# Patient Record
Sex: FEMALE | Race: WHITE | HISPANIC OR LATINO
[De-identification: names, ages, dates, MRNs, and addresses within clinical notes are randomized per-mention and may not be internally consistent; named-entity substitution may affect disease eponyms.]

---

## 2017-01-05 ENCOUNTER — APPOINTMENT (OUTPATIENT)
Dept: ORTHOPEDIC SURGERY | Facility: CLINIC | Age: 82
End: 2017-01-05

## 2017-01-05 VITALS — SYSTOLIC BLOOD PRESSURE: 143 MMHG | HEART RATE: 69 BPM | DIASTOLIC BLOOD PRESSURE: 81 MMHG

## 2017-01-05 VITALS — BODY MASS INDEX: 23.6 KG/M2 | WEIGHT: 125 LBS | HEIGHT: 61 IN

## 2017-01-05 DIAGNOSIS — Z60.2 PROBLEMS RELATED TO LIVING ALONE: ICD-10-CM

## 2017-01-05 DIAGNOSIS — Z78.9 OTHER SPECIFIED HEALTH STATUS: ICD-10-CM

## 2017-01-05 SDOH — SOCIAL STABILITY - SOCIAL INSECURITY: PROBLEMS RELATED TO LIVING ALONE: Z60.2

## 2017-03-12 ENCOUNTER — INPATIENT (INPATIENT)
Facility: HOSPITAL | Age: 82
LOS: 3 days | Discharge: HOME | DRG: 392 | End: 2017-03-16
Attending: INTERNAL MEDICINE | Admitting: INTERNAL MEDICINE
Payer: MEDICARE

## 2017-03-12 VITALS
DIASTOLIC BLOOD PRESSURE: 88 MMHG | HEART RATE: 84 BPM | SYSTOLIC BLOOD PRESSURE: 146 MMHG | TEMPERATURE: 98 F | RESPIRATION RATE: 18 BRPM

## 2017-03-12 DIAGNOSIS — K57.32 DIVERTICULITIS OF LARGE INTESTINE WITHOUT PERFORATION OR ABSCESS WITHOUT BLEEDING: ICD-10-CM

## 2017-03-12 LAB
ALBUMIN SERPL ELPH-MCNC: 4.6 G/DL — SIGNIFICANT CHANGE UP (ref 3.3–5)
ALP SERPL-CCNC: 112 U/L — SIGNIFICANT CHANGE UP (ref 40–120)
ALT FLD-CCNC: 16 U/L RC — SIGNIFICANT CHANGE UP (ref 10–45)
ANION GAP SERPL CALC-SCNC: 15 MMOL/L — SIGNIFICANT CHANGE UP (ref 5–17)
APPEARANCE UR: CLEAR — SIGNIFICANT CHANGE UP
APTT BLD: 27.9 SEC — SIGNIFICANT CHANGE UP (ref 27.5–37.4)
AST SERPL-CCNC: 17 U/L — SIGNIFICANT CHANGE UP (ref 10–40)
BACTERIA # UR AUTO: ABNORMAL /HPF
BASE EXCESS BLDV CALC-SCNC: 3.4 MMOL/L — HIGH (ref -2–2)
BASOPHILS # BLD AUTO: 0 K/UL — SIGNIFICANT CHANGE UP (ref 0–0.2)
BASOPHILS NFR BLD AUTO: 0.3 % — SIGNIFICANT CHANGE UP (ref 0–2)
BILIRUB SERPL-MCNC: 0.8 MG/DL — SIGNIFICANT CHANGE UP (ref 0.2–1.2)
BILIRUB UR-MCNC: NEGATIVE — SIGNIFICANT CHANGE UP
BUN SERPL-MCNC: 19 MG/DL — SIGNIFICANT CHANGE UP (ref 7–23)
CA-I SERPL-SCNC: 1.29 MMOL/L — SIGNIFICANT CHANGE UP (ref 1.12–1.3)
CALCIUM SERPL-MCNC: 9.8 MG/DL — SIGNIFICANT CHANGE UP (ref 8.4–10.5)
CHLORIDE BLDV-SCNC: 102 MMOL/L — SIGNIFICANT CHANGE UP (ref 96–108)
CHLORIDE SERPL-SCNC: 101 MMOL/L — SIGNIFICANT CHANGE UP (ref 96–108)
CO2 BLDV-SCNC: 33 MMOL/L — HIGH (ref 22–30)
CO2 SERPL-SCNC: 27 MMOL/L — SIGNIFICANT CHANGE UP (ref 22–31)
COLOR SPEC: YELLOW — SIGNIFICANT CHANGE UP
CREAT SERPL-MCNC: 0.61 MG/DL — SIGNIFICANT CHANGE UP (ref 0.5–1.3)
DIFF PNL FLD: NEGATIVE — SIGNIFICANT CHANGE UP
EOSINOPHIL # BLD AUTO: 0.1 K/UL — SIGNIFICANT CHANGE UP (ref 0–0.5)
EOSINOPHIL NFR BLD AUTO: 0.5 % — SIGNIFICANT CHANGE UP (ref 0–6)
EPI CELLS # UR: SIGNIFICANT CHANGE UP /HPF
GAS PNL BLDV: 144 MMOL/L — SIGNIFICANT CHANGE UP (ref 136–145)
GAS PNL BLDV: SIGNIFICANT CHANGE UP
GAS PNL BLDV: SIGNIFICANT CHANGE UP
GLUCOSE BLDV-MCNC: 115 MG/DL — HIGH (ref 70–99)
GLUCOSE SERPL-MCNC: 112 MG/DL — HIGH (ref 70–99)
GLUCOSE UR QL: NEGATIVE — SIGNIFICANT CHANGE UP
HCO3 BLDV-SCNC: 31 MMOL/L — HIGH (ref 21–29)
HCT VFR BLD CALC: 42.7 % — SIGNIFICANT CHANGE UP (ref 34.5–45)
HCT VFR BLDA CALC: 44 % — SIGNIFICANT CHANGE UP (ref 39–50)
HGB BLD CALC-MCNC: 14.3 G/DL — SIGNIFICANT CHANGE UP (ref 11.5–15.5)
HGB BLD-MCNC: 13.7 G/DL — SIGNIFICANT CHANGE UP (ref 11.5–15.5)
HOROWITZ INDEX BLDV+IHG-RTO: SIGNIFICANT CHANGE UP
HYALINE CASTS # UR AUTO: ABNORMAL
INR BLD: 1 RATIO — SIGNIFICANT CHANGE UP (ref 0.88–1.16)
KETONES UR-MCNC: ABNORMAL
LACTATE BLDV-MCNC: 1.3 MMOL/L — SIGNIFICANT CHANGE UP (ref 0.7–2)
LEUKOCYTE ESTERASE UR-ACNC: ABNORMAL
LIDOCAIN IGE QN: 20 U/L — SIGNIFICANT CHANGE UP (ref 7–60)
LYMPHOCYTES # BLD AUTO: 15.2 % — SIGNIFICANT CHANGE UP (ref 13–44)
LYMPHOCYTES # BLD AUTO: 2 K/UL — SIGNIFICANT CHANGE UP (ref 1–3.3)
MCHC RBC-ENTMCNC: 29 PG — SIGNIFICANT CHANGE UP (ref 27–34)
MCHC RBC-ENTMCNC: 32.1 GM/DL — SIGNIFICANT CHANGE UP (ref 32–36)
MCV RBC AUTO: 90.4 FL — SIGNIFICANT CHANGE UP (ref 80–100)
MONOCYTES # BLD AUTO: 1.2 K/UL — HIGH (ref 0–0.9)
MONOCYTES NFR BLD AUTO: 9.2 % — SIGNIFICANT CHANGE UP (ref 2–14)
NEUTROPHILS # BLD AUTO: 10 K/UL — HIGH (ref 1.8–7.4)
NEUTROPHILS NFR BLD AUTO: 74.8 % — SIGNIFICANT CHANGE UP (ref 43–77)
NITRITE UR-MCNC: NEGATIVE — SIGNIFICANT CHANGE UP
PCO2 BLDV: 63 MMHG — HIGH (ref 35–50)
PH BLDV: 7.32 — LOW (ref 7.35–7.45)
PH UR: 5.5 — SIGNIFICANT CHANGE UP (ref 4.8–8)
PLATELET # BLD AUTO: 294 K/UL — SIGNIFICANT CHANGE UP (ref 150–400)
PO2 BLDV: 30 MMHG — SIGNIFICANT CHANGE UP (ref 25–45)
POTASSIUM BLDV-SCNC: 3.8 MMOL/L — SIGNIFICANT CHANGE UP (ref 3.5–5)
POTASSIUM SERPL-MCNC: 3.7 MMOL/L — SIGNIFICANT CHANGE UP (ref 3.5–5.3)
POTASSIUM SERPL-SCNC: 3.7 MMOL/L — SIGNIFICANT CHANGE UP (ref 3.5–5.3)
PROT SERPL-MCNC: 7.9 G/DL — SIGNIFICANT CHANGE UP (ref 6–8.3)
PROT UR-MCNC: SIGNIFICANT CHANGE UP
PROTHROM AB SERPL-ACNC: 10.9 SEC — SIGNIFICANT CHANGE UP (ref 10–13.1)
RBC # BLD: 4.72 M/UL — SIGNIFICANT CHANGE UP (ref 3.8–5.2)
RBC # FLD: 13.1 % — SIGNIFICANT CHANGE UP (ref 10.3–14.5)
RBC CASTS # UR COMP ASSIST: SIGNIFICANT CHANGE UP /HPF (ref 0–2)
SAO2 % BLDV: 47 % — LOW (ref 67–88)
SODIUM SERPL-SCNC: 143 MMOL/L — SIGNIFICANT CHANGE UP (ref 135–145)
SP GR SPEC: 1.02 — SIGNIFICANT CHANGE UP (ref 1.01–1.02)
UROBILINOGEN FLD QL: NEGATIVE — SIGNIFICANT CHANGE UP
WBC # BLD: 13.3 K/UL — HIGH (ref 3.8–10.5)
WBC # FLD AUTO: 13.3 K/UL — HIGH (ref 3.8–10.5)
WBC UR QL: SIGNIFICANT CHANGE UP /HPF (ref 0–5)

## 2017-03-12 PROCEDURE — 99284 EMERGENCY DEPT VISIT MOD MDM: CPT

## 2017-03-12 PROCEDURE — 74176 CT ABD & PELVIS W/O CONTRAST: CPT | Mod: 26

## 2017-03-12 RX ORDER — METRONIDAZOLE 500 MG
500 TABLET ORAL ONCE
Qty: 0 | Refills: 0 | Status: COMPLETED | OUTPATIENT
Start: 2017-03-12 | End: 2017-03-12

## 2017-03-12 RX ORDER — ACETAMINOPHEN 500 MG
1000 TABLET ORAL ONCE
Qty: 0 | Refills: 0 | Status: COMPLETED | OUTPATIENT
Start: 2017-03-12 | End: 2017-03-12

## 2017-03-12 RX ORDER — SODIUM CHLORIDE 9 MG/ML
1000 INJECTION INTRAMUSCULAR; INTRAVENOUS; SUBCUTANEOUS ONCE
Qty: 0 | Refills: 0 | Status: COMPLETED | OUTPATIENT
Start: 2017-03-12 | End: 2017-03-12

## 2017-03-12 RX ORDER — SODIUM CHLORIDE 9 MG/ML
3 INJECTION INTRAMUSCULAR; INTRAVENOUS; SUBCUTANEOUS ONCE
Qty: 0 | Refills: 0 | Status: COMPLETED | OUTPATIENT
Start: 2017-03-12 | End: 2017-03-12

## 2017-03-12 RX ORDER — CIPROFLOXACIN LACTATE 400MG/40ML
400 VIAL (ML) INTRAVENOUS ONCE
Qty: 0 | Refills: 0 | Status: COMPLETED | OUTPATIENT
Start: 2017-03-12 | End: 2017-03-12

## 2017-03-12 RX ADMIN — Medication 100 MILLIGRAM(S): at 21:10

## 2017-03-12 RX ADMIN — SODIUM CHLORIDE 1000 MILLILITER(S): 9 INJECTION INTRAMUSCULAR; INTRAVENOUS; SUBCUTANEOUS at 18:32

## 2017-03-12 RX ADMIN — Medication 200 MILLIGRAM(S): at 21:10

## 2017-03-12 RX ADMIN — SODIUM CHLORIDE 3 MILLILITER(S): 9 INJECTION INTRAMUSCULAR; INTRAVENOUS; SUBCUTANEOUS at 18:32

## 2017-03-12 NOTE — ED PROVIDER NOTE - OBJECTIVE STATEMENT
82 year old female with pmhx of gerd, diverticulosis, prior stroke with no residual defects, asthma presents to the ER with 82 year old female with pmhx of gerd, diverticulosis, prior stroke with no residual defects, asthma presents to the ER with left lower abdominal pain x few days worsening today, with nausea, no vomiting. No diarrhea. Patient has had multiple episodes of loose stools today.  No blood in stool. No cp, no sob.

## 2017-03-12 NOTE — ED PROVIDER NOTE - MEDICAL DECISION MAKING DETAILS
Kyle: 82 year old female with abdominal pain, history of diverticulosis, with llq pain, multiple episodes of loose stools. Patient declined pain medications. will get labs, ivf, ct a/p, reassess

## 2017-03-12 NOTE — ED ADULT NURSE REASSESSMENT NOTE - NS ED NURSE REASSESS COMMENT FT1
Received report from RICKIE wasserman. Safety checked. No c/o pain or discomfort at this time. Comfort care provided. Pt encouraged to call for assist when needed. pending ct results; pt anxious appearing, instructed of plan of care

## 2017-03-12 NOTE — ED ADULT NURSE NOTE - OBJECTIVE STATEMENT
Pt presents to the ER A+Ox3 hx of diverticulosis; pt is visibly anxious; complaining of left lower quadrant abdominal pain, increased frequency of formed stool bowel movements, and decreased PO intake x2 days. Pt denies fever, chills, nausea, vomiting, diarrhea.

## 2017-03-12 NOTE — ED PROVIDER NOTE - PROGRESS NOTE DETAILS
Patient refused oral contrast. Declines pre-medication. Spoke with Dr. Troy about results, recommends admission at this time. Patient agrees.

## 2017-03-13 DIAGNOSIS — Z29.9 ENCOUNTER FOR PROPHYLACTIC MEASURES, UNSPECIFIED: ICD-10-CM

## 2017-03-13 DIAGNOSIS — J45.909 UNSPECIFIED ASTHMA, UNCOMPLICATED: ICD-10-CM

## 2017-03-13 DIAGNOSIS — K21.9 GASTRO-ESOPHAGEAL REFLUX DISEASE WITHOUT ESOPHAGITIS: ICD-10-CM

## 2017-03-13 DIAGNOSIS — I63.9 CEREBRAL INFARCTION, UNSPECIFIED: ICD-10-CM

## 2017-03-13 DIAGNOSIS — K57.32 DIVERTICULITIS OF LARGE INTESTINE WITHOUT PERFORATION OR ABSCESS WITHOUT BLEEDING: ICD-10-CM

## 2017-03-13 LAB
ANION GAP SERPL CALC-SCNC: 13 MMOL/L — SIGNIFICANT CHANGE UP (ref 5–17)
BASOPHILS # BLD AUTO: 0 K/UL — SIGNIFICANT CHANGE UP (ref 0–0.2)
BASOPHILS NFR BLD AUTO: 0.2 % — SIGNIFICANT CHANGE UP (ref 0–2)
BUN SERPL-MCNC: 13 MG/DL — SIGNIFICANT CHANGE UP (ref 7–23)
CALCIUM SERPL-MCNC: 9.1 MG/DL — SIGNIFICANT CHANGE UP (ref 8.4–10.5)
CHLORIDE SERPL-SCNC: 103 MMOL/L — SIGNIFICANT CHANGE UP (ref 96–108)
CO2 SERPL-SCNC: 27 MMOL/L — SIGNIFICANT CHANGE UP (ref 22–31)
CREAT SERPL-MCNC: 0.54 MG/DL — SIGNIFICANT CHANGE UP (ref 0.5–1.3)
EOSINOPHIL # BLD AUTO: 0.1 K/UL — SIGNIFICANT CHANGE UP (ref 0–0.5)
EOSINOPHIL NFR BLD AUTO: 0.7 % — SIGNIFICANT CHANGE UP (ref 0–6)
GLUCOSE SERPL-MCNC: 147 MG/DL — HIGH (ref 70–99)
HCT VFR BLD CALC: 38.6 % — SIGNIFICANT CHANGE UP (ref 34.5–45)
HGB BLD-MCNC: 12.7 G/DL — SIGNIFICANT CHANGE UP (ref 11.5–15.5)
LYMPHOCYTES # BLD AUTO: 1.5 K/UL — SIGNIFICANT CHANGE UP (ref 1–3.3)
LYMPHOCYTES # BLD AUTO: 14.6 % — SIGNIFICANT CHANGE UP (ref 13–44)
MAGNESIUM SERPL-MCNC: 1.8 MG/DL — SIGNIFICANT CHANGE UP (ref 1.6–2.6)
MCHC RBC-ENTMCNC: 29.3 PG — SIGNIFICANT CHANGE UP (ref 27–34)
MCHC RBC-ENTMCNC: 32.8 GM/DL — SIGNIFICANT CHANGE UP (ref 32–36)
MCV RBC AUTO: 89.3 FL — SIGNIFICANT CHANGE UP (ref 80–100)
MONOCYTES # BLD AUTO: 0.9 K/UL — SIGNIFICANT CHANGE UP (ref 0–0.9)
MONOCYTES NFR BLD AUTO: 8.3 % — SIGNIFICANT CHANGE UP (ref 2–14)
NEUTROPHILS # BLD AUTO: 7.9 K/UL — HIGH (ref 1.8–7.4)
NEUTROPHILS NFR BLD AUTO: 76.2 % — SIGNIFICANT CHANGE UP (ref 43–77)
PLATELET # BLD AUTO: 242 K/UL — SIGNIFICANT CHANGE UP (ref 150–400)
POTASSIUM SERPL-MCNC: 3.9 MMOL/L — SIGNIFICANT CHANGE UP (ref 3.5–5.3)
POTASSIUM SERPL-SCNC: 3.9 MMOL/L — SIGNIFICANT CHANGE UP (ref 3.5–5.3)
RBC # BLD: 4.32 M/UL — SIGNIFICANT CHANGE UP (ref 3.8–5.2)
RBC # FLD: 13 % — SIGNIFICANT CHANGE UP (ref 10.3–14.5)
SODIUM SERPL-SCNC: 143 MMOL/L — SIGNIFICANT CHANGE UP (ref 135–145)
WBC # BLD: 10.3 K/UL — SIGNIFICANT CHANGE UP (ref 3.8–10.5)
WBC # FLD AUTO: 10.3 K/UL — SIGNIFICANT CHANGE UP (ref 3.8–10.5)

## 2017-03-13 PROCEDURE — 99222 1ST HOSP IP/OBS MODERATE 55: CPT

## 2017-03-13 PROCEDURE — 99223 1ST HOSP IP/OBS HIGH 75: CPT

## 2017-03-13 RX ORDER — LACTOBACILLUS ACIDOPHILUS 100MM CELL
1 CAPSULE ORAL DAILY
Qty: 0 | Refills: 0 | Status: DISCONTINUED | OUTPATIENT
Start: 2017-03-13 | End: 2017-03-16

## 2017-03-13 RX ORDER — ERTAPENEM SODIUM 1 G/1
500 INJECTION, POWDER, LYOPHILIZED, FOR SOLUTION INTRAMUSCULAR; INTRAVENOUS ONCE
Qty: 0 | Refills: 0 | Status: COMPLETED | OUTPATIENT
Start: 2017-03-13 | End: 2017-03-13

## 2017-03-13 RX ORDER — CIPROFLOXACIN LACTATE 400MG/40ML
400 VIAL (ML) INTRAVENOUS EVERY 12 HOURS
Qty: 0 | Refills: 0 | Status: DISCONTINUED | OUTPATIENT
Start: 2017-03-13 | End: 2017-03-13

## 2017-03-13 RX ORDER — ASPIRIN/CALCIUM CARB/MAGNESIUM 324 MG
81 TABLET ORAL DAILY
Qty: 0 | Refills: 0 | Status: DISCONTINUED | OUTPATIENT
Start: 2017-03-13 | End: 2017-03-16

## 2017-03-13 RX ORDER — ERTAPENEM SODIUM 1 G/1
500 INJECTION, POWDER, LYOPHILIZED, FOR SOLUTION INTRAMUSCULAR; INTRAVENOUS EVERY 24 HOURS
Qty: 0 | Refills: 0 | Status: DISCONTINUED | OUTPATIENT
Start: 2017-03-14 | End: 2017-03-16

## 2017-03-13 RX ORDER — ERTAPENEM SODIUM 1 G/1
INJECTION, POWDER, LYOPHILIZED, FOR SOLUTION INTRAMUSCULAR; INTRAVENOUS
Qty: 0 | Refills: 0 | Status: DISCONTINUED | OUTPATIENT
Start: 2017-03-14 | End: 2017-03-16

## 2017-03-13 RX ORDER — ACETAMINOPHEN 500 MG
650 TABLET ORAL EVERY 6 HOURS
Qty: 0 | Refills: 0 | Status: DISCONTINUED | OUTPATIENT
Start: 2017-03-13 | End: 2017-03-16

## 2017-03-13 RX ORDER — METRONIDAZOLE 500 MG
500 TABLET ORAL EVERY 8 HOURS
Qty: 0 | Refills: 0 | Status: DISCONTINUED | OUTPATIENT
Start: 2017-03-13 | End: 2017-03-13

## 2017-03-13 RX ADMIN — Medication 1 TABLET(S): at 12:01

## 2017-03-13 RX ADMIN — Medication 200 MILLIGRAM(S): at 09:47

## 2017-03-13 RX ADMIN — Medication 81 MILLIGRAM(S): at 12:01

## 2017-03-13 RX ADMIN — Medication 100 MILLIGRAM(S): at 06:37

## 2017-03-13 NOTE — PROVIDER CONTACT NOTE (MEDICATION) - ASSESSMENT
VSS. pt denies SOB, chest pain, itchiness, hives, redness, trouble breathing. pt a&ox4. previous to administration, pt ambulating in room &bathroom. pt pulled out IV r/t anxiety. pt screaming wanting to speak to MD.

## 2017-03-13 NOTE — H&P ADULT. - FAMILY HISTORY
Father  Still living? Unknown  Family history of amyotrophic lateral sclerosis, Age at diagnosis: Age Unknown     Child  Still living? Unknown  Family history of multiple sclerosis, Age at diagnosis: Age Unknown

## 2017-03-13 NOTE — PROVIDER CONTACT NOTE (MEDICATION) - BACKGROUND
pt admitted with diverticulitis. pt with NKDA, only allergy to IV contrast. pt received dose of flagyl in ED. pt admitted with diverticulitis. pt with NKDA, only allergy to IV contrast. pt received dose of flagyl in ED with no issues.

## 2017-03-13 NOTE — H&P ADULT. - PMH
Asthma    Cerebrovascular accident (CVA), unspecified mechanism    Diverticulosis of intestine    GERD (gastroesophageal reflux disease)

## 2017-03-13 NOTE — H&P ADULT. - HISTORY OF PRESENT ILLNESS
82F w/ diverticulosis, GERD, reportedly CVA w/ no residual deficits, asthma p/w abd pain x3 days. Pt states 3 days ago she noticed some mild abd pain in her LLQ which worsened with movement. The pain worsened over the past 2 days and today she decided to come get evaluated for the pain. Pt denies nausea, vomiting, diarrhea. Pt has been able to tolerate PO well including solid food last night and a sandwich in the ER without worsening of symptoms. She denies fevers, chills. Pain is worsened with movement or palpation.

## 2017-03-13 NOTE — H&P ADULT. - PROBLEM SELECTOR PLAN 1
CT abd/pelvis with evidence of diverticulitis. Appreciate ER note regarding GI Scorch. Pt still able to tolerate PO. Unable to tolerate IV contrast.  -Cont. IV cipro/flagyl  -Cont. mechanical soft diet for now, advance as tolerated  -F/u GI recommendations  -Tylenol PRN pain control CT abd/pelvis with evidence of diverticulitis. Appreciate ER note regarding GI Scorch. Pt still able to tolerate PO. Unable to tolerate IV contrast.  -Cont. IV cipro/ flagyl  -Cont. mechanical soft diet for now, advance as tolerated  -F/u GI recommendations  -Tylenol PRN pain control  -Patient should follow up with her outpatient physician regarding abnormalities and possible mass seen on her CT abd/pelvis

## 2017-03-13 NOTE — PROVIDER CONTACT NOTE (MEDICATION) - ACTION/TREATMENT ORDERED:
provider aware. NP stated day time Np would be up to see pt within the next few minutes. infusion stopped immediately. will continue to monitor pt.

## 2017-03-13 NOTE — H&P ADULT. - ASSESSMENT
82F w/ diverticulosis, GERD, reportedly CVA w/ no residual deficits, asthma p/w abd pain x3 days and found to have diverticulitis

## 2017-03-14 LAB
CANCER AG125 SERPL-ACNC: 8 U/ML — SIGNIFICANT CHANGE UP
HCT VFR BLD CALC: 38 % — SIGNIFICANT CHANGE UP (ref 34.5–45)
HGB BLD-MCNC: 12.4 G/DL — SIGNIFICANT CHANGE UP (ref 11.5–15.5)
MCHC RBC-ENTMCNC: 29.5 PG — SIGNIFICANT CHANGE UP (ref 27–34)
MCHC RBC-ENTMCNC: 32.7 GM/DL — SIGNIFICANT CHANGE UP (ref 32–36)
MCV RBC AUTO: 90.2 FL — SIGNIFICANT CHANGE UP (ref 80–100)
PLATELET # BLD AUTO: 234 K/UL — SIGNIFICANT CHANGE UP (ref 150–400)
RBC # BLD: 4.21 M/UL — SIGNIFICANT CHANGE UP (ref 3.8–5.2)
RBC # FLD: 13 % — SIGNIFICANT CHANGE UP (ref 10.3–14.5)
WBC # BLD: 9.2 K/UL — SIGNIFICANT CHANGE UP (ref 3.8–10.5)
WBC # FLD AUTO: 9.2 K/UL — SIGNIFICANT CHANGE UP (ref 3.8–10.5)

## 2017-03-14 PROCEDURE — 99232 SBSQ HOSP IP/OBS MODERATE 35: CPT

## 2017-03-14 PROCEDURE — 76856 US EXAM PELVIC COMPLETE: CPT | Mod: 26

## 2017-03-14 PROCEDURE — 76830 TRANSVAGINAL US NON-OB: CPT | Mod: 26

## 2017-03-14 RX ADMIN — Medication 81 MILLIGRAM(S): at 12:37

## 2017-03-14 RX ADMIN — ERTAPENEM SODIUM 100 MILLIGRAM(S): 1 INJECTION, POWDER, LYOPHILIZED, FOR SOLUTION INTRAMUSCULAR; INTRAVENOUS at 09:41

## 2017-03-14 RX ADMIN — Medication 1 TABLET(S): at 12:37

## 2017-03-14 NOTE — PROVIDER CONTACT NOTE (OTHER) - ASSESSMENT
Pt A&Ox4, VSS, no present s/s of anaphylactic or rash considered an allergic reaction, refusing all abx and demanding an alternate plan of care

## 2017-03-14 NOTE — PROVIDER CONTACT NOTE (OTHER) - SITUATION
Pt refusing all Abx, states that she is a "highly allergic person" and she cannot take the antibiotics because she gets itchy at her IV site and is unable.

## 2017-03-14 NOTE — PROVIDER CONTACT NOTE (OTHER) - ACTION/TREATMENT ORDERED:
As per Keisha Galindo NP, the prescribed Abx is the current course of treatment and no changes to be made at this time

## 2017-03-15 LAB
ANION GAP SERPL CALC-SCNC: 12 MMOL/L — SIGNIFICANT CHANGE UP (ref 5–17)
BUN SERPL-MCNC: 15 MG/DL — SIGNIFICANT CHANGE UP (ref 7–23)
CALCIUM SERPL-MCNC: 9.6 MG/DL — SIGNIFICANT CHANGE UP (ref 8.4–10.5)
CHLORIDE SERPL-SCNC: 103 MMOL/L — SIGNIFICANT CHANGE UP (ref 96–108)
CO2 SERPL-SCNC: 30 MMOL/L — SIGNIFICANT CHANGE UP (ref 22–31)
CREAT SERPL-MCNC: 0.66 MG/DL — SIGNIFICANT CHANGE UP (ref 0.5–1.3)
GLUCOSE SERPL-MCNC: 144 MG/DL — HIGH (ref 70–99)
HCT VFR BLD CALC: 41.5 % — SIGNIFICANT CHANGE UP (ref 34.5–45)
HGB BLD-MCNC: 13.6 G/DL — SIGNIFICANT CHANGE UP (ref 11.5–15.5)
MAGNESIUM SERPL-MCNC: 2 MG/DL — SIGNIFICANT CHANGE UP (ref 1.6–2.6)
MCHC RBC-ENTMCNC: 29.8 PG — SIGNIFICANT CHANGE UP (ref 27–34)
MCHC RBC-ENTMCNC: 32.9 GM/DL — SIGNIFICANT CHANGE UP (ref 32–36)
MCV RBC AUTO: 90.7 FL — SIGNIFICANT CHANGE UP (ref 80–100)
PLATELET # BLD AUTO: 265 K/UL — SIGNIFICANT CHANGE UP (ref 150–400)
POTASSIUM SERPL-MCNC: 4.9 MMOL/L — SIGNIFICANT CHANGE UP (ref 3.5–5.3)
POTASSIUM SERPL-SCNC: 4.9 MMOL/L — SIGNIFICANT CHANGE UP (ref 3.5–5.3)
RBC # BLD: 4.57 M/UL — SIGNIFICANT CHANGE UP (ref 3.8–5.2)
RBC # FLD: 13.3 % — SIGNIFICANT CHANGE UP (ref 10.3–14.5)
SODIUM SERPL-SCNC: 145 MMOL/L — SIGNIFICANT CHANGE UP (ref 135–145)
WBC # BLD: 6.8 K/UL — SIGNIFICANT CHANGE UP (ref 3.8–10.5)
WBC # FLD AUTO: 6.8 K/UL — SIGNIFICANT CHANGE UP (ref 3.8–10.5)

## 2017-03-15 PROCEDURE — 99232 SBSQ HOSP IP/OBS MODERATE 35: CPT

## 2017-03-15 RX ORDER — LACTOBACILLUS ACIDOPHILUS 100MM CELL
0 CAPSULE ORAL
Qty: 0 | Refills: 0 | DISCHARGE
Start: 2017-03-15

## 2017-03-15 RX ADMIN — ERTAPENEM SODIUM 100 MILLIGRAM(S): 1 INJECTION, POWDER, LYOPHILIZED, FOR SOLUTION INTRAMUSCULAR; INTRAVENOUS at 20:58

## 2017-03-15 RX ADMIN — Medication 81 MILLIGRAM(S): at 12:20

## 2017-03-15 RX ADMIN — Medication 1 TABLET(S): at 12:20

## 2017-03-15 NOTE — DISCHARGE NOTE ADULT - INSTRUCTIONS
low fiber, avoid nuts / seeds (diet for diverticulosis) instructed pt on importance of remaining hydrated, not skipping meals, low fiber diet, and to call md for f/u appt. pt verb understanding of all instructions,

## 2017-03-15 NOTE — DISCHARGE NOTE ADULT - PLAN OF CARE
resolved Complete antibiotics as ordered  Follow up with your gastroenterologist follow up with your GYN

## 2017-03-15 NOTE — DISCHARGE NOTE ADULT - HOSPITAL COURSE
to be completed by attending physician 82 year old female with pmhx of gerd, diverticulosis, prior stroke with no residual defects, asthma presents to the ER with left lower abdominal pain x few days worsening today, with nausea, no vomiting. 82 year old female with pmhx of gerd, diverticulosis, prior stroke with no residual defects, asthma presents to the ER with left lower abdominal pain x few days worsening today, with nausea, no vomiting.   Diverticulosis: receiced cipro and flagyl in the ED  Ph 7.32, PCO2 63, hx of asthma, patient asymptomatic, ABG ordered, F/U results  3/13- Pelvic US Eval ovaries(   )  3/14: cystic lesion - left ovary (seen on abd / pelvis CT 3/12)  	- Transvaginal ultrasound   	- l Gyn consult inpt vs outpt/pt has own gyn sees tearly  3/15: ID (Dr Brooks) is OK with discharging patient on oral abxs (Augmentin 875) - BUT 	needs to be cleared with GI (Dr Troy) and for how many days    	-  cleared with GI -/for out-patient GYN 			follow-up / work-up for ? ovarian lesion    ca 125 neg

## 2017-03-15 NOTE — DISCHARGE NOTE ADULT - ADDITIONAL INSTRUCTIONS
Make appointments to follow up with your physician(s) including your gastroenterologist.   Make an appointment to see your gynecologist for follow up  Bring all discharge paperwork including discharge medication list to follow up appointments. Make appointments to follow up with your physician(s) including your gastroenterologist.   Make an appointment to see your gynecologist for follow up with abnormal ultrasound of your pelvis   Bring all discharge paperwork including discharge medication list to follow up appointments.

## 2017-03-15 NOTE — DISCHARGE NOTE ADULT - FINDINGS/TREATMENT
3/12/17: CT Scan - Abdomen and Pelvis: LOWER CHEST: Centrilobular emphysema. Subsegmental atelectasis. Small triangular opacities in the left lower lobe (3:9-10 and 6:23-24), which may represent vessels versus intrapulmonary lymph nodes.   LIVER: Multiple cysts measuring up to 2.7 x 2.3 cm. Scattered subcentimeter hypodense foci, too small to characterize.  BILE DUCTS: Normal caliber.  GALLBLADDER: No radiopaque gallstone.  SPLEEN: Within normal limits.  PANCREAS: Mild fatty atrophy.  ADRENALS: Diffuse bilateral adrenal thickening, which may represent hyperplasia.  KIDNEYS/URETERS: No hydronephrosis, hydroureter or significant perinephric stranding. No radiopaque stone in the urinary tract. A 1.3 x 0.8 cm fat-containing lesion in the left interpolar region, likely   angiomyolipoma.  BLADDER: Partially distended.  REPRODUCTIVE ORGANS: Retroverted/retroflexed uterus. A 3.0 x 2.3 cm cystic lesion in the left adnexa. Grossly unremarkable right adnexa.  BOWEL: No bowel obstruction. Unremarkable appendix. Colon diverticulosis. Distal ascending colon wall thickening and peridiverticular inflammatory change, indicating diverticulitis.  PERITONEUM: No free air or drainable fluid collection.  VESSELS:  Atherosclerotic change of the aorta and its branches.  RETROPERITONEUM: No lymphadenopathy.    ABDOMINAL WALL: Small fat-containing umbilical hernia.  BONES: Degenerative changes of the spine, pubic symphysis and sacroiliac joints. Severe right and mild-to-moderate left hip osteoarthrosis. Age-indeterminate L3 compression fracture with moderate vertebral height loss and mild retropulsion. Nonspecific small sclerotic focus at L2 vertebral body. 3/14/17: Pelvic Ultrasound: INTERPRETATION:  CLINICAL INFORMATION: Left lower quadrant pain x4 days.   Evaluate ovarian cyst seen on CT from 3/12/2017.. LMP: Postmenopausal  Ultrasonography of the pelvis was performed transabdominally and endovaginally using gray scale and color spectral doppler imaging. Endovaginal technique was added to better evaluate the ovaries.  COMPARISON: CT abdomen 3/12/2017  The uterus is retroverted and measures 5.0 x 2.6 x 3.8 cm.  The uterus is normal in appearance.   The endometrium measures 5 mm.      The bilateral ovaries were not visualized. The patient was unable to complete the exam secondary to discomfort and confusion.  There is no free fluid.

## 2017-03-15 NOTE — DISCHARGE NOTE ADULT - CARE PROVIDER_API CALL
Evelio Troy (DO), Gastroenterology; Internal Medicine  [address] [place] [address]  Phone: [phone]  Fax: [phone] Evelio Troy (DO), Gastroenterology; Internal Medicine  [address]  [place] [address]  Phone: [phone]  Fax: [phone]    Walker Andrews), Obstetrics and Gynecology  [address]  [place] [address]  Phone: [phone]  Fax: [phone]    Juwan Mahoney), Internal Medicine  [address] [place] [address]  Phone: [phone]  Fax: [phone]

## 2017-03-15 NOTE — DISCHARGE NOTE ADULT - CARE PROVIDERS DIRECT ADDRESSES
[email or web address],DirectAddress_Unknown

## 2017-03-15 NOTE — DISCHARGE NOTE ADULT - MEDICATION SUMMARY - MEDICATIONS TO TAKE
I will START or STAY ON the medications listed below when I get home from the hospital:    Aspirin Low Dose 81 mg oral delayed release tablet  -- 1 tab(s) by mouth once a day  -- Indication: For Cerebrovascular accident (CVA), unspecified mechanism    Augmentin 875 mg-125 mg oral tablet  -- 1 tab(s) by mouth every 12 hours  -- Finish all this medication unless otherwise directed by prescriber.  Take with food or milk.    -- Indication: For Diverticulitis of large intestine without perforation or abscess without bleeding    lactobacillus acidophilus oral capsule  --  by mouth   -- Indication: For Diverticulitis of large intestine without perforation or abscess without bleeding

## 2017-03-15 NOTE — PROVIDER CONTACT NOTE (OTHER) - RECOMMENDATIONS
Notify HCP, notify Day RN during morning report in case patient changes her mind and consents to labs being drawn.

## 2017-03-15 NOTE — DISCHARGE NOTE ADULT - OTHER SIGNIFICANT FINDINGS
3/12/17: CT Abdomen and pelvis: A 3.0 x 2.3 cm cystic lesion in the left adnexa. Neoplasm cannot be   excluded, especially in a postmenopausal patient. Recommend clinical   correlation and follow-up

## 2017-03-15 NOTE — PROVIDER CONTACT NOTE (OTHER) - ACTION/TREATMENT ORDERED:
As per KYRA Whitman, leave the AM labs for phlebotomy in case the patient changes her mind, inform next RN during report that patient refused and have next RN offer for labs to be drawn.

## 2017-03-15 NOTE — DISCHARGE NOTE ADULT - CARE PLAN
Principal Discharge DX:	Diverticulitis of large intestine without perforation or abscess without bleeding  Goal:	resolved  Instructions for follow-up, activity and diet:	Complete antibiotics as ordered  Follow up with your gastroenterologist Principal Discharge DX:	Diverticulitis of large intestine without perforation or abscess without bleeding  Goal:	resolved  Instructions for follow-up, activity and diet:	Complete antibiotics as ordered  Follow up with your gastroenterologist  Secondary Diagnosis:	Abnormal ultrasound of pelvis  Instructions for follow-up, activity and diet:	follow up with your GYN

## 2017-03-15 NOTE — DISCHARGE NOTE ADULT - CONDITIONS AT DISCHARGE
stable, tolerating diet, tolerating abx treatment, safety maintained, no c/o pain at this time. piv removed. voiding well. ambulating independently. ready for discharge.

## 2017-03-15 NOTE — DISCHARGE NOTE ADULT - PATIENT PORTAL LINK FT
“You can access the Follow[facility] Patient Portal, offered by [facility], by registering with the following website: [email or web address]”

## 2017-03-15 NOTE — PROVIDER CONTACT NOTE (OTHER) - ASSESSMENT
Pt is A&Ox4, no complaints of pain or discomfort at this time acute s/s of infection or sepsis, VSS.  Pt educated on importance of morning labs to trend electrolytes as well as white blood cell count to evaluate effectiveness of IV ABX. Pt verbalized understanding and continued refusal stating "how much more blood do you want from me, this is getting rediculous"

## 2017-03-16 VITALS
OXYGEN SATURATION: 97 % | HEART RATE: 82 BPM | DIASTOLIC BLOOD PRESSURE: 80 MMHG | RESPIRATION RATE: 17 BRPM | TEMPERATURE: 97 F | SYSTOLIC BLOOD PRESSURE: 130 MMHG

## 2017-03-16 LAB
ANION GAP SERPL CALC-SCNC: 10 MMOL/L — SIGNIFICANT CHANGE UP (ref 5–17)
BUN SERPL-MCNC: 18 MG/DL — SIGNIFICANT CHANGE UP (ref 7–23)
CALCIUM SERPL-MCNC: 10.1 MG/DL — SIGNIFICANT CHANGE UP (ref 8.4–10.5)
CHLORIDE SERPL-SCNC: 101 MMOL/L — SIGNIFICANT CHANGE UP (ref 96–108)
CO2 SERPL-SCNC: 31 MMOL/L — SIGNIFICANT CHANGE UP (ref 22–31)
CREAT SERPL-MCNC: 0.58 MG/DL — SIGNIFICANT CHANGE UP (ref 0.5–1.3)
GLUCOSE SERPL-MCNC: 104 MG/DL — HIGH (ref 70–99)
HCT VFR BLD CALC: 41.6 % — SIGNIFICANT CHANGE UP (ref 34.5–45)
HGB BLD-MCNC: 13.5 G/DL — SIGNIFICANT CHANGE UP (ref 11.5–15.5)
MCHC RBC-ENTMCNC: 29.4 PG — SIGNIFICANT CHANGE UP (ref 27–34)
MCHC RBC-ENTMCNC: 32.5 GM/DL — SIGNIFICANT CHANGE UP (ref 32–36)
MCV RBC AUTO: 90.4 FL — SIGNIFICANT CHANGE UP (ref 80–100)
PLATELET # BLD AUTO: 294 K/UL — SIGNIFICANT CHANGE UP (ref 150–400)
POTASSIUM SERPL-MCNC: 4.6 MMOL/L — SIGNIFICANT CHANGE UP (ref 3.5–5.3)
POTASSIUM SERPL-SCNC: 4.6 MMOL/L — SIGNIFICANT CHANGE UP (ref 3.5–5.3)
RBC # BLD: 4.6 M/UL — SIGNIFICANT CHANGE UP (ref 3.8–5.2)
RBC # FLD: 13.1 % — SIGNIFICANT CHANGE UP (ref 10.3–14.5)
SODIUM SERPL-SCNC: 142 MMOL/L — SIGNIFICANT CHANGE UP (ref 135–145)
WBC # BLD: 6.3 K/UL — SIGNIFICANT CHANGE UP (ref 3.8–10.5)
WBC # FLD AUTO: 6.3 K/UL — SIGNIFICANT CHANGE UP (ref 3.8–10.5)

## 2017-03-16 RX ADMIN — Medication 81 MILLIGRAM(S): at 12:36

## 2017-03-16 RX ADMIN — Medication 1 TABLET(S): at 12:36

## 2017-03-29 ENCOUNTER — EMERGENCY (EMERGENCY)
Facility: HOSPITAL | Age: 82
LOS: 1 days | Discharge: HOME | End: 2017-03-29
Attending: EMERGENCY MEDICINE | Admitting: EMERGENCY MEDICINE
Payer: MEDICARE

## 2017-03-29 VITALS
OXYGEN SATURATION: 95 % | HEIGHT: 61 IN | SYSTOLIC BLOOD PRESSURE: 166 MMHG | TEMPERATURE: 98 F | DIASTOLIC BLOOD PRESSURE: 90 MMHG | HEART RATE: 79 BPM | RESPIRATION RATE: 16 BRPM | WEIGHT: 123.9 LBS

## 2017-03-29 VITALS
TEMPERATURE: 98 F | HEART RATE: 84 BPM | DIASTOLIC BLOOD PRESSURE: 63 MMHG | RESPIRATION RATE: 18 BRPM | OXYGEN SATURATION: 100 % | SYSTOLIC BLOOD PRESSURE: 123 MMHG

## 2017-03-29 DIAGNOSIS — Z90.89 ACQUIRED ABSENCE OF OTHER ORGANS: ICD-10-CM

## 2017-03-29 DIAGNOSIS — Z90.89 ACQUIRED ABSENCE OF OTHER ORGANS: Chronic | ICD-10-CM

## 2017-03-29 DIAGNOSIS — Z91.041 RADIOGRAPHIC DYE ALLERGY STATUS: ICD-10-CM

## 2017-03-29 DIAGNOSIS — R42 DIZZINESS AND GIDDINESS: ICD-10-CM

## 2017-03-29 DIAGNOSIS — Z79.82 LONG TERM (CURRENT) USE OF ASPIRIN: ICD-10-CM

## 2017-03-29 DIAGNOSIS — J45.909 UNSPECIFIED ASTHMA, UNCOMPLICATED: ICD-10-CM

## 2017-03-29 DIAGNOSIS — R26.81 UNSTEADINESS ON FEET: ICD-10-CM

## 2017-03-29 LAB
ALBUMIN SERPL ELPH-MCNC: 4.3 G/DL — SIGNIFICANT CHANGE UP (ref 3.3–5)
ALP SERPL-CCNC: 104 U/L — SIGNIFICANT CHANGE UP (ref 40–120)
ALT FLD-CCNC: 17 U/L RC — SIGNIFICANT CHANGE UP (ref 10–45)
ANION GAP SERPL CALC-SCNC: 13 MMOL/L — SIGNIFICANT CHANGE UP (ref 5–17)
AST SERPL-CCNC: 24 U/L — SIGNIFICANT CHANGE UP (ref 10–40)
BASOPHILS # BLD AUTO: 0.1 K/UL — SIGNIFICANT CHANGE UP (ref 0–0.2)
BASOPHILS NFR BLD AUTO: 1.5 % — SIGNIFICANT CHANGE UP (ref 0–2)
BILIRUB SERPL-MCNC: 0.8 MG/DL — SIGNIFICANT CHANGE UP (ref 0.2–1.2)
BUN SERPL-MCNC: 27 MG/DL — HIGH (ref 7–23)
CALCIUM SERPL-MCNC: 9.6 MG/DL — SIGNIFICANT CHANGE UP (ref 8.4–10.5)
CHLORIDE SERPL-SCNC: 100 MMOL/L — SIGNIFICANT CHANGE UP (ref 96–108)
CO2 SERPL-SCNC: 27 MMOL/L — SIGNIFICANT CHANGE UP (ref 22–31)
CREAT SERPL-MCNC: 0.63 MG/DL — SIGNIFICANT CHANGE UP (ref 0.5–1.3)
EOSINOPHIL # BLD AUTO: 0.1 K/UL — SIGNIFICANT CHANGE UP (ref 0–0.5)
EOSINOPHIL NFR BLD AUTO: 1.5 % — SIGNIFICANT CHANGE UP (ref 0–6)
GLUCOSE SERPL-MCNC: 101 MG/DL — HIGH (ref 70–99)
HCT VFR BLD CALC: 41.6 % — SIGNIFICANT CHANGE UP (ref 34.5–45)
HGB BLD-MCNC: 13.6 G/DL — SIGNIFICANT CHANGE UP (ref 11.5–15.5)
LYMPHOCYTES # BLD AUTO: 2.6 K/UL — SIGNIFICANT CHANGE UP (ref 1–3.3)
LYMPHOCYTES # BLD AUTO: 30.5 % — SIGNIFICANT CHANGE UP (ref 13–44)
MCHC RBC-ENTMCNC: 29.5 PG — SIGNIFICANT CHANGE UP (ref 27–34)
MCHC RBC-ENTMCNC: 32.7 GM/DL — SIGNIFICANT CHANGE UP (ref 32–36)
MCV RBC AUTO: 90.2 FL — SIGNIFICANT CHANGE UP (ref 80–100)
MONOCYTES # BLD AUTO: 0.8 K/UL — SIGNIFICANT CHANGE UP (ref 0–0.9)
MONOCYTES NFR BLD AUTO: 9.8 % — SIGNIFICANT CHANGE UP (ref 2–14)
NEUTROPHILS # BLD AUTO: 4.8 K/UL — SIGNIFICANT CHANGE UP (ref 1.8–7.4)
NEUTROPHILS NFR BLD AUTO: 56.7 % — SIGNIFICANT CHANGE UP (ref 43–77)
PLATELET # BLD AUTO: 265 K/UL — SIGNIFICANT CHANGE UP (ref 150–400)
POTASSIUM SERPL-MCNC: 4.9 MMOL/L — SIGNIFICANT CHANGE UP (ref 3.5–5.3)
POTASSIUM SERPL-SCNC: 4.9 MMOL/L — SIGNIFICANT CHANGE UP (ref 3.5–5.3)
PROT SERPL-MCNC: 7.7 G/DL — SIGNIFICANT CHANGE UP (ref 6–8.3)
RBC # BLD: 4.61 M/UL — SIGNIFICANT CHANGE UP (ref 3.8–5.2)
RBC # FLD: 13.4 % — SIGNIFICANT CHANGE UP (ref 10.3–14.5)
SODIUM SERPL-SCNC: 140 MMOL/L — SIGNIFICANT CHANGE UP (ref 135–145)
WBC # BLD: 8.4 K/UL — SIGNIFICANT CHANGE UP (ref 3.8–10.5)
WBC # FLD AUTO: 8.4 K/UL — SIGNIFICANT CHANGE UP (ref 3.8–10.5)

## 2017-03-29 PROCEDURE — 99284 EMERGENCY DEPT VISIT MOD MDM: CPT | Mod: 25

## 2017-03-29 PROCEDURE — 82962 GLUCOSE BLOOD TEST: CPT

## 2017-03-29 PROCEDURE — 80053 COMPREHEN METABOLIC PANEL: CPT

## 2017-03-29 PROCEDURE — 93005 ELECTROCARDIOGRAM TRACING: CPT

## 2017-03-29 PROCEDURE — 93010 ELECTROCARDIOGRAM REPORT: CPT

## 2017-03-29 PROCEDURE — 85027 COMPLETE CBC AUTOMATED: CPT

## 2017-03-29 RX ORDER — MECLIZINE HCL 12.5 MG
25 TABLET ORAL ONCE
Qty: 0 | Refills: 0 | Status: COMPLETED | OUTPATIENT
Start: 2017-03-29 | End: 2017-03-29

## 2017-03-29 RX ORDER — SODIUM CHLORIDE 9 MG/ML
1000 INJECTION INTRAMUSCULAR; INTRAVENOUS; SUBCUTANEOUS ONCE
Qty: 0 | Refills: 0 | Status: COMPLETED | OUTPATIENT
Start: 2017-03-29 | End: 2017-03-29

## 2017-03-29 RX ORDER — MECLIZINE HCL 12.5 MG
1 TABLET ORAL
Qty: 9 | Refills: 0
Start: 2017-03-29 | End: 2017-04-01

## 2017-03-29 RX ADMIN — SODIUM CHLORIDE 1000 MILLILITER(S): 9 INJECTION INTRAMUSCULAR; INTRAVENOUS; SUBCUTANEOUS at 12:13

## 2017-03-29 RX ADMIN — Medication 25 MILLIGRAM(S): at 12:13

## 2017-03-29 NOTE — ED PROVIDER NOTE - NS ED ROS FT
Constitutional: No fever or chills  Eyes: No visual changes, eye pain or redness  HEENT: No throat pain, ear pain, nasal pain. No nose bleeding.  CV: No chest pain or lower extremity edema  Resp: No SOB no cough  GI: No abd pain. No nausea or vomiting. No diarrhea. No constipation.   : No dysuria, hematuria.   MSK: No musculoskeletal pain  Skin: No rash  Neuro: No headache. No numbness or tingling. No weakness. +dizziness

## 2017-03-29 NOTE — ED PROVIDER NOTE - OBJECTIVE STATEMENT
81 y/o F pmhx CVA on ASA only, admitted x2 weeks ago for diverticulitis (treated with cipro/flagyl, resolved), presenting with dizziness and unsteadiness secondarily since this AM. Pt states she has been doing well, no abdominal pain/diarrhea/fevers/chills, eating and drinking normally, since the time of her discharge, until she woke up feeling dizzy this morning. Reports she had feeling of the room spinning, making her feel unsteady on her feet. Lives alone and 'feels like she can't cope if this doesn't resolve.' Took a cab here today. Denies any headache, neck pain, lightheadedness, chest pain, shortness of breath, nausea, vomiting, changes in vision. 81 y/o F pmhx CVA on ASA only, admitted x2 weeks ago for diverticulitis (treated with cipro/flagyl, resolved), presenting with dizziness and unsteadiness secondarily since this AM. Pt states she has been doing well, no abdominal pain/diarrhea/fevers/chills, eating and drinking normally, since the time of her discharge, until she woke up feeling dizzy this morning. Reports she had feeling of the room spinning, making her feel unsteady on her feet. Lives alone and 'feels like she can't cope if this doesn't resolve.' Took a cab here today. States she had a mechanical slip and fall 3 days ago onto her R shin, which is bruised and 'maybe making it more difficult for her to walk.' Denies any headache, neck pain, lightheadedness, chest pain, shortness of breath, nausea, vomiting, changes in vision.

## 2017-03-29 NOTE — ED PROVIDER NOTE - PROGRESS NOTE DETAILS
Pt initially refuses meclizine for her dizziness 'states she doesn't like to take medications and doesn't want it.' Advised her that she has symptoms of her typical vertigo which did not resolve with her home methods, and that this medication will help with her symptoms.   Pt then refuses the xrays of her RLE to evaluate ecchymosis from mechanical slip and fall. Advised her on risks, benefits and alternatives to refusing this treatment and patient understands and agrees, has capacity to make her own medical decisions, and continues to refuse xrays. -Marcella Palma PA-C Pt refuses CT head states that she feels significantly improved after meclizine and has no longer any dizziness or unsteadiness of gait with walking. States that her neurologist Dr. Chandler has her scheduled for a doppler of her carotid arteries and an MRI of her head on Sunday and she does not want to have any other imaging before this. States that she feels better and 'wants to eat something and call a cab and go home.' Denies any other symptoms; all resolved. -Marcella Palma PA-C Pt refuses CT head states that she feels significantly improved after meclizine and has no longer any dizziness or unsteadiness of gait with walking. Walked patient through [facility] area with no unsteadiness or difficulty. Pt was using cane improperly, adjusted to appropriate height which patient states made 'things even better for her.' States that her neurologist Dr. Chandler has her scheduled for a doppler of her carotid arteries and an MRI of her head on Sunday and she does not want to have any other imaging before this. States that she feels better and 'wants to eat something and call a cab and go home.' Denies any other symptoms; all resolved. Ronit from SW department discusses with patient options for someone to come into home to help with ADLS if patient has concern and states that she 'feels she does not need this at this time.'  -Marcella Palma PA-C Pt refuses CT head states that she feels significantly improved after meclizine and has no longer any dizziness or unsteadiness of gait with walking. Walked patient through [facility] area with no unsteadiness or difficulty. Pt was using cane improperly, adjusted to appropriate height which patient states made 'things even better for her.' States that her neurologist Dr. Chandler has her scheduled for a doppler of her carotid arteries and an MRI of her head on Sunday and she does not want to have any other imaging before this. States that she feels better and 'wants to eat something and call a cab and go home.' Denies any other symptoms; all resolved. Ronit from SW department discusses with patient options for someone to come into home to help with ADLS or friendly  services, if patient has concern and states that she 'feels she does not need this at this time, but will take the information in case she changes her mind.'  -Marcella Palma PA-C

## 2017-03-29 NOTE — ED PROVIDER NOTE - MEDICAL DECISION MAKING DETAILS
81 y/o F pmhx CVA on ASA, recent admission for diverticulitis treated appropriately, symptoms resolved. PE remarkable for dizziness and unsteady gait secondarily. Will obtain basics, EKG, CT head, social work involvement and reassess 81 y/o F pmhx CVA on ASA, recent admission for diverticulitis treated appropriately, symptoms resolved. PE remarkable for dizziness and unsteady gait secondarily. Will obtain basics, EKG, CT head, social work involvement and reassess  Att yo female pmh vertigo presents with vertigo since this am; no nausea, no vomiting; no fevers, tinnitus, headache; on exam neuro intact, no cerebellar dysmetria; Plan: ct, labs, meclizine, social work, reassess

## 2017-03-29 NOTE — ED PROVIDER NOTE - CARE PLAN
Principal Discharge DX:	Vertigo  Instructions for follow-up, activity and diet:	1. Continue to take your home medications as directed ALONG WITH Meclizine one tablet as needed for vertigo. Do not take unless you feel dizziness or vertigo symptoms redevelop   2. Follow-up with your neurologist Ramesh for your scheduled appointment.   3. Return for any new or worsening symptoms.

## 2017-03-29 NOTE — ED ADULT NURSE NOTE - PMH
Asthma    Cerebrovascular accident (CVA), unspecified mechanism    Diverticulosis of intestine    GERD (gastroesophageal reflux disease)    Vertebral fracture, osteoporotic

## 2017-03-29 NOTE — ED ADULT NURSE NOTE - OBJECTIVE STATEMENT
89 y/o female walked into ED a&ox3 c/o dizziness. pt reports she woke up this morning feeling dizzy states "the room is spinning". pt denies SOB, CP, abd pain, N/V/D, HA, weakness. pt states she fell 3 days ago because "my leg gave out". pt reports hx ov CVA in 2008 with residual right leg weakness. pt BG 96 in ED. lung sounds clear b/l with auscultation. abd soft, nontender, nondistended. PERRL. pt resting in bed on CM. VSS.

## 2017-03-29 NOTE — ED PROVIDER NOTE - ATTENDING CONTRIBUTION TO CARE
Att yo female pmh vertigo presents with vertigo since this am; no nausea, no vomiting; no fevers, tinnitus, headache; on exam neuro intact, no cerebellar dysmetria; Plan: ct, labs, meclizine, social work, reassess

## 2017-03-29 NOTE — ED PROVIDER NOTE - PHYSICAL EXAMINATION
patient awake alert NAD . LUNGS CTAB no wheeze no crackle. CARD RRR no m/r/g.  Abdomen soft NT ND no rebound no guarding no CVA tenderness. EXT WWP no edema no calf tenderness CV 2+DP/PT bilaterally. neuro A&Ox3; gait mildly unsteady secondary to dizziness.  skin warm and dry no rash

## 2017-03-29 NOTE — ED PROVIDER NOTE - PLAN OF CARE
1. Continue to take your home medications as directed ALONG WITH Meclizine one tablet as needed for vertigo. Do not take unless you feel dizziness or vertigo symptoms redevelop   2. Follow-up with your neurologist Chandler for your scheduled appointment.   3. Return for any new or worsening symptoms.

## 2017-06-28 ENCOUNTER — NON-APPOINTMENT (OUTPATIENT)
Age: 82
End: 2017-06-28

## 2017-06-28 ENCOUNTER — APPOINTMENT (OUTPATIENT)
Dept: CARDIOLOGY | Facility: CLINIC | Age: 82
End: 2017-06-28

## 2017-06-28 VITALS
DIASTOLIC BLOOD PRESSURE: 76 MMHG | HEART RATE: 78 BPM | WEIGHT: 126 LBS | BODY MASS INDEX: 23.79 KG/M2 | HEIGHT: 61 IN | RESPIRATION RATE: 14 BRPM | SYSTOLIC BLOOD PRESSURE: 136 MMHG

## 2017-07-14 ENCOUNTER — APPOINTMENT (OUTPATIENT)
Dept: CARDIOLOGY | Facility: CLINIC | Age: 82
End: 2017-07-14

## 2017-08-22 ENCOUNTER — APPOINTMENT (OUTPATIENT)
Dept: CARDIOLOGY | Facility: CLINIC | Age: 82
End: 2017-08-22
Payer: MEDICARE

## 2017-08-22 PROCEDURE — 36415 COLL VENOUS BLD VENIPUNCTURE: CPT

## 2017-08-23 LAB
ALBUMIN SERPL ELPH-MCNC: 4.2 G/DL
ALP BLD-CCNC: 102 U/L
ALT SERPL-CCNC: 12 U/L
ANION GAP SERPL CALC-SCNC: 12 MMOL/L
AST SERPL-CCNC: 13 U/L
BASOPHILS # BLD AUTO: 0.02 K/UL
BASOPHILS NFR BLD AUTO: 0.3 %
BILIRUB SERPL-MCNC: 0.8 MG/DL
BUN SERPL-MCNC: 21 MG/DL
CALCIUM SERPL-MCNC: 10 MG/DL
CHLORIDE SERPL-SCNC: 103 MMOL/L
CO2 SERPL-SCNC: 27 MMOL/L
CREAT SERPL-MCNC: 0.74 MG/DL
EOSINOPHIL # BLD AUTO: 0.13 K/UL
EOSINOPHIL NFR BLD AUTO: 1.8 %
GLUCOSE SERPL-MCNC: 98 MG/DL
HCT VFR BLD CALC: 41.1 %
HGB BLD-MCNC: 12.9 G/DL
IMM GRANULOCYTES NFR BLD AUTO: 0.3 %
LYMPHOCYTES # BLD AUTO: 2.48 K/UL
LYMPHOCYTES NFR BLD AUTO: 33.7 %
MAN DIFF?: NORMAL
MCHC RBC-ENTMCNC: 28.4 PG
MCHC RBC-ENTMCNC: 31.4 GM/DL
MCV RBC AUTO: 90.5 FL
MONOCYTES # BLD AUTO: 0.8 K/UL
MONOCYTES NFR BLD AUTO: 10.9 %
NEUTROPHILS # BLD AUTO: 3.9 K/UL
NEUTROPHILS NFR BLD AUTO: 53 %
PLATELET # BLD AUTO: 280 K/UL
POTASSIUM SERPL-SCNC: 5.1 MMOL/L
PROT SERPL-MCNC: 7.2 G/DL
RBC # BLD: 4.54 M/UL
RBC # FLD: 15.7 %
SODIUM SERPL-SCNC: 142 MMOL/L
TSH SERPL-ACNC: 1.79 UIU/ML
WBC # FLD AUTO: 7.35 K/UL

## 2017-09-23 ENCOUNTER — EMERGENCY (EMERGENCY)
Facility: HOSPITAL | Age: 82
LOS: 1 days | Discharge: HOME | End: 2017-09-23
Attending: EMERGENCY MEDICINE | Admitting: EMERGENCY MEDICINE
Payer: MEDICARE

## 2017-09-23 VITALS
DIASTOLIC BLOOD PRESSURE: 80 MMHG | TEMPERATURE: 99 F | HEART RATE: 18 BPM | SYSTOLIC BLOOD PRESSURE: 155 MMHG | OXYGEN SATURATION: 95 % | RESPIRATION RATE: 18 BRPM

## 2017-09-23 DIAGNOSIS — Z90.89 ACQUIRED ABSENCE OF OTHER ORGANS: Chronic | ICD-10-CM

## 2017-09-23 LAB
APPEARANCE UR: CLEAR — SIGNIFICANT CHANGE UP
BACTERIA # UR AUTO: ABNORMAL /HPF
BILIRUB UR-MCNC: NEGATIVE — SIGNIFICANT CHANGE UP
COLOR SPEC: YELLOW — SIGNIFICANT CHANGE UP
COMMENT - URINE: SIGNIFICANT CHANGE UP
DIFF PNL FLD: NEGATIVE — SIGNIFICANT CHANGE UP
EPI CELLS # UR: SIGNIFICANT CHANGE UP /HPF
GLUCOSE UR QL: NEGATIVE — SIGNIFICANT CHANGE UP
KETONES UR-MCNC: ABNORMAL
LEUKOCYTE ESTERASE UR-ACNC: ABNORMAL
NITRITE UR-MCNC: NEGATIVE — SIGNIFICANT CHANGE UP
PH UR: 6 — SIGNIFICANT CHANGE UP (ref 5–8)
PROT UR-MCNC: 100 MG/DL
RBC CASTS # UR COMP ASSIST: SIGNIFICANT CHANGE UP /HPF (ref 0–2)
SP GR SPEC: >=1.03 — SIGNIFICANT CHANGE UP (ref 1.01–1.02)
UROBILINOGEN FLD QL: NEGATIVE — SIGNIFICANT CHANGE UP
WBC UR QL: SIGNIFICANT CHANGE UP /HPF (ref 0–5)

## 2017-09-23 PROCEDURE — 99283 EMERGENCY DEPT VISIT LOW MDM: CPT | Mod: GC

## 2017-09-23 PROCEDURE — 99283 EMERGENCY DEPT VISIT LOW MDM: CPT

## 2017-09-23 PROCEDURE — 81001 URINALYSIS AUTO W/SCOPE: CPT

## 2017-09-23 PROCEDURE — 87086 URINE CULTURE/COLONY COUNT: CPT

## 2017-09-23 RX ORDER — MOXIFLOXACIN HYDROCHLORIDE TABLETS, 400 MG 400 MG/1
1 TABLET, FILM COATED ORAL
Qty: 14 | Refills: 0 | OUTPATIENT
Start: 2017-09-23 | End: 2017-09-30

## 2017-09-23 RX ORDER — MOXIFLOXACIN HYDROCHLORIDE TABLETS, 400 MG 400 MG/1
1 TABLET, FILM COATED ORAL
Qty: 14 | Refills: 0
Start: 2017-09-23 | End: 2017-09-30

## 2017-09-23 NOTE — ED POST DISCHARGE NOTE - REASON FOR FOLLOW-UP
Other pt called states she just picked up her cipro from the pharmacy but lost it already while in transit, requesting another script be sent so she can get the medication. will send script. - KYRA De Anda

## 2017-09-23 NOTE — ED ADULT NURSE NOTE - OBJECTIVE STATEMENT
82 y.o. female presents ambulatory to ED through waiting room c/o increased urinary frequency. Hx of CVA, asthma, GERD, diverticulosis, and anxiety. States she went to PMD yesterday and was diagnosed with a UTI however when she read the warnings for the antibiotics she became nervous "because it said it could lead to sudden death". Patient does not remember the name of the medication. Denies HA, CP, Abd pain, burning urination, fevers, chills. Patient undressed and placed into gown, call bell in hand and side rails up with bed in lowest position for safety. blanket provided, Comfort and safety provided.

## 2017-09-23 NOTE — ED ADULT TRIAGE NOTE - CHIEF COMPLAINT QUOTE
patient c/o epistaxis which has resolved, also went to urgent care yesterday and was told she had a UTI.  She did not take prescription she was given

## 2017-09-23 NOTE — ED PROVIDER NOTE - OBJECTIVE STATEMENT
Patient is 82 Patient is 82 year old female with PMHx of Asthma and diverticulitis who presents with 3 days of increased urinary frequency and 1 episode of incontinence. She states that she went to urgent care yesterday and was diagnosed with a UTI. She was given some antibiotic with an "M" in the name and does not want to take it. After she received the rx she looked up the Abx online and decided that there were too many side effects. She presents today hoping that we can give her a rx for something new with less side effects. She denies fever, chills, nausea, vomiting, or flank pain.

## 2017-09-23 NOTE — ED PROVIDER NOTE - NS ED ROS FT
Gen: Denies fever, weight loss  CV: Denies chest pain, palpitations  Skin: Denies rash, erythema, color changes  Resp: Denies SOB, cough  Endo: Denies sensitivity to heat, cold, increased urination  GI: Denies constipation, nausea, vomiting  Msk: Denies back pain, LE swelling, extremity pain  : Admits to increased frequency, Denies dysuria  Neuro: Denies LOC, weakness, seizures  Psych: Denies hx of psych, hallucinations

## 2017-09-23 NOTE — ED ADULT NURSE NOTE - PMH
Anxiety    Asthma    Cerebrovascular accident (CVA), unspecified mechanism    CVA (Cerebral Infarction)    Diverticulosis of intestine    GERD (gastroesophageal reflux disease)    Vertebral fracture, osteoporotic

## 2017-09-23 NOTE — ED PROVIDER NOTE - ATTENDING CONTRIBUTION TO CARE
I was physically present for the E/M service provided. I agree with above history, physical, and plan which I have reviewed and edited where appropriate. I was physically present for the key portions of the service provided.    82F CVA, asthma, GERD p/w urinary frequency. Dx with UTI at [facility] yesterday and prescribed something with an "M". Pt read online that medication can cause "sudden death syndrome" and would like a different medication. I was physically present for the E/M service provided. I agree with above history, physical, and plan which I have reviewed and edited where appropriate. I was physically present for the key portions of the service provided.    82F CVA, asthma, GERD p/w urinary frequency. Dx with UTI at [facility] yesterday and prescribed something with an "M". Pt read online that medication can cause "sudden death syndrome" and would like a different medication. Afebrile. No CVA TTP. Abdomen soft NTND.

## 2017-09-24 LAB
CULTURE RESULTS: SIGNIFICANT CHANGE UP
SPECIMEN SOURCE: SIGNIFICANT CHANGE UP

## 2017-12-25 ENCOUNTER — EMERGENCY (EMERGENCY)
Facility: HOSPITAL | Age: 82
LOS: 1 days | End: 2017-12-25
Attending: EMERGENCY MEDICINE | Admitting: EMERGENCY MEDICINE
Payer: MEDICARE

## 2017-12-25 VITALS
OXYGEN SATURATION: 95 % | HEART RATE: 77 BPM | TEMPERATURE: 98 F | DIASTOLIC BLOOD PRESSURE: 85 MMHG | SYSTOLIC BLOOD PRESSURE: 158 MMHG | RESPIRATION RATE: 18 BRPM

## 2017-12-25 VITALS
TEMPERATURE: 98 F | RESPIRATION RATE: 20 BRPM | HEART RATE: 75 BPM | OXYGEN SATURATION: 100 % | SYSTOLIC BLOOD PRESSURE: 150 MMHG | DIASTOLIC BLOOD PRESSURE: 78 MMHG

## 2017-12-25 DIAGNOSIS — Z90.89 ACQUIRED ABSENCE OF OTHER ORGANS: Chronic | ICD-10-CM

## 2017-12-25 PROBLEM — K57.90 DIVERTICULOSIS OF INTESTINE, PART UNSPECIFIED, WITHOUT PERFORATION OR ABSCESS WITHOUT BLEEDING: Chronic | Status: ACTIVE | Noted: 2017-03-12

## 2017-12-25 PROBLEM — K21.9 GASTRO-ESOPHAGEAL REFLUX DISEASE WITHOUT ESOPHAGITIS: Chronic | Status: ACTIVE | Noted: 2017-03-12

## 2017-12-25 PROBLEM — J45.909 UNSPECIFIED ASTHMA, UNCOMPLICATED: Chronic | Status: ACTIVE | Noted: 2017-03-12

## 2017-12-25 LAB
ALBUMIN SERPL ELPH-MCNC: 4.1 G/DL — SIGNIFICANT CHANGE UP (ref 3.3–5)
ALP SERPL-CCNC: 105 U/L — SIGNIFICANT CHANGE UP (ref 40–120)
ALT FLD-CCNC: 18 U/L RC — SIGNIFICANT CHANGE UP (ref 10–45)
ANION GAP SERPL CALC-SCNC: 10 MMOL/L — SIGNIFICANT CHANGE UP (ref 5–17)
APPEARANCE UR: CLEAR — SIGNIFICANT CHANGE UP
AST SERPL-CCNC: 17 U/L — SIGNIFICANT CHANGE UP (ref 10–40)
BASOPHILS # BLD AUTO: 0.1 K/UL — SIGNIFICANT CHANGE UP (ref 0–0.2)
BASOPHILS NFR BLD AUTO: 1.1 % — SIGNIFICANT CHANGE UP (ref 0–2)
BILIRUB SERPL-MCNC: 0.7 MG/DL — SIGNIFICANT CHANGE UP (ref 0.2–1.2)
BILIRUB UR-MCNC: NEGATIVE — SIGNIFICANT CHANGE UP
BUN SERPL-MCNC: 15 MG/DL — SIGNIFICANT CHANGE UP (ref 7–23)
CALCIUM SERPL-MCNC: 9.5 MG/DL — SIGNIFICANT CHANGE UP (ref 8.4–10.5)
CHLORIDE SERPL-SCNC: 102 MMOL/L — SIGNIFICANT CHANGE UP (ref 96–108)
CO2 SERPL-SCNC: 31 MMOL/L — SIGNIFICANT CHANGE UP (ref 22–31)
COLOR SPEC: YELLOW — SIGNIFICANT CHANGE UP
CREAT SERPL-MCNC: 0.59 MG/DL — SIGNIFICANT CHANGE UP (ref 0.5–1.3)
DIFF PNL FLD: NEGATIVE — SIGNIFICANT CHANGE UP
EOSINOPHIL # BLD AUTO: 0.2 K/UL — SIGNIFICANT CHANGE UP (ref 0–0.5)
EOSINOPHIL NFR BLD AUTO: 2.2 % — SIGNIFICANT CHANGE UP (ref 0–6)
GAS PNL BLDV: SIGNIFICANT CHANGE UP
GLUCOSE SERPL-MCNC: 105 MG/DL — HIGH (ref 70–99)
GLUCOSE UR QL: NEGATIVE — SIGNIFICANT CHANGE UP
HCT VFR BLD CALC: 42.6 % — SIGNIFICANT CHANGE UP (ref 34.5–45)
HGB BLD-MCNC: 13.9 G/DL — SIGNIFICANT CHANGE UP (ref 11.5–15.5)
KETONES UR-MCNC: NEGATIVE — SIGNIFICANT CHANGE UP
LEUKOCYTE ESTERASE UR-ACNC: ABNORMAL
LIDOCAIN IGE QN: 15 U/L — SIGNIFICANT CHANGE UP (ref 7–60)
LYMPHOCYTES # BLD AUTO: 3 K/UL — SIGNIFICANT CHANGE UP (ref 1–3.3)
LYMPHOCYTES # BLD AUTO: 34.2 % — SIGNIFICANT CHANGE UP (ref 13–44)
MCHC RBC-ENTMCNC: 30.6 PG — SIGNIFICANT CHANGE UP (ref 27–34)
MCHC RBC-ENTMCNC: 32.6 GM/DL — SIGNIFICANT CHANGE UP (ref 32–36)
MCV RBC AUTO: 93.7 FL — SIGNIFICANT CHANGE UP (ref 80–100)
MONOCYTES # BLD AUTO: 0.8 K/UL — SIGNIFICANT CHANGE UP (ref 0–0.9)
MONOCYTES NFR BLD AUTO: 9.6 % — SIGNIFICANT CHANGE UP (ref 2–14)
NEUTROPHILS # BLD AUTO: 4.6 K/UL — SIGNIFICANT CHANGE UP (ref 1.8–7.4)
NEUTROPHILS NFR BLD AUTO: 52.9 % — SIGNIFICANT CHANGE UP (ref 43–77)
NITRITE UR-MCNC: NEGATIVE — SIGNIFICANT CHANGE UP
PH UR: 6 — SIGNIFICANT CHANGE UP (ref 5–8)
PLATELET # BLD AUTO: 263 K/UL — SIGNIFICANT CHANGE UP (ref 150–400)
POTASSIUM SERPL-MCNC: 5.1 MMOL/L — SIGNIFICANT CHANGE UP (ref 3.5–5.3)
POTASSIUM SERPL-SCNC: 5.1 MMOL/L — SIGNIFICANT CHANGE UP (ref 3.5–5.3)
PROT SERPL-MCNC: 7.6 G/DL — SIGNIFICANT CHANGE UP (ref 6–8.3)
PROT UR-MCNC: SIGNIFICANT CHANGE UP
RBC # BLD: 4.54 M/UL — SIGNIFICANT CHANGE UP (ref 3.8–5.2)
RBC # FLD: 13.1 % — SIGNIFICANT CHANGE UP (ref 10.3–14.5)
SODIUM SERPL-SCNC: 143 MMOL/L — SIGNIFICANT CHANGE UP (ref 135–145)
SP GR SPEC: 1.02 — SIGNIFICANT CHANGE UP (ref 1.01–1.02)
UROBILINOGEN FLD QL: NEGATIVE — SIGNIFICANT CHANGE UP
WBC # BLD: 8.7 K/UL — SIGNIFICANT CHANGE UP (ref 3.8–10.5)
WBC # FLD AUTO: 8.7 K/UL — SIGNIFICANT CHANGE UP (ref 3.8–10.5)

## 2017-12-25 PROCEDURE — 84132 ASSAY OF SERUM POTASSIUM: CPT

## 2017-12-25 PROCEDURE — 80053 COMPREHEN METABOLIC PANEL: CPT

## 2017-12-25 PROCEDURE — 85014 HEMATOCRIT: CPT

## 2017-12-25 PROCEDURE — 83690 ASSAY OF LIPASE: CPT

## 2017-12-25 PROCEDURE — 99284 EMERGENCY DEPT VISIT MOD MDM: CPT | Mod: GC

## 2017-12-25 PROCEDURE — 85027 COMPLETE CBC AUTOMATED: CPT

## 2017-12-25 PROCEDURE — 74176 CT ABD & PELVIS W/O CONTRAST: CPT | Mod: 26

## 2017-12-25 PROCEDURE — 82435 ASSAY OF BLOOD CHLORIDE: CPT

## 2017-12-25 PROCEDURE — 83605 ASSAY OF LACTIC ACID: CPT

## 2017-12-25 PROCEDURE — 81001 URINALYSIS AUTO W/SCOPE: CPT

## 2017-12-25 PROCEDURE — 99284 EMERGENCY DEPT VISIT MOD MDM: CPT | Mod: 25

## 2017-12-25 PROCEDURE — 82803 BLOOD GASES ANY COMBINATION: CPT

## 2017-12-25 PROCEDURE — 74176 CT ABD & PELVIS W/O CONTRAST: CPT

## 2017-12-25 PROCEDURE — 84295 ASSAY OF SERUM SODIUM: CPT

## 2017-12-25 PROCEDURE — 87086 URINE CULTURE/COLONY COUNT: CPT

## 2017-12-25 PROCEDURE — 82947 ASSAY GLUCOSE BLOOD QUANT: CPT

## 2017-12-25 PROCEDURE — 82330 ASSAY OF CALCIUM: CPT

## 2017-12-25 RX ORDER — CEPHALEXIN 500 MG
1 CAPSULE ORAL
Qty: 6 | Refills: 0
Start: 2017-12-25 | End: 2017-12-27

## 2017-12-25 NOTE — ED PROVIDER NOTE - OBJECTIVE STATEMENT
83 F h/o CVA and diverticulitis (no h/o abscess / perf) p/w L sided abd pain that feels similar to prior diverticulitis that started after dinner last night. Had 3-4 loose formed stool, no blood or mucous. NO nausea/vomiting. NO fevrs/chills. +urinary frequency, no dysuria. NO flank pain.

## 2017-12-25 NOTE — ED PROVIDER NOTE - MEDICAL DECISION MAKING DETAILS
83 F with h/o diverticulitis p/w L sided abd pain that feels like prior episode of diverticultiis. VSS. Exam with non tender abd and no CVA tenderness, however will CT scan abd r/o diverticulitis. Labs and UA. Patient does not want pain meds currently 83 F with h/o diverticulitis p/w L sided abd pain that feels like prior episode of diverticultiis. VSS. Exam with non tender abd and no CVA tenderness, however will CT scan abd r/o diverticulitis. Labs and UA. Patient does not want pain meds currently    [place] FLOOD: 82 y/o female with remote hx of CVA and Diverticulosis here with acute onset of LLQ pain. Reports pain began after dinner last night and is dull and nonradiating and associated with loose stool. Denies vomiting nausea, fever, chills, back pain, urinary symptoms, vag D/C, rash, trauma or abd disention. Exam shows a female in NAD with clear lungs and abd soft and nontender and nondistended and no CVA tenderness. Clear skin. Consider UTI, Diverticulitis. SBO, Pancreatitis. Plan CBC, CMP, Lipase, CT abd, IVF and reassess.

## 2017-12-25 NOTE — ED PROVIDER NOTE - CONSTITUTIONAL, MLM
normal... anxious appearing and tearful, well nourished, awake, alert, oriented to person, place, time/situation and in no apparent distress.

## 2017-12-25 NOTE — ED PROVIDER NOTE - CARE PLAN
Principal Discharge DX:	UTI (urinary tract infection)  Instructions for follow-up, activity and diet:	Take antibiotics for UTI. Return if fever, vomiting, diarrhea, worsening pain. Follow up with pmd in 2 days.  Secondary Diagnosis:	Abdominal pain

## 2017-12-25 NOTE — ED PROVIDER NOTE - PLAN OF CARE
Take antibiotics for UTI. Return if fever, vomiting, diarrhea, worsening pain. Follow up with pmd in 2 days.

## 2017-12-25 NOTE — ED PROVIDER NOTE - PROGRESS NOTE DETAILS
Morad: Ct neg. UA trace leuks, however patient c/o urinary frequency, will send with keflex x 3 days and pmd follow up

## 2017-12-25 NOTE — ED PROVIDER NOTE - ATTENDING CONTRIBUTION TO CARE
Attending MD Paulino:  I personally have seen and examined this patient.  Resident note reviewed and agree on plan of care and except where noted.  See MDM for details.

## 2017-12-26 LAB
CULTURE RESULTS: SIGNIFICANT CHANGE UP
SPECIMEN SOURCE: SIGNIFICANT CHANGE UP

## 2017-12-27 ENCOUNTER — APPOINTMENT (OUTPATIENT)
Dept: CARDIOLOGY | Facility: CLINIC | Age: 82
End: 2017-12-27
Payer: MEDICARE

## 2017-12-27 ENCOUNTER — NON-APPOINTMENT (OUTPATIENT)
Age: 82
End: 2017-12-27

## 2017-12-27 VITALS
HEIGHT: 61 IN | SYSTOLIC BLOOD PRESSURE: 166 MMHG | RESPIRATION RATE: 14 BRPM | HEART RATE: 76 BPM | WEIGHT: 127.6 LBS | BODY MASS INDEX: 24.09 KG/M2 | DIASTOLIC BLOOD PRESSURE: 72 MMHG

## 2017-12-27 PROCEDURE — 93306 TTE W/DOPPLER COMPLETE: CPT

## 2017-12-27 PROCEDURE — 93000 ELECTROCARDIOGRAM COMPLETE: CPT

## 2017-12-27 PROCEDURE — 99214 OFFICE O/P EST MOD 30 MIN: CPT

## 2018-01-16 ENCOUNTER — APPOINTMENT (OUTPATIENT)
Dept: ORTHOPEDIC SURGERY | Facility: CLINIC | Age: 83
End: 2018-01-16
Payer: MEDICARE

## 2018-01-16 VITALS — BODY MASS INDEX: 23.98 KG/M2 | WEIGHT: 127 LBS | HEIGHT: 61 IN

## 2018-01-16 DIAGNOSIS — M47.817 SPONDYLOSIS W/OUT MYELOPATHY OR RADICULOPATHY, LUMBOSACRAL REGION: ICD-10-CM

## 2018-01-16 DIAGNOSIS — M23.91 UNSPECIFIED INTERNAL DERANGEMENT OF RIGHT KNEE: ICD-10-CM

## 2018-01-16 DIAGNOSIS — M16.11 UNILATERAL PRIMARY OSTEOARTHRITIS, RIGHT HIP: ICD-10-CM

## 2018-01-16 PROCEDURE — 99214 OFFICE O/P EST MOD 30 MIN: CPT

## 2018-03-17 ENCOUNTER — EMERGENCY (EMERGENCY)
Facility: HOSPITAL | Age: 83
LOS: 1 days | Discharge: HOME | End: 2018-03-17
Attending: EMERGENCY MEDICINE | Admitting: EMERGENCY MEDICINE
Payer: MEDICARE

## 2018-03-17 VITALS
HEART RATE: 88 BPM | SYSTOLIC BLOOD PRESSURE: 159 MMHG | OXYGEN SATURATION: 95 % | DIASTOLIC BLOOD PRESSURE: 88 MMHG | TEMPERATURE: 99 F | RESPIRATION RATE: 20 BRPM

## 2018-03-17 VITALS
SYSTOLIC BLOOD PRESSURE: 131 MMHG | HEART RATE: 77 BPM | RESPIRATION RATE: 18 BRPM | TEMPERATURE: 98 F | DIASTOLIC BLOOD PRESSURE: 82 MMHG | OXYGEN SATURATION: 94 %

## 2018-03-17 DIAGNOSIS — Z90.89 ACQUIRED ABSENCE OF OTHER ORGANS: Chronic | ICD-10-CM

## 2018-03-17 LAB
BASOPHILS # BLD AUTO: 0 K/UL — SIGNIFICANT CHANGE UP (ref 0–0.2)
BASOPHILS NFR BLD AUTO: 0.4 % — SIGNIFICANT CHANGE UP (ref 0–2)
EOSINOPHIL # BLD AUTO: 0.1 K/UL — SIGNIFICANT CHANGE UP (ref 0–0.5)
EOSINOPHIL NFR BLD AUTO: 1.3 % — SIGNIFICANT CHANGE UP (ref 0–6)
HCT VFR BLD CALC: 42.4 % — SIGNIFICANT CHANGE UP (ref 34.5–45)
HGB BLD-MCNC: 13.8 G/DL — SIGNIFICANT CHANGE UP (ref 11.5–15.5)
LYMPHOCYTES # BLD AUTO: 1.9 K/UL — SIGNIFICANT CHANGE UP (ref 1–3.3)
LYMPHOCYTES # BLD AUTO: 18.9 % — SIGNIFICANT CHANGE UP (ref 13–44)
MCHC RBC-ENTMCNC: 29.8 PG — SIGNIFICANT CHANGE UP (ref 27–34)
MCHC RBC-ENTMCNC: 32.5 GM/DL — SIGNIFICANT CHANGE UP (ref 32–36)
MCV RBC AUTO: 91.6 FL — SIGNIFICANT CHANGE UP (ref 80–100)
MONOCYTES # BLD AUTO: 1 K/UL — HIGH (ref 0–0.9)
MONOCYTES NFR BLD AUTO: 10.2 % — SIGNIFICANT CHANGE UP (ref 2–14)
NEUTROPHILS # BLD AUTO: 7.1 K/UL — SIGNIFICANT CHANGE UP (ref 1.8–7.4)
NEUTROPHILS NFR BLD AUTO: 69.2 % — SIGNIFICANT CHANGE UP (ref 43–77)
PLATELET # BLD AUTO: 242 K/UL — SIGNIFICANT CHANGE UP (ref 150–400)
RBC # BLD: 4.63 M/UL — SIGNIFICANT CHANGE UP (ref 3.8–5.2)
RBC # FLD: 12.9 % — SIGNIFICANT CHANGE UP (ref 10.3–14.5)
WBC # BLD: 10.3 K/UL — SIGNIFICANT CHANGE UP (ref 3.8–10.5)
WBC # FLD AUTO: 10.3 K/UL — SIGNIFICANT CHANGE UP (ref 3.8–10.5)

## 2018-03-17 PROCEDURE — 82272 OCCULT BLD FECES 1-3 TESTS: CPT

## 2018-03-17 PROCEDURE — 99284 EMERGENCY DEPT VISIT MOD MDM: CPT | Mod: GC

## 2018-03-17 PROCEDURE — 85027 COMPLETE CBC AUTOMATED: CPT

## 2018-03-17 PROCEDURE — 99283 EMERGENCY DEPT VISIT LOW MDM: CPT

## 2018-03-17 NOTE — ED PROVIDER NOTE - NS ED ROS FT
ROS: denies HA, weakness, dizziness, fevers/chills, nausea/vomiting, chest pain, SOB, diaphoresis, abdominal pain, back/neck pain, dysuria/hematuria, or rash  +rectal bleeding, bloating

## 2018-03-17 NOTE — ED PROVIDER NOTE - OBJECTIVE STATEMENT
83 y.o. female hx of diverticulosis and diverticulitis, hemorrhoids, pw 1 episode of BRBPR this evening when wiping. Also with some mild "gas" and bloating. No ab pain. No pain with defecation. Denies lightheadedness, chest pain, dizziness, nausea.

## 2018-03-17 NOTE — ED ADULT NURSE NOTE - CHPI ED SYMPTOMS NEG
no abdominal distension/no dysuria/no burning urination/no nausea/no vomiting/no fever/no hematuria/no chills

## 2018-03-17 NOTE — ED PROVIDER NOTE - ATTENDING CONTRIBUTION TO CARE
84 yo female with BRB on the toiler paper when wiping.  + Hemorrhoid on exam here.  CBC normal; patient otherwise very well appearing, smiling, quite conversant, ambulatory, no complaint.  Unlikely to be significant GI bleed; ok for f/u with outpatient GI ([facility]).

## 2018-03-17 NOTE — ED PROVIDER NOTE - PHYSICAL EXAMINATION
Gen: Well appearing, NAD  Head: NCAT  HEENT: MMM, Normal conjunctiva  Lung: CTAB, no rales, rhonchi or wheezing  CV: RRR, no murmurs, rubs or gallops  Abd: soft, NTND, no rebound or guarding  MSK: No CVA tenderness. No edema, no visible deformities  Neuro: No focal neurologic deficits.   Skin: Warm and dry, no evidence of rash  Psych: anxious, A&Ox3 Gen: Well appearing, NAD  Head: NCAT  HEENT: MMM, Normal conjunctiva  Lung: CTAB, no rales, rhonchi or wheezing  CV: RRR, no murmurs, rubs or gallops  Abd: soft, NTND, no rebound or guarding  Rectal: bleeding from hemorrhoid, stool brown and guaiac neg, able to reduce hemorrhoid, not thrombosed.   MSK: No CVA tenderness. No edema, no visible deformities  Neuro: No focal neurologic deficits.   Skin: Warm and dry, no evidence of rash  Psych: anxious, A&Ox3

## 2018-03-17 NOTE — ED PROVIDER NOTE - MEDICAL DECISION MAKING DETAILS
83 y.o. female pw bleeding from hemorrhoids. Well appearing. Normal VS. Will check cbc and if ok will dc home with GI follow up.

## 2018-03-17 NOTE — ED ADULT NURSE NOTE - OBJECTIVE STATEMENT
82 y/o female presented to the ED c/o x1 episode of diarrhea with Bright red blood noted. pt stated she has history of diverticulosis. pt denies any abdominal surgery. pt had stroke 09/2009. asthma. fracture vertebra, weakness in R side, uses cane. pt states she is on Baby aspirin. Dr. Alejandra next to bedside, guaiac was neg. MD noted hemorrhoid. on assessment pt is on room air with no signs of distress. S1S2. lungs CTAB. BS +, hyperactive. no pain on palpation. skin intact. no edema or erythema noted. pt undressed and fully assessed. denies any chest pain, SOB, or dizziness. awaiting MD dispo.

## 2018-05-23 ENCOUNTER — APPOINTMENT (OUTPATIENT)
Dept: ORTHOPEDIC SURGERY | Facility: CLINIC | Age: 83
End: 2018-05-23

## 2018-07-03 ENCOUNTER — APPOINTMENT (OUTPATIENT)
Dept: CARDIOLOGY | Facility: CLINIC | Age: 83
End: 2018-07-03

## 2018-07-10 ENCOUNTER — NON-APPOINTMENT (OUTPATIENT)
Age: 83
End: 2018-07-10

## 2018-07-10 ENCOUNTER — APPOINTMENT (OUTPATIENT)
Dept: CARDIOLOGY | Facility: CLINIC | Age: 83
End: 2018-07-10
Payer: MEDICARE

## 2018-07-10 VITALS
RESPIRATION RATE: 14 BRPM | WEIGHT: 124 LBS | HEART RATE: 74 BPM | DIASTOLIC BLOOD PRESSURE: 76 MMHG | HEIGHT: 61 IN | BODY MASS INDEX: 23.41 KG/M2 | SYSTOLIC BLOOD PRESSURE: 138 MMHG

## 2018-07-10 PROCEDURE — 93000 ELECTROCARDIOGRAM COMPLETE: CPT

## 2018-07-10 PROCEDURE — 99214 OFFICE O/P EST MOD 30 MIN: CPT

## 2018-07-23 PROBLEM — M23.91 INTERNAL DERANGEMENT OF KNEE, RIGHT: Status: ACTIVE | Noted: 2018-01-16

## 2018-07-23 PROBLEM — M16.11 PRIMARY LOCALIZED OSTEOARTHROSIS OF PELVIC REGION, RIGHT: Status: ACTIVE | Noted: 2018-01-16

## 2018-08-28 NOTE — PATIENT PROFILE ADULT. - NS PRO PT RIGHT SUPPORT PERSON
Telemetry Shift Summary    Rhythm SR  HR Range   Ectopy none  Measurements .16/.10/.36        Normal Values  Rhythm SR  HR Range    Measurements 0.12-0.20 / 0.06-0.10  / 0.30-0.52   Declines

## 2018-08-29 PROBLEM — I63.9 CEREBRAL INFARCTION, UNSPECIFIED: Chronic | Status: ACTIVE | Noted: 2017-03-13

## 2018-08-29 PROBLEM — M80.88XA OTHER OSTEOPOROSIS WITH CURRENT PATHOLOGICAL FRACTURE, VERTEBRA(E), INITIAL ENCOUNTER FOR FRACTURE: Chronic | Status: ACTIVE | Noted: 2017-03-29

## 2018-09-18 ENCOUNTER — NON-APPOINTMENT (OUTPATIENT)
Age: 83
End: 2018-09-18

## 2018-09-18 ENCOUNTER — APPOINTMENT (OUTPATIENT)
Dept: CARDIOLOGY | Facility: CLINIC | Age: 83
End: 2018-09-18
Payer: MEDICARE

## 2018-09-18 VITALS
WEIGHT: 128 LBS | HEART RATE: 73 BPM | BODY MASS INDEX: 24.17 KG/M2 | DIASTOLIC BLOOD PRESSURE: 82 MMHG | OXYGEN SATURATION: 95 % | SYSTOLIC BLOOD PRESSURE: 134 MMHG | HEIGHT: 61 IN

## 2018-09-18 PROCEDURE — 93000 ELECTROCARDIOGRAM COMPLETE: CPT

## 2018-09-18 PROCEDURE — 99214 OFFICE O/P EST MOD 30 MIN: CPT

## 2018-09-26 ENCOUNTER — APPOINTMENT (OUTPATIENT)
Dept: ORTHOPEDIC SURGERY | Facility: CLINIC | Age: 83
End: 2018-09-26

## 2018-09-26 VITALS
HEIGHT: 61 IN | BODY MASS INDEX: 23.6 KG/M2 | SYSTOLIC BLOOD PRESSURE: 167 MMHG | WEIGHT: 125 LBS | HEART RATE: 64 BPM | DIASTOLIC BLOOD PRESSURE: 84 MMHG

## 2018-09-26 DIAGNOSIS — M65.30 TRIGGER FINGER, UNSPECIFIED FINGER: ICD-10-CM

## 2018-10-02 ENCOUNTER — APPOINTMENT (OUTPATIENT)
Dept: ORTHOPEDIC SURGERY | Facility: CLINIC | Age: 83
End: 2018-10-02

## 2018-10-24 ENCOUNTER — APPOINTMENT (OUTPATIENT)
Dept: ORTHOPEDIC SURGERY | Facility: CLINIC | Age: 83
End: 2018-10-24

## 2018-11-05 ENCOUNTER — APPOINTMENT (OUTPATIENT)
Dept: RHEUMATOLOGY | Facility: CLINIC | Age: 83
End: 2018-11-05

## 2019-01-01 NOTE — H&P ADULT. - DOCUMENT STATUS
SBAR OUT Report: BABY Verbal report given to Tani Hylton RN  
 on this patient, being transferred to [facility] for routine progression of care. Report consisted of Situation, Background, Assessment, and Recommendations (SBAR).  ID bands were compared with the identification form, and verified with the patient's mother and receiving nurse. Information from the SBAR and the [place] Report was reviewed with the receiving nurse. According to the estimated gestational age scale, this infant is AGA. BETA STREP:   The mother's Group Beta Strep (GBS) result was negative. Prenatal care was received by this patients mother. Opportunity for questions and clarification provided. Authored by Attending

## 2019-02-26 ENCOUNTER — APPOINTMENT (OUTPATIENT)
Dept: INTERNAL MEDICINE | Facility: CLINIC | Age: 84
End: 2019-02-26
Payer: MEDICARE

## 2019-02-26 ENCOUNTER — APPOINTMENT (OUTPATIENT)
Dept: ALLERGY | Facility: CLINIC | Age: 84
End: 2019-02-26
Payer: MEDICARE

## 2019-02-26 VITALS
RESPIRATION RATE: 14 BRPM | HEART RATE: 68 BPM | DIASTOLIC BLOOD PRESSURE: 80 MMHG | BODY MASS INDEX: 23.6 KG/M2 | HEIGHT: 61 IN | WEIGHT: 125 LBS | SYSTOLIC BLOOD PRESSURE: 160 MMHG

## 2019-02-26 VITALS
OXYGEN SATURATION: 95 % | HEART RATE: 68 BPM | BODY MASS INDEX: 25.52 KG/M2 | WEIGHT: 130 LBS | HEIGHT: 60 IN | SYSTOLIC BLOOD PRESSURE: 160 MMHG | TEMPERATURE: 97.9 F | DIASTOLIC BLOOD PRESSURE: 80 MMHG

## 2019-02-26 DIAGNOSIS — J45.909 UNSPECIFIED ASTHMA, UNCOMPLICATED: ICD-10-CM

## 2019-02-26 DIAGNOSIS — J30.9 ALLERGIC RHINITIS, UNSPECIFIED: ICD-10-CM

## 2019-02-26 PROCEDURE — 95018 ALL TSTG PERQ&IQ DRUGS/BIOL: CPT

## 2019-02-26 PROCEDURE — 95004 PERQ TESTS W/ALRGNC XTRCS: CPT

## 2019-02-26 PROCEDURE — 36415 COLL VENOUS BLD VENIPUNCTURE: CPT

## 2019-02-26 PROCEDURE — 99213 OFFICE O/P EST LOW 20 MIN: CPT | Mod: 25

## 2019-02-26 PROCEDURE — G0439: CPT

## 2019-02-26 PROCEDURE — 99204 OFFICE O/P NEW MOD 45 MIN: CPT | Mod: 25

## 2019-02-26 RX ORDER — OMEPRAZOLE 40 MG/1
40 CAPSULE, DELAYED RELEASE ORAL
Qty: 60 | Refills: 0 | Status: DISCONTINUED | COMMUNITY
Start: 2016-11-30 | End: 2019-02-26

## 2019-02-26 RX ORDER — RANITIDINE 150 MG/1
150 TABLET ORAL
Qty: 60 | Refills: 0 | Status: DISCONTINUED | COMMUNITY
Start: 2016-12-30 | End: 2019-02-26

## 2019-02-26 RX ORDER — ALBUTEROL SULFATE 90 UG/1
108 (90 BASE) AEROSOL, METERED RESPIRATORY (INHALATION)
Qty: 18 | Refills: 0 | Status: DISCONTINUED | COMMUNITY
Start: 2016-07-07 | End: 2019-02-26

## 2019-02-26 RX ORDER — ALBUTEROL SULFATE 90 UG/1
108 (90 BASE) AEROSOL, METERED RESPIRATORY (INHALATION)
Refills: 0 | Status: ACTIVE | COMMUNITY

## 2019-02-26 RX ORDER — ALPRAZOLAM 0.25 MG/1
0.25 TABLET ORAL
Qty: 30 | Refills: 0 | Status: DISCONTINUED | COMMUNITY
Start: 2016-11-01 | End: 2019-02-26

## 2019-02-26 RX ORDER — FLUTICASONE PROPIONATE 50 UG/1
50 SPRAY, METERED NASAL DAILY
Qty: 1 | Refills: 2 | Status: DISCONTINUED | COMMUNITY
Start: 2019-02-26 | End: 2019-02-26

## 2019-02-26 NOTE — ASSESSMENT
[FreeTextEntry1] : Perennial allergic rhinitis:\par \par Flonase 2 puffs QD\par \par Hip pain requiring replacement - rule out metal allergy:\par \par RV metal patch testing prior to planned hip replacement surgery

## 2019-02-26 NOTE — HISTORY OF PRESENT ILLNESS
[FreeTextEntry1] : fall at home, establish care [de-identified] : 83yo F with PMH of asthma (controlled, on no maintenance meds), MVP and aortic insufficiency presents s/p fall at home two days prior. The fall is described as mechanical; she was getting out of bed and turned around to put slippers on and then fell. She fell on her left side, used her left arm to break the fall but landed on left ribs. She denies any head trauma, LOC. Denies HA, dizziness, CP, palp, SOB, cough, abd pain, N/V/D. She did not want to go to ER after the fall; she was seeing Dr Boxer today and was sent to our office.\par She has a history of asthma for which she has a ventolin rescue inhaler, last use was about 6 months ago.\par She has a history of right hip pain; she was evaluated by orthopedic surgery who recommends hip replacement. She has yet to schedule the surgery but plans to come back for pre op check prior to procedure.

## 2019-02-26 NOTE — PHYSICAL EXAM
[No Acute Distress] : no acute distress [Well Nourished] : well nourished [Well Developed] : well developed [Well-Appearing] : well-appearing [PERRL] : pupils equal round and reactive to light [Normal Outer Ear/Nose] : the outer ears and nose were normal in appearance [Normal Oropharynx] : the oropharynx was normal [Supple] : supple [No Respiratory Distress] : no respiratory distress  [Clear to Auscultation] : lungs were clear to auscultation bilaterally [Normal Rate] : normal rate  [Regular Rhythm] : with a regular rhythm [Normal S1, S2] : normal S1 and S2 [No Edema] : there was no peripheral edema [Soft] : abdomen soft [Non Tender] : non-tender [Non-distended] : non-distended [Normal Bowel Sounds] : normal bowel sounds [No CVA Tenderness] : no CVA  tenderness [Grossly Normal Strength/Tone] : grossly normal strength/tone [Coordination Grossly Intact] : coordination grossly intact [Normal Affect] : the affect was normal [Alert and Oriented x3] : oriented to person, place, and time [de-identified] : left rib point tenderness to palpation around ribs 7-8 laterally

## 2019-02-26 NOTE — PHYSICAL EXAM
[Alert] : alert [Well Nourished] : well nourished [Healthy Appearance] : healthy appearance [No Acute Distress] : no acute distress [Well Developed] : well developed [Normal Voice/Communication] : normal voice communication [Normal Lips/Tongue] : the lips and tongue were normal [Normal Tonsils] : normal tonsils [No Oral Lesions or Ulcers] : no oral lesions or ulcers [Clear Rhinorrhea] : clear rhinorrhea was seen [No Neck Mass] : no neck mass was observed [No LAD] : no lymphadenopathy [No Crackles] : no crackles [No Retractions] : no retractions [Bilateral Audible Breath Sounds] : bilateral audible breath sounds [Normal Rate] : heart rate was normal  [Normal S1, S2] : normal S1 and S2 [No murmur] : no murmur [Regular Rhythm] : with a regular rhythm [Normal Cervical Lymph Nodes] : cervical [Skin Intact] : skin intact  [No Rash] : no rash [No clubbing] : no clubbing [No Edema] : no edema [No Cyanosis] : no cyanosis [Normal Mood] : mood was normal [Normal Affect] : affect was normal [Judgment and Insight Age Appropriate] : judgement and insight is age appropriate [Alert, Awake, Oriented as Age-Appropriate] : alert, awake, oriented as age appropriate [Conjunctival Erythema] : no conjunctival erythema [Suborbital Bogginess] : no suborbital bogginess (allergic shiners) [Boggy Nasal Turbinates] : no boggy and/or pale nasal turbinates [Wheezing] : no wheezing was heard

## 2019-02-26 NOTE — REVIEW OF SYSTEMS
[Frequency] : frequency [Fever] : no fever [Chills] : no chills [Fatigue] : no fatigue [Vision Problems] : no vision problems [Nasal Discharge] : no nasal discharge [Sore Throat] : no sore throat [Chest Pain] : no chest pain [Palpitations] : no palpitations [Shortness Of Breath] : no shortness of breath [Wheezing] : no wheezing [Cough] : no cough [Abdominal Pain] : no abdominal pain [Nausea] : no nausea [Constipation] : no constipation [Diarrhea] : diarrhea [Vomiting] : no vomiting [Dysuria] : no dysuria [Hematuria] : no hematuria [Headache] : no headache [Dizziness] : no dizziness [Depression] : no depression [FreeTextEntry9] : left rib pain

## 2019-02-26 NOTE — SOCIAL HISTORY
[Apartment] : [unfilled] lives in an apartment  [Window Units] : air conditioning provided by window units [Cat] : cat [] :  [de-identified] : alone [FreeTextEntry2] : retired  [Bedroom] : not in the bedroom [Living Area] : not in the living area [Smokers in Household] : there are no smokers in the home

## 2019-02-26 NOTE — HEALTH RISK ASSESSMENT
[Any fall with injury in past year] : Patient reported fall with injury in the past year [0] : 2) Feeling down, depressed, or hopeless: Not at all (0) [None] : None [Alone] : lives alone [Retired] : retired [] :  [# Of Children ___] : has [unfilled] children [Independent] : feeding [Some assistance needed] : walking [Fully functional (using the telephone, shopping, preparing meals, housekeeping, doing laundry, using] : Fully functional and needs no help or supervision to perform IADLs (using the telephone, shopping, preparing meals, housekeeping, doing laundry, using transportation, managing medications and managing finances) [Smoke Detector] : smoke detector [Carbon Monoxide Detector] : carbon monoxide detector [Seat Belt] :  uses seat belt [] : No [[id number]] : 0 [Change in mental status noted] : No change in mental status noted [Language] : denies difficulty with language [Reports changes in hearing] : Reports no changes in hearing [Reports changes in vision] : Reports no changes in vision [Reports changes in dental health] : Reports no changes in dental health [FreeTextEntry2] :  [FreeTextEntry8] : uses cane

## 2019-02-26 NOTE — HISTORY OF PRESENT ILLNESS
[Eczematous rashes] : eczematous rashes [Venom Reactions] : venom reactions [Food Allergies] : food allergies [de-identified] : Patient requires hip replacement surgery right - the orthopedist is concerned about metal allergies - the orthopedist will be using a titanium replacement.   Patient is concerned about allergies because she has perennial nasal allergies - rhinorrhea - sneezing - coughing - she needed Urgent Care during the spring time for medical treatment.   She has mild asthma and she uses prn Ventolin or nebulizer - every few months. \par \par No problem with jewelry.   No previous joint replacement.

## 2019-02-26 NOTE — PLAN
[FreeTextEntry1] : Healthcare Maintenance\par -routine blood work, follow up results\par -EKG done with cardio Sep 2018\par -aged out of screenings\par -she does follow up with GYN annually\par \par Left rib pain s/p fall- likely rib contusion\par -will check rib xray to rule out fracture\par -encourage rest, NSAIDs for symptomatic relief\par -if pain acutely worsens, develops SOB- go straight to ER\par \par MVP, aortic insufficiency\par -asymptomatic\par -routine cardio follow up\par -continue aspirin 81mg daily\par \par Right hip pain in need of hip replacement per ortho\par -recommend ortho follow up, scheduling procedure\par -would benefit from PT, rehab post op\par \par Follow up in 4 months

## 2019-02-27 LAB
25(OH)D3 SERPL-MCNC: 23.8 NG/ML
ALBUMIN SERPL ELPH-MCNC: 4.6 G/DL
ALP BLD-CCNC: 103 U/L
ALT SERPL-CCNC: 13 U/L
ANION GAP SERPL CALC-SCNC: 13 MMOL/L
AST SERPL-CCNC: 30 U/L
BASOPHILS # BLD AUTO: 0.05 K/UL
BASOPHILS NFR BLD AUTO: 0.7 %
BILIRUB SERPL-MCNC: 0.8 MG/DL
BUN SERPL-MCNC: 22 MG/DL
CALCIUM SERPL-MCNC: 9.8 MG/DL
CHLORIDE SERPL-SCNC: 102 MMOL/L
CHOLEST SERPL-MCNC: 181 MG/DL
CHOLEST/HDLC SERPL: 2 RATIO
CO2 SERPL-SCNC: 28 MMOL/L
CREAT SERPL-MCNC: 0.61 MG/DL
EOSINOPHIL # BLD AUTO: 0.12 K/UL
EOSINOPHIL NFR BLD AUTO: 1.7 %
GLUCOSE SERPL-MCNC: 139 MG/DL
HBA1C MFR BLD HPLC: 5.4 %
HCT VFR BLD CALC: 42.9 %
HDLC SERPL-MCNC: 89 MG/DL
HGB BLD-MCNC: 12.9 G/DL
IMM GRANULOCYTES NFR BLD AUTO: 0.3 %
LDLC SERPL CALC-MCNC: 78 MG/DL
LYMPHOCYTES # BLD AUTO: 1.92 K/UL
LYMPHOCYTES NFR BLD AUTO: 27.5 %
MAN DIFF?: NORMAL
MCHC RBC-ENTMCNC: 28.4 PG
MCHC RBC-ENTMCNC: 30.1 GM/DL
MCV RBC AUTO: 94.5 FL
MONOCYTES # BLD AUTO: 0.64 K/UL
MONOCYTES NFR BLD AUTO: 9.2 %
NEUTROPHILS # BLD AUTO: 4.22 K/UL
NEUTROPHILS NFR BLD AUTO: 60.6 %
PLATELET # BLD AUTO: 277 K/UL
POTASSIUM SERPL-SCNC: 4.1 MMOL/L
PROT SERPL-MCNC: 7.2 G/DL
RBC # BLD: 4.54 M/UL
RBC # FLD: 15.4 %
SODIUM SERPL-SCNC: 143 MMOL/L
TRIGL SERPL-MCNC: 68 MG/DL
TSH SERPL-ACNC: 1.95 UIU/ML
WBC # FLD AUTO: 6.97 K/UL

## 2019-03-05 ENCOUNTER — APPOINTMENT (OUTPATIENT)
Dept: ALLERGY | Facility: CLINIC | Age: 84
End: 2019-03-05

## 2019-03-06 ENCOUNTER — APPOINTMENT (OUTPATIENT)
Dept: INTERNAL MEDICINE | Facility: CLINIC | Age: 84
End: 2019-03-06

## 2019-03-07 ENCOUNTER — APPOINTMENT (OUTPATIENT)
Dept: ALLERGY | Facility: CLINIC | Age: 84
End: 2019-03-07

## 2019-03-11 ENCOUNTER — APPOINTMENT (OUTPATIENT)
Dept: ALLERGY | Facility: CLINIC | Age: 84
End: 2019-03-11

## 2019-03-12 ENCOUNTER — APPOINTMENT (OUTPATIENT)
Dept: ALLERGY | Facility: CLINIC | Age: 84
End: 2019-03-12
Payer: MEDICARE

## 2019-03-12 PROCEDURE — 95044 PATCH/APPLICATION TESTS: CPT

## 2019-03-12 NOTE — ASSESSMENT
[FreeTextEntry1] : Possible metal contact dermatitis:\par \par Metal patch testing x 10 placed\par RV 48 hours.

## 2019-03-14 ENCOUNTER — APPOINTMENT (OUTPATIENT)
Dept: ALLERGY | Facility: CLINIC | Age: 84
End: 2019-03-14
Payer: MEDICARE

## 2019-03-14 PROCEDURE — 99212 OFFICE O/P EST SF 10 MIN: CPT

## 2019-03-14 NOTE — REASON FOR VISIT
[Routine Follow-Up] : a routine follow-up visit for [FreeTextEntry3] : 48 hour patch test interpretation

## 2019-03-18 ENCOUNTER — APPOINTMENT (OUTPATIENT)
Dept: ALLERGY | Facility: CLINIC | Age: 84
End: 2019-03-18
Payer: MEDICARE

## 2019-03-18 PROCEDURE — 99213 OFFICE O/P EST LOW 20 MIN: CPT

## 2019-03-18 NOTE — ASSESSMENT
[FreeTextEntry1] : Patient with negative patch testing to metals - she can proceed with hip surgery without concern about metal allergy.

## 2019-03-18 NOTE — REASON FOR VISIT
[Routine Follow-Up] : a routine follow-up visit for [FreeTextEntry3] : final reading and review of metal patch testing

## 2019-05-04 ENCOUNTER — EMERGENCY (EMERGENCY)
Facility: HOSPITAL | Age: 84
LOS: 1 days | Discharge: HOME | End: 2019-05-04
Attending: EMERGENCY MEDICINE | Admitting: EMERGENCY MEDICINE
Payer: MEDICARE

## 2019-05-04 VITALS
HEART RATE: 82 BPM | RESPIRATION RATE: 18 BRPM | SYSTOLIC BLOOD PRESSURE: 140 MMHG | OXYGEN SATURATION: 98 % | DIASTOLIC BLOOD PRESSURE: 95 MMHG | TEMPERATURE: 98 F

## 2019-05-04 VITALS
SYSTOLIC BLOOD PRESSURE: 148 MMHG | OXYGEN SATURATION: 99 % | DIASTOLIC BLOOD PRESSURE: 93 MMHG | RESPIRATION RATE: 18 BRPM | TEMPERATURE: 98 F | HEART RATE: 72 BPM

## 2019-05-04 DIAGNOSIS — Z90.89 ACQUIRED ABSENCE OF OTHER ORGANS: Chronic | ICD-10-CM

## 2019-05-04 PROCEDURE — 99282 EMERGENCY DEPT VISIT SF MDM: CPT

## 2019-05-04 NOTE — ED PROVIDER NOTE - NS_ ATTENDINGSCRIBEDETAILS _ED_A_ED_FT
The scribe's documentation has been prepared under my direction and personally reviewed by me, Lizzie Brandt MD, in its entirety. I confirm that the note above accurately reflects all work, treatment, procedures, and medical decision making performed by me.

## 2019-05-04 NOTE — ED PROVIDER NOTE - PROGRESS NOTE DETAILS
Dental consults. ETA about 1 hour. Pt was evaluated by dental. nothing to drain. do not recommend abx. pt needs tooth extraction and was provided with f/u by dental. will d/c home.

## 2019-05-04 NOTE — PROGRESS NOTE ADULT - SUBJECTIVE AND OBJECTIVE BOX
Patient is a 84y old  Female who presents with a chief complaint of tooth pain x 1.5 days.     HPI: Pt reports that the crown on #7 fractured off several years ago. Pt has upper RPD spanning from #3-8. Pt reports she has been having difficulty eating for the past 1.5 days. Pt claims that her previous dental work has not been good and that her previous dentist fabricated her RPD several years ago without extracting the root tip of #7. Pt had been asymptomatic up until 1.5 days ago. Pt tried making an emergency appointment for extraction of #7, but claimed that no offices would accept her as she was not a patient of record. Pt has not taken any meds for pain management.       PAST MEDICAL & SURGICAL HISTORY:  Vertebral fracture, osteoporotic  Cerebrovascular accident (CVA), unspecified mechanism  Asthma  GERD (gastroesophageal reflux disease)  Diverticulosis of intestine  CVA (Cerebral Infarction)  Anxiety  History of tonsillectomy  S/P Tonsillectomy    MEDICATIONS  (STANDING): denies    MEDICATIONS  (PRN): denies    Allergies:  iodine (Unknown)  IV Contrast (Unknown)  IV DYE- burning, head tingling (Unknown)  novacaine (Unknown)    FAMILY HISTORY:  Family history of multiple sclerosis (Child)  Family history of amyotrophic lateral sclerosis  No pertinent family history in first degree relatives    Vital Signs Last 24 Hrs  T(C): 36.8 (04 May 2019 10:08), Max: 36.8 (04 May 2019 10:08)  T(F): 98.2 (04 May 2019 10:08), Max: 98.2 (04 May 2019 10:08)  HR: 82 (04 May 2019 10:08) (72 - 82)  BP: 140/95 (04 May 2019 10:08) (140/95 - 148/93)  BP(mean): --  RR: 18 (04 May 2019 10:08) (18 - 18)  SpO2: 98% (04 May 2019 10:08) (98% - 99%)    EOE:  TMJ ( -  ) clicks                    ( -   ) pops                    (  -  ) crepitus             Mandible FROM             Facial bones and MOM grossly intact             ( -  ) trismus             ( -  ) LAD             ( -  ) swelling             ( -  ) asymmetry             ( -  ) palpation             ( -  ) SOB             ( -  ) dysphagia              IOE:  permanent dentition: multiple missing teeth; pt has an RPD spanning from #4-8. #7 root tip present. (-) swelling/abscess/fistula.           hard/soft palate:  ( -  ) palatal torus           tongue/FOM WNL           labial/buccal mucosa WNL           ( +  ) percussion: #7           ( +  ) palpation: #7           ( -  ) swelling     DENTAL RADIOGRAPHS: offered to take radiographs, but pt declined; stated that she would just have radiographs taken when she sees a dentist for extraction of #7. Pt was unaware that we do not extract permanent teeth in the ED.    ASSESSMENT: Root tip #7    PROCEDURE:  Discussed clinical findings with patient. Recommended extraction of #7 root tip, but explained that we cannot extract permanent teeth in the ED due to hospital policy. Pt understood. Pt asked for phone number to [facility] and said she would like to try to schedule an emergency appointment. Gave pt the phone number for [facility] ([phone]). All questions answered.     RECOMMENDATIONS:  1) Soft diet, Motrin PRN  2) Dental F/U with outpatient dentist for comprehensive dental care. Gave pt phone number for [facility] ([phone]); pt said she would call on Monday.  3) If any difficulty swallowing/breathing, fever occur, return to ED.    Kaz Varela DDS, #[id number]

## 2019-05-04 NOTE — ED PROVIDER NOTE - NSFOLLOWUPINSTRUCTIONS_ED_ALL_ED_FT
Please see a dentist next week. Follow up information was provided to you by the dentist. Come back to ED if you have fever, worsening pain, swelling, trouble breathing, redness or any other concerning symptom.

## 2019-05-04 NOTE — ED PROVIDER NOTE - OBJECTIVE STATEMENT
83 y/o F w/ PMHx of Asthma, Diverticulitis, CVA presents to ED c/o rt upper dental pain since yesterday night. States there is no tooth in the area of pain. Admits to difficulty chewing secondary to pain. Has h/o dental abscess years ago. Was unable to arrange a visit w/ a dentist today so came to ER. Denies fevers, and other complaints. Allergic contrast dye. 85 y/o F w/ PMHx of Asthma, Diverticulitis, CVA presents to ED c/o rt upper dental pain since yesterday night. States there is a broken tooth in the area of pain. Admits to difficulty chewing secondary to pain. Has h/o dental abscess years ago. Was unable to arrange a visit w/ a dentist today so came to ER. Denies fevers, and other complaints. Allergic contrast dye.

## 2019-05-04 NOTE — ED PROVIDER NOTE - TOOTH FINDINGS
tenderness to percussion rt maxillary sinus, no swelling, no erythema, no stridor, no trismus fractured R upper front tooth, +ttp an surrounding swelling, tenderness to percussion rt maxillary sinus, no swelling, no erythema, no stridor, no trismus

## 2019-05-04 NOTE — ED PROVIDER NOTE - CLINICAL SUMMARY MEDICAL DECISION MAKING FREE TEXT BOX
Dental abscess vs sinusitis vs pulpitis, no signs of Neel's or airway compromise, do not suspect sepsis. Plan - dental consult.

## 2019-06-11 NOTE — ED ADULT TRIAGE NOTE - MODE OF ARRIVAL
-has an appt 07/16/2019 for a physical, asking if she can have 2nd MMR at that visit    Asking for an order to have the 2nd shingles vaccine at [facility]     
SPOKE TO PT AND TOLD HER THAT SHE HAS TO GET HER VACCINE AT THE [facility] AND THAT SHE CAN GET HER MMR AT HER VISIT IN THE [facility]  
Walk in

## 2019-06-27 ENCOUNTER — CLINICAL ADVICE (OUTPATIENT)
Age: 84
End: 2019-06-27

## 2019-07-01 ENCOUNTER — APPOINTMENT (OUTPATIENT)
Dept: CARDIOLOGY | Facility: CLINIC | Age: 84
End: 2019-07-01
Payer: MEDICARE

## 2019-07-01 ENCOUNTER — NON-APPOINTMENT (OUTPATIENT)
Age: 84
End: 2019-07-01

## 2019-07-01 VITALS
WEIGHT: 124 LBS | HEIGHT: 60 IN | SYSTOLIC BLOOD PRESSURE: 129 MMHG | OXYGEN SATURATION: 93 % | BODY MASS INDEX: 24.35 KG/M2 | DIASTOLIC BLOOD PRESSURE: 73 MMHG | HEART RATE: 77 BPM

## 2019-07-01 VITALS
SYSTOLIC BLOOD PRESSURE: 126 MMHG | BODY MASS INDEX: 24.35 KG/M2 | HEIGHT: 60 IN | DIASTOLIC BLOOD PRESSURE: 72 MMHG | HEART RATE: 76 BPM | WEIGHT: 124 LBS | RESPIRATION RATE: 14 BRPM

## 2019-07-01 PROCEDURE — 93224 XTRNL ECG REC UP TO 48 HRS: CPT

## 2019-07-01 PROCEDURE — 36415 COLL VENOUS BLD VENIPUNCTURE: CPT

## 2019-07-01 PROCEDURE — 99214 OFFICE O/P EST MOD 30 MIN: CPT

## 2019-07-01 NOTE — HISTORY OF PRESENT ILLNESS
[FreeTextEntry1] : I last saw her in September of last year.  Since that time, she has continued her usual activities.  She remains quite anxious and stressed, worrying about her two children who rely upon her for care.\par \par She saw a dentist recently because of a cracked tooth that will need to be removed.  She complained of a pounding heartbeat and he advised her to be seen here before he could proceed with extraction.\par \par She reports a strong, regular heartbeat, sometimes fast.  She does not describe irregularity.  This correlated with episodes of anxiety and stress, but not with physical exertion.  She reports no chest discomfort or JONES.  There have been no episodes of orthopnea or PND.  She reports no episodes of dizziness.\par \par Her meds are unchanged.  She describes no new interval medical problems.

## 2019-07-01 NOTE — DISCUSSION/SUMMARY
[FreeTextEntry1] : Palps-\par     Will wear a Holter monitor.\par     Will check CBC, CMP and TSH.\par \par Mild valvular disease on echo done in December of 2017, with MVP with trivial prolapse and mild mitral valve insufficiency; mild aortic valve insufficiency noted.  \par \par Mild diastolic LV dysfunction on most recent echo.\par \par History of cerebrovascular accident in 2007, associated with ataxia and right leg weakness at the time, which resolved.  Will continue ASA.\par \par Will call her when results of tests are available; hopefully, will be able to clear her for dental procedure.\par \par She will return here for a visit in 2 months.

## 2019-07-01 NOTE — ASSESSMENT
[FreeTextEntry1] : Myxomatous mitral valve with trivial prolapse; mild mitral valve insufficiency.\par \par Mild aortic valve insufficiency.\par \par Mild diastolic LV dysfunction\par \par History of cerebrovascular accident in 2007, associated with ataxia and right leg weakness at the time, which resolved.

## 2019-07-02 LAB
ALBUMIN SERPL ELPH-MCNC: 4.3 G/DL
ALP BLD-CCNC: 105 U/L
ALT SERPL-CCNC: 13 U/L
ANION GAP SERPL CALC-SCNC: 12 MMOL/L
AST SERPL-CCNC: 16 U/L
BASOPHILS # BLD AUTO: 0.03 K/UL
BASOPHILS NFR BLD AUTO: 0.3 %
BILIRUB SERPL-MCNC: 0.8 MG/DL
BUN SERPL-MCNC: 25 MG/DL
CALCIUM SERPL-MCNC: 9.9 MG/DL
CHLORIDE SERPL-SCNC: 102 MMOL/L
CHOLEST SERPL-MCNC: 171 MG/DL
CHOLEST/HDLC SERPL: 2.1 RATIO
CO2 SERPL-SCNC: 26 MMOL/L
CREAT SERPL-MCNC: 0.71 MG/DL
EOSINOPHIL # BLD AUTO: 0.14 K/UL
EOSINOPHIL NFR BLD AUTO: 1.6 %
GLUCOSE SERPL-MCNC: 94 MG/DL
HCT VFR BLD CALC: 43.5 %
HDLC SERPL-MCNC: 81 MG/DL
HGB BLD-MCNC: 12.9 G/DL
IMM GRANULOCYTES NFR BLD AUTO: 0.2 %
LDLC SERPL CALC-MCNC: 78 MG/DL
LDLC SERPL DIRECT ASSAY-MCNC: 91 MG/DL
LYMPHOCYTES # BLD AUTO: 2.38 K/UL
LYMPHOCYTES NFR BLD AUTO: 27.6 %
MAN DIFF?: NORMAL
MCHC RBC-ENTMCNC: 28.7 PG
MCHC RBC-ENTMCNC: 29.7 GM/DL
MCV RBC AUTO: 96.9 FL
MONOCYTES # BLD AUTO: 0.97 K/UL
MONOCYTES NFR BLD AUTO: 11.3 %
NEUTROPHILS # BLD AUTO: 5.07 K/UL
NEUTROPHILS NFR BLD AUTO: 59 %
PLATELET # BLD AUTO: 271 K/UL
POTASSIUM SERPL-SCNC: 4.8 MMOL/L
PROT SERPL-MCNC: 6.9 G/DL
RBC # BLD: 4.49 M/UL
RBC # FLD: 15.4 %
SODIUM SERPL-SCNC: 140 MMOL/L
TRIGL SERPL-MCNC: 61 MG/DL
TSH SERPL-ACNC: 1.7 UIU/ML
WBC # FLD AUTO: 8.61 K/UL

## 2019-07-09 ENCOUNTER — NON-APPOINTMENT (OUTPATIENT)
Age: 84
End: 2019-07-09

## 2019-07-10 ENCOUNTER — NON-APPOINTMENT (OUTPATIENT)
Age: 84
End: 2019-07-10

## 2019-07-12 ENCOUNTER — NON-APPOINTMENT (OUTPATIENT)
Age: 84
End: 2019-07-12

## 2019-09-18 NOTE — ED ADULT NURSE NOTE - HEIGHT IN INCHES
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

## 2019-10-02 PROBLEM — Z60.2 PERSON LIVING ALONE: Status: ACTIVE | Noted: 2017-01-05

## 2019-12-12 NOTE — REASON FOR VISIT
used
[FreeTextEntry1] : \samantha Dinorah Hogan walked in today, asking to be seen, because of a "pounding heartbeat."  She has a hx. of MVP with mild MR, mild aortic valve insufficiency and prior history of cerebrovascular accident in 2007.

## 2019-12-31 NOTE — ED ADULT NURSE NOTE - NS ED NOTE ABUSE RESPONSE YN
CARDIOLOGY CONSULTATION NOTE:

 

DATE OF CONSULT:  12/31/19

 

ATTENDING PHYSICIAN:  Dr. Bran, Cardiology.* (DICTATED BY DINORAH SAMS NP
)

 

REASON FOR CONSULTATION:  Troponin elevation.

 

PRIMARY CARDIOLOGIST:  Dr. Davy Faria.

 

PRIMARY PHYSICIAN:  Dr. Boo.

 

CHIEF COMPLAINT:  Shortness of breath, positional lightheadedness, intermittent 
chest pain.

 

HISTORY OF PRESENT ILLNESS:  This is a pleasant 87-year-old male patient who 
follows Dr. Davy Faria of our practice due to notable history of coronary 
artery disease with bypass x4 in 2017, low flow, low gradient aortic stenosis, 
systolic heart failure, bicuspid aortic valve, sick sinus syndrome with 
Medtronic permanent pacemaker in situ.

 

The patient states that he has had recurrent hospitalizations over the past 3 
to 4 months.  He adds that he was most recently admitted earlier this month due 
to acute on chronic renal failure in addition to unresponsiveness secondary to 
hypoglycemia and urinary retention.  He adds that after being discharged to 
home on 12/21/19, he was doing well.  He adds that this past Saturday, he 
started to notice increased shortness of breath, positional lightheadedness 
with intermittent episodes of chest pain described as an ache.  He is not able 
to identify any provoking or relieving factors in regards to his chest pain.  
He has not taken any medications to relieve chest pain.  He had an appointment 
with Dr. Boo, advanced practice provider yesterday due to symptoms.  He 
states that he informed the advanced practice provider that he is experiencing 
chest pain, thus 911 was called and he was transferred to [facility] 
for further evaluation.  He denies having an EKG done in the office or basic 
blood work being obtained at that time.  While being evaluated in the emergency 
department according to medical records, he had also reported decreased oral 
intake and generalized feeling of weakness for 4 to 5 days.  Blood pressure was 
125/52, pulse was 80.  He had basic blood work obtained. Troponin was elevated 
at 0.38.  He adds that the last episode of chest pain was yesterday morning 
while he was at home.  He has not had any recurrent chest pain since that time.
  He was admitted to [address] [place] [address] for chest pain, rule out ACS.  Started on IV 
heparin and was given 180 mg load of p.o. Brilinta.  We are asked to see the 
patient in consultation for consideration of cardiac catheterization.

 

He again denies any recurrent chest pain since yesterday morning while he was 
at home.  States breathing has improved.  Denies weight gain, orthopnea.  
States that his chronic lower extremity edema is at baseline.  He has not had 
noticed increased edema.  He is compliant with medications and denies any 
changes in regards to his diuretic regimen.  Denies hematuria, melena, or 
hematochezia.

 

Last echocardiogram was 08/08/19; per report, LVEF 30% to 35%.  Mild mitral 
stenosis, moderate mitral regurgitation, moderate aortic valve stenosis with 
probable low flow, low valve gradient.  Mild tricuspid insufficiency.  Peak 
aortic valve gradient 23, mean aortic valve gradient 13, dimensionless index 
was not listed in report.

 

Last ischemic evaluation according to our records was prior to bypass surgery 02
/04/17.

 

PAST MEDICAL HISTORY:

1.  Coronary artery disease.

2.  Sick sinus syndrome.

3.  Chronic kidney disease.

4.  Bicuspid aortic valve.

5.  Low flow, low gradient aortic stenosis.

6.  Moderate mitral insufficiency.

7.  Transthoracic ascending aortic aneurysm.

8.  Hyperlipidemia.

9.  Paroxysmal AFib, not on oral anticoagulation due to history of anemia.

10.  GERD.

11.  Hypertension.

12.  Connective tissue disease.

13.  Anemia.

 

PAST SURGICAL HISTORY:

1.  CABG x4 in 2017 (LIMA to LAD, saphenous vein graft to right PDA, sequential 
vein graft to OM1, OM2).

2.  Medtronic permanent pacemaker.

3.  Cataract.

4.  Tonsillectomy.

5.  Inguinal hernia repair.

 

MEDICATIONS:  Home medications according to admission med rec:

1.  Oxycodone 5/325 mg tablet 1 tablet p.o. q.6h. p.r.n.

2.  Metamucil as directed.

3.  Prilosec 20 mg a day.

4.  Multivitamin 1 capsule a day.

5.  Metoprolol succinate 25 mg p.o. b.i.d.

6.  Plaquenil 200 mg a day.

7.  Lasix 60 mg a day.

8.  Folic acid 1 mg a day.

9.  Vitamin D 1000 units p.o. daily.

10.  Atorvastatin 10 mg a day.

11.  Calcium carbonate 500 mg p.o. b.i.d.

12.  Aspirin 81 mg a day.

13.  Augmentin as directed.

14.  Tylenol as directed.

 

ALLERGIES:  Listed includes SIMVASTATIN and SEASONAL ALLERGIES.

 

FAMILY HISTORY:  Noncontributory.

 

SOCIAL HISTORY:  The patient is .  Lives at home with his wife.  He is 
wheelchair bound but is able to stand to pivot in place.  He is listed as a 
full code.  Denies tobacco, alcohol, or drug abuse.

 

PHYSICAL EXAM:  Temperature 97.8, pulse 73, respirations 20, oxygenation 98% on 
room air, blood pressure 109/54.  General:  The patient is lying upright in bed 
upon entering room.  Elderly frail gentleman.  He is in no apparent distress.  
He is pleasant and oriented x3.  HEENT:  Head is atraumatic, normocephalic.  
Oral mucosa is moist.  Tongue is midline.  Neck:  Supple.  Trachea midline.  
Positive hepatojugular reflux.  Unable to rule out carotid bruits given harsh 
aortic valve murmur.  Cardiac:  Normal S1, S2.  Regular rate and rhythm.  The 
patient has a 3/6 early systolic aortic valve murmur auscultated across the 
entire pericardium.  No gallop or rub.  Lungs:  Auscultated posteriorly.  
Inspiratory rales noted in bilateral bases.  Respirations are not labored.  /
GI:  Abdomen is distended, firm.  Normoactive bowel sounds x4.  Nontender to 
palpation.  Extremities:  1+ pretibial edema noted.  Peripheral Vascular:  2+ 
brachial and dorsalis pedis pulse palpated bilaterally and symmetrically.  Skin
:  Intact.  No evidence of jaundice, rashes, or ecchymosis appreciated.

 

DIAGNOSTIC STUDIES/LAB DATA:  Blood work obtained on 03/31/19:  Sodium 133, 
potassium 5, chloride 106, carbon dioxide 16, creatinine 3.42.  BUN 58.  
Troponin peaked at 0.61 this 12/31/19.  AST 44, ALT 51.  BNP greater than 1300.
  INR 1.2. White count 8.5, hemoglobin 7.1, hematocrit 22, platelets 250.

 

Imaging; abdomen and pelvis CT per Radiology report.  New bilateral pleural 
effusions, right greater than left.  No acute findings.  No hydronephrosis.

 

Chest x-ray, 12/30/19; per Radiology report consistent with congestive heart 
failure.  The patient has small bilateral pleural effusions right greater than 
left.

 

EKG, 12/31/19; V-paced rate 68, underlying rhythm appears to be sinus.  No 
further analysis attempted.

 

ASSESSMENT AND PLAN:

1.  Complaints of shortness of breath, positional lightheadedness, intermittent 
chest pain; the patient has troponinemia.  His troponin peaked at 0.61.  He has 
not had recurrent chest pain since yesterday while at his home.  The patient 
has a history of coronary artery bypass graft x4 in 2017.  Of note, he has 
chronic kidney disease, does not follow Nephrology according to the patient. He 
has a history of  chronic anemia, thus is not on -oral anticoagulation for 
atrial fibrillation. He is listed as a full code.  In regards to goals of care, 
I discussed at length with the patient what his goals of care are given his 
multiple comorbidities.  He states that given his age he is not interested in 
any invasive procedures and adds that his goal is to reside at home and be 
comfortable. given this information,  I briefly discussed with him 
consideration for palliative care consultation. He states that he would be 
interested in regards to considering this.  I will defer to the primary team 
for palliative care consultation.  However, given the patient's known chronic 
kidney disease, anemia, and inability to be anticoagulated and troponin has 
peaked with no recurrent chest pain, I will discontinue IV heparin therapy.  
Discontinue Brilinta therapy.  We will continue aspirin 81 mg a day, Lipitor 
and metoprolol therapy, and conservatively medically manage the patient, which 
is his wish.  Given the patient's desire to not proceed with any aggressive 
measures, I do not feel compelled to update an echocardiogram at this time.

2.  History of systolic heart failure; the patient appears decompensated with 
right greater than left pleural effusion, 1+ pretibial edema and positive 
hepatojugular reflux on exam.  He has chronic kidney disease, creatinine is 
3.42.  He is on Lasix 60 mg a day.  Consideration for Bumex instead of Lasix 
should be given; however, at this current time given anemia and relative 
hypotension, I would avoid volume contraction.  He is clinically stable.  We 
will continue metoprolol therapy.  He is not on ACE inhibitor, ARB, or 
Aldactone due to chronic kidney disease.

3.  History of chronic kidney disease.  Creatinine is 3.42.  Historically 
follow Dr. Jha.  It is not clear the patient has had longitudinal followup.  
We will defer to primary team.

4.  History of macrocytic anemia; hemoglobin is 7.1.  He was loaded with 
Brilinta and started on IV heparin therapy, both of which have been 
discontinued.  In the past, he was deemed not an anticoagulant candidate due to 
anemia.  Thus he is not anticoagulated for atrial fibrillation.  He denies 
melena, hematochezia, or hematuria at this current time.  Would recommend 
transfusing to hemoglobin greater than 8 given history of coronary disease.  
Blood pressure is 109/54.  He is currently asymptomatic, although he has been 
complaining of intermittent positional lightheadedness and weakness.  Defer to 
primary team.

5.  History of moderate aortic stenosis with known history of bicuspid aortic 
valve and possible low flow, low gradient aortic stenosis.  The patient is not 
interested in any invasive measures.  Avoid volume contraction and after load 
reduction.

6.  Disposition pending course.  The patient is full code.  We will 
conservatively medically manage the patient.  We will defer to primary team in 
regards to consulting Palliative Care.

 

Dr. Bran has personally seen and examined the patient and agrees with the 
above assessment and plan.  The patient has not had chest pain since 12/30/19. 
Troponin peaked at 0.61.

 

____________________________________ DINORAH SAMS NP

 

[id number]/CPS #: [id number]

MARIO Yes

## 2020-02-25 ENCOUNTER — EMERGENCY (EMERGENCY)
Facility: HOSPITAL | Age: 85
LOS: 1 days | Discharge: HOME | End: 2020-02-25
Attending: EMERGENCY MEDICINE
Payer: MEDICARE

## 2020-02-25 VITALS
RESPIRATION RATE: 17 BRPM | TEMPERATURE: 98 F | OXYGEN SATURATION: 95 % | DIASTOLIC BLOOD PRESSURE: 65 MMHG | HEART RATE: 73 BPM | SYSTOLIC BLOOD PRESSURE: 121 MMHG

## 2020-02-25 DIAGNOSIS — Z90.89 ACQUIRED ABSENCE OF OTHER ORGANS: Chronic | ICD-10-CM

## 2020-02-25 PROCEDURE — 99282 EMERGENCY DEPT VISIT SF MDM: CPT

## 2020-02-25 NOTE — ED PROVIDER NOTE - ATTENDING CONTRIBUTION TO CARE
my exam written above.  MS documented HX and I added    I performed a history and physical exam of the patient and discussed their management with the resident and /or advanced care provider. I reviewed the resident and /or ACP's note and agree with the documented findings and plan of care. My medical decison making and observations are found above.

## 2020-02-25 NOTE — ED PROVIDER NOTE - MUSCULOSKELETAL MINIMAL EXAM
all other extremities are non-tender, no deformites, no redness, warmth.  right and left hip can roll/ ad/abduct without problem or eliciting new pain

## 2020-02-25 NOTE — ED ADULT TRIAGE NOTE - CHIEF COMPLAINT QUOTE
pt mechanical fall while trying to cross street. denies head trauma and LOC takes 81mg asa every other day. no deformities noted. ambulatory with cane

## 2020-02-25 NOTE — ED PROVIDER NOTE - OBJECTIVE STATEMENT
Dinorah Hogan is an 85 year old woman with a PMH of CVA (no residual defect) on baby aspirin every other day presents after mechanical fall while walking. She reports that she was crossing the street with her cane, when her right leg gave out while trying to step up the curb. She caught herself on her hands and knees, denies head trauma/LOC. She does not report pain or weakness. Reports that she is being evaluated for a right hip replacement and attributes her fall to her bad hip. She Dinorah Hogan is an 85 year old woman with a PMH of CVA (no residual defect) on baby aspirin every other day presents after mechanical fall while walking. She reports that she was crossing the street with her cane, when her right leg gave out while trying to step up the curb. She landed on her buttocks, denies head trauma/LOC. She does not report pain or weakness. Reports that she is being evaluated for a right hip replacement and attributes her fall to her bad hip. She Dinorah Hogan is an 85 year old woman with a PMH of CVA (no residual defect) on baby aspirin every other day presents after mechanical fall while walking. She reports that she was crossing the street with her cane, when her right leg gave out while trying to step up the curb. She landed on her buttocks, denies head trauma/LOC. She does not report pain or weakness. Reports that she is being evaluated for a right hip replacement and attributes her fall to her bad hip. She was able to move her leg afterwards, and was able to ambulate as per her normal with her cane for support    pt fell on her butt, did not hit her head, had no loc, denies any other injuries

## 2020-02-25 NOTE — ED PROVIDER NOTE - PATIENT PORTAL LINK FT
You can access the FollowMyHealth Patient Portal offered by [facility] by registering at the following website: [email or web address]. By joining [facility]’s FollowMyHealth portal, you will also be able to view your health information using other applications (apps) compatible with our system.

## 2020-02-25 NOTE — ED PROVIDER NOTE - CARE PLAN
Principal Discharge DX:	Fall (on)(from) sidewalk curb, initial encounter Principal Discharge DX:	Fall (on)(from) sidewalk curb, initial encounter  Secondary Diagnosis:	Muscle strain Principal Discharge DX:	Muscle strain  Secondary Diagnosis:	Fall (on)(from) sidewalk curb, initial encounter

## 2020-02-27 ENCOUNTER — APPOINTMENT (OUTPATIENT)
Dept: CARDIOLOGY | Facility: CLINIC | Age: 85
End: 2020-02-27
Payer: MEDICARE

## 2020-02-27 ENCOUNTER — NON-APPOINTMENT (OUTPATIENT)
Age: 85
End: 2020-02-27

## 2020-02-27 VITALS
OXYGEN SATURATION: 98 % | DIASTOLIC BLOOD PRESSURE: 72 MMHG | WEIGHT: 124 LBS | SYSTOLIC BLOOD PRESSURE: 132 MMHG | BODY MASS INDEX: 22.82 KG/M2 | HEART RATE: 68 BPM | HEIGHT: 62 IN | TEMPERATURE: 97.9 F

## 2020-02-27 VITALS
HEART RATE: 67 BPM | WEIGHT: 124 LBS | SYSTOLIC BLOOD PRESSURE: 170 MMHG | HEIGHT: 60 IN | BODY MASS INDEX: 24.35 KG/M2 | DIASTOLIC BLOOD PRESSURE: 89 MMHG | OXYGEN SATURATION: 99 %

## 2020-02-27 LAB
BASOPHILS # BLD AUTO: 0.04 K/UL
BASOPHILS NFR BLD AUTO: 0.6 %
EOSINOPHIL # BLD AUTO: 0.12 K/UL
EOSINOPHIL NFR BLD AUTO: 1.7 %
HCT VFR BLD CALC: 42.6 %
HGB BLD-MCNC: 13.2 G/DL
IMM GRANULOCYTES NFR BLD AUTO: 0.1 %
LYMPHOCYTES # BLD AUTO: 2.04 K/UL
LYMPHOCYTES NFR BLD AUTO: 28.9 %
MAN DIFF?: NORMAL
MCHC RBC-ENTMCNC: 28.9 PG
MCHC RBC-ENTMCNC: 31 GM/DL
MCV RBC AUTO: 93.2 FL
MONOCYTES # BLD AUTO: 0.79 K/UL
MONOCYTES NFR BLD AUTO: 11.2 %
NEUTROPHILS # BLD AUTO: 4.05 K/UL
NEUTROPHILS NFR BLD AUTO: 57.5 %
PLATELET # BLD AUTO: 261 K/UL
RBC # BLD: 4.57 M/UL
RBC # FLD: 14.7 %
WBC # FLD AUTO: 7.05 K/UL

## 2020-02-27 PROCEDURE — 99215 OFFICE O/P EST HI 40 MIN: CPT

## 2020-02-27 PROCEDURE — 93000 ELECTROCARDIOGRAM COMPLETE: CPT

## 2020-02-27 NOTE — REASON FOR VISIT
[Aortic Stenosis] : aortic stenosis [Palpitations] : palpitations [Mitral Regurgitation] : mitral regurgitation [Follow-Up - From Hospitalization] : follow-up of a recent hospitalization for [Fatigue] : feeling tired (fatigue) [Discharge Date: ___] : Discharge Date: [unfilled]

## 2020-02-27 NOTE — HISTORY OF PRESENT ILLNESS
[FreeTextEntry1] : Mrs. Dinorah Hogan is an 85 year old woman with palpitations, mitral valve prolapse, mitral regurgitation and aortic insufficiency. She requests urgent visit. Two days ago had mechanical fall crossing street, evaluated in emergency room and concluded due to weakness and pain in her right hip that is being considered for orthopedic surgical replacement. She has no residual left sided weakness from stroke. Palpitations have been quiescent since evaluated several months ago with only occasional APCs. She denies chest discomfort, dyspnea or orthopnea. Additionally she complains bitterly of fatigue, inability to accomplish her chores, just wants to lie down, though in fact gets necessary duties done. Also says she is being evaluated for hip surgery and wishes to know if will be a candidate.

## 2020-02-27 NOTE — DISCUSSION/SUMMARY
[Mild Mitral Regurgitation] : mild mitral regurgitation [Mitral Valve Prolapse] : mitral valve prolapse [Stable] : stable [Anxiety] : anxiety disorder NOS [None] : There are no changes in medication management [Improving] : improving [Patient] : the patient [de-identified] : aortic insufficiency [FreeTextEntry1] : \par Complains of fatigue and says it is recent onset. No blood testing was done in ER, thus obtaining complete blood tests seeking potential explanations. Discussed that pending results of blood tests there are no findings that would preclude hip surgery.

## 2020-02-27 NOTE — REVIEW OF SYSTEMS
[see HPI] : see HPI [Negative] : Heme/Lymph [Feeling Fatigued] : feeling fatigued [Hip Pain] : hip pain [Joint Pain] : joint pain [Abdominal Pain] : abdominal pain [Anxiety] : anxiety [Recent Weight Loss (___ Lbs)] : no recent weight loss [Recent Weight Gain (___ Lbs)] : no recent weight gain

## 2020-02-27 NOTE — PHYSICAL EXAM
[Normal Appearance] : normal appearance [General Appearance - Well Developed] : well developed [Well Groomed] : well groomed [General Appearance - Well Nourished] : well nourished [General Appearance - In No Acute Distress] : no acute distress [Normal Conjunctiva] : the conjunctiva exhibited no abnormalities [Eyelids - No Xanthelasma] : the eyelids demonstrated no xanthelasmas [Normal Jugular Venous A Waves Present] : normal jugular venous A waves present [Normal Jugular Venous V Waves Present] : normal jugular venous V waves present [Respiration, Rhythm And Depth] : normal respiratory rhythm and effort [Auscultation Breath Sounds / Voice Sounds] : lungs were clear to auscultation bilaterally [Bowel Sounds] : normal bowel sounds [Abnormal Walk] : normal gait [Nail Clubbing] : no clubbing of the fingernails [Cyanosis, Localized] : no localized cyanosis [Skin Color & Pigmentation] : normal skin color and pigmentation [] : no rash [Oriented To Time, Place, And Person] : oriented to person, place, and time [Impaired Insight] : insight and judgment were intact [Affect] : the affect was normal [Mood] : the mood was normal [No Oral Pallor] : no oral pallor [Normal Oral Mucosa] : normal oral mucosa [No Oral Cyanosis] : no oral cyanosis [JVD Elevated _____cm] : JVD elevated [unfilled] ~U cm above clavicle [5th Left ICS - MCL] : palpated at the 5th LICS in the midclavicular line [Normal] : normal [Rhythm Regular] : regular [Normal Rate] : normal [Normal S2] : normal S2 [Normal S1] : normal S1 [No Murmur] : no murmurs heard [No Abnormalities] : the abdominal aorta was not enlarged and no bruit was heard [2+] : left 2+ [No Pitting Edema] : no pitting edema present [Pericardial Rub] : no pericardial rub [Click] : no click [Right Carotid Bruit] : no bruit heard over the right carotid [Left Carotid Bruit] : no bruit heard over the left carotid [FreeTextEntry1] : No bruit. Soft, non-tender. Liver and spleen not palpable; aortic pulsation normal.

## 2020-03-02 LAB
ALBUMIN SERPL ELPH-MCNC: 4.5 G/DL
ALP BLD-CCNC: 104 U/L
ALT SERPL-CCNC: 13 U/L
ANION GAP SERPL CALC-SCNC: 15 MMOL/L
AST SERPL-CCNC: 17 U/L
BILIRUB SERPL-MCNC: 0.8 MG/DL
BUN SERPL-MCNC: 22 MG/DL
CALCIUM SERPL-MCNC: 9.5 MG/DL
CHLORIDE SERPL-SCNC: 102 MMOL/L
CHOLEST SERPL-MCNC: 175 MG/DL
CHOLEST/HDLC SERPL: 1.9 RATIO
CO2 SERPL-SCNC: 25 MMOL/L
CREAT SERPL-MCNC: 0.58 MG/DL
GLUCOSE SERPL-MCNC: 102 MG/DL
HDLC SERPL-MCNC: 92 MG/DL
LDLC SERPL CALC-MCNC: 70 MG/DL
POTASSIUM SERPL-SCNC: 4.8 MMOL/L
PROT SERPL-MCNC: 6.8 G/DL
SODIUM SERPL-SCNC: 142 MMOL/L
T4 FREE SERPL-MCNC: 1.4 NG/DL
TRIGL SERPL-MCNC: 67 MG/DL
TSH SERPL-ACNC: 1.6 UIU/ML

## 2020-04-01 NOTE — ED ADULT NURSE NOTE - THOUGHTS OF HOMICIDE/VIOLENCE TOWARDS OTHERS YN, MLM
Spoke to [facility] from [facility]. Patient just releeased from [facility] after CVA. Patient wants to do Rehab at [facility]. Referral entered. Will call patient when auth received.
No

## 2020-05-26 ENCOUNTER — APPOINTMENT (OUTPATIENT)
Dept: CARDIOLOGY | Facility: CLINIC | Age: 85
End: 2020-05-26

## 2020-05-26 NOTE — DISCUSSION/SUMMARY
[FreeTextEntry1] : PRELIMINARY NOTE\par \par \par Palps-\par     Will wear a Holter monitor.\par     Will check CBC, CMP and TSH.\par \par Mild valvular disease on echo done in December of 2017, with MVP with trivial prolapse and mild mitral valve insufficiency; mild aortic valve insufficiency noted.  \par \par Mild diastolic LV dysfunction on most recent echo.\par \par History of cerebrovascular accident in 2007, associated with ataxia and right leg weakness at the time, which resolved.  Will continue ASA.\par \par Will call her when results of tests are available; hopefully, will be able to clear her for dental procedure.\par \par She will return here for a visit in 2 months.

## 2020-05-26 NOTE — HISTORY OF PRESENT ILLNESS
[FreeTextEntry1] : We last saw her in February.  Since that time, \par \par \par \par  she has continued her usual activities.  She remains quite anxious and stressed, worrying about her two children who rely upon her for care.\par \par She saw a dentist recently because of a cracked tooth that will need to be removed.  She complained of a pounding heartbeat and he advised her to be seen here before he could proceed with extraction.\par \par She reports a strong, regular heartbeat, sometimes fast.  She does not describe irregularity.  This correlated with episodes of anxiety and stress, but not with physical exertion.  She reports no chest discomfort or JONES.  There have been no episodes of orthopnea or PND.  She reports no episodes of dizziness.\par \par Her meds are unchanged.  She describes no new interval medical problems.

## 2020-05-26 NOTE — REASON FOR VISIT
[FreeTextEntry1] : NO SHOW FOR APPOINTMENT TODAY.\par \par \par PRELIMINARY NOTE\par \par \par [place] returns for f/u re palpitations  MVP with mild MR, & mild aortic valve insufficiency.

## 2020-05-31 ENCOUNTER — EMERGENCY (EMERGENCY)
Facility: HOSPITAL | Age: 85
LOS: 1 days | Discharge: HOME | End: 2020-05-31
Attending: EMERGENCY MEDICINE
Payer: MEDICARE

## 2020-05-31 VITALS
SYSTOLIC BLOOD PRESSURE: 130 MMHG | HEART RATE: 81 BPM | RESPIRATION RATE: 18 BRPM | WEIGHT: 123.9 LBS | DIASTOLIC BLOOD PRESSURE: 83 MMHG | HEIGHT: 61 IN | OXYGEN SATURATION: 97 % | TEMPERATURE: 98 F

## 2020-05-31 VITALS — HEART RATE: 84 BPM | DIASTOLIC BLOOD PRESSURE: 83 MMHG | OXYGEN SATURATION: 96 % | SYSTOLIC BLOOD PRESSURE: 140 MMHG

## 2020-05-31 DIAGNOSIS — Z90.89 ACQUIRED ABSENCE OF OTHER ORGANS: Chronic | ICD-10-CM

## 2020-05-31 LAB
ALBUMIN SERPL ELPH-MCNC: 4.3 G/DL — SIGNIFICANT CHANGE UP (ref 3.3–5)
ALP SERPL-CCNC: 85 U/L — SIGNIFICANT CHANGE UP (ref 40–120)
ALT FLD-CCNC: 11 U/L — SIGNIFICANT CHANGE UP (ref 10–45)
ANION GAP SERPL CALC-SCNC: 11 MMOL/L — SIGNIFICANT CHANGE UP (ref 5–17)
AST SERPL-CCNC: 14 U/L — SIGNIFICANT CHANGE UP (ref 10–40)
BASOPHILS # BLD AUTO: 0.04 K/UL — SIGNIFICANT CHANGE UP (ref 0–0.2)
BASOPHILS NFR BLD AUTO: 0.5 % — SIGNIFICANT CHANGE UP (ref 0–2)
BILIRUB SERPL-MCNC: 1.1 MG/DL — SIGNIFICANT CHANGE UP (ref 0.2–1.2)
BUN SERPL-MCNC: 19 MG/DL — SIGNIFICANT CHANGE UP (ref 7–23)
CALCIUM SERPL-MCNC: 9.8 MG/DL — SIGNIFICANT CHANGE UP (ref 8.4–10.5)
CHLORIDE SERPL-SCNC: 103 MMOL/L — SIGNIFICANT CHANGE UP (ref 96–108)
CO2 SERPL-SCNC: 29 MMOL/L — SIGNIFICANT CHANGE UP (ref 22–31)
CREAT SERPL-MCNC: 0.62 MG/DL — SIGNIFICANT CHANGE UP (ref 0.5–1.3)
EOSINOPHIL # BLD AUTO: 0.12 K/UL — SIGNIFICANT CHANGE UP (ref 0–0.5)
EOSINOPHIL NFR BLD AUTO: 1.6 % — SIGNIFICANT CHANGE UP (ref 0–6)
GAS PNL BLDV: SIGNIFICANT CHANGE UP
GLUCOSE SERPL-MCNC: 99 MG/DL — SIGNIFICANT CHANGE UP (ref 70–99)
HCT VFR BLD CALC: 40.3 % — SIGNIFICANT CHANGE UP (ref 34.5–45)
HGB BLD-MCNC: 12.8 G/DL — SIGNIFICANT CHANGE UP (ref 11.5–15.5)
IMM GRANULOCYTES NFR BLD AUTO: 0.4 % — SIGNIFICANT CHANGE UP (ref 0–1.5)
LIDOCAIN IGE QN: 18 U/L — SIGNIFICANT CHANGE UP (ref 7–60)
LYMPHOCYTES # BLD AUTO: 2.03 K/UL — SIGNIFICANT CHANGE UP (ref 1–3.3)
LYMPHOCYTES # BLD AUTO: 27.1 % — SIGNIFICANT CHANGE UP (ref 13–44)
MCHC RBC-ENTMCNC: 29 PG — SIGNIFICANT CHANGE UP (ref 27–34)
MCHC RBC-ENTMCNC: 31.8 GM/DL — LOW (ref 32–36)
MCV RBC AUTO: 91.4 FL — SIGNIFICANT CHANGE UP (ref 80–100)
MONOCYTES # BLD AUTO: 0.88 K/UL — SIGNIFICANT CHANGE UP (ref 0–0.9)
MONOCYTES NFR BLD AUTO: 11.7 % — SIGNIFICANT CHANGE UP (ref 2–14)
NEUTROPHILS # BLD AUTO: 4.4 K/UL — SIGNIFICANT CHANGE UP (ref 1.8–7.4)
NEUTROPHILS NFR BLD AUTO: 58.7 % — SIGNIFICANT CHANGE UP (ref 43–77)
NRBC # BLD: 0 /100 WBCS — SIGNIFICANT CHANGE UP (ref 0–0)
PLATELET # BLD AUTO: 229 K/UL — SIGNIFICANT CHANGE UP (ref 150–400)
POTASSIUM SERPL-MCNC: 4.1 MMOL/L — SIGNIFICANT CHANGE UP (ref 3.5–5.3)
POTASSIUM SERPL-SCNC: 4.1 MMOL/L — SIGNIFICANT CHANGE UP (ref 3.5–5.3)
PROT SERPL-MCNC: 7.2 G/DL — SIGNIFICANT CHANGE UP (ref 6–8.3)
RBC # BLD: 4.41 M/UL — SIGNIFICANT CHANGE UP (ref 3.8–5.2)
RBC # FLD: 14.4 % — SIGNIFICANT CHANGE UP (ref 10.3–14.5)
SODIUM SERPL-SCNC: 143 MMOL/L — SIGNIFICANT CHANGE UP (ref 135–145)
WBC # BLD: 7.5 K/UL — SIGNIFICANT CHANGE UP (ref 3.8–10.5)
WBC # FLD AUTO: 7.5 K/UL — SIGNIFICANT CHANGE UP (ref 3.8–10.5)

## 2020-05-31 PROCEDURE — 84295 ASSAY OF SERUM SODIUM: CPT

## 2020-05-31 PROCEDURE — 83605 ASSAY OF LACTIC ACID: CPT

## 2020-05-31 PROCEDURE — 83690 ASSAY OF LIPASE: CPT

## 2020-05-31 PROCEDURE — 82435 ASSAY OF BLOOD CHLORIDE: CPT

## 2020-05-31 PROCEDURE — 93005 ELECTROCARDIOGRAM TRACING: CPT

## 2020-05-31 PROCEDURE — 82947 ASSAY GLUCOSE BLOOD QUANT: CPT

## 2020-05-31 PROCEDURE — 85014 HEMATOCRIT: CPT

## 2020-05-31 PROCEDURE — 80053 COMPREHEN METABOLIC PANEL: CPT

## 2020-05-31 PROCEDURE — 84132 ASSAY OF SERUM POTASSIUM: CPT

## 2020-05-31 PROCEDURE — 85027 COMPLETE CBC AUTOMATED: CPT

## 2020-05-31 PROCEDURE — 74176 CT ABD & PELVIS W/O CONTRAST: CPT

## 2020-05-31 PROCEDURE — 93010 ELECTROCARDIOGRAM REPORT: CPT

## 2020-05-31 PROCEDURE — 74176 CT ABD & PELVIS W/O CONTRAST: CPT | Mod: 26

## 2020-05-31 PROCEDURE — 99284 EMERGENCY DEPT VISIT MOD MDM: CPT | Mod: 25

## 2020-05-31 PROCEDURE — 99284 EMERGENCY DEPT VISIT MOD MDM: CPT | Mod: GC

## 2020-05-31 PROCEDURE — 82803 BLOOD GASES ANY COMBINATION: CPT

## 2020-05-31 PROCEDURE — 82330 ASSAY OF CALCIUM: CPT

## 2020-05-31 RX ORDER — CIPROFLOXACIN LACTATE 400MG/40ML
1 VIAL (ML) INTRAVENOUS
Qty: 20 | Refills: 0
Start: 2020-05-31 | End: 2020-06-09

## 2020-05-31 RX ORDER — METRONIDAZOLE 500 MG
1 TABLET ORAL
Qty: 21 | Refills: 0
Start: 2020-05-31 | End: 2020-06-06

## 2020-05-31 RX ORDER — CIPROFLOXACIN LACTATE 400MG/40ML
500 VIAL (ML) INTRAVENOUS ONCE
Refills: 0 | Status: COMPLETED | OUTPATIENT
Start: 2020-05-31 | End: 2020-05-31

## 2020-05-31 RX ORDER — METRONIDAZOLE 500 MG
500 TABLET ORAL ONCE
Refills: 0 | Status: COMPLETED | OUTPATIENT
Start: 2020-05-31 | End: 2020-05-31

## 2020-05-31 RX ORDER — ACETAMINOPHEN 500 MG
650 TABLET ORAL ONCE
Refills: 0 | Status: COMPLETED | OUTPATIENT
Start: 2020-05-31 | End: 2020-05-31

## 2020-05-31 RX ADMIN — Medication 500 MILLIGRAM(S): at 09:26

## 2020-05-31 RX ADMIN — Medication 1 TABLET(S): at 08:19

## 2020-05-31 RX ADMIN — Medication 500 MILLIGRAM(S): at 09:27

## 2020-05-31 NOTE — ED ADULT TRIAGE NOTE - AS TEMP SITE
Diagnosis: high h&h    Pre-Phlebotomy: /84, HR 75    Post-Phlebotomy: /75, HR 80    Volume Removed: 894  grams    Complications: none    Comments: none    LOT # [id number]  EXP: [id number]      Kecia Reyna oral

## 2020-05-31 NOTE — ED ADULT NURSE NOTE - OBJECTIVE STATEMENT
86 y/o female history of asthma, anal fissure, GERD, CVA, and anxiety presents to the ED from home c/o abdominal pain. Patient states that she has been having left lower quadrant pain for the last two days and constipation. Patient states that she is sure it is another "diverticulitis attack". Last BM was two days ago. Denies fever, chills, n/v, weakness, constipation, numbness/tingling, urinary s/s. Patient A&Ox3, in no respiratory distress, no chest pain. Left lower extremity weakness from previous CVA. Strong peripheral pulses. Soft non distended abdomen. Tenderness to palpation of the left lower quadrant.

## 2020-05-31 NOTE — ED ADULT NURSE NOTE - CHPI ED NUR SYMPTOMS NEG
no abdominal distension/no blood in stool/no burning urination/no chills/no fever/no vomiting/no dysuria/no hematuria/no nausea/no diarrhea

## 2020-05-31 NOTE — ED PROVIDER NOTE - ATTENDING CONTRIBUTION TO CARE
MD Guzman:  patient seen and evaluated personally.   I agree with the History & Physical,  Impression & Plan other than what was detailed in my note.  MD Guzman    84 y/o F w/ PMHx of Asthma, Diverticulitis, CVA, allergy to IV dye (" I get tingly shocks), presents to ed w/ cc of 2 days of llq pain 5/10, feels like previous episode of diverticulitis, non radiating, no assoicated n/v, f/c. pos constipated x 2 days, no blood in stool. afebrile vitals stable, non toxic, well appearing, pos llq ttp. plan for cbc, cmp, lipase, ct without( pt refusing pre treatment at this time). will s/o to oncoming team

## 2020-05-31 NOTE — ED PROVIDER NOTE - PHYSICAL EXAMINATION
General: well appearing, interactive, well nourished, no apparent distress, ncat  HEENT: EOMI, PERRLA, normal mucosa, normal oropharynx, no lesions on the lips or on oral mucosa, normal external ear  Neck: supple, no lymphadenopathy, full range of motion, no nuchal rigidity  CV: RRR, normal S1 and S2 with no murmur, capillary refill less than two seconds  Resp: lungs CTA b/l, good aeration bilaterally, symmetric chest wall   Abd: non-distended, soft, llq ttp w/o guarding/rebound  : no CVA tenderness  MSK: full range of motion, no cyanosis, no edema, no clubbing, no immobility  Neuro: CN II-XII grossly intact, muscle strength 5/5 in all extremities  Skin: no rashes, skin intact

## 2020-05-31 NOTE — ED PROVIDER NOTE - RATE
Other (Free Text): Patient had labs drawn by PCP will draw labs and tSpot next visit
Note Text (......Xxx Chief Complaint.): This diagnosis correlates with the
Detail Level: Zone
66

## 2020-05-31 NOTE — ED PROVIDER NOTE - PROGRESS NOTE DETAILS
Jasmina Payne M.D. Resident  Augmentin given, Rx sent, Pt tolerating PO and agreeable to going home. Jasmina Payne M.D. Resident   Pt already taking Augmentin for diverticulitis x 2 days, not better. Pt offered CDU admission for IV abx, pt strongly declined. Cipro. flagy, ordered and Rx sent. Pt instructed to take tylenol (was taking nothing for pain at home).

## 2020-05-31 NOTE — ED ADULT NURSE NOTE - NSIMPLEMENTINTERV_GEN_ALL_ED
Implemented All Fall with Harm Risk Interventions:  [place] to call system. Call bell, personal items and telephone within reach. Instruct patient to call for assistance. Room bathroom lighting operational. Non-slip footwear when patient is off stretcher. Physically safe environment: no spills, clutter or unnecessary equipment. Stretcher in lowest position, wheels locked, appropriate side rails in place. Provide visual cue, wrist band, yellow gown, etc. Monitor gait and stability. Monitor for mental status changes and reorient to person, place, and time. Review medications for side effects contributing to fall risk. Reinforce activity limits and safety measures with patient and family. Provide visual clues: red socks.

## 2020-05-31 NOTE — ED ADULT NURSE NOTE - NSFALLRSKUNASSIST_ED_ALL_ED
Pt had EGD/EUS with [facility] this am for RUQ pain at 0900.  Pt recently discharged to home.  Pt calls requesting narcotics for abdominal pain.  Explained that narcotics are not prescribed after this procedure.  Pt aware referral has been pain to pain management for abdominal wall nerve entrapment syndrome and that office would call pt for appt.  Pt did as for pain medication after procedure and was told by  to talk with her PCP. Pt did call her PCP and was instructed to call GI.  Pt upset with writer because she will not be given any narcotics.  Pt denies any other symptoms besides the abdominal wall pain.   no

## 2020-05-31 NOTE — ED PROVIDER NOTE - PATIENT PORTAL LINK FT
You can access the FollowMyHealth Patient Portal offered by [facility] by registering at the following website: [email or web address]. By joining [employer]’s FollowMyHealth portal, you will also be able to view your health information using other applications (apps) compatible with our system.

## 2020-05-31 NOTE — ED PROVIDER NOTE - NS ED ROS FT
GENERAL: No fever or chills, //             EYES: no change in vision, //             HEENT: no trouble swallowing or speaking, //             CARDIAC: no chest pain, //              PULMONARY: no cough or SOB, //             GI: llq pain , //             : No changes in urination,  //            SKIN: no rashes,  //            NEURO: no headache,  //             MSK: No joint pain otherwise as HPI or negative. ~Fei Krishnamurthy DO PGY2

## 2020-05-31 NOTE — ED PROVIDER NOTE - NSFOLLOWUPINSTRUCTIONS_ED_ALL_ED_FT
You were seen in the Emergency Department (ED) for abdominal pain. You were found to have diverticulitis.     You are prescribed Augmentin. Please take as directed by your pharmacists.   Please take over the counter Tylenol as directed by the packaging for your pain.     Please follow up with your primary care doctor in the next 72 hours.  If you have issues obtaining follow up, please call: [phone] to obtain a doctor or specialist who takes your insurance in your area.    Please return to the ED if you experience any new or concerning symptoms, such as: worsening or changing abdominal pain, unable to eat or drink, fever, chills.     Thank you for visiting a [facility] ED. You were seen in the Emergency Department (ED) for abdominal pain. You were found to have diverticulitis.     You are prescribed Ciprofloxacin (take 3 times a day) and Flagyl (take 2 times a day). Please take as directed by your pharmacists.   Please take over the counter Tylenol as directed by the packaging for your pain.     Please follow up with your primary care doctor in the next 72 hours.  If you have issues obtaining follow up, please call: [phone] to obtain a doctor or specialist who takes your insurance in your area.    Please return to the ED if you experience any new or concerning symptoms, such as: worsening or changing abdominal pain, unable to eat or drink, fever, chills.     Thank you for visiting a [facility] ED.

## 2020-05-31 NOTE — ED PROVIDER NOTE - OBJECTIVE STATEMENT
86 yo f pmh Asthma, Diverticulitis, CVA, pw llq pain x 2 days. sharp, worse w/ pressure, 6/10, no known remitting factors, has had similar pain in past and consistent w/ diverticulitis. reports has not had bm in two days. no hx abd procedures. denies n/v, cp, sob, cough, congestion, urinary sx.

## 2020-06-01 ENCOUNTER — APPOINTMENT (OUTPATIENT)
Dept: CARDIOLOGY | Facility: CLINIC | Age: 85
End: 2020-06-01

## 2020-06-03 NOTE — REASON FOR VISIT
[FreeTextEntry1] : PATIENT CANCELLED APPT. \par \par \par PRELIMINARY NOTE\par \par \par Dinorah Edison returns for f/u re palpitations  MVP with mild MR, & mild aortic valve insufficiency.

## 2020-06-03 NOTE — PHYSICAL EXAM
[General Appearance - Well Developed] : well developed [General Appearance - Well Nourished] : well nourished [General Appearance - In No Acute Distress] : no acute distress [Normal Appearance] : normal appearance [Well Groomed] : well groomed [Eyelids - No Xanthelasma] : the eyelids demonstrated no xanthelasmas [Normal Jugular Venous A Waves Present] : normal jugular venous A waves present [Normal Conjunctiva] : the conjunctiva exhibited no abnormalities [Normal Jugular Venous V Waves Present] : normal jugular venous V waves present [Respiration, Rhythm And Depth] : normal respiratory rhythm and effort [Auscultation Breath Sounds / Voice Sounds] : lungs were clear to auscultation bilaterally [Heart Rate And Rhythm] : heart rate and rhythm were normal [Bowel Sounds] : normal bowel sounds [Abnormal Walk] : normal gait [Nail Clubbing] : no clubbing of the fingernails [Cyanosis, Localized] : no localized cyanosis [Skin Color & Pigmentation] : normal skin color and pigmentation [Affect] : the affect was normal [Oriented To Time, Place, And Person] : oriented to person, place, and time [Impaired Insight] : insight and judgment were intact [] : no rash [Mood] : the mood was normal [FreeTextEntry1] : PMI not displaced. First and second heart sounds are normal. 1/6 systolic murmur at apex. No diastolic murmur or gallop appreciated.

## 2020-06-10 ENCOUNTER — APPOINTMENT (OUTPATIENT)
Dept: CARDIOLOGY | Facility: CLINIC | Age: 85
End: 2020-06-10
Payer: MEDICARE

## 2020-06-10 ENCOUNTER — NON-APPOINTMENT (OUTPATIENT)
Age: 85
End: 2020-06-10

## 2020-06-10 VITALS
DIASTOLIC BLOOD PRESSURE: 78 MMHG | SYSTOLIC BLOOD PRESSURE: 124 MMHG | WEIGHT: 120 LBS | HEIGHT: 62 IN | HEART RATE: 70 BPM | BODY MASS INDEX: 22.08 KG/M2

## 2020-06-10 PROCEDURE — 93000 ELECTROCARDIOGRAM COMPLETE: CPT

## 2020-06-10 PROCEDURE — 99214 OFFICE O/P EST MOD 30 MIN: CPT

## 2020-06-10 NOTE — DISCUSSION/SUMMARY
[FreeTextEntry1] : Palps- quiescent at this time.  Prior Holter monitor without significant findings.  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  Will continue ond ASA.\par \par R hip arthritis, currently using a Rollator; advised her to f/u with Dr. Brown, who she had seen 2 years ago.\par \par She will return here for a visit in 3 months.

## 2020-06-10 NOTE — HISTORY OF PRESENT ILLNESS
[FreeTextEntry1] : We saw her last in February.  Since that time, she was recently hospitalized at [facility] for abdominal pain; CT scan showed evidence of diverticulitis.  She was treated with antibiotic with improvement.  She went to the ED at [place] earlier this week because of constipation and was given an enema with relief.\par \par From a cardiac standpoint, she was apparently told of ectopy seen on a monitor while at [place]; she did not have any cardiac sxs.  She does not think any other cardiac testing was done.continued her usual activities.\par \par As I see her today, she is feeling well.  She does not describe palps at this time.  She reports no chest discomfort or JONES.  There have been no episodes of orthopnea or PND.  She reports no episodes of dizziness.\par \par Her meds are unchanged.

## 2020-06-10 NOTE — REASON FOR VISIT
[FreeTextEntry1] : \samantha Hogan returns for a f/u visit regarding palpitations, hx. of MVP with mild MR, mild aortic valve insufficiency and prior history of cerebrovascular accident in 2007.

## 2020-07-02 ENCOUNTER — APPOINTMENT (OUTPATIENT)
Dept: GERIATRICS | Facility: CLINIC | Age: 85
End: 2020-07-02

## 2020-07-21 ENCOUNTER — APPOINTMENT (OUTPATIENT)
Dept: GERIATRICS | Facility: CLINIC | Age: 85
End: 2020-07-21

## 2020-09-14 ENCOUNTER — APPOINTMENT (OUTPATIENT)
Dept: CARDIOLOGY | Facility: CLINIC | Age: 85
End: 2020-09-14
Payer: MEDICARE

## 2020-09-14 ENCOUNTER — NON-APPOINTMENT (OUTPATIENT)
Age: 85
End: 2020-09-14

## 2020-09-14 VITALS
WEIGHT: 121 LBS | OXYGEN SATURATION: 95 % | TEMPERATURE: 97.7 F | HEART RATE: 75 BPM | DIASTOLIC BLOOD PRESSURE: 86 MMHG | BODY MASS INDEX: 22.26 KG/M2 | SYSTOLIC BLOOD PRESSURE: 159 MMHG | HEIGHT: 62 IN | RESPIRATION RATE: 14 BRPM

## 2020-09-14 VITALS — SYSTOLIC BLOOD PRESSURE: 108 MMHG | HEART RATE: 76 BPM | DIASTOLIC BLOOD PRESSURE: 66 MMHG

## 2020-09-14 PROCEDURE — 99214 OFFICE O/P EST MOD 30 MIN: CPT

## 2020-09-14 PROCEDURE — 93000 ELECTROCARDIOGRAM COMPLETE: CPT

## 2020-09-14 NOTE — HISTORY OF PRESENT ILLNESS
[FreeTextEntry1] : We saw her last in June.  Since that time, she reports no recurrence of abdominal pain or the diverticular sxs. that prompted her stay at [place] earlier this year.  From a cardiac standpoint, she remains stable.  She does not report cardiac sxs.- there have been no episodes of palps.  She reports no exertional chest discomfort or JONES.  There have been no episodes of orthopnea or PND.  She reports no episodes of dizziness.\par \par Her meds are unchanged.  Her main complaint is of fatigue.  Currently, she needs a Rollator, due to pain of hip arthritis.  She tells me she plans to f/u up with Dr. Walker regarding possible hip replacement.

## 2020-09-14 NOTE — DISCUSSION/SUMMARY
[FreeTextEntry1] : \par Palps- quiescent at this time.  Prior Holter monitor without significant findings.  \par \par Mild MVP with trivial prolapse/mild MR; mild aortic valve insufficiency - remains asx.  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  Will continue ASA.\par \par R hip arthritis, currently using a Rollator.  I explained that I see no cardiac contraindication to surgery if hip replacement surgery is needed.\par \par She will return here for a visit in 3 months with TTE.

## 2020-09-14 NOTE — ASSESSMENT
[FreeTextEntry1] : Myxomatous mitral valve with trivial prolapse; mild mitral valve insufficiency.\par \par Mild aortic valve insufficiency.\par \par Mild diastolic LV dysfunction\par \par Palps, quiescent\par \par History of cerebrovascular accident in 2007, associated with ataxia and right leg weakness at the time, which resolved.

## 2020-09-23 ENCOUNTER — APPOINTMENT (OUTPATIENT)
Dept: INTERNAL MEDICINE | Facility: CLINIC | Age: 85
End: 2020-09-23
Payer: MEDICARE

## 2020-09-23 VITALS
DIASTOLIC BLOOD PRESSURE: 80 MMHG | HEIGHT: 60 IN | WEIGHT: 120 LBS | HEART RATE: 60 BPM | BODY MASS INDEX: 23.56 KG/M2 | TEMPERATURE: 97.6 F | SYSTOLIC BLOOD PRESSURE: 120 MMHG

## 2020-09-23 DIAGNOSIS — Z23 ENCOUNTER FOR IMMUNIZATION: ICD-10-CM

## 2020-09-23 DIAGNOSIS — Z00.00 ENCOUNTER FOR GENERAL ADULT MEDICAL EXAMINATION W/OUT ABNORMAL FINDINGS: ICD-10-CM

## 2020-09-23 PROCEDURE — G0439: CPT

## 2020-09-23 PROCEDURE — G0444 DEPRESSION SCREEN ANNUAL: CPT | Mod: 59

## 2020-09-23 NOTE — PHYSICAL EXAM
[No Acute Distress] : no acute distress [Well Nourished] : well nourished [Well Developed] : well developed [Well-Appearing] : well-appearing [EOMI] : extraocular movements intact [Normal Outer Ear/Nose] : the outer ears and nose were normal in appearance [Normal Oropharynx] : the oropharynx was normal [Normal TMs] : both tympanic membranes were normal [Normal Nasal Mucosa] : the nasal mucosa was normal [No Lymphadenopathy] : no lymphadenopathy [Supple] : supple [Thyroid Normal, No Nodules] : the thyroid was normal and there were no nodules present [No Respiratory Distress] : no respiratory distress  [No Accessory Muscle Use] : no accessory muscle use [Clear to Auscultation] : lungs were clear to auscultation bilaterally [Normal Rate] : normal rate  [Regular Rhythm] : with a regular rhythm [Normal S1, S2] : normal S1 and S2 [No Edema] : there was no peripheral edema [Declined Breast Exam] : declined breast exam  [Soft] : abdomen soft [Non Tender] : non-tender [Non-distended] : non-distended [Normal Bowel Sounds] : normal bowel sounds [Normal Supraclavicular Nodes] : no supraclavicular lymphadenopathy [Normal Posterior Cervical Nodes] : no posterior cervical lymphadenopathy [Normal Anterior Cervical Nodes] : no anterior cervical lymphadenopathy [Kyphosis] : kyphosis [Memory Grossly Normal] : memory grossly normal [Alert and Oriented x3] : oriented to person, place, and time [de-identified] : Walks with rolling walker

## 2020-09-23 NOTE — ASSESSMENT
[FreeTextEntry1] : HCM\par Pt refuses labs today\par Cardio fuv and echo scheduled\par Advised PT for gait training and balance\par Advised ortho fuv\par Flu, PNA refused today\par Support offered

## 2020-09-23 NOTE — COUNSELING
[Fall prevention counseling provided] : Fall prevention counseling provided [Adequate lighting] : Adequate lighting [Use proper foot wear] : Use proper foot wear [Use recommended devices] : Use recommended devices

## 2020-09-23 NOTE — HEALTH RISK ASSESSMENT
[Fair] :  ~his/her~ mood as fair [] : No [No] : In the past 12 months have you used drugs other than those required for medical reasons? No [de-identified] : as tolerated [None] : None [Alone] : lives alone [Feels Safe at Home] : Feels safe at home [Fully functional (bathing, dressing, toileting, transferring, walking, feeding)] : Fully functional (bathing, dressing, toileting, transferring, walking, feeding) [Fully functional (using the telephone, shopping, preparing meals, housekeeping, doing laundry, using] : Fully functional and needs no help or supervision to perform IADLs (using the telephone, shopping, preparing meals, housekeeping, doing laundry, using transportation, managing medications and managing finances) [Reports changes in hearing] : Reports no changes in hearing [Reports changes in vision] : Reports no changes in vision [Reports changes in dental health] : Reports no changes in dental health [Smoke Detector] : smoke detector [Carbon Monoxide Detector] : carbon monoxide detector [Seat Belt] :  uses seat belt

## 2020-09-23 NOTE — REVIEW OF SYSTEMS
[Fever] : no fever [Vision Problems] : no vision problems [Nasal Discharge] : no nasal discharge [Chest Pain] : no chest pain [Palpitations] : no palpitations [Shortness Of Breath] : no shortness of breath [Wheezing] : no wheezing [Cough] : no cough [Abdominal Pain] : no abdominal pain [Skin Rash] : no skin rash [Headache] : no headache

## 2020-09-23 NOTE — HISTORY OF PRESENT ILLNESS
[FreeTextEntry1] : Annual Physical [de-identified] : TREMAINE NUR is an 86 yo woman with history of CVA 2008, vertebral fractures, mild asthma, right hip OA, aortic and mitral regurgitation here for a first physical. She has been well overall.  She sees cardiologist Dr Arechiga\par \par The patient reports that she has had 2 falls in recent times.  She has been seen at [facility].  She has been walking with a rolling walker for the past 2 months.  She is considering right hip replacement with Dr Walker.\par \par The patient reports that she had a reaction to the flu shot last year.  She refuses the flu shot and pneumovax.  The patient reports that she has been treated for diverticulitis in the past.\par \par The patient is single with two children.  She is a retired .  Her daughter had MS and her son has bipolar disorder.  The patient understandably worries about her children. She lives alone.

## 2020-10-20 ENCOUNTER — APPOINTMENT (OUTPATIENT)
Dept: INTERNAL MEDICINE | Facility: CLINIC | Age: 85
End: 2020-10-20
Payer: MEDICARE

## 2020-10-20 VITALS
DIASTOLIC BLOOD PRESSURE: 80 MMHG | OXYGEN SATURATION: 98 % | BODY MASS INDEX: 23.56 KG/M2 | HEIGHT: 60 IN | HEART RATE: 67 BPM | WEIGHT: 120 LBS | TEMPERATURE: 97.5 F | SYSTOLIC BLOOD PRESSURE: 125 MMHG

## 2020-10-20 PROCEDURE — 99214 OFFICE O/P EST MOD 30 MIN: CPT | Mod: 25

## 2020-10-20 PROCEDURE — 36415 COLL VENOUS BLD VENIPUNCTURE: CPT

## 2020-10-20 NOTE — HISTORY OF PRESENT ILLNESS
[FreeTextEntry1] : FUV and labs [de-identified] : TREMAINE NUR is an 84 yo woman with history of CVA 2008, vertebral fractures, mild asthma, right hip OA, aortic and mitral regurgitation here for a bp check and labs. She has been well overall.  She sees cardiologist Dr Arechiga\par \par The patient reports that she has had 2 falls in recent times.  She has been seen at [facility].  She has been walking with a rolling walker for the past 2 months.  She is considering right hip replacement with Dr Walker.\par \par The patient reports that she had a reaction to the flu shot last year.  She refuses the flu shot and pneumovax.  The patient reports that she has been treated for diverticulitis in the past.\par \par The patient is single with two children.  She is a retired .  Her daughter had MS and her son has bipolar disorder.  The patient understandably worries about her children. She lives alone.

## 2020-10-20 NOTE — PHYSICAL EXAM
[No Acute Distress] : no acute distress [Well Nourished] : well nourished [Well Developed] : well developed [Well-Appearing] : well-appearing [No Lymphadenopathy] : no lymphadenopathy [Supple] : supple [No Respiratory Distress] : no respiratory distress  [No Accessory Muscle Use] : no accessory muscle use [Clear to Auscultation] : lungs were clear to auscultation bilaterally [Normal Rate] : normal rate  [Regular Rhythm] : with a regular rhythm [Normal S1, S2] : normal S1 and S2 [No Edema] : there was no peripheral edema [Alert and Oriented x3] : oriented to person, place, and time [de-identified] : walks with rolling walker

## 2020-10-20 NOTE — ASSESSMENT
[FreeTextEntry1] : Pt refuses flu shot\par Pt reports that she is very isolated, her two children have medical illnesses and she is estranged from the rest of her family\par I recommended reaching out to her neighbors, perhaps, one at a time. Just saying hello\par I recommend reaching out to her [employer]. They do have a senior center\par Support offered\par BP check in 3 months\par

## 2020-10-21 LAB
25(OH)D3 SERPL-MCNC: 34.6 NG/ML
ALBUMIN SERPL ELPH-MCNC: 4.4 G/DL
ALP BLD-CCNC: 101 U/L
ALT SERPL-CCNC: 13 U/L
ANION GAP SERPL CALC-SCNC: 13 MMOL/L
AST SERPL-CCNC: 16 U/L
BASOPHILS # BLD AUTO: 0.06 K/UL
BASOPHILS NFR BLD AUTO: 0.8 %
BILIRUB SERPL-MCNC: 0.8 MG/DL
BUN SERPL-MCNC: 17 MG/DL
CALCIUM SERPL-MCNC: 9.4 MG/DL
CHLORIDE SERPL-SCNC: 103 MMOL/L
CHOLEST SERPL-MCNC: 167 MG/DL
CO2 SERPL-SCNC: 26 MMOL/L
CREAT SERPL-MCNC: 0.7 MG/DL
EOSINOPHIL # BLD AUTO: 0.09 K/UL
EOSINOPHIL NFR BLD AUTO: 1.2 %
ESTIMATED AVERAGE GLUCOSE: 105 MG/DL
GLUCOSE SERPL-MCNC: 87 MG/DL
HBA1C MFR BLD HPLC: 5.3 %
HCT VFR BLD CALC: 42.2 %
HDLC SERPL-MCNC: 79 MG/DL
HGB BLD-MCNC: 13.1 G/DL
IMM GRANULOCYTES NFR BLD AUTO: 0.3 %
LDLC SERPL CALC-MCNC: 74 MG/DL
LYMPHOCYTES # BLD AUTO: 1.92 K/UL
LYMPHOCYTES NFR BLD AUTO: 25.8 %
MAN DIFF?: NORMAL
MCHC RBC-ENTMCNC: 30 PG
MCHC RBC-ENTMCNC: 31 GM/DL
MCV RBC AUTO: 96.6 FL
MONOCYTES # BLD AUTO: 0.72 K/UL
MONOCYTES NFR BLD AUTO: 9.7 %
NEUTROPHILS # BLD AUTO: 4.63 K/UL
NEUTROPHILS NFR BLD AUTO: 62.2 %
NONHDLC SERPL-MCNC: 89 MG/DL
PLATELET # BLD AUTO: 285 K/UL
POTASSIUM SERPL-SCNC: 4.6 MMOL/L
PROT SERPL-MCNC: 7.1 G/DL
RBC # BLD: 4.37 M/UL
RBC # FLD: 14.4 %
SODIUM SERPL-SCNC: 142 MMOL/L
T4 FREE SERPL-MCNC: 1.4 NG/DL
TRIGL SERPL-MCNC: 74 MG/DL
TSH SERPL-ACNC: 1.49 UIU/ML
VIT B12 SERPL-MCNC: 520 PG/ML
WBC # FLD AUTO: 7.44 K/UL

## 2020-10-29 ENCOUNTER — EMERGENCY (EMERGENCY)
Facility: HOSPITAL | Age: 85
LOS: 1 days | Discharge: HOME | End: 2020-10-29
Attending: EMERGENCY MEDICINE
Payer: MEDICARE

## 2020-10-29 VITALS
HEART RATE: 78 BPM | SYSTOLIC BLOOD PRESSURE: 144 MMHG | DIASTOLIC BLOOD PRESSURE: 85 MMHG | TEMPERATURE: 98 F | OXYGEN SATURATION: 96 % | RESPIRATION RATE: 20 BRPM | HEIGHT: 61 IN

## 2020-10-29 VITALS
OXYGEN SATURATION: 100 % | SYSTOLIC BLOOD PRESSURE: 142 MMHG | DIASTOLIC BLOOD PRESSURE: 72 MMHG | HEART RATE: 74 BPM | RESPIRATION RATE: 18 BRPM

## 2020-10-29 DIAGNOSIS — Z90.89 ACQUIRED ABSENCE OF OTHER ORGANS: Chronic | ICD-10-CM

## 2020-10-29 LAB
ALBUMIN SERPL ELPH-MCNC: 4.2 G/DL — SIGNIFICANT CHANGE UP (ref 3.3–5)
ALP SERPL-CCNC: 91 U/L — SIGNIFICANT CHANGE UP (ref 40–120)
ALT FLD-CCNC: 13 U/L — SIGNIFICANT CHANGE UP (ref 10–45)
ANION GAP SERPL CALC-SCNC: 9 MMOL/L — SIGNIFICANT CHANGE UP (ref 5–17)
AST SERPL-CCNC: 16 U/L — SIGNIFICANT CHANGE UP (ref 10–40)
BASOPHILS # BLD AUTO: 0.04 K/UL — SIGNIFICANT CHANGE UP (ref 0–0.2)
BASOPHILS NFR BLD AUTO: 0.6 % — SIGNIFICANT CHANGE UP (ref 0–2)
BILIRUB SERPL-MCNC: 1 MG/DL — SIGNIFICANT CHANGE UP (ref 0.2–1.2)
BUN SERPL-MCNC: 20 MG/DL — SIGNIFICANT CHANGE UP (ref 7–23)
CALCIUM SERPL-MCNC: 9.9 MG/DL — SIGNIFICANT CHANGE UP (ref 8.4–10.5)
CHLORIDE SERPL-SCNC: 103 MMOL/L — SIGNIFICANT CHANGE UP (ref 96–108)
CO2 SERPL-SCNC: 28 MMOL/L — SIGNIFICANT CHANGE UP (ref 22–31)
CREAT SERPL-MCNC: 0.59 MG/DL — SIGNIFICANT CHANGE UP (ref 0.5–1.3)
EOSINOPHIL # BLD AUTO: 0.07 K/UL — SIGNIFICANT CHANGE UP (ref 0–0.5)
EOSINOPHIL NFR BLD AUTO: 1 % — SIGNIFICANT CHANGE UP (ref 0–6)
GLUCOSE SERPL-MCNC: 101 MG/DL — HIGH (ref 70–99)
HCT VFR BLD CALC: 40.3 % — SIGNIFICANT CHANGE UP (ref 34.5–45)
HGB BLD-MCNC: 12.7 G/DL — SIGNIFICANT CHANGE UP (ref 11.5–15.5)
IMM GRANULOCYTES NFR BLD AUTO: 0.4 % — SIGNIFICANT CHANGE UP (ref 0–1.5)
LYMPHOCYTES # BLD AUTO: 1.31 K/UL — SIGNIFICANT CHANGE UP (ref 1–3.3)
LYMPHOCYTES # BLD AUTO: 19.6 % — SIGNIFICANT CHANGE UP (ref 13–44)
MAGNESIUM SERPL-MCNC: 2 MG/DL — SIGNIFICANT CHANGE UP (ref 1.6–2.6)
MCHC RBC-ENTMCNC: 29.5 PG — SIGNIFICANT CHANGE UP (ref 27–34)
MCHC RBC-ENTMCNC: 31.5 GM/DL — LOW (ref 32–36)
MCV RBC AUTO: 93.7 FL — SIGNIFICANT CHANGE UP (ref 80–100)
MONOCYTES # BLD AUTO: 0.69 K/UL — SIGNIFICANT CHANGE UP (ref 0–0.9)
MONOCYTES NFR BLD AUTO: 10.3 % — SIGNIFICANT CHANGE UP (ref 2–14)
NEUTROPHILS # BLD AUTO: 4.56 K/UL — SIGNIFICANT CHANGE UP (ref 1.8–7.4)
NEUTROPHILS NFR BLD AUTO: 68.1 % — SIGNIFICANT CHANGE UP (ref 43–77)
NRBC # BLD: 0 /100 WBCS — SIGNIFICANT CHANGE UP (ref 0–0)
PHOSPHATE SERPL-MCNC: 3 MG/DL — SIGNIFICANT CHANGE UP (ref 2.5–4.5)
PLATELET # BLD AUTO: 273 K/UL — SIGNIFICANT CHANGE UP (ref 150–400)
POTASSIUM SERPL-MCNC: 4 MMOL/L — SIGNIFICANT CHANGE UP (ref 3.5–5.3)
POTASSIUM SERPL-SCNC: 4 MMOL/L — SIGNIFICANT CHANGE UP (ref 3.5–5.3)
PROT SERPL-MCNC: 7 G/DL — SIGNIFICANT CHANGE UP (ref 6–8.3)
RBC # BLD: 4.3 M/UL — SIGNIFICANT CHANGE UP (ref 3.8–5.2)
RBC # FLD: 14.2 % — SIGNIFICANT CHANGE UP (ref 10.3–14.5)
SARS-COV-2 RNA SPEC QL NAA+PROBE: SIGNIFICANT CHANGE UP
SODIUM SERPL-SCNC: 140 MMOL/L — SIGNIFICANT CHANGE UP (ref 135–145)
TROPONIN T, HIGH SENSITIVITY RESULT: 14 NG/L — SIGNIFICANT CHANGE UP (ref 0–51)
WBC # BLD: 6.7 K/UL — SIGNIFICANT CHANGE UP (ref 3.8–10.5)
WBC # FLD AUTO: 6.7 K/UL — SIGNIFICANT CHANGE UP (ref 3.8–10.5)

## 2020-10-29 PROCEDURE — 83735 ASSAY OF MAGNESIUM: CPT

## 2020-10-29 PROCEDURE — 99284 EMERGENCY DEPT VISIT MOD MDM: CPT | Mod: CS,GC

## 2020-10-29 PROCEDURE — 85025 COMPLETE CBC W/AUTO DIFF WBC: CPT

## 2020-10-29 PROCEDURE — 80053 COMPREHEN METABOLIC PANEL: CPT

## 2020-10-29 PROCEDURE — 99284 EMERGENCY DEPT VISIT MOD MDM: CPT | Mod: 25

## 2020-10-29 PROCEDURE — 84484 ASSAY OF TROPONIN QUANT: CPT

## 2020-10-29 PROCEDURE — 82962 GLUCOSE BLOOD TEST: CPT

## 2020-10-29 PROCEDURE — 93005 ELECTROCARDIOGRAM TRACING: CPT

## 2020-10-29 PROCEDURE — U0003: CPT

## 2020-10-29 PROCEDURE — 70450 CT HEAD/BRAIN W/O DYE: CPT

## 2020-10-29 PROCEDURE — 84100 ASSAY OF PHOSPHORUS: CPT

## 2020-10-29 PROCEDURE — 93010 ELECTROCARDIOGRAM REPORT: CPT

## 2020-10-29 PROCEDURE — 70450 CT HEAD/BRAIN W/O DYE: CPT | Mod: 26

## 2020-10-29 RX ORDER — MECLIZINE HCL 12.5 MG
25 TABLET ORAL ONCE
Refills: 0 | Status: COMPLETED | OUTPATIENT
Start: 2020-10-29 | End: 2020-10-29

## 2020-10-29 NOTE — ED ADULT NURSE NOTE - CHPI ED NUR SYMPTOMS NEG
no back pain/no chest pain/no congestion/no diaphoresis/no fever/no syncope/no shortness of breath/no chills/no nausea/no vomiting

## 2020-10-29 NOTE — ED PROVIDER NOTE - PATIENT PORTAL LINK FT
You can access the FollowMyHealth Patient Portal offered by [facility] by registering at the following website: [email or web address]. By joining [employer]’s FollowMyHealth portal, you will also be able to view your health information using other applications (apps) compatible with our system.

## 2020-10-29 NOTE — ED ADULT NURSE NOTE - OBJECTIVE STATEMENT
Pt is an 85y F [facility] Pt is an 85y F PMH CVA, asthma p/w dizziness since 2AM when she got up to go to the bathroom. Dizziness persistent since waking. Pt endorses a room spinning sensation. Pt reports this happening once prior and shorter episodes on occasion. Denies LOC, syncope, numbness/tingling, SOB, chest pain, palpitations. A&Ox4, SINGH, lungs clear, distal pulses intact, abdomen soft nontender, skin intact. Side rails up for safety, call bell and personal items within reach, instructed to call for assistance, verbalizes understanding. Will continue to monitor.

## 2020-10-29 NOTE — ED PROVIDER NOTE - CLINICAL SUMMARY MEDICAL DECISION MAKING FREE TEXT BOX
85 female, Hx: CVA, Asthma - presents with acute onset of dizziness at 2 AM this morning when she went to go the bathroom, dizziness has been persistent thereafter; worse when she looks up. Exam, presentation, and history concerning for BPPV (acute onset, no nystagmus on exam, prior history of, positionally elicited/exacerbated), however given age and stroke hx - will rule out acute CVA, ICH, electrolyte abnormalities, and dysrhythmias. Plan: CBC, CMP, EKG, CT Head, Trop, Meclizine (pt declined), MRI (pt declined/refused).

## 2020-10-29 NOTE — ED PROVIDER NOTE - ATTENDING CONTRIBUTION TO CARE
attending Ector: 85yF h/o prior stroke (residual RLE weakness) here with sudden onset dizziness described as room spinning, began 5 hours ago. Now somewhat improved. Worse with head movement. Favor peripheral etiology although given age and prior stroke also consider central cause. Pt declines meclizine, IV contrast (due to allergy) and MRI (due to metal in body). Amenable to labs, CTH and reassess.

## 2020-10-29 NOTE — ED ADULT NURSE NOTE - NSIMPLEMENTINTERV_GEN_ALL_ED
Implemented All Fall with Harm Risk Interventions:  [place] to call system. Call bell, personal items and telephone within reach. Instruct patient to call for assistance. Room bathroom lighting operational. Non-slip footwear when patient is off stretcher. Physically safe environment: no spills, clutter or unnecessary equipment. Stretcher in lowest position, wheels locked, appropriate side rails in place. Provide visual cue, wrist band, yellow gown, etc. Monitor gait and stability. Monitor for mental status changes and reorient to person, place, and time. Review medications for side effects contributing to fall risk. Reinforce activity limits and safety measures with patient and family. Provide visual clues: red socks.

## 2020-10-29 NOTE — ED ADULT TRIAGE NOTE - CHIEF COMPLAINT QUOTE
dizziness since waking this morning around 2pm; pt states that she felt normal upon going to bed around 7pm

## 2020-10-29 NOTE — ED ADULT TRIAGE NOTE - PATIENT ON (OXYGEN DELIVERY METHOD)
Airway patent, Nasal mucosa clear. Mouth with normal mucosa. Throat has no vesicles, no oropharyngeal exudates and uvula is midline.
room air

## 2020-10-29 NOTE — ED PROVIDER NOTE - PHYSICAL EXAMINATION
GEN - NAD; well appearing; A+Ox3   HEAD - NC/AT, No visible Ecchymosis, No Abrasions, No Lacerations/Skin Tears     EYES - EOMI, No Nystagmus Elicited Horizontally/Vertically; no conjunctival pallor, no scleral icterus  PULM - CTA B/L,  symmetric breath sounds  COR -  RRR, S1 S2, no murmurs  ABD - NT/ND, soft, no guarding, no rebound, no masses    BACK - nontender midline CTL spine     EXTREMS - 2+ Pulses B/L UE and LE; 0+ edema, no gross deformity, warm and well perfused, no calf tenderness/swelling/erythema    NEUROLOGIC - alert, CN3-12 intact, sensation nl, moving all 4 ext with 5/5 Strength (RLE with residual neuro deficit from CVA, unchanged in presentation or quality as per patient)

## 2020-10-29 NOTE — ED PROVIDER NOTE - OBJECTIVE STATEMENT
85 female, Hx: CVA, Asthma - presents with acute onset of dizziness at 2 AM this morning when she went to go the bathroom, dizziness has been persistent thereafter; worse when she looks up. Endorses dizziness to be room spinning sensation; denies any numbness/tingling/weakness in peripheral extremities. Reports this has happened to her once prior in 2017, ut did not follow up with Neuro at that time. Denies any falls or trauma.

## 2020-10-29 NOTE — ED PROVIDER NOTE - NS ED ROS FT
Constitution: No Fever or chills  Eyes: (+) Room Spinning Sensation  HEENT: No cough, No Discharge, No Rhinorrhea, No URI symptoms  Cardio: No Chest pain, No Palpitations, No Dyspnea  Resp: No SOB, No Wheezing  GI: No abdominal pain, No Nausea, No Vomiting, No Constipation, No Diarrhea  : No burning upon urination, trouble urinating, no foul odor from urine  MSK: No Back pain, No Numbness, No Tingling, No Weakness  Neuro: (+) Room Spinning Sensation; No Headache, No changes to Hearing, Steady Gait

## 2020-10-29 NOTE — ED PROVIDER NOTE - NSFOLLOWUPINSTRUCTIONS_ED_ALL_ED_FT
You were seen and evaluated in the Emergency Department for your dizziness.     Clinical examination and history demonstrated no acute evidence of any life-threatening risks to your health as a result of your symptoms.     While emergent evaluation does not demonstrate any immediate life-threats, we strongly recommend you follow up with an Ear/Nose/Throat Doctor and a Neurologist for further evaluation of your dizziness symptoms and a more formal/comprehensive diagnosis.     Should you develop persistent nausea, vomiting, worsening headaches, inability to walk properly, numbness or tingling in the extremities, dizziness, or changes to your hearing/vision - please immediately return to the Emergency Department for evaluation of your symptoms.     If you do not have a Neurologist, you can call the following number to schedule an appointment with our Neurology partners:    [facility]  Neurology  [address] [place] [address] [place] [address]  Phone: [phone]    [facility]  ENT  [address] [facility] [address], [address]  [place] [address]  Phone: [phone]    Furthermore we recommend you see your Primary Care Physician for a comprehensive evaluation of your health within the next 48-72 hours.

## 2020-10-30 ENCOUNTER — NON-APPOINTMENT (OUTPATIENT)
Age: 85
End: 2020-10-30

## 2020-11-03 ENCOUNTER — NON-APPOINTMENT (OUTPATIENT)
Age: 85
End: 2020-11-03

## 2020-11-06 ENCOUNTER — APPOINTMENT (OUTPATIENT)
Dept: INTERNAL MEDICINE | Facility: CLINIC | Age: 85
End: 2020-11-06
Payer: MEDICARE

## 2020-11-06 VITALS
SYSTOLIC BLOOD PRESSURE: 125 MMHG | TEMPERATURE: 97.5 F | BODY MASS INDEX: 23.56 KG/M2 | HEART RATE: 61 BPM | HEIGHT: 60 IN | DIASTOLIC BLOOD PRESSURE: 80 MMHG | OXYGEN SATURATION: 96 % | WEIGHT: 120 LBS

## 2020-11-06 PROCEDURE — 99214 OFFICE O/P EST MOD 30 MIN: CPT

## 2020-11-06 NOTE — PHYSICAL EXAM
[No Acute Distress] : no acute distress [Well Nourished] : well nourished [Well Developed] : well developed [Well-Appearing] : well-appearing [No Lymphadenopathy] : no lymphadenopathy [Supple] : supple [No Respiratory Distress] : no respiratory distress  [No Accessory Muscle Use] : no accessory muscle use [Clear to Auscultation] : lungs were clear to auscultation bilaterally [Normal Rate] : normal rate  [Regular Rhythm] : with a regular rhythm [Normal S1, S2] : normal S1 and S2 [No Edema] : there was no peripheral edema [Alert and Oriented x3] : oriented to person, place, and time [de-identified] : walks with walker

## 2020-11-06 NOTE — ASSESSMENT
[FreeTextEntry1] : Advised GI fuv\par Pt refuses flu shot\par Pt reports that she is very isolated, her two children have medical illnesses and she is estranged from the rest of her family\par I recommended reaching out to her neighbors, perhaps, one at a time. Just saying hello\par I recommend reaching out to her [employer]. They do have a senior center\par Support offered\par BP check in 3 months\par

## 2020-11-06 NOTE — HISTORY OF PRESENT ILLNESS
[FreeTextEntry1] : FUV and labs [de-identified] : TREMAINE NUR is an 86 yo woman with history of CVA 2008, vertebral fractures, mild asthma, right hip OA, aortic and mitral regurgitation here for a bp check. She has been well overall.  She sees cardiologist Dr Arechiga\par \par Has seen GI Dr Troy fro diverticulosis in the past.\par \par The patient reports that she has had 2 falls in recent times.  She has been seen at [facility].  She has been walking with a rolling walker for the past 2 months.  She is considering right hip replacement with Dr Walker.\par \par The patient reports that she had a reaction to the flu shot last year.  She refuses the flu shot and pneumovax.  The patient reports that she has been treated for diverticulitis in the past.\par \par The patient is single with two children.  She is a retired .  Her daughter had MS and her son has bipolar disorder.  The patient understandably worries about her children. She lives alone.

## 2020-11-13 ENCOUNTER — NON-APPOINTMENT (OUTPATIENT)
Age: 85
End: 2020-11-13

## 2020-11-13 ENCOUNTER — APPOINTMENT (OUTPATIENT)
Dept: CARDIOLOGY | Facility: CLINIC | Age: 85
End: 2020-11-13
Payer: MEDICARE

## 2020-11-13 VITALS
BODY MASS INDEX: 23.56 KG/M2 | WEIGHT: 120 LBS | RESPIRATION RATE: 14 BRPM | SYSTOLIC BLOOD PRESSURE: 158 MMHG | DIASTOLIC BLOOD PRESSURE: 80 MMHG | HEIGHT: 60 IN | HEART RATE: 67 BPM

## 2020-11-13 PROCEDURE — 93000 ELECTROCARDIOGRAM COMPLETE: CPT

## 2020-11-13 PROCEDURE — 99214 OFFICE O/P EST MOD 30 MIN: CPT

## 2020-11-13 NOTE — REASON FOR VISIT
[FreeTextEntry1] : \par Dinorah Hogan returns for a f/u visit regarding a hx. of palpitations, hx. of MVP with mild MR, mild aortic valve insufficiency.

## 2020-11-13 NOTE — ASSESSMENT
[FreeTextEntry1] : Myxomatous mitral valve with trivial prolapse; mild mitral valve insufficiency.\par \par Mild aortic valve insufficiency.\par \par Mild diastolic LV dysfunction\par \par Palps, quiescent\par \par Remote hx. of CVA 2007 (?mechanism), associated with ataxia and right leg weakness at the time, which resolved.

## 2020-11-13 NOTE — DISCUSSION/SUMMARY
[FreeTextEntry1] : \par Palps- quiescent at this time.  Prior Holter monitor without significant findings.  \par \par Mild MVP with trivial prolapse/mild MR; mild aortic valve insufficiency - remains asx.  \par \par Mild diastolic LV dysfunction on most recent echo of 2017; no treatment needed.  \par \par R hip arthritis, currently using a walker.  I see no contraindication to surgery from a cardiac standpoint if needed.  \par I suggested that she f/u with an orthopedist.  I suggested she return to Dr. Brown, who she saw in 2018.  She is concerned that he does not do an anterior repair, but I advised her to call that office, as one of his colleagues likely could do that approach.  Alternatively, she could see Dr. Trinidad of [facility]; he has an office in [place], but I explained that he may only operate in [facility].\par \par She will return here for a visit in 3 months.

## 2020-11-13 NOTE — HISTORY OF PRESENT ILLNESS
[FreeTextEntry1] : We saw her last in September.  Since that time, she has been stable from a cardiac standpoint.  She describes no cardiac sxs.- there have been no episodes of palps, and she reports no exertional chest discomfort or JONES.  There have been no episodes of orthopnea or PND.  She recounts no episodes of dizziness.\par \par Her meds are unchanged. \par \par She currently is ambulating with a walker, due to arthritis of the hip.  She has seen Dr. Walker regarding possible hip replacement, but does not want surgery done at [facility]

## 2020-11-23 ENCOUNTER — APPOINTMENT (OUTPATIENT)
Dept: INTERNAL MEDICINE | Facility: CLINIC | Age: 85
End: 2020-11-23

## 2020-11-26 ENCOUNTER — TRANSCRIPTION ENCOUNTER (OUTPATIENT)
Age: 85
End: 2020-11-26

## 2020-12-09 ENCOUNTER — APPOINTMENT (OUTPATIENT)
Dept: INTERNAL MEDICINE | Facility: CLINIC | Age: 85
End: 2020-12-09

## 2020-12-15 ENCOUNTER — APPOINTMENT (OUTPATIENT)
Dept: CARDIOLOGY | Facility: CLINIC | Age: 85
End: 2020-12-15
Payer: MEDICARE

## 2020-12-15 PROCEDURE — 93306 TTE W/DOPPLER COMPLETE: CPT

## 2020-12-18 ENCOUNTER — APPOINTMENT (OUTPATIENT)
Dept: INTERNAL MEDICINE | Facility: CLINIC | Age: 85
End: 2020-12-18

## 2020-12-22 ENCOUNTER — APPOINTMENT (OUTPATIENT)
Dept: INTERNAL MEDICINE | Facility: CLINIC | Age: 85
End: 2020-12-22

## 2020-12-23 ENCOUNTER — APPOINTMENT (OUTPATIENT)
Dept: INTERNAL MEDICINE | Facility: CLINIC | Age: 85
End: 2020-12-23
Payer: MEDICARE

## 2020-12-23 VITALS
BODY MASS INDEX: 23.75 KG/M2 | HEIGHT: 60 IN | DIASTOLIC BLOOD PRESSURE: 80 MMHG | SYSTOLIC BLOOD PRESSURE: 132 MMHG | TEMPERATURE: 96.5 F | WEIGHT: 121 LBS | HEART RATE: 65 BPM | OXYGEN SATURATION: 96 %

## 2020-12-23 DIAGNOSIS — R07.81 PLEURODYNIA: ICD-10-CM

## 2020-12-23 PROCEDURE — 99214 OFFICE O/P EST MOD 30 MIN: CPT

## 2020-12-23 NOTE — HISTORY OF PRESENT ILLNESS
[FreeTextEntry1] : FUV  [de-identified] : TREMAINE NUR is an 87 yo woman with history of CVA 2008, vertebral fractures, mild asthma, right hip OA, aortic and mitral regurgitation here for a bp check and fuv for left rib pain. She did not fall.  She turned and heard a cracking sound on the left side.  She is concerned about a rib fracture. She does report soreness in the left rib area.  No rash, redness, sob.\par \par She has been well overall.  She sees cardiologist Dr Arechiga\par \par Has seen GI Dr Troy fro diverticulosis in the past.\par \par The patient reports that she has had 2 falls in recent times.  She has been seen at [facility].  She has been walking with a rolling walker for the past 2 months.  She is considering right hip replacement with Dr Walker.\par \par The patient reports that she had a reaction to the flu shot last year.  She refuses the flu shot and pneumovax.  The patient reports that she has been treated for diverticulitis in the past.\par \par The patient is single with two children.  She is a retired .  Her daughter had MS and her son has bipolar disorder.  The patient understandably worries about her children. She lives alone. Drove herself in today.

## 2020-12-23 NOTE — ASSESSMENT
[FreeTextEntry1] : Support offered\par Conservative management of rib pain, sprain, fracture discussed\par All questions answered

## 2020-12-23 NOTE — PHYSICAL EXAM
[No Acute Distress] : no acute distress [Well Nourished] : well nourished [Well Developed] : well developed [Well-Appearing] : well-appearing [No Lymphadenopathy] : no lymphadenopathy [Supple] : supple [No Respiratory Distress] : no respiratory distress  [No Accessory Muscle Use] : no accessory muscle use [Clear to Auscultation] : lungs were clear to auscultation bilaterally [Normal Rate] : normal rate  [Regular Rhythm] : with a regular rhythm [Normal S1, S2] : normal S1 and S2 [No Edema] : there was no peripheral edema [Alert and Oriented x3] : oriented to person, place, and time [de-identified] : No tenderness to palpation of left chest wall, ribs [de-identified] : walks with walker

## 2020-12-24 ENCOUNTER — APPOINTMENT (OUTPATIENT)
Dept: RADIOLOGY | Facility: IMAGING CENTER | Age: 85
End: 2020-12-24

## 2021-01-12 ENCOUNTER — APPOINTMENT (OUTPATIENT)
Dept: ORTHOPEDIC SURGERY | Facility: CLINIC | Age: 86
End: 2021-01-12
Payer: MEDICARE

## 2021-01-12 VITALS — TEMPERATURE: 98 F

## 2021-01-12 DIAGNOSIS — M16.11 UNILATERAL PRIMARY OSTEOARTHRITIS, RIGHT HIP: ICD-10-CM

## 2021-01-12 PROCEDURE — 73502 X-RAY EXAM HIP UNI 2-3 VIEWS: CPT | Mod: RT

## 2021-01-12 PROCEDURE — 99214 OFFICE O/P EST MOD 30 MIN: CPT

## 2021-01-12 NOTE — PHYSICAL EXAM
[de-identified] : AP and lateral x-rays of the right hip, pelvis, and femur were ordered and taken in the office and demonstrate degenerative joint disease of the hip with joint space narrowing, osteophyte formation, and subchondral sclerosis.\par  [de-identified] : Patient is well nourished, well-developed, in no acute distress, with appropriate mood and affect. The patient is oriented to time, place, and person. Respirations are even and unlabored. Gait evaluation reveals a limp. There is no inguinal adenopathy. Examination of the contralateral hip shows normal range of motion, strength, no tenderness, and intact skin. The affected limb is well-perfused, shows a grossly normal motor and sensory examination. Examination of the hip shows no skin lesions. Hip motion is reduced and causes pain. FADIR is positive and PRAMOD is positive. Stinchfield test is positive. Leg lengths are approximately 1 cm short on the right. Both hips are stable and muscle strength is normal. Pedal pulses are palpable.

## 2021-01-12 NOTE — HISTORY OF PRESENT ILLNESS
[de-identified] : This is a 86-year-old female experiencing right hip and thigh pain, which is moderate in intensity. The pain mildly limits activities of daily living. Walking tolerance is reduced.  Use a walker at baseline.  Does not take any medications for this.  Does not use physical therapy in the past.  Is quite fearful of surgery.  Does not want surgery.  The patient denies any radiation of the pain to the feet and it is not associated with numbness, tingling, or weakness.  Has some psychiatric delusions during the examination today.

## 2021-01-19 ENCOUNTER — APPOINTMENT (OUTPATIENT)
Dept: INTERNAL MEDICINE | Facility: CLINIC | Age: 86
End: 2021-01-19

## 2021-02-25 ENCOUNTER — APPOINTMENT (OUTPATIENT)
Dept: DERMATOLOGY | Facility: CLINIC | Age: 86
End: 2021-02-25
Payer: MEDICARE

## 2021-02-25 VITALS — BODY MASS INDEX: 23.6 KG/M2 | WEIGHT: 125 LBS | HEIGHT: 61 IN

## 2021-02-25 DIAGNOSIS — L65.0 TELOGEN EFFLUVIUM: ICD-10-CM

## 2021-02-25 DIAGNOSIS — L82.1 OTHER SEBORRHEIC KERATOSIS: ICD-10-CM

## 2021-02-25 DIAGNOSIS — Z12.83 ENCOUNTER FOR SCREENING FOR MALIGNANT NEOPLASM OF SKIN: ICD-10-CM

## 2021-02-25 PROCEDURE — 99203 OFFICE O/P NEW LOW 30 MIN: CPT

## 2021-02-26 ENCOUNTER — APPOINTMENT (OUTPATIENT)
Dept: UROLOGY | Facility: CLINIC | Age: 86
End: 2021-02-26

## 2021-03-09 ENCOUNTER — APPOINTMENT (OUTPATIENT)
Dept: INTERNAL MEDICINE | Facility: CLINIC | Age: 86
End: 2021-03-09
Payer: MEDICARE

## 2021-03-09 VITALS
RESPIRATION RATE: 14 BRPM | TEMPERATURE: 97.3 F | HEART RATE: 77 BPM | WEIGHT: 125 LBS | SYSTOLIC BLOOD PRESSURE: 132 MMHG | HEIGHT: 61 IN | BODY MASS INDEX: 23.6 KG/M2 | DIASTOLIC BLOOD PRESSURE: 76 MMHG | OXYGEN SATURATION: 92 %

## 2021-03-09 DIAGNOSIS — M25.551 PAIN IN RIGHT HIP: ICD-10-CM

## 2021-03-09 PROCEDURE — 99204 OFFICE O/P NEW MOD 45 MIN: CPT

## 2021-03-21 NOTE — HISTORY OF PRESENT ILLNESS
[de-identified] : Ms. TREMAINE NUR is a 86 year old female, with hx of CVA 2008, vertebral fxs, mild asthma,mitral and aortic regurgitation, rt hip OA,  presents for initial evaluation / establish care\par She follows with cardiology- last visit Nov 2020\par Pt states she was hospitalized twice at [facility] this past year ( says in June and sometime in November) after having a fall\par Reports that both times she fell because of her hip\par She has been following with Ortho for her rt hip. She is using a walker now for ambulation. Says she wants to have surgery \par Denies SOB, chest pain, abdominal pain, N/V/D, leg swelling, headache, dizziness \par Offers no complaints\par \par \par

## 2021-03-21 NOTE — ASSESSMENT
[FreeTextEntry1] : rt hip pain-walks with walker\par Pt states that she wants to have surgery but the orthopedist told her that she can't because she is too old. Then she starts going on and on during the visit about her hospital stay in June. She says that she had tests done and is asking me about the results. \par I explained to the pt that I cannot give her any test results from [facility] as I was not the Dr at the hospital\par Then she says that the " other Dr in the hospital"  told her that she needs to have surgery on the hip but she left the hospital and did not have the surgery done. \par Then again she says she saw another orthopedist and says " he told me I cannot have surgery because I am too old"\par ( I believe pt was seen by Ortho during her hospitalization and also followed up with Ortho as outpt) I reviewed Ortho's notes in the chart  Dr Lazaro Murray who she saw on January 12th. As per chart notes pt not a candidate for surgery as she is medically a high risk for surgery. He did recommend conservative treatment ( PT NSAIDS, injections) \par I explained that to the patient however she is insisting that she needs to get the surgery done on her rt hip so she will be able to walk without a walker\par \par She had seen her cardiologist Dr Haq,  in November 2020- I reviewed cardiology notes in the chart- As per cardiology notes there were no contraindications to surgery at that time. He also recommended few Orthopedist for pt to f/u with\par \par I explained to pt that maybe she should follow up with another Orthopedist for another opinion since she is adamant about having surgery to the rt hip. \par \par

## 2021-03-21 NOTE — PHYSICAL EXAM
[Normal Sclera/Conjunctiva] : normal sclera/conjunctiva [Normal Outer Ear/Nose] : the outer ears and nose were normal in appearance [No JVD] : no jugular venous distention [Normal] : normal rate, regular rhythm, normal S1 and S2 and no murmur heard [No Edema] : there was no peripheral edema [Soft] : abdomen soft [Non Tender] : non-tender [Non-distended] : non-distended [Speech Grossly Normal] : speech grossly normal [Alert and Oriented x3] : oriented to person, place, and time [de-identified] : rt hip pain [de-identified] : walks with walker

## 2021-03-30 ENCOUNTER — APPOINTMENT (OUTPATIENT)
Dept: CARDIOLOGY | Facility: CLINIC | Age: 86
End: 2021-03-30
Payer: MEDICARE

## 2021-03-30 ENCOUNTER — NON-APPOINTMENT (OUTPATIENT)
Age: 86
End: 2021-03-30

## 2021-03-30 VITALS
BODY MASS INDEX: 22.86 KG/M2 | TEMPERATURE: 97.6 F | SYSTOLIC BLOOD PRESSURE: 140 MMHG | HEART RATE: 75 BPM | RESPIRATION RATE: 14 BRPM | WEIGHT: 121 LBS | OXYGEN SATURATION: 97 % | DIASTOLIC BLOOD PRESSURE: 80 MMHG

## 2021-03-30 PROCEDURE — 99214 OFFICE O/P EST MOD 30 MIN: CPT

## 2021-03-30 PROCEDURE — 93000 ELECTROCARDIOGRAM COMPLETE: CPT

## 2021-03-30 NOTE — PHYSICAL EXAM
[General Appearance - Well Developed] : well developed [Normal Appearance] : normal appearance [Well Groomed] : well groomed [General Appearance - Well Nourished] : well nourished [General Appearance - In No Acute Distress] : no acute distress [Normal Conjunctiva] : the conjunctiva exhibited no abnormalities [Eyelids - No Xanthelasma] : the eyelids demonstrated no xanthelasmas [Normal Jugular Venous A Waves Present] : normal jugular venous A waves present [Normal Jugular Venous V Waves Present] : normal jugular venous V waves present [Respiration, Rhythm And Depth] : normal respiratory rhythm and effort [Auscultation Breath Sounds / Voice Sounds] : lungs were clear to auscultation bilaterally [Heart Rate And Rhythm] : heart rate and rhythm were normal [Bowel Sounds] : normal bowel sounds [Nail Clubbing] : no clubbing of the fingernails [Abnormal Walk] : normal gait [Cyanosis, Localized] : no localized cyanosis [Skin Color & Pigmentation] : normal skin color and pigmentation [] : no rash [Impaired Insight] : insight and judgment were intact [Oriented To Time, Place, And Person] : oriented to person, place, and time [Affect] : the affect was normal [Mood] : the mood was normal [FreeTextEntry1] : 2+ pulses in the upper and lower extremities. No edema.

## 2021-03-30 NOTE — DISCUSSION/SUMMARY
[FreeTextEntry1] : \par Palps- quiescent at this time.  Prior Holter monitor without significant findings.  \par \par Mild MVP with trivial prolapse/mild MR; mild aortic valve insufficiency - remains asx.  \par \par Mild diastolic LV dysfunction on most recent echo of 2017; no treatment needed.  \par \par Numbness in first 3 fingers or R hand awakening her from sleep; sxs. suggest carpal tunnel syndrome.  She had previously seen Dr. Viraj Chandler for a problem with her arm and I suggested that she return to him.\par \par She will return here for a visit in 4 months.

## 2021-03-30 NOTE — HISTORY OF PRESENT ILLNESS
[FreeTextEntry1] : We saw her last in November.  Since that time, she remains stable from a cardiac standpoint.  There have been no cardiac sxs.- no episodes of palps and no episodes of exertional chest discomfort or JONES.  She reports orthopnea or PND.  There have been no episodes of dizziness or LOC.\par \par Her meds are unchanged. \par \par She continues to use a walker when ambulating, due to longstanding arthritis of the hip.  She also describes numbness in the first 3 fingers of the R hand which awakens her from sleep and requests the name of a hand doctor.

## 2021-04-06 ENCOUNTER — APPOINTMENT (OUTPATIENT)
Dept: ORTHOPEDIC SURGERY | Facility: CLINIC | Age: 86
End: 2021-04-06
Payer: MEDICARE

## 2021-04-06 VITALS — DIASTOLIC BLOOD PRESSURE: 70 MMHG | SYSTOLIC BLOOD PRESSURE: 117 MMHG | HEART RATE: 77 BPM

## 2021-04-06 DIAGNOSIS — M18.11 UNILATERAL PRIMARY OSTEOARTHRITIS OF FIRST CARPOMETACARPAL JOINT, RIGHT HAND: ICD-10-CM

## 2021-04-06 DIAGNOSIS — M18.12 UNILATERAL PRIMARY OSTEOARTHRITIS OF FIRST CARPOMETACARPAL JOINT, LEFT HAND: ICD-10-CM

## 2021-04-06 DIAGNOSIS — M19.041 PRIMARY OSTEOARTHRITIS, RIGHT HAND: ICD-10-CM

## 2021-04-06 DIAGNOSIS — M19.042 PRIMARY OSTEOARTHRITIS, LEFT HAND: ICD-10-CM

## 2021-04-06 DIAGNOSIS — G56.01 CARPAL TUNNEL SYNDROME, RIGHT UPPER LIMB: ICD-10-CM

## 2021-04-06 DIAGNOSIS — Z78.9 OTHER SPECIFIED HEALTH STATUS: ICD-10-CM

## 2021-04-06 PROCEDURE — 99203 OFFICE O/P NEW LOW 30 MIN: CPT

## 2021-04-07 PROBLEM — Z78.9 NON-SMOKER: Status: ACTIVE | Noted: 2021-04-07

## 2021-05-01 ENCOUNTER — EMERGENCY (EMERGENCY)
Facility: HOSPITAL | Age: 86
LOS: 1 days | Discharge: HOME | End: 2021-05-01
Attending: EMERGENCY MEDICINE
Payer: MEDICARE

## 2021-05-01 VITALS
HEIGHT: 61 IN | DIASTOLIC BLOOD PRESSURE: 95 MMHG | OXYGEN SATURATION: 99 % | TEMPERATURE: 98 F | HEART RATE: 80 BPM | WEIGHT: 123.9 LBS | SYSTOLIC BLOOD PRESSURE: 158 MMHG | RESPIRATION RATE: 22 BRPM

## 2021-05-01 VITALS
OXYGEN SATURATION: 98 % | RESPIRATION RATE: 20 BRPM | SYSTOLIC BLOOD PRESSURE: 143 MMHG | DIASTOLIC BLOOD PRESSURE: 72 MMHG | HEART RATE: 77 BPM

## 2021-05-01 DIAGNOSIS — Z90.89 ACQUIRED ABSENCE OF OTHER ORGANS: Chronic | ICD-10-CM

## 2021-05-01 LAB
ALBUMIN SERPL ELPH-MCNC: 4 G/DL — SIGNIFICANT CHANGE UP (ref 3.3–5)
ALP SERPL-CCNC: 112 U/L — SIGNIFICANT CHANGE UP (ref 40–120)
ALT FLD-CCNC: 11 U/L — SIGNIFICANT CHANGE UP (ref 10–45)
ANION GAP SERPL CALC-SCNC: 12 MMOL/L — SIGNIFICANT CHANGE UP (ref 5–17)
APTT BLD: 27.8 SEC — SIGNIFICANT CHANGE UP (ref 27.5–35.5)
AST SERPL-CCNC: 15 U/L — SIGNIFICANT CHANGE UP (ref 10–40)
BASOPHILS # BLD AUTO: 0.05 K/UL — SIGNIFICANT CHANGE UP (ref 0–0.2)
BASOPHILS NFR BLD AUTO: 0.7 % — SIGNIFICANT CHANGE UP (ref 0–2)
BILIRUB SERPL-MCNC: 0.8 MG/DL — SIGNIFICANT CHANGE UP (ref 0.2–1.2)
BUN SERPL-MCNC: 28 MG/DL — HIGH (ref 7–23)
CALCIUM SERPL-MCNC: 9.5 MG/DL — SIGNIFICANT CHANGE UP (ref 8.4–10.5)
CHLORIDE SERPL-SCNC: 104 MMOL/L — SIGNIFICANT CHANGE UP (ref 96–108)
CO2 SERPL-SCNC: 25 MMOL/L — SIGNIFICANT CHANGE UP (ref 22–31)
CREAT SERPL-MCNC: 0.61 MG/DL — SIGNIFICANT CHANGE UP (ref 0.5–1.3)
EOSINOPHIL # BLD AUTO: 0.09 K/UL — SIGNIFICANT CHANGE UP (ref 0–0.5)
EOSINOPHIL NFR BLD AUTO: 1.2 % — SIGNIFICANT CHANGE UP (ref 0–6)
GLUCOSE SERPL-MCNC: 97 MG/DL — SIGNIFICANT CHANGE UP (ref 70–99)
HCT VFR BLD CALC: 41.1 % — SIGNIFICANT CHANGE UP (ref 34.5–45)
HGB BLD-MCNC: 13.1 G/DL — SIGNIFICANT CHANGE UP (ref 11.5–15.5)
IMM GRANULOCYTES NFR BLD AUTO: 0.3 % — SIGNIFICANT CHANGE UP (ref 0–1.5)
INR BLD: 0.96 RATIO — SIGNIFICANT CHANGE UP (ref 0.88–1.16)
LYMPHOCYTES # BLD AUTO: 1.55 K/UL — SIGNIFICANT CHANGE UP (ref 1–3.3)
LYMPHOCYTES # BLD AUTO: 20.7 % — SIGNIFICANT CHANGE UP (ref 13–44)
MAGNESIUM SERPL-MCNC: 2.1 MG/DL — SIGNIFICANT CHANGE UP (ref 1.6–2.6)
MCHC RBC-ENTMCNC: 28.7 PG — SIGNIFICANT CHANGE UP (ref 27–34)
MCHC RBC-ENTMCNC: 31.9 GM/DL — LOW (ref 32–36)
MCV RBC AUTO: 90.1 FL — SIGNIFICANT CHANGE UP (ref 80–100)
MONOCYTES # BLD AUTO: 0.79 K/UL — SIGNIFICANT CHANGE UP (ref 0–0.9)
MONOCYTES NFR BLD AUTO: 10.6 % — SIGNIFICANT CHANGE UP (ref 2–14)
NEUTROPHILS # BLD AUTO: 4.98 K/UL — SIGNIFICANT CHANGE UP (ref 1.8–7.4)
NEUTROPHILS NFR BLD AUTO: 66.5 % — SIGNIFICANT CHANGE UP (ref 43–77)
NRBC # BLD: 0 /100 WBCS — SIGNIFICANT CHANGE UP (ref 0–0)
PHOSPHATE SERPL-MCNC: 3.7 MG/DL — SIGNIFICANT CHANGE UP (ref 2.5–4.5)
PLATELET # BLD AUTO: 278 K/UL — SIGNIFICANT CHANGE UP (ref 150–400)
POTASSIUM SERPL-MCNC: 4.6 MMOL/L — SIGNIFICANT CHANGE UP (ref 3.5–5.3)
POTASSIUM SERPL-SCNC: 4.6 MMOL/L — SIGNIFICANT CHANGE UP (ref 3.5–5.3)
PROT SERPL-MCNC: 7.2 G/DL — SIGNIFICANT CHANGE UP (ref 6–8.3)
PROTHROM AB SERPL-ACNC: 11.5 SEC — SIGNIFICANT CHANGE UP (ref 10.6–13.6)
RBC # BLD: 4.56 M/UL — SIGNIFICANT CHANGE UP (ref 3.8–5.2)
RBC # FLD: 14.6 % — HIGH (ref 10.3–14.5)
SODIUM SERPL-SCNC: 141 MMOL/L — SIGNIFICANT CHANGE UP (ref 135–145)
TROPONIN T, HIGH SENSITIVITY RESULT: 15 NG/L — SIGNIFICANT CHANGE UP (ref 0–51)
TROPONIN T, HIGH SENSITIVITY RESULT: 16 NG/L — SIGNIFICANT CHANGE UP (ref 0–51)
TSH SERPL-MCNC: 1.24 UIU/ML — SIGNIFICANT CHANGE UP (ref 0.27–4.2)
WBC # BLD: 7.48 K/UL — SIGNIFICANT CHANGE UP (ref 3.8–10.5)
WBC # FLD AUTO: 7.48 K/UL — SIGNIFICANT CHANGE UP (ref 3.8–10.5)

## 2021-05-01 PROCEDURE — 93010 ELECTROCARDIOGRAM REPORT: CPT | Mod: GC

## 2021-05-01 PROCEDURE — 85025 COMPLETE CBC W/AUTO DIFF WBC: CPT

## 2021-05-01 PROCEDURE — 99284 EMERGENCY DEPT VISIT MOD MDM: CPT | Mod: 25

## 2021-05-01 PROCEDURE — 84100 ASSAY OF PHOSPHORUS: CPT

## 2021-05-01 PROCEDURE — 85610 PROTHROMBIN TIME: CPT

## 2021-05-01 PROCEDURE — 84443 ASSAY THYROID STIM HORMONE: CPT

## 2021-05-01 PROCEDURE — 93005 ELECTROCARDIOGRAM TRACING: CPT

## 2021-05-01 PROCEDURE — 84484 ASSAY OF TROPONIN QUANT: CPT

## 2021-05-01 PROCEDURE — 36000 PLACE NEEDLE IN VEIN: CPT

## 2021-05-01 PROCEDURE — 85730 THROMBOPLASTIN TIME PARTIAL: CPT

## 2021-05-01 PROCEDURE — 80053 COMPREHEN METABOLIC PANEL: CPT

## 2021-05-01 PROCEDURE — 99285 EMERGENCY DEPT VISIT HI MDM: CPT | Mod: GC

## 2021-05-01 PROCEDURE — 83735 ASSAY OF MAGNESIUM: CPT

## 2021-05-01 NOTE — ED PROVIDER NOTE - NSFOLLOWUPINSTRUCTIONS_ED_ALL_ED_FT
Palpitations    A palpitation is the feeling that your heartbeat is irregular or is faster than normal. It may feel like your heart is fluttering or skipping a beat. They may be caused by many things, including smoking, caffeine, alcohol, stress, and certain medicines. Although most causes of palpitations are not serious, palpitations can be a sign of a serious medical problem. Avoid caffeine, alcohol, and tobacco products at home. Try to reduce stress and anxiety and make sure to get plenty of rest.     SEEK IMMEDIATE MEDICAL CARE IF YOU HAVE ANY OF THE FOLLOWING SYMPTOMS: chest pain, shortness of breath, severe headache, dizziness/lightheadedness, or fainting.    - Follow up with cardiologist on Monday as scheduled.  - Bring results with you to the appointment.   - Return to the ED for new or worsening symptoms.

## 2021-05-01 NOTE — ED PROVIDER NOTE - PATIENT PORTAL LINK FT
You can access the FollowMyHealth Patient Portal offered by [facility] by registering at the following website: [email or web address]. By joining [facility]’s FollowMyHealth portal, you will also be able to view your health information using other applications (apps) compatible with our system.

## 2021-05-01 NOTE — ED PROVIDER NOTE - PROGRESS NOTE DETAILS
Mcgarry: patient reporting urinary frequency, ordering UA Zeferino: spoke to cards who states we do not have the capability of interrogating the holter here Mcgarry: trops stable, patient states she does not want to stay for tele monitoring, states she will follow up with her cardiologist, declines ua

## 2021-05-01 NOTE — ED PROVIDER NOTE - OBJECTIVE STATEMENT
86F h/o diverticulitis, asthma, recent sbo w/ resolution presents w/ palpitations and a 'loud heartbeat' overnight. States symptoms have improved now and she feels back at baseline. States she was told she had an abnormal rhythm and was instructed to stay in the hospital at [facility] during her SBO. States she left but had her cardiologist Dr Mahoney place a Holter monitor. Had [place] on Thursday until this morning. Removed in waiting area here. Denies f/c, chest pain, SOB

## 2021-05-01 NOTE — ED PROVIDER NOTE - ATTENDING CONTRIBUTION TO CARE
Dr. Mahoney (Attending Physician)  I performed a history and physical exam of the patient and discussed their management with the resident. I reviewed the resident's note and agree with the documented findings and plan of care. My medical decision making and observations are found above.

## 2021-05-01 NOTE — ED ADULT NURSE NOTE - OBJECTIVE STATEMENT
86 y f came to the ed c/o "hearing her heatbeat" and saying her heart was beating slow. patient was a portable cardiac monitor from [facility]. Did not receive a call from cardiologist to go the ED she just felt like something was wrong. patient is a/ox3. denies fevers, chills, chest pain, sob. abdomen is soft and nontender. skin is warm and dry. had recent GI surgery at [facility].

## 2021-05-01 NOTE — ED PROVIDER NOTE - CLINICAL SUMMARY MEDICAL DECISION MAKING FREE TEXT BOX
86F w/ palpitations since last night, no chest pain or SOB, at baseline now, will check labs including lytes, tsh, cbc, trop, reassess, patient declines xray at this time

## 2021-05-01 NOTE — ED PROVIDER NOTE - NS ED ROS FT
General: denies fever, chills  HENT: denies nasal congestion, rhinorrhea  CV: denies chest pain, + palpitations  Resp: denies difficulty breathing, cough  Abdominal: denies nausea, diarrhea, abdominal pain  MSK: denies muscle aches, leg swelling  Neuro: denies headaches, numbness, tingling  Skin: denies rashes, bruises  Heme: denies petechia, abnormal bleeding

## 2021-07-13 ENCOUNTER — EMERGENCY (EMERGENCY)
Facility: HOSPITAL | Age: 86
LOS: 1 days | Discharge: HOME | End: 2021-07-13
Attending: EMERGENCY MEDICINE
Payer: MEDICARE

## 2021-07-13 VITALS
OXYGEN SATURATION: 98 % | DIASTOLIC BLOOD PRESSURE: 81 MMHG | HEART RATE: 70 BPM | SYSTOLIC BLOOD PRESSURE: 145 MMHG | RESPIRATION RATE: 18 BRPM

## 2021-07-13 VITALS
TEMPERATURE: 98 F | DIASTOLIC BLOOD PRESSURE: 74 MMHG | OXYGEN SATURATION: 96 % | SYSTOLIC BLOOD PRESSURE: 142 MMHG | HEIGHT: 61 IN | RESPIRATION RATE: 16 BRPM | HEART RATE: 76 BPM

## 2021-07-13 DIAGNOSIS — Z90.89 ACQUIRED ABSENCE OF OTHER ORGANS: Chronic | ICD-10-CM

## 2021-07-13 LAB
ALBUMIN SERPL ELPH-MCNC: 4.3 G/DL — SIGNIFICANT CHANGE UP (ref 3.3–5)
ALP SERPL-CCNC: 100 U/L — SIGNIFICANT CHANGE UP (ref 40–120)
ALT FLD-CCNC: 11 U/L — SIGNIFICANT CHANGE UP (ref 10–45)
ANION GAP SERPL CALC-SCNC: 9 MMOL/L — SIGNIFICANT CHANGE UP (ref 5–17)
APTT BLD: 27.8 SEC — SIGNIFICANT CHANGE UP (ref 27.5–35.5)
AST SERPL-CCNC: 17 U/L — SIGNIFICANT CHANGE UP (ref 10–40)
BASE EXCESS BLDV CALC-SCNC: 5.6 MMOL/L — HIGH (ref -2–2)
BASOPHILS # BLD AUTO: 0.04 K/UL — SIGNIFICANT CHANGE UP (ref 0–0.2)
BASOPHILS NFR BLD AUTO: 0.5 % — SIGNIFICANT CHANGE UP (ref 0–2)
BILIRUB SERPL-MCNC: 0.9 MG/DL — SIGNIFICANT CHANGE UP (ref 0.2–1.2)
BUN SERPL-MCNC: 26 MG/DL — HIGH (ref 7–23)
CALCIUM SERPL-MCNC: 9.5 MG/DL — SIGNIFICANT CHANGE UP (ref 8.4–10.5)
CHLORIDE SERPL-SCNC: 102 MMOL/L — SIGNIFICANT CHANGE UP (ref 96–108)
CO2 BLDV-SCNC: 34 MMOL/L — HIGH (ref 22–30)
CO2 SERPL-SCNC: 28 MMOL/L — SIGNIFICANT CHANGE UP (ref 22–31)
CREAT SERPL-MCNC: 0.71 MG/DL — SIGNIFICANT CHANGE UP (ref 0.5–1.3)
EOSINOPHIL # BLD AUTO: 0.06 K/UL — SIGNIFICANT CHANGE UP (ref 0–0.5)
EOSINOPHIL NFR BLD AUTO: 0.8 % — SIGNIFICANT CHANGE UP (ref 0–6)
GLUCOSE SERPL-MCNC: 113 MG/DL — HIGH (ref 70–99)
HCO3 BLDV-SCNC: 33 MMOL/L — HIGH (ref 21–29)
HCT VFR BLD CALC: 40.6 % — SIGNIFICANT CHANGE UP (ref 34.5–45)
HGB BLD-MCNC: 12.8 G/DL — SIGNIFICANT CHANGE UP (ref 11.5–15.5)
IMM GRANULOCYTES NFR BLD AUTO: 0.4 % — SIGNIFICANT CHANGE UP (ref 0–1.5)
INR BLD: 0.96 RATIO — SIGNIFICANT CHANGE UP (ref 0.88–1.16)
LYMPHOCYTES # BLD AUTO: 1.68 K/UL — SIGNIFICANT CHANGE UP (ref 1–3.3)
LYMPHOCYTES # BLD AUTO: 22.4 % — SIGNIFICANT CHANGE UP (ref 13–44)
MCHC RBC-ENTMCNC: 28.4 PG — SIGNIFICANT CHANGE UP (ref 27–34)
MCHC RBC-ENTMCNC: 31.5 GM/DL — LOW (ref 32–36)
MCV RBC AUTO: 90 FL — SIGNIFICANT CHANGE UP (ref 80–100)
MONOCYTES # BLD AUTO: 0.71 K/UL — SIGNIFICANT CHANGE UP (ref 0–0.9)
MONOCYTES NFR BLD AUTO: 9.5 % — SIGNIFICANT CHANGE UP (ref 2–14)
NEUTROPHILS # BLD AUTO: 4.98 K/UL — SIGNIFICANT CHANGE UP (ref 1.8–7.4)
NEUTROPHILS NFR BLD AUTO: 66.4 % — SIGNIFICANT CHANGE UP (ref 43–77)
NRBC # BLD: 0 /100 WBCS — SIGNIFICANT CHANGE UP (ref 0–0)
PCO2 BLDV: 61 MMHG — HIGH (ref 35–50)
PH BLDV: 7.35 — SIGNIFICANT CHANGE UP (ref 7.35–7.45)
PLATELET # BLD AUTO: 261 K/UL — SIGNIFICANT CHANGE UP (ref 150–400)
PO2 BLDV: 28 MMHG — SIGNIFICANT CHANGE UP (ref 25–45)
POTASSIUM SERPL-MCNC: 4.3 MMOL/L — SIGNIFICANT CHANGE UP (ref 3.5–5.3)
POTASSIUM SERPL-SCNC: 4.3 MMOL/L — SIGNIFICANT CHANGE UP (ref 3.5–5.3)
PROT SERPL-MCNC: 7.4 G/DL — SIGNIFICANT CHANGE UP (ref 6–8.3)
PROTHROM AB SERPL-ACNC: 11.5 SEC — SIGNIFICANT CHANGE UP (ref 10.6–13.6)
RBC # BLD: 4.51 M/UL — SIGNIFICANT CHANGE UP (ref 3.8–5.2)
RBC # FLD: 14.6 % — HIGH (ref 10.3–14.5)
SAO2 % BLDV: 44 % — LOW (ref 67–88)
SODIUM SERPL-SCNC: 139 MMOL/L — SIGNIFICANT CHANGE UP (ref 135–145)
TROPONIN T, HIGH SENSITIVITY RESULT: 19 NG/L — SIGNIFICANT CHANGE UP (ref 0–51)
WBC # BLD: 7.5 K/UL — SIGNIFICANT CHANGE UP (ref 3.8–10.5)
WBC # FLD AUTO: 7.5 K/UL — SIGNIFICANT CHANGE UP (ref 3.8–10.5)

## 2021-07-13 PROCEDURE — 85025 COMPLETE CBC W/AUTO DIFF WBC: CPT

## 2021-07-13 PROCEDURE — 93005 ELECTROCARDIOGRAM TRACING: CPT

## 2021-07-13 PROCEDURE — 82962 GLUCOSE BLOOD TEST: CPT

## 2021-07-13 PROCEDURE — 80053 COMPREHEN METABOLIC PANEL: CPT

## 2021-07-13 PROCEDURE — 99285 EMERGENCY DEPT VISIT HI MDM: CPT

## 2021-07-13 PROCEDURE — 82803 BLOOD GASES ANY COMBINATION: CPT

## 2021-07-13 PROCEDURE — 84484 ASSAY OF TROPONIN QUANT: CPT

## 2021-07-13 PROCEDURE — 85610 PROTHROMBIN TIME: CPT

## 2021-07-13 PROCEDURE — 93010 ELECTROCARDIOGRAM REPORT: CPT

## 2021-07-13 PROCEDURE — 70450 CT HEAD/BRAIN W/O DYE: CPT | Mod: 26,MA

## 2021-07-13 PROCEDURE — 99284 EMERGENCY DEPT VISIT MOD MDM: CPT | Mod: 25

## 2021-07-13 PROCEDURE — 70450 CT HEAD/BRAIN W/O DYE: CPT

## 2021-07-13 PROCEDURE — 85730 THROMBOPLASTIN TIME PARTIAL: CPT

## 2021-07-13 NOTE — ED ADULT NURSE NOTE - OBJECTIVE STATEMENT
Female 86 years old with past medical history of GERD and CVA came in for left leg weakness since 5 pm yesterday. Pt reports yesterday evening after coming from [facility] she felt very weak and can't move her left leg. Pt usually walks with a walker because of her right hip. States she spoke with her MD today diagnosed her for Stroke and was advised to got to ER, Denies chest pain, numbness, sob or slurred speech. Code stroke called at 1050. Code stroke protocol followed. Safety maintained. Female 86 years old with past medical history of GERD and CVA came in for left leg weakness since 5 pm yesterday. Pt states she has history of Afib. Pt reports yesterday evening after coming from [place] she felt very weak and can't move her left leg. Pt usually walks with a walker because of her right hip. States she spoke with her MD today diagnosed her for Stroke and was advised to got to ER, Denies chest pain, numbness, sob or slurred speech. Code stroke called at 1050. Code stroke protocol followed. Safety maintained.

## 2021-07-13 NOTE — ED ADULT NURSE REASSESSMENT NOTE - NS ED NURSE REASSESS COMMENT FT1
At 1400 pt OOB to bathroom, with use of walker and assisted by staff. Safety maintained. Pt.  OOB to bathroom, multiple times with use of walker and assisted by staff. Safety maintained.  pt at the desk multiple times c/o of her discharge and  Md and PA wanted to confirm pt dx of Afib as claimed by pt  before she sent home and attempted to contact Dr. Irving Andrews and office unable confirm to dx Pt became very irate at the desk and managers called to assist pt to her car. Left ED per wheelchair.

## 2021-07-13 NOTE — ED PROVIDER NOTE - OBJECTIVE STATEMENT
86 year old female with pmhx GERD, prior CVA presents to ED c/o left lower extremity weakness since 5pm. Pt reports that yesterday evening she noticed her left leg was weak and she was unable to move it.  Reports presented with same complaint when diagnosed with her stroke and spoke to her doctor today who advised her to come to ED. Pt reports typically walks with walker because of right hip issue. Denies fevers, chills, chest pain, sob, abd pain, n/v/d, numbness, weakness, visual changes, change in speech 86 year old female with pmhx GERD,  Afib, prior CVA presents to ED c/o left lower extremity weakness since 5pm improved this am. Pt reports that yesterday evening she noticed her left leg was weak and she was unable to move it.  Reports presented with same complaint when diagnosed with her stroke and spoke to her doctor today who advised her to come to ED. Pt reports typically walks with walker because of right hip issue. Denies fevers, chills, chest pain, sob, abd pain, n/v/d, numbness, weakness, visual changes, change in speech

## 2021-07-13 NOTE — ED PROVIDER NOTE - PHYSICAL EXAMINATION
CONSTITUTIONAL: Patient is awake, alert and oriented x 3. Patient is well appearing and in no acute distress  HEAD: NCAT  EYES: PERRL b/l, EOMI  NECK: supple, FROM   LUNGS: CTA B/L  HEART: RRR  ABDOMEN: Soft nd/nttp   EXTREMITY: no edema or calf tenderness b/l  SKIN: with no rash or lesions  NEURO: Cn3-12 grossly intact. Strength5/5 b/l UE. ***** LLE/ RLE . Nml Gross Sensation UE/LE b/l. Normal finger to nose, no drift CONSTITUTIONAL: Patient is awake, alert and oriented x 3. Patient is well appearing and in no acute distress  HEAD: NCAT  EYES: PERRL b/l, EOMI  NECK: supple, FROM   LUNGS: CTA B/L  HEART: RRR  ABDOMEN: Soft nd/nttp   EXTREMITY: no edema or calf tenderness b/l  SKIN: with no rash or lesions  NEURO: Cn3-12 grossly intact. Strength 5/5 b/l UE, 5/5 LLE.  5/5 RLE foot flexion/extension, unable to assess R hip flexor strength secondary to pain. Nml Gross Sensation UE/LE b/l. Normal finger to nose, no drift

## 2021-07-13 NOTE — CONSULT NOTE ADULT - SUBJECTIVE AND OBJECTIVE BOX
HPI:    (Stroke only)  LKN:   NIHSS: 3  preMRS: 4  Pt is not a candidate for tpa due outside tpa window  Pt is not a candidate for mechanical thrombectomy due to symptoms resolved.    REVIEW OF SYSTEMS      A 10-system ROS was performed and is negative except for those items noted above and/or in the HPI.    PAST MEDICAL & SURGICAL HISTORY:  Anxiety    CVA (Cerebral Infarction)    Diverticulosis of intestine    GERD (gastroesophageal reflux disease)    Asthma    Cerebrovascular accident (CVA), unspecified mechanism    Vertebral fracture, osteoporotic    S/P Tonsillectomy    History of tonsillectomy      FAMILY HISTORY:  No pertinent family history in first degree relatives    Family history of amyotrophic lateral sclerosis    Family history of multiple sclerosis (Child)      SOCIAL HISTORY:   T/E/D:   Occupation:   Lives with:     MEDICATIONS (HOME):  Home Medications:  aspirin 81 mg oral tablet, chewable: 1 tab(s) orally once a day (12 May 2015 06:31)  Aspirin Low Dose 81 mg oral delayed release tablet: 1 tab(s) orally once a day (28 Sep 2017 14:49)  lactobacillus acidophilus oral capsule:  orally  (28 Sep 2017 14:49)    MEDICATIONS  (STANDING):    MEDICATIONS  (PRN):    ALLERGIES/INTOLERANCES:  Allergies  iodine (Unknown)  IV Contrast (Unknown)  IV DYE- burning, head tingling (Unknown)  novacaine (Unknown)    Intolerances    VITALS & EXAMINATION:  Vital Signs Last 24 Hrs  T(C): 36.7 (13 Jul 2021 14:44), Max: 36.7 (13 Jul 2021 10:44)  T(F): 98 (13 Jul 2021 14:44), Max: 98.1 (13 Jul 2021 10:44)  HR: 52 (13 Jul 2021 14:44) (52 - 76)  BP: 157/47 (13 Jul 2021 14:44) (142/74 - 165/101)  BP(mean): --  RR: 16 (13 Jul 2021 14:44) (16 - 18)  SpO2: 100% (13 Jul 2021 14:44) (96% - 100%)    General:  Constitutional: Obese Female, appears stated age, in no apparent distress including pain  Head: Normocephalic & atraumatic.  ENT: Patent ear canals, intact TM, mucus membranes moist & pink, neck supple, no lymphadenopathy.   Respiratory: Patent airway. All lung fields are clear to auscultation bilaterally.  Extremities: No cyanosis, clubbing, or edema.  Skin: No rashes, bruising, or discoloration.    Cardiovascular (>2): RRR no murmurs. Carotid pulsations symmetric, no bruits. Normal capillary beds refill, 1-2 seconds or less.     Neurological (>12):  MS: Awake, alert, oriented to person, place, situation, time. Normal affect. Follows all commands.    Language: Speech is clear, fluent with good repetition & comprehension (able to name objects___)    CNs: PERRLA (R = 3mm, L = 3mm). VFF. EOMI no nystagmus, no diplopia. V1-3 intact to LT/pinprick, well developed masseter muscles b/l. No facial asymmetry b/l, full eye closure strength b/l. Hearing grossly normal (rubbing fingers) b/l. Symmetric palate elevation in midline. Gag reflex deferred. Head turning & shoulder shrug intact b/l. Tongue midline, normal movements, no atrophy.    Fundoscopic: pale w/ sharp discs margins No vascular changes.      Motor: Normal muscle bulk & tone. No noticeable tremor or seizure. No pronator drift.              Deltoid	Biceps	Triceps	Wrist	Finger ABd	   R	5	5	5	5	5		5 	  L	5	5	5	5	5		5    	H-Flex	H-Ext	H-ABd	H-ADd	K-Flex	K-Ext	D-Flex	P-Flex  R	5	5	5	5	5	5	5	5 	   L	5	5	5	5	5	5	5	5	     Sensation: Intact to LT/PP/Temp/Vibration/Position b/l throughout.     Cortical: Extinction on DSS (neglect): none    Reflexes:              Biceps(C5)       BR(C6)     Triceps(C7)               Patellar(L4)    Achilles(S1)    Plantar Resp  R	2	          2	             2		        2		    2		Down   L	2	          2	             2		        2		    2		Down     Coordination: intact rapid-alt movements. No dysmetria to FTN/HTS    Gait: Normal Romberg. No postural instability. Normal stance and tandem gait.     LABORATORY:  CBC                       12.8   7.50  )-----------( 261      ( 13 Jul 2021 10:55 )             40.6     Chem 07-13    139  |  102  |  26<H>  ----------------------------<  113<H>  4.3   |  28  |  0.71    Ca    9.5      13 Jul 2021 10:55    TPro  7.4  /  Alb  4.3  /  TBili  0.9  /  DBili  x   /  AST  17  /  ALT  11  /  AlkPhos  100  07-13    LFTs LIVER FUNCTIONS - ( 13 Jul 2021 10:55 )  Alb: 4.3 g/dL / Pro: 7.4 g/dL / ALK PHOS: 100 U/L / ALT: 11 U/L / AST: 17 U/L / GGT: x           Coagulopathy PT/INR - ( 13 Jul 2021 10:55 )   PT: 11.5 sec;   INR: 0.96 ratio         PTT - ( 13 Jul 2021 10:55 )  PTT:27.8 sec  Lipid Panel   A1c   Cardiac enzymes     U/A   CSF  Immunological  Other    STUDIES & IMAGING:  Studies (EKG, EEG, EMG, etc):     Radiology (XR, CT, MR, U/S, TTE/INDIGO): HPI:  86 year old female prior CVA, R hip fracture, diverticulitis, asthma presents to ED c/o left lower extremity weakness since 5pm improved this am. Pt reports that yesterday evening she noticed her left leg was weak and she was unable to move it.  Reports presented with same complaint when diagnosed with her stroke and spoke to her doctor today who advised her to come to ED. Pt reports typically walks with walker because of right hip issue. Denies fevers, chills, chest pain, sob, abd pain, n/v/d, numbness, weakness, visual changes, change in speech. Pt reports hx of afib however no formal record of afib dx and pt not on anticoagulation. Of note, per extensive chart review and discussion with medical records RN from [facility], pt has no known dx of afib. She has had multiple outpatient visits and ED visits for "palpitations".  Pt has seen multiple cardiologists, including Dr. Washington Arechiga and Dr. Mahoney. Per most recent cardiologist Dr. Myke Andrews's records (last visit 7/1/21), pt prescribed aspirin 81 mg, Toprolol 25mg daily and TTE 4/2021 w/ 66%, mild AR. Per last ED note, pt has had a Holter monitor previously.    LKN: 7/12/21 at 17:00  NIHSS: 3  preMRS: 4  Pt is not a candidate for tpa due outside tpa window  Pt refusing IV contrast as it gave her "shock like sensations in her body" but no anaphylactic symptoms. Pt verbalized understanding that she would not be eligible for any potential treatments without IV contrast studies.    REVIEW OF SYSTEMS  General: No fevers, chills, fatigue, weight loss  HEENT: No headaches, acute visual changes, rhinorrhea, sore throat, dysphagia  CVS: No chest pain, palpitations  Resp: No cough, dyspnea, wheezing  GI: No abdominal pain, nausea, vomiting, constipation, diarrhea, hematochezia, melena  : No dysuria, frequency, hematuria, or discharge  MSK: No joint pain or swelling  Ext: No edema, no claudication  Skin: No rashes or itching  Heme: No easy bruising or petechiae  Neuro: +LLE weakness. No confusion, numbness, or loss of consciousness  Endocrine: No excessive heat or cold symptoms, polyuria, polydipsia    A 10-system ROS was performed and is negative except for those items noted above and/or in the HPI.    PAST MEDICAL & SURGICAL HISTORY:  Anxiety    CVA (Cerebral Infarction)    Diverticulosis of intestine    GERD (gastroesophageal reflux disease)    Asthma    Cerebrovascular accident (CVA), unspecified mechanism    Vertebral fracture, osteoporotic    S/P Tonsillectomy    History of tonsillectomy      FAMILY HISTORY:  No pertinent family history in first degree relatives    Family history of amyotrophic lateral sclerosis    Family history of multiple sclerosis (Child)      SOCIAL HISTORY:   Pt lives alone.     MEDICATIONS (HOME):  Home Medications:  aspirin 81 mg oral tablet, chewable: 1 tab(s) orally once a day (12 May 2015 06:31)  lactobacillus acidophilus oral capsule:  orally  (28 Sep 2017 14:49)    MEDICATIONS  (STANDING):    MEDICATIONS  (PRN):    ALLERGIES/INTOLERANCES:  Allergies  iodine (Unknown)  IV Contrast (Unknown)  IV DYE- burning, head tingling (Unknown)  novacaine (Unknown)    Intolerances    VITALS & EXAMINATION:  Vital Signs Last 24 Hrs  T(C): 36.7 (13 Jul 2021 14:44), Max: 36.7 (13 Jul 2021 10:44)  T(F): 98 (13 Jul 2021 14:44), Max: 98.1 (13 Jul 2021 10:44)  HR: 52 (13 Jul 2021 14:44) (52 - 76)  BP: 157/47 (13 Jul 2021 14:44) (142/74 - 165/101)  BP(mean): --  RR: 16 (13 Jul 2021 14:44) (16 - 18)  SpO2: 100% (13 Jul 2021 14:44) (96% - 100%)    General:  Constitutional: appears stated age, in no apparent distress including pain  Head: Normocephalic & atraumatic.  Respiratory: No increased work of breathing  Extremities: No cyanosis, clubbing, or edema.  Skin: No rashes, bruising, or discoloration.  Psych; pressured speech, tangential    Neurological (>12):  MS: Awake, alert, oriented to person, place, situation, time. Normal affect. Follows all commands.    Language: Speech is clear, fluent with good repetition & comprehension (able to name objects thumb, eyebrow).    CNs: PERRL (R = 3mm, L = 3mm). VFF. EOMI no nystagmus, no diplopia. V1-3 intact to LT, well developed masseter muscles b/l. No facial asymmetry b/l, full eye closure strength b/l. Hearing grossly normal (rubbing fingers) b/l. Symmetric palate elevation in midline. Gag reflex deferred. Head turning & shoulder shrug intact b/l. Tongue midline, normal movements, no atrophy.    Motor: Normal muscle bulk. No noticeable tremor or seizure. No pronator drift. RLE hip exam limited by pain due to known R hip fracture.              Deltoid	Biceps	Triceps	Wrist	   R	4+	4+	4+	4+	4		 	  L	4+	4+	4+	4+	4	    	H-Flex		K-Flex	K-Ext	D-Flex	P-Flex  R	4-		5	5	5	5		   L	4+		5	5	5	5		     Sensation: Intact to LT b/l throughout.     Cortical: Extinction on DSS (neglect): none    Reflexes:              Biceps(C5)       BR(C6)     Patellar(L4)    Achilles(S1)    Plantar Resp  R	2	          2	             2		        2		Down   L	2	          2	             2		        2		 Down     Coordination: No dysmetria to FTN    Gait: Normal gait with walker.     LABORATORY:  CBC                       12.8   7.50  )-----------( 261      ( 13 Jul 2021 10:55 )             40.6     Chem 07-13    139  |  102  |  26<H>  ----------------------------<  113<H>  4.3   |  28  |  0.71    Ca    9.5      13 Jul 2021 10:55    TPro  7.4  /  Alb  4.3  /  TBili  0.9  /  DBili  x   /  AST  17  /  ALT  11  /  AlkPhos  100  07-13    LFTs LIVER FUNCTIONS - ( 13 Jul 2021 10:55 )  Alb: 4.3 g/dL / Pro: 7.4 g/dL / ALK PHOS: 100 U/L / ALT: 11 U/L / AST: 17 U/L / GGT: x           Coagulopathy PT/INR - ( 13 Jul 2021 10:55 )   PT: 11.5 sec;   INR: 0.96 ratio         PTT - ( 13 Jul 2021 10:55 )  PTT:27.8 sec    STUDIES & IMAGING:  Studies (EKG, EEG, EMG, etc):     Radiology (XR, CT, MR, U/S, TTE/INDIGO):    < from: CT Brain Stroke Protocol (07.13.21 @ 11:05) >  COMPARISON: Brain CT dated 10/29/2020    FINDINGS:  No acute hemorrhage, hydrocephalus, midline shift or extra-axial collections are identified. Age-appropriate involutional changes and moderate microvascular ischemic changes are similar in appearance.    The orbits are not remarkable in appearance. Right lens replacement is noted.    The visualized paranasal sinuses and tympanomastoid cavities are free of acute disease.    IMPRESSION:    No acute hemorrhage, mass effect or extra-axial collections.

## 2021-07-13 NOTE — ED PROVIDER NOTE - ATTENDING CONTRIBUTION TO CARE
Patient with history of prior stroke, baseline ambulatory disfunction presenting complaining of LLE weakness beginning at 5PM yesterday.  No other acute symptoms.  Code stroke activated from triage.  RLE weakness noted on exam due to chronic pain.  Refusing IV contrast studies as above.  Will pursue other workup per protocol, not tPA candidate due to time from onset of symptoms, will pursue further treatment based on stroke neurology recommendations.

## 2021-07-13 NOTE — ED PROVIDER NOTE - NSFOLLOWUPINSTRUCTIONS_ED_ALL_ED_FT
1. Please continue all your home medications as prescribed including home aspirin 81mg daily     2. Follow  up Neurology  Dr. Morales this week for further evaluation of your episode of leg weakness. Please see office information below to call and schedule an appointment:       Gerhard Morales (DO)     Neurology; Vascular Neurology     [address], [address]     [place] [address]     Phone: [phone]     Fax: [phone]      3. Follow up with your cardiologist Dr. Myke Andrews after discharge as well for reevaluation     4. Return to ED for change of symptoms including headaches, dizziness, numbness, weakness, difficulty walking, change in vision or speech and any other concerns

## 2021-07-13 NOTE — ED PROVIDER NOTE - PATIENT PORTAL LINK FT
You can access the FollowMyHealth Patient Portal offered by [facility] by registering at the following website: [email or web address]. By joining [facility]’s FollowMyHealth portal, you will also be able to view your health information using other applications (apps) compatible with our system.

## 2021-07-13 NOTE — CONSULT NOTE ADULT - ASSESSMENT
86 year old female prior CVA, R hip fracture, diverticulitis, asthma presents to ED c/o left lower extremity weakness since 5pm, now resolved since this morning. Pt reports that yesterday evening she noticed her left leg was weak and she was unable to move it.  Reports presented with same complaint when diagnosed with her stroke and spoke to her doctor today who advised her to come to ED. Pt reports typically walks with walker because of right hip issue. Pt reports hx of afib however no formal record of afib dx and pt not on anticoagulation after extensive chart review. Neuro exam notable for generalized weakness, RLE weakness likely 2/2 chronic R hip fracture, pressured speech. CTH neg for acute hemorrhage (old CVA not visiualized). Pt w/ capacity and refusing IV contrast imaging. EKG here w/ frequent PVCs.    Impression: LLE weakness now resolved possibly 2/2 TIA    Plan:  [] c/w home aspirin 81mg for secondary stroke prevention  [] outpatient cardiologist follow up (pt now follows with Dr. Myke Andrews, [facility]) for BP management and long term cardiac monitoring  [] outpatient neurology follow up with Dr. Morales ([phone])    Case discussed with stroke fellow.     86 year old female prior CVA, R hip fracture, diverticulitis, asthma presents to ED c/o left lower extremity weakness since 5pm, now resolved since this morning. Pt reports that yesterday evening she noticed her left leg was weak and she was unable to move it.  Reports presented with same complaint when diagnosed with her stroke and spoke to her doctor today who advised her to come to ED. Pt reports typically walks with walker because of right hip issue. Pt reports hx of afib however no formal record of afib dx and pt not on anticoagulation after extensive chart review. Neuro exam notable for generalized weakness, RLE weakness likely 2/2 chronic R hip fracture, pressured speech. CTH neg for acute hemorrhage (old CVA not visiualized). Pt w/ capacity and refusing IV contrast imaging. EKG here w/ frequent PVCs.    Impression: LLE weakness now resolved possibly 2/2 TIA    Plan:  [] c/w home aspirin 81mg for secondary stroke prevention  [] no role for anticoagulation given no dx of afib  [] outpatient cardiologist follow up (pt now follows with Dr. Myke Andrews, [facility]) for BP management and long term cardiac monitoring  [] outpatient neurology follow up with Dr. Morales ([phone])    Case discussed with stroke fellow.     86 year old female prior CVA, R hip fracture, diverticulitis, asthma presents to ED c/o left lower extremity weakness since 5pm, now resolved since this morning. Pt reports that yesterday evening she noticed her left leg was weak and she was unable to move it.  Reports presented with same complaint when diagnosed with her stroke and spoke to her doctor today who advised her to come to ED. Pt reports typically walks with walker because of right hip issue. Pt reports hx of afib however no formal record of afib dx and pt not on anticoagulation after extensive chart review. Neuro exam notable for generalized weakness, RLE weakness likely 2/2 chronic R hip fracture, pressured speech. CTH neg for acute hemorrhage (old CVA not visiualized). Pt w/ capacity and refusing IV contrast imaging. EKG here w/ frequent PVCs.    Impression: Transient LLE weakness now resolved consistent with R brain dysfunction. Etiology likely TIA. Mechanism unknown due to incomplete workup.    Plan:  [] c/w home aspirin 81mg for secondary stroke prevention  [] no role for anticoagulation given no dx of afib  [] outpatient cardiologist follow up (pt now follows with Dr. Myke Andrews, [facility]) for BP management and long term cardiac monitoring  [] outpatient neurology follow up with Dr. Morales ([phone])    Case discussed with stroke fellow.     86 year old female prior CVA, R hip fracture, diverticulitis, asthma presents to ED c/o left lower extremity weakness since 5pm, now resolved since this morning. Pt reports that yesterday evening she noticed her left leg was weak and she was unable to move it.  Reports presented with same complaint when diagnosed with her stroke and spoke to her doctor today who advised her to come to ED. Pt reports typically walks with walker because of right hip issue. Pt reports hx of afib however no formal record of afib dx and pt not on anticoagulation after extensive chart review. Neuro exam notable for generalized weakness, RLE weakness likely 2/2 chronic R hip fracture, pressured speech. CTH neg for acute hemorrhage (old CVA not visiualized). Pt w/ capacity and refusing IV contrast imaging. EKG here w/ frequent PVCs.    Impression: Transient LLE weakness now resolved consistent with R brain dysfunction. Etiology likely TIA. Mechanism unknown due to incomplete workup.    Plan:  [] c/w home aspirin 81mg for secondary stroke prevention  [] from neurovascular perspective, pt should be on Eliquis 5mg BID if she has confirmed afib, however pt should should follow up with cardiologist to confirm diagnosis. Would not start anticoagulation at this time without confirmation by cardiologist. Pt should follow up with cardiologist within 3 days. If started on anticoagulation, no neurovascular indication for aspirin  [] outpatient cardiologist follow up (pt now follows with Dr. Myke Andrews, [facility]) for BP management and long term cardiac monitoring  [] outpatient neurology follow up with Dr. Morales ([phone])    Case discussed with stroke fellow.     86 year old female prior CVA, R hip fracture, diverticulitis, asthma presents to ED c/o left lower extremity weakness since 5pm, now resolved since this morning. Pt reports that yesterday evening she noticed her left leg was weak and she was unable to move it.  Reports presented with same complaint when diagnosed with her stroke and spoke to her doctor today who advised her to come to ED. Pt reports typically walks with walker because of right hip issue. Pt reports hx of afib however no formal record of afib dx and pt not on anticoagulation after extensive chart review. Neuro exam notable for generalized weakness, RLE weakness likely 2/2 chronic R hip fracture, pressured speech. CTH neg for acute hemorrhage (old CVA not visiualized). Pt w/ capacity and refusing IV contrast imaging. EKG here w/ frequent PVCs.    Impression: Transient LLE weakness now resolved consistent with R brain dysfunction. Etiology likely TIA. Mechanism unknown due to incomplete workup.    We had extensive discussion with the RN at the patients cardiology office at [facility] records [phone]. Per RN, no documented chart documentation of Afib nor has patient ever been on anticoagulation.     Plan:  [] c/w home aspirin 81mg for secondary stroke prevention  [] from neurovascular perspective, pt should be on Eliquis 5mg BID if she has confirmed afib, however pt should should follow up with cardiologist to confirm diagnosis. Would not start anticoagulation at this time without confirmation by cardiologist. Pt should follow up with cardiologist within 3 days. If started on anticoagulation, no neurovascular indication for aspirin  [] outpatient cardiologist follow up (pt now follows with Dr. Myke Andrews, [facility]) for BP management and long term cardiac monitoring  [] outpatient neurology follow up with Dr. Morales ([phone])    Case discussed with stroke fellow.

## 2021-07-13 NOTE — ED PROVIDER NOTE - PROGRESS NOTE DETAILS
Patient refusing IV contrast study.  Patient reporting had "whole body electric shocks" in response to contrast in past and is now refusing.  Both myself and neurology resident discussed with patient that if she is having a stroke at this time, given her onset of symptoms at 5PM yesterday our ability to diagnose and intervene on a possible LVO will be very limited and may prevent her from getting definitive treatment.  She acknowledged and understood this risk and still refused.  Miguel Browne M.D. Pt ambulated in ED with walker without requiring additional assistance from staff which is pt baseline ambulation. Pending Neuro recs   Stephanie Guy PA-C Neurology consult appreciated. Pt requesting discharge. Plan and follow up discussed. [email or web address] Upon review of neurology note for d/c clearance neurology resident wrote patient without afib no role for a/c. Upon initial presentation pt informed myself she was diagnosed with afib by her Cardiologist Dr. Myke Andrews and has been taking Metroprolol for several months. Spoke to Neuro and advised that pt informed ED of her afib diagnosis. Neuro states attempted to contact pt cards [facility] office and could not speak to doctor but spoke to RN who could not find diagnosis of Afib in chart.  Neurology states that they would change note to state start eliquis if afib diagnosis CONFIRMED.  Called office myself and spoke to covering Cardiologist Dr. Mahoney who reviewed chart and pt EKGs and Holter monitor and could not find evidence of afib. Throughout process of figuring out formal neuro recs patient became very agitated with staff and frustrated that herd doctor gave her a diagnosis and we cannot confirm this. Pt asking to go home, states feels she is being "held hostage". Explained to patient that we are ensuring proper management. Pt d/c home with plan to just continue 81mg asa daily without Eliquis given we could not confirm the diagnosis of afib with office  Stephanie Guy PA-C

## 2021-07-13 NOTE — ED ADULT NURSE REASSESSMENT NOTE - NS ED NURSE REASSESS COMMENT FT1
pt ambulated with rolling walker without assistance, noted with steady gait. sandwich, cookies and juice provided to patient. pt given update.

## 2021-07-26 PROBLEM — I51.89 DIASTOLIC DYSFUNCTION: Status: ACTIVE | Noted: 2018-07-02

## 2021-07-26 PROBLEM — I34.0 MYXOMATOUS MITRAL VALVE REGURGITATION: Status: ACTIVE | Noted: 2018-07-02

## 2021-07-27 ENCOUNTER — APPOINTMENT (OUTPATIENT)
Dept: CARDIOLOGY | Facility: CLINIC | Age: 86
End: 2021-07-27

## 2021-07-27 DIAGNOSIS — I51.89 OTHER ILL-DEFINED HEART DISEASES: ICD-10-CM

## 2021-07-27 DIAGNOSIS — I34.0 NONRHEUMATIC MITRAL (VALVE) INSUFFICIENCY: ICD-10-CM

## 2021-07-27 DIAGNOSIS — I35.1 NONRHEUMATIC AORTIC (VALVE) INSUFFICIENCY: ICD-10-CM

## 2021-07-27 NOTE — HISTORY OF PRESENT ILLNESS
[FreeTextEntry1] : I saw her last in March.  Since that time, \par \par \par she remains stable from a cardiac standpoint.  There have been no cardiac sxs.- no episodes of palps and no episodes of exertional chest discomfort or JONES.  She reports orthopnea or PND.  There have been no episodes of dizziness or LOC.\par \par Her meds are unchanged. \par \par She continues to use a walker when ambulating, due to longstanding arthritis of the hip.  She also describes numbness in the first 3 fingers of the R hand which awakens her from sleep and requests the name of a hand doctor. coagulation(Bleeding disorder R/T clinical cond/anti-coags)/other

## 2021-07-27 NOTE — REASON FOR VISIT
[FreeTextEntry1] : NO SHOW FOR APPOINTMENT TODAY. \par \par \par PRELIMINARY NOTE\par \par \par Dinorah Hogan returns for a f/u visit regarding a hx. of palpitations, hx. of MVP with mild MR, mild aortic valve insufficiency.

## 2021-07-27 NOTE — DISCUSSION/SUMMARY
[FreeTextEntry1] : PRELIMINARY NOTE\par \par \par Palps- quiescent at this time.  Prior Holter monitor without significant findings.  \par \par Mild MVP with trivial prolapse/mild MR; mild aortic valve insufficiency - remains asx.  \par \par Mild diastolic LV dysfunction on most recent echo of 2017; no treatment needed.  \par \par Numbness in first 3 fingers or R hand awakening her from sleep; sxs. suggest carpal tunnel syndrome.  She had previously seen Dr. Viraj Chandler for a problem with her arm and I suggested that she return to him.\par \par She will return here for a visit in 4 months.

## 2021-10-12 ENCOUNTER — EMERGENCY (EMERGENCY)
Facility: HOSPITAL | Age: 86
LOS: 1 days | Discharge: HOME | End: 2021-10-12
Attending: EMERGENCY MEDICINE
Payer: MEDICARE

## 2021-10-12 VITALS
OXYGEN SATURATION: 95 % | HEART RATE: 76 BPM | HEIGHT: 61 IN | DIASTOLIC BLOOD PRESSURE: 68 MMHG | RESPIRATION RATE: 19 BRPM | SYSTOLIC BLOOD PRESSURE: 152 MMHG

## 2021-10-12 DIAGNOSIS — Z90.89 ACQUIRED ABSENCE OF OTHER ORGANS: Chronic | ICD-10-CM

## 2021-10-12 LAB
ALBUMIN SERPL ELPH-MCNC: 4.4 G/DL — SIGNIFICANT CHANGE UP (ref 3.3–5)
ALP SERPL-CCNC: 126 U/L — HIGH (ref 40–120)
ALT FLD-CCNC: 11 U/L — SIGNIFICANT CHANGE UP (ref 10–45)
ANION GAP SERPL CALC-SCNC: 12 MMOL/L — SIGNIFICANT CHANGE UP (ref 5–17)
AST SERPL-CCNC: 16 U/L — SIGNIFICANT CHANGE UP (ref 10–40)
BASOPHILS # BLD AUTO: 0.06 K/UL — SIGNIFICANT CHANGE UP (ref 0–0.2)
BASOPHILS NFR BLD AUTO: 0.7 % — SIGNIFICANT CHANGE UP (ref 0–2)
BILIRUB SERPL-MCNC: 0.5 MG/DL — SIGNIFICANT CHANGE UP (ref 0.2–1.2)
BUN SERPL-MCNC: 26 MG/DL — HIGH (ref 7–23)
CALCIUM SERPL-MCNC: 9.8 MG/DL — SIGNIFICANT CHANGE UP (ref 8.4–10.5)
CHLORIDE SERPL-SCNC: 103 MMOL/L — SIGNIFICANT CHANGE UP (ref 96–108)
CO2 SERPL-SCNC: 26 MMOL/L — SIGNIFICANT CHANGE UP (ref 22–31)
CREAT SERPL-MCNC: 0.7 MG/DL — SIGNIFICANT CHANGE UP (ref 0.5–1.3)
EOSINOPHIL # BLD AUTO: 0.16 K/UL — SIGNIFICANT CHANGE UP (ref 0–0.5)
EOSINOPHIL NFR BLD AUTO: 1.9 % — SIGNIFICANT CHANGE UP (ref 0–6)
GLUCOSE SERPL-MCNC: 129 MG/DL — HIGH (ref 70–99)
HCT VFR BLD CALC: 40.2 % — SIGNIFICANT CHANGE UP (ref 34.5–45)
HGB BLD-MCNC: 13 G/DL — SIGNIFICANT CHANGE UP (ref 11.5–15.5)
IMM GRANULOCYTES NFR BLD AUTO: 0.5 % — SIGNIFICANT CHANGE UP (ref 0–1.5)
LYMPHOCYTES # BLD AUTO: 2.2 K/UL — SIGNIFICANT CHANGE UP (ref 1–3.3)
LYMPHOCYTES # BLD AUTO: 26.5 % — SIGNIFICANT CHANGE UP (ref 13–44)
MCHC RBC-ENTMCNC: 29.4 PG — SIGNIFICANT CHANGE UP (ref 27–34)
MCHC RBC-ENTMCNC: 32.3 GM/DL — SIGNIFICANT CHANGE UP (ref 32–36)
MCV RBC AUTO: 91 FL — SIGNIFICANT CHANGE UP (ref 80–100)
MONOCYTES # BLD AUTO: 1.01 K/UL — HIGH (ref 0–0.9)
MONOCYTES NFR BLD AUTO: 12.2 % — SIGNIFICANT CHANGE UP (ref 2–14)
NEUTROPHILS # BLD AUTO: 4.84 K/UL — SIGNIFICANT CHANGE UP (ref 1.8–7.4)
NEUTROPHILS NFR BLD AUTO: 58.2 % — SIGNIFICANT CHANGE UP (ref 43–77)
NRBC # BLD: 0 /100 WBCS — SIGNIFICANT CHANGE UP (ref 0–0)
PLATELET # BLD AUTO: 289 K/UL — SIGNIFICANT CHANGE UP (ref 150–400)
POTASSIUM SERPL-MCNC: 4.4 MMOL/L — SIGNIFICANT CHANGE UP (ref 3.5–5.3)
POTASSIUM SERPL-SCNC: 4.4 MMOL/L — SIGNIFICANT CHANGE UP (ref 3.5–5.3)
PROT SERPL-MCNC: 7.1 G/DL — SIGNIFICANT CHANGE UP (ref 6–8.3)
RBC # BLD: 4.42 M/UL — SIGNIFICANT CHANGE UP (ref 3.8–5.2)
RBC # FLD: 14.5 % — SIGNIFICANT CHANGE UP (ref 10.3–14.5)
SODIUM SERPL-SCNC: 141 MMOL/L — SIGNIFICANT CHANGE UP (ref 135–145)
TROPONIN T, HIGH SENSITIVITY RESULT: 16 NG/L — SIGNIFICANT CHANGE UP (ref 0–51)
WBC # BLD: 8.31 K/UL — SIGNIFICANT CHANGE UP (ref 3.8–10.5)
WBC # FLD AUTO: 8.31 K/UL — SIGNIFICANT CHANGE UP (ref 3.8–10.5)

## 2021-10-12 PROCEDURE — 99284 EMERGENCY DEPT VISIT MOD MDM: CPT

## 2021-10-12 PROCEDURE — 84484 ASSAY OF TROPONIN QUANT: CPT

## 2021-10-12 PROCEDURE — 36415 COLL VENOUS BLD VENIPUNCTURE: CPT

## 2021-10-12 PROCEDURE — 80053 COMPREHEN METABOLIC PANEL: CPT

## 2021-10-12 PROCEDURE — 85025 COMPLETE CBC W/AUTO DIFF WBC: CPT

## 2021-10-12 PROCEDURE — 93005 ELECTROCARDIOGRAM TRACING: CPT

## 2021-10-12 PROCEDURE — 93010 ELECTROCARDIOGRAM REPORT: CPT

## 2021-10-12 NOTE — ED PROVIDER NOTE - NSFOLLOWUPINSTRUCTIONS_ED_ALL_ED_FT
-You were seen in the Emergency Department (ED) for shortness of breath. Your work up is incomplete, please follow up with your primary care doctor for continued workup. Please follow up within the next 24 hours.     FOLLOW-UP:  -Please follow up with your PMD if symptoms return or for any concerning matter pertaining to your shortness of breath within the next 24 hours.   -If you do not have a PMD, please call [phone] to find one convenient for you or call our clinic at [phone].    MEDICATIONS:  -Continue all other prescribed medicine, IF ANY, as per your primary care doctor's (PMD) recommendations.    PAIN CONTROL:  -Please take over the counter Tylenol (also known as acetaminophen) 650mg every 6 hours or Ibuprofen (also known as motrin, advil) 600mg every 8 hour for your pain, IF ANY, unless you are not supposed to for any reason.  -Rest, stay hydrated with plenty of fluids (drink at least 2 Liters or 64 Ounces of water each day UNLESS you are supposed to restrict fluids or ANY reason.    RETURN PRECAUTIONS:  -Please return to the Emergency Department if you experience ANY new or concerning symptoms, such as, but not limited to: worsening pain, large amount of bleeding, passing out, fever >100.F, shaking chills, inability to see or new double vision, chest pain, difficulty breathing, diffuse abdominal pain, unable to eat or drink, continuous vomiting or diarrhea, unable to move or feel part of your body

## 2021-10-12 NOTE — ED PROVIDER NOTE - PROGRESS NOTE DETAILS
patient declined chest CR adamantly. anna like to have XR at home. explained to patient risks and potential complications of missed diagnosis. patient informed that them may need to sign out AMA. The patient is clinically not intoxicated, free from distracting pain, appears to have intact insight, judgment and reason and in my medical opinion has the capacity to make decisions. The patient is also not under any duress to leave the hospital. In this scenario, it would be battery to subject a patient to treatment against his/her will.   I have voiced my concerns for the patient's health given that a full evaluation and treatment had not occurred. I have discussed the need for continued evaluation to determine if their symptoms are caused by a condition that present risk of death or morbidity. Risks including but not limited to death, permanent disability, prolonged hospitalization, prolonged illness, were discussed. I tried offering alternative options in hopes that the patient might be amenable to partial evaluation and treatment which would be medically beneficial to the patient, though the patient declined my options and insisted on leaving.   Because I have been unable to convince the patient to stay, I answered all of their questions about their condition and asked them to return to the ED as soon as possible to complete their evaluation, especially if their symptoms worsen or do not improve. I emphasized that leaving against medical advice does not preclude returning here for further evaluation. I asked the patient to return if they change their mind about the further evaluation and treatment. I strongly encouraged the patient to return to this Emergency Department or any Emergency Department at any time, particularly with worsening symptoms.

## 2021-10-12 NOTE — ED PROVIDER NOTE - PATIENT PORTAL LINK FT
You can access the FollowMyHealth Patient Portal offered by [facility] by registering at the following website: [email or web address]. By joining [employer]’s FollowMyHealth portal, you will also be able to view your health information using other applications (apps) compatible with our system.

## 2021-10-12 NOTE — ED PROVIDER NOTE - RAPID ASSESSMENT
86 F with PMHx of CVA, Asthma, and currently undergoing workup for possible A-fib on metoprolol presents to the ER after waking up "gasping for air." Pt concerned it was a possible asthma attack or "GERD attack." Pt is well appearing.     Scribe Statement: Saad CORONEL Tiffany, attest that this documentation has been prepared under the direction and in the presence of Richard Garcia) 86 F with PMHx of CVA, Asthma, and currently undergoing workup for possible A-fib on metoprolol presents to the ER after waking up "gasping for air." Pt concerned it was a possible asthma attack or "GERD attack." Pt is well appearing.     Scribe Statement: I, Abigail Nash, attest that this documentation has been prepared under the direction and in the presence of Richard Garcia)    Jose FLOOD: Patient was seen using tele-video conferencing.  A brief medical screening was performed.  Initial labs/orders were entered based on information provided at the time of the patient's evaluation.  Care is to be resumed by the primary ED team.  The scribe's documentation has been prepared under my direction and personally reviewed by me in its entirety. I confirm that the note above accurately reflects all work, treatment, procedures, and medical decision making performed by me (Dr. Garcia).

## 2021-10-12 NOTE — ED PROVIDER NOTE - ATTENDING CONTRIBUTION TO CARE
I performed a history and physical exam of the patient and discussed their management with the resident and /or advanced care provider. I reviewed the resident and /or ACP's note and agree with the documented findings and plan of care. My medical decision making and observations are found above.  Lungs clear abd soft, nl speech, nl breathing

## 2021-10-12 NOTE — ED PROVIDER NOTE - CLINICAL SUMMARY MEDICAL DECISION MAKING FREE TEXT BOX
86 y F with a pmhx of CVA, Asthma, and currently undergoing workup for possible A-fib on metoprolol presents to the ED with a acute episode of SOB that occurred prior to arrival to the ED. She states that her symptoms spontaneously resolved. vitals wnl. PE unremarkable. EKG demonstrates PAC's, otherwise NSR. r/o ACS vs pulmonary pathology. will order labs, imaging, ekg, reassess 86 y F with a pmhx of CVA, Asthma, and currently undergoing workup for possible A-fib on metoprolol presents to the ED with a acute episode of SOB that occurred prior to arrival to the ED. She states that her symptoms spontaneously resolved. vitals wnl. PE unremarkable. EKG demonstrates PAC's, otherwise NSR. r/o ACS vs pulmonary pathology. will order labs, imaging, ekg, reassess    Sukhdev: acute episode of throat burning that left her unable to take a breath for 20+ seconds. Patient then had no SOB no chest pain. felt ok. Hx sound mostly like acute gerd/choking episode. Considered AAA/disection/PE and ACS. all of these are unlikely with such a short period of distress. Would get ekg and labs and cxr.

## 2021-10-12 NOTE — ED PROVIDER NOTE - OBJECTIVE STATEMENT
86 y F with a pmhx of CVA, Asthma, and currently undergoing workup for possible A-fib on metoprolol presents to the ED with a acute episode of SOB that occurred prior to arrival to the ED. She states that her symptoms spontaneously resolved. She denies any CP. She denies any fevers, cough, N/V/D, abdominal pain, fnd, numbness or tingling. She denies any other symptoms at bedside.

## 2021-10-12 NOTE — ED PROVIDER NOTE - NSICDXFAMILYHX_GEN_ALL_CORE_FT
FAMILY HISTORY:  Family history of amyotrophic lateral sclerosis  No pertinent family history in first degree relatives    Child  Still living? Unknown  Family history of multiple sclerosis, Age at diagnosis: Age Unknown

## 2021-10-12 NOTE — ED PROVIDER NOTE - MUSCULOSKELETAL, MLM
The patient is a 26y Male complaining of medical evaluation.
Spine appears normal, range of motion is not limited, no muscle or joint tenderness

## 2021-10-12 NOTE — ED PROVIDER NOTE - NSFOLLOWUPCLINICS_GEN_ALL_ED_FT
[facility] General Internal Medicine  General Internal Medicine  [address] [place] [address]  Phone: [phone]  Fax:   Follow Up Time: Urgent

## 2021-10-12 NOTE — ED PROVIDER NOTE - NSICDXPASTMEDICALHX_GEN_ALL_CORE_FT
PAST MEDICAL HISTORY:  Anxiety     Asthma     Cerebrovascular accident (CVA), unspecified mechanism     CVA (Cerebral Infarction)     Diverticulosis of intestine     GERD (gastroesophageal reflux disease)     Vertebral fracture, osteoporotic

## 2021-10-13 VITALS
HEART RATE: 68 BPM | OXYGEN SATURATION: 98 % | SYSTOLIC BLOOD PRESSURE: 152 MMHG | RESPIRATION RATE: 16 BRPM | TEMPERATURE: 98 F | DIASTOLIC BLOOD PRESSURE: 66 MMHG

## 2021-10-13 NOTE — ED ADULT NURSE NOTE - OBJECTIVE STATEMENT
86yF, A&Ox4, PMH CVA, Asthma, and currently undergoing workup for possible A-fib-on metoprolol presents to the ED with a acute episode of SOB that occurred prior to arrival to the ED. She states that her symptoms spontaneously resolved. She denies any CP. She denies any fevers, cough, N/V/D, abdominal pain, numbness or tingling. She denies any other symptoms at bedside.

## 2021-10-13 NOTE — ED ADULT NURSE NOTE - HIV OFFER
Patient: Any Santamaria Date: 2017   : 1956 Attending: Dev Evangelista MD   60 year old female      Primary Rehabilitation Diagnosis: MS Exacerbation; L fibula fracture  Expected Discharge Date: 17  Planned Discharge Destination: Home    Subjective: Denied H/A, N/V/D, CP or SOB  All other systems reviewed and were reported negative per patient.    Reviewed: Allergies, Medical History, Surgical History and Medications    Vital Last Value 24 Hour Range   Temperature 97.9 °F (36.6 °C) Temp  Min: 97.8 °F (36.6 °C)  Max: 97.9 °F (36.6 °C)   Pulse 50 Pulse  Min: 50  Max: 52   Respiratory 18 Resp  Min: 18  Max: 18   Blood Pressure 96/64 BP  Min: 96/64  Max: 97/56   Pulse Oximetry 98 % SpO2  Min: 98 %  Max: 98 %     Vital Today Admit   Weight 75.1 kg Weight: 75.8 kg   Height N/A Height: 5' 5\" (165.1 cm)   BMI N/A BMI (Calculated): 27.87     Weight over the past 48 Hours:  No data found.       Intake/Output:    Last Stool Occurrence: 1 (brown, formed ) (17 0834)    I/O this shift:  In: 240 [P.O.:240]  Out: 850 [Urine:850]    I/O last 3 completed shifts:  In: 1350 [P.O.:1350]  Out: 2425 [Urine:2425]      Intake/Output Summary (Last 24 hours) at 17 1129  Last data filed at 17 0853   Gross per 24 hour   Intake             1230 ml   Output             2800 ml   Net            -1570 ml       Central Line: No    Pryor: Yes - Medically Necessary    Physical Exam:   General Appearance:    Alert, cooperative, no distress, appears stated age   Head:    Normocephalic, without obvious abnormality, atraumatic   Eyes:    Conjunctiva/corneas clear, EOM's intact, both eyes   Ears:    Normal external ear canals, no erythema or lesions, bilaterally   Nose:   Nares normal, septum midline, mucosa normal, no drainage or sinus tenderness   Throat:   Lips, mucosa, and tongue normal; no lesions or plaque   Lungs:     Clear to auscultation bilaterally, respirations unlabored    Heart:    Regular rate and  rhythm, S1 and S2 normal, no murmur, rub or gallop   Abdomen:     Soft, non-tender, bowel sounds active and normal pitch, no masses, no organomegaly   Pulses:   2+ and symmetric all extremities   Skin:   Skin color, texture, turgor normal, no rashes or lesions   Neurologic:   Alert and oriented to place, person and time.  There are no significant cognitive deficits noted.  Cranial nerves 2-12 demonstrated mild decreased visual acuity in the right eye, with a decreased shoulder shrug bilaterally.  The remaining cranial nerve examination within normal limits.  Motor examination:  The patient has significant increased tone in the right upper extremity and bilateral lower extremities.  The motor strength of the right upper extremity, the proximal muscles of the shoulder and elbow is 3-4/5, intrinsics are 4/5, finger extensors are 2-/5.  The motor strength of left upper extremity is 4/5.    Does have significant rigidity and increased tone in bilateral lower extremities, intact with manual muscle testing.  The patient is more weak on the right than the left.  The motor strength of the bilateral lower extremities is 2-/5 to 2+/5.  Deep tendon reflexes are exaggerated with a equivocal plantar response bilaterally.  Sensory examination:  The sensation was intact for touch and pinprick in bilateral upper and lower extremities.        Laboratory Results:  Lab Results   Component Value Date    SODIUM 141 09/08/2017    POTASSIUM 3.9 09/08/2017    CHLORIDE 106 09/08/2017    CO2 30 09/08/2017    CALCIUM 9.1 09/08/2017    BUN 18 09/08/2017    CREATININE 0.46 (L) 09/08/2017    MG 2.0 10/23/2015    INR 1.0 11/15/2011    PT 10.4 11/15/2011    WBC 5.7 09/02/2017    HCT 32.6 (L) 09/02/2017    HGB 10.8 (L) 09/02/2017     09/02/2017    TSH 1.049 07/12/2017    ALBUMIN 3.5 (L) 08/30/2017    GFRA >90 09/08/2017    GFRNA >90 09/08/2017    GLUCOSE 82 09/08/2017     Current Functional status over the last 24 hours:  Nursing Skin  Documentation:   Integumentary Assessment: Exceptions to WDL   Bladder FIM Documentation:                         Bowel FIM Documentation:        Score: 6-Modified Independent: Pt uses medication for bowel control               Pain Documentation:   Pain Assessment: Within defined limits   Mobility Documentation:  Supine to Sit: Moderate Assist (Mod), Sit to Supine: Moderate Assist (Mod)  Sit to Stand: Minimal Assist (Min), Stand to Sit: Minimal Assist (Min),    Gait Assistance: Total Assist - Dependent, Assistive Device/: 2 People, 4-wheeled walker, Gait Belt, Ambulation Distance (Feet): 35 Feet   ,  ,    , Stair Management Assistance: Not attempted due to medical condition, Not attempted due to safety concerns   ,     Selfcare Documentation:     Grooming Assistance: Supervision, Set-up, Chair  Bathing Assistance: Moderate Assist (Mod) (perched position in STEDY)  Upper Body Dressing Assistance: Minimal Assist (Min), Chair  Lower Body Clothing Assistance: Total Assist - Non-dependent, Chair  Toileting Assistance: Total Assist - Dependent   Communication/Cognition/Swallowing Documentation:   , Expressive Language: Intact     Simple Problem Solving: Intact, Complex Problem Solving: Intact   , Delayed Memory: Mild impairment        Meds:  • bisacodyl  10 mg Rectal QAM AC   • ciprofloxacin  250 mg Oral BID Abreakfast & HS   • senna  3 tablet Oral BID   • polyethylene glycol  17 g Oral Daily   • lactobacillus acidophilus  1 tablet Oral TID WC   • cholecalciferol  3,000 Units Oral Daily   • enoxaparin (LOVENOX) injection  40 mg Subcutaneous Q24H   • acetaminophen  1,000 mg Oral BID   • vitamin C  500 mg Oral BID   • clonazePAM  0.25 mg Oral TID   • docusate calcium  240 mg Oral Every Other Day   • Fampridine  1 capsule Oral 4x Daily   • levothyroxine  100 mcg Oral QAM AC   • meloxicam  15 mg Oral Daily   • mirabegron ER  50 mg Oral Daily   • morphine SR  15 mg Oral 2 times per day   • sertraline  150 mg Oral  Daily      Quality:   VTE Pharmacologic Prophylaxis: Yes  (LMWH)  VTE Mechanical Prophylaxis: Yes    Impressions/Plan/DC Plan:  1. Multiple sclerosis exacerbation (CMS/HCC), completed steroids. Needs to concentrate on therapy to improve mobility and endurance.   2. Closed fracture of left foot, allowed to do weight bearing without or with use of appliance for the ankle per ortho. Her MS issues present unique challenges for mobility , and subsequent healing.    3. Urinary retention with incomplete bladder emptying. Pryor in until mobility improved. She is still declining to get it removed.   4. Hypothyroid:  TSH normal as noted above, on levothyroxine 100 mcg po daily.   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 Dr Nickie Posey's consult reviewed and appreciated.   6. Constipation: - to continue scheduled senna and MiraLax, follow with Team.  7. Mobility and self-care deficits: participating actively with Rehabilitation Team (current status noted above) with emerging gains.  See team conference reports for specific discharge plans.   8. Input of Dr. Lopez for medical management appreciated.  9. - to continue Fampridine to improve lower limb strength - gait in MS  10. Cipro - to continue for UTI, and lactobacillus for GI issues.  11. She requests her own Accupuncturist to perform a treatment on her, d/e Edie and Ange to determine the logistics and hospital policy, and orders written.  12. She had a large BM last evening.  13. She is concerned about her low BP - Not on any BP meds.     I have considered how current medical status and co-morbidities are impacting progress towards goals. Presently, the patient's medical co-morbidities are maximized and are not inhibiting therapy progress with the medical plan as noted. The patient has continued functional deficits requiring inpatient rehabilitation. Continue intensive rehab, medical supervision, nursing cares and comprehensive discharge  planning.    Previously Declined (within the last year)

## 2021-10-14 ENCOUNTER — APPOINTMENT (OUTPATIENT)
Dept: INTERNAL MEDICINE | Facility: CLINIC | Age: 86
End: 2021-10-14
Payer: MEDICARE

## 2021-10-14 VITALS
HEART RATE: 76 BPM | TEMPERATURE: 97.7 F | DIASTOLIC BLOOD PRESSURE: 82 MMHG | SYSTOLIC BLOOD PRESSURE: 156 MMHG | BODY MASS INDEX: 23.43 KG/M2 | WEIGHT: 124 LBS | OXYGEN SATURATION: 94 %

## 2021-10-14 DIAGNOSIS — I48.91 UNSPECIFIED ATRIAL FIBRILLATION: ICD-10-CM

## 2021-10-14 DIAGNOSIS — I49.9 CARDIAC ARRHYTHMIA, UNSPECIFIED: ICD-10-CM

## 2021-10-14 DIAGNOSIS — R11.12 PROJECTILE VOMITING: ICD-10-CM

## 2021-10-14 PROCEDURE — 99214 OFFICE O/P EST MOD 30 MIN: CPT

## 2021-10-14 PROCEDURE — 99204 OFFICE O/P NEW MOD 45 MIN: CPT

## 2021-10-14 RX ORDER — METOPROLOL SUCCINATE 25 MG/1
25 TABLET, EXTENDED RELEASE ORAL DAILY
Qty: 1 | Refills: 0 | Status: ACTIVE | COMMUNITY
Start: 2021-10-14 | End: 1900-01-01

## 2021-10-14 NOTE — REVIEW OF SYSTEMS
[Chest Pain] : no chest pain [Lower Ext Edema] : no lower extremity edema [Suicidal] : not suicidal [Anxiety] : anxiety [Depression] : no depression [Negative] : Heme/Lymph

## 2021-10-14 NOTE — HISTORY OF PRESENT ILLNESS
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 She had an EKG which was only notable for occasional PVCs and LAD with normal rate and intervals. Blood work in the ED included CBC, CMP, VBG, and troponins that were unremarkable.\par They discussed a possible CT chest but she refused and decided to leave AMA.\par \par Today she reports that it was a "gastric attack" that resulted in her "spewing up" vomit. She then drove herself to the emergency room because she had no family to help her. At the time of the episode, her projectile vomiting was followed by a sense of significant shortness of breath. She has not had any further episodes of either the vomiting or shortness of breath. It only lasted a few minutes and resolved. She believes she might have had a similar episode several years ago but denies that this has been frequent or otherwise recurrent.\par She notes that for dinner that night she'd had a pork roast with Chipotle Colbert on it. She does note a single episode of diarrhea the following morning that she reports was "like expelling something poisonous from her body". Denies black tarry stools or blood in the stool.\par \par She reports that she only takes ASA 81 mg, and metoprolol succinate 25 mg/d. She was following up with a cardiologist to evaluate for a suspected diagnosis of paroxysmal AFib. However, she attempted a Holter monitor but the patient notes that she has a lot on her mind and difficulty adhering to protocols and anxiety/frustration that impede her ability to wear the Holter monitor for the appropriate amount of time successfully. She reports seeing a cardiologist at [place], Dr. Stefan Chacon, who recommended she proceed with an implantable monitor. However, now she states that any time she calls his office she cannot get through to his office and has not been able to follow up. \par \par Social: Has a daughter with MS who she cares for, son has bipolar, does not have any family to help.

## 2021-10-14 NOTE — PHYSICAL EXAM
[No Acute Distress] : no acute distress [Normal Sclera/Conjunctiva] : normal sclera/conjunctiva [Normal Outer Ear/Nose] : the outer ears and nose were normal in appearance [No JVD] : no jugular venous distention [Supple] : supple [No Respiratory Distress] : no respiratory distress  [No Accessory Muscle Use] : no accessory muscle use [Normal Rate] : normal rate  [Regular Rhythm] : with a regular rhythm [No Edema] : there was no peripheral edema [Soft] : abdomen soft [Non Tender] : non-tender [No HSM] : no HSM [Normal] : normal gait, coordination grossly intact, no focal deficits and deep tendon reflexes were 2+ and symmetric [de-identified] : arthritic bony enlargement of PIP/DIP joints [de-identified] : anxious affect,

## 2021-10-30 ENCOUNTER — EMERGENCY (EMERGENCY)
Facility: HOSPITAL | Age: 86
LOS: 1 days | Discharge: HOME | End: 2021-10-30
Attending: EMERGENCY MEDICINE
Payer: MEDICARE

## 2021-10-30 VITALS
TEMPERATURE: 99 F | OXYGEN SATURATION: 95 % | WEIGHT: 123.02 LBS | RESPIRATION RATE: 20 BRPM | DIASTOLIC BLOOD PRESSURE: 93 MMHG | HEIGHT: 61 IN | HEART RATE: 82 BPM | SYSTOLIC BLOOD PRESSURE: 155 MMHG

## 2021-10-30 VITALS
DIASTOLIC BLOOD PRESSURE: 78 MMHG | TEMPERATURE: 97 F | SYSTOLIC BLOOD PRESSURE: 148 MMHG | RESPIRATION RATE: 18 BRPM | HEART RATE: 70 BPM | OXYGEN SATURATION: 99 %

## 2021-10-30 DIAGNOSIS — Z90.89 ACQUIRED ABSENCE OF OTHER ORGANS: Chronic | ICD-10-CM

## 2021-10-30 PROCEDURE — 93010 ELECTROCARDIOGRAM REPORT: CPT | Mod: GC

## 2021-10-30 PROCEDURE — 99284 EMERGENCY DEPT VISIT MOD MDM: CPT | Mod: GC

## 2021-10-30 RX ORDER — LIDOCAINE 4 G/100G
1 CREAM TOPICAL ONCE
Refills: 0 | Status: COMPLETED | OUTPATIENT
Start: 2021-10-30 | End: 2021-10-30

## 2021-10-30 RX ORDER — ACETAMINOPHEN 500 MG
650 TABLET ORAL ONCE
Refills: 0 | Status: COMPLETED | OUTPATIENT
Start: 2021-10-30 | End: 2021-10-30

## 2021-10-30 RX ADMIN — LIDOCAINE 1 PATCH: 4 CREAM TOPICAL at 19:44

## 2021-10-30 NOTE — ED PROVIDER NOTE - PHYSICAL EXAMINATION
Physical Exam:  Gen: AOx3, non-toxic appearing  Head: NCAT  HEENT: EOMI, PEERL, normal conjunctiva, tongue midline, oral mucosa moist  Lung: CTAB, no respiratory distress, no wheezes/rhonchi/rales B/L, speaking in full sentences  CV: RRR, no murmurs, rubs or gallops  Abd: soft, NT, ND, no guarding, no rigidity, no rebound tenderness, no CVA tenderness   MSK: no visible deformities, ROM normal in UE/LE, +paraspinal muscle tenderness in B/L thoracic region, no midline tenderness   Neuro: No focal sensory or motor deficits  Skin: Warm, well perfused, no rash, no leg swelling  Psych: extremely anxious with pressured speech   Krissy Frey D.O. -Resident

## 2021-10-30 NOTE — ED PROVIDER NOTE - PATIENT PORTAL LINK FT
You can access the FollowMyHealth Patient Portal offered by [facility] by registering at the following website: [email or web address]. By joining [employer]’s FollowMyHealth portal, you will also be able to view your health information using other applications (apps) compatible with our system.

## 2021-10-30 NOTE — ED ADULT NURSE NOTE - NSFALLRSKASSESSTYPE_ED_ALL_ED
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 Rambo MARCH will follow Initial (On Arrival)

## 2021-10-30 NOTE — ED PROVIDER NOTE - ATTENDING CONTRIBUTION TO CARE
86F hx anxiety, anxiety, osteoporosis, prior CVA here w c/o back pain x4-5 days w/o inciting event. No bowel or bladder dysf. No saddle anesthesias. No weakness, numbness, tingling. Seen at urgent care and had lumbar spine xray which was negative for fracture and was sent in for "r/o dissection." Pt denies CP, SOB, HA< neuro sx. Pt has BL thoracic paraspinal pain which is reproducible with mvmt and palpation. Her CV and neuro exam are wnl. I do not suspect dissection in this pt. Hx and exam are most c/w MSK back pain. Pain meds, imaging, re-eval.

## 2021-10-30 NOTE — ED PROVIDER NOTE - OBJECTIVE STATEMENT
86y F w/ PMHx Asthma, Anxiety and hx of CVA, GERD presenting from home with complaint of thoracic pain x5 days. Patient states pain started after she stood up from bed and has been worsening. Patient was sent by PMD for rule out dissection after having normal lumbar x-ray. Patient states pain is worse with movement or twisting of her back, denies dizziness, chest pain, difficulty breathing, nausea, vomiting, focal weakness/numbness. Denies recent trauma or falls. States pain is located over B/L thoracic region, has only been taking aspirin at home intermittently as she states she does not like taking pain pills. Ambulates with walker, states she has had back pain in the past, but this pain has worsened. States she feels like her back "goes into spasm". Patient stating she can't move, but is actively moving off stretcher while talking 86y F w/ PMHx Asthma, Anxiety and hx of CVA, GERD presenting from home with complaint of thoracic pain x5 days. Patient states pain started after she stood up from bed and has been worsening. Patient was sent by PMD for rule out dissection after having normal lumbar x-ray. Patient states pain is worse with movement or twisting of her back, denies dizziness, chest pain, difficulty breathing, nausea, vomiting, focal weakness/numbness. Denies recent trauma or falls. States pain is located over B/L thoracic region, has only been taking aspirin at home intermittently as she states she does not like taking pain pills. Ambulates with walker, states she has had back pain in the past, but this pain has worsened. States she feels like her back "goes into spasm". Patient stating she can't move, but is actively moving off stretcher while talking. Denies fever, chills, cp, sob, dizziness, focal weakness/numbness, abd pain, nausea, vomiting, recent falls or trauma, hematuria, bloody stools.

## 2021-10-30 NOTE — ED PROVIDER NOTE - PROGRESS NOTE DETAILS
Shante ALBARADO (PGY-3): patient now refusing tylenol and x-rays. Initially was agreeable to pain control and x-ray. Now states she wants to go home and does not want anything for her pain. No contraindication for discharge as suspect MSK etiology for pain, patient is able to move off the bed without difficulty. Patient's pain is b/l paraspinal muscles in thoracic and lumbar region, no midline tenderness, no saddle anesthesia or urinary/bowel incontinence and no urinary retention. Also do not clinically suspect dissection despite triage's note of PMD's concern for dissection. Pulses equal B/L, no neurological deficits, no chest pain, and pain is reproducible upon palpation. Patient is medically stable for discharge as per request.

## 2021-10-30 NOTE — ED PROVIDER NOTE - CLINICAL SUMMARY MEDICAL DECISION MAKING FREE TEXT BOX
86y F w/ PMHx Asthma, Anxiety and hx of CVA, GERD presenting from home with complaint of thoracic pain x5 days. +ttp over paraspinal thoracic muscles, no midline tenderness, no rash, no point tenderness over ribs. Abd soft, no palpable mass, B/L BP without difference in RUE/LUE. Neurovascularly intact. No clinical suspicion for aortic dissection despite triage note. Pain likely MSK etiology. Will offer pain control and CXR, reassess.

## 2021-10-30 NOTE — ED ADULT NURSE REASSESSMENT NOTE - NS ED NURSE REASSESS COMMENT FT1
pt refusing all treatment and would like to leave ASAP. Importance or treatment and safety explained to pt and she insists on leaving as soon as possible. pt is A&Ox4, MAEW, sensation grossly intact upper and lower extremity pulses intact and equal, upper and lower extremity NO drift, no facial droop, skin is warm dry intact and normal for race with cap refill < 2 seconds.  Pt denies headache, dizziness, chest pain, palpitations, cough, SOB, abdominal pain, n/v/d, urinary symptoms, fevers, chills, weakness at this time.

## 2021-10-30 NOTE — ED ADULT NURSE NOTE - OBJECTIVE STATEMENT
86 year old female presents to the ED complaining of 4 days of back pain - sent in by outpatient MD for rule out aortic dissection. bilateral blood pressures taken in triage (normal). PMH HTN, CVA. 86 year old female presents to the ED complaining of 4 days of back pain - sent in by outpatient MD for rule out aortic dissection. bilateral blood pressures taken in triage (normal). PMH HTN, CVA with residual right sided lower extremity weakness, walks with cane at baseline. Patient states she carried heavy groceries 4 days ago prior to back beginning to hurt. Patient was seen outpatient today for back pain, had an xray which was negative for compression fracture  (as per paperwork from MD Martins). 18g peripheral IV placed in L AC and 20g peripheral IV placed in R Forearm. P. denies chest pain, sob, headahce, diaphoresis, vision changes, dizziness, fever/chills, nausea, vomiting, diarrhea, dysuria, recent travel, recent sick contacts. EKG done and given to ED Attending Tommy. Placed on CM- NSR 70s. Patient undressed and placed into gown, call bell in hand and side rails up with bed in lowest position for safety. blanket provided. Comfort and safety provided.

## 2021-10-30 NOTE — ED PROVIDER NOTE - NS ED ROS FT
CONSTITUTIONAL: No fevers, no chills, no lightheadedness, no dizziness  EYES: no visual changes, no eye pain  CV: No chest pain, no palpitations  RESP: No SOB, no cough  GI: No n/v/d, no abd pain  : no dysuria, no hematuria, no flank pain  MSK: +thoracic back pain, no extremity pain  SKIN: no rashes  NEURO: no headache, no focal weakness, no decreased sensation/paresthesias   PSYCHIATRIC: +anxiety

## 2021-10-30 NOTE — ED PROVIDER NOTE - NSFOLLOWUPINSTRUCTIONS_ED_ALL_ED_FT
- Follow up with your doctor in 1 week for reassessment    - Return to the ED for any new, worsening, or concerning symptoms to you including new chest pain, difficulty breathing, new numbness, tingling or weakness or worsening of your pain.     - Continue all prescribed medications    - Take ibuprofen/tylenol as directed as needed for pain  To control your pain at home, you should take Ibuprofen 400 mg (with food) along with Tylenol 650mg-1000mg every 6 to 8 hours. Limit your maximum daily Tylenol from all sources to 4000mg. Be aware that many other medications contain acetaminophen which is also known as Tylenol. Taking Tylenol and Ibuprofen together has been shown to be more effective at relieving pain than taking them separately. These are both over the counter medications that you can  at your local pharmacy without a prescription. You need to respect all of the warnings on the bottles. You shouldn’t take these medications for more than a week without following up with your doctor. Both medications come with certain risks and side effects that you need to discuss with your doctor, especially if you are taking them for a prolonged period.    - Rest and keep yourself hydrated with fluids

## 2021-10-30 NOTE — ED PROVIDER NOTE - MDM ORDERS SUBMITTED SELECTION
Adequate NUTRIENT INTAKE -      Nutrient/Hydration intake appropriate for improving, restoring or maintaining nutritional needs Progressing        CARDIOVASCULAR -      Absence of cardiac dysrhythmias or at baseline rhythm Progressing        DISCHARGE PLANNING     Discharge to home or other facility with appropriate resources Progressing        DISCHARGE PLANNING - CARE MANAGEMENT     Discharge to post-acute care or home with appropriate resources Progressing        Knowledge Deficit     Patient/family/caregiver demonstrates understanding of disease process, treatment plan, medications, and discharge instructions Progressing        SAFETY -      Patient will remain free from falls Progressing        THERMOREGULATION - /PEDIATRICS     Maintains normal body temperature Progressing Labs/EKG/Imaging Studies/Medications

## 2021-11-01 ENCOUNTER — EMERGENCY (EMERGENCY)
Facility: HOSPITAL | Age: 86
LOS: 1 days | Discharge: HOME | End: 2021-11-01
Attending: EMERGENCY MEDICINE
Payer: MEDICARE

## 2021-11-01 VITALS
SYSTOLIC BLOOD PRESSURE: 140 MMHG | OXYGEN SATURATION: 95 % | HEIGHT: 61 IN | WEIGHT: 119.93 LBS | DIASTOLIC BLOOD PRESSURE: 85 MMHG | RESPIRATION RATE: 16 BRPM | HEART RATE: 86 BPM | TEMPERATURE: 98 F

## 2021-11-01 VITALS
DIASTOLIC BLOOD PRESSURE: 57 MMHG | RESPIRATION RATE: 15 BRPM | OXYGEN SATURATION: 95 % | SYSTOLIC BLOOD PRESSURE: 124 MMHG | HEART RATE: 93 BPM | TEMPERATURE: 98 F

## 2021-11-01 DIAGNOSIS — Z90.89 ACQUIRED ABSENCE OF OTHER ORGANS: Chronic | ICD-10-CM

## 2021-11-01 PROCEDURE — 99285 EMERGENCY DEPT VISIT HI MDM: CPT

## 2021-11-01 PROCEDURE — 72128 CT CHEST SPINE W/O DYE: CPT | Mod: 26,MA

## 2021-11-01 PROCEDURE — 71045 X-RAY EXAM CHEST 1 VIEW: CPT | Mod: 26

## 2021-11-01 RX ORDER — IBUPROFEN 200 MG
400 TABLET ORAL ONCE
Refills: 0 | Status: COMPLETED | OUTPATIENT
Start: 2021-11-01 | End: 2021-11-01

## 2021-11-01 RX ORDER — ACETAMINOPHEN 500 MG
650 TABLET ORAL ONCE
Refills: 0 | Status: COMPLETED | OUTPATIENT
Start: 2021-11-01 | End: 2021-11-01

## 2021-11-01 NOTE — ED PROVIDER NOTE - PROGRESS NOTE DETAILS
Jasmina Payne M.D. Tox Fellow  Spoke to Dr. Yeni Martin, states pt pmd sent her in form admission for possible new onset Afib and placement. Pt EKG sinus in the ED. pt workup positive for acute/subacute T8 fracture. Pt offered admission for placement, adamantly denies, states she has no one to take care of daughter with MS at home. Pt "wants services" and "needs help", but declined admission. Pt ambulates with walker without assistance, has capacity. SW consulted will see patient. Jasmina Payne M.D. Tox Fellow  SW spoke to patient, offered admission again, patient again declined. Pt plans to go home and take care of daughter and will return tomorrow for admission. PT AOx3, has capacity to decline admission. Vitals stable. rapid follow up with spine center follow up requested for patient. Jasmina Payne M.D. Tox Fellow  SW spoke to patient. Patient offered admission again, patient again declined. Pt plans to go home and take care of daughter and will return tomorrow for admission. PT AOx3, has capacity to decline admission. Vitals stable. rapid follow up with spine center follow up requested for patient.

## 2021-11-01 NOTE — ED ADULT NURSE REASSESSMENT NOTE - NS ED NURSE REASSESS COMMENT FT1
1718 pt explained why she cannot stay pt has daughter at home with MS and cannot leave her alone Pt has compression fx and uses a walker and able to get around  EBER cade to see pt and see if any assistance can be offered and help with the situation Jana

## 2021-11-01 NOTE — ED PROVIDER NOTE - OBJECTIVE STATEMENT
85 yo F hx Asthma, CVA, vertebral fracture, anxiety, BIBEMS from cardiologist office for back pain. Pt states she had worsening mid thoracic back pain this morning, denies recent heavy lifting, fall, trauma, instrumentation to back, fever, stiff neck, weakness of LE or /GI changes. Pt ambulatory and walks around room with out assistance.

## 2021-11-01 NOTE — CHART NOTE - NSCHARTNOTEFT_GEN_A_CORE
Emergency Room :  LMSW received a referral from the medical team for support and discharge planning.  Medical chart was reviewed.  Per chart review patient's medical history includes: CVA, Asthma, Anxiety, and GERD. Per medical team the patient is cleared for discharge. LMSW introduced herself to the patient and she verbalized understanding the role of the .  Patient is alert an oriented x 4 spheres.  Demographic information was reviewed and confirmed.  Patient resides in a garden apartment with her adult daughter.  Patient reports being independent with her ADLS and uses a rollator in the community to assist with ambulation.  Patient reportedly went to her community cardiologist (Dr. Myke Andrews - [address] [place] [address], [facility] ) today for an office visit and was advised to follow up in the ED to address her concerns regarding lower back pain. Patient is able to transfer independently and able to ambulate.  Patient expressed concern about her daughter in the home as she has MS.  LMSW explored the daughter's level of functioning and community support. Patient informed her daughter is independent with ADLS and ambulation.  Daughter has an aide for 4 hours and is able to care for herself after.  LMSW offered support regarding her daughter and requested to return to the ED. LMSW reassured patient she can return to the ED to address her concerns at anytime.  Patient acknowledged. LMSW attempted to speak with the patient's daughter as per her request.  LMSW left message void of PHI for return call.  LMSW contacted a local car service to transport the patient to her MD office because her car was still parked there.  Patient advised she would cover the cost for the cab and she agreed.  Patient was assisted to the car and was dressed appropriately for the weather.  No social work barriers for discharge identified.

## 2021-11-01 NOTE — ED ADULT NURSE NOTE - OBJECTIVE STATEMENT
86F bib EMS to ED c/o back pain to mid-back. Patient states that she was in this hospital two days ago for the same chief complaint. As per EMS, patient went to her cardiologist for the back pain today, the patient refused pain medicine and then was referred to the ER for the pain. Patient is normally able to ambulate with a walker but states that she cannot at this time due to the increase in pain. Patient states that she has a full bladder at this time but that she can go if she can be brought to the bathroom and refuses a rectal exam at this time.  Patient has history of fracture in spine. Patient is alert and oriented x4, calm/cooperative, NAD, VSS. Patient is changed into a hospital gown and given a blanket for comfort. Patient is moved to [facility] ED as per doctor Matteo/Charge RN/Mobile charge RN at this time after patient being changed into hospital gown.

## 2021-11-01 NOTE — ED ADULT NURSE REASSESSMENT NOTE - NS ED NURSE REASSESS COMMENT FT1
1756 Radha Garnett to see pt pt to be d/c home and Md aware safe to go home t needs to get to her car at the md office may come back not sure about her home situation and her daughter Scot

## 2021-11-01 NOTE — ED ADULT NURSE NOTE - CAS DISCH CONDITION
Airway patent, TM normal bilaterally, normal appearing mouth, nose, throat, neck supple with full range of motion, no cervical adenopathy. Stable

## 2021-11-01 NOTE — ED PROVIDER NOTE - PATIENT PORTAL LINK FT
You can access the FollowMyHealth Patient Portal offered by [facility] by registering at the following website: [email or web address]. By joining [employer]’s FollowMyHealth portal, you will also be able to view your health information using other applications (apps) compatible with our system.

## 2021-11-01 NOTE — ED ADULT NURSE REASSESSMENT NOTE - NS ED NURSE REASSESS COMMENT FT1
1817 pt pending cab for  pt cannot wait in the waiting room for too many pts waiting will continue to monitor Mpuleorn

## 2021-11-01 NOTE — ED PROVIDER NOTE - NS ED ROS FT
GENERAL: No fever, chills  EYES: no vision changes, no discharge.   ENT: no difficulty swallowing or speaking   CARDIAC: no chest pain/pressure, SOB, lower extremity swelling  PULMONARY: no cough, SOB  GI: no abdominal pain, n/v/d  : no dysuria, no hematuria  SKIN: no rashes, no ecchymosis  NEURO: no headache, lightheadedness  MSK: + back pain, no joint pain, myalgia, weakness.

## 2021-11-01 NOTE — ED PROVIDER NOTE - PHYSICAL EXAMINATION
GEN: Patient awake alert NAD.   HEENT: normocephalic, atraumatic, EOMI, no scleral icterus, + dry MM  CARDIAC: RRR, S1, S2, no murmur.   PULM: CTA B/L no wheeze, rhonchi, rales.   ABD: soft NT, ND, no rebound no guarding, no CVA tenderness.   Back: + midline mid thoracic back ttp, no midline C and L spine tenderness.   MSK: 5/5 strength and full ROM in all extremities.     NEURO: A&Ox3, gait normal, no focal neurological deficits, no saddle anesthesia.   SKIN: warm, dry, no rash.

## 2021-11-01 NOTE — ED ADULT NURSE REASSESSMENT NOTE - NS ED NURSE REASSESS COMMENT FT1
Pt refusing blood work and pain medication stating  "I don't need that. I'm not going to drug myself up. I can deal with the pain. My doctor did blood work just this morning or yesterday. I'll only accept the EKG." Pt educated on use ot tylenol and motrin - continues to refuse. Dr. Rowe made aware. Pt refusing blood work and pain medication stating  "I don't need that. I'm not going to drug myself up. I can deal with the pain. My doctor did blood work just this morning or yesterday. I'll only accept the EKG." Pt educated on use of Tylenol and Motrin - continues to refuse. Dr. Rowe made aware.

## 2021-11-01 NOTE — ED PROVIDER NOTE - NSFOLLOWUPINSTRUCTIONS_ED_ALL_ED_FT
You were found to have a fracture of your Thoracic spine. You were offered admission but you declined. A rapid follow up appointment with spine center has been requested for you; someone will reach out to you with an appointment.     Please follow up with your primary care doctor in the next 72 hours.     Please return to the ED if you experience any new or concerning symptoms, such as:   - worsening back pain  - weakness in your legs  - urinary retention, bowel or bladder incontinence  - passing out  - unable to move or feel part of your body  - fever, chills

## 2021-11-01 NOTE — ED PROVIDER NOTE - CLINICAL SUMMARY MEDICAL DECISION MAKING FREE TEXT BOX
getachew 86 m with thoracuic back pain for week plus no cp no sob -- pt denies any cough no fever no numbness or weakness no bowel or bladder -- seen in ed several days ago -- not taking meds at home--sent by pcp --mid thoracic spine ttp - ct r/o compression fx -- analgesia screening ekg and if neg dc w spine fu

## 2021-11-01 NOTE — ED ADULT NURSE NOTE - NSIMPLEMENTINTERV_GEN_ALL_ED
Implemented All Fall Risk Interventions:  [place] to call system. Call bell, personal items and telephone within reach. Instruct patient to call for assistance. Room bathroom lighting operational. Non-slip footwear when patient is off stretcher. Physically safe environment: no spills, clutter or unnecessary equipment. Stretcher in lowest position, wheels locked, appropriate side rails in place. Provide visual cue, wrist band, yellow gown, etc. Monitor gait and stability. Monitor for mental status changes and reorient to person, place, and time. Review medications for side effects contributing to fall risk. Reinforce activity limits and safety measures with patient and family.

## 2021-11-02 ENCOUNTER — INPATIENT (INPATIENT)
Facility: HOSPITAL | Age: 86
LOS: 1 days | Discharge: LEFT BEFORE DISCHARGE | DRG: 544 | End: 2021-11-04
Attending: INTERNAL MEDICINE | Admitting: INTERNAL MEDICINE
Payer: MEDICARE

## 2021-11-02 VITALS
OXYGEN SATURATION: 97 % | HEIGHT: 61 IN | DIASTOLIC BLOOD PRESSURE: 70 MMHG | TEMPERATURE: 98 F | RESPIRATION RATE: 20 BRPM | HEART RATE: 89 BPM | SYSTOLIC BLOOD PRESSURE: 122 MMHG | WEIGHT: 125 LBS

## 2021-11-02 DIAGNOSIS — F43.23 ADJUSTMENT DISORDER WITH MIXED ANXIETY AND DEPRESSED MOOD: ICD-10-CM

## 2021-11-02 DIAGNOSIS — Z90.89 ACQUIRED ABSENCE OF OTHER ORGANS: Chronic | ICD-10-CM

## 2021-11-02 DIAGNOSIS — S29.012A STRAIN OF MUSCLE AND TENDON OF BACK WALL OF THORAX, INITIAL ENCOUNTER: ICD-10-CM

## 2021-11-02 LAB
ALBUMIN SERPL ELPH-MCNC: 4 G/DL — SIGNIFICANT CHANGE UP (ref 3.3–5)
ALP SERPL-CCNC: 96 U/L — SIGNIFICANT CHANGE UP (ref 40–120)
ALT FLD-CCNC: 12 U/L — SIGNIFICANT CHANGE UP (ref 10–45)
ANION GAP SERPL CALC-SCNC: 16 MMOL/L — SIGNIFICANT CHANGE UP (ref 5–17)
AST SERPL-CCNC: 12 U/L — SIGNIFICANT CHANGE UP (ref 10–40)
BASOPHILS # BLD AUTO: 0.05 K/UL — SIGNIFICANT CHANGE UP (ref 0–0.2)
BASOPHILS NFR BLD AUTO: 0.6 % — SIGNIFICANT CHANGE UP (ref 0–2)
BILIRUB SERPL-MCNC: 0.9 MG/DL — SIGNIFICANT CHANGE UP (ref 0.2–1.2)
BUN SERPL-MCNC: 23 MG/DL — SIGNIFICANT CHANGE UP (ref 7–23)
CALCIUM SERPL-MCNC: 9.8 MG/DL — SIGNIFICANT CHANGE UP (ref 8.4–10.5)
CHLORIDE SERPL-SCNC: 101 MMOL/L — SIGNIFICANT CHANGE UP (ref 96–108)
CO2 SERPL-SCNC: 26 MMOL/L — SIGNIFICANT CHANGE UP (ref 22–31)
CREAT SERPL-MCNC: 0.73 MG/DL — SIGNIFICANT CHANGE UP (ref 0.5–1.3)
EOSINOPHIL # BLD AUTO: 0.07 K/UL — SIGNIFICANT CHANGE UP (ref 0–0.5)
EOSINOPHIL NFR BLD AUTO: 0.9 % — SIGNIFICANT CHANGE UP (ref 0–6)
GLUCOSE SERPL-MCNC: 128 MG/DL — HIGH (ref 70–99)
HCT VFR BLD CALC: 40.2 % — SIGNIFICANT CHANGE UP (ref 34.5–45)
HGB BLD-MCNC: 12.6 G/DL — SIGNIFICANT CHANGE UP (ref 11.5–15.5)
IMM GRANULOCYTES NFR BLD AUTO: 0.6 % — SIGNIFICANT CHANGE UP (ref 0–1.5)
LYMPHOCYTES # BLD AUTO: 0.99 K/UL — LOW (ref 1–3.3)
LYMPHOCYTES # BLD AUTO: 12.1 % — LOW (ref 13–44)
MAGNESIUM SERPL-MCNC: 2 MG/DL — SIGNIFICANT CHANGE UP (ref 1.6–2.6)
MCHC RBC-ENTMCNC: 28.4 PG — SIGNIFICANT CHANGE UP (ref 27–34)
MCHC RBC-ENTMCNC: 31.3 GM/DL — LOW (ref 32–36)
MCV RBC AUTO: 90.7 FL — SIGNIFICANT CHANGE UP (ref 80–100)
MONOCYTES # BLD AUTO: 0.82 K/UL — SIGNIFICANT CHANGE UP (ref 0–0.9)
MONOCYTES NFR BLD AUTO: 10.1 % — SIGNIFICANT CHANGE UP (ref 2–14)
NEUTROPHILS # BLD AUTO: 6.17 K/UL — SIGNIFICANT CHANGE UP (ref 1.8–7.4)
NEUTROPHILS NFR BLD AUTO: 75.7 % — SIGNIFICANT CHANGE UP (ref 43–77)
NRBC # BLD: 0 /100 WBCS — SIGNIFICANT CHANGE UP (ref 0–0)
NT-PROBNP SERPL-SCNC: 394 PG/ML — HIGH (ref 0–300)
PLATELET # BLD AUTO: 305 K/UL — SIGNIFICANT CHANGE UP (ref 150–400)
POTASSIUM SERPL-MCNC: 3.9 MMOL/L — SIGNIFICANT CHANGE UP (ref 3.5–5.3)
POTASSIUM SERPL-SCNC: 3.9 MMOL/L — SIGNIFICANT CHANGE UP (ref 3.5–5.3)
PROCALCITONIN SERPL-MCNC: 0.04 NG/ML — SIGNIFICANT CHANGE UP (ref 0.02–0.1)
PROT SERPL-MCNC: 7.2 G/DL — SIGNIFICANT CHANGE UP (ref 6–8.3)
RBC # BLD: 4.43 M/UL — SIGNIFICANT CHANGE UP (ref 3.8–5.2)
RBC # FLD: 13.9 % — SIGNIFICANT CHANGE UP (ref 10.3–14.5)
SARS-COV-2 RNA SPEC QL NAA+PROBE: SIGNIFICANT CHANGE UP
SODIUM SERPL-SCNC: 143 MMOL/L — SIGNIFICANT CHANGE UP (ref 135–145)
TROPONIN T, HIGH SENSITIVITY RESULT: 23 NG/L — SIGNIFICANT CHANGE UP (ref 0–51)
TSH SERPL-MCNC: 1.24 UIU/ML — SIGNIFICANT CHANGE UP (ref 0.27–4.2)
WBC # BLD: 8.15 K/UL — SIGNIFICANT CHANGE UP (ref 3.8–10.5)
WBC # FLD AUTO: 8.15 K/UL — SIGNIFICANT CHANGE UP (ref 3.8–10.5)

## 2021-11-02 PROCEDURE — 99285 EMERGENCY DEPT VISIT HI MDM: CPT

## 2021-11-02 PROCEDURE — 99222 1ST HOSP IP/OBS MODERATE 55: CPT

## 2021-11-02 PROCEDURE — 99284 EMERGENCY DEPT VISIT MOD MDM: CPT

## 2021-11-02 PROCEDURE — 93010 ELECTROCARDIOGRAM REPORT: CPT

## 2021-11-02 RX ORDER — LANOLIN ALCOHOL/MO/W.PET/CERES
3 CREAM (GRAM) TOPICAL AT BEDTIME
Refills: 0 | Status: DISCONTINUED | OUTPATIENT
Start: 2021-11-02 | End: 2021-11-04

## 2021-11-02 RX ORDER — SENNA PLUS 8.6 MG/1
2 TABLET ORAL AT BEDTIME
Refills: 0 | Status: DISCONTINUED | OUTPATIENT
Start: 2021-11-02 | End: 2021-11-04

## 2021-11-02 RX ORDER — ASPIRIN/CALCIUM CARB/MAGNESIUM 324 MG
1 TABLET ORAL
Qty: 0 | Refills: 0 | DISCHARGE

## 2021-11-02 RX ORDER — ALPRAZOLAM 0.25 MG
0.25 TABLET ORAL
Refills: 0 | Status: DISCONTINUED | OUTPATIENT
Start: 2021-11-02 | End: 2021-11-03

## 2021-11-02 RX ORDER — ASPIRIN/CALCIUM CARB/MAGNESIUM 324 MG
81 TABLET ORAL DAILY
Refills: 0 | Status: DISCONTINUED | OUTPATIENT
Start: 2021-11-02 | End: 2021-11-04

## 2021-11-02 RX ORDER — ACETAMINOPHEN 500 MG
975 TABLET ORAL ONCE
Refills: 0 | Status: COMPLETED | OUTPATIENT
Start: 2021-11-02 | End: 2021-11-02

## 2021-11-02 RX ORDER — ASPIRIN/CALCIUM CARB/MAGNESIUM 324 MG
650 TABLET ORAL ONCE
Refills: 0 | Status: COMPLETED | OUTPATIENT
Start: 2021-11-02 | End: 2021-11-02

## 2021-11-02 RX ORDER — TRAMADOL HYDROCHLORIDE 50 MG/1
25 TABLET ORAL
Refills: 0 | Status: DISCONTINUED | OUTPATIENT
Start: 2021-11-02 | End: 2021-11-03

## 2021-11-02 RX ORDER — LIDOCAINE 4 G/100G
1 CREAM TOPICAL ONCE
Refills: 0 | Status: DISCONTINUED | OUTPATIENT
Start: 2021-11-02 | End: 2021-11-02

## 2021-11-02 RX ORDER — POLYETHYLENE GLYCOL 3350 17 G/17G
17 POWDER, FOR SOLUTION ORAL AT BEDTIME
Refills: 0 | Status: DISCONTINUED | OUTPATIENT
Start: 2021-11-02 | End: 2021-11-04

## 2021-11-02 RX ADMIN — Medication 650 MILLIGRAM(S): at 10:31

## 2021-11-02 RX ADMIN — SENNA PLUS 2 TABLET(S): 8.6 TABLET ORAL at 21:23

## 2021-11-02 NOTE — ED PROVIDER NOTE - CARE PLAN
1 Principal Discharge DX:	Repetitive strain injury of mid back, initial encounter  Secondary Diagnosis:	Gait instability

## 2021-11-02 NOTE — BEHAVIORAL HEALTH ASSESSMENT NOTE - NSBHCHARTREVIEWLAB_PSY_A_CORE FT
12.6   8.15  )-----------( 305      ( 02 Nov 2021 10:40 )             40.2     11-02    143  |  101  |  23  ----------------------------<  128<H>  3.9   |  26  |  0.73    Ca    9.8      02 Nov 2021 10:40  Mg     2.0     11-02    TPro  7.2  /  Alb  4.0  /  TBili  0.9  /  DBili  x   /  AST  12  /  ALT  12  /  AlkPhos  96  11-02

## 2021-11-02 NOTE — PHYSICAL THERAPY INITIAL EVALUATION ADULT - PRECAUTIONS/LIMITATIONS, REHAB EVAL
CT T: Anterior wedge-shaped compression deformity of T8 vertebral body (50% height loss), age indeterminate but possibly acute to subacute in nature. Minimal 2 mm retropulsion, without significant spinal canal stenosis. Consider further evaluation with MRI if there is high clinical suspicion for acute traumatic ligamentous injury. Focal prominence of prevertebral soft tissues at T8 (up to 1.0 cm), may be due to exaggerated thoracic kyphosis. However, small prevertebral/hemorrhage cannot be entirely excluded. Consider MRI for further evaluation as clinically warranted. Compression deformity of inferior L3 endplate (50% height loss), chronic in nature given stability since 5/31/2020.

## 2021-11-02 NOTE — H&P ADULT - NSHPPHYSICALEXAM_GEN_ALL_CORE
PHYSICAL EXAMINATION:  Vital Signs Last 24 Hrs  T(C): 36.7 (02 Nov 2021 10:29), Max: 36.9 (01 Nov 2021 12:30)  T(F): 98 (02 Nov 2021 10:29), Max: 98.4 (01 Nov 2021 12:30)  HR: 90 (02 Nov 2021 10:29) (86 - 93)  BP: 125/90 (02 Nov 2021 10:29) (122/70 - 140/85)  BP(mean): --  RR: 18 (02 Nov 2021 10:29) (15 - 20)  SpO2: 97% (02 Nov 2021 10:29) (95% - 97%)  CAPILLARY BLOOD GLUCOSE            GENERAL: NAD, well-groomed,  HEAD:  atraumatic, normocephalic  EYES: sclera anicteric  ENMT: mucous membranes moist  NECK: supple, No JVD  CHEST/LUNG: clear to auscultation bilaterally;    no      rales   ,   no rhonchi,   HEART: normal S1, S2  ABDOMEN: BS+, soft, ND, NT   EXTREMITIES:    no    edema    b/l LEs  NEURO: awake, ,     moves all extremities  SKIN: no     rash  no leg  weakness

## 2021-11-02 NOTE — ED ADULT NURSE NOTE - OBJECTIVE STATEMENT
85 yo F PMH asthma, CVA, vertebral fx, anxiety, comes in from home for worsening back pain since last week. Seen multiple times for same complaint since. Denies fevers, trauma, weight loss, loss of bowel bladder incontinence, IVDU, loss of sensation to extremities. SNIGH x 4. Ambulating with walker with steady coordinated gait in PINK area. Denies CP, SOB, n/v/d, fevers, chills, abdominal pain, urinary symptoms, numbness, tingling in upper and lower extremities, HA, blurry vision. VSS updated on plan of care.

## 2021-11-02 NOTE — PATIENT PROFILE ADULT - NSPROEXTENSIONSOFSELF_GEN_A_NUR
walker clothing, black handbag, black wallet, black shoes, 2 medical alert necklaces, 3 rings, key bunch/eyeglasses/walker

## 2021-11-02 NOTE — PHYSICAL THERAPY INITIAL EVALUATION ADULT - IMPAIRMENTS CONTRIBUTING TO GAIT DEVIATIONS, PT EVAL
How Severe Is Your Skin Lesion?: mild Has Your Skin Lesion Been Treated?: not been treated Is This A New Presentation, Or A Follow-Up?: Skin Lesions impaired balance/pain/decreased strength

## 2021-11-02 NOTE — PATIENT PROFILE ADULT - NSPROIMPLANTSMEDDEV_GEN_A_NUR
"Please watch this YouTube video. It's a presentation by a [facility] pain specialist about what opioids do in your brain after long term use. [email or web address]    The only medication that you should take ""as needed\"" is the oxycodone. SCHEDULE everything else.   " unable to assess pt is confused at times

## 2021-11-02 NOTE — H&P ADULT - ASSESSMENT
86  yr  f,   h/o  OA,  ?  cva. had  very   brief ?  leg  weakness, / resolved,   hepatic  cysts,  gallstones,     anxiety.  s/p  diverticulitis in 2020,  old  compression fx L 3,  diastolic  dysfunction   on  no  meds  at  home    seen  in er  twice, in past  week  ans  was   d/c.  most  recntly  dc.  from er  on 11/1  pt  was  in  her  dr's  office  yesterday  and  there  was  a  ?  afib,  given  her  palps  and  tachycardia      and  now  returns,  seeking  help at home  and  also  for  back  pain to ED   *   c/o acute on chronic moderate dull ache across mid back, worse on L side, worse w/ bending/twisting,  pt  is  unable to walk safely and has increased trouble getting around home., no recent falls,/ trauma   no numbness/focal weakness or additional loss of function, no incontinence/retention   CT  spine,  done on  11/1 by  er /  T  8  comp  fx.  with  ? h'ge  mri  spine  ortho  eval  pain meds  *, severe  baseline  anxiety,  hampering her daily  activities    psych  eval  dvt  ppx/  pas    await   ortho   eval, and , for   wt bearing, given ?  pre  vertebral  h'ge  on ct  scan        rad< from: CT Thoracic Spine No Cont (11.01.21 @ 15:06) >  IMPRESSION:  1.  Anterior wedge-shaped compression deformity of T8 vertebral body (50% height loss), age indeterminate but possibly acute to subacute in nature. Minimal 2 mm retropulsion, without significant spinal canal stenosis. Consider further evaluation with MRI if there is high clinical suspicion for acute traumatic ligamentous injury.  2.  Focal prominence of prevertebral soft tissues at T8 (up to 1.0 cm), may bedue to exaggerated thoracic kyphosis. However, small prevertebral/hemorrhage cannot be entirely excluded. Consider MRI for further evaluation as clinically warranted.  3.  Compression deformity of inferior L3 endplate (50% height loss), chronic in nature given stability since 5/31/2020.  Findings were discussed with Dr.Juliana Payne via telephone on 11/1/2021 at 4:41 PM with verbal read back  < end of copied text >     rad< from: CT Abdomen and Pelvis No Cont (05.31.20 @ 06:57) >  ADRENALS: Nonspecific adrenal gland thickening, left greater than right.  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  REPRODUCTIVE ORGANS: An approximately 3.2 cm probably benign cyst in the left ovary is grossly stable from 12/25/2017  BOWEL: No bowel obstruction. Normal appendix.  Pancolonic diverticulosis.  Very mild focal wall thickening in the distal descending colon with mild surrounding inflammatory changes compatible with acute diverticulitis.  PERITONEUM: No ascites.  VESSELS: Atherosclerotic calcification of the aortoiliac tree with heavy, circumferential involvement of the infrarenal abdominal aorta and common iliac arteries.  RETROPERITONEUM/LYMPH NODES: No lymphadenopathy.    ABDOMINAL WALL: Tiny fat-containing umbilical hernia.  BONES: Stable chronic compression fracture in the superior endplate of L3.  Mildthoracolumbar scoliosis.  Multilevel degenerative changes in the spine with moderate spinal canal stenosis at L2-L3, L3-L4 and L4-L5.  Severe right hip osteoarthrosis.  Mild left hip osteoporosis.  IMPRESSION:   Acute, uncomplicated diverticulitis of the distal descending colon.  Follow-up colonoscopy may be performed as clinically warranted to exclude an underlying chronic lesion.  < end of copied text >     ra< from: Transthoracic Echocardiogram w/ Doppler (09.02.08 @ 13:00) >  Conclusions:  1. Normal left ventricular function.  Mild diastolic  dysfunction (Stage I).  2. Normal right ventricular size and systolic function.  3. Minimal mitral regurgitation.  No obvious cardiac source of embolus was identified on this  transthoracic study.  If clinical suspicion is high,  consider INDIGO for further evaluation.  < end of copied text >     86  yr  f,   h/o  OA,  ?  cva. had  very   brief ?  leg  weakness, / resolved,   hepatic  cysts,  gallstones,     anxiety.  s/p  diverticulitis in 2020,  old  compression fx L 3,  diastolic  dysfunction   on  no  meds  at  home    seen  in er  twice, in past  week  ans  was   d/c.  most  recntly  dc.  from er  on 11/1  pt  was  in  her  dr's  office  yesterday  and  there  was  a  ?  afib,  given  her  palps  and  tachycardia      and  now  returns,  seeking  help at home  and  also  for  back  pain to ED   *   c/o acute on chronic moderate dull ache across mid back, worse on L side, worse w/ bending/twisting,  pt  is  unable to walk safely and has increased trouble getting around home., no recent falls,/ trauma   no numbness/focal weakness or additional loss of function, no incontinence/retention   CT  spine,  done on  11/1 by  er /  T  8  comp  fx.  with  ? h'ge  mri  spine  ortho  eval  pain meds  *  h/o  palps/  tachycardia   was   seen by meghan mckinnon/  here  meghan wilcox is covering  *, severe  baseline  anxiety,  hampering her daily  activities    psych  eval  dvt  ppx/  pas    await   ortho   eval, and , for   wt bearing, given ?  pre  vertebral  h'ge  on ct  scan        rad< from: CT Thoracic Spine No Cont (11.01.21 @ 15:06) >  IMPRESSION:  1.  Anterior wedge-shaped compression deformity of T8 vertebral body (50% height loss), age indeterminate but possibly acute to subacute in nature. Minimal 2 mm retropulsion, without significant spinal canal stenosis. Consider further evaluation with MRI if there is high clinical suspicion for acute traumatic ligamentous injury.  2.  Focal prominence of prevertebral soft tissues at T8 (up to 1.0 cm), may bedue to exaggerated thoracic kyphosis. However, small prevertebral/hemorrhage cannot be entirely excluded. Consider MRI for further evaluation as clinically warranted.  3.  Compression deformity of inferior L3 endplate (50% height loss), chronic in nature given stability since 5/31/2020.  Findings were discussed with Dr.Juliana Payne via telephone on 11/1/2021 at 4:41 PM with verbal read back  < end of copied text >     rad< from: CT Abdomen and Pelvis No Cont (05.31.20 @ 06:57) >  ADRENALS: Nonspecific adrenal gland thickening, left greater than right.  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  REPRODUCTIVE ORGANS: An approximately 3.2 cm probably benign cyst in the left ovary is grossly stable from 12/25/2017  BOWEL: No bowel obstruction. Normal appendix.  Pancolonic diverticulosis.  Very mild focal wall thickening in the distal descending colon with mild surrounding inflammatory changes compatible with acute diverticulitis.  PERITONEUM: No ascites.  VESSELS: Atherosclerotic calcification of the aortoiliac tree with heavy, circumferential involvement of the infrarenal abdominal aorta and common iliac arteries.  RETROPERITONEUM/LYMPH NODES: No lymphadenopathy.    ABDOMINAL WALL: Tiny fat-containing umbilical hernia.  BONES: Stable chronic compression fracture in the superior endplate of L3.  Mildthoracolumbar scoliosis.  Multilevel degenerative changes in the spine with moderate spinal canal stenosis at L2-L3, L3-L4 and L4-L5.  Severe right hip osteoarthrosis.  Mild left hip osteoporosis.  IMPRESSION:   Acute, uncomplicated diverticulitis of the distal descending colon.  Follow-up colonoscopy may be performed as clinically warranted to exclude an underlying chronic lesion.  < end of copied text >     ra< from: Transthoracic Echocardiogram w/ Doppler (09.02.08 @ 13:00) >  Conclusions:  1. Normal left ventricular function.  Mild diastolic  dysfunction (Stage I).  2. Normal right ventricular size and systolic function.  3. Minimal mitral regurgitation.  No obvious cardiac source of embolus was identified on this  transthoracic study.  If clinical suspicion is high,  consider INDIGO for further evaluation.  < end of copied text >

## 2021-11-02 NOTE — CONSULT NOTE ADULT - ASSESSMENT
BOOMTREMAINE REYNA  86F hx vertebral fx     HPI:  87 yo F hx Asthma, CVA, vertebral fracture, anxiety, p/w back pain and diff ambulating. Pt has been here previously for similar symptoms. Denies recent heavy lifting, fall, trauma, instrumentation to back, fever, stiff neck, weakness of LE or /GI changes. Pt ambulatory and walks around room with out assistance. Seen in ED 2x past week and was d/c most recently on 11/1. C/o acute on chronic moderate dull ache across mid back, worse on L side, worse w/ bending/twisting, pt describes, as  being unable to walk safely and has increased trouble getting around home. No recent falls,/ trauma,  no numbness/focal weakness or additional loss of function, no incontinence/retention, no chest pain/discomfort or sob/dyspnea or palpitations, pt very anxious at baseline.    Imaging:  CT T: Anterior wedge-shaped compression deformity of T8 vertebral body (50% height loss), age indeterminate but possibly acute to subacute in nature. Minimal 2 mm retropulsion, without significant spinal canal stenosis. Consider further evaluation with MRI if there is high clinical suspicion for acute traumatic ligamentous injury. Focal prominence of prevertebral soft tissues at T8 (up to 1.0 cm), may be due to exaggerated thoracic kyphosis. However, small prevertebral/hemorrhage cannot be entirely excluded. Consider MRI for further evaluation as clinically warranted. Compression deformity of inferior L3 endplate (50% height loss), chronic in nature given stability since 5/31/2020.    Exam:  AAOx3, PERRLA, EOMI, no drift, BUE 5/5, LLE 5/5, RLE prox 3/5, dist 4+/5, SILT BOOM, TREMAINE  86F hx vertebral fx p/w back pain and diff ambulating. Adm med for PT/OT for gait issues. Mult ED visits for similar sx. Denies recent trauma. Per ED, walking around ED w/ walker. C/o chronic mod dull mech mid back pain L>R. Has allergy to IV contrast (burning, tingling). CT: T8 ant wedge comp fx w/ 1cm prevert soft tissue swelling; inf L3 endplate fx Exam: AAOx3, BUE 5/5, LLE 5/5, RLE prox 3/5 (R hip fx), dist 4+/5, SILT.  - No acute neurosurgical intervention  - Pain control per primary  - PT/OT  - TLSO brace for comfort (can call Edmar Kwon [phone] for fitting prn)  - f/u outpatient w/ Dr. Flores in 1-2w

## 2021-11-02 NOTE — ED PROVIDER NOTE - ATTENDING CONTRIBUTION TO CARE
------------ATTENDING NOTE------------  pt returning to ED c/o acute on chronic moderate dull ache across mid back, worse on L side, worse w/ bending/twisting, pt describes now being unable to walk safely and has increased trouble getting around home, no recent falls, no numbness/focal weakness or additional loss of function, no incontinence/retention but does describes increased urinary frequency, no chest pain/discomfort or sob/dyspnea or palpitations, pt very anxious at baseline, awaiting labs/imaging and PT consult for planned admission to optimize medical mgmt and outpt needs assessments -->  - Kaz Eckert MD   ----------------------------------------------

## 2021-11-02 NOTE — ED PROVIDER NOTE - OBJECTIVE STATEMENT
This is a 86 year old female with PMH: anxiety, asthma, diverticulosis, CVA, who p/w acute, sharp, mid/upper back pain, 10/10 that started 1 week ago. Pt. presented to ED yesterday with same complaints and had a CT spine and was diagnosed w/ a stable T8 vertebral body fx and was d/c'd home per pt. request. Pt. denies trauma, falls, CP, SOB, N/V, dizziness/lightheadedness. Upon initial exam, pt. appears anxious and uncomfortable but in NAD and HD stable.     - Jacqueline Guzman, [employer] student

## 2021-11-02 NOTE — PHYSICAL THERAPY INITIAL EVALUATION ADULT - PERTINENT HX OF CURRENT PROBLEM, REHAB EVAL
85 yo F hx Asthma, CVA, vertebral fracture, anxiety, p/w back pain and diff ambulating. Pt has been here previously for similar symptoms. Denies recent heavy lifting, fall, trauma, instrumentation to back, fever, stiff neck, weakness of LE or /GI changes. C/o acute on chronic moderate dull ache across mid back, worse on L side, worse w/ bending/twisting. Pt describes as being unable to walk safely and has increased trouble getting around home. Per Neurosurgery, fx stable - TLSO for comfort.

## 2021-11-02 NOTE — BEHAVIORAL HEALTH ASSESSMENT NOTE - NSBHCHARTREVIEWIMAGING_PSY_A_CORE FT
< from: CT Head No Cont (10.29.20 @ 08:21) >      EXAM:  CT BRAIN                            PROCEDURE DATE:  10/29/2020            INTERPRETATION:  INDICATIONS:  Dizziness.    TECHNIQUE:  Serial axial images were obtained from the skull base to the vertex without intravenous contrast. Coronal andsagittal reformatted images were obtained.    COMPARISON EXAMINATION: 3/1/2016    FINDINGS:  VENTRICLES AND SULCI:  Prominent in size compatible with age-appropriate volume loss  INTRA-AXIAL:  Areas of white matter hypodensity are seen within the cerebral hemispheres bilaterally compatible with moderate microvascular-type changes. No mass, blood or abnormal attenuation is otherwise seen. A prominent focus of lucency is seen within the left internal capsule anterior limb which may be microvascularor may represent a lacune, stable. No mass effect or hemorrhage.  EXTRA-AXIAL:  No mass or collection is seen.  VISUALIZED SINUSES:  Clear.  VISUALIZED MASTOIDS:  Clear.  CALVARIUM:  Normal.  MISCELLANEOUS:  Right intraocular lens implant    IMPRESSION:  Stable exam.    No mass effect, hemorrhage or evidence of acute intracranial pathology.    < end of copied text >

## 2021-11-02 NOTE — BEHAVIORAL HEALTH ASSESSMENT NOTE - NSBHCHARTREVIEWINVESTIGATE_PSY_A_CORE FT
< from: 12 Lead ECG (10.30.21 @ 18:43) >      Ventricular Rate 76 BPM    Atrial Rate 76 BPM    P-R Interval 140 ms    QRS Duration 56 ms    Q-T Interval 378 ms    QTC Calculation(Bazett) 425 ms    P Axis 96 degrees    R Axis -41 degrees    T Axis 37 degrees    Diagnosis Line NORMAL SINUS RHYTHM  LEFT AXIS DEVIATION  INFERIOR INFARCT , AGE UNDETERMINED  ABNORMAL ECG  WHEN COMPARED WITH ECG OF 12-OCT-2021 21:59,  SIGNIFICANT CHANGES HAVE OCCURRED  PAC npt seen  Confirmed by NIDIA DICKERSON, GALA ([id number]) on 11/1/2021 9:50:41 AM    < end of copied text >

## 2021-11-02 NOTE — BEHAVIORAL HEALTH ASSESSMENT NOTE - HPI (INCLUDE ILLNESS QUALITY, SEVERITY, DURATION, TIMING, CONTEXT, MODIFYING FACTORS, ASSOCIATED SIGNS AND SYMPTOMS)
86F unmarried, retired, domiciled with her disabled daughter, with no formal PPHx, no prior SA or psych admissions, no active substance abuse, with PMH significant for asthma, diverticulosis, CVA presenting with worsening back pain, psychiatry consulted to evaluate for anxiety.    On interivew pt is found in ED hallway in a stretcher, talkative, irritable but cooperative, not appearing in distress. Patient expresses frustration with being in the ER, states she is not getting "real food", is uncomfortable and needs to make phone calls.  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 Pt states that she is exhausted and has questioned the universe, "why is it all on me".  Denies SIIP/HIIP, AVH, or paranoia, denies substance abuse, denies anhedonia.  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 She enjoys reading books for pleasure, does crossword puzzles regularly.  Pt reports that she does not have as much of a social life as her friends passed away from cancers many years ago. Patient reports loss of appetite, insomnia, loss of energy all due to the back pain.  States she is not interested in medications because she is a strong and independent person who is used to doing things on her own.

## 2021-11-02 NOTE — BEHAVIORAL HEALTH ASSESSMENT NOTE - NSBHCONSULTMEDS_PSY_A_CORE FT
pt declining psychiatric medications at this time    can consider trial of Remeron if pt becomes amenable    PRN: Melatonin 3mg PO qHS PRN insomnia    OP psych f/u at [facility]- [phone]  [facility]- [phone]

## 2021-11-02 NOTE — CONSULT NOTE ADULT - ASSESSMENT
86  yr  f  h/o  OA,  ?  cva. had  very   brief ?  leg  weakness, / resolved,   anxiety.  s/p  diverticulitis  gew  trs  ago   on  no  meds  at  home seen  in er  twice, in past  week  ans  was   d/c.  most  recently  dc.  from er  on 11/1    , and  now  returns,  seeking  help at home  and  also  for  back  pain to ED     c/o acute on chronic moderate dull ache across mid back, worse on L side, worse w/ bending/twisting,     pt describes  , as  being unable to walk safely and has increased trouble getting around home     , no recent falls,/  trauma  no numbness/focal weakness or additional loss of function, no incontinence/retention      no chest pain/discomfort or sob/dyspnea .    , pt very anxious at baseline,  pt with long hx of palpitation and tachycardia and follows with Dr Andrews  pt with hx of cva, no hx of a.fib, but ecg with old inferior infarct, and diastolic dysfunction  will discuss with Dr andrews  continue asa daily  dvt prophylaxis  tsh  echo if mnot done recently

## 2021-11-02 NOTE — CONSULT NOTE ADULT - SUBJECTIVE AND OBJECTIVE BOX
CHIEF COMPLAINT:Patient is a 86y old  Female who presents with a chief complaint of needs  assistance  for  adl's/  back pain.      HPI:  86  yr  f  h/o  OA,  ?  cva. had  very   brief ?  leg  weakness, / resolved,   anxiety.  s/p  diverticulitis  gew  trs  ago   on  no  meds  at  home seen  in er  twice, in past  week  ans  was   d/c.  most  recently  dc.  from er  on 11/1    , and  now  returns,  seeking  help at home  and  also  for  back  pain to ED  c/o acute on chronic moderate dull ache across mid back, worse on L side, worse w/ bending/twisting,     pt describes  , as  being unable to walk safely and has increased trouble getting around home     , no recent falls,/  trauma  no numbness/focal weakness or additional loss of function, no incontinence/retention      no chest pain/discomfort or sob/dyspnea or palpitations     pt very anxious at baseline,      PAST MEDICAL & SURGICAL HISTORY:  Anxiety    CVA (Cerebral Infarction)    Diverticulosis of intestine    GERD (gastroesophageal reflux disease)    Asthma    Cerebrovascular accident (CVA), unspecified mechanism    Vertebral fracture, osteoporotic    S/P Tonsillectomy    History of tonsillectomy        MEDICATIONS  (STANDING):  ALPRAZolam 0.25 milliGRAM(s) Oral two times a day  aspirin  chewable 81 milliGRAM(s) Oral daily  LORazepam     Tablet 1 milliGRAM(s) Oral once  senna 2 Tablet(s) Oral at bedtime    MEDICATIONS  (PRN):  melatonin 3 milliGRAM(s) Oral at bedtime PRN Insomnia  traMADol 25 milliGRAM(s) Oral two times a day PRN Moderate Pain (4 - 6)      FAMILY HISTORY:  Family history of amyotrophic lateral sclerosis    Family history of multiple sclerosis (Child)        SOCIAL HISTORY:    [x ] Non-smoker  [ ] Smoker  [ ] Alcohol    Allergies    iodine (Unknown)  IV Contrast (Unknown)  IV DYE- burning, head tingling (Unknown)  novocaine (Unknown)    Intolerances    	    REVIEW OF SYSTEMS:  CONSTITUTIONAL: No fever, weight loss, or fatigue  EYES: No eye pain, visual disturbances, or discharge  ENT:  No difficulty hearing, tinnitus, vertigo; No sinus or throat pain  NECK: No pain or stiffness  RESPIRATORY: No cough, wheezing, chills or hemoptysis; No Shortness of Breath  CARDIOVASCULAR: No chest pain, palpitations, passing out, dizziness, or leg swelling  GASTROINTESTINAL: No abdominal or epigastric pain. No nausea, vomiting, or hematemesis; No diarrhea or constipation. No melena or hematochezia.  GENITOURINARY: No dysuria, frequency, hematuria, or incontinence  NEUROLOGICAL: No headaches, memory loss, loss of strength, numbness, or tremors  SKIN: No itching, burning, rashes, or lesions   LYMPH Nodes: No enlarged glands  ENDOCRINE: No heat or cold intolerance; No hair loss  MUSCULOSKELETAL: No joint pain or swelling; No muscle, back, or extremity pain  PSYCHIATRIC: No depression, anxiety, mood swings, or difficulty sleeping  HEME/LYMPH: No easy bruising, or bleeding gums  ALLERGY AND IMMUNOLOGIC: No hives or eczema	    [ ] All others negative	  [ ] Unable to obtain    PHYSICAL EXAM:  T(C): 36.6 (11-02-21 @ 18:26), Max: 36.7 (11-02-21 @ 09:45)  HR: 78 (11-02-21 @ 18:26) (74 - 90)  BP: 141/76 (11-02-21 @ 18:26) (122/70 - 162/83)  RR: 17 (11-02-21 @ 18:26) (17 - 20)  SpO2: 93% (11-02-21 @ 18:26) (93% - 99%)  Wt(kg): --  I&O's Summary    02 Nov 2021 07:01  -  02 Nov 2021 19:41  --------------------------------------------------------  IN: 240 mL / OUT: 0 mL / NET: 240 mL        Appearance: Normal	  HEENT:   Normal oral mucosa, PERRL, EOMI	  Lymphatic: No lymphadenopathy  Cardiovascular: Normal S1 S2, No JVD, + murmurs, No edema  Respiratory: rhonchi  Psychiatry: A & O x 3, Mood & affect appropriate  Gastrointestinal:  Soft, Non-tender, + BS	  Skin: No rashes, No ecchymoses, No cyanosis	  Neurologic: Non-focal  Extremities: Normal range of motion, No clubbing, cyanosis or edema  Vascular: Peripheral pulses palpable 2+ bilaterally    TELEMETRY: 	    ECG:  	  RADIOLOGY:  OTHER: 	  	  LABS:	 	    CARDIAC MARKERS:                              12.6   8.15  )-----------( 305      ( 02 Nov 2021 10:40 )             40.2     11-02    143  |  101  |  23  ----------------------------<  128<H>  3.9   |  26  |  0.73    Ca    9.8      02 Nov 2021 10:40  Mg     2.0     11-02    TPro  7.2  /  Alb  4.0  /  TBili  0.9  /  DBili  x   /  AST  12  /  ALT  12  /  AlkPhos  96  11-02    proBNP: Serum Pro-Brain Natriuretic Peptide: 394 pg/mL (11-02 @ 10:40)    Lipid Profile:   HgA1c:   TSH: Thyroid Stimulating Hormone, Serum: 1.24 uIU/mL (11-02 @ 16:45)        PREVIOUS DIAGNOSTIC TESTING:    < from: 12 Lead ECG (10.30.21 @ 18:43) >  Diagnosis Line NORMAL SINUS RHYTHM  LEFT AXIS DEVIATION  INFERIOR INFARCT , AGE UNDETERMINED  ABNORMAL ECG  WHEN COMPARED WITH ECG OF 12-OCT-2021 21:59,  SIGNIFICANT CHANGES HAVE OCCURRED  PAC npt seen    < from: Transthoracic Echocardiogram w/ Doppler (09.02.08 @ 13:00) >  Mitral Valve: Mitral annular calcification, otherwise  normal mitral valve.  Aortic Valve/Aorta: Calcified trileaflet aortic valve with  normal opening.  Normal aortic root.  Left Atrium: Normal left atrium.  Left Ventricle: Normal left ventricular internal dimensions  and wall thicknesses.  Normal left ventricular function.  Mild diastolic  dysfunction (Stage I).  Right Heart: Normal right atrium.  Normal right ventricular size and systolic function.  Normal tricuspid and pulmonic valves.  Pericardium/Pleura: Normal pericardium with no pericardial  effusion.  Doppler: Minimal mitral regurgitation.  Minimal tricuspid regurgitation.  ------------------------------------------------------------------------  Conclusions:  1. Normal left ventricular function.  Mild diastolic  dysfunction (Stage I).  2. Normal right ventricular size and systolic function.  3. Minimal mitral regurgitation.  No obvious cardiac source of embolus was identified on this  transthoracic study.  If clinical suspicion is high,  consider INDIGO for further evaluation.    - No acute neurosurgical intervention  - Pain control per primary  - PT/OT  - TLSO brace for comfort (can call Edmar Kown [phone] for fitting prn)  -          
p (1480)     HPI:  87 yo F hx Asthma, CVA, vertebral fracture, anxiety, p/w back pain and diff ambulating. Pt has been here previously for similar symptoms. Denies recent heavy lifting, fall, trauma, instrumentation to back, fever, stiff neck, weakness of LE or /GI changes. Pt ambulatory and walks around room with out assistance. Seen in ED 2x past week and was d/c most recently on 11/1. C/o acute on chronic moderate dull ache across mid back, worse on L side, worse w/ bending/twisting, pt describes, as  being unable to walk safely and has increased trouble getting around home. No recent falls,/ trauma,  no numbness/focal weakness or additional loss of function, no incontinence/retention, no chest pain/discomfort or sob/dyspnea or palpitations, pt very anxious at baseline.    Imaging:  CT T: Anterior wedge-shaped compression deformity of T8 vertebral body (50% height loss), age indeterminate but possibly acute to subacute in nature. Minimal 2 mm retropulsion, without significant spinal canal stenosis. Consider further evaluation with MRI if there is high clinical suspicion for acute traumatic ligamentous injury. Focal prominence of prevertebral soft tissues at T8 (up to 1.0 cm), may be due to exaggerated thoracic kyphosis. However, small prevertebral/hemorrhage cannot be entirely excluded. Consider MRI for further evaluation as clinically warranted. Compression deformity of inferior L3 endplate (50% height loss), chronic in nature given stability since 5/31/2020.    Exam:  AAOx3, PERRLA, EOMI, no drift, BUE 5/5, LLE 5/5, RLE prox 3/5, dist 4+/5, SILT    --Anticoagulation:  aspirin  chewable 81 milliGRAM(s) Oral daily    =====================  PAST MEDICAL HISTORY   Anxiety    CVA (Cerebral Infarction)    Diverticulosis of intestine    GERD (gastroesophageal reflux disease)    Asthma    Cerebrovascular accident (CVA), unspecified mechanism    Vertebral fracture, osteoporotic      PAST SURGICAL HISTORY   S/P Tonsillectomy    No significant past surgical history    History of tonsillectomy      iodine (Unknown)  IV Contrast (Unknown)  IV DYE- burning, head tingling (Unknown)  novacaine (Unknown)      MEDICATIONS:  Antibiotics:    Neuro:  ALPRAZolam 0.25 milliGRAM(s) Oral two times a day  LORazepam     Tablet 1 milliGRAM(s) Oral once  traMADol 25 milliGRAM(s) Oral two times a day PRN    Other:  senna 2 Tablet(s) Oral at bedtime      SOCIAL HISTORY:   Occupation:   Marital Status:     FAMILY HISTORY:  No pertinent family history in first degree relatives    Family history of amyotrophic lateral sclerosis    Family history of multiple sclerosis (Child)        ROS: Negative except per HPI    LABS:                          12.6   8.15  )-----------( 305      ( 02 Nov 2021 10:40 )             40.2     11-02    143  |  101  |  23  ----------------------------<  128<H>  3.9   |  26  |  0.73    Ca    9.8      02 Nov 2021 10:40  Mg     2.0     11-02    TPro  7.2  /  Alb  4.0  /  TBili  0.9  /  DBili  x   /  AST  12  /  ALT  12  /  AlkPhos  96  11-02

## 2021-11-02 NOTE — PATIENT PROFILE ADULT - NSPROMEDSDISPOSITION_GEN_A_NUR
pt refused to have her metoprolol and inhaler taken from her. Pt instructed not to take medication and verbalized  understanding. Primary RN aware/bedside

## 2021-11-02 NOTE — H&P ADULT - HISTORY OF PRESENT ILLNESS
86  yr  f,   h/o  OA,  ?  cva. had  very   brief ?  leg  weakness, / resolved,   anxiety.  s/p  diverticulitis  gew  trs  ago   on  no  meds  at  home    seen  in er  twice, in past  week  ans  was   d/c.  most  recntly  dc.  from er  on 11/1    , and  now  returns,  seeking  help at home  and  also  for  back  pain to ED     c/o acute on chronic moderate dull ache across mid back, worse on L side, worse w/ bending/twisting,     pt describes  , as  being unable to walk safely and has increased trouble getting around home     , no recent falls,/  [place],  no numbness/focal weakness or additional loss of function, no incontinence/retention      no chest pain/discomfort or sob/dyspnea or palpitations    , pt very anxious at baseline,

## 2021-11-02 NOTE — BEHAVIORAL HEALTH ASSESSMENT NOTE - RISK ASSESSMENT
Low Acute Suicide Risk Risk factors: anxiety, health stressors, limited social supports     Protective factors: no current SIIP/HIIP, no h/o SA/SIB, no h/o psych admissions, no access to weapons, no active substance abuse, dependent child, domiciled, help-seeking behavior    Pt is at low acute risk of harm and does not meet criteria for psychiatric hospitalization at this time.

## 2021-11-02 NOTE — BEHAVIORAL HEALTH ASSESSMENT NOTE - NSBHCHARTREVIEWVS_PSY_A_CORE FT
Vital Signs Last 24 Hrs  T(C): 36.7 (02 Nov 2021 13:05), Max: 36.8 (01 Nov 2021 18:09)  T(F): 98.1 (02 Nov 2021 13:05), Max: 98.3 (01 Nov 2021 18:09)  HR: 76 (02 Nov 2021 13:58) (74 - 93)  BP: 158/79 (02 Nov 2021 13:58) (122/70 - 162/83)  BP(mean): --  RR: 18 (02 Nov 2021 13:05) (15 - 20)  SpO2: 98% (02 Nov 2021 13:58) (95% - 99%)

## 2021-11-02 NOTE — PHYSICAL THERAPY INITIAL EVALUATION ADULT - PLANNED THERAPY INTERVENTIONS, PT EVAL
LTG 1: Stairs - Pt will be independent with negotiation of 5 steps with unilateral handrail within 4 weeks./balance training/bed mobility training/gait training/transfer training

## 2021-11-03 ENCOUNTER — TRANSCRIPTION ENCOUNTER (OUTPATIENT)
Age: 86
End: 2021-11-03

## 2021-11-03 RX ORDER — METOPROLOL TARTRATE 50 MG
1 TABLET ORAL
Qty: 30 | Refills: 0
Start: 2021-11-03 | End: 2021-12-02

## 2021-11-03 RX ORDER — POLYETHYLENE GLYCOL 3350 17 G/17G
17 POWDER, FOR SOLUTION ORAL
Qty: 510 | Refills: 0
Start: 2021-11-03 | End: 2021-12-02

## 2021-11-03 RX ORDER — TRAMADOL HYDROCHLORIDE 50 MG/1
0.5 TABLET ORAL
Qty: 1.5 | Refills: 0
Start: 2021-11-03 | End: 2021-11-05

## 2021-11-03 RX ORDER — TRAMADOL HYDROCHLORIDE 50 MG/1
25 TABLET ORAL DAILY
Refills: 0 | Status: DISCONTINUED | OUTPATIENT
Start: 2021-11-03 | End: 2021-11-04

## 2021-11-03 RX ORDER — METOPROLOL TARTRATE 50 MG
25 TABLET ORAL DAILY
Refills: 0 | Status: DISCONTINUED | OUTPATIENT
Start: 2021-11-03 | End: 2021-11-04

## 2021-11-03 RX ORDER — SENNA PLUS 8.6 MG/1
2 TABLET ORAL
Qty: 60 | Refills: 0
Start: 2021-11-03 | End: 2021-12-02

## 2021-11-03 RX ADMIN — Medication 81 MILLIGRAM(S): at 11:45

## 2021-11-03 RX ADMIN — Medication 25 MILLIGRAM(S): at 08:41

## 2021-11-03 NOTE — DISCHARGE NOTE PROVIDER - NSDCFUADDAPPT_GEN_ALL_CORE_FT
You must follow up with your primary medical doctor, Dr. Mera, within 1-2 days of discharge - please call to make an appointment.    You must follow up with your Neurosurgeon, Dr. Flores, within one week of discharge - please call to make an appointment.        APPTS ARE READY TO BE MADE: [ x] YES    Best Family or Patient Contact (if needed):    Additional Information about above appointments (if needed):    1:   2:   3:     Other comments or requests:        You must follow up with your primary medical doctor, Dr. Mera, within 1-2 days of discharge - please call to make an appointment.    You must follow up with your Neurosurgeon, Dr. Flores, within one week of discharge - please call to make an appointment.    Unable to Reach  / Leave Message 	  3 attempts were made to reach patient, which have been unsuccessful. Unable to leave voicemail on (insert 3 dates). Will await call back from patient to coordinate follow up care.	    APPTS ARE READY TO BE MADE: [ x] YES    Best Family or Patient Contact (if needed):    Additional Information about above appointments (if needed):    1:   2:   3:     Other comments or requests:

## 2021-11-03 NOTE — PROGRESS NOTE ADULT - SUBJECTIVE AND OBJECTIVE BOX
CARDIOLOGY     PROGRESS  NOTE   ________________________________________________    CHIEF COMPLAINT:Patient is a 86y old  Female who presents with a chief complaint of needs  assistance  for  adl's/  back pain (02 Nov 2021 19:40)  doing better, c/o constipation/ palpitation.  	  REVIEW OF SYSTEMS:  CONSTITUTIONAL: No fever, weight loss, or fatigue  EYES: No eye pain, visual disturbances, or discharge  ENT:  No difficulty hearing, tinnitus, vertigo; No sinus or throat pain  NECK: No pain or stiffness  RESPIRATORY: No cough, wheezing, chills or hemoptysis; No Shortness of Breath  CARDIOVASCULAR: No chest pain, palpitations, passing out, dizziness, or leg swelling  GASTROINTESTINAL: No abdominal or epigastric pain. No nausea, vomiting, or hematemesis; No diarrhea or constipation. No melena or hematochezia.  GENITOURINARY: No dysuria, frequency, hematuria, or incontinence  NEUROLOGICAL: No headaches, memory loss, loss of strength, numbness, or tremors  SKIN: No itching, burning, rashes, or lesions   LYMPH Nodes: No enlarged glands  ENDOCRINE: No heat or cold intolerance; No hair loss  MUSCULOSKELETAL: No joint pain or swelling; No muscle, back, or extremity pain  PSYCHIATRIC: No depression, anxiety, mood swings, or difficulty sleeping  HEME/LYMPH: No easy bruising, or bleeding gums  ALLERGY AND IMMUNOLOGIC: No hives or eczema	    [ ] All others negative	  [ ] Unable to obtain    PHYSICAL EXAM:  T(C): 36.7 (11-03-21 @ 04:20), Max: 36.7 (11-02-21 @ 09:45)  HR: 85 (11-03-21 @ 04:20) (74 - 90)  BP: 153/84 (11-03-21 @ 04:20) (122/70 - 162/83)  RR: 18 (11-03-21 @ 04:20) (17 - 20)  SpO2: 95% (11-03-21 @ 04:20) (93% - 99%)  Wt(kg): --  I&O's Summary    02 Nov 2021 07:01  -  03 Nov 2021 07:00  --------------------------------------------------------  IN: 240 mL / OUT: 300 mL / NET: -60 mL        Appearance: Normal	  HEENT:   Normal oral mucosa, PERRL, EOMI	  Lymphatic: No lymphadenopathy  Cardiovascular: Normal S1 S2, No JVD, +murmurs, No edema  Respiratory: Lungs clear to auscultation	  Psychiatry: A & O x 3, Mood & affect appropriate  Gastrointestinal:  Soft, Non-tender, + BS	  Skin: No rashes, No ecchymoses, No cyanosis	  Neurologic: Non-focal  Extremities: Normal range of motion, No clubbing, cyanosis or edema  Vascular: Peripheral pulses palpable 2+ bilaterally    MEDICATIONS  (STANDING):  ALPRAZolam 0.25 milliGRAM(s) Oral two times a day  aspirin  chewable 81 milliGRAM(s) Oral daily  LORazepam     Tablet 1 milliGRAM(s) Oral once  polyethylene glycol 3350 17 Gram(s) Oral at bedtime  senna 2 Tablet(s) Oral at bedtime      TELEMETRY: 	    ECG:  	  RADIOLOGY:  OTHER: 	  	  LABS:	 	    CARDIAC MARKERS:                                12.6   8.15  )-----------( 305      ( 02 Nov 2021 10:40 )             40.2     11-02    143  |  101  |  23  ----------------------------<  128<H>  3.9   |  26  |  0.73    Ca    9.8      02 Nov 2021 10:40  Mg     2.0     11-02    TPro  7.2  /  Alb  4.0  /  TBili  0.9  /  DBili  x   /  AST  12  /  ALT  12  /  AlkPhos  96  11-02    proBNP: Serum Pro-Brain Natriuretic Peptide: 394 pg/mL (11-02 @ 10:40)    Lipid Profile:   HgA1c:   TSH: Thyroid Stimulating Hormone, Serum: 1.24 uIU/mL (11-02 @ 16:45)    < from: 12 Lead ECG (11.01.21 @ 15:54) >  Diagnosis Line NORMAL SINUS RHYTHM  LEFT AXIS DEVIATION  ABNORMAL ECG  WHEN COMPARED WITH ECG OF 30-OCT-2021 18:43,  NO SIGNIFICANT CHANGE WAS FOUND        Assessment and plan  ---------------------------  86  yr  f  h/o  OA,  ?  cva. had  very   brief ?  leg  weakness, / resolved,   anxiety.  s/p  diverticulitis  gew  trs  ago   on  no  meds  at  home seen  in er  twice, in past  week  ans  was   d/c.  most  recently  dc.  from er  on 11/1    , and  now  returns,  seeking  help at home  and  also  for  back  pain to ED     c/o acute on chronic moderate dull ache across mid back, worse on L side, worse w/ bending/twisting,     pt describes  , as  being unable to walk safely and has increased trouble getting around home     , no recent falls,/  trauma  no numbness/focal weakness or additional loss of function, no incontinence/retention      no chest pain/discomfort or sob/dyspnea .    , pt very anxious at baseline,  pt with long hx of palpitation and tachycardia and follows with Dr Andrews  pt with hx of cva, no hx of a.fib, but ecg with old inferior infarct, and diastolic dysfunction  will discuss with Dr andrews  continue asa daily  dvt prophylaxis  tsh noted  echo if not done recently  palpitation ?sec to anxiety  increase bp will add metoprolol er 25 mg daily  laxative has ordered last night

## 2021-11-03 NOTE — PROGRESS NOTE ADULT - SUBJECTIVE AND OBJECTIVE BOX
afbrile    REVIEW OF SYSTEMS:  GEN: no fever,    no chills  RESP: no SOB,   no cough  CVS: no chest pain,   no palpitations  GI: no abdominal pain,   no nausea,   no vomiting,   no constipation,   no diarrhea  : no dysuria,   no frequency  NEURO: no headache,   no dizziness  PSYCH: no depression,   not anxious  Derm : no rash    MEDICATIONS  (STANDING):  ALPRAZolam 0.25 milliGRAM(s) Oral two times a day  aspirin  chewable 81 milliGRAM(s) Oral daily  LORazepam     Tablet 1 milliGRAM(s) Oral once  polyethylene glycol 3350 17 Gram(s) Oral at bedtime  senna 2 Tablet(s) Oral at bedtime    MEDICATIONS  (PRN):  melatonin 3 milliGRAM(s) Oral at bedtime PRN Insomnia  traMADol 25 milliGRAM(s) Oral two times a day PRN Moderate Pain (4 - 6)      Vital Signs Last 24 Hrs  T(C): 36.7 (03 Nov 2021 04:20), Max: 36.7 (02 Nov 2021 09:45)  T(F): 98 (03 Nov 2021 04:20), Max: 98.1 (02 Nov 2021 13:05)  HR: 85 (03 Nov 2021 04:20) (74 - 90)  BP: 153/84 (03 Nov 2021 04:20) (122/70 - 162/83)  BP(mean): --  RR: 18 (03 Nov 2021 04:20) (17 - 20)  SpO2: 95% (03 Nov 2021 04:20) (93% - 99%)  CAPILLARY BLOOD GLUCOSE        I&O's Summary    02 Nov 2021 07:01  -  03 Nov 2021 07:00  --------------------------------------------------------  IN: 240 mL / OUT: 300 mL / NET: -60 mL        PHYSICAL EXAM:  HEAD:  Atraumatic, Normocephalic  NECK: Supple, No   JVD  CHEST/LUNG:   no     rales,     no,    rhonchi  HEART: Regular rate and rhythm;         murmur  ABDOMEN: Soft, Nontender, ;   EXTREMITIES:   no     edema  NEUROLOGY:  alert    LABS:                        12.6   8.15  )-----------( 305      ( 02 Nov 2021 10:40 )             40.2     11-02    143  |  101  |  23  ----------------------------<  128<H>  3.9   |  26  |  0.73    Ca    9.8      02 Nov 2021 10:40  Mg     2.0     11-02    TPro  7.2  /  Alb  4.0  /  TBili  0.9  /  DBili  x   /  AST  12  /  ALT  12  /  AlkPhos  96  11-02                    Thyroid Stimulating Hormone, Serum: 1.24 uIU/mL (11-02 @ 16:45)          Consultant(s) Notes Reviewed:      Care Discussed with Consultants/Other Providers:       afbrile  pt  dressed  up, very  talkative,  wants to  go home now    REVIEW OF SYSTEMS:  GEN: no fever,    no chills  RESP: no SOB,   no cough  CVS: no chest pain,   no palpitations  GI: no abdominal pain,   no nausea,   no vomiting,   no constipation,   no diarrhea  : no dysuria,   no frequency  NEURO: no headache,   no dizziness  PSYCH: no depression,   not anxious  Derm : no rash    MEDICATIONS  (STANDING):  ALPRAZolam 0.25 milliGRAM(s) Oral two times a day  aspirin  chewable 81 milliGRAM(s) Oral daily  LORazepam     Tablet 1 milliGRAM(s) Oral once  polyethylene glycol 3350 17 Gram(s) Oral at bedtime  senna 2 Tablet(s) Oral at bedtime    MEDICATIONS  (PRN):  melatonin 3 milliGRAM(s) Oral at bedtime PRN Insomnia  traMADol 25 milliGRAM(s) Oral two times a day PRN Moderate Pain (4 - 6)      Vital Signs Last 24 Hrs  T(C): 36.7 (03 Nov 2021 04:20), Max: 36.7 (02 Nov 2021 09:45)  T(F): 98 (03 Nov 2021 04:20), Max: 98.1 (02 Nov 2021 13:05)  HR: 85 (03 Nov 2021 04:20) (74 - 90)  BP: 153/84 (03 Nov 2021 04:20) (122/70 - 162/83)  BP(mean): --  RR: 18 (03 Nov 2021 04:20) (17 - 20)  SpO2: 95% (03 Nov 2021 04:20) (93% - 99%)  CAPILLARY BLOOD GLUCOSE        I&O's Summary    02 Nov 2021 07:01  -  03 Nov 2021 07:00  --------------------------------------------------------  IN: 240 mL / OUT: 300 mL / NET: -60 mL        PHYSICAL EXAM:  HEAD:  Atraumatic, Normocephalic  NECK: Supple, No   JVD  CHEST/LUNG:   no     rales,     no,    rhonchi  HEART: Regular rate and rhythm;         murmur  ABDOMEN: Soft, Nontender, ;   EXTREMITIES:   no     edema  NEUROLOGY:  alert    LABS:                        12.6   8.15  )-----------( 305      ( 02 Nov 2021 10:40 )             40.2     11-02    143  |  101  |  23  ----------------------------<  128<H>  3.9   |  26  |  0.73    Ca    9.8      02 Nov 2021 10:40  Mg     2.0     11-02    TPro  7.2  /  Alb  4.0  /  TBili  0.9  /  DBili  x   /  AST  12  /  ALT  12  /  AlkPhos  96  11-02                    Thyroid Stimulating Hormone, Serum: 1.24 uIU/mL (11-02 @ 16:45)          Consultant(s) Notes Reviewed:      Care Discussed with Consultants/Other Providers:

## 2021-11-03 NOTE — DISCHARGE NOTE PROVIDER - NSDCMRMEDTOKEN_GEN_ALL_CORE_FT
aspirin 81 mg oral tablet, chewable: 1 tab(s) orally once a day   aspirin 81 mg oral tablet, chewable: 1 tab(s) orally once a day  metoprolol succinate 25 mg oral tablet, extended release: 1 tab(s) orally once a day  polyethylene glycol 3350 oral powder for reconstitution: 17 gram(s) orally once a day (at bedtime), As Needed -for constipation   senna oral tablet: 2 tab(s) orally once a day (at bedtime)  TLSO Brace: Length of need - 99    Wt - 56.70kg  Ht - 154.9cm  traMADol 50 mg oral tablet: 0.5 tab(s) orally once a day, As needed, Moderate Pain (4 - 6) MDD:0.5 tab    PLEASE CUT IN HALF

## 2021-11-03 NOTE — DISCHARGE NOTE PROVIDER - CARE PROVIDER_API CALL
Emir Mera  FAMILY MEDICINE  [address], [place] [address]  Phone: [phone]  Fax: [phone]  Follow Up Time:    Emir Mera  [facility]  [address] [facility], [place] [address]  Phone: [phone]  Fax: [phone]  Follow Up Time:     Iris Flores)  Neurosurgery  [address] [facility] [facility], [address] [place] [id number]  Phone: [phone]  Fax: [phone]  Follow Up Time:

## 2021-11-03 NOTE — DISCHARGE NOTE PROVIDER - PROVIDER TOKENS
PROVIDER:[TOKEN:[[id number]]] PROVIDER:[TOKEN:[[id number]]],PROVIDER:[TOKEN:[[id number]]]

## 2021-11-03 NOTE — PROGRESS NOTE ADULT - ASSESSMENT
86  yr  f,   h/o  OA,  ?  cva. had  very   brief ?  leg  weakness, / resolved,   hepatic  cysts,  gallstones,     anxiety.  s/p  diverticulitis in 2020,  old  compression fx L 3,  diastolic  dysfunction   on  no  meds  at  home    seen  in er  twice, in past  week  ans  was   d/c.  most  recntly  dc.  from er  on 11/1  pt  was  in  her  dr's  office  yesterday  and  there  was  a  ?  afib,  given  her  palps  and  tachycardia      and  now  returns,  seeking  help at home  and  also  for  back  pain to ED   *   c/o acute on chronic moderate dull ache across mid back, worse on L side, worse w/ bending/twisting,  pt  is  unable to walk safely and has increased trouble getting around home., no recent falls,/ trauma   no numbness/focal weakness or additional loss of function, no incontinence/retention   CT  spine,  done on  11/1 by  er /  T  8  comp  fx.  with  ? h'ge  mri  spine  pain meds  *  h/o  palps/  tachycardia   was   seen by meghan mckinnon/  here  meghan wilcox is covering  *, severe  baseline  anxiety,  hampering her daily  activities  on xanax  bid    psych  eval  noted. pt refusing any meds  frpm psych  dvt  ppx/  pas   seen by spine/ N/S/  brace  PT   eval        rad< from: CT Thoracic Spine No Cont (11.01.21 @ 15:06) >  IMPRESSION:  1.  Anterior wedge-shaped compression deformity of T8 vertebral body (50% height loss), age indeterminate but possibly acute to subacute in nature. Minimal 2 mm retropulsion, without significant spinal canal stenosis. Consider further evaluation with MRI if there is high clinical suspicion for acute traumatic ligamentous injury.  2.  Focal prominence of prevertebral soft tissues at T8 (up to 1.0 cm), may bedue to exaggerated thoracic kyphosis. However, small prevertebral/hemorrhage cannot be entirely excluded. Consider MRI for further evaluation as clinically warranted.  3.  Compression deformity of inferior L3 endplate (50% height loss), chronic in nature given stability since 5/31/2020.  Findings were discussed with Dr.Juliana Payne via telephone on 11/1/2021 at 4:41 PM with verbal read back  < end of copied text >     rad< from: CT Abdomen and Pelvis No Cont (05.31.20 @ 06:57) >  ADRENALS: Nonspecific adrenal gland thickening, left greater than right.  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  REPRODUCTIVE ORGANS: An approximately 3.2 cm probably benign cyst in the left ovary is grossly stable from 12/25/2017  BOWEL: No bowel obstruction. Normal appendix.  Pancolonic diverticulosis.  Very mild focal wall thickening in the distal descending colon with mild surrounding inflammatory changes compatible with acute diverticulitis.  PERITONEUM: No ascites.  VESSELS: Atherosclerotic calcification of the aortoiliac tree with heavy, circumferential involvement of the infrarenal abdominal aorta and common iliac arteries.  RETROPERITONEUM/LYMPH NODES: No lymphadenopathy.    ABDOMINAL WALL: Tiny fat-containing umbilical hernia.  BONES: Stable chronic compression fracture in the superior endplate of L3.  Mildthoracolumbar scoliosis.  Multilevel degenerative changes in the spine with moderate spinal canal stenosis at L2-L3, L3-L4 and L4-L5.  Severe right hip osteoarthrosis.  Mild left hip osteoporosis.  IMPRESSION:   Acute, uncomplicated diverticulitis of the distal descending colon.  Follow-up colonoscopy may be performed as clinically warranted to exclude an underlying chronic lesion.  < end of copied text >     ra< from: Transthoracic Echocardiogram w/ Doppler (09.02.08 @ 13:00) >  Conclusions:  1. Normal left ventricular function.  Mild diastolic  dysfunction (Stage I).  2. Normal right ventricular size and systolic function.  3. Minimal mitral regurgitation.  No obvious cardiac source of embolus was identified on this  transthoracic study.  If clinical suspicion is high,  consider INDIGO for further evaluation.  < end of copied text >     86  yr  f,   h/o  OA,  ?  cva. had  very   brief ?  leg  weakness, / resolved,   hepatic  cysts,  gallstones,     anxiety.  s/p  diverticulitis in 2020,  old  compression fx L 3,  diastolic  dysfunction   on  no  meds  at  home    seen  in er  twice, in past  week  ans  was   d/c.  most  recntly  dc.  from er  on 11/1  pt  was  in  her  dr's  office  yesterday  and  there  was  a  ?  afib,  given  her  palps  and  tachycardia      and  now  returns,  seeking  help at home  and  also  for  back  pain to ED   *   c/o acute on chronic moderate dull ache across mid back, worse on L side, worse w/ bending/twisting,  pt  is  unable to walk safely and has increased trouble getting around home., no recent falls,/ trauma   no numbness/focal weakness or additional loss of function, no incontinence/retention   CT  spine,  done on  11/1 by  er /  T  8  comp  fx.  with  ? h'ge  mri  spine/ now  refused  by pt  pain meds  *  h/o  palps/  tachycardia   was   seen by meghan mckinnon/  here  meghan wilcox is covering  *, severe  baseline  anxiety,  hampering her daily  activities  on xanax  bid    psych  eval  noted. pt refusing any meds  frpm psych/  and ha s been cleraed by psych  per PT, need s rehab. pt is refusing/ and  also refusing mri spine  awiating brace  per  spine team   pt  wants to leave  now,   may do  so AMA.  as  she is  not    well  quipped  to  take  care of  herself.  and  thus far has  had  3  recent visits to  er  floor  Np  aware  dvt  ppx/  pas           rad< from: CT Thoracic Spine No Cont (11.01.21 @ 15:06) >  IMPRESSION:  1.  Anterior wedge-shaped compression deformity of T8 vertebral body (50% height loss), age indeterminate but possibly acute to subacute in nature. Minimal 2 mm retropulsion, without significant spinal canal stenosis. Consider further evaluation with MRI if there is high clinical suspicion for acute traumatic ligamentous injury.  2.  Focal prominence of prevertebral soft tissues at T8 (up to 1.0 cm), may bedue to exaggerated thoracic kyphosis. However, small prevertebral/hemorrhage cannot be entirely excluded. Consider MRI for further evaluation as clinically warranted.  3.  Compression deformity of inferior L3 endplate (50% height loss), chronic in nature given stability since 5/31/2020.  Findings were discussed with Dr.Juliana Payne via telephone on 11/1/2021 at 4:41 PM with verbal read back  < end of copied text >     rad< from: CT Abdomen and Pelvis No Cont (05.31.20 @ 06:57) >  ADRENALS: Nonspecific adrenal gland thickening, left greater than right.  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  REPRODUCTIVE ORGANS: An approximately 3.2 cm probably benign cyst in the left ovary is grossly stable from 12/25/2017  BOWEL: No bowel obstruction. Normal appendix.  Pancolonic diverticulosis.  Very mild focal wall thickening in the distal descending colon with mild surrounding inflammatory changes compatible with acute diverticulitis.  PERITONEUM: No ascites.  VESSELS: Atherosclerotic calcification of the aortoiliac tree with heavy, circumferential involvement of the infrarenal abdominal aorta and common iliac arteries.  RETROPERITONEUM/LYMPH NODES: No lymphadenopathy.    ABDOMINAL WALL: Tiny fat-containing umbilical hernia.  BONES: Stable chronic compression fracture in the superior endplate of L3.  Mildthoracolumbar scoliosis.  Multilevel degenerative changes in the spine with moderate spinal canal stenosis at L2-L3, L3-L4 and L4-L5.  Severe right hip osteoarthrosis.  Mild left hip osteoporosis.  IMPRESSION:   Acute, uncomplicated diverticulitis of the distal descending colon.  Follow-up colonoscopy may be performed as clinically warranted to exclude an underlying chronic lesion.  < end of copied text >     ra< from: Transthoracic Echocardiogram w/ Doppler (09.02.08 @ 13:00) >  Conclusions:  1. Normal left ventricular function.  Mild diastolic  dysfunction (Stage I).  2. Normal right ventricular size and systolic function.  3. Minimal mitral regurgitation.  No obvious cardiac source of embolus was identified on this  transthoracic study.  If clinical suspicion is high,  consider INDIGO for further evaluation.  < end of copied text >     86  yr  f,   h/o  OA,  ?  cva. had  very   brief ?  leg  weakness, / resolved,   hepatic  cysts,  gallstones,     anxiety.  s/p  diverticulitis in 2020,  old  compression fx L 3,  diastolic  dysfunction   on  no  meds  at  home    seen  in er  twice, in past  week  ans  was   d/c.  most  recntly  dc.  from er  on 11/1  pt  was  in  her  dr's  office  yesterday  and  there  was  a  ?  afib,  given  her  palps  and  tachycardia      and  now  returns,  seeking  help at home  and  also  for  back  pain to ED   *   c/o acute on chronic moderate dull ache across mid back, worse on L side, worse w/ bending/twisting,  pt  is  unable to walk safely and has increased trouble getting around home., no recent falls,/ trauma   no numbness/focal weakness or additional loss of function, no incontinence/retention   CT  spine,  done on  11/1 by  er /  T  8  comp  fx.  with  ? h'ge  mri  spine/ now  refused  by pt  pain meds  *  h/o  palps/  tachycardia   was   seen by meghan mckinnon/  here  meghan wilcox is covering  *, severe  baseline  anxiety,  hampering her daily  activities    psych  eval  noted. pt refusing any meds  frpm psych/  and ha s been cleraed by psych  per PT, need s rehab. pt is refusing/ and  also refusing mri spine  awiating brace  per  spine team   pt  wants to leave  now,   may do  so AMA.  as  she is  not    well  quipped  to  take  care of  herself.  and  thus far has  had  3  recent visits to  er  floor  Np  aware  dvt  ppx/  pas           rad< from: CT Thoracic Spine No Cont (11.01.21 @ 15:06) >  IMPRESSION:  1.  Anterior wedge-shaped compression deformity of T8 vertebral body (50% height loss), age indeterminate but possibly acute to subacute in nature. Minimal 2 mm retropulsion, without significant spinal canal stenosis. Consider further evaluation with MRI if there is high clinical suspicion for acute traumatic ligamentous injury.  2.  Focal prominence of prevertebral soft tissues at T8 (up to 1.0 cm), may bedue to exaggerated thoracic kyphosis. However, small prevertebral/hemorrhage cannot be entirely excluded. Consider MRI for further evaluation as clinically warranted.  3.  Compression deformity of inferior L3 endplate (50% height loss), chronic in nature given stability since 5/31/2020.  Findings were discussed with Dr.Juliana Payne via telephone on 11/1/2021 at 4:41 PM with verbal read back  < end of copied text >     rad< from: CT Abdomen and Pelvis No Cont (05.31.20 @ 06:57) >  ADRENALS: Nonspecific adrenal gland thickening, left greater than right.  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  REPRODUCTIVE ORGANS: An approximately 3.2 cm probably benign cyst in the left ovary is grossly stable from 12/25/2017  BOWEL: No bowel obstruction. Normal appendix.  Pancolonic diverticulosis.  Very mild focal wall thickening in the distal descending colon with mild surrounding inflammatory changes compatible with acute diverticulitis.  PERITONEUM: No ascites.  VESSELS: Atherosclerotic calcification of the aortoiliac tree with heavy, circumferential involvement of the infrarenal abdominal aorta and common iliac arteries.  RETROPERITONEUM/LYMPH NODES: No lymphadenopathy.    ABDOMINAL WALL: Tiny fat-containing umbilical hernia.  BONES: Stable chronic compression fracture in the superior endplate of L3.  Mildthoracolumbar scoliosis.  Multilevel degenerative changes in the spine with moderate spinal canal stenosis at L2-L3, L3-L4 and L4-L5.  Severe right hip osteoarthrosis.  Mild left hip osteoporosis.  IMPRESSION:   Acute, uncomplicated diverticulitis of the distal descending colon.  Follow-up colonoscopy may be performed as clinically warranted to exclude an underlying chronic lesion.  < end of copied text >     ra< from: Transthoracic Echocardiogram w/ Doppler (09.02.08 @ 13:00) >  Conclusions:  1. Normal left ventricular function.  Mild diastolic  dysfunction (Stage I).  2. Normal right ventricular size and systolic function.  3. Minimal mitral regurgitation.  No obvious cardiac source of embolus was identified on this  transthoracic study.  If clinical suspicion is high,  consider INDIGO for further evaluation.  < end of copied text >     86  yr  f,   h/o  OA,  ?  cva. had  very   brief ?  leg  weakness, / resolved,   hepatic  cysts,  gallstones,     anxiety.  s/p  diverticulitis in 2020,  old  compression fx L 3,  diastolic  dysfunction   on  no  meds  at  home    seen  in er  twice, in past  week  ans  was   d/c.  most  recntly  dc.  from er  on 11/1  pt  was  in  her  dr's  office  yesterday  and  there  was  a  ?  afib,  given  her  palps  and  tachycardia      and  now  returns,  seeking  help at home  and  also  for  back  pain to ED   *   c/o acute on chronic moderate dull ache across mid back, worse on L side, worse w/ bending/twisting,  pt  is  unable to walk safely and has increased trouble getting around home., no recent falls,/ trauma   no numbness/focal weakness or additional loss of function, no incontinence/retention   CT  spine,  done on  11/1 by  er /  T  8  comp  fx.  with  ? h'ge  mri  spine/ now  refused  by pt  pain meds/ tramadol prn  *  h/o  palps/  tachycardia   was   seen by meghan mckinnon/  here  meghan wilcox is covering  *, severe  baseline  anxiety,  hampering her daily  activities   Psych  eval  noted. pt refusing any meds  frpm psych/  and haS  been cleraed by psych  per PT, need s rehab. pt is refusing/ and  also refusing mri spine  awiating  brace  per  spine team     pt  wants to leave  now,   may do  so AMA.  as  she is  not    well  quipped  to  take  care of  herself.  and  thus far has  had  3  recent visits to  er and  her  pmd  on outside, [place]  had  grave  concerns  about her   living by  herself.  and  her  inability to  take care of  HER  adl;s  floor  Np  aware  dvt  ppx/  pas  called  daughter,    Kathryn, unable  to reach/  Pmd  made  aware        rad< from: CT Thoracic Spine No Cont (11.01.21 @ 15:06) >  IMPRESSION:  1.  Anterior wedge-shaped compression deformity of T8 vertebral body (50% height loss), age indeterminate but possibly acute to subacute in nature. Minimal 2 mm retropulsion, without significant spinal canal stenosis. Consider further evaluation with MRI if there is high clinical suspicion for acute traumatic ligamentous injury.  2.  Focal prominence of prevertebral soft tissues at T8 (up to 1.0 cm), may bedue to exaggerated thoracic kyphosis. However, small prevertebral/hemorrhage cannot be entirely excluded. Consider MRI for further evaluation as clinically warranted.  3.  Compression deformity of inferior L3 endplate (50% height loss), chronic in nature given stability since 5/31/2020.  Findings were discussed with Dr.Juliana Payne via telephone on 11/1/2021 at 4:41 PM with verbal read back  < end of copied text >     rad< from: CT Abdomen and Pelvis No Cont (05.31.20 @ 06:57) >  ADRENALS: Nonspecific adrenal gland thickening, left greater than right.  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  REPRODUCTIVE ORGANS: An approximately 3.2 cm probably benign cyst in the left ovary is grossly stable from 12/25/2017  BOWEL: No bowel obstruction. Normal appendix.  Pancolonic diverticulosis.  Very mild focal wall thickening in the distal descending colon with mild surrounding inflammatory changes compatible with acute diverticulitis.  PERITONEUM: No ascites.  VESSELS: Atherosclerotic calcification of the aortoiliac tree with heavy, circumferential involvement of the infrarenal abdominal aorta and common iliac arteries.  RETROPERITONEUM/LYMPH NODES: No lymphadenopathy.    ABDOMINAL WALL: Tiny fat-containing umbilical hernia.  BONES: Stable chronic compression fracture in the superior endplate of L3.  Mildthoracolumbar scoliosis.  Multilevel degenerative changes in the spine with moderate spinal canal stenosis at L2-L3, L3-L4 and L4-L5.  Severe right hip osteoarthrosis.  Mild left hip osteoporosis.  IMPRESSION:   Acute, uncomplicated diverticulitis of the distal descending colon.  Follow-up colonoscopy may be performed as clinically warranted to exclude an underlying chronic lesion.  < end of copied text >     ra< from: Transthoracic Echocardiogram w/ Doppler (09.02.08 @ 13:00) >  Conclusions:  1. Normal left ventricular function.  Mild diastolic  dysfunction (Stage I).  2. Normal right ventricular size and systolic function.  3. Minimal mitral regurgitation.  No obvious cardiac source of embolus was identified on this  transthoracic study.  If clinical suspicion is high,  consider INDIGO for further evaluation.  < end of copied text >

## 2021-11-03 NOTE — DISCHARGE NOTE PROVIDER - NSDCCPCAREPLAN_GEN_ALL_CORE_FT
PRINCIPAL DISCHARGE DIAGNOSIS  Diagnosis: Repetitive strain injury of mid back, initial encounter  Assessment and Plan of Treatment: You were      SECONDARY DISCHARGE DIAGNOSES  Diagnosis: Gait instability  Assessment and Plan of Treatment:     Diagnosis: Adjustment disorder with mixed anxiety and depressed mood  Assessment and Plan of Treatment: You can follow up at the [facility] - [phone]     PRINCIPAL DISCHARGE DIAGNOSIS  Diagnosis: Repetitive strain injury of mid back, initial encounter  Assessment and Plan of Treatment: You were found to have a T8 compression fracture with possible small preverterbral/hemorrhage.  You were also found to have a compression deformity of the inferior L3 endplate.  You were given a TLSO brace.  You must follow up with Neurosurgery, Dr. Flores, within one week of discharge - please call to make an appointment.  You must follow up with your primary medical doctor within 1-2 days of discharge - please call to make an appointment.      SECONDARY DISCHARGE DIAGNOSES  Diagnosis: Gait instability  Assessment and Plan of Treatment: TLSO brace    Diagnosis: Adjustment disorder with mixed anxiety and depressed mood  Assessment and Plan of Treatment: You can follow up at the [facility] - [phone]

## 2021-11-03 NOTE — DISCHARGE NOTE PROVIDER - HOSPITAL COURSE
86 year old female, with past medical history of OA,  ?  cva. had  very   brief ?  leg  weakness, / resolved,   anxiety.  s/p  diverticulitis  gew  trs  ago   on  no  meds  at  home    seen  in er  twice, in past  week  ans  was   d/c.  most  recntly  dc.  from er  on 11/1    , and  now  returns,  seeking  help at home  and  also  for  back  pain to ED     c/o acute on chronic moderate dull ache across mid back, worse on L side, worse w/ bending/twisting,     pt describes  , as  being unable to walk safely and has increased trouble getting around home     , no recent falls,/  [place],  no numbness/focal weakness or additional loss of function, no incontinence/retention      no chest pain/discomfort or sob/dyspnea or palpitations    , pt very anxious at baseline,  86 year old female, with past medical history of OA, possible CVA (had brief episode of leg weakness which resolved), anxiety, and diverticulitis, now presents to ED seeking help at home, and also with complaints of back pain. She complains of acute on chronic moderate dull ache across mid back, worse on left side, and worse with bending/twisting.  Patient describes being unable to walk safely and has increased trouble getting around home.  Patient noted to be very anxious.    CT thoracic spine shows: 1.  Anterior wedge-shaped compression deformity of T8 vertebral body (50% height loss), age indeterminate but possibly acute to subacute in nature. Minimal 2 mm retropulsion, without significant spinal canal stenosis. Consider further evaluation with MRI if there is high clinical suspicion for acute traumatic ligamentous injury.  2.  Focal prominence of prevertebral soft tissues at T8 (up to 1.0 cm), may be due to exaggerated thoracic kyphosis. However, small prevertebral/hemorrhage cannot be entirely excluded. Consider MRI for further evaluation as clinically warranted.  3.  Compression deformity of inferior L3 endplate (50% height loss), chronic in nature given stability since 5/31/2020.    Neurosurgery consulted -  86 year old female, with past medical history of OA, possible CVA (had brief episode of leg weakness which resolved), anxiety, and diverticulitis, now presents to ED seeking help at home, and also with complaints of back pain. She complains of acute on chronic moderate dull ache across mid back, worse on left side, and worse with bending/twisting.  Patient describes being unable to walk safely and has increased trouble getting around home.  Patient noted to be very anxious.    CT thoracic spine shows: 1.  Anterior wedge-shaped compression deformity of T8 vertebral body (50% height loss), age indeterminate but possibly acute to subacute in nature. Minimal 2 mm retropulsion, without significant spinal canal stenosis. Consider further evaluation with MRI if there is high clinical suspicion for acute traumatic ligamentous injury.  2.  Focal prominence of prevertebral soft tissues at T8 (up to 1.0 cm), may be due to exaggerated thoracic kyphosis. However, small prevertebral/hemorrhage cannot be entirely excluded. Consider MRI for further evaluation as clinically warranted.  3.  Compression deformity of inferior L3 endplate (50% height loss), chronic in nature given stability since 5/31/2020.    Neurosurgery consulted - no acute neurosurgical intervention needed - TLSO brace with outpatient follow up.  86 year old female, with past medical history of OA, possible CVA (had brief episode of leg weakness which resolved), anxiety, and diverticulitis, now presents to ED seeking help at home, and also with complaints of back pain. She complains of acute on chronic moderate dull ache across mid back, worse on left side, and worse with bending/twisting.  Patient describes being unable to walk safely and has increased trouble getting around home.  Patient noted to be very anxious.    CT thoracic spine shows: 1.  Anterior wedge-shaped compression deformity of T8 vertebral body (50% height loss), age indeterminate but possibly acute to subacute in nature. Minimal 2 mm retropulsion, without significant spinal canal stenosis. Consider further evaluation with MRI if there is high clinical suspicion for acute traumatic ligamentous injury.  2.  Focal prominence of prevertebral soft tissues at T8 (up to 1.0 cm), may be due to exaggerated thoracic kyphosis. However, small prevertebral/hemorrhage cannot be entirely excluded. Consider MRI for further evaluation as clinically warranted.  3.  Compression deformity of inferior L3 endplate (50% height loss), chronic in nature given stability since 5/31/2020.    Neurosurgery consulted - no acute neurosurgical intervention needed - TLSO brace with outpatient follow up.  Psychiatry consulted for anxiety - patient currently refusing medications - patient can follow up outpatient.    Neurosurgery consulted - no acute neurosurgical intervention needed - TLSO brace with outpatient follow up.  Psychiatry consulted for anxiety - patient currently refusing medications - patient can follow up outpatient.      86  yr  f,   h/o  OA,  ?  cva. had  very   brief ?  leg  weakness, / resolved,   hepatic  cysts,  gallstones,     anxiety.  s/p  diverticulitis in 2020,  old  compression fx L 3,  diastolic  dysfunction   on  no  meds  at  home    seen  in er  twice, in past  week  ans  was   d/c.  most  recntly  dc.  from er  on 11/1  pt  was  in  her  dr's  office  yesterday  and  there  was  a  ?  afib,  given  her  palps  and  tachycardia      and  now  returns,  seeking  help at home  and  also  for  back  pain to ED   *   c/o acute on chronic moderate dull ache across mid back, worse on L side, worse w/ bending/twisting,  pt  is  unable to walk safely and has increased trouble getting around home., no recent falls,/ trauma   no numbness/focal weakness or additional loss of function, no incontinence/retention   CT  spine,  done on  11/1 by  er /  T  8  comp  fx.  with  ? [facility]  mri  spine/ now  refused  by pt  pain meds  *  h/o  palps/  tachycardia   was   seen by meghan mckinnon/  here  meghan wilcox is covering  *, severe  baseline  anxiety,  hampering her daily  activities  on xanax  bid    psych  eval  noted. pt refusing any meds  frpm psych/  and ha s been cleraed by psych  per PT, need s rehab. pt is refusing/ and  also refusing mri spine  awiating brace  per  spine team   pt  wants to leave  now,   may do  so AMA.  as  she is  not    well  quipped  to  take  care of  herself.  and  thus far has  had  3  recent visits to  er/ and  is refusing PT recommendations  pt is afall risk  floor  Np  aware  dvt  ppx/  pas           rad< from: CT Thoracic Spine No Cont (11.01.21 @ 15:06) >  IMPRESSION:  1.  Anterior wedge-shaped compression deformity of T8 vertebral body (50% height loss), age indeterminate but possibly acute to subacute in nature. Minimal 2 mm retropulsion, without significant spinal canal stenosis. Consider further evaluation with MRI if there is high clinical suspicion for acute traumatic ligamentous injury.  2.  Focal prominence of prevertebral soft tissues at T8 (up to 1.0 cm), may bedue to exaggerated thoracic kyphosis. However, small prevertebral/hemorrhage cannot be entirely excluded. Consider MRI for further evaluation as clinically warranted.  3.  Compression deformity of inferior L3 endplate (50% height loss), chronic in nature given stability since 5/31/2020.  Findings were discussed with Dr.Juliana Payne via telephone on 11/1/2021 at 4:41 PM with verbal read back  < end of copied text >     rad< from: CT Abdomen and Pelvis No Cont (05.31.20 @ 06:57) >  ADRENALS: Nonspecific adrenal gland thickening, left greater than right.  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  REPRODUCTIVE ORGANS: An approximately 3.2 cm probably benign cyst in the left ovary is grossly stable from 12/25/2017  BOWEL: No bowel obstruction. Normal appendix.  Pancolonic diverticulosis.  Very mild focal wall thickening in the distal descending colon with mild surrounding inflammatory changes compatible with acute diverticulitis.  PERITONEUM: No ascites.  VESSELS: Atherosclerotic calcification of the aortoiliac tree with heavy, circumferential involvement of the infrarenal abdominal aorta and common iliac arteries.  RETROPERITONEUM/LYMPH NODES: No lymphadenopathy.    ABDOMINAL WALL: Tiny fat-containing umbilical hernia.  BONES: Stable chronic compression fracture in the superior endplate of L3.  Mildthoracolumbar scoliosis.  Multilevel degenerative changes in the spine with moderate spinal canal stenosis at L2-L3, L3-L4 and L4-L5.  Severe right hip osteoarthrosis.  Mild left hip osteoporosis.  IMPRESSION:   Acute, uncomplicated diverticulitis of the distal descending colon.  Follow-up colonoscopy may be performed as clinically warranted to exclude an underlying chronic lesion.  < end of copied text >     ra< from: Transthoracic Echocardiogram w/ Doppler (09.02.08 @ 13:00) >  Conclusions:  1. Normal left ventricular function.  Mild diastolic  dysfunction (Stage I).  2. Normal right ventricular size and systolic function.  3. Minimal mitral regurgitation.  No obvious cardiac source of embolus was identified on this  transthoracic study.  If clinical suspicion is high,  consider INDIGO for further evaluation.  < end of copied text >

## 2021-11-04 ENCOUNTER — EMERGENCY (EMERGENCY)
Facility: HOSPITAL | Age: 86
LOS: 1 days | Discharge: HOME | End: 2021-11-04
Attending: EMERGENCY MEDICINE | Admitting: INTERNAL MEDICINE
Payer: MEDICARE

## 2021-11-04 ENCOUNTER — TRANSCRIPTION ENCOUNTER (OUTPATIENT)
Age: 86
End: 2021-11-04

## 2021-11-04 VITALS
DIASTOLIC BLOOD PRESSURE: 69 MMHG | RESPIRATION RATE: 18 BRPM | SYSTOLIC BLOOD PRESSURE: 131 MMHG | OXYGEN SATURATION: 95 % | HEART RATE: 65 BPM

## 2021-11-04 VITALS
DIASTOLIC BLOOD PRESSURE: 101 MMHG | HEART RATE: 81 BPM | RESPIRATION RATE: 18 BRPM | HEIGHT: 61 IN | TEMPERATURE: 98 F | WEIGHT: 149.91 LBS | SYSTOLIC BLOOD PRESSURE: 148 MMHG | OXYGEN SATURATION: 94 %

## 2021-11-04 DIAGNOSIS — Z90.89 ACQUIRED ABSENCE OF OTHER ORGANS: Chronic | ICD-10-CM

## 2021-11-04 LAB — SARS-COV-2 RNA SPEC QL NAA+PROBE: SIGNIFICANT CHANGE UP

## 2021-11-04 PROCEDURE — 99285 EMERGENCY DEPT VISIT HI MDM: CPT | Mod: 25

## 2021-11-04 PROCEDURE — 80053 COMPREHEN METABOLIC PANEL: CPT

## 2021-11-04 PROCEDURE — 93005 ELECTROCARDIOGRAM TRACING: CPT

## 2021-11-04 PROCEDURE — 99284 EMERGENCY DEPT VISIT MOD MDM: CPT | Mod: GC

## 2021-11-04 PROCEDURE — 84484 ASSAY OF TROPONIN QUANT: CPT

## 2021-11-04 PROCEDURE — 84145 PROCALCITONIN (PCT): CPT

## 2021-11-04 PROCEDURE — U0003: CPT

## 2021-11-04 PROCEDURE — 71045 X-RAY EXAM CHEST 1 VIEW: CPT

## 2021-11-04 PROCEDURE — 83880 ASSAY OF NATRIURETIC PEPTIDE: CPT

## 2021-11-04 PROCEDURE — 87635 SARS-COV-2 COVID-19 AMP PRB: CPT

## 2021-11-04 PROCEDURE — U0005: CPT

## 2021-11-04 PROCEDURE — 84443 ASSAY THYROID STIM HORMONE: CPT

## 2021-11-04 PROCEDURE — 72128 CT CHEST SPINE W/O DYE: CPT | Mod: MA

## 2021-11-04 PROCEDURE — 99284 EMERGENCY DEPT VISIT MOD MDM: CPT

## 2021-11-04 PROCEDURE — 97161 PT EVAL LOW COMPLEX 20 MIN: CPT

## 2021-11-04 PROCEDURE — 83735 ASSAY OF MAGNESIUM: CPT

## 2021-11-04 PROCEDURE — 85025 COMPLETE CBC W/AUTO DIFF WBC: CPT

## 2021-11-04 RX ORDER — HEPARIN SODIUM 5000 [USP'U]/ML
5000 INJECTION INTRAVENOUS; SUBCUTANEOUS EVERY 12 HOURS
Refills: 0 | Status: DISCONTINUED | OUTPATIENT
Start: 2021-11-04 | End: 2021-11-04

## 2021-11-04 RX ORDER — METOPROLOL TARTRATE 50 MG
25 TABLET ORAL ONCE
Refills: 0 | Status: COMPLETED | OUTPATIENT
Start: 2021-11-04 | End: 2021-11-04

## 2021-11-04 RX ADMIN — Medication 25 MILLIGRAM(S): at 11:35

## 2021-11-04 RX ADMIN — Medication 81 MILLIGRAM(S): at 11:35

## 2021-11-04 NOTE — PROGRESS NOTE ADULT - SUBJECTIVE AND OBJECTIVE BOX
CARDIOLOGY     PROGRESS  NOTE   ________________________________________________    CHIEF COMPLAINT:Patient is a 86y old  Female who presents with a chief complaint of needs  assistance  for  adl's/  back pain (03 Nov 2021 10:12)  no complain.  	  REVIEW OF SYSTEMS:  CONSTITUTIONAL: No fever, weight loss, or fatigue  EYES: No eye pain, visual disturbances, or discharge  ENT:  No difficulty hearing, tinnitus, vertigo; No sinus or throat pain  NECK: No pain or stiffness  RESPIRATORY: No cough, wheezing, chills or hemoptysis; No Shortness of Breath  CARDIOVASCULAR: No chest pain, palpitations, passing out, dizziness, or leg swelling  GASTROINTESTINAL: No abdominal or epigastric pain. No nausea, vomiting, or hematemesis; No diarrhea or constipation. No melena or hematochezia.  GENITOURINARY: No dysuria, frequency, hematuria, or incontinence  NEUROLOGICAL: No headaches, memory loss, loss of strength, numbness, or tremors  SKIN: No itching, burning, rashes, or lesions   LYMPH Nodes: No enlarged glands  ENDOCRINE: No heat or cold intolerance; No hair loss  MUSCULOSKELETAL: No joint pain or swelling; No muscle, back, or extremity pain  PSYCHIATRIC: No depression, anxiety, mood swings, or difficulty sleeping  HEME/LYMPH: No easy bruising, or bleeding gums  ALLERGY AND IMMUNOLOGIC: No hives or eczema	    [ ] All others negative	  [ ] Unable to obtain    PHYSICAL EXAM:  T(C): 36.7 (11-04-21 @ 04:50), Max: 36.7 (11-04-21 @ 04:50)  HR: 67 (11-04-21 @ 04:50) (67 - 72)  BP: 151/83 (11-04-21 @ 04:50) (147/72 - 151/83)  RR: 18 (11-04-21 @ 04:50) (18 - 18)  SpO2: 93% (11-04-21 @ 04:50) (93% - 96%)  Wt(kg): --  I&O's Summary    03 Nov 2021 07:01  -  04 Nov 2021 07:00  --------------------------------------------------------  IN: 600 mL / OUT: 0 mL / NET: 600 mL        Appearance: Normal	  HEENT:   Normal oral mucosa, PERRL, EOMI	  Lymphatic: No lymphadenopathy  Cardiovascular: Normal S1 S2, No JVD, No murmurs, No edema  Respiratory: Lungs clear to auscultation	  Psychiatry: A & O x 3, Mood & affect appropriate  Gastrointestinal:  Soft, Non-tender, + BS	  Skin: No rashes, No ecchymoses, No cyanosis	  Neurologic: Non-focal  Extremities: Normal range of motion, No clubbing, cyanosis or edema  Vascular: Peripheral pulses palpable 2+ bilaterally    MEDICATIONS  (STANDING):  aspirin  chewable 81 milliGRAM(s) Oral daily  metoprolol succinate ER 25 milliGRAM(s) Oral daily  polyethylene glycol 3350 17 Gram(s) Oral at bedtime  senna 2 Tablet(s) Oral at bedtime      TELEMETRY: 	    ECG:  	  RADIOLOGY:  OTHER: 	  	  LABS:	 	    CARDIAC MARKERS:                                12.6   8.15  )-----------( 305      ( 02 Nov 2021 10:40 )             40.2     11-02    143  |  101  |  23  ----------------------------<  128<H>  3.9   |  26  |  0.73    Ca    9.8      02 Nov 2021 10:40  Mg     2.0     11-02    TPro  7.2  /  Alb  4.0  /  TBili  0.9  /  DBili  x   /  AST  12  /  ALT  12  /  AlkPhos  96  11-02    proBNP: Serum Pro-Brain Natriuretic Peptide: 394 pg/mL (11-02 @ 10:40)    Lipid Profile:   HgA1c:   TSH: Thyroid Stimulating Hormone, Serum: 1.24 uIU/mL (11-02 @ 16:45)          Assessment and plan  ---------------------------  86  yr  f  h/o  OA,  ?  cva. had  very   brief ?  leg  weakness, / resolved,   anxiety.  s/p  diverticulitis  gew  trs  ago   on  no  meds  at  home seen  in er  twice, in past  week  ans  was   d/c.  most  recently  dc.  from er  on 11/1    , and  now  returns,  seeking  help at home  and  also  for  back  pain to ED     c/o acute on chronic moderate dull ache across mid back, worse on L side, worse w/ bending/twisting,     pt describes  , as  being unable to walk safely and has increased trouble getting around home     , no recent falls,/  trauma  no numbness/focal weakness or additional loss of function, no incontinence/retention      no chest pain/discomfort or sob/dyspnea .    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

## 2021-11-04 NOTE — PROGRESS NOTE ADULT - ASSESSMENT
86  yr  f,   h/o  OA,  ?  cva. had  very   brief ?  leg  weakness, / resolved,   hepatic  cysts,  gallstones,     anxiety.  s/p  diverticulitis in 2020,  old  compression fx L 3,  diastolic  dysfunction   on  no  meds  at  home    seen  in er  twice, in past  week  ans  was   d/c.  most  recntly  dc.  from er  on 11/1  pt  was  in  her  dr's  office  yesterday  and  there  was  a  ?  afib,  given  her  palps  and  tachycardia      and  now  returns,  seeking  help at home  and  also  for  back  pain to ED   *   c/o acute on chronic moderate dull ache across mid back, worse on L side, worse w/ bending/twisting,  pt  is  unable to walk safely and has increased trouble getting around home., no recent falls,/ trauma   no numbness/focal weakness or additional loss of function, no incontinence/retention   CT  spine,  done on  11/1 by  er /  T  8  comp  fx.  with  ? [facility]  mri  spine/ now  refused  by pt  pain meds/ tramadol prn  *  h/o  palps/  tachycardia   was   seen by meghan mckinnon/  here  meghan wilcox is covering  *, severe  baseline  anxiety,  hampering her daily  activities   Psych  eval  noted. pt refusing any meds  frpm psych/  and has  been cleraed by psych  per PT, need s rehab. pt  was  refusing/ and  also refusing mri spine  ha s recvd   brace.  per  spine team     pt  wanted  to leave  on 11/3,   may do  so AMA.  as  she is  not    well  quipped  to  take  care of  herself.  and  thus far has  had  3  recent visits to  er and  her  pmd  on outside, karlo  had  grave  concerns  about her   living by  herself.  and  her  inability to  take care of  HER  adl;s  floor  Np  aware  dvt  ppx/  pas  called  daughter,    Kathryn, unable  to reach  pt now  ha s agreed  to go  to  rehab   pt cleraed  for   d/c  to rehab        rad< from: CT Thoracic Spine No Cont (11.01.21 @ 15:06) >  IMPRESSION:  1.  Anterior wedge-shaped compression deformity of T8 vertebral body (50% height loss), age indeterminate but possibly acute to subacute in nature. Minimal 2 mm retropulsion, without significant spinal canal stenosis. Consider further evaluation with MRI if there is high clinical suspicion for acute traumatic ligamentous injury.  2.  Focal prominence of prevertebral soft tissues at T8 (up to 1.0 cm), may bedue to exaggerated thoracic kyphosis. However, small prevertebral/hemorrhage cannot be entirely excluded. Consider MRI for further evaluation as clinically warranted.  3.  Compression deformity of inferior L3 endplate (50% height loss), chronic in nature given stability since 5/31/2020.  Findings were discussed with Dr.Juliana Payne via telephone on 11/1/2021 at 4:41 PM with verbal read back  < end of copied text >     rad< from: CT Abdomen and Pelvis No Cont (05.31.20 @ 06:57) >  ADRENALS: Nonspecific adrenal gland thickening, left greater than right.  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  REPRODUCTIVE ORGANS: An approximately 3.2 cm probably benign cyst in the left ovary is grossly stable from 12/25/2017  BOWEL: No bowel obstruction. Normal appendix.  Pancolonic diverticulosis.  Very mild focal wall thickening in the distal descending colon with mild surrounding inflammatory changes compatible with acute diverticulitis.  PERITONEUM: No ascites.  VESSELS: Atherosclerotic calcification of the aortoiliac tree with heavy, circumferential involvement of the infrarenal abdominal aorta and common iliac arteries.  RETROPERITONEUM/LYMPH NODES: No lymphadenopathy.    ABDOMINAL WALL: Tiny fat-containing umbilical hernia.  BONES: Stable chronic compression fracture in the superior endplate of L3.  Mildthoracolumbar scoliosis.  Multilevel degenerative changes in the spine with moderate spinal canal stenosis at L2-L3, L3-L4 and L4-L5.  Severe right hip osteoarthrosis.  Mild left hip osteoporosis.  IMPRESSION:   Acute, uncomplicated diverticulitis of the distal descending colon.  Follow-up colonoscopy may be performed as clinically warranted to exclude an underlying chronic lesion.  < end of copied text >     ra< from: Transthoracic Echocardiogram w/ Doppler (09.02.08 @ 13:00) >  Conclusions:  1. Normal left ventricular function.  Mild diastolic  dysfunction (Stage I).  2. Normal right ventricular size and systolic function.  3. Minimal mitral regurgitation.  No obvious cardiac source of embolus was identified on this  transthoracic study.  If clinical suspicion is high,  consider INDIGO for further evaluation.  < end of copied text >     86  yr  f,   h/o  OA,  ?  cva. had  very   brief ?  leg  weakness, / resolved,   hepatic  cysts,  gallstones,     anxiety.  s/p  diverticulitis in 2020,  old  compression fx L 3,  diastolic  dysfunction   on  no  meds  at  home    seen  in er  twice, in past  week  ans  was   d/c.  most  recntly  dc.  from er  on 11/1  pt  was  in  her  dr's  office  yesterday  and  there  was  a  ?  afib,  given  her  palps  and  tachycardia      and  now  returns,  seeking  help at home  and  also  for  back  pain to ED   *   c/o acute on chronic moderate dull ache across mid back, worse on L side, worse w/ bending/twisting,  pt  is  unable to walk safely and has increased trouble getting around home., no recent falls,/ trauma   no numbness/focal weakness or additional loss of function, no incontinence/retention   CT  spine,  done on  11/1 by  er /  T  8  comp  fx.  with  ? h'ge  mri  spine/ now  refused  by pt  pain meds/ tramadol prn  *  h/o  palps/  tachycardia   was   seen by meghan mckinnon/  here  meghan wilcox is covering  *, severe  baseline  anxiety,  hampering her daily  activities   Psych  eval  noted. pt refusing any meds  frpm psych/  and has  been cleraed by psych  per PT, need s rehab. pt  was  refusing/ and  also refusing mri spine  ha s recvd   brace.  per  spine team     pt  wanted  to leave  on 11/3,   may do  so AMA.  as  she is  not    well  quipped  to  take  care of  herself.  and  thus far has  had  3  recent visits to  er and  her  pmd  on outside, karlo  had  grave  concerns  about her   living by  herself.  and  her  inability to  take care of  her  ADL's  dvt  ppx/  pas  called  daughter,    Kathryn, unable  to reach  pt last evening, had  agreed  to go  to  rehab. per  floor Np   today pt sttaes  she  is going home/ pt  in a recliner , today  in hallway   unsafe   d/c  pt may leave AMA.   discussed  with Naty don< from: CT Thoracic Spine No Cont (11.01.21 @ 15:06) >  IMPRESSION:  1.  Anterior wedge-shaped compression deformity of T8 vertebral body (50% height loss), age indeterminate but possibly acute to subacute in nature. Minimal 2 mm retropulsion, without significant spinal canal stenosis. Consider further evaluation with MRI if there is high clinical suspicion for acute traumatic ligamentous injury.  2.  Focal prominence of prevertebral soft tissues at T8 (up to 1.0 cm), may bedue to exaggerated thoracic kyphosis. However, small prevertebral/hemorrhage cannot be entirely excluded. Consider MRI for further evaluation as clinically warranted.  3.  Compression deformity of inferior L3 endplate (50% height loss), chronic in nature given stability since 5/31/2020.  Findings were discussed with Dr.Juliana Payne via telephone on 11/1/2021 at 4:41 PM with verbal read back  < end of copied text >     rad< from: CT Abdomen and Pelvis No Cont (05.31.20 @ 06:57) >  ADRENALS: Nonspecific adrenal gland thickening, left greater than right.  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  REPRODUCTIVE ORGANS: An approximately 3.2 cm probably benign cyst in the left ovary is grossly stable from 12/25/2017  BOWEL: No bowel obstruction. Normal appendix.  Pancolonic diverticulosis.  Very mild focal wall thickening in the distal descending colon with mild surrounding inflammatory changes compatible with acute diverticulitis.  PERITONEUM: No ascites.  VESSELS: Atherosclerotic calcification of the aortoiliac tree with heavy, circumferential involvement of the infrarenal abdominal aorta and common iliac arteries.  RETROPERITONEUM/LYMPH NODES: No lymphadenopathy.    ABDOMINAL WALL: Tiny fat-containing umbilical hernia.  BONES: Stable chronic compression fracture in the superior endplate of L3.  Mildthoracolumbar scoliosis.  Multilevel degenerative changes in the spine with moderate spinal canal stenosis at L2-L3, L3-L4 and L4-L5.  Severe right hip osteoarthrosis.  Mild left hip osteoporosis.  IMPRESSION:   Acute, uncomplicated diverticulitis of the distal descending colon.  Follow-up colonoscopy may be performed as clinically warranted to exclude an underlying chronic lesion.  < end of copied text >     ra< from: Transthoracic Echocardiogram w/ Doppler (09.02.08 @ 13:00) >  Conclusions:  1. Normal left ventricular function.  Mild diastolic  dysfunction (Stage I).  2. Normal right ventricular size and systolic function.  3. Minimal mitral regurgitation.  No obvious cardiac source of embolus was identified on this  transthoracic study.  If clinical suspicion is high,  consider INDIGO for further evaluation.  < end of copied text >

## 2021-11-04 NOTE — DISCHARGE NOTE NURSING/CASE MANAGEMENT/SOCIAL WORK - NSDCPNINST_GEN_ALL_CORE
patient was educated on her medication. patient was educated to follow up with provider after discharge. patient verbalized understanding.

## 2021-11-04 NOTE — PROGRESS NOTE ADULT - REASON FOR ADMISSION
needs  assistance  for  adl's/  back pain

## 2021-11-04 NOTE — DISCHARGE NOTE NURSING/CASE MANAGEMENT/SOCIAL WORK - NSPROEXTENSIONSOFSELF_GEN_A_NUR
clothing, black handbag, black wallet, black shoes, 2 medical alert necklaces, 3 rings, key bunch/brace/eyeglasses/walker

## 2021-11-04 NOTE — DISCHARGE NOTE NURSING/CASE MANAGEMENT/SOCIAL WORK - NSDCFUADDAPPT_GEN_ALL_CORE_FT
You must follow up with your primary medical doctor, Dr. Mera, within 1-2 days of discharge - please call to make an appointment.    You must follow up with your Neurosurgeon, Dr. Flores, within one week of discharge - please call to make an appointment.        APPTS ARE READY TO BE MADE: [ x] YES    Best Family or Patient Contact (if needed):    Additional Information about above appointments (if needed):    1:   2:   3:     Other comments or requests:

## 2021-11-04 NOTE — DISCHARGE NOTE NURSING/CASE MANAGEMENT/SOCIAL WORK - PATIENT PORTAL LINK FT
You can access the FollowMyHealth Patient Portal offered by [facility] by registering at the following website: [email or web address]. By joining [employer]’s FollowMyHealth portal, you will also be able to view your health information using other applications (apps) compatible with our system.

## 2021-11-04 NOTE — PROGRESS NOTE ADULT - SUBJECTIVE AND OBJECTIVE BOX
afberile    REVIEW OF SYSTEMS:  GEN: no fever,    no chills  RESP: no SOB,   no cough  CVS: no chest pain,   no palpitations  GI: no abdominal pain,   no nausea,   no vomiting,   no constipation,   no diarrhea  : no dysuria,   no frequency  NEURO: no headache,   no dizziness  PSYCH: no depression,   not anxious  Derm : no rash    MEDICATIONS  (STANDING):  aspirin  chewable 81 milliGRAM(s) Oral daily  metoprolol succinate ER 25 milliGRAM(s) Oral daily  polyethylene glycol 3350 17 Gram(s) Oral at bedtime  senna 2 Tablet(s) Oral at bedtime    MEDICATIONS  (PRN):  melatonin 3 milliGRAM(s) Oral at bedtime PRN Insomnia  traMADol 25 milliGRAM(s) Oral daily PRN Moderate Pain (4 - 6)      Vital Signs Last 24 Hrs  T(C): 36.7 (04 Nov 2021 04:50), Max: 36.7 (04 Nov 2021 04:50)  T(F): 98 (04 Nov 2021 04:50), Max: 98 (04 Nov 2021 04:50)  HR: 67 (04 Nov 2021 04:50) (67 - 72)  BP: 151/83 (04 Nov 2021 04:50) (147/72 - 151/83)  BP(mean): --  RR: 18 (04 Nov 2021 04:50) (18 - 18)  SpO2: 93% (04 Nov 2021 04:50) (93% - 96%)  CAPILLARY BLOOD GLUCOSE        I&O's Summary    03 Nov 2021 07:01  -  04 Nov 2021 07:00  --------------------------------------------------------  IN: 600 mL / OUT: 0 mL / NET: 600 mL        PHYSICAL EXAM:  HEAD:  Atraumatic, Normocephalic  NECK: Supple, No   JVD  CHEST/LUNG:   no     rales,     no,    rhonchi  HEART: Regular rate and rhythm;         murmur  ABDOMEN: Soft, Nontender, ;   EXTREMITIES:    no    edema  NEUROLOGY:  alert    LABS:                        12.6   8.15  )-----------( 305      ( 02 Nov 2021 10:40 )             40.2     11-02    143  |  101  |  23  ----------------------------<  128<H>  3.9   |  26  |  0.73    Ca    9.8      02 Nov 2021 10:40  Mg     2.0     11-02    TPro  7.2  /  Alb  4.0  /  TBili  0.9  /  DBili  x   /  AST  12  /  ALT  12  /  AlkPhos  96  11-02                    Thyroid Stimulating Hormone, Serum: 1.24 uIU/mL (11-02 @ 16:45)          Consultant(s) Notes Reviewed:      Care Discussed with Consultants/Other Providers:

## 2021-11-04 NOTE — ED ADULT TRIAGE NOTE - CHIEF COMPLAINT QUOTE
Mid-back pain; reports a recent fracture of thoracic spine. Was discharged yesterday and reports pain was "unbearable."

## 2021-11-05 VITALS
OXYGEN SATURATION: 94 % | DIASTOLIC BLOOD PRESSURE: 88 MMHG | RESPIRATION RATE: 20 BRPM | SYSTOLIC BLOOD PRESSURE: 135 MMHG | HEART RATE: 87 BPM | TEMPERATURE: 99 F

## 2021-11-05 DIAGNOSIS — M54.9 DORSALGIA, UNSPECIFIED: ICD-10-CM

## 2021-11-05 LAB
ALBUMIN SERPL ELPH-MCNC: 4 G/DL — SIGNIFICANT CHANGE UP (ref 3.3–5)
ALP SERPL-CCNC: 102 U/L — SIGNIFICANT CHANGE UP (ref 40–120)
ALT FLD-CCNC: 17 U/L — SIGNIFICANT CHANGE UP (ref 10–45)
ANION GAP SERPL CALC-SCNC: 11 MMOL/L — SIGNIFICANT CHANGE UP (ref 5–17)
AST SERPL-CCNC: 31 U/L — SIGNIFICANT CHANGE UP (ref 10–40)
BASOPHILS # BLD AUTO: 0.07 K/UL — SIGNIFICANT CHANGE UP (ref 0–0.2)
BASOPHILS NFR BLD AUTO: 0.8 % — SIGNIFICANT CHANGE UP (ref 0–2)
BILIRUB SERPL-MCNC: 0.8 MG/DL — SIGNIFICANT CHANGE UP (ref 0.2–1.2)
BUN SERPL-MCNC: 16 MG/DL — SIGNIFICANT CHANGE UP (ref 7–23)
CALCIUM SERPL-MCNC: 9.9 MG/DL — SIGNIFICANT CHANGE UP (ref 8.4–10.5)
CHLORIDE SERPL-SCNC: 101 MMOL/L — SIGNIFICANT CHANGE UP (ref 96–108)
CO2 SERPL-SCNC: 25 MMOL/L — SIGNIFICANT CHANGE UP (ref 22–31)
CREAT SERPL-MCNC: 0.55 MG/DL — SIGNIFICANT CHANGE UP (ref 0.5–1.3)
EOSINOPHIL # BLD AUTO: 0.19 K/UL — SIGNIFICANT CHANGE UP (ref 0–0.5)
EOSINOPHIL NFR BLD AUTO: 2.2 % — SIGNIFICANT CHANGE UP (ref 0–6)
GLUCOSE SERPL-MCNC: 102 MG/DL — HIGH (ref 70–99)
HCT VFR BLD CALC: 38.9 % — SIGNIFICANT CHANGE UP (ref 34.5–45)
HGB BLD-MCNC: 12.4 G/DL — SIGNIFICANT CHANGE UP (ref 11.5–15.5)
IMM GRANULOCYTES NFR BLD AUTO: 0.5 % — SIGNIFICANT CHANGE UP (ref 0–1.5)
LYMPHOCYTES # BLD AUTO: 1.74 K/UL — SIGNIFICANT CHANGE UP (ref 1–3.3)
LYMPHOCYTES # BLD AUTO: 20.2 % — SIGNIFICANT CHANGE UP (ref 13–44)
MCHC RBC-ENTMCNC: 28.8 PG — SIGNIFICANT CHANGE UP (ref 27–34)
MCHC RBC-ENTMCNC: 31.9 GM/DL — LOW (ref 32–36)
MCV RBC AUTO: 90.5 FL — SIGNIFICANT CHANGE UP (ref 80–100)
MONOCYTES # BLD AUTO: 1.06 K/UL — HIGH (ref 0–0.9)
MONOCYTES NFR BLD AUTO: 12.3 % — SIGNIFICANT CHANGE UP (ref 2–14)
NEUTROPHILS # BLD AUTO: 5.53 K/UL — SIGNIFICANT CHANGE UP (ref 1.8–7.4)
NEUTROPHILS NFR BLD AUTO: 64 % — SIGNIFICANT CHANGE UP (ref 43–77)
NRBC # BLD: 0 /100 WBCS — SIGNIFICANT CHANGE UP (ref 0–0)
PLATELET # BLD AUTO: 305 K/UL — SIGNIFICANT CHANGE UP (ref 150–400)
POTASSIUM SERPL-MCNC: 5.7 MMOL/L — HIGH (ref 3.5–5.3)
POTASSIUM SERPL-SCNC: 5.7 MMOL/L — HIGH (ref 3.5–5.3)
PROT SERPL-MCNC: 7.7 G/DL — SIGNIFICANT CHANGE UP (ref 6–8.3)
RBC # BLD: 4.3 M/UL — SIGNIFICANT CHANGE UP (ref 3.8–5.2)
RBC # FLD: 14.1 % — SIGNIFICANT CHANGE UP (ref 10.3–14.5)
SODIUM SERPL-SCNC: 137 MMOL/L — SIGNIFICANT CHANGE UP (ref 135–145)
WBC # BLD: 8.63 K/UL — SIGNIFICANT CHANGE UP (ref 3.8–10.5)
WBC # FLD AUTO: 8.63 K/UL — SIGNIFICANT CHANGE UP (ref 3.8–10.5)

## 2021-11-05 PROCEDURE — 99283 EMERGENCY DEPT VISIT LOW MDM: CPT

## 2021-11-05 PROCEDURE — 85025 COMPLETE CBC W/AUTO DIFF WBC: CPT

## 2021-11-05 PROCEDURE — 80053 COMPREHEN METABOLIC PANEL: CPT

## 2021-11-05 RX ORDER — METOPROLOL TARTRATE 50 MG
25 TABLET ORAL DAILY
Refills: 0 | Status: DISCONTINUED | OUTPATIENT
Start: 2021-11-05 | End: 2021-11-05

## 2021-11-05 RX ORDER — SENNA PLUS 8.6 MG/1
2 TABLET ORAL AT BEDTIME
Refills: 0 | Status: DISCONTINUED | OUTPATIENT
Start: 2021-11-05 | End: 2021-11-05

## 2021-11-05 RX ORDER — ACETAMINOPHEN 500 MG
650 TABLET ORAL EVERY 6 HOURS
Refills: 0 | Status: DISCONTINUED | OUTPATIENT
Start: 2021-11-05 | End: 2021-11-05

## 2021-11-05 RX ORDER — ACETAMINOPHEN 500 MG
975 TABLET ORAL ONCE
Refills: 0 | Status: COMPLETED | OUTPATIENT
Start: 2021-11-05 | End: 2021-11-05

## 2021-11-05 RX ORDER — ASPIRIN/CALCIUM CARB/MAGNESIUM 324 MG
81 TABLET ORAL DAILY
Refills: 0 | Status: DISCONTINUED | OUTPATIENT
Start: 2021-11-05 | End: 2021-11-05

## 2021-11-05 RX ORDER — POLYETHYLENE GLYCOL 3350 17 G/17G
17 POWDER, FOR SOLUTION ORAL AT BEDTIME
Refills: 0 | Status: DISCONTINUED | OUTPATIENT
Start: 2021-11-05 | End: 2021-11-05

## 2021-11-05 RX ORDER — TRAMADOL HYDROCHLORIDE 50 MG/1
25 TABLET ORAL DAILY
Refills: 0 | Status: DISCONTINUED | OUTPATIENT
Start: 2021-11-05 | End: 2021-11-05

## 2021-11-05 RX ORDER — HEPARIN SODIUM 5000 [USP'U]/ML
5000 INJECTION INTRAVENOUS; SUBCUTANEOUS EVERY 12 HOURS
Refills: 0 | Status: DISCONTINUED | OUTPATIENT
Start: 2021-11-05 | End: 2021-11-05

## 2021-11-05 NOTE — ED PROVIDER NOTE - CLINICAL SUMMARY MEDICAL DECISION MAKING FREE TEXT BOX
87yo female p/w worsening back pain from t8 compression fx. AMA's from hospital yesterday now amenable to admission, neuro intact. labs, tylenol and admit.

## 2021-11-05 NOTE — H&P ADULT - HISTORY OF PRESENT ILLNESS
85yo female     recent admission for T8 compression fx, was  seen ny  spine/ Neurosurg,    .  anxiety, mlple falls,  spinal fx/s.  OA,  HTN, intermittent palps     pt  left AMA'd from hospital   on 11/4    . Went home and then  returned to er . stating  she was unable to do her normal tasks at home, due to he r back pain   per  er, pt  was  amenable to admission and inpatient rehab/PT eval.     Denies numbness, weakness, incontinence, fevers  was  seen by psych  on recnt visit  and  was   cleared

## 2021-11-05 NOTE — ED PROVIDER NOTE - ATTENDING CONTRIBUTION TO CARE
Nemes - 85yo F w recent admission for T8 compression fx AMA'd from hospital yesterday. Went home and back pain was too much to deal with, unable to do her normal tasks at home. Now amenable to admission and inpatient rehab/PT eval. Denies numbness, weakness, incontinence, fever. Patient very tangential and poor historian. No new trauma, no new symptoms from prior presentation.  VS wnl, well appearing, in NAD. Moist mucosae, pink conjunctivae. Neck supple, no c-spine TTP, CN/neuro intact. Lungs clear, cardiac wnl, no JVD. Abdomen soft/NT, no CVAT. No pedal edema, no calf TTP.  Willing to stay in the hospital for further PT evaluation and possible placement in long-term Rehab. Will get basic labs, admit

## 2021-11-05 NOTE — H&P ADULT - ASSESSMENT
86  yr  f,   h/o  OA,  ?  cva. had  very   brief ?  leg  weakness, / resolved,   hepatic  cysts,  gallstones,     anxiety.  s/p  diverticulitis in 2020,  old  compression fx L 3,  diastolic  dysfunction.  h/o palpitations. HTN   mlple falls   seen  in er  mlple times,  in past  week  and  was   d/c.  most  recntly  dc.  from er  on 11/1  then admitted  on  11/2  and  was   d/c  AMA  on 11/4, now  returns   to  er again       and  now  returns,  seeking  help at [facility]   to ED   *    has  c/c back pain from  spinal  compression fx,  from  old  L 3  comp  fx  and  an acute  T8 fx, note d on recent visit  pt  is  unable to walk safely and has increased trouble getting around home/ unsteady  gait,  giv en OA  and  advanced age.  no numbness/focal weakness or additional loss of function, no incontinence/retention   CT  spine, on recent visit,   done on  11/1 by  er /  T  8  comp  fx.  and mri  spine, was  refused  by pt  pain meds/on  tramadol prn  *  h/o  palps/  tachycardia   was   seen by meghan mckinnon/  here  card  dr wilcox is covering  *, severe  baseline  anxiety,  hampering her daily  activities   Psych  eval from recent visit,   noted. pt refusing any meds  frpm psych/  and had   been cleraed by psych  per PT, needs  rehab. pt  was  refusing  and pt was  adamant on  going home, hence. pt left  AMA  on 11/ 4  ha s recvd   brace.  per  spine team     plan. pT  eval and   d/c to  rehab    As  she is  not    well  quipped  to  take  care of  herself.  and  thus far has  had  4   recent visits to  hosp. er, and  her  pmd  on outside, karlo  had  grave  concerns  about her   living by  herself.  and  her  inability to  take care of  her  ADL's  called  daughter,    Kathryn, unable  to reach    and  if pt again chooses  to  leave,  she may do so, AMA   check rpt bmp   *   HTN, palps on toprol  on  dvt ppx          rad< from: CT Thoracic Spine No Cont (11.01.21 @ 15:06) >  IMPRESSION:  1.  Anterior wedge-shaped compression deformity of T8 vertebral body (50% height loss), age indeterminate but possibly acute to subacute in nature. Minimal 2 mm retropulsion, without significant spinal canal stenosis. Consider further evaluation with MRI if there is high clinical suspicion for acute traumatic ligamentous injury.  2.  Focal prominence of prevertebral soft tissues at T8 (up to 1.0 cm), may bedue to exaggerated thoracic kyphosis. However, small prevertebral/hemorrhage cannot be entirely excluded. Consider MRI for further evaluation as clinically warranted.  3.  Compression deformity of inferior L3 endplate (50% height loss), chronic in nature given stability since 5/31/2020.  Findings were discussed with Dr.Juliana Payne via telephone on 11/1/2021 at 4:41 PM with verbal read back  < end of copied text >     rad< from: CT Abdomen and Pelvis No Cont (05.31.20 @ 06:57) >  ADRENALS: Nonspecific adrenal gland thickening, left greater than right.  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  REPRODUCTIVE ORGANS: An approximately 3.2 cm probably benign cyst in the left ovary is grossly stable from 12/25/2017  BOWEL: No bowel obstruction. Normal appendix.  Pancolonic diverticulosis.  Very mild focal wall thickening in the distal descending colon with mild surrounding inflammatory changes compatible with acute diverticulitis.  PERITONEUM: No ascites.  VESSELS: Atherosclerotic calcification of the aortoiliac tree with heavy, circumferential involvement of the infrarenal abdominal aorta and common iliac arteries.  RETROPERITONEUM/LYMPH NODES: No lymphadenopathy.    ABDOMINAL WALL: Tiny fat-containing umbilical hernia.  BONES: Stable chronic compression fracture in the superior endplate of L3.  Mildthoracolumbar scoliosis.  Multilevel degenerative changes in the spine with moderate spinal canal stenosis at L2-L3, L3-L4 and L4-L5.  Severe right hip osteoarthrosis.  Mild left hip osteoporosis.  IMPRESSION:   Acute, uncomplicated diverticulitis of the distal descending colon.  Follow-up colonoscopy may be performed as clinically warranted to exclude an underlying chronic lesion.  < end of copied text >     ra< from: Transthoracic Echocardiogram w/ Doppler (09.02.08 @ 13:00) >  Conclusions:  1. Normal left ventricular function.  Mild diastolic  dysfunction (Stage I).  2. Normal right ventricular size and systolic function.  3. Minimal mitral regurgitation.  No obvious cardiac source of embolus was identified on this  transthoracic study.  If clinical suspicion is high,  consider INDIGO for further evaluation.  < end of copied text >

## 2021-11-05 NOTE — H&P ADULT - NSHPLABSRESULTS_GEN_ALL_CORE
LABS:                        12.4   8.63  )-----------( 305      ( 05 Nov 2021 02:00 )             38.9     11-05    137  |  101  |  16  ----------------------------<  102<H>  5.7<H>   |  25  |  0.55    Ca    9.9      05 Nov 2021 02:00    TPro  7.7  /  Alb  4.0  /  TBili  0.8  /  DBili  x   /  AST  31  /  ALT  17  /  AlkPhos  102  11-05                    Thyroid Stimulating Hormone, Serum: 1.24 uIU/mL (11-02 @ 16:45)

## 2021-11-05 NOTE — ED ADULT NURSE NOTE - OBJECTIVE STATEMENT
86F aaox4 ambulatory brought in by a friend from home with c/o mid back pain. 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 Denies any recent fall, able to walk with a rolling walker. Labs sent, patient pending Xray. VS WDL.

## 2021-11-05 NOTE — H&P ADULT - NSHPPHYSICALEXAM_GEN_ALL_CORE
PHYSICAL EXAMINATION:  Vital Signs Last 24 Hrs  T(C): 37 (05 Nov 2021 03:30), Max: 37 (05 Nov 2021 03:30)  T(F): 98.6 (05 Nov 2021 03:30), Max: 98.6 (05 Nov 2021 03:30)  HR: 87 (05 Nov 2021 03:30) (65 - 87)  BP: 135/88 (05 Nov 2021 03:30) (131/69 - 148/101)  BP(mean): --  RR: 20 (05 Nov 2021 03:30) (18 - 20)  SpO2: 94% (05 Nov 2021 03:30) (94% - 95%)  CAPILLARY BLOOD GLUCOSE            GENERAL: NAD, well-groomed,  HEAD:  atraumatic, normocephalic  EYES: sclera anicteric  ENMT: mucous membranes moist  NECK: supple, No JVD  CHEST/LUNG: clear to auscultation bilaterally;    no      rales   ,   no rhonchi,   HEART: normal S1, S2  ABDOMEN: BS+, soft, ND, NT   EXTREMITIES:    no    edema    b/l LEs  NEURO: awake, ,     moves all extremities  SKIN: no     rash  no leg weakness

## 2021-11-05 NOTE — ED PROVIDER NOTE - PHYSICAL EXAMINATION
Physical Exam:  Gen: NAD, AOx3, non-toxic appearing, able to ambulate with walker  Head: NCAT  HEENT: EOMI, PEERLA, normal conjunctiva, tongue midline, oral mucosa moist  Lung: CTAB, no respiratory distress, no wheezes/rhonchi/rales B/L, speaking in full sentences  CV: RRR, no murmurs, rubs or gallops, distal pulses 2+ b/l  MSK: no visible deformities, ROM normal in UE/LE, in back brace  Neuro: No focal sensory or motor deficits  Skin: Warm, well perfused, no rash, no leg swelling  Psych: normal affect, calm

## 2021-11-05 NOTE — ED PROVIDER NOTE - PROGRESS NOTE DETAILS
Lenka PGY3: patient now wants to leave and go home, able to ambulate with walker. Will take cab home.

## 2021-11-05 NOTE — ED ADULT NURSE NOTE - NSICDXFAMILYHX_GEN_ALL_CORE_FT
FAMILY HISTORY:  Family history of amyotrophic lateral sclerosis    Child  Still living? Unknown  Family history of multiple sclerosis, Age at diagnosis: Age Unknown

## 2021-11-05 NOTE — ED PROVIDER NOTE - PATIENT PORTAL LINK FT
You can access the FollowMyHealth Patient Portal offered by [facility] by registering at the following website: [email or web address]. By joining [employer]’s FollowMyHealth portal, you will also be able to view your health information using other applications (apps) compatible with our system.

## 2021-11-06 ENCOUNTER — EMERGENCY (EMERGENCY)
Facility: HOSPITAL | Age: 86
LOS: 1 days | Discharge: HOME | End: 2021-11-06
Attending: EMERGENCY MEDICINE
Payer: MEDICARE

## 2021-11-06 VITALS
SYSTOLIC BLOOD PRESSURE: 137 MMHG | HEART RATE: 80 BPM | TEMPERATURE: 98 F | DIASTOLIC BLOOD PRESSURE: 75 MMHG | OXYGEN SATURATION: 96 % | RESPIRATION RATE: 19 BRPM

## 2021-11-06 VITALS
WEIGHT: 139.99 LBS | DIASTOLIC BLOOD PRESSURE: 74 MMHG | HEIGHT: 61 IN | SYSTOLIC BLOOD PRESSURE: 125 MMHG | HEART RATE: 81 BPM

## 2021-11-06 DIAGNOSIS — Z90.89 ACQUIRED ABSENCE OF OTHER ORGANS: Chronic | ICD-10-CM

## 2021-11-06 PROCEDURE — 99283 EMERGENCY DEPT VISIT LOW MDM: CPT

## 2021-11-06 PROCEDURE — 99283 EMERGENCY DEPT VISIT LOW MDM: CPT | Mod: GC

## 2021-11-06 RX ORDER — ACETAMINOPHEN 500 MG
650 TABLET ORAL ONCE
Refills: 0 | Status: COMPLETED | OUTPATIENT
Start: 2021-11-06 | End: 2021-11-06

## 2021-11-06 RX ORDER — SENNA PLUS 8.6 MG/1
1 TABLET ORAL ONCE
Refills: 0 | Status: COMPLETED | OUTPATIENT
Start: 2021-11-06 | End: 2021-11-06

## 2021-11-06 RX ORDER — ACETAMINOPHEN 500 MG
635 TABLET ORAL ONCE
Refills: 0 | Status: COMPLETED | OUTPATIENT
Start: 2021-11-06 | End: 2021-11-06

## 2021-11-06 NOTE — ED ADULT NURSE NOTE - NSIMPLEMENTINTERV_GEN_ALL_ED
Implemented All Fall Risk Interventions:  [place] to call system. Call bell, personal items and telephone within reach. Instruct patient to call for assistance. Room bathroom lighting operational. Non-slip footwear when patient is off stretcher. Physically safe environment: no spills, clutter or unnecessary equipment. Stretcher in lowest position, wheels locked, appropriate side rails in place. Provide visual cue, wrist band, yellow gown, etc. Monitor gait and stability. Monitor for mental status changes and reorient to person, place, and time. Review medications for side effects contributing to fall risk. Reinforce activity limits and safety measures with patient and family.

## 2021-11-06 NOTE — ED PROVIDER NOTE - ATTENDING CONTRIBUTION TO CARE
Attending MD Pierre:  I personally have seen and examined this patient.  Resident note reviewed and agree on plan of care and except where noted.  See HPI, PE, and MDM for details.

## 2021-11-06 NOTE — ED PROVIDER NOTE - PROGRESS NOTE DETAILS
Faby, PGY3: pt states that she just wants tylenol and she will go home, attempted to give pt tylenol though she is refusing here, she states she is hungry but has food at home, pt states she hasn't had bm since leaving hospital recently until today, she doesn't want to be admitted to the hospital to be evaluated for HHA/rehab, pt states she has a safe place to go. Faby, PGY3: pt wants to go home, asking for swk to set her up with cab

## 2021-11-06 NOTE — ED PROVIDER NOTE - NSFOLLOWUPINSTRUCTIONS_ED_ALL_ED_FT
- Please follow up with your Primary Care Doctor within 48 hours. Bring your results from today.    - Tylenol up to 650 mg every 6 hours as needed for pain.    - You may take over the counter stool softeners to help with bowel movements. Please drink plenty fluids as this will also help.    - Be sure to return to the ED if you develop new, worsening, or any distressing symptoms.

## 2021-11-06 NOTE — ED ADULT NURSE NOTE - OBJECTIVE STATEMENT
86 year old female presents to ED via walk-in via taxi complaining of back pain and constipation. Pt was seen here yesterday and AMA. Pt states when getting into the taxi yesterday to go home "my back was messed up". Pt ambulatory during visit. Pt states she has not had a bowel movement since ED visit yesterday. Pt requests prune juice. Pt educated that no prune juice is available. Pt then requesting a senna. Pt decline xray and enema at this time. Pt states she takes care of herself and feels safe at home. Sitting in chair in the hallway, refusing to get into stretcher. Comfort and safety measures maintained.

## 2021-11-06 NOTE — ED PROVIDER NOTE - PATIENT PORTAL LINK FT
You can access the FollowMyHealth Patient Portal offered by [facility] by registering at the following website: [email or web address]. By joining [employer]’s FollowMyHealth portal, you will also be able to view your health information using other applications (apps) compatible with our system.

## 2021-11-06 NOTE — ED PROVIDER NOTE - PHYSICAL EXAMINATION
GENERAL: no acute distress, non-toxic appearing  HEENT: normal conjunctiva  CARDIAC: regular rate and regular rhythm  PULM: no incr wob  GI: abdomen nondistended, soft, nontender  : no suprapubic tenderness  NEURO: alert and oriented x 3, normal speech, no FND  MSK: tenderness to mid/low T spine, pt able to bear weight and is steady on feet  SKIN: no visible rashes  PSYCH: tangential though has insight into her recent dx

## 2021-11-06 NOTE — ED ADULT NURSE REASSESSMENT NOTE - NS ED NURSE REASSESS COMMENT FT1
Pt states she had a bowel movement during this visit. Pt states she found a senna in her purse and took it after requesting this medication.

## 2021-11-06 NOTE — ED PROVIDER NOTE - CLINICAL SUMMARY MEDICAL DECISION MAKING FREE TEXT BOX
Attending MD Pierre:  86F presenting for back pain in setting of known T8 compression fracture, patient left AMA from hospital recently. Returns today because she is concerned about the return of pain. No s/s cord compromise, no acute distress. Patient educated on time frame for healing of compression fx (weeks) and states she did not understand that from other providers. Requesting discharge after senna and oral pain medications (will dose tylenol). May need social work for assistance home.      *The above represents an initial assessment/impression. Please refer to progress notes for potential changes in patient clinical course*

## 2021-11-06 NOTE — ED PROVIDER NOTE - OBJECTIVE STATEMENT
85yo female recent admission for T8 compression fx AMA'd from hospital 11/4, presents to the ED for back pain requesting tylenol. Pt also complains of constipation though she took senna prior to coming to ED and states she had a bm. Pt otherwise is asking for food, states she has food at home. Pt able to stand, only some pain with walking, no weakness. Pt is very tangential with questioning. Pt states she can take care of herself at home and when asked if she would like help or to stay in the hospital to be evaluated for HHA/rehab she states she doesn't want to do that and that she would like tylenol and then to go home.

## 2021-11-15 NOTE — CHART NOTE - NSCHARTNOTEFT_GEN_A_CORE
Patient was fit and delivered a TLSO rigid posterior panel and soft apron front. Posterior panel extends from sacrococcygeal junction to the scapular spine. Dinorah was educated on the care use and function of the orthosis. Contact info was given to patient. All went without incident.   Gabriel CARTAGENA  [facility]  [phone]
Patient was informed of referrals and timeframe on 11/06/2021. Will outreach again to confirm follow up appt was made.
The patient is requesting to leave the hospital against medical advice.  She states that she does not want to go to rehab because "I don't want to get covid" and "I have a daughter with MS who relies on me to help her with her care."  After thorough discussion regarding the risks, the patient verbalized understanding that leaving now without completing the recommended treatment/rehabilitation regimen, may result in permanent physical injury up to and including death.  Dr. Martin is aware that patient is leaving AMA.
Left a message for patient in regards to follow up care with callback information.
Patient was informed of referrals and timeframe on 11/15/2021. Will outreach again to confirm follow up appt was made.

## 2021-12-07 ENCOUNTER — EMERGENCY (EMERGENCY)
Facility: HOSPITAL | Age: 86
LOS: 0 days | Discharge: LEFT BEFORE DISCHARGE | End: 2021-12-07
Attending: EMERGENCY MEDICINE
Payer: MEDICARE

## 2021-12-07 VITALS
WEIGHT: 125 LBS | RESPIRATION RATE: 22 BRPM | SYSTOLIC BLOOD PRESSURE: 154 MMHG | TEMPERATURE: 98 F | DIASTOLIC BLOOD PRESSURE: 92 MMHG | HEART RATE: 78 BPM | HEIGHT: 61 IN | OXYGEN SATURATION: 92 %

## 2021-12-07 DIAGNOSIS — I63.9 CEREBRAL INFARCTION, UNSPECIFIED: ICD-10-CM

## 2021-12-07 DIAGNOSIS — R07.89 OTHER CHEST PAIN: ICD-10-CM

## 2021-12-07 DIAGNOSIS — K21.9 GASTRO-ESOPHAGEAL REFLUX DISEASE WITHOUT ESOPHAGITIS: ICD-10-CM

## 2021-12-07 DIAGNOSIS — Z20.822 CONTACT WITH AND (SUSPECTED) EXPOSURE TO COVID-19: ICD-10-CM

## 2021-12-07 DIAGNOSIS — Z91.041 RADIOGRAPHIC DYE ALLERGY STATUS: ICD-10-CM

## 2021-12-07 DIAGNOSIS — Z90.09 ACQUIRED ABSENCE OF OTHER PART OF HEAD AND NECK: ICD-10-CM

## 2021-12-07 DIAGNOSIS — Z90.89 ACQUIRED ABSENCE OF OTHER ORGANS: Chronic | ICD-10-CM

## 2021-12-07 DIAGNOSIS — J40 BRONCHITIS, NOT SPECIFIED AS ACUTE OR CHRONIC: ICD-10-CM

## 2021-12-07 DIAGNOSIS — Z88.8 ALLERGY STATUS TO OTHER DRUGS, MEDICAMENTS AND BIOLOGICAL SUBSTANCES STATUS: ICD-10-CM

## 2021-12-07 DIAGNOSIS — R05.1 ACUTE COUGH: ICD-10-CM

## 2021-12-07 DIAGNOSIS — R06.02 SHORTNESS OF BREATH: ICD-10-CM

## 2021-12-07 DIAGNOSIS — Z79.82 LONG TERM (CURRENT) USE OF ASPIRIN: ICD-10-CM

## 2021-12-07 LAB
ALBUMIN SERPL ELPH-MCNC: 3.1 G/DL — LOW (ref 3.3–5)
ALP SERPL-CCNC: 119 U/L — SIGNIFICANT CHANGE UP (ref 40–120)
ALT FLD-CCNC: 19 U/L — SIGNIFICANT CHANGE UP (ref 12–78)
ANION GAP SERPL CALC-SCNC: 4 MMOL/L — LOW (ref 5–17)
AST SERPL-CCNC: 21 U/L — SIGNIFICANT CHANGE UP (ref 15–37)
BASOPHILS # BLD AUTO: 0.03 K/UL — SIGNIFICANT CHANGE UP (ref 0–0.2)
BASOPHILS NFR BLD AUTO: 0.4 % — SIGNIFICANT CHANGE UP (ref 0–2)
BILIRUB SERPL-MCNC: 0.8 MG/DL — SIGNIFICANT CHANGE UP (ref 0.2–1.2)
BUN SERPL-MCNC: 17 MG/DL — SIGNIFICANT CHANGE UP (ref 7–23)
CALCIUM SERPL-MCNC: 9.4 MG/DL — SIGNIFICANT CHANGE UP (ref 8.5–10.1)
CHLORIDE SERPL-SCNC: 107 MMOL/L — SIGNIFICANT CHANGE UP (ref 96–108)
CO2 SERPL-SCNC: 32 MMOL/L — HIGH (ref 22–31)
CREAT SERPL-MCNC: 0.75 MG/DL — SIGNIFICANT CHANGE UP (ref 0.5–1.3)
D DIMER BLD IA.RAPID-MCNC: 541 NG/ML DDU — HIGH
EOSINOPHIL # BLD AUTO: 0.05 K/UL — SIGNIFICANT CHANGE UP (ref 0–0.5)
EOSINOPHIL NFR BLD AUTO: 0.7 % — SIGNIFICANT CHANGE UP (ref 0–6)
GLUCOSE SERPL-MCNC: 127 MG/DL — HIGH (ref 70–99)
HCT VFR BLD CALC: 42.1 % — SIGNIFICANT CHANGE UP (ref 34.5–45)
HGB BLD-MCNC: 13.4 G/DL — SIGNIFICANT CHANGE UP (ref 11.5–15.5)
IMM GRANULOCYTES NFR BLD AUTO: 0.4 % — SIGNIFICANT CHANGE UP (ref 0–1.5)
LACTATE SERPL-SCNC: 1.6 MMOL/L — SIGNIFICANT CHANGE UP (ref 0.7–2)
LYMPHOCYTES # BLD AUTO: 0.95 K/UL — LOW (ref 1–3.3)
LYMPHOCYTES # BLD AUTO: 12.9 % — LOW (ref 13–44)
MCHC RBC-ENTMCNC: 28.4 PG — SIGNIFICANT CHANGE UP (ref 27–34)
MCHC RBC-ENTMCNC: 31.8 GM/DL — LOW (ref 32–36)
MCV RBC AUTO: 89.2 FL — SIGNIFICANT CHANGE UP (ref 80–100)
MONOCYTES # BLD AUTO: 0.74 K/UL — SIGNIFICANT CHANGE UP (ref 0–0.9)
MONOCYTES NFR BLD AUTO: 10.1 % — SIGNIFICANT CHANGE UP (ref 2–14)
NEUTROPHILS # BLD AUTO: 5.55 K/UL — SIGNIFICANT CHANGE UP (ref 1.8–7.4)
NEUTROPHILS NFR BLD AUTO: 75.5 % — SIGNIFICANT CHANGE UP (ref 43–77)
NRBC # BLD: 0 /100 WBCS — SIGNIFICANT CHANGE UP (ref 0–0)
NT-PROBNP SERPL-SCNC: 774 PG/ML — HIGH (ref 0–450)
PLATELET # BLD AUTO: 327 K/UL — SIGNIFICANT CHANGE UP (ref 150–400)
POTASSIUM SERPL-MCNC: 3.9 MMOL/L — SIGNIFICANT CHANGE UP (ref 3.5–5.3)
POTASSIUM SERPL-SCNC: 3.9 MMOL/L — SIGNIFICANT CHANGE UP (ref 3.5–5.3)
PROT SERPL-MCNC: 7.7 GM/DL — SIGNIFICANT CHANGE UP (ref 6–8.3)
RBC # BLD: 4.72 M/UL — SIGNIFICANT CHANGE UP (ref 3.8–5.2)
RBC # FLD: 14.3 % — SIGNIFICANT CHANGE UP (ref 10.3–14.5)
SODIUM SERPL-SCNC: 143 MMOL/L — SIGNIFICANT CHANGE UP (ref 135–145)
TROPONIN I, HIGH SENSITIVITY RESULT: 27.6 NG/L — SIGNIFICANT CHANGE UP
WBC # BLD: 7.35 K/UL — SIGNIFICANT CHANGE UP (ref 3.8–10.5)
WBC # FLD AUTO: 7.35 K/UL — SIGNIFICANT CHANGE UP (ref 3.8–10.5)

## 2021-12-07 PROCEDURE — 71045 X-RAY EXAM CHEST 1 VIEW: CPT | Mod: 26

## 2021-12-07 PROCEDURE — 93010 ELECTROCARDIOGRAM REPORT: CPT

## 2021-12-07 PROCEDURE — 99285 EMERGENCY DEPT VISIT HI MDM: CPT

## 2021-12-07 RX ORDER — PANTOPRAZOLE SODIUM 20 MG/1
40 TABLET, DELAYED RELEASE ORAL ONCE
Refills: 0 | Status: DISCONTINUED | OUTPATIENT
Start: 2021-12-07 | End: 2021-12-07

## 2021-12-07 RX ORDER — SODIUM CHLORIDE 9 MG/ML
1500 INJECTION, SOLUTION INTRAVENOUS ONCE
Refills: 0 | Status: DISCONTINUED | OUTPATIENT
Start: 2021-12-07 | End: 2021-12-07

## 2021-12-07 RX ORDER — ALBUTEROL 90 UG/1
2 AEROSOL, METERED ORAL
Refills: 0 | Status: DISCONTINUED | OUTPATIENT
Start: 2021-12-07 | End: 2021-12-07

## 2021-12-07 NOTE — ED PROVIDER NOTE - PATIENT PORTAL LINK FT
You can access the FollowMyHealth Patient Portal offered by [facility] by registering at the following website: [email or web address]. By joining [employer]’s FollowMyHealth portal, you will also be able to view your health information using other applications (apps) compatible with our system.

## 2021-12-07 NOTE — ED PROVIDER NOTE - PROGRESS NOTE DETAILS
Pt sts she was started on Amoxil by her doctor couple days ago. Pt is alert and oriented x 3 refuses medications solumedral and albuterol and sts she wants to be discharged and she will come back tomorrow. Pt wants to leave before labs and being discharged. Pt is capable of making medical decisions, pt is explained the risks including death of leaving hospital against medical advise.

## 2021-12-07 NOTE — ED PROVIDER NOTE - CLINICAL SUMMARY MEDICAL DECISION MAKING FREE TEXT BOX
hx, exam ekg cxr no acute infil Pt wants to leave before labs and follow up with her pmd navin pt is also referred to Dr. Liu. Pt is advised to return if symptoms persist or worsen.

## 2021-12-07 NOTE — ED ADULT NURSE NOTE - OBJECTIVE STATEMENT
PT is a&ox4, C/o "rumble in her chest" X3 days, Painful when she coughs, sob. Pt states productive cough started 5 days ago, clear sputum. denies chest pain, chills, n,v, diarrhea.

## 2021-12-07 NOTE — ED PROVIDER NOTE - CONSTITUTIONAL, MLM
normal... Well appearing, awake, alert, oriented to person, place, time/situation and in no apparent distress. Speaking in clear full sentences no nasal flaring no shoulders retractions no diaphoresis appears comfortable

## 2021-12-07 NOTE — ED PROVIDER NOTE - OBJECTIVE STATEMENT
87 years old female walked in c/o productive cough, sob, chest congestions and chest burning pain with cough x 5 days. Pt sts she has hx of asthma and hypertension. Pt had three doses of Pfizer. Pt denies headache, dizziness, blurrd visions, light sensitivities, focal/distal weakness or numbness, , nausea, vomiting, fever, chills, abd pain, dysuria or irregular bowel movements.

## 2021-12-08 ENCOUNTER — EMERGENCY (EMERGENCY)
Facility: HOSPITAL | Age: 86
LOS: 1 days | Discharge: LEFT BEFORE DISCHARGE | End: 2021-12-08
Attending: HOSPITALIST | Admitting: HOSPITALIST
Payer: MEDICARE

## 2021-12-08 VITALS
SYSTOLIC BLOOD PRESSURE: 140 MMHG | HEART RATE: 82 BPM | DIASTOLIC BLOOD PRESSURE: 78 MMHG | OXYGEN SATURATION: 100 % | RESPIRATION RATE: 18 BRPM | HEIGHT: 61 IN | TEMPERATURE: 98 F

## 2021-12-08 VITALS
SYSTOLIC BLOOD PRESSURE: 133 MMHG | OXYGEN SATURATION: 100 % | RESPIRATION RATE: 19 BRPM | TEMPERATURE: 98 F | DIASTOLIC BLOOD PRESSURE: 84 MMHG | HEART RATE: 90 BPM

## 2021-12-08 DIAGNOSIS — Z90.89 ACQUIRED ABSENCE OF OTHER ORGANS: Chronic | ICD-10-CM

## 2021-12-08 PROCEDURE — L9991: CPT

## 2021-12-08 NOTE — ED PROVIDER NOTE - NSFOLLOWUPINSTRUCTIONS_ED_ALL_ED_FT
Follow up with your primary care doctor within 1 week  Return to the ER with any worsening or concerning symptoms, shortness of breath, worsening cough, weakness, feeling faint, fever or any other concerns.

## 2021-12-08 NOTE — ED ADULT TRIAGE NOTE - CHIEF COMPLAINT QUOTE
Patient has c/o cough and shortness of breath as well as back pain. She was at [place] for same and they wanted her to stay but she did not. Pt states she has pneumonia (self-diagnosed)

## 2021-12-08 NOTE — ED PROVIDER NOTE - PROGRESS NOTE DETAILS
KYRA Suarez: MD discussed with daughter, pt drives herself normally. KYRA Suarez: MD discussed with daughter, pt is independent and drives. Discussed that pt is refusing examination, daughter will check in on patient later this evening. KYRA Suarez: Pt stating she dose not have her car keys, states someone else parked her car. Eventually keys were found on her person. Called daughter who states mother is frequently losing her keys and is paranoid someone stole her car. Given pt is elderly, dark outside RN will spoke to SW to get taxi home for this patient.

## 2021-12-08 NOTE — ED PROVIDER NOTE - OBJECTIVE STATEMENT
Pt refusing to be seen in the ED, requesting to leave, will not allow evaluation, states she will not answer personal questions. 87yF presenting to ED with cough. Went to evaluate pt and she is refusing to be seen in the ED, requesting to leave, states she will not answer personal questions. Refusing to be examined. Pt is resting comfortably on her walker.

## 2021-12-08 NOTE — PROVIDER CONTACT NOTE (OTHER) - ASSESSMENT
Pt was discharged, per Attending Provider, pt is not safe to drive at Night. Pt was insisting to drive, spoke with her daughter Kathryn, [phone] and she asked to put her in a Taxi.  Dtr spoke with pt and she agreed to take the taxi. Arranged [employer] and put her in the taxi. Bill back. Leonor RN/Admin, Makenzie RN/Asst Mgr was with pt. Received Text from Taxi stating pt was dropped off at 7:04 PM.

## 2021-12-08 NOTE — ED ADULT NURSE REASSESSMENT NOTE - NS ED NURSE REASSESS COMMENT FT1
Notified by triage RN that pt was agitated upon exciting ER and was unable to locate her car in the ER lot stating someone took her keys. Pt keys were then located on patient in her purse. Upon my evaluation pt appeared forgetful and it was escalated to the team that this patient should not be driving a car home. Pt repeatedly refused to answer questions to assess mental status, frequently becoming agitated. KYRA Suarez spoke with daughter who states pt is at baseline mental status and frequently misplaces her keys and becomes agitated. Myself, MD Heard, MERVAT Velazquez, EBER Salgado and Security team all agreed that having the pt drive home at night would be unsafe. MD and EBER spoke with daughter Kathryn who agreed pt should take taxi home and get the car at a later date because she is at work and unable to  patient. EBER Salgado arranged transport via [employer] to pt home address. Pt escorted into taxi to address provided. MD Heard to follow up that pt arrived home safely. -Makenzie Morrissey RN (AN).

## 2021-12-08 NOTE — PROVIDER CONTACT NOTE (OTHER) - RECOMMENDATIONS
At 7:10 pt's daughter called me and left message stating pt got back to her apartment safely with no incidents.

## 2021-12-08 NOTE — ED PROVIDER NOTE - CLINICAL SUMMARY MEDICAL DECISION MAKING FREE TEXT BOX
87yF presenting to ED with cough. Went to evaluate pt and she is refusing to be seen in the ED, requesting to leave, states she will not answer personal questions. Refusing to be examined. Pt is A&Ox3 and has decision making capacity. Attempted multiple times to discuss patients symptoms and perform an exam but she continues to request to leave and deny examination. Will discuss with pts family.

## 2021-12-12 LAB
CULTURE RESULTS: SIGNIFICANT CHANGE UP
CULTURE RESULTS: SIGNIFICANT CHANGE UP
SPECIMEN SOURCE: SIGNIFICANT CHANGE UP
SPECIMEN SOURCE: SIGNIFICANT CHANGE UP

## 2021-12-23 ENCOUNTER — EMERGENCY (EMERGENCY)
Facility: HOSPITAL | Age: 86
LOS: 0 days | Discharge: HOME | End: 2021-12-23
Attending: EMERGENCY MEDICINE
Payer: MEDICARE

## 2021-12-23 VITALS
DIASTOLIC BLOOD PRESSURE: 80 MMHG | WEIGHT: 139.99 LBS | HEART RATE: 82 BPM | HEIGHT: 61 IN | OXYGEN SATURATION: 97 % | RESPIRATION RATE: 17 BRPM | SYSTOLIC BLOOD PRESSURE: 129 MMHG | TEMPERATURE: 98 F

## 2021-12-23 VITALS
HEART RATE: 80 BPM | TEMPERATURE: 98 F | OXYGEN SATURATION: 99 % | DIASTOLIC BLOOD PRESSURE: 78 MMHG | RESPIRATION RATE: 18 BRPM | SYSTOLIC BLOOD PRESSURE: 125 MMHG

## 2021-12-23 DIAGNOSIS — R42 DIZZINESS AND GIDDINESS: ICD-10-CM

## 2021-12-23 DIAGNOSIS — Z90.89 ACQUIRED ABSENCE OF OTHER ORGANS: Chronic | ICD-10-CM

## 2021-12-23 DIAGNOSIS — Z79.82 LONG TERM (CURRENT) USE OF ASPIRIN: ICD-10-CM

## 2021-12-23 DIAGNOSIS — Z88.8 ALLERGY STATUS TO OTHER DRUGS, MEDICAMENTS AND BIOLOGICAL SUBSTANCES STATUS: ICD-10-CM

## 2021-12-23 LAB
ALBUMIN SERPL ELPH-MCNC: 3.3 G/DL — SIGNIFICANT CHANGE UP (ref 3.3–5)
ALP SERPL-CCNC: 122 U/L — HIGH (ref 40–120)
ALT FLD-CCNC: 21 U/L — SIGNIFICANT CHANGE UP (ref 12–78)
ANION GAP SERPL CALC-SCNC: 6 MMOL/L — SIGNIFICANT CHANGE UP (ref 5–17)
APPEARANCE UR: CLEAR — SIGNIFICANT CHANGE UP
APTT BLD: 26.5 SEC — LOW (ref 27.5–35.5)
AST SERPL-CCNC: 15 U/L — SIGNIFICANT CHANGE UP (ref 15–37)
BACTERIA # UR AUTO: NEGATIVE — SIGNIFICANT CHANGE UP
BASOPHILS # BLD AUTO: 0.05 K/UL — SIGNIFICANT CHANGE UP (ref 0–0.2)
BASOPHILS NFR BLD AUTO: 0.7 % — SIGNIFICANT CHANGE UP (ref 0–2)
BILIRUB SERPL-MCNC: 1.2 MG/DL — SIGNIFICANT CHANGE UP (ref 0.2–1.2)
BILIRUB UR-MCNC: ABNORMAL
BUN SERPL-MCNC: 26 MG/DL — HIGH (ref 7–23)
CALCIUM SERPL-MCNC: 9.6 MG/DL — SIGNIFICANT CHANGE UP (ref 8.5–10.1)
CHLORIDE SERPL-SCNC: 108 MMOL/L — SIGNIFICANT CHANGE UP (ref 96–108)
CO2 SERPL-SCNC: 29 MMOL/L — SIGNIFICANT CHANGE UP (ref 22–31)
COD CRY URNS QL: ABNORMAL
COLOR SPEC: ABNORMAL
COMMENT - URINE: SIGNIFICANT CHANGE UP
COMMENT - URINE: SIGNIFICANT CHANGE UP
CREAT SERPL-MCNC: 0.86 MG/DL — SIGNIFICANT CHANGE UP (ref 0.5–1.3)
DIFF PNL FLD: ABNORMAL
EOSINOPHIL # BLD AUTO: 0.04 K/UL — SIGNIFICANT CHANGE UP (ref 0–0.5)
EOSINOPHIL NFR BLD AUTO: 0.6 % — SIGNIFICANT CHANGE UP (ref 0–6)
EPI CELLS # UR: SIGNIFICANT CHANGE UP
GLUCOSE SERPL-MCNC: 122 MG/DL — HIGH (ref 70–99)
GLUCOSE UR QL: NEGATIVE MG/DL — SIGNIFICANT CHANGE UP
GRAN CASTS # UR COMP ASSIST: ABNORMAL /LPF
HCT VFR BLD CALC: 41.6 % — SIGNIFICANT CHANGE UP (ref 34.5–45)
HGB BLD-MCNC: 13.4 G/DL — SIGNIFICANT CHANGE UP (ref 11.5–15.5)
HYALINE CASTS # UR AUTO: ABNORMAL /LPF
IMM GRANULOCYTES NFR BLD AUTO: 0.3 % — SIGNIFICANT CHANGE UP (ref 0–1.5)
INR BLD: 0.99 RATIO — SIGNIFICANT CHANGE UP (ref 0.88–1.16)
KETONES UR-MCNC: ABNORMAL
LEUKOCYTE ESTERASE UR-ACNC: ABNORMAL
LYMPHOCYTES # BLD AUTO: 1.17 K/UL — SIGNIFICANT CHANGE UP (ref 1–3.3)
LYMPHOCYTES # BLD AUTO: 17.3 % — SIGNIFICANT CHANGE UP (ref 13–44)
MAGNESIUM SERPL-MCNC: 2.1 MG/DL — SIGNIFICANT CHANGE UP (ref 1.6–2.6)
MCHC RBC-ENTMCNC: 28.5 PG — SIGNIFICANT CHANGE UP (ref 27–34)
MCHC RBC-ENTMCNC: 32.2 GM/DL — SIGNIFICANT CHANGE UP (ref 32–36)
MCV RBC AUTO: 88.3 FL — SIGNIFICANT CHANGE UP (ref 80–100)
MONOCYTES # BLD AUTO: 0.71 K/UL — SIGNIFICANT CHANGE UP (ref 0–0.9)
MONOCYTES NFR BLD AUTO: 10.5 % — SIGNIFICANT CHANGE UP (ref 2–14)
NEUTROPHILS # BLD AUTO: 4.78 K/UL — SIGNIFICANT CHANGE UP (ref 1.8–7.4)
NEUTROPHILS NFR BLD AUTO: 70.6 % — SIGNIFICANT CHANGE UP (ref 43–77)
NITRITE UR-MCNC: NEGATIVE — SIGNIFICANT CHANGE UP
NRBC # BLD: 0 /100 WBCS — SIGNIFICANT CHANGE UP (ref 0–0)
NT-PROBNP SERPL-SCNC: 498 PG/ML — HIGH (ref 0–450)
PH UR: 5 — SIGNIFICANT CHANGE UP (ref 5–8)
PLATELET # BLD AUTO: 260 K/UL — SIGNIFICANT CHANGE UP (ref 150–400)
POTASSIUM SERPL-MCNC: 4 MMOL/L — SIGNIFICANT CHANGE UP (ref 3.5–5.3)
POTASSIUM SERPL-SCNC: 4 MMOL/L — SIGNIFICANT CHANGE UP (ref 3.5–5.3)
PROT SERPL-MCNC: 7.2 GM/DL — SIGNIFICANT CHANGE UP (ref 6–8.3)
PROT UR-MCNC: 30 MG/DL
PROTHROM AB SERPL-ACNC: 11.5 SEC — SIGNIFICANT CHANGE UP (ref 10.6–13.6)
RBC # BLD: 4.71 M/UL — SIGNIFICANT CHANGE UP (ref 3.8–5.2)
RBC # FLD: 14.6 % — HIGH (ref 10.3–14.5)
RBC CASTS # UR COMP ASSIST: ABNORMAL /HPF (ref 0–4)
SODIUM SERPL-SCNC: 143 MMOL/L — SIGNIFICANT CHANGE UP (ref 135–145)
SP GR SPEC: 1.02 — SIGNIFICANT CHANGE UP (ref 1.01–1.02)
TROPONIN I, HIGH SENSITIVITY RESULT: 20.9 NG/L — SIGNIFICANT CHANGE UP
TSH SERPL-MCNC: 1.1 UU/ML — SIGNIFICANT CHANGE UP (ref 0.36–3.74)
UROBILINOGEN FLD QL: 4 MG/DL
WBC # BLD: 6.77 K/UL — SIGNIFICANT CHANGE UP (ref 3.8–10.5)
WBC # FLD AUTO: 6.77 K/UL — SIGNIFICANT CHANGE UP (ref 3.8–10.5)
WBC UR QL: ABNORMAL

## 2021-12-23 PROCEDURE — 99285 EMERGENCY DEPT VISIT HI MDM: CPT

## 2021-12-23 PROCEDURE — 70450 CT HEAD/BRAIN W/O DYE: CPT | Mod: 26,MA

## 2021-12-23 RX ORDER — HALOPERIDOL DECANOATE 100 MG/ML
2 INJECTION INTRAMUSCULAR ONCE
Refills: 0 | Status: COMPLETED | OUTPATIENT
Start: 2021-12-23 | End: 2021-12-23

## 2021-12-23 NOTE — ED PROVIDER NOTE - OBJECTIVE STATEMENT
87F unclear med hx pw dizziness. pt notes she was dizzy this morning upon awakening. she notes it has since resolved. she denies ha, vision loss, rhinorrhea, cp, sob, abd pain, numbness, rash, bleeding, dysuria, back pain, cough. pt reports feeling very anxious.

## 2021-12-23 NOTE — ED PROVIDER NOTE - CLINICAL SUMMARY MEDICAL DECISION MAKING FREE TEXT BOX
dizziness. dizziness.   I read ekg as nsr rate 72, no st elevation or depression, qtc 42, narrow qrs, normal axis, left axis deviation, inferior qs. dizziness.   I read ekg as nsr rate 72, no st elevation or depression, qtc 42, narrow qrs, normal axis, left axis deviation, inferior qs.  labs reassuring. pt continues to feel well. ok for dc home.

## 2021-12-23 NOTE — ED PROVIDER NOTE - PATIENT PORTAL LINK FT
You can access the FollowMyHealth Patient Portal offered by [facility] by registering at the following website: [email or web address]. By joining [facility]’s FollowMyHealth portal, you will also be able to view your health information using other applications (apps) compatible with our system.

## 2021-12-23 NOTE — ED PROVIDER NOTE - PRO INTERPRETER NEED 2
"Hospitalist Team      Patient Care Team:  Arnaud Monson MD as PCP - General  Arnaud Monson MD as PCP - Family Medicine        Chief Complaint: Follow-up HTN    Subjective    Ms. León notes she is still having some pain this evening, but it seems better controlled.  Denies chest pain and dyspnea.  Has not recovered her appetite.      Objective    Vital Signs  Temp:  [97 °F (36.1 °C)-99.3 °F (37.4 °C)] 98.6 °F (37 °C)  Heart Rate:  [60-80] 80  Resp:  [18] 18  BP: ()/(51-73) 105/56  Oxygen Therapy  SpO2: 95 %  Pulse Oximetry Type: Intermittent  O2 Device: room air  Flow (L/min): 1  EtCO2 (mm Hg) (Respiratory Monitoring): 36}    Flowsheet Rows         First Filed Value    Admission Height  63\" (160 cm) Documented at 12/01/2017 1040    Admission Weight  123 lb 6.4 oz (56 kg) Documented at 12/01/2017 0638          Physical Exam:    General Appearance:    Sleeping, but aroused easily   Lungs:     Clear to auscultation,respirations regular, even and                   unlabored    Heart:    Regular rhythm and normal rate, normal S1 and S2, no            murmur, no gallop, no rub, no click   Abdomen:     Soft and non-tender w/ active bowel sounds   Extremities:   Moves all extremities well, no edema, no cyanosis   Pulses:   Radial pulses palpable and equal bilaterally   Neurologic:   Cranial nerves 2 - 12 grossly intact       Results Review:     I reviewed the patient's new clinical results.  Lab Results (last 24 hours)     Procedure Component Value Units Date/Time    Basic Metabolic Panel [[id number]]  (Abnormal) Collected:  12/04/17 0404    Specimen:  Blood Updated:  12/04/17 0444     Glucose 94 mg/dL      BUN 14 mg/dL      Creatinine 0.70 mg/dL      Sodium 136 mmol/L      Potassium 4.2 mmol/L      Chloride 96 (L) mmol/L      CO2 33.0 (H) mmol/L      Calcium 9.4 mg/dL      eGFR Non African Amer 80 mL/min/1.73      BUN/Creatinine Ratio 20.0     Anion Gap 7.0 mmol/L     Narrative:       The MDRD GFR " formula is only valid for adults with stable renal function between ages 18 and 70.          Imaging Results (last 24 hours)     ** No results found for the last 24 hours. **            Medication Review:   I have reviewed the patient's current medication list    Current Facility-Administered Medications:   •  allopurinol (ZYLOPRIM) tablet 100 mg, 100 mg, Oral, Daily, Mirza Whittington MD, 100 mg at 12/04/17 0926  •  aspirin EC tablet 325 mg, 325 mg, Oral, Daily, Mirza Whittington MD, 325 mg at 12/04/17 0926  •  atorvastatin (LIPITOR) tablet 10 mg, 10 mg, Oral, Daily, Mirza Whittington MD, 10 mg at 12/04/17 0927  •  cholecalciferol (VITAMIN D3) tablet 2,000 Units, 2,000 Units, Oral, Daily, Mirza Whittington MD, 2,000 Units at 12/04/17 0926  •  docusate sodium (COLACE) capsule 100 mg, 100 mg, Oral, BID, Mirza Whittington MD, 100 mg at 12/04/17 1730  •  escitalopram (LEXAPRO) tablet 10 mg, 10 mg, Oral, Daily, Mirza Whittington MD, 10 mg at 12/04/17 0927  •  lactated ringers infusion, 50 mL/hr, Intravenous, Continuous, Christina Harris CRNA, Stopped at 12/01/17 1256  •  latanoprost (XALATAN) 0.005 % ophthalmic solution 1 drop, 1 drop, Both Eyes, Nightly, Mirza Whittington MD, 1 drop at 12/03/17 2055  •  levothyroxine (SYNTHROID, LEVOTHROID) tablet 50 mcg, 50 mcg, Oral, Daily, Mirza Whittington MD, 50 mcg at 12/04/17 0928  •  metoprolol tartrate (LOPRESSOR) tablet 25 mg, 25 mg, Oral, Daily, Mirza Whittington MD, 25 mg at 12/04/17 0926  •  oxyCODONE-acetaminophen (PERCOCET) 7.5-325 MG per tablet 1 tablet, 1 tablet, Oral, Q4H PRN, Mirza Whittington MD, 1 tablet at 12/04/17 1621  •  pantoprazole (PROTONIX) EC tablet 40 mg, 40 mg, Oral, QAM, Mirza Whittington MD, 40 mg at 12/04/17 0644  •  sodium chloride 0.9 % infusion, 20 mL/hr, Intravenous, Continuous, Mirza Whittington MD  •  zolpidem (AMBIEN) tablet 10 mg, 10 mg, Oral, Nightly, Mirza Whittington MD, 10 mg at 12/03/17 2054      Assessment/Plan     1.  HTN:  At goal.  No change to regimen.    Plan for disposition: As  per Ortho.    Mason Mathew MD  12/04/17  7:41 PM         English

## 2021-12-23 NOTE — ED PROVIDER NOTE - CARE PROVIDER_API CALL
Felicia Hodge  NEUROLOGY  [address] [place] [address]  Phone: [phone]  Fax: [phone]  Follow Up Time: Urgent

## 2021-12-23 NOTE — ED ADULT NURSE NOTE - CHIEF COMPLAINT QUOTE
as per patient c/o constipation unrelieved by stool softeners. Reports no BM for 3-4 days. Denies N/V. Hx: OCHOA

## 2021-12-23 NOTE — ED ADULT NURSE NOTE - NSIMPLEMENTINTERV_GEN_ALL_ED
Implemented All Fall with Harm Risk Interventions:  [place] to call system. Call bell, personal items and telephone within reach. Instruct patient to call for assistance. Room bathroom lighting operational. Non-slip footwear when patient is off stretcher. Physically safe environment: no spills, clutter or unnecessary equipment. Stretcher in lowest position, wheels locked, appropriate side rails in place. Provide visual cue, wrist band, yellow gown, etc. Monitor gait and stability. Monitor for mental status changes and reorient to person, place, and time. Review medications for side effects contributing to fall risk. Reinforce activity limits and safety measures with patient and family. Provide visual clues: red socks.

## 2021-12-23 NOTE — ED ADULT NURSE NOTE - OBJECTIVE STATEMENT
Pt AOx4, ambulatory, c/o dizziness upon waking up, resolved on arrival to ED. PT denies CP, SOB, changes in vision, LOC. EKG done, pt placed on cardiac monitor, no signs of distress noted.

## 2022-01-26 NOTE — ED PROVIDER NOTE - NS ED MD DISPO DISCHARGE CCDA
Recommend tetanus (tdap) vaccines at the pharmacy.    
Patient/Caregiver provided printed discharge information.

## 2022-04-05 ENCOUNTER — NON-APPOINTMENT (OUTPATIENT)
Age: 87
End: 2022-04-05

## 2022-04-05 ENCOUNTER — APPOINTMENT (OUTPATIENT)
Dept: CARDIOLOGY | Facility: CLINIC | Age: 87
End: 2022-04-05
Payer: MEDICARE

## 2022-04-05 VITALS
BODY MASS INDEX: 21.92 KG/M2 | WEIGHT: 116 LBS | DIASTOLIC BLOOD PRESSURE: 80 MMHG | HEART RATE: 77 BPM | SYSTOLIC BLOOD PRESSURE: 120 MMHG | OXYGEN SATURATION: 96 %

## 2022-04-05 DIAGNOSIS — R53.83 OTHER FATIGUE: ICD-10-CM

## 2022-04-05 DIAGNOSIS — I08.0 RHEUMATIC DISORDERS OF BOTH MITRAL AND AORTIC VALVES: ICD-10-CM

## 2022-04-05 DIAGNOSIS — R00.2 PALPITATIONS: ICD-10-CM

## 2022-04-05 PROCEDURE — 93000 ELECTROCARDIOGRAM COMPLETE: CPT

## 2022-04-05 PROCEDURE — 99214 OFFICE O/P EST MOD 30 MIN: CPT

## 2022-04-06 ENCOUNTER — NON-APPOINTMENT (OUTPATIENT)
Age: 87
End: 2022-04-06

## 2022-04-06 ENCOUNTER — APPOINTMENT (OUTPATIENT)
Dept: CARDIOLOGY | Facility: CLINIC | Age: 87
End: 2022-04-06

## 2022-04-06 PROBLEM — I08.0 MITRAL AND AORTIC INSUFFICIENCY: Status: ACTIVE | Noted: 2018-09-17

## 2022-04-06 NOTE — ASSESSMENT
[FreeTextEntry1] : Myxomatous mitral valve with trivial prolapse; mild mitral valve insufficiency.\par \par Mild aortic valve insufficiency.\par \par Mild diastolic LV dysfunction\par \par Palps\par \par Remote hx. of CVA 2007 (?mechanism), associated with ataxia and right leg weakness at the time, which resolved.

## 2022-04-06 NOTE — PHYSICAL EXAM
[General Appearance - Well Developed] : well developed [Normal Appearance] : normal appearance [Well Groomed] : well groomed [General Appearance - Well Nourished] : well nourished [General Appearance - In No Acute Distress] : no acute distress [Normal Conjunctiva] : the conjunctiva exhibited no abnormalities [Eyelids - No Xanthelasma] : the eyelids demonstrated no xanthelasmas [Normal Jugular Venous A Waves Present] : normal jugular venous A waves present [Normal Jugular Venous V Waves Present] : normal jugular venous V waves present [Respiration, Rhythm And Depth] : normal respiratory rhythm and effort [Auscultation Breath Sounds / Voice Sounds] : lungs were clear to auscultation bilaterally [Heart Rate And Rhythm] : heart rate and rhythm were normal [Bowel Sounds] : normal bowel sounds [Nail Clubbing] : no clubbing of the fingernails [Cyanosis, Localized] : no localized cyanosis [Skin Color & Pigmentation] : normal skin color and pigmentation [] : no rash [Oriented To Time, Place, And Person] : oriented to person, place, and time [Impaired Insight] : insight and judgment were intact [Affect] : the affect was normal [Mood] : the mood was normal [FreeTextEntry1] : 2+ pulses in the upper and lower extremities. No edema.

## 2022-04-06 NOTE — DISCUSSION/SUMMARY
[FreeTextEntry1] : \par Palps - EKG shows SR today.  Prior Holter monitor without significant findings.  \par \par Mild MVP with trivial prolapse/mild MR; mild aortic valve insufficiency - remains asx.; exam unchanged  \par \par Mild diastolic LV dysfunction on most recent echo of 2017; no treatment needed.  \par \par She has been reassured that her cardiac status is stable and has been referred to her primary care physician for ongoing general medical management.\par \par She will return here for a visit in on an as needed basis.

## 2022-04-06 NOTE — REASON FOR VISIT
[FreeTextEntry1] : \samantha Dinorah Hogan has a hx of  palpitations, MVP with mild MR & mild aortic valve insufficiency.  She returns for f/u today.

## 2022-04-06 NOTE — REVIEW OF SYSTEMS
[Joint Pain] : joint pain [Weakness] : weakness [Negative] : Heme/Lymph [Joint Stiffness] : joint stiffness [Joint Swelling] : no joint swelling [Muscle Cramps] : no muscle cramps [Myalgia] : no myalgia [Dizziness] : no dizziness [Tremor] : no tremor was seen [Convulsions] : no convulsions [Tingling (Paresthesia)] : no tingling [Limb Weakness (Paresis)] : no limb weakness (Paresis) [Speech Disturbance] : no speech disturbance [FreeTextEntry5] : Cf. HPI

## 2022-04-06 NOTE — HISTORY OF PRESENT ILLNESS
[FreeTextEntry1] : We saw her last in March of last year.  Since that time, she seems stable from a cardiac standpoint. \par \par Her main problems remain orthopedic.  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 the hip.  Otherwise, since her last visit, she has been feeling okay overall for the most part.  She relates feelings of loneliness and frustration regarding her general medical decline related to age.  Today, she has no specific cardiac complaint and seeks reassurance of her cardiac health. She reports no episodes of chest discomfort and describes no unusual shortness of breath.  There is occasional aware of her heart beat and sometimes mild tachycardia.\par \par Her medications have been reviewed and she is taking all as directed.

## 2022-04-25 ENCOUNTER — APPOINTMENT (OUTPATIENT)
Dept: PULMONOLOGY | Facility: CLINIC | Age: 87
End: 2022-04-25

## 2022-05-26 NOTE — ED PROVIDER NOTE - PMH
[Gradual] : gradual Anxiety    Asthma    Cerebrovascular accident (CVA), unspecified mechanism    CVA (Cerebral Infarction)    Diverticulosis of intestine    GERD (gastroesophageal reflux disease)    Vertebral fracture, osteoporotic [7] : 7 [0] : 0 [Intermittent] : intermittent [Household chores] : household chores [Leisure] : leisure [Social interactions] : social interactions [Rest] : rest [Student] : Work status: student [de-identified] : Patient is here for her left heel. Patient states NKI. Patient states she started to have pain approx 1 year ago. Patient states she went to an Ortho Dr. Sanchez.  [] : Post Surgical Visit: no [FreeTextEntry1] : Left heel  [FreeTextEntry3] : 1 year ago  [de-identified] : movement

## 2022-06-23 NOTE — DISCHARGE NOTE PROVIDER - NSDCHHNEEDSERVICE_GEN_ALL_CORE
Hospital Medicine Discharge Summary    Patient: Bhavesh Rosas     Gender: female  : 1959   Age: 58 y.o. MRN: [id number]    Admitting Physician: Aleena Brizuela MD  Discharge Physician: Aleena Brizuela MD     Code Status: Full Code     Admit Date: 2022   Discharge Date:   2022    Disposition:  Home      Discharge Diagnoses: Active Hospital Problems    Diagnosis Date Noted    Vertigo [R42] 2022     Priority: Medium           Condition at Discharge:  Stable    Hospital Course:   Patient was admitted to hospital with vertigo felt much better by the next day had no further palpitations chest pain shortness of breath fevers or chills was discharged home after admitted with nausea as patient felt methimazole causing his symptoms we will follow-up with endocrinology outpatient    Discharge Exam:    BP (!) 143/79   Pulse 58   Temp 97.7 °F (36.5 °C) (Oral)   Resp 16   Ht 5' 4\" (1.626 m)   Wt 184 lb 4.8 oz (83.6 kg)   SpO2 95%   BMI 31.64 kg/m²   General appearance:  Appears comfortable. AAOx3  HEENT: atraumatic, Pupils equal, muscous membranes moist, no masses appreciated  Cardiovascular: Regular rate and rhythm no murmurs appreciated  Respiratory: CTAB no wheezing  Gastrointestinal: Abdomen soft, non-tender, BS+  EXT: no edema  Neurology: no gross focal deficts  Psychiatry: Appropriate affect.  Not agitated  Skin: Warm, dry, no rashes appreciated    Discharge Medications:   Current Discharge Medication List        Current Discharge Medication List        Current Discharge Medication List      CONTINUE these medications which have NOT CHANGED    Details   methIMAzole (TAPAZOLE) 5 MG tablet Take 1 tablet by mouth daily  Qty: 30 tablet, Refills: 4      carvedilol (COREG) 6.25 MG tablet Take 1 tablet by mouth 2 times daily  Qty: 60 tablet, Refills: 2      Semaglutide,0.25 or 0.5MG/DOS, (OZEMPIC, 0.25 OR 0.5 MG/DOSE,) 2 MG/1.5ML SOPN 0.25 mg  Qty: 1 pen, Refills: 0    Associated Diagnoses: Obesity, unspecified classification, unspecified obesity type, unspecified whether serious comorbidity present      umeclidinium-vilanterol (ANORO ELLIPTA) 62.5-25 MCG/INH AEPB inhaler Inhale 1 puff into the lungs daily  Qty: 1 each, Refills: 2      rosuvastatin (CRESTOR) 5 MG tablet Take 1 tablet by mouth daily  Qty: 90 tablet, Refills: 3      Insulin Pen Needle (B-D UF III MINI PEN NEEDLES) 31G X 5 MM MISC 1 each by Does not apply route daily  Qty: 100 each, Refills: 6      lisinopril (PRINIVIL;ZESTRIL) 20 MG tablet Take 1 tablet by mouth 2 times daily  Qty: 180 tablet, Refills: 3      hydroCHLOROthiazide (MICROZIDE) 12.5 MG capsule Take 1 capsule by mouth every morning  Qty: 90 capsule, Refills: 3      nitroGLYCERIN (NITROSTAT) 0.4 MG SL tablet Place 1 tablet under the tongue every 5 minutes as needed for Chest pain  Qty: 25 tablet, Refills: 3      famotidine (PEPCID) 20 MG tablet Take 1 tablet by mouth 2 times daily  Qty: 60 tablet, Refills: 3      Cholecalciferol (VITAMIN D) 50 MCG (2000 UT) CAPS capsule Take 2 capsules by mouth daily      clopidogrel (PLAVIX) 75 MG tablet Take 1 tablet by mouth daily  Qty: 90 tablet, Refills: 3      potassium chloride (KLOR-CON M) 10 MEQ extended release tablet Take 1 tablet by mouth daily  Qty: 90 tablet, Refills: 3      aspirin 81 MG EC tablet Take 81 mg by mouth daily           Current Discharge Medication List          Labs:  For convenience and continuity at follow-up the following most recent labs are provided:    Lab Results   Component Value Date    WBC 6.5 06/23/2022    HGB 12.8 06/23/2022    HCT 38.8 06/23/2022    MCV 84.1 06/23/2022     06/23/2022     06/23/2022    K 3.3 06/23/2022     06/23/2022    CO2 28 06/23/2022    BUN 15 06/23/2022    CREATININE <0.5 06/23/2022    CALCIUM 9.3 06/23/2022    ALKPHOS 59 06/22/2022    ALT 17 06/22/2022    AST 17 06/22/2022    BILITOT 0.4 06/22/2022    BILIDIR 0.10 07/31/2010    LABALBU 4.4 06/22/2022 LDLCALC 71 05/11/2022    TRIG 72 12/10/2021     Lab Results   Component Value Date    INR 0.90 06/22/2022       Radiology:  CT HEAD WO CONTRAST    Result Date: 6/22/2022  EXAMINATION: CT OF THE HEAD WITHOUT CONTRAST  6/22/2022 10:59 am TECHNIQUE: CT of the head was performed without the administration of intravenous contrast. Automated exposure control, iterative reconstruction, and/or weight based adjustment of the mA/kV was utilized to reduce the radiation dose to as low as reasonably achievable. COMPARISON: None. HISTORY: ORDERING SYSTEM PROVIDED HISTORY: dizziness TECHNOLOGIST PROVIDED HISTORY: Reason for exam:->dizziness Has a \"code stroke\" or \"stroke alert\" been called? ->No Decision Support Exception - unselect if not a suspected or confirmed emergency medical condition->Emergency Medical Condition (MA) Reason for Exam: dizziness FINDINGS: BRAIN/VENTRICLES: There is no acute intracranial hemorrhage, mass effect or midline shift. No abnormal extra-axial fluid collection. The gray-white differentiation is maintained without evidence of an acute infarct. There is no evidence of hydrocephalus. ORBITS: The visualized portion of the orbits demonstrate no acute abnormality. SINUSES: The visualized paranasal sinuses and mastoid air cells demonstrate no acute abnormality. SOFT TISSUES/SKULL:  No acute abnormality of the visualized skull or soft tissues. No acute intracranial abnormality. US HEAD NECK SOFT TISSUE THYROID    Result Date: 5/29/2022  EXAMINATION: THYROID ULTRASOUND 5/28/2022 COMPARISON: 01/15/2022, 08/14/2021, 08/21/2020, 06/20/2020 HISTORY: ORDERING SYSTEM PROVIDED HISTORY: Multinodular goiter Re-evaluate thyroid nodules. Previous left thyroid nodule FNA was benign on 08/21/2020. FINDINGS: Right thyroid lobe:  Measures 4.8 x 2.0 x 2.0 cm Left thyroid lobe:  Measures 5.4 x 2.3 x 2.0 cm Isthmus:  Measures 5.9 mm Thyroid Gland:  Thyroid gland demonstrates normal echotexture and vascularity. Nodules: There are multiple benign colloid cysts throughout the thyroid parenchyma, which require no further follow-up. There are additional scattered solid nodules throughout the thyroid parenchyma, as follows: NODULE: Right 1 Size: 13 x 10 x 8 mm Location: Mid right thyroid lobe 1. Composition:  Spongiform (0) 2. Echogenicity:  Hypoechoic (2) 3. Shape: Wider-than-tall (0) 4. Margins:  Smooth (0) 5. Echogenic foci:  Macrocalcifications (1) ACR TI-RADS total points:  3 ACR TI-RADS risk category: TR3 Prior biopsy: No. Significant growth:  No.Previously measured 12 x 9 x 7 mm on 06/20/2020. Change in features:  No.Similar to 06/20/2020. Change in ACR TI-RADS risk category:  No.Unchanged from 06/20/2020. NODULE: Right 2 Size: 12 x 11 x 6 mm Location: Medial right thyroid lobe 1. Composition:  Almost completely solid (2) 2. Echogenicity:  Hypoechoic (2) 3. Shape: Wider-than-tall (0) 4. Margins:  Smooth (0) 5. Echogenic foci:  None (0) ACR TI-RADS total points:  4 ACR TI-RADS risk category: TR4 Prior biopsy: No. Significant growth:  Yes. Previously measured 10 x 7 x 6 mm. Change in features:  Yes. The nodule is now more hypoechoic in comparison with the study of 06/20/2020. Change in ACR TI-RADS risk category:  Yes. Previously [address] on 06/20/2020. NODULE: Left 1 Size: 16 x 13 x 5 mm Location: Mid left thyroid lobe 1. Composition:  Almost completely solid (2) 2. Echogenicity:  Hypoechoic (2) 3. Shape: Wider-than-tall (0) 4. Margins:  Smooth (0) 5. Echogenic foci:  None (0) ACR TI-RADS total points:  4 ACR TI-RADS risk category: TR4 Prior biopsy: No. Significant growth:  No.Previously measured 17 x 8 x 5 mm on 06/20/2020. Change in features:  No.Similar to 06/20/2020. Change in ACR TI-RADS risk category:  No.Unchanged from 06/20/2020. NODULE: Left 2 Size: 11 x 10 x 4 mm Location: Mid left thyroid lobe 1. Composition:  Solid (2) 2. Echogenicity:  Hypoechoic (2) 3. Shape: Wider-than-tall (0) 4. Margins:  Smooth (0) 5. Echogenic foci:  None (0) ACR TI-RADS total points:  4 ACR TI-RADS risk category: TR4 Prior biopsy: No. Significant growth:  New from prior exams. Change in features:  Not applicable. Change in ACR TI-RADS risk category:  Not applicable. NODULE: Left 3 Size: 25 x 22 x 17 mm Location: Posterior left thyroid lobe 1. Composition:  Almost completely solid (2) 2. Echogenicity:  Isoechoic (1) 3. Shape: Wider-than-tall (0) 4. Margins:  Smooth (0) 5. Echogenic foci:  None (0) ACR TI-RADS total points:  3 ACR TI-RADS risk category: TR3 Prior biopsy: Yes. Benign on 08/21/2020. Significant growth:  No.Similar to 06/20/2020 when it measured 28 x 22 x 20 mm. Change in features:  No.Similar to 06/20/2020. Change in ACR TI-RADS risk category:  No.Unchanged from 06/20/2020. Cervical lymphadenopathy: No abnormal lymph nodes in the imaged portions of the neck. Multiple scattered thyroid nodules, for which potential further follow-up is as follows: NODULE right 1: ACR TI-RADS TR3: Recommend:  No FNA or follow-up. NODULE right 2: ACR TI-RADS TR4: Recommend: Follow-up thyroid ultrasound in 1 year. NODULE left 1: ACR TI-RADS TR4: Recommend:  Ultrasound-guided FNA. NODULE left 2: ACR TI-RADS TR4: Recommend: Follow-up thyroid ultrasound in 1 year. NODULE left 3: ACR TI-RADS TR3: Recommend:  Previous FNA was benign on 08/21/2020. Also, given that this nodule has remained stable in size from 06/20/2020, suggest a follow-up thyroid ultrasound in 1 year. See below.  RECOMMENDATIONS: ACR TI-RADS recommendations: TR5 (>= 7 points):  FNA if >= 1 cm; follow-up if 0.5-0.9 cm in 1, 2, 3, 4, and 5 years TR4 (4-6 points):  FNA if >= 1.5 cm; follow-up if 1.0-1.4 cm in 1, 2, 3, and 5 years TR3 (3 points):  FNA if >= 2.5 cm; follow-up if 1.5-2.4 cm in 1, 3, and 5 years TR2 (2 points):  No FNA or follow-up TR1 (0 points):  No FNA or follow-up ACR TI-RADS recommends that no more than two nodules with the highest ACR TI-RADS point total should be biopsied and no more than four nodules should be followed. XR CHEST PORTABLE    Result Date: 6/22/2022  EXAMINATION: ONE XRAY VIEW OF THE CHEST 6/22/2022 9:32 am COMPARISON: 11/17/2021 HISTORY: ORDERING SYSTEM PROVIDED HISTORY: palpitations TECHNOLOGIST PROVIDED HISTORY: Reason for exam:->palpitations FINDINGS: No confluent airspace disease. No pleural effusion or pneumothorax. Cardiac and mediastinal silhouettes are unchanged. Calcification of aorta. No acute cardiopulmonary disease. CTA HEAD NECK W CONTRAST    Result Date: 6/22/2022  EXAMINATION: CTA OF THE HEAD AND NECK WITH CONTRAST 6/22/2022 11:00 am: TECHNIQUE: CTA of the head and neck was performed with the administration of intravenous contrast. Multiplanar reformatted images are provided for review. MIP images are provided for review. Stenosis of the internal carotid arteries measured using NASCET criteria. Automated exposure control, iterative reconstruction, and/or weight based adjustment of the mA/kV was utilized to reduce the radiation dose to as low as reasonably achievable. COMPARISON: Concurrent head CT HISTORY: ORDERING SYSTEM PROVIDED HISTORY: dizziness FINDINGS: CTA NECK: AORTIC ARCH/ARCH VESSELS: No dissection or arterial injury. No significant stenosis of the brachiocephalic or subclavian arteries. 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 CAROTID ARTERIES: No dissection, arterial injury, or hemodynamically significant stenosis by NASCET criteria. VERTEBRAL ARTERIES: No dissection, arterial injury, or significant stenosis. SOFT TISSUES: The lung apices are clear. No cervical or superior mediastinal lymphadenopathy. The larynx and pharynx are unremarkable. No acute abnormality of the salivary and thyroid glands. Mildly enlarged, heterogeneous thyroid gland. BONES: No acute osseous abnormality.  CTA HEAD: ANTERIOR CIRCULATION: No significant stenosis of the intracranial internal carotid, anterior cerebral, or middle cerebral arteries. No aneurysm. POSTERIOR CIRCULATION: No significant stenosis of the vertebral, basilar, or posterior cerebral arteries. No aneurysm. OTHER: No dural venous sinus thrombosis on this non-dedicated study. BRAIN: No mass effect or midline shift. No extra-axial fluid collection. The gray-white differentiation is maintained. 1. No apparent arterial high grade stenosis, occlusion or aneurysm within the head or neck. 2. Mildly enlarged, heterogeneous thyroid gland. Nonemergent thyroid ultrasound recommended for further evaluation. RECOMMENDATIONS: Unavailable     MRI BRAIN WO CONTRAST MR WITNESS    Result Date: 6/22/2022  EXAMINATION: MRI OF THE BRAIN WITHOUT CONTRAST  6/22/2022 11:59 am TECHNIQUE: Multiplanar multisequence MRI of the brain was performed without the administration of intravenous contrast. COMPARISON: Concurrent head CTs HISTORY: ORDERING SYSTEM PROVIDED HISTORY: dizziness FINDINGS: INTRACRANIAL STRUCTURES/VENTRICLES: No evidence of an acute infarct or other acute parenchymal process. No evidence of acute intracranial hemorrhage. There is no evidence of an intracranial mass or extraaxial fluid collection. No significant mass effect or midline shift. Scattered patchy foci of T2/FLAIR hyperintensity are present within supratentorial white matter which is a nonspecific finding but likely represents mild chronic microvascular ischemia. There is no significant volume loss. The ventricles are within normal limits of size and configuration for age. The sellar/suprasellar regions appear unremarkable. The normal signal voids within the major intracranial vessels appear maintained. ORBITS: The visualized portion of the orbits demonstrate no acute abnormality. SINUSES: The visualized paranasal sinuses and mastoid air cells are well aerated.  BONES/SOFT TISSUES: The bone marrow signal intensity appears normal. The soft tissues demonstrate no acute abnormality. No acute infarct or other acute intracranial process. Scattered patchy foci of T2/FLAIR hyperintensity are present within supratentorial white matter which is a nonspecific finding but likely represents mild chronic microvascular ischemia.  RECOMMENDATIONS: Unavailable         Signed:    Bennie Arias MD   6/23/2022 Rehabilitation services

## 2022-08-20 ENCOUNTER — INPATIENT (INPATIENT)
Facility: HOSPITAL | Age: 87
LOS: 5 days | Discharge: NURSING, REHAB OR LONG-TERM CARE | End: 2022-08-26
Attending: HOSPITALIST | Admitting: HOSPITALIST

## 2022-08-20 VITALS
TEMPERATURE: 98 F | SYSTOLIC BLOOD PRESSURE: 127 MMHG | HEIGHT: 61 IN | OXYGEN SATURATION: 98 % | DIASTOLIC BLOOD PRESSURE: 83 MMHG | HEART RATE: 66 BPM | RESPIRATION RATE: 18 BRPM

## 2022-08-20 DIAGNOSIS — Z90.89 ACQUIRED ABSENCE OF OTHER ORGANS: Chronic | ICD-10-CM

## 2022-08-20 LAB
APPEARANCE UR: ABNORMAL
BACTERIA # UR AUTO: ABNORMAL
BILIRUB UR-MCNC: NEGATIVE — SIGNIFICANT CHANGE UP
COD CRY URNS QL: ABNORMAL
COLOR SPEC: YELLOW — SIGNIFICANT CHANGE UP
COMMENT - URINE: SIGNIFICANT CHANGE UP
DIFF PNL FLD: NEGATIVE — SIGNIFICANT CHANGE UP
EPI CELLS # UR: 6 /HPF — HIGH (ref 0–5)
GLUCOSE UR QL: NEGATIVE — SIGNIFICANT CHANGE UP
HYALINE CASTS # UR AUTO: 20 /LPF — HIGH (ref 0–7)
KETONES UR-MCNC: ABNORMAL
LEUKOCYTE ESTERASE UR-ACNC: ABNORMAL
NITRITE UR-MCNC: NEGATIVE — SIGNIFICANT CHANGE UP
PH UR: 5.5 — SIGNIFICANT CHANGE UP (ref 5–8)
PROT UR-MCNC: ABNORMAL
RBC CASTS # UR COMP ASSIST: 2 /HPF — SIGNIFICANT CHANGE UP (ref 0–4)
SP GR SPEC: 1.04 — SIGNIFICANT CHANGE UP (ref 1.01–1.05)
UROBILINOGEN FLD QL: ABNORMAL
WBC UR QL: 11 /HPF — HIGH (ref 0–5)

## 2022-08-20 PROCEDURE — 99285 EMERGENCY DEPT VISIT HI MDM: CPT | Mod: GC

## 2022-08-20 PROCEDURE — 70450 CT HEAD/BRAIN W/O DYE: CPT | Mod: 26,MA

## 2022-08-20 RX ORDER — HALOPERIDOL DECANOATE 100 MG/ML
3 INJECTION INTRAMUSCULAR ONCE
Refills: 0 | Status: COMPLETED | OUTPATIENT
Start: 2022-08-20 | End: 2022-08-20

## 2022-08-20 RX ORDER — HALOPERIDOL DECANOATE 100 MG/ML
2 INJECTION INTRAMUSCULAR ONCE
Refills: 0 | Status: COMPLETED | OUTPATIENT
Start: 2022-08-20 | End: 2022-08-20

## 2022-08-20 RX ORDER — SODIUM CHLORIDE 9 MG/ML
1000 INJECTION INTRAMUSCULAR; INTRAVENOUS; SUBCUTANEOUS ONCE
Refills: 0 | Status: COMPLETED | OUTPATIENT
Start: 2022-08-20 | End: 2022-08-20

## 2022-08-20 RX ORDER — CEFTRIAXONE 500 MG/1
1000 INJECTION, POWDER, FOR SOLUTION INTRAMUSCULAR; INTRAVENOUS ONCE
Refills: 0 | Status: COMPLETED | OUTPATIENT
Start: 2022-08-20 | End: 2022-08-20

## 2022-08-20 RX ADMIN — HALOPERIDOL DECANOATE 2 MILLIGRAM(S): 100 INJECTION INTRAMUSCULAR at 21:18

## 2022-08-20 RX ADMIN — Medication 1 MILLIGRAM(S): at 23:04

## 2022-08-20 RX ADMIN — Medication 1 MILLIGRAM(S): at 21:43

## 2022-08-20 RX ADMIN — HALOPERIDOL DECANOATE 3 MILLIGRAM(S): 100 INJECTION INTRAMUSCULAR at 20:27

## 2022-08-20 NOTE — ED PROVIDER NOTE - PROGRESS NOTE DETAILS
Jose Del Toro, PGY2 spoke with daughter on phone about need for delirium workup due to confusion on presentation to ed. Daughter in agreement due to acute onset confusion. Jose Del Toro, PGY2 Had extensive conversation with daughter over phone. Daughter states she is worried for her mother living at home alone as she is unsure if she is able to perform her ADL's. Daughter is not able to  her mother or care for her at this time and believes she is not a safe discharge currently.

## 2022-08-20 NOTE — ED PROVIDER NOTE - PHYSICAL EXAMINATION
Cont: Pt agitated in bed  Eyes: no conjunctival injection, and symmetrical lids.  HEENT: Head NCAT, no lesions. Atraumatic external nose and ears. Moist MM.  Neck: Symmetric, trachea midline.   CVS: +S1/S2,   RESP: Unlabored respiratory effort. Clear to auscultation bilaterally.  GI: Nontender/Nondistended   MSK: Normocephalic/Atraumatic, Lower Extremities w/o calf tenderness or edema b/l.   Skin: Warm, dry and intact.   Neuro: CNs II-XII intact. +5 strength ue and lle. Rle movement, but rom limited due to chronic pain.   Psych: Awake, Alert, & Oriented (AAO) x3, but confused on timelines.

## 2022-08-20 NOTE — ED PROVIDER NOTE - CLINICAL SUMMARY MEDICAL DECISION MAKING FREE TEXT BOX
86 yo f hx of asthma and anxiety who BIB Ems for confusion found at urgent care for numbness. Pt currently agitaetd, requiring haldol for IV access. R/o cause for delirium with labs, head ct, ua.

## 2022-08-20 NOTE — ED PROVIDER NOTE - OBJECTIVE STATEMENT
88 yo f hx of asthma and anxiety who BIB Ems for confusion found at urgent care for numbness. Pt does not complain of numbness currently stating she feels well and wants to go home. Denies any cp, sob, n/v/f/c, pt eating well at home, lives alone, performs ADL's independently. pt agitated in ED and trying to get out of bed.  Pt is AxOx3 but confused about timeline, stating she has been in Ed overnight (pt just arrived).
Patient/Caregiver provided printed discharge information.

## 2022-08-20 NOTE — ED ADULT TRIAGE NOTE - CHIEF COMPLAINT QUOTE
oriented x 3-4  went to urgent center and had numbness bilat hands 2 days ago as per pt unconfirmed time frame due to pt confusion (as per EMS pt lacks decisional capacity brought to hospital for safety)

## 2022-08-20 NOTE — ED ADULT NURSE REASSESSMENT NOTE - NS ED NURSE REASSESS COMMENT FT1
Break RN note- patient resting in bed, restless and agitated. Patient medicated as ordered. 20g IV placed to left arm. Labs sent as ordered. COVID swab sent. Safety maintained. Patient stable upon exiting the room.

## 2022-08-20 NOTE — ED PROVIDER NOTE - ATTENDING CONTRIBUTION TO CARE
Attending note:   After face to face evaluation of this patient, I concur with above noted hx, pe, and care plan for this patient.  Carreon: 87 yof with asthma and anxiety. Pt is agitated and confused, thinks she was in ED yesterday. Pt is note agitated than usual as per daughter that was at bedside at one time. Pt no longer states numbness. Pt seen walking all over ED and no bathroom. Pt is in no distress, intermittently yelling at staff, cpeech in clear, understandable but not rational. walking with came. Pt will require sedation for any workup. Sedation ordered with little response to Haldol, now Ativan. Will perform physical exam and  workup after.

## 2022-08-20 NOTE — ED ADULT NURSE NOTE - OBJECTIVE STATEMENT
87 yof presents A&Ox1 to person only. Unsure of pt baseline mental status.  BIBEMS for numbness and tingling in bilat hands a few days ago per pt family. Pt refusing to cooperate and answer questions at this time and yelling. Pt states "I do not want to be here, I do not need a hospital." Pt redirected multiple times. Pt education provided.  Respirations even and unlabored, sating 100% on RA, +pulse motor sensation and strength equal and present in all 4 exts. pt well appearing, NAD noted at this time. Pending MD clements. bed in lowest position, side rails up, call bell in hand, safety maintained.

## 2022-08-21 DIAGNOSIS — J45.909 UNSPECIFIED ASTHMA, UNCOMPLICATED: ICD-10-CM

## 2022-08-21 DIAGNOSIS — G93.49 OTHER ENCEPHALOPATHY: ICD-10-CM

## 2022-08-21 DIAGNOSIS — N39.0 URINARY TRACT INFECTION, SITE NOT SPECIFIED: ICD-10-CM

## 2022-08-21 DIAGNOSIS — F41.9 ANXIETY DISORDER, UNSPECIFIED: ICD-10-CM

## 2022-08-21 DIAGNOSIS — R41.0 DISORIENTATION, UNSPECIFIED: ICD-10-CM

## 2022-08-21 DIAGNOSIS — R53.81 OTHER MALAISE: ICD-10-CM

## 2022-08-21 DIAGNOSIS — I63.9 CEREBRAL INFARCTION, UNSPECIFIED: ICD-10-CM

## 2022-08-21 DIAGNOSIS — Z29.9 ENCOUNTER FOR PROPHYLACTIC MEASURES, UNSPECIFIED: ICD-10-CM

## 2022-08-21 LAB
ALBUMIN SERPL ELPH-MCNC: 4 G/DL — SIGNIFICANT CHANGE UP (ref 3.3–5)
ALBUMIN SERPL ELPH-MCNC: 4.3 G/DL — SIGNIFICANT CHANGE UP (ref 3.3–5)
ALP SERPL-CCNC: 84 U/L — SIGNIFICANT CHANGE UP (ref 40–120)
ALP SERPL-CCNC: 97 U/L — SIGNIFICANT CHANGE UP (ref 40–120)
ALT FLD-CCNC: 11 U/L — SIGNIFICANT CHANGE UP (ref 4–33)
ALT FLD-CCNC: 11 U/L — SIGNIFICANT CHANGE UP (ref 4–33)
AMMONIA BLD-MCNC: 16 UMOL/L — SIGNIFICANT CHANGE UP (ref 11–55)
AMPHET UR-MCNC: NEGATIVE — SIGNIFICANT CHANGE UP
ANION GAP SERPL CALC-SCNC: 10 MMOL/L — SIGNIFICANT CHANGE UP (ref 7–14)
ANION GAP SERPL CALC-SCNC: 9 MMOL/L — SIGNIFICANT CHANGE UP (ref 7–14)
APPEARANCE UR: CLEAR — SIGNIFICANT CHANGE UP
AST SERPL-CCNC: 16 U/L — SIGNIFICANT CHANGE UP (ref 4–32)
AST SERPL-CCNC: 19 U/L — SIGNIFICANT CHANGE UP (ref 4–32)
B PERT DNA SPEC QL NAA+PROBE: SIGNIFICANT CHANGE UP
B PERT+PARAPERT DNA PNL SPEC NAA+PROBE: SIGNIFICANT CHANGE UP
BARBITURATES UR SCN-MCNC: NEGATIVE — SIGNIFICANT CHANGE UP
BASOPHILS # BLD AUTO: 0.03 K/UL — SIGNIFICANT CHANGE UP (ref 0–0.2)
BASOPHILS NFR BLD AUTO: 0.4 % — SIGNIFICANT CHANGE UP (ref 0–2)
BENZODIAZ UR-MCNC: NEGATIVE — SIGNIFICANT CHANGE UP
BILIRUB SERPL-MCNC: 0.9 MG/DL — SIGNIFICANT CHANGE UP (ref 0.2–1.2)
BILIRUB SERPL-MCNC: 0.9 MG/DL — SIGNIFICANT CHANGE UP (ref 0.2–1.2)
BILIRUB UR-MCNC: NEGATIVE — SIGNIFICANT CHANGE UP
BORDETELLA PARAPERTUSSIS (RAPRVP): SIGNIFICANT CHANGE UP
BUN SERPL-MCNC: 16 MG/DL — SIGNIFICANT CHANGE UP (ref 7–23)
BUN SERPL-MCNC: 22 MG/DL — SIGNIFICANT CHANGE UP (ref 7–23)
C PNEUM DNA SPEC QL NAA+PROBE: SIGNIFICANT CHANGE UP
CALCIUM SERPL-MCNC: 9 MG/DL — SIGNIFICANT CHANGE UP (ref 8.4–10.5)
CALCIUM SERPL-MCNC: 9.9 MG/DL — SIGNIFICANT CHANGE UP (ref 8.4–10.5)
CHLORIDE SERPL-SCNC: 104 MMOL/L — SIGNIFICANT CHANGE UP (ref 98–107)
CHLORIDE SERPL-SCNC: 104 MMOL/L — SIGNIFICANT CHANGE UP (ref 98–107)
CO2 SERPL-SCNC: 28 MMOL/L — SIGNIFICANT CHANGE UP (ref 22–31)
CO2 SERPL-SCNC: 31 MMOL/L — SIGNIFICANT CHANGE UP (ref 22–31)
COCAINE METAB.OTHER UR-MCNC: NEGATIVE — SIGNIFICANT CHANGE UP
COLOR SPEC: YELLOW — SIGNIFICANT CHANGE UP
CREAT SERPL-MCNC: 0.51 MG/DL — SIGNIFICANT CHANGE UP (ref 0.5–1.3)
CREAT SERPL-MCNC: 0.63 MG/DL — SIGNIFICANT CHANGE UP (ref 0.5–1.3)
CREATININE URINE RESULT, DAU: 96 MG/DL — SIGNIFICANT CHANGE UP
CRP SERPL-MCNC: <3 MG/L — SIGNIFICANT CHANGE UP
DIFF PNL FLD: NEGATIVE — SIGNIFICANT CHANGE UP
EGFR: 86 ML/MIN/1.73M2 — SIGNIFICANT CHANGE UP
EGFR: 90 ML/MIN/1.73M2 — SIGNIFICANT CHANGE UP
EOSINOPHIL # BLD AUTO: 0.08 K/UL — SIGNIFICANT CHANGE UP (ref 0–0.5)
EOSINOPHIL NFR BLD AUTO: 1 % — SIGNIFICANT CHANGE UP (ref 0–6)
ERYTHROCYTE [SEDIMENTATION RATE] IN BLOOD: 12 MM/HR — SIGNIFICANT CHANGE UP (ref 4–25)
FLUAV SUBTYP SPEC NAA+PROBE: SIGNIFICANT CHANGE UP
FLUBV RNA SPEC QL NAA+PROBE: SIGNIFICANT CHANGE UP
FOLATE SERPL-MCNC: 12 NG/ML — SIGNIFICANT CHANGE UP (ref 3.1–17.5)
GLUCOSE SERPL-MCNC: 111 MG/DL — HIGH (ref 70–99)
GLUCOSE SERPL-MCNC: 95 MG/DL — SIGNIFICANT CHANGE UP (ref 70–99)
GLUCOSE UR QL: NEGATIVE — SIGNIFICANT CHANGE UP
HADV DNA SPEC QL NAA+PROBE: SIGNIFICANT CHANGE UP
HCOV 229E RNA SPEC QL NAA+PROBE: SIGNIFICANT CHANGE UP
HCOV HKU1 RNA SPEC QL NAA+PROBE: SIGNIFICANT CHANGE UP
HCOV NL63 RNA SPEC QL NAA+PROBE: SIGNIFICANT CHANGE UP
HCOV OC43 RNA SPEC QL NAA+PROBE: SIGNIFICANT CHANGE UP
HCT VFR BLD CALC: 36.5 % — SIGNIFICANT CHANGE UP (ref 34.5–45)
HCT VFR BLD CALC: 38.4 % — SIGNIFICANT CHANGE UP (ref 34.5–45)
HGB BLD-MCNC: 11.6 G/DL — SIGNIFICANT CHANGE UP (ref 11.5–15.5)
HGB BLD-MCNC: 12 G/DL — SIGNIFICANT CHANGE UP (ref 11.5–15.5)
HMPV RNA SPEC QL NAA+PROBE: SIGNIFICANT CHANGE UP
HPIV1 RNA SPEC QL NAA+PROBE: SIGNIFICANT CHANGE UP
HPIV2 RNA SPEC QL NAA+PROBE: SIGNIFICANT CHANGE UP
HPIV3 RNA SPEC QL NAA+PROBE: SIGNIFICANT CHANGE UP
HPIV4 RNA SPEC QL NAA+PROBE: SIGNIFICANT CHANGE UP
IANC: 4.52 K/UL — SIGNIFICANT CHANGE UP (ref 1.8–7.4)
IMM GRANULOCYTES NFR BLD AUTO: 0.4 % — SIGNIFICANT CHANGE UP (ref 0–1.5)
KETONES UR-MCNC: NEGATIVE — SIGNIFICANT CHANGE UP
LEUKOCYTE ESTERASE UR-ACNC: NEGATIVE — SIGNIFICANT CHANGE UP
LYMPHOCYTES # BLD AUTO: 2.08 K/UL — SIGNIFICANT CHANGE UP (ref 1–3.3)
LYMPHOCYTES # BLD AUTO: 26.9 % — SIGNIFICANT CHANGE UP (ref 13–44)
M PNEUMO DNA SPEC QL NAA+PROBE: SIGNIFICANT CHANGE UP
MAGNESIUM SERPL-MCNC: 1.9 MG/DL — SIGNIFICANT CHANGE UP (ref 1.6–2.6)
MAGNESIUM SERPL-MCNC: 2.1 MG/DL — SIGNIFICANT CHANGE UP (ref 1.6–2.6)
MCHC RBC-ENTMCNC: 28.8 PG — SIGNIFICANT CHANGE UP (ref 27–34)
MCHC RBC-ENTMCNC: 29.3 PG — SIGNIFICANT CHANGE UP (ref 27–34)
MCHC RBC-ENTMCNC: 31.3 GM/DL — LOW (ref 32–36)
MCHC RBC-ENTMCNC: 31.8 GM/DL — LOW (ref 32–36)
MCV RBC AUTO: 92.1 FL — SIGNIFICANT CHANGE UP (ref 80–100)
MCV RBC AUTO: 92.2 FL — SIGNIFICANT CHANGE UP (ref 80–100)
METHADONE UR-MCNC: NEGATIVE — SIGNIFICANT CHANGE UP
MONOCYTES # BLD AUTO: 1 K/UL — HIGH (ref 0–0.9)
MONOCYTES NFR BLD AUTO: 12.9 % — SIGNIFICANT CHANGE UP (ref 2–14)
NEUTROPHILS # BLD AUTO: 4.52 K/UL — SIGNIFICANT CHANGE UP (ref 1.8–7.4)
NEUTROPHILS NFR BLD AUTO: 58.4 % — SIGNIFICANT CHANGE UP (ref 43–77)
NITRITE UR-MCNC: NEGATIVE — SIGNIFICANT CHANGE UP
NRBC # BLD: 0 /100 WBCS — SIGNIFICANT CHANGE UP (ref 0–0)
NRBC # BLD: 0 /100 WBCS — SIGNIFICANT CHANGE UP (ref 0–0)
NRBC # FLD: 0 K/UL — SIGNIFICANT CHANGE UP (ref 0–0)
NRBC # FLD: 0 K/UL — SIGNIFICANT CHANGE UP (ref 0–0)
OPIATES UR-MCNC: NEGATIVE — SIGNIFICANT CHANGE UP
OXYCODONE UR-MCNC: NEGATIVE — SIGNIFICANT CHANGE UP
PCP SPEC-MCNC: SIGNIFICANT CHANGE UP
PCP UR-MCNC: NEGATIVE — SIGNIFICANT CHANGE UP
PH UR: 6 — SIGNIFICANT CHANGE UP (ref 5–8)
PHOSPHATE SERPL-MCNC: 3.1 MG/DL — SIGNIFICANT CHANGE UP (ref 2.5–4.5)
PLATELET # BLD AUTO: 219 K/UL — SIGNIFICANT CHANGE UP (ref 150–400)
PLATELET # BLD AUTO: 249 K/UL — SIGNIFICANT CHANGE UP (ref 150–400)
POTASSIUM SERPL-MCNC: 4 MMOL/L — SIGNIFICANT CHANGE UP (ref 3.5–5.3)
POTASSIUM SERPL-MCNC: 4.1 MMOL/L — SIGNIFICANT CHANGE UP (ref 3.5–5.3)
POTASSIUM SERPL-SCNC: 4 MMOL/L — SIGNIFICANT CHANGE UP (ref 3.5–5.3)
POTASSIUM SERPL-SCNC: 4.1 MMOL/L — SIGNIFICANT CHANGE UP (ref 3.5–5.3)
PROT SERPL-MCNC: 6.7 G/DL — SIGNIFICANT CHANGE UP (ref 6–8.3)
PROT SERPL-MCNC: 6.7 G/DL — SIGNIFICANT CHANGE UP (ref 6–8.3)
PROT UR-MCNC: ABNORMAL
RAPID RVP RESULT: SIGNIFICANT CHANGE UP
RBC # BLD: 3.96 M/UL — SIGNIFICANT CHANGE UP (ref 3.8–5.2)
RBC # BLD: 4.17 M/UL — SIGNIFICANT CHANGE UP (ref 3.8–5.2)
RBC # FLD: 14.6 % — HIGH (ref 10.3–14.5)
RBC # FLD: 14.6 % — HIGH (ref 10.3–14.5)
RSV RNA SPEC QL NAA+PROBE: SIGNIFICANT CHANGE UP
RV+EV RNA SPEC QL NAA+PROBE: SIGNIFICANT CHANGE UP
SARS-COV-2 RNA SPEC QL NAA+PROBE: SIGNIFICANT CHANGE UP
SODIUM SERPL-SCNC: 142 MMOL/L — SIGNIFICANT CHANGE UP (ref 135–145)
SODIUM SERPL-SCNC: 144 MMOL/L — SIGNIFICANT CHANGE UP (ref 135–145)
SP GR SPEC: 1.02 — SIGNIFICANT CHANGE UP (ref 1.01–1.05)
THC UR QL: NEGATIVE — SIGNIFICANT CHANGE UP
UROBILINOGEN FLD QL: SIGNIFICANT CHANGE UP
VIT B12 SERPL-MCNC: 406 PG/ML — SIGNIFICANT CHANGE UP (ref 200–900)
WBC # BLD: 7.52 K/UL — SIGNIFICANT CHANGE UP (ref 3.8–10.5)
WBC # BLD: 7.74 K/UL — SIGNIFICANT CHANGE UP (ref 3.8–10.5)
WBC # FLD AUTO: 7.52 K/UL — SIGNIFICANT CHANGE UP (ref 3.8–10.5)
WBC # FLD AUTO: 7.74 K/UL — SIGNIFICANT CHANGE UP (ref 3.8–10.5)

## 2022-08-21 PROCEDURE — 99223 1ST HOSP IP/OBS HIGH 75: CPT

## 2022-08-21 PROCEDURE — 12345: CPT | Mod: NC

## 2022-08-21 RX ORDER — METOPROLOL TARTRATE 50 MG
25 TABLET ORAL DAILY
Refills: 0 | Status: DISCONTINUED | OUTPATIENT
Start: 2022-08-21 | End: 2022-08-26

## 2022-08-21 RX ORDER — IPRATROPIUM/ALBUTEROL SULFATE 18-103MCG
3 AEROSOL WITH ADAPTER (GRAM) INHALATION EVERY 6 HOURS
Refills: 0 | Status: DISCONTINUED | OUTPATIENT
Start: 2022-08-21 | End: 2022-08-26

## 2022-08-21 RX ORDER — ENOXAPARIN SODIUM 100 MG/ML
40 INJECTION SUBCUTANEOUS EVERY 24 HOURS
Refills: 0 | Status: DISCONTINUED | OUTPATIENT
Start: 2022-08-21 | End: 2022-08-23

## 2022-08-21 RX ORDER — METOPROLOL TARTRATE 50 MG
25 TABLET ORAL DAILY
Refills: 0 | Status: DISCONTINUED | OUTPATIENT
Start: 2022-08-21 | End: 2022-08-21

## 2022-08-21 RX ORDER — HALOPERIDOL DECANOATE 100 MG/ML
2.5 INJECTION INTRAMUSCULAR ONCE
Refills: 0 | Status: COMPLETED | OUTPATIENT
Start: 2022-08-21 | End: 2022-08-21

## 2022-08-21 RX ORDER — CEFTRIAXONE 500 MG/1
1000 INJECTION, POWDER, FOR SOLUTION INTRAMUSCULAR; INTRAVENOUS EVERY 24 HOURS
Refills: 0 | Status: COMPLETED | OUTPATIENT
Start: 2022-08-21 | End: 2022-08-23

## 2022-08-21 RX ADMIN — HALOPERIDOL DECANOATE 2.5 MILLIGRAM(S): 100 INJECTION INTRAMUSCULAR at 21:06

## 2022-08-21 RX ADMIN — SODIUM CHLORIDE 1000 MILLILITER(S): 9 INJECTION INTRAMUSCULAR; INTRAVENOUS; SUBCUTANEOUS at 00:13

## 2022-08-21 RX ADMIN — CEFTRIAXONE 100 MILLIGRAM(S): 500 INJECTION, POWDER, FOR SOLUTION INTRAMUSCULAR; INTRAVENOUS at 17:21

## 2022-08-21 RX ADMIN — CEFTRIAXONE 100 MILLIGRAM(S): 500 INJECTION, POWDER, FOR SOLUTION INTRAMUSCULAR; INTRAVENOUS at 00:13

## 2022-08-21 RX ADMIN — Medication 1 MILLIGRAM(S): at 17:24

## 2022-08-21 RX ADMIN — Medication 3 MILLILITER(S): at 10:01

## 2022-08-21 NOTE — H&P ADULT - NSHPSOCIALHISTORY_GEN_ALL_CORE
Lives alone, has difficulty with ADL's such as bathing as per daughter  Daughter visits patient frequently

## 2022-08-21 NOTE — H&P ADULT - NSICDXFAMILYHX_GEN_ALL_CORE_FT
FAMILY HISTORY:  Family history of amyotrophic lateral sclerosis, Daughter    Child  Still living? Unknown  Family history of multiple sclerosis, Age at diagnosis: Age Unknown

## 2022-08-21 NOTE — PHYSICAL THERAPY INITIAL EVALUATION ADULT - GAIT DEVIATIONS NOTED, PT EVAL
decreased tez/increased time in double stance/decreased velocity of limb motion/decreased step length/decreased stride length/decreased weight-shifting ability

## 2022-08-21 NOTE — H&P ADULT - ASSESSMENT
Ms. Hogan is an 87YOF with PMH asthma, anxiety, questionable CVA in 2007, diverticulosis, GERD, and previous vertebral fracture who presented to the ED for confusion noted at outpatient urgent care where she initially presented for numbness with labs concerning for UTI.

## 2022-08-21 NOTE — H&P ADULT - HISTORY OF PRESENT ILLNESS
Ms. Hogan is an 87YOF with PMH asthma, anxiety, questionable CVA in 2007, diverticulosis, GERD, and previous vertebral fracture who presented to the ED for confusion. History primarily obtained from her daughter as patient was asleep following administration of haldol and ativan in the ED. Earlier today the patient had reported feeling numbness in her L shoulder and had gone to an urgent care for evaluation. At the urgent care she was found to be confused and she was BIBEMS to [facility] ED. Her daughter reports the patient has had narcissistic/histrionic personality traits in the past and "has her own realities" with regards to current events and politics but has never had episodes of disorientation/confusion in terms of knowing who or where she is. Per daughter she has shown some signs of confusion with regards to failing to pay her bills timely and needs assistance bathing and other ADL's, however they have been having difficulty in getting a visiting nurse. She also reports the patient gets very disoriented in hospital settings. The patient primarily lives alone but her daughter visits her frequently, however her daughter's MS makes it difficult to assist with patient's ADL's. She uses a walker for movement but per her daughter she had no acute need for a walker and suspects she may have deconditioned herself by relying on a walker for mobility when it was not indicated.    In the ED the patient was found to agitated and confused about when she came to the hospital, thinking she had been in the ED overnight. She was given 3mg and 2mg IM haldol doses to obtain IV access and received total 3mg ativan afterwards. CT head showed no acute hemorrhage but was notable for cerebral volume loss, mild/moderate periventricular hypoattenuation with chronic microvascular changes. UA was positive for bacteria, WBC, calcium oxalate, and leuk esterase and she was given 1L NS and 1 dose ceftriaxone. On examination she was resting comfortably, asleep after medication administration but responsive to light touch and able to shake and nod head for some questions. Denies pain or numbness at this time.

## 2022-08-21 NOTE — H&P ADULT - NSHPLABSRESULTS_GEN_ALL_CORE
LABS:                          12.0   7.74  )-----------( 249      ( 20 Aug 2022 23:44 )             38.4     08-20    144  |  104  |  22  ----------------------------<  111<H>  4.1   |  31  |  0.63    Ca    9.9      20 Aug 2022 23:44  Mg     2.10     08-20    TPro  6.7  /  Alb  4.3  /  TBili  0.9  /  DBili  x   /  AST  16  /  ALT  11  /  AlkPhos  97  08-20

## 2022-08-21 NOTE — PATIENT PROFILE ADULT - FALL HARM RISK - HARM RISK INTERVENTIONS
Assistance with ambulation/Assistance OOB with selected safe patient handling equipment/Communicate Risk of Fall with Harm to all staff/Discuss with provider need for PT consult/Monitor for mental status changes/Monitor gait and stability/Move patient closer to nurses' station/Provide patient with walking aids - walker, cane, crutches/Reinforce activity limits and safety measures with patient and family/Reorient to person, place and time as needed/Tailored Fall Risk Interventions/Toileting schedule using arm’s reach rule for commode and bathroom/Use of alarms - bed, chair and/or voice tab/Visual Cue: Yellow wristband and red socks/Bed in lowest position, wheels locked, appropriate side rails in place/Call bell, personal items and telephone in reach/Instruct patient to call for assistance before getting out of bed or chair/Non-slip footwear when patient is out of bed/Ralph to call system/Physically safe environment - no spills, clutter or unnecessary equipment/Purposeful Proactive Rounding/Room/bathroom lighting operational, light cord in reach

## 2022-08-21 NOTE — ED ADULT NURSE REASSESSMENT NOTE - NS ED NURSE REASSESS COMMENT FT1
Resting comfortably on stretcher. denies any pain, not in acute distress. Patient is awake now. Wanted to go home, Reassured the patient. Morning labs sent. Patient refused to take metoprolol in the morning. Patient is alert but confused. was incontinent of urine x1. Cleaned the patient and repositioned the patient.

## 2022-08-21 NOTE — ED ADULT NURSE REASSESSMENT NOTE - NS ED NURSE REASSESS COMMENT FT1
pt restless fusing with her belongings.  Pt reoriented as needed. Pt tolerated small amount of breakfast. pts vs wnl. Pt denies sob or chest pain.

## 2022-08-21 NOTE — H&P ADULT - PROBLEM SELECTOR PLAN 2
-pt acutely disoriented to place upon arrival to ED, change from baseline  -agitated, continuously trying to get out of bed  -s/p total 5mg haldol and 3mg ativan  -CT head showing age-related cerebral volume loss and microvascular changes  -can consider zyprexa IM PRN for acute agitation  -acute onset of confusion likely due to UTI but can consider urine tox screen for alternative etiologies.  -electrolytes WNL, LFT's normal -pt acutely disoriented to place upon arrival to ED, change from baseline  -agitated, continuously trying to get out of bed  -s/p total 5mg haldol and 3mg ativan  -CT head showing age-related cerebral volume loss and microvascular changes  -can consider zyprexa IM PRN for acute agitation  -acute onset of confusion likely due to UTI but can consider urine tox screen for alternative etiologies.  -electrolytes WNL, LFT's normal  -will check serum ammonia, folate, B12, ESR/CRP  -if infectious and metabolic workup negative may need psychiatric workup -UA notable for bacteria, WBC, leuk esterase, calcium oxalate crystals  -s/p 1 dose ceftriaxone in ED, will monitor off antibiotics for now.  -Chek CRP, if low, may not have any systemic infections  -epithelial cells 6 on UA, will get repeat UA  -f/u UCx

## 2022-08-21 NOTE — H&P ADULT - PROBLEM SELECTOR PLAN 4
Diet: Regular  DVT: lovenox 40 BID  Dispo: pending PT eval Diet: Regular  DVT: lovenox 40  Dispo: pending PT eval Will need albuterol PRN. Some mild wheezing on exam

## 2022-08-21 NOTE — H&P ADULT - ATTENDING COMMENTS
I agree with the above H&P from ACP/Resident/Intern. In addition:  HPI: Hx supplemented by daughter over the phone. Updated her on plans and medical decisions.  Ms. Hogan is an 87YOF with PMH asthma, anxiety, questionable CVA in 2007, diverticulosis, GERD, and previous vertebral fracture who presented to the ED for confusion. History primarily obtained from her daughter as patient was asleep following administration of haldol and ativan in the ED. Earlier today the patient had reported feeling numbness in her L shoulder and had gone to an urgent care for evaluation. At the urgent care she was found to be confused and she was BIBEMS to [facility] ED. Her daughter reports the patient has had narcissistic/histrionic personality traits in the past and "has her own realities" with regards to current events and politics but has never had episodes of disorientation/confusion in terms of knowing who or where she is. Per daughter she has shown some signs of confusion with regards to failing to pay her bills timely and needs assistance bathing and other ADL's, however they have been having difficulty in getting a visiting nurse. The patient primarily lives alone but her daughter visits her frequently, however her daughter's MS makes it difficult to assist with patient's ADL's. Per daughter, she denies chest pain, dyspnea, diarrhea, fever, chills, dysuria, headaches, neck pain. Otherwise in her usual sate of health. Last saw patient 1 week ago. Daughter unsure why she was sent from urgent care but suspected liability issues.    In the ED, the patient was found to be agitated and confused. She was given 3mg and 2mg IM haldol doses to obtain IV access and received total 3mg ativan afterwards. CT head showed no acute hemorrhage but was notable for cerebral volume loss, mild/moderate periventricular hypoattenuation with chronic microvascular changes. UA was positive for bacteria, WBC, calcium oxalate, and leuk esterase and she was given 1L NS and 1 dose ceftriaxone.     Labs: Reviewed  Imaging: CTH reviewed  EKG: NSR,     PHYSICAL EXAM:  GENERAL: Sedated comfortable appearing  CHEST/LUNG: Some end expiratory wheezing mostly upper airways. No compromised airway  HEART: Regular rate and rhythm; No murmurs, rubs, or gallops, (+)S1, S2  ABDOMEN: Soft, Nontender, Nondistended; Normal Bowel sounds   EXTREMITIES:  2+ Peripheral Pulses, No clubbing, cyanosis, or edema  Neuro: rousable and able to provide her name, Pupils equal and reactive, moving extremities     A/P: Ms. Hogan is an 87YOF with PMH asthma, anxiety, questionable CVA in 2007, diverticulosis, GERD, and previous vertebral fracture who presented to the ED for confusion. Patient likely has signs of dementia but was never diagnosed and this could be agitation due to dementia. There  is no systemic signs of systemic infection and unknown how the urine was collected at this time. There is calcium oxalate stones without blood, but does have squamous cells. Her symptoms of agitation and ability to go to urgent care was a bit concerning for delirium and atypical of septic encephalopathy. Hold antibiotics for now. Check CRP and recheck UA. May need psych input. Duonebs for wheezing/asthma.

## 2022-08-21 NOTE — H&P ADULT - NSICDXPASTMEDICALHX_GEN_ALL_CORE_FT
PAST MEDICAL HISTORY:  Anxiety     Asthma     Cerebrovascular accident (CVA), unspecified mechanism     CVA (Cerebral Infarction) 2007 per daughter    Diverticulosis of intestine     GERD (gastroesophageal reflux disease)     Vertebral fracture, osteoporotic

## 2022-08-21 NOTE — H&P ADULT - NSHPPHYSICALEXAM_GEN_ALL_CORE
VITALS:   T(C): 36.6 (08-21-22 @ 00:29), Max: 36.8 (08-20-22 @ 17:13)  HR: 69 (08-21-22 @ 00:29) (66 - 69)  BP: 127/100 (08-21-22 @ 00:29) (127/83 - 185/85)  RR: 17 (08-21-22 @ 00:29) (16 - 18)  SpO2: 100% (08-21-22 @ 00:29) (98% - 100%)    GENERAL: NAD, lying in bed comfortably  HEAD:  Atraumatic, normocephalic  EYES: EOMI, PERRLA, conjunctiva and sclera clear  ENT: Moist mucous membranes  NECK: Supple, no JVD  HEART: Regular rate and rhythm, no murmurs, rubs, or gallops  LUNGS: Unlabored respirations.  Clear to auscultation bilaterally, no crackles, wheezing, or rhonchi  ABDOMEN: Soft, nontender, nondistended, +BS  EXTREMITIES: 2+ peripheral pulses bilaterally. No clubbing, cyanosis, or edema  NERVOUS SYSTEM:  A&Ox0-1 after ativan and haldol administration, responsive to verbal and light touch.  SKIN: dry and flaking skin noted on lower extremities bilaterally.

## 2022-08-21 NOTE — H&P ADULT - PROBLEM SELECTOR PLAN 1
-UA notable for bacteria, WBC, leuk esterase, calcium oxalate crystals  -s/p 1 dose ceftriaxone in ED, can continue until culture results  -epithelial cells 6 on UA, can consider repeat UA  -f/u UCx  -can give NS 75 cc/hr for hydration -UA notable for bacteria, WBC, leuk esterase, calcium oxalate crystals  -s/p 1 dose ceftriaxone in ED, can continue until culture results  -epithelial cells 6 on UA, will get repeat UA  -f/u UCx  -can give NS 75 cc/hr for hydration -UA notable for bacteria, WBC, leuk esterase, calcium oxalate crystals  -s/p 1 dose ceftriaxone in ED, will monitor off antibiotics for now  -epithelial cells 6 on UA, will get repeat UA  -f/u UCx -pt acutely disoriented to place upon arrival to ED, change from baseline  -agitated, continuously trying to get out of bed  -s/p total 5mg haldol and 3mg ativan  -CT head showing age-related cerebral volume loss and microvascular changes  -can consider zyprexa IM PRN for acute agitation  -acute onset of confusion likely due to UTI but can consider urine tox screen for alternative etiologies.  -electrolytes WNL, LFT's normal  -will check serum ammonia, folate, B12, ESR/CRP  -if infectious and metabolic workup negative may need psychiatric workup for dementia vs psych disorders.  -Will need to reassess in AM when mental status improves

## 2022-08-21 NOTE — ED ADULT NURSE REASSESSMENT NOTE - NS ED NURSE REASSESS COMMENT FT1
Patient resting comfortably on stretcher. Currently sleeping. Not in acute distress. Safety maintained,. call bell is within reach.

## 2022-08-21 NOTE — CHART NOTE - NSCHARTNOTEFT_GEN_A_CORE
Pt admitted overnight, H& P as per sunrise   87 y/F with PMH asthma, anxiety, questionable CVA in 2007, diverticulosis, GERD, and previous vertebral fracture who presented to the ED for confusion. History primarily obtained from her daughter as patient was asleep following administration of haldol and ativan in the ED. Earlier today the patient had reported feeling numbness in her L shoulder and had gone to an urgent care for evaluation. At the urgent care she was found to be confused and she was BIBEMS to [facility] ED.   During my interview, pt awake, conversant, eating lunch, AAOx 2-3 ( missed the date)   States she is in the hospital as she needs help at home   -UA from 8/20 positive. Continue CTX x 3 days   - CT head- no acute stroke   - Rest of labs unremarkable  - Spoke with daughter Chanelle- As per daughter, pt needs help at home with ADLS  - FU PT starr

## 2022-08-21 NOTE — PROVIDER CONTACT NOTE (OTHER) - SITUATION
87 year old female ax02 confused attempting to leave hospital patient unable to leave at this time no decisional capacity security was called.  patient agitated and aggressive towards staff.

## 2022-08-22 LAB
ANION GAP SERPL CALC-SCNC: 11 MMOL/L — SIGNIFICANT CHANGE UP (ref 7–14)
BUN SERPL-MCNC: 15 MG/DL — SIGNIFICANT CHANGE UP (ref 7–23)
CALCIUM SERPL-MCNC: 9.4 MG/DL — SIGNIFICANT CHANGE UP (ref 8.4–10.5)
CHLORIDE SERPL-SCNC: 104 MMOL/L — SIGNIFICANT CHANGE UP (ref 98–107)
CO2 SERPL-SCNC: 25 MMOL/L — SIGNIFICANT CHANGE UP (ref 22–31)
CREAT SERPL-MCNC: 0.48 MG/DL — LOW (ref 0.5–1.3)
CULTURE RESULTS: SIGNIFICANT CHANGE UP
EGFR: 92 ML/MIN/1.73M2 — SIGNIFICANT CHANGE UP
GLUCOSE SERPL-MCNC: 88 MG/DL — SIGNIFICANT CHANGE UP (ref 70–99)
MAGNESIUM SERPL-MCNC: 1.9 MG/DL — SIGNIFICANT CHANGE UP (ref 1.6–2.6)
PHOSPHATE SERPL-MCNC: 2.9 MG/DL — SIGNIFICANT CHANGE UP (ref 2.5–4.5)
POTASSIUM SERPL-MCNC: 3.8 MMOL/L — SIGNIFICANT CHANGE UP (ref 3.5–5.3)
POTASSIUM SERPL-SCNC: 3.8 MMOL/L — SIGNIFICANT CHANGE UP (ref 3.5–5.3)
SODIUM SERPL-SCNC: 140 MMOL/L — SIGNIFICANT CHANGE UP (ref 135–145)
SPECIMEN SOURCE: SIGNIFICANT CHANGE UP

## 2022-08-22 PROCEDURE — 99233 SBSQ HOSP IP/OBS HIGH 50: CPT

## 2022-08-22 RX ORDER — KETOROLAC TROMETHAMINE 30 MG/ML
10 SYRINGE (ML) INJECTION EVERY 8 HOURS
Refills: 0 | Status: DISCONTINUED | OUTPATIENT
Start: 2022-08-22 | End: 2022-08-26

## 2022-08-22 RX ORDER — ACETAMINOPHEN 500 MG
650 TABLET ORAL EVERY 6 HOURS
Refills: 0 | Status: DISCONTINUED | OUTPATIENT
Start: 2022-08-22 | End: 2022-08-26

## 2022-08-22 RX ORDER — OLANZAPINE 15 MG/1
2.5 TABLET, FILM COATED ORAL ONCE
Refills: 0 | Status: COMPLETED | OUTPATIENT
Start: 2022-08-22 | End: 2022-08-22

## 2022-08-22 RX ADMIN — OLANZAPINE 2.5 MILLIGRAM(S): 15 TABLET, FILM COATED ORAL at 02:37

## 2022-08-22 RX ADMIN — CEFTRIAXONE 100 MILLIGRAM(S): 500 INJECTION, POWDER, FOR SOLUTION INTRAMUSCULAR; INTRAVENOUS at 18:17

## 2022-08-22 NOTE — PROGRESS NOTE ADULT - PROBLEM SELECTOR PLAN 6
Diet: Regular  DVT: lovenox 40  Dispo: pending PT eval Diet: Regular  DVT: lovenox 40  Dispo: PT rec rehab.

## 2022-08-22 NOTE — PROGRESS NOTE ADULT - SUBJECTIVE AND OBJECTIVE BOX
Patient is a 87y old  Female who presents with a chief complaint of Confusion (21 Aug 2022 03:43)      SUBJECTIVE / OVERNIGHT EVENTS:    MEDICATIONS  (STANDING):  albuterol/ipratropium for Nebulization 3 milliLiter(s) Nebulizer every 6 hours  cefTRIAXone   IVPB 1000 milliGRAM(s) IV Intermittent every 24 hours  enoxaparin Injectable 40 milliGRAM(s) SubCutaneous every 24 hours  metoprolol succinate ER 25 milliGRAM(s) Oral daily    MEDICATIONS  (PRN):      Vital Signs Last 24 Hrs  T(C): 36.6 (22 Aug 2022 11:14), Max: 36.8 (22 Aug 2022 06:00)  T(F): 97.8 (22 Aug 2022 11:14), Max: 98.2 (22 Aug 2022 06:00)  HR: 88 (22 Aug 2022 11:14) (73 - 88)  BP: 140/66 (22 Aug 2022 11:14) (140/66 - 159/88)  BP(mean): --  RR: 18 (22 Aug 2022 11:14) (18 - 18)  SpO2: 97% (22 Aug 2022 11:14) (97% - 98%)    Parameters below as of 22 Aug 2022 11:14  Patient On (Oxygen Delivery Method): room air      CAPILLARY BLOOD GLUCOSE        I&O's Summary      PHYSICAL EXAM:  GENERAL: NAD, well-developed  HEAD:  Atraumatic, Normocephalic  EYES: EOMI, PERRLA, conjunctiva and sclera clear  NECK: Supple, No JVD  CHEST/LUNG: Clear to auscultation bilaterally; No wheeze  HEART: Regular rate and rhythm; No murmurs, rubs, or gallops  ABDOMEN: Soft, Nontender, Nondistended; Bowel sounds present  EXTREMITIES:  2+ Peripheral Pulses, No clubbing, cyanosis, or edema  PSYCH: AAOx3  NEUROLOGY: non-focal  SKIN: No rashes or lesions    LABS:                        11.6   7.52  )-----------( 219      ( 21 Aug 2022 06:43 )             36.5     08-22    140  |  104  |  15  ----------------------------<  88  3.8   |  25  |  0.48<L>    Ca    9.4      22 Aug 2022 05:52  Phos  2.9     -  Mg     1.90     -    TPro  6.7  /  Alb  4.0  /  TBili  0.9  /  DBili  x   /  AST  19  /  ALT  11  /  AlkPhos  84            Urinalysis Basic - ( 21 Aug 2022 06:28 )    Color: Yellow / Appearance: Clear / S.025 / pH: x  Gluc: x / Ketone: Negative  / Bili: Negative / Urobili: <2 mg/dL   Blood: x / Protein: Trace / Nitrite: Negative   Leuk Esterase: Negative / RBC: x / WBC x   Sq Epi: x / Non Sq Epi: x / Bacteria: x        RADIOLOGY & ADDITIONAL TESTS:    Imaging Personally Reviewed:    Consultant(s) Notes Reviewed:      Care Discussed with Consultants/Other Providers:   Patient is a 87y old  Female who presents with a chief complaint of Confusion (21 Aug 2022 03:43)      SUBJECTIVE / OVERNIGHT EVENTS:  Patient awake and aware ( AAO x3). She says she was never confused but has been suffering from pain in B/L LE for months. Denies cp, SOB, abdominal pain, N/V/D/chills/cough/dysuria     MEDICATIONS  (STANDING):  albuterol/ipratropium for Nebulization 3 milliLiter(s) Nebulizer every 6 hours  cefTRIAXone   IVPB 1000 milliGRAM(s) IV Intermittent every 24 hours  enoxaparin Injectable 40 milliGRAM(s) SubCutaneous every 24 hours  metoprolol succinate ER 25 milliGRAM(s) Oral daily    MEDICATIONS  (PRN):      Vital Signs Last 24 Hrs  T(C): 36.6 (22 Aug 2022 11:14), Max: 36.8 (22 Aug 2022 06:00)  T(F): 97.8 (22 Aug 2022 11:14), Max: 98.2 (22 Aug 2022 06:00)  HR: 88 (22 Aug 2022 11:14) (73 - 88)  BP: 140/66 (22 Aug 2022 11:14) (140/66 - 159/88)  BP(mean): --  RR: 18 (22 Aug 2022 11:14) (18 - 18)  SpO2: 97% (22 Aug 2022 11:14) (97% - 98%)    Parameters below as of 22 Aug 2022 11:14  Patient On (Oxygen Delivery Method): room air      CAPILLARY BLOOD GLUCOSE        I&O's Summary      PHYSICAL EXAM:  GENERAL: NAD, well-developed  HEAD:  Atraumatic, Normocephalic  EYES: EOMI, PERRLA, conjunctiva and sclera clear  NECK: Supple, No JVD  CHEST/LUNG: Clear to auscultation bilaterally; No wheeze  HEART: Regular rate and rhythm; No murmurs, rubs, or gallops  ABDOMEN: Soft, Nontender, Nondistended; Bowel sounds present  EXTREMITIES:  2+ Peripheral Pulses, No clubbing, cyanosis, or edema  PSYCH: AAOx3  NEUROLOGY: non-focal  SKIN: No rashes or lesions    LABS:                        11.6   7.52  )-----------( 219      ( 21 Aug 2022 06:43 )             36.5     08-22    140  |  104  |  15  ----------------------------<  88  3.8   |  25  |  0.48<L>    Ca    9.4      22 Aug 2022 05:52  Phos  2.9       Mg     1.90         TPro  6.7  /  Alb  4.0  /  TBili  0.9  /  DBili  x   /  AST  19  /  ALT  11  /  AlkPhos  84            Urinalysis Basic - ( 21 Aug 2022 06:28 )    Color: Yellow / Appearance: Clear / S.025 / pH: x  Gluc: x / Ketone: Negative  / Bili: Negative / Urobili: <2 mg/dL   Blood: x / Protein: Trace / Nitrite: Negative   Leuk Esterase: Negative / RBC: x / WBC x   Sq Epi: x / Non Sq Epi: x / Bacteria: x        RADIOLOGY & ADDITIONAL TESTS:    Imaging Personally Reviewed:    Consultant(s) Notes Reviewed:      Care Discussed with Consultants/Other Providers:

## 2022-08-22 NOTE — PROGRESS NOTE ADULT - PROBLEM SELECTOR PLAN 1
-pt acutely disoriented to place upon arrival to ED, change from baseline  -agitated, continuously trying to get out of bed  -s/p total 5mg haldol and 3mg ativan  -CT head showing age-related cerebral volume loss and microvascular changes  -can consider zyprexa IM PRN for acute agitation  -Urine toxicology, repeat UA negative  -electrolytes WNL, LFT's normal  -Serum ammonia, folate, B12, ESR/CRP WNL  -if infectious and metabolic workup negative may need psychiatric workup for dementia vs psych disorders. -pt acutely disoriented to place upon arrival to ED, change from baseline; she currently appears to be at baseline  -initially aggitated in ED  -s/p total 5mg haldol and 3mg ativan  -CT head showing age-related cerebral volume loss and microvascular changes  -Urine toxicology, repeat UA negative  -electrolytes WNL, LFT's normal  -Serum ammonia, folate, B12, ESR/CRP WNL -pt acutely disoriented to place upon arrival to ED, change from baseline; she currently appears to be at baseline  -initially agitated in ED  -s/p total 5mg haldol and 3mg ativan  -CT head showing age-related cerebral volume loss and microvascular changes  -Urine toxicology, repeat UA negative  -electrolytes WNL, LFT's normal  -Serum ammonia, folate, B12, ESR/CRP WNL

## 2022-08-22 NOTE — PROGRESS NOTE ADULT - PROBLEM SELECTOR PLAN 2
-UA notable for bacteria, WBC, leuk esterase, calcium oxalate crystals  -s/p 1 dose ceftriaxone in ED.  -monitoring off antibiotics for now since repeat UA negative  -CRP WNL.   -F/U Urine Cx

## 2022-08-22 NOTE — PROGRESS NOTE ADULT - PROBLEM SELECTOR PLAN 3
-per pt's daughter pt. has become more deconditioned since using walker  -PT consulted -per pt's daughter pt. has become more deconditioned since using walker  -PT recommended    -suspect pain in extremities is osteoarthritic and will start Tylenol/Toradol prn and monitor for improvement.

## 2022-08-22 NOTE — PROGRESS NOTE ADULT - ASSESSMENT
87 y.o. Female w/ Hx  asthma, anxiety, questionable CVA in 2007, diverticulosis, GERD, and previous vertebral fracture p/w confusion noted at outpatient urgent care where she initially presented for left shoulder numbness with labs concerning for UTI.  87 y.o. Female w/ Hx  asthma, anxiety, questionable CVA in 2007, diverticulosis, GERD, and previous vertebral fracture p/w confusion noted at outpatient urgent care where she initially presented for left shoulder numbness with labs concerning for UTI. However repeat UA negative and patient c/o chronic B/L LE pain.  87 y.o. Female w/ Hx  asthma, anxiety, questionable CVA in 2007, diverticulosis, GERD, and previous vertebral fracture p/w confusion noted at outpatient urgent care where she initially presented for left shoulder numbness with labs concerning for UTI. However repeat UA negative and patient c/o chronic B/L LE pain.      Called daughter doug but no answer on 8/22

## 2022-08-23 LAB
ANION GAP SERPL CALC-SCNC: 7 MMOL/L — SIGNIFICANT CHANGE UP (ref 7–14)
BUN SERPL-MCNC: 15 MG/DL — SIGNIFICANT CHANGE UP (ref 7–23)
CALCIUM SERPL-MCNC: 9.1 MG/DL — SIGNIFICANT CHANGE UP (ref 8.4–10.5)
CHLORIDE SERPL-SCNC: 102 MMOL/L — SIGNIFICANT CHANGE UP (ref 98–107)
CO2 SERPL-SCNC: 30 MMOL/L — SIGNIFICANT CHANGE UP (ref 22–31)
CREAT SERPL-MCNC: 0.52 MG/DL — SIGNIFICANT CHANGE UP (ref 0.5–1.3)
EGFR: 90 ML/MIN/1.73M2 — SIGNIFICANT CHANGE UP
GLUCOSE SERPL-MCNC: 111 MG/DL — HIGH (ref 70–99)
HCT VFR BLD CALC: 39 % — SIGNIFICANT CHANGE UP (ref 34.5–45)
HGB BLD-MCNC: 12.5 G/DL — SIGNIFICANT CHANGE UP (ref 11.5–15.5)
MAGNESIUM SERPL-MCNC: 1.9 MG/DL — SIGNIFICANT CHANGE UP (ref 1.6–2.6)
MCHC RBC-ENTMCNC: 29.4 PG — SIGNIFICANT CHANGE UP (ref 27–34)
MCHC RBC-ENTMCNC: 32.1 GM/DL — SIGNIFICANT CHANGE UP (ref 32–36)
MCV RBC AUTO: 91.8 FL — SIGNIFICANT CHANGE UP (ref 80–100)
NRBC # BLD: 0 /100 WBCS — SIGNIFICANT CHANGE UP (ref 0–0)
NRBC # FLD: 0 K/UL — SIGNIFICANT CHANGE UP (ref 0–0)
PHOSPHATE SERPL-MCNC: 3.1 MG/DL — SIGNIFICANT CHANGE UP (ref 2.5–4.5)
PLATELET # BLD AUTO: 249 K/UL — SIGNIFICANT CHANGE UP (ref 150–400)
POTASSIUM SERPL-MCNC: 3.6 MMOL/L — SIGNIFICANT CHANGE UP (ref 3.5–5.3)
POTASSIUM SERPL-SCNC: 3.6 MMOL/L — SIGNIFICANT CHANGE UP (ref 3.5–5.3)
RBC # BLD: 4.25 M/UL — SIGNIFICANT CHANGE UP (ref 3.8–5.2)
RBC # FLD: 14.7 % — HIGH (ref 10.3–14.5)
SODIUM SERPL-SCNC: 139 MMOL/L — SIGNIFICANT CHANGE UP (ref 135–145)
WBC # BLD: 9.04 K/UL — SIGNIFICANT CHANGE UP (ref 3.8–10.5)
WBC # FLD AUTO: 9.04 K/UL — SIGNIFICANT CHANGE UP (ref 3.8–10.5)

## 2022-08-23 PROCEDURE — 99233 SBSQ HOSP IP/OBS HIGH 50: CPT

## 2022-08-23 RX ORDER — ENOXAPARIN SODIUM 100 MG/ML
40 INJECTION SUBCUTANEOUS EVERY 24 HOURS
Refills: 0 | Status: DISCONTINUED | OUTPATIENT
Start: 2022-08-23 | End: 2022-08-26

## 2022-08-23 RX ADMIN — Medication 3 MILLILITER(S): at 16:26

## 2022-08-23 RX ADMIN — Medication 3 MILLILITER(S): at 10:00

## 2022-08-23 RX ADMIN — Medication 650 MILLIGRAM(S): at 18:22

## 2022-08-23 RX ADMIN — Medication 25 MILLIGRAM(S): at 06:11

## 2022-08-23 RX ADMIN — CEFTRIAXONE 100 MILLIGRAM(S): 500 INJECTION, POWDER, FOR SOLUTION INTRAMUSCULAR; INTRAVENOUS at 18:19

## 2022-08-23 NOTE — PROGRESS NOTE ADULT - ASSESSMENT
87 y.o. Female w/ Hx  asthma, anxiety, questionable CVA in 2007, diverticulosis, GERD, and previous vertebral fracture p/w confusion noted at outpatient urgent care where she initially presented for left shoulder numbness with labs concerning for UTI. However repeat UA negative and patient c/o chronic B/L LE pain.      Called daughter doug but no answer on 8/22 87 y.o. Female w/ Hx  asthma, anxiety, questionable CVA in 2007, diverticulosis, GERD, and previous vertebral fracture p/w confusion noted at outpatient urgent care where she initially presented for left shoulder numbness with labs concerning for UTI. However repeat UA negative and patient c/o chronic B/L LE pain.      spoke to daughter doug on  8/23 about patient's condition and is amenable to rehab placement.

## 2022-08-23 NOTE — CHART NOTE - NSCHARTNOTEFT_GEN_A_CORE
Per RN, patient refusing Lovenox, wants to take her home ASA. Per discussion with medicine attending, will order SCDs for DVT ppx as patient is refusing Lovenox. Will continue to encourage patient to take Lovenox.

## 2022-08-23 NOTE — PROGRESS NOTE ADULT - PROBLEM SELECTOR PLAN 2
-UA notable for bacteria, WBC, leuk esterase, calcium oxalate crystals  -s/p 1 dose ceftriaxone in ED.  -monitoring off antibiotics for now since repeat UA negative  -CRP WNL.   -Urine Cx contaminated  -F/U Urine Cx -UA notable for bacteria, WBC, leuk esterase, calcium oxalate crystals  -s/p 1 dose ceftriaxone in ED.  -monitoring off antibiotics for now since repeat UA negative  -CRP WNL.   -Urine Cx contaminated

## 2022-08-23 NOTE — PROGRESS NOTE ADULT - PROBLEM SELECTOR PLAN 3
-per pt's daughter pt. has become more deconditioned since using walker  -PT recommended rehab    -suspect pain in extremities is osteoarthritic so will c/w Tylenol/Toradol prn and monitor for improvement.

## 2022-08-23 NOTE — PROGRESS NOTE ADULT - SUBJECTIVE AND OBJECTIVE BOX
Patient is a 87y old  Female who presents with a chief complaint of Confusion (22 Aug 2022 13:46)      SUBJECTIVE / OVERNIGHT EVENTS:  Patient has no new complaints. Denies cp, SOB, abdominal pain, N/V/D     MEDICATIONS  (STANDING):  albuterol/ipratropium for Nebulization 3 milliLiter(s) Nebulizer every 6 hours  cefTRIAXone   IVPB 1000 milliGRAM(s) IV Intermittent every 24 hours  enoxaparin Injectable 40 milliGRAM(s) SubCutaneous every 24 hours  metoprolol succinate ER 25 milliGRAM(s) Oral daily    MEDICATIONS  (PRN):  acetaminophen     Tablet .. 650 milliGRAM(s) Oral every 6 hours PRN Mild Pain (1 - 3)  ketorolac 10 milliGRAM(s) Oral every 8 hours PRN Moderate to severe pain      Vital Signs Last 24 Hrs  T(C): 36.4 (23 Aug 2022 06:00), Max: 36.8 (22 Aug 2022 22:33)  T(F): 97.5 (23 Aug 2022 06:00), Max: 98.3 (22 Aug 2022 22:33)  HR: 68 (23 Aug 2022 10:11) (68 - 81)  BP: 154/74 (23 Aug 2022 06:00) (140/67 - 156/67)  BP(mean): --  RR: 17 (23 Aug 2022 06:00) (17 - 17)  SpO2: 97% (23 Aug 2022 10:11) (95% - 98%)    Parameters below as of 23 Aug 2022 06:00  Patient On (Oxygen Delivery Method): room air      CAPILLARY BLOOD GLUCOSE        I&O's Summary    22 Aug 2022 07:01  -  23 Aug 2022 07:00  --------------------------------------------------------  IN: 260 mL / OUT: 200 mL / NET: 60 mL        PHYSICAL EXAM:  GENERAL: NAD, well-developed  HEAD:  Atraumatic, Normocephalic  EYES: EOMI, PERRLA, conjunctiva and sclera clear  NECK: Supple, No JVD  CHEST/LUNG: Clear to auscultation bilaterally; No wheeze  HEART: Regular rate and rhythm; No murmurs, rubs, or gallops  ABDOMEN: Soft, Nontender, Nondistended; Bowel sounds present  EXTREMITIES:  2+ Peripheral Pulses, No clubbing, cyanosis, or edema  PSYCH: AAOx3  NEUROLOGY: non-focal  SKIN: No rashes or lesions    LABS:                        12.5   9.04  )-----------( 249      ( 23 Aug 2022 11:30 )             39.0     08-23    139  |  102  |  15  ----------------------------<  111<H>  3.6   |  30  |  0.52    Ca    9.1      23 Aug 2022 11:30  Phos  3.1     08-23  Mg     1.90     08-23                RADIOLOGY & ADDITIONAL TESTS:    Imaging Personally Reviewed:    Consultant(s) Notes Reviewed:      Care Discussed with Consultants/Other Providers:

## 2022-08-23 NOTE — PROGRESS NOTE ADULT - PROBLEM SELECTOR PLAN 1
-pt acutely disoriented to place upon arrival to ED, change from baseline; she currently appears to be at baseline  -initially agitated in ED  -s/p total 5mg haldol and 3mg ativan  -CT head showing age-related cerebral volume loss and microvascular changes  -Urine toxicology, repeat UA negative  -electrolytes WNL, LFT's normal  -Serum ammonia, folate, B12, ESR/CRP WNL

## 2022-08-24 ENCOUNTER — TRANSCRIPTION ENCOUNTER (OUTPATIENT)
Age: 87
End: 2022-08-24

## 2022-08-24 LAB
ANION GAP SERPL CALC-SCNC: 9 MMOL/L — SIGNIFICANT CHANGE UP (ref 7–14)
BUN SERPL-MCNC: 11 MG/DL — SIGNIFICANT CHANGE UP (ref 7–23)
CALCIUM SERPL-MCNC: 9.6 MG/DL — SIGNIFICANT CHANGE UP (ref 8.4–10.5)
CHLORIDE SERPL-SCNC: 100 MMOL/L — SIGNIFICANT CHANGE UP (ref 98–107)
CO2 SERPL-SCNC: 29 MMOL/L — SIGNIFICANT CHANGE UP (ref 22–31)
CREAT SERPL-MCNC: 0.48 MG/DL — LOW (ref 0.5–1.3)
EGFR: 92 ML/MIN/1.73M2 — SIGNIFICANT CHANGE UP
GLUCOSE SERPL-MCNC: 112 MG/DL — HIGH (ref 70–99)
HCT VFR BLD CALC: 38.4 % — SIGNIFICANT CHANGE UP (ref 34.5–45)
HGB BLD-MCNC: 12.4 G/DL — SIGNIFICANT CHANGE UP (ref 11.5–15.5)
MAGNESIUM SERPL-MCNC: 1.9 MG/DL — SIGNIFICANT CHANGE UP (ref 1.6–2.6)
MCHC RBC-ENTMCNC: 29.1 PG — SIGNIFICANT CHANGE UP (ref 27–34)
MCHC RBC-ENTMCNC: 32.3 GM/DL — SIGNIFICANT CHANGE UP (ref 32–36)
MCV RBC AUTO: 90.1 FL — SIGNIFICANT CHANGE UP (ref 80–100)
NRBC # BLD: 0 /100 WBCS — SIGNIFICANT CHANGE UP (ref 0–0)
NRBC # FLD: 0 K/UL — SIGNIFICANT CHANGE UP (ref 0–0)
PHOSPHATE SERPL-MCNC: 2.8 MG/DL — SIGNIFICANT CHANGE UP (ref 2.5–4.5)
PLATELET # BLD AUTO: 252 K/UL — SIGNIFICANT CHANGE UP (ref 150–400)
POTASSIUM SERPL-MCNC: 3.7 MMOL/L — SIGNIFICANT CHANGE UP (ref 3.5–5.3)
POTASSIUM SERPL-SCNC: 3.7 MMOL/L — SIGNIFICANT CHANGE UP (ref 3.5–5.3)
RBC # BLD: 4.26 M/UL — SIGNIFICANT CHANGE UP (ref 3.8–5.2)
RBC # FLD: 14.7 % — HIGH (ref 10.3–14.5)
SODIUM SERPL-SCNC: 138 MMOL/L — SIGNIFICANT CHANGE UP (ref 135–145)
WBC # BLD: 7.8 K/UL — SIGNIFICANT CHANGE UP (ref 3.8–10.5)
WBC # FLD AUTO: 7.8 K/UL — SIGNIFICANT CHANGE UP (ref 3.8–10.5)

## 2022-08-24 PROCEDURE — 99232 SBSQ HOSP IP/OBS MODERATE 35: CPT

## 2022-08-24 RX ORDER — LANOLIN ALCOHOL/MO/W.PET/CERES
3 CREAM (GRAM) TOPICAL ONCE
Refills: 0 | Status: DISCONTINUED | OUTPATIENT
Start: 2022-08-24 | End: 2022-08-26

## 2022-08-24 RX ADMIN — Medication 25 MILLIGRAM(S): at 06:16

## 2022-08-24 NOTE — DISCHARGE NOTE PROVIDER - DETAILS OF MALNUTRITION DIAGNOSIS/DIAGNOSES
This patient has been assessed with a concern for Malnutrition and was treated during this hospitalization for the following Nutrition diagnosis/diagnoses:     -  08/24/2022: Severe protein-calorie malnutrition

## 2022-08-24 NOTE — DIETITIAN INITIAL EVALUATION ADULT - NS FNS WEIGHT CHANGE REASON
Per Bernabe ALMEIDA previouse weight 116#, most current weight 114.10. Weight reflects 2lbs/1.7% of loss x 4 months./unintentional

## 2022-08-24 NOTE — PROGRESS NOTE ADULT - PROBLEM SELECTOR PLAN 6
Diet: Regular  DVT: Lovenox  Dispo: PT rec rehab. Diet: Regular  DVT: Lovenox  Dispo: awaiting rehab placement.

## 2022-08-24 NOTE — DISCHARGE NOTE PROVIDER - NSDCMRMEDTOKEN_GEN_ALL_CORE_FT
aspirin 81 mg oral tablet, chewable: 1 tab(s) orally once a day  metoprolol succinate 25 mg oral tablet, extended release: 1 tab(s) orally once a day  TLSO Brace: Length of need - 99    Wt - 56.70kg  Ht - 154.9cm  traMADol 50 mg oral tablet: 0.5 tab(s) orally once a day, As needed, Moderate Pain (4 - 6) MDD:0.5 tab    PLEASE CUT IN HALF   acetaminophen 325 mg oral tablet: 2 tab(s) orally every 6 hours, As needed, Mild Pain (1 - 3)  aspirin 81 mg oral tablet, chewable: 1 tab(s) orally once a day  melatonin 3 mg oral tablet: 1 tab(s) orally once  metoprolol succinate 25 mg oral tablet, extended release: 1 tab(s) orally once a day  TLSO Brace: Length of need - 99    Wt - 56.70kg  Ht - 154.9cm  traMADol 50 mg oral tablet: 0.5 tab(s) orally once a day, As needed, Moderate Pain (4 - 6) MDD:0.5 tab    PLEASE CUT IN HALF   acetaminophen 325 mg oral tablet: 2 tab(s) orally every 6 hours, As needed, Mild Pain (1 - 3)  melatonin 3 mg oral tablet: 1 tab(s) orally once  metoprolol succinate 25 mg oral tablet, extended release: 1 tab(s) orally once a day  TLSO Brace: Length of need - 99    Wt - 56.70kg  Ht - 154.9cm  traMADol 50 mg oral tablet: 0.5 tab(s) orally once a day, As needed, Moderate Pain (4 - 6) MDD:0.5 tab    PLEASE CUT IN HALF

## 2022-08-24 NOTE — PROGRESS NOTE ADULT - PROBLEM SELECTOR PLAN 2
-UA notable for bacteria, WBC, leuk esterase, calcium oxalate crystals  -s/p 4 days of Ceftriaxone 8/20- 8/23  - repeat UA negative  -CRP WNL.   -Urine Cx contaminated

## 2022-08-24 NOTE — DISCHARGE NOTE PROVIDER - HOSPITAL COURSE
87 y.o. Female w/ Hx  asthma, anxiety, questionable CVA in 2007, diverticulosis, GERD, and previous vertebral fracture p/w confusion noted at outpatient urgent care where she initially presented for left shoulder numbness with labs concerning for UTI. However repeat UA negative. Patient was treated for UTI with Ceftriaxone. Urine Cx was contaminated. She did c/o arthritic pain in legs which resolved with tylenol.  Patient now awating rehab placement.    Other encephalopathy.   -pt acutely disoriented to place upon arrival to ED, change from baseline; she currently appears to be at baseline  -initially agitated in ED  -s/p total 5mg haldol and 3mg ativan  -CT head showing age-related cerebral volume loss and microvascular changes  -Urine toxicology, repeat UA negative  -electrolytes WNL, LFT's normal  -Serum ammonia, folate, B12, ESR/CRP WNL.    Urinary tract infection.   -UA notable for bacteria, WBC, leuk esterase, calcium oxalate crystals  -s/p 4 days of Ceftriaxone 8/20- 8/23  - repeat UA negative  -CRP WNL.   -Urine Cx contaminated.    Physical deconditioning.   -per pt's daughter pt. has become more deconditioned since using walker  -PT recommended rehab  -suspect pain in extremities is osteoarthritic and improved with  Tylenol/Toradol prn.    Asthma.   -Stable  -c/w albuterol PRN.    Cerebrovascular accident (CVA), unspecified mechanism.   -stable  -Not on ASA at home.    Preventive measure.   Diet: Regular  DVT: Lovenox    On _________ , discussed with Dr. King, patient is medically cleared and optimized for discharge today to rehab. All medications were reviewed with attending, and any new/required prescriptions were sent to mutually agreed upon pharmacy.         87 y.o. Female w/ Hx  asthma, anxiety, questionable CVA in 2007, diverticulosis, GERD, and previous vertebral fracture p/w confusion noted at outpatient urgent care where she initially presented for left shoulder numbness with labs concerning for UTI. However repeat UA negative. Patient was treated for UTI with Ceftriaxone. Urine Cx was contaminated. She did c/o arthritic pain in legs which resolved with tylenol.  Patient now awating rehab placement.    Other encephalopathy.   -pt acutely disoriented to place upon arrival to ED, change from baseline; she currently appears to be at baseline  -initially agitated in ED  -s/p total 5mg haldol and 3mg ativan  -CT head showing age-related cerebral volume loss and microvascular changes  -Urine toxicology, repeat UA negative  -electrolytes WNL, LFT's normal  -Serum ammonia, folate, B12, ESR/CRP WNL.    Urinary tract infection.   -UA notable for bacteria, WBC, leuk esterase, calcium oxalate crystals  -s/p 4 days of Ceftriaxone 8/20- 8/23  -repeat UA negative  -CRP WNL.   -Urine Cx contaminated.    Physical deconditioning.   -per pt's daughter pt. has become more deconditioned since using walker  -PT recommended rehab  -suspect pain in extremities is osteoarthritic and improved with Tylenol/Toradol prn.    Asthma.   -Stable  -c/w albuterol PRN.    Cerebrovascular accident (CVA), unspecified mechanism.   -stable  -Not on ASA at home.      On 8/26/2022, discussed with Dr. King, patient is medically cleared for discharge today to rehab. All medications were reviewed with attending.

## 2022-08-24 NOTE — PROGRESS NOTE ADULT - SUBJECTIVE AND OBJECTIVE BOX
Patient is a 87y old  Female who presents with a chief complaint of Confusion (23 Aug 2022 13:06)      SUBJECTIVE / OVERNIGHT EVENTS:    MEDICATIONS  (STANDING):  albuterol/ipratropium for Nebulization 3 milliLiter(s) Nebulizer every 6 hours  enoxaparin Injectable 40 milliGRAM(s) SubCutaneous every 24 hours  metoprolol succinate ER 25 milliGRAM(s) Oral daily    MEDICATIONS  (PRN):  acetaminophen     Tablet .. 650 milliGRAM(s) Oral every 6 hours PRN Mild Pain (1 - 3)  ketorolac 10 milliGRAM(s) Oral every 8 hours PRN Moderate to severe pain      Vital Signs Last 24 Hrs  T(C): 36.6 (24 Aug 2022 06:13), Max: 36.9 (23 Aug 2022 21:43)  T(F): 97.8 (24 Aug 2022 06:13), Max: 98.4 (23 Aug 2022 21:43)  HR: 78 (24 Aug 2022 06:13) (66 - 86)  BP: 176/84 (24 Aug 2022 06:13) (108/63 - 176/84)  BP(mean): --  RR: 16 (24 Aug 2022 06:13) (16 - 18)  SpO2: 96% (24 Aug 2022 06:13) (96% - 100%)    Parameters below as of 24 Aug 2022 06:13  Patient On (Oxygen Delivery Method): room air      CAPILLARY BLOOD GLUCOSE        I&O's Summary    23 Aug 2022 07:01  -  24 Aug 2022 07:00  --------------------------------------------------------  IN: 600 mL / OUT: 500 mL / NET: 100 mL        PHYSICAL EXAM:  GENERAL: NAD, well-developed  HEAD:  Atraumatic, Normocephalic  EYES: EOMI, PERRLA, conjunctiva and sclera clear  NECK: Supple, No JVD  CHEST/LUNG: Clear to auscultation bilaterally; No wheeze  HEART: Regular rate and rhythm; No murmurs, rubs, or gallops  ABDOMEN: Soft, Nontender, Nondistended; Bowel sounds present  EXTREMITIES:  2+ Peripheral Pulses, No clubbing, cyanosis, or edema  PSYCH: AAOx3  NEUROLOGY: non-focal  SKIN: No rashes or lesions    LABS:                        12.5   9.04  )-----------( 249      ( 23 Aug 2022 11:30 )             39.0     08-23    139  |  102  |  15  ----------------------------<  111<H>  3.6   |  30  |  0.52    Ca    9.1      23 Aug 2022 11:30  Phos  3.1     08-23  Mg     1.90     08-23                RADIOLOGY & ADDITIONAL TESTS:    Imaging Personally Reviewed:    Consultant(s) Notes Reviewed:      Care Discussed with Consultants/Other Providers:   Patient is a 87y old  Female who presents with a chief complaint of Confusion (23 Aug 2022 13:06)      SUBJECTIVE / OVERNIGHT EVENTS:  Patient has no new complaints. Denies cp, SOB, abdominal pain, N/V/D     MEDICATIONS  (STANDING):  albuterol/ipratropium for Nebulization 3 milliLiter(s) Nebulizer every 6 hours  enoxaparin Injectable 40 milliGRAM(s) SubCutaneous every 24 hours  metoprolol succinate ER 25 milliGRAM(s) Oral daily    MEDICATIONS  (PRN):  acetaminophen     Tablet .. 650 milliGRAM(s) Oral every 6 hours PRN Mild Pain (1 - 3)  ketorolac 10 milliGRAM(s) Oral every 8 hours PRN Moderate to severe pain      Vital Signs Last 24 Hrs  T(C): 36.6 (24 Aug 2022 06:13), Max: 36.9 (23 Aug 2022 21:43)  T(F): 97.8 (24 Aug 2022 06:13), Max: 98.4 (23 Aug 2022 21:43)  HR: 78 (24 Aug 2022 06:13) (66 - 86)  BP: 176/84 (24 Aug 2022 06:13) (108/63 - 176/84)  BP(mean): --  RR: 16 (24 Aug 2022 06:13) (16 - 18)  SpO2: 96% (24 Aug 2022 06:13) (96% - 100%)    Parameters below as of 24 Aug 2022 06:13  Patient On (Oxygen Delivery Method): room air      CAPILLARY BLOOD GLUCOSE        I&O's Summary    23 Aug 2022 07:01  -  24 Aug 2022 07:00  --------------------------------------------------------  IN: 600 mL / OUT: 500 mL / NET: 100 mL        PHYSICAL EXAM:  GENERAL: NAD, well-developed  HEAD:  Atraumatic, Normocephalic  EYES: EOMI, PERRLA, conjunctiva and sclera clear  NECK: Supple, No JVD  CHEST/LUNG: Clear to auscultation bilaterally; No wheeze  HEART: Regular rate and rhythm; No murmurs, rubs, or gallops  ABDOMEN: Soft, Nontender, Nondistended; Bowel sounds present  EXTREMITIES:  2+ Peripheral Pulses, No clubbing, cyanosis, or edema  PSYCH: AAOx3  NEUROLOGY: non-focal  SKIN: No rashes or lesions    LABS:                        12.5   9.04  )-----------( 249      ( 23 Aug 2022 11:30 )             39.0     08-23    139  |  102  |  15  ----------------------------<  111<H>  3.6   |  30  |  0.52    Ca    9.1      23 Aug 2022 11:30  Phos  3.1     08-23  Mg     1.90     08-23                RADIOLOGY & ADDITIONAL TESTS:    Imaging Personally Reviewed:    Consultant(s) Notes Reviewed:      Care Discussed with Consultants/Other Providers:

## 2022-08-24 NOTE — DIETITIAN INITIAL EVALUATION ADULT - ORAL INTAKE PTA/DIET HISTORY
Patient lives alone. States she orders groceries online, and able to prepare her food at home. Pt reports good appetite. However, patient states she eats very little, and consumes 3 meals daily. Likely patient is not meeting her protein calorie needs. Patient denies follow any diet at home.

## 2022-08-24 NOTE — PROGRESS NOTE ADULT - ASSESSMENT
87 y.o. Female w/ Hx  asthma, anxiety, questionable CVA in 2007, diverticulosis, GERD, and previous vertebral fracture p/w confusion noted at outpatient urgent care where she initially presented for left shoulder numbness with labs concerning for UTI. However repeat UA negative and patient c/o chronic B/L LE pain.      spoke to daughter doug on  8/23 about patient's condition and is amenable to rehab placement.

## 2022-08-24 NOTE — DIETITIAN INITIAL EVALUATION ADULT - PERTINENT MEDS FT
MEDICATIONS  (STANDING):  albuterol/ipratropium for Nebulization 3 milliLiter(s) Nebulizer every 6 hours  enoxaparin Injectable 40 milliGRAM(s) SubCutaneous every 24 hours  metoprolol succinate ER 25 milliGRAM(s) Oral daily    MEDICATIONS  (PRN):  acetaminophen     Tablet .. 650 milliGRAM(s) Oral every 6 hours PRN Mild Pain (1 - 3)  ketorolac 10 milliGRAM(s) Oral every 8 hours PRN Moderate to severe pain

## 2022-08-24 NOTE — DIETITIAN INITIAL EVALUATION ADULT - ADD RECOMMEND
1) Recommend downgrade diet to soft and bite sized as per pt's request.   2) Will provide [employer] vital shake x2 (520 keyon,20gm pro each serving), and Magic Cup 1x daily (290kcal, 9gm pro) to provision optimal nutrition.   3) Monitor and document % PO intake.   4) Monitor BM, PO intake, Labs, weights, and skin integrity.   5) RDN remains available PRN.

## 2022-08-24 NOTE — DIETITIAN INITIAL EVALUATION ADULT - PERTINENT LABORATORY DATA
08-24    138  |  100  |  11  ----------------------------<  112<H>  3.7   |  29  |  0.48<L>    Ca    9.6      24 Aug 2022 09:45  Phos  2.8     08-24  Mg     1.90     08-24

## 2022-08-24 NOTE — DIETITIAN INITIAL EVALUATION ADULT - OTHER INFO
Per chart review, 87 y.o. Female w/ Hx  asthma, anxiety, questionable CVA in 2007, diverticulosis, GERD, and previous vertebral fracture p/w confusion noted at outpatient urgent care where she initially presented for left shoulder numbness with labs concerning for UTI. However repeat UA negative and patient c/o chronic B/L LE pain.  D/C plan in placed for rehab per SW.      Patient seen at bedside. Reports poor dentition, c/o regular food texture which pt is having difficulty chewing the food. Pt requests downgrade to soft and bite sized. Denies swallowing difficulty. Food preferences explored, pt amenable to [employer] shake 2 x daily (520cal, 22gm pro per serving) and Magic Cup 1x daily (290kcal, 9gm pro). Denies nausea and vomiting. Denies any GI issues (nausea/vomiting/diarrhea/constipation.) Last BM yesterday. Patient unable to recall her UBW.

## 2022-08-24 NOTE — DISCHARGE NOTE PROVIDER - NSDCCPCAREPLAN_GEN_ALL_CORE_FT
PRINCIPAL DISCHARGE DIAGNOSIS  Diagnosis: Acute UTI  Assessment and Plan of Treatment: You were treated with antibiotics which you completed on 8/23. Your repeat urine test was negative.      SECONDARY DISCHARGE DIAGNOSES  Diagnosis: Other encephalopathy  Assessment and Plan of Treatment: You were noted to have some confusion. You had a CT of your head which showed no acute changes, and showed age-related cerebral volume loss and microvascular changes. Please follow up with your primary care physician for further monitoring/management.    Diagnosis: Physical deconditioning  Assessment and Plan of Treatment: You were seen by physical therapy and recommended you go to rehab. Your pain in your extremeties is likely due to your osteoarthritis and improves with Tylenol/pain control.    Diagnosis: Asthma  Assessment and Plan of Treatment: Continue your inhalers and annual pulmonary function tests with your outpatient provider. Monitor for asthma exacerbation, such as, shortness of breath, hyperventilation, or any other difficulties breathing and report to the emergency room in the event that your rescue inhaler has not resolved your symptoms.

## 2022-08-25 LAB — SARS-COV-2 RNA SPEC QL NAA+PROBE: SIGNIFICANT CHANGE UP

## 2022-08-25 PROCEDURE — 99232 SBSQ HOSP IP/OBS MODERATE 35: CPT

## 2022-08-25 RX ADMIN — Medication 3 MILLILITER(S): at 21:45

## 2022-08-25 RX ADMIN — Medication 25 MILLIGRAM(S): at 10:04

## 2022-08-25 NOTE — PROGRESS NOTE ADULT - PROBLEM SELECTOR PLAN 3
-per pt's daughter pt. has become more deconditioned since using walker  -PT recommended rehab    -suspect pain in extremities is osteoarthritic and improved with  Tylenol/Toradol prn.

## 2022-08-25 NOTE — PROGRESS NOTE ADULT - SUBJECTIVE AND OBJECTIVE BOX
Patient is a 87y old  Female who presents with a chief complaint of Confusion (24 Aug 2022 16:00)      SUBJECTIVE / OVERNIGHT EVENTS:  Patient has no new complaints. Denies cp, SOB, abdominal pain, N/V/D     MEDICATIONS  (STANDING):  albuterol/ipratropium for Nebulization 3 milliLiter(s) Nebulizer every 6 hours  enoxaparin Injectable 40 milliGRAM(s) SubCutaneous every 24 hours  melatonin 3 milliGRAM(s) Oral once  metoprolol succinate ER 25 milliGRAM(s) Oral daily    MEDICATIONS  (PRN):  acetaminophen     Tablet .. 650 milliGRAM(s) Oral every 6 hours PRN Mild Pain (1 - 3)  ketorolac 10 milliGRAM(s) Oral every 8 hours PRN Moderate to severe pain      Vital Signs Last 24 Hrs  T(C): 36.3 (25 Aug 2022 09:55), Max: 36.6 (24 Aug 2022 20:47)  T(F): 97.4 (25 Aug 2022 09:55), Max: 97.8 (24 Aug 2022 20:47)  HR: 84 (25 Aug 2022 09:55) (84 - 87)  BP: 144/74 (25 Aug 2022 09:55) (129/57 - 144/74)  BP(mean): --  RR: 18 (25 Aug 2022 09:55) (16 - 18)  SpO2: 96% (25 Aug 2022 09:55) (94% - 96%)    Parameters below as of 25 Aug 2022 09:55  Patient On (Oxygen Delivery Method): room air      CAPILLARY BLOOD GLUCOSE        I&O's Summary    24 Aug 2022 07:01  -  25 Aug 2022 07:00  --------------------------------------------------------  IN: 320 mL / OUT: 200 mL / NET: 120 mL        PHYSICAL EXAM:  GENERAL: NAD, well-developed  HEAD:  Atraumatic, Normocephalic  EYES: EOMI, PERRLA, conjunctiva and sclera clear  NECK: Supple, No JVD  CHEST/LUNG: Clear to auscultation bilaterally; No wheeze  HEART: Regular rate and rhythm; No murmurs, rubs, or gallops  ABDOMEN: Soft, Nontender, Nondistended; Bowel sounds present  EXTREMITIES:  2+ Peripheral Pulses, No clubbing, cyanosis, or edema  PSYCH: AAOx3  NEUROLOGY: non-focal  SKIN: No rashes or lesions    LABS:                        12.4   7.80  )-----------( 252      ( 24 Aug 2022 09:45 )             38.4     08-24    138  |  100  |  11  ----------------------------<  112<H>  3.7   |  29  |  0.48<L>    Ca    9.6      24 Aug 2022 09:45  Phos  2.8     08-24  Mg     1.90     08-24                RADIOLOGY & ADDITIONAL TESTS:    Imaging Personally Reviewed:    Consultant(s) Notes Reviewed:      Care Discussed with Consultants/Other Providers:

## 2022-08-25 NOTE — PROGRESS NOTE ADULT - ASSESSMENT
87 y.o. Female w/ Hx  asthma, anxiety, questionable CVA in 2007, diverticulosis, GERD, and previous vertebral fracture p/w confusion noted at outpatient urgent care where she initially presented for left shoulder numbness with labs concerning for UTI. However repeat UA negative. Patient was treated for UTI with Ceftriaxone. Urine Cx was contaminated. She did c/o arthritic pain in legs which resolved with tylenol.  Patient now awating rehab placement. D/C 40 minutes.     Spoke to daughter doug on  8/25 about patient's condition and is amenable to rehab placement.

## 2022-08-25 NOTE — PROGRESS NOTE ADULT - PROBLEM SELECTOR PLAN 6
Diet: Regular  DVT: Lovenox  Dispo: awaiting rehab placement. Diet: Regular  DVT: Lovenox  Dispo: awaiting rehab placement. F/U COVID test.

## 2022-08-26 ENCOUNTER — TRANSCRIPTION ENCOUNTER (OUTPATIENT)
Age: 87
End: 2022-08-26

## 2022-08-26 VITALS
SYSTOLIC BLOOD PRESSURE: 148 MMHG | HEART RATE: 68 BPM | OXYGEN SATURATION: 68 % | RESPIRATION RATE: 16 BRPM | TEMPERATURE: 99 F | DIASTOLIC BLOOD PRESSURE: 78 MMHG

## 2022-08-26 PROCEDURE — 99239 HOSP IP/OBS DSCHRG MGMT >30: CPT

## 2022-08-26 RX ORDER — LANOLIN ALCOHOL/MO/W.PET/CERES
1 CREAM (GRAM) TOPICAL
Qty: 0 | Refills: 0 | DISCHARGE
Start: 2022-08-26

## 2022-08-26 RX ORDER — ACETAMINOPHEN 500 MG
2 TABLET ORAL
Qty: 0 | Refills: 0 | DISCHARGE
Start: 2022-08-26

## 2022-08-26 RX ADMIN — Medication 25 MILLIGRAM(S): at 06:28

## 2022-08-26 NOTE — PROGRESS NOTE ADULT - PROBLEM SELECTOR PROBLEM 5
Cerebrovascular accident (CVA), unspecified mechanism

## 2022-08-26 NOTE — PROGRESS NOTE ADULT - PROBLEM SELECTOR PROBLEM 1
Other encephalopathy

## 2022-08-26 NOTE — PROGRESS NOTE ADULT - NUTRITIONAL ASSESSMENT
This patient has been assessed with a concern for Malnutrition and has been determined to have a diagnosis/diagnoses of Severe protein-calorie malnutrition.    This patient is being managed with:   Diet Soft and Bite Sized-  Entered: Aug 24 2022  3:00PM    
This patient has been assessed with a concern for Malnutrition and has been determined to have a diagnosis/diagnoses of Severe protein-calorie malnutrition.    This patient is being managed with:   Diet Soft and Bite Sized-  Entered: Aug 24 2022  3:00PM

## 2022-08-26 NOTE — PROGRESS NOTE ADULT - PROBLEM SELECTOR PLAN 6
Diet: Regular  DVT: Lovenox  Dispo: awaiting rehab placement. F/U COVID test. Diet: Regular  DVT: Lovenox  Dispo: awaiting rehab placement. COVID negative on 8/25.

## 2022-08-26 NOTE — DISCHARGE NOTE NURSING/CASE MANAGEMENT/SOCIAL WORK - NSDCPEFALRISK_GEN_ALL_CORE
For information on Fall & Injury Prevention, visit: [email or web address] OR  [email or web address] OR  https://www.cdc.gov/steadi/patient.html

## 2022-08-26 NOTE — DISCHARGE NOTE NURSING/CASE MANAGEMENT/SOCIAL WORK - PATIENT PORTAL LINK FT
You can access the FollowMyHealth Patient Portal offered by [facility] by registering at the following website: [email or web address]. By joining [facility]’s FollowMyHealth portal, you will also be able to view your health information using other applications (apps) compatible with our system.

## 2022-08-26 NOTE — PROGRESS NOTE ADULT - PROBLEM/PLAN-4
DISPLAY PLAN FREE TEXT
Normal vision: sees adequately in most situations; can see medication labels, newsprint

## 2022-08-26 NOTE — DISCHARGE NOTE NURSING/CASE MANAGEMENT/SOCIAL WORK - NSDCFUADDAPPT_GEN_ALL_CORE_FT
Please follow up with your primary care physician regarding your hospitalization and for further monitoring/management.

## 2022-08-26 NOTE — PROGRESS NOTE ADULT - SUBJECTIVE AND OBJECTIVE BOX
Patient is a 87y old  Female who presents with a chief complaint of Confusion (25 Aug 2022 13:36)      SUBJECTIVE / OVERNIGHT EVENTS:  Patient has no new complaints. Denies cp, SOB, abdominal pain, N/V/D     MEDICATIONS  (STANDING):  albuterol/ipratropium for Nebulization 3 milliLiter(s) Nebulizer every 6 hours  enoxaparin Injectable 40 milliGRAM(s) SubCutaneous every 24 hours  melatonin 3 milliGRAM(s) Oral once  metoprolol succinate ER 25 milliGRAM(s) Oral daily    MEDICATIONS  (PRN):  acetaminophen     Tablet .. 650 milliGRAM(s) Oral every 6 hours PRN Mild Pain (1 - 3)  ketorolac 10 milliGRAM(s) Oral every 8 hours PRN Moderate to severe pain      Vital Signs Last 24 Hrs  T(C): 37.1 (26 Aug 2022 07:30), Max: 37.1 (26 Aug 2022 07:30)  T(F): 98.7 (26 Aug 2022 07:30), Max: 98.7 (26 Aug 2022 07:30)  HR: 68 (26 Aug 2022 07:30) (68 - 80)  BP: 148/78 (26 Aug 2022 07:30) (146/64 - 152/76)  BP(mean): --  RR: 16 (26 Aug 2022 07:30) (16 - 18)  SpO2: 68% (26 Aug 2022 07:30) (68% - 97%)    Parameters below as of 26 Aug 2022 07:30  Patient On (Oxygen Delivery Method): room air      CAPILLARY BLOOD GLUCOSE        I&O's Summary    25 Aug 2022 07:01  -  26 Aug 2022 07:00  --------------------------------------------------------  IN: 720 mL / OUT: 0 mL / NET: 720 mL        PHYSICAL EXAM:  GENERAL: NAD, well-developed  HEAD:  Atraumatic, Normocephalic  EYES: EOMI, PERRLA, conjunctiva and sclera clear  NECK: Supple, No JVD  CHEST/LUNG: Clear to auscultation bilaterally; No wheeze  HEART: Regular rate and rhythm; No murmurs, rubs, or gallops  ABDOMEN: Soft, Nontender, Nondistended; Bowel sounds present  EXTREMITIES:  2+ Peripheral Pulses, No clubbing, cyanosis, or edema  PSYCH: AAOx3  NEUROLOGY: non-focal  SKIN: No rashes or lesions    LABS:                    RADIOLOGY & ADDITIONAL TESTS:    Imaging Personally Reviewed:    Consultant(s) Notes Reviewed:      Care Discussed with Consultants/Other Providers:

## 2022-08-26 NOTE — PROGRESS NOTE ADULT - PROBLEM SELECTOR PROBLEM 3
Physical deconditioning

## 2022-09-09 ENCOUNTER — INPATIENT (INPATIENT)
Facility: HOSPITAL | Age: 87
LOS: 10 days | Discharge: NURSING, REHAB OR LONG-TERM CARE | DRG: 178 | End: 2022-09-20
Attending: INTERNAL MEDICINE | Admitting: HOSPITALIST
Payer: MEDICARE

## 2022-09-09 VITALS
OXYGEN SATURATION: 97 % | WEIGHT: 89.95 LBS | DIASTOLIC BLOOD PRESSURE: 82 MMHG | HEART RATE: 88 BPM | SYSTOLIC BLOOD PRESSURE: 126 MMHG | RESPIRATION RATE: 18 BRPM | TEMPERATURE: 99 F | HEIGHT: 61 IN

## 2022-09-09 DIAGNOSIS — Z90.89 ACQUIRED ABSENCE OF OTHER ORGANS: Chronic | ICD-10-CM

## 2022-09-09 LAB
ALBUMIN SERPL ELPH-MCNC: 3.6 G/DL — SIGNIFICANT CHANGE UP (ref 3.3–5)
ALP SERPL-CCNC: 90 U/L — SIGNIFICANT CHANGE UP (ref 40–120)
ALT FLD-CCNC: 20 U/L — SIGNIFICANT CHANGE UP (ref 10–45)
ANION GAP SERPL CALC-SCNC: 9 MMOL/L — SIGNIFICANT CHANGE UP (ref 5–17)
AST SERPL-CCNC: 30 U/L — SIGNIFICANT CHANGE UP (ref 10–40)
BASOPHILS # BLD AUTO: 0.01 K/UL — SIGNIFICANT CHANGE UP (ref 0–0.2)
BASOPHILS NFR BLD AUTO: 0.1 % — SIGNIFICANT CHANGE UP (ref 0–2)
BILIRUB SERPL-MCNC: 0.9 MG/DL — SIGNIFICANT CHANGE UP (ref 0.2–1.2)
BUN SERPL-MCNC: 10 MG/DL — SIGNIFICANT CHANGE UP (ref 7–23)
CALCIUM SERPL-MCNC: 9.6 MG/DL — SIGNIFICANT CHANGE UP (ref 8.4–10.5)
CHLORIDE SERPL-SCNC: 103 MMOL/L — SIGNIFICANT CHANGE UP (ref 96–108)
CO2 SERPL-SCNC: 27 MMOL/L — SIGNIFICANT CHANGE UP (ref 22–31)
CREAT SERPL-MCNC: 0.64 MG/DL — SIGNIFICANT CHANGE UP (ref 0.5–1.3)
EGFR: 85 ML/MIN/1.73M2 — SIGNIFICANT CHANGE UP
EOSINOPHIL # BLD AUTO: 0.05 K/UL — SIGNIFICANT CHANGE UP (ref 0–0.5)
EOSINOPHIL NFR BLD AUTO: 0.7 % — SIGNIFICANT CHANGE UP (ref 0–6)
GLUCOSE SERPL-MCNC: 105 MG/DL — HIGH (ref 70–99)
HCT VFR BLD CALC: 36.1 % — SIGNIFICANT CHANGE UP (ref 34.5–45)
HGB BLD-MCNC: 11.6 G/DL — SIGNIFICANT CHANGE UP (ref 11.5–15.5)
IMM GRANULOCYTES NFR BLD AUTO: 0.4 % — SIGNIFICANT CHANGE UP (ref 0–1.5)
LYMPHOCYTES # BLD AUTO: 1.62 K/UL — SIGNIFICANT CHANGE UP (ref 1–3.3)
LYMPHOCYTES # BLD AUTO: 23.4 % — SIGNIFICANT CHANGE UP (ref 13–44)
MCHC RBC-ENTMCNC: 28.6 PG — SIGNIFICANT CHANGE UP (ref 27–34)
MCHC RBC-ENTMCNC: 32.1 GM/DL — SIGNIFICANT CHANGE UP (ref 32–36)
MCV RBC AUTO: 89.1 FL — SIGNIFICANT CHANGE UP (ref 80–100)
MONOCYTES # BLD AUTO: 0.85 K/UL — SIGNIFICANT CHANGE UP (ref 0–0.9)
MONOCYTES NFR BLD AUTO: 12.3 % — SIGNIFICANT CHANGE UP (ref 2–14)
NEUTROPHILS # BLD AUTO: 4.35 K/UL — SIGNIFICANT CHANGE UP (ref 1.8–7.4)
NEUTROPHILS NFR BLD AUTO: 63.1 % — SIGNIFICANT CHANGE UP (ref 43–77)
NRBC # BLD: 0 /100 WBCS — SIGNIFICANT CHANGE UP (ref 0–0)
PLATELET # BLD AUTO: 246 K/UL — SIGNIFICANT CHANGE UP (ref 150–400)
POTASSIUM SERPL-MCNC: 4.3 MMOL/L — SIGNIFICANT CHANGE UP (ref 3.5–5.3)
POTASSIUM SERPL-SCNC: 4.3 MMOL/L — SIGNIFICANT CHANGE UP (ref 3.5–5.3)
PROT SERPL-MCNC: 6.5 G/DL — SIGNIFICANT CHANGE UP (ref 6–8.3)
RBC # BLD: 4.05 M/UL — SIGNIFICANT CHANGE UP (ref 3.8–5.2)
RBC # FLD: 14.2 % — SIGNIFICANT CHANGE UP (ref 10.3–14.5)
SODIUM SERPL-SCNC: 139 MMOL/L — SIGNIFICANT CHANGE UP (ref 135–145)
WBC # BLD: 6.91 K/UL — SIGNIFICANT CHANGE UP (ref 3.8–10.5)
WBC # FLD AUTO: 6.91 K/UL — SIGNIFICANT CHANGE UP (ref 3.8–10.5)

## 2022-09-09 PROCEDURE — 93010 ELECTROCARDIOGRAM REPORT: CPT

## 2022-09-09 PROCEDURE — 70450 CT HEAD/BRAIN W/O DYE: CPT | Mod: 26,MA

## 2022-09-09 PROCEDURE — 99285 EMERGENCY DEPT VISIT HI MDM: CPT | Mod: FS,CS

## 2022-09-09 RX ORDER — HALOPERIDOL DECANOATE 100 MG/ML
5 INJECTION INTRAMUSCULAR ONCE
Refills: 0 | Status: COMPLETED | OUTPATIENT
Start: 2022-09-09 | End: 2022-09-09

## 2022-09-09 RX ADMIN — Medication 2 MILLIGRAM(S): at 20:14

## 2022-09-09 RX ADMIN — HALOPERIDOL DECANOATE 5 MILLIGRAM(S): 100 INJECTION INTRAMUSCULAR at 19:09

## 2022-09-09 NOTE — ED PROVIDER NOTE - ATTENDING APP SHARED VISIT CONTRIBUTION OF CARE
Attending MD Brady:   I personally have seen and examined this patient.  Physician assistant note reviewed and agree on plan of care and except where noted.  See below for details.     Seen in [place] [facility]    87F with PMH/PSH including dementia, asthma, anxiety, osteoporosis, GERD, TIA, from EMR ?CVA 2007, diverticulosis, previous vertebral fx, recent admission to [facility] for confusion/UTI presents to the ED brought in by EMS for agitation.  EMS reports patient was agitated, uncooperative and aggressive towards staff and sent in.  No other information available.  On arrival, patient yelling "help me", "I can't stay in the bed" "my back hurts" "I wasn't treated right".  Limited ROS secondary to agitation, denies chest pain, shortness of breath, abdominal pain.  Reports back pain, reports diffuse.    Exam:   General: NAD  HENT: head NCAT, airway patent  Eyes: anicteric, no conjunctival injection   Lungs: lungs CTAB with good inspiratory effort, no wheezing, no rhonchi, no rales  Cardiac: +S1S2, no obvious m/r/g  GI: abdomen soft with +BS, NT, ND  MSK: ranging neck and extremities freely, no point tenderness in midline spine palpation  Neuro: moving all extremities spontaneously, nonfocal  Psych: agitated    A/P: 87F with agitation, will obtain CTH to eval for ICH, will obtain labs for metabolic derangement/encephalopathy, will obtain CXR and UA for occult infection, will likely need admission Attending MD Brady:   I personally have seen and examined this patient.  Physician assistant note reviewed and agree on plan of care and except where noted.  See below for details.     Seen in [place] [facility]    87F with PMH/PSH including dementia, asthma, anxiety, osteoporosis, GERD, TIA, from EMR ?CVA 2007, diverticulosis, previous vertebral fx, recent admission to [facility] for confusion/UTI presents to the ED brought in by EMS for agitation.  EMS reports patient was agitated, uncooperative and aggressive towards staff and sent in.  No other information available.  On arrival, patient yelling "help me", "I can't stay in the bed" "my back hurts" "I wasn't treated right".  Limited ROS secondary to agitation, denies chest pain, shortness of breath, abdominal pain.  Reports back pain, reports diffuse.      Exam:   General: NAD  HENT: head NCAT, airway patent  Eyes: anicteric, no conjunctival injection   Lungs: lungs CTAB with good inspiratory effort, no wheezing, no rhonchi, no rales  Cardiac: +S1S2, no obvious m/r/g  GI: abdomen soft with +BS, NT, ND  MSK: ranging neck and extremities freely, no point tenderness in midline spine palpation  Neuro: moving all extremities spontaneously, nonfocal  Psych: agitated    A/P: 87F with agitation, will obtain CTH to eval for ICH, will obtain labs for metabolic derangement/encephalopathy, will obtain CXR and UA for occult infection, will likely need admission    ADDENDUM: MD informed that the patient was speaking Tu[place] fluently to staff, patient re-interviewed, denies all physical complaints including chest pain, shortness of breath, abdominal pain, dysuria.  Re-questioned patient about back pain, denies back pain.

## 2022-09-09 NOTE — ED ADULT NURSE REASSESSMENT NOTE - NS ED NURSE REASSESS COMMENT FT1
revived pt at 1930. pt is agitated , tried to kick writer. pt is orientated to self and sometimes place. pt is close to nursing stating will continue to monitor.. received pt at 1930. pt is agitated , tried to kick writer. pt is orientated to self and sometimes place. pt is close to nursing stating will continue to monitor..

## 2022-09-09 NOTE — ED PROVIDER NOTE - PHYSICAL EXAMINATION
GEN: Pt elderly, non-toxic appearing, frequently yelling at staff, alert.  PSYCH: Agitated.  EYES: Sclera white w/o injection.  ENT: Head NCAT. Neck supple FROM. Airway patent.  RESP: No chest wall tenderness, CTA b/l, no wheezes, rales, or rhonchi.   CARDIAC: RRR, clear distinct S1, S2.  ABD: Abdomen soft, non-tender. No CVAT b/l.  MSK: Moving all extremities.  NEURO: No apparent focal motor or sensory deficits.  VASC: Well perfused. No edema.  SKIN: No rashes or lesions.

## 2022-09-09 NOTE — ED ADULT NURSE NOTE - OBJECTIVE STATEMENT
patient MAL from nursing home. As per EMS patient was sent here for evaluation due to increasing agitation & combative. Upon arrival patient not cooperative & kept yelling to the staff. Refused to answer questions instead claiming that "wasn't treated right from that place!" "my back is hurting, cannot stay on this bed"  Remains refusing staff to assist her. Noted when she stated back her complete name & . Awaiting to be evaluated by MD.
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
## 2022-09-09 NOTE — ED ADULT NURSE NOTE - ED STAT RN HANDOFF DETAILS 2
Handoff report provided to oncoming nurse Sherice DAMIAN. Understands pmh, medications given, and plan of care for patient. Patient in stable condition, vital signs updated, and patient has no complaints at this time and has been updated on care plan. Pt sleeping in stretcher

## 2022-09-09 NOTE — ED PROVIDER NOTE - OBJECTIVE STATEMENT
87y F pmhx asthma, anxiety, questionable CVA in 2007, diverticulosis, GERD, and previous vertebral fracture, recently admitted [facility] 8/21-8/26 for confusion and UTI, presents to ED BIBEMS from The [facility] due to agitation. Agitated in ED yelling at staff. Reporting back pain. No reported falls/trauma by EMS.

## 2022-09-09 NOTE — ED PROVIDER NOTE - NS ED ATTENDING STATEMENT MOD
This was a shared visit with the ALLAN. I reviewed and verified the documentation and independently performed the documented:

## 2022-09-10 DIAGNOSIS — J45.909 UNSPECIFIED ASTHMA, UNCOMPLICATED: ICD-10-CM

## 2022-09-10 DIAGNOSIS — R45.1 RESTLESSNESS AND AGITATION: ICD-10-CM

## 2022-09-10 DIAGNOSIS — U07.1 COVID-19: ICD-10-CM

## 2022-09-10 LAB
APPEARANCE UR: CLEAR — SIGNIFICANT CHANGE UP
BACTERIA # UR AUTO: NEGATIVE — SIGNIFICANT CHANGE UP
BILIRUB UR-MCNC: NEGATIVE — SIGNIFICANT CHANGE UP
COLOR SPEC: SIGNIFICANT CHANGE UP
DIFF PNL FLD: NEGATIVE — SIGNIFICANT CHANGE UP
EPI CELLS # UR: 0 /HPF — SIGNIFICANT CHANGE UP
GLUCOSE UR QL: NEGATIVE — SIGNIFICANT CHANGE UP
HYALINE CASTS # UR AUTO: 1 /LPF — SIGNIFICANT CHANGE UP (ref 0–2)
KETONES UR-MCNC: SIGNIFICANT CHANGE UP
LEUKOCYTE ESTERASE UR-ACNC: NEGATIVE — SIGNIFICANT CHANGE UP
NITRITE UR-MCNC: NEGATIVE — SIGNIFICANT CHANGE UP
PH UR: 7 — SIGNIFICANT CHANGE UP (ref 5–8)
PROT UR-MCNC: NEGATIVE — SIGNIFICANT CHANGE UP
RBC CASTS # UR COMP ASSIST: 1 /HPF — SIGNIFICANT CHANGE UP (ref 0–4)
SARS-COV-2 RNA SPEC QL NAA+PROBE: DETECTED
SP GR SPEC: 1.01 — SIGNIFICANT CHANGE UP (ref 1.01–1.02)
UROBILINOGEN FLD QL: NEGATIVE — SIGNIFICANT CHANGE UP
WBC UR QL: 1 /HPF — SIGNIFICANT CHANGE UP (ref 0–5)

## 2022-09-10 PROCEDURE — 99222 1ST HOSP IP/OBS MODERATE 55: CPT

## 2022-09-10 PROCEDURE — 71045 X-RAY EXAM CHEST 1 VIEW: CPT | Mod: 26

## 2022-09-10 PROCEDURE — 93010 ELECTROCARDIOGRAM REPORT: CPT

## 2022-09-10 RX ORDER — DIVALPROEX SODIUM 500 MG/1
125 TABLET, DELAYED RELEASE ORAL EVERY 6 HOURS
Refills: 0 | Status: DISCONTINUED | OUTPATIENT
Start: 2022-09-10 | End: 2022-09-10

## 2022-09-10 RX ORDER — METOPROLOL TARTRATE 50 MG
25 TABLET ORAL DAILY
Refills: 0 | Status: DISCONTINUED | OUTPATIENT
Start: 2022-09-10 | End: 2022-09-20

## 2022-09-10 RX ORDER — HALOPERIDOL DECANOATE 100 MG/ML
0.5 INJECTION INTRAMUSCULAR ONCE
Refills: 0 | Status: COMPLETED | OUTPATIENT
Start: 2022-09-10 | End: 2022-09-10

## 2022-09-10 RX ORDER — ENOXAPARIN SODIUM 100 MG/ML
40 INJECTION SUBCUTANEOUS EVERY 12 HOURS
Refills: 0 | Status: DISCONTINUED | OUTPATIENT
Start: 2022-09-10 | End: 2022-09-10

## 2022-09-10 RX ORDER — LANOLIN ALCOHOL/MO/W.PET/CERES
3 CREAM (GRAM) TOPICAL ONCE
Refills: 0 | Status: COMPLETED | OUTPATIENT
Start: 2022-09-10 | End: 2022-09-10

## 2022-09-10 RX ORDER — ENOXAPARIN SODIUM 100 MG/ML
40 INJECTION SUBCUTANEOUS EVERY 24 HOURS
Refills: 0 | Status: DISCONTINUED | OUTPATIENT
Start: 2022-09-10 | End: 2022-09-20

## 2022-09-10 RX ORDER — HALOPERIDOL DECANOATE 100 MG/ML
0.5 INJECTION INTRAMUSCULAR EVERY 6 HOURS
Refills: 0 | Status: DISCONTINUED | OUTPATIENT
Start: 2022-09-10 | End: 2022-09-11

## 2022-09-10 RX ORDER — HALOPERIDOL DECANOATE 100 MG/ML
1 INJECTION INTRAMUSCULAR ONCE
Refills: 0 | Status: COMPLETED | OUTPATIENT
Start: 2022-09-10 | End: 2022-09-10

## 2022-09-10 RX ADMIN — HALOPERIDOL DECANOATE 0.5 MILLIGRAM(S): 100 INJECTION INTRAMUSCULAR at 20:31

## 2022-09-10 RX ADMIN — Medication 3 MILLIGRAM(S): at 21:43

## 2022-09-10 RX ADMIN — HALOPERIDOL DECANOATE 1 MILLIGRAM(S): 100 INJECTION INTRAMUSCULAR at 21:43

## 2022-09-10 RX ADMIN — HALOPERIDOL DECANOATE 0.5 MILLIGRAM(S): 100 INJECTION INTRAMUSCULAR at 13:47

## 2022-09-10 NOTE — CONSULT NOTE ADULT - ASSESSMENT
87 f with asthma, anxiety, questionable CVA in 2007, diverticulosis, GERD, and previous vertebral fracture, recently admitted [facility] 8/21-8/26 for confusion and UTI (but urine cx showed >3 organisms) now sent from SNF for agitation   pt afebrile, on RA, WBC normal  COVID positive  CXR clear      COVID positive but no symptoms, not requiring O2    * pt is asymptomatic and not requiring O2, no treatment indicated now  * monitor O2 sat    The above assessment and plan was discussed with the primary team    Krys Luna MD  contact on teams  After 5pm and on weekends call [phone] 87 f with asthma, anxiety, questionable CVA in 2007, diverticulosis, GERD, and previous vertebral fracture, recently admitted [facility] 8/21-8/26 for confusion and UTI (but urine cx showed >3 organisms) now sent from SNF for agitation   pt afebrile, on RA, WBC normal  COVID positive  CXR clear      COVID positive but no symptoms, not requiring O2    * pt is asymptomatic and not requiring O2, no treatment indicated now  * monitor O2 sat  * will sign off, please call with questions  The above assessment and plan was discussed with the primary team    Krys Luna MD  contact on teams  After 5pm and on weekends call [phone]

## 2022-09-10 NOTE — H&P ADULT - ASSESSMENT
pt w/ covid  no hypoxia or resp symptoms at present   id / pulm eval  hold any tx for now  isolation  c/w outpt  meds  dvt proph   neuro eval  pt eval

## 2022-09-10 NOTE — ED ADULT NURSE REASSESSMENT NOTE - NS ED NURSE REASSESS COMMENT FT1
Straight urinary catheter inserted using sterile technique. Procedure, risks, and benefits of catheter explained to patient, patient verbalized understanding. Second RN present to confirm sterility. Pt tolerated well. Urinary catheter drained 100 mL  clear reed urine, no clots visualized. Urinalysis and urine cultures obtained. Catheter removed promptly.

## 2022-09-10 NOTE — H&P ADULT - NSHPLABSRESULTS_GEN_ALL_CORE
11.6   6.91  )-----------( 246      ( 09 Sep 2022 22:02 )             36.1           139  |  103  |  10  ----------------------------<  105<H>  4.3   |  27  |  0.64    Ca    9.6      09 Sep 2022 22:02    TPro  6.5  /  Alb  3.6  /  TBili  0.9  /  DBili  x   /  AST  30  /  ALT  20  /  AlkPhos  90                Urinalysis Basic - ( 10 Sep 2022 01:42 )    Color: Light Yellow / Appearance: Clear / S.010 / pH: x  Gluc: x / Ketone: Trace  / Bili: Negative / Urobili: Negative   Blood: x / Protein: Negative / Nitrite: Negative   Leuk Esterase: Negative / RBC: 1 /hpf / WBC 1 /HPF   Sq Epi: x / Non Sq Epi: 0 /hpf / Bacteria: Negative            Lactate Trend            CAPILLARY BLOOD GLUCOSE            Culture Results:   >=3 organisms. Probable collection contamination. ( @ 20:39)
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
## 2022-09-10 NOTE — ED ADULT NURSE REASSESSMENT NOTE - NS ED NURSE REASSESS COMMENT FT1
pt confused, pt cleaned and placed in gown, bed in lowest position and rail up. call bell with in reach.

## 2022-09-10 NOTE — H&P ADULT - HISTORY OF PRESENT ILLNESS
87y F pmhx asthma, anxiety, questionable CVA in 2007, diverticulosis, GERD, and previous vertebral fracture, recently admitted [facility] 8/21-8/26 for confusion and UTI, presents to ED BIBJEFF from [facility] due to agitation.   . Reporting back pain. No reported falls/trauma by EMS.

## 2022-09-10 NOTE — H&P ADULT - RESPIRATORY
normal/clear to auscultation bilaterally/no wheezes/no rales/no rhonchi/diminished breath sounds, L/diminished breath sounds, R

## 2022-09-10 NOTE — CONSULT NOTE ADULT - PROBLEM SELECTOR RECOMMENDATION 9
+COVID PCR on admission  -CXR clear, normoxic, no respiratory symptoms   -No indication for Remdesivir or Decadron at this time.   -Check inflammatory markers  -DVT ppx

## 2022-09-10 NOTE — ED ADULT NURSE REASSESSMENT NOTE - NS ED NURSE REASSESS COMMENT FT1
Pt found to be soiled. Perineal cleansed with cleansing solution. Clean diaper and nina applied underneath perineum. Clean linen provided to patient.

## 2022-09-11 LAB
ALBUMIN SERPL ELPH-MCNC: 3.7 G/DL — SIGNIFICANT CHANGE UP (ref 3.3–5)
ALP SERPL-CCNC: 87 U/L — SIGNIFICANT CHANGE UP (ref 40–120)
ALT FLD-CCNC: 21 U/L — SIGNIFICANT CHANGE UP (ref 10–45)
ANION GAP SERPL CALC-SCNC: 13 MMOL/L — SIGNIFICANT CHANGE UP (ref 5–17)
AST SERPL-CCNC: 25 U/L — SIGNIFICANT CHANGE UP (ref 10–40)
BILIRUB SERPL-MCNC: 1.2 MG/DL — SIGNIFICANT CHANGE UP (ref 0.2–1.2)
BUN SERPL-MCNC: 13 MG/DL — SIGNIFICANT CHANGE UP (ref 7–23)
CALCIUM SERPL-MCNC: 9.4 MG/DL — SIGNIFICANT CHANGE UP (ref 8.4–10.5)
CHLORIDE SERPL-SCNC: 101 MMOL/L — SIGNIFICANT CHANGE UP (ref 96–108)
CO2 SERPL-SCNC: 24 MMOL/L — SIGNIFICANT CHANGE UP (ref 22–31)
CREAT SERPL-MCNC: 0.55 MG/DL — SIGNIFICANT CHANGE UP (ref 0.5–1.3)
CULTURE RESULTS: NO GROWTH — SIGNIFICANT CHANGE UP
D DIMER BLD IA.RAPID-MCNC: 399 NG/ML DDU — HIGH
EGFR: 89 ML/MIN/1.73M2 — SIGNIFICANT CHANGE UP
FERRITIN SERPL-MCNC: 146 NG/ML — SIGNIFICANT CHANGE UP (ref 15–150)
GLUCOSE SERPL-MCNC: 101 MG/DL — HIGH (ref 70–99)
HCT VFR BLD CALC: 37.3 % — SIGNIFICANT CHANGE UP (ref 34.5–45)
HGB BLD-MCNC: 12 G/DL — SIGNIFICANT CHANGE UP (ref 11.5–15.5)
LDH SERPL L TO P-CCNC: 243 U/L — HIGH (ref 50–242)
MCHC RBC-ENTMCNC: 29 PG — SIGNIFICANT CHANGE UP (ref 27–34)
MCHC RBC-ENTMCNC: 32.2 GM/DL — SIGNIFICANT CHANGE UP (ref 32–36)
MCV RBC AUTO: 90.1 FL — SIGNIFICANT CHANGE UP (ref 80–100)
NRBC # BLD: 0 /100 WBCS — SIGNIFICANT CHANGE UP (ref 0–0)
PLATELET # BLD AUTO: 288 K/UL — SIGNIFICANT CHANGE UP (ref 150–400)
POTASSIUM SERPL-MCNC: 4 MMOL/L — SIGNIFICANT CHANGE UP (ref 3.5–5.3)
POTASSIUM SERPL-SCNC: 4 MMOL/L — SIGNIFICANT CHANGE UP (ref 3.5–5.3)
PROCALCITONIN SERPL-MCNC: 0.05 NG/ML — SIGNIFICANT CHANGE UP (ref 0.02–0.1)
PROT SERPL-MCNC: 6.8 G/DL — SIGNIFICANT CHANGE UP (ref 6–8.3)
RBC # BLD: 4.14 M/UL — SIGNIFICANT CHANGE UP (ref 3.8–5.2)
RBC # FLD: 14.4 % — SIGNIFICANT CHANGE UP (ref 10.3–14.5)
SODIUM SERPL-SCNC: 138 MMOL/L — SIGNIFICANT CHANGE UP (ref 135–145)
SPECIMEN SOURCE: SIGNIFICANT CHANGE UP
WBC # BLD: 5.72 K/UL — SIGNIFICANT CHANGE UP (ref 3.8–10.5)
WBC # FLD AUTO: 5.72 K/UL — SIGNIFICANT CHANGE UP (ref 3.8–10.5)

## 2022-09-11 PROCEDURE — 93010 ELECTROCARDIOGRAM REPORT: CPT

## 2022-09-11 PROCEDURE — 99222 1ST HOSP IP/OBS MODERATE 55: CPT

## 2022-09-11 RX ORDER — HALOPERIDOL DECANOATE 100 MG/ML
1.5 INJECTION INTRAMUSCULAR ONCE
Refills: 0 | Status: COMPLETED | OUTPATIENT
Start: 2022-09-11 | End: 2022-09-11

## 2022-09-11 RX ORDER — LANOLIN ALCOHOL/MO/W.PET/CERES
3 CREAM (GRAM) TOPICAL AT BEDTIME
Refills: 0 | Status: DISCONTINUED | OUTPATIENT
Start: 2022-09-11 | End: 2022-09-20

## 2022-09-11 RX ORDER — OLANZAPINE 15 MG/1
1.25 TABLET, FILM COATED ORAL EVERY 6 HOURS
Refills: 0 | Status: DISCONTINUED | OUTPATIENT
Start: 2022-09-11 | End: 2022-09-20

## 2022-09-11 RX ORDER — QUETIAPINE FUMARATE 200 MG/1
25 TABLET, FILM COATED ORAL
Refills: 0 | Status: DISCONTINUED | OUTPATIENT
Start: 2022-09-11 | End: 2022-09-15

## 2022-09-11 RX ORDER — QUETIAPINE FUMARATE 200 MG/1
25 TABLET, FILM COATED ORAL EVERY 6 HOURS
Refills: 0 | Status: DISCONTINUED | OUTPATIENT
Start: 2022-09-11 | End: 2022-09-20

## 2022-09-11 RX ADMIN — Medication 25 MILLIGRAM(S): at 07:59

## 2022-09-11 RX ADMIN — HALOPERIDOL DECANOATE 1.5 MILLIGRAM(S): 100 INJECTION INTRAMUSCULAR at 10:47

## 2022-09-11 RX ADMIN — QUETIAPINE FUMARATE 25 MILLIGRAM(S): 200 TABLET, FILM COATED ORAL at 20:26

## 2022-09-11 RX ADMIN — HALOPERIDOL DECANOATE 1.5 MILLIGRAM(S): 100 INJECTION INTRAMUSCULAR at 04:34

## 2022-09-11 RX ADMIN — HALOPERIDOL DECANOATE 1.5 MILLIGRAM(S): 100 INJECTION INTRAMUSCULAR at 01:00

## 2022-09-11 RX ADMIN — Medication 0.5 MILLIGRAM(S): at 12:02

## 2022-09-11 RX ADMIN — ENOXAPARIN SODIUM 40 MILLIGRAM(S): 100 INJECTION SUBCUTANEOUS at 04:34

## 2022-09-11 RX ADMIN — QUETIAPINE FUMARATE 25 MILLIGRAM(S): 200 TABLET, FILM COATED ORAL at 17:27

## 2022-09-11 NOTE — CONSULT NOTE ADULT - ASSESSMENT
This is a 87y Female, here with agitation, found to be COVID positive.    No indication of stroke, seizure, meningitis, encephalitis.    Exam nonfocal, nontoxic    Likely with underlying cognitive impairment.    CT head neg.    Plan:  - no need for MRI/EEG  - no further neuro intervention needed  - can call psych for agitation management  - would benefit most from abbreviated hospital stay.

## 2022-09-11 NOTE — BH CONSULTATION LIAISON ASSESSMENT NOTE - NSBHCONSULTRECOMMENDOTHER_PSY_A_CORE FT
1. Start Seroquel 25mg PO BID, monitor EKG for QTc<500    2. PRN: Seroquel 25mg PO q6h PRN agitation; Zyprexa 1.25-2.5mg IM q6h PRN severe agitation.  Monitor for QTc<500.  Melatonin 3mg PO qHS PRN insomnia.    3. Minimize use of benzos, opioids, anticholinergics, or other deliriogenic agents when possible.  Maintain sleep wake cycle.  Provide frequent reorientation and redirection.  Family member at bedside if possible. Assess for need for glasses and hearing aid (if applicable).    4. Pt does not meet criteria for psychiatric hospitalization.  Recommend outpt psych f/u at [facility] after d/c- [phone].   [facility] Psych will follow.

## 2022-09-11 NOTE — PHYSICAL THERAPY INITIAL EVALUATION ADULT - GAIT TRAINING, PT EVAL
GOAL: Patient will ambulate 50 feet with contact guard with rolling walker in 2 weeks
26-Sep-2021 19:30

## 2022-09-11 NOTE — BH CONSULTATION LIAISON ASSESSMENT NOTE - NSBHCHARTREVIEWVS_PSY_A_CORE FT
Vital Signs Last 24 Hrs  T(C): 36.3 (11 Sep 2022 12:18), Max: 36.4 (11 Sep 2022 04:51)  T(F): 97.4 (11 Sep 2022 12:18), Max: 97.6 (11 Sep 2022 04:51)  HR: 89 (11 Sep 2022 12:18) (83 - 94)  BP: 160/65 (11 Sep 2022 12:18) (125/79 - 160/65)  BP(mean): --  RR: 16 (11 Sep 2022 12:18) (16 - 18)  SpO2: 96% (11 Sep 2022 12:18) (94% - 97%)    Parameters below as of 11 Sep 2022 12:18  Patient On (Oxygen Delivery Method): room air

## 2022-09-11 NOTE — BH CONSULTATION LIAISON ASSESSMENT NOTE - NSBHCHARTREVIEWLAB_PSY_A_CORE FT
12.0   5.72  )-----------( 288      ( 11 Sep 2022 07:21 )             37.3     09-11    138  |  101  |  13  ----------------------------<  101<H>  4.0   |  24  |  0.55    Ca    9.4      11 Sep 2022 07:22    TPro  6.8  /  Alb  3.7  /  TBili  1.2  /  DBili  x   /  AST  25  /  ALT  21  /  AlkPhos  87  09-11    Urinalysis Basic - ( 10 Sep 2022 01:42 )    Color: Light Yellow / Appearance: Clear / S.010 / pH: x  Gluc: x / Ketone: Trace  / Bili: Negative / Urobili: Negative   Blood: x / Protein: Negative / Nitrite: Negative   Leuk Esterase: Negative / RBC: 1 /hpf / WBC 1 /HPF   Sq Epi: x / Non Sq Epi: 0 /hpf / Bacteria: Negative

## 2022-09-11 NOTE — BH CONSULTATION LIAISON ASSESSMENT NOTE - RISK ASSESSMENT
elevated risk 2/2 acute/chronic cognitive impairments, no e/o suicidality or homicidality at this time, does not meet criteria for psychiatric admission

## 2022-09-11 NOTE — BH CONSULTATION LIAISON ASSESSMENT NOTE - CURRENT MEDICATION
MEDICATIONS  (STANDING):  enoxaparin Injectable 40 milliGRAM(s) SubCutaneous every 24 hours  metoprolol succinate ER 25 milliGRAM(s) Oral daily    MEDICATIONS  (PRN):

## 2022-09-11 NOTE — PHYSICAL THERAPY INITIAL EVALUATION ADULT - IMPAIRMENTS CONTRIBUTING TO GAIT DEVIATIONS, PT EVAL
impaired balance/pain/decreased strength Knee Sprain, Adult       A knee sprain is a stretch or tear in a knee ligament. Knee ligaments are tissues that connect bones in the knee to each other.      What are the causes?    This condition often results from:  •A fall.      •An injury to the knee.        What are the signs or symptoms?    Symptoms of this condition include:  •Trouble straightening or bending the leg.      •Swelling in the knee.      •Bruising around the knee.      •Tenderness or pain in the knee.      •Muscle spasms around the knee.        How is this diagnosed?    This condition may be diagnosed based on:  •A physical exam.      •A history of what happened just before you started to have symptoms.    •Tests, including:  •An X-ray. This may be done to make sure no bones are broken.      •An MRI. This may be done to check if the ligament is torn.      •Stress testing of the knee. This may be done to check ligament damage.          How is this treated?    Treatment for this condition may involve:  •Keeping the knee still (immobilized) with a cast, brace, or splint.      •Applying ice to the knee. This helps with pain and swelling.      •Raising (elevating) the knee above the level of your heart when you are resting. This helps with pain and swelling.      •Taking medicine for pain.      •Doing exercises to prevent or limit permanent weakness or stiffness in your knee.      •Having surgery to reconnect the ligament to the bone or to reconstruct it. This may be needed if the ligament is completely torn.        Follow these instructions at home:    If you have a splint or brace:     •Wear it as told by your health care provider. Remove it only as told by your health care provider.      •Check the skin around it every day. Tell your health care provider about any concerns.      •Loosen it if your toes tingle, become numb, or turn cold and blue.      •Keep it clean and dry.      If you have a cast:     • Do not stick anything inside it to scratch your skin. Doing that increases your risk of infection.      •Check the skin around it every day. Tell your health care provider about any concerns.      •You may put lotion on dry skin around the edges of the cast. Do not put lotion on the skin underneath the cast.      •Keep it clean and dry.      Bathing     If you have a splint, brace, or cast that is not waterproof:  •Do not let it get wet.      •Cover it with a watertight covering when you take a bath or a shower.        Managing pain, stiffness, and swelling  •If directed, put ice on the injured area. To do this:  •If you have a removable splint or brace, remove it as told by your health care provider.      •Put ice in a plastic bag.      •Place a towel between your skin and the bag or between your cast and the bag.      •Leave the ice on for 20 minutes, 2–3 times a day.        •Move your toes often to reduce stiffness and swelling.      •Elevate the injured area above the level of your heart while you are sitting or lying down.      General instructions    •Take over-the-counter and prescription medicines only as told by your health care provider.      •Do not use any products that contain nicotine or tobacco, such as cigarettes, e-cigarettes, and chewing tobacco. These can delay healing. If you need help quitting, ask your health care provider.      •Do exercises as told by your health care provider.      •Keep all follow-up visits as told by your health care provider. This is important.        Contact a health care provider if:    •You have pain that gets worse.      •The cast, brace, or splint does not fit right.      •The cast, brace, or splint gets damaged.        Get help right away if:    •You cannot use your injured knee to support any of your body weight (cannot bear weight).      •You cannot move the injured joint.      •You cannot walk more than a few steps without pain or without your knee buckling.      •You have significant pain, swelling, or numbness in the leg below the cast, brace, or splint.      •Your foot or toes are numb, cold, or blue after loosening your splint or brace.        Summary    •A knee sprain is a stretch or tear in a knee ligament that usually occurs as the result of a fall or injury.      •Treatment may involve immobilizing the knee with a cast, splint, or brace and then doing exercises.      •If the ligament is completely torn, it may require surgery to repair or replace the injured ligament.      This information is not intended to replace advice given to you by your health care provider. Make sure you discuss any questions you have with your health care provider.
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
## 2022-09-11 NOTE — PHYSICAL THERAPY INITIAL EVALUATION ADULT - PERTINENT HX OF CURRENT PROBLEM, REHAB EVAL
87y F pmhx asthma, anxiety, questionable CVA in 2007, diverticulosis, GERD, and previous vertebral fracture, recently admitted [facility] 8/21-8/26 for confusion and UTI, presents to ED BIBJEFF from [facility] due to agitation. Reporting back pain. No reported falls/trauma by EMS.

## 2022-09-11 NOTE — BH CONSULTATION LIAISON ASSESSMENT NOTE - PRIVATE RESIDENCE
PULMONARY PROGRESS NOTE- OUTPATIENT     Chief Complaint: Follow-up and COPD    HPI:  This is a pleasant 56 year-old female, former smoker (1 ppd for 30 years, quitting 2021) with a PMH for Celiac disease, bilateral rotator cuff and cervical spine surgeries, anxiety and depression. Known to our clinic for stage 2 moderate COPD and lung nodules. Currently on Spiriva Respimat 2.5 mcg and albuterol inhaler as needed. Here in follow up with CT Lung cancer screen. One COPD exacerbation last 03/2022 and treated with prednisone and azithromycin. Today, at breathing baseline with mild \"cold symptoms\". Does not feel the Stiolto works; however, discussed that it is long acting may and does not give immediate breathing relief like an albuterol inhaler. Complains of infrequent voice hoarseness and will stop Stiolto and use nebulizer instead. No use of her albuterol inhaler. Congratulation on maintaining a smoke free status.      COPD assessment test: CAT   Rate your current symptoms on a scale of 1 to 5:  (0 = best, feeling good)  (5 = worst, not feel well)  I never cough --> I cough all the time: 1  I have no phlegm --> My chest is completely full of mucus: 1  My chest does not feel tight all --> My chest feel very tight: 0  Walking up a hill or the stairs I am not breathless --> When I up a hill or one flight of stairs I am very breathless: 3  I am not limited doing any activities at home --> I am very limited doing activities at home: 0  I am confident leaving my home despite my lung condition --> I am not all confident leaving my home because of my lung condition: 0  I sleep soundly --> I do NOT sleep soundly because of my lung condition: 2  I have lots of energy --> I have no energy at all: 3  COPD assessment Test (CAT) score today 10  (0-40 range): Experts also suggest that scores <10 have a low impact, 10-20 medium, 21-30 high and >30 very high impact, requiring gradually more interventions.    MMRC dyspnea scale  (Modified medical research [place])   Patient responded that on a day that they feel their best, they are a:  Grade 1 - I get short of breath when hurring on level ground or walking up a slight hill.    PAST MEDICAL HISTORY:  Past Medical History:   Diagnosis Date   • Anxiety    • Anxiety and depression 6/9/2014   • Bacterial infection    • Bartholin's gland abscess 10/12/15    Miller catheter placement   • Bronchitis    • Celiac disease 11/2018   • Cervical spondylosis with radiculopathy    • Chronic pain    • Common migraine    • COPD (chronic obstructive pulmonary disease) (CMS/HCC)    • Depression    • Dysmenorrhea    • Gastroesophageal reflux disease    • High cholesterol    • Insomnia    • Malignant neoplasm (CMS/HCC)    • Menorrhagia    • Osteoporosis    • Otitis media    • Pneumonia    • PONV (postoperative nausea and vomiting)    • Sinusitis, chronic    • Tobacco use disorder      PAST SURGERY HISTORY:  Past Surgical History:   Procedure Laterality Date   • Bunionectomy  08/08/2012   • Cervical fusion  12/06/2016    Anterior Cervical Fusion C5-6 - Dr Holbrook   • Colonoscopy  09/26/2017    repeat colonoscopy in 10 years per Dr Lozada due Sept 2027   • D and c  1995   • Esophagogastroduodenoscopy  11/09/2018   • Gi endoscopic ultrasound  11/09/2018   • Hysterectomy  02/01/2012    TVH; has ovaries   • Joint replacement     • Knee scope,diagnostic Right 06/2014    torn meniscus    • Knee surgery Left 07/12/2017   • Laparoscopic cholecystectomy  02/11/2019    Dr Eliecer Youngblood MD    • Pain clinic      r knee   • Removal gallbladder     • Shoulder surgery Bilateral 02/09/2010    3 Left & 1 Right   • Skin biopsy     • Total knee replacement Right 11/24/2021   • [place] tooth extraction  age 17     FAMILY HISTORY:  Family History   Problem Relation Age of Onset   • Diabetes Mother    • Hypertension Mother    • Cancer Mother         rhabdomysarcoma   • Cancer, Lung Father    • Diabetes Father    • Thyroid Daughter    •  Heart Daughter    • Asthma Daughter    • Thyroid Sister    • Patient is unaware of any medical problems Maternal Grandmother    • Cancer, Lung Maternal Grandfather    • Patient is unaware of any medical problems Paternal Grandmother    • Patient is unaware of any medical problems Paternal Grandfather      SOCIAL HISTORY:  Social History     Socioeconomic History   • Marital status:      Spouse name: Mark Newberry   • Number of children: 2    • Years of education: 13    • Highest education level: Not on file   Occupational History   • Occupation: - [employer]     Comment: Full time      Employer: [employer]   Tobacco Use   • Smoking status: Former Smoker     Packs/day: 1.50     Years: 37.00     Pack years: 55.50     Types: Cigarettes     Start date: 1984     Quit date: 2021     Years since quittin.2   • Smokeless tobacco: Never Used   Vaping Use   • Vaping Use: never used   Substance and Sexual Activity   • Alcohol use: Yes     Comment: rare drinking   • Drug use: No     Comment: CBD gummies/1-5 reg sodas/day   • Sexual activity: Not Currently   Other Topics Concern   •  Service Not Asked   • Blood Transfusions No   • Caffeine Concern No     Comment: 3 Diet Mt Dews Daily   • Occupational Exposure Not Asked   • Hobby Hazards Not Asked   • Sleep Concern Not Asked   • Stress Concern Not Asked   • Weight Concern Not Asked   • Special Diet Not Asked   • Back Care Not Asked   • Exercise Yes     Comment: Walking   • Bike Helmet Not Asked   • Seat Belt Yes   • Self-Exams Not Asked   Social History Narrative    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  She works half days during the summer.             2 children.      Social Determinants of Health     Financial Resource Strain: Not on file   Food Insecurity: Not on file   Transportation Needs: Not on file   Physical Activity: Not on file   Stress: Not  on file   Social Connections: Not on file   Intimate Partner Violence: Not At Risk   • Social Determinants: Intimate Partner Violence Past Fear: No   • Social Determinants: Intimate Partner Violence Current Fear: No     ALLERGIES:  ALLERGIES:   Allergen Reactions   • Benzoyl Peroxide Other (See Comments)     (blisters, burning sensation)   • Shellfish Allergy   (Food Or Med) SWELLING and THROAT SWELLING     (difficulty swallowing)   • Ibuprofen Palpitations     CURRENT MEDICATIONS  Current Outpatient Medications   Medication Sig Dispense Refill   • citalopram (CeleXA) 40 MG tablet TAKE 1 TABLET BY MOUTH EVERY DAY AT NIGHT 90 tablet 0   • alendronate (FOSAMAX) 70 MG tablet Take 1 tablet by mouth 1 day a week. 12 tablet 0   • tiotropium-olodaterol (STIOLTO RESPIMAT) 2.5-2.5 MCG/ACT inhaler Inhale 2 puffs into the lungs daily. 3 each 3   • aspirin 81 MG EC tablet Take 1 tablet by mouth 2 times daily. 60 tablet 0   • HYDROcodone-acetaminophen (NORCO)  MG per tablet Take 1 tablet by mouth daily as needed for Pain. 42 tablet 0   • famotidine (PEPCID) 20 MG tablet Take 20 mg by mouth 2 times daily.     • COLLAGEN PO      • ipratropium-albuterol (DUONEB) 0.5-2.5 (3) MG/3ML nebulizer solution Take 3 mLs by nebulization every 6 hours as needed for Wheezing. 360 mL 0   • albuterol 108 (90 Base) MCG/ACT inhaler Inhale 2 puffs into the lungs every 4 hours as needed for Shortness of Breath, Wheezing or Other (cough). 1 each 1   • Calcium Carbonate (CALCIUM-CARB 600 PO) Take 1,200 mg by mouth daily.     • cholecalciferol (cholecalciferol) 25 mcg (1,000 units) tablet Take 4 tablets by mouth daily. 120 tablet 0   • valACYclovir (VALTREX) 500 MG tablet Take 500 mg by mouth nightly.      • metaxalone (SKELAXIN) 800 MG tablet Take 800 mg by mouth 3 times daily as needed.      • traZODone (DESYREL) 50 MG tablet TAKE ONE TO TWO TABLETS BY MOUTH NIGHTLY AT BEDTIME     • morphine SR (MS CONTIN) 15 MG 12 hr tablet Take 15 mg by mouth  2 times daily.       No current facility-administered medications for this visit.     Review of systems:       Pertinent positives include unexplained waking, heartburn, joint pain, achy muscles, shoulder pain, swelling of the joints, back point, insomnia and anxiety.    PHYSICAL EXAMINATION:   VITAL SIGNS:  Visit Vitals  /68   Pulse 70   Resp 17   Ht 5' 8\" (1.727 m)   Wt 90.3 kg (199 lb)   SpO2 94%   BMI 30.26 kg/m²   ,   Weight    07/25/22 1053   Weight: 90.3 kg (199 lb)   , Body mass index is 30.26 kg/m².  GENERAL: No acute distress, well-developed, well-nourished. Good eye contact. No clubbing or cyanosis.  HEENT: Normocephalic. Hearing is appropriate. Oral mucosa is moist. No oropharyngeal erythema, exudate.   NECK: no obvious JVD. ROM is appropriate.  RESPIRATORY: Anterior/posterior lung fields clear. No wheezes or other adventitious sounds. Symmetrical chest wall expansion without a prolonged expiratory phase. Speech is non labored. No cough. No use of accessory muscles.   CARDIOVASCULAR: Normal rate, regular rhythm. S1, S2. No murmurs or extra heart tones. No edema to the lower extremities.  GI: Soft, nontender to the touch, nondistended, with normal bowel sounds.   MUSCULOSKELETAL: Normal range of motion, normal strength, no deformities. Normal gait. Independent with mobility.   SKIN: Warm, moist, and intact. No rashes noted.   NEUROLOGIC: Alert and oriented x3, cooperative, appropriate mood and affect. Normal judgment. No focal deficits noted.     IMAGING  07/18/2022: CT CHEST, NONCONTRAST - NODULE SCREENING PROTOCOL   CLINICAL INDICATION:  Special screening for malignancy of the respiratory organs. Tobacco use.   COMPARISON:  11/16/2021   TECHNIQUE:  Low-dose helical CT was acquired without intravenous contrast, from lung apices to bases, with 1 mm and 3 mm thick axial slices. Sagittal and coronal reconstructions were obtained.   FINDINGS:   Stable scarring in the lung apices. Stable oblong nodule  in the right upper lobe anteriorly series 5 image 46. Stable 3 mm nodule in the anterior right lung base series 5 image 67.   Emphysematous change with superimposed mosaic attenuation of the lungs consistent with small airways disease.   Stable subpleural calcification posterior right upper lobe.   No central endobronchial abnormality. Trace coronary artery calcification.   Cholecystectomy. No acute findings in the imaged upper abdomen.   No pleural or pericardial effusion. No enlarged lymph nodes in the chest.   Postoperative changes in the lower cervical spine and left shoulder. Old rib fractures.   IMPRESSION:   Lung nodules:  Stable   Lung-RADS category:  2 - Benign.   Recommendations:  Routine annual screening CT followup.   Incidental findings on screening CT:    1. Emphysema and small airways disease.    IMPRESSION AND PLAN:     Ashley was seen today for follow-up and copd.    Diagnoses and all orders for this visit:    Stage 2 moderate COPD by GOLD classification (CMS/[facility])    History of smoking 30 or more pack years    Other orders  -     tiotropium-olodaterol (STIOLTO RESPIMAT) 2.5-2.5 MCG/ACT inhaler; Inhale 2 puffs into the lungs daily.    GOLD STAGE 2/GROUP B:MODERATE COPD (FEV1-74%;FEV1/FVC 49%).  Management  Influenza vaccination: Reviewed the importance of receiving this fall, 2022; however, \"flu shot makes her sick- vomiting\".  Pneumococcal vaccination: Received the PCV 20 today.  COVID19 immunization: reviewed the importance of receiving- declines further information.   Tobacco cessation: smoke free.    Pulmonary Rehabilitation advocated.   Long term oxygen therapy:Does not meet criteria.  FRANKY Index  Post bronchodilator FEV1%> 65% (0 points).  6MW > 350 meters (0 point).  MMRC dyspnea scale: MMRC 1 (0 point).   Body mass index > 21 (0 point).   Approximate 4 year survival:0-2 points: 80%.     Stage 2 moderate COPD stable in clinic today; however, did experience a COPD exacerbation last spring. I  will not step down COPD therapy at this time and will continue with Stiolto and short acting bronchodilators.     Meets criteria for annual CT lung cancer Screening (age, smoking greater than 20 pack/years history and quit within 15 years).  -reviewed images from the most recent CT scan of the chest- chronic findings.   -continue annual screens.    Occasional voice hoarseness  -Stiolto inhaler maybe contributing.   -Discussed ENT consult for evaluation of airway. If this returns, she will call the clinic and an ENT consult will be initiated.     Follow-up in 1 year with a CT lung cancer screening.     I spent a total of 30 minutes on the day of the visit. This includes pre-charting, chart review and documenting.     WILLIAM Lake          living alone

## 2022-09-11 NOTE — BH CONSULTATION LIAISON ASSESSMENT NOTE - HPI (INCLUDE ILLNESS QUALITY, SEVERITY, DURATION, TIMING, CONTEXT, MODIFYING FACTORS, ASSOCIATED SIGNS AND SYMPTOMS)
87F retired, domic[facility] in an apartment [facility], with no formal PPHx, no prior SA or psych admissions, no active substance abuse, with PMH significant for asthma, questionable CVA in 2007, diverticulosis, GERD, and previous vertebral fracture, recently admitted [facility] 8/21-8/26 for confusion and UTI, presents to ED BIBEMS from [facility] at [facility] due to agitation, found to be COVID19+, psychiatry consulted for management of agitation.    On interview pt is found in mitten restraints, restless and trying to remove mittens, repeatedly calling out, calling out "I am dying of embarrassment." Pt oriented to person and being in a hospital, but is not able to provide date, month, location, or reason for admission.  States she is feeling forgetful.  States she lives alone, seemingly unaware of being transferred from the [facility].  Denies SIIP/HIIP, AVH, or paranoia, denies substance abuse.      Collateral obtained from pt's daughter Kathryn Hogan (works as a MH counselor)- [phone], who states pt is not at baseline mental status.  States before pt was hospitalized at [facility] and in rehab, had only mild forgetfulness without confusion, this is an acute change.  States pt has had prior episodes of delirium when hospitalized.  States pt was at [facility] rehab for 14 days, was highly agitated, became paranoid and thought people were trying to kill her.  States pt has never previously sought treatment, but daughter thinks pt may have NPD and BAD, but never received treatment.

## 2022-09-11 NOTE — BH CONSULTATION LIAISON ASSESSMENT NOTE - NSBHCONSULTFOLLOWAFTERCARE_PSY_A_CORE FT
OP psych f/u at [facility]- [phone]  [facility] clinic- [phone]

## 2022-09-11 NOTE — BH CONSULTATION LIAISON ASSESSMENT NOTE - SUMMARY
87F retired, domiciled in an apartment but most recently at [facility], with no formal PPHx, no prior SA or psych admissions, no active substance abuse, with PMH significant for asthma, questionable CVA in 2007, diverticulosis, GERD, and previous vertebral fracture, recently admitted [facility] 8/21-8/26 for confusion and UTI, presents to ED BIBEMS from [facility] due to agitation, found to be COVID19+, psychiatry consulted for management of agitation.  Pt presently AOx1, restless, calling out, inattentive and forgetful; daughter states pt is not at baseline, has been confused since recent [facility] admission for UTI, was agitated and paranoid in rehab, has had prior episodes of delirium when hospitalized.  Dx: Delirium 2/2 [facility].  R/o underlying mood d/o (daughter suspects bipolar)

## 2022-09-11 NOTE — PHYSICAL THERAPY INITIAL EVALUATION ADULT - ADDITIONAL COMMENTS
Pt states that she lives in an apt w/ her son and elevator access. Pt states that she was independent PTA. Pt also states that she amb w/ RW. As per chart, pt from [facility].

## 2022-09-11 NOTE — PROGRESS NOTE ADULT - SUBJECTIVE AND OBJECTIVE BOX
DATE OF SERVICE: 09-11-22 @ 12:42  CHIEF COMPLAINT:Patient is a 87y old  Female who presents with a chief complaint of   	        PAST MEDICAL & SURGICAL HISTORY:  Anxiety      CVA (Cerebral Infarction)  2007 per daughter      Diverticulosis of intestine      GERD (gastroesophageal reflux disease)      Asthma      Cerebrovascular accident (CVA), unspecified mechanism      Vertebral fracture, osteoporotic      S/P Tonsillectomy      History of tonsillectomy              REVIEW OF SYSTEMS:    EYES: No eye pain, visual disturbances, or discharge  NECK: No pain or stiffness  RESPIRATORY: No cough, wheezing, chills or hemoptysis; No Shortness of Breath  CARDIOVASCULAR: No chest pain, palpitations, passing out, dizziness,   GASTROINTESTINAL: No abdominal or epigastric pain. No nausea, vomiting, or hematemesis;   GENITOURINARY: No dysuria, frequency, hematuria, or incontinence      Medications:  MEDICATIONS  (STANDING):  enoxaparin Injectable 40 milliGRAM(s) SubCutaneous every 24 hours  metoprolol succinate ER 25 milliGRAM(s) Oral daily    MEDICATIONS  (PRN):    	    PHYSICAL EXAM:  T(C): 36.4 (09-11-22 @ 08:35), Max: 36.4 (09-11-22 @ 04:51)  HR: 83 (09-11-22 @ 08:35) (83 - 94)  BP: 125/79 (09-11-22 @ 08:35) (125/79 - 157/85)  RR: 17 (09-11-22 @ 08:35) (17 - 18)  SpO2: 97% (09-11-22 @ 08:35) (94% - 97%)  Wt(kg): --  I&O's Summary    10 Sep 2022 07:01  -  11 Sep 2022 07:00  --------------------------------------------------------  IN: 240 mL / OUT: 0 mL / NET: 240 mL        	  HEENT:   Normal oral mucosa,   Cardiovascular: Normal S1 S2, No JVD,  Respiratory: dec bs     Gastrointestinal:  Soft, Non-tender, + BS	  	  Neurologic: Non-focal  Extremities: dec rom     TELEMETRY: 	    ECG:  	  RADIOLOGY:  OTHER: 	  	  LABS:	 	    CARDIAC MARKERS:                                12.0   5.72  )-----------( 288      ( 11 Sep 2022 07:21 )             37.3     09-11    138  |  101  |  13  ----------------------------<  101<H>  4.0   |  24  |  0.55    Ca    9.4      11 Sep 2022 07:22    TPro  6.8  /  Alb  3.7  /  TBili  1.2  /  DBili  x   /  AST  25  /  ALT  21  /  AlkPhos  87  09-11    proBNP:   Lipid Profile:   HgA1c:   TSH:

## 2022-09-12 LAB
D DIMER BLD IA.RAPID-MCNC: 433 NG/ML DDU — HIGH
FERRITIN SERPL-MCNC: 143 NG/ML — SIGNIFICANT CHANGE UP (ref 15–150)

## 2022-09-12 RX ORDER — ACETAMINOPHEN 500 MG
650 TABLET ORAL EVERY 6 HOURS
Refills: 0 | Status: DISCONTINUED | OUTPATIENT
Start: 2022-09-12 | End: 2022-09-20

## 2022-09-12 RX ORDER — CHLORHEXIDINE GLUCONATE 213 G/1000ML
1 SOLUTION TOPICAL DAILY
Refills: 0 | Status: DISCONTINUED | OUTPATIENT
Start: 2022-09-12 | End: 2022-09-20

## 2022-09-12 RX ADMIN — ENOXAPARIN SODIUM 40 MILLIGRAM(S): 100 INJECTION SUBCUTANEOUS at 05:19

## 2022-09-12 RX ADMIN — QUETIAPINE FUMARATE 25 MILLIGRAM(S): 200 TABLET, FILM COATED ORAL at 18:12

## 2022-09-12 RX ADMIN — QUETIAPINE FUMARATE 25 MILLIGRAM(S): 200 TABLET, FILM COATED ORAL at 05:19

## 2022-09-12 RX ADMIN — Medication 25 MILLIGRAM(S): at 05:19

## 2022-09-12 NOTE — PROGRESS NOTE ADULT - SUBJECTIVE AND OBJECTIVE BOX
Follow-up Pulm Progress Note    more calm today, states feels tired   No new respiratory events overnight.  Denies SOB/CP.   Sats 97% RA      Medications:  MEDICATIONS  (STANDING):  enoxaparin Injectable 40 milliGRAM(s) SubCutaneous every 24 hours  metoprolol succinate ER 25 milliGRAM(s) Oral daily  QUEtiapine 25 milliGRAM(s) Oral two times a day    MEDICATIONS  (PRN):  melatonin 3 milliGRAM(s) Oral at bedtime PRN Insomnia  OLANZapine Injectable 1.25 milliGRAM(s) IntraMuscular every 6 hours PRN severe agitation  QUEtiapine 25 milliGRAM(s) Oral every 6 hours PRN agitation          Vital Signs Last 24 Hrs  T(C): 36.8 (12 Sep 2022 04:50), Max: 36.8 (12 Sep 2022 04:50)  T(F): 98.2 (12 Sep 2022 04:50), Max: 98.2 (12 Sep 2022 04:50)  HR: 85 (12 Sep 2022 04:50) (85 - 97)  BP: 146/82 (12 Sep 2022 04:50) (142/67 - 160/65)  BP(mean): --  RR: 19 (12 Sep 2022 04:50) (16 - 20)  SpO2: 97% (12 Sep 2022 04:50) (94% - 97%)    Parameters below as of 12 Sep 2022 04:50  Patient On (Oxygen Delivery Method): room air                  LABS:                        12.0   5.72  )-----------( 288      ( 11 Sep 2022 07:21 )             37.3     09-11    138  |  101  |  13  ----------------------------<  101<H>  4.0   |  24  |  0.55    Ca    9.4      11 Sep 2022 07:22    TPro  6.8  /  Alb  3.7  /  TBili  1.2  /  DBili  x   /  AST  25  /  ALT  21  /  AlkPhos  87  09-11              Procalcitonin, Serum: 0.05 ng/mL (09-11-22 @ 07:22)                  CULTURES:     Culture - Urine (collected 09-10-22 @ 01:42)  Source: Clean Catch Clean Catch (Midstream)  Final Report (09-11-22 @ 11:51):    No growth            Physical Examination:  PULM: Clear to auscultation bilaterally, no significant sputum production  CVS: S1, S2 heard    RADIOLOGY REVIEWED  CXR: clear lungs

## 2022-09-12 NOTE — PROGRESS NOTE ADULT - PROBLEM SELECTOR PLAN 1
+COVID PCR on admission  -CXR clear, normoxic, no respiratory symptoms   -No indication for Remdesivir or Decadron at this time.   -Trend inflammatory markers  -DVT ppx.

## 2022-09-12 NOTE — PROGRESS NOTE ADULT - SUBJECTIVE AND OBJECTIVE BOX
DATE OF SERVICE: 09-12-22 @ 15:19  CHIEF COMPLAINT:Patient is a 87y old  Female who presents with a chief complaint of   	        PAST MEDICAL & SURGICAL HISTORY:  Anxiety      CVA (Cerebral Infarction)  2007 per daughter      Diverticulosis of intestine      GERD (gastroesophageal reflux disease)      Asthma      Cerebrovascular accident (CVA), unspecified mechanism      Vertebral fracture, osteoporotic      S/P Tonsillectomy      History of tonsillectomy              less agitated  RESPIRATORY: No cough, wheezing, chills or hemoptysis; No Shortness of Breath  CARDIOVASCULAR: No chest pain,   GASTROINTESTINAL: No abdominal or epigastric pain.  GENITOURINARY: No dysuria, frequency, hematuria,  NEUROLOGICAL: No headaches,  Medications:  MEDICATIONS  (STANDING):  enoxaparin Injectable 40 milliGRAM(s) SubCutaneous every 24 hours  metoprolol succinate ER 25 milliGRAM(s) Oral daily  QUEtiapine 25 milliGRAM(s) Oral two times a day    MEDICATIONS  (PRN):  melatonin 3 milliGRAM(s) Oral at bedtime PRN Insomnia  OLANZapine Injectable 1.25 milliGRAM(s) IntraMuscular every 6 hours PRN severe agitation  QUEtiapine 25 milliGRAM(s) Oral every 6 hours PRN agitation    	    PHYSICAL EXAM:  T(C): 36.5 (09-12-22 @ 12:11), Max: 36.8 (09-12-22 @ 04:50)  HR: 92 (09-12-22 @ 12:11) (85 - 97)  BP: 153/77 (09-12-22 @ 12:11) (142/67 - 153/77)  RR: 18 (09-12-22 @ 12:11) (18 - 20)  SpO2: 96% (09-12-22 @ 12:11) (94% - 97%)  Wt(kg): --  I&O's Summary    12 Sep 2022 07:01  -  12 Sep 2022 15:19  --------------------------------------------------------  IN: 120 mL / OUT: 0 mL / NET: 120 mL        Appearance: Normal	  HEENT:   Normal oral mucosa, PERRL, EOMI	  Lymphatic: No lymphadenopathy  Cardiovascular: Normal S1 S2  Respiratory: Lungs clear to auscultation	    Gastrointestinal:  Soft, Non-tender, + BS	  	  Neurologic: Non-focal  Extremities: pvd    TELEMETRY: 	    ECG:  	  RADIOLOGY:  OTHER: 	  	  LABS:	 	    CARDIAC MARKERS:                                12.0   5.72  )-----------( 288      ( 11 Sep 2022 07:21 )             37.3     09-11    138  |  101  |  13  ----------------------------<  101<H>  4.0   |  24  |  0.55    Ca    9.4      11 Sep 2022 07:22    TPro  6.8  /  Alb  3.7  /  TBili  1.2  /  DBili  x   /  AST  25  /  ALT  21  /  AlkPhos  87  09-11    proBNP:   Lipid Profile:   HgA1c:   TSH:

## 2022-09-13 RX ADMIN — Medication 25 MILLIGRAM(S): at 10:11

## 2022-09-13 RX ADMIN — QUETIAPINE FUMARATE 25 MILLIGRAM(S): 200 TABLET, FILM COATED ORAL at 10:11

## 2022-09-13 RX ADMIN — CHLORHEXIDINE GLUCONATE 1 APPLICATION(S): 213 SOLUTION TOPICAL at 13:36

## 2022-09-13 NOTE — DISCHARGE NOTE ADULT - PRINCIPAL DIAGNOSIS
[address] [place] [address], 70 [facility] - Phone: [phone]  Fax: [phone] / 0325 [facility]  Patient Name: Smith Ramos : 1974   Medical   Diagnosis: S/p R Achilles Tendon Repair Treatment Diagnosis: Right ankle pain [M25.571]   Onset Date: DOS: 22     Referral Source: Yamil Adams DPM Start of Care List of hospitals in [place]): 2022   Prior Hospitalization: See medical history Provider #: [id number]   Prior Level of Function: Complete prolonged amb/standing activities without A device or difficulty, Negotiate stairs with reciprocal pattern safely and without difficulty   Comorbidities: DM, OA, PMHx knee pain   Medications: Verified on Patient Summary List   The Plan of Care and following information is based on the information from the initial evaluation.   ===========================================================================================  Assessment / key information:  Pt is a 51 y/o female reporting R posterior ankle and heel pain rated 4-10/10 s/p R Achilles Tendon repair on 22. Pt NWB in [facility] with scooter from DOS until 22. Pt is now amb [facility] with heel lift, WBAT R with FWW. Pt had stiches removed last week and reports she is having R ankle/foot/LE wrapped in soft cast 2x/week to decrease swelling (PT contacted Dr. [facility] office and confirmed that it is ok for the PT to remove this soft cast and re-wrap with ace bandages). Pt has been completing R ankle AROM in all directions as tolerated since last week. Pain is located R post ankle and heel, described as achy and sharp. Pain increases with amb, standing and stair negotiation; decreases with avoiding these activities. Pt's current standing tolerance is 5 minutes in [facility]. Pt reports intermittent numbness R post ankle/heel.  Pt lives in house with bedroom and bathroom on second floor. Pt has been negotiating steps by hopping on L foot and using rail while NWB and is now negotiating WBAT R in [address] [place] [address] with rail (pt was ascending with R leg and descending with L leg). Pt wears CAMboot throughout the day, but does not sleep in it (pt sleeps in side-lying to avoid increase in pain). Pt denies falls or red flags. Pt arrived at PT amb with FWW (lifting walker with each step), antalgic gait with step to pattern, forward flexed posture, and decreased stance R in CAMboot with 1 heel lift. PT adjusted walker height and educated pt on importance of upright posture and keeping walker on floor during amb. Pt also educated pt on correct way to negotiate stairs with non-reciprocal pattern and rail in CAMboot (advised pt to limit amount of times she negotiates stairs each day). Pt demonstrates increased swelling/circumferential measurements R ankle/foot ([place] R 30 cm, L 28 cm; Ankle crease to heel R 34.5 cm, L 34 cm; Base of 5th R 26.5 cm L 25.5 cm), decreased R ankle AROM (R: -8 degrees DF with knee flex, -11 degrees DF with knee ext, 22 PF, INV and EV to neutral; L: 15 degrees DF with knee flex, 4 degrees DF with knee ext, 46 PF, 13 INV, 5 EV), decreased R great toe ext PROM (R: 52 degrees, L: 60 degrees), TTP R post tib, peroneals, calcaneal fat pad, distal achilles, medial longitudinal arch and 5th metatarsal, no signs/sxs of infection or DVT, 2 small scabs on distal scar, decreased sensation to light touch R great toe, strength testing deferred secondary to post op status and decreased functional mobility.  FOTO Score: 18/100    PT contacted Dr. Nikolas Taylor office and left message requesting information on progression from heel lift in CAMboot, to CAMboot without heel lift and eventually to regular shoe (with or without heel lift).  ===========================================================================================  Eval Complexity: History MEDIUM  Complexity : 1-2 comorbidities / personal factors will impact the outcome/ POC ;  Examination  HIGH Complexity : 4+ Standardized tests and measures addressing body structure, function, activity limitation and / or participation in recreation ; Presentation MEDIUM Complexity : Evolving with changing characteristics ; Decision Making HIGH Complexity : FOTO score of 1- 25 ; Overall Complexity MEDIUM  Problem List: pain affecting function, decrease ROM, decrease strength, edema affecting function, impaired gait/ balance, decrease ADL/ functional abilitiies, decrease activity tolerance, decrease flexibility/ joint mobility, and decrease transfer abilities   Treatment Plan may include any combination of the following: Therapeutic exercise, Therapeutic activities, Neuromuscular re-education, Physical agent/modality, Gait/balance training, Manual therapy, Aquatic therapy, Patient education, Self Care training, Functional mobility training, Home safety training, and Stair training  Patient / Family readiness to learn indicated by: asking questions, trying to perform skills, and interest  Persons(s) to be included in education: patient (P)  Barriers to Learning/Limitations: None  Measures taken, if barriers to learning:    Patient Goal (s): \"Being able to use my foot to return to work\"   Patient self reported health status: fair  Rehabilitation Potential: good  Short Term Goals: To be accomplished in  2-3  weeks:  Pt to demonstrate independence with HEP to improve R ankle AROM and decrease LE swelling for ADLs and amb. Pt to demonstrate . 5cm or > decrease in R ankle/foot circumferential measurements to improve LE proprioception with amb and ADLs. Pt to demonstrate 0 degrees R ankle DF AROM/PROM to improve mobility for amb. Long Term Goals: To be accomplished in  4-6  weeks:  Pt to report 46/100 or > on FOTO scores to improve functional mobility for amb and stair negotiation.   Pt to report 50% or > decrease in max pain levels to improve functional mobility for ADLs and amb. Pt to demonstrate even stride length with amb over level surface of 250 feet in regular shoe without A device safely to improve efficiency with amb at home and in community. Frequency / Duration:   Patient to be seen  2  times per week for 6  weeks:  Patient / Caregiver education and instruction: self care, activity modification, and exercises  Therapist Signature: Xuan Guerra DPT, ATC Date: 6/37/6952   Certification Period: N/A Time: 10:42 AM   ===========================================================================================  I certify that the above Physical Therapy Services are being furnished while the patient is under my care. I agree with the treatment plan and certify that this therapy is necessary. Physician Signature:        Date:       Time:                                        Donna De Santiago DPM  Please sign and return to [facility] AT [facility] at [facility], [facility]. or you may fax the signed copy to [phone]. Thank you. Diverticulitis of large intestine without perforation or abscess without bleeding

## 2022-09-13 NOTE — PROGRESS NOTE ADULT - SUBJECTIVE AND OBJECTIVE BOX
DATE OF SERVICE: 09-13-22 @ 13:25  CHIEF COMPLAINT:Patient is a 87y old  Female who presents with a chief complaint of   	        PAST MEDICAL & SURGICAL HISTORY:  Anxiety      CVA (Cerebral Infarction)  2007 per daughter      Diverticulosis of intestine      GERD (gastroesophageal reflux disease)      Asthma      Cerebrovascular accident (CVA), unspecified mechanism      Vertebral fracture, osteoporotic      S/P Tonsillectomy      History of tonsillectomy              no cp or sob  no n/v  no h/as  agitated at times    Medications:  MEDICATIONS  (STANDING):  chlorhexidine 2% Cloths 1 Application(s) Topical daily  enoxaparin Injectable 40 milliGRAM(s) SubCutaneous every 24 hours  metoprolol succinate ER 25 milliGRAM(s) Oral daily  QUEtiapine 25 milliGRAM(s) Oral two times a day    MEDICATIONS  (PRN):  acetaminophen     Tablet .. 650 milliGRAM(s) Oral every 6 hours PRN Temp greater or equal to 38C (100.4F), Mild Pain (1 - 3)  melatonin 3 milliGRAM(s) Oral at bedtime PRN Insomnia  OLANZapine Injectable 1.25 milliGRAM(s) IntraMuscular every 6 hours PRN severe agitation  QUEtiapine 25 milliGRAM(s) Oral every 6 hours PRN agitation    	    PHYSICAL EXAM:  T(C): 37.1 (09-13-22 @ 10:11), Max: 37.1 (09-13-22 @ 10:11)  HR: 97 (09-13-22 @ 10:11) (83 - 97)  BP: 126/76 (09-13-22 @ 10:11) (126/76 - 168/87)  RR: 20 (09-13-22 @ 10:11) (18 - 20)  SpO2: 95% (09-13-22 @ 10:11) (94% - 97%)  Wt(kg): --  I&O's Summary    12 Sep 2022 07:01  -  13 Sep 2022 07:00  --------------------------------------------------------  IN: 120 mL / OUT: 0 mL / NET: 120 mL        Appearance: Normal	  HEENT:   Normal oral mucosa, PERRL, EOMI	  Lymphatic: No lymphadenopathy  Cardiovascular: Normal S1 S2, No JVD, No murmurs, No edema  Respiratory: Lungs clear to auscultation	    Gastrointestinal:  Soft, Non-tender, + BS	    Neurologic: Non-focal  Extremities: dec rom  TELEMETRY: 	    ECG:  	  RADIOLOGY:  OTHER: 	  	  LABS:	 	    CARDIAC MARKERS:                  proBNP:   Lipid Profile:   HgA1c:   TSH:

## 2022-09-13 NOTE — PROGRESS NOTE ADULT - SUBJECTIVE AND OBJECTIVE BOX
Follow-up Pulm Progress Note    No new respiratory events overnight.  Denies SOB/CP.     Medications:  MEDICATIONS  (STANDING):  chlorhexidine 2% Cloths 1 Application(s) Topical daily  enoxaparin Injectable 40 milliGRAM(s) SubCutaneous every 24 hours  metoprolol succinate ER 25 milliGRAM(s) Oral daily  QUEtiapine 25 milliGRAM(s) Oral two times a day    MEDICATIONS  (PRN):  acetaminophen     Tablet .. 650 milliGRAM(s) Oral every 6 hours PRN Temp greater or equal to 38C (100.4F), Mild Pain (1 - 3)  melatonin 3 milliGRAM(s) Oral at bedtime PRN Insomnia  OLANZapine Injectable 1.25 milliGRAM(s) IntraMuscular every 6 hours PRN severe agitation  QUEtiapine 25 milliGRAM(s) Oral every 6 hours PRN agitation          Vital Signs Last 24 Hrs  T(C): 37.1 (13 Sep 2022 10:11), Max: 37.1 (13 Sep 2022 10:11)  T(F): 98.8 (13 Sep 2022 10:11), Max: 98.8 (13 Sep 2022 10:11)  HR: 97 (13 Sep 2022 10:11) (83 - 97)  BP: 126/76 (13 Sep 2022 10:11) (126/76 - 168/87)  BP(mean): --  RR: 20 (13 Sep 2022 10:11) (18 - 20)  SpO2: 95% (13 Sep 2022 10:11) (94% - 97%)    Parameters below as of 13 Sep 2022 10:11  Patient On (Oxygen Delivery Method): room air              09-12 @ 07:01  -  09-13 @ 07:00  --------------------------------------------------------  IN: 120 mL / OUT: 0 mL / NET: 120 mL          LABS:                CAPILLARY BLOOD GLUCOSE            Procalcitonin, Serum: 0.05 ng/mL (09-11-22 @ 07:22)                  CULTURES:       Culture - Urine (collected 09-10-22 @ 01:42)  Source: Clean Catch Clean Catch (Midstream)  Final Report (09-11-22 @ 11:51):    No growth                  Physical Examination:  PULM: Clear to auscultation bilaterally, no significant sputum production  CVS: S1, S2 heard    RADIOLOGY REVIEWED  CXR: clear lungs

## 2022-09-14 RX ADMIN — OLANZAPINE 1.25 MILLIGRAM(S): 15 TABLET, FILM COATED ORAL at 05:36

## 2022-09-14 RX ADMIN — OLANZAPINE 1.25 MILLIGRAM(S): 15 TABLET, FILM COATED ORAL at 22:36

## 2022-09-14 RX ADMIN — Medication 25 MILLIGRAM(S): at 05:24

## 2022-09-14 RX ADMIN — QUETIAPINE FUMARATE 25 MILLIGRAM(S): 200 TABLET, FILM COATED ORAL at 17:43

## 2022-09-14 RX ADMIN — CHLORHEXIDINE GLUCONATE 1 APPLICATION(S): 213 SOLUTION TOPICAL at 12:33

## 2022-09-14 RX ADMIN — ENOXAPARIN SODIUM 40 MILLIGRAM(S): 100 INJECTION SUBCUTANEOUS at 05:24

## 2022-09-14 RX ADMIN — OLANZAPINE 1.25 MILLIGRAM(S): 15 TABLET, FILM COATED ORAL at 14:11

## 2022-09-14 NOTE — PROGRESS NOTE ADULT - SUBJECTIVE AND OBJECTIVE BOX
DATE OF SERVICE: 09-14-22 @ 14:27  CHIEF COMPLAINT:Patient is a 87y old  Female who presents with a chief complaint of   	        PAST MEDICAL & SURGICAL HISTORY:  Anxiety      CVA (Cerebral Infarction)  2007 per daughter      Diverticulosis of intestine      GERD (gastroesophageal reflux disease)      Asthma      Cerebrovascular accident (CVA), unspecified mechanism      Vertebral fracture, osteoporotic      S/P Tonsillectomy      History of tonsillectomy              REVIEW OF SYSTEMS:      NECK: No pain or stiffness  RESPIRATORY: No cough, wheezing, chills or hemoptysis;  CARDIOVASCULAR: No chest pain, palpitations, passing out,  GASTROINTESTINAL: No abdominal or epigastric pain.  confused at times    Medications:  MEDICATIONS  (STANDING):  chlorhexidine 2% Cloths 1 Application(s) Topical daily  enoxaparin Injectable 40 milliGRAM(s) SubCutaneous every 24 hours  metoprolol succinate ER 25 milliGRAM(s) Oral daily  QUEtiapine 25 milliGRAM(s) Oral two times a day    MEDICATIONS  (PRN):  acetaminophen     Tablet .. 650 milliGRAM(s) Oral every 6 hours PRN Temp greater or equal to 38C (100.4F), Mild Pain (1 - 3)  melatonin 3 milliGRAM(s) Oral at bedtime PRN Insomnia  OLANZapine Injectable 1.25 milliGRAM(s) IntraMuscular every 6 hours PRN severe agitation  QUEtiapine 25 milliGRAM(s) Oral every 6 hours PRN agitation    	    PHYSICAL EXAM:  T(C): 36.6 (09-14-22 @ 12:25), Max: 37.1 (09-14-22 @ 00:02)  HR: 75 (09-14-22 @ 12:25) (75 - 100)  BP: 148/79 (09-14-22 @ 12:25) (144/70 - 160/77)  RR: 16 (09-14-22 @ 12:25) (16 - 20)  SpO2: 96% (09-14-22 @ 12:25) (94% - 96%)  Wt(kg): --  I&O's Summary    13 Sep 2022 07:01  -  14 Sep 2022 07:00  --------------------------------------------------------  IN: 240 mL / OUT: 0 mL / NET: 240 mL    14 Sep 2022 07:01  -  14 Sep 2022 14:27  --------------------------------------------------------  IN: 100 mL / OUT: 0 mL / NET: 100 mL        Appearance: Normal	  HEENT:   Normal oral mucosa, PERRL, EOMI	  Lymphatic: No lymphadenopathy  Cardiovascular: Normal S1 S2, No JVD, No murmurs, No edema  Respiratory: Lungs clear to auscultation	    Gastrointestinal:  Soft, Non-tender, + BS	  Skin: No rashes, No ecchymoses, No cyanosis	  Neurologic: Non-focal  Extremities: dec rom  pvd    TELEMETRY: 	    ECG:  	  RADIOLOGY:  OTHER: 	  	  LABS:	 	    CARDIAC MARKERS:                  proBNP:   Lipid Profile:   HgA1c:   TSH:

## 2022-09-14 NOTE — PROGRESS NOTE ADULT - SUBJECTIVE AND OBJECTIVE BOX
Follow-up Pulm Progress Note    alert, confused   No new respiratory events overnight.  Denies SOB/CP.   sats 95% RA    Medications:  MEDICATIONS  (STANDING):  chlorhexidine 2% Cloths 1 Application(s) Topical daily  enoxaparin Injectable 40 milliGRAM(s) SubCutaneous every 24 hours  metoprolol succinate ER 25 milliGRAM(s) Oral daily  QUEtiapine 25 milliGRAM(s) Oral two times a day    MEDICATIONS  (PRN):  acetaminophen     Tablet .. 650 milliGRAM(s) Oral every 6 hours PRN Temp greater or equal to 38C (100.4F), Mild Pain (1 - 3)  melatonin 3 milliGRAM(s) Oral at bedtime PRN Insomnia  OLANZapine Injectable 1.25 milliGRAM(s) IntraMuscular every 6 hours PRN severe agitation  QUEtiapine 25 milliGRAM(s) Oral every 6 hours PRN agitation          Vital Signs Last 24 Hrs  T(C): 36.9 (14 Sep 2022 08:20), Max: 37.1 (14 Sep 2022 00:02)  T(F): 98.4 (14 Sep 2022 08:20), Max: 98.7 (14 Sep 2022 00:02)  HR: 83 (14 Sep 2022 08:20) (83 - 100)  BP: 144/70 (14 Sep 2022 08:20) (144/70 - 160/77)  BP(mean): --  RR: 16 (14 Sep 2022 08:20) (16 - 20)  SpO2: 95% (14 Sep 2022 08:20) (94% - 95%)    Parameters below as of 14 Sep 2022 08:20  Patient On (Oxygen Delivery Method): room air              09-13 @ 07:01  -  09-14 @ 07:00  --------------------------------------------------------  IN: 240 mL / OUT: 0 mL / NET: 240 mL            CULTURES:       Culture - Urine (collected 09-10-22 @ 01:42)  Source: Clean Catch Clean Catch (Midstream)  Final Report (09-11-22 @ 11:51):    No growth                      Physical Examination:  PULM: Clear to auscultation bilaterally, no significant sputum production  CVS: S1, S2 heard    RADIOLOGY REVIEWED  CXR: clear lungs

## 2022-09-15 LAB
ANION GAP SERPL CALC-SCNC: 10 MMOL/L — SIGNIFICANT CHANGE UP (ref 5–17)
BUN SERPL-MCNC: 22 MG/DL — SIGNIFICANT CHANGE UP (ref 7–23)
CALCIUM SERPL-MCNC: 9.8 MG/DL — SIGNIFICANT CHANGE UP (ref 8.4–10.5)
CHLORIDE SERPL-SCNC: 105 MMOL/L — SIGNIFICANT CHANGE UP (ref 96–108)
CO2 SERPL-SCNC: 26 MMOL/L — SIGNIFICANT CHANGE UP (ref 22–31)
CREAT SERPL-MCNC: 0.53 MG/DL — SIGNIFICANT CHANGE UP (ref 0.5–1.3)
CRP SERPL-MCNC: 146 MG/L — HIGH (ref 0–4)
D DIMER BLD IA.RAPID-MCNC: 809 NG/ML DDU — HIGH
EGFR: 89 ML/MIN/1.73M2 — SIGNIFICANT CHANGE UP
GLUCOSE SERPL-MCNC: 94 MG/DL — SIGNIFICANT CHANGE UP (ref 70–99)
POTASSIUM SERPL-MCNC: 3.8 MMOL/L — SIGNIFICANT CHANGE UP (ref 3.5–5.3)
POTASSIUM SERPL-SCNC: 3.8 MMOL/L — SIGNIFICANT CHANGE UP (ref 3.5–5.3)
SODIUM SERPL-SCNC: 141 MMOL/L — SIGNIFICANT CHANGE UP (ref 135–145)

## 2022-09-15 PROCEDURE — 99232 SBSQ HOSP IP/OBS MODERATE 35: CPT

## 2022-09-15 RX ORDER — QUETIAPINE FUMARATE 200 MG/1
50 TABLET, FILM COATED ORAL AT BEDTIME
Refills: 0 | Status: DISCONTINUED | OUTPATIENT
Start: 2022-09-15 | End: 2022-09-20

## 2022-09-15 RX ADMIN — QUETIAPINE FUMARATE 50 MILLIGRAM(S): 200 TABLET, FILM COATED ORAL at 21:47

## 2022-09-15 RX ADMIN — ENOXAPARIN SODIUM 40 MILLIGRAM(S): 100 INJECTION SUBCUTANEOUS at 06:47

## 2022-09-15 RX ADMIN — Medication 25 MILLIGRAM(S): at 06:47

## 2022-09-15 RX ADMIN — QUETIAPINE FUMARATE 25 MILLIGRAM(S): 200 TABLET, FILM COATED ORAL at 06:47

## 2022-09-15 RX ADMIN — CHLORHEXIDINE GLUCONATE 1 APPLICATION(S): 213 SOLUTION TOPICAL at 13:26

## 2022-09-15 NOTE — BH CONSULTATION LIAISON PROGRESS NOTE - NSBHFUPINTERVALHXFT_PSY_A_CORE
Patient seen and evaluated, staff consulted, chart, labs, meds reviewed, initial evaluation appreciated. Patient and staff report no issues overnight. Patient demonstrates minimal improvements in clarity of sensorium and orientation. No SI/HI, no delusions or perceptual disturbances, no prominent psych. sx. noted or reported. Patient is oriented to self, but not location or time. Patient also complains of being thirsty.    Direction of care discussed with the family. Patient's daughter, Kathryn Hogan [phone] insists that patient has undiagnosed bipolar disorder, which somehow evaded diagnosis and treatment, that she is a 'narcissist', and that her weakness and inability to walk is 'conversion'. Counseling and support provided to the daughter, patient currently still presenting with clouding of sensorium and pre-existing disorders cannot easily be identified at this time.    Patient did not represent a management problem overnight.

## 2022-09-15 NOTE — BH CONSULTATION LIAISON PROGRESS NOTE - NSBHCHARTREVIEWINVESTIGATE_PSY_A_CORE FT
< from: 12 Lead ECG (09.11.22 @ 06:38) >      Ventricular Rate 97 BPM    Atrial Rate 97 BPM    P-R Interval 150 ms    QRS Duration 52 ms    Q-T Interval 362 ms    QTC Calculation(Bazett) 459 ms    P Axis 108 degrees    R Axis 212 degrees    T Axis 138 degrees    Diagnosis Line *** SUSPECT ARM LEAD REVERSAL, INTERPRETATION ASSUMES NO REVERSAL  NORMAL SINUS RHYTHM  BASELINE ARTIFACT  ABNORMAL ECG  WHEN COMPARED WITH ECG OF 09-SEP-2022 22:00,  SIGNIFICANT CHANGES HAVE OCCURRED  Confirmed by TRISTON CASTELLON MD ([id number]) on 9/11/2022 12:14:59 PM    < end of copied text >

## 2022-09-15 NOTE — BH CONSULTATION LIAISON PROGRESS NOTE - NSBHCONSULTRECOMMENDOTHER_PSY_A_CORE FT
1. Reschedule Seroquel to 50mg PO at bedtime, monitor EKG for QTc<500    2. PRN: Seroquel 25mg PO q6h PRN agitation; Zyprexa 1.25-2.5mg IM q6h PRN severe agitation.  Monitor for QTc<500.  Melatonin 3mg PO qHS PRN insomnia.    3. Minimize use of benzos, opioids, anticholinergics, or other deliriogenic agents when possible.  Maintain sleep wake cycle.  Provide frequent reorientation and redirection.  Family member at bedside if possible. Assess for need for glasses and hearing aid (if applicable).    4. Pt does not meet criteria for psychiatric hospitalization.  Recommend outpt psych f/u at [facility] after d/c- [phone].   [facility] will follow.
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
## 2022-09-15 NOTE — PROGRESS NOTE ADULT - SUBJECTIVE AND OBJECTIVE BOX
Follow-up Pulm Progress Note    No new respiratory events overnight.  Denies SOB/CP.   O2 sats 96% RA    Medications:  MEDICATIONS  (STANDING):  chlorhexidine 2% Cloths 1 Application(s) Topical daily  enoxaparin Injectable 40 milliGRAM(s) SubCutaneous every 24 hours  metoprolol succinate ER 25 milliGRAM(s) Oral daily  QUEtiapine 25 milliGRAM(s) Oral two times a day    MEDICATIONS  (PRN):  acetaminophen     Tablet .. 650 milliGRAM(s) Oral every 6 hours PRN Temp greater or equal to 38C (100.4F), Mild Pain (1 - 3)  melatonin 3 milliGRAM(s) Oral at bedtime PRN Insomnia  OLANZapine Injectable 1.25 milliGRAM(s) IntraMuscular every 6 hours PRN severe agitation  QUEtiapine 25 milliGRAM(s) Oral every 6 hours PRN agitation    Vital Signs Last 24 Hrs  T(C): 37.2 (15 Sep 2022 08:42), Max: 37.2 (15 Sep 2022 08:42)  T(F): 98.9 (15 Sep 2022 08:42), Max: 98.9 (15 Sep 2022 08:42)  HR: 89 (15 Sep 2022 08:42) (75 - 89)  BP: 126/80 (15 Sep 2022 08:42) (126/80 - 157/71)  BP(mean): --  RR: 16 (15 Sep 2022 08:42) (16 - 20)  SpO2: 95% (15 Sep 2022 08:42) (93% - 98%)    Parameters below as of 15 Sep 2022 08:42  Patient On (Oxygen Delivery Method): room air    09-14 @ 07:01  -  09-15 @ 07:00  --------------------------------------------------------  IN: 220 mL / OUT: 200 mL / NET: 20 mL    LABS:    09-15    141  |  105  |  22  ----------------------------<  94  3.8   |  26  |  0.53    Ca    9.8      15 Sep 2022 07:21    CULTURES:     Culture - Urine (collected 09-10-22 @ 01:42)  Source: Clean Catch Clean Catch (Midstream)  Final Report (09-11-22 @ 11:51):    No growth    Physical Examination:  PULM: Clear to auscultation bilaterally, no significant sputum production  CVS: S1, S2 heard    RADIOLOGY REVIEWED  CXR: clear lungs

## 2022-09-15 NOTE — BH CONSULTATION LIAISON PROGRESS NOTE - CURRENT MEDICATION
MEDICATIONS  (STANDING):  chlorhexidine 2% Cloths 1 Application(s) Topical daily  enoxaparin Injectable 40 milliGRAM(s) SubCutaneous every 24 hours  metoprolol succinate ER 25 milliGRAM(s) Oral daily  QUEtiapine 25 milliGRAM(s) Oral two times a day    MEDICATIONS  (PRN):  acetaminophen     Tablet .. 650 milliGRAM(s) Oral every 6 hours PRN Temp greater or equal to 38C (100.4F), Mild Pain (1 - 3)  melatonin 3 milliGRAM(s) Oral at bedtime PRN Insomnia  OLANZapine Injectable 1.25 milliGRAM(s) IntraMuscular every 6 hours PRN severe agitation  QUEtiapine 25 milliGRAM(s) Oral every 6 hours PRN agitation

## 2022-09-15 NOTE — PROGRESS NOTE ADULT - PROBLEM SELECTOR PLAN 1
+COVID PCR on admission  -CXR clear, normoxic, no respiratory symptoms   -No indication for Remdesivir or Decadron at this time.   -Trend inflammatory markers  -DVT ppx. +COVID PCR on admission  -CXR clear, normoxic, no respiratory symptoms   -No indication for Remdesivir or Decadron at this time.   -Trend inflammatory markers  -Ddimer uptrending. Check LE duplex to r/o DVT.   -DVT ppx.

## 2022-09-15 NOTE — BH CONSULTATION LIAISON PROGRESS NOTE - NSBHCONSULTFOLLOWAFTERCARE_PSY_A_CORE FT
OP psych f/u at [facility]- [phone]  [facility] clinic- [phone]

## 2022-09-15 NOTE — BH CONSULTATION LIAISON PROGRESS NOTE - NSBHCHARTREVIEWVS_PSY_A_CORE FT
Vital Signs Last 24 Hrs  T(C): 36.9 (15 Sep 2022 12:31), Max: 37.2 (15 Sep 2022 08:42)  T(F): 98.5 (15 Sep 2022 12:31), Max: 98.9 (15 Sep 2022 08:42)  HR: 80 (15 Sep 2022 12:31) (80 - 89)  BP: 139/70 (15 Sep 2022 12:31) (126/80 - 157/71)  BP(mean): --  RR: 16 (15 Sep 2022 12:31) (16 - 20)  SpO2: 93% (15 Sep 2022 12:31) (93% - 98%)    Parameters below as of 15 Sep 2022 12:31  Patient On (Oxygen Delivery Method): room air

## 2022-09-15 NOTE — PROGRESS NOTE ADULT - SUBJECTIVE AND OBJECTIVE BOX
DATE OF SERVICE: 09-15-22 @ 14:11  CHIEF COMPLAINT:Patient is a 87y old  Female who presents with a chief complaint of   	        PAST MEDICAL & SURGICAL HISTORY:  Anxiety      CVA (Cerebral Infarction)  2007 per daughter      Diverticulosis of intestine      GERD (gastroesophageal reflux disease)      Asthma      Cerebrovascular accident (CVA), unspecified mechanism      Vertebral fracture, osteoporotic      S/P Tonsillectomy      History of tonsillectomy                RESPIRATORY: No cough, wheezing, chills or hemoptysis; No Shortness of Breath  CARDIOVASCULAR: No chest pain, palpitations, passing out, dizziness,   GASTROINTESTINAL: No abdominal or epigastric pain.   GENITOURINARY: No dysuria, frequency, hematuria, o  NEUROLOGICAL: No headaches,     Medications:  MEDICATIONS  (STANDING):  chlorhexidine 2% Cloths 1 Application(s) Topical daily  enoxaparin Injectable 40 milliGRAM(s) SubCutaneous every 24 hours  metoprolol succinate ER 25 milliGRAM(s) Oral daily  QUEtiapine 25 milliGRAM(s) Oral two times a day    MEDICATIONS  (PRN):  acetaminophen     Tablet .. 650 milliGRAM(s) Oral every 6 hours PRN Temp greater or equal to 38C (100.4F), Mild Pain (1 - 3)  melatonin 3 milliGRAM(s) Oral at bedtime PRN Insomnia  OLANZapine Injectable 1.25 milliGRAM(s) IntraMuscular every 6 hours PRN severe agitation  QUEtiapine 25 milliGRAM(s) Oral every 6 hours PRN agitation    	    PHYSICAL EXAM:  T(C): 36.9 (09-15-22 @ 12:31), Max: 37.2 (09-15-22 @ 08:42)  HR: 80 (09-15-22 @ 12:31) (80 - 89)  BP: 139/70 (09-15-22 @ 12:31) (126/80 - 157/71)  RR: 16 (09-15-22 @ 12:31) (16 - 20)  SpO2: 93% (09-15-22 @ 12:31) (93% - 98%)  Wt(kg): --  I&O's Summary    14 Sep 2022 07:01  -  15 Sep 2022 07:00  --------------------------------------------------------  IN: 220 mL / OUT: 200 mL / NET: 20 mL    15 Sep 2022 07:01  -  15 Sep 2022 14:11  --------------------------------------------------------  IN: 200 mL / OUT: 0 mL / NET: 200 mL          HEENT:   Normal oral mucosa,   Cardiovascular: Normal S1 S2, No JVD,   Respiratory: Lungs clear to auscultation	  Psychiatry: A & O   Gastrointestinal:  Soft, Non-tender, + BS	  	  Neurologic: Non-focal  Extremities:  dec rom     TELEMETRY: 	    ECG:  	  RADIOLOGY:  OTHER: 	  	  LABS:	 	    CARDIAC MARKERS:            09-15    141  |  105  |  22  ----------------------------<  94  3.8   |  26  |  0.53    Ca    9.8      15 Sep 2022 07:21      proBNP:   Lipid Profile:   HgA1c:   TSH:

## 2022-09-16 LAB — D DIMER BLD IA.RAPID-MCNC: 926 NG/ML DDU — HIGH

## 2022-09-16 PROCEDURE — 93970 EXTREMITY STUDY: CPT | Mod: 26

## 2022-09-16 PROCEDURE — 71275 CT ANGIOGRAPHY CHEST: CPT | Mod: 26

## 2022-09-16 RX ORDER — DIPHENHYDRAMINE HCL 50 MG
50 CAPSULE ORAL ONCE
Refills: 0 | Status: COMPLETED | OUTPATIENT
Start: 2022-09-16 | End: 2022-09-16

## 2022-09-16 RX ADMIN — Medication 25 MILLIGRAM(S): at 06:16

## 2022-09-16 RX ADMIN — Medication 50 MILLIGRAM(S): at 18:11

## 2022-09-16 RX ADMIN — CHLORHEXIDINE GLUCONATE 1 APPLICATION(S): 213 SOLUTION TOPICAL at 12:11

## 2022-09-16 RX ADMIN — QUETIAPINE FUMARATE 50 MILLIGRAM(S): 200 TABLET, FILM COATED ORAL at 21:46

## 2022-09-16 RX ADMIN — OLANZAPINE 1.25 MILLIGRAM(S): 15 TABLET, FILM COATED ORAL at 06:12

## 2022-09-16 RX ADMIN — ENOXAPARIN SODIUM 40 MILLIGRAM(S): 100 INJECTION SUBCUTANEOUS at 06:12

## 2022-09-16 RX ADMIN — Medication 40 MILLIGRAM(S): at 14:27

## 2022-09-16 NOTE — PROGRESS NOTE ADULT - SUBJECTIVE AND OBJECTIVE BOX
DATE OF SERVICE: 09-16-22 @ 13:35  CHIEF COMPLAINT:Patient is a 87y old  Female who presents with a chief complaint of   	        PAST MEDICAL & SURGICAL HISTORY:  Anxiety      CVA (Cerebral Infarction)  2007 per daughter      Diverticulosis of intestine      GERD (gastroesophageal reflux disease)      Asthma      Cerebrovascular accident (CVA), unspecified mechanism      Vertebral fracture, osteoporotic      S/P Tonsillectomy      History of tonsillectomy              psych f/u noted  no cp or sob  no vomiting  confused at times    Medications:  MEDICATIONS  (STANDING):  chlorhexidine 2% Cloths 1 Application(s) Topical daily  enoxaparin Injectable 40 milliGRAM(s) SubCutaneous every 24 hours  metoprolol succinate ER 25 milliGRAM(s) Oral daily  QUEtiapine 50 milliGRAM(s) Oral at bedtime    MEDICATIONS  (PRN):  acetaminophen     Tablet .. 650 milliGRAM(s) Oral every 6 hours PRN Temp greater or equal to 38C (100.4F), Mild Pain (1 - 3)  melatonin 3 milliGRAM(s) Oral at bedtime PRN Insomnia  OLANZapine Injectable 1.25 milliGRAM(s) IntraMuscular every 6 hours PRN severe agitation  QUEtiapine 25 milliGRAM(s) Oral every 6 hours PRN agitation    	    PHYSICAL EXAM:  T(C): 36.3 (09-16-22 @ 12:16), Max: 36.5 (09-15-22 @ 21:20)  HR: 92 (09-16-22 @ 12:16) (80 - 93)  BP: 111/69 (09-16-22 @ 12:16) (111/69 - 133/75)  RR: 16 (09-16-22 @ 12:16) (16 - 18)  SpO2: 94% (09-16-22 @ 12:16) (94% - 96%)  Wt(kg): --  I&O's Summary    15 Sep 2022 07:01  -  16 Sep 2022 07:00  --------------------------------------------------------  IN: 200 mL / OUT: 0 mL / NET: 200 mL    16 Sep 2022 07:01  -  16 Sep 2022 13:35  --------------------------------------------------------  IN: 240 mL / OUT: 0 mL / NET: 240 mL        Appearance: Normal	  HEENT:   Normal oral mucosa,   Cardiovascular: Normal S1 S2, No JVD,   Respiratory: Lungs clear to auscultation	    Gastrointestinal:  Soft, Non-tender, + BS	    Neurologic: Non-focal  Extremities: dec rom    TELEMETRY: 	    ECG:  	  RADIOLOGY:  OTHER: 	  	  LABS:	 	    CARDIAC MARKERS:            09-15    141  |  105  |  22  ----------------------------<  94  3.8   |  26  |  0.53    Ca    9.8      15 Sep 2022 07:21      proBNP:   Lipid Profile:   HgA1c:   TSH:

## 2022-09-16 NOTE — PROGRESS NOTE ADULT - SUBJECTIVE AND OBJECTIVE BOX
Follow-up Pulm Progress Note    No new respiratory events overnight.  Denies SOB/CP.     Medications:  MEDICATIONS  (STANDING):  chlorhexidine 2% Cloths 1 Application(s) Topical daily  enoxaparin Injectable 40 milliGRAM(s) SubCutaneous every 24 hours  metoprolol succinate ER 25 milliGRAM(s) Oral daily  QUEtiapine 50 milliGRAM(s) Oral at bedtime    MEDICATIONS  (PRN):  acetaminophen     Tablet .. 650 milliGRAM(s) Oral every 6 hours PRN Temp greater or equal to 38C (100.4F), Mild Pain (1 - 3)  diphenhydrAMINE Injectable 50 milliGRAM(s) IV Push once PRN Rash and/or Itching  melatonin 3 milliGRAM(s) Oral at bedtime PRN Insomnia  OLANZapine Injectable 1.25 milliGRAM(s) IntraMuscular every 6 hours PRN severe agitation  QUEtiapine 25 milliGRAM(s) Oral every 6 hours PRN agitation    Vital Signs Last 24 Hrs  T(C): 36.3 (16 Sep 2022 12:16), Max: 36.5 (15 Sep 2022 21:20)  T(F): 97.4 (16 Sep 2022 12:16), Max: 97.7 (15 Sep 2022 21:20)  HR: 92 (16 Sep 2022 12:16) (80 - 93)  BP: 111/69 (16 Sep 2022 12:16) (111/69 - 133/75)  BP(mean): --  RR: 16 (16 Sep 2022 12:16) (16 - 18)  SpO2: 94% (16 Sep 2022 12:16) (94% - 96%)    Parameters below as of 16 Sep 2022 12:16  Patient On (Oxygen Delivery Method): room air    09-15 @ 07:01  -  09-16 @ 07:00  --------------------------------------------------------  IN: 200 mL / OUT: 0 mL / NET: 200 mL      LABS:    09-15    141  |  105  |  22  ----------------------------<  94  3.8   |  26  |  0.53    Ca    9.8      15 Sep 2022 07:21    CULTURES:     Culture - Urine (collected 09-10-22 @ 01:42)  Source: Clean Catch Clean Catch (Midstream)  Final Report (09-11-22 @ 11:51):    No growth    Physical Examination:  PULM: Clear to auscultation bilaterally, no significant sputum production  CVS: S1, S2 heard    RADIOLOGY REVIEWED  CXR: clear lungs

## 2022-09-16 NOTE — PROGRESS NOTE ADULT - PROBLEM SELECTOR PLAN 1
+COVID PCR on admission  -CXR clear, normoxic, no respiratory symptoms   -No indication for Remdesivir or Decadron at this time.   -Trend inflammatory markers  -Ddimer uptrending. LE duplex pending to r/o DVT. Suggest CTA chest to r/o PE  -DVT ppx  -D/c planning per primary team pending results of LE duplex & CTA.

## 2022-09-17 LAB
D DIMER BLD IA.RAPID-MCNC: 922 NG/ML DDU — HIGH
GLUCOSE BLDC GLUCOMTR-MCNC: 118 MG/DL — HIGH (ref 70–99)

## 2022-09-17 RX ADMIN — QUETIAPINE FUMARATE 25 MILLIGRAM(S): 200 TABLET, FILM COATED ORAL at 10:55

## 2022-09-17 RX ADMIN — Medication 25 MILLIGRAM(S): at 05:52

## 2022-09-17 RX ADMIN — CHLORHEXIDINE GLUCONATE 1 APPLICATION(S): 213 SOLUTION TOPICAL at 10:56

## 2022-09-17 RX ADMIN — ENOXAPARIN SODIUM 40 MILLIGRAM(S): 100 INJECTION SUBCUTANEOUS at 05:52

## 2022-09-17 NOTE — PROGRESS NOTE ADULT - SUBJECTIVE AND OBJECTIVE BOX
DATE OF SERVICE: 09-17-22 @ 11:42  CHIEF COMPLAINT:Patient is a 87y old  Female who presents with a chief complaint of   	        PAST MEDICAL & SURGICAL HISTORY:  Anxiety      CVA (Cerebral Infarction)  2007 per daughter      Diverticulosis of intestine      GERD (gastroesophageal reflux disease)      Asthma      Cerebrovascular accident (CVA), unspecified mechanism      Vertebral fracture, osteoporotic      S/P Tonsillectomy      History of tonsillectomy              REVIEW OF SYSTEMS:  CONSTITUTIONAL: No fever, weight loss, or fatigue  EYES: No eye pain, visual disturbances, or discharge  NECK: No pain or stiffness  RESPIRATORY: No cough, wheezing, chills or hemoptysis; No Shortness of Breath  CARDIOVASCULAR: No chest pain, palpitations, passing out, dizziness, or leg swelling  GASTROINTESTINAL: No abdominal or epigastric pain. No nausea, vomiting, or hematemesis; No diarrhea or constipation. No melena or hematochezia.  GENITOURINARY: No dysuria, frequency, hematuria, or incontinence  NEUROLOGICAL: No headaches, memory loss, loss of strength, numbness, or tremors  SKIN: No itching, burning, rashes, or lesions   LYMPH Nodes: No enlarged glands  ENDOCRINE: No heat or cold intolerance; No hair loss  MUSCULOSKELETAL: No joint pain or swelling; No muscle, back, or extremity pain    Medications:  MEDICATIONS  (STANDING):  chlorhexidine 2% Cloths 1 Application(s) Topical daily  enoxaparin Injectable 40 milliGRAM(s) SubCutaneous every 24 hours  metoprolol succinate ER 25 milliGRAM(s) Oral daily  QUEtiapine 50 milliGRAM(s) Oral at bedtime    MEDICATIONS  (PRN):  acetaminophen     Tablet .. 650 milliGRAM(s) Oral every 6 hours PRN Temp greater or equal to 38C (100.4F), Mild Pain (1 - 3)  melatonin 3 milliGRAM(s) Oral at bedtime PRN Insomnia  OLANZapine Injectable 1.25 milliGRAM(s) IntraMuscular every 6 hours PRN severe agitation  QUEtiapine 25 milliGRAM(s) Oral every 6 hours PRN agitation    	    PHYSICAL EXAM:  T(C): 36.3 (09-17-22 @ 05:02), Max: 36.6 (09-16-22 @ 21:40)  HR: 80 (09-17-22 @ 05:02) (80 - 92)  BP: 160/88 (09-17-22 @ 05:02) (111/69 - 160/88)  RR: 18 (09-17-22 @ 05:02) (16 - 18)  SpO2: 96% (09-17-22 @ 05:02) (93% - 96%)  Wt(kg): --  I&O's Summary    16 Sep 2022 07:01  -  17 Sep 2022 07:00  --------------------------------------------------------  IN: 240 mL / OUT: 0 mL / NET: 240 mL        Appearance: Normal	  HEENT:   Normal oral mucosa, PERRL, EOMI	  Lymphatic: No lymphadenopathy  Cardiovascular: Normal S1 S2, No JVD, No murmurs, No edema  Respiratory: Lungs clear to auscultation	  Psychiatry: A & O x 3, Mood & affect appropriate  Gastrointestinal:  Soft, Non-tender, + BS	  Skin: No rashes, No ecchymoses, No cyanosis	  Neurologic: Non-focal  Extremities: Normal range of motion, No clubbing, cyanosis or edema  Vascular: Peripheral pulses palpable 2+ bilaterally    TELEMETRY: 	    ECG:  	  RADIOLOGY:  OTHER: 	  	  LABS:	 	    CARDIAC MARKERS:                  proBNP:   Lipid Profile:   HgA1c:   TSH:     	         DATE OF SERVICE: 09-17-22 @ 11:42  CHIEF COMPLAINT:Patient is a 87y old  Female who presents with a chief complaint of   	        PAST MEDICAL & SURGICAL HISTORY:  Anxiety      CVA (Cerebral Infarction)  2007 per daughter      Diverticulosis of intestine      GERD (gastroesophageal reflux disease)      Asthma      Cerebrovascular accident (CVA), unspecified mechanism      Vertebral fracture, osteoporotic      S/P Tonsillectomy      History of tonsillectomy              REVIEW OF SYSTEMS:    NECK: No pain or stiffness  RESPIRATORY: No cough, wheezing, chills   No Shortness of Breath  CARDIOVASCULAR: No chest pain, palpitations, passing out,   GASTROINTESTINAL: No abdominal or epigastric pain. No nausea, vomiting    NEUROLOGICAL: No headaches,     Medications:  MEDICATIONS  (STANDING):  chlorhexidine 2% Cloths 1 Application(s) Topical daily  enoxaparin Injectable 40 milliGRAM(s) SubCutaneous every 24 hours  metoprolol succinate ER 25 milliGRAM(s) Oral daily  QUEtiapine 50 milliGRAM(s) Oral at bedtime    MEDICATIONS  (PRN):  acetaminophen     Tablet .. 650 milliGRAM(s) Oral every 6 hours PRN Temp greater or equal to 38C (100.4F), Mild Pain (1 - 3)  melatonin 3 milliGRAM(s) Oral at bedtime PRN Insomnia  OLANZapine Injectable 1.25 milliGRAM(s) IntraMuscular every 6 hours PRN severe agitation  QUEtiapine 25 milliGRAM(s) Oral every 6 hours PRN agitation    	    PHYSICAL EXAM:  T(C): 36.3 (09-17-22 @ 05:02), Max: 36.6 (09-16-22 @ 21:40)  HR: 80 (09-17-22 @ 05:02) (80 - 92)  BP: 160/88 (09-17-22 @ 05:02) (111/69 - 160/88)  RR: 18 (09-17-22 @ 05:02) (16 - 18)  SpO2: 96% (09-17-22 @ 05:02) (93% - 96%)  Wt(kg): --  I&O's Summary    16 Sep 2022 07:01  -  17 Sep 2022 07:00  --------------------------------------------------------  IN: 240 mL / OUT: 0 mL / NET: 240 mL        Appearance: Normal	  HEENT:   Normal oral mucosa, PERRL, EOMI	  Lymphatic: No lymphadenopathy  Cardiovascular: Normal S1 S2, No JVD, No murmurs  Respiratory: Lungs clear to auscultation	  Gastrointestinal:  Soft, Non-tender, + BS	    Neurologic: Non-focal  Extremities: dec rom    TELEMETRY: 	    ECG:  	  RADIOLOGY:  OTHER: 	  	  LABS:	 	    CARDIAC MARKERS:                  proBNP:   Lipid Profile:   HgA1c:   TSH:

## 2022-09-18 PROCEDURE — 74019 RADEX ABDOMEN 2 VIEWS: CPT | Mod: 26

## 2022-09-18 RX ADMIN — ENOXAPARIN SODIUM 40 MILLIGRAM(S): 100 INJECTION SUBCUTANEOUS at 06:53

## 2022-09-18 RX ADMIN — CHLORHEXIDINE GLUCONATE 1 APPLICATION(S): 213 SOLUTION TOPICAL at 08:27

## 2022-09-18 RX ADMIN — QUETIAPINE FUMARATE 50 MILLIGRAM(S): 200 TABLET, FILM COATED ORAL at 22:52

## 2022-09-18 RX ADMIN — QUETIAPINE FUMARATE 25 MILLIGRAM(S): 200 TABLET, FILM COATED ORAL at 11:00

## 2022-09-18 RX ADMIN — Medication 25 MILLIGRAM(S): at 06:53

## 2022-09-18 NOTE — PROVIDER CONTACT NOTE (OTHER) - ASSESSMENT
Patient is A&Ox3, confused at times. Patient complains of 10/10 lower abdomen pain/pressure. Bladder scan performed and shows 550ml of urine.

## 2022-09-18 NOTE — PROGRESS NOTE ADULT - SUBJECTIVE AND OBJECTIVE BOX
DATE OF SERVICE: 09-18-22 @ 11:48  CHIEF COMPLAINT:Patient is a 87y old  Female who presents with a chief complaint of   	        PAST MEDICAL & SURGICAL HISTORY:  Anxiety      CVA (Cerebral Infarction)  2007 per daughter      Diverticulosis of intestine      GERD (gastroesophageal reflux disease)      Asthma      Cerebrovascular accident (CVA), unspecified mechanism      Vertebral fracture, osteoporotic      S/P Tonsillectomy      History of tonsillectomy              REVIEW OF SYSTEMS:    NECK: No pain or stiffness  RESPIRATORY: No cough, wheezing, chills or hemoptysis; No Shortness of Breath  CARDIOVASCULAR: No chest pain, palpitations, passing out,   GASTROINTESTINAL: some abd discomfort    Medications:  MEDICATIONS  (STANDING):  chlorhexidine 2% Cloths 1 Application(s) Topical daily  enoxaparin Injectable 40 milliGRAM(s) SubCutaneous every 24 hours  metoprolol succinate ER 25 milliGRAM(s) Oral daily  QUEtiapine 50 milliGRAM(s) Oral at bedtime    MEDICATIONS  (PRN):  acetaminophen     Tablet .. 650 milliGRAM(s) Oral every 6 hours PRN Temp greater or equal to 38C (100.4F), Mild Pain (1 - 3)  melatonin 3 milliGRAM(s) Oral at bedtime PRN Insomnia  OLANZapine Injectable 1.25 milliGRAM(s) IntraMuscular every 6 hours PRN severe agitation  QUEtiapine 25 milliGRAM(s) Oral every 6 hours PRN agitation    	    PHYSICAL EXAM:  T(C): 36.2 (09-18-22 @ 05:12), Max: 36.6 (09-17-22 @ 13:19)  HR: 63 (09-18-22 @ 05:12) (63 - 85)  BP: 158/90 (09-18-22 @ 05:12) (127/50 - 169/77)  RR: 18 (09-18-22 @ 05:12) (18 - 18)  SpO2: 97% (09-18-22 @ 05:12) (93% - 98%)  Wt(kg): --  I&O's Summary    17 Sep 2022 07:01  -  18 Sep 2022 07:00  --------------------------------------------------------  IN: 480 mL / OUT: 400 mL / NET: 80 mL        Appearance: Normal	  HEENT:   Normal oral mucosa, PERRL, EOMI	  Lymphatic: No lymphadenopathy  Cardiovascular: Normal S1 S2, No JVD, No murmurs, No edema  Respiratory: Lungs clear to auscultation	    Gastrointestinal:  Soft, Non-tender, + BS	  Skin: No rashes, No ecchymoses, No cyanosis	  Neurologic: Non-focal  Extremities: dec rom    TELEMETRY: 	    ECG:  	  RADIOLOGY:  OTHER: 	  	  LABS:	 	    CARDIAC MARKERS:                  proBNP:   Lipid Profile:   HgA1c:   TSH:

## 2022-09-19 ENCOUNTER — TRANSCRIPTION ENCOUNTER (OUTPATIENT)
Age: 87
End: 2022-09-19

## 2022-09-19 LAB
ANION GAP SERPL CALC-SCNC: 9 MMOL/L — SIGNIFICANT CHANGE UP (ref 5–17)
BUN SERPL-MCNC: 26 MG/DL — HIGH (ref 7–23)
CALCIUM SERPL-MCNC: 9.8 MG/DL — SIGNIFICANT CHANGE UP (ref 8.4–10.5)
CHLORIDE SERPL-SCNC: 105 MMOL/L — SIGNIFICANT CHANGE UP (ref 96–108)
CO2 SERPL-SCNC: 30 MMOL/L — SIGNIFICANT CHANGE UP (ref 22–31)
CREAT SERPL-MCNC: 0.65 MG/DL — SIGNIFICANT CHANGE UP (ref 0.5–1.3)
EGFR: 85 ML/MIN/1.73M2 — SIGNIFICANT CHANGE UP
GLUCOSE SERPL-MCNC: 133 MG/DL — HIGH (ref 70–99)
HCT VFR BLD CALC: 39.5 % — SIGNIFICANT CHANGE UP (ref 34.5–45)
HGB BLD-MCNC: 12.1 G/DL — SIGNIFICANT CHANGE UP (ref 11.5–15.5)
MCHC RBC-ENTMCNC: 28.1 PG — SIGNIFICANT CHANGE UP (ref 27–34)
MCHC RBC-ENTMCNC: 30.6 GM/DL — LOW (ref 32–36)
MCV RBC AUTO: 91.9 FL — SIGNIFICANT CHANGE UP (ref 80–100)
NRBC # BLD: 0 /100 WBCS — SIGNIFICANT CHANGE UP (ref 0–0)
PLATELET # BLD AUTO: 325 K/UL — SIGNIFICANT CHANGE UP (ref 150–400)
POTASSIUM SERPL-MCNC: 3.8 MMOL/L — SIGNIFICANT CHANGE UP (ref 3.5–5.3)
POTASSIUM SERPL-SCNC: 3.8 MMOL/L — SIGNIFICANT CHANGE UP (ref 3.5–5.3)
RBC # BLD: 4.3 M/UL — SIGNIFICANT CHANGE UP (ref 3.8–5.2)
RBC # FLD: 14.2 % — SIGNIFICANT CHANGE UP (ref 10.3–14.5)
SARS-COV-2 RNA SPEC QL NAA+PROBE: SIGNIFICANT CHANGE UP
SODIUM SERPL-SCNC: 144 MMOL/L — SIGNIFICANT CHANGE UP (ref 135–145)
WBC # BLD: 6.88 K/UL — SIGNIFICANT CHANGE UP (ref 3.8–10.5)
WBC # FLD AUTO: 6.88 K/UL — SIGNIFICANT CHANGE UP (ref 3.8–10.5)

## 2022-09-19 RX ORDER — QUETIAPINE FUMARATE 200 MG/1
1 TABLET, FILM COATED ORAL
Qty: 0 | Refills: 0 | DISCHARGE
Start: 2022-09-19

## 2022-09-19 RX ORDER — SENNA PLUS 8.6 MG/1
2 TABLET ORAL AT BEDTIME
Refills: 0 | Status: DISCONTINUED | OUTPATIENT
Start: 2022-09-19 | End: 2022-09-20

## 2022-09-19 RX ADMIN — Medication 25 MILLIGRAM(S): at 06:17

## 2022-09-19 RX ADMIN — SENNA PLUS 2 TABLET(S): 8.6 TABLET ORAL at 22:15

## 2022-09-19 RX ADMIN — ENOXAPARIN SODIUM 40 MILLIGRAM(S): 100 INJECTION SUBCUTANEOUS at 06:17

## 2022-09-19 RX ADMIN — CHLORHEXIDINE GLUCONATE 1 APPLICATION(S): 213 SOLUTION TOPICAL at 11:59

## 2022-09-19 RX ADMIN — QUETIAPINE FUMARATE 25 MILLIGRAM(S): 200 TABLET, FILM COATED ORAL at 09:24

## 2022-09-19 RX ADMIN — SENNA PLUS 2 TABLET(S): 8.6 TABLET ORAL at 11:59

## 2022-09-19 RX ADMIN — OLANZAPINE 1.25 MILLIGRAM(S): 15 TABLET, FILM COATED ORAL at 11:42

## 2022-09-19 RX ADMIN — QUETIAPINE FUMARATE 50 MILLIGRAM(S): 200 TABLET, FILM COATED ORAL at 22:15

## 2022-09-19 NOTE — PROGRESS NOTE ADULT - SUBJECTIVE AND OBJECTIVE BOX
DATE OF SERVICE: 09-19-22 @ 13:19  CHIEF COMPLAINT:Patient is a 87y old  Female who presents with a chief complaint of   	        PAST MEDICAL & SURGICAL HISTORY:  Anxiety      CVA (Cerebral Infarction)  2007 per daughter      Diverticulosis of intestine      GERD (gastroesophageal reflux disease)      Asthma      Cerebrovascular accident (CVA), unspecified mechanism      Vertebral fracture, osteoporotic      S/P Tonsillectomy      History of tonsillectomy              REVIEW OF SYSTEMS:    RESPIRATORY: No cough, wheezing, chills or hemoptysis; No Shortness of Breath  CARDIOVASCULAR: No chest pain, palpitations, passing out  GASTROINTESTINAL: No abdominal or epigastric pain. No nausea, vomiting,   GENITOURINARY: No dysuria, frequency, hematuria,   NEUROLOGICAL: No headaches,    Medications:  MEDICATIONS  (STANDING):  chlorhexidine 2% Cloths 1 Application(s) Topical daily  enoxaparin Injectable 40 milliGRAM(s) SubCutaneous every 24 hours  metoprolol succinate ER 25 milliGRAM(s) Oral daily  QUEtiapine 50 milliGRAM(s) Oral at bedtime  senna 2 Tablet(s) Oral at bedtime    MEDICATIONS  (PRN):  acetaminophen     Tablet .. 650 milliGRAM(s) Oral every 6 hours PRN Temp greater or equal to 38C (100.4F), Mild Pain (1 - 3)  melatonin 3 milliGRAM(s) Oral at bedtime PRN Insomnia  OLANZapine Injectable 1.25 milliGRAM(s) IntraMuscular every 6 hours PRN severe agitation  QUEtiapine 25 milliGRAM(s) Oral every 6 hours PRN agitation    	    PHYSICAL EXAM:  T(C): 36.3 (09-19-22 @ 06:23), Max: 36.7 (09-18-22 @ 13:50)  HR: 81 (09-19-22 @ 11:38) (53 - 85)  BP: 97/62 (09-19-22 @ 11:38) (97/62 - 148/82)  RR: 18 (09-19-22 @ 11:38) (18 - 18)  SpO2: 98% (09-19-22 @ 11:38) (91% - 98%)  Wt(kg): --  I&O's Summary    18 Sep 2022 07:01  -  19 Sep 2022 07:00  --------------------------------------------------------  IN: 540 mL / OUT: 800 mL / NET: -260 mL        Appearance: Normal	  HEENT:   Normal oral mucosa, PERRL, EOMI	  Lymphatic: No lymphadenopathy  Cardiovascular: Normal S1 S2, No JVD,  Respiratory: Lungs clear to auscultation	    Gastrointestinal:  Soft, Non-tender, + BS	  Skin: No rashes, No ecchymoses, No cyanosis	  Neurologic: Non-focal  dec rom     TELEMETRY: 	    ECG:  	  RADIOLOGY:  OTHER: 	  	  LABS:	 	    CARDIAC MARKERS:                  proBNP:   Lipid Profile:   HgA1c:   TSH:

## 2022-09-19 NOTE — DISCHARGE NOTE PROVIDER - NSDCCPCAREPLAN_GEN_ALL_CORE_FT
PRINCIPAL DISCHARGE DIAGNOSIS  Diagnosis: Agitation  Assessment and Plan of Treatment: Psych was consulted during the hospital admission and recommended seroquel / zyprexa.   Agitation resolved.   Your agitation was also likely in the setting of covid and your history of dementia.      SECONDARY DISCHARGE DIAGNOSES  Diagnosis: 2019 novel coronavirus disease (COVID-19)  Assessment and Plan of Treatment: - Follow up with your doctor within 2-3 days.   - Return to the ED for any new or worsening symptoms including but not limited to difficulty breathing, severe weakness, confusion, etc.  - Stay well hydrated.   - Avoid contact with anybody who is sick OR at risk populations including elderly, young, pregnant patients, immune compromised people  - Wash hands frequently in warm soapy water for at least 20 seconds   - Quarantine yourself at home for at least 2 weeks  - If you would like an appointment to be tested for COVID-19 (coronavirus) at one of the testing sites, contact [phone] for an appointment.    Viral Respiratory Infection  A viral respiratory infection is an illness that affects parts of the body used for breathing, like the lungs, nose, and throat. It is caused by a germ called a virus. Symptoms can include runny nose, coughing, sneezing, fatigue, body aches, sore throat, fever, or headache. Over the counter medicine can be used to manage the symptoms but the infection typically goes away on its own in 5 to 10 days.   SEEK IMMEDIATE MEDICAL CARE IF YOU HAVE ANY OF THE FOLLOWING SYMPTOMS: shortness of breath, chest pain, fever over 10 days, or lightheadedness/dizziness.      Diagnosis: Asthma  Assessment and Plan of Treatment: No wheezing- continue your home regimen.   Follow up with your PCP as directed.     PRINCIPAL DISCHARGE DIAGNOSIS  Diagnosis: Agitation  Assessment and Plan of Treatment: Psych was consulted during the hospital admission and recommended seroquel / zyprexa.   Agitation resolved.   Your agitation was also likely in the setting of covid.      SECONDARY DISCHARGE DIAGNOSES  Diagnosis: 2019 novel coronavirus disease (COVID-19)  Assessment and Plan of Treatment: - Follow up with your doctor within 2-3 days.   - Return to the ED for any new or worsening symptoms including but not limited to difficulty breathing, severe weakness, confusion, etc.  - Stay well hydrated.   - Avoid contact with anybody who is sick OR at risk populations including elderly, young, pregnant patients, immune compromised people  - Wash hands frequently in warm soapy water for at least 20 seconds   - Quarantine yourself at home for at least 2 weeks  - If you would like an appointment to be tested for COVID-19 (coronavirus) at one of the testing sites, contact [phone] for an appointment.    Viral Respiratory Infection  A viral respiratory infection is an illness that affects parts of the body used for breathing, like the lungs, nose, and throat. It is caused by a germ called a virus. Symptoms can include runny nose, coughing, sneezing, fatigue, body aches, sore throat, fever, or headache. Over the counter medicine can be used to manage the symptoms but the infection typically goes away on its own in 5 to 10 days.   SEEK IMMEDIATE MEDICAL CARE IF YOU HAVE ANY OF THE FOLLOWING SYMPTOMS: shortness of breath, chest pain, fever over 10 days, or lightheadedness/dizziness.      Diagnosis: Asthma  Assessment and Plan of Treatment: No wheezing- continue your home regimen.   Follow up with your PCP as directed.     PRINCIPAL DISCHARGE DIAGNOSIS  Diagnosis: Agitation  Assessment and Plan of Treatment: Psych was consulted during the hospital admission and recommended seroquel / zyprexa.   Agitation resolved. Continue Seroquel at bedtime per psych.   Your agitation was also likely in the setting of covid.      SECONDARY DISCHARGE DIAGNOSES  Diagnosis: 2019 novel coronavirus disease (COVID-19)  Assessment and Plan of Treatment: You are being discharged on Lovenox due to increase risk of clotting- you are to continue this medication and follow with the PCP while at rehab  - Follow up with your doctor within 2-3 days.   - Return to the ED for any new or worsening symptoms including but not limited to difficulty breathing, severe weakness, confusion, etc.  - Stay well hydrated.   - Avoid contact with anybody who is sick OR at risk populations including elderly, young, pregnant patients, immune compromised people  - Wash hands frequently in warm soapy water for at least 20 seconds   - Quarantine yourself at home for at least 2 weeks  - If you would like an appointment to be tested for COVID-19 (coronavirus) at one of the testing sites, contact [phone] for an appointment.    Viral Respiratory Infection  A viral respiratory infection is an illness that affects parts of the body used for breathing, like the lungs, nose, and throat. It is caused by a germ called a virus. Symptoms can include runny nose, coughing, sneezing, fatigue, body aches, sore throat, fever, or headache. Over the counter medicine can be used to manage the symptoms but the infection typically goes away on its own in 5 to 10 days.   SEEK IMMEDIATE MEDICAL CARE IF YOU HAVE ANY OF THE FOLLOWING SYMPTOMS: shortness of breath, chest pain, fever over 10 days, or lightheadedness/dizziness.      Diagnosis: Asthma  Assessment and Plan of Treatment: No wheezing- continue your home regimen.   Follow up with your PCP as directed.

## 2022-09-19 NOTE — PROGRESS NOTE ADULT - SUBJECTIVE AND OBJECTIVE BOX
Follow-up Pulm Progress Note    No new respiratory events overnight.  Denies SOB/CP.     Medications:  MEDICATIONS  (STANDING):  chlorhexidine 2% Cloths 1 Application(s) Topical daily  enoxaparin Injectable 40 milliGRAM(s) SubCutaneous every 24 hours  metoprolol succinate ER 25 milliGRAM(s) Oral daily  QUEtiapine 50 milliGRAM(s) Oral at bedtime    MEDICATIONS  (PRN):  acetaminophen     Tablet .. 650 milliGRAM(s) Oral every 6 hours PRN Temp greater or equal to 38C (100.4F), Mild Pain (1 - 3)  melatonin 3 milliGRAM(s) Oral at bedtime PRN Insomnia  OLANZapine Injectable 1.25 milliGRAM(s) IntraMuscular every 6 hours PRN severe agitation  QUEtiapine 25 milliGRAM(s) Oral every 6 hours PRN agitation          Vital Signs Last 24 Hrs  T(C): 36.3 (19 Sep 2022 06:23), Max: 36.7 (18 Sep 2022 13:50)  T(F): 97.4 (19 Sep 2022 06:23), Max: 98 (18 Sep 2022 13:50)  HR: 74 (19 Sep 2022 06:23) (53 - 85)  BP: 120/67 (19 Sep 2022 06:23) (120/67 - 148/82)  BP(mean): --  RR: 18 (19 Sep 2022 06:23) (18 - 18)  SpO2: 97% (19 Sep 2022 06:23) (91% - 98%)    Parameters below as of 19 Sep 2022 06:23  Patient On (Oxygen Delivery Method): room air              09-18 @ 07:01  -  09-19 @ 07:00  --------------------------------------------------------  IN: 540 mL / OUT: 800 mL / NET: -260 mL            CULTURES:      Culture - Urine (collected 09-10-22 @ 01:42)  Source: Clean Catch Clean Catch (Midstream)  Final Report (09-11-22 @ 11:51):    No growth        Physical Examination:  PULM: Clear to auscultation bilaterally, no significant sputum production  CVS: S1, S2 heard    RADIOLOGY REVIEWED  CTA chest: < from: CT Angio Chest PE Protocol w/ IV Cont (09.16.22 @ 19:51) >  FINDINGS:    PULMONARY ARTERIES: No pulmonary embolism.    MEDIASTINUM: Heart size normal. Coronary calcifications. No pericardial   effusion. Thoracic aorta normal caliber.  No large mediastinal lymph   nodes.    AIRWAYS, LUNGS, PLEURA: Clear central airways. Emphysema. Foci of   subsegmental atelectasis. No pleural effusion.    IMAGED ABDOMEN: Cholelithiasis. Hepatic hypodense lesions, some are cysts   and others are too small to characterize.    SOFT TISSUES: Unremarkable.    BONES: Severe T8 compression deformity, progressed compared to 11/1/2022   CT thoracic spine.      IMPRESSION:.    No pulmonary embolism.    < end of copied text >

## 2022-09-19 NOTE — DISCHARGE NOTE PROVIDER - NSDCFUADDAPPT_GEN_ALL_CORE_FT
APPTS ARE READY TO BE MADE: [ ] YES    Best Family or Patient Contact (if needed):    Additional Information about above appointments (if needed):    1: Primary care within 1-2 weeks of discharge  2:   3:     Other comments or requests:    APPTS ARE READY TO BE MADE: [ ] YES    Best Family or Patient Contact (if needed):    Additional Information about above appointments (if needed):    1: Primary care within 1-2 weeks of discharge  2:  Psych follow up while at rehab  3:     Other comments or requests:

## 2022-09-19 NOTE — DISCHARGE NOTE PROVIDER - NSDCFUADDINST_GEN_ALL_CORE_FT
Please follow with your healthcare providers while at rehab. You are to continue Seroquel at bedtime and Lovenox for prophylaxis as you have higher risk of clots due to COVID.

## 2022-09-19 NOTE — CONSULT NOTE ADULT - ASSESSMENT
abdominal pain  urinary retention  2019 novel coronavirus disease  constipation     abd pain likely 2/2 urinary retention vs constipation  f/u abd Xray  start bowel regimen  monitor stool count  diet as tolerated  will follow

## 2022-09-19 NOTE — DISCHARGE NOTE PROVIDER - HOSPITAL COURSE
86 y/o F with PMH asthma, anxiety, questionable CVA in 2007, diverticulosis, GERD, and previous vertebral fracture, recently admitted [facility] 8/21-8/26 for confusion and UTI. Presents from [facility] due to agitation. Endorses back pain. Found to be COVID positive on admission. CXR with clear lungs, normoxic.     2019 novel coronavirus disease (COVID-19).   +COVID PCR on admission  -CXR clear, normoxic, no respiratory symptoms   -No indication for Remdesivir or Decadron at this time.   -Ddimer elevated. CTA chest and LE duplex neg PE.      Asthma. -No wheezing on exam.    abd pain likely 2/2 urinary retention vs constipation- started bowel regimen    seroquel / zyprexa as per psych  dementia likely diagnosis     Patient medically clear per Dr. Ramirez 9/19/22.

## 2022-09-19 NOTE — DISCHARGE NOTE PROVIDER - NSDCMRMEDTOKEN_GEN_ALL_CORE_FT
acetaminophen 325 mg oral tablet: 2 tab(s) orally every 6 hours, As needed, Mild Pain (1 - 3)  melatonin 3 mg oral tablet: 1 tab(s) orally once  metoprolol succinate 25 mg oral tablet, extended release: 1 tab(s) orally once a day  QUEtiapine 25 mg oral tablet: 1 tab(s) orally every 6 hours, As needed, agitation  QUEtiapine 50 mg oral tablet: 1 tab(s) orally once a day (at bedtime)  TLSO Brace: Length of need - 99    Wt - 56.70kg  Ht - 154.9cm   acetaminophen 325 mg oral tablet: 2 tab(s) orally every 6 hours, As needed, Mild Pain (1 - 3)  melatonin 3 mg oral tablet: 1 tab(s) orally once  metoprolol succinate 25 mg oral tablet, extended release: 1 tab(s) orally once a day  TLSO Brace: Length of need - 99    Wt - 56.70kg  Ht - 154.9cm

## 2022-09-19 NOTE — DISCHARGE NOTE PROVIDER - NSDCDCMDCOMP_GEN_ALL_CORE
Current Discharge Medication List  
  
CONTINUE these medications which have NOT CHANGED Dose & Instructions Dispensing Information Comments Morning Noon Evening Bedtime Blood-Glucose Meter monitoring kit Your last dose was: Your next dose is:    
   
   
 Check glucoses fasting and 2 hours after each meal every day Quantity:  1 Kit Refills:  0 LABEL IN [place], PLEASE DISPENSE APPROPRIATE BRAND METER PER FORMULARY  
    
   
   
   
  
 glucose blood VI test strips strip Commonly known as:  blood glucose test  
   
Your last dose was: Your next dose is:    
   
   
 Check glucoses fasting and 2 hours after each meal every day Quantity:  100 Strip Refills:  6 LABEL IN [place], PLEASE DISPENSE APPROPRIATE BRAND METER PER FORMULARY Lancets Misc Your last dose was: Your next dose is:    
   
   
 Check glucoses fasting and 2 hours after each meal everyday Quantity:  100 Each Refills:  6 LABEL IN [place], PLEASE DISPENSE APPROPRIATE BRAND METER PER FORMULARY prenatal vit-calcium-iron-fa 27 mg iron- 1 mg Tab Commonly known as:  PRENATAL PLUS with CALCIUM Your last dose was: Your next dose is:    
   
   
 Dose:  1 Tab Take 1 Tab by mouth daily. Refills:  0  
     
   
   
   
  
 raNITIdine 150 mg tablet Commonly known as:  ZANTAC Your last dose was: Your next dose is:    
   
   
 Dose:  150 mg Take 1 Tab by mouth two (2) times a day. Quantity:  60 Tab Refills:  3 This document is complete and the patient is ready for discharge.

## 2022-09-19 NOTE — PROGRESS NOTE ADULT - PROBLEM SELECTOR PLAN 1
+COVID PCR on admission  -CXR clear, normoxic, no respiratory symptoms   -No indication for Remdesivir or Decadron at this time.   -Trend inflammatory markers  -Ddimer elevated. CTA chest and LE duplex neg PE.   -DVT ppx  -D/c planning per primary team

## 2022-09-20 ENCOUNTER — TRANSCRIPTION ENCOUNTER (OUTPATIENT)
Age: 87
End: 2022-09-20

## 2022-09-20 VITALS
RESPIRATION RATE: 18 BRPM | OXYGEN SATURATION: 96 % | TEMPERATURE: 98 F | DIASTOLIC BLOOD PRESSURE: 78 MMHG | SYSTOLIC BLOOD PRESSURE: 128 MMHG | HEART RATE: 86 BPM

## 2022-09-20 PROCEDURE — 36415 COLL VENOUS BLD VENIPUNCTURE: CPT

## 2022-09-20 PROCEDURE — 97162 PT EVAL MOD COMPLEX 30 MIN: CPT

## 2022-09-20 PROCEDURE — U0005: CPT

## 2022-09-20 PROCEDURE — 71045 X-RAY EXAM CHEST 1 VIEW: CPT

## 2022-09-20 PROCEDURE — 85025 COMPLETE CBC W/AUTO DIFF WBC: CPT

## 2022-09-20 PROCEDURE — 85379 FIBRIN DEGRADATION QUANT: CPT

## 2022-09-20 PROCEDURE — 86140 C-REACTIVE PROTEIN: CPT

## 2022-09-20 PROCEDURE — 74019 RADEX ABDOMEN 2 VIEWS: CPT

## 2022-09-20 PROCEDURE — 93005 ELECTROCARDIOGRAM TRACING: CPT

## 2022-09-20 PROCEDURE — 96375 TX/PRO/DX INJ NEW DRUG ADDON: CPT

## 2022-09-20 PROCEDURE — 97110 THERAPEUTIC EXERCISES: CPT

## 2022-09-20 PROCEDURE — 82728 ASSAY OF FERRITIN: CPT

## 2022-09-20 PROCEDURE — 70450 CT HEAD/BRAIN W/O DYE: CPT | Mod: MA

## 2022-09-20 PROCEDURE — 96374 THER/PROPH/DIAG INJ IV PUSH: CPT

## 2022-09-20 PROCEDURE — 85027 COMPLETE CBC AUTOMATED: CPT

## 2022-09-20 PROCEDURE — 71275 CT ANGIOGRAPHY CHEST: CPT

## 2022-09-20 PROCEDURE — 93970 EXTREMITY STUDY: CPT

## 2022-09-20 PROCEDURE — 80053 COMPREHEN METABOLIC PANEL: CPT

## 2022-09-20 PROCEDURE — 87086 URINE CULTURE/COLONY COUNT: CPT

## 2022-09-20 PROCEDURE — 81001 URINALYSIS AUTO W/SCOPE: CPT

## 2022-09-20 PROCEDURE — 82962 GLUCOSE BLOOD TEST: CPT

## 2022-09-20 PROCEDURE — 97530 THERAPEUTIC ACTIVITIES: CPT

## 2022-09-20 PROCEDURE — U0003: CPT

## 2022-09-20 PROCEDURE — 80048 BASIC METABOLIC PNL TOTAL CA: CPT

## 2022-09-20 PROCEDURE — 84145 PROCALCITONIN (PCT): CPT

## 2022-09-20 PROCEDURE — 99285 EMERGENCY DEPT VISIT HI MDM: CPT | Mod: 25

## 2022-09-20 PROCEDURE — 83615 LACTATE (LD) (LDH) ENZYME: CPT

## 2022-09-20 RX ORDER — QUETIAPINE FUMARATE 200 MG/1
1 TABLET, FILM COATED ORAL
Qty: 30 | Refills: 0
Start: 2022-09-20 | End: 2022-10-19

## 2022-09-20 RX ORDER — ENOXAPARIN SODIUM 100 MG/ML
40 INJECTION SUBCUTANEOUS
Qty: 1200 | Refills: 0
Start: 2022-09-20 | End: 2022-10-19

## 2022-09-20 RX ADMIN — ENOXAPARIN SODIUM 40 MILLIGRAM(S): 100 INJECTION SUBCUTANEOUS at 05:53

## 2022-09-20 RX ADMIN — QUETIAPINE FUMARATE 25 MILLIGRAM(S): 200 TABLET, FILM COATED ORAL at 10:11

## 2022-09-20 RX ADMIN — Medication 25 MILLIGRAM(S): at 05:53

## 2022-09-20 RX ADMIN — CHLORHEXIDINE GLUCONATE 1 APPLICATION(S): 213 SOLUTION TOPICAL at 13:06

## 2022-09-20 NOTE — CHART NOTE - NSCHARTNOTEFT_GEN_A_CORE
Patient medically cleared by Dr. knight- d/c paperwork completed.   D/C meds d/w Dr. Knight and psych- d/c on Lovenox and Seroquel per psych recommendations.   VSS. Labs stable

## 2022-09-20 NOTE — DISCHARGE NOTE NURSING/CASE MANAGEMENT/SOCIAL WORK - NSDCPEFALRISK_GEN_ALL_CORE
For information on Fall & Injury Prevention, visit: [email or web address] OR  [email or web address] OR  https://www.cdc.gov/steadi/patient.html

## 2022-09-20 NOTE — DISCHARGE NOTE NURSING/CASE MANAGEMENT/SOCIAL WORK - NSDCFUADDAPPT_GEN_ALL_CORE_FT
APPTS ARE READY TO BE MADE: [ ] YES    Best Family or Patient Contact (if needed):    Additional Information about above appointments (if needed):    1: Primary care within 1-2 weeks of discharge  2:   3:     Other comments or requests:

## 2022-09-20 NOTE — PROGRESS NOTE ADULT - PROVIDER SPECIALTY LIST ADULT
Internal Medicine
Gastroenterology
Internal Medicine
Pulmonology
Pulmonology
Internal Medicine
Pulmonology

## 2022-09-20 NOTE — PROGRESS NOTE ADULT - SUBJECTIVE AND OBJECTIVE BOX
DATE OF SERVICE: 09-20-22 @ 13:06  CHIEF COMPLAINT:Patient is a 87y old  Female who presents with a chief complaint of   	        PAST MEDICAL & SURGICAL HISTORY:  Anxiety      CVA (Cerebral Infarction)  2007 per daughter      Diverticulosis of intestine      GERD (gastroesophageal reflux disease)      Asthma      Cerebrovascular accident (CVA), unspecified mechanism      Vertebral fracture, osteoporotic      S/P Tonsillectomy      History of tonsillectomy              REVIEW OF SYSTEMS:  CONSTITUTIONAL: No fever, weight loss, or fatigue    NECK: No pain or stiffness  RESPIRATORY: No cough, wheezing, chills or hemoptysis; No Shortness of Breath  CARDIOVASCULAR: No chest pain, palpitations, passing out, dizziness,  GASTROINTESTINAL: No abdominal or epigastric pain. No nausea, vomiting,   GENITOURINARY: No dysuria, frequency, hematuria, or incontinence  NEUROLOGICAL: No headaches,     Medications:  MEDICATIONS  (STANDING):  chlorhexidine 2% Cloths 1 Application(s) Topical daily  enoxaparin Injectable 40 milliGRAM(s) SubCutaneous every 24 hours  metoprolol succinate ER 25 milliGRAM(s) Oral daily  QUEtiapine 50 milliGRAM(s) Oral at bedtime  senna 2 Tablet(s) Oral at bedtime    MEDICATIONS  (PRN):  acetaminophen     Tablet .. 650 milliGRAM(s) Oral every 6 hours PRN Temp greater or equal to 38C (100.4F), Mild Pain (1 - 3)  melatonin 3 milliGRAM(s) Oral at bedtime PRN Insomnia  OLANZapine Injectable 1.25 milliGRAM(s) IntraMuscular every 6 hours PRN severe agitation  QUEtiapine 25 milliGRAM(s) Oral every 6 hours PRN agitation    	    PHYSICAL EXAM:  T(C): 36.4 (09-20-22 @ 10:37), Max: 36.4 (09-19-22 @ 14:03)  HR: 86 (09-20-22 @ 10:37) (79 - 90)  BP: 128/78 (09-20-22 @ 10:37) (103/71 - 128/78)  RR: 18 (09-20-22 @ 10:37) (18 - 18)  SpO2: 96% (09-20-22 @ 10:37) (96% - 97%)  Wt(kg): --  I&O's Summary    19 Sep 2022 07:01  -  20 Sep 2022 07:00  --------------------------------------------------------  IN: 240 mL / OUT: 450 mL / NET: -210 mL        Appearance: Normal	  HEENT:   Normal oral mucosa, PERRL, EOMI	  Lymphatic: No lymphadenopathy  Cardiovascular: Normal S1 S2, No JVD,  Respiratory: Lungs clear to auscultation	  Psychiatry: A & O x 3, Mood & affect appropriate  Gastrointestinal:  Soft, Non-tender, + BS	  	  Neurologic: Non-focal  Extremities:dec rom  TELEMETRY: 	    ECG:  	  RADIOLOGY:  OTHER: 	  	  LABS:	 	    CARDIAC MARKERS:                                12.1   6.88  )-----------( 325      ( 19 Sep 2022 13:55 )             39.5     09-19    144  |  105  |  26<H>  ----------------------------<  133<H>  3.8   |  30  |  0.65    Ca    9.8      19 Sep 2022 13:55      proBNP:   Lipid Profile:   HgA1c:   TSH:

## 2022-09-20 NOTE — PROGRESS NOTE ADULT - PROBLEM SELECTOR PLAN 2
by hx  -No wheezing on exam

## 2022-09-20 NOTE — PROGRESS NOTE ADULT - ASSESSMENT
86 y/o F with PMH asthma, anxiety, questionable CVA in 2007, diverticulosis, GERD, and previous vertebral fracture, recently admitted [facility] 8/21-8/26 for confusion and UTI. Presents from [facility] due to agitation. Endorses back pain. Found to be COVID positive on admission. CXR with clear lungs, normoxic. 
86 y/o F with PMH asthma, anxiety, questionable CVA in 2007, diverticulosis, GERD, and previous vertebral fracture, recently admitted [facility] 8/21-8/26 for confusion and UTI. Presents from [facility] due to agitation. Endorses back pain. Found to be COVID positive on admission. CXR with clear lungs, normoxic. 
pt w/ covid  no hypoxia or resp symptoms at present   id / pulm eval noted  hold any tx as no symptoms    neg  dopplers/cta /    c/w outpt  meds  dvt proph   neuro eval appreciated   pt     seroquel / zyprexa as per psych  dementia likely diagnosis   d/c planning      
pt w/ covid  no hypoxia or resp symptoms at present   id / pulm eval noted  hold any tx for now as no symptoms  isolation  recheck covid for d/c to rehab  check dopplers/ lext w/ inc d dimer  c/w outpt  meds  dvt proph   neuro eval appreciated   pt     seroquel / zyprexa as per psych  dementia likely diagnosis   rehab planning    
pt w/ covid  no hypoxia or resp symptoms at present   id / pulm eval noted  hold any tx for now as no symptoms  isolation  recheck covid for d/c to rehab  check dopplers/cta /  w/ inc d dimer  c/w outpt  meds  dvt proph   neuro eval appreciated   pt     seroquel / zyprexa as per psych  dementia likely diagnosis   rehab planning    
pt w/ covid  no hypoxia or resp symptoms at present   id / pulm eval noted  hold any tx for now as no symptoms  isolation  c/w outpt  meds  dvt proph   neuro eval appreciated   pt     seroquel / zyprexa as per psych  dementia likely diagnosis   d/c planning  rehab planning    
pt w/ covid  no hypoxia or resp symptoms at present   id / pulm eval noted  hold any tx for now as no symptoms  isolation  c/w outpt  meds  dvt proph   neuro eval appreciated   pt   discussed w/ daughter   seroquel / zyprexa as per psych  dementia likely diagnosis   d/c planing  rehab planning    
pt w/ covid  no hypoxia or resp symptoms at present   id / pulm eval noted  hold any tx for now as no symptoms  isolation  recheck covid for d/c to rehab  neg  dopplers/cta /    c/w outpt  meds  dvt proph   neuro eval appreciated   pt     seroquel / zyprexa as per psych  dementia likely diagnosis   abd pain likely distended bladder  abd xray       
abdominal pain  urinary retention  2019 novel coronavirus disease  constipation     abd pain likely 2/2 urinary retention vs constipation  start bowel regimen  monitor stool count  diet as tolerated  no GI objection to dc planning   will follow     Advanced care planning was discussed with patient and family.  Advanced care planning forms were reviewed and discussed.  Risks, benefits and alternatives of gastroenterologic procedures were discussed in detail and all questions were answered.    30 minutes spent. 
pt w/ covid  no hypoxia or resp symptoms at present   id / pulm eval noted  hold any tx as no symptoms  isolation  recheck covid for d/c to rehab  neg  dopplers/cta /    c/w outpt  meds  dvt proph   neuro eval appreciated   pt     seroquel / zyprexa as per psych  dementia likely diagnosis         
pt w/ covid  no hypoxia or resp symptoms at present   id / pulm eval noted  hold any tx for now as no symptoms  isolation  c/w outpt  meds  dvt proph   neuro eval appreciated   pt eval   discussed w/ daughter at length this am   haldol prn  dementia likely diagnosis     
pt w/ covid  no hypoxia or resp symptoms at present   id / pulm eval noted  hold any tx for now as no symptoms  isolation  c/w outpt  meds  dvt proph   neuro eval appreciated   pt starr   discussed w/ daughter at length   seroquel / zyprexa as per psych  dementia likely diagnosis   d/c planing    
88 y/o F with PMH asthma, anxiety, questionable CVA in 2007, diverticulosis, GERD, and previous vertebral fracture, recently admitted [facility] 8/21-8/26 for confusion and UTI. Presents from [facility] due to agitation. Endorses back pain. Found to be COVID positive on admission. CXR with clear lungs, normoxic. 
pt w/ covid  no hypoxia or resp symptoms at present   id / pulm eval noted  hold any tx for now as no symptoms  isolation  recheck covid for d/c to rehab  neg  dopplers/cta /    c/w outpt  meds  dvt proph   neuro eval appreciated   pt     seroquel / zyprexa as per psych  dementia likely diagnosis   rehab planning    
86 y/o F with PMH asthma, anxiety, questionable CVA in 2007, diverticulosis, GERD, and previous vertebral fracture, recently admitted [facility] 8/21-8/26 for confusion and UTI. Presents from [facility] due to agitation. Endorses back pain. Found to be COVID positive on admission. CXR with clear lungs, normoxic. 
86 y/o F with PMH asthma, anxiety, questionable CVA in 2007, diverticulosis, GERD, and previous vertebral fracture, recently admitted [facility] 8/21-8/26 for confusion and UTI. Presents from [facility] due to agitation. Endorses back pain. Found to be COVID positive on admission. CXR with clear lungs, normoxic. 
88 y/o F with PMH asthma, anxiety, questionable CVA in 2007, diverticulosis, GERD, and previous vertebral fracture, recently admitted [facility] 8/21-8/26 for confusion and UTI. Presents from [facility] due to agitation. Endorses back pain. Found to be COVID positive on admission. CXR with clear lungs, normoxic. 
86 y/o F with PMH asthma, anxiety, questionable CVA in 2007, diverticulosis, GERD, and previous vertebral fracture, recently admitted [facility] 8/21-8/26 for confusion and UTI. Presents from [facility] due to agitation. Endorses back pain. Found to be COVID positive on admission. CXR with clear lungs, normoxic.

## 2022-09-20 NOTE — DISCHARGE NOTE NURSING/CASE MANAGEMENT/SOCIAL WORK - PATIENT PORTAL LINK FT
You can access the FollowMyHealth Patient Portal offered by [facility] by registering at the following website: [email or web address]. By joining [facility]’s FollowMyHealth portal, you will also be able to view your health information using other applications (apps) compatible with our system. none

## 2022-09-20 NOTE — PROGRESS NOTE ADULT - SUBJECTIVE AND OBJECTIVE BOX
Follow-up Pulm Progress Note    Alert, confused  No new respiratory events overnight.  Denies SOB/CP.   Sats 97% RA    Medications:  MEDICATIONS  (STANDING):  chlorhexidine 2% Cloths 1 Application(s) Topical daily  enoxaparin Injectable 40 milliGRAM(s) SubCutaneous every 24 hours  metoprolol succinate ER 25 milliGRAM(s) Oral daily  QUEtiapine 50 milliGRAM(s) Oral at bedtime  senna 2 Tablet(s) Oral at bedtime    MEDICATIONS  (PRN):  acetaminophen     Tablet .. 650 milliGRAM(s) Oral every 6 hours PRN Temp greater or equal to 38C (100.4F), Mild Pain (1 - 3)  melatonin 3 milliGRAM(s) Oral at bedtime PRN Insomnia  OLANZapine Injectable 1.25 milliGRAM(s) IntraMuscular every 6 hours PRN severe agitation  QUEtiapine 25 milliGRAM(s) Oral every 6 hours PRN agitation          Vital Signs Last 24 Hrs  T(C): 36.4 (20 Sep 2022 04:55), Max: 36.4 (19 Sep 2022 14:03)  T(F): 97.6 (20 Sep 2022 04:55), Max: 97.6 (19 Sep 2022 14:03)  HR: 79 (20 Sep 2022 04:55) (79 - 90)  BP: 123/76 (20 Sep 2022 04:55) (97/62 - 123/76)  BP(mean): --  RR: 18 (20 Sep 2022 04:55) (18 - 18)  SpO2: 97% (20 Sep 2022 04:55) (97% - 98%)    Parameters below as of 20 Sep 2022 04:55  Patient On (Oxygen Delivery Method): room air              09-19 @ 07:01  -  09-20 @ 07:00  --------------------------------------------------------  IN: 240 mL / OUT: 450 mL / NET: -210 mL          LABS:                        12.1   6.88  )-----------( 325      ( 19 Sep 2022 13:55 )             39.5     09-19    144  |  105  |  26<H>  ----------------------------<  133<H>  3.8   |  30  |  0.65    Ca    9.8      19 Sep 2022 13:55          Physical Examination:  PULM: Clear to auscultation bilaterally, no significant sputum production  CVS: S1, S2 heard    RADIOLOGY REVIEWED  CTA chest: < from: CT Angio Chest PE Protocol w/ IV Cont (09.16.22 @ 19:51) >  FINDINGS:    PULMONARY ARTERIES: No pulmonary embolism.    MEDIASTINUM: Heart size normal. Coronary calcifications. No pericardial   effusion. Thoracic aorta normal caliber.  No large mediastinal lymph   nodes.    AIRWAYS, LUNGS, PLEURA: Clear central airways. Emphysema. Foci of   subsegmental atelectasis. No pleural effusion.    IMAGED ABDOMEN: Cholelithiasis. Hepatic hypodense lesions, some are cysts   and others are too small to characterize.    SOFT TISSUES: Unremarkable.    BONES: Severe T8 compression deformity, progressed compared to 11/1/2022   CT thoracic spine.      IMPRESSION:.    No pulmonary embolism.    < end of copied text >

## 2022-12-30 ENCOUNTER — INPATIENT (INPATIENT)
Facility: HOSPITAL | Age: 87
LOS: 20 days | Discharge: PSYCHIATRIC OR SUBSTANCE USE FACILITY | DRG: 552 | End: 2023-01-20
Attending: HOSPITALIST | Admitting: STUDENT IN AN ORGANIZED HEALTH CARE EDUCATION/TRAINING PROGRAM
Payer: MEDICARE

## 2022-12-30 VITALS
WEIGHT: 119.93 LBS | RESPIRATION RATE: 18 BRPM | DIASTOLIC BLOOD PRESSURE: 72 MMHG | SYSTOLIC BLOOD PRESSURE: 134 MMHG | TEMPERATURE: 98 F | OXYGEN SATURATION: 96 % | HEART RATE: 71 BPM

## 2022-12-30 DIAGNOSIS — F31.9 BIPOLAR DISORDER, UNSPECIFIED: ICD-10-CM

## 2022-12-30 DIAGNOSIS — Z90.89 ACQUIRED ABSENCE OF OTHER ORGANS: Chronic | ICD-10-CM

## 2022-12-30 DIAGNOSIS — M79.604 PAIN IN RIGHT LEG: ICD-10-CM

## 2022-12-30 DIAGNOSIS — M79.606 PAIN IN LEG, UNSPECIFIED: ICD-10-CM

## 2022-12-30 DIAGNOSIS — Z65.9 PROBLEM RELATED TO UNSPECIFIED PSYCHOSOCIAL CIRCUMSTANCES: ICD-10-CM

## 2022-12-30 DIAGNOSIS — F41.9 ANXIETY DISORDER, UNSPECIFIED: ICD-10-CM

## 2022-12-30 LAB
ALBUMIN SERPL ELPH-MCNC: 3.7 G/DL — SIGNIFICANT CHANGE UP (ref 3.3–5)
ALP SERPL-CCNC: 91 U/L — SIGNIFICANT CHANGE UP (ref 40–120)
ALT FLD-CCNC: 8 U/L — LOW (ref 10–45)
ANION GAP SERPL CALC-SCNC: 11 MMOL/L — SIGNIFICANT CHANGE UP (ref 5–17)
AST SERPL-CCNC: 14 U/L — SIGNIFICANT CHANGE UP (ref 10–40)
BASOPHILS # BLD AUTO: 0.06 K/UL — SIGNIFICANT CHANGE UP (ref 0–0.2)
BASOPHILS NFR BLD AUTO: 0.9 % — SIGNIFICANT CHANGE UP (ref 0–2)
BILIRUB SERPL-MCNC: 0.9 MG/DL — SIGNIFICANT CHANGE UP (ref 0.2–1.2)
BUN SERPL-MCNC: 16 MG/DL — SIGNIFICANT CHANGE UP (ref 7–23)
CALCIUM SERPL-MCNC: 9.5 MG/DL — SIGNIFICANT CHANGE UP (ref 8.4–10.5)
CHLORIDE SERPL-SCNC: 101 MMOL/L — SIGNIFICANT CHANGE UP (ref 96–108)
CO2 SERPL-SCNC: 29 MMOL/L — SIGNIFICANT CHANGE UP (ref 22–31)
CREAT SERPL-MCNC: 0.69 MG/DL — SIGNIFICANT CHANGE UP (ref 0.5–1.3)
EGFR: 83 ML/MIN/1.73M2 — SIGNIFICANT CHANGE UP
EOSINOPHIL # BLD AUTO: 0.15 K/UL — SIGNIFICANT CHANGE UP (ref 0–0.5)
EOSINOPHIL NFR BLD AUTO: 2.2 % — SIGNIFICANT CHANGE UP (ref 0–6)
GLUCOSE SERPL-MCNC: 101 MG/DL — HIGH (ref 70–99)
HCT VFR BLD CALC: 35.4 % — SIGNIFICANT CHANGE UP (ref 34.5–45)
HGB BLD-MCNC: 11.3 G/DL — LOW (ref 11.5–15.5)
IMM GRANULOCYTES NFR BLD AUTO: 0.3 % — SIGNIFICANT CHANGE UP (ref 0–0.9)
LYMPHOCYTES # BLD AUTO: 2.33 K/UL — SIGNIFICANT CHANGE UP (ref 1–3.3)
LYMPHOCYTES # BLD AUTO: 34.4 % — SIGNIFICANT CHANGE UP (ref 13–44)
MAGNESIUM SERPL-MCNC: 1.9 MG/DL — SIGNIFICANT CHANGE UP (ref 1.6–2.6)
MCHC RBC-ENTMCNC: 28.5 PG — SIGNIFICANT CHANGE UP (ref 27–34)
MCHC RBC-ENTMCNC: 31.9 GM/DL — LOW (ref 32–36)
MCV RBC AUTO: 89.2 FL — SIGNIFICANT CHANGE UP (ref 80–100)
MONOCYTES # BLD AUTO: 0.84 K/UL — SIGNIFICANT CHANGE UP (ref 0–0.9)
MONOCYTES NFR BLD AUTO: 12.4 % — SIGNIFICANT CHANGE UP (ref 2–14)
NEUTROPHILS # BLD AUTO: 3.37 K/UL — SIGNIFICANT CHANGE UP (ref 1.8–7.4)
NEUTROPHILS NFR BLD AUTO: 49.8 % — SIGNIFICANT CHANGE UP (ref 43–77)
NRBC # BLD: 0 /100 WBCS — SIGNIFICANT CHANGE UP (ref 0–0)
PHOSPHATE SERPL-MCNC: 3.7 MG/DL — SIGNIFICANT CHANGE UP (ref 2.5–4.5)
PLATELET # BLD AUTO: 274 K/UL — SIGNIFICANT CHANGE UP (ref 150–400)
POTASSIUM SERPL-MCNC: 4 MMOL/L — SIGNIFICANT CHANGE UP (ref 3.5–5.3)
POTASSIUM SERPL-SCNC: 4 MMOL/L — SIGNIFICANT CHANGE UP (ref 3.5–5.3)
PROT SERPL-MCNC: 6.9 G/DL — SIGNIFICANT CHANGE UP (ref 6–8.3)
RAPID RVP RESULT: SIGNIFICANT CHANGE UP
RBC # BLD: 3.97 M/UL — SIGNIFICANT CHANGE UP (ref 3.8–5.2)
RBC # FLD: 14.4 % — SIGNIFICANT CHANGE UP (ref 10.3–14.5)
SARS-COV-2 RNA SPEC QL NAA+PROBE: SIGNIFICANT CHANGE UP
SODIUM SERPL-SCNC: 141 MMOL/L — SIGNIFICANT CHANGE UP (ref 135–145)
VALPROATE SERPL-MCNC: <5 UG/ML — LOW (ref 50–100)
WBC # BLD: 6.77 K/UL — SIGNIFICANT CHANGE UP (ref 3.8–10.5)
WBC # FLD AUTO: 6.77 K/UL — SIGNIFICANT CHANGE UP (ref 3.8–10.5)

## 2022-12-30 PROCEDURE — 99223 1ST HOSP IP/OBS HIGH 75: CPT

## 2022-12-30 PROCEDURE — 99285 EMERGENCY DEPT VISIT HI MDM: CPT

## 2022-12-30 RX ORDER — OLANZAPINE 15 MG/1
2.5 TABLET, FILM COATED ORAL EVERY 6 HOURS
Refills: 0 | Status: DISCONTINUED | OUTPATIENT
Start: 2022-12-30 | End: 2023-01-18

## 2022-12-30 RX ORDER — CLONAZEPAM 1 MG
0.5 TABLET ORAL ONCE
Refills: 0 | Status: DISCONTINUED | OUTPATIENT
Start: 2022-12-30 | End: 2022-12-30

## 2022-12-30 RX ORDER — METOPROLOL TARTRATE 50 MG
50 TABLET ORAL DAILY
Refills: 0 | Status: DISCONTINUED | OUTPATIENT
Start: 2022-12-30 | End: 2023-01-20

## 2022-12-30 RX ORDER — ACETAMINOPHEN 500 MG
650 TABLET ORAL EVERY 6 HOURS
Refills: 0 | Status: DISCONTINUED | OUTPATIENT
Start: 2022-12-30 | End: 2023-01-04

## 2022-12-30 RX ORDER — LANOLIN ALCOHOL/MO/W.PET/CERES
3 CREAM (GRAM) TOPICAL AT BEDTIME
Refills: 0 | Status: DISCONTINUED | OUTPATIENT
Start: 2022-12-30 | End: 2023-01-20

## 2022-12-30 RX ORDER — ONDANSETRON 8 MG/1
4 TABLET, FILM COATED ORAL EVERY 8 HOURS
Refills: 0 | Status: DISCONTINUED | OUTPATIENT
Start: 2022-12-30 | End: 2023-01-20

## 2022-12-30 RX ORDER — SENNA PLUS 8.6 MG/1
2 TABLET ORAL AT BEDTIME
Refills: 0 | Status: DISCONTINUED | OUTPATIENT
Start: 2022-12-30 | End: 2023-01-20

## 2022-12-30 RX ORDER — VALPROIC ACID (AS SODIUM SALT) 250 MG/5ML
250 SOLUTION, ORAL ORAL
Refills: 0 | Status: DISCONTINUED | OUTPATIENT
Start: 2022-12-30 | End: 2023-01-20

## 2022-12-30 RX ORDER — CLONAZEPAM 1 MG
0.5 TABLET ORAL AT BEDTIME
Refills: 0 | Status: DISCONTINUED | OUTPATIENT
Start: 2022-12-30 | End: 2023-01-06

## 2022-12-30 RX ORDER — QUETIAPINE FUMARATE 200 MG/1
50 TABLET, FILM COATED ORAL
Refills: 0 | Status: DISCONTINUED | OUTPATIENT
Start: 2022-12-30 | End: 2023-01-02

## 2022-12-30 RX ORDER — SERTRALINE 25 MG/1
25 TABLET, FILM COATED ORAL DAILY
Refills: 0 | Status: DISCONTINUED | OUTPATIENT
Start: 2022-12-30 | End: 2023-01-20

## 2022-12-30 RX ADMIN — Medication 2 MILLIGRAM(S): at 18:35

## 2022-12-30 RX ADMIN — OLANZAPINE 2.5 MILLIGRAM(S): 15 TABLET, FILM COATED ORAL at 23:00

## 2022-12-30 NOTE — H&P ADULT - PROBLEM SELECTOR PLAN 1
no visible trauma , no Tenderness to palpations , suspect radiculopathy , given h/o vertebral fx , will attempt to obtain MRI ,  per daughter ,  patient should be able to tolerate once re-started on her psychiatric regimen   - Tylenol prn   - MR T and L spine ( floor provided sedation , if required)   - PT   - Fall precautions 2+ B/L

## 2022-12-30 NOTE — ED PROVIDER NOTE - OBJECTIVE STATEMENT
88yoF w/Hx of asthma, anxiety, questionable CVA in 2007, diverticulosis, GERD, and previous vertebral fracture, bipolar disorder, schizophrenia p/w RLE pain. Pt states this am she had severe RLE pain and was unable to move leg so she called 911. In ED pt denies leg pain, can move leg, declining further evaluation at this time. No trauma/falls, ambulates w/walker at baseline.

## 2022-12-30 NOTE — ED PROVIDER NOTE - PROGRESS NOTE DETAILS
Pt w/o home health aide to receive her at home, per daughter Kathryn (via phone) lives independently, cannot ambulate, discharge is unsafe at this time. Transport canceled. TBA. - Shankar Chua, PGY-2
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
## 2022-12-30 NOTE — ED ADULT TRIAGE NOTE - CHIEF COMPLAINT QUOTE
EMS called by neighbor patient was screaming in pain c/o right leg pain. No trauma or deformity EMS called by neighbor patient was yelling c/o right leg pain. No trauma or deformity

## 2022-12-30 NOTE — H&P ADULT - ASSESSMENT
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

## 2022-12-30 NOTE — H&P ADULT - NSICDXPASTMEDICALHX_GEN_ALL_CORE_FT
Infectious Disease PAST MEDICAL HISTORY:  Anxiety     Asthma     Cerebrovascular accident (CVA), unspecified mechanism     CVA (Cerebral Infarction) 2007 per daughter    Diverticulosis of intestine     GERD (gastroesophageal reflux disease)     Vertebral fracture, osteoporotic

## 2022-12-30 NOTE — PATIENT PROFILE ADULT - FALL HARM RISK - HARM RISK INTERVENTIONS
Assistance with ambulation/Assistance OOB with selected safe patient handling equipment/Communicate Risk of Fall with Harm to all staff/Discuss with provider need for PT consult/Monitor gait and stability/Reinforce activity limits and safety measures with patient and family/Tailored Fall Risk Interventions/Visual Cue: Yellow wristband and red socks/Bed in lowest position, wheels locked, appropriate side rails in place/Call bell, personal items and telephone in reach/Instruct patient to call for assistance before getting out of bed or chair/Non-slip footwear when patient is out of bed/[place] to call system/Physically safe environment - no spills, clutter or unnecessary equipment/Purposeful Proactive Rounding/Room/bathroom lighting operational, light cord in reach
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
## 2022-12-30 NOTE — H&P ADULT - NSHPLABSRESULTS_GEN_ALL_CORE
Labs personally reviewed:                          11.3   6.77  )-----------( 274      ( 30 Dec 2022 19:01 )             35.4     12-30    141  |  101  |  16  ----------------------------<  101<H>  4.0   |  29  |  0.69    Ca    9.5      30 Dec 2022 19:01  Phos  3.7     12-30  Mg     1.9     12-30    TPro  6.9  /  Alb  3.7  /  TBili  0.9  /  DBili  x   /  AST  14  /  ALT  8<L>  /  AlkPhos  91  12-30        LIVER FUNCTIONS - ( 30 Dec 2022 19:01 )  Alb: 3.7 g/dL / Pro: 6.9 g/dL / ALK PHOS: 91 U/L / ALT: 8 U/L / AST: 14 U/L / GGT: x               CAPILLARY BLOOD GLUCOSE          Imaging:  CXR    EKG Labs personally reviewed:                          11.3   6.77  )-----------( 274      ( 30 Dec 2022 19:01 )             35.4     12-30    141  |  101  |  16  ----------------------------<  101<H>  4.0   |  29  |  0.69    Ca    9.5      30 Dec 2022 19:01  Phos  3.7     12-30  Mg     1.9     12-30    TPro  6.9  /  Alb  3.7  /  TBili  0.9  /  DBili  x   /  AST  14  /  ALT  8<L>  /  AlkPhos  91  12-30        LIVER FUNCTIONS - ( 30 Dec 2022 19:01 )  Alb: 3.7 g/dL / Pro: 6.9 g/dL / ALK PHOS: 91 U/L / ALT: 8 U/L / AST: 14 U/L / GGT: x               CAPILLARY BLOOD GLUCOSE          Imaging:  CXR  MRI spine ordered   EKG- ordered

## 2022-12-30 NOTE — PATIENT PROFILE ADULT - FUNCTIONAL ASSESSMENT - BASIC MOBILITY 6.
2-calculated by average/Not able to assess (calculate score using [facility] averaging method)  2-calculated by average/Not able to assess (calculate score using [facility] averaging method)

## 2022-12-30 NOTE — ED PROVIDER NOTE - CLINICAL SUMMARY MEDICAL DECISION MAKING FREE TEXT BOX
Patient reports right lower extremity pain this morning that has resolved. Patient does not report pain during interview. Patient without tenderness to pelvis, right hip or right lower extremity. Patient also has FROM of RLE. Patient deferring further evaluation in the ER and is oriented to person, place, time.  Patient has home aid to accept and will arrange transportation home. Will also ambulate prior to discharge as patient walks with walker at baseline. Patient signed-out at 1500 to Dr. Ulloa to follow-up ambulation and discharge plan.

## 2022-12-30 NOTE — ED PROVIDER NOTE - PHYSICAL EXAMINATION
General: WN/WD NAD  Head: Atraumatic  Eyes: EOM grossly in tact, no scleral icterus  ENT: dry mucous membranes  Neurology: A&Ox3, nonfocal, SINGH x 4  Respiratory: normal respiratory effort  CV: Extremities warm and well perfused  Abdominal: Soft, non-distended  Extremities: No edema, FROM RLE no bony deformities  Skin: No rashes
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
## 2022-12-30 NOTE — ED PROVIDER NOTE - NS ED ROS FT
GENERAL: No fever or chills, EYES: no change in vision, HEENT: no trouble swallowing or speaking, CARDIAC: no chest pain, PULMONARY: no cough or SOB, GI: no abdominal pain, no nausea, no vomiting, no diarrhea or constipation, : No changes in urination, SKIN: no rashes, NEURO: no headache,  MSK: No joint pain     All other ROS negative unless otherwise specified in HPI.     ~Shankar Chua M.D. Resident

## 2022-12-30 NOTE — H&P ADULT - NSHPPHYSICALEXAM_GEN_ALL_CORE
Vital Signs Last 24 Hrs  T(C): 36.9 (30 Dec 2022 18:15), Max: 36.9 (30 Dec 2022 18:15)  T(F): 98.4 (30 Dec 2022 18:15), Max: 98.4 (30 Dec 2022 18:15)  HR: 78 (30 Dec 2022 18:15) (71 - 78)  BP: 132/88 (30 Dec 2022 18:15) (132/88 - 134/72)  BP(mean): --  RR: 18 (30 Dec 2022 18:15) (18 - 18)  SpO2: 95% (30 Dec 2022 18:15) (95% - 96%)    Parameters below as of 30 Dec 2022 18:15  Patient On (Oxygen Delivery Method): room air Vital Signs Last 24 Hrs  T(C): 36.9 (30 Dec 2022 18:15), Max: 36.9 (30 Dec 2022 18:15)  T(F): 98.4 (30 Dec 2022 18:15), Max: 98.4 (30 Dec 2022 18:15)  HR: 78 (30 Dec 2022 18:15) (71 - 78)  BP: 132/88 (30 Dec 2022 18:15) (132/88 - 134/72)  BP(mean): --  RR: 18 (30 Dec 2022 18:15) (18 - 18)  SpO2: 95% (30 Dec 2022 18:15) (95% - 96%)    Parameters below as of 30 Dec 2022 18:15  Patient On (Oxygen Delivery Method): room air    GENERAL:  mild distress,  thin , frail , anxious   HEAD:  Atraumatic, Normocephalic  ENT: EOMI, PERRLA, conjunctiva and sclera clear,  moist mucosa no pharyngeal erythema or exudates   NECK: supple , no JVD   CHEST/LUNG: Clear to auscultation bilaterally; No wheeze, equal breath sounds bilaterally   BACK: No spinal tenderness,  No CVA tenderness   HEART: Regular rate and rhythm; No murmurs, rubs, or gallops  ABDOMEN: Soft, Nontender, Nondistended; Bowel sounds present  EXTREMITIES:  No clubbing, cyanosis, or edema  MSK: No joint swelling or effusions, ROM intact   PSYCH: Normal behavior/affect  NEUROLOGY: AAOx1 , non-focal, cranial nerves intact  SKIN: Normal color, No rashes or lesions

## 2022-12-30 NOTE — ED PROVIDER NOTE - ATTENDING CONTRIBUTION TO CARE
I, Mario Lynn, performed a history and physical exam of the patient and discussed their management with the resident and /or advanced care provider. I reviewed the resident and /or ACP's note and agree with the documented findings and plan of care. I was present and available for all procedures.  Patient reports right lower extremity pain this morning that has resolved. Patient does not report pain during interview. Patient without tenderness to pelvis, right hip or right lower extremity. Patient also has FROM of RLE. Patient deferring further evaluation in the ER and is oriented to person, place, time.  Patient has home aid to accept and will arrange transportation home. Will also ambulate prior to discharge as patient walks with walker at baseline. Patient signed-out at 1500 to Dr. Ulloa to follow-up ambulation and discharge plan.

## 2022-12-30 NOTE — H&P ADULT - PROBLEM SELECTOR PLAN 2
will resume regimen based on history provided by daughter  - continue quetiapine   - continue valproic acid   - melatonin   - If continuously with  behavioral disturbances , behavioral health consult can be arranged by day team   - constant observation

## 2022-12-30 NOTE — H&P ADULT - HISTORY OF PRESENT ILLNESS
Patient is an 88 year old female with PMH asthma, anxiety,  questionable CVA in 2007, diverticulosis, GERD, and previous vertebral fracture, recent admission for Covid ( Sep ’22 ) now presents for right lower extremity pain and difficulty ambulating  for the past    Patient is an 88 year old female with PMH asthma, anxiety,  questionable CVA in 2007, diverticulosis, GERD, and previous vertebral fracture, recent admission for Covid ( Sep ’22 ), bipolar / schizophrenia  , she now presents for right lower extremity pain and difficulty ambulating  for the past few days.   Per daughter , patients neighbor called EMS on day of admission because patient was heard yelling for help and moaning in pain , EMS arrived a few times throughout day and the last time decided to bring patient to the hospitals since symptoms appeared to be worsening . Patient currently reports no specific symptoms , reports no pain at this time.   Per daughter, patient has a history of frequent falls. Has had vertebral fracture in the past and suffers from sciatica.  In addition, patient has a history of bipolar and is non complaint with medications , she was recently in rehab and started on a new mediation regimen and improved , however, since discharge patient has declined and daughter suspects probably not taking her medications.

## 2022-12-31 LAB
ALBUMIN SERPL ELPH-MCNC: 3.5 G/DL — SIGNIFICANT CHANGE UP (ref 3.3–5)
ALP SERPL-CCNC: 83 U/L — SIGNIFICANT CHANGE UP (ref 40–120)
ALT FLD-CCNC: 10 U/L — SIGNIFICANT CHANGE UP (ref 10–45)
ANION GAP SERPL CALC-SCNC: 8 MMOL/L — SIGNIFICANT CHANGE UP (ref 5–17)
AST SERPL-CCNC: 11 U/L — SIGNIFICANT CHANGE UP (ref 10–40)
BILIRUB SERPL-MCNC: 1 MG/DL — SIGNIFICANT CHANGE UP (ref 0.2–1.2)
BUN SERPL-MCNC: 16 MG/DL — SIGNIFICANT CHANGE UP (ref 7–23)
CALCIUM SERPL-MCNC: 9.6 MG/DL — SIGNIFICANT CHANGE UP (ref 8.4–10.5)
CHLORIDE SERPL-SCNC: 104 MMOL/L — SIGNIFICANT CHANGE UP (ref 96–108)
CO2 SERPL-SCNC: 29 MMOL/L — SIGNIFICANT CHANGE UP (ref 22–31)
CREAT SERPL-MCNC: 0.45 MG/DL — LOW (ref 0.5–1.3)
EGFR: 92 ML/MIN/1.73M2 — SIGNIFICANT CHANGE UP
GLUCOSE SERPL-MCNC: 101 MG/DL — HIGH (ref 70–99)
HCT VFR BLD CALC: 34.1 % — LOW (ref 34.5–45)
HGB BLD-MCNC: 11 G/DL — LOW (ref 11.5–15.5)
MCHC RBC-ENTMCNC: 28.5 PG — SIGNIFICANT CHANGE UP (ref 27–34)
MCHC RBC-ENTMCNC: 32.3 GM/DL — SIGNIFICANT CHANGE UP (ref 32–36)
MCV RBC AUTO: 88.3 FL — SIGNIFICANT CHANGE UP (ref 80–100)
NRBC # BLD: 0 /100 WBCS — SIGNIFICANT CHANGE UP (ref 0–0)
PLATELET # BLD AUTO: 254 K/UL — SIGNIFICANT CHANGE UP (ref 150–400)
POTASSIUM SERPL-MCNC: 3.7 MMOL/L — SIGNIFICANT CHANGE UP (ref 3.5–5.3)
POTASSIUM SERPL-SCNC: 3.7 MMOL/L — SIGNIFICANT CHANGE UP (ref 3.5–5.3)
PROT SERPL-MCNC: 6.1 G/DL — SIGNIFICANT CHANGE UP (ref 6–8.3)
RBC # BLD: 3.86 M/UL — SIGNIFICANT CHANGE UP (ref 3.8–5.2)
RBC # FLD: 14.5 % — SIGNIFICANT CHANGE UP (ref 10.3–14.5)
SODIUM SERPL-SCNC: 141 MMOL/L — SIGNIFICANT CHANGE UP (ref 135–145)
WBC # BLD: 5.81 K/UL — SIGNIFICANT CHANGE UP (ref 3.8–10.5)
WBC # FLD AUTO: 5.81 K/UL — SIGNIFICANT CHANGE UP (ref 3.8–10.5)

## 2022-12-31 PROCEDURE — 99232 SBSQ HOSP IP/OBS MODERATE 35: CPT

## 2022-12-31 RX ADMIN — Medication 0.5 MILLIGRAM(S): at 21:06

## 2022-12-31 RX ADMIN — SERTRALINE 25 MILLIGRAM(S): 25 TABLET, FILM COATED ORAL at 12:45

## 2022-12-31 RX ADMIN — Medication 0.5 MILLIGRAM(S): at 00:19

## 2022-12-31 RX ADMIN — Medication 5 MILLIGRAM(S): at 15:21

## 2022-12-31 RX ADMIN — SENNA PLUS 2 TABLET(S): 8.6 TABLET ORAL at 00:19

## 2022-12-31 RX ADMIN — QUETIAPINE FUMARATE 50 MILLIGRAM(S): 200 TABLET, FILM COATED ORAL at 09:17

## 2022-12-31 RX ADMIN — OLANZAPINE 2.5 MILLIGRAM(S): 15 TABLET, FILM COATED ORAL at 06:50

## 2022-12-31 RX ADMIN — Medication 3 MILLIGRAM(S): at 21:06

## 2022-12-31 RX ADMIN — Medication 250 MILLIGRAM(S): at 09:17

## 2022-12-31 NOTE — PROGRESS NOTE ADULT - SUBJECTIVE AND OBJECTIVE BOX
Indira Costa MD  Division of Hospital Medicine  Pager: [phone] or reachable on MS Teams  After hours please page [phone]    PROGRESS NOTE:     Patient is a 88y old  Female who presents with a chief complaint of Right lower extremity pain (30 Dec 2022 21:08)      SUBJECTIVE / OVERNIGHT EVENTS: No acute events overnight, pt seen and evaluated this AM. Agitated, unable to answer questions or obtain ROS     ADDITIONAL REVIEW OF SYSTEMS: unable to obtain    MEDICATIONS  (STANDING):  clonazePAM  Tablet 0.5 milliGRAM(s) Oral at bedtime  metoprolol succinate ER 50 milliGRAM(s) Oral daily  QUEtiapine 50 milliGRAM(s) Oral two times a day  senna 2 Tablet(s) Oral at bedtime  sertraline 25 milliGRAM(s) Oral daily  valproic  acid Syrup 250 milliGRAM(s) Oral two times a day    MEDICATIONS  (PRN):  acetaminophen     Tablet .. 650 milliGRAM(s) Oral every 6 hours PRN Temp greater or equal to 38C (100.4F), Mild Pain (1 - 3)  aluminum hydroxide/magnesium hydroxide/simethicone Suspension 30 milliLiter(s) Oral every 4 hours PRN Dyspepsia  bisacodyl 5 milliGRAM(s) Oral every 12 hours PRN Constipation  melatonin 3 milliGRAM(s) Oral at bedtime PRN Insomnia  OLANZapine Injectable 2.5 milliGRAM(s) IntraMuscular every 6 hours PRN Agitation  ondansetron Injectable 4 milliGRAM(s) IV Push every 8 hours PRN Nausea and/or Vomiting      CAPILLARY BLOOD GLUCOSE        I&O's Summary    30 Dec 2022 07:01  -  31 Dec 2022 07:00  --------------------------------------------------------  IN: 100 mL / OUT: 0 mL / NET: 100 mL    31 Dec 2022 07:01  -  31 Dec 2022 15:24  --------------------------------------------------------  IN: 25 mL / OUT: 0 mL / NET: 25 mL        PHYSICAL EXAM:  Vital Signs Last 24 Hrs  T(C): 36.4 (31 Dec 2022 10:18), Max: 36.9 (30 Dec 2022 18:15)  T(F): 97.5 (31 Dec 2022 10:18), Max: 98.4 (30 Dec 2022 18:15)  HR: 83 (31 Dec 2022 10:18) (73 - 83)  BP: 98/70 (31 Dec 2022 10:18) (98/70 - 151/87)  BP(mean): --  RR: 18 (31 Dec 2022 13:28) (18 - 18)  SpO2: 94% (31 Dec 2022 10:18) (94% - 95%)    Parameters below as of 31 Dec 2022 13:28  Patient On (Oxygen Delivery Method): room air        CONSTITUTIONAL: NAD, well-developed  RESPIRATORY: Normal respiratory effort; lungs are clear to auscultation bilaterally  CARDIOVASCULAR: Regular rate and rhythm, normal S1 and S2, no murmur/rub/gallop; No lower extremity edema; Peripheral pulses are 2+ bilaterally  ABDOMEN: Nontender to palpation, normoactive bowel sounds, no rebound/guarding; No hepatosplenomegaly  MUSCLOSKELETAL: no clubbing or cyanosis of digits; no joint swelling or tenderness to palpation  PSYCH: Alert, not able to assess orientation due to agitation     LABS:                        11.0   5.81  )-----------( 254      ( 31 Dec 2022 10:16 )             34.1     12-31    141  |  104  |  16  ----------------------------<  101<H>  3.7   |  29  |  0.45<L>    Ca    9.6      31 Dec 2022 10:16  Phos  3.7     12-30  Mg     1.9     12-30    TPro  6.1  /  Alb  3.5  /  TBili  1.0  /  DBili  x   /  AST  11  /  ALT  10  /  AlkPhos  83  12-31                RADIOLOGY & ADDITIONAL TESTS:  Results Reviewed:   Imaging Personally Reviewed:  Electrocardiogram Personally Reviewed:    COORDINATION OF CARE:  Care Discussed with Consultants/Other Providers [Y/N]:  Prior or Outpatient Records Reviewed [Y/N]:

## 2022-12-31 NOTE — PROGRESS NOTE ADULT - PROBLEM SELECTOR PLAN 1
no visible trauma , no Tenderness to palpations , suspect radiculopathy , given h/o vertebral fx , will attempt to obtain MRI ,  per daughter ,  patient should be able to tolerate once re-started on her psychiatric regimen   - Tylenol prn   - MR T and L spine ( floor provided sedation , if required)   - PT   - Fall precautions

## 2022-12-31 NOTE — PHYSICAL THERAPY INITIAL EVALUATION ADULT - ADDITIONAL COMMENTS
Pt states that she lives in an apt w/ her son and elevator access. Pt states that she was independent PTA. Pt also states that she amb w/ RW. As per chart, pt from [facility].

## 2022-12-31 NOTE — PHYSICAL THERAPY INITIAL EVALUATION ADULT - PERTINENT HX OF CURRENT PROBLEM, REHAB EVAL
88 year old female with PMHx asthma, anxiety,  questionable CVA in 2007, diverticulosis, GERD, and previous vertebral fracture, recent admission for Covid ( Sep ’22 ), bipolar / schizophrenia; now presents for right lower extremity pain and difficulty ambulating  for the past few days, c/f radiculopathy, pending MRI.
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
## 2022-12-31 NOTE — PHYSICAL THERAPY INITIAL EVALUATION ADULT - PHYSICAL ASSIST/NONPHYSICAL ASSIST: STAND/SIT, REHAB EVAL
If you are a smoker, it is important for your health to stop smoking. Please be aware that second hand smoke is also harmful. 1 person assist

## 2023-01-01 DIAGNOSIS — Z29.9 ENCOUNTER FOR PROPHYLACTIC MEASURES, UNSPECIFIED: ICD-10-CM

## 2023-01-01 LAB
ALBUMIN SERPL ELPH-MCNC: 3.9 G/DL — SIGNIFICANT CHANGE UP (ref 3.3–5)
ALP SERPL-CCNC: 99 U/L — SIGNIFICANT CHANGE UP (ref 40–120)
ALT FLD-CCNC: 7 U/L — LOW (ref 10–45)
ANION GAP SERPL CALC-SCNC: 11 MMOL/L — SIGNIFICANT CHANGE UP (ref 5–17)
AST SERPL-CCNC: 14 U/L — SIGNIFICANT CHANGE UP (ref 10–40)
BILIRUB SERPL-MCNC: 1.2 MG/DL — SIGNIFICANT CHANGE UP (ref 0.2–1.2)
BUN SERPL-MCNC: 23 MG/DL — SIGNIFICANT CHANGE UP (ref 7–23)
CALCIUM SERPL-MCNC: 9.9 MG/DL — SIGNIFICANT CHANGE UP (ref 8.4–10.5)
CHLORIDE SERPL-SCNC: 103 MMOL/L — SIGNIFICANT CHANGE UP (ref 96–108)
CO2 SERPL-SCNC: 30 MMOL/L — SIGNIFICANT CHANGE UP (ref 22–31)
CREAT SERPL-MCNC: 0.59 MG/DL — SIGNIFICANT CHANGE UP (ref 0.5–1.3)
EGFR: 87 ML/MIN/1.73M2 — SIGNIFICANT CHANGE UP
GLUCOSE SERPL-MCNC: 85 MG/DL — SIGNIFICANT CHANGE UP (ref 70–99)
HCT VFR BLD CALC: 41 % — SIGNIFICANT CHANGE UP (ref 34.5–45)
HGB BLD-MCNC: 13 G/DL — SIGNIFICANT CHANGE UP (ref 11.5–15.5)
MAGNESIUM SERPL-MCNC: 2.2 MG/DL — SIGNIFICANT CHANGE UP (ref 1.6–2.6)
MCHC RBC-ENTMCNC: 28.4 PG — SIGNIFICANT CHANGE UP (ref 27–34)
MCHC RBC-ENTMCNC: 31.7 GM/DL — LOW (ref 32–36)
MCV RBC AUTO: 89.7 FL — SIGNIFICANT CHANGE UP (ref 80–100)
NRBC # BLD: 0 /100 WBCS — SIGNIFICANT CHANGE UP (ref 0–0)
PHOSPHATE SERPL-MCNC: 3.8 MG/DL — SIGNIFICANT CHANGE UP (ref 2.5–4.5)
PLATELET # BLD AUTO: 258 K/UL — SIGNIFICANT CHANGE UP (ref 150–400)
POTASSIUM SERPL-MCNC: 4.3 MMOL/L — SIGNIFICANT CHANGE UP (ref 3.5–5.3)
POTASSIUM SERPL-SCNC: 4.3 MMOL/L — SIGNIFICANT CHANGE UP (ref 3.5–5.3)
PROT SERPL-MCNC: 7.2 G/DL — SIGNIFICANT CHANGE UP (ref 6–8.3)
RBC # BLD: 4.57 M/UL — SIGNIFICANT CHANGE UP (ref 3.8–5.2)
RBC # FLD: 14.6 % — HIGH (ref 10.3–14.5)
SODIUM SERPL-SCNC: 144 MMOL/L — SIGNIFICANT CHANGE UP (ref 135–145)
WBC # BLD: 6.9 K/UL — SIGNIFICANT CHANGE UP (ref 3.8–10.5)
WBC # FLD AUTO: 6.9 K/UL — SIGNIFICANT CHANGE UP (ref 3.8–10.5)

## 2023-01-01 PROCEDURE — 99233 SBSQ HOSP IP/OBS HIGH 50: CPT

## 2023-01-01 RX ORDER — ENOXAPARIN SODIUM 100 MG/ML
40 INJECTION SUBCUTANEOUS EVERY 24 HOURS
Refills: 0 | Status: DISCONTINUED | OUTPATIENT
Start: 2023-01-01 | End: 2023-01-20

## 2023-01-01 RX ADMIN — QUETIAPINE FUMARATE 50 MILLIGRAM(S): 200 TABLET, FILM COATED ORAL at 17:11

## 2023-01-01 RX ADMIN — Medication 250 MILLIGRAM(S): at 07:20

## 2023-01-01 RX ADMIN — Medication 250 MILLIGRAM(S): at 17:10

## 2023-01-01 RX ADMIN — Medication 50 MILLIGRAM(S): at 07:18

## 2023-01-01 RX ADMIN — Medication 10 MILLIGRAM(S): at 15:37

## 2023-01-01 RX ADMIN — OLANZAPINE 2.5 MILLIGRAM(S): 15 TABLET, FILM COATED ORAL at 10:32

## 2023-01-01 RX ADMIN — SERTRALINE 25 MILLIGRAM(S): 25 TABLET, FILM COATED ORAL at 12:15

## 2023-01-01 RX ADMIN — QUETIAPINE FUMARATE 50 MILLIGRAM(S): 200 TABLET, FILM COATED ORAL at 07:20
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
## 2023-01-02 PROCEDURE — 99222 1ST HOSP IP/OBS MODERATE 55: CPT

## 2023-01-02 PROCEDURE — 99233 SBSQ HOSP IP/OBS HIGH 50: CPT

## 2023-01-02 RX ORDER — OLANZAPINE 15 MG/1
5 TABLET, FILM COATED ORAL AT BEDTIME
Refills: 0 | Status: DISCONTINUED | OUTPATIENT
Start: 2023-01-02 | End: 2023-01-06

## 2023-01-02 RX ORDER — OLANZAPINE 15 MG/1
2.5 TABLET, FILM COATED ORAL
Refills: 0 | Status: DISCONTINUED | OUTPATIENT
Start: 2023-01-02 | End: 2023-01-06

## 2023-01-02 RX ADMIN — OLANZAPINE 5 MILLIGRAM(S): 15 TABLET, FILM COATED ORAL at 23:27

## 2023-01-02 RX ADMIN — Medication 50 MILLIGRAM(S): at 06:36

## 2023-01-02 RX ADMIN — Medication 0.5 MILLIGRAM(S): at 23:27

## 2023-01-02 RX ADMIN — SENNA PLUS 2 TABLET(S): 8.6 TABLET ORAL at 23:27

## 2023-01-02 RX ADMIN — ENOXAPARIN SODIUM 40 MILLIGRAM(S): 100 INJECTION SUBCUTANEOUS at 13:29

## 2023-01-02 RX ADMIN — QUETIAPINE FUMARATE 50 MILLIGRAM(S): 200 TABLET, FILM COATED ORAL at 06:36

## 2023-01-02 RX ADMIN — OLANZAPINE 2.5 MILLIGRAM(S): 15 TABLET, FILM COATED ORAL at 22:47

## 2023-01-02 NOTE — PROGRESS NOTE ADULT - SUBJECTIVE AND OBJECTIVE BOX
Indira Costa MD  Division of Hospital Medicine  Pager: [phone] or reachable on MS Teams  After hours please page [phone]    PROGRESS NOTE:     Patient is a 88y old  Female who presents with a chief complaint of Right lower extremity pain (01 Jan 2023 18:23)      SUBJECTIVE / OVERNIGHT EVENTS: No acute events overnight, pt still agitated, accusatory, nonredirectable. Could not obtain ROS    ADDITIONAL REVIEW OF SYSTEMS: unable to obtain    MEDICATIONS  (STANDING):  clonazePAM  Tablet 0.5 milliGRAM(s) Oral at bedtime  enoxaparin Injectable 40 milliGRAM(s) SubCutaneous every 24 hours  LORazepam     Tablet 2 milliGRAM(s) Oral once  metoprolol succinate ER 50 milliGRAM(s) Oral daily  OLANZapine 2.5 milliGRAM(s) Oral <User Schedule>  OLANZapine 5 milliGRAM(s) Oral at bedtime  senna 2 Tablet(s) Oral at bedtime  sertraline 25 milliGRAM(s) Oral daily  valproic  acid Syrup 250 milliGRAM(s) Oral two times a day    MEDICATIONS  (PRN):  acetaminophen     Tablet .. 650 milliGRAM(s) Oral every 6 hours PRN Temp greater or equal to 38C (100.4F), Mild Pain (1 - 3)  aluminum hydroxide/magnesium hydroxide/simethicone Suspension 30 milliLiter(s) Oral every 4 hours PRN Dyspepsia  bisacodyl 5 milliGRAM(s) Oral every 12 hours PRN Constipation  melatonin 3 milliGRAM(s) Oral at bedtime PRN Insomnia  OLANZapine Injectable 2.5 milliGRAM(s) IntraMuscular every 6 hours PRN Agitation  ondansetron Injectable 4 milliGRAM(s) IV Push every 8 hours PRN Nausea and/or Vomiting      CAPILLARY BLOOD GLUCOSE        I&O's Summary    01 Jan 2023 07:01  -  02 Jan 2023 07:00  --------------------------------------------------------  IN: 520 mL / OUT: 1 mL / NET: 519 mL    02 Jan 2023 07:01  -  02 Jan 2023 17:34  --------------------------------------------------------  IN: 440 mL / OUT: 0 mL / NET: 440 mL        PHYSICAL EXAM:  Vital Signs Last 24 Hrs  T(C): 36.6 (02 Jan 2023 15:57), Max: 36.6 (01 Jan 2023 20:49)  T(F): 97.9 (02 Jan 2023 15:57), Max: 97.9 (02 Jan 2023 10:03)  HR: 67 (02 Jan 2023 15:57) (57 - 88)  BP: 101/55 (02 Jan 2023 15:57) (101/55 - 115/74)  BP(mean): --  RR: 18 (02 Jan 2023 15:57) (18 - 18)  SpO2: 96% (02 Jan 2023 15:57) (95% - 96%)    Parameters below as of 02 Jan 2023 15:57  Patient On (Oxygen Delivery Method): room air      Refusing physical exam  CONSTITUTIONAL: sleeping, arouses easily however becomes agitated  RESPIRATORY: Normal respiratory effort  CARDIOVASCULAR: unable to assess  ABDOMEN: unable to assess  MUSCLOSKELETAL: moving upper extremities, unable to assess lower extremities  PSYCH: Alert and oriented to self, agitated, confused     LABS:                        13.0   6.90  )-----------( 258      ( 01 Jan 2023 07:51 )             41.0     01-01    144  |  103  |  23  ----------------------------<  85  4.3   |  30  |  0.59    Ca    9.9      01 Jan 2023 07:50  Phos  3.8     01-01  Mg     2.2     01-01    TPro  7.2  /  Alb  3.9  /  TBili  1.2  /  DBili  x   /  AST  14  /  ALT  7<L>  /  AlkPhos  99  01-01                RADIOLOGY & ADDITIONAL TESTS:  Results Reviewed:   Imaging Personally Reviewed:  Electrocardiogram Personally Reviewed:    COORDINATION OF CARE:  Care Discussed with Consultants/Other Providers [Y/N]:  Prior or Outpatient Records Reviewed [Y/N]:

## 2023-01-02 NOTE — BH CONSULTATION LIAISON ASSESSMENT NOTE - CURRENT MEDICATION
MEDICATIONS  (STANDING):  clonazePAM  Tablet 0.5 milliGRAM(s) Oral at bedtime  enoxaparin Injectable 40 milliGRAM(s) SubCutaneous every 24 hours  LORazepam     Tablet 2 milliGRAM(s) Oral once  metoprolol succinate ER 50 milliGRAM(s) Oral daily  QUEtiapine 50 milliGRAM(s) Oral two times a day  senna 2 Tablet(s) Oral at bedtime  sertraline 25 milliGRAM(s) Oral daily  valproic  acid Syrup 250 milliGRAM(s) Oral two times a day    MEDICATIONS  (PRN):  acetaminophen     Tablet .. 650 milliGRAM(s) Oral every 6 hours PRN Temp greater or equal to 38C (100.4F), Mild Pain (1 - 3)  aluminum hydroxide/magnesium hydroxide/simethicone Suspension 30 milliLiter(s) Oral every 4 hours PRN Dyspepsia  bisacodyl 5 milliGRAM(s) Oral every 12 hours PRN Constipation  melatonin 3 milliGRAM(s) Oral at bedtime PRN Insomnia  OLANZapine Injectable 2.5 milliGRAM(s) IntraMuscular every 6 hours PRN Agitation  ondansetron Injectable 4 milliGRAM(s) IV Push every 8 hours PRN Nausea and/or Vomiting

## 2023-01-02 NOTE — PROGRESS NOTE ADULT - PROBLEM SELECTOR PLAN 5
Diet: easy to chew  DVT: lovenox  Dispo: pending improvement in agitation, MRI, PT eval, possible inpt psych

## 2023-01-02 NOTE — BH CONSULTATION LIAISON ASSESSMENT NOTE - NSBHCHARTREVIEWVS_PSY_A_CORE FT
Vital Signs Last 24 Hrs  T(C): 36.6 (02 Jan 2023 10:03), Max: 36.6 (01 Jan 2023 20:49)  T(F): 97.9 (02 Jan 2023 10:03), Max: 97.9 (02 Jan 2023 10:03)  HR: 57 (02 Jan 2023 10:03) (57 - 88)  BP: 104/57 (02 Jan 2023 10:03) (104/57 - 115/74)  BP(mean): --  RR: 18 (02 Jan 2023 10:03) (18 - 18)  SpO2: 96% (02 Jan 2023 10:03) (95% - 96%)    Parameters below as of 02 Jan 2023 10:03  Patient On (Oxygen Delivery Method): room air

## 2023-01-02 NOTE — BH CONSULTATION LIAISON ASSESSMENT NOTE - RISK ASSESSMENT
Patient is a moderate risk of harm to self or others. Risk factors: agitation, bipolar disorder, chronic pain  Protective factors: residential stability, social support from her daughter
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
## 2023-01-02 NOTE — BH CONSULTATION LIAISON ASSESSMENT NOTE - NSBHCHARTREVIEWLAB_PSY_A_CORE FT
13.0   6.90  )-----------( 258      ( 01 Jan 2023 07:51 )             41.0     01-01    144  |  103  |  23  ----------------------------<  85  4.3   |  30  |  0.59    Ca    9.9      01 Jan 2023 07:50  Phos  3.8     01-01  Mg     2.2     01-01    TPro  7.2  /  Alb  3.9  /  TBili  1.2  /  DBili  x   /  AST  14  /  ALT  7<L>  /  AlkPhos  99  01-01
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
## 2023-01-02 NOTE — BH CONSULTATION LIAISON ASSESSMENT NOTE - SUMMARY
Patient is an 88 year old female, , domiciled with daughter, PMH of asthma, GERD, sciatica with previous vertebral fracture, a PPH of Bipolar I disorder, schizophrenia, admitted for RLE pain and difficulty ambulating, with psychiatry consulted for evaluation of agitation. On exam, patient is hostile and labile. Minimally cooperative. More collateral is necessary for full evaluation. At present, it is determined that patient does not have capacity to refuse medical treatment.  Patient is an 88 year old female, , domiciled with daughter, PMH of asthma, GERD, sciatica with previous vertebral fracture, a PPH of Bipolar I disorder, schizophrenia, admitted for RLE pain and difficulty ambulating, with psychiatry consulted for evaluation of agitation. On exam, patient is hostile and labile. Minimally cooperative. More collateral is necessary for full evaluation. At present, it is determined that patient does not have capacity to refuse medical treatment. Once medically stabilized, pt may benefit from inpatient psychiatric hospitalization    Plan:  - Switch to Zyprexa 2.5 mg AM and 5 mg qhs PO (d/c seroquel)  - C/w Zyprexa 2.5 mg IM Q6 Patient is an 88 year old female, , domiciled with daughter, PMH of asthma, GERD, sciatica with previous vertebral fracture, a PPH of Bipolar I disorder, schizophrenia, admitted for RLE pain and difficulty ambulating, with psychiatry consulted for evaluation of agitation. On exam, patient is hostile and labile. Minimally cooperative. More collateral is necessary for full evaluation. At present, it is determined that patient does not have capacity to refuse medical treatment. Once medically stabilized, pt may benefit from inpatient psychiatric hospitalization    Plan:  - Switch to Zyprexa 2.5 mg AM and 5 mg qhs PO (d/c seroquel)  - C/w Zyprexa 2.5 mg IM Q6 PRN for agitation

## 2023-01-02 NOTE — BH CONSULTATION LIAISON ASSESSMENT NOTE - NSBHATTESTCOMMENTATTENDFT_PSY_A_CORE
Patient is an 88 year old female, , domiciled with daughter, PMH of asthma, GERD, sciatica with previous vertebral fracture, a PPH of Bipolar I disorder, schizophrenia, admitted for RLE pain and difficulty ambulating, with psychiatry consulted for evaluation of agitation. On exam, patient is hostile and labile. collateral obtained. rec d/c serouel, start zyprexa, cont rest of meds, prns. pt may need psych admission

## 2023-01-03 PROCEDURE — 99232 SBSQ HOSP IP/OBS MODERATE 35: CPT

## 2023-01-03 RX ORDER — HALOPERIDOL DECANOATE 100 MG/ML
1 INJECTION INTRAMUSCULAR DAILY
Refills: 0 | Status: DISCONTINUED | OUTPATIENT
Start: 2023-01-04 | End: 2023-01-13

## 2023-01-03 RX ORDER — HALOPERIDOL DECANOATE 100 MG/ML
2 INJECTION INTRAMUSCULAR AT BEDTIME
Refills: 0 | Status: DISCONTINUED | OUTPATIENT
Start: 2023-01-03 | End: 2023-01-13

## 2023-01-03 RX ADMIN — OLANZAPINE 2.5 MILLIGRAM(S): 15 TABLET, FILM COATED ORAL at 06:00

## 2023-01-03 RX ADMIN — OLANZAPINE 2.5 MILLIGRAM(S): 15 TABLET, FILM COATED ORAL at 14:42

## 2023-01-03 RX ADMIN — OLANZAPINE 5 MILLIGRAM(S): 15 TABLET, FILM COATED ORAL at 22:17

## 2023-01-03 RX ADMIN — HALOPERIDOL DECANOATE 2 MILLIGRAM(S): 100 INJECTION INTRAMUSCULAR at 22:11

## 2023-01-03 RX ADMIN — SERTRALINE 25 MILLIGRAM(S): 25 TABLET, FILM COATED ORAL at 13:48

## 2023-01-03 RX ADMIN — OLANZAPINE 2.5 MILLIGRAM(S): 15 TABLET, FILM COATED ORAL at 09:24

## 2023-01-03 RX ADMIN — Medication 0.5 MILLIGRAM(S): at 22:16

## 2023-01-03 RX ADMIN — ENOXAPARIN SODIUM 40 MILLIGRAM(S): 100 INJECTION SUBCUTANEOUS at 13:49

## 2023-01-03 NOTE — PROGRESS NOTE ADULT - PROBLEM SELECTOR PLAN 5
Diet: easy to chew  DVT: lovenox  Dispo: pending improvement in agitation, MRI, PT eval, possible inpt psych Diet: easy to chew  DVT: lovenox  Dispo: pending improvement in agitation, MRI, PT eval, possible inpt psych    D/w daughter 1/3 updated on full plan of care
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
## 2023-01-03 NOTE — BH CONSULTATION LIAISON PROGRESS NOTE - NSBHFUPINTERVALHXFT_PSY_A_CORE
Pt seen at bedside by resident. Pt observed to be agitated and irritable. States "I hate everyone who looks like you! Get out of here." Per PCA, patient was observed to be yelling loudly at her daughter for over 30 minutes on the phone. Pt has also been refusing all PO medication. Interview ended early based on pt's level of agitation. Pt states, "I will not stop screaming at the top of my lungs until you leave. Take my socks off!" This writer left the room and returned five minutes later. Pt did not recognize this writer upon return.

## 2023-01-03 NOTE — PROGRESS NOTE ADULT - SUBJECTIVE AND OBJECTIVE BOX
Patient is a 88y old  Female who presents with a chief complaint of Right lower extremity pain (02 Jan 2023 15:34)      SUBJECTIVE / OVERNIGHT EVENTS: pt appears calm, notified by staff that pt is not taking her meds, gets agitated     MEDICATIONS  (STANDING):  clonazePAM  Tablet 0.5 milliGRAM(s) Oral at bedtime  enoxaparin Injectable 40 milliGRAM(s) SubCutaneous every 24 hours  metoprolol succinate ER 50 milliGRAM(s) Oral daily  OLANZapine 2.5 milliGRAM(s) Oral <User Schedule>  OLANZapine 5 milliGRAM(s) Oral at bedtime  senna 2 Tablet(s) Oral at bedtime  sertraline 25 milliGRAM(s) Oral daily  valproic  acid Syrup 250 milliGRAM(s) Oral two times a day    MEDICATIONS  (PRN):  acetaminophen     Tablet .. 650 milliGRAM(s) Oral every 6 hours PRN Temp greater or equal to 38C (100.4F), Mild Pain (1 - 3)  aluminum hydroxide/magnesium hydroxide/simethicone Suspension 30 milliLiter(s) Oral every 4 hours PRN Dyspepsia  bisacodyl 5 milliGRAM(s) Oral every 12 hours PRN Constipation  melatonin 3 milliGRAM(s) Oral at bedtime PRN Insomnia  OLANZapine Injectable 2.5 milliGRAM(s) IntraMuscular every 6 hours PRN Agitation  ondansetron Injectable 4 milliGRAM(s) IV Push every 8 hours PRN Nausea and/or Vomiting        CAPILLARY BLOOD GLUCOSE        I&O's Summary    02 Jan 2023 07:01  -  03 Jan 2023 07:00  --------------------------------------------------------  IN: 680 mL / OUT: 0 mL / NET: 680 mL    03 Jan 2023 07:01  -  03 Jan 2023 13:17  --------------------------------------------------------  IN: 240 mL / OUT: 0 mL / NET: 240 mL        PHYSICAL EXAM:  GENERAL: NAD, frail   HEAD:  Atraumatic, Normocephalic  NECK:  No JVD  CHEST/LUNG: Clear to auscultation bilaterally; No wheeze  HEART: Regular rate and rhythm; S1S2  ABDOMEN: Soft, Nontender, Nondistended; Bowel sounds present  EXTREMITIES:  2+ Peripheral Pulses      LABS:                    RADIOLOGY & ADDITIONAL TESTS:    Imaging Personally Reviewed:    Consultant(s) Notes Reviewed:      Care Discussed with Consultants/Other Providers:

## 2023-01-03 NOTE — PROGRESS NOTE ADULT - PROBLEM SELECTOR PLAN 1
no visible trauma , no Tenderness to palpations , suspect radiculopathy , given h/o vertebral fx , will attempt to obtain MRI ,  per daughter ,  patient should be able to tolerate once re-started on her psychiatric regimen   - Tylenol prn   - MR T and L spine with sedation   - PT   - Fall precautions
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
## 2023-01-04 PROCEDURE — 99233 SBSQ HOSP IP/OBS HIGH 50: CPT

## 2023-01-04 PROCEDURE — 99231 SBSQ HOSP IP/OBS SF/LOW 25: CPT

## 2023-01-04 RX ORDER — ACETAMINOPHEN 500 MG
975 TABLET ORAL EVERY 12 HOURS
Refills: 0 | Status: DISCONTINUED | OUTPATIENT
Start: 2023-01-04 | End: 2023-01-20

## 2023-01-04 RX ORDER — LIDOCAINE 4 G/100G
1 CREAM TOPICAL DAILY
Refills: 0 | Status: DISCONTINUED | OUTPATIENT
Start: 2023-01-04 | End: 2023-01-20

## 2023-01-04 RX ADMIN — Medication 975 MILLIGRAM(S): at 17:34

## 2023-01-04 RX ADMIN — OLANZAPINE 2.5 MILLIGRAM(S): 15 TABLET, FILM COATED ORAL at 09:17

## 2023-01-04 RX ADMIN — Medication 250 MILLIGRAM(S): at 17:32

## 2023-01-04 RX ADMIN — SERTRALINE 25 MILLIGRAM(S): 25 TABLET, FILM COATED ORAL at 12:07

## 2023-01-04 RX ADMIN — Medication 975 MILLIGRAM(S): at 17:40

## 2023-01-04 RX ADMIN — HALOPERIDOL DECANOATE 2 MILLIGRAM(S): 100 INJECTION INTRAMUSCULAR at 22:59

## 2023-01-04 RX ADMIN — OLANZAPINE 2.5 MILLIGRAM(S): 15 TABLET, FILM COATED ORAL at 16:38

## 2023-01-04 RX ADMIN — ENOXAPARIN SODIUM 40 MILLIGRAM(S): 100 INJECTION SUBCUTANEOUS at 12:54

## 2023-01-04 RX ADMIN — HALOPERIDOL DECANOATE 1 MILLIGRAM(S): 100 INJECTION INTRAMUSCULAR at 12:07

## 2023-01-04 NOTE — PROGRESS NOTE ADULT - PROBLEM SELECTOR PLAN 1
no visible trauma , no Tenderness to palpations , suspect radiculopathy , given h/o vertebral fx , will attempt to obtain MRI   Check MRI T/L spine with sedation   Pain control with standing tylenol and lidocaine patch   PT post MRI   Fall precautions
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
## 2023-01-04 NOTE — PROGRESS NOTE ADULT - PROBLEM SELECTOR PLAN 5
Diet: easy to chew  DVT: lovenox  Dispo: pending improvement in agitation, MRI, PT eval, possible inpt psych    D/w daughter 1/3 updated on full plan of care
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
## 2023-01-04 NOTE — PROGRESS NOTE ADULT - SUBJECTIVE AND OBJECTIVE BOX
Patient is a 88y old  Female who presents with a chief complaint of Right lower extremity pain (03 Jan 2023 13:16)      SUBJECTIVE / OVERNIGHT EVENTS: pt agitated wants to go home, angry, does not want to go to facility     MEDICATIONS  (STANDING):  acetaminophen     Tablet .. 975 milliGRAM(s) Oral every 12 hours  clonazePAM  Tablet 0.5 milliGRAM(s) Oral at bedtime  enoxaparin Injectable 40 milliGRAM(s) SubCutaneous every 24 hours  haloperidol    Injectable 1 milliGRAM(s) IV Push daily  haloperidol    Injectable 2 milliGRAM(s) IV Push at bedtime  lidocaine   4% Patch 1 Patch Transdermal daily  metoprolol succinate ER 50 milliGRAM(s) Oral daily  OLANZapine 2.5 milliGRAM(s) Oral <User Schedule>  OLANZapine 5 milliGRAM(s) Oral at bedtime  senna 2 Tablet(s) Oral at bedtime  sertraline 25 milliGRAM(s) Oral daily  valproic  acid Syrup 250 milliGRAM(s) Oral two times a day    MEDICATIONS  (PRN):  aluminum hydroxide/magnesium hydroxide/simethicone Suspension 30 milliLiter(s) Oral every 4 hours PRN Dyspepsia  bisacodyl 5 milliGRAM(s) Oral every 12 hours PRN Constipation  melatonin 3 milliGRAM(s) Oral at bedtime PRN Insomnia  OLANZapine Injectable 2.5 milliGRAM(s) IntraMuscular every 6 hours PRN Agitation  ondansetron Injectable 4 milliGRAM(s) IV Push every 8 hours PRN Nausea and/or Vomiting        CAPILLARY BLOOD GLUCOSE        I&O's Summary    03 Jan 2023 07:01  -  04 Jan 2023 07:00  --------------------------------------------------------  IN: 480 mL / OUT: 0 mL / NET: 480 mL    04 Jan 2023 07:01  -  04 Jan 2023 13:11  --------------------------------------------------------  IN: 240 mL / OUT: 0 mL / NET: 240 mL        PHYSICAL EXAM:  GENERAL: NAD,frail  HEAD:  Atraumatic, Normocephalic  EYES:  conjunctiva and sclera clear  NECK:  No JVD  CHEST/LUNG: Clear to auscultation bilaterally; No wheeze  HEART: Regular rate and rhythm; S1S2  ABDOMEN: Soft, Nontender, Nondistended; Bowel sounds present  EXTREMITIES:  2+ Peripheral Pulses, No clubbing, cyanosis, or edema  PSYCH: AAOx2    LABS:                    RADIOLOGY & ADDITIONAL TESTS:    Imaging Personally Reviewed:    Consultant(s) Notes Reviewed:      Care Discussed with Consultants/Other Providers:

## 2023-01-05 DIAGNOSIS — M54.9 DORSALGIA, UNSPECIFIED: ICD-10-CM

## 2023-01-05 PROCEDURE — 72146 MRI CHEST SPINE W/O DYE: CPT | Mod: 26

## 2023-01-05 PROCEDURE — 72148 MRI LUMBAR SPINE W/O DYE: CPT | Mod: 26

## 2023-01-05 PROCEDURE — 99233 SBSQ HOSP IP/OBS HIGH 50: CPT

## 2023-01-05 PROCEDURE — 93010 ELECTROCARDIOGRAM REPORT: CPT

## 2023-01-05 RX ORDER — OXYCODONE HYDROCHLORIDE 5 MG/1
2.5 TABLET ORAL EVERY 6 HOURS
Refills: 0 | Status: DISCONTINUED | OUTPATIENT
Start: 2023-01-05 | End: 2023-01-05

## 2023-01-05 RX ORDER — OXYCODONE HYDROCHLORIDE 5 MG/1
5 TABLET ORAL DAILY
Refills: 0 | Status: DISCONTINUED | OUTPATIENT
Start: 2023-01-05 | End: 2023-01-06

## 2023-01-05 RX ADMIN — ENOXAPARIN SODIUM 40 MILLIGRAM(S): 100 INJECTION SUBCUTANEOUS at 18:02

## 2023-01-05 RX ADMIN — HALOPERIDOL DECANOATE 2 MILLIGRAM(S): 100 INJECTION INTRAMUSCULAR at 22:14

## 2023-01-05 RX ADMIN — HALOPERIDOL DECANOATE 1 MILLIGRAM(S): 100 INJECTION INTRAMUSCULAR at 12:28

## 2023-01-05 RX ADMIN — OLANZAPINE 2.5 MILLIGRAM(S): 15 TABLET, FILM COATED ORAL at 10:15

## 2023-01-05 RX ADMIN — Medication 250 MILLIGRAM(S): at 18:05

## 2023-01-05 NOTE — PROGRESS NOTE ADULT - SUBJECTIVE AND OBJECTIVE BOX
Patient is a 88y old  Female who presents with a chief complaint of Right lower extremity pain (04 Jan 2023 13:09)      SUBJECTIVE / OVERNIGHT EVENTS: pt agitated, combative, angry    MEDICATIONS  (STANDING):  acetaminophen     Tablet .. 975 milliGRAM(s) Oral every 12 hours  clonazePAM  Tablet 0.5 milliGRAM(s) Oral at bedtime  enoxaparin Injectable 40 milliGRAM(s) SubCutaneous every 24 hours  haloperidol    Injectable 1 milliGRAM(s) IV Push daily  haloperidol    Injectable 2 milliGRAM(s) IV Push at bedtime  lidocaine   4% Patch 1 Patch Transdermal daily  metoprolol succinate ER 50 milliGRAM(s) Oral daily  OLANZapine 2.5 milliGRAM(s) Oral <User Schedule>  OLANZapine 5 milliGRAM(s) Oral at bedtime  senna 2 Tablet(s) Oral at bedtime  sertraline 25 milliGRAM(s) Oral daily  valproic  acid Syrup 250 milliGRAM(s) Oral two times a day    MEDICATIONS  (PRN):  aluminum hydroxide/magnesium hydroxide/simethicone Suspension 30 milliLiter(s) Oral every 4 hours PRN Dyspepsia  bisacodyl 5 milliGRAM(s) Oral every 12 hours PRN Constipation  melatonin 3 milliGRAM(s) Oral at bedtime PRN Insomnia  OLANZapine Injectable 2.5 milliGRAM(s) IntraMuscular every 6 hours PRN Agitation  ondansetron Injectable 4 milliGRAM(s) IV Push every 8 hours PRN Nausea and/or Vomiting        CAPILLARY BLOOD GLUCOSE        I&O's Summary    04 Jan 2023 07:01  -  05 Jan 2023 07:00  --------------------------------------------------------  IN: 490 mL / OUT: 0 mL / NET: 490 mL        PHYSICAL EXAM:  GENERAL: NAD,frail  HEAD:  Atraumatic, Normocephalic  EYES:  conjunctiva and sclera clear  NECK:  No JVD  CHEST/LUNG: Clear to auscultation bilaterally; No wheeze  HEART: Regular rate and rhythm; S1S2  ABDOMEN: Soft, Nontender, Nondistended; Bowel sounds present  EXTREMITIES:  2+ Peripheral Pulses  PSYCH: AAOx1-2  LABS:                    RADIOLOGY & ADDITIONAL TESTS:    Imaging Personally Reviewed:    Consultant(s) Notes Reviewed:      Care Discussed with Consultants/Other Providers:

## 2023-01-05 NOTE — PROGRESS NOTE ADULT - PROBLEM SELECTOR PLAN 1
no visible trauma , no Tenderness to palpations , suspect radiculopathy , given h/o vertebral fx , will obtain MRI   Check MRI T/L spine with sedation   Pain control with standing tylenol and lidocaine patch   PT post MRI   Fall precautions

## 2023-01-05 NOTE — PRE-ANESTHESIA EVALUATION ADULT - NSANTHPMHFT_GEN_ALL_CORE
Pt 1st trimester screen results obt  Reviewed By DR Anthony Ruiz  Low risk for trisomy 18/13/21  Less than 1 in [id number] risk for trisomy 18/13/21    Report sent for scanning into pt record
87 yo F h/o bipolar disorder with paranoia / delusions, a-fib, asthma, GERD, h/o CVA here for thoracic and lumbar spine MRI  Patient unable to give medical history, only complains of back pain  No cardiopulmonary disease other than a-fib as per daughter
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
## 2023-01-05 NOTE — BH CONSULTATION LIAISON PROGRESS NOTE - ATTENDING COMMENTS
I have attempted to see this patient twice in person. Chart, labs, meds reviewed. I agree with resident's assessment and plan. Maintain 1:1.

## 2023-01-05 NOTE — PRE-ANESTHESIA EVALUATION ADULT - NSANTHADDINFOFT_GEN_ALL_CORE
Discussed case with patient's attending Dr. Michelle and patient's daughter. Patient initially very against receiving MRI or sedation. After speaking for some time, patient is agreeable. Daughter gives consent to proceed with sedation. We discussed that risks of general anesthesia likely don't outweigh the benefits of the MRI and that we would rather cancel the MRI rather than proceed with general anesthesia. Dr. Michelle and patient's daughter agree with the plan.

## 2023-01-06 PROCEDURE — 99233 SBSQ HOSP IP/OBS HIGH 50: CPT

## 2023-01-06 PROCEDURE — 99231 SBSQ HOSP IP/OBS SF/LOW 25: CPT | Mod: FS

## 2023-01-06 PROCEDURE — 99285 EMERGENCY DEPT VISIT HI MDM: CPT

## 2023-01-06 RX ORDER — CLONAZEPAM 1 MG
0.5 TABLET ORAL AT BEDTIME
Refills: 0 | Status: DISCONTINUED | OUTPATIENT
Start: 2023-01-06 | End: 2023-01-13

## 2023-01-06 RX ORDER — TRAMADOL HYDROCHLORIDE 50 MG/1
50 TABLET ORAL EVERY 6 HOURS
Refills: 0 | Status: DISCONTINUED | OUTPATIENT
Start: 2023-01-06 | End: 2023-01-07

## 2023-01-06 RX ADMIN — LIDOCAINE 1 PATCH: 4 CREAM TOPICAL at 23:33

## 2023-01-06 RX ADMIN — ENOXAPARIN SODIUM 40 MILLIGRAM(S): 100 INJECTION SUBCUTANEOUS at 17:33

## 2023-01-06 RX ADMIN — Medication 250 MILLIGRAM(S): at 17:34

## 2023-01-06 RX ADMIN — SERTRALINE 25 MILLIGRAM(S): 25 TABLET, FILM COATED ORAL at 10:48

## 2023-01-06 RX ADMIN — Medication 50 MILLIGRAM(S): at 10:27

## 2023-01-06 RX ADMIN — SENNA PLUS 2 TABLET(S): 8.6 TABLET ORAL at 22:00

## 2023-01-06 RX ADMIN — HALOPERIDOL DECANOATE 1 MILLIGRAM(S): 100 INJECTION INTRAMUSCULAR at 10:48

## 2023-01-06 RX ADMIN — Medication 975 MILLIGRAM(S): at 17:33

## 2023-01-06 RX ADMIN — LIDOCAINE 1 PATCH: 4 CREAM TOPICAL at 19:33

## 2023-01-06 RX ADMIN — HALOPERIDOL DECANOATE 2 MILLIGRAM(S): 100 INJECTION INTRAMUSCULAR at 22:00

## 2023-01-06 RX ADMIN — Medication 975 MILLIGRAM(S): at 10:26

## 2023-01-06 RX ADMIN — LIDOCAINE 1 PATCH: 4 CREAM TOPICAL at 10:46

## 2023-01-06 NOTE — CONSULT NOTE ADULT - ASSESSMENT
88 yr old female with gerd, asthma,  anxiety,  questionable CVA in 2007, diverticulosis, GERD, and previous vertebral fracture, recent admission for Covid ( Sep ’22 ), bipolar / schizophrenia   plan: talked to patient at length about trying pain medication and that they are meant to help her  she agreed for pain control  consult chronic pain.  no acute neurosurgical intervention.   plan per primary team

## 2023-01-06 NOTE — BH CONSULTATION LIAISON PROGRESS NOTE - NSBHFUPINTERVALHXFT_PSY_A_CORE
Pt seen at bedside by resident. Pt observed to be more calm today. Agitation has attenuated. Pt demonstrating improved insight stating, "I know its bothersome when I yell. But, I need help. I understand that is bad to do." Denies SIIP/HIIP. Reports taking Tylenol with good effect. Reports "I know I am anti medication. But, the tylenol helped tremendously."

## 2023-01-06 NOTE — CONSULT NOTE ADULT - SUBJECTIVE AND OBJECTIVE BOX
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  Patient seen by behavior health due to behavior deemed not to have capacity.  MRI was obtained with anesthesia on 1/5 that shows lumbar stenosis.  Patient has been refusing all pain medication because she "does not want to be drugged"  She even refused tylenol.  Her overall plan is once she is ambulating independently to be discharged to inpatient psych.    Pain describes pain to be as non radiating but "squeezing her right leg" .  When asked if she would ever want surgery she said she did not want it.    < from: MR Lumbar Spine No Cont (01.05.23 @ 15:15) >    L1-L2: Bulge ligamentous hypertrophy and facet joint hypertrophic change   contributes to a mild to moderate central stenosis  L2-L3: Bulge ligamentous hypertrophy and facet joint hypertrophic change   with a mild to moderate stenosis  L3-L4: All bulge ligamentous hypertrophy and facet jointhypertrophic   change with a moderate to more severe central stenosis suggested. Disc   material encroaches on the right neural foramen at this level all  L4-L5: Bulge ligamentous hypertrophy and facet joint hypertrophic change   contributes to a moderate to severe central stenosis  L5-S1: Bulge ligamentous hypertrophy and facet joint hypertrophic change   with a mild degree of stenosis    < end of copied text >       Exam: alert oriented to name and place and year  ue 5/5   le pain limited but left and right 5/5 with encouragement.    sensation is intact

## 2023-01-06 NOTE — PROGRESS NOTE ADULT - PROBLEM SELECTOR PLAN 5
Diet: easy to chew  DVT: lovenox  Dispo: inpatient psych vs [facility] pending PT re-eval     D/w daughter 1/6 updated on full plan of care

## 2023-01-06 NOTE — PROGRESS NOTE ADULT - SUBJECTIVE AND OBJECTIVE BOX
Patient is a 88y old  Female who presents with a chief complaint of Right lower extremity pain (06 Jan 2023 10:28)      SUBJECTIVE / OVERNIGHT EVENTS: pt more calm today, less agitated     MEDICATIONS  (STANDING):  acetaminophen     Tablet .. 975 milliGRAM(s) Oral every 12 hours  clonazePAM  Tablet 0.5 milliGRAM(s) Oral at bedtime  enoxaparin Injectable 40 milliGRAM(s) SubCutaneous every 24 hours  haloperidol    Injectable 1 milliGRAM(s) IV Push daily  haloperidol    Injectable 2 milliGRAM(s) IV Push at bedtime  lidocaine   4% Patch 1 Patch Transdermal daily  metoprolol succinate ER 50 milliGRAM(s) Oral daily  senna 2 Tablet(s) Oral at bedtime  sertraline 25 milliGRAM(s) Oral daily  valproic  acid Syrup 250 milliGRAM(s) Oral two times a day    MEDICATIONS  (PRN):  aluminum hydroxide/magnesium hydroxide/simethicone Suspension 30 milliLiter(s) Oral every 4 hours PRN Dyspepsia  bisacodyl 5 milliGRAM(s) Oral every 12 hours PRN Constipation  melatonin 3 milliGRAM(s) Oral at bedtime PRN Insomnia  OLANZapine Injectable 2.5 milliGRAM(s) IntraMuscular every 6 hours PRN Agitation  ondansetron Injectable 4 milliGRAM(s) IV Push every 8 hours PRN Nausea and/or Vomiting  traMADol 50 milliGRAM(s) Oral every 6 hours PRN Moderate Pain (4 - 6)        CAPILLARY BLOOD GLUCOSE        I&O's Summary    05 Jan 2023 07:01  -  06 Jan 2023 07:00  --------------------------------------------------------  IN: 440 mL / OUT: 0 mL / NET: 440 mL        PHYSICAL EXAM:  GENERAL: NAD, well-developed  HEAD:  Atraumatic, Normocephalic  EYES: EOMI, PERRLA, conjunctiva and sclera clear  NECK: Supple, No JVD  CHEST/LUNG: Clear to auscultation bilaterally; No wheeze  HEART: Regular rate and rhythm; No murmurs, rubs, or gallops  ABDOMEN: Soft, Nontender, Nondistended; Bowel sounds present  EXTREMITIES:  2+ Peripheral Pulses, No clubbing, cyanosis, or edema  PSYCH: AAOx3  NEUROLOGY: non-focal  SKIN: No rashes or lesions    LABS:                    RADIOLOGY & ADDITIONAL TESTS:    Imaging Personally Reviewed:    Consultant(s) Notes Reviewed:      Care Discussed with Consultants/Other Providers:   Patient is a 88y old  Female who presents with a chief complaint of Right lower extremity pain (06 Jan 2023 10:28)      SUBJECTIVE / OVERNIGHT EVENTS: pt more calm today, less agitated, appears comfortable, back pain is better     MEDICATIONS  (STANDING):  acetaminophen     Tablet .. 975 milliGRAM(s) Oral every 12 hours  clonazePAM  Tablet 0.5 milliGRAM(s) Oral at bedtime  enoxaparin Injectable 40 milliGRAM(s) SubCutaneous every 24 hours  haloperidol    Injectable 1 milliGRAM(s) IV Push daily  haloperidol    Injectable 2 milliGRAM(s) IV Push at bedtime  lidocaine   4% Patch 1 Patch Transdermal daily  metoprolol succinate ER 50 milliGRAM(s) Oral daily  senna 2 Tablet(s) Oral at bedtime  sertraline 25 milliGRAM(s) Oral daily  valproic  acid Syrup 250 milliGRAM(s) Oral two times a day    MEDICATIONS  (PRN):  aluminum hydroxide/magnesium hydroxide/simethicone Suspension 30 milliLiter(s) Oral every 4 hours PRN Dyspepsia  bisacodyl 5 milliGRAM(s) Oral every 12 hours PRN Constipation  melatonin 3 milliGRAM(s) Oral at bedtime PRN Insomnia  OLANZapine Injectable 2.5 milliGRAM(s) IntraMuscular every 6 hours PRN Agitation  ondansetron Injectable 4 milliGRAM(s) IV Push every 8 hours PRN Nausea and/or Vomiting  traMADol 50 milliGRAM(s) Oral every 6 hours PRN Moderate Pain (4 - 6)        CAPILLARY BLOOD GLUCOSE        I&O's Summary    05 Jan 2023 07:01  -  06 Jan 2023 07:00  --------------------------------------------------------  IN: 440 mL / OUT: 0 mL / NET: 440 mL        PHYSICAL EXAM:  GENERAL: NAD, frail  HEAD:  Atraumatic, Normocephalic  EYES:  conjunctiva and sclera clear  NECK:  No JVD  CHEST/LUNG: Clear to auscultation bilaterally; No wheeze  HEART: Regular rate and rhythm; S1S2  ABDOMEN: Soft, Nontender, Nondistended; Bowel sounds present  EXTREMITIES:  2+ Peripheral Pulses  PSYCH: AAOx1-2    LABS:                    RADIOLOGY & ADDITIONAL TESTS:    Imaging Personally Reviewed:    Consultant(s) Notes Reviewed:      Care Discussed with Consultants/Other Providers:
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
## 2023-01-06 NOTE — PROGRESS NOTE ADULT - PROBLEM SELECTOR PLAN 1
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 Evolution of the fracture at L3 that was identified at the time of   1/19/2010 study now with loss of height of the vertebral body return of the normal marrow signal  No neurosurgical intervention   Pain control with standing tylenol, tramadol prn and lidocaine patch   PT-[facility]   Fall precautions MRI T/L spine: stenosis L3-4 and L4-5. Evolution of the fracture at L3 that was identified at the time of 1/19/2010 study now with loss of height of the vertebral body return of the normal marrow signal  No neurosurgical intervention   Pain control with standing tylenol, tramadol prn and lidocaine patch   PT-[facility]   Fall precautions
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
## 2023-01-07 PROCEDURE — 99232 SBSQ HOSP IP/OBS MODERATE 35: CPT | Mod: GC

## 2023-01-07 PROCEDURE — 99233 SBSQ HOSP IP/OBS HIGH 50: CPT

## 2023-01-07 RX ADMIN — Medication 250 MILLIGRAM(S): at 06:00

## 2023-01-07 RX ADMIN — LIDOCAINE 1 PATCH: 4 CREAM TOPICAL at 11:01

## 2023-01-07 RX ADMIN — LIDOCAINE 1 PATCH: 4 CREAM TOPICAL at 19:33

## 2023-01-07 RX ADMIN — Medication 50 MILLIGRAM(S): at 06:00

## 2023-01-07 RX ADMIN — Medication 975 MILLIGRAM(S): at 06:40

## 2023-01-07 RX ADMIN — LIDOCAINE 1 PATCH: 4 CREAM TOPICAL at 23:24

## 2023-01-07 RX ADMIN — Medication 975 MILLIGRAM(S): at 17:44

## 2023-01-07 RX ADMIN — Medication 975 MILLIGRAM(S): at 17:10

## 2023-01-07 RX ADMIN — Medication 975 MILLIGRAM(S): at 06:00

## 2023-01-07 RX ADMIN — SERTRALINE 25 MILLIGRAM(S): 25 TABLET, FILM COATED ORAL at 11:01

## 2023-01-07 RX ADMIN — HALOPERIDOL DECANOATE 1 MILLIGRAM(S): 100 INJECTION INTRAMUSCULAR at 11:01

## 2023-01-07 RX ADMIN — Medication 250 MILLIGRAM(S): at 17:10

## 2023-01-07 RX ADMIN — SENNA PLUS 2 TABLET(S): 8.6 TABLET ORAL at 22:47

## 2023-01-07 RX ADMIN — ENOXAPARIN SODIUM 40 MILLIGRAM(S): 100 INJECTION SUBCUTANEOUS at 13:37

## 2023-01-07 RX ADMIN — Medication 0.5 MILLIGRAM(S): at 22:47

## 2023-01-07 NOTE — PROGRESS NOTE ADULT - PROBLEM SELECTOR PLAN 1
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 Evolution of the fracture at L3 that was identified at the time of 1/19/2010 study now with loss of height of the vertebral body return of the normal marrow signal  No neurosurgical intervention   Pain control with standing tylenol, and lidocaine patch, can give additional tylenol as long as 3g not exceeded. Per chronic pain chart note can consider short trial of celebrex but would avoid opioid medications and gabapentinoids  PT-[facility]   Fall precautions
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
## 2023-01-07 NOTE — BH CONSULTATION LIAISON PROGRESS NOTE - NSBHCHARTREVIEWLAB_PSY_A_CORE FT
none new
                      13.0   6.90  )-----------( 258      ( 01 Jan 2023 07:51 )             41.0     01-01    144  |  103  |  23  ----------------------------<  85  4.3   |  30  |  0.59    Ca    9.9      01 Jan 2023 07:50  Phos  3.8     01-01  Mg     2.2     01-01    TPro  7.2  /  Alb  3.9  /  TBili  1.2  /  DBili  x   /  AST  14  /  ALT  7<L>  /  AlkPhos  99  01-01  

## 2023-01-07 NOTE — PROGRESS NOTE ADULT - SUBJECTIVE AND OBJECTIVE BOX
Indira Costa MD  Division of Hospital Medicine  Pager: [phone] or reachable on MS Teams  After hours please page [phone]    PROGRESS NOTE:     Patient is a 88y old  Female who presents with a chief complaint of Right lower extremity pain (06 Jan 2023 13:32)      SUBJECTIVE / OVERNIGHT EVENTS: No acute events overnight, pt seen and examined this AM. Pt states she would like to ambulate to the bathroom, she also states she would like larger meal portions. Denies chest pain, shortness of breath. Appears calmer however still difficult to redirect.     ADDITIONAL REVIEW OF SYSTEMS: neg     MEDICATIONS  (STANDING):  acetaminophen     Tablet .. 975 milliGRAM(s) Oral every 12 hours  clonazePAM  Tablet 0.5 milliGRAM(s) Oral at bedtime  enoxaparin Injectable 40 milliGRAM(s) SubCutaneous every 24 hours  haloperidol    Injectable 1 milliGRAM(s) IV Push daily  haloperidol    Injectable 2 milliGRAM(s) IV Push at bedtime  lidocaine   4% Patch 1 Patch Transdermal daily  metoprolol succinate ER 50 milliGRAM(s) Oral daily  senna 2 Tablet(s) Oral at bedtime  sertraline 25 milliGRAM(s) Oral daily  valproic  acid Syrup 250 milliGRAM(s) Oral two times a day    MEDICATIONS  (PRN):  aluminum hydroxide/magnesium hydroxide/simethicone Suspension 30 milliLiter(s) Oral every 4 hours PRN Dyspepsia  bisacodyl 5 milliGRAM(s) Oral every 12 hours PRN Constipation  melatonin 3 milliGRAM(s) Oral at bedtime PRN Insomnia  OLANZapine Injectable 2.5 milliGRAM(s) IntraMuscular every 6 hours PRN Agitation  ondansetron Injectable 4 milliGRAM(s) IV Push every 8 hours PRN Nausea and/or Vomiting      CAPILLARY BLOOD GLUCOSE        I&O's Summary    06 Jan 2023 07:01  -  07 Jan 2023 07:00  --------------------------------------------------------  IN: 540 mL / OUT: 0 mL / NET: 540 mL    07 Jan 2023 07:01  -  07 Jan 2023 18:04  --------------------------------------------------------  IN: 0 mL / OUT: 1000 mL / NET: -1000 mL        PHYSICAL EXAM:  Vital Signs Last 24 Hrs  T(C): 36.6 (07 Jan 2023 12:11), Max: 36.6 (07 Jan 2023 12:11)  T(F): 97.8 (07 Jan 2023 12:11), Max: 97.8 (07 Jan 2023 12:11)  HR: 60 (07 Jan 2023 12:11) (60 - 66)  BP: 134/83 (07 Jan 2023 12:11) (98/59 - 147/83)  BP(mean): --  RR: 18 (07 Jan 2023 12:11) (18 - 18)  SpO2: 92% (07 Jan 2023 12:11) (92% - 93%)    Parameters below as of 07 Jan 2023 12:11  Patient On (Oxygen Delivery Method): room air        GENERAL: NAD, frail  HEAD:  Atraumatic, Normocephalic  EYES:  conjunctiva and sclera clear  NECK:  No JVD  CHEST/LUNG: Clear to auscultation bilaterally; No wheeze  HEART: Regular rate and rhythm; S1S2  ABDOMEN: Soft, Nontender, Nondistended; Bowel sounds present  EXTREMITIES:  2+ Peripheral Pulses  PSYCH: AAOx1-2      LABS:                      RADIOLOGY & ADDITIONAL TESTS:  Results Reviewed:   Imaging Personally Reviewed:  Electrocardiogram Personally Reviewed:    COORDINATION OF CARE:  Care Discussed with Consultants/Other Providers [Y/N]:  Prior or Outpatient Records Reviewed [Y/N]:

## 2023-01-07 NOTE — PROGRESS NOTE ADULT - PROBLEM SELECTOR PLAN 5
Diet: easy to chew w/ ensure   DVT: lovenox  Dispo: inpatient psych vs [facility] pending PT re-eval     D/w daughter 1/6 updated on full plan of care
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
## 2023-01-07 NOTE — BH CONSULTATION LIAISON PROGRESS NOTE - NSBHATTESTCOMMENTATTENDFT_PSY_A_CORE
This is an 88-year-old HF patient, , domiciled with daughter, PMH of asthma, GERD, sciatica with previous vertebral fracture, a PPH of Bipolar I disorder, schizophrenia, admitted for RLE pain and difficulty ambulating, with psychiatry consulted for evaluation of agitation.    At this time, patient is unable to care for self due to worsening psychotic decompensation, and would benefit from involuntary inpatient psychiatric hospitalization. On exam today, pt is more responsive to redirection and less agitated. AOx1-2, intermittently confused. I have seen and evaluated this patient myself. Chart, labs, meds reviewed. I agree with resident's assessment and plan.     This is an 88-year-old F patient, , domiciled with daughter, PMH of asthma, GERD, sciatica with previous vertebral fracture, a PPH of Bipolar I disorder, schizophrenia, admitted for RLE pain and difficulty ambulating, with psychiatry consulted for evaluation of agitation.    At this time, patient is unable to care for self due to worsening psychotic decompensation, and would benefit from involuntary inpatient psychiatric hospitalization. On exam today, pt is more responsive to redirection and less agitated. AOx1-2, intermittently confused. I have seen and evaluated this patient myself. Chart, labs, meds reviewed. I agree with resident's assessment and plan.

## 2023-01-07 NOTE — BH CONSULTATION LIAISON PROGRESS NOTE - NSBHFUPINTERVALHXFT_PSY_A_CORE
Patient was seen at bedside. She was notably agitated, yelling for a bedpan and refusing to engage with treatment team.

## 2023-01-08 PROCEDURE — 99232 SBSQ HOSP IP/OBS MODERATE 35: CPT

## 2023-01-08 PROCEDURE — 99232 SBSQ HOSP IP/OBS MODERATE 35: CPT | Mod: GC

## 2023-01-08 RX ADMIN — LIDOCAINE 1 PATCH: 4 CREAM TOPICAL at 11:44

## 2023-01-08 RX ADMIN — SENNA PLUS 2 TABLET(S): 8.6 TABLET ORAL at 21:18

## 2023-01-08 RX ADMIN — Medication 975 MILLIGRAM(S): at 17:56

## 2023-01-08 RX ADMIN — HALOPERIDOL DECANOATE 1 MILLIGRAM(S): 100 INJECTION INTRAMUSCULAR at 11:44

## 2023-01-08 RX ADMIN — Medication 0.5 MILLIGRAM(S): at 21:18

## 2023-01-08 RX ADMIN — Medication 50 MILLIGRAM(S): at 05:27

## 2023-01-08 RX ADMIN — SERTRALINE 25 MILLIGRAM(S): 25 TABLET, FILM COATED ORAL at 11:44

## 2023-01-08 RX ADMIN — Medication 975 MILLIGRAM(S): at 18:33

## 2023-01-08 RX ADMIN — LIDOCAINE 1 PATCH: 4 CREAM TOPICAL at 19:37

## 2023-01-08 RX ADMIN — Medication 975 MILLIGRAM(S): at 05:27

## 2023-01-08 RX ADMIN — ENOXAPARIN SODIUM 40 MILLIGRAM(S): 100 INJECTION SUBCUTANEOUS at 14:30

## 2023-01-08 RX ADMIN — Medication 975 MILLIGRAM(S): at 06:24

## 2023-01-08 RX ADMIN — LIDOCAINE 1 PATCH: 4 CREAM TOPICAL at 23:19

## 2023-01-08 RX ADMIN — Medication 250 MILLIGRAM(S): at 17:56

## 2023-01-08 NOTE — BH CONSULTATION LIAISON PROGRESS NOTE - NSBHFUPINTERVALHXFT_PSY_A_CORE
Patient was seen at bedside. She was notably agitated, stating that she needs to go home to take care of her family (her 2 adult children, her dog, and a cat). States that she has not seen a "specialist" while she has been here and that her rights are being violated.

## 2023-01-08 NOTE — BH CONSULTATION LIAISON PROGRESS NOTE - NSBHATTESTCOMMENTATTENDFT_PSY_A_CORE
This is an 88-year-old F patient, , domiciled with daughter, PMH of asthma, GERD, sciatica with previous vertebral fracture, a PPH of Bipolar I disorder, schizophrenia, admitted for RLE pain and difficulty ambulating, with psychiatry consulted for evaluation of agitation.    At this time, patient is unable to care for self due to worsening psychotic decompensation, and would benefit from involuntary inpatient psychiatric hospitalization.  I have seen and evaluated this patient myself. Chart, labs, meds reviewed. I agree with resident's assessment and plan.
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
## 2023-01-08 NOTE — PROGRESS NOTE ADULT - SUBJECTIVE AND OBJECTIVE BOX
Indira Costa MD  Division of Hospital Medicine  Pager: [phone] or reachable on MS Teams  After hours please page [phone]    PROGRESS NOTE:     Patient is a 88y old  Female who presents with a chief complaint of Right lower extremity pain (07 Jan 2023 18:04)      SUBJECTIVE / OVERNIGHT EVENTS: No acute events overnight, pt seen and evaluated this AM. Resting in bad, complaining that she is being held hostage and that she does not want to eat any processed foods. Denies chest pain, shortness of breath. Easily agitated and emotionally labile    ADDITIONAL REVIEW OF SYSTEMS: above    MEDICATIONS  (STANDING):  acetaminophen     Tablet .. 975 milliGRAM(s) Oral every 12 hours  clonazePAM  Tablet 0.5 milliGRAM(s) Oral at bedtime  enoxaparin Injectable 40 milliGRAM(s) SubCutaneous every 24 hours  haloperidol    Injectable 1 milliGRAM(s) IV Push daily  haloperidol    Injectable 2 milliGRAM(s) IV Push at bedtime  lidocaine   4% Patch 1 Patch Transdermal daily  metoprolol succinate ER 50 milliGRAM(s) Oral daily  senna 2 Tablet(s) Oral at bedtime  sertraline 25 milliGRAM(s) Oral daily  valproic  acid Syrup 250 milliGRAM(s) Oral two times a day    MEDICATIONS  (PRN):  aluminum hydroxide/magnesium hydroxide/simethicone Suspension 30 milliLiter(s) Oral every 4 hours PRN Dyspepsia  bisacodyl 5 milliGRAM(s) Oral every 12 hours PRN Constipation  melatonin 3 milliGRAM(s) Oral at bedtime PRN Insomnia  OLANZapine Injectable 2.5 milliGRAM(s) IntraMuscular every 6 hours PRN Agitation  ondansetron Injectable 4 milliGRAM(s) IV Push every 8 hours PRN Nausea and/or Vomiting      CAPILLARY BLOOD GLUCOSE        I&O's Summary    07 Jan 2023 07:01  -  08 Jan 2023 07:00  --------------------------------------------------------  IN: 300 mL / OUT: 1000 mL / NET: -700 mL    08 Jan 2023 07:01  -  08 Jan 2023 17:03  --------------------------------------------------------  IN: 720 mL / OUT: 0 mL / NET: 720 mL        PHYSICAL EXAM:  Vital Signs Last 24 Hrs  T(C): 36.8 (08 Jan 2023 09:57), Max: 36.9 (07 Jan 2023 20:04)  T(F): 98.2 (08 Jan 2023 09:57), Max: 98.4 (07 Jan 2023 20:04)  HR: 74 (08 Jan 2023 09:57) (62 - 78)  BP: 140/72 (08 Jan 2023 09:57) (131/64 - 151/75)  BP(mean): --  RR: 18 (08 Jan 2023 09:57) (18 - 18)  SpO2: 97% (08 Jan 2023 09:57) (93% - 97%)    Parameters below as of 08 Jan 2023 09:57  Patient On (Oxygen Delivery Method): room air      GENERAL: NAD, frail  HEAD:  Atraumatic, Normocephalic  EYES:  conjunctiva and sclera clear  NECK:  No JVD  CHEST/LUNG: Clear to auscultation bilaterally; No wheeze  HEART: Regular rate and rhythm; S1S2  ABDOMEN: Soft, Nontender, Nondistended; Bowel sounds present  EXTREMITIES:  2+ Peripheral Pulses  PSYCH: AAOx1-2    LABS:          RADIOLOGY & ADDITIONAL TESTS:  Results Reviewed:   Imaging Personally Reviewed:  Electrocardiogram Personally Reviewed:    COORDINATION OF CARE:  Care Discussed with Consultants/Other Providers [Y/N]:  Prior or Outpatient Records Reviewed [Y/N]:

## 2023-01-09 PROCEDURE — 99231 SBSQ HOSP IP/OBS SF/LOW 25: CPT

## 2023-01-09 PROCEDURE — 99232 SBSQ HOSP IP/OBS MODERATE 35: CPT

## 2023-01-09 RX ADMIN — Medication 250 MILLIGRAM(S): at 17:15

## 2023-01-09 RX ADMIN — Medication 975 MILLIGRAM(S): at 17:34

## 2023-01-09 RX ADMIN — ENOXAPARIN SODIUM 40 MILLIGRAM(S): 100 INJECTION SUBCUTANEOUS at 14:17

## 2023-01-09 RX ADMIN — LIDOCAINE 1 PATCH: 4 CREAM TOPICAL at 11:19

## 2023-01-09 RX ADMIN — Medication 0.5 MILLIGRAM(S): at 21:47

## 2023-01-09 RX ADMIN — Medication 3 MILLIGRAM(S): at 21:47

## 2023-01-09 RX ADMIN — OLANZAPINE 2.5 MILLIGRAM(S): 15 TABLET, FILM COATED ORAL at 17:17

## 2023-01-09 RX ADMIN — Medication 975 MILLIGRAM(S): at 05:25

## 2023-01-09 RX ADMIN — Medication 975 MILLIGRAM(S): at 17:15

## 2023-01-09 RX ADMIN — LIDOCAINE 1 PATCH: 4 CREAM TOPICAL at 23:25

## 2023-01-09 RX ADMIN — SERTRALINE 25 MILLIGRAM(S): 25 TABLET, FILM COATED ORAL at 11:19

## 2023-01-09 RX ADMIN — Medication 50 MILLIGRAM(S): at 05:28

## 2023-01-09 RX ADMIN — HALOPERIDOL DECANOATE 1 MILLIGRAM(S): 100 INJECTION INTRAMUSCULAR at 11:19

## 2023-01-09 RX ADMIN — HALOPERIDOL DECANOATE 2 MILLIGRAM(S): 100 INJECTION INTRAMUSCULAR at 21:54

## 2023-01-09 RX ADMIN — LIDOCAINE 1 PATCH: 4 CREAM TOPICAL at 19:23
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
## 2023-01-09 NOTE — PROGRESS NOTE ADULT - SUBJECTIVE AND OBJECTIVE BOX
PROGRESS NOTE:   Kim Chandler DO  Hospitalist  Pager [phone]  After 5pm/weekends or if no answer ext: [phone]      Patient is a 88y old  Female who presents with a chief complaint of Right lower extremity pain (08 Jan 2023 17:03)      SUBJECTIVE / OVERNIGHT EVENTS: Very agitated but staying in bed/    ADDITIONAL REVIEW OF SYSTEMS:  no fever or chills  no n/v/d    MEDICATIONS  (STANDING):  acetaminophen     Tablet .. 975 milliGRAM(s) Oral every 12 hours  clonazePAM  Tablet 0.5 milliGRAM(s) Oral at bedtime  enoxaparin Injectable 40 milliGRAM(s) SubCutaneous every 24 hours  haloperidol    Injectable 1 milliGRAM(s) IV Push daily  haloperidol    Injectable 2 milliGRAM(s) IV Push at bedtime  lidocaine   4% Patch 1 Patch Transdermal daily  metoprolol succinate ER 50 milliGRAM(s) Oral daily  senna 2 Tablet(s) Oral at bedtime  sertraline 25 milliGRAM(s) Oral daily  valproic  acid Syrup 250 milliGRAM(s) Oral two times a day    MEDICATIONS  (PRN):  aluminum hydroxide/magnesium hydroxide/simethicone Suspension 30 milliLiter(s) Oral every 4 hours PRN Dyspepsia  bisacodyl 5 milliGRAM(s) Oral every 12 hours PRN Constipation  melatonin 3 milliGRAM(s) Oral at bedtime PRN Insomnia  OLANZapine Injectable 2.5 milliGRAM(s) IntraMuscular every 6 hours PRN Agitation  ondansetron Injectable 4 milliGRAM(s) IV Push every 8 hours PRN Nausea and/or Vomiting      CAPILLARY BLOOD GLUCOSE        I&O's Summary    08 Jan 2023 07:01  -  09 Jan 2023 07:00  --------------------------------------------------------  IN: 990 mL / OUT: 0 mL / NET: 990 mL        PHYSICAL EXAM:  Vital Signs Last 24 Hrs  T(C): 36.9 (09 Jan 2023 04:54), Max: 36.9 (09 Jan 2023 04:54)  T(F): 98.4 (09 Jan 2023 04:54), Max: 98.4 (09 Jan 2023 04:54)  HR: 68 (09 Jan 2023 04:54) (66 - 68)  BP: 112/76 (09 Jan 2023 04:54) (112/76 - 117/73)  BP(mean): --  RR: 18 (09 Jan 2023 04:54) (18 - 18)  SpO2: 95% (09 Jan 2023 04:54) (93% - 95%)    Parameters below as of 09 Jan 2023 04:54  Patient On (Oxygen Delivery Method): room air        CONSTITUTIONAL: NAD, well-developed, non toxic appearing but agitated  RESPIRATORY: Normal respiratory effort; lungs are clear to auscultation bilaterally  CARDIOVASCULAR: Regular rate and rhythm, normal S1 and S2, no murmur/rub/gallop; No lower extremity edema; Peripheral pulses are 2+ bilaterally  ABDOMEN: Nontender to palpation, normoactive bowel sounds, no rebound/guarding; No hepatosplenomegaly  MUSCLOSKELETAL: no clubbing or cyanosis of digits; no joint swelling or tenderness to palpation  PSYCH: A+O to person, place, and time; affect agitated    LABS:                      RADIOLOGY & ADDITIONAL TESTS:  Results Reviewed:   Imaging Personally Reviewed:  Electrocardiogram Personally Reviewed:    COORDINATION OF CARE:  Care Discussed with Consultants/Other Providers [Y/N]:  Prior or Outpatient Records Reviewed [Y/N]:
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
## 2023-01-09 NOTE — BH CONSULTATION LIAISON PROGRESS NOTE - NSBHFUPINTERVALHXFT_PSY_A_CORE
Patient was seen at bedside. She was notably agitated, stating that she needs to go home to take care of her family (her 2 adult children, her dog, and a cat). States that she has not seen a "specialist" while she has been here and that her rights are being violated. Patient examined at bedside. Continues to exhibit agitation.  Patient examined by resident and attending at bedside. Continues to exhibit agitation. Pt asked which speciality this writer was from. When this writer stated that she was from psychiatry, pt states, "Goodbye! I want nothing to do with you! Leave my room!"

## 2023-01-10 PROCEDURE — 99231 SBSQ HOSP IP/OBS SF/LOW 25: CPT

## 2023-01-10 PROCEDURE — 99233 SBSQ HOSP IP/OBS HIGH 50: CPT

## 2023-01-10 RX ADMIN — Medication 50 MILLIGRAM(S): at 05:53

## 2023-01-10 RX ADMIN — LIDOCAINE 1 PATCH: 4 CREAM TOPICAL at 11:13

## 2023-01-10 RX ADMIN — LIDOCAINE 1 PATCH: 4 CREAM TOPICAL at 23:42

## 2023-01-10 RX ADMIN — Medication 250 MILLIGRAM(S): at 05:57

## 2023-01-10 RX ADMIN — LIDOCAINE 1 PATCH: 4 CREAM TOPICAL at 22:02

## 2023-01-10 RX ADMIN — Medication 975 MILLIGRAM(S): at 06:36

## 2023-01-10 RX ADMIN — Medication 975 MILLIGRAM(S): at 05:53

## 2023-01-10 RX ADMIN — SERTRALINE 25 MILLIGRAM(S): 25 TABLET, FILM COATED ORAL at 11:15

## 2023-01-10 RX ADMIN — Medication 250 MILLIGRAM(S): at 17:03

## 2023-01-10 RX ADMIN — HALOPERIDOL DECANOATE 1 MILLIGRAM(S): 100 INJECTION INTRAMUSCULAR at 11:13

## 2023-01-10 RX ADMIN — ENOXAPARIN SODIUM 40 MILLIGRAM(S): 100 INJECTION SUBCUTANEOUS at 14:03

## 2023-01-10 NOTE — BH CONSULTATION LIAISON PROGRESS NOTE - NSBHFUPINTERVALHXFT_PSY_A_CORE
Pt states she is fine, denies complaints. Pt states she needs to leave the hospital, doesn't belong here.  Pt denies si/hi, manic symptoms, depression. Pt reports poor sleep.
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
## 2023-01-10 NOTE — PROGRESS NOTE ADULT - SUBJECTIVE AND OBJECTIVE BOX
Patient is a 88y old  Female who presents with a chief complaint of Right lower extremity pain (09 Jan 2023 11:41)      SUBJECTIVE / OVERNIGHT EVENTS: sleeping, calm     MEDICATIONS  (STANDING):  acetaminophen     Tablet .. 975 milliGRAM(s) Oral every 12 hours  clonazePAM  Tablet 0.5 milliGRAM(s) Oral at bedtime  enoxaparin Injectable 40 milliGRAM(s) SubCutaneous every 24 hours  haloperidol    Injectable 1 milliGRAM(s) IV Push daily  haloperidol    Injectable 2 milliGRAM(s) IV Push at bedtime  lidocaine   4% Patch 1 Patch Transdermal daily  metoprolol succinate ER 50 milliGRAM(s) Oral daily  senna 2 Tablet(s) Oral at bedtime  sertraline 25 milliGRAM(s) Oral daily  valproic  acid Syrup 250 milliGRAM(s) Oral two times a day    MEDICATIONS  (PRN):  aluminum hydroxide/magnesium hydroxide/simethicone Suspension 30 milliLiter(s) Oral every 4 hours PRN Dyspepsia  bisacodyl 5 milliGRAM(s) Oral every 12 hours PRN Constipation  melatonin 3 milliGRAM(s) Oral at bedtime PRN Insomnia  OLANZapine Injectable 2.5 milliGRAM(s) IntraMuscular every 6 hours PRN Agitation  ondansetron Injectable 4 milliGRAM(s) IV Push every 8 hours PRN Nausea and/or Vomiting        CAPILLARY BLOOD GLUCOSE        I&O's Summary    09 Jan 2023 07:01  -  10 Bebeto 2023 07:00  --------------------------------------------------------  IN: 510 mL / OUT: 0 mL / NET: 510 mL    10 Bebeto 2023 07:01  -  10 Bebeto 2023 15:13  --------------------------------------------------------  IN: 480 mL / OUT: 0 mL / NET: 480 mL        PHYSICAL EXAM:  GENERAL: NAD, well-developed  HEAD:  Atraumatic, Normocephalic  NECK: Supple, No JVD  CHEST/LUNG: Clear to auscultation bilaterally; No wheeze  HEART: Regular rate and rhythm; No murmurs, rubs, or gallops  ABDOMEN: Soft, Nontender, Nondistended; Bowel sounds present  EXTREMITIES:  2+ Peripheral Pulses, No clubbing, cyanosis, or edema      LABS:                    RADIOLOGY & ADDITIONAL TESTS:    Imaging Personally Reviewed:    Consultant(s) Notes Reviewed:      Care Discussed with Consultants/Other Providers:

## 2023-01-11 PROCEDURE — 99231 SBSQ HOSP IP/OBS SF/LOW 25: CPT

## 2023-01-11 PROCEDURE — 99232 SBSQ HOSP IP/OBS MODERATE 35: CPT

## 2023-01-11 RX ADMIN — Medication 975 MILLIGRAM(S): at 03:44

## 2023-01-11 RX ADMIN — LIDOCAINE 1 PATCH: 4 CREAM TOPICAL at 19:32

## 2023-01-11 RX ADMIN — ENOXAPARIN SODIUM 40 MILLIGRAM(S): 100 INJECTION SUBCUTANEOUS at 13:05

## 2023-01-11 RX ADMIN — HALOPERIDOL DECANOATE 1 MILLIGRAM(S): 100 INJECTION INTRAMUSCULAR at 13:05

## 2023-01-11 RX ADMIN — LIDOCAINE 1 PATCH: 4 CREAM TOPICAL at 13:06

## 2023-01-11 RX ADMIN — HALOPERIDOL DECANOATE 2 MILLIGRAM(S): 100 INJECTION INTRAMUSCULAR at 21:49

## 2023-01-11 RX ADMIN — Medication 975 MILLIGRAM(S): at 04:44

## 2023-01-11 NOTE — PROGRESS NOTE ADULT - PROBLEM SELECTOR PLAN 1
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 Evolution of the fracture at L3 that was identified at the time of 1/19/2010 study now with loss of height of the vertebral body return of the normal marrow signal  No neurosurgical intervention   Pain control with standing tylenol, and lidocaine patch, can give additional tylenol as long as 3g not exceeded. Per chronic pain chart note can consider short trial of celebrex but would avoid opioid medications and gabapentinoids  Fall precautions

## 2023-01-11 NOTE — PROGRESS NOTE ADULT - SUBJECTIVE AND OBJECTIVE BOX
Patient is a 88y old  Female who presents with a chief complaint of Right lower extremity pain (10 Bebeto 2023 15:12)      SUBJECTIVE / OVERNIGHT EVENTS: pt more awake and alert today, still agitated     MEDICATIONS  (STANDING):  acetaminophen     Tablet .. 975 milliGRAM(s) Oral every 12 hours  clonazePAM  Tablet 0.5 milliGRAM(s) Oral at bedtime  enoxaparin Injectable 40 milliGRAM(s) SubCutaneous every 24 hours  haloperidol    Injectable 1 milliGRAM(s) IV Push daily  haloperidol    Injectable 2 milliGRAM(s) IV Push at bedtime  lidocaine   4% Patch 1 Patch Transdermal daily  metoprolol succinate ER 50 milliGRAM(s) Oral daily  senna 2 Tablet(s) Oral at bedtime  sertraline 25 milliGRAM(s) Oral daily  valproic  acid Syrup 250 milliGRAM(s) Oral two times a day    MEDICATIONS  (PRN):  aluminum hydroxide/magnesium hydroxide/simethicone Suspension 30 milliLiter(s) Oral every 4 hours PRN Dyspepsia  bisacodyl 5 milliGRAM(s) Oral every 12 hours PRN Constipation  melatonin 3 milliGRAM(s) Oral at bedtime PRN Insomnia  OLANZapine Injectable 2.5 milliGRAM(s) IntraMuscular every 6 hours PRN Agitation  ondansetron Injectable 4 milliGRAM(s) IV Push every 8 hours PRN Nausea and/or Vomiting        CAPILLARY BLOOD GLUCOSE        I&O's Summary    10 Bebeto 2023 07:01  -  11 Jan 2023 07:00  --------------------------------------------------------  IN: 720 mL / OUT: 300 mL / NET: 420 mL        PHYSICAL EXAM:  GENERAL: NAD, well-developed  HEAD:  Atraumatic, Normocephalic  NECK: Supple, No JVD  CHEST/LUNG: Clear to auscultation bilaterally; No wheeze  HEART: Regular rate and rhythm; No murmurs, rubs, or gallops  ABDOMEN: Soft, Nontender, Nondistended; Bowel sounds present  EXTREMITIES:  2+ Peripheral Pulses, No clubbing, cyanosis, or edema    LABS:                    RADIOLOGY & ADDITIONAL TESTS:    Imaging Personally Reviewed:    Consultant(s) Notes Reviewed:      Care Discussed with Consultants/Other Providers:
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
## 2023-01-11 NOTE — BH CONSULTATION LIAISON PROGRESS NOTE - NSBHATTESTCOMMENTATTENDFT_PSY_A_CORE
Patient is an 88 year old female, , domiciled with daughter, PMH of asthma, GERD, sciatica with previous vertebral fracture, a PPH of Bipolar I disorder, schizophrenia, admitted for RLE pain and difficulty ambulating, with psychiatry consulted for evaluation of agitation.   pt poor historian, denies hortencia, psychosis, depression. pt repeatedly stating she wants to go home. no prns overnight. Pt more calm today, may not need psych admission if pt remains calm,not needing prns. pt also now needs rehab. will cont to discuss with medical team.

## 2023-01-11 NOTE — BH CONSULTATION LIAISON PROGRESS NOTE - NSBHFUPINTERVALHXFT_PSY_A_CORE
Pt examined at bedside by resident. Appears to be resting comfortably. Expressing desire to leave the hospital. Denies SI/HI/AVH. Continues to endorse insomnia and non-localized pain.   Pt examined at bedside by resident. Appears to be resting comfortably. Expressing desire to leave the hospital. Denies SI/HI/AVH. Continues to endorse insomnia and non-localized pain. States "Just me go home. My daughter will take care of me."
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
## 2023-01-12 PROCEDURE — 99232 SBSQ HOSP IP/OBS MODERATE 35: CPT

## 2023-01-12 PROCEDURE — 99231 SBSQ HOSP IP/OBS SF/LOW 25: CPT

## 2023-01-12 RX ORDER — ACETAMINOPHEN 500 MG
650 TABLET ORAL ONCE
Refills: 0 | Status: COMPLETED | OUTPATIENT
Start: 2023-01-12 | End: 2023-01-12

## 2023-01-12 RX ADMIN — Medication 250 MILLIGRAM(S): at 18:10

## 2023-01-12 RX ADMIN — LIDOCAINE 1 PATCH: 4 CREAM TOPICAL at 13:34

## 2023-01-12 RX ADMIN — Medication 50 MILLIGRAM(S): at 06:13

## 2023-01-12 RX ADMIN — SENNA PLUS 2 TABLET(S): 8.6 TABLET ORAL at 21:35

## 2023-01-12 RX ADMIN — Medication 650 MILLIGRAM(S): at 17:50

## 2023-01-12 RX ADMIN — Medication 650 MILLIGRAM(S): at 13:32

## 2023-01-12 RX ADMIN — SERTRALINE 25 MILLIGRAM(S): 25 TABLET, FILM COATED ORAL at 13:34

## 2023-01-12 RX ADMIN — HALOPERIDOL DECANOATE 2 MILLIGRAM(S): 100 INJECTION INTRAMUSCULAR at 21:35

## 2023-01-12 RX ADMIN — ENOXAPARIN SODIUM 40 MILLIGRAM(S): 100 INJECTION SUBCUTANEOUS at 13:32

## 2023-01-12 RX ADMIN — Medication 975 MILLIGRAM(S): at 06:12

## 2023-01-12 RX ADMIN — LIDOCAINE 1 PATCH: 4 CREAM TOPICAL at 06:33

## 2023-01-12 RX ADMIN — Medication 0.5 MILLIGRAM(S): at 21:41

## 2023-01-12 RX ADMIN — HALOPERIDOL DECANOATE 1 MILLIGRAM(S): 100 INJECTION INTRAMUSCULAR at 13:33

## 2023-01-12 NOTE — BH CONSULTATION LIAISON PROGRESS NOTE - NSBHFUPINTERVALHXFT_PSY_A_CORE
Pt examined at bedside by resident. Appears to be resting comfortably and asleep when this writer came in. Pt does open her eyes but does not engage in conversation or answer this writer's questions.
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
## 2023-01-12 NOTE — BH CONSULTATION LIAISON PROGRESS NOTE - NSBHCONSFOLLOWNEEDS_PSY_ALL_CORE
pt may need psych admission/Needs further psych safety assessment prior to discharge pt may need psych admission/No psychiatric contraindications to discharge

## 2023-01-12 NOTE — BH CONSULTATION LIAISON PROGRESS NOTE - NSBHATTESTCOMMENTATTENDFT_PSY_A_CORE
Patient is an 88 year old female, , domiciled with daughter, PMH of asthma, GERD, sciatica with previous vertebral fracture, a PPH of Bipolar I disorder, schizophrenia, admitted for RLE pain and difficulty ambulating, with psychiatry consulted for evaluation of agitation.  pt denies complaints, no severe agittation, no prns needed. can change haldol to 2mg po/iv bid. pt can be d/c back home with pt services, aide, or rehas , no psych admission needed at this time

## 2023-01-12 NOTE — PROGRESS NOTE ADULT - SUBJECTIVE AND OBJECTIVE BOX
Patient is a 88y old  Female who presents with a chief complaint of Right lower extremity pain (11 Jan 2023 11:58)      SUBJECTIVE / OVERNIGHT EVENTS: agitated, wants to go home     MEDICATIONS  (STANDING):  acetaminophen     Tablet .. 975 milliGRAM(s) Oral every 12 hours  acetaminophen     Tablet .. 650 milliGRAM(s) Oral once  clonazePAM  Tablet 0.5 milliGRAM(s) Oral at bedtime  enoxaparin Injectable 40 milliGRAM(s) SubCutaneous every 24 hours  haloperidol    Injectable 1 milliGRAM(s) IV Push daily  haloperidol    Injectable 2 milliGRAM(s) IV Push at bedtime  lidocaine   4% Patch 1 Patch Transdermal daily  metoprolol succinate ER 50 milliGRAM(s) Oral daily  senna 2 Tablet(s) Oral at bedtime  sertraline 25 milliGRAM(s) Oral daily  valproic  acid Syrup 250 milliGRAM(s) Oral two times a day    MEDICATIONS  (PRN):  aluminum hydroxide/magnesium hydroxide/simethicone Suspension 30 milliLiter(s) Oral every 4 hours PRN Dyspepsia  bisacodyl 5 milliGRAM(s) Oral every 12 hours PRN Constipation  melatonin 3 milliGRAM(s) Oral at bedtime PRN Insomnia  OLANZapine Injectable 2.5 milliGRAM(s) IntraMuscular every 6 hours PRN Agitation  ondansetron Injectable 4 milliGRAM(s) IV Push every 8 hours PRN Nausea and/or Vomiting        CAPILLARY BLOOD GLUCOSE        I&O's Summary    11 Jan 2023 07:01  -  12 Jan 2023 07:00  --------------------------------------------------------  IN: 1260 mL / OUT: 600 mL / NET: 660 mL    12 Jan 2023 07:01  -  12 Jan 2023 13:22  --------------------------------------------------------  IN: 200 mL / OUT: 0 mL / NET: 200 mL        PHYSICAL EXAM:  GENERAL: NAD, well-developed  HEAD:  Atraumatic, Normocephalic  NECK: Supple, No JVD  CHEST/LUNG: Clear to auscultation bilaterally; No wheeze  HEART: Regular rate and rhythm; No murmurs, rubs, or gallops  ABDOMEN: Soft, Nontender, Nondistended; Bowel sounds present  EXTREMITIES:  2+ Peripheral Pulses, No clubbing, cyanosis, or edema    LABS:                    RADIOLOGY & ADDITIONAL TESTS:    Imaging Personally Reviewed:    Consultant(s) Notes Reviewed:      Care Discussed with Consultants/Other Providers:

## 2023-01-13 LAB — SARS-COV-2 RNA SPEC QL NAA+PROBE: SIGNIFICANT CHANGE UP

## 2023-01-13 PROCEDURE — 99232 SBSQ HOSP IP/OBS MODERATE 35: CPT

## 2023-01-13 PROCEDURE — 99231 SBSQ HOSP IP/OBS SF/LOW 25: CPT

## 2023-01-13 RX ORDER — HALOPERIDOL DECANOATE 100 MG/ML
2 INJECTION INTRAMUSCULAR
Refills: 0 | Status: DISCONTINUED | OUTPATIENT
Start: 2023-01-13 | End: 2023-01-14

## 2023-01-13 RX ORDER — CLONAZEPAM 1 MG
0.5 TABLET ORAL AT BEDTIME
Refills: 0 | Status: DISCONTINUED | OUTPATIENT
Start: 2023-01-14 | End: 2023-01-14

## 2023-01-13 RX ADMIN — LIDOCAINE 1 PATCH: 4 CREAM TOPICAL at 12:27

## 2023-01-13 RX ADMIN — Medication 975 MILLIGRAM(S): at 06:50

## 2023-01-13 RX ADMIN — Medication 0.5 MILLIGRAM(S): at 22:49

## 2023-01-13 RX ADMIN — SERTRALINE 25 MILLIGRAM(S): 25 TABLET, FILM COATED ORAL at 12:28

## 2023-01-13 RX ADMIN — Medication 50 MILLIGRAM(S): at 06:51

## 2023-01-13 RX ADMIN — LIDOCAINE 1 PATCH: 4 CREAM TOPICAL at 22:55

## 2023-01-13 NOTE — DIETITIAN INITIAL EVALUATION ADULT - OTHER CALCULATIONS
Defer fluid needs to team. Calculations accounting for factor of prior chronic-severe protein calorie malnutrition identified on 8/2022 admission i/s/o further weight decline between 8/2022 admission and current admission

## 2023-01-13 NOTE — DIETITIAN INITIAL EVALUATION ADULT - REASON INDICATOR FOR ASSESSMENT
Length of stay  Source: chart  Chart reviewed, events noted Length of stay  Source: chart (attempt made x2 to see patient)  Chart reviewed, events noted

## 2023-01-13 NOTE — DIETITIAN INITIAL EVALUATION ADULT - OTHER INFO
NKFA per chart. Patient w/ prior identification of chronic-severe protein calorie malnutrition on 8/2022 admission. Patient weighing 114lbs on 8/2022 admission, current BW of 106lbs reflecting further weight loss and likely further progression of malnutrition. To assess physical parameters upon f/u for appropriate determination of malnutrition remains present.    No current height on file. Height of patient to be 5'1" per review of prior encounters per chart review.
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
## 2023-01-13 NOTE — DIETITIAN INITIAL EVALUATION ADULT - NS FNS DIET ORDER
Diet, Regular:   Easy to Chew (EASYTOCHEW)  Supplement Feeding Modality:  Oral  Ensure Enlive Cans or Servings Per Day:  1       Frequency:  Daily (01-08-23 @ 13:39) [Active]

## 2023-01-13 NOTE — PROGRESS NOTE ADULT - SUBJECTIVE AND OBJECTIVE BOX
Patient is a 88y old  Female who presents with a chief complaint of Right lower extremity pain (12 Jan 2023 13:22)      SUBJECTIVE / OVERNIGHT EVENTS: appears more calm today     MEDICATIONS  (STANDING):  acetaminophen     Tablet .. 975 milliGRAM(s) Oral every 12 hours  carboxymethylcellulose 0.5% (Preservative-Free) Ophthalmic Solution 1 Drop(s) Both EYES two times a day  clonazePAM  Tablet 0.5 milliGRAM(s) Oral at bedtime  enoxaparin Injectable 40 milliGRAM(s) SubCutaneous every 24 hours  haloperidol     Tablet 2 milliGRAM(s) Oral two times a day  lidocaine   4% Patch 1 Patch Transdermal daily  metoprolol succinate ER 50 milliGRAM(s) Oral daily  senna 2 Tablet(s) Oral at bedtime  sertraline 25 milliGRAM(s) Oral daily  valproic  acid Syrup 250 milliGRAM(s) Oral two times a day    MEDICATIONS  (PRN):  aluminum hydroxide/magnesium hydroxide/simethicone Suspension 30 milliLiter(s) Oral every 4 hours PRN Dyspepsia  bisacodyl 5 milliGRAM(s) Oral every 12 hours PRN Constipation  melatonin 3 milliGRAM(s) Oral at bedtime PRN Insomnia  OLANZapine Injectable 2.5 milliGRAM(s) IntraMuscular every 6 hours PRN Agitation  ondansetron Injectable 4 milliGRAM(s) IV Push every 8 hours PRN Nausea and/or Vomiting        CAPILLARY BLOOD GLUCOSE        I&O's Summary    12 Jan 2023 07:01  -  13 Jan 2023 07:00  --------------------------------------------------------  IN: 1100 mL / OUT: 0 mL / NET: 1100 mL    13 Jan 2023 07:01  -  13 Jan 2023 14:03  --------------------------------------------------------  IN: 360 mL / OUT: 0 mL / NET: 360 mL        PHYSICAL EXAM:  GENERAL: NAD, frail  HEAD:  Atraumatic, Normocephalic  EYES: conjunctiva and sclera clear  NECK: No JVD  CHEST/LUNG: Clear to auscultation bilaterally; No wheeze  HEART: Regular rate and rhythm; S1S2  ABDOMEN: Soft, Nontender, Nondistended; Bowel sounds present  EXTREMITIES:  2+ Peripheral Pulses, No clubbing, cyanosis, or edema  PSYCH: AAOx2-3    LABS:                    RADIOLOGY & ADDITIONAL TESTS:    Imaging Personally Reviewed:    Consultant(s) Notes Reviewed:      Care Discussed with Consultants/Other Providers:

## 2023-01-13 NOTE — DIETITIAN INITIAL EVALUATION ADULT - ADD RECOMMEND
1. continue current diet as tolerated of: easy to chew solids, thin liquids  2. encourage PO intake, protein source with each meal, supplement intake as tolerated  3. provide Ensure Enlive daily to help provide extra calories and protein  4. monitor tolerance and adequacy of PO intake, weight trend, electrolytes, labs, BMs

## 2023-01-13 NOTE — BH CONSULTATION LIAISON PROGRESS NOTE - NSBHATTESTSTAFFAMEND_PSY_A_CORE
I have personally seen and examined this patient. I fully participated in the care of this patient. I have made amendments to the documentation where appropriate and otherwise agree with the history, physical exam, and plan as documented by the

## 2023-01-13 NOTE — BH CONSULTATION LIAISON PROGRESS NOTE - NSBHATTESTCOMMENTATTENDFT_PSY_A_CORE
Patient is an 88 year old female, , domiciled with daughter, PMH of asthma, GERD, sciatica with previous vertebral fracture, a PPH of Bipolar I disorder, schizophrenia, admitted for RLE pain and difficulty ambulating, with psychiatry consulted for evaluation of agitation.    Pt calm today, no prns overnight, cont current meds. pt no longer requires 2pc psych admission. pt can f/u [facility]

## 2023-01-13 NOTE — DIETITIAN INITIAL EVALUATION ADULT - PERTINENT MEDS FT
MEDICATIONS  (STANDING):  acetaminophen     Tablet .. 975 milliGRAM(s) Oral every 12 hours  carboxymethylcellulose 0.5% (Preservative-Free) Ophthalmic Solution 1 Drop(s) Both EYES two times a day  clonazePAM  Tablet 0.5 milliGRAM(s) Oral at bedtime  enoxaparin Injectable 40 milliGRAM(s) SubCutaneous every 24 hours  haloperidol     Tablet 2 milliGRAM(s) Oral two times a day  lidocaine   4% Patch 1 Patch Transdermal daily  metoprolol succinate ER 50 milliGRAM(s) Oral daily  senna 2 Tablet(s) Oral at bedtime  sertraline 25 milliGRAM(s) Oral daily  valproic  acid Syrup 250 milliGRAM(s) Oral two times a day    MEDICATIONS  (PRN):  aluminum hydroxide/magnesium hydroxide/simethicone Suspension 30 milliLiter(s) Oral every 4 hours PRN Dyspepsia  bisacodyl 5 milliGRAM(s) Oral every 12 hours PRN Constipation  melatonin 3 milliGRAM(s) Oral at bedtime PRN Insomnia  OLANZapine Injectable 2.5 milliGRAM(s) IntraMuscular every 6 hours PRN Agitation  ondansetron Injectable 4 milliGRAM(s) IV Push every 8 hours PRN Nausea and/or Vomiting

## 2023-01-13 NOTE — DIETITIAN INITIAL EVALUATION ADULT - ORAL INTAKE PTA/DIET HISTORY
FENG. Per prior RD assessment on 8/2022 admission patient would order groceries online and was able to prepare food for herself at home, stated she had good appetite at the time but eats very little, consuming 3 meals daily. Patient taking a regular consistency diet w/ thin liquids at the time on that admission but requested a downgrade in diet due to her dentition and having difficulty chewing food, was changed to a soft & bite sized diet w/ thin liquids on that admission.
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
## 2023-01-13 NOTE — BH CONSULTATION LIAISON PROGRESS NOTE - NSBHFUPINTERVALHXFT_PSY_A_CORE
Pt examined at bedside by resident. No acute overnight events. Pt appears to be resting comfortably in no apparent distress. Denies AVH/SIIP/HIIP. Expresses a desire to leave the hospital. Pt denies any past psychiatric history or past psychotropic medication trials. States "I will not take psych medications after discharge. I am offended you would ask." Notably more calm today. States that she is looking forward to seeing her daughter.

## 2023-01-13 NOTE — DIETITIAN INITIAL EVALUATION ADULT - ENERGY INTAKE
Patient eating well w/ % of meals consumed per review of flowsheets. Has Ensure Enlive order in place once daily. Diet texture was downgraded to easy to chew on 1/8 by Dr. Costa. Adequate (%)

## 2023-01-13 NOTE — PROGRESS NOTE ADULT - PROBLEM SELECTOR PLAN 5
Diet: easy to chew w/ ensure   DVT: lovenox  Dispo: home [facility]  CM to facilitate discharge    D/w daughter 1/13 updated on full plan of care

## 2023-01-13 NOTE — DIETITIAN INITIAL EVALUATION ADULT - REASON FOR ADMISSION
Pain of lower extremity    Per chart, patient is a 87 y/o female with PMH including asthma, anxiety, questionable CVA in 2007, diverticulosis, GERD, previous vertebral fracture, recent admission for COVID-19 (9/2022), bipolar I, schizophrenia. Patient presents to [facility] for right lower extremity pain and difficulty ambulating for the past few days. Patient's daughter reporting a history of frequent falls for the patient, having vertebral fracture in past and suffers from sciatica. Psychiatry following for management of bipolar/schizophrenia, per prior  notes patient initially for 2PC, now per psychiatry patient no longer warrants inpatient psychiatry admission. 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

## 2023-01-13 NOTE — DIETITIAN INITIAL EVALUATION ADULT - ORAL NUTRITION SUPPLEMENTS
Continue Ensure Enlive once daily as ordered (350kcal, 20g protein) to help provide extra calories and protein

## 2023-01-14 ENCOUNTER — TRANSCRIPTION ENCOUNTER (OUTPATIENT)
Age: 88
End: 2023-01-14

## 2023-01-14 PROCEDURE — 99233 SBSQ HOSP IP/OBS HIGH 50: CPT

## 2023-01-14 PROCEDURE — 99232 SBSQ HOSP IP/OBS MODERATE 35: CPT

## 2023-01-14 RX ORDER — CLONAZEPAM 1 MG
0.25 TABLET ORAL
Refills: 0 | Status: DISCONTINUED | OUTPATIENT
Start: 2023-01-14 | End: 2023-01-20

## 2023-01-14 RX ORDER — OLANZAPINE 15 MG/1
5 TABLET, FILM COATED ORAL
Refills: 0 | Status: DISCONTINUED | OUTPATIENT
Start: 2023-01-14 | End: 2023-01-18

## 2023-01-14 RX ORDER — ACETAMINOPHEN 500 MG
975 TABLET ORAL ONCE
Refills: 0 | Status: COMPLETED | OUTPATIENT
Start: 2023-01-14 | End: 2023-01-14

## 2023-01-14 RX ADMIN — Medication 975 MILLIGRAM(S): at 18:14

## 2023-01-14 RX ADMIN — SERTRALINE 25 MILLIGRAM(S): 25 TABLET, FILM COATED ORAL at 11:17

## 2023-01-14 RX ADMIN — Medication 975 MILLIGRAM(S): at 04:23

## 2023-01-14 RX ADMIN — SENNA PLUS 2 TABLET(S): 8.6 TABLET ORAL at 22:52

## 2023-01-14 RX ADMIN — Medication 1 APPLICATION(S): at 18:56

## 2023-01-14 RX ADMIN — LIDOCAINE 1 PATCH: 4 CREAM TOPICAL at 19:30

## 2023-01-14 RX ADMIN — Medication 1 DROP(S): at 18:13

## 2023-01-14 RX ADMIN — LIDOCAINE 1 PATCH: 4 CREAM TOPICAL at 00:27

## 2023-01-14 RX ADMIN — LIDOCAINE 1 PATCH: 4 CREAM TOPICAL at 22:31

## 2023-01-14 RX ADMIN — OLANZAPINE 5 MILLIGRAM(S): 15 TABLET, FILM COATED ORAL at 18:13

## 2023-01-14 RX ADMIN — LIDOCAINE 1 PATCH: 4 CREAM TOPICAL at 11:18

## 2023-01-14 RX ADMIN — Medication 0.25 MILLIGRAM(S): at 22:26

## 2023-01-14 RX ADMIN — HALOPERIDOL DECANOATE 2 MILLIGRAM(S): 100 INJECTION INTRAMUSCULAR at 11:13

## 2023-01-14 RX ADMIN — Medication 250 MILLIGRAM(S): at 11:14

## 2023-01-14 RX ADMIN — Medication 975 MILLIGRAM(S): at 05:33

## 2023-01-14 RX ADMIN — Medication 50 MILLIGRAM(S): at 11:13

## 2023-01-14 NOTE — BH CONSULTATION LIAISON PROGRESS NOTE - NSBHFUPINTERVALHXFT_PSY_A_CORE
Team requested follow up for pt since she was agitated overnight, with concern that she is not swallowing her haldol. Pt was irritable and slightly paranoid when seen.  She has been stating: "I will not take psych medications after discharge."   Changed medication to Zyprexa orally disintegrating since pt is unreliable and may be "cheeking" and spitting out her medications.  She is also having difficulty with agitation at night and Zyprexa would be more calming and help her sleep.

## 2023-01-14 NOTE — PROGRESS NOTE ADULT - SUBJECTIVE AND OBJECTIVE BOX
PROGRESS NOTE:   Authored by Dr. Natasha Yancey MD  Pager [phone]     Patient is a 88y old  Female who presents with a chief complaint of Pain of lower extremity    Per chart, patient is a 87 y/o female with PMH including asthma, anxiety, questionable CVA in 2007, diverticulosis, GERD, previous vertebral fracture, recent admission for COVID-19 (9/2022), bipolar I, schizophrenia. Patient presents to [facility] for right lower extremity pain and difficulty ambulating for the past few days. Patient's daughter reporting a history of frequent falls for the patient, having vertebral fracture in past and suffers from sciatica. Psychiatry following for management of bipolar/schizophrenia, per prior  notes patient initially for 2PC, now per psychiatry patient no longer warrants inpatient psychiatry admission. 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  (13 Jan 2023 14:37)      SUBJECTIVE / OVERNIGHT EVENTS: Patient seen and examined at bedside. This am patient was agitated and refused her morning medications, which she has yet to take. She denies pain, wants to go home. Says she lives with daughter who has MS and she takes care of her daughter. She has very poor insight into her disease and says she cannot take her medications.      ADDITIONAL REVIEW OF SYSTEMS: as above    MEDICATIONS  (STANDING):  acetaminophen     Tablet .. 975 milliGRAM(s) Oral every 12 hours  carboxymethylcellulose 0.5% (Preservative-Free) Ophthalmic Solution 1 Drop(s) Both EYES two times a day  clonazePAM  Tablet 0.5 milliGRAM(s) Oral at bedtime  enoxaparin Injectable 40 milliGRAM(s) SubCutaneous every 24 hours  haloperidol     Tablet 2 milliGRAM(s) Oral two times a day  lidocaine   4% Patch 1 Patch Transdermal daily  metoprolol succinate ER 50 milliGRAM(s) Oral daily  senna 2 Tablet(s) Oral at bedtime  sertraline 25 milliGRAM(s) Oral daily  valproic  acid Syrup 250 milliGRAM(s) Oral two times a day    MEDICATIONS  (PRN):  aluminum hydroxide/magnesium hydroxide/simethicone Suspension 30 milliLiter(s) Oral every 4 hours PRN Dyspepsia  bisacodyl 5 milliGRAM(s) Oral every 12 hours PRN Constipation  melatonin 3 milliGRAM(s) Oral at bedtime PRN Insomnia  OLANZapine Injectable 2.5 milliGRAM(s) IntraMuscular every 6 hours PRN Agitation  ondansetron Injectable 4 milliGRAM(s) IV Push every 8 hours PRN Nausea and/or Vomiting      CAPILLARY BLOOD GLUCOSE        I&O's Summary    13 Jan 2023 07:01  -  14 Jan 2023 07:00  --------------------------------------------------------  IN: 720 mL / OUT: 0 mL / NET: 720 mL        PHYSICAL EXAM:  Vital Signs Last 24 Hrs  T(C): 36.3 (14 Jan 2023 04:30), Max: 36.5 (13 Jan 2023 19:25)  T(F): 97.3 (14 Jan 2023 04:30), Max: 97.7 (13 Jan 2023 19:25)  HR: 63 (14 Jan 2023 04:30) (63 - 72)  BP: 110/67 (14 Jan 2023 04:30) (98/65 - 135/74)  BP(mean): --  RR: 18 (14 Jan 2023 04:30) (18 - 18)  SpO2: 91% (14 Jan 2023 04:30) (91% - 94%)    Parameters below as of 14 Jan 2023 04:30  Patient On (Oxygen Delivery Method): room air        CONSTITUTIONAL: NAD  RESPIRATORY: Normal respiratory effort; lungs are clear to auscultation bilaterally  CARDIOVASCULAR: Regular rate and rhythm, normal S1 and S2, no murmur/rub/gallop; No lower extremity edema; Peripheral pulses are 2+ bilaterally  ABDOMEN: Nontender to palpation, normoactive bowel sounds, no rebound/guarding; No hepatosplenomegaly  MUSCLOSKELETAL: no clubbing or cyanosis of digits; no joint swelling or tenderness to palpation  PSYCH: A+O to person, place; hypervigilant    LABS:                      RADIOLOGY & ADDITIONAL TESTS:  Results Reviewed:   Imaging Personally Reviewed:  Electrocardiogram Personally Reviewed:    COORDINATION OF CARE:  Care Discussed with Consultants/Other Providers [Y/N]:  Prior or Outpatient Records Reviewed [Y/N]:
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
## 2023-01-14 NOTE — BH CONSULTATION LIAISON PROGRESS NOTE - NSBHCONSULTMEDAGITATION_PSY_A_CORE FT
2.5 mg Zyprexa IM Q6 PRN for severe agitation
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
## 2023-01-14 NOTE — PROGRESS NOTE ADULT - PROBLEM SELECTOR PLAN 1
This am patient combative/agitated, refused oral meds and so did not get haldol.   Meds were changed to PO 1/13 haldol 2mg bid, also on valproic acid 250mg bid, sertraline and klonipin  Per psych note 1/13, patient was stable for discharge home with [facility], however, given developments, will need psych re eval to consider inpatient psychiatric admission  C/w Zyprexa 2.5 mg IM Q6 PRN for severe agitation - will need to give if patient refuses meds  c/w sertraline   c/w klonopin   melatonin    monitor QTC

## 2023-01-14 NOTE — PROGRESS NOTE ADULT - PROBLEM SELECTOR PLAN 5
Diet: easy to chew w/ ensure   DVT: lovenox  Dispo: home [facility] vs. psychiatric admission  CM to facilitate discharge

## 2023-01-15 PROCEDURE — 99232 SBSQ HOSP IP/OBS MODERATE 35: CPT

## 2023-01-15 RX ADMIN — LIDOCAINE 1 PATCH: 4 CREAM TOPICAL at 19:30

## 2023-01-15 RX ADMIN — Medication 975 MILLIGRAM(S): at 04:53

## 2023-01-15 RX ADMIN — Medication 50 MILLIGRAM(S): at 05:55

## 2023-01-15 RX ADMIN — Medication 0.25 MILLIGRAM(S): at 08:27

## 2023-01-15 RX ADMIN — Medication 1 APPLICATION(S): at 18:27

## 2023-01-15 RX ADMIN — Medication 975 MILLIGRAM(S): at 03:53

## 2023-01-15 RX ADMIN — SERTRALINE 25 MILLIGRAM(S): 25 TABLET, FILM COATED ORAL at 13:01

## 2023-01-15 RX ADMIN — Medication 1 APPLICATION(S): at 05:55

## 2023-01-15 RX ADMIN — LIDOCAINE 1 PATCH: 4 CREAM TOPICAL at 13:01

## 2023-01-15 RX ADMIN — Medication 0.25 MILLIGRAM(S): at 21:07

## 2023-01-15 RX ADMIN — Medication 1 DROP(S): at 05:54

## 2023-01-15 NOTE — PROVIDER CONTACT NOTE (OTHER) - DATE AND TIME:
05-Jan-2023 16:52
14-Jan-2023 06:36
31-Dec-2022 06:45
13-Jan-2023 19:58
11-Jan-2023 03:54
15-Bebeto-2023 03:47

## 2023-01-15 NOTE — PROVIDER CONTACT NOTE (OTHER) - SITUATION
Pt is in pain, has scheduled dose of tylenol at 0600. Wants tylenol early
Pt needs renewal of clonazepam, helps with agitation
pt refused 6am medication
Pt has back pain, requests tylenol. It is due at 0600, pt wants it early
Pt refusing morning meds, being combative and yelling.

## 2023-01-15 NOTE — DISCHARGE NOTE PROVIDER - HOSPITAL COURSE
87 y/o female with PMH including asthma, anxiety, questionable CVA in 2007, diverticulosis, GERD, previous vertebral fracture,  recent admission for COVID-19 (9/2022), bipolar I, schizophrenia.  Patient presents to [facility] for right lower extremity pain and   difficulty ambulating for the past few days.  Patient's daughter reporting a history of frequent falls for the patient, having vertebral   fracture in past and suffers from sciatica.  Psychiatry following for management of bipolar/schizophrenia, per prior  notes   patient initially for 2PC, now per psychiatry patient no longer warrants inpatient psychiatry admission. MRI T/L spine w/ stenosis of   L3-4, L4-5, evolution of L3 fracture, no surgical intervention per MD.     # Bipolar Disorder:  Per Psych note 1/13, patient was stable for discharge home with A.    F/u by Psych on 1/14, pt. no longer warrants in-patient psych admission.  Pt. can be d/c home with additional support/aide services   F/u with [facility] clinic   C/w Zyprexa 2.5 mg IM Q6 PRN for severe agitation - will need to give if patient refuses meds  c/w Sertraline   c/w Klonopin   Melatonin    Monitor QTC    # Back Pain:  MRI T/L spine: stenosis L3-4 and L4-5.  Evolution of the fracture at L3 that was identified at the time of 1/19/2010 study now with loss  of height of the vertebral body return of the normal marrow signal  No neurosurgical intervention   Pain control with standing Tylenol (do not exceed 3g), and Lidocaine patch.  Per chronic pain chart note can consider   short trial of Celebrex but would avoid opioid medications and gabapentinoids.  Has not required additional medications  PT eval 12/31: recommended ZIA   Fall precautions.    # Anxiety  Continue Clonazepam   Continue Zoloft 88 F w/ PMH asthma, anxiety,  questionable CVA in 2007, diverticulosis, GERD, bipolar, schizophrenia, and previous vertebral fracture, recent admission for Covid ( Sep ’22 ), bipolar / schizophrenia, presents with back pain with inability to ambulate and decompensated bipolar/schizophrenia      Problem/Plan - 1:  ·  Problem: Bipolar disorder.   ·  Plan: D/w pych meds changed to haldol 1mg IV bid and haldol 1mg IV q6prn   pt to be transferred to inpt psych 2PC  c/w valproic acid 250mg bid, sertraline and klonipin  c/w melatonin    monitor QTC.     Problem/Plan - 2:  ·  Problem: Back pain.   ·  Plan: MRI T/L spine: stenosis L3-4 and L4-5. Evolution of the fracture at L3 that was identified at the time of 1/19/2010 study now with loss of height of the vertebral body return of the normal marrow signal  No neurosurgical intervention   Pain control with standing tylenol (do not exceed 3g), and lidocaine patch. Per chronic pain chart note can consider short trial of celebrex but would avoid opioid medications and gabapentinoids. Has not required additional medications  PT  Fall precautions.     Problem/Plan - 3:  ·  Problem: Anxiety.   ·  Plan: continue clonazepam   continue zoloft.     Problem/Plan - 4:  ·  Problem: Social problem.   ·  Plan: Per daughter Kathryn Hogan [phone], provider should not share information with patient's  son who has  posed danger to patient in past  See above...     Problem/Plan - 5:  ·  Problem: Need for prophylactic measure.   ·  Plan: Diet: easy to chew w/ ensure   DVT: lovenox  Dispo: inpt psych  CM to facilitate discharge.

## 2023-01-15 NOTE — DISCHARGE NOTE PROVIDER - NSDCMRMEDTOKEN_GEN_ALL_CORE_FT
acetaminophen 325 mg oral tablet: 2 tab(s) orally every 6 hours, As needed, Mild Pain (1 - 3)  clonazePAM 0.5 mg oral tablet: 1 tab(s) orally once a day (at bedtime)  melatonin 3 mg oral tablet: 1 tab(s) orally once  metoprolol succinate 50 mg oral tablet, extended release: 1 tab(s) orally once a day  QUEtiapine 50 mg oral tablet: 1 tab(s) orally 2 times a day  Senna 8.6 mg oral tablet: 2 tab(s) orally once a day (at bedtime)  TLSO Brace: Length of need - 99    Wt - 56.70kg  Ht - 154.9cm  valproic acid 250 mg/5 mL oral liquid: 2.5 milliliter(s) orally 2 times a day  Zoloft 25 mg oral tablet: 1 tab(s) orally once a day   acetaminophen 325 mg oral tablet: 2 tab(s) orally every 6 hours, As needed, Mild Pain (1 - 3)  aluminum hydroxide-magnesium hydroxide 200 mg-200 mg/5 mL oral suspension: 30 milliliter(s) orally every 4 hours, As needed, Dyspepsia  bisacodyl 5 mg oral delayed release tablet: 1 tab(s) orally every 12 hours, As needed, Constipation  clonazePAM: 0.25 milligram(s) orally 2 times a day (08:00 and 20:00)  haloperidol: 1 milligram(s) intravenous 2 times a day  haloperidol: 1 milligram(s) intravenous every 6 hours, As Needed for severe agitation  lidocaine 4% topical film: 1 patch transdermal once a day  melatonin 3 mg oral tablet: 1 tab(s) orally once a day (at bedtime), As needed, Insomnia  metoprolol succinate 50 mg oral tablet, extended release: 1 tab(s) orally once a day  ocular lubricant ophthalmic solution: 1 drop(s) to each affected eye 2 times a day  polyethylene glycol 3350 oral powder for reconstitution: 17 gram(s) orally once a day  senna leaf extract oral tablet: 2 tab(s) orally once a day (at bedtime)  sertraline 25 mg oral tablet: 1 tab(s) orally once a day  valproic acid 250 mg/5 mL oral liquid: 250 milligram(s) orally 2 times a day

## 2023-01-15 NOTE — PROVIDER CONTACT NOTE (OTHER) - ACTION/TREATMENT ORDERED:
PA notified and to come take a look at it, placed in patient's chart.
Give tylenol early per providers order.
Will reschedule meds to 0900 per providers order.
PA notified. As per PA, give PRN olanzapine and push 6am medication to 8am
Will give tylenol early per providers order
Clonazepam renewed per providers order

## 2023-01-15 NOTE — DISCHARGE NOTE PROVIDER - NSDCCPCAREPLAN_GEN_ALL_CORE_FT
PRINCIPAL DISCHARGE DIAGNOSIS  Diagnosis: Leg pain  Assessment and Plan of Treatment: MRI T/L spine: stenosis L3-4 and L4-5. Evolution of the fracture at L3 that was identified at the time of 1/19/2010 study now with loss of height of the vertebral body return of the normal marrow signal  No neurosurgical intervention   Pain control with standing Tylenol (do not exceed 3g), and Lidocaine patch.  Per chronic pain chart note can consider short trial of Celebrex but would avoid opioid medications and Gabapentinoids. Has not required additional medications       PRINCIPAL DISCHARGE DIAGNOSIS  Diagnosis: Back pain  Assessment and Plan of Treatment:   RI T/L spine: stenosis L3-4 and L4-5. Evolution of the fracture at L3 that was identified at the time of 1/19/2010 study now with loss of height of the vertebral body return of the normal marrow signal.  No neurosurgical intervention.  Pain control with standing tylenol (do not exceed 3g daily), and lidocaine patch. Per chronic pain chart note, may consider short trial of celebrex but would avoid opioid medications and gabapentinoids. Has not required additional medications.  Fall precautions.      SECONDARY DISCHARGE DIAGNOSES  Diagnosis: Bipolar disorder  Assessment and Plan of Treatment:   Patient to be transferred to inpatient psych 2PC  -continue medications as prescribed  -monitor QTC

## 2023-01-15 NOTE — PROVIDER CONTACT NOTE (OTHER) - BACKGROUND
Pt admitted for pain in lower extremity
pt admitted for pain of R lower extremity
Pt admitted for pain in lower extremity. [facility] of schizophrenia and bipolar
Pt admitted for pain in lower extremity
pt admitted for lower extremity pain

## 2023-01-15 NOTE — PROGRESS NOTE ADULT - PROBLEM SELECTOR PLAN 4
Per daughter Kathryn Edison [phone], provider should not share information with patient's  son who has  posed danger to patient in past

## 2023-01-15 NOTE — PROVIDER CONTACT NOTE (OTHER) - ASSESSMENT
pt refusing 6am medication due to agitation
Pt A+Ox2, VSS. Combative, yelling and refusing meds.
Pt A+Ox2, VSS.
Pt A+Ox1, anxious and grimacing in pain. VSS
Pt A=Ox2, VSS. Grimacing in pain

## 2023-01-15 NOTE — PROVIDER CONTACT NOTE (OTHER) - REASON
Renewal of clonazepam
Pt in pain, wants tylenol
pt in pain, wants tylenol early
EKG done as per orders.
Pt refusing morning meds, combative
pt refusing 6am medication

## 2023-01-15 NOTE — PROGRESS NOTE ADULT - PROBLEM SELECTOR PLAN 1
1/14 haldol was changed to zyprexa zydis 5mg, which patient took  Today improved, more calm, less paranoia, has not required any PRN doses  c/w zyprexa at 6pm, c/w valproic acid 250mg bid, sertraline and klonipin  Per psych, not taking medications is not a reason for inpatient admission. They feel patient can return home or rehab with increased services.     c/w melatonin    monitor QTC

## 2023-01-15 NOTE — PROGRESS NOTE ADULT - SUBJECTIVE AND OBJECTIVE BOX
PROGRESS NOTE:   Authored by Dr. Natasha Yancey MD  Pager [phone]     Patient is a 88y old  Female who presents with a chief complaint of Right lower extremity pain (15 Bebeto 2023 02:33)      SUBJECTIVE / OVERNIGHT EVENTS: Patient seen and examined at bedside. Patient seems more calm today, took her zyprexa last night. Still with confusion/irrational speaking but improved. She does complain of not being able to walk well due to R leg pain.     ADDITIONAL REVIEW OF SYSTEMS: unable to obtain due to AMS    MEDICATIONS  (STANDING):  acetaminophen     Tablet .. 975 milliGRAM(s) Oral every 12 hours  carboxymethylcellulose 0.5% (Preservative-Free) Ophthalmic Solution 1 Drop(s) Both EYES two times a day  clonazePAM  Tablet 0.25 milliGRAM(s) Oral <User Schedule>  enoxaparin Injectable 40 milliGRAM(s) SubCutaneous every 24 hours  lidocaine   4% Patch 1 Patch Transdermal daily  metoprolol succinate ER 50 milliGRAM(s) Oral daily  neomycin/bacitracin/polymyxin Ointment 1 Application(s) Both EYES two times a day  OLANZapine Disintegrating Tablet 5 milliGRAM(s) Oral <User Schedule>  senna 2 Tablet(s) Oral at bedtime  sertraline 25 milliGRAM(s) Oral daily  valproic  acid Syrup 250 milliGRAM(s) Oral two times a day    MEDICATIONS  (PRN):  aluminum hydroxide/magnesium hydroxide/simethicone Suspension 30 milliLiter(s) Oral every 4 hours PRN Dyspepsia  bisacodyl 5 milliGRAM(s) Oral every 12 hours PRN Constipation  melatonin 3 milliGRAM(s) Oral at bedtime PRN Insomnia  OLANZapine Injectable 2.5 milliGRAM(s) IntraMuscular every 6 hours PRN Agitation  ondansetron Injectable 4 milliGRAM(s) IV Push every 8 hours PRN Nausea and/or Vomiting      CAPILLARY BLOOD GLUCOSE        I&O's Summary    14 Jan 2023 07:01  -  15 Bebeto 2023 07:00  --------------------------------------------------------  IN: 960 mL / OUT: 0 mL / NET: 960 mL        PHYSICAL EXAM:  Vital Signs Last 24 Hrs  T(C): 36.5 (15 Bebeto 2023 04:51), Max: 36.7 (14 Jan 2023 19:52)  T(F): 97.7 (15 Bebeto 2023 04:51), Max: 98.1 (14 Jan 2023 19:52)  HR: 61 (15 Bebeto 2023 04:51) (61 - 98)  BP: 115/70 (15 Bebeto 2023 04:51) (101/65 - 124/83)  BP(mean): --  RR: 18 (15 Bebeto 2023 04:51) (18 - 18)  SpO2: 93% (15 Bebeto 2023 04:51) (92% - 97%)    Parameters below as of 15 Bebeto 2023 04:51  Patient On (Oxygen Delivery Method): room air        CONSTITUTIONAL: NAD, frail  Eyes: erythema improved  RESPIRATORY: Normal respiratory effort; lungs are clear to auscultation bilaterally  CARDIOVASCULAR: S1 S2, no JVD; No lower extremity edema; Peripheral pulses are 2+ bilaterally  ABDOMEN: Nontender to palpation, normoactive bowel sounds, no rebound/guarding; No hepatosplenomegaly  MUSCLOSKELETAL: no clubbing or cyanosis of digits; no joint swelling or tenderness to palpation  PSYCH: A+O to person, place (did not know floor)    LABS:                      RADIOLOGY & ADDITIONAL TESTS:  Results Reviewed:   Imaging Personally Reviewed:  Electrocardiogram Personally Reviewed:    COORDINATION OF CARE:  Care Discussed with Consultants/Other Providers [Y/N]:  Prior or Outpatient Records Reviewed [Y/N]:

## 2023-01-16 LAB
ANION GAP SERPL CALC-SCNC: 9 MMOL/L — SIGNIFICANT CHANGE UP (ref 5–17)
BUN SERPL-MCNC: 28 MG/DL — HIGH (ref 7–23)
CALCIUM SERPL-MCNC: 9.8 MG/DL — SIGNIFICANT CHANGE UP (ref 8.4–10.5)
CHLORIDE SERPL-SCNC: 100 MMOL/L — SIGNIFICANT CHANGE UP (ref 96–108)
CO2 SERPL-SCNC: 29 MMOL/L — SIGNIFICANT CHANGE UP (ref 22–31)
CREAT SERPL-MCNC: 0.53 MG/DL — SIGNIFICANT CHANGE UP (ref 0.5–1.3)
EGFR: 89 ML/MIN/1.73M2 — SIGNIFICANT CHANGE UP
GLUCOSE SERPL-MCNC: 80 MG/DL — SIGNIFICANT CHANGE UP (ref 70–99)
HCT VFR BLD CALC: 37.1 % — SIGNIFICANT CHANGE UP (ref 34.5–45)
HGB BLD-MCNC: 11.5 G/DL — SIGNIFICANT CHANGE UP (ref 11.5–15.5)
MCHC RBC-ENTMCNC: 28.2 PG — SIGNIFICANT CHANGE UP (ref 27–34)
MCHC RBC-ENTMCNC: 31 GM/DL — LOW (ref 32–36)
MCV RBC AUTO: 90.9 FL — SIGNIFICANT CHANGE UP (ref 80–100)
NRBC # BLD: 0 /100 WBCS — SIGNIFICANT CHANGE UP (ref 0–0)
PLATELET # BLD AUTO: 299 K/UL — SIGNIFICANT CHANGE UP (ref 150–400)
POTASSIUM SERPL-MCNC: 3.9 MMOL/L — SIGNIFICANT CHANGE UP (ref 3.5–5.3)
POTASSIUM SERPL-SCNC: 3.9 MMOL/L — SIGNIFICANT CHANGE UP (ref 3.5–5.3)
RBC # BLD: 4.08 M/UL — SIGNIFICANT CHANGE UP (ref 3.8–5.2)
RBC # FLD: 14.8 % — HIGH (ref 10.3–14.5)
SODIUM SERPL-SCNC: 138 MMOL/L — SIGNIFICANT CHANGE UP (ref 135–145)
WBC # BLD: 7.69 K/UL — SIGNIFICANT CHANGE UP (ref 3.8–10.5)
WBC # FLD AUTO: 7.69 K/UL — SIGNIFICANT CHANGE UP (ref 3.8–10.5)

## 2023-01-16 PROCEDURE — 99232 SBSQ HOSP IP/OBS MODERATE 35: CPT

## 2023-01-16 RX ADMIN — SERTRALINE 25 MILLIGRAM(S): 25 TABLET, FILM COATED ORAL at 14:44

## 2023-01-16 RX ADMIN — Medication 3 MILLIGRAM(S): at 22:26

## 2023-01-16 RX ADMIN — Medication 1 APPLICATION(S): at 05:00

## 2023-01-16 RX ADMIN — LIDOCAINE 1 PATCH: 4 CREAM TOPICAL at 14:44

## 2023-01-16 RX ADMIN — Medication 0.25 MILLIGRAM(S): at 20:06

## 2023-01-16 RX ADMIN — Medication 975 MILLIGRAM(S): at 04:58

## 2023-01-16 RX ADMIN — Medication 975 MILLIGRAM(S): at 05:52

## 2023-01-16 RX ADMIN — Medication 1 DROP(S): at 04:59

## 2023-01-16 RX ADMIN — LIDOCAINE 1 PATCH: 4 CREAM TOPICAL at 19:33

## 2023-01-16 RX ADMIN — Medication 50 MILLIGRAM(S): at 04:58

## 2023-01-16 RX ADMIN — Medication 0.25 MILLIGRAM(S): at 09:53

## 2023-01-16 NOTE — PROGRESS NOTE ADULT - SUBJECTIVE AND OBJECTIVE BOX
PROGRESS NOTE:   Authored by Dr. Natasha Yancey MD  Pager [phone]     Patient is a 88y old  Female who presents with a chief complaint of Right lower extremity pain (15 Bebeto 2023 13:14)      SUBJECTIVE / OVERNIGHT EVENTS: Patient seen and examined at bedside. Refused medications last night, this morning more hypervigilant and angry.     ADDITIONAL REVIEW OF SYSTEMS: unable to obtain due to confusion    MEDICATIONS  (STANDING):  acetaminophen     Tablet .. 975 milliGRAM(s) Oral every 12 hours  carboxymethylcellulose 0.5% (Preservative-Free) Ophthalmic Solution 1 Drop(s) Both EYES two times a day  clonazePAM  Tablet 0.25 milliGRAM(s) Oral <User Schedule>  enoxaparin Injectable 40 milliGRAM(s) SubCutaneous every 24 hours  lidocaine   4% Patch 1 Patch Transdermal daily  metoprolol succinate ER 50 milliGRAM(s) Oral daily  neomycin/bacitracin/polymyxin Ointment 1 Application(s) Both EYES two times a day  OLANZapine Disintegrating Tablet 5 milliGRAM(s) Oral <User Schedule>  senna 2 Tablet(s) Oral at bedtime  sertraline 25 milliGRAM(s) Oral daily  valproic  acid Syrup 250 milliGRAM(s) Oral two times a day    MEDICATIONS  (PRN):  aluminum hydroxide/magnesium hydroxide/simethicone Suspension 30 milliLiter(s) Oral every 4 hours PRN Dyspepsia  bisacodyl 5 milliGRAM(s) Oral every 12 hours PRN Constipation  melatonin 3 milliGRAM(s) Oral at bedtime PRN Insomnia  OLANZapine Injectable 2.5 milliGRAM(s) IntraMuscular every 6 hours PRN Agitation  ondansetron Injectable 4 milliGRAM(s) IV Push every 8 hours PRN Nausea and/or Vomiting      CAPILLARY BLOOD GLUCOSE        I&O's Summary    15 Bebeto 2023 07:01  -  16 Jan 2023 07:00  --------------------------------------------------------  IN: 480 mL / OUT: 0 mL / NET: 480 mL        PHYSICAL EXAM:  Vital Signs Last 24 Hrs  T(C): 36.4 (16 Jan 2023 04:44), Max: 36.7 (15 Bebeto 2023 19:36)  T(F): 97.5 (16 Jan 2023 04:44), Max: 98.1 (15 Bebeto 2023 19:36)  HR: 68 (16 Jan 2023 04:44) (68 - 71)  BP: 129/73 (16 Jan 2023 04:44) (101/60 - 129/73)  BP(mean): --  RR: 18 (16 Jan 2023 04:44) (18 - 18)  SpO2: 93% (16 Jan 2023 04:44) (93% - 93%)    Parameters below as of 16 Jan 2023 04:44  Patient On (Oxygen Delivery Method): room air        CONSTITUTIONAL: NAD, frail  RESPIRATORY: Normal respiratory effort; lungs are clear to auscultation bilaterally  CARDIOVASCULAR: S1, S2, no JVD; No lower extremity edema; Peripheral pulses are 2+ bilaterally  ABDOMEN: Nontender to palpation, normoactive bowel sounds, no rebound/guarding; No hepatosplenomegaly  MUSCLOSKELETAL: no clubbing or cyanosis of digits; no joint swelling or tenderness to palpation  PSYCH: A+O to person, place (not floor or which hospital); hypervigilant    LABS:                        11.5   7.69  )-----------( 299      ( 16 Jan 2023 07:31 )             37.1     01-16    138  |  100  |  28<H>  ----------------------------<  80  3.9   |  29  |  0.53    Ca    9.8      16 Jan 2023 07:29                  RADIOLOGY & ADDITIONAL TESTS:  Results Reviewed:   Imaging Personally Reviewed:  Electrocardiogram Personally Reviewed:    COORDINATION OF CARE:  Care Discussed with Consultants/Other Providers [Y/N]:  Prior or Outpatient Records Reviewed [Y/N]:

## 2023-01-16 NOTE — PROGRESS NOTE ADULT - PROBLEM SELECTOR PLAN 4
Per daughter Kathryn Lucianoolph [phone], provider should not share information with patient's  son who has  posed danger to patient in past  See above...

## 2023-01-16 NOTE — PROGRESS NOTE ADULT - PROBLEM SELECTOR PLAN 1
1/14 haldol was changed to zyprexa zydis 5mg, last night again patient refused and today more agitated, paranoid.  This is a problem... patient lives at home with daughter, however, if she does not take her medications at home she becomes paranoid and agitated and cannot be taken care of.   Will need to re-evaluate safety of patient at home vs. ZIA and LTC placement  Per psych, not taking medications is not a reason for inpatient admission. They feel patient can return home or rehab with increased services. However, unclear how it will be possible to care for her.  I think this necessitates a conversation between psychiatry and the patient's daughter. This will need to be facilitated on Tuesday  c/w zyprexa at 6pm, c/w valproic acid 250mg bid, sertraline and klonipin  c/w melatonin    monitor QTC

## 2023-01-17 PROCEDURE — 99233 SBSQ HOSP IP/OBS HIGH 50: CPT

## 2023-01-17 RX ORDER — POLYETHYLENE GLYCOL 3350 17 G/17G
17 POWDER, FOR SOLUTION ORAL DAILY
Refills: 0 | Status: DISCONTINUED | OUTPATIENT
Start: 2023-01-17 | End: 2023-01-20

## 2023-01-17 RX ADMIN — Medication 0.25 MILLIGRAM(S): at 08:48

## 2023-01-17 RX ADMIN — Medication 50 MILLIGRAM(S): at 04:59

## 2023-01-17 RX ADMIN — LIDOCAINE 1 PATCH: 4 CREAM TOPICAL at 19:51

## 2023-01-17 RX ADMIN — Medication 975 MILLIGRAM(S): at 05:54

## 2023-01-17 RX ADMIN — OLANZAPINE 2.5 MILLIGRAM(S): 15 TABLET, FILM COATED ORAL at 11:52

## 2023-01-17 RX ADMIN — Medication 1 APPLICATION(S): at 05:06

## 2023-01-17 RX ADMIN — OLANZAPINE 2.5 MILLIGRAM(S): 15 TABLET, FILM COATED ORAL at 18:13

## 2023-01-17 RX ADMIN — POLYETHYLENE GLYCOL 3350 17 GRAM(S): 17 POWDER, FOR SOLUTION ORAL at 12:43

## 2023-01-17 RX ADMIN — LIDOCAINE 1 PATCH: 4 CREAM TOPICAL at 02:00

## 2023-01-17 RX ADMIN — Medication 975 MILLIGRAM(S): at 04:54

## 2023-01-17 RX ADMIN — SENNA PLUS 2 TABLET(S): 8.6 TABLET ORAL at 14:03

## 2023-01-17 RX ADMIN — Medication 1 DROP(S): at 05:05

## 2023-01-17 RX ADMIN — ENOXAPARIN SODIUM 40 MILLIGRAM(S): 100 INJECTION SUBCUTANEOUS at 12:43

## 2023-01-17 RX ADMIN — Medication 5 MILLIGRAM(S): at 10:29

## 2023-01-17 RX ADMIN — LIDOCAINE 1 PATCH: 4 CREAM TOPICAL at 11:19

## 2023-01-17 RX ADMIN — Medication 0.25 MILLIGRAM(S): at 20:36

## 2023-01-17 RX ADMIN — LIDOCAINE 1 PATCH: 4 CREAM TOPICAL at 23:26

## 2023-01-17 RX ADMIN — Medication 250 MILLIGRAM(S): at 05:02

## 2023-01-17 NOTE — PROGRESS NOTE ADULT - PROBLEM SELECTOR PLAN 4
Per daughter Kathryn Lucianoolph [phone], provider should not share information with patient's  son who has  posed danger to patient in past  See above...

## 2023-01-17 NOTE — PROGRESS NOTE ADULT - PROBLEM SELECTOR PLAN 1
c/w zyprexa zydis 5mg   pending [facility] reeval 1/17  Pt does not have rehab days, no medicaid application, no inpt psych, dispo is a challenge   c/w zyprexa at 6pm, c/w valproic acid 250mg bid, sertraline and klonipin  c/w melatonin    monitor QTC

## 2023-01-17 NOTE — PROGRESS NOTE ADULT - SUBJECTIVE AND OBJECTIVE BOX
Patient is a 88y old  Female who presents with a chief complaint of Right lower extremity pain (16 Jan 2023 14:27)      SUBJECTIVE / OVERNIGHT EVENTS: agitated, yelling, wants to leave     MEDICATIONS  (STANDING):  acetaminophen     Tablet .. 975 milliGRAM(s) Oral every 12 hours  carboxymethylcellulose 0.5% (Preservative-Free) Ophthalmic Solution 1 Drop(s) Both EYES two times a day  clonazePAM  Tablet 0.25 milliGRAM(s) Oral <User Schedule>  enoxaparin Injectable 40 milliGRAM(s) SubCutaneous every 24 hours  lidocaine   4% Patch 1 Patch Transdermal daily  metoprolol succinate ER 50 milliGRAM(s) Oral daily  neomycin/bacitracin/polymyxin Ointment 1 Application(s) Both EYES two times a day  OLANZapine Disintegrating Tablet 5 milliGRAM(s) Oral <User Schedule>  senna 2 Tablet(s) Oral at bedtime  sertraline 25 milliGRAM(s) Oral daily  valproic  acid Syrup 250 milliGRAM(s) Oral two times a day    MEDICATIONS  (PRN):  aluminum hydroxide/magnesium hydroxide/simethicone Suspension 30 milliLiter(s) Oral every 4 hours PRN Dyspepsia  bisacodyl 5 milliGRAM(s) Oral every 12 hours PRN Constipation  melatonin 3 milliGRAM(s) Oral at bedtime PRN Insomnia  OLANZapine Injectable 2.5 milliGRAM(s) IntraMuscular every 6 hours PRN Agitation  ondansetron Injectable 4 milliGRAM(s) IV Push every 8 hours PRN Nausea and/or Vomiting        CAPILLARY BLOOD GLUCOSE        I&O's Summary    16 Jan 2023 07:01  -  17 Jan 2023 07:00  --------------------------------------------------------  IN: 540 mL / OUT: 0 mL / NET: 540 mL        PHYSICAL EXAM:  GENERAL: NAD, frail   HEAD:  Atraumatic, Normocephalic  EYES:  conjunctiva and sclera clear  NECK: No JVD  CHEST/LUNG: Clear to auscultation bilaterally; No wheeze  HEART: Regular rate and rhythm; S1S2  ABDOMEN: Soft, Nontender, Nondistended; Bowel sounds present  EXTREMITIES:  2+ Peripheral Pulses, No clubbing, cyanosis, or edema  PSYCH: AAOx2      LABS:                        11.5   7.69  )-----------( 299      ( 16 Jan 2023 07:31 )             37.1     01-16    138  |  100  |  28<H>  ----------------------------<  80  3.9   |  29  |  0.53    Ca    9.8      16 Jan 2023 07:29                RADIOLOGY & ADDITIONAL TESTS:    Imaging Personally Reviewed:    Consultant(s) Notes Reviewed:      Care Discussed with Consultants/Other Providers:

## 2023-01-18 PROCEDURE — 99232 SBSQ HOSP IP/OBS MODERATE 35: CPT

## 2023-01-18 PROCEDURE — 99233 SBSQ HOSP IP/OBS HIGH 50: CPT

## 2023-01-18 RX ORDER — HALOPERIDOL DECANOATE 100 MG/ML
1 INJECTION INTRAMUSCULAR EVERY 6 HOURS
Refills: 0 | Status: DISCONTINUED | OUTPATIENT
Start: 2023-01-18 | End: 2023-01-20

## 2023-01-18 RX ORDER — HALOPERIDOL DECANOATE 100 MG/ML
1 INJECTION INTRAMUSCULAR
Refills: 0 | Status: DISCONTINUED | OUTPATIENT
Start: 2023-01-18 | End: 2023-01-20

## 2023-01-18 RX ADMIN — Medication 3 MILLIGRAM(S): at 01:40

## 2023-01-18 RX ADMIN — Medication 975 MILLIGRAM(S): at 19:04

## 2023-01-18 RX ADMIN — Medication 0.25 MILLIGRAM(S): at 09:17

## 2023-01-18 RX ADMIN — Medication 250 MILLIGRAM(S): at 18:04

## 2023-01-18 RX ADMIN — Medication 250 MILLIGRAM(S): at 09:16

## 2023-01-18 RX ADMIN — HALOPERIDOL DECANOATE 1 MILLIGRAM(S): 100 INJECTION INTRAMUSCULAR at 18:17

## 2023-01-18 RX ADMIN — ENOXAPARIN SODIUM 40 MILLIGRAM(S): 100 INJECTION SUBCUTANEOUS at 13:50

## 2023-01-18 RX ADMIN — Medication 0.25 MILLIGRAM(S): at 21:38

## 2023-01-18 RX ADMIN — Medication 975 MILLIGRAM(S): at 09:50

## 2023-01-18 RX ADMIN — Medication 975 MILLIGRAM(S): at 18:04

## 2023-01-18 RX ADMIN — OLANZAPINE 2.5 MILLIGRAM(S): 15 TABLET, FILM COATED ORAL at 10:43

## 2023-01-18 RX ADMIN — Medication 975 MILLIGRAM(S): at 09:20

## 2023-01-18 RX ADMIN — SENNA PLUS 2 TABLET(S): 8.6 TABLET ORAL at 21:38

## 2023-01-18 RX ADMIN — Medication 50 MILLIGRAM(S): at 09:16

## 2023-01-18 NOTE — BH CONSULTATION LIAISON PROGRESS NOTE - NSBHCONSULTRECOMMENDOTHER_PSY_A_CORE FT
can d/c zyprexa since pt not taking po, can change to haldol 1mg iv bid for agitation, haldol 1-2mg po/iv q6hr prn severe agitation, d/c zyprexa prn. cont klonopin. pt will be transferred to psych once medically cleared due to escalating agitation, not takign po meds, noncompliant. 2pc admission.

## 2023-01-18 NOTE — PROVIDER CONTACT NOTE (MEDICATION) - ACTION/TREATMENT ORDERED:
KYRA Herrera made aware. Stated OK for patient to refuse at this time.
KYRA Herrera made aware. Stated okay for patient to refuse at this time.
NP aware. As per NP, do not bother patient and push medications to 8am

## 2023-01-18 NOTE — PROVIDER CONTACT NOTE (MEDICATION) - BACKGROUND
Patient has history of bipolar disorder, schizophrenia.
Patient stated has own medication at home and does not want to take hospital's provided medication.
pt admitted for right lower extremity pain

## 2023-01-18 NOTE — PROVIDER CONTACT NOTE (MEDICATION) - REASON
Patient refusing 10PM PO medications.
pt refusing 0600 medication
Patient refusing Valproic Acid Syrup

## 2023-01-18 NOTE — PROGRESS NOTE ADULT - SUBJECTIVE AND OBJECTIVE BOX
Patient is a 88y old  Female who presents with a chief complaint of Right lower extremity pain (17 Jan 2023 10:48)      SUBJECTIVE / OVERNIGHT EVENTS: pt still agitated, screaming, yelling    MEDICATIONS  (STANDING):  acetaminophen     Tablet .. 975 milliGRAM(s) Oral every 12 hours  carboxymethylcellulose 0.5% (Preservative-Free) Ophthalmic Solution 1 Drop(s) Both EYES two times a day  clonazePAM  Tablet 0.25 milliGRAM(s) Oral <User Schedule>  enoxaparin Injectable 40 milliGRAM(s) SubCutaneous every 24 hours  lidocaine   4% Patch 1 Patch Transdermal daily  metoprolol succinate ER 50 milliGRAM(s) Oral daily  neomycin/bacitracin/polymyxin Ointment 1 Application(s) Both EYES two times a day  OLANZapine Disintegrating Tablet 5 milliGRAM(s) Oral <User Schedule>  polyethylene glycol 3350 17 Gram(s) Oral daily  senna 2 Tablet(s) Oral at bedtime  sertraline 25 milliGRAM(s) Oral daily  valproic  acid Syrup 250 milliGRAM(s) Oral two times a day    MEDICATIONS  (PRN):  aluminum hydroxide/magnesium hydroxide/simethicone Suspension 30 milliLiter(s) Oral every 4 hours PRN Dyspepsia  bisacodyl 5 milliGRAM(s) Oral every 12 hours PRN Constipation  melatonin 3 milliGRAM(s) Oral at bedtime PRN Insomnia  OLANZapine Injectable 2.5 milliGRAM(s) IntraMuscular every 6 hours PRN Agitation  ondansetron Injectable 4 milliGRAM(s) IV Push every 8 hours PRN Nausea and/or Vomiting        CAPILLARY BLOOD GLUCOSE        I&O's Summary    17 Jan 2023 07:01  -  18 Jan 2023 07:00  --------------------------------------------------------  IN: 980 mL / OUT: 0 mL / NET: 980 mL    18 Jan 2023 07:01  -  18 Jan 2023 11:08  --------------------------------------------------------  IN: 240 mL / OUT: 0 mL / NET: 240 mL        PHYSICAL EXAM:  GENERAL: NAD, well-developed  HEAD:  Atraumatic, Normocephalic  EYES: conjunctiva and sclera clear  NECK: No JVD  CHEST/LUNG: Clear to auscultation bilaterally; No wheeze  HEART: Regular rate and rhythm; S1S2  ABDOMEN: Soft, Nontender, Nondistended; Bowel sounds present  EXTREMITIES:  2+ Peripheral Pulses, No clubbing, cyanosis, or edema  PSYCH: AAOx2    LABS:                    RADIOLOGY & ADDITIONAL TESTS:    Imaging Personally Reviewed:    Consultant(s) Notes Reviewed:      Care Discussed with Consultants/Other Providers:

## 2023-01-18 NOTE — PROVIDER CONTACT NOTE (MEDICATION) - ASSESSMENT
/71 HR 68 pt refusing AM meds
Patient resting comfortably in bed. Verbalized refusal of PO bedtime medications at this time.
Patient resting comfortably in bed.
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
## 2023-01-18 NOTE — PROGRESS NOTE ADULT - PROBLEM SELECTOR PLAN 1
c/w zyprexa zydis 5mg   d/w psych 1/18 will change meds to IV, pending follow up   Pt does not have rehab days, no medicaid application, no inpt psych, dispo is a challenge   c/w zyprexa at 6pm, c/w valproic acid 250mg bid, sertraline and klonipin  c/w melatonin    monitor QTC
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
## 2023-01-18 NOTE — PROVIDER CONTACT NOTE (MEDICATION) - SITUATION
pt refusing AM meds
Patient refusing 0600 Valproic Acid Syrup.
Patient refusing all bedtime PO medications.

## 2023-01-18 NOTE — BH CONSULTATION LIAISON PROGRESS NOTE - NSBHFUPINTERVALHXFT_PSY_A_CORE
Team requested follow up for pt since she was agitated, not taking po meds. Pt irritable today, demanding to be discharged. Pt not answering most questions. Pt denies hortencia, psychosis, depression. no si/hi. pt yelling at times, no physical aggression. as per staff pt does not have a safe dispo, can not return home alone, pt does not have anymore medicaid coverage. pt received zyprexa prn last night

## 2023-01-18 NOTE — PROGRESS NOTE ADULT - PROBLEM SELECTOR PLAN 5
Diet: easy to chew w/ ensure   DVT: lovenox  Dispo: home [facility] vs inpt psych?  CM to facilitate discharge

## 2023-01-18 NOTE — BH CONSULTATION LIAISON PROGRESS NOTE - NSBHMSESPABN_PSY_A_CORE
Loud volume/Increased productivity
Loud volume
Loud volume/Pressured rate/Increased productivity
Loud volume/Increased productivity
Loud volume
Loud volume/Increased productivity
Loud volume/Pressured rate/Increased productivity
Loud volume/Increased productivity
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
## 2023-01-19 LAB — SARS-COV-2 RNA SPEC QL NAA+PROBE: SIGNIFICANT CHANGE UP

## 2023-01-19 PROCEDURE — 99232 SBSQ HOSP IP/OBS MODERATE 35: CPT

## 2023-01-19 PROCEDURE — 99231 SBSQ HOSP IP/OBS SF/LOW 25: CPT

## 2023-01-19 RX ADMIN — Medication 975 MILLIGRAM(S): at 18:06

## 2023-01-19 RX ADMIN — Medication 975 MILLIGRAM(S): at 05:50

## 2023-01-19 RX ADMIN — Medication 0.25 MILLIGRAM(S): at 20:16

## 2023-01-19 RX ADMIN — Medication 3 MILLIGRAM(S): at 01:03

## 2023-01-19 RX ADMIN — HALOPERIDOL DECANOATE 1 MILLIGRAM(S): 100 INJECTION INTRAMUSCULAR at 05:55

## 2023-01-19 RX ADMIN — HALOPERIDOL DECANOATE 1 MILLIGRAM(S): 100 INJECTION INTRAMUSCULAR at 18:27

## 2023-01-19 RX ADMIN — Medication 975 MILLIGRAM(S): at 06:40

## 2023-01-19 RX ADMIN — Medication 50 MILLIGRAM(S): at 05:53

## 2023-01-19 RX ADMIN — Medication 975 MILLIGRAM(S): at 17:46

## 2023-01-19 RX ADMIN — LIDOCAINE 1 PATCH: 4 CREAM TOPICAL at 13:18

## 2023-01-19 RX ADMIN — SERTRALINE 25 MILLIGRAM(S): 25 TABLET, FILM COATED ORAL at 13:19

## 2023-01-19 RX ADMIN — LIDOCAINE 1 PATCH: 4 CREAM TOPICAL at 18:06

## 2023-01-19 RX ADMIN — Medication 0.25 MILLIGRAM(S): at 08:55

## 2023-01-19 NOTE — BH CONSULTATION LIAISON PROGRESS NOTE - NSBHCONSULTRECOMMENDOTHER_PSY_A_CORE FT
cont haldol 1mg iv bid for agitation, haldol 1-2mg po/iv q6hr prn severe agitation.  cont klonopin. pt will be transferred to psych once medically cleared due to escalating agitation, not takign po meds, noncompliant. 2pc admission.

## 2023-01-19 NOTE — PROGRESS NOTE ADULT - PROBLEM SELECTOR PLAN 1
D/w pych meds changed to haldol 1mg IV bid and haldol 1mg IV q6prn   pt to be transferred to inpt psych 2PC  c/w valproic acid 250mg bid, sertraline and klonipin  c/w melatonin    monitor QTC

## 2023-01-19 NOTE — BH CONSULTATION LIAISON PROGRESS NOTE - NSBHFUPINTERVALHXFT_PSY_A_CORE
pt currently calm, sleeping, not answering most questions interview. pt received melatonin prn last night.
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
## 2023-01-19 NOTE — PROGRESS NOTE ADULT - SUBJECTIVE AND OBJECTIVE BOX
Patient is a 88y old  Female who presents with a chief complaint of Right lower extremity pain (18 Jan 2023 11:07)      SUBJECTIVE / OVERNIGHT EVENTS: pt appears comfortable     MEDICATIONS  (STANDING):  acetaminophen     Tablet .. 975 milliGRAM(s) Oral every 12 hours  carboxymethylcellulose 0.5% (Preservative-Free) Ophthalmic Solution 1 Drop(s) Both EYES two times a day  clonazePAM  Tablet 0.25 milliGRAM(s) Oral <User Schedule>  enoxaparin Injectable 40 milliGRAM(s) SubCutaneous every 24 hours  haloperidol    Injectable 1 milliGRAM(s) IV Push two times a day  lidocaine   4% Patch 1 Patch Transdermal daily  metoprolol succinate ER 50 milliGRAM(s) Oral daily  polyethylene glycol 3350 17 Gram(s) Oral daily  senna 2 Tablet(s) Oral at bedtime  sertraline 25 milliGRAM(s) Oral daily  valproic  acid Syrup 250 milliGRAM(s) Oral two times a day    MEDICATIONS  (PRN):  aluminum hydroxide/magnesium hydroxide/simethicone Suspension 30 milliLiter(s) Oral every 4 hours PRN Dyspepsia  bisacodyl 5 milliGRAM(s) Oral every 12 hours PRN Constipation  haloperidol    Injectable 1 milliGRAM(s) IV Push every 6 hours PRN severe agitation  melatonin 3 milliGRAM(s) Oral at bedtime PRN Insomnia  ondansetron Injectable 4 milliGRAM(s) IV Push every 8 hours PRN Nausea and/or Vomiting        CAPILLARY BLOOD GLUCOSE        I&O's Summary    18 Jan 2023 07:01  -  19 Jan 2023 07:00  --------------------------------------------------------  IN: 720 mL / OUT: 0 mL / NET: 720 mL        PHYSICAL EXAM:  GENERAL: NAD, well-developed  HEAD:  Atraumatic, Normocephalic  NECK:  No JVD  CHEST/LUNG: Clear to auscultation bilaterally; No wheeze  HEART: Regular rate and rhythm; No murmurs, rubs, or gallops  ABDOMEN: Soft, Nontender, Nondistended; Bowel sounds present  EXTREMITIES:  2+ Peripheral Pulses, No clubbing, cyanosis, or edema    LABS:                    RADIOLOGY & ADDITIONAL TESTS:    Imaging Personally Reviewed:    Consultant(s) Notes Reviewed:      Care Discussed with Consultants/Other Providers:

## 2023-01-20 ENCOUNTER — TRANSCRIPTION ENCOUNTER (OUTPATIENT)
Age: 88
End: 2023-01-20

## 2023-01-20 ENCOUNTER — INPATIENT (INPATIENT)
Facility: HOSPITAL | Age: 88
LOS: 79 days | Discharge: HOME HEALTH | End: 2023-04-10
Attending: PSYCHIATRY & NEUROLOGY | Admitting: PSYCHIATRY & NEUROLOGY
Payer: MEDICARE

## 2023-01-20 VITALS — HEART RATE: 87 BPM | TEMPERATURE: 98 F | DIASTOLIC BLOOD PRESSURE: 60 MMHG | SYSTOLIC BLOOD PRESSURE: 141 MMHG

## 2023-01-20 VITALS
OXYGEN SATURATION: 94 % | SYSTOLIC BLOOD PRESSURE: 125 MMHG | DIASTOLIC BLOOD PRESSURE: 74 MMHG | TEMPERATURE: 98 F | HEART RATE: 69 BPM | RESPIRATION RATE: 18 BRPM

## 2023-01-20 DIAGNOSIS — F05 DELIRIUM DUE TO KNOWN PHYSIOLOGICAL CONDITION: ICD-10-CM

## 2023-01-20 DIAGNOSIS — Z90.89 ACQUIRED ABSENCE OF OTHER ORGANS: Chronic | ICD-10-CM

## 2023-01-20 DIAGNOSIS — F33.9 MAJOR DEPRESSIVE DISORDER, RECURRENT, UNSPECIFIED: ICD-10-CM

## 2023-01-20 DIAGNOSIS — F41.9 ANXIETY DISORDER, UNSPECIFIED: ICD-10-CM

## 2023-01-20 PROCEDURE — 72146 MRI CHEST SPINE W/O DYE: CPT

## 2023-01-20 PROCEDURE — 96374 THER/PROPH/DIAG INJ IV PUSH: CPT

## 2023-01-20 PROCEDURE — 99222 1ST HOSP IP/OBS MODERATE 55: CPT

## 2023-01-20 PROCEDURE — U0003: CPT

## 2023-01-20 PROCEDURE — 99231 SBSQ HOSP IP/OBS SF/LOW 25: CPT

## 2023-01-20 PROCEDURE — 0225U NFCT DS DNA&RNA 21 SARSCOV2: CPT

## 2023-01-20 PROCEDURE — 97110 THERAPEUTIC EXERCISES: CPT

## 2023-01-20 PROCEDURE — 85027 COMPLETE CBC AUTOMATED: CPT

## 2023-01-20 PROCEDURE — 83735 ASSAY OF MAGNESIUM: CPT

## 2023-01-20 PROCEDURE — 93010 ELECTROCARDIOGRAM REPORT: CPT

## 2023-01-20 PROCEDURE — 97162 PT EVAL MOD COMPLEX 30 MIN: CPT

## 2023-01-20 PROCEDURE — 80053 COMPREHEN METABOLIC PANEL: CPT

## 2023-01-20 PROCEDURE — 72148 MRI LUMBAR SPINE W/O DYE: CPT

## 2023-01-20 PROCEDURE — 99285 EMERGENCY DEPT VISIT HI MDM: CPT

## 2023-01-20 PROCEDURE — 36415 COLL VENOUS BLD VENIPUNCTURE: CPT

## 2023-01-20 PROCEDURE — 97530 THERAPEUTIC ACTIVITIES: CPT

## 2023-01-20 PROCEDURE — 80164 ASSAY DIPROPYLACETIC ACD TOT: CPT

## 2023-01-20 PROCEDURE — 93005 ELECTROCARDIOGRAM TRACING: CPT

## 2023-01-20 PROCEDURE — 80048 BASIC METABOLIC PNL TOTAL CA: CPT

## 2023-01-20 PROCEDURE — U0005: CPT

## 2023-01-20 PROCEDURE — 99232 SBSQ HOSP IP/OBS MODERATE 35: CPT

## 2023-01-20 PROCEDURE — 84100 ASSAY OF PHOSPHORUS: CPT

## 2023-01-20 PROCEDURE — 97116 GAIT TRAINING THERAPY: CPT

## 2023-01-20 PROCEDURE — 85025 COMPLETE CBC W/AUTO DIFF WBC: CPT

## 2023-01-20 RX ORDER — METOPROLOL TARTRATE 50 MG
1 TABLET ORAL
Qty: 0 | Refills: 0 | DISCHARGE
Start: 2023-01-20

## 2023-01-20 RX ORDER — VALPROIC ACID (AS SODIUM SALT) 250 MG/5ML
2.5 SOLUTION, ORAL ORAL
Qty: 0 | Refills: 0 | DISCHARGE

## 2023-01-20 RX ORDER — POLYETHYLENE GLYCOL 3350 17 G/17G
17 POWDER, FOR SOLUTION ORAL DAILY
Refills: 0 | Status: DISCONTINUED | OUTPATIENT
Start: 2023-01-20 | End: 2023-04-10

## 2023-01-20 RX ORDER — HALOPERIDOL DECANOATE 100 MG/ML
1 INJECTION INTRAMUSCULAR
Qty: 0 | Refills: 0 | DISCHARGE
Start: 2023-01-20

## 2023-01-20 RX ORDER — OLANZAPINE 15 MG/1
2.5 TABLET, FILM COATED ORAL EVERY 6 HOURS
Refills: 0 | Status: DISCONTINUED | OUTPATIENT
Start: 2023-01-20 | End: 2023-01-21

## 2023-01-20 RX ORDER — CLONAZEPAM 1 MG
0.25 TABLET ORAL
Qty: 0 | Refills: 0 | DISCHARGE
Start: 2023-01-20

## 2023-01-20 RX ORDER — SERTRALINE 25 MG/1
1 TABLET, FILM COATED ORAL
Qty: 0 | Refills: 0 | DISCHARGE
Start: 2023-01-20

## 2023-01-20 RX ORDER — VALPROIC ACID (AS SODIUM SALT) 250 MG/5ML
250 SOLUTION, ORAL ORAL
Qty: 0 | Refills: 0 | DISCHARGE
Start: 2023-01-20

## 2023-01-20 RX ORDER — CLONAZEPAM 1 MG
1 TABLET ORAL
Qty: 0 | Refills: 0 | DISCHARGE

## 2023-01-20 RX ORDER — POLYETHYLENE GLYCOL 3350 17 G/17G
17 POWDER, FOR SOLUTION ORAL
Qty: 0 | Refills: 0 | DISCHARGE
Start: 2023-01-20

## 2023-01-20 RX ORDER — LIDOCAINE 4 G/100G
1 CREAM TOPICAL
Qty: 0 | Refills: 0 | DISCHARGE
Start: 2023-01-20

## 2023-01-20 RX ORDER — OLANZAPINE 15 MG/1
2.5 TABLET, FILM COATED ORAL ONCE
Refills: 0 | Status: DISCONTINUED | OUTPATIENT
Start: 2023-01-20 | End: 2023-01-21

## 2023-01-20 RX ORDER — ACETAMINOPHEN 500 MG
650 TABLET ORAL EVERY 6 HOURS
Refills: 0 | Status: DISCONTINUED | OUTPATIENT
Start: 2023-01-20 | End: 2023-04-10

## 2023-01-20 RX ORDER — HALOPERIDOL DECANOATE 100 MG/ML
1 INJECTION INTRAMUSCULAR
Refills: 0 | Status: DISCONTINUED | OUTPATIENT
Start: 2023-01-20 | End: 2023-02-04

## 2023-01-20 RX ORDER — LANOLIN ALCOHOL/MO/W.PET/CERES
1 CREAM (GRAM) TOPICAL
Qty: 0 | Refills: 0 | DISCHARGE
Start: 2023-01-20

## 2023-01-20 RX ORDER — CLONAZEPAM 1 MG
0.25 TABLET ORAL
Refills: 0 | Status: DISCONTINUED | OUTPATIENT
Start: 2023-01-20 | End: 2023-01-21

## 2023-01-20 RX ORDER — SENNA PLUS 8.6 MG/1
2 TABLET ORAL
Qty: 0 | Refills: 0 | DISCHARGE
Start: 2023-01-20

## 2023-01-20 RX ORDER — METOPROLOL TARTRATE 50 MG
50 TABLET ORAL DAILY
Refills: 0 | Status: DISCONTINUED | OUTPATIENT
Start: 2023-01-20 | End: 2023-02-22

## 2023-01-20 RX ORDER — SERTRALINE 25 MG/1
1 TABLET, FILM COATED ORAL
Qty: 0 | Refills: 0 | DISCHARGE

## 2023-01-20 RX ORDER — LIDOCAINE 4 G/100G
1 CREAM TOPICAL EVERY 24 HOURS
Refills: 0 | Status: DISCONTINUED | OUTPATIENT
Start: 2023-01-20 | End: 2023-04-10

## 2023-01-20 RX ORDER — SENNA PLUS 8.6 MG/1
2 TABLET ORAL
Qty: 0 | Refills: 0 | DISCHARGE

## 2023-01-20 RX ORDER — SENNA PLUS 8.6 MG/1
2 TABLET ORAL AT BEDTIME
Refills: 0 | Status: DISCONTINUED | OUTPATIENT
Start: 2023-01-20 | End: 2023-04-10

## 2023-01-20 RX ORDER — VALPROIC ACID (AS SODIUM SALT) 250 MG/5ML
250 SOLUTION, ORAL ORAL THREE TIMES A DAY
Refills: 0 | Status: DISCONTINUED | OUTPATIENT
Start: 2023-01-20 | End: 2023-01-21

## 2023-01-20 RX ORDER — QUETIAPINE FUMARATE 200 MG/1
1 TABLET, FILM COATED ORAL
Qty: 0 | Refills: 0 | DISCHARGE

## 2023-01-20 RX ORDER — METOPROLOL TARTRATE 50 MG
1 TABLET ORAL
Qty: 0 | Refills: 0 | DISCHARGE

## 2023-01-20 RX ORDER — LANOLIN ALCOHOL/MO/W.PET/CERES
3 CREAM (GRAM) TOPICAL AT BEDTIME
Refills: 0 | Status: DISCONTINUED | OUTPATIENT
Start: 2023-01-20 | End: 2023-02-14

## 2023-01-20 RX ADMIN — LIDOCAINE 1 PATCH: 4 CREAM TOPICAL at 23:00

## 2023-01-20 RX ADMIN — Medication 3 MILLIGRAM(S): at 21:35

## 2023-01-20 RX ADMIN — HALOPERIDOL DECANOATE 1 MILLIGRAM(S): 100 INJECTION INTRAMUSCULAR at 11:16

## 2023-01-20 RX ADMIN — Medication 0.25 MILLIGRAM(S): at 21:35

## 2023-01-20 RX ADMIN — HALOPERIDOL DECANOATE 1 MILLIGRAM(S): 100 INJECTION INTRAMUSCULAR at 05:07

## 2023-01-20 RX ADMIN — Medication 975 MILLIGRAM(S): at 05:58

## 2023-01-20 RX ADMIN — SENNA PLUS 2 TABLET(S): 8.6 TABLET ORAL at 21:35

## 2023-01-20 RX ADMIN — Medication 1 DROP(S): at 05:19

## 2023-01-20 RX ADMIN — Medication 0.25 MILLIGRAM(S): at 10:30

## 2023-01-20 RX ADMIN — Medication 975 MILLIGRAM(S): at 04:58

## 2023-01-20 RX ADMIN — SERTRALINE 25 MILLIGRAM(S): 25 TABLET, FILM COATED ORAL at 11:16

## 2023-01-20 RX ADMIN — ENOXAPARIN SODIUM 40 MILLIGRAM(S): 100 INJECTION SUBCUTANEOUS at 12:56

## 2023-01-20 RX ADMIN — POLYETHYLENE GLYCOL 3350 17 GRAM(S): 17 POWDER, FOR SOLUTION ORAL at 11:16

## 2023-01-20 RX ADMIN — HALOPERIDOL DECANOATE 1 MILLIGRAM(S): 100 INJECTION INTRAMUSCULAR at 00:32

## 2023-01-20 RX ADMIN — LIDOCAINE 1 PATCH: 4 CREAM TOPICAL at 11:16

## 2023-01-20 NOTE — PROGRESS NOTE ADULT - SUBJECTIVE AND OBJECTIVE BOX
Patient is a 88y old  Female who presents with a chief complaint of Right lower extremity pain (19 Jan 2023 12:28)      SUBJECTIVE / OVERNIGHT EVENTS: pt sleeping     MEDICATIONS  (STANDING):  acetaminophen     Tablet .. 975 milliGRAM(s) Oral every 12 hours  carboxymethylcellulose 0.5% (Preservative-Free) Ophthalmic Solution 1 Drop(s) Both EYES two times a day  clonazePAM  Tablet 0.25 milliGRAM(s) Oral <User Schedule>  enoxaparin Injectable 40 milliGRAM(s) SubCutaneous every 24 hours  haloperidol    Injectable 1 milliGRAM(s) IV Push two times a day  lidocaine   4% Patch 1 Patch Transdermal daily  metoprolol succinate ER 50 milliGRAM(s) Oral daily  polyethylene glycol 3350 17 Gram(s) Oral daily  senna 2 Tablet(s) Oral at bedtime  sertraline 25 milliGRAM(s) Oral daily  valproic  acid Syrup 250 milliGRAM(s) Oral two times a day    MEDICATIONS  (PRN):  aluminum hydroxide/magnesium hydroxide/simethicone Suspension 30 milliLiter(s) Oral every 4 hours PRN Dyspepsia  bisacodyl 5 milliGRAM(s) Oral every 12 hours PRN Constipation  haloperidol    Injectable 1 milliGRAM(s) IV Push every 6 hours PRN severe agitation  melatonin 3 milliGRAM(s) Oral at bedtime PRN Insomnia  ondansetron Injectable 4 milliGRAM(s) IV Push every 8 hours PRN Nausea and/or Vomiting        CAPILLARY BLOOD GLUCOSE        I&O's Summary    19 Jan 2023 07:01  -  20 Jan 2023 07:00  --------------------------------------------------------  IN: 480 mL / OUT: 0 mL / NET: 480 mL        PHYSICAL EXAM:  GENERAL: NAD, well-developed  HEAD:  Atraumatic, Normocephalic  NECK: No JVD  CHEST/LUNG: Clear to auscultation bilaterally; No wheeze  HEART: Regular rate and rhythm; S1S2  ABDOMEN: Soft, Nontender, Nondistended; Bowel sounds present  EXTREMITIES:  2+ Peripheral Pulses, No clubbing, cyanosis, or edema      LABS:                    RADIOLOGY & ADDITIONAL TESTS:    Imaging Personally Reviewed:    Consultant(s) Notes Reviewed:      Care Discussed with Consultants/Other Providers:

## 2023-01-20 NOTE — BH CONSULTATION LIAISON PROGRESS NOTE - NSBHMSESPEECH_PSY_A_CORE
Abnormal as indicated, otherwise normal...
Non-verbal: unable to assess speech further
Abnormal as indicated, otherwise normal...
Non-verbal: unable to assess speech further
Abnormal as indicated, otherwise normal...

## 2023-01-20 NOTE — BH CONSULTATION LIAISON PROGRESS NOTE - NSBHCONSULTFOLLOWAFTERCARE_PSY_A_CORE FT
pt will be transferred to [address] status 
pt no longer warrants inpt psych admission, pt can be d/c home with additional support/aide services, rehab? can fu [facility] 
pt will be transferred to [address] status 
pt no longer warrants inpt psych admission, pt can be d/c home with additional support/aide services, rehab? can fu [facility] 
pt will be transferred to [address] status 
pt no longer warrants inpt psych admission, pt can be d/c home with additional support/aide services, rehab? can fu [facility]

## 2023-01-20 NOTE — BH INPATIENT PSYCHIATRY ASSESSMENT NOTE - CURRENT MEDICATION
MEDICATIONS  (STANDING):  artificial  tears Solution 1 Drop(s) Both EYES two times a day  clonazePAM Oral Disintegrating Tablet 0.25 milliGRAM(s) Oral two times a day  haloperidol     Tablet 1 milliGRAM(s) Oral two times a day  lidocaine   4% Patch 1 Patch Transdermal every 24 hours  metoprolol succinate ER 50 milliGRAM(s) Oral daily  polyethylene glycol 3350 17 Gram(s) Oral daily  senna 2 Tablet(s) Oral at bedtime  valproic  acid Syrup 250 milliGRAM(s) Oral three times a day    MEDICATIONS  (PRN):  acetaminophen     Tablet .. 650 milliGRAM(s) Oral every 6 hours PRN Mild Pain (1 - 3), Moderate Pain (4 - 6), Severe Pain (7 - 10)  aluminum hydroxide/magnesium hydroxide/simethicone Suspension 30 milliLiter(s) Oral every 4 hours PRN Dyspepsia  bisacodyl 5 milliGRAM(s) Oral every 12 hours PRN Constipation  melatonin. 3 milliGRAM(s) Oral at bedtime PRN Insomnia  OLANZapine 2.5 milliGRAM(s) Oral every 6 hours PRN agitation  OLANZapine Injectable 2.5 milliGRAM(s) IntraMuscular once PRN agitation
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
## 2023-01-20 NOTE — BH PATIENT PROFILE - FUNCTIONAL ASSESSMENT - BASIC MOBILITY 6.
2-calculated by average/Not able to assess (calculate score using [facility] averaging method)
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
## 2023-01-20 NOTE — BH CONSULTATION LIAISON PROGRESS NOTE - NSBHPSYCHOLCOGABN_PSY_A_CORE
disoriented to time/disoriented to place/disoriented to situation
disoriented to time/disoriented to place
disoriented to time
disoriented to time/disoriented to place
disoriented to time/disoriented to place
disoriented to time/disoriented to place/disoriented to situation
disoriented to time/disoriented to place
disoriented to time
disoriented to time

## 2023-01-20 NOTE — PROGRESS NOTE ADULT - PROBLEM SELECTOR PROBLEM 1
Lower extremity pain, right
Lower extremity pain, right
Back pain
Bipolar disorder
Back pain
Bipolar disorder
Bipolar disorder
Back pain
Bipolar disorder
Lower extremity pain, right
Back pain
Lower extremity pain, right
Back pain
Back pain
Bipolar disorder
Lower extremity pain, right
Bipolar disorder
Lower extremity pain, right
Bipolar disorder

## 2023-01-20 NOTE — BH CONSULTATION LIAISON PROGRESS NOTE - NSBHMSETHTCONTENT_PSY_A_CORE
Unremarkable
Preoccupations
Preoccupations
Unable to assess
Unremarkable
Preoccupations/Ruminations
Preoccupations

## 2023-01-20 NOTE — BH CONSULTATION LIAISON PROGRESS NOTE - NSBHMSEAFFQUAL_PSY_A_CORE
Irritable
Irritable
Irritable/Unable to assess
Irritable
Unable to assess
Irritable
Irritable

## 2023-01-20 NOTE — BH CONSULTATION LIAISON PROGRESS NOTE - NSBHATTESTTYPEVISIT_PSY_A_CORE
Attending Only
Resident/Fellow with telephonic supervision
Attending Only
Attending with Resident/Fellow/Student

## 2023-01-20 NOTE — BH CONSULTATION LIAISON PROGRESS NOTE - NSICDXBHPRIMARYDX_PSY_ALL_CORE
Delirium due to other cause   F05  

## 2023-01-20 NOTE — BH CONSULTATION LIAISON PROGRESS NOTE - NSBHMSELANG_PSY_A_CORE
Impaired naming/Impaired repetition/Unable to assess
Impaired naming/Impaired repetition/Unable to assess
Impaired naming
No abnormalities noted
Impaired naming/Impaired repetition/Unable to assess
Impaired naming/Impaired repetition/Unable to assess
No abnormalities noted
Unable to assess
Impaired naming

## 2023-01-20 NOTE — DISCHARGE NOTE NURSING/CASE MANAGEMENT/SOCIAL WORK - PATIENT PORTAL LINK FT
You can access the FollowMyHealth Patient Portal offered by [facility] by registering at the following website: [email or web address]. By joining [facility]’s FollowMyHealth portal, you will also be able to view your health information using other applications (apps) compatible with our system.

## 2023-01-20 NOTE — BH CONSULTATION LIAISON PROGRESS NOTE - NSICDXBHSECONDARYDX_PSY_ALL_CORE
Bipolar disorder   F31.9  Anxiety   F41.9  
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
## 2023-01-20 NOTE — PROGRESS NOTE ADULT - REASON FOR ADMISSION
Right lower extremity pain

## 2023-01-20 NOTE — BH CONSULTATION LIAISON PROGRESS NOTE - GENERAL APPEARANCE
Deformities present

## 2023-01-20 NOTE — BH CONSULTATION LIAISON PROGRESS NOTE - NSBHMSEREMMEM_PSY_A_CORE
Impaired
Normal
Impaired
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
## 2023-01-20 NOTE — BH CONSULTATION LIAISON PROGRESS NOTE - NSBHMSEKNOWHOW_PSY_ALL_CORE
Current Events/Vocabulary
Vocabulary
Vocabulary
Current Events/Vocabulary
Vocabulary
Current Events/Vocabulary
Vocabulary
Current Events/Vocabulary
Vocabulary

## 2023-01-20 NOTE — PROGRESS NOTE ADULT - PROBLEM SELECTOR PROBLEM 2
Back pain
Bipolar disorder
Back pain
Bipolar disorder
Back pain
Bipolar disorder
Back pain
Bipolar disorder
Bipolar disorder
Back pain
Bipolar disorder
Back pain
Bipolar disorder
Back pain
Bipolar disorder

## 2023-01-20 NOTE — BH CONSULTATION LIAISON PROGRESS NOTE - NSBHCONSULTPRIMARYDISCUSSYES_PSY_A_CORE FT
Addendum  created 09/21/17 1103 by Bubba Dinh MD    Sign clinical note      
as above
as above
Discussed with Ricky 
Provider at [id number]
as above
Discussed with Ricky 
Discussed with Ricky 
as above

## 2023-01-20 NOTE — PROGRESS NOTE ADULT - PROBLEM SELECTOR PLAN 2
- seroquel discontiued per psych recs, now on zyprexa 2.5 AM and 5mg qhs  - continue valproic acid   - klonopin qhs   - prn zyprexa  - c/w sertraline   - melatonin   - appreciate psych consult, may require inpt psych
seroquel discontinued per psych recs  C/w to 1 mg Haldol IV in AM and 2 mg Haldol IV qhs   C/w Zyprexa 2.5 mg IM Q6 PRN for severe agitation  continue valproic acid 250mg bid   c/w sertraline   c/w klonopin   melatonin    per psych plan is for inpatient psych however PT has recommended rehab- daily ambulation with PT to reassess need for ania  pending psych follow up today to convert meds to po
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 Evolution of the fracture at L3 that was identified at the time of 1/19/2010 study now with loss of height of the vertebral body return of the normal marrow signal  No neurosurgical intervention   Pain control with standing tylenol (do not exceed 3g), and lidocaine patch. Per chronic pain chart note can consider short trial of celebrex but would avoid opioid medications and gabapentinoids. Has not required additional medications  PT  Fall precautions
seroquel discontinued per psych recs  C/w to 1 mg Haldol IV in AM and 2 mg Haldol IV qhs   C/w Zyprexa 2.5 mg IM Q6 PRN for severe agitation  continue valproic acid 250mg bid   c/w sertraline   c/w klonopin   melatonin    per psych plan is for inpatient psych however PT has recommended rehab- daily ambulation with PT to reassess need for ania
seroquel discontinued per psych recs  C/w to 1 mg Haldol IV in AM and 2 mg Haldol IV qhs   C/w Zyprexa 2.5 mg IM Q6 PRN for severe agitation  continue valproic acid 250mg bid   c/w sertraline   c/w klonopin   melatonin   check EKG to evaluate qtc   per psych plan is for inpatient psych however PT has recommended rehab- daily ambulation with PT to reassess need for ania
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 Evolution of the fracture at L3 that was identified at the time of 1/19/2010 study now with loss of height of the vertebral body return of the normal marrow signal  No neurosurgical intervention   Pain control with standing tylenol (do not exceed 3g), and lidocaine patch. Per chronic pain chart note can consider short trial of celebrex but would avoid opioid medications and gabapentinoids. Has not required additional medications  PT  Fall precautions
seroquel discontinued per psych recs  C/w to 1 mg Haldol IV in AM and 2 mg Haldol IV qhs   C/w Zyprexa 2.5 mg IM Q6 PRN for severe agitation  continue valproic acid 250mg bid   c/w sertraline   c/w klonopin   melatonin   check EKG to evaluate qtc   per psych plan is for inpatient psych however PT has recommended rehab- daily ambulation with PT to reassess need for ania
seroquel discontinued per psych recs  C/w to 1 mg Haldol IV in AM and 2 mg Haldol IV qhs   C/w Zyprexa 2.5 mg IM Q6 PRN for severe agitation  continue valproic acid 250mg bid   c/w sertraline   c/w klonopin   melatonin   check EKG to evaluate qtc   pt for inpt psych
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 Evolution of the fracture at L3 that was identified at the time of 1/19/2010 study now with loss of height of the vertebral body return of the normal marrow signal  No neurosurgical intervention   Pain control with standing tylenol (do not exceed 3g), and lidocaine patch. Per chronic pain chart note can consider short trial of celebrex but would avoid opioid medications and gabapentinoids. Has not required additional medications  PT  Fall precautions
seroquel discontinued per psych recs  C/w to 1 mg Haldol IV in AM and 2 mg Haldol IV qhs   C/w Zyprexa 2.5 mg IM Q6 PRN for severe agitation  continue valproic acid 250mg bid   c/w sertraline   c/w klonopin   melatonin    per psych plan is for inpatient psych however PT has recommended rehab- daily ambulation with PT to reassess need for ania
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 Evolution of the fracture at L3 that was identified at the time of 1/19/2010 study now with loss of height of the vertebral body return of the normal marrow signal  No neurosurgical intervention   Pain control with standing tylenol (do not exceed 3g), and lidocaine patch. Per chronic pain chart note can consider short trial of celebrex but would avoid opioid medications and gabapentinoids. Has not required additional medications  PT  Fall precautions
D/w pscyh 1/13 change to po haldol 2mg bid   C/w Zyprexa 2.5 mg IM Q6 PRN for severe agitation  continue valproic acid 250mg bid   c/w sertraline   c/w klonopin   melatonin    qtc 445  no need for inpt psych now, pt has no rehab days, daughter has not submitted a medicaid application, plan for dc home with A once agitation improved
seroquel discontinued per psych recs  C/w to 1 mg Haldol IV in AM and 2 mg Haldol IV qhs   C/w Zyprexa 2.5 mg IM Q6 PRN for severe agitation  continue valproic acid 250mg bid   c/w sertraline   c/w klonopin   melatonin   check EKG to evaluate qtc   per psych plan is for inpatient psych however PT has recommended rehab- daily ambulation with PT to reassess need for ania
will resume regimen based on history provided by daughter  - continue quetiapine   - continue valproic acid   - klonopin qhs   - prn zyprexa  - c/w sertraline   - melatonin   - psych consult in AM as pt's behavior has not improved
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 Evolution of the fracture at L3 that was identified at the time of 1/19/2010 study now with loss of height of the vertebral body return of the normal marrow signal  No neurosurgical intervention   Pain control with standing tylenol (do not exceed 3g), and lidocaine patch. Per chronic pain chart note can consider short trial of celebrex but would avoid opioid medications and gabapentinoids. Has not required additional medications  PT  Fall precautions
will resume regimen based on history provided by daughter  - continue quetiapine   - continue valproic acid   - melatonin   - If continuously with  behavioral disturbances despite taking medications, behavioral health consult  - enhanced observation
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 Evolution of the fracture at L3 that was identified at the time of 1/19/2010 study now with loss of height of the vertebral body return of the normal marrow signal  No neurosurgical intervention   Pain control with standing tylenol (do not exceed 3g), and lidocaine patch. Per chronic pain chart note can consider short trial of celebrex but would avoid opioid medications and gabapentinoids. Has not required additional medications  PT  Fall precautions
seroquel discontinued per psych recs  C/w to 1 mg Haldol IV in AM and 2 mg Haldol IV qhs   C/w Zyprexa 2.5 mg IM Q6 PRN for severe agitation  continue valproic acid 250mg bid   c/w sertraline   c/w klonopin   melatonin    per psych plan is for inpatient psych however PT has recommended rehab- daily ambulation with PT to reassess need for ania
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 Evolution of the fracture at L3 that was identified at the time of 1/19/2010 study now with loss of height of the vertebral body return of the normal marrow signal  No neurosurgical intervention   Pain control with standing tylenol (do not exceed 3g), and lidocaine patch. Per chronic pain chart note can consider short trial of celebrex but would avoid opioid medications and gabapentinoids. Has not required additional medications  PT  Fall precautions
- seroquel discontiued per psych recs, now on zyprexa 2.5 AM and 5mg qhs  - continue valproic acid   - klonopin qhs   - prn zyprexa  - c/w sertraline   - melatonin   - called psych today 1/3 pending follow up
seroquel discontiued per psych recs  C/w to 1 mg Haldol IV in AM and 2 mg Haldol IV qhs   C/w Zyprexa 2.5 mg IM Q6 PRN for severe agitation  continue valproic acid 250mg bid   c/w sertraline   c/w klonopin   melatonin   pt likely for inpt psych pending follow up

## 2023-01-20 NOTE — PROGRESS NOTE ADULT - NSPROGADDITIONALINFOA_GEN_ALL_CORE
D/w BUD Tipton
D/w ACP Zeta
d/w ACP Zeta
d/w BUD Bullock
D/w BUD García
Plan d/w ACP Ricky
D/w BUD Duggan
D/w ACP Vicky
D/w BUD Duggan
Plan d/w ACP Zeta
D/w ACP Vicky
D/w BUD Duggan
D/w ACP Vicky
D/w ACP Zeta
D/w BUD Duggan
Plan d/w ACP Spogmanaldo

## 2023-01-20 NOTE — BH INPATIENT PSYCHIATRY ASSESSMENT NOTE - NSBHASSESSSUMMFT_PSY_ALL_CORE
Dinorah Hogan is an 88 year old female, , domiciled with daughter with PPHx of bipolar I disorder, 1 previous inpatient psychiatric hospitalization in 1970s, no known history of SI/SA and PMHx of asthma, GERD, sciatica with previous vertebral fracture who is admitted on 9.27 status for symptoms of hortencia including irritability, loud speech, and agitation.     DDx: hortencia vs hypomania    Today, patient is angry about her current admission and denying any psychiatric history. She is asking to be discharged immediately and yelling at treatment team. She is noted to have loud and rapid speech, but is able to be interrupted. She states she has been sleeping poorly due to hip and R knee pain. She denies S/IIP, H/IIP, AVH, paranoia, or ideas of reference. She is AOx1-2 (person, place--hospital), but unable to state year or location in [place]. She requires 9.27 inpatient admission for evaluation and management of hortencia.    Plan:  1. Legal: continue 9.27 involuntary admission  2. Safety:   - Requires 1:1 CO for fall risk and monitoring of hospital bed   - Olanzapine 2.5 mg PO or IM q6h PRN agitation  3. Psychiatric:  - Valproic acid syrup 250 mg TID for mood stabilization  - Clonazepam 0.25 mg ODT BID for anxiety  - Haloperidol 1 mg PO BID for agitation  - Melatonin 3 mg PRN for insomnia  4. Group/Milieu therapy: encourage participation as able  5. Medical:   - Hx of vertebral fractures with ambulatory dysfunction: no acute surgical intervention, patient requires hospital bed, PRN tylenol and lidocaine patch  - HTN: metoprolol XL 50 mg daily  - Constipation: miralax and senna daily, PRN bisacodyl q12h  - Dry eyes: Artificial tears 1 drop both eyes BID  6. Collateral/Dispo: per primary team    Dinorah Hogan is an 88 year old female, , domiciled with daughter with PPHx of bipolar I disorder, 1 previous inpatient psychiatric hospitalization in 1970s, no known history of SI/SA and PMHx of asthma, GERD, sciatica with previous vertebral fracture who is admitted on 9.27 status for symptoms of hortencia including irritability, loud speech, and agitation.     DDx: hortencia vs hypomania vs comorbid delirium    Today, patient is angry about her current admission and denying any psychiatric history. She is asking to be discharged immediately and yelling at treatment team. She is noted to have loud and rapid speech, but is able to be interrupted. She states she has been sleeping poorly due to hip and R knee pain. She denies S/IIP, H/IIP, AVH, paranoia, or ideas of reference. She is AOx1-2 (person, place--hospital), but unable to state year or location in [place]. She requires 9.27 inpatient admission for evaluation and management of hortencia.    Plan:  1. Legal: continue 9.27 involuntary admission  2. Safety:   - Requires 1:1 CO for fall risk and monitoring of hospital bed   - Olanzapine 2.5 mg PO or IM q6h PRN agitation  3. Psychiatric:  - Valproic acid syrup 250 mg for mood stabilization  - Clonazepam 0.25 mg ODT BID for anxiety  - Haloperidol 1 mg PO BID for agitation  - Melatonin 3 mg PRN for insomnia  4. Group/Milieu therapy: encourage participation as able  5. Medical:   - Hx of vertebral fractures with ambulatory dysfunction: no acute surgical intervention, patient requires hospital bed, PRN tylenol and lidocaine patch  - HTN: metoprolol XL 50 mg daily  - Constipation: miralax and senna daily, PRN bisacodyl q12h  - Dry eyes: Artificial tears 1 drop both eyes BID  6. Collateral/Dispo: per primary team

## 2023-01-20 NOTE — BH CONSULTATION LIAISON PROGRESS NOTE - NS ED BHA REVIEW OF ED CHART AVAILABLE LABS REVIEWED
Yes
Yes
None available
Yes
None available
Yes
None available
Yes

## 2023-01-20 NOTE — BH CONSULTATION LIAISON PROGRESS NOTE - NSBHMSEBODY_PSY_A_CORE
Underweight

## 2023-01-20 NOTE — BH INPATIENT PSYCHIATRY ASSESSMENT NOTE - NSBHCHARTREVIEWVS_PSY_A_CORE FT
Vital Signs Last 24 Hrs  T(C): 36.6 (01-20-23 @ 16:48), Max: 37 (01-20-23 @ 04:55)  T(F): 97.8 (01-20-23 @ 16:48), Max: 98.6 (01-20-23 @ 04:55)  HR: 87 (01-20-23 @ 16:48) (66 - 87)  BP: 141/60 (01-20-23 @ 16:48) (125/74 - 141/60)  BP(mean): --  RR: 18 (01-20-23 @ 09:22) (18 - 18)  SpO2: 94% (01-20-23 @ 09:22) (93% - 94%)

## 2023-01-20 NOTE — BH CONSULTATION LIAISON PROGRESS NOTE - NSBHMSEKNOW_PSY_A_CORE
Normal
Impaired
Normal
Impaired
Normal

## 2023-01-20 NOTE — BH CONSULTATION LIAISON PROGRESS NOTE - NSBHCHARTREVIEWVS_PSY_A_CORE FT
Vital Signs Last 24 Hrs  T(C): 36.3 (12 Jan 2023 05:58), Max: 36.6 (11 Jan 2023 14:19)  T(F): 97.4 (12 Jan 2023 05:58), Max: 97.8 (11 Jan 2023 14:19)  HR: 67 (12 Jan 2023 05:58) (67 - 72)  BP: 135/81 (12 Jan 2023 05:58) (126/84 - 150/81)  BP(mean): --  RR: 18 (12 Jan 2023 05:58) (18 - 18)  SpO2: 94% (12 Jan 2023 05:58) (92% - 96%)    Parameters below as of 12 Jan 2023 05:58  Patient On (Oxygen Delivery Method): room air    
Vital Signs Last 24 Hrs  T(C): 36.6 (14 Jan 2023 11:12), Max: 36.6 (14 Jan 2023 11:12)  T(F): 97.8 (14 Jan 2023 11:12), Max: 97.8 (14 Jan 2023 11:12)  HR: 66 (14 Jan 2023 11:12) (63 - 72)  BP: 119/75 (14 Jan 2023 11:12) (98/65 - 119/75)  BP(mean): --  RR: 18 (14 Jan 2023 11:12) (18 - 18)  SpO2: 93% (14 Jan 2023 11:12) (91% - 93%)    Parameters below as of 14 Jan 2023 11:12  Patient On (Oxygen Delivery Method): room air    
Vital Signs Last 24 Hrs  T(C): 36.9 (09 Jan 2023 04:54), Max: 36.9 (09 Jan 2023 04:54)  T(F): 98.4 (09 Jan 2023 04:54), Max: 98.4 (09 Jan 2023 04:54)  HR: 68 (09 Jan 2023 04:54) (66 - 68)  BP: 112/76 (09 Jan 2023 04:54) (112/76 - 117/73)  BP(mean): --  RR: 18 (09 Jan 2023 04:54) (18 - 18)  SpO2: 95% (09 Jan 2023 04:54) (93% - 95%)    Parameters below as of 09 Jan 2023 04:54  Patient On (Oxygen Delivery Method): room air    
Vital Signs Last 24 Hrs  T(C): 36.4 (11 Jan 2023 04:27), Max: 36.4 (10 Bebeto 2023 20:13)  T(F): 97.6 (11 Jan 2023 04:27), Max: 97.6 (11 Jan 2023 04:27)  HR: 65 (11 Jan 2023 04:27) (65 - 68)  BP: 117/68 (11 Jan 2023 04:27) (117/68 - 149/64)  BP(mean): --  RR: 18 (11 Jan 2023 04:27) (18 - 18)  SpO2: 95% (11 Jan 2023 04:27) (94% - 96%)    Parameters below as of 11 Jan 2023 04:27  Patient On (Oxygen Delivery Method): room air    
Vital Signs Last 24 Hrs  T(C): 36.2 (05 Jan 2023 15:15), Max: 36.9 (05 Jan 2023 02:17)  T(F): 97.2 (05 Jan 2023 15:15), Max: 98.4 (05 Jan 2023 02:17)  HR: 71 (05 Jan 2023 15:30) (69 - 85)  BP: 96/78 (05 Jan 2023 15:30) (93/70 - 142/82)  BP(mean): 88 (05 Jan 2023 15:25) (78 - 88)  RR: 20 (05 Jan 2023 15:30) (18 - 21)  SpO2: 98% (05 Jan 2023 15:30) (93% - 100%)    Parameters below as of 05 Jan 2023 15:30    O2 Flow (L/min): 2  
Vital Signs Last 24 Hrs  T(C): 36.4 (07 Jan 2023 06:18), Max: 36.6 (06 Jan 2023 12:40)  T(F): 97.6 (07 Jan 2023 06:18), Max: 97.8 (06 Jan 2023 12:40)  HR: 63 (07 Jan 2023 06:18) (63 - 77)  BP: 147/83 (07 Jan 2023 06:18) (98/59 - 147/83)  BP(mean): --  RR: 18 (07 Jan 2023 06:18) (17 - 18)  SpO2: 92% (07 Jan 2023 06:18) (92% - 96%)    Parameters below as of 07 Jan 2023 06:18  Patient On (Oxygen Delivery Method): room air    
Vital Signs Last 24 Hrs  T(C): 36.6 (06 Jan 2023 12:40), Max: 36.9 (06 Jan 2023 04:25)  T(F): 97.8 (06 Jan 2023 12:40), Max: 98.4 (06 Jan 2023 04:25)  HR: 77 (06 Jan 2023 12:40) (68 - 77)  BP: 100/67 (06 Jan 2023 12:40) (93/70 - 126/86)  BP(mean): 97 (05 Jan 2023 15:45) (78 - 97)  RR: 17 (06 Jan 2023 12:40) (17 - 21)  SpO2: 96% (06 Jan 2023 12:40) (96% - 100%)    Parameters below as of 06 Jan 2023 12:40  Patient On (Oxygen Delivery Method): nasal cannula  O2 Flow (L/min): 2  
Vital Signs Last 24 Hrs  T(C): 36.7 (20 Jan 2023 09:22), Max: 37 (20 Jan 2023 04:55)  T(F): 98.1 (20 Jan 2023 09:22), Max: 98.6 (20 Jan 2023 04:55)  HR: 69 (20 Jan 2023 09:22) (66 - 75)  BP: 125/74 (20 Jan 2023 09:22) (111/73 - 130/69)  BP(mean): --  RR: 18 (20 Jan 2023 09:22) (18 - 18)  SpO2: 94% (20 Jan 2023 09:22) (91% - 95%)    Parameters below as of 20 Jan 2023 09:22  Patient On (Oxygen Delivery Method): room air    
Vital Signs Last 24 Hrs  T(C): 36.4 (03 Jan 2023 16:05), Max: 36.8 (03 Jan 2023 05:14)  T(F): 97.5 (03 Jan 2023 16:05), Max: 98.2 (03 Jan 2023 05:14)  HR: 77 (03 Jan 2023 16:05) (68 - 77)  BP: 102/59 (03 Jan 2023 16:05) (102/59 - 109/67)  BP(mean): --  RR: 18 (03 Jan 2023 16:05) (17 - 18)  SpO2: 92% (03 Jan 2023 16:05) (92% - 96%)    Parameters below as of 03 Jan 2023 16:05  Patient On (Oxygen Delivery Method): room air    
Vital Signs Last 24 Hrs  T(C): 36.3 (10 Bebeto 2023 11:03), Max: 36.5 (10 Bebeto 2023 05:32)  T(F): 97.4 (10 Bebeto 2023 11:03), Max: 97.7 (10 Bebeto 2023 05:32)  HR: 68 (10 Bebeto 2023 11:03) (60 - 73)  BP: 121/64 (10 Bebeto 2023 11:03) (119/66 - 125/66)  BP(mean): --  RR: 18 (10 Bebeto 2023 11:03) (18 - 18)  SpO2: 94% (10 Bebeto 2023 11:03) (94% - 95%)    Parameters below as of 10 Bebeto 2023 11:03  Patient On (Oxygen Delivery Method): room air    
Vital Signs Last 24 Hrs  T(C): 36.7 (19 Jan 2023 11:50), Max: 36.7 (18 Jan 2023 19:55)  T(F): 98 (19 Jan 2023 11:50), Max: 98 (18 Jan 2023 19:55)  HR: 69 (19 Jan 2023 11:50) (68 - 70)  BP: 125/72 (19 Jan 2023 11:50) (125/72 - 131/65)  BP(mean): --  RR: 18 (19 Jan 2023 11:50) (18 - 18)  SpO2: 94% (19 Jan 2023 11:50) (93% - 95%)    Parameters below as of 19 Jan 2023 11:50  Patient On (Oxygen Delivery Method): room air    
Vital Signs Last 24 Hrs  T(C): 36.4 (13 Jan 2023 11:39), Max: 36.7 (12 Jan 2023 19:59)  T(F): 97.6 (13 Jan 2023 11:39), Max: 98.1 (12 Jan 2023 19:59)  HR: 64 (13 Jan 2023 11:39) (61 - 68)  BP: 135/74 (13 Jan 2023 11:39) (118/74 - 135/74)  BP(mean): --  RR: 18 (13 Jan 2023 11:39) (18 - 18)  SpO2: 94% (13 Jan 2023 11:39) (92% - 95%)    Parameters below as of 13 Jan 2023 11:39  Patient On (Oxygen Delivery Method): room air    
Vital Signs Last 24 Hrs  T(C): 36.9 (04 Jan 2023 09:55), Max: 36.9 (04 Jan 2023 09:55)  T(F): 98.4 (04 Jan 2023 09:55), Max: 98.4 (04 Jan 2023 09:55)  HR: 67 (04 Jan 2023 09:55) (67 - 85)  BP: 136/83 (04 Jan 2023 09:55) (102/59 - 136/83)  BP(mean): --  RR: 18 (04 Jan 2023 09:55) (18 - 18)  SpO2: 96% (04 Jan 2023 09:55) (92% - 96%)    Parameters below as of 04 Jan 2023 09:55  Patient On (Oxygen Delivery Method): room air    
Vital Signs Last 24 Hrs  T(C): 36.9 (18 Jan 2023 08:48), Max: 36.9 (18 Jan 2023 08:48)  T(F): 98.4 (18 Jan 2023 08:48), Max: 98.4 (18 Jan 2023 08:48)  HR: 61 (18 Jan 2023 08:48) (61 - 68)  BP: 121/76 (18 Jan 2023 08:48) (121/76 - 127/73)  BP(mean): --  RR: 18 (18 Jan 2023 08:48) (18 - 18)  SpO2: 94% (18 Jan 2023 08:48) (93% - 94%)    Parameters below as of 18 Jan 2023 08:48  Patient On (Oxygen Delivery Method): room air    
Vital Signs Last 24 Hrs  T(C): 36.8 (08 Jan 2023 09:57), Max: 36.9 (07 Jan 2023 20:04)  T(F): 98.2 (08 Jan 2023 09:57), Max: 98.4 (07 Jan 2023 20:04)  HR: 74 (08 Jan 2023 09:57) (62 - 78)  BP: 140/72 (08 Jan 2023 09:57) (131/64 - 151/75)  BP(mean): --  RR: 18 (08 Jan 2023 09:57) (18 - 18)  SpO2: 97% (08 Jan 2023 09:57) (93% - 97%)    Parameters below as of 08 Jan 2023 09:57  Patient On (Oxygen Delivery Method): room air

## 2023-01-20 NOTE — BH INPATIENT PSYCHIATRY ASSESSMENT NOTE - NSSUICPROTFACT_PSY_ALL_CORE
Supportive social network of family or friends Responsibility to children, family, or others/Supportive social network of family or friends/Fear of death or the actual act of killing self

## 2023-01-20 NOTE — BH CONSULTATION LIAISON PROGRESS NOTE - NSBHMSETHTASSOC_PSY_A_CORE
Normal
Normal
Unable to assess
Normal
Loose
Normal

## 2023-01-20 NOTE — PROGRESS NOTE ADULT - PROVIDER SPECIALTY LIST ADULT
Hospitalist

## 2023-01-20 NOTE — BH CONSULTATION LIAISON PROGRESS NOTE - NSBHPTASSESSDT_PSY_A_CORE
09-Jan-2023 10:24
06-Jan-2023 14:24
14-Jan-2023 15:47
05-Jan-2023 15:51
19-Jan-2023 14:14
03-Jan-2023 16:21
11-Jan-2023 09:15
04-Jan-2023 11:43
08-Jan-2023 13:15
12-Jan-2023 11:54
07-Jan-2023 12:05
10-Bebeto-2023 14:34
20-Jan-2023 16:05
13-Jan-2023 12:53
18-Jan-2023 13:50

## 2023-01-20 NOTE — BH CONSULTATION LIAISON PROGRESS NOTE - CURRENT MEDICATION
MEDICATIONS  (STANDING):  acetaminophen     Tablet .. 975 milliGRAM(s) Oral every 12 hours  clonazePAM  Tablet 0.5 milliGRAM(s) Oral at bedtime  enoxaparin Injectable 40 milliGRAM(s) SubCutaneous every 24 hours  haloperidol    Injectable 1 milliGRAM(s) IV Push daily  haloperidol    Injectable 2 milliGRAM(s) IV Push at bedtime  lidocaine   4% Patch 1 Patch Transdermal daily  metoprolol succinate ER 50 milliGRAM(s) Oral daily  OLANZapine 2.5 milliGRAM(s) Oral <User Schedule>  OLANZapine 5 milliGRAM(s) Oral at bedtime  senna 2 Tablet(s) Oral at bedtime  sertraline 25 milliGRAM(s) Oral daily  valproic  acid Syrup 250 milliGRAM(s) Oral two times a day    MEDICATIONS  (PRN):  aluminum hydroxide/magnesium hydroxide/simethicone Suspension 30 milliLiter(s) Oral every 4 hours PRN Dyspepsia  bisacodyl 5 milliGRAM(s) Oral every 12 hours PRN Constipation  melatonin 3 milliGRAM(s) Oral at bedtime PRN Insomnia  OLANZapine Injectable 2.5 milliGRAM(s) IntraMuscular every 6 hours PRN Agitation  ondansetron Injectable 4 milliGRAM(s) IV Push every 8 hours PRN Nausea and/or Vomiting  oxyCODONE    IR 5 milliGRAM(s) Oral daily PRN Moderate Pain (4 - 6)  
MEDICATIONS  (STANDING):  acetaminophen     Tablet .. 975 milliGRAM(s) Oral every 12 hours  clonazePAM  Tablet 0.5 milliGRAM(s) Oral at bedtime  enoxaparin Injectable 40 milliGRAM(s) SubCutaneous every 24 hours  haloperidol    Injectable 1 milliGRAM(s) IV Push daily  haloperidol    Injectable 2 milliGRAM(s) IV Push at bedtime  lidocaine   4% Patch 1 Patch Transdermal daily  metoprolol succinate ER 50 milliGRAM(s) Oral daily  senna 2 Tablet(s) Oral at bedtime  sertraline 25 milliGRAM(s) Oral daily  valproic  acid Syrup 250 milliGRAM(s) Oral two times a day    MEDICATIONS  (PRN):  aluminum hydroxide/magnesium hydroxide/simethicone Suspension 30 milliLiter(s) Oral every 4 hours PRN Dyspepsia  bisacodyl 5 milliGRAM(s) Oral every 12 hours PRN Constipation  melatonin 3 milliGRAM(s) Oral at bedtime PRN Insomnia  OLANZapine Injectable 2.5 milliGRAM(s) IntraMuscular every 6 hours PRN Agitation  ondansetron Injectable 4 milliGRAM(s) IV Push every 8 hours PRN Nausea and/or Vomiting  
MEDICATIONS  (STANDING):  acetaminophen     Tablet .. 975 milliGRAM(s) Oral every 12 hours  clonazePAM  Tablet 0.5 milliGRAM(s) Oral at bedtime  enoxaparin Injectable 40 milliGRAM(s) SubCutaneous every 24 hours  haloperidol    Injectable 1 milliGRAM(s) IV Push daily  haloperidol    Injectable 2 milliGRAM(s) IV Push at bedtime  lidocaine   4% Patch 1 Patch Transdermal daily  metoprolol succinate ER 50 milliGRAM(s) Oral daily  senna 2 Tablet(s) Oral at bedtime  sertraline 25 milliGRAM(s) Oral daily  valproic  acid Syrup 250 milliGRAM(s) Oral two times a day    MEDICATIONS  (PRN):  aluminum hydroxide/magnesium hydroxide/simethicone Suspension 30 milliLiter(s) Oral every 4 hours PRN Dyspepsia  bisacodyl 5 milliGRAM(s) Oral every 12 hours PRN Constipation  melatonin 3 milliGRAM(s) Oral at bedtime PRN Insomnia  OLANZapine Injectable 2.5 milliGRAM(s) IntraMuscular every 6 hours PRN Agitation  ondansetron Injectable 4 milliGRAM(s) IV Push every 8 hours PRN Nausea and/or Vomiting  
MEDICATIONS  (STANDING):  acetaminophen     Tablet .. 975 milliGRAM(s) Oral every 12 hours  carboxymethylcellulose 0.5% (Preservative-Free) Ophthalmic Solution 1 Drop(s) Both EYES two times a day  clonazePAM  Tablet 0.25 milliGRAM(s) Oral <User Schedule>  enoxaparin Injectable 40 milliGRAM(s) SubCutaneous every 24 hours  lidocaine   4% Patch 1 Patch Transdermal daily  metoprolol succinate ER 50 milliGRAM(s) Oral daily  neomycin/bacitracin/polymyxin Ointment 1 Application(s) Both EYES two times a day  OLANZapine Disintegrating Tablet 5 milliGRAM(s) Oral <User Schedule>  polyethylene glycol 3350 17 Gram(s) Oral daily  senna 2 Tablet(s) Oral at bedtime  sertraline 25 milliGRAM(s) Oral daily  valproic  acid Syrup 250 milliGRAM(s) Oral two times a day    MEDICATIONS  (PRN):  aluminum hydroxide/magnesium hydroxide/simethicone Suspension 30 milliLiter(s) Oral every 4 hours PRN Dyspepsia  bisacodyl 5 milliGRAM(s) Oral every 12 hours PRN Constipation  melatonin 3 milliGRAM(s) Oral at bedtime PRN Insomnia  OLANZapine Injectable 2.5 milliGRAM(s) IntraMuscular every 6 hours PRN Agitation  ondansetron Injectable 4 milliGRAM(s) IV Push every 8 hours PRN Nausea and/or Vomiting  
MEDICATIONS  (STANDING):  acetaminophen     Tablet .. 975 milliGRAM(s) Oral every 12 hours  carboxymethylcellulose 0.5% (Preservative-Free) Ophthalmic Solution 1 Drop(s) Both EYES two times a day  clonazePAM  Tablet 0.25 milliGRAM(s) Oral <User Schedule>  enoxaparin Injectable 40 milliGRAM(s) SubCutaneous every 24 hours  haloperidol    Injectable 1 milliGRAM(s) IV Push two times a day  lidocaine   4% Patch 1 Patch Transdermal daily  metoprolol succinate ER 50 milliGRAM(s) Oral daily  polyethylene glycol 3350 17 Gram(s) Oral daily  senna 2 Tablet(s) Oral at bedtime  sertraline 25 milliGRAM(s) Oral daily  valproic  acid Syrup 250 milliGRAM(s) Oral two times a day    MEDICATIONS  (PRN):  aluminum hydroxide/magnesium hydroxide/simethicone Suspension 30 milliLiter(s) Oral every 4 hours PRN Dyspepsia  bisacodyl 5 milliGRAM(s) Oral every 12 hours PRN Constipation  haloperidol    Injectable 1 milliGRAM(s) IV Push every 6 hours PRN severe agitation  melatonin 3 milliGRAM(s) Oral at bedtime PRN Insomnia  ondansetron Injectable 4 milliGRAM(s) IV Push every 8 hours PRN Nausea and/or Vomiting  
MEDICATIONS  (STANDING):  clonazePAM  Tablet 0.5 milliGRAM(s) Oral at bedtime  enoxaparin Injectable 40 milliGRAM(s) SubCutaneous every 24 hours  metoprolol succinate ER 50 milliGRAM(s) Oral daily  OLANZapine 2.5 milliGRAM(s) Oral <User Schedule>  OLANZapine 5 milliGRAM(s) Oral at bedtime  senna 2 Tablet(s) Oral at bedtime  sertraline 25 milliGRAM(s) Oral daily  valproic  acid Syrup 250 milliGRAM(s) Oral two times a day    MEDICATIONS  (PRN):  acetaminophen     Tablet .. 650 milliGRAM(s) Oral every 6 hours PRN Temp greater or equal to 38C (100.4F), Mild Pain (1 - 3)  aluminum hydroxide/magnesium hydroxide/simethicone Suspension 30 milliLiter(s) Oral every 4 hours PRN Dyspepsia  bisacodyl 5 milliGRAM(s) Oral every 12 hours PRN Constipation  melatonin 3 milliGRAM(s) Oral at bedtime PRN Insomnia  OLANZapine Injectable 2.5 milliGRAM(s) IntraMuscular every 6 hours PRN Agitation  ondansetron Injectable 4 milliGRAM(s) IV Push every 8 hours PRN Nausea and/or Vomiting  
MEDICATIONS  (STANDING):  acetaminophen     Tablet .. 975 milliGRAM(s) Oral every 12 hours  clonazePAM  Tablet 0.5 milliGRAM(s) Oral at bedtime  enoxaparin Injectable 40 milliGRAM(s) SubCutaneous every 24 hours  haloperidol    Injectable 1 milliGRAM(s) IV Push daily  haloperidol    Injectable 2 milliGRAM(s) IV Push at bedtime  lidocaine   4% Patch 1 Patch Transdermal daily  metoprolol succinate ER 50 milliGRAM(s) Oral daily  senna 2 Tablet(s) Oral at bedtime  sertraline 25 milliGRAM(s) Oral daily  valproic  acid Syrup 250 milliGRAM(s) Oral two times a day    MEDICATIONS  (PRN):  aluminum hydroxide/magnesium hydroxide/simethicone Suspension 30 milliLiter(s) Oral every 4 hours PRN Dyspepsia  bisacodyl 5 milliGRAM(s) Oral every 12 hours PRN Constipation  melatonin 3 milliGRAM(s) Oral at bedtime PRN Insomnia  OLANZapine Injectable 2.5 milliGRAM(s) IntraMuscular every 6 hours PRN Agitation  ondansetron Injectable 4 milliGRAM(s) IV Push every 8 hours PRN Nausea and/or Vomiting  
MEDICATIONS  (STANDING):  acetaminophen     Tablet .. 975 milliGRAM(s) Oral every 12 hours  clonazePAM  Tablet 0.5 milliGRAM(s) Oral at bedtime  enoxaparin Injectable 40 milliGRAM(s) SubCutaneous every 24 hours  haloperidol    Injectable 1 milliGRAM(s) IV Push daily  haloperidol    Injectable 2 milliGRAM(s) IV Push at bedtime  lidocaine   4% Patch 1 Patch Transdermal daily  metoprolol succinate ER 50 milliGRAM(s) Oral daily  senna 2 Tablet(s) Oral at bedtime  sertraline 25 milliGRAM(s) Oral daily  valproic  acid Syrup 250 milliGRAM(s) Oral two times a day    MEDICATIONS  (PRN):  aluminum hydroxide/magnesium hydroxide/simethicone Suspension 30 milliLiter(s) Oral every 4 hours PRN Dyspepsia  bisacodyl 5 milliGRAM(s) Oral every 12 hours PRN Constipation  melatonin 3 milliGRAM(s) Oral at bedtime PRN Insomnia  OLANZapine Injectable 2.5 milliGRAM(s) IntraMuscular every 6 hours PRN Agitation  ondansetron Injectable 4 milliGRAM(s) IV Push every 8 hours PRN Nausea and/or Vomiting  traMADol 50 milliGRAM(s) Oral every 6 hours PRN Moderate Pain (4 - 6)  
MEDICATIONS  (STANDING):  acetaminophen     Tablet .. 975 milliGRAM(s) Oral every 12 hours  carboxymethylcellulose 0.5% (Preservative-Free) Ophthalmic Solution 1 Drop(s) Both EYES two times a day  clonazePAM  Tablet 0.25 milliGRAM(s) Oral <User Schedule>  enoxaparin Injectable 40 milliGRAM(s) SubCutaneous every 24 hours  haloperidol    Injectable 1 milliGRAM(s) IV Push two times a day  lidocaine   4% Patch 1 Patch Transdermal daily  metoprolol succinate ER 50 milliGRAM(s) Oral daily  polyethylene glycol 3350 17 Gram(s) Oral daily  senna 2 Tablet(s) Oral at bedtime  sertraline 25 milliGRAM(s) Oral daily  valproic  acid Syrup 250 milliGRAM(s) Oral two times a day    MEDICATIONS  (PRN):  aluminum hydroxide/magnesium hydroxide/simethicone Suspension 30 milliLiter(s) Oral every 4 hours PRN Dyspepsia  bisacodyl 5 milliGRAM(s) Oral every 12 hours PRN Constipation  haloperidol    Injectable 1 milliGRAM(s) IV Push every 6 hours PRN severe agitation  melatonin 3 milliGRAM(s) Oral at bedtime PRN Insomnia  ondansetron Injectable 4 milliGRAM(s) IV Push every 8 hours PRN Nausea and/or Vomiting  
MEDICATIONS  (STANDING):  acetaminophen     Tablet .. 975 milliGRAM(s) Oral every 12 hours  clonazePAM  Tablet 0.5 milliGRAM(s) Oral at bedtime  enoxaparin Injectable 40 milliGRAM(s) SubCutaneous every 24 hours  haloperidol    Injectable 1 milliGRAM(s) IV Push daily  haloperidol    Injectable 2 milliGRAM(s) IV Push at bedtime  lidocaine   4% Patch 1 Patch Transdermal daily  metoprolol succinate ER 50 milliGRAM(s) Oral daily  senna 2 Tablet(s) Oral at bedtime  sertraline 25 milliGRAM(s) Oral daily  valproic  acid Syrup 250 milliGRAM(s) Oral two times a day    MEDICATIONS  (PRN):  aluminum hydroxide/magnesium hydroxide/simethicone Suspension 30 milliLiter(s) Oral every 4 hours PRN Dyspepsia  bisacodyl 5 milliGRAM(s) Oral every 12 hours PRN Constipation  melatonin 3 milliGRAM(s) Oral at bedtime PRN Insomnia  OLANZapine Injectable 2.5 milliGRAM(s) IntraMuscular every 6 hours PRN Agitation  ondansetron Injectable 4 milliGRAM(s) IV Push every 8 hours PRN Nausea and/or Vomiting  
MEDICATIONS  (STANDING):  acetaminophen     Tablet .. 975 milliGRAM(s) Oral every 12 hours  carboxymethylcellulose 0.5% (Preservative-Free) Ophthalmic Solution 1 Drop(s) Both EYES two times a day  clonazePAM  Tablet 0.5 milliGRAM(s) Oral at bedtime  enoxaparin Injectable 40 milliGRAM(s) SubCutaneous every 24 hours  haloperidol     Tablet 2 milliGRAM(s) Oral two times a day  lidocaine   4% Patch 1 Patch Transdermal daily  metoprolol succinate ER 50 milliGRAM(s) Oral daily  senna 2 Tablet(s) Oral at bedtime  sertraline 25 milliGRAM(s) Oral daily  valproic  acid Syrup 250 milliGRAM(s) Oral two times a day    MEDICATIONS  (PRN):  aluminum hydroxide/magnesium hydroxide/simethicone Suspension 30 milliLiter(s) Oral every 4 hours PRN Dyspepsia  bisacodyl 5 milliGRAM(s) Oral every 12 hours PRN Constipation  melatonin 3 milliGRAM(s) Oral at bedtime PRN Insomnia  OLANZapine Injectable 2.5 milliGRAM(s) IntraMuscular every 6 hours PRN Agitation  ondansetron Injectable 4 milliGRAM(s) IV Push every 8 hours PRN Nausea and/or Vomiting  
MEDICATIONS  (STANDING):  acetaminophen     Tablet .. 975 milliGRAM(s) Oral every 12 hours  clonazePAM  Tablet 0.5 milliGRAM(s) Oral at bedtime  enoxaparin Injectable 40 milliGRAM(s) SubCutaneous every 24 hours  haloperidol    Injectable 1 milliGRAM(s) IV Push daily  haloperidol    Injectable 2 milliGRAM(s) IV Push at bedtime  lidocaine   4% Patch 1 Patch Transdermal daily  metoprolol succinate ER 50 milliGRAM(s) Oral daily  senna 2 Tablet(s) Oral at bedtime  sertraline 25 milliGRAM(s) Oral daily  valproic  acid Syrup 250 milliGRAM(s) Oral two times a day    MEDICATIONS  (PRN):  aluminum hydroxide/magnesium hydroxide/simethicone Suspension 30 milliLiter(s) Oral every 4 hours PRN Dyspepsia  bisacodyl 5 milliGRAM(s) Oral every 12 hours PRN Constipation  melatonin 3 milliGRAM(s) Oral at bedtime PRN Insomnia  OLANZapine Injectable 2.5 milliGRAM(s) IntraMuscular every 6 hours PRN Agitation  ondansetron Injectable 4 milliGRAM(s) IV Push every 8 hours PRN Nausea and/or Vomiting  
MEDICATIONS  (STANDING):  clonazePAM  Tablet 0.5 milliGRAM(s) Oral at bedtime  enoxaparin Injectable 40 milliGRAM(s) SubCutaneous every 24 hours  haloperidol    Injectable 1 milliGRAM(s) IV Push daily  haloperidol    Injectable 2 milliGRAM(s) IV Push at bedtime  metoprolol succinate ER 50 milliGRAM(s) Oral daily  OLANZapine 2.5 milliGRAM(s) Oral <User Schedule>  OLANZapine 5 milliGRAM(s) Oral at bedtime  senna 2 Tablet(s) Oral at bedtime  sertraline 25 milliGRAM(s) Oral daily  valproic  acid Syrup 250 milliGRAM(s) Oral two times a day    MEDICATIONS  (PRN):  acetaminophen     Tablet .. 650 milliGRAM(s) Oral every 6 hours PRN Temp greater or equal to 38C (100.4F), Mild Pain (1 - 3)  aluminum hydroxide/magnesium hydroxide/simethicone Suspension 30 milliLiter(s) Oral every 4 hours PRN Dyspepsia  bisacodyl 5 milliGRAM(s) Oral every 12 hours PRN Constipation  melatonin 3 milliGRAM(s) Oral at bedtime PRN Insomnia  OLANZapine Injectable 2.5 milliGRAM(s) IntraMuscular every 6 hours PRN Agitation  ondansetron Injectable 4 milliGRAM(s) IV Push every 8 hours PRN Nausea and/or Vomiting  
MEDICATIONS  (STANDING):  acetaminophen     Tablet .. 975 milliGRAM(s) Oral every 12 hours  clonazePAM  Tablet 0.5 milliGRAM(s) Oral at bedtime  enoxaparin Injectable 40 milliGRAM(s) SubCutaneous every 24 hours  haloperidol    Injectable 1 milliGRAM(s) IV Push daily  haloperidol    Injectable 2 milliGRAM(s) IV Push at bedtime  lidocaine   4% Patch 1 Patch Transdermal daily  metoprolol succinate ER 50 milliGRAM(s) Oral daily  senna 2 Tablet(s) Oral at bedtime  sertraline 25 milliGRAM(s) Oral daily  valproic  acid Syrup 250 milliGRAM(s) Oral two times a day    MEDICATIONS  (PRN):  aluminum hydroxide/magnesium hydroxide/simethicone Suspension 30 milliLiter(s) Oral every 4 hours PRN Dyspepsia  bisacodyl 5 milliGRAM(s) Oral every 12 hours PRN Constipation  melatonin 3 milliGRAM(s) Oral at bedtime PRN Insomnia  OLANZapine Injectable 2.5 milliGRAM(s) IntraMuscular every 6 hours PRN Agitation  ondansetron Injectable 4 milliGRAM(s) IV Push every 8 hours PRN Nausea and/or Vomiting  
MEDICATIONS  (STANDING):  acetaminophen     Tablet .. 975 milliGRAM(s) Oral every 12 hours  carboxymethylcellulose 0.5% (Preservative-Free) Ophthalmic Solution 1 Drop(s) Both EYES two times a day  clonazePAM  Tablet 0.5 milliGRAM(s) Oral at bedtime  enoxaparin Injectable 40 milliGRAM(s) SubCutaneous every 24 hours  lidocaine   4% Patch 1 Patch Transdermal daily  metoprolol succinate ER 50 milliGRAM(s) Oral daily  neomycin/bacitracin/polymyxin Ointment 1 Application(s) Both EYES two times a day  OLANZapine Disintegrating Tablet 5 milliGRAM(s) Oral <User Schedule>  senna 2 Tablet(s) Oral at bedtime  sertraline 25 milliGRAM(s) Oral daily  valproic  acid Syrup 250 milliGRAM(s) Oral two times a day    MEDICATIONS  (PRN):  aluminum hydroxide/magnesium hydroxide/simethicone Suspension 30 milliLiter(s) Oral every 4 hours PRN Dyspepsia  bisacodyl 5 milliGRAM(s) Oral every 12 hours PRN Constipation  melatonin 3 milliGRAM(s) Oral at bedtime PRN Insomnia  OLANZapine Injectable 2.5 milliGRAM(s) IntraMuscular every 6 hours PRN Agitation  ondansetron Injectable 4 milliGRAM(s) IV Push every 8 hours PRN Nausea and/or Vomiting

## 2023-01-20 NOTE — BH CONSULTATION LIAISON PROGRESS NOTE - NSBHMSEMUSCLE_PSY_A_CORE
Normal muscle tone/strength
Unable to assess
Normal muscle tone/strength
Unable to assess
Normal muscle tone/strength

## 2023-01-20 NOTE — PROGRESS NOTE ADULT - PROBLEM SELECTOR PROBLEM 3
Anxiety

## 2023-01-20 NOTE — BH PATIENT PROFILE - FALL HARM RISK - HARM RISK INTERVENTIONS
Assistance with ambulation/Assistance OOB with selected safe patient handling equipment/Communicate Risk of Fall with Harm to all staff/Discuss with provider need for PT consult/Monitor for mental status changes/Monitor gait and stability/Orthostatic vital signs/Provide patient with walking aids - walker, cane, crutches/Reinforce activity limits and safety measures with patient and family/Reorient to person, place and time as needed/Review medications for side effects contributing to fall risk/Sit up slowly, dangle for a short time, stand at bedside before walking/Tailored Fall Risk Interventions/Toileting schedule using arm’s reach rule for commode and bathroom/Visual Cue: Yellow wristband and red socks/Bed in lowest position, wheels locked, appropriate side rails in place/Call bell, personal items and telephone in reach/Instruct patient to call for assistance before getting out of bed or chair/Non-slip footwear when patient is out of bed/[place] to call system/Physically safe environment - no spills, clutter or unnecessary equipment/Purposeful Proactive Rounding/Room/bathroom lighting operational, light cord in reach

## 2023-01-20 NOTE — BH CONSULTATION LIAISON PROGRESS NOTE - NSBHATTESTBILLING_PSY_A_CORE
00330-Tswgexoqmw OBS or IP - low complexity OR 25-34 mins
77790-Auqdouhgzl OBS or IP - low complexity OR 25-34 mins
64906-Knvavhakvi OBS or IP - moderate complexity OR 35-49 mins
28873-Iskldvvtup OBS or IP - low complexity OR 25-34 mins
56519-Ogrsotevkh OBS or IP - low complexity OR 25-34 mins
39844-Laimtleppi - low complexity OR 20-29 mins
59747-Bgiugbrvbw OBS or IP - moderate complexity OR 35-49 mins
54253-Eqwvrznbae OBS or IP - moderate complexity OR 35-49 mins
73095-Qkajhxumyi OBS or IP - low complexity OR 25-34 mins
75716-Nczzydjsbc OBS or IP - moderate complexity OR 35-49 mins
83968-Ignbhcfzqq OBS or IP - low complexity OR 25-34 mins
27558-Zfgawvuuug OBS or IP - low complexity OR 25-34 mins
71625-Nhnydcotua OBS or IP - low complexity OR 25-34 mins

## 2023-01-20 NOTE — BH CONSULTATION LIAISON PROGRESS NOTE - NSBHMSEPERCEPT_PSY_A_CORE
No abnormalities
Unable to assess
No abnormalities
No abnormalities/Unable to assess
No abnormalities

## 2023-01-20 NOTE — BH INPATIENT PSYCHIATRY ASSESSMENT NOTE - VIOLENCE RISK FACTORS:
Dora from [facility] stated patient has a lozada catheter, it was changed 2 weeks ago. Dora stated the urine in the bag is very cloudy. Dora stated patient is not having any symptoms or discomfort. Dora stated they could not get a hold of any one at urologists office. Dora would like a call back in regards to this.    Affective dysregulation/None Known

## 2023-01-20 NOTE — PROGRESS NOTE ADULT - PROBLEM SELECTOR PROBLEM 4
Social problem

## 2023-01-20 NOTE — BH INPATIENT PSYCHIATRY ASSESSMENT NOTE - MSE UNSTRUCTURED FT
Appearance: The patient appears stated age, fair hygiene and dressed appropriately in own clothes.   Behavior: The patient was angry but cooperative with interview and maintained appropriate eye contact.    Motor: No psychomotor agitation or slowing noted. Patient unable to ambulate independently, refusing to stand with assistance during interview.    Speech: The patient's speech was fluent but with loud volume, rapid tone, and angry tone. Speech was not pressured and could be interrupted.  Mood: The patient's mood is "upset."    Affect: Irritable, labile (calm/conversant, then yelling) and mood congruent  Thought process: The patient's thoughts are circumstantial at times.    Thought content: No delusional content elicited.   Perception: Denies auditory or visual hallucinations  Cognition: A&O x 1-2. Good attention, able to answer questions appropriately  SI/HI: Denies any suicidal or homicidal ideation, intent, or plan.    Insight is poor. Judgment is fair. Impulse control has been fair on the unit.

## 2023-01-20 NOTE — PROGRESS NOTE ADULT - PROBLEM SELECTOR PLAN 3
continue clonazepam   continue zoloft
- continue clonazepam   - continue zoloft
continue clonazepam   continue zoloft
- continue clonazepam   - continue zoloft
continue clonazepam   continue zoloft
- continue clonazepam   - continue zoloft
- continue clonazepam   - continue zoloft
continue clonazepam   continue zoloft

## 2023-01-20 NOTE — BH PATIENT PROFILE - HOME MEDICATIONS
ocular lubricant ophthalmic solution , 1 drop(s) to each affected eye 2 times a day  polyethylene glycol 3350 oral powder for reconstitution , 17 gram(s) orally once a day  bisacodyl 5 mg oral delayed release tablet , 1 tab(s) orally every 12 hours, As needed, Constipation  senna leaf extract oral tablet , 2 tab(s) orally once a day (at bedtime)  lidocaine 4% topical film , 1 patch transdermal once a day  metoprolol succinate 50 mg oral tablet, extended release , 1 tab(s) orally once a day  melatonin 3 mg oral tablet , 1 tab(s) orally once a day (at bedtime), As needed, Insomnia  clonazePAM , 0.25 milligram(s) orally 2 times a day (08:00 and 20:00)  haloperidol , 1 milligram(s) intravenous every 6 hours, As Needed for severe agitation  haloperidol , 1 milligram(s) intravenous 2 times a day  sertraline 25 mg oral tablet , 1 tab(s) orally once a day  valproic acid 250 mg/5 mL oral liquid , 250 milligram(s) orally 2 times a day  aluminum hydroxide-magnesium hydroxide 200 mg-200 mg/5 mL oral suspension , 30 milliliter(s) orally every 4 hours, As needed, Dyspepsia  acetaminophen 325 mg oral tablet , 2 tab(s) orally every 6 hours, As needed, Mild Pain (1 - 3)

## 2023-01-20 NOTE — BH PATIENT PROFILE - DIETITIAN.
Problem: PAIN - ADULT  Goal: Verbalizes/displays adequate comfort level or baseline comfort level  Description: Interventions:  - Encourage patient to monitor pain and request assistance  - Assess pain using appropriate pain scale  - Administer analgesics based on type and severity of pain and evaluate response  - Implement non-pharmacological measures as appropriate and evaluate response  - Consider cultural and social influences on pain and pain management  - Notify physician/advanced practitioner if interventions unsuccessful or patient reports new pain  Outcome: Progressing     Problem: INFECTION - ADULT  Goal: Absence or prevention of progression during hospitalization  Description: INTERVENTIONS:  - Assess and monitor for signs and symptoms of infection  - Monitor lab/diagnostic results  - Monitor all insertion sites, i e  indwelling lines, tubes, and drains  - Monitor endotracheal if appropriate and nasal secretions for changes in amount and color  - [place] appropriate cooling/warming therapies per order  - Administer medications as ordered  - Instruct and encourage patient and family to use good hand hygiene technique  - Identify and instruct in appropriate isolation precautions for identified infection/condition  Outcome: Progressing  Goal: Absence of fever/infection during neutropenic period  Description: INTERVENTIONS:  - Monitor WBC    Outcome: Progressing     Problem: SAFETY ADULT  Goal: Patient will remain free of falls  Description: INTERVENTIONS:  - Educate patient/family on patient safety including physical limitations  - Instruct patient to call for assistance with activity   - Consult OT/PT to assist with strengthening/mobility   - Keep Call bell within reach  - Keep bed low and locked with side rails adjusted as appropriate  - Keep care items and personal belongings within reach  - Initiate and maintain comfort rounds  - Make Fall Risk Sign visible to staff  - Offer Toileting every 4 bed Hours, in advance of need  - Initiate/Maintain bed alarm  - Obtain necessary fall risk management equipment  - Apply yellow socks and bracelet for high fall risk patients  - Consider moving patient to room near nurses station  Outcome: Progressing  Goal: Maintain or return to baseline ADL function  Description: INTERVENTIONS:  -  Assess patient's ability to carry out ADLs; assess patient's baseline for ADL function and identify physical deficits which impact ability to perform ADLs (bathing, care of mouth/teeth, toileting, grooming, dressing, etc )  - Assess/evaluate cause of self-care deficits   - Assess range of motion  - Assess patient's mobility; develop plan if impaired  - Assess patient's need for assistive devices and provide as appropriate  - Encourage maximum independence but intervene and supervise when necessary  - Involve family in performance of ADLs  - Assess for home care needs following discharge   - Consider OT consult to assist with ADL evaluation and planning for discharge  - Provide patient education as appropriate  Outcome: Progressing  Goal: Maintains/Returns to pre admission functional level  Description: INTERVENTIONS:  - Perform BMAT or MOVE assessment daily    - Set and communicate daily mobility goal to care team and patient/family/caregiver  - Collaborate with rehabilitation services on mobility goals if consulted  - Perform Range of Motion 3 times a day  - Reposition patient every 2 hours    - Dangle patient 3 times a day  - Stand patient 3 times a day  - Ambulate patient 3 times a day  - Out of bed to chair 3 times a day   - Out of bed for meals 3 times a day  - Out of bed for toileting  - Record patient progress and toleration of activity level   Outcome: Progressing     Problem: DISCHARGE PLANNING  Goal: Discharge to home or other facility with appropriate resources  Description: INTERVENTIONS:  - Identify barriers to discharge w/patient and caregiver  - Arrange for needed discharge resources and transportation as appropriate  - Identify discharge learning needs (meds, wound care, etc )  - Arrange for interpretive services to assist at discharge as needed  - Refer to Case Management Department for coordinating discharge planning if the patient needs post-hospital services based on physician/advanced practitioner order or complex needs related to functional status, cognitive ability, or social support system  Outcome: Progressing     Problem: Knowledge Deficit  Goal: Patient/family/caregiver demonstrates understanding of disease process, treatment plan, medications, and discharge instructions  Description: Complete learning assessment and assess knowledge base    Interventions:  - Provide teaching at level of understanding  - Provide teaching via preferred learning methods  Outcome: Progressing     Problem: RESPIRATORY - ADULT  Goal: Achieves optimal ventilation and oxygenation  Description: INTERVENTIONS:  - Assess for changes in respiratory status  - Assess for changes in mentation and behavior  - Position to facilitate oxygenation and minimize respiratory effort  - Oxygen administered by appropriate delivery if ordered  - Initiate smoking cessation education as indicated  - Encourage broncho-pulmonary hygiene including cough, deep breathe, Incentive Spirometry  - Assess the need for suctioning and aspirate as needed  - Assess and instruct to report SOB or any respiratory difficulty  - Respiratory Therapy support as indicated  Outcome: Progressing     Problem: Potential for Falls  Goal: Patient will remain free of falls  Description: INTERVENTIONS:  - Educate patient/family on patient safety including physical limitations  - Instruct patient to call for assistance with activity   - Consult OT/PT to assist with strengthening/mobility   - Keep Call bell within reach  - Keep bed low and locked with side rails adjusted as appropriate  - Keep care items and personal belongings within reach  - Initiate and maintain comfort rounds  - Make Fall Risk Sign visible to staff  - Offer Toileting every 4 Hours, in advance of need  - Obtain necessary fall risk management equipment  - Apply yellow socks and bracelet for high fall risk patients  - Consider moving patient to room near nurses station  Outcome: Progressing     Problem: MOBILITY - ADULT  Goal: Maintain or return to baseline ADL function  Description: INTERVENTIONS:  -  Assess patient's ability to carry out ADLs; assess patient's baseline for ADL function and identify physical deficits which impact ability to perform ADLs (bathing, care of mouth/teeth, toileting, grooming, dressing, etc )  - Assess/evaluate cause of self-care deficits   - Assess range of motion  - Assess patient's mobility; develop plan if impaired  - Assess patient's need for assistive devices and provide as appropriate  - Encourage maximum independence but intervene and supervise when necessary  - Involve family in performance of ADLs  - Assess for home care needs following discharge   - Consider OT consult to assist with ADL evaluation and planning for discharge  - Provide patient education as appropriate  Outcome: Progressing  Goal: Maintains/Returns to pre admission functional level  Description: INTERVENTIONS:  - Perform BMAT or MOVE assessment daily    - Set and communicate daily mobility goal to care team and patient/family/caregiver  - Collaborate with rehabilitation services on mobility goals if consulted  - Perform Range of Motion 3 times a day  - Reposition patient every 2 hours    - Dangle patient 3 times a day  - Stand patient 3 times a day  - Ambulate patient 3 times a day  - Out of bed to chair 3 times a day   - Out of bed for meals 3 times a day  - Out of bed for toileting  - Record patient progress and toleration of activity level   Outcome: Progressing dietitian/nutrition services

## 2023-01-20 NOTE — BH CONSULTATION LIAISON PROGRESS NOTE - NSBHMSEGAIT_PSY_A_CORE
Abnormal gait / station
Unable to assess
Abnormal gait / station
Unable to assess
Abnormal gait / station
Unable to assess
Abnormal gait / station
Abnormal gait / station

## 2023-01-20 NOTE — BH INPATIENT PSYCHIATRY ASSESSMENT NOTE - NSBHMETABOLIC_PSY_ALL_CORE_FT
BMI: BMI (kg/m2): 22.7 (01-05-23 @ 14:22)  HbA1c:   Glucose: POCT Blood Glucose.: 118 mg/dL (09-17-22 @ 08:34)    BP: 141/60 (01-20-23 @ 16:48) (141/60 - 141/60)  Lipid Panel:

## 2023-01-20 NOTE — BH CONSULTATION LIAISON PROGRESS NOTE - NSBHMSEMOOD_PSY_A_CORE
Irritable
Irritable/Angry
Irritable
Irritable/Angry
Irritable/Angry
Irritable
Unable to assess
Irritable/Angry
Irritable
Unable to assess

## 2023-01-20 NOTE — BH CONSULTATION LIAISON PROGRESS NOTE - NS ED BHA REVIEW OF ED CHART AVAILABLE INVESTIGATIONS REVIEWED
None available
Yes
None available
None available
Yes
None available
None available
Yes
Yes
None available
None available
Yes
None available

## 2023-01-20 NOTE — PROGRESS NOTE ADULT - PROBLEM/PLAN-3
DISPLAY PLAN FREE TEXT
artificial rupture

## 2023-01-20 NOTE — PROGRESS NOTE ADULT - ASSESSMENT
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

## 2023-01-20 NOTE — BH CONSULTATION LIAISON PROGRESS NOTE - NSBHFUPINTERVALHXFT_PSY_A_CORE
pt states she needs to leave the hospital, wants to go home. denies depression, hortencia, psychosis. no si/hi. pt has been calm  overnight, no prns. pt remains confused, irritable. pt now on standing haldol iv

## 2023-01-20 NOTE — BH CONSULTATION LIAISON PROGRESS NOTE - NSBHMSEIMPULSE_PSY_A_CORE
Normal
Impaired
Normal
Impaired
Normal
Impaired
Normal
Impaired

## 2023-01-21 PROCEDURE — 99232 SBSQ HOSP IP/OBS MODERATE 35: CPT

## 2023-01-21 PROCEDURE — 99222 1ST HOSP IP/OBS MODERATE 55: CPT

## 2023-01-21 RX ORDER — HALOPERIDOL DECANOATE 100 MG/ML
1 INJECTION INTRAMUSCULAR ONCE
Refills: 0 | Status: DISCONTINUED | OUTPATIENT
Start: 2023-01-21 | End: 2023-02-03

## 2023-01-21 RX ORDER — CLONAZEPAM 1 MG
0.12 TABLET ORAL
Refills: 0 | Status: DISCONTINUED | OUTPATIENT
Start: 2023-01-21 | End: 2023-01-24

## 2023-01-21 RX ORDER — ENOXAPARIN SODIUM 100 MG/ML
40 INJECTION SUBCUTANEOUS
Refills: 0 | Status: DISCONTINUED | OUTPATIENT
Start: 2023-01-21 | End: 2023-03-30

## 2023-01-21 RX ORDER — DIVALPROEX SODIUM 500 MG/1
375 TABLET, DELAYED RELEASE ORAL
Refills: 0 | Status: DISCONTINUED | OUTPATIENT
Start: 2023-01-21 | End: 2023-02-04

## 2023-01-21 RX ORDER — HALOPERIDOL DECANOATE 100 MG/ML
1 INJECTION INTRAMUSCULAR EVERY 6 HOURS
Refills: 0 | Status: DISCONTINUED | OUTPATIENT
Start: 2023-01-21 | End: 2023-02-03

## 2023-01-21 RX ORDER — ALBUTEROL 90 UG/1
2 AEROSOL, METERED ORAL EVERY 6 HOURS
Refills: 0 | Status: DISCONTINUED | OUTPATIENT
Start: 2023-01-21 | End: 2023-03-01

## 2023-01-21 RX ADMIN — LIDOCAINE 1 PATCH: 4 CREAM TOPICAL at 20:37

## 2023-01-21 RX ADMIN — Medication 1 DROP(S): at 20:22

## 2023-01-21 RX ADMIN — Medication 50 MILLIGRAM(S): at 08:40

## 2023-01-21 RX ADMIN — SENNA PLUS 2 TABLET(S): 8.6 TABLET ORAL at 20:22

## 2023-01-21 NOTE — BH TREATMENT PLAN - NSTXMEDICINTERRN_PSY_ALL_CORE
Assess pt's reasons for medication noncompliance and knowledge of treatment regimen, educate on the name, dosage, indication, side effects and timing of medications and the importance of adhering to the medication regimen, allow time for teach back

## 2023-01-21 NOTE — BH INPATIENT PSYCHIATRY PROGRESS NOTE - NSBHFUPINTERVALHXFT_PSY_A_CORE
Patient seen for BAD, since admission she had been loud, agitated frequently screaming about physical complaints or complaints about being in hospital. Eating, sleeping fair

## 2023-01-21 NOTE — BH INPATIENT PSYCHIATRY PROGRESS NOTE - MSE UNSTRUCTURED FT
Appearance: The patient appears stated age, fair hygiene and dressed appropriately in own clothes.   Behavior: The patient was angry but cooperative with interview and maintained appropriate eye contact.    Motor: No psychomotor agitation or slowing noted. Patient unable to ambulate independently, refusing to stand with assistance during interview.    Speech: The patient's speech was fluent but with loud volume, rapid tone, and angry tone. Speech was not pressured and could be interrupted.  Mood: The patient's mood is "upset."    Affect: Irritable, labile (calm/conversant, then yelling) and mood congruent  Thought process: The patient's thoughts are circumstantial at times.    Thought content: No delusional content elicited.   Perception: Denies auditory or visual hallucinations  Cognition: A&O x 1-2. Good attention, able to answer questions appropriately  SI/HI: Denies any suicidal or homicidal ideation, intent, or plan.    Insight is poor. Judgment is fair. Impulse control has been fair on the unit. Appearance: The patient appears stated age, fair hygiene and dressed appropriately in own clothes.   Behavior: The patient was angry but cooperative with interview and maintained appropriate eye contact.    Motor: No psychomotor agitation or slowing noted. Patient unable to ambulate independently, refusing to stand with assistance during interview.    Speech: The patient's speech was fluent but with loud volume, rapid tone, and angry tone. Speech was pressured   Mood: The patient's mood is "angry."    Affect: Irritable, labile (calm/conversant, then yelling) and mood congruent  Thought process: The patient's thoughts are circumstantial at times.    Thought content: suspicious thought being here was due to some scheme  Perception: No auditory or visual hallucinations  Cognition: A&O x 1-2. Good attention, able to answer questions appropriately  SI/HI: Denies any suicidal or homicidal ideation, intent, or plan.    Insight is poor. Judgment is fair. Impulse control has been poor on the unit.

## 2023-01-21 NOTE — BH INPATIENT PSYCHIATRY PROGRESS NOTE - NSBHASSESSSUMMFT_PSY_ALL_CORE
Dinorah Hogan is an 88 year old female, , domiciled with daughter with PPHx of bipolar I disorder, 1 previous inpatient psychiatric hospitalization in 1970s, no known history of SI/SA and PMHx of asthma, GERD, sciatica with previous vertebral fracture who is admitted on 9.27 status for symptoms of hortencia including irritability, loud speech, and agitation.     DDx: hortencia vs hypomania vs comorbid delirium    Today, patient is angry about her current admission and denying any psychiatric history. She is asking to be discharged immediately and yelling at treatment team. She is noted to have loud and rapid speech, but is able to be interrupted. She states she has been sleeping poorly due to hip and R knee pain. She denies S/IIP, H/IIP, AVH, paranoia, or ideas of reference. She is AOx1-2 (person, place--hospital), but unable to state year or location in [place]. She requires 9.27 inpatient admission for evaluation and management of hortencia.    Plan:  1. Legal: continue 9.27 involuntary admission  2. Safety:   - Requires 1:1 CO for fall risk and monitoring of hospital bed   - Olanzapine 2.5 mg PO or IM q6h PRN agitation  3. Psychiatric:  - Valproic acid syrup 250 mg for mood stabilization  - Clonazepam 0.25 mg ODT BID for anxiety  - Haloperidol 1 mg PO BID for agitation  - Melatonin 3 mg PRN for insomnia  4. Group/Milieu therapy: encourage participation as able  5. Medical:   - Hx of vertebral fractures with ambulatory dysfunction: no acute surgical intervention, patient requires hospital bed, PRN tylenol and lidocaine patch  - HTN: metoprolol XL 50 mg daily  - Constipation: miralax and senna daily, PRN bisacodyl q12h  1/21 Suspect irritable hortencia will  cont haldol change to Depakote sprinkles stop Klonopin ( not taking anyway low risk withdrawal) get more collateral patient is allowing call to family  - Dry eyes: Artificial tears 1 drop both eyes BID  6. Collateral/Dispo: per primary team    Dinorah Hogan is an 88 year old female, , domiciled with daughter with PPHx of bipolar I disorder, 1 previous inpatient psychiatric hospitalization in 1970s, no known history of SI/SA and PMHx of asthma, GERD, sciatica with previous vertebral fracture who is admitted on 9.27 status for symptoms of hortencia including irritability, loud speech, and agitation.     DDx: hortencia vs hypomania vs comorbid delirium    Today, patient is angry about her current admission and denying any psychiatric history. She is asking to be discharged immediately and yelling at treatment team. She is noted to have loud and rapid speech, but is able to be interrupted. She states she has been sleeping poorly due to hip and R knee pain. She denies S/IIP, H/IIP, AVH, paranoia, or ideas of reference. She is AOx1-2 (person, place--hospital), but unable to state year or location in [place]. She requires 9.27 inpatient admission for evaluation and management of hortencia.    Plan:  1. Legal: continue 9.27 involuntary admission  2. Safety:   - Requires 1:1 CO for fall risk and monitoring of hospital bed   - Olanzapine 2.5 mg PO or IM q6h PRN agitation  3. Psychiatric:  - Valproic acid syrup 250 mg for mood stabilization  - Clonazepam 0.25 mg ODT BID for anxiety  - Haloperidol 1 mg PO BID for agitation  - Melatonin 3 mg PRN for insomnia  4. Group/Milieu therapy: encourage participation as able  5. Medical:   - Hx of vertebral fractures with ambulatory dysfunction: no acute surgical intervention, patient requires hospital bed, PRN tylenol and lidocaine patch  - HTN: metoprolol XL 50 mg daily  - Constipation: miralax and senna daily, PRN bisacodyl q12h  1/21 Suspect irritable hortencia will  cont haldol change to Depakote sprinkles taper Klonopin get more collateral patient is allowing call to family  - Dry eyes: Artificial tears 1 drop both eyes BID  6. Collateral/Dispo: per primary team

## 2023-01-21 NOTE — BH INPATIENT PSYCHIATRY PROGRESS NOTE - PRN MEDS
MEDICATIONS  (PRN):  acetaminophen     Tablet .. 650 milliGRAM(s) Oral every 6 hours PRN Mild Pain (1 - 3), Moderate Pain (4 - 6), Severe Pain (7 - 10)  aluminum hydroxide/magnesium hydroxide/simethicone Suspension 30 milliLiter(s) Oral every 4 hours PRN Dyspepsia  bisacodyl 5 milliGRAM(s) Oral every 12 hours PRN Constipation  haloperidol     Tablet 1 milliGRAM(s) Oral every 6 hours PRN agitation  haloperidol    Injectable 1 milliGRAM(s) IntraMuscular once PRN agitation  melatonin. 3 milliGRAM(s) Oral at bedtime PRN Insomnia

## 2023-01-21 NOTE — PSYCHIATRIC REHAB INITIAL EVALUATION - NSBHPRRECOMMEND_PSY_ALL_CORE
Per medical records, pt is a 88 year old  woman wit a history of  bipolar I disorder and at least one inpatient psychiatric admission in the 1970s. Pt has a history of aggression when manic towards her children. Pt was initially admitted to medicine for right lower extremity pain and difficulty ambulating. Pt was noted to be agitated. Pt was transferred to [facility] for treatment of symptoms of hortencia including irritability, loud speech, and agitation.  Per medical records, pt is a 88 year old  woman wit a history of  bipolar I disorder and at least one inpatient psychiatric admission in the 1970s. Pt has a history of aggression when manic towards her children. Pt was initially admitted to medicine for right lower extremity pain and difficulty ambulating. Pt was noted to be agitated. Pt was transferred to [facility] for treatment of symptoms of hortencia including irritability, loud speech, and agitation. Writer attempted to talk to pt, however, pt was agitated and yelling at writer, therefore at another time a member of psychiatric rehabilitation will introduce psychiatric rehabilitation staff and services, provide patient with a copy of the unit schedule, and encourage patient to attend psychiatric rehabilitation activities and engage in treatment.  Psychiatric Rehabilitation staff will continue to engage patient daily in order to develop therapeutic rapport.

## 2023-01-21 NOTE — CONSULT NOTE ADULT - ASSESSMENT
89 y/o F w/ PMH asthma, anxiety, questionable CVA in 2007, diverticulosis, GERD, bipolar disorder, schizophrenia, and previous vertebral fracture, with recent admission for Covid ( Sep ’22 ), now transferred to [facility] for decompensated bipolar/schizophrenia.     #Back Pain  - patient with known vertebral compression Fx at L3  - no neurosurgical intervention at this time  - patient reports improvement in pain with tylenol and lidocaine patches  - c/w tylenol/lidocaine patch qd    #HTN  - BPs well controlled on current home regimen  - c/w toprol 50mg qd with hold parameters    #Asthma  - patient reports history of asthma  - no wheezing noted on exam  - will order albuterol PRN for SOB/Wheezing    #Bipolar Disorder/Schizophrenia  - management per primary psychiatry team

## 2023-01-21 NOTE — CONSULT NOTE ADULT - SUBJECTIVE AND OBJECTIVE BOX
HPI: Patient is an 89 y/o F w/ PMH asthma, anxiety, questionable CVA in 2007, diverticulosis, GERD, bipolar disorder, schizophrenia, and previous vertebral fracture, with recent admission for Covid ( Sep ’22 ), now transferred to [facility] for decompensated bipolar/schizophrenia.    Patient seen and examined at bedside on psychiatry unit. Patient resting comfortably. Continues to endorse some back pain but does report improvement with lidocaine patches especially. Denies any new weakness/loss of sensation. Denies any CP, dizziness or SOB.    PAST MEDICAL & SURGICAL HISTORY:  Anxiety  CVA (Cerebral Infarction)  2007 per daughter  Diverticulosis of intestine  GERD (gastroesophageal reflux disease)  Asthma  Cerebrovascular accident (CVA), unspecified mechanism  Vertebral fracture, osteoporotic  S/P Tonsillectomy  History of tonsillectomy    Review of Systems:   CONSTITUTIONAL: No fever, weight loss, or fatigue  EYES: No eye pain, visual disturbances, or discharge  ENMT:  No difficulty hearing, tinnitus, vertigo; No sinus or throat pain  NECK: No pain or stiffness  RESPIRATORY: No cough, wheezing, chills or hemoptysis; No shortness of breath  CARDIOVASCULAR: No chest pain, palpitations, dizziness, or leg swelling  GASTROINTESTINAL: No abdominal or epigastric pain. No nausea, vomiting, or hematemesis; No diarrhea or constipation. No melena or hematochezia.  GENITOURINARY: No dysuria, frequency, hematuria, or incontinence  NEUROLOGICAL: No headaches, memory loss, loss of strength, numbness, or tremors  SKIN: No itching, burning, rashes, or lesions   MUSCULOSKELETAL: No joint pain or swelling; No muscle pain,+back pain      Allergies    iodine (Unknown)  IV Contrast (Unknown)  IV DYE- burning, head tingling (Unknown)  novacaine (Unknown)    Intolerances  Social History:     FAMILY HISTORY:  Family history of amyotrophic lateral sclerosis  Daughter    Family history of multiple sclerosis (Child)    MEDICATIONS  (STANDING):  artificial  tears Solution 1 Drop(s) Both EYES two times a day  clonazePAM Oral Disintegrating Tablet 0.125 milliGRAM(s) Oral two times a day  divalproex Sprinkle 375 milliGRAM(s) Oral two times a day  enoxaparin Injectable 40 milliGRAM(s) SubCutaneous <User Schedule>  haloperidol     Tablet 1 milliGRAM(s) Oral two times a day  lidocaine   4% Patch 1 Patch Transdermal every 24 hours  metoprolol succinate ER 50 milliGRAM(s) Oral daily  polyethylene glycol 3350 17 Gram(s) Oral daily  senna 2 Tablet(s) Oral at bedtime    MEDICATIONS  (PRN):  acetaminophen     Tablet .. 650 milliGRAM(s) Oral every 6 hours PRN Mild Pain (1 - 3), Moderate Pain (4 - 6), Severe Pain (7 - 10)  aluminum hydroxide/magnesium hydroxide/simethicone Suspension 30 milliLiter(s) Oral every 4 hours PRN Dyspepsia  bisacodyl 5 milliGRAM(s) Oral every 12 hours PRN Constipation  haloperidol     Tablet 1 milliGRAM(s) Oral every 6 hours PRN agitation  haloperidol    Injectable 1 milliGRAM(s) IntraMuscular once PRN agitation  melatonin. 3 milliGRAM(s) Oral at bedtime PRN Insomnia    Vital Signs Last 24 Hrs  T(C): 36.7 (21 Jan 2023 08:22), Max: 36.7 (21 Jan 2023 08:22)  T(F): 98.1 (21 Jan 2023 08:22), Max: 98.1 (21 Jan 2023 08:22)  HR: 87 (20 Jan 2023 16:48) (87 - 87)  BP: 141/60 (20 Jan 2023 16:48) (141/60 - 141/60)  BP(mean): --  RR: --  SpO2: 92% (21 Jan 2023 08:22) (92% - 92%)    CAPILLARY BLOOD GLUCOSE    PHYSICAL EXAM:  GENERAL: NAD, well-developed  HEAD:  Atraumatic, Normocephalic  EYES: Eyes tracking  NECK: Supple, No JVD  CHEST/LUNG: Clear to auscultation bilaterally; No wheezes noted  HEART: Regular rate and rhythm; No murmurs, rubs, or gallops  ABDOMEN: Soft, Nontender, Nondistended; Bowel sounds present  EXTREMITIES:  2+ Peripheral Pulses, no edema    LABS:    EKG(personally reviewed):    RADIOLOGY & ADDITIONAL TESTS:    Imaging Personally Reviewed:    Consultant(s) Notes Reviewed:      Care Discussed with Consultants/Other Providers:

## 2023-01-21 NOTE — PSYCHIATRIC REHAB INITIAL EVALUATION - NSBHSIGPRIORMED_PSY_ALL_CORE
Pt's past psychiatric history is significant for  sciatica with previous vertebral fracture,  CVA, and right lower extremity pain/difficulty ambulating/Yes (Specify)

## 2023-01-21 NOTE — BH INPATIENT PSYCHIATRY PROGRESS NOTE - NSBHCHARTREVIEWVS_PSY_A_CORE FT
Vital Signs Last 24 Hrs  T(C): 36.7 (01-21-23 @ 08:22), Max: 36.7 (01-21-23 @ 08:22)  T(F): 98.1 (01-21-23 @ 08:22), Max: 98.1 (01-21-23 @ 08:22)  HR: 87 (01-20-23 @ 16:48) (87 - 87)  BP: 141/60 (01-20-23 @ 16:48) (141/60 - 141/60)  BP(mean): --  RR: --  SpO2: 92% (01-21-23 @ 08:22) (92% - 92%)    Orthostatic VS  01-21-23 @ 08:22  Lying BP: --/-- HR: --  Sitting BP: 116/63 HR: 75  Standing BP: --/-- HR: --  Site: --  Mode: --

## 2023-01-21 NOTE — BH INPATIENT PSYCHIATRY PROGRESS NOTE - CURRENT MEDICATION
MEDICATIONS  (STANDING):  artificial  tears Solution 1 Drop(s) Both EYES two times a day  clonazePAM Oral Disintegrating Tablet 0.25 milliGRAM(s) Oral two times a day  divalproex Sprinkle 375 milliGRAM(s) Oral two times a day  enoxaparin Injectable 40 milliGRAM(s) SubCutaneous <User Schedule>  haloperidol     Tablet 1 milliGRAM(s) Oral two times a day  lidocaine   4% Patch 1 Patch Transdermal every 24 hours  metoprolol succinate ER 50 milliGRAM(s) Oral daily  polyethylene glycol 3350 17 Gram(s) Oral daily  senna 2 Tablet(s) Oral at bedtime    MEDICATIONS  (PRN):  acetaminophen     Tablet .. 650 milliGRAM(s) Oral every 6 hours PRN Mild Pain (1 - 3), Moderate Pain (4 - 6), Severe Pain (7 - 10)  aluminum hydroxide/magnesium hydroxide/simethicone Suspension 30 milliLiter(s) Oral every 4 hours PRN Dyspepsia  bisacodyl 5 milliGRAM(s) Oral every 12 hours PRN Constipation  haloperidol     Tablet 1 milliGRAM(s) Oral every 6 hours PRN agitation  haloperidol    Injectable 1 milliGRAM(s) IntraMuscular once PRN agitation  melatonin. 3 milliGRAM(s) Oral at bedtime PRN Insomnia   MEDICATIONS  (STANDING):  artificial  tears Solution 1 Drop(s) Both EYES two times a day  clonazePAM Oral Disintegrating Tablet 0.125 milliGRAM(s) Oral two times a day  divalproex Sprinkle 375 milliGRAM(s) Oral two times a day  enoxaparin Injectable 40 milliGRAM(s) SubCutaneous <User Schedule>  haloperidol     Tablet 1 milliGRAM(s) Oral two times a day  lidocaine   4% Patch 1 Patch Transdermal every 24 hours  metoprolol succinate ER 50 milliGRAM(s) Oral daily  polyethylene glycol 3350 17 Gram(s) Oral daily  senna 2 Tablet(s) Oral at bedtime    MEDICATIONS  (PRN):  acetaminophen     Tablet .. 650 milliGRAM(s) Oral every 6 hours PRN Mild Pain (1 - 3), Moderate Pain (4 - 6), Severe Pain (7 - 10)  aluminum hydroxide/magnesium hydroxide/simethicone Suspension 30 milliLiter(s) Oral every 4 hours PRN Dyspepsia  bisacodyl 5 milliGRAM(s) Oral every 12 hours PRN Constipation  haloperidol     Tablet 1 milliGRAM(s) Oral every 6 hours PRN agitation  haloperidol    Injectable 1 milliGRAM(s) IntraMuscular once PRN agitation  melatonin. 3 milliGRAM(s) Oral at bedtime PRN Insomnia

## 2023-01-22 PROCEDURE — 99231 SBSQ HOSP IP/OBS SF/LOW 25: CPT

## 2023-01-22 RX ADMIN — SENNA PLUS 2 TABLET(S): 8.6 TABLET ORAL at 21:12

## 2023-01-22 RX ADMIN — Medication 0.12 MILLIGRAM(S): at 10:32

## 2023-01-22 RX ADMIN — Medication 0.12 MILLIGRAM(S): at 21:12

## 2023-01-22 RX ADMIN — Medication 50 MILLIGRAM(S): at 10:32

## 2023-01-22 RX ADMIN — LIDOCAINE 1 PATCH: 4 CREAM TOPICAL at 06:57

## 2023-01-22 RX ADMIN — DIVALPROEX SODIUM 375 MILLIGRAM(S): 500 TABLET, DELAYED RELEASE ORAL at 21:12

## 2023-01-22 RX ADMIN — HALOPERIDOL DECANOATE 1 MILLIGRAM(S): 100 INJECTION INTRAMUSCULAR at 13:31

## 2023-01-22 RX ADMIN — HALOPERIDOL DECANOATE 1 MILLIGRAM(S): 100 INJECTION INTRAMUSCULAR at 21:13

## 2023-01-22 RX ADMIN — Medication 3 MILLIGRAM(S): at 21:13

## 2023-01-22 RX ADMIN — DIVALPROEX SODIUM 375 MILLIGRAM(S): 500 TABLET, DELAYED RELEASE ORAL at 10:33

## 2023-01-22 RX ADMIN — Medication 650 MILLIGRAM(S): at 13:31

## 2023-01-22 RX ADMIN — Medication 1 DROP(S): at 21:12

## 2023-01-22 RX ADMIN — Medication 650 MILLIGRAM(S): at 14:01

## 2023-01-22 RX ADMIN — HALOPERIDOL DECANOATE 1 MILLIGRAM(S): 100 INJECTION INTRAMUSCULAR at 10:32

## 2023-01-22 NOTE — BH INPATIENT PSYCHIATRY PROGRESS NOTE - NSBHASSESSSUMMFT_PSY_ALL_CORE
Dinorah Hogan is an 88 year old female, , domiciled with daughter with PPHx of bipolar I disorder, 1 previous inpatient psychiatric hospitalization in 1970s, no known history of SI/SA and PMHx of asthma, GERD, sciatica with previous vertebral fracture who is admitted on 9.27 status for symptoms of hortencia including irritability, loud speech, and agitation.     DDx: hortencia vs hypomania vs comorbid delirium    Today, patient is angry about her current admission and denying any psychiatric history. She is asking to be discharged immediately and yelling at treatment team. She is noted to have loud and rapid speech, but is able to be interrupted. She states she has been sleeping poorly due to hip and R knee pain. She denies S/IIP, H/IIP, AVH, paranoia, or ideas of reference. She is AOx1-2 (person, place--hospital), but unable to state year or location in [place]. She requires 9.27 inpatient admission for evaluation and management of hortencia.    1/22: less irritable but still requiring PRN med, more compliant with meds, no SI/HI.    Plan:  1. Legal: continue 9.27 involuntary admission  2. Safety:   - Requires 1:1 CO for fall risk and monitoring of hospital bed   - Olanzapine 2.5 mg PO or IM q6h PRN agitation  3. Psychiatric:  - Valproic acid syrup 250 mg for mood stabilization  - Clonazepam 0.25 mg ODT BID for anxiety  - Haloperidol 1 mg PO BID for agitation  - Melatonin 3 mg PRN for insomnia  4. Group/Milieu therapy: encourage participation as able  5. Medical:   - Hx of vertebral fractures with ambulatory dysfunction: no acute surgical intervention, patient requires hospital bed, PRN tylenol and lidocaine patch  - HTN: metoprolol XL 50 mg daily  - Constipation: miralax and senna daily, PRN bisacodyl q12h  1/21 Suspect irritable hortencia will  cont haldol change to Depakote sprinkles taper Klonopin get more collateral patient is allowing call to family  - Dry eyes: Artificial tears 1 drop both eyes BID  6. Collateral/Dispo: per primary team

## 2023-01-22 NOTE — BH INPATIENT PSYCHIATRY PROGRESS NOTE - CURRENT MEDICATION
MEDICATIONS  (STANDING):  artificial  tears Solution 1 Drop(s) Both EYES two times a day  clonazePAM Oral Disintegrating Tablet 0.125 milliGRAM(s) Oral two times a day  divalproex Sprinkle 375 milliGRAM(s) Oral two times a day  enoxaparin Injectable 40 milliGRAM(s) SubCutaneous <User Schedule>  haloperidol     Tablet 1 milliGRAM(s) Oral two times a day  lidocaine   4% Patch 1 Patch Transdermal every 24 hours  metoprolol succinate ER 50 milliGRAM(s) Oral daily  polyethylene glycol 3350 17 Gram(s) Oral daily  senna 2 Tablet(s) Oral at bedtime    MEDICATIONS  (PRN):  acetaminophen     Tablet .. 650 milliGRAM(s) Oral every 6 hours PRN Mild Pain (1 - 3), Moderate Pain (4 - 6), Severe Pain (7 - 10)  albuterol    90 MICROgram(s) HFA Inhaler 2 Puff(s) Inhalation every 6 hours PRN Shortness of Breath and/or Wheezing  aluminum hydroxide/magnesium hydroxide/simethicone Suspension 30 milliLiter(s) Oral every 4 hours PRN Dyspepsia  bisacodyl 5 milliGRAM(s) Oral every 12 hours PRN Constipation  haloperidol     Tablet 1 milliGRAM(s) Oral every 6 hours PRN agitation  haloperidol    Injectable 1 milliGRAM(s) IntraMuscular once PRN agitation  melatonin. 3 milliGRAM(s) Oral at bedtime PRN Insomnia

## 2023-01-22 NOTE — BH INPATIENT PSYCHIATRY PROGRESS NOTE - NSBHFUPINTERVALHXFT_PSY_A_CORE
Patient seen for BAD, chart reviewed and discussed with nursing staff. Pt complied medication this am, refused last night, per EMR received PRN po haldol this afternoon. On exam, Pt reports feeling ok, but has hip pain as she says she had fracture. Reports sleeping and eating well. Denies any hallucination, SI, HI. Oriented to year, month.

## 2023-01-22 NOTE — BH INPATIENT PSYCHIATRY PROGRESS NOTE - MSE UNSTRUCTURED FT
Appearance: The patient appears stated age, fair hygiene and dressed appropriately in own clothes.   Behavior: The patient is cooperative with interview and maintained appropriate eye contact.    Motor: No psychomotor agitation or slowing noted. Patient unable to ambulate independently.    Speech: The patient's speech was fluent but with loud volume, rapid tone, and angry tone. Speech was pressured   Mood: The patient's mood is "fine"    Affect: less irritable, labile (calm/conversant, then yelling) and mood congruent  Thought process: The patient's thoughts are circumstantial at times.    Thought content: suspicious thought being here was due to some scheme  Perception: No auditory or visual hallucinations  Cognition: A&O x 1-2. Good attention, able to answer questions appropriately  SI/HI: Denies any suicidal or homicidal ideation, intent, or plan.    Insight is poor. Judgment is fair. Impulse control is impaired.

## 2023-01-22 NOTE — BH INPATIENT PSYCHIATRY PROGRESS NOTE - PRN MEDS
MEDICATIONS  (PRN):  acetaminophen     Tablet .. 650 milliGRAM(s) Oral every 6 hours PRN Mild Pain (1 - 3), Moderate Pain (4 - 6), Severe Pain (7 - 10)  albuterol    90 MICROgram(s) HFA Inhaler 2 Puff(s) Inhalation every 6 hours PRN Shortness of Breath and/or Wheezing  aluminum hydroxide/magnesium hydroxide/simethicone Suspension 30 milliLiter(s) Oral every 4 hours PRN Dyspepsia  bisacodyl 5 milliGRAM(s) Oral every 12 hours PRN Constipation  haloperidol     Tablet 1 milliGRAM(s) Oral every 6 hours PRN agitation  haloperidol    Injectable 1 milliGRAM(s) IntraMuscular once PRN agitation  melatonin. 3 milliGRAM(s) Oral at bedtime PRN Insomnia

## 2023-01-22 NOTE — BH INPATIENT PSYCHIATRY PROGRESS NOTE - NSBHCHARTREVIEWVS_PSY_A_CORE FT
Vital Signs Last 24 Hrs  T(C): 36.6 (01-22-23 @ 09:05), Max: 36.6 (01-22-23 @ 09:05)  T(F): 97.9 (01-22-23 @ 09:05), Max: 97.9 (01-22-23 @ 09:05)  HR: --  BP: --  BP(mean): --  RR: --  SpO2: 96% (01-22-23 @ 09:05) (96% - 96%)    Orthostatic VS  01-22-23 @ 09:05  Lying BP: --/-- HR: --  Sitting BP: 130/47 HR: 67  Standing BP: 124/60 HR: 69  Site: upper left arm  Mode: electronic  Orthostatic VS  01-21-23 @ 08:22  Lying BP: --/-- HR: --  Sitting BP: 116/63 HR: 75  Standing BP: --/-- HR: --  Site: --  Mode: --

## 2023-01-23 LAB
A1C WITH ESTIMATED AVERAGE GLUCOSE RESULT: 5.3 % — SIGNIFICANT CHANGE UP (ref 4–5.6)
ANION GAP SERPL CALC-SCNC: 12 MMOL/L — SIGNIFICANT CHANGE UP (ref 7–14)
BUN SERPL-MCNC: 19 MG/DL — SIGNIFICANT CHANGE UP (ref 7–23)
CALCIUM SERPL-MCNC: 9.3 MG/DL — SIGNIFICANT CHANGE UP (ref 8.4–10.5)
CHLORIDE SERPL-SCNC: 102 MMOL/L — SIGNIFICANT CHANGE UP (ref 98–107)
CHOLEST SERPL-MCNC: 171 MG/DL — SIGNIFICANT CHANGE UP
CO2 SERPL-SCNC: 28 MMOL/L — SIGNIFICANT CHANGE UP (ref 22–31)
CREAT SERPL-MCNC: 0.53 MG/DL — SIGNIFICANT CHANGE UP (ref 0.5–1.3)
EGFR: 89 ML/MIN/1.73M2 — SIGNIFICANT CHANGE UP
ESTIMATED AVERAGE GLUCOSE: 105 — SIGNIFICANT CHANGE UP
FOLATE SERPL-MCNC: 7.4 NG/ML — SIGNIFICANT CHANGE UP (ref 3.1–17.5)
GLUCOSE SERPL-MCNC: 146 MG/DL — HIGH (ref 70–99)
HDLC SERPL-MCNC: 60 MG/DL — SIGNIFICANT CHANGE UP
LIPID PNL WITH DIRECT LDL SERPL: 98 MG/DL — SIGNIFICANT CHANGE UP
NON HDL CHOLESTEROL: 111 MG/DL — SIGNIFICANT CHANGE UP
POTASSIUM SERPL-MCNC: 3.8 MMOL/L — SIGNIFICANT CHANGE UP (ref 3.5–5.3)
POTASSIUM SERPL-SCNC: 3.8 MMOL/L — SIGNIFICANT CHANGE UP (ref 3.5–5.3)
SODIUM SERPL-SCNC: 142 MMOL/L — SIGNIFICANT CHANGE UP (ref 135–145)
TRIGL SERPL-MCNC: 63 MG/DL — SIGNIFICANT CHANGE UP
VIT B12 SERPL-MCNC: 495 PG/ML — SIGNIFICANT CHANGE UP (ref 200–900)

## 2023-01-23 PROCEDURE — 99232 SBSQ HOSP IP/OBS MODERATE 35: CPT | Mod: GC

## 2023-01-23 PROCEDURE — 99232 SBSQ HOSP IP/OBS MODERATE 35: CPT

## 2023-01-23 RX ADMIN — POLYETHYLENE GLYCOL 3350 17 GRAM(S): 17 POWDER, FOR SOLUTION ORAL at 09:48

## 2023-01-23 RX ADMIN — Medication 650 MILLIGRAM(S): at 14:47

## 2023-01-23 RX ADMIN — Medication 50 MILLIGRAM(S): at 09:48

## 2023-01-23 RX ADMIN — LIDOCAINE 1 PATCH: 4 CREAM TOPICAL at 19:47

## 2023-01-23 RX ADMIN — LIDOCAINE 1 PATCH: 4 CREAM TOPICAL at 09:50

## 2023-01-23 RX ADMIN — ENOXAPARIN SODIUM 40 MILLIGRAM(S): 100 INJECTION SUBCUTANEOUS at 09:50

## 2023-01-23 RX ADMIN — Medication 0.12 MILLIGRAM(S): at 21:43

## 2023-01-23 RX ADMIN — LIDOCAINE 1 PATCH: 4 CREAM TOPICAL at 22:00

## 2023-01-23 RX ADMIN — SENNA PLUS 2 TABLET(S): 8.6 TABLET ORAL at 21:44

## 2023-01-23 RX ADMIN — Medication 650 MILLIGRAM(S): at 13:47

## 2023-01-23 RX ADMIN — HALOPERIDOL DECANOATE 1 MILLIGRAM(S): 100 INJECTION INTRAMUSCULAR at 21:44

## 2023-01-23 RX ADMIN — Medication 650 MILLIGRAM(S): at 22:48

## 2023-01-23 RX ADMIN — HALOPERIDOL DECANOATE 1 MILLIGRAM(S): 100 INJECTION INTRAMUSCULAR at 09:48

## 2023-01-23 RX ADMIN — DIVALPROEX SODIUM 375 MILLIGRAM(S): 500 TABLET, DELAYED RELEASE ORAL at 09:47

## 2023-01-23 RX ADMIN — Medication 650 MILLIGRAM(S): at 21:44

## 2023-01-23 RX ADMIN — Medication 0.12 MILLIGRAM(S): at 09:48

## 2023-01-23 RX ADMIN — DIVALPROEX SODIUM 375 MILLIGRAM(S): 500 TABLET, DELAYED RELEASE ORAL at 21:43

## 2023-01-23 RX ADMIN — Medication 1 DROP(S): at 21:44

## 2023-01-23 NOTE — BH INPATIENT PSYCHIATRY PROGRESS NOTE - NSBHFUPINTERVALHXFT_PSY_A_CORE
Patient seen for BAD, chart reviewed and discussed with nursing staff. Pt accepted medication yesterday and this AM. Received PRN po haldol 1mg yd afternoon. CUrrently on CO due to hospital bed requirement. Reports fair sleep and appetite.    On exam, Pt reports feeling ok, but endorses ongoing hip pain, some relief with lidocaine patch & hot pack. Denies any hallucination, SI, HI. Oriented to location and oriented to "end of January 2023" though not specific date. States mood is "stable." Endorses anxiety about medical concerns and interactions with staff and peers on the unit. States that staff is "racist" toward her and makes accusations that staff has stolen her belongings. Education provided regarding unit protocols for pt belongings; pt states she will believe that her belongings will be returned "when I see it."    Pt notes new mole discovered on skin check on admission to unit; denies knowledge of this mole previously. Endorses significant anxiety about mole. On examination, there is a approx. 1x1.5cm dark brown raised rough patch with somewhat irregular borders.    Pt denies ambulatory ability and remains in chair throughout interview.     Pt refused consent to speak to family at this time. Denies any outpatient psychiatric or therapeutic care. Patient seen for BAD, chart reviewed and discussed with nursing staff. Pt accepted medication yesterday and this AM. Received PRN po haldol 1mg yd afternoon. Currently on CO due to hospital bed requirement. Reports fair sleep and appetite.    On exam, Pt reports feeling ok, but endorses ongoing hip pain, some relief with lidocaine patch & hot pack. Denies any hallucination, SI, HI. Oriented to location and oriented to "end of January 2023" though not specific date. States mood is "stable." Endorses anxiety about medical concerns and interactions with staff and peers on the unit. States that staff is "racist" toward her and makes accusations that staff has stolen her belongings. Education provided regarding unit protocols for pt belongings; pt states she will believe that her belongings will be returned "when I see it."    Pt notes new mole discovered on skin check on admission to unit; denies knowledge of this mole previously. Endorses significant anxiety about mole. On examination, there is a approx. 1x1.5cm dark brown raised rough patch with somewhat irregular borders.    Pt denies ambulatory ability and remains in chair throughout interview.     Pt refused consent to speak to family at this time. Denies any outpatient psychiatric or therapeutic care.

## 2023-01-23 NOTE — PROGRESS NOTE ADULT - ASSESSMENT
89 y/o F w/ PMH asthma, anxiety, questionable CVA in 2007, diverticulosis, GERD, bipolar disorder, schizophrenia, and previous vertebral fracture, with recent admission for Covid ( Sep ’22 ), now transferred to [facility] for decompensated bipolar/schizophrenia.     #Skin lesion:   - scattered seborrheic keratoses on back, benign.  Reassured pt.  Outpatient derm follow up for routine skin exam.    #Back Pain  - patient with known vertebral compression Fx at L3  - no neurosurgical intervention at this time  - patient reports improvement in pain with tylenol and lidocaine patches  - c/w tylenol/lidocaine patch qd    #HTN  - BPs well controlled on current home regimen  - c/w toprol 50mg qd with hold parameters    #Asthma  - patient reports history of asthma  - no wheezing noted on exam  - will order albuterol PRN for SOB/Wheezing    #Bipolar Disorder/Schizophrenia  - management per primary psychiatry team

## 2023-01-23 NOTE — PHYSICAL THERAPY INITIAL EVALUATION ADULT - TRANSFER SKILLS, REHAB EVAL
4 months 2 week y/o F pt presents to ED for cough, and b/l eye discharge. Per mom symptoms started yesterday. Full term delivery, no complications. No
independent

## 2023-01-23 NOTE — BH INPATIENT PSYCHIATRY PROGRESS NOTE - NSBHASSESSSUMMFT_PSY_ALL_CORE
Dinorah Hogan is an 88 year old female, , domiciled with daughter with PPHx of bipolar I disorder, 1 previous inpatient psychiatric hospitalization in 1970s, no known history of SI/SA and PMHx of asthma, GERD, sciatica with previous vertebral fracture who is admitted on 9.27 status for symptoms of hortencia including irritability, loud speech, and agitation.     DDx: hortencia vs hypomania vs comorbid delirium    Today, patient is angry about her current admission and denying any psychiatric history. She is asking to be discharged immediately and yelling at treatment team. She is noted to have loud and rapid speech, but is able to be interrupted. She states she has been sleeping poorly due to hip and R knee pain. She denies S/IIP, H/IIP, AVH, paranoia, or ideas of reference. She is AOx1-2 (person, place--hospital), but unable to state year or location in [place]. She requires 9.27 inpatient admission for evaluation and management of hortencia.    1/22: less irritable but still requiring PRN med, more compliant with meds, no SI/HI.  1/23: Accepted meds yd and this AM though required PRN yd. Less irritable, appropriately conversant, making needs known. Suspicious bordering on paranoid, however no fully formed delusional thought content apparent.    Plan:  1. Legal: continue 9.27 involuntary admission  2. Safety:   - Requires 1:1 CO for fall risk and monitoring of hospital bed   - Olanzapine 2.5 mg PO or IM q6h PRN agitation  3. Psychiatric:  - Valproic acid syrup 250 mg for mood stabilization  - Clonazepam 0.25 mg ODT BID for anxiety  - Haloperidol 1 mg PO BID for agitation  - Melatonin 3 mg PRN for insomnia  4. Group/Milieu therapy: encourage participation as able  5. Medical:   - Hx of vertebral fractures with ambulatory dysfunction: no acute surgical intervention, patient requires hospital bed, PRN tylenol, lidocaine patch, hot packs  - HTN: metoprolol XL 50 mg daily  - Constipation: miralax and senna daily, PRN bisacodyl q12h  1/21 Suspect irritable hortencia will  cont haldol change to Depakote sprinkles taper Klonopin get more collateral patient is allowing call to family  - Dry eyes: Artificial tears 1 drop both eyes BID  6. Collateral/Dispo: refuses consent to speak to family at this time.   Dinorah Hogan is an 88 year old female, , domiciled with daughter with PPHx of bipolar I disorder, 1 previous inpatient psychiatric hospitalization in 1970s, no known history of SI/SA and PMHx of asthma, GERD, sciatica with previous vertebral fracture who is admitted on 9.27 status for symptoms of hortencia including irritability, loud speech, and agitation.     DDx: hortencia vs hypomania vs comorbid delirium    Today, patient is angry about her current admission and denying any psychiatric history. She is asking to be discharged immediately and yelling at treatment team. She is noted to have loud and rapid speech, but is able to be interrupted. She states she has been sleeping poorly due to hip and R knee pain. She denies S/IIP, H/IIP, AVH, paranoia, or ideas of reference. She is AOx1-2 (person, place--hospital), but unable to state year or location in [place]. She requires 9.27 inpatient admission for evaluation and management of hortencia.    1/22: less irritable but still requiring PRN med, more compliant with meds, no SI/HI.  1/23: Accepted meds yd and this AM though required PRN yd. Less irritable, appropriately conversant, making needs known. Suspicious bordering on paranoid, however no fully formed delusional thought content apparent.    Plan:  1. Legal: continue 9.27 involuntary admission  2. Safety:   - Requires 1:1 CO for fall risk and monitoring of hospital bed   - Olanzapine 2.5 mg PO or IM q6h PRN agitation  3. Psychiatric:  - Valproic acid syrup 250 mg for mood stabilization  - Clonazepam 0.25 mg ODT BID for anxiety  - Haloperidol 1 mg PO BID for agitation  - Melatonin 3 mg PRN for insomnia  4. Group/Milieu therapy: encourage participation as able  5. Medical:   - Hx of vertebral fractures with ambulatory dysfunction: no acute surgical intervention, patient requires hospital bed, PRN tylenol, lidocaine patch, hot packs  - HTN: metoprolol XL 50 mg daily  - Constipation: miralax and senna daily, PRN bisacodyl q12h  - Newly noted mole: likely sebhorreic keratosis, hospitalist consulted  1/21 Suspect irritable hortencia will  cont haldol change to Depakote sprinkles taper Klonopin get more collateral patient is allowing call to family  - Dry eyes: Artificial tears 1 drop both eyes BID  6. Collateral/Dispo: refuses consent to speak to family at this time.

## 2023-01-23 NOTE — PHYSICAL THERAPY INITIAL EVALUATION ADULT - PLANNED THERAPY INTERVENTIONS, PT EVAL
balance training/gait training/lumbar stabilization/manual therapy techniques/neuromuscular re-education/postural re-education/strengthening/transfer training

## 2023-01-23 NOTE — BH SOCIAL WORK INITIAL PSYCHOSOCIAL EVALUATION - NSPTSTATEDGOAL_PSY_ALL_CORE
The pt reports wanting relief from back pain, becoming eligible for Medicaid, and having a cleaning service when discharged home.

## 2023-01-23 NOTE — BH SOCIAL WORK INITIAL PSYCHOSOCIAL EVALUATION - OTHER PAST PSYCHIATRIC HISTORY (INCLUDE DETAILS REGARDING ONSET, COURSE OF ILLNESS, INPATIENT/OUTPATIENT TREATMENT)
Pt is an 87 yo  woman who resides in her 2 bedroom apartment with her 56 yo daughter, Kathryn, who has MS and works part time as a mental health counselor. The pt reports experiencing intense lower extremity pain, called 911, was brought to [facility] ER, admitted 12/30/22, followed by C&L, prescribed Depakote, Haldol, and Klonopin, seen by neurology, and transferred to [facility] 1/20/23. The pt recanted leg pain in [facility] ER but reports back pain and stated she couldn't walk. Pt has old vertebral fracture. Pt had Covid 9/22 and 2 week stay in [facility], and returned home. The pt denies any hx of psychiatric illness and reports having no outpatient provider. CVM indicates no [facility] inpatient stays but [facility], and 2007. Record references CVA 2007, psychiatric hospitalization in 1970s, no known hx . ER obtained information from the pt's daughter indicating pt had multiple remote psychiatric hospitalization. The pt currently refuses contact with her daughter for collateral information.

## 2023-01-23 NOTE — PHYSICAL THERAPY INITIAL EVALUATION ADULT - IMPAIRMENTS FOUND, PT EVAL
cognitive impairment/gait, locomotion, and balance/joint integrity and mobility/muscle strength/poor safety awareness

## 2023-01-23 NOTE — BH INPATIENT PSYCHIATRY PROGRESS NOTE - NSBHCHARTREVIEWVS_PSY_A_CORE FT
Vital Signs Last 24 Hrs  T(C): 36.5 (01-23-23 @ 08:41), Max: 36.5 (01-23-23 @ 08:41)  T(F): 97.7 (01-23-23 @ 08:41), Max: 97.7 (01-23-23 @ 08:41)  HR: --  BP: --  BP(mean): --  RR: --  SpO2: --    Orthostatic VS  01-23-23 @ 08:41  Lying BP: --/-- HR: --  Sitting BP: 122/62 HR: 70  Standing BP: 127/56 HR: 65  Site: --  Mode: --  Orthostatic VS  01-22-23 @ 09:05  Lying BP: --/-- HR: --  Sitting BP: 130/47 HR: 67  Standing BP: 124/60 HR: 69  Site: upper left arm  Mode: electronic   Vital Signs Last 24 Hrs  T(C): 36.5 (01-23-23 @ 08:41), Max: 36.5 (01-23-23 @ 08:41)  T(F): 97.7 (01-23-23 @ 08:41), Max: 97.7 (01-23-23 @ 08:41)  HR: --  BP: --  BP(mean): --  RR: --  SpO2: --    Orthostatic VS  01-23-23 @ 11:09  Lying BP: --/-- HR: --  Sitting BP: 104/64 HR: 74  Standing BP: --/-- HR: --  Site: --  Mode: --  Orthostatic VS  01-23-23 @ 08:41  Lying BP: --/-- HR: --  Sitting BP: 122/62 HR: 70  Standing BP: 127/56 HR: 65  Site: --  Mode: --  Orthostatic VS  01-22-23 @ 09:05  Lying BP: --/-- HR: --  Sitting BP: 130/47 HR: 67  Standing BP: 124/60 HR: 69  Site: upper left arm  Mode: electronic

## 2023-01-23 NOTE — BH INPATIENT PSYCHIATRY PROGRESS NOTE - MSE UNSTRUCTURED FT
Appearance: The patient appears stated age, fair hygiene and dressed appropriately in hospital attire.    Behavior: The patient is cooperative with interview and maintained appropriate eye contact.    Motor: No psychomotor agitation or slowing noted. Patient unable to ambulate independently.    Speech: The patient's speech was fluent but with loud volume, rapid tone, and angry tone. Speech was pressured   Mood: The patient's mood is "fine"    Affect: somewhat irritable, at times smiling, somewhat labile (calm/conversant, then irritable/snappy, redirectable) and mood congruent  Thought process: Overall linear, circumstantial at times.    Thought content: suspicious  Perception: Denies auditory or visual hallucinations  Cognition: A&O x 3. Good attention, able to answer questions appropriately  SI/HI: Denies any suicidal or homicidal ideation, intent, or plan.    Insight is poor. Judgment is fair. Impulse control is impaired.

## 2023-01-23 NOTE — PROGRESS NOTE ADULT - SUBJECTIVE AND OBJECTIVE BOX
CC/Reason for Consult: mole on back    SUBJECTIVE / OVERNIGHT EVENTS:  Patient anxious because mole noted on back during admission skin assessment, she is worried about diagnosis    MEDICATIONS  (STANDING):  artificial  tears Solution 1 Drop(s) Both EYES two times a day  clonazePAM Oral Disintegrating Tablet 0.125 milliGRAM(s) Oral two times a day  divalproex Sprinkle 375 milliGRAM(s) Oral two times a day  enoxaparin Injectable 40 milliGRAM(s) SubCutaneous <User Schedule>  haloperidol     Tablet 1 milliGRAM(s) Oral two times a day  lidocaine   4% Patch 1 Patch Transdermal every 24 hours  metoprolol succinate ER 50 milliGRAM(s) Oral daily  polyethylene glycol 3350 17 Gram(s) Oral daily  senna 2 Tablet(s) Oral at bedtime    MEDICATIONS  (PRN):  acetaminophen     Tablet .. 650 milliGRAM(s) Oral every 6 hours PRN Mild Pain (1 - 3), Moderate Pain (4 - 6), Severe Pain (7 - 10)  albuterol    90 MICROgram(s) HFA Inhaler 2 Puff(s) Inhalation every 6 hours PRN Shortness of Breath and/or Wheezing  aluminum hydroxide/magnesium hydroxide/simethicone Suspension 30 milliLiter(s) Oral every 4 hours PRN Dyspepsia  bisacodyl 5 milliGRAM(s) Oral every 12 hours PRN Constipation  haloperidol     Tablet 1 milliGRAM(s) Oral every 6 hours PRN agitation  haloperidol    Injectable 1 milliGRAM(s) IntraMuscular once PRN agitation  melatonin. 3 milliGRAM(s) Oral at bedtime PRN Insomnia      Vital Signs Last 24 Hrs  T(C): 36.5 (23 Jan 2023 08:41), Max: 36.5 (23 Jan 2023 08:41)  T(F): 97.7 (23 Jan 2023 08:41), Max: 97.7 (23 Jan 2023 08:41)  HR: 74  BP: 104/64  BP(mean): --  RR: 15            PHYSICAL EXAM:  GENERAL: NAD  HEAD:  Atraumatic, Normocephalic  EYES: EOMI, conjunctiva and sclera clear  NECK: Supple, No JVD  CHEST/LUNG: Clear to auscultation bilaterally; No wheeze  HEART: Regular rate and rhythm; No murmurs, rubs, or gallops  ABDOMEN: Soft, Nontender, Nondistended; Bowel sounds present  EXTREMITIES:  2+ Peripheral Pulses, No clubbing, cyanosis, or edema  PSYCH: AAOx3  NEUROLOGY: non-focal  SKIN: scattered seborrheic keratoses on back, one 2x1cm    LABS:    01-23    142  |  102  |  19  ----------------------------<  146<H>  3.8   |  28  |  0.53    Ca    9.3      23 Jan 2023 08:57            Care Discussed with Consultants/Other Providers: psych resident

## 2023-01-23 NOTE — BH SOCIAL WORK INITIAL PSYCHOSOCIAL EVALUATION - NSBHFINANCE_PSY_ALL_CORE
Pt reports receiving $3800 combined monthly social security snf and pension benefits./Pension income

## 2023-01-23 NOTE — PHYSICAL THERAPY INITIAL EVALUATION ADULT - PERTINENT HX OF CURRENT PROBLEM, REHAB EVAL
Patient is an 88 year old female, , domiciled with daughter with PPHx of bipolar I disorder, 1 previous inpatient psychiatric hospitalization in 1970s, no known history of SI/SA and PMHx of asthma, GERD, sciatica with previous vertebral fracture who is admitted on 9.27 status for symptoms of hortencia including irritability, loud speech, and agitation. Patient referred to physical therapy secondary to hx of trauma/surgery.

## 2023-01-24 PROCEDURE — 99231 SBSQ HOSP IP/OBS SF/LOW 25: CPT | Mod: GC

## 2023-01-24 RX ADMIN — Medication 650 MILLIGRAM(S): at 10:18

## 2023-01-24 RX ADMIN — HALOPERIDOL DECANOATE 1 MILLIGRAM(S): 100 INJECTION INTRAMUSCULAR at 20:56

## 2023-01-24 RX ADMIN — Medication 1 DROP(S): at 10:03

## 2023-01-24 RX ADMIN — LIDOCAINE 1 PATCH: 4 CREAM TOPICAL at 22:07

## 2023-01-24 RX ADMIN — POLYETHYLENE GLYCOL 3350 17 GRAM(S): 17 POWDER, FOR SOLUTION ORAL at 10:02

## 2023-01-24 RX ADMIN — DIVALPROEX SODIUM 375 MILLIGRAM(S): 500 TABLET, DELAYED RELEASE ORAL at 20:56

## 2023-01-24 RX ADMIN — LIDOCAINE 1 PATCH: 4 CREAM TOPICAL at 22:06

## 2023-01-24 RX ADMIN — ENOXAPARIN SODIUM 40 MILLIGRAM(S): 100 INJECTION SUBCUTANEOUS at 10:19

## 2023-01-24 RX ADMIN — LIDOCAINE 1 PATCH: 4 CREAM TOPICAL at 19:07

## 2023-01-24 RX ADMIN — Medication 50 MILLIGRAM(S): at 10:02

## 2023-01-24 RX ADMIN — DIVALPROEX SODIUM 375 MILLIGRAM(S): 500 TABLET, DELAYED RELEASE ORAL at 10:02

## 2023-01-24 RX ADMIN — HALOPERIDOL DECANOATE 1 MILLIGRAM(S): 100 INJECTION INTRAMUSCULAR at 10:02

## 2023-01-24 RX ADMIN — ALBUTEROL 2 PUFF(S): 90 AEROSOL, METERED ORAL at 10:03

## 2023-01-24 RX ADMIN — Medication 1 DROP(S): at 20:56

## 2023-01-24 RX ADMIN — SENNA PLUS 2 TABLET(S): 8.6 TABLET ORAL at 20:56

## 2023-01-24 RX ADMIN — Medication 650 MILLIGRAM(S): at 11:15

## 2023-01-24 RX ADMIN — LIDOCAINE 1 PATCH: 4 CREAM TOPICAL at 10:02

## 2023-01-24 NOTE — BH INPATIENT PSYCHIATRY PROGRESS NOTE - NSBHCHARTREVIEWVS_PSY_A_CORE FT
Vital Signs Last 24 Hrs  T(C): 36.5 (01-24-23 @ 08:22), Max: 36.5 (01-24-23 @ 08:22)  T(F): 97.7 (01-24-23 @ 08:22), Max: 97.7 (01-24-23 @ 08:22)  HR: 68 (01-24-23 @ 08:22) (68 - 68)  BP: 131/58 (01-24-23 @ 08:22) (131/58 - 131/58)  BP(mean): --  RR: --  SpO2: --    Orthostatic VS  01-23-23 @ 11:09  Lying BP: --/-- HR: --  Sitting BP: 104/64 HR: 74  Standing BP: --/-- HR: --  Site: --  Mode: --  Orthostatic VS  01-23-23 @ 08:41  Lying BP: --/-- HR: --  Sitting BP: 122/62 HR: 70  Standing BP: 127/56 HR: 65  Site: --  Mode: --

## 2023-01-24 NOTE — PHARMACOTHERAPY INTERVENTION NOTE - COMMENTS
Contacted doctor to clarify dose and indiction of enoxaparin 40 mg subcutaneously once a day. Confirmed patient qualifies for DVT prophylaxis as patient is immobile and dose is appropriate as BMI is under 40. Contacted provider to clarify dose and indication of enoxaparin 40 mG subcutaneously once a day. Provider confirmed patient meets criteria for DVT prophylaxis as patient is immobile.    Dose is appropriate as BMI is under 40 kG/m2.

## 2023-01-24 NOTE — BH INPATIENT PSYCHIATRY PROGRESS NOTE - NSBHFUPINTERVALHXFT_PSY_A_CORE
Patient seen for BAD, chart reviewed and discussed with nursing staff. Pt accepted medication yesterday and this AM. Currently on CO due to hospital bed requirement. Reports fair sleep and appetite.    Pt encountered in hallway in kike- chair. Observed to be oppositional during PT. Pt states she feels "awful" and "dominated" and makes suspicious and racially provocative statements about staff. States that staff is "menacing" her and wants her to die, asks writer to "take me off your punishment list!" Endorses ongoing hip pain, some relief with lidocaine patch & hot pack. Denies any hallucination, SI, HI. States mood is "fine."    Writer reviewed medication list with patient. Patient states that she does not want to take any psychiatric meds. Writer provided psychoeducation on indication, risks, and benefit. Writer explained to pt that meds will be offered and she has the right to refuse.    Regarding ambulatory status, pt reports ambulating with walker at home but states she cannot use the walkers on the unit because they are too heavy.    Pt refused consent to speak to family at this time.

## 2023-01-24 NOTE — DIETITIAN INITIAL EVALUATION ADULT - OTHER INFO
Patient admitted to [facility] with hortencia including irritability, loud speech, and agitation; hx: bipolar 1 disorder; PMH: asthma, GERD, sciatica with previous vertebral fracture, CVA, HTN, diverticulosis. Spoke to patient in the day room. Patient states her appetite is "normal". Has top teeth missing and reports she needs food to be chopped up -recommend soft and bite sized diet.  Denies GI distress. Patient reports losing weight, states usual weight is 120#, current weight is 97#, does not know time frame. Patient states " I eat highly caloric foods because of this problem". Patient amenable to having H[employer] Vital Shake added to meal plan- will add with daily lunch and dinner meal.

## 2023-01-24 NOTE — BH INPATIENT PSYCHIATRY PROGRESS NOTE - MSE UNSTRUCTURED FT
Appearance: The patient appears stated age, fair hygiene and dressed appropriately in hospital attire.    Behavior: The patient is somewhat with interview though at times shouting and making accusations, maintained appropriate eye contact.    Motor: No psychomotor agitation or slowing noted. Patient unable to ambulate independently.    Speech: The patient's speech was fluent but with loud volume, rapid tone, and angry tone. Speech was pressured   Mood: The patient's mood is "fine"    Affect: irritable, labile, and mood congruent  Thought process: Overall linear, circumstantial at times.    Thought content: suspicious  Perception: Denies auditory or visual hallucinations  Cognition: A&O x 3. Good attention, able to answer questions appropriately  SI/HI: Denies any suicidal or homicidal ideation, intent, or plan.    Insight is poor. Judgment is fair. Impulse control is impaired.

## 2023-01-24 NOTE — DIETITIAN INITIAL EVALUATION ADULT - PERTINENT LABORATORY DATA
01-23    142  |  102  |  19  ----------------------------<  146<H>  3.8   |  28  |  0.53    Ca    9.3      23 Jan 2023 08:57    A1C with Estimated Average Glucose Result: 5.3 % (01-23-23 @ 08:57)

## 2023-01-24 NOTE — DIETITIAN INITIAL EVALUATION ADULT - RD TO REMAIN AVAILABLE
Provide Hormel Vital Shake BID.   Encourage/monitor po intake and tolerance, weights, labs, BMs./yes

## 2023-01-24 NOTE — BH INPATIENT PSYCHIATRY PROGRESS NOTE - NSBHASSESSSUMMFT_PSY_ALL_CORE
Dinorah Hogan is an 88 year old female, , domiciled with daughter with PPHx of bipolar I disorder, 1 previous inpatient psychiatric hospitalization in 1970s, no known history of SI/SA and PMHx of asthma, GERD, sciatica with previous vertebral fracture who is admitted on 9.27 status for symptoms of hortencia including irritability, loud speech, and agitation.     DDx: hortencia vs hypomania vs comorbid delirium    Today, patient is angry about her current admission and denying any psychiatric history. She is asking to be discharged immediately and yelling at treatment team. She is noted to have loud and rapid speech, but is able to be interrupted. She states she has been sleeping poorly due to hip and R knee pain. She denies S/IIP, H/IIP, AVH, paranoia, or ideas of reference. She is AOx1-2 (person, place--hospital), but unable to state year or location in [place]. She requires 9.27 inpatient admission for evaluation and management of hortencia.    1/22: less irritable but still requiring PRN med, more compliant with meds, no SI/HI.  1/23: Accepted meds yd and this AM though required PRN yd. Less irritable, appropriately conversant, making needs known. Suspicious bordering on paranoid, however no fully formed delusional thought content apparent.  1/24: Irritable and labile, suspicious regarding staff members. Discussed medication, pt will be offered psychiatric meds and is aware she has right to refuse.    Plan:  1. Legal: continue 9.27 involuntary admission  2. Safety:   - Requires 1:1 CO for fall risk and monitoring of hospital bed   - Olanzapine 2.5 mg PO or IM q6h PRN agitation  3. Psychiatric:  - STOP Clonazepam 0.25 mg ODT BID due to risk of fall, minimal anxiety per pt  - Valproic acid syrup 250 mg for mood stabilization  - Haloperidol 1 mg PO BID for agitation  - Melatonin 3 mg PRN for insomnia  4. Group/Milieu therapy: encourage participation as able  5. Medical:   - Hx of vertebral fractures with ambulatory dysfunction: no acute surgical intervention, patient requires hospital bed, PRN tylenol, lidocaine patch, hot packs  - HTN: metoprolol XL 50 mg daily  - Constipation: miralax and senna daily, PRN bisacodyl q12h  - Newly noted mole: consistent with sebhorreic keratosis  - Dry eyes: Artificial tears 1 drop both eyes BID  6. Collateral/Dispo: refuses consent to speak to family at this time.

## 2023-01-24 NOTE — BH INPATIENT PSYCHIATRY PROGRESS NOTE - CURRENT MEDICATION
MEDICATIONS  (STANDING):  artificial  tears Solution 1 Drop(s) Both EYES two times a day  divalproex Sprinkle 375 milliGRAM(s) Oral two times a day  enoxaparin Injectable 40 milliGRAM(s) SubCutaneous <User Schedule>  haloperidol     Tablet 1 milliGRAM(s) Oral two times a day  lidocaine   4% Patch 1 Patch Transdermal every 24 hours  metoprolol succinate ER 50 milliGRAM(s) Oral daily  polyethylene glycol 3350 17 Gram(s) Oral daily  senna 2 Tablet(s) Oral at bedtime    MEDICATIONS  (PRN):  acetaminophen     Tablet .. 650 milliGRAM(s) Oral every 6 hours PRN Mild Pain (1 - 3), Moderate Pain (4 - 6), Severe Pain (7 - 10)  albuterol    90 MICROgram(s) HFA Inhaler 2 Puff(s) Inhalation every 6 hours PRN Shortness of Breath and/or Wheezing  aluminum hydroxide/magnesium hydroxide/simethicone Suspension 30 milliLiter(s) Oral every 4 hours PRN Dyspepsia  bisacodyl 5 milliGRAM(s) Oral every 12 hours PRN Constipation  haloperidol     Tablet 1 milliGRAM(s) Oral every 6 hours PRN agitation  haloperidol    Injectable 1 milliGRAM(s) IntraMuscular once PRN agitation  melatonin. 3 milliGRAM(s) Oral at bedtime PRN Insomnia

## 2023-01-25 PROCEDURE — 99231 SBSQ HOSP IP/OBS SF/LOW 25: CPT | Mod: GC

## 2023-01-25 RX ADMIN — SENNA PLUS 2 TABLET(S): 8.6 TABLET ORAL at 21:20

## 2023-01-25 RX ADMIN — LIDOCAINE 1 PATCH: 4 CREAM TOPICAL at 09:51

## 2023-01-25 RX ADMIN — Medication 650 MILLIGRAM(S): at 10:40

## 2023-01-25 RX ADMIN — Medication 50 MILLIGRAM(S): at 09:55

## 2023-01-25 RX ADMIN — Medication 650 MILLIGRAM(S): at 21:20

## 2023-01-25 RX ADMIN — ENOXAPARIN SODIUM 40 MILLIGRAM(S): 100 INJECTION SUBCUTANEOUS at 09:55

## 2023-01-25 RX ADMIN — DIVALPROEX SODIUM 375 MILLIGRAM(S): 500 TABLET, DELAYED RELEASE ORAL at 09:54

## 2023-01-25 RX ADMIN — POLYETHYLENE GLYCOL 3350 17 GRAM(S): 17 POWDER, FOR SOLUTION ORAL at 09:55

## 2023-01-25 RX ADMIN — LIDOCAINE 1 PATCH: 4 CREAM TOPICAL at 07:52

## 2023-01-25 RX ADMIN — Medication 650 MILLIGRAM(S): at 10:01

## 2023-01-25 RX ADMIN — HALOPERIDOL DECANOATE 1 MILLIGRAM(S): 100 INJECTION INTRAMUSCULAR at 09:55

## 2023-01-25 RX ADMIN — Medication 1 DROP(S): at 09:55

## 2023-01-25 RX ADMIN — LIDOCAINE 1 PATCH: 4 CREAM TOPICAL at 22:10

## 2023-01-25 RX ADMIN — Medication 3 MILLIGRAM(S): at 21:21

## 2023-01-25 NOTE — BH INPATIENT PSYCHIATRY PROGRESS NOTE - NSBHFUPINTERVALHXFT_PSY_A_CORE
Patient seen for BAD, chart reviewed and discussed with nursing staff. Pt accepted medication yesterday and this AM. Currently on CO due to hospital bed requirement. Reports fair sleep and appetite.    Pt encountered in common room. Observed during PT, shouting at staff. On initial approach pt states she is too upset from PT and refuses interview. On follow up, pt engages briefly but soon terminates interview. Pt states she is in "in Hell" and is being "punished" by being asked to sit in the day room. Endorses ongoing back pain with minimal relief from lidocaine patch and some relief with hot packs. Denies any hallucination, SI, HI. Writer asked patient to participate in [facility] evaluation today, resulting in pt becoming agitated and stating "No tests! I've taken tests all my life and I'm not doing any more." When writer requested pt participate in abbreviated mini mental status instead, pt states "you're trying to kill me!" and terminates interview.

## 2023-01-25 NOTE — BH INPATIENT PSYCHIATRY PROGRESS NOTE - NSBHCHARTREVIEWVS_PSY_A_CORE FT
Vital Signs Last 24 Hrs  T(C): 37 (01-25-23 @ 08:31), Max: 37 (01-25-23 @ 08:31)  T(F): 98.6 (01-25-23 @ 08:31), Max: 98.6 (01-25-23 @ 08:31)  HR: 72 (01-25-23 @ 08:31) (72 - 72)  BP: 148/67 (01-25-23 @ 08:31) (148/67 - 148/67)  BP(mean): --  RR: --  SpO2: --

## 2023-01-25 NOTE — BH INPATIENT PSYCHIATRY PROGRESS NOTE - NSBHASSESSSUMMFT_PSY_ALL_CORE
Dinorah Hogan is an 88 year old female, , domiciled with daughter with PPHx of bipolar I disorder, 1 previous inpatient psychiatric hospitalization in 1970s, no known history of SI/SA and PMHx of asthma, GERD, sciatica with previous vertebral fracture who is admitted on 9.27 status for symptoms of hortencia including irritability, loud speech, and agitation.     DDx: hortencia vs hypomania vs comorbid delirium    Today, patient is angry about her current admission and denying any psychiatric history. She is asking to be discharged immediately and yelling at treatment team. She is noted to have loud and rapid speech, but is able to be interrupted. She states she has been sleeping poorly due to hip and R knee pain. She denies S/IIP, H/IIP, AVH, paranoia, or ideas of reference. She is AOx1-2 (person, place--hospital), but unable to state year or location in [place]. She requires 9.27 inpatient admission for evaluation and management of hortencia.    1/22: less irritable but still requiring PRN med, more compliant with meds, no SI/HI.  1/23: Accepted meds yd and this AM though required PRN yd. Less irritable, appropriately conversant, making needs known. Suspicious bordering on paranoid, however no fully formed delusional thought content apparent.  1/24: Irritable and labile, suspicious regarding staff members. Discussed medication, pt will be offered psychiatric meds and is aware she has right to refuse.  1/25: Hostile, irritable, labile. TOO application in progress given poor insight and intermittent refusal of psychiatric mediations. Pt at risk for deconditioning given refusal to ambulate and minimal engagement with PT, given ambulatory at home refusal here appears to be behavioral, will continue to encourage participation. Refused cognitive evaluation today.    Plan:  1. Legal: continue 9.27 involuntary admission. TOO application in progress. Pt submitted 9.31.  2. Safety:   - Requires 1:1 CO for fall risk and monitoring of hospital bed   - Olanzapine 2.5 mg PO or IM q6h PRN agitation  3. Psychiatric:  - Valproic acid sprinkle 375mg PO BID for mood stabilization  - Haloperidol 1 mg PO BID for agitation  - Melatonin 3 mg PRN for insomnia  4. Group/Milieu therapy: encourage participation as able  5. Medical:   - Hx of vertebral fractures with ambulatory dysfunction: no acute surgical intervention, patient requires hospital bed, PRN tylenol, lidocaine patch, hot packs  - HTN: metoprolol XL 50 mg daily  - Constipation: miralax and senna daily, PRN bisacodyl q12h  - Newly noted mole: consistent with sebhorreic keratosis  - Dry eyes: Artificial tears 1 drop both eyes BID  6. Collateral/Dispo: refuses consent to speak to family at this time.   Dinorah Hogan is an 88 year old female, , domiciled with daughter with PPHx of bipolar I disorder, 1 previous inpatient psychiatric hospitalization in 1970s, no known history of SI/SA and PMHx of asthma, GERD, sciatica with previous vertebral fracture who is admitted on 9.27 status for symptoms of hortencia including irritability, loud speech, and agitation.     DDx: hortencia vs hypomania vs comorbid delirium    Today, patient is angry about her current admission and denying any psychiatric history. She is asking to be discharged immediately and yelling at treatment team. She is noted to have loud and rapid speech, but is able to be interrupted. She states she has been sleeping poorly due to hip and R knee pain. She denies S/IIP, H/IIP, AVH, paranoia, or ideas of reference. She is AOx1-2 (person, place--hospital), but unable to state year or location in [place]. She requires 9.27 inpatient admission for evaluation and management of hortencia.    1/22: less irritable but still requiring PRN med, more compliant with meds, no SI/HI.  1/23: Accepted meds yd and this AM though required PRN yd. Less irritable, appropriately conversant, making needs known. Suspicious bordering on paranoid, however no fully formed delusional thought content apparent.  1/24: Irritable and labile, suspicious regarding staff members. Discussed medication, pt will be offered psychiatric meds and is aware she has right to refuse.  1/25: Hostile, irritable, labile. TOO application in progress given poor insight and intermittent refusal of psychiatric mediations. Pt at risk for deconditioning given refusal to ambulate and minimal engagement with PT, given ambulatory at home refusal here appears to be behavioral, will continue to encourage participation. Refused cognitive evaluation today. Given ongoing intermittent agitation, paranoia, physical deconditioning, and poor insight into condition, pt requires continued inpatient hospitalization for stabilization and safety.    Plan:  1. Legal: continue 9.27 involuntary admission. TOO application in progress. Pt submitted 9.31.  2. Safety:   - Requires 1:1 CO for fall risk and monitoring of hospital bed   - Olanzapine 2.5 mg PO or IM q6h PRN agitation  3. Psychiatric:  - Valproic acid sprinkle 375mg PO BID for mood stabilization  - Haloperidol 1 mg PO BID for agitation  - Melatonin 3 mg PRN for insomnia  4. Group/Milieu therapy: encourage participation as able  5. Medical:   - Hx of vertebral fractures with ambulatory dysfunction: no acute surgical intervention, patient requires hospital bed, PRN tylenol, lidocaine patch, hot packs  - HTN: metoprolol XL 50 mg daily  - Constipation: miralax and senna daily, PRN bisacodyl q12h  - Newly noted mole: consistent with sebhorreic keratosis  - Dry eyes: Artificial tears 1 drop both eyes BID  6. Collateral/Dispo: refuses consent to speak to family at this time.

## 2023-01-25 NOTE — BH INPATIENT PSYCHIATRY PROGRESS NOTE - MSE UNSTRUCTURED FT
Appearance: The patient appears stated age, fair hygiene and dressed appropriately in hospital attire.    Behavior: Not cooperative with interview, at times hostile and shouting, maintained appropriate eye contact.    Motor: No psychomotor agitation or slowing noted. Patient states she is unable to ambulate independently, although uses walker at home.  Speech: The patient's speech was fluent but with loud volume, rapid tone, and angry tone. Speech was pressured   Mood: The patient's mood is "terrible"    Affect: irritable, labile, and mood congruent  Thought process: Overall linear    Thought content: suspicious  Perception: Denies auditory or visual hallucinations  Cognition: A&O x 3. Good attention, able to answer questions appropriately. Refused MMSE & MoCA 1/25.  SI/HI: Denies any suicidal or homicidal ideation, intent, or plan.    Insight is poor. Judgment is fair. Impulse control is impaired.

## 2023-01-26 PROCEDURE — 99232 SBSQ HOSP IP/OBS MODERATE 35: CPT | Mod: GC

## 2023-01-26 RX ORDER — HALOPERIDOL DECANOATE 100 MG/ML
1 INJECTION INTRAMUSCULAR ONCE
Refills: 0 | Status: COMPLETED | OUTPATIENT
Start: 2023-01-26 | End: 2023-01-26

## 2023-01-26 RX ORDER — BENZOCAINE AND MENTHOL 5; 1 G/100ML; G/100ML
1 LIQUID ORAL
Refills: 0 | Status: DISCONTINUED | OUTPATIENT
Start: 2023-01-26 | End: 2023-02-21

## 2023-01-26 RX ADMIN — POLYETHYLENE GLYCOL 3350 17 GRAM(S): 17 POWDER, FOR SOLUTION ORAL at 10:00

## 2023-01-26 RX ADMIN — HALOPERIDOL DECANOATE 1 MILLIGRAM(S): 100 INJECTION INTRAMUSCULAR at 15:00

## 2023-01-26 RX ADMIN — Medication 50 MILLIGRAM(S): at 10:00

## 2023-01-26 RX ADMIN — Medication 3 MILLIGRAM(S): at 20:30

## 2023-01-26 RX ADMIN — Medication 650 MILLIGRAM(S): at 20:29

## 2023-01-26 RX ADMIN — LIDOCAINE 1 PATCH: 4 CREAM TOPICAL at 22:03

## 2023-01-26 RX ADMIN — Medication 1 DROP(S): at 10:01

## 2023-01-26 RX ADMIN — HALOPERIDOL DECANOATE 1 MILLIGRAM(S): 100 INJECTION INTRAMUSCULAR at 10:00

## 2023-01-26 RX ADMIN — DIVALPROEX SODIUM 375 MILLIGRAM(S): 500 TABLET, DELAYED RELEASE ORAL at 09:59

## 2023-01-26 RX ADMIN — ENOXAPARIN SODIUM 40 MILLIGRAM(S): 100 INJECTION SUBCUTANEOUS at 10:00

## 2023-01-26 RX ADMIN — SENNA PLUS 2 TABLET(S): 8.6 TABLET ORAL at 20:30

## 2023-01-26 NOTE — BH INPATIENT PSYCHIATRY PROGRESS NOTE - NSBHFUPINTERVALHXFT_PSY_A_CORE
Patient seen for BAD, chart reviewed and discussed with nursing staff. Pt intermittently refusing meds. Currently on CO due to hospital bed requirement. Reports fair sleep and appetite.    INPROGRESS    Pt encountered in common room. Observed during PT, shouting at staff. On initial approach pt states she is too upset from PT and refuses interview. On follow up, pt engages briefly but soon terminates interview. Pt states she is in "in Hell" and is being "punished" by being asked to sit in the day room. Endorses ongoing back pain with minimal relief from lidocaine patch and some relief with hot packs. Denies any hallucination, SI, HI. Writer asked patient to participate in MoCA evaluation today, resulting in pt becoming agitated and stating "No tests! I've taken tests all my life and I'm not doing any more." When writer requested pt participate in abbreviated mini mental status instead, pt states "you're trying to kill me!" and terminates interview. Patient seen for BAD, chart reviewed and discussed with nursing staff. Pt intermittently refusing meds. Currently on CO due to hospital bed requirement. Reports fair sleep and appetite.    Pt encountered in common room seated at table. She endorses "terrible" mood and states that she is being "dominated, subjugated" and alleges that staff member stated to her "If you don't obey we're going to screw you over real good" and feels that she is being ridiculed by staff. States that one nurse "controls the robots," and clarifies that she means that this staff member is making other staff members treat the patient poorly. Per discussion with staff, these allegations have not been observed on the unit.    Pt endorses poor sleep and appetite, stating that she does not like the food. Refusing to ambulate.     Denies any hallucination, SI, HI. Refused MoCA & MMSE again today. Continues to refuse consent to speak with family.    Writer reviewed medications with patient again today. Pt states she refuses any psychiatric medications. Writer reinforced that psychiatric medications will be offered and patient has right to refuse. Attempted to discuss risks, benefits, and indications, however patient refuses to engage in conversation and shouts over writer. Encouraged patient to ask what medications she is receiving if she is not told proactively. Patient expressed understanding and reiterated refusal of medications.

## 2023-01-26 NOTE — BH INPATIENT PSYCHIATRY PROGRESS NOTE - PRN MEDS
MEDICATIONS  (PRN):  acetaminophen     Tablet .. 650 milliGRAM(s) Oral every 6 hours PRN Mild Pain (1 - 3), Moderate Pain (4 - 6), Severe Pain (7 - 10)  albuterol    90 MICROgram(s) HFA Inhaler 2 Puff(s) Inhalation every 6 hours PRN Shortness of Breath and/or Wheezing  aluminum hydroxide/magnesium hydroxide/simethicone Suspension 30 milliLiter(s) Oral every 4 hours PRN Dyspepsia  benzocaine 15 mG/menthol 3.6 mG Lozenge 1 Lozenge Oral every 2 hours PRN sore throat  bisacodyl 5 milliGRAM(s) Oral every 12 hours PRN Constipation  haloperidol     Tablet 1 milliGRAM(s) Oral every 6 hours PRN agitation  haloperidol    Injectable 1 milliGRAM(s) IntraMuscular once PRN agitation  melatonin. 3 milliGRAM(s) Oral at bedtime PRN Insomnia

## 2023-01-26 NOTE — BH INPATIENT PSYCHIATRY PROGRESS NOTE - NSBHCHARTREVIEWVS_PSY_A_CORE FT
Vital Signs Last 24 Hrs  T(C): --  T(F): --  HR: 71 (01-26-23 @ 09:37) (71 - 71)  BP: 150/78 (01-26-23 @ 09:37) (150/78 - 150/78)  BP(mean): --  RR: --  SpO2: --

## 2023-01-26 NOTE — BH INPATIENT PSYCHIATRY PROGRESS NOTE - NSBHASSESSSUMMFT_PSY_ALL_CORE

## 2023-01-26 NOTE — BH INPATIENT PSYCHIATRY PROGRESS NOTE - MSE UNSTRUCTURED FT
Appearance: The patient appears stated age, fair hygiene and dressed appropriately in hospital attire.    Behavior: Not cooperative with interview, at times hostile and shouting, maintained appropriate eye contact.    Motor: No psychomotor agitation or slowing noted. Patient states she is unable to ambulate independently, although uses walker at home.  Speech: The patient's speech was fluent but with loud volume, rapid tone, and angry tone. Speech was pressured   Mood: The patient's mood is "terrible"    Affect: irritable, labile, and mood congruent  Thought process: Overall linear    Thought content: suspicious  Perception: Denies auditory or visual hallucinations  Cognition: A&O x 3. Good attention, able to answer questions appropriately. Refused MMSE & MoCA 1/25.  SI/HI: Denies any suicidal or homicidal ideation, intent, or plan.    Insight is poor. Judgment is fair. Impulse control is impaired. Appearance: The patient appears stated age, fair hygiene and dressed appropriately in hospital attire.    Behavior: Not cooperative with interview, at times hostile and shouting, maintained appropriate eye contact.    Motor: No psychomotor agitation or slowing noted. Patient states she is unable to ambulate independently, although uses walker at home.  Speech: The patient's speech was fluent but with loud volume, rapid tone, and angry tone. Speech was pressured   Mood: The patient's mood is "terrible"    Affect: irritable, labile, mood congruent  Thought process: Overall linear    Thought content: paranoia  Perception: Denies auditory or visual hallucinations  Cognition: A&O x 3. Good attention, able to answer questions appropriately. Refused MMSE & MoCA 1/25 & 1/26.  SI/HI: Denies any suicidal or homicidal ideation, intent, or plan.    Insight is poor. Judgment is fair. Impulse control is impaired.

## 2023-01-26 NOTE — BH INPATIENT PSYCHIATRY PROGRESS NOTE - CURRENT MEDICATION
MEDICATIONS  (STANDING):  artificial  tears Solution 1 Drop(s) Both EYES two times a day  divalproex Sprinkle 375 milliGRAM(s) Oral two times a day  enoxaparin Injectable 40 milliGRAM(s) SubCutaneous <User Schedule>  haloperidol     Tablet 1 milliGRAM(s) Oral two times a day  lidocaine   4% Patch 1 Patch Transdermal every 24 hours  metoprolol succinate ER 50 milliGRAM(s) Oral daily  polyethylene glycol 3350 17 Gram(s) Oral daily  senna 2 Tablet(s) Oral at bedtime    MEDICATIONS  (PRN):  acetaminophen     Tablet .. 650 milliGRAM(s) Oral every 6 hours PRN Mild Pain (1 - 3), Moderate Pain (4 - 6), Severe Pain (7 - 10)  albuterol    90 MICROgram(s) HFA Inhaler 2 Puff(s) Inhalation every 6 hours PRN Shortness of Breath and/or Wheezing  aluminum hydroxide/magnesium hydroxide/simethicone Suspension 30 milliLiter(s) Oral every 4 hours PRN Dyspepsia  benzocaine 15 mG/menthol 3.6 mG Lozenge 1 Lozenge Oral every 2 hours PRN sore throat  bisacodyl 5 milliGRAM(s) Oral every 12 hours PRN Constipation  haloperidol     Tablet 1 milliGRAM(s) Oral every 6 hours PRN agitation  haloperidol    Injectable 1 milliGRAM(s) IntraMuscular once PRN agitation  melatonin. 3 milliGRAM(s) Oral at bedtime PRN Insomnia

## 2023-01-27 PROCEDURE — 99231 SBSQ HOSP IP/OBS SF/LOW 25: CPT

## 2023-01-27 RX ADMIN — Medication 3 MILLIGRAM(S): at 21:50

## 2023-01-27 RX ADMIN — SENNA PLUS 2 TABLET(S): 8.6 TABLET ORAL at 21:49

## 2023-01-27 RX ADMIN — LIDOCAINE 1 PATCH: 4 CREAM TOPICAL at 23:13

## 2023-01-27 RX ADMIN — LIDOCAINE 1 PATCH: 4 CREAM TOPICAL at 06:27

## 2023-01-27 RX ADMIN — Medication 650 MILLIGRAM(S): at 21:49

## 2023-01-27 RX ADMIN — Medication 50 MILLIGRAM(S): at 10:13

## 2023-01-27 RX ADMIN — Medication 650 MILLIGRAM(S): at 21:59

## 2023-01-27 NOTE — BH INPATIENT PSYCHIATRY PROGRESS NOTE - MSE UNSTRUCTURED FT
Appearance: The patient appears stated age, fair hygiene and dressed appropriately in hospital attire.    Behavior: Not cooperative with interview, at times hostile and shouting, maintained appropriate eye contact.    Motor: No psychomotor agitation or slowing noted. Patient states she is unable to ambulate independently, although uses walker at home.  Speech: The patient's speech was fluent but with loud volume, rapid tone, and angry tone. Speech was pressured   Mood: The patient's mood is "terrible"    Affect: irritable, labile, mood congruent  Thought process: Overall linear    Thought content: paranoia, feels staff intentionally tried to harm herself as well as peers.   Perception: Denies auditory or visual hallucinations  Cognition: Approx oriented, forgetful. Fair attention. Refused MMSE & MoCA 1/25 & 1/26.  SI/HI: Denies any suicidal or homicidal ideation, intent, or plan.    Insight is poor. Judgment is fair. Impulse control is impaired.

## 2023-01-27 NOTE — BH INPATIENT PSYCHIATRY PROGRESS NOTE - NSBHFUPINTERVALHXFT_PSY_A_CORE
Patient seen for BAD, chart reviewed and discussed with nursing staff. Pt intermittently refusing meds. Currently on CO due to hospital bed requirement. Reports fair sleep and appetite.    Pt encountered in common room seated at table. She endorses "terrible" mood and states that she is being "dominated, subjugated" and alleges that staff member stated to her "If you don't obey we're going to screw you over real good" and feels that she is being ridiculed by staff. States that one nurse "controls the robots," and clarifies that she means that this staff member is making other staff members treat the patient poorly. Per discussion with staff, these allegations have not been observed on the unit.    Pt endorses poor sleep and appetite, stating that she does not like the food. Refusing to ambulate.     Denies any hallucination, SI, HI. Refused MoCA & MMSE again today. Continues to refuse consent to speak with family.    Writer reviewed medications with patient again today. Pt states she refuses any psychiatric medications. Writer reinforced that psychiatric medications will be offered and patient has right to refuse. Attempted to discuss risks, benefits, and indications, however patient refuses to engage in conversation and shouts over writer. Encouraged patient to ask what medications she is receiving if she is not told proactively. Patient expressed understanding and reiterated refusal of medications.  1/27 Patient seen for BAD with superimposed dementia. Patient cont loud disruptive, fair sleep and appetite, refusing psych meds most of time. VSS. Very resitive to PT though doing some ambulation

## 2023-01-27 NOTE — BH INPATIENT PSYCHIATRY PROGRESS NOTE - NSBHCHARTREVIEWVS_PSY_A_CORE FT
Vital Signs Last 24 Hrs  T(C): 36.4 (01-27-23 @ 08:30), Max: 36.4 (01-27-23 @ 08:30)  T(F): 97.5 (01-27-23 @ 08:30), Max: 97.5 (01-27-23 @ 08:30)  HR: --  BP: 160/72 (01-27-23 @ 08:30) (160/72 - 160/72)  BP(mean): 70 (01-27-23 @ 08:30) (70 - 70)  RR: --  SpO2: --

## 2023-01-27 NOTE — BH INPATIENT PSYCHIATRY PROGRESS NOTE - NSBHASSESSSUMMFT_PSY_ALL_CORE
Dinorah Hogan is an 88 year old female, , domiciled with daughter with PPHx of bipolar I disorder, 1 previous inpatient psychiatric hospitalization in 1970s, no known history of SI/SA and PMHx of asthma, GERD, sciatica with previous vertebral fracture who is admitted on 9.27 status for symptoms of hortencia including irritability, loud speech, and agitation.     DDx: hortencia vs hypomania vs comorbid delirium    Today, patient is angry about her current admission and denying any psychiatric history. She is asking to be discharged immediately and yelling at treatment team. She is noted to have loud and rapid speech, but is able to be interrupted. She states she has been sleeping poorly due to hip and R knee pain. She denies S/IIP, H/IIP, AVH, paranoia, or ideas of reference. She is AOx1-2 (person, place--hospital), but unable to state year or location in [place]. She requires 9.27 inpatient admission for evaluation and management of hortencia.    1/22: less irritable but still requiring PRN med, more compliant with meds, no SI/HI.  1/23: Accepted meds yd and this AM though required PRN yd. Less irritable, appropriately conversant, making needs known. Suspicious bordering on paranoid, however no fully formed delusional thought content apparent.  1/24: Irritable and labile, suspicious regarding staff members. Discussed medication, pt will be offered psychiatric meds and is aware she has right to refuse.  1/25: Hostile, irritable, labile. TOO application in progress given poor insight and intermittent refusal of psychiatric mediations. Pt at risk for deconditioning given refusal to ambulate and minimal engagement with PT, given ambulatory at home refusal here appears to be behavioral, will continue to encourage participation. Refused cognitive evaluation today. Given ongoing intermittent agitation, paranoia, physical deconditioning, and poor insight into condition, pt requires continued inpatient hospitalization for stabilization and safety.  1/26: Pt remains labile, irritable, and paranoid toward staff. Although pt makes provocative statements (e.g. "she controls the robots," "the Jews will hate me"), these statements appear to represent intense feeling rather than fully formed delusion. Continues to refuse cognitive evaluation or consent to contact family. Given ongoing intermittent agitation, paranoia, physical deconditioning, and poor insight into condition, pt requires continued inpatient hospitalization for stabilization and safety.  1/27 Patient agitated, markedly irritable,paranoid  with themes of perrvasive mistreatment, being singled out but w/u well formed , fixed delusional ideas.Cont enocurage med compliance scheduled for court hearing for TOO    Plan:  1. Legal: continue 9.27 involuntary admission. TOO application in progress. Pt submitted 9.31.  2. Safety:   - Requires 1:1 CO for fall risk and monitoring of hospital bed   - Olanzapine 2.5 mg PO or IM q6h PRN agitation  3. Psychiatric:  - Valproic acid sprinkle 375mg PO BID for mood stabilization  - Haloperidol 1 mg PO BID for agitation  - Melatonin 3 mg PRN for insomnia  4. Group/Milieu therapy: encourage participation as able  5. Medical:   - Hx of vertebral fractures with ambulatory dysfunction: no acute surgical intervention, patient requires hospital bed, PRN tylenol, lidocaine patch, hot packs  - HTN: metoprolol XL 50 mg daily  - Constipation: miralax and senna daily, PRN bisacodyl q12h  - Newly noted mole: consistent with sebhorreic keratosis  - Dry eyes: Artificial tears 1 drop both eyes BID  6. Collateral/Dispo: refuses consent to speak to family at this time.

## 2023-01-28 PROCEDURE — 99231 SBSQ HOSP IP/OBS SF/LOW 25: CPT

## 2023-01-28 RX ADMIN — LIDOCAINE 1 PATCH: 4 CREAM TOPICAL at 07:58

## 2023-01-28 RX ADMIN — SENNA PLUS 2 TABLET(S): 8.6 TABLET ORAL at 21:52

## 2023-01-28 RX ADMIN — Medication 650 MILLIGRAM(S): at 03:19

## 2023-01-28 RX ADMIN — HALOPERIDOL DECANOATE 1 MILLIGRAM(S): 100 INJECTION INTRAMUSCULAR at 21:52

## 2023-01-28 RX ADMIN — LIDOCAINE 1 PATCH: 4 CREAM TOPICAL at 18:06

## 2023-01-28 RX ADMIN — DIVALPROEX SODIUM 375 MILLIGRAM(S): 500 TABLET, DELAYED RELEASE ORAL at 21:52

## 2023-01-28 NOTE — BH INPATIENT PSYCHIATRY PROGRESS NOTE - NSBHFUPINTERVALHXFT_PSY_A_CORE
Patient seen for BAD, chart reviewed and discussed with nursing staff. Pt intermittently refusing meds. Currently on CO due to hospital bed requirement. Reports fair sleep and appetite. Seen at bedside during rounds. Continues to refuse most medications, took senna and tylenol last night. Reporting back pain "15/10" and requesting more heat packs "I will die without these", however under no visible distress.

## 2023-01-28 NOTE — BH INPATIENT PSYCHIATRY PROGRESS NOTE - NSBHASSESSSUMMFT_PSY_ALL_CORE
Dinorah Hogan is an 88 year old female, , domiciled with daughter with PPHx of bipolar I disorder, 1 previous inpatient psychiatric hospitalization in 1970s, no known history of SI/SA and PMHx of asthma, GERD, sciatica with previous vertebral fracture who is admitted on 9.27 status for symptoms of hortencia including irritability, loud speech, and agitation.     DDx: ohrtencia vs hypomania vs comorbid delirium    Today, patient is angry about her current admission and denying any psychiatric history. She is asking to be discharged immediately and yelling at treatment team. She is noted to have loud and rapid speech, but is able to be interrupted. She states she has been sleeping poorly due to hip and R knee pain. She denies S/IIP, H/IIP, AVH, paranoia, or ideas of reference. She is AOx1-2 (person, place--hospital), but unable to state year or location in [place]. She requires 9.27 inpatient admission for evaluation and management of hortencia.    1/22: less irritable but still requiring PRN med, more compliant with meds, no SI/HI.  1/23: Accepted meds yd and this AM though required PRN yd. Less irritable, appropriately conversant, making needs known. Suspicious bordering on paranoid, however no fully formed delusional thought content apparent.  1/24: Irritable and labile, suspicious regarding staff members. Discussed medication, pt will be offered psychiatric meds and is aware she has right to refuse.  1/25: Hostile, irritable, labile. TOO application in progress given poor insight and intermittent refusal of psychiatric mediations. Pt at risk for deconditioning given refusal to ambulate and minimal engagement with PT, given ambulatory at home refusal here appears to be behavioral, will continue to encourage participation. Refused cognitive evaluation today. Given ongoing intermittent agitation, paranoia, physical deconditioning, and poor insight into condition, pt requires continued inpatient hospitalization for stabilization and safety.  1/26: Pt remains labile, irritable, and paranoid toward staff. Although pt makes provocative statements (e.g. "she controls the robots," "the Jews will hate me"), these statements appear to represent intense feeling rather than fully formed delusion. Continues to refuse cognitive evaluation or consent to contact family. Given ongoing intermittent agitation, paranoia, physical deconditioning, and poor insight into condition, pt requires continued inpatient hospitalization for stabilization and safety.  1/27 Patient agitated, markedly irritable,paranoid  with themes of perrvasive mistreatment, being singled out but w/u well formed , fixed delusional ideas.Cont enocurage med compliance scheduled for court hearing for TOO  1/28: Currently on CO due to hospital bed requirement. Reports fair sleep and appetite. Seen at bedside during rounds. Continues to refuse most medications, took senna and tylenol last night. Reporting back pain "15/10" and requesting more heat packs "I will die without these", however under no visible distress. Primary team pursuing TOO. Renewed CO.     Plan:  1. Legal: continue 9.27 involuntary admission. TOO application in progress. Pt submitted 9.31.  2. Safety:   - Requires 1:1 CO for fall risk and monitoring of hospital bed   - Olanzapine 2.5 mg PO or IM q6h PRN agitation  3. Psychiatric:  - Valproic acid sprinkle 375mg PO BID for mood stabilization  - Haloperidol 1 mg PO BID for agitation  - Melatonin 3 mg PRN for insomnia  4. Group/Milieu therapy: encourage participation as able  5. Medical:   - Hx of vertebral fractures with ambulatory dysfunction: no acute surgical intervention, patient requires hospital bed, PRN tylenol, lidocaine patch, hot packs  - HTN: metoprolol XL 50 mg daily  - Constipation: miralax and senna daily, PRN bisacodyl q12h  - Newly noted mole: consistent with sebhorreic keratosis  - Dry eyes: Artificial tears 1 drop both eyes BID  6. Collateral/Dispo: refuses consent to speak to family at this time.

## 2023-01-29 PROCEDURE — 99231 SBSQ HOSP IP/OBS SF/LOW 25: CPT

## 2023-01-29 NOTE — BH INPATIENT PSYCHIATRY PROGRESS NOTE - NSBHFUPINTERVALHXFT_PSY_A_CORE
Patient seen for BAD, chart reviewed and discussed with nursing staff. Pt intermittently refusing meds. Currently on CO due to hospital bed requirement. Reports fair sleep and appetite. Seen at bedside during rounds. Continues to refuse most medications, took senna, depakote and haldol last night. Requesting multiple heat packs to use and to have a stash as well. Writer provided 1 heat pack and encouraged her to let her CO staff know if she needs more later. No other complaints.

## 2023-01-29 NOTE — BH INPATIENT PSYCHIATRY PROGRESS NOTE - NSBHASSESSSUMMFT_PSY_ALL_CORE
Dinorah Hogan is an 88 year old female, , domiciled with daughter with PPHx of bipolar I disorder, 1 previous inpatient psychiatric hospitalization in 1970s, no known history of SI/SA and PMHx of asthma, GERD, sciatica with previous vertebral fracture who is admitted on 9.27 status for symptoms of hortencia including irritability, loud speech, and agitation.     DDx: hortencia vs hypomania vs comorbid delirium    Today, patient is angry about her current admission and denying any psychiatric history. She is asking to be discharged immediately and yelling at treatment team. She is noted to have loud and rapid speech, but is able to be interrupted. She states she has been sleeping poorly due to hip and R knee pain. She denies S/IIP, H/IIP, AVH, paranoia, or ideas of reference. She is AOx1-2 (person, place--hospital), but unable to state year or location in [place]. She requires 9.27 inpatient admission for evaluation and management of hortencia.    1/22: less irritable but still requiring PRN med, more compliant with meds, no SI/HI.  1/23: Accepted meds yd and this AM though required PRN yd. Less irritable, appropriately conversant, making needs known. Suspicious bordering on paranoid, however no fully formed delusional thought content apparent.  1/24: Irritable and labile, suspicious regarding staff members. Discussed medication, pt will be offered psychiatric meds and is aware she has right to refuse.  1/25: Hostile, irritable, labile. TOO application in progress given poor insight and intermittent refusal of psychiatric mediations. Pt at risk for deconditioning given refusal to ambulate and minimal engagement with PT, given ambulatory at home refusal here appears to be behavioral, will continue to encourage participation. Refused cognitive evaluation today. Given ongoing intermittent agitation, paranoia, physical deconditioning, and poor insight into condition, pt requires continued inpatient hospitalization for stabilization and safety.  1/26: Pt remains labile, irritable, and paranoid toward staff. Although pt makes provocative statements (e.g. "she controls the robots," "the Jews will hate me"), these statements appear to represent intense feeling rather than fully formed delusion. Continues to refuse cognitive evaluation or consent to contact family. Given ongoing intermittent agitation, paranoia, physical deconditioning, and poor insight into condition, pt requires continued inpatient hospitalization for stabilization and safety.  1/27 Patient agitated, markedly irritable,paranoid  with themes of perrvasive mistreatment, being singled out but w/u well formed , fixed delusional ideas.Cont enocurage med compliance scheduled for court hearing for TOO  1/28: Currently on CO due to hospital bed requirement. Reports fair sleep and appetite. Seen at bedside during rounds. Continues to refuse most medications, took senna and tylenol last night. Reporting back pain "15/10" and requesting more heat packs "I will die without these", however under no visible distress. Primary team pursuing TOO. Renewed CO.   1/29: Renewed CO. Last night took Depakote, Haldol and Senna. Med compliance has been partial.   Plan:  1. Legal: continue 9.27 involuntary admission. TOO application in progress. Pt submitted 9.31.  2. Safety:   - Requires 1:1 CO for fall risk and monitoring of hospital bed   - Olanzapine 2.5 mg PO or IM q6h PRN agitation  3. Psychiatric:  - Valproic acid sprinkle 375mg PO BID for mood stabilization  - Haloperidol 1 mg PO BID for agitation  - Melatonin 3 mg PRN for insomnia  4. Group/Milieu therapy: encourage participation as able  5. Medical:   - Hx of vertebral fractures with ambulatory dysfunction: no acute surgical intervention, patient requires hospital bed, PRN tylenol, lidocaine patch, hot packs  - HTN: metoprolol XL 50 mg daily  - Constipation: miralax and senna daily, PRN bisacodyl q12h  - Newly noted mole: consistent with sebhorreic keratosis  - Dry eyes: Artificial tears 1 drop both eyes BID  6. Collateral/Dispo: refuses consent to speak to family at this time.

## 2023-01-29 NOTE — BH INPATIENT PSYCHIATRY PROGRESS NOTE - NSBHCHARTREVIEWVS_PSY_A_CORE FT
Vital Signs Last 24 Hrs  T(C): 36.8 (01-29-23 @ 07:53), Max: 36.8 (01-29-23 @ 07:53)  T(F): 98.2 (01-29-23 @ 07:53), Max: 98.2 (01-29-23 @ 07:53)  HR: --  BP: --  BP(mean): --  RR: --  SpO2: --    Orthostatic VS  01-29-23 @ 07:53  Lying BP: --/-- HR: --  Sitting BP: 142/75 HR: 77  Standing BP: 153/77 HR: 76  Site: --  Mode: --

## 2023-01-30 PROCEDURE — 99231 SBSQ HOSP IP/OBS SF/LOW 25: CPT | Mod: GC

## 2023-01-30 RX ORDER — HALOPERIDOL DECANOATE 100 MG/ML
1 INJECTION INTRAMUSCULAR ONCE
Refills: 0 | Status: COMPLETED | OUTPATIENT
Start: 2023-01-30 | End: 2023-01-30

## 2023-01-30 RX ADMIN — Medication 50 MILLIGRAM(S): at 10:03

## 2023-01-30 RX ADMIN — HALOPERIDOL DECANOATE 1 MILLIGRAM(S): 100 INJECTION INTRAMUSCULAR at 21:06

## 2023-01-30 RX ADMIN — LIDOCAINE 1 PATCH: 4 CREAM TOPICAL at 23:55

## 2023-01-30 NOTE — BH INPATIENT PSYCHIATRY PROGRESS NOTE - MSE UNSTRUCTURED FT
Appearance: The patient appears stated age, fair hygiene and dressed appropriately in hospital attire.    Behavior: Not cooperative with interview, at times hostile and shouting, maintained appropriate eye contact.    Motor: No psychomotor agitation or slowing noted, no abnormal movements. Patient states she is unable to ambulate independently, although uses walker at home.  Speech: The patient's speech was fluent but with loud volume, rapid tone, and angry tone. Speech was pressured   Mood: The patient's mood is "terrible"    Affect: irritable, labile, mood congruent  Thought process: Overall linear    Thought content: paranoia, feels staff intentionally tried to harm herself as well as peers.   Perception: Denies auditory or visual hallucinations  Cognition: Approx oriented, forgetful. Fair attention. Refused MMSE & MoCA 1/25 & 1/26, 1/30.  SI/HI: Denies any suicidal or homicidal ideation, intent, or plan.    Insight is poor. Judgment is fair. Impulse control is impaired.

## 2023-01-30 NOTE — BH INPATIENT PSYCHIATRY PROGRESS NOTE - NSBHFUPINTERVALHXFT_PSY_A_CORE
Patient seen for BAD, chart reviewed and discussed with nursing staff. Pt refused all medications yesterday morning and evening, refused all medications except metoprolol this morning. Currently on CO due to hospital bed requirement. Reports fair sleep and appetite. Seen at bedside during rounds.  She is hostile toward writer, making accusations that writer wishes ill toward the patient, wants patient to die, and only values money. Pt refused assessment of orientation or MMSE. Refused to answer questions about safety or perceptual changes. When asked if anyone on staff is actively attempting to harm her, patient says yes but refuses to share details or state who. Pt terminated interview, stating she refused to talk to writer.

## 2023-01-30 NOTE — BH INPATIENT PSYCHIATRY PROGRESS NOTE - NSBHCHARTREVIEWVS_PSY_A_CORE FT
Vital Signs Last 24 Hrs  T(C): 37.1 (01-30-23 @ 05:48), Max: 37.1 (01-30-23 @ 05:48)  T(F): 98.7 (01-30-23 @ 05:48), Max: 98.7 (01-30-23 @ 05:48)  HR: 95 (01-30-23 @ 05:48) (95 - 95)  BP: 160/72 (01-30-23 @ 05:48) (160/72 - 160/72)  BP(mean): --  RR: 19 (01-30-23 @ 05:48) (19 - 19)  SpO2: --    Orthostatic VS  01-29-23 @ 07:53  Lying BP: --/-- HR: --  Sitting BP: 142/75 HR: 77  Standing BP: 153/77 HR: 76  Site: --  Mode: --

## 2023-01-30 NOTE — BH INPATIENT PSYCHIATRY PROGRESS NOTE - NSBHASSESSSUMMFT_PSY_ALL_CORE
Dinorah Hogan is an 88 year old female, , domiciled with daughter with PPHx of bipolar I disorder, 1 previous inpatient psychiatric hospitalization in 1970s, no known history of SI/SA and PMHx of asthma, GERD, sciatica with previous vertebral fracture who is admitted on 9.27 status for symptoms of hortencia including irritability, loud speech, and agitation.     DDx: hortencia vs hypomania vs comorbid delirium    Today, patient is angry about her current admission and denying any psychiatric history. She is asking to be discharged immediately and yelling at treatment team. She is noted to have loud and rapid speech, but is able to be interrupted. She states she has been sleeping poorly due to hip and R knee pain. She denies S/IIP, H/IIP, AVH, paranoia, or ideas of reference. She is AOx1-2 (person, place--hospital), but unable to state year or location in [place]. She requires 9.27 inpatient admission for evaluation and management of hortencia.    1/22: less irritable but still requiring PRN med, more compliant with meds, no SI/HI.  1/23: Accepted meds yd and this AM though required PRN yd. Less irritable, appropriately conversant, making needs known. Suspicious bordering on paranoid, however no fully formed delusional thought content apparent.  1/24: Irritable and labile, suspicious regarding staff members. Discussed medication, pt will be offered psychiatric meds and is aware she has right to refuse.  1/25: Hostile, irritable, labile. TOO application in progress given poor insight and intermittent refusal of psychiatric mediations. Pt at risk for deconditioning given refusal to ambulate and minimal engagement with PT, given ambulatory at home refusal here appears to be behavioral, will continue to encourage participation. Refused cognitive evaluation today. Given ongoing intermittent agitation, paranoia, physical deconditioning, and poor insight into condition, pt requires continued inpatient hospitalization for stabilization and safety.  1/26: Pt remains labile, irritable, and paranoid toward staff. Although pt makes provocative statements (e.g. "she controls the robots," "the Jews will hate me"), these statements appear to represent intense feeling rather than fully formed delusion. Continues to refuse cognitive evaluation or consent to contact family. Given ongoing intermittent agitation, paranoia, physical deconditioning, and poor insight into condition, pt requires continued inpatient hospitalization for stabilization and safety.  1/27 Patient agitated, markedly irritable,paranoid  with themes of perrvasive mistreatment, being singled out but w/u well formed , fixed delusional ideas.Cont enocurage med compliance scheduled for court hearing for TOO  1/28: Currently on CO due to hospital bed requirement. Reports fair sleep and appetite. Seen at bedside during rounds. Continues to refuse most medications, took senna and tylenol last night. Reporting back pain "15/10" and requesting more heat packs "I will die without these", however under no visible distress. Primary team pursuing TOO. Renewed CO.   1/29: Renewed CO. Last night took Depakote, Haldol and Senna. Med compliance has been partial.   1/30: Refused all meds yd, this AM accepted metoprolol. Making accusations that people are attempting to harm her and want her to die. Impulse control is impaired.    Plan:  1. Legal: continue 9.27 involuntary admission. TOO application in progress. Pt submitted 9.31.  2. Safety:   - Requires 1:1 CO for fall risk and monitoring of hospital bed   - Olanzapine 2.5 mg PO or IM q6h PRN agitation  3. Psychiatric:  - Valproic acid sprinkle 375mg PO BID for mood stabilization  - Haloperidol 1 mg PO BID for agitation  - Melatonin 3 mg PRN for insomnia  4. Group/Milieu therapy: encourage participation as able  5. Medical:   - Hx of vertebral fractures with ambulatory dysfunction: no acute surgical intervention, patient requires hospital bed, PRN tylenol, lidocaine patch, hot packs  - HTN: metoprolol XL 50 mg daily  - Constipation: miralax and senna daily, PRN bisacodyl q12h  - Newly noted mole: consistent with sebhorreic keratosis  - Dry eyes: Artificial tears 1 drop both eyes BID  6. Collateral/Dispo: refuses consent to speak to family at this time.

## 2023-01-31 PROCEDURE — 99231 SBSQ HOSP IP/OBS SF/LOW 25: CPT | Mod: GC

## 2023-01-31 RX ORDER — HALOPERIDOL DECANOATE 100 MG/ML
1 INJECTION INTRAMUSCULAR ONCE
Refills: 0 | Status: COMPLETED | OUTPATIENT
Start: 2023-01-31 | End: 2023-02-01

## 2023-01-31 RX ORDER — ASPIRIN/CALCIUM CARB/MAGNESIUM 324 MG
81 TABLET ORAL DAILY
Refills: 0 | Status: DISCONTINUED | OUTPATIENT
Start: 2023-02-01 | End: 2023-03-23

## 2023-01-31 RX ORDER — ASPIRIN/CALCIUM CARB/MAGNESIUM 324 MG
81 TABLET ORAL ONCE
Refills: 0 | Status: COMPLETED | OUTPATIENT
Start: 2023-01-31 | End: 2023-01-31

## 2023-01-31 RX ADMIN — HALOPERIDOL DECANOATE 1 MILLIGRAM(S): 100 INJECTION INTRAMUSCULAR at 14:47

## 2023-01-31 RX ADMIN — Medication 650 MILLIGRAM(S): at 22:50

## 2023-01-31 RX ADMIN — Medication 3 MILLIGRAM(S): at 22:28

## 2023-01-31 RX ADMIN — LIDOCAINE 1 PATCH: 4 CREAM TOPICAL at 22:27

## 2023-01-31 RX ADMIN — Medication 650 MILLIGRAM(S): at 22:27

## 2023-01-31 NOTE — BH INPATIENT PSYCHIATRY PROGRESS NOTE - NSBHCHARTREVIEWVS_PSY_A_CORE FT
Vital Signs Last 24 Hrs  T(C): 37.3 (01-31-23 @ 07:56), Max: 37.3 (01-31-23 @ 07:56)  T(F): 99.1 (01-31-23 @ 07:56), Max: 99.1 (01-31-23 @ 07:56)  HR: --  BP: --  BP(mean): --  RR: --  SpO2: --    Orthostatic VS  01-31-23 @ 07:56  Lying BP: --/-- HR: --  Sitting BP: 179/69 HR: 75  Standing BP: --/-- HR: --  Site: --  Mode: --

## 2023-01-31 NOTE — BH INPATIENT PSYCHIATRY PROGRESS NOTE - MSE UNSTRUCTURED FT
Appearance: The patient appears stated age, fair hygiene and dressed appropriately in hospital attire.    Behavior: Not cooperative with interview, at times hostile and shouting, maintained appropriate eye contact.    Motor: No psychomotor agitation or slowing noted, no abnormal movements. Patient states she is unable to ambulate independently, although uses walker at home.  Speech: The patient's speech was fluent but with loud volume, rapid tone, and angry tone. Speech was pressured   Mood: The patient's mood is "awful"    Affect: irritable, labile, congruent  Thought process: Overall linear  Thought content: paranoia, feels staff intentionally trying to harm her   Perception: Refuses to answer today, no known h/o AVH  Cognition: Approx oriented, forgetful. Fair attention. Refused MMSE & MoCA 1/25 & 1/26, 1/30.  SI/HI: Refuses to answer today  Insight is poor. Judgment is fair. Impulse control is impaired.

## 2023-01-31 NOTE — BH INPATIENT PSYCHIATRY PROGRESS NOTE - NSBHFUPINTERVALHXFT_PSY_A_CORE
Patient seen for BAD1, chart reviewed and discussed with nursing staff. Pt refused all medications yesterday morning and evening except metoprolol. Required IM Haldol 1mg yd at 21:00 due to agitation, attempting to climb out of hospital bed. Currently on CO due to hospital bed requirement. Reports fair sleep and appetite. Seen at bedside during rounds and observed on the unit. Noted to be screaming, agitated, stating staff is trying to kill her during brief PT session. During interview pt is hostile toward writer and terminates interview quickly, states that writer wants patient to die and only values money. Pt refused assessment of orientation or MMSE again today. Refused to answer questions about safety or perceptual changes. Refuses to engage further.

## 2023-01-31 NOTE — BH INPATIENT PSYCHIATRY PROGRESS NOTE - CURRENT MEDICATION
MEDICATIONS  (STANDING):  artificial  tears Solution 1 Drop(s) Both EYES two times a day  divalproex Sprinkle 375 milliGRAM(s) Oral two times a day  enoxaparin Injectable 40 milliGRAM(s) SubCutaneous <User Schedule>  haloperidol     Tablet 1 milliGRAM(s) Oral two times a day  lidocaine   4% Patch 1 Patch Transdermal every 24 hours  metoprolol succinate ER 50 milliGRAM(s) Oral daily  polyethylene glycol 3350 17 Gram(s) Oral daily  senna 2 Tablet(s) Oral at bedtime    MEDICATIONS  (PRN):  acetaminophen     Tablet .. 650 milliGRAM(s) Oral every 6 hours PRN Mild Pain (1 - 3), Moderate Pain (4 - 6), Severe Pain (7 - 10)  albuterol    90 MICROgram(s) HFA Inhaler 2 Puff(s) Inhalation every 6 hours PRN Shortness of Breath and/or Wheezing  aluminum hydroxide/magnesium hydroxide/simethicone Suspension 30 milliLiter(s) Oral every 4 hours PRN Dyspepsia  benzocaine 15 mG/menthol 3.6 mG Lozenge 1 Lozenge Oral every 2 hours PRN sore throat  bisacodyl 5 milliGRAM(s) Oral every 12 hours PRN Constipation  haloperidol     Tablet 1 milliGRAM(s) Oral every 6 hours PRN agitation  haloperidol    Injectable 1 milliGRAM(s) IntraMuscular once PRN agitation  haloperidol    Injectable 1 milliGRAM(s) IntraMuscular once PRN agitation  melatonin. 3 milliGRAM(s) Oral at bedtime PRN Insomnia

## 2023-01-31 NOTE — BH INPATIENT PSYCHIATRY PROGRESS NOTE - NSBHASSESSSUMMFT_PSY_ALL_CORE
Dinorah Hogan is an 88 year old female, , domiciled with daughter with PPHx of bipolar I disorder, 1 previous inpatient psychiatric hospitalization in 1970s, no known history of SI/SA and PMHx of asthma, GERD, sciatica with previous vertebral fracture who is admitted on 9.27 status for symptoms of hortencia including irritability, loud speech, and agitation.     DDx: hortencia vs hypomania vs comorbid delirium    Today, patient is angry about her current admission and denying any psychiatric history. She is asking to be discharged immediately and yelling at treatment team. She is noted to have loud and rapid speech, but is able to be interrupted. She states she has been sleeping poorly due to hip and R knee pain. She denies S/IIP, H/IIP, AVH, paranoia, or ideas of reference. She is AOx1-2 (person, place--hospital), but unable to state year or location in [place]. She requires 9.27 inpatient admission for evaluation and management of hortencia.    1/22: less irritable but still requiring PRN med, more compliant with meds, no SI/HI.  1/23: Accepted meds yd and this AM though required PRN yd. Less irritable, appropriately conversant, making needs known. Suspicious bordering on paranoid, however no fully formed delusional thought content apparent.  1/24: Irritable and labile, suspicious regarding staff members. Discussed medication, pt will be offered psychiatric meds and is aware she has right to refuse.  1/25: Hostile, irritable, labile. TOO application in progress given poor insight and intermittent refusal of psychiatric mediations. Pt at risk for deconditioning given refusal to ambulate and minimal engagement with PT, given ambulatory at home refusal here appears to be behavioral, will continue to encourage participation. Refused cognitive evaluation today. Given ongoing intermittent agitation, paranoia, physical deconditioning, and poor insight into condition, pt requires continued inpatient hospitalization for stabilization and safety.  1/26: Pt remains labile, irritable, and paranoid toward staff. Although pt makes provocative statements (e.g. "she controls the robots," "the Jews will hate me"), these statements appear to represent intense feeling rather than fully formed delusion. Continues to refuse cognitive evaluation or consent to contact family. Given ongoing intermittent agitation, paranoia, physical deconditioning, and poor insight into condition, pt requires continued inpatient hospitalization for stabilization and safety.  1/27 Patient agitated, markedly irritable,paranoid  with themes of perrvasive mistreatment, being singled out but w/u well formed , fixed delusional ideas.Cont enocurage med compliance scheduled for court hearing for TOO  1/28: Currently on CO due to hospital bed requirement. Reports fair sleep and appetite. Seen at bedside during rounds. Continues to refuse most medications, took senna and tylenol last night. Reporting back pain "15/10" and requesting more heat packs "I will die without these", however under no visible distress. Primary team pursuing TOO. Renewed CO.   1/29: Renewed CO. Last night took Depakote, Haldol and Senna. Med compliance has been partial.   1/30: Refused all meds yd, this AM accepted metoprolol. Making accusations that people are attempting to harm her and want her to die. Impulse control is impaired.  1/31: Continues with intermittent agitation, hostile and accusatory twd staff, refusing meds (except metoprolol), refusing consent to speak to daughter. Given ongoing agitation, impulsivity, disinhibition, and deconditioning 2/2 refusal to ambulate (also attempted to climb out of bed last night), pt poses a threat of harm to herself and requires ongoing inpatient hospitalization for stabilization and safety. TOO & retention court date next week.    Plan:  1. Legal: continue 9.27 involuntary admission. TOO application in progress. Pt submitted 9.31.  2. Safety:   - Requires 1:1 CO for fall risk and monitoring of hospital bed   - Olanzapine 2.5 mg PO or IM q6h PRN agitation  3. Psychiatric:  - Valproic acid sprinkle 375mg PO BID for mood stabilization  - Haloperidol 1 mg PO BID for agitation  - Melatonin 3 mg PRN for insomnia  4. Group/Milieu therapy: encourage participation as able  5. Medical:   - Hx of vertebral fractures with ambulatory dysfunction: no acute surgical intervention, patient requires hospital bed, PRN tylenol, lidocaine patch, hot packs  - HTN: metoprolol XL 50 mg daily  - Constipation: miralax and senna daily, PRN bisacodyl q12h  - Newly noted mole: consistent with sebhorreic keratosis  - Dry eyes: Artificial tears 1 drop both eyes BID  6. Collateral/Dispo: refuses consent to speak to family at this time.

## 2023-01-31 NOTE — BH INPATIENT PSYCHIATRY PROGRESS NOTE - PRN MEDS
MEDICATIONS  (PRN):  acetaminophen     Tablet .. 650 milliGRAM(s) Oral every 6 hours PRN Mild Pain (1 - 3), Moderate Pain (4 - 6), Severe Pain (7 - 10)  albuterol    90 MICROgram(s) HFA Inhaler 2 Puff(s) Inhalation every 6 hours PRN Shortness of Breath and/or Wheezing  aluminum hydroxide/magnesium hydroxide/simethicone Suspension 30 milliLiter(s) Oral every 4 hours PRN Dyspepsia  benzocaine 15 mG/menthol 3.6 mG Lozenge 1 Lozenge Oral every 2 hours PRN sore throat  bisacodyl 5 milliGRAM(s) Oral every 12 hours PRN Constipation  haloperidol     Tablet 1 milliGRAM(s) Oral every 6 hours PRN agitation  haloperidol    Injectable 1 milliGRAM(s) IntraMuscular once PRN agitation  haloperidol    Injectable 1 milliGRAM(s) IntraMuscular once PRN agitation  melatonin. 3 milliGRAM(s) Oral at bedtime PRN Insomnia

## 2023-02-01 PROCEDURE — 99231 SBSQ HOSP IP/OBS SF/LOW 25: CPT | Mod: GC

## 2023-02-01 RX ADMIN — HALOPERIDOL DECANOATE 1 MILLIGRAM(S): 100 INJECTION INTRAMUSCULAR at 12:40

## 2023-02-01 RX ADMIN — HALOPERIDOL DECANOATE 1 MILLIGRAM(S): 100 INJECTION INTRAMUSCULAR at 22:26

## 2023-02-01 RX ADMIN — Medication 50 MILLIGRAM(S): at 12:40

## 2023-02-01 RX ADMIN — Medication 1 DROP(S): at 12:40

## 2023-02-01 RX ADMIN — ENOXAPARIN SODIUM 40 MILLIGRAM(S): 100 INJECTION SUBCUTANEOUS at 12:41

## 2023-02-01 RX ADMIN — DIVALPROEX SODIUM 375 MILLIGRAM(S): 500 TABLET, DELAYED RELEASE ORAL at 12:39

## 2023-02-01 RX ADMIN — Medication 81 MILLIGRAM(S): at 12:39

## 2023-02-01 RX ADMIN — LIDOCAINE 1 PATCH: 4 CREAM TOPICAL at 22:05

## 2023-02-01 NOTE — BH INPATIENT PSYCHIATRY PROGRESS NOTE - CURRENT MEDICATION
MEDICATIONS  (STANDING):  artificial  tears Solution 1 Drop(s) Both EYES two times a day  aspirin enteric coated 81 milliGRAM(s) Oral daily  divalproex Sprinkle 375 milliGRAM(s) Oral two times a day  enoxaparin Injectable 40 milliGRAM(s) SubCutaneous <User Schedule>  haloperidol     Tablet 1 milliGRAM(s) Oral two times a day  lidocaine   4% Patch 1 Patch Transdermal every 24 hours  metoprolol succinate ER 50 milliGRAM(s) Oral daily  polyethylene glycol 3350 17 Gram(s) Oral daily  senna 2 Tablet(s) Oral at bedtime    MEDICATIONS  (PRN):  acetaminophen     Tablet .. 650 milliGRAM(s) Oral every 6 hours PRN Mild Pain (1 - 3), Moderate Pain (4 - 6), Severe Pain (7 - 10)  albuterol    90 MICROgram(s) HFA Inhaler 2 Puff(s) Inhalation every 6 hours PRN Shortness of Breath and/or Wheezing  aluminum hydroxide/magnesium hydroxide/simethicone Suspension 30 milliLiter(s) Oral every 4 hours PRN Dyspepsia  benzocaine 15 mG/menthol 3.6 mG Lozenge 1 Lozenge Oral every 2 hours PRN sore throat  bisacodyl 5 milliGRAM(s) Oral every 12 hours PRN Constipation  haloperidol     Tablet 1 milliGRAM(s) Oral every 6 hours PRN agitation  haloperidol    Injectable 1 milliGRAM(s) IntraMuscular once PRN agitation  haloperidol    Injectable 1 milliGRAM(s) IntraMuscular once PRN agitation  melatonin. 3 milliGRAM(s) Oral at bedtime PRN Insomnia

## 2023-02-01 NOTE — BH INPATIENT PSYCHIATRY PROGRESS NOTE - NSBHFUPINTERVALHXFT_PSY_A_CORE
Patient seen for BAD1, chart reviewed and discussed with nursing staff. Pt refused all medications yesterday morning and evening except metoprolol. Required PO Haldol 1mg yd at 14:00 due to agitation. Currently on CO due to hospital bed requirement.    Patient is encountered in private room, seated in chair with one to one present. Upon writer's entrance, patient shouts "you have to get me out of here, this chair is killing me, they are trying to kill me!" Hot packs offered and accepted; pt accuses 1:1 of stealing prior hot packs, does not accept explanation that used hot packs were thrown away. She endorses poor sleep and appetite. Discussed medications, patient continues to refuse psychiatric meds and intermittently refuse medications for her chronic medical conditions, writer encouraged patient to accept medications. She refuses to engage in mental status questions, and refuses to answer any questions about suicidality or perceptual disturbances. Patient quickly terminates interview, stating "you don't want to help me, get out of here, I wont speak to you!"

## 2023-02-01 NOTE — BH INPATIENT PSYCHIATRY PROGRESS NOTE - MSE UNSTRUCTURED FT
Appearance: The patient appears stated age, fair hygiene and dressed appropriately in hospital attire.    Behavior: Not cooperative with interview, at times hostile and shouting, maintained appropriate eye contact.    Motor: No psychomotor agitation or slowing noted, no abnormal movements. Patient states she is unable to ambulate independently, although uses walker at home.  Speech: The patient's speech was fluent but with loud volume, rapid tone, and angry tone. Speech was pressured   Mood: The patient's mood is "bad"    Affect: irritable, labile, congruent  Thought process: Overall linear  Thought content: paranoia, alleging staff stealing from her and intentionally trying to harm her   Perception: Refuses to answer today, no known h/o AVH  Cognition: Approx oriented, forgetful. Fair attention. Refused MMSE & MoCA 1/25 & 1/26, 1/30.  SI/HI: Refuses to answer today  Insight is poor. Judgment is fair. Impulse control is impaired.

## 2023-02-01 NOTE — BH INPATIENT PSYCHIATRY PROGRESS NOTE - NSBHCHARTREVIEWVS_PSY_A_CORE FT
Vital Signs Last 24 Hrs  T(C): 36.8 (02-01-23 @ 05:31), Max: 36.8 (02-01-23 @ 05:31)  T(F): 98.2 (02-01-23 @ 05:31), Max: 98.2 (02-01-23 @ 05:31)  HR: --  BP: --  BP(mean): --  RR: 18 (02-01-23 @ 05:31) (18 - 18)  SpO2: --    Orthostatic VS  02-01-23 @ 05:31  Lying BP: 149/80 HR: 99  Sitting BP: 140/89 HR: 95  Standing BP: --/-- HR: --  Site: --  Mode: --  Orthostatic VS  01-31-23 @ 07:56  Lying BP: --/-- HR: --  Sitting BP: 179/69 HR: 75  Standing BP: --/-- HR: --  Site: --  Mode: --

## 2023-02-01 NOTE — BH INPATIENT PSYCHIATRY PROGRESS NOTE - NSBHASSESSSUMMFT_PSY_ALL_CORE
Dinorah Hogan is an 88 year old female, , domiciled with daughter with PPHx of bipolar I disorder, 1 previous inpatient psychiatric hospitalization in 1970s, no known history of SI/SA and PMHx of asthma, GERD, sciatica with previous vertebral fracture who is admitted on 9.27 status for symptoms of hortencia including irritability, loud speech, and agitation.     DDx: hortencia vs hypomania vs comorbid delirium    Today, patient is angry about her current admission and denying any psychiatric history. She is asking to be discharged immediately and yelling at treatment team. She is noted to have loud and rapid speech, but is able to be interrupted. She states she has been sleeping poorly due to hip and R knee pain. She denies S/IIP, H/IIP, AVH, paranoia, or ideas of reference. She is AOx1-2 (person, place--hospital), but unable to state year or location in [place]. She requires 9.27 inpatient admission for evaluation and management of hortencia.    1/22: less irritable but still requiring PRN med, more compliant with meds, no SI/HI.  1/23: Accepted meds yd and this AM though required PRN yd. Less irritable, appropriately conversant, making needs known. Suspicious bordering on paranoid, however no fully formed delusional thought content apparent.  1/24: Irritable and labile, suspicious regarding staff members. Discussed medication, pt will be offered psychiatric meds and is aware she has right to refuse.  1/25: Hostile, irritable, labile. TOO application in progress given poor insight and intermittent refusal of psychiatric mediations. Pt at risk for deconditioning given refusal to ambulate and minimal engagement with PT, given ambulatory at home refusal here appears to be behavioral, will continue to encourage participation. Refused cognitive evaluation today. Given ongoing intermittent agitation, paranoia, physical deconditioning, and poor insight into condition, pt requires continued inpatient hospitalization for stabilization and safety.  1/26: Pt remains labile, irritable, and paranoid toward staff. Although pt makes provocative statements (e.g. "she controls the robots," "the Jews will hate me"), these statements appear to represent intense feeling rather than fully formed delusion. Continues to refuse cognitive evaluation or consent to contact family. Given ongoing intermittent agitation, paranoia, physical deconditioning, and poor insight into condition, pt requires continued inpatient hospitalization for stabilization and safety.  1/27 Patient agitated, markedly irritable,paranoid  with themes of perrvasive mistreatment, being singled out but w/u well formed , fixed delusional ideas.Cont enocurage med compliance scheduled for court hearing for TOO  1/28: Currently on CO due to hospital bed requirement. Reports fair sleep and appetite. Seen at bedside during rounds. Continues to refuse most medications, took senna and tylenol last night. Reporting back pain "15/10" and requesting more heat packs "I will die without these", however under no visible distress. Primary team pursuing TOO. Renewed CO.   1/29: Renewed CO. Last night took Depakote, Haldol and Senna. Med compliance has been partial.   1/30: Refused all meds yd, this AM accepted metoprolol. Making accusations that people are attempting to harm her and want her to die. Impulse control is impaired.  1/31: Continues with intermittent agitation, hostile and accusatory twd staff, refusing meds (except metoprolol), refusing consent to speak to daughter. Given ongoing agitation, impulsivity, disinhibition, and deconditioning 2/2 refusal to ambulate (also attempted to climb out of bed last night), pt poses a threat of harm to herself and requires ongoing inpatient hospitalization for stabilization and safety. TOO & retention court date next week.  2/1: Continues irritable, paranoid, refusing medications and engagement in interviews.     Plan:  1. Legal: continue 9.27 involuntary admission. TOO application in progress. Pt submitted 9.31.  2. Safety:   - Requires 1:1 CO for fall risk and monitoring of hospital bed   - Olanzapine 2.5 mg PO or IM q6h PRN agitation  3. Psychiatric:  - Valproic acid sprinkle 375mg PO BID for mood stabilization  - Haloperidol 1 mg PO BID for agitation  - Melatonin 3 mg PRN for insomnia  4. Group/Milieu therapy: encourage participation as able  5. Medical:   - Hx of vertebral fractures with ambulatory dysfunction: no acute surgical intervention, patient requires hospital bed, PRN tylenol, lidocaine patch, hot packs  - HTN: metoprolol XL 50 mg daily  - Constipation: miralax and senna daily, PRN bisacodyl q12h  - Newly noted mole: consistent with sebhorreic keratosis  - Dry eyes: Artificial tears 1 drop both eyes BID  6. Collateral/Dispo: refuses consent to speak to family at this time.

## 2023-02-02 PROCEDURE — 99232 SBSQ HOSP IP/OBS MODERATE 35: CPT | Mod: GC

## 2023-02-02 RX ORDER — HALOPERIDOL DECANOATE 100 MG/ML
1 INJECTION INTRAMUSCULAR ONCE
Refills: 0 | Status: COMPLETED | OUTPATIENT
Start: 2023-02-02 | End: 2023-02-03

## 2023-02-02 RX ADMIN — LIDOCAINE 1 PATCH: 4 CREAM TOPICAL at 09:46

## 2023-02-02 RX ADMIN — LIDOCAINE 1 PATCH: 4 CREAM TOPICAL at 07:26

## 2023-02-02 RX ADMIN — Medication 1 DROP(S): at 22:22

## 2023-02-02 RX ADMIN — LIDOCAINE 1 PATCH: 4 CREAM TOPICAL at 22:23

## 2023-02-02 RX ADMIN — SENNA PLUS 2 TABLET(S): 8.6 TABLET ORAL at 22:22

## 2023-02-02 NOTE — BH INPATIENT PSYCHIATRY PROGRESS NOTE - NSBHFUPINTERVALHXFT_PSY_A_CORE
Patient seen for BAD1, chart reviewed and discussed with nursing staff. Pt accepted AM meds yd, refused all hs medications. Required IM Haldol 1mg yd at 22:00 due to agitation. Currently on CO due to hospital bed requirement. Did not accept PT today.    Patient is encountered in private room, lying in bed with one to one present. Upon writer's entrance, patient shouts "Go away! Go away!" Writer inquired about pt's pain control, pt endorsing ongoing back pain (unwilling to rate on scale 0-10). Writer initiated conversation about pain management including pharmacologic options, however patient refused to engage in discussion and requested writer to leave. Writer and 1:1 attempted to meet patient's request for water, however pt vacillates between demanding water "immediately" and rejecting water offered because "it's poison;" ultimately did not accept water during encounter. Pt refuses to engage in mental status questions, and refuses to answer any questions about suicidality or perceptual disturbances.

## 2023-02-02 NOTE — BH INPATIENT PSYCHIATRY PROGRESS NOTE - NSBHASSESSSUMMFT_PSY_ALL_CORE
Dinorah Hogan is an 88 year old female, , domiciled with daughter with PPHx of bipolar I disorder, 1 previous inpatient psychiatric hospitalization in 1970s, no known history of SI/SA and PMHx of asthma, GERD, sciatica with previous vertebral fracture who is admitted on 9.27 status for symptoms of hortencia including irritability, loud speech, and agitation.     DDx: hortencia vs hypomania vs comorbid delirium    Today, patient is angry about her current admission and denying any psychiatric history. She is asking to be discharged immediately and yelling at treatment team. She is noted to have loud and rapid speech, but is able to be interrupted. She states she has been sleeping poorly due to hip and R knee pain. She denies S/IIP, H/IIP, AVH, paranoia, or ideas of reference. She is AOx1-2 (person, place--hospital), but unable to state year or location in [place]. She requires 9.27 inpatient admission for evaluation and management of hortencia.    1/22: less irritable but still requiring PRN med, more compliant with meds, no SI/HI.  1/23: Accepted meds yd and this AM though required PRN yd. Less irritable, appropriately conversant, making needs known. Suspicious bordering on paranoid, however no fully formed delusional thought content apparent.  1/24: Irritable and labile, suspicious regarding staff members. Discussed medication, pt will be offered psychiatric meds and is aware she has right to refuse.  1/25: Hostile, irritable, labile. TOO application in progress given poor insight and intermittent refusal of psychiatric mediations. Pt at risk for deconditioning given refusal to ambulate and minimal engagement with PT, given ambulatory at home refusal here appears to be behavioral, will continue to encourage participation. Refused cognitive evaluation today. Given ongoing intermittent agitation, paranoia, physical deconditioning, and poor insight into condition, pt requires continued inpatient hospitalization for stabilization and safety.  1/26: Pt remains labile, irritable, and paranoid toward staff. Although pt makes provocative statements (e.g. "she controls the robots," "the Jews will hate me"), these statements appear to represent intense feeling rather than fully formed delusion. Continues to refuse cognitive evaluation or consent to contact family. Given ongoing intermittent agitation, paranoia, physical deconditioning, and poor insight into condition, pt requires continued inpatient hospitalization for stabilization and safety.  1/27 Patient agitated, markedly irritable,paranoid  with themes of perrvasive mistreatment, being singled out but w/u well formed , fixed delusional ideas.Cont enocurage med compliance scheduled for court hearing for TOO  1/28: Currently on CO due to hospital bed requirement. Reports fair sleep and appetite. Seen at bedside during rounds. Continues to refuse most medications, took senna and tylenol last night. Reporting back pain "15/10" and requesting more heat packs "I will die without these", however under no visible distress. Primary team pursuing TOO. Renewed CO.   1/29: Renewed CO. Last night took Depakote, Haldol and Senna. Med compliance has been partial.   1/30: Refused all meds yd, this AM accepted metoprolol. Making accusations that people are attempting to harm her and want her to die. Impulse control is impaired.  1/31: Continues with intermittent agitation, hostile and accusatory twd staff, refusing meds (except metoprolol), refusing consent to speak to daughter. Given ongoing agitation, impulsivity, disinhibition, and deconditioning 2/2 refusal to ambulate (also attempted to climb out of bed last night), pt poses a threat of harm to herself and requires ongoing inpatient hospitalization for stabilization and safety. TOO & retention court date next week.  2/1: Continues irritable, paranoid, refusing medications and engagement in interviews.   2/2: Minimally engaged in interview, refusing to answer most questions; labile, hostile, required IM Haldol 1mg overnight for agitation; refusing to ambulate or engage in PT; refusing pain control interventions other than hot packs and lidocaine patch. TOO in process.     Plan:  1. Legal: continue 9.27 involuntary admission. TOO application in progress. Pt submitted 9.31.  2. Safety:   - Requires 1:1 CO for fall risk and monitoring of hospital bed   - Olanzapine 2.5 mg PO or IM q6h PRN agitation  3. Psychiatric:  - Valproic acid sprinkle 375mg PO BID for mood stabilization  - Haloperidol 1 mg PO BID for agitation  - Melatonin 3 mg PRN for insomnia  4. Group/Milieu therapy: encourage participation as able  5. Medical:   - Hx of vertebral fractures with ambulatory dysfunction: no acute surgical intervention, patient requires hospital bed, PRN tylenol, lidocaine patch, hot packs  - HTN: metoprolol XL 50 mg daily  - Constipation: miralax and senna daily, PRN bisacodyl q12h  - Newly noted mole: consistent with sebhorreic keratosis  - Dry eyes: Artificial tears 1 drop both eyes BID  6. Collateral/Dispo: refuses consent to speak to family at this time.

## 2023-02-02 NOTE — BH INPATIENT PSYCHIATRY PROGRESS NOTE - NSBHCHARTREVIEWVS_PSY_A_CORE FT
Vital Signs Last 24 Hrs  T(C): 36.6 (02-02-23 @ 09:21), Max: 36.6 (02-02-23 @ 09:21)  T(F): 97.9 (02-02-23 @ 09:21), Max: 97.9 (02-02-23 @ 09:21)  HR: --  BP: --  BP(mean): --  RR: --  SpO2: --    Orthostatic VS  02-02-23 @ 09:21  Lying BP: --/-- HR: --  Sitting BP: 149/92 HR: 76  Standing BP: 134/90 HR: 76  Site: --  Mode: --  Orthostatic VS  02-01-23 @ 05:31  Lying BP: 149/80 HR: 99  Sitting BP: 140/89 HR: 95  Standing BP: --/-- HR: --  Site: --  Mode: --

## 2023-02-02 NOTE — BH INPATIENT PSYCHIATRY PROGRESS NOTE - CURRENT MEDICATION
MEDICATIONS  (STANDING):  artificial  tears Solution 1 Drop(s) Both EYES two times a day  aspirin enteric coated 81 milliGRAM(s) Oral daily  divalproex Sprinkle 375 milliGRAM(s) Oral two times a day  enoxaparin Injectable 40 milliGRAM(s) SubCutaneous <User Schedule>  haloperidol     Tablet 1 milliGRAM(s) Oral two times a day  lidocaine   4% Patch 1 Patch Transdermal every 24 hours  metoprolol succinate ER 50 milliGRAM(s) Oral daily  polyethylene glycol 3350 17 Gram(s) Oral daily  senna 2 Tablet(s) Oral at bedtime    MEDICATIONS  (PRN):  acetaminophen     Tablet .. 650 milliGRAM(s) Oral every 6 hours PRN Mild Pain (1 - 3), Moderate Pain (4 - 6), Severe Pain (7 - 10)  albuterol    90 MICROgram(s) HFA Inhaler 2 Puff(s) Inhalation every 6 hours PRN Shortness of Breath and/or Wheezing  aluminum hydroxide/magnesium hydroxide/simethicone Suspension 30 milliLiter(s) Oral every 4 hours PRN Dyspepsia  benzocaine 15 mG/menthol 3.6 mG Lozenge 1 Lozenge Oral every 2 hours PRN sore throat  bisacodyl 5 milliGRAM(s) Oral every 12 hours PRN Constipation  haloperidol     Tablet 1 milliGRAM(s) Oral every 6 hours PRN agitation  haloperidol    Injectable 1 milliGRAM(s) IntraMuscular once PRN agitation  melatonin. 3 milliGRAM(s) Oral at bedtime PRN Insomnia

## 2023-02-02 NOTE — BH INPATIENT PSYCHIATRY PROGRESS NOTE - MSE UNSTRUCTURED FT
Appearance: The patient appears stated age, fair hygiene and dressed appropriately in hospital attire.    Behavior: Not cooperative with interview, at times hostile and shouting, maintained fair eye contact.    Motor: No psychomotor agitation or slowing noted, no abnormal movements.   Speech: The patient's speech was fluent but with loud volume, rapid tone, and angry tone. Speech was pressured   Mood: The patient's mood is "terrible"    Affect: irritable, labile, congruent  Thought process: Overall linear  Thought content: paranoia, alleging staff intentionally trying to harm her   Perception: Refuses to answer today, no known h/o AVH  Cognition: Approx oriented, forgetful. Fair attention. Refused MMSE & MoCA 1/25 & 1/26, 1/30.  SI/HI: Refuses to answer today  Insight is poor. Judgment is fair. Impulse control is impaired.

## 2023-02-03 PROCEDURE — 99231 SBSQ HOSP IP/OBS SF/LOW 25: CPT | Mod: GC

## 2023-02-03 RX ORDER — OLANZAPINE 15 MG/1
1.25 TABLET, FILM COATED ORAL ONCE
Refills: 0 | Status: DISCONTINUED | OUTPATIENT
Start: 2023-02-03 | End: 2023-02-21

## 2023-02-03 RX ORDER — OLANZAPINE 15 MG/1
1.25 TABLET, FILM COATED ORAL EVERY 6 HOURS
Refills: 0 | Status: DISCONTINUED | OUTPATIENT
Start: 2023-02-03 | End: 2023-02-15

## 2023-02-03 RX ORDER — OLANZAPINE 15 MG/1
1.25 TABLET, FILM COATED ORAL ONCE
Refills: 0 | Status: COMPLETED | OUTPATIENT
Start: 2023-02-03 | End: 2023-02-03

## 2023-02-03 RX ADMIN — OLANZAPINE 1.25 MILLIGRAM(S): 15 TABLET, FILM COATED ORAL at 14:55

## 2023-02-03 RX ADMIN — LIDOCAINE 1 PATCH: 4 CREAM TOPICAL at 22:34

## 2023-02-03 RX ADMIN — LIDOCAINE 1 PATCH: 4 CREAM TOPICAL at 10:03

## 2023-02-03 RX ADMIN — LIDOCAINE 1 PATCH: 4 CREAM TOPICAL at 06:52

## 2023-02-03 RX ADMIN — HALOPERIDOL DECANOATE 1 MILLIGRAM(S): 100 INJECTION INTRAMUSCULAR at 00:03

## 2023-02-03 NOTE — BH INPATIENT PSYCHIATRY PROGRESS NOTE - CURRENT MEDICATION
MEDICATIONS  (STANDING):  artificial  tears Solution 1 Drop(s) Both EYES two times a day  aspirin enteric coated 81 milliGRAM(s) Oral daily  divalproex Sprinkle 375 milliGRAM(s) Oral two times a day  enoxaparin Injectable 40 milliGRAM(s) SubCutaneous <User Schedule>  haloperidol     Tablet 1 milliGRAM(s) Oral two times a day  lidocaine   4% Patch 1 Patch Transdermal every 24 hours  metoprolol succinate ER 50 milliGRAM(s) Oral daily  polyethylene glycol 3350 17 Gram(s) Oral daily  senna 2 Tablet(s) Oral at bedtime    MEDICATIONS  (PRN):  acetaminophen     Tablet .. 650 milliGRAM(s) Oral every 6 hours PRN Mild Pain (1 - 3), Moderate Pain (4 - 6), Severe Pain (7 - 10)  albuterol    90 MICROgram(s) HFA Inhaler 2 Puff(s) Inhalation every 6 hours PRN Shortness of Breath and/or Wheezing  aluminum hydroxide/magnesium hydroxide/simethicone Suspension 30 milliLiter(s) Oral every 4 hours PRN Dyspepsia  benzocaine 15 mG/menthol 3.6 mG Lozenge 1 Lozenge Oral every 2 hours PRN sore throat  bisacodyl 5 milliGRAM(s) Oral every 12 hours PRN Constipation  melatonin. 3 milliGRAM(s) Oral at bedtime PRN Insomnia  OLANZapine 1.25 milliGRAM(s) Oral every 6 hours PRN agitation  OLANZapine Injectable 1.25 milliGRAM(s) IntraMuscular once PRN agitation

## 2023-02-03 NOTE — BH INPATIENT PSYCHIATRY PROGRESS NOTE - NSBHFUPINTERVALHXFT_PSY_A_CORE
Patient seen for BAD1, chart reviewed and discussed with nursing staff. Pt refused all meds yd except Senna. Required IM Haldol 1mg yd at 23:00 due to agitation. Currently on CO due to hospital bed requirement.     Patient is encountered in private room, lying in bed with one to one present. Upon writer's entrance, patient shouts "Go away! Go away!" Writer inquired about pt's pain control, pt endorsing ongoing back pain (unwilling to rate on scale 0-10). Writer initiated conversation about pain management including pharmacologic options, however patient refused to engage in discussion and requested writer to leave. Writer and 1:1 attempted to meet patient's request for water, however pt vacillates between demanding water "immediately" and rejecting water offered because "it's poison;" ultimately did not accept water during encounter. Pt refuses to engage in mental status questions, and refuses to answer any questions about suicidality or perceptual disturbances.     Pt is observed on the unit frequently shouting. Per staff member report, during physical therapy she is noted to shout to a passing staff member "you killed me! you broke my foot!" without provocation.

## 2023-02-03 NOTE — BH INPATIENT PSYCHIATRY PROGRESS NOTE - PRN MEDS
MEDICATIONS  (PRN):  acetaminophen     Tablet .. 650 milliGRAM(s) Oral every 6 hours PRN Mild Pain (1 - 3), Moderate Pain (4 - 6), Severe Pain (7 - 10)  albuterol    90 MICROgram(s) HFA Inhaler 2 Puff(s) Inhalation every 6 hours PRN Shortness of Breath and/or Wheezing  aluminum hydroxide/magnesium hydroxide/simethicone Suspension 30 milliLiter(s) Oral every 4 hours PRN Dyspepsia  benzocaine 15 mG/menthol 3.6 mG Lozenge 1 Lozenge Oral every 2 hours PRN sore throat  bisacodyl 5 milliGRAM(s) Oral every 12 hours PRN Constipation  melatonin. 3 milliGRAM(s) Oral at bedtime PRN Insomnia  OLANZapine 1.25 milliGRAM(s) Oral every 6 hours PRN agitation  OLANZapine Injectable 1.25 milliGRAM(s) IntraMuscular once PRN agitation

## 2023-02-03 NOTE — BH INPATIENT PSYCHIATRY PROGRESS NOTE - MSE UNSTRUCTURED FT
Appearance: The patient appears stated age, fair hygiene and dressed appropriately in hospital attire.    Behavior: Not cooperative with interview, at times hostile and shouting, maintained fair eye contact.    Motor: No psychomotor agitation or slowing noted, no abnormal movements.   Speech: The patient's speech was fluent but with loud volume, rapid tone, and angry tone. Speech was pressured   Mood: The patient's mood is "terrible"    Affect: irritable, labile, congruent  Thought process: Overall linear  Thought content: paranoia, alleging staff intentionally trying to harm her   Perception: Refuses to answer today, no known h/o AVH  Cognition: Approx oriented, forgetful. Fair attention. Refused MMSE & MoCA 1/25 & 1/26, 1/30, 2/3.  SI/HI: Refuses to answer today  Insight is poor. Judgment is fair. Impulse control is impaired.

## 2023-02-03 NOTE — BH INPATIENT PSYCHIATRY PROGRESS NOTE - NSBHASSESSSUMMFT_PSY_ALL_CORE
Dinorah Hogan is an 88 year old female, , domiciled with daughter with PPHx of bipolar I disorder, 1 previous inpatient psychiatric hospitalization in 1970s, no known history of SI/SA and PMHx of asthma, GERD, sciatica with previous vertebral fracture who is admitted on 9.27 status for symptoms of hortencia including irritability, loud speech, and agitation.     DDx: hortencia vs hypomania vs comorbid delirium    Today, patient is angry about her current admission and denying any psychiatric history. She is asking to be discharged immediately and yelling at treatment team. She is noted to have loud and rapid speech, but is able to be interrupted. She states she has been sleeping poorly due to hip and R knee pain. She denies S/IIP, H/IIP, AVH, paranoia, or ideas of reference. She is AOx1-2 (person, place--hospital), but unable to state year or location in [place]. She requires 9.27 inpatient admission for evaluation and management of hortencia.    1/22: less irritable but still requiring PRN med, more compliant with meds, no SI/HI.  1/23: Accepted meds yd and this AM though required PRN yd. Less irritable, appropriately conversant, making needs known. Suspicious bordering on paranoid, however no fully formed delusional thought content apparent.  1/24: Irritable and labile, suspicious regarding staff members. Discussed medication, pt will be offered psychiatric meds and is aware she has right to refuse.  1/25: Hostile, irritable, labile. TOO application in progress given poor insight and intermittent refusal of psychiatric mediations. Pt at risk for deconditioning given refusal to ambulate and minimal engagement with PT, given ambulatory at home refusal here appears to be behavioral, will continue to encourage participation. Refused cognitive evaluation today. Given ongoing intermittent agitation, paranoia, physical deconditioning, and poor insight into condition, pt requires continued inpatient hospitalization for stabilization and safety.  1/26: Pt remains labile, irritable, and paranoid toward staff. Although pt makes provocative statements (e.g. "she controls the robots," "the Jews will hate me"), these statements appear to represent intense feeling rather than fully formed delusion. Continues to refuse cognitive evaluation or consent to contact family. Given ongoing intermittent agitation, paranoia, physical deconditioning, and poor insight into condition, pt requires continued inpatient hospitalization for stabilization and safety.  1/27 Patient agitated, markedly irritable,paranoid  with themes of perrvasive mistreatment, being singled out but w/u well formed , fixed delusional ideas.Cont enocurage med compliance scheduled for court hearing for TOO  1/28: Currently on CO due to hospital bed requirement. Reports fair sleep and appetite. Seen at bedside during rounds. Continues to refuse most medications, took senna and tylenol last night. Reporting back pain "15/10" and requesting more heat packs "I will die without these", however under no visible distress. Primary team pursuing TOO. Renewed CO.   1/29: Renewed CO. Last night took Depakote, Haldol and Senna. Med compliance has been partial.   1/30: Refused all meds yd, this AM accepted metoprolol. Making accusations that people are attempting to harm her and want her to die. Impulse control is impaired.  1/31: Continues with intermittent agitation, hostile and accusatory twd staff, refusing meds (except metoprolol), refusing consent to speak to daughter. Given ongoing agitation, impulsivity, disinhibition, and deconditioning 2/2 refusal to ambulate (also attempted to climb out of bed last night), pt poses a threat of harm to herself and requires ongoing inpatient hospitalization for stabilization and safety. TOO & retention court date next week.  2/1: Continues irritable, paranoid, refusing medications and engagement in interviews.   2/2: Minimally engaged in interview, refusing to answer most questions; labile, hostile, required IM Haldol 1mg overnight for agitation; refusing to ambulate or engage in PT; refusing pain control interventions other than hot packs and lidocaine patch. TOO in process.   2/3:     Plan:  1. Legal: continue 9.27 involuntary admission. TOO application in progress. Pt submitted 9.31.  2. Safety:   - Requires 1:1 CO for fall risk and monitoring of hospital bed   - Olanzapine 2.5 mg PO or IM q6h PRN agitation  3. Psychiatric:  - Valproic acid sprinkle 375mg PO BID for mood stabilization  - Haloperidol 1 mg PO BID for agitation  - Melatonin 3 mg PRN for insomnia  4. Group/Milieu therapy: encourage participation as able  5. Medical:   - Hx of vertebral fractures with ambulatory dysfunction: no acute surgical intervention, patient requires hospital bed, PRN tylenol, lidocaine patch, hot packs  - HTN: metoprolol XL 50 mg daily  - Constipation: miralax and senna daily, PRN bisacodyl q12h  - Newly noted mole: consistent with sebhorreic keratosis  - Dry eyes: Artificial tears 1 drop both eyes BID  6. Collateral/Dispo: refuses consent to speak to family at this time.

## 2023-02-03 NOTE — BH INPATIENT PSYCHIATRY PROGRESS NOTE - NSBHCHARTREVIEWVS_PSY_A_CORE FT
Vital Signs Last 24 Hrs  T(C): 36.6 (02-03-23 @ 08:38), Max: 36.6 (02-03-23 @ 08:38)  T(F): 97.8 (02-03-23 @ 08:38), Max: 97.8 (02-03-23 @ 08:38)  HR: --  BP: 147/64 (02-03-23 @ 08:38) (147/64 - 147/64)  BP(mean): 73 (02-03-23 @ 08:38) (73 - 73)  RR: --  SpO2: --    Orthostatic VS  02-02-23 @ 09:21  Lying BP: --/-- HR: --  Sitting BP: 149/92 HR: 76  Standing BP: 134/90 HR: 76  Site: --  Mode: --

## 2023-02-04 LAB
ANION GAP SERPL CALC-SCNC: 12 MMOL/L — SIGNIFICANT CHANGE UP (ref 7–14)
B PERT DNA SPEC QL NAA+PROBE: SIGNIFICANT CHANGE UP
B PERT+PARAPERT DNA PNL SPEC NAA+PROBE: SIGNIFICANT CHANGE UP
BASOPHILS # BLD AUTO: 0.04 K/UL — SIGNIFICANT CHANGE UP (ref 0–0.2)
BASOPHILS NFR BLD AUTO: 0.4 % — SIGNIFICANT CHANGE UP (ref 0–2)
BORDETELLA PARAPERTUSSIS (RAPRVP): SIGNIFICANT CHANGE UP
BUN SERPL-MCNC: 15 MG/DL — SIGNIFICANT CHANGE UP (ref 7–23)
C PNEUM DNA SPEC QL NAA+PROBE: SIGNIFICANT CHANGE UP
CALCIUM SERPL-MCNC: 10.1 MG/DL — SIGNIFICANT CHANGE UP (ref 8.4–10.5)
CHLORIDE SERPL-SCNC: 104 MMOL/L — SIGNIFICANT CHANGE UP (ref 98–107)
CO2 SERPL-SCNC: 30 MMOL/L — SIGNIFICANT CHANGE UP (ref 22–31)
CREAT SERPL-MCNC: 0.45 MG/DL — LOW (ref 0.5–1.3)
EGFR: 92 ML/MIN/1.73M2 — SIGNIFICANT CHANGE UP
EOSINOPHIL # BLD AUTO: 0.12 K/UL — SIGNIFICANT CHANGE UP (ref 0–0.5)
EOSINOPHIL NFR BLD AUTO: 1.3 % — SIGNIFICANT CHANGE UP (ref 0–6)
FLUAV SUBTYP SPEC NAA+PROBE: SIGNIFICANT CHANGE UP
FLUBV RNA SPEC QL NAA+PROBE: SIGNIFICANT CHANGE UP
GLUCOSE SERPL-MCNC: 125 MG/DL — HIGH (ref 70–99)
HADV DNA SPEC QL NAA+PROBE: SIGNIFICANT CHANGE UP
HCOV 229E RNA SPEC QL NAA+PROBE: SIGNIFICANT CHANGE UP
HCOV HKU1 RNA SPEC QL NAA+PROBE: SIGNIFICANT CHANGE UP
HCOV NL63 RNA SPEC QL NAA+PROBE: SIGNIFICANT CHANGE UP
HCOV OC43 RNA SPEC QL NAA+PROBE: SIGNIFICANT CHANGE UP
HCT VFR BLD CALC: 38.9 % — SIGNIFICANT CHANGE UP (ref 34.5–45)
HGB BLD-MCNC: 12.1 G/DL — SIGNIFICANT CHANGE UP (ref 11.5–15.5)
HMPV RNA SPEC QL NAA+PROBE: SIGNIFICANT CHANGE UP
HPIV1 RNA SPEC QL NAA+PROBE: SIGNIFICANT CHANGE UP
HPIV2 RNA SPEC QL NAA+PROBE: SIGNIFICANT CHANGE UP
HPIV3 RNA SPEC QL NAA+PROBE: SIGNIFICANT CHANGE UP
HPIV4 RNA SPEC QL NAA+PROBE: SIGNIFICANT CHANGE UP
IANC: 5.81 K/UL — SIGNIFICANT CHANGE UP (ref 1.8–7.4)
IMM GRANULOCYTES NFR BLD AUTO: 0.2 % — SIGNIFICANT CHANGE UP (ref 0–0.9)
LYMPHOCYTES # BLD AUTO: 2.18 K/UL — SIGNIFICANT CHANGE UP (ref 1–3.3)
LYMPHOCYTES # BLD AUTO: 23.4 % — SIGNIFICANT CHANGE UP (ref 13–44)
M PNEUMO DNA SPEC QL NAA+PROBE: SIGNIFICANT CHANGE UP
MCHC RBC-ENTMCNC: 27.9 PG — SIGNIFICANT CHANGE UP (ref 27–34)
MCHC RBC-ENTMCNC: 31.1 GM/DL — LOW (ref 32–36)
MCV RBC AUTO: 89.6 FL — SIGNIFICANT CHANGE UP (ref 80–100)
MONOCYTES # BLD AUTO: 1.16 K/UL — HIGH (ref 0–0.9)
MONOCYTES NFR BLD AUTO: 12.4 % — SIGNIFICANT CHANGE UP (ref 2–14)
NEUTROPHILS # BLD AUTO: 5.81 K/UL — SIGNIFICANT CHANGE UP (ref 1.8–7.4)
NEUTROPHILS NFR BLD AUTO: 62.3 % — SIGNIFICANT CHANGE UP (ref 43–77)
NRBC # BLD: 0 /100 WBCS — SIGNIFICANT CHANGE UP (ref 0–0)
NRBC # FLD: 0 K/UL — SIGNIFICANT CHANGE UP (ref 0–0)
PLATELET # BLD AUTO: 335 K/UL — SIGNIFICANT CHANGE UP (ref 150–400)
POTASSIUM SERPL-MCNC: 3.8 MMOL/L — SIGNIFICANT CHANGE UP (ref 3.5–5.3)
POTASSIUM SERPL-SCNC: 3.8 MMOL/L — SIGNIFICANT CHANGE UP (ref 3.5–5.3)
RAPID RVP RESULT: DETECTED
RBC # BLD: 4.34 M/UL — SIGNIFICANT CHANGE UP (ref 3.8–5.2)
RBC # FLD: 15.2 % — HIGH (ref 10.3–14.5)
RSV RNA SPEC QL NAA+PROBE: SIGNIFICANT CHANGE UP
RV+EV RNA SPEC QL NAA+PROBE: DETECTED
SARS-COV-2 RNA SPEC QL NAA+PROBE: SIGNIFICANT CHANGE UP
SODIUM SERPL-SCNC: 146 MMOL/L — HIGH (ref 135–145)
WBC # BLD: 9.33 K/UL — SIGNIFICANT CHANGE UP (ref 3.8–10.5)
WBC # FLD AUTO: 9.33 K/UL — SIGNIFICANT CHANGE UP (ref 3.8–10.5)

## 2023-02-04 PROCEDURE — 99232 SBSQ HOSP IP/OBS MODERATE 35: CPT

## 2023-02-04 PROCEDURE — 99231 SBSQ HOSP IP/OBS SF/LOW 25: CPT

## 2023-02-04 RX ORDER — OLANZAPINE 15 MG/1
1.25 TABLET, FILM COATED ORAL AT BEDTIME
Refills: 0 | Status: DISCONTINUED | OUTPATIENT
Start: 2023-02-04 | End: 2023-02-08

## 2023-02-04 RX ORDER — OLANZAPINE 15 MG/1
1.25 TABLET, FILM COATED ORAL ONCE
Refills: 0 | Status: COMPLETED | OUTPATIENT
Start: 2023-02-04 | End: 2023-02-04

## 2023-02-04 RX ADMIN — HALOPERIDOL DECANOATE 1 MILLIGRAM(S): 100 INJECTION INTRAMUSCULAR at 08:42

## 2023-02-04 RX ADMIN — LIDOCAINE 1 PATCH: 4 CREAM TOPICAL at 11:15

## 2023-02-04 RX ADMIN — Medication 81 MILLIGRAM(S): at 08:42

## 2023-02-04 RX ADMIN — Medication 1 DROP(S): at 08:43

## 2023-02-04 RX ADMIN — OLANZAPINE 1.25 MILLIGRAM(S): 15 TABLET, FILM COATED ORAL at 13:33

## 2023-02-04 RX ADMIN — ENOXAPARIN SODIUM 40 MILLIGRAM(S): 100 INJECTION SUBCUTANEOUS at 08:42

## 2023-02-04 RX ADMIN — ALBUTEROL 2 PUFF(S): 90 AEROSOL, METERED ORAL at 08:38

## 2023-02-04 RX ADMIN — LIDOCAINE 1 PATCH: 4 CREAM TOPICAL at 07:15

## 2023-02-04 RX ADMIN — POLYETHYLENE GLYCOL 3350 17 GRAM(S): 17 POWDER, FOR SOLUTION ORAL at 08:42

## 2023-02-04 RX ADMIN — ALBUTEROL 2 PUFF(S): 90 AEROSOL, METERED ORAL at 17:54

## 2023-02-04 NOTE — BH INPATIENT PSYCHIATRY PROGRESS NOTE - CURRENT MEDICATION
MEDICATIONS  (STANDING):  artificial  tears Solution 1 Drop(s) Both EYES two times a day  aspirin enteric coated 81 milliGRAM(s) Oral daily  enoxaparin Injectable 40 milliGRAM(s) SubCutaneous <User Schedule>  lidocaine   4% Patch 1 Patch Transdermal every 24 hours  metoprolol succinate ER 50 milliGRAM(s) Oral daily  OLANZapine 1.25 milliGRAM(s) Oral at bedtime  polyethylene glycol 3350 17 Gram(s) Oral daily  senna 2 Tablet(s) Oral at bedtime    MEDICATIONS  (PRN):  acetaminophen     Tablet .. 650 milliGRAM(s) Oral every 6 hours PRN Mild Pain (1 - 3), Moderate Pain (4 - 6), Severe Pain (7 - 10)  albuterol    90 MICROgram(s) HFA Inhaler 2 Puff(s) Inhalation every 6 hours PRN Shortness of Breath and/or Wheezing  aluminum hydroxide/magnesium hydroxide/simethicone Suspension 30 milliLiter(s) Oral every 4 hours PRN Dyspepsia  benzocaine 15 mG/menthol 3.6 mG Lozenge 1 Lozenge Oral every 2 hours PRN sore throat  bisacodyl 5 milliGRAM(s) Oral every 12 hours PRN Constipation  melatonin. 3 milliGRAM(s) Oral at bedtime PRN Insomnia  OLANZapine 1.25 milliGRAM(s) Oral every 6 hours PRN agitation  OLANZapine Injectable 1.25 milliGRAM(s) IntraMuscular once PRN agitation

## 2023-02-04 NOTE — CONSULT NOTE ADULT - SUBJECTIVE AND OBJECTIVE BOX
HPI:   88 year old female with Bipolar Disorder now with exacerbation.  Pt agitated during night with poor sleep.  Early this AM pt with fluctuating oxygen sats.  Later sats wnl high 90s.  Pt with Asthma and requested inhaler in AM.    PAST MEDICAL & SURGICAL HISTORY:  Anxiety      CVA (Cerebral Infarction)  2007 per daughter      Diverticulosis of intestine      GERD (gastroesophageal reflux disease)      Asthma      Cerebrovascular accident (CVA), unspecified mechanism      Vertebral fracture, osteoporotic      S/P Tonsillectomy      History of tonsillectomy          Allergies    iodine (Unknown)  IV Contrast (Unknown)  IV DYE- burning, head tingling (Unknown)  novacaine (Unknown)            Social History:  -CIGS, -ETOH, -DRUGS    FAMILY HISTORY:  Family history of amyotrophic lateral sclerosis  Daughter    Family history of multiple sclerosis (Child)        MEDICATIONS  (STANDING):  artificial  tears Solution 1 Drop(s) Both EYES two times a day  aspirin enteric coated 81 milliGRAM(s) Oral daily  enoxaparin Injectable 40 milliGRAM(s) SubCutaneous <User Schedule>  lidocaine   4% Patch 1 Patch Transdermal every 24 hours  metoprolol succinate ER 50 milliGRAM(s) Oral daily  OLANZapine 1.25 milliGRAM(s) Oral at bedtime  polyethylene glycol 3350 17 Gram(s) Oral daily  senna 2 Tablet(s) Oral at bedtime    MEDICATIONS  (PRN):  acetaminophen     Tablet .. 650 milliGRAM(s) Oral every 6 hours PRN Mild Pain (1 - 3), Moderate Pain (4 - 6), Severe Pain (7 - 10)  albuterol    90 MICROgram(s) HFA Inhaler 2 Puff(s) Inhalation every 6 hours PRN Shortness of Breath and/or Wheezing  aluminum hydroxide/magnesium hydroxide/simethicone Suspension 30 milliLiter(s) Oral every 4 hours PRN Dyspepsia  benzocaine 15 mG/menthol 3.6 mG Lozenge 1 Lozenge Oral every 2 hours PRN sore throat  bisacodyl 5 milliGRAM(s) Oral every 12 hours PRN Constipation  melatonin. 3 milliGRAM(s) Oral at bedtime PRN Insomnia  OLANZapine 1.25 milliGRAM(s) Oral every 6 hours PRN agitation  OLANZapine Injectable 1.25 milliGRAM(s) IntraMuscular once PRN agitation    VS:   T 98.1  132/53  69      PHYSICAL EXAM:  GENERAL: NAD, well-developed  HEAD:  Atraumatic, Normocephalic  EYES: EOMI, conjunctiva and sclera clear  NECK: Supple, No JVD  CHEST/LUNG: Clear to auscultation bilaterally; No wheeze  HEART: Regular rate and rhythm; No murmurs, rubs, or gallops  ABDOMEN: Soft, Nontender, Nondistended; Bowel sounds present  EXTREMITIES:   No clubbing, cyanosis, or edema  NEUROLOGY: non-focal  SKIN: No rashes or lesions    LABS:                        12.1   9.33  )-----------( 335      ( 04 Feb 2023 09:37 )             38.9     02-04    146<H>  |  104  |  15  ----------------------------<  125<H>  3.8   |  30  |  0.45<L>    Ca    10.1      04 Feb 2023 09:37          Consultant(s) Notes Reviewed:  NOTES REVIEWED    Care Discussed with Consultants/Other Providers:  ATTENDING AND STAFF   HPI:   88 year old female with Bipolar Disorder now with exacerbation.  Pt agitated during night with poor sleep.  Early this AM pt with fluctuating oxygen sats.  Later sats wnl high 90s.  Pt with Asthma and requested inhaler in AM.    PAST MEDICAL & SURGICAL HISTORY:  Anxiety      CVA (Cerebral Infarction)  2007 per daughter      Diverticulosis of intestine      GERD (gastroesophageal reflux disease)      Asthma      Cerebrovascular accident (CVA), unspecified mechanism      Vertebral fracture, osteoporotic      S/P Tonsillectomy      History of tonsillectomy          Allergies    iodine (Unknown)  IV Contrast (Unknown)  IV DYE- burning, head tingling (Unknown)  novacaine (Unknown)            Social History:  -CIGS, -ETOH, -DRUGS    FAMILY HISTORY:  Family history of amyotrophic lateral sclerosis  Daughter    Family history of multiple sclerosis (Child)        MEDICATIONS  (STANDING):  artificial  tears Solution 1 Drop(s) Both EYES two times a day  aspirin enteric coated 81 milliGRAM(s) Oral daily  enoxaparin Injectable 40 milliGRAM(s) SubCutaneous <User Schedule>  lidocaine   4% Patch 1 Patch Transdermal every 24 hours  metoprolol succinate ER 50 milliGRAM(s) Oral daily  OLANZapine 1.25 milliGRAM(s) Oral at bedtime  polyethylene glycol 3350 17 Gram(s) Oral daily  senna 2 Tablet(s) Oral at bedtime    MEDICATIONS  (PRN):  acetaminophen     Tablet .. 650 milliGRAM(s) Oral every 6 hours PRN Mild Pain (1 - 3), Moderate Pain (4 - 6), Severe Pain (7 - 10)  albuterol    90 MICROgram(s) HFA Inhaler 2 Puff(s) Inhalation every 6 hours PRN Shortness of Breath and/or Wheezing  aluminum hydroxide/magnesium hydroxide/simethicone Suspension 30 milliLiter(s) Oral every 4 hours PRN Dyspepsia  benzocaine 15 mG/menthol 3.6 mG Lozenge 1 Lozenge Oral every 2 hours PRN sore throat  bisacodyl 5 milliGRAM(s) Oral every 12 hours PRN Constipation  melatonin. 3 milliGRAM(s) Oral at bedtime PRN Insomnia  OLANZapine 1.25 milliGRAM(s) Oral every 6 hours PRN agitation  OLANZapine Injectable 1.25 milliGRAM(s) IntraMuscular once PRN agitation    VS:   T 98.1  132/53  69  Pt now with cold fingers, unable to measure accurate oxygen sat.  Need to warm hands prior to measurement      PHYSICAL EXAM:  GENERAL: NAD, well-developed  HEAD:  Atraumatic, Normocephalic  EYES: EOMI, conjunctiva and sclera clear  NECK: Supple, No JVD  CHEST/LUNG: Clear to auscultation bilaterally; No wheeze  HEART: Regular rate and rhythm; No murmurs, rubs, or gallops  ABDOMEN: Soft, Nontender, Nondistended; Bowel sounds present  EXTREMITIES:   No clubbing, cyanosis, or edema, +cold fingers  NEUROLOGY: non-focal  SKIN: No rashes or lesions    LABS:                        12.1   9.33  )-----------( 335      ( 04 Feb 2023 09:37 )             38.9     02-04    146<H>  |  104  |  15  ----------------------------<  125<H>  3.8   |  30  |  0.45<L>    Ca    10.1      04 Feb 2023 09:37          Consultant(s) Notes Reviewed:  NOTES REVIEWED    Care Discussed with Consultants/Other Providers:  ATTENDING AND STAFF

## 2023-02-04 NOTE — BH INPATIENT PSYCHIATRY PROGRESS NOTE - MSE UNSTRUCTURED FT
Appearance: The patient appears stated age, fair hygiene and dressed appropriately in hospital attire.    Behavior: Not cooperative with interview, initally sleepy then awake and yelling   Motor: No psychomotor agitation or slowing noted, no abnormal movements.   Speech: Hoarse, high pitched  Mood: The patient's mood is "terrible"    Affect: irritable, labile, congruent  Thought process: Overall linear  Thought content: paranoia, alleging staff intentionally trying to harm her   Perception: Refuses to answer today, no known h/o AVH  Cognition: Approx oriented, forgetful. Fair attention. Refused MMSE & MoCA 1/25 & 1/26, 1/30, 2/3.  SI/HI: Refuses to answer today  Insight is poor. Judgment is fair. Impulse control is impaired.

## 2023-02-04 NOTE — BH INPATIENT PSYCHIATRY PROGRESS NOTE - NSBHCHARTREVIEWVS_PSY_A_CORE FT
Vital Signs Last 24 Hrs  T(C): --  T(F): --  HR: --  BP: --  BP(mean): --  RR: --  SpO2: 99% (02-04-23 @ 08:33) (99% - 99%)    Orthostatic VS  02-04-23 @ 08:33  Lying BP: 132/53 HR: 69  Sitting BP: --/-- HR: --  Standing BP: --/-- HR: --  Site: --  Mode: --  Orthostatic VS  02-04-23 @ 08:25  Lying BP: 92/57 HR: 73  Sitting BP: --/-- HR: --  Standing BP: --/-- HR: --  Site: --  Mode: --

## 2023-02-04 NOTE — CONSULT NOTE ADULT - ASSESSMENT
88 year old female with Bipolar Disorder now with exacerbation agitated during night with poor sleep.  Pt early am with fluctuating oxygen saturations normalized in mid to high 90s  later in AM.  Pt with Asthma and asked for a prn inhaler Proair.  1.  RESPIRATORY:  MORNING EPISODE OF DESATURATION AFTER NOT SLEEPING WITH AGITATION, S/P PROAIR INHALER PUFFS, WITH NORMAL OXYGEN SATS SINCE THEN.  LABS DRAWN WITH NORMAL WBC COUNT, LYTES ETC.  RSV PANEL PENDING  2.  ASTHMA:  CONTINUE WITH PRN INHALERS.  3.  BACK PAIN/VERTEBRAL FX:  LIDOCAINE PATCH AND TYLENOL PRN  4.  GERD:  PANTOPRAZOLE.  5.  PSYCH: AS PER ATTENDING  6.  HTN:  METOPROLOL. 88 year old female with Bipolar Disorder now with exacerbation agitated during night with poor sleep.  Pt early am with fluctuating oxygen saturations normalized in mid to high 90s  later in AM.  Pt with Asthma and asked for a prn inhaler Proair.  1.  RESPIRATORY:  MORNING EPISODE OF DESATURATION AFTER NOT SLEEPING WITH AGITATION, S/P PROAIR INHALER PUFFS, WITH NORMAL OXYGEN SATS SINCE THEN.  LABS DRAWN WITH NORMAL WBC COUNT, LYTES ETC.  RSV PANEL PENDING.  Pt now with cold fingers.  Accurate oxygen sats not reliable with cold digits.  2.  ASTHMA:  CONTINUE WITH PRN INHALERS.  3.  BACK PAIN/VERTEBRAL FX:  LIDOCAINE PATCH AND TYLENOL PRN  4.  GERD:  PANTOPRAZOLE.  5.  PSYCH: AS PER ATTENDING  6.  HTN:  METOPROLOL.

## 2023-02-04 NOTE — BH INPATIENT PSYCHIATRY PROGRESS NOTE - NSBHFUPINTERVALHXFT_PSY_A_CORE
Patient seen for BAD1, chart reviewed and discussed with nursing staff. Pt refused all meds yd except Senna. Required IM Haldol 1mg yd at 23:00 due to agitation. Currently on CO due to hospital bed requirement.     Patient is encountered in private room, lying in bed with one to one present. Upon writer's entrance, patient shouts "Go away! Go away!" Writer inquired about pt's pain control, pt endorsing ongoing back pain (unwilling to rate on scale 0-10). Writer initiated conversation about pain management including pharmacologic options, however patient refused to engage in discussion and requested writer to leave. Writer and 1:1 attempted to meet patient's request for water, however pt vacillates between demanding water "immediately" and rejecting water offered because "it's poison;" ultimately did not accept water during encounter. Pt refuses to engage in mental status questions, and refuses to answer any questions about suicidality or perceptual disturbances.     Pt is observed on the unit frequently shouting. Per staff member report, during physical therapy she is noted to shout to a passing staff member "you killed me! you broke my foot!" without provocation.  2/4 PAtient seen for irritability, paranoia. Patient awake entire night. THis sounds hoarse initally had low O2  but may have been artifact as immediately came up on another finger. BPand pusle variable.

## 2023-02-04 NOTE — CONSULT NOTE ADULT - CONSULT REASON
Asked by attending to see this 88 year old female with Bipolar Disorder now with exacerbation with agitation.  Pt agitated all night with poor sleep.  Earlier in AM, pt with fluctuating oxygen sats.  F/U oxygen sats wnl in high 90s.  Pt with Asthma and requested inhaler. Asked by attending to see this 88 year old female with Bipolar Disorder now with exacerbation with agitation.  Pt agitated all night with poor sleep.  Earlier in AM, pt with fluctuating oxygen sats.  F/U oxygen sats wnl in mid to high 90s.  Pt with Asthma and requested inhaler.

## 2023-02-05 PROCEDURE — 99231 SBSQ HOSP IP/OBS SF/LOW 25: CPT

## 2023-02-05 RX ORDER — DIVALPROEX SODIUM 500 MG/1
375 TABLET, DELAYED RELEASE ORAL
Refills: 0 | Status: DISCONTINUED | OUTPATIENT
Start: 2023-02-05 | End: 2023-02-23

## 2023-02-05 RX ADMIN — OLANZAPINE 1.25 MILLIGRAM(S): 15 TABLET, FILM COATED ORAL at 04:13

## 2023-02-05 RX ADMIN — LIDOCAINE 1 PATCH: 4 CREAM TOPICAL at 10:13

## 2023-02-05 RX ADMIN — LIDOCAINE 1 PATCH: 4 CREAM TOPICAL at 18:28

## 2023-02-05 RX ADMIN — LIDOCAINE 1 PATCH: 4 CREAM TOPICAL at 04:23

## 2023-02-05 NOTE — BH INPATIENT PSYCHIATRY PROGRESS NOTE - MSE UNSTRUCTURED FT
10 Appearance: The patient appears stated age, fair hygiene and dressed appropriately in hospital attire.    Behavior: Not cooperative with interview, initally sleepy then awake and yelling   Motor: No psychomotor agitation or slowing noted, no abnormal movements.   Speech: Hoarse, high pitched  Mood: The patient's mood is "terrible"    Affect: irritable, labile, congruent  Thought process: Overall linear  Thought content: paranoia, alleging staff intentionally trying to harm her , claims she had a supply of food in trunk of car she needs to get because she wont be fed here  Perception: Refuses to answer today, no known h/o AVH  Cognition: Said was Jan 2023, forgetful. Fair attention. Refused MMSE & MoCA 1/25 & 1/26, 1/30, 2/3.  SI/HI: Refuses to answer today  Insight is poor. Judgment is fair. Impulse control is impaired.

## 2023-02-05 NOTE — BH INPATIENT PSYCHIATRY PROGRESS NOTE - NSBHASSESSSUMMFT_PSY_ALL_CORE
Dinorah Hogan is an 88 year old female, , domiciled with daughter with PPHx of bipolar I disorder, 1 previous inpatient psychiatric hospitalization in 1970s, no known history of SI/SA and PMHx of asthma, GERD, sciatica with previous vertebral fracture who is admitted on 9.27 status for symptoms of hortencia including irritability, loud speech, and agitation.     DDx: hortencia vs hypomania vs comorbid delirium    Today, patient is angry about her current admission and denying any psychiatric history. She is asking to be discharged immediately and yelling at treatment team. She is noted to have loud and rapid speech, but is able to be interrupted. She states she has been sleeping poorly due to hip and R knee pain. She denies S/IIP, H/IIP, AVH, paranoia, or ideas of reference. She is AOx1-2 (person, place--hospital), but unable to state year or location in [place]. She requires 9.27 inpatient admission for evaluation and management of hortencia.    1/22: less irritable but still requiring PRN med, more compliant with meds, no SI/HI.  1/23: Accepted meds yd and this AM though required PRN yd. Less irritable, appropriately conversant, making needs known. Suspicious bordering on paranoid, however no fully formed delusional thought content apparent.  1/24: Irritable and labile, suspicious regarding staff members. Discussed medication, pt will be offered psychiatric meds and is aware she has right to refuse.  1/25: Hostile, irritable, labile. TOO application in progress given poor insight and intermittent refusal of psychiatric mediations. Pt at risk for deconditioning given refusal to ambulate and minimal engagement with PT, given ambulatory at home refusal here appears to be behavioral, will continue to encourage participation. Refused cognitive evaluation today. Given ongoing intermittent agitation, paranoia, physical deconditioning, and poor insight into condition, pt requires continued inpatient hospitalization for stabilization and safety.  1/26: Pt remains labile, irritable, and paranoid toward staff. Although pt makes provocative statements (e.g. "she controls the robots," "the Jews will hate me"), these statements appear to represent intense feeling rather than fully formed delusion. Continues to refuse cognitive evaluation or consent to contact family. Given ongoing intermittent agitation, paranoia, physical deconditioning, and poor insight into condition, pt requires continued inpatient hospitalization for stabilization and safety.  1/27 Patient agitated, markedly irritable,paranoid  with themes of perrvasive mistreatment, being singled out but w/u well formed , fixed delusional ideas.Cont enocurage med compliance scheduled for court hearing for TOO  1/28: Currently on CO due to hospital bed requirement. Reports fair sleep and appetite. Seen at bedside during rounds. Continues to refuse most medications, took senna and tylenol last night. Reporting back pain "15/10" and requesting more heat packs "I will die without these", however under no visible distress. Primary team pursuing TOO. Renewed CO.   1/29: Renewed CO. Last night took Depakote, Haldol and Senna. Med compliance has been partial.   1/30: Refused all meds yd, this AM accepted metoprolol. Making accusations that people are attempting to harm her and want her to die. Impulse control is impaired.  1/31: Continues with intermittent agitation, hostile and accusatory twd staff, refusing meds (except metoprolol), refusing consent to speak to daughter. Given ongoing agitation, impulsivity, disinhibition, and deconditioning 2/2 refusal to ambulate (also attempted to climb out of bed last night), pt poses a threat of harm to herself and requires ongoing inpatient hospitalization for stabilization and safety. TOO & retention court date next week.  2/1: Continues irritable, paranoid, refusing medications and engagement in interviews.   2/2: Minimally engaged in interview, refusing to answer most questions; labile, hostile, required IM Haldol 1mg overnight for agitation; refusing to ambulate or engage in PT; refusing pain control interventions other than hot packs and lidocaine patch. TOO in process.   2/3:     Plan:  1. Legal: continue 9.27 involuntary admission. TOO application in progress. Pt submitted 9.31.  2. Safety:   - Requires 1:1 CO for fall risk and monitoring of hospital bed   - Olanzapine 2.5 mg PO or IM q6h PRN agitation  3. Psychiatric:  - Valproic acid sprinkle 375mg PO BID for mood stabilization  - Haloperidol 1 mg PO BID for agitation  - Melatonin 3 mg PRN for insomnia  4. Group/Milieu therapy: encourage participation as able  5. Medical:   - Hx of vertebral fractures with ambulatory dysfunction: no acute surgical intervention, patient requires hospital bed, PRN tylenol, lidocaine patch, hot packs  - HTN: metoprolol XL 50 mg daily  - Constipation: miralax and senna daily, PRN bisacodyl q12h  - Newly noted mole: consistent with sebhorreic keratosis  - Dry eyes: Artificial tears 1 drop both eyes BID  6. Collateral/Dispo: refuses consent to speak to family at this time.  2/4 Cont agitated poor sleep, this Am had vairable O2 sat, patient reported she feels she needs an inhaler for asthma. Patient to be seen by medicine, labs and RVP ordered. Will change haldol to olanzapine standing as response to prns of haldol and occ po's have had poor response.   2/5 Paranoid, severely agitated. Has URI. COnt meds prn inhlaer scheuled for TOO 2/7

## 2023-02-05 NOTE — BH INPATIENT PSYCHIATRY PROGRESS NOTE - NSBHFUPINTERVALHXFT_PSY_A_CORE
Patient seen for BAD1, chart reviewed and discussed with nursing staff. Pt refused all meds yd except Senna. Required IM Haldol 1mg yd at 23:00 due to agitation. Currently on CO due to hospital bed requirement.     Patient is encountered in private room, lying in bed with one to one present. Upon writer's entrance, patient shouts "Go away! Go away!" Writer inquired about pt's pain control, pt endorsing ongoing back pain (unwilling to rate on scale 0-10). Writer initiated conversation about pain management including pharmacologic options, however patient refused to engage in discussion and requested writer to leave. Writer and 1:1 attempted to meet patient's request for water, however pt vacillates between demanding water "immediately" and rejecting water offered because "it's poison;" ultimately did not accept water during encounter. Pt refuses to engage in mental status questions, and refuses to answer any questions about suicidality or perceptual disturbances.     Pt is observed on the unit frequently shouting. Per staff member report, during physical therapy she is noted to shout to a passing staff member "you killed me! you broke my foot!" without provocation.  2/5 Patient seen for irritability, paranoia. Patient awake much of night VSS. Screaming at staff for help then rejecting help.  Had benign cold virus. Eating okay. Cont to refusing standing psych meds most of time

## 2023-02-05 NOTE — BH INPATIENT PSYCHIATRY PROGRESS NOTE - NSBHCHARTREVIEWVS_PSY_A_CORE FT
Vital Signs Last 24 Hrs  T(C): 36.7 (02-04-23 @ 10:02), Max: 36.7 (02-04-23 @ 10:02)  T(F): 98.1 (02-04-23 @ 10:02), Max: 98.1 (02-04-23 @ 10:02)  HR: 98 (02-04-23 @ 20:42) (98 - 98)  BP: 140/80 (02-04-23 @ 20:42) (140/80 - 140/80)  BP(mean): --  RR: --  SpO2: 99% (02-04-23 @ 20:42) (99% - 99%)    Orthostatic VS  02-04-23 @ 08:33  Lying BP: 132/53 HR: 69  Sitting BP: --/-- HR: --  Standing BP: --/-- HR: --  Site: --  Mode: --  Orthostatic VS  02-04-23 @ 08:25  Lying BP: 92/57 HR: 73  Sitting BP: --/-- HR: --  Standing BP: --/-- HR: --  Site: --  Mode: --

## 2023-02-05 NOTE — BH INPATIENT PSYCHIATRY PROGRESS NOTE - CURRENT MEDICATION
MEDICATIONS  (STANDING):  artificial  tears Solution 1 Drop(s) Both EYES two times a day  aspirin enteric coated 81 milliGRAM(s) Oral daily  divalproex Sprinkle 375 milliGRAM(s) Oral two times a day  enoxaparin Injectable 40 milliGRAM(s) SubCutaneous <User Schedule>  lidocaine   4% Patch 1 Patch Transdermal every 24 hours  metoprolol succinate ER 50 milliGRAM(s) Oral daily  OLANZapine 1.25 milliGRAM(s) Oral at bedtime  polyethylene glycol 3350 17 Gram(s) Oral daily  senna 2 Tablet(s) Oral at bedtime    MEDICATIONS  (PRN):  acetaminophen     Tablet .. 650 milliGRAM(s) Oral every 6 hours PRN Mild Pain (1 - 3), Moderate Pain (4 - 6), Severe Pain (7 - 10)  albuterol    90 MICROgram(s) HFA Inhaler 2 Puff(s) Inhalation every 6 hours PRN Shortness of Breath and/or Wheezing  aluminum hydroxide/magnesium hydroxide/simethicone Suspension 30 milliLiter(s) Oral every 4 hours PRN Dyspepsia  benzocaine 15 mG/menthol 3.6 mG Lozenge 1 Lozenge Oral every 2 hours PRN sore throat  bisacodyl 5 milliGRAM(s) Oral every 12 hours PRN Constipation  melatonin. 3 milliGRAM(s) Oral at bedtime PRN Insomnia  OLANZapine 1.25 milliGRAM(s) Oral every 6 hours PRN agitation  OLANZapine Injectable 1.25 milliGRAM(s) IntraMuscular once PRN agitation

## 2023-02-06 PROCEDURE — 99232 SBSQ HOSP IP/OBS MODERATE 35: CPT

## 2023-02-06 PROCEDURE — 99232 SBSQ HOSP IP/OBS MODERATE 35: CPT | Mod: GC

## 2023-02-06 RX ADMIN — ALBUTEROL 2 PUFF(S): 90 AEROSOL, METERED ORAL at 00:13

## 2023-02-06 RX ADMIN — OLANZAPINE 1.25 MILLIGRAM(S): 15 TABLET, FILM COATED ORAL at 00:15

## 2023-02-06 RX ADMIN — LIDOCAINE 1 PATCH: 4 CREAM TOPICAL at 00:15

## 2023-02-06 RX ADMIN — Medication 81 MILLIGRAM(S): at 09:36

## 2023-02-06 RX ADMIN — LIDOCAINE 1 PATCH: 4 CREAM TOPICAL at 12:25

## 2023-02-06 NOTE — BH INPATIENT PSYCHIATRY PROGRESS NOTE - NSBHCHARTREVIEWVS_PSY_A_CORE FT
Vital Signs Last 24 Hrs  T(C): 36.7 (02-06-23 @ 07:53), Max: 36.7 (02-06-23 @ 07:53)  T(F): 98.1 (02-06-23 @ 07:53), Max: 98.1 (02-06-23 @ 07:53)  HR: --  BP: --  BP(mean): --  RR: --  SpO2: --    Orthostatic VS  02-06-23 @ 07:53  Lying BP: --/-- HR: --  Sitting BP: 134/110 HR: 89  Standing BP: --/-- HR: --  Site: --  Mode: --

## 2023-02-06 NOTE — BH INPATIENT PSYCHIATRY PROGRESS NOTE - MSE UNSTRUCTURED FT
Appearance: The patient appears stated age, poor hygiene, and dressed appropriately in hospital attire.    Behavior: Not cooperative with interview, intermittently shouting and hostile   Motor: No psychomotor agitation or slowing noted, no abnormal movements.   Speech: Hoarse, high pitched  Mood: The patient's mood is "terrible"    Affect: irritable, labile, congruent  Thought process: Overall linear  Thought content: paranoia, alleging staff intentionally trying to harm her, accusing writer of evil  Perception: Refuses to answer today, no known h/o AVH  Cognition: Forgetful. Fair attention. Refused orientation questions today. Refused MMSE & MoCA 1/25 & 1/26, 1/30, 2/3, 2/6.  SI/HI: Refuses to answer today  Insight is poor. Judgment is fair. Impulse control is impaired.

## 2023-02-06 NOTE — PROGRESS NOTE ADULT - SUBJECTIVE AND OBJECTIVE BOX
Patient is a 88y old  Female who presents with a chief complaint of EXACERBATION OF BIPOLAR DISORDER (04 Feb 2023 14:34)      SUBJECTIVE / OVERNIGHT EVENTS:    No events overnight. This AM, patient without n/v/d/cp/sob.      MEDICATIONS  (STANDING):  artificial  tears Solution 1 Drop(s) Both EYES two times a day  aspirin enteric coated 81 milliGRAM(s) Oral daily  divalproex Sprinkle 375 milliGRAM(s) Oral two times a day  enoxaparin Injectable 40 milliGRAM(s) SubCutaneous <User Schedule>  lidocaine   4% Patch 1 Patch Transdermal every 24 hours  metoprolol succinate ER 50 milliGRAM(s) Oral daily  OLANZapine 1.25 milliGRAM(s) Oral at bedtime  polyethylene glycol 3350 17 Gram(s) Oral daily  senna 2 Tablet(s) Oral at bedtime    MEDICATIONS  (PRN):  acetaminophen     Tablet .. 650 milliGRAM(s) Oral every 6 hours PRN Mild Pain (1 - 3), Moderate Pain (4 - 6), Severe Pain (7 - 10)  albuterol    90 MICROgram(s) HFA Inhaler 2 Puff(s) Inhalation every 6 hours PRN Shortness of Breath and/or Wheezing  aluminum hydroxide/magnesium hydroxide/simethicone Suspension 30 milliLiter(s) Oral every 4 hours PRN Dyspepsia  benzocaine 15 mG/menthol 3.6 mG Lozenge 1 Lozenge Oral every 2 hours PRN sore throat  bisacodyl 5 milliGRAM(s) Oral every 12 hours PRN Constipation  melatonin. 3 milliGRAM(s) Oral at bedtime PRN Insomnia  OLANZapine 1.25 milliGRAM(s) Oral every 6 hours PRN agitation  OLANZapine Injectable 1.25 milliGRAM(s) IntraMuscular once PRN agitation      PHYSICAL EXAM:  T(C): 36.7 (02-06-23 @ 07:53), Max: 36.7 (02-06-23 @ 07:53)  HR: --  BP: --  RR: --  SpO2: --  I&O's Summary    GENERAL: NAD, well-developed, seated in chair  HEAD:  Atraumatic, Normocephalic, MMM  CHEST/LUNG: No use of accessory muscles, CTAB, breathing non-labored  COR: RR, no mrcg  ABD: Soft, ND/NT, +BS  PSYCH: AAOxperson/place  NEUROLOGY: CN II-XII grossly intact, moving all extremities  SKIN: No rashes or lesions  EXT: wwp, no cce    LABS:  CAPILLARY BLOOD GLUCOSE                            RADIOLOGY & ADDITIONAL TESTS:    Telemetry Personally Reviewed -     Imaging Personally Reviewed -     Imaging Reviewed -     Consultant(s) Notes Reviewed -       Care Discussed with Consultants/Other Providers -

## 2023-02-06 NOTE — BH INPATIENT PSYCHIATRY PROGRESS NOTE - NSBHFUPINTERVALHXFT_PSY_A_CORE
Patient seen for BAD1, chart reviewed and discussed with nursing staff. Pt refused all meds yd 2/5, required PRN Seroquel 1.25mg PO yd at 04:00 and overnight at midnight due to agitation. Tested (+) for URI (enter/rhino), using PRN albuterol inhaler with adequate relief. Currently on CO due to hospital bed requirement.     Patient is encountered in day room. When writer initiates interview, pt states "you think you're the smartest person in the universe, but you don't know anything." She is not willing to engage appropriately in interview, comparing writer to historical figures, stating "you're like [place] of [facility], she was evil and power-hungry." Pt does not respond appropriately to questions about mood, perceptual disturbances, safety, sleep, appetite, or physical symptoms. Does not engage in discussion of medication  management. Requests to be transferred to alternate hospital and states that the only thing that will make her feel better is to "get out of here!"Pt is observed on the unit frequently shouting. Per staff member report, during physical therapy she is noted to shout to a passing staff member "you killed me! you broke my foot!" without provocation.

## 2023-02-06 NOTE — PROGRESS NOTE ADULT - ASSESSMENT
88 year old female with Bipolar Disorder now with exacerbation agitated during night with poor sleep.  Pt with Asthma and asked for a prn inhaler Proair.      1.  Chronic asthma w/o exacerbation:  patient with rhinovirus on RVP, with normal oxygen saturation on RA. Lungs are without wheezing. No respiratory distress noted on exam. Would c/w as needed albuterol inhaler; no indication for standing order at this time.  2.  BACK PAIN/VERTEBRAL FX:  c/w LIDOCAINE PATCH AND TYLENOL PRN  3.  GERD:  PANTOPRAZOLE.  4.  PSYCH: AS PER ATTENDING  5.  HTN: BP acceptable. c/w METOPROLOL.

## 2023-02-06 NOTE — BH INPATIENT PSYCHIATRY PROGRESS NOTE - NSBHASSESSSUMMFT_PSY_ALL_CORE
Dinorah Hogan is an 88 year old female, , domiciled with daughter with PPHx of bipolar I disorder, 1 previous inpatient psychiatric hospitalization in 1970s, no known history of SI/SA and PMHx of asthma, GERD, sciatica with previous vertebral fracture who is admitted on 9.27 status for symptoms of hortencia including irritability, loud speech, and agitation.     DDx: hortencia vs hypomania vs comorbid delirium    Today, patient is angry about her current admission and denying any psychiatric history. She is asking to be discharged immediately and yelling at treatment team. She is noted to have loud and rapid speech, but is able to be interrupted. She states she has been sleeping poorly due to hip and R knee pain. She denies S/IIP, H/IIP, AVH, paranoia, or ideas of reference. She is AOx1-2 (person, place--hospital), but unable to state year or location in [place]. She requires 9.27 inpatient admission for evaluation and management of hortencia.    1/22: less irritable but still requiring PRN med, more compliant with meds, no SI/HI.  1/23: Accepted meds yd and this AM though required PRN yd. Less irritable, appropriately conversant, making needs known. Suspicious bordering on paranoid, however no fully formed delusional thought content apparent.  1/24: Irritable and labile, suspicious regarding staff members. Discussed medication, pt will be offered psychiatric meds and is aware she has right to refuse.  1/25: Hostile, irritable, labile. TOO application in progress given poor insight and intermittent refusal of psychiatric mediations. Pt at risk for deconditioning given refusal to ambulate and minimal engagement with PT, given ambulatory at home refusal here appears to be behavioral, will continue to encourage participation. Refused cognitive evaluation today. Given ongoing intermittent agitation, paranoia, physical deconditioning, and poor insight into condition, pt requires continued inpatient hospitalization for stabilization and safety.  1/26: Pt remains labile, irritable, and paranoid toward staff. Although pt makes provocative statements (e.g. "she controls the robots," "the Jews will hate me"), these statements appear to represent intense feeling rather than fully formed delusion. Continues to refuse cognitive evaluation or consent to contact family. Given ongoing intermittent agitation, paranoia, physical deconditioning, and poor insight into condition, pt requires continued inpatient hospitalization for stabilization and safety.  1/27 Patient agitated, markedly irritable,paranoid  with themes of perrvasive mistreatment, being singled out but w/u well formed , fixed delusional ideas.Cont enocurage med compliance scheduled for court hearing for TOO  1/28: Currently on CO due to hospital bed requirement. Reports fair sleep and appetite. Seen at bedside during rounds. Continues to refuse most medications, took senna and tylenol last night. Reporting back pain "15/10" and requesting more heat packs "I will die without these", however under no visible distress. Primary team pursuing TOO. Renewed CO.   1/29: Renewed CO. Last night took Depakote, Haldol and Senna. Med compliance has been partial.   1/30: Refused all meds yd, this AM accepted metoprolol. Making accusations that people are attempting to harm her and want her to die. Impulse control is impaired.  1/31: Continues with intermittent agitation, hostile and accusatory twd staff, refusing meds (except metoprolol), refusing consent to speak to daughter. Given ongoing agitation, impulsivity, disinhibition, and deconditioning 2/2 refusal to ambulate (also attempted to climb out of bed last night), pt poses a threat of harm to herself and requires ongoing inpatient hospitalization for stabilization and safety. TOO & retention court date next week.  2/1: Continues irritable, paranoid, refusing medications and engagement in interviews.   2/2: Minimally engaged in interview, refusing to answer most questions; labile, hostile, required IM Haldol 1mg overnight for agitation; refusing to ambulate or engage in PT; refusing pain control interventions other than hot packs and lidocaine patch. TOO in process.   2/4 Cont agitated poor sleep, this Am had vairable O2 sat, patient reported she feels she needs an inhaler for asthma. Patient to be seen by medicine, labs and RVP ordered. Will change haldol to olanzapine standing as response to prns of haldol and occ po's have had poor response.   2/5 Paranoid, severely agitated. Has URI. COnt meds prn inhlaer scheuled for TOO 2/7 2/6: Remains paranoid, agitated, accusatory. Frequently refusing meds, required 3x olanzapine PRNs over the weekend.    Plan:  1. Legal: continue 9.27 involuntary admission. TOO application in progress. Pt submitted 9.31.  2. Safety:   - Requires 1:1 CO for fall risk and monitoring of hospital bed   - Olanzapine 1.25 mg PO or IM q6h PRN agitation  3. Psychiatric:  - Valproic acid sprinkle 375mg PO BID for mood stabilization  - Haloperidol 1 mg PO BID for agitation  - Melatonin 3 mg PRN for insomnia  4. Group/Milieu therapy: encourage participation as able  5. Medical:   - Hx of vertebral fractures with ambulatory dysfunction: no acute surgical intervention, patient requires hospital bed, PRN tylenol, lidocaine patch, hot packs  - HTN: metoprolol XL 50 mg daily  - Constipation: miralax and senna daily, PRN bisacodyl q12h  - Newly noted mole: consistent with sebhorreic keratosis  - Dry eyes: Artificial tears 1 drop both eyes BID  - URI: symptomatic management, PRN albuterol inhaler  6. Collateral/Dispo: refuses consent to speak to family at this time.

## 2023-02-07 RX ORDER — OLANZAPINE 15 MG/1
1.25 TABLET, FILM COATED ORAL AT BEDTIME
Refills: 0 | Status: DISCONTINUED | OUTPATIENT
Start: 2023-02-07 | End: 2023-02-09

## 2023-02-07 RX ADMIN — LIDOCAINE 1 PATCH: 4 CREAM TOPICAL at 22:16

## 2023-02-07 RX ADMIN — Medication 1 DROP(S): at 22:18

## 2023-02-07 RX ADMIN — Medication 81 MILLIGRAM(S): at 09:50

## 2023-02-07 RX ADMIN — Medication 50 MILLIGRAM(S): at 09:50

## 2023-02-07 RX ADMIN — Medication 3 MILLIGRAM(S): at 22:49

## 2023-02-07 RX ADMIN — DIVALPROEX SODIUM 375 MILLIGRAM(S): 500 TABLET, DELAYED RELEASE ORAL at 22:17

## 2023-02-07 RX ADMIN — Medication 5 MILLIGRAM(S): at 10:06

## 2023-02-07 RX ADMIN — OLANZAPINE 1.25 MILLIGRAM(S): 15 TABLET, FILM COATED ORAL at 22:17

## 2023-02-07 RX ADMIN — SENNA PLUS 2 TABLET(S): 8.6 TABLET ORAL at 22:17

## 2023-02-07 NOTE — BH INPATIENT PSYCHIATRY PROGRESS NOTE - NSBHATTESTCOMMENTATTENDFT_PSY_A_CORE
See fellow note for details. I saw and evaluated patient. I agree with assessment and plan of resident. Patient irritable, accusatory, encourage compliance encourage her permission to speak with family plan d/c klonopin
See resident note for details. I saw and evaluated patient. I agree with assessment and plan of resident. Patient paranoid, irritable, appears to have sig psychogenic component to non ambulation. Cont to encourage compliance, will start application for TOO
See resident note for details. I saw and evaluated patient. MADHAVI palm with assessment and plan of resident. Patient with erratic compliance, cont encourage meds and plan for TOO hearing
See resident note for details. I saw and evaluated patient . I agree with assessment and plan of resident. Patient agitated confused, labile paranoid. Refusing important medical meds. Plan Court for TOO
See resident note for details. I saw and evaluated patient. I agree with assessment and plan of resident. Patient screaming, terrified, agitated this PM , needed prn. Encourage compliance but have scheduled TOO hearing. 
See resident note for details. I saw and evaluated patient. I agree with assessment and plan of resident. Plan encourage med compliance pending TOO hearing
See resident note for details. I saw and evaluated patient. I agree with assessment and plan of resident. PAtient severely agitated, paranoid with impairment in function and sig subjective distress plan cont plan for TOO
See resident note for detail.s I saw and evaluated patient. I agree with assessment and plan of resident. Cont plan for MOO
See resident note for details. I saw and evaluated patient. I agree with assessment and plan of  resident. Patient notably agitated paranoia severely distressed taking meds intermittently. Plan cont med and plan for TOO. Seen by medicine no need for standing inhalers
See resident note for details. I saw and evaluated patient. I agree with assessment and plan of resident.  Hospital granted TOO, 9.31 declined. Will add hs olanzapine IM prn refusal of po
See resident note for details. I saw and evaluated patient. I agree with assessment and plan of resident. Patient cont severely agitated, paranoid refusing psych and medical meds. Offered pain management which she refuses

## 2023-02-07 NOTE — BH INPATIENT PSYCHIATRY PROGRESS NOTE - CURRENT MEDICATION
MEDICATIONS  (STANDING):  artificial  tears Solution 1 Drop(s) Both EYES two times a day  aspirin enteric coated 81 milliGRAM(s) Oral daily  divalproex Sprinkle 375 milliGRAM(s) Oral two times a day  enoxaparin Injectable 40 milliGRAM(s) SubCutaneous <User Schedule>  lidocaine   4% Patch 1 Patch Transdermal every 24 hours  metoprolol succinate ER 50 milliGRAM(s) Oral daily  OLANZapine 1.25 milliGRAM(s) Oral at bedtime  polyethylene glycol 3350 17 Gram(s) Oral daily  senna 2 Tablet(s) Oral at bedtime    MEDICATIONS  (PRN):  acetaminophen     Tablet .. 650 milliGRAM(s) Oral every 6 hours PRN Mild Pain (1 - 3), Moderate Pain (4 - 6), Severe Pain (7 - 10)  albuterol    90 MICROgram(s) HFA Inhaler 2 Puff(s) Inhalation every 6 hours PRN Shortness of Breath and/or Wheezing  aluminum hydroxide/magnesium hydroxide/simethicone Suspension 30 milliLiter(s) Oral every 4 hours PRN Dyspepsia  benzocaine 15 mG/menthol 3.6 mG Lozenge 1 Lozenge Oral every 2 hours PRN sore throat  bisacodyl 5 milliGRAM(s) Oral every 12 hours PRN Constipation  melatonin. 3 milliGRAM(s) Oral at bedtime PRN Insomnia  OLANZapine 1.25 milliGRAM(s) Oral every 6 hours PRN agitation  OLANZapine Injectable 1.25 milliGRAM(s) IntraMuscular once PRN agitation   MEDICATIONS  (STANDING):  artificial  tears Solution 1 Drop(s) Both EYES two times a day  aspirin enteric coated 81 milliGRAM(s) Oral daily  divalproex Sprinkle 375 milliGRAM(s) Oral two times a day  enoxaparin Injectable 40 milliGRAM(s) SubCutaneous <User Schedule>  lidocaine   4% Patch 1 Patch Transdermal every 24 hours  metoprolol succinate ER 50 milliGRAM(s) Oral daily  OLANZapine 1.25 milliGRAM(s) Oral at bedtime  polyethylene glycol 3350 17 Gram(s) Oral daily  senna 2 Tablet(s) Oral at bedtime    MEDICATIONS  (PRN):  acetaminophen     Tablet .. 650 milliGRAM(s) Oral every 6 hours PRN Mild Pain (1 - 3), Moderate Pain (4 - 6), Severe Pain (7 - 10)  albuterol    90 MICROgram(s) HFA Inhaler 2 Puff(s) Inhalation every 6 hours PRN Shortness of Breath and/or Wheezing  aluminum hydroxide/magnesium hydroxide/simethicone Suspension 30 milliLiter(s) Oral every 4 hours PRN Dyspepsia  benzocaine 15 mG/menthol 3.6 mG Lozenge 1 Lozenge Oral every 2 hours PRN sore throat  bisacodyl 5 milliGRAM(s) Oral every 12 hours PRN Constipation  melatonin. 3 milliGRAM(s) Oral at bedtime PRN Insomnia  OLANZapine 1.25 milliGRAM(s) Oral every 6 hours PRN agitation  OLANZapine Injectable 1.25 milliGRAM(s) IntraMuscular at bedtime PRN refusal of po olanzpine  OLANZapine Injectable 1.25 milliGRAM(s) IntraMuscular once PRN agitation

## 2023-02-07 NOTE — BH INPATIENT PSYCHIATRY PROGRESS NOTE - NSBHFUPINTERVALHXFT_PSY_A_CORE
Patient seen for BAD1, chart reviewed and discussed with nursing staff. Pt refused all meds except ASA yd. No psychiatric PRNs requested or required. Tested (+) for URI (enter/rhino), using PRN albuterol inhaler with adequate relief. Currently on CO due to hospital bed requirement.     Patient is encountered in private room eating breakfast. She endorses ongoing chronic back pain and accepts a hot pack, however otherwise is not willing to engage appropriately in interview and makes hostile statements toward the writer. Informed of court process today for retention and TOO, informed that she has legal representation and the right to appear in court. Pt states there is "no point" in attending because "it's all stacked against me anyway" and she accuses writer of making up lies about her. Pt does not respond appropriately to questions about mood, perceptual disturbances, safety, sleep, appetite, or physical symptoms. Does not engage in discussion of medication  management.

## 2023-02-07 NOTE — BH INPATIENT PSYCHIATRY PROGRESS NOTE - NSBHASSESSSUMMFT_PSY_ALL_CORE
Dinorah Hogan is an 88 year old female, , domiciled with daughter with PPHx of bipolar I disorder, 1 previous inpatient psychiatric hospitalization in 1970s, no known history of SI/SA and PMHx of asthma, GERD, sciatica with previous vertebral fracture who is admitted on 9.27 status for symptoms of hortencia including irritability, loud speech, and agitation.     DDx: hortencia vs hypomania vs comorbid delirium    Today, patient is angry about her current admission and denying any psychiatric history. She is asking to be discharged immediately and yelling at treatment team. She is noted to have loud and rapid speech, but is able to be interrupted. She states she has been sleeping poorly due to hip and R knee pain. She denies S/IIP, H/IIP, AVH, paranoia, or ideas of reference. She is AOx1-2 (person, place--hospital), but unable to state year or location in [place]. She requires 9.27 inpatient admission for evaluation and management of hortencia.    1/22: less irritable but still requiring PRN med, more compliant with meds, no SI/HI.  1/23: Accepted meds yd and this AM though required PRN yd. Less irritable, appropriately conversant, making needs known. Suspicious bordering on paranoid, however no fully formed delusional thought content apparent.  1/24: Irritable and labile, suspicious regarding staff members. Discussed medication, pt will be offered psychiatric meds and is aware she has right to refuse.  1/25: Hostile, irritable, labile. TOO application in progress given poor insight and intermittent refusal of psychiatric mediations. Pt at risk for deconditioning given refusal to ambulate and minimal engagement with PT, given ambulatory at home refusal here appears to be behavioral, will continue to encourage participation. Refused cognitive evaluation today. Given ongoing intermittent agitation, paranoia, physical deconditioning, and poor insight into condition, pt requires continued inpatient hospitalization for stabilization and safety.  1/26: Pt remains labile, irritable, and paranoid toward staff. Although pt makes provocative statements (e.g. "she controls the robots," "the Jews will hate me"), these statements appear to represent intense feeling rather than fully formed delusion. Continues to refuse cognitive evaluation or consent to contact family. Given ongoing intermittent agitation, paranoia, physical deconditioning, and poor insight into condition, pt requires continued inpatient hospitalization for stabilization and safety.  1/27 Patient agitated, markedly irritable,paranoid  with themes of perrvasive mistreatment, being singled out but w/u well formed , fixed delusional ideas.Cont enocurage med compliance scheduled for court hearing for TOO  1/28: Currently on CO due to hospital bed requirement. Reports fair sleep and appetite. Seen at bedside during rounds. Continues to refuse most medications, took senna and tylenol last night. Reporting back pain "15/10" and requesting more heat packs "I will die without these", however under no visible distress. Primary team pursuing TOO. Renewed CO.   1/29: Renewed CO. Last night took Depakote, Haldol and Senna. Med compliance has been partial.   1/30: Refused all meds yd, this AM accepted metoprolol. Making accusations that people are attempting to harm her and want her to die. Impulse control is impaired.  1/31: Continues with intermittent agitation, hostile and accusatory twd staff, refusing meds (except metoprolol), refusing consent to speak to daughter. Given ongoing agitation, impulsivity, disinhibition, and deconditioning 2/2 refusal to ambulate (also attempted to climb out of bed last night), pt poses a threat of harm to herself and requires ongoing inpatient hospitalization for stabilization and safety. TOO & retention court date next week.  2/1: Continues irritable, paranoid, refusing medications and engagement in interviews.   2/2: Minimally engaged in interview, refusing to answer most questions; labile, hostile, required IM Haldol 1mg overnight for agitation; refusing to ambulate or engage in PT; refusing pain control interventions other than hot packs and lidocaine patch. TOO in process.   2/4 Cont agitated poor sleep, this Am had vairable O2 sat, patient reported she feels she needs an inhaler for asthma. Patient to be seen by medicine, labs and RVP ordered. Will change haldol to olanzapine standing as response to prns of haldol and occ po's have had poor response.   2/5 Paranoid, severely agitated. Has URI. COnt meds prn inhlaer scheuled for TOO 2/7 2/6: Remains paranoid, agitated, accusatory. Frequently refusing meds, required 3x olanzapine PRNs over the weekend.  2/7: Remains paranoid and accusatory twd staff. Continues to refuse majority of medications. Currently with URI. Going to court today for retention and TOO.    Plan:  1. Legal: continue 9.27 involuntary admission. TOO application in progress. Pt submitted 9.31.  2. Safety:   - Requires 1:1 CO for fall risk and monitoring of hospital bed   - Olanzapine 1.25 mg PO or IM q6h PRN agitation  3. Psychiatric:  - Valproic acid sprinkle 375mg PO BID for mood stabilization  - Haloperidol 1 mg PO BID for agitation  - Melatonin 3 mg PRN for insomnia  4. Group/Milieu therapy: encourage participation as able  5. Medical:   - Hx of vertebral fractures with ambulatory dysfunction: no acute surgical intervention, patient requires hospital bed, PRN tylenol, lidocaine patch, hot packs  - HTN: metoprolol XL 50 mg daily  - Constipation: miralax and senna daily, PRN bisacodyl q12h  - Newly noted mole: consistent with sebhorreic keratosis  - Dry eyes: Artificial tears 1 drop both eyes BID  - URI: symptomatic management, PRN albuterol inhaler  6. Collateral/Dispo: refuses consent to speak to family at this time.

## 2023-02-07 NOTE — BH INPATIENT PSYCHIATRY PROGRESS NOTE - NSBHCHARTREVIEWVS_PSY_A_CORE FT
Vital Signs Last 24 Hrs  T(C): 36.2 (02-07-23 @ 07:46), Max: 36.2 (02-07-23 @ 07:46)  T(F): 97.1 (02-07-23 @ 07:46), Max: 97.1 (02-07-23 @ 07:46)  HR: 68 (02-07-23 @ 07:46) (68 - 68)  BP: 107/66 (02-07-23 @ 07:46) (107/66 - 107/66)  BP(mean): --  RR: --  SpO2: --    Orthostatic VS  02-06-23 @ 07:53  Lying BP: --/-- HR: --  Sitting BP: 134/110 HR: 89  Standing BP: --/-- HR: --  Site: --  Mode: --

## 2023-02-08 LAB
ANION GAP SERPL CALC-SCNC: 14 MMOL/L — SIGNIFICANT CHANGE UP (ref 7–14)
BUN SERPL-MCNC: 15 MG/DL — SIGNIFICANT CHANGE UP (ref 7–23)
CALCIUM SERPL-MCNC: 9.9 MG/DL — SIGNIFICANT CHANGE UP (ref 8.4–10.5)
CHLORIDE SERPL-SCNC: 99 MMOL/L — SIGNIFICANT CHANGE UP (ref 98–107)
CO2 SERPL-SCNC: 24 MMOL/L — SIGNIFICANT CHANGE UP (ref 22–31)
CREAT SERPL-MCNC: 0.45 MG/DL — LOW (ref 0.5–1.3)
EGFR: 92 ML/MIN/1.73M2 — SIGNIFICANT CHANGE UP
GLUCOSE SERPL-MCNC: 90 MG/DL — SIGNIFICANT CHANGE UP (ref 70–99)
POTASSIUM SERPL-MCNC: 4.8 MMOL/L — SIGNIFICANT CHANGE UP (ref 3.5–5.3)
POTASSIUM SERPL-SCNC: 4.8 MMOL/L — SIGNIFICANT CHANGE UP (ref 3.5–5.3)
SODIUM SERPL-SCNC: 137 MMOL/L — SIGNIFICANT CHANGE UP (ref 135–145)

## 2023-02-08 PROCEDURE — 99231 SBSQ HOSP IP/OBS SF/LOW 25: CPT

## 2023-02-08 RX ORDER — OLANZAPINE 15 MG/1
2.5 TABLET, FILM COATED ORAL AT BEDTIME
Refills: 0 | Status: DISCONTINUED | OUTPATIENT
Start: 2023-02-08 | End: 2023-02-09

## 2023-02-08 RX ORDER — OLANZAPINE 15 MG/1
1.25 TABLET, FILM COATED ORAL ONCE
Refills: 0 | Status: COMPLETED | OUTPATIENT
Start: 2023-02-08 | End: 2023-02-08

## 2023-02-08 RX ADMIN — SENNA PLUS 2 TABLET(S): 8.6 TABLET ORAL at 21:48

## 2023-02-08 RX ADMIN — OLANZAPINE 1.25 MILLIGRAM(S): 15 TABLET, FILM COATED ORAL at 00:48

## 2023-02-08 RX ADMIN — DIVALPROEX SODIUM 375 MILLIGRAM(S): 500 TABLET, DELAYED RELEASE ORAL at 09:15

## 2023-02-08 RX ADMIN — DIVALPROEX SODIUM 375 MILLIGRAM(S): 500 TABLET, DELAYED RELEASE ORAL at 21:48

## 2023-02-08 RX ADMIN — LIDOCAINE 1 PATCH: 4 CREAM TOPICAL at 09:16

## 2023-02-08 RX ADMIN — ENOXAPARIN SODIUM 40 MILLIGRAM(S): 100 INJECTION SUBCUTANEOUS at 09:16

## 2023-02-08 RX ADMIN — OLANZAPINE 1.25 MILLIGRAM(S): 15 TABLET, FILM COATED ORAL at 09:15

## 2023-02-08 RX ADMIN — Medication 1 DROP(S): at 21:49

## 2023-02-08 RX ADMIN — Medication 50 MILLIGRAM(S): at 09:15

## 2023-02-08 RX ADMIN — LIDOCAINE 1 PATCH: 4 CREAM TOPICAL at 06:59

## 2023-02-08 RX ADMIN — POLYETHYLENE GLYCOL 3350 17 GRAM(S): 17 POWDER, FOR SOLUTION ORAL at 09:16

## 2023-02-08 RX ADMIN — OLANZAPINE 2.5 MILLIGRAM(S): 15 TABLET, FILM COATED ORAL at 21:48

## 2023-02-08 RX ADMIN — Medication 81 MILLIGRAM(S): at 09:15

## 2023-02-08 NOTE — BH INPATIENT PSYCHIATRY PROGRESS NOTE - CURRENT MEDICATION
MEDICATIONS  (STANDING):  artificial  tears Solution 1 Drop(s) Both EYES two times a day  aspirin enteric coated 81 milliGRAM(s) Oral daily  divalproex Sprinkle 375 milliGRAM(s) Oral two times a day  enoxaparin Injectable 40 milliGRAM(s) SubCutaneous <User Schedule>  lidocaine   4% Patch 1 Patch Transdermal every 24 hours  metoprolol succinate ER 50 milliGRAM(s) Oral daily  OLANZapine 2.5 milliGRAM(s) Oral at bedtime  polyethylene glycol 3350 17 Gram(s) Oral daily  senna 2 Tablet(s) Oral at bedtime    MEDICATIONS  (PRN):  acetaminophen     Tablet .. 650 milliGRAM(s) Oral every 6 hours PRN Mild Pain (1 - 3), Moderate Pain (4 - 6), Severe Pain (7 - 10)  albuterol    90 MICROgram(s) HFA Inhaler 2 Puff(s) Inhalation every 6 hours PRN Shortness of Breath and/or Wheezing  aluminum hydroxide/magnesium hydroxide/simethicone Suspension 30 milliLiter(s) Oral every 4 hours PRN Dyspepsia  benzocaine 15 mG/menthol 3.6 mG Lozenge 1 Lozenge Oral every 2 hours PRN sore throat  bisacodyl 5 milliGRAM(s) Oral every 12 hours PRN Constipation  melatonin. 3 milliGRAM(s) Oral at bedtime PRN Insomnia  OLANZapine 1.25 milliGRAM(s) Oral every 6 hours PRN agitation  OLANZapine Injectable 1.25 milliGRAM(s) IntraMuscular at bedtime PRN refusal of po olanzpine  OLANZapine Injectable 1.25 milliGRAM(s) IntraMuscular once PRN agitation

## 2023-02-08 NOTE — BH INPATIENT PSYCHIATRY PROGRESS NOTE - NSBHASSESSSUMMFT_PSY_ALL_CORE
Dinorah Hogan is an 88 year old female, , domiciled with daughter with PPHx of bipolar I disorder, 1 previous inpatient psychiatric hospitalization in 1970s, no known history of SI/SA and PMHx of asthma, GERD, sciatica with previous vertebral fracture who is admitted on 9.27 status for symptoms of hortencia including irritability, loud speech, and agitation.     DDx: hortencia vs hypomania vs comorbid delirium    Today, patient is angry about her current admission and denying any psychiatric history. She is asking to be discharged immediately and yelling at treatment team. She is noted to have loud and rapid speech, but is able to be interrupted. She states she has been sleeping poorly due to hip and R knee pain. She denies S/IIP, H/IIP, AVH, paranoia, or ideas of reference. She is AOx1-2 (person, place--hospital), but unable to state year or location in [place]. She requires 9.27 inpatient admission for evaluation and management of hortencia.    1/22: less irritable but still requiring PRN med, more compliant with meds, no SI/HI.  1/23: Accepted meds yd and this AM though required PRN yd. Less irritable, appropriately conversant, making needs known. Suspicious bordering on paranoid, however no fully formed delusional thought content apparent.  1/24: Irritable and labile, suspicious regarding staff members. Discussed medication, pt will be offered psychiatric meds and is aware she has right to refuse.  1/25: Hostile, irritable, labile. TOO application in progress given poor insight and intermittent refusal of psychiatric mediations. Pt at risk for deconditioning given refusal to ambulate and minimal engagement with PT, given ambulatory at home refusal here appears to be behavioral, will continue to encourage participation. Refused cognitive evaluation today. Given ongoing intermittent agitation, paranoia, physical deconditioning, and poor insight into condition, pt requires continued inpatient hospitalization for stabilization and safety.  1/26: Pt remains labile, irritable, and paranoid toward staff. Although pt makes provocative statements (e.g. "she controls the robots," "the Jews will hate me"), these statements appear to represent intense feeling rather than fully formed delusion. Continues to refuse cognitive evaluation or consent to contact family. Given ongoing intermittent agitation, paranoia, physical deconditioning, and poor insight into condition, pt requires continued inpatient hospitalization for stabilization and safety.  1/27 Patient agitated, markedly irritable,paranoid  with themes of perrvasive mistreatment, being singled out but w/u well formed , fixed delusional ideas.Cont enocurage med compliance scheduled for court hearing for TOO  1/28: Currently on CO due to hospital bed requirement. Reports fair sleep and appetite. Seen at bedside during rounds. Continues to refuse most medications, took senna and tylenol last night. Reporting back pain "15/10" and requesting more heat packs "I will die without these", however under no visible distress. Primary team pursuing TOO. Renewed CO.   1/29: Renewed CO. Last night took Depakote, Haldol and Senna. Med compliance has been partial.   1/30: Refused all meds yd, this AM accepted metoprolol. Making accusations that people are attempting to harm her and want her to die. Impulse control is impaired.  1/31: Continues with intermittent agitation, hostile and accusatory twd staff, refusing meds (except metoprolol), refusing consent to speak to daughter. Given ongoing agitation, impulsivity, disinhibition, and deconditioning 2/2 refusal to ambulate (also attempted to climb out of bed last night), pt poses a threat of harm to herself and requires ongoing inpatient hospitalization for stabilization and safety. TOO & retention court date next week.  2/1: Continues irritable, paranoid, refusing medications and engagement in interviews.   2/2: Minimally engaged in interview, refusing to answer most questions; labile, hostile, required IM Haldol 1mg overnight for agitation; refusing to ambulate or engage in PT; refusing pain control interventions other than hot packs and lidocaine patch. TOO in process.   2/4 Cont agitated poor sleep, this Am had vairable O2 sat, patient reported she feels she needs an inhaler for asthma. Patient to be seen by medicine, labs and RVP ordered. Will change haldol to olanzapine standing as response to prns of haldol and occ po's have had poor response.   2/5 Paranoid, severely agitated. Has URI. COnt meds prn inhlaer scheuled for TOO 2/7 2/6: Remains paranoid, agitated, accusatory. Frequently refusing meds, required 3x olanzapine PRNs over the weekend.  2/7: Remains paranoid and accusatory twd staff. Continues to refuse majority of medications. Currently with URI. Going to court today for retention and TOO.  2/8:  Patient irritable, agitated, will increase olanzapine with IM back up.   Plan:  1. Legal: continue 9.27 involuntary admission. TOO application in progress. Pt submitted 9.31.  2. Safety:   - Requires 1:1 CO for fall risk and monitoring of hospital bed   - Olanzapine 1.25 mg PO or IM q6h PRN agitation  3. Psychiatric:  - Valproic acid sprinkle 375mg PO BID for mood stabilization  - Haloperidol 1 mg PO BID for agitation  - Melatonin 3 mg PRN for insomnia  4. Group/Milieu therapy: encourage participation as able  5. Medical:   - Hx of vertebral fractures with ambulatory dysfunction: no acute surgical intervention, patient requires hospital bed, PRN tylenol, lidocaine patch, hot packs  - HTN: metoprolol XL 50 mg daily  - Constipation: miralax and senna daily, PRN bisacodyl q12h  - Newly noted mole: consistent with sebhorreic keratosis  - Dry eyes: Artificial tears 1 drop both eyes BID  - URI: symptomatic management, PRN albuterol inhaler  6. Collateral/Dispo: refuses consent to speak to family at this time.

## 2023-02-08 NOTE — BH INPATIENT PSYCHIATRY PROGRESS NOTE - NSBHCHARTREVIEWVS_PSY_A_CORE FT
Vital Signs Last 24 Hrs  T(C): 36.6 (02-08-23 @ 07:52), Max: 36.6 (02-08-23 @ 07:52)  T(F): 97.9 (02-08-23 @ 07:52), Max: 97.9 (02-08-23 @ 07:52)  HR: --  BP: 134/60 (02-08-23 @ 07:52) (134/60 - 134/60)  BP(mean): 62 (02-08-23 @ 07:52) (62 - 62)  RR: --  SpO2: --

## 2023-02-08 NOTE — BH INPATIENT PSYCHIATRY PROGRESS NOTE - MSE UNSTRUCTURED FT
Appearance: The patient appears stated age, poor hygiene, and dressed appropriately in hospital attire.    Behavior: Not cooperative with interview, intermittently shouting and hostile   Motor: No psychomotor agitation or slowing noted, no abnormal movements.   Speech: Hoarse, high pitched  Mood: The patient's mood is "terrible"    Affect: irritable, labile, congruent  Thought process: Overall linear  Thought content: paranoia, alleging staff intentionally trying to harm her, accusing writer of evil  Perception: Refuses to answer today, no known h/o AVH  Cognition: Forgetful. Fair attention. Refused orientation questions today.   SI/HI: Refuses to answer today  Insight is poor. Judgment is fair. Impulse control is impaired.

## 2023-02-08 NOTE — BH INPATIENT PSYCHIATRY PROGRESS NOTE - PRN MEDS
MEDICATIONS  (PRN):  acetaminophen     Tablet .. 650 milliGRAM(s) Oral every 6 hours PRN Mild Pain (1 - 3), Moderate Pain (4 - 6), Severe Pain (7 - 10)  albuterol    90 MICROgram(s) HFA Inhaler 2 Puff(s) Inhalation every 6 hours PRN Shortness of Breath and/or Wheezing  aluminum hydroxide/magnesium hydroxide/simethicone Suspension 30 milliLiter(s) Oral every 4 hours PRN Dyspepsia  benzocaine 15 mG/menthol 3.6 mG Lozenge 1 Lozenge Oral every 2 hours PRN sore throat  bisacodyl 5 milliGRAM(s) Oral every 12 hours PRN Constipation  melatonin. 3 milliGRAM(s) Oral at bedtime PRN Insomnia  OLANZapine 1.25 milliGRAM(s) Oral every 6 hours PRN agitation  OLANZapine Injectable 1.25 milliGRAM(s) IntraMuscular at bedtime PRN refusal of po olanzpine  OLANZapine Injectable 1.25 milliGRAM(s) IntraMuscular once PRN agitation

## 2023-02-09 PROCEDURE — 99231 SBSQ HOSP IP/OBS SF/LOW 25: CPT

## 2023-02-09 RX ORDER — OLANZAPINE 15 MG/1
2.5 TABLET, FILM COATED ORAL ONCE
Refills: 0 | Status: COMPLETED | OUTPATIENT
Start: 2023-02-09 | End: 2023-02-09

## 2023-02-09 RX ORDER — OLANZAPINE 15 MG/1
2.5 TABLET, FILM COATED ORAL AT BEDTIME
Refills: 0 | Status: DISCONTINUED | OUTPATIENT
Start: 2023-02-09 | End: 2023-02-13

## 2023-02-09 RX ORDER — OLANZAPINE 15 MG/1
2.5 TABLET, FILM COATED ORAL AT BEDTIME
Refills: 0 | Status: DISCONTINUED | OUTPATIENT
Start: 2023-02-09 | End: 2023-02-15

## 2023-02-09 RX ADMIN — Medication 81 MILLIGRAM(S): at 10:23

## 2023-02-09 RX ADMIN — LIDOCAINE 1 PATCH: 4 CREAM TOPICAL at 22:40

## 2023-02-09 RX ADMIN — ENOXAPARIN SODIUM 40 MILLIGRAM(S): 100 INJECTION SUBCUTANEOUS at 10:24

## 2023-02-09 RX ADMIN — Medication 50 MILLIGRAM(S): at 10:23

## 2023-02-09 RX ADMIN — OLANZAPINE 2.5 MILLIGRAM(S): 15 TABLET, FILM COATED ORAL at 16:41

## 2023-02-09 RX ADMIN — OLANZAPINE 2.5 MILLIGRAM(S): 15 TABLET, FILM COATED ORAL at 22:36

## 2023-02-09 RX ADMIN — DIVALPROEX SODIUM 375 MILLIGRAM(S): 500 TABLET, DELAYED RELEASE ORAL at 10:24

## 2023-02-09 RX ADMIN — Medication 1 DROP(S): at 22:37

## 2023-02-09 RX ADMIN — Medication 1 DROP(S): at 10:24

## 2023-02-09 NOTE — BH INPATIENT PSYCHIATRY PROGRESS NOTE - CURRENT MEDICATION
MEDICATIONS  (STANDING):  artificial  tears Solution 1 Drop(s) Both EYES two times a day  aspirin enteric coated 81 milliGRAM(s) Oral daily  divalproex Sprinkle 375 milliGRAM(s) Oral two times a day  enoxaparin Injectable 40 milliGRAM(s) SubCutaneous <User Schedule>  lidocaine   4% Patch 1 Patch Transdermal every 24 hours  metoprolol succinate ER 50 milliGRAM(s) Oral daily  OLANZapine 2.5 milliGRAM(s) Oral at bedtime  polyethylene glycol 3350 17 Gram(s) Oral daily  senna 2 Tablet(s) Oral at bedtime    MEDICATIONS  (PRN):  acetaminophen     Tablet .. 650 milliGRAM(s) Oral every 6 hours PRN Mild Pain (1 - 3), Moderate Pain (4 - 6), Severe Pain (7 - 10)  albuterol    90 MICROgram(s) HFA Inhaler 2 Puff(s) Inhalation every 6 hours PRN Shortness of Breath and/or Wheezing  aluminum hydroxide/magnesium hydroxide/simethicone Suspension 30 milliLiter(s) Oral every 4 hours PRN Dyspepsia  benzocaine 15 mG/menthol 3.6 mG Lozenge 1 Lozenge Oral every 2 hours PRN sore throat  bisacodyl 5 milliGRAM(s) Oral every 12 hours PRN Constipation  melatonin. 3 milliGRAM(s) Oral at bedtime PRN Insomnia  OLANZapine 1.25 milliGRAM(s) Oral every 6 hours PRN agitation  OLANZapine Injectable 2.5 milliGRAM(s) IntraMuscular at bedtime PRN refusal of po olanzpine  OLANZapine Injectable 1.25 milliGRAM(s) IntraMuscular once PRN agitation

## 2023-02-09 NOTE — BH INPATIENT PSYCHIATRY PROGRESS NOTE - NSBHASSESSSUMMFT_PSY_ALL_CORE
Dinorah Hogan is an 88 year old female, , domiciled with daughter with PPHx of bipolar I disorder, 1 previous inpatient psychiatric hospitalization in 1970s, no known history of SI/SA and PMHx of asthma, GERD, sciatica with previous vertebral fracture who is admitted on 9.27 status for symptoms of hortencia including irritability, loud speech, and agitation.     DDx: hortencia vs hypomania vs comorbid delirium    Today, patient is angry about her current admission and denying any psychiatric history. She is asking to be discharged immediately and yelling at treatment team. She is noted to have loud and rapid speech, but is able to be interrupted. She states she has been sleeping poorly due to hip and R knee pain. She denies S/IIP, H/IIP, AVH, paranoia, or ideas of reference. She is AOx1-2 (person, place--hospital), but unable to state year or location in [place]. She requires 9.27 inpatient admission for evaluation and management of hortencia.    1/22: less irritable but still requiring PRN med, more compliant with meds, no SI/HI.  1/23: Accepted meds yd and this AM though required PRN yd. Less irritable, appropriately conversant, making needs known. Suspicious bordering on paranoid, however no fully formed delusional thought content apparent.  1/24: Irritable and labile, suspicious regarding staff members. Discussed medication, pt will be offered psychiatric meds and is aware she has right to refuse.  1/25: Hostile, irritable, labile. TOO application in progress given poor insight and intermittent refusal of psychiatric mediations. Pt at risk for deconditioning given refusal to ambulate and minimal engagement with PT, given ambulatory at home refusal here appears to be behavioral, will continue to encourage participation. Refused cognitive evaluation today. Given ongoing intermittent agitation, paranoia, physical deconditioning, and poor insight into condition, pt requires continued inpatient hospitalization for stabilization and safety.  1/26: Pt remains labile, irritable, and paranoid toward staff. Although pt makes provocative statements (e.g. "she controls the robots," "the Jews will hate me"), these statements appear to represent intense feeling rather than fully formed delusion. Continues to refuse cognitive evaluation or consent to contact family. Given ongoing intermittent agitation, paranoia, physical deconditioning, and poor insight into condition, pt requires continued inpatient hospitalization for stabilization and safety.  1/27 Patient agitated, markedly irritable,paranoid  with themes of perrvasive mistreatment, being singled out but w/u well formed , fixed delusional ideas.Cont enocurage med compliance scheduled for court hearing for TOO  1/28: Currently on CO due to hospital bed requirement. Reports fair sleep and appetite. Seen at bedside during rounds. Continues to refuse most medications, took senna and tylenol last night. Reporting back pain "15/10" and requesting more heat packs "I will die without these", however under no visible distress. Primary team pursuing TOO. Renewed CO.   1/29: Renewed CO. Last night took Depakote, Haldol and Senna. Med compliance has been partial.   1/30: Refused all meds yd, this AM accepted metoprolol. Making accusations that people are attempting to harm her and want her to die. Impulse control is impaired.  1/31: Continues with intermittent agitation, hostile and accusatory twd staff, refusing meds (except metoprolol), refusing consent to speak to daughter. Given ongoing agitation, impulsivity, disinhibition, and deconditioning 2/2 refusal to ambulate (also attempted to climb out of bed last night), pt poses a threat of harm to herself and requires ongoing inpatient hospitalization for stabilization and safety. TOO & retention court date next week.  2/1: Continues irritable, paranoid, refusing medications and engagement in interviews.   2/2: Minimally engaged in interview, refusing to answer most questions; labile, hostile, required IM Haldol 1mg overnight for agitation; refusing to ambulate or engage in PT; refusing pain control interventions other than hot packs and lidocaine patch. TOO in process.   2/4 Cont agitated poor sleep, this Am had vairable O2 sat, patient reported she feels she needs an inhaler for asthma. Patient to be seen by medicine, labs and RVP ordered. Will change haldol to olanzapine standing as response to prns of haldol and occ po's have had poor response.   2/5 Paranoid, severely agitated. Has URI. COnt meds prn inhlaer scheuled for TOO 2/7 2/6: Remains paranoid, agitated, accusatory. Frequently refusing meds, required 3x olanzapine PRNs over the weekend.  2/7: Remains paranoid and accusatory twd staff. Continues to refuse majority of medications. Currently with URI. Going to court today for retention and TOO.  2/8:  Patient irritable, agitated, will increase olanzapine with IM back up.   2/9 No major changes but taking meds, Cont olanzapine now at 2.5mg hs and Depakote. Plan cont to titrate, monitor for se

## 2023-02-09 NOTE — BH INPATIENT PSYCHIATRY PROGRESS NOTE - NSBHFUPINTERVALHXFT_PSY_A_CORE
Patient seen for BAD, r/o cog disorder. Patient took meds last night, eating fair sleeping fair. Cont to be persistently agitated, yelling, screaming during PT however was a little quieter during an activity.

## 2023-02-09 NOTE — BH INPATIENT PSYCHIATRY PROGRESS NOTE - PRN MEDS
MEDICATIONS  (PRN):  acetaminophen     Tablet .. 650 milliGRAM(s) Oral every 6 hours PRN Mild Pain (1 - 3), Moderate Pain (4 - 6), Severe Pain (7 - 10)  albuterol    90 MICROgram(s) HFA Inhaler 2 Puff(s) Inhalation every 6 hours PRN Shortness of Breath and/or Wheezing  aluminum hydroxide/magnesium hydroxide/simethicone Suspension 30 milliLiter(s) Oral every 4 hours PRN Dyspepsia  benzocaine 15 mG/menthol 3.6 mG Lozenge 1 Lozenge Oral every 2 hours PRN sore throat  bisacodyl 5 milliGRAM(s) Oral every 12 hours PRN Constipation  melatonin. 3 milliGRAM(s) Oral at bedtime PRN Insomnia  OLANZapine 1.25 milliGRAM(s) Oral every 6 hours PRN agitation  OLANZapine Injectable 2.5 milliGRAM(s) IntraMuscular at bedtime PRN refusal of po olanzpine  OLANZapine Injectable 1.25 milliGRAM(s) IntraMuscular once PRN agitation

## 2023-02-09 NOTE — BH INPATIENT PSYCHIATRY PROGRESS NOTE - NSBHCHARTREVIEWVS_PSY_A_CORE FT
Vital Signs Last 24 Hrs  T(C): 36.5 (02-09-23 @ 05:38), Max: 36.5 (02-09-23 @ 05:38)  T(F): 97.7 (02-09-23 @ 05:38), Max: 97.7 (02-09-23 @ 05:38)  HR: 85 (02-09-23 @ 05:38) (85 - 85)  BP: 139/73 (02-09-23 @ 05:38) (139/73 - 139/73)  BP(mean): --  RR: 18 (02-09-23 @ 05:38) (18 - 18)  SpO2: --

## 2023-02-09 NOTE — BH INPATIENT PSYCHIATRY PROGRESS NOTE - MSE UNSTRUCTURED FT
Appearance: The patient appears stated age, poor hygiene, and dressed appropriately in hospital attire.    Behavior: Not cooperative with interview, intermittently shouting and hostile   Motor: No psychomotor agitation or slowing noted, no abnormal movements.   Speech: Hoarse, high pitched  Mood: The patient's mood is "terrible"    Affect: irritable, labile, congruent  Thought process: Overall linear  Thought content: paranoia, alleging staff intentionally trying to kill her or break bones  Perception: Refuses to answer today, no known h/o AVH  Cognition: Forgetful. Fair attention. Refused orientation questions today.   SI/HI: Refuses to answer today  Insight is poor. Judgment is fair. Impulse control is impaired.

## 2023-02-10 PROCEDURE — 99232 SBSQ HOSP IP/OBS MODERATE 35: CPT

## 2023-02-10 RX ORDER — OLANZAPINE 15 MG/1
2.5 TABLET, FILM COATED ORAL ONCE
Refills: 0 | Status: COMPLETED | OUTPATIENT
Start: 2023-02-10 | End: 2023-02-10

## 2023-02-10 RX ADMIN — Medication 1 DROP(S): at 20:53

## 2023-02-10 RX ADMIN — Medication 50 MILLIGRAM(S): at 09:16

## 2023-02-10 RX ADMIN — OLANZAPINE 2.5 MILLIGRAM(S): 15 TABLET, FILM COATED ORAL at 20:54

## 2023-02-10 RX ADMIN — SENNA PLUS 2 TABLET(S): 8.6 TABLET ORAL at 20:53

## 2023-02-10 RX ADMIN — OLANZAPINE 1.25 MILLIGRAM(S): 15 TABLET, FILM COATED ORAL at 04:00

## 2023-02-10 RX ADMIN — POLYETHYLENE GLYCOL 3350 17 GRAM(S): 17 POWDER, FOR SOLUTION ORAL at 09:19

## 2023-02-10 RX ADMIN — Medication 81 MILLIGRAM(S): at 09:16

## 2023-02-10 RX ADMIN — ENOXAPARIN SODIUM 40 MILLIGRAM(S): 100 INJECTION SUBCUTANEOUS at 09:19

## 2023-02-10 RX ADMIN — OLANZAPINE 1.25 MILLIGRAM(S): 15 TABLET, FILM COATED ORAL at 23:46

## 2023-02-10 RX ADMIN — DIVALPROEX SODIUM 375 MILLIGRAM(S): 500 TABLET, DELAYED RELEASE ORAL at 09:18

## 2023-02-10 RX ADMIN — LIDOCAINE 1 PATCH: 4 CREAM TOPICAL at 22:10

## 2023-02-10 RX ADMIN — OLANZAPINE 2.5 MILLIGRAM(S): 15 TABLET, FILM COATED ORAL at 14:29

## 2023-02-10 RX ADMIN — DIVALPROEX SODIUM 375 MILLIGRAM(S): 500 TABLET, DELAYED RELEASE ORAL at 20:53

## 2023-02-10 NOTE — BH INPATIENT PSYCHIATRY PROGRESS NOTE - PRN MEDS
MEDICATIONS  (PRN):  acetaminophen     Tablet .. 650 milliGRAM(s) Oral every 6 hours PRN Mild Pain (1 - 3), Moderate Pain (4 - 6), Severe Pain (7 - 10)  albuterol    90 MICROgram(s) HFA Inhaler 2 Puff(s) Inhalation every 6 hours PRN Shortness of Breath and/or Wheezing  aluminum hydroxide/magnesium hydroxide/simethicone Suspension 30 milliLiter(s) Oral every 4 hours PRN Dyspepsia  benzocaine 15 mG/menthol 3.6 mG Lozenge 1 Lozenge Oral every 2 hours PRN sore throat  bisacodyl 5 milliGRAM(s) Oral every 12 hours PRN Constipation  melatonin. 3 milliGRAM(s) Oral at bedtime PRN Insomnia  OLANZapine 1.25 milliGRAM(s) Oral every 6 hours PRN agitation  OLANZapine Injectable 1.25 milliGRAM(s) IntraMuscular once PRN agitation  OLANZapine Injectable 2.5 milliGRAM(s) IntraMuscular at bedtime PRN refusal of po olanzpine

## 2023-02-10 NOTE — BH INPATIENT PSYCHIATRY PROGRESS NOTE - CURRENT MEDICATION
MEDICATIONS  (STANDING):  artificial  tears Solution 1 Drop(s) Both EYES two times a day  aspirin enteric coated 81 milliGRAM(s) Oral daily  divalproex Sprinkle 375 milliGRAM(s) Oral two times a day  enoxaparin Injectable 40 milliGRAM(s) SubCutaneous <User Schedule>  lidocaine   4% Patch 1 Patch Transdermal every 24 hours  metoprolol succinate ER 50 milliGRAM(s) Oral daily  OLANZapine 2.5 milliGRAM(s) Oral at bedtime  polyethylene glycol 3350 17 Gram(s) Oral daily  senna 2 Tablet(s) Oral at bedtime    MEDICATIONS  (PRN):  acetaminophen     Tablet .. 650 milliGRAM(s) Oral every 6 hours PRN Mild Pain (1 - 3), Moderate Pain (4 - 6), Severe Pain (7 - 10)  albuterol    90 MICROgram(s) HFA Inhaler 2 Puff(s) Inhalation every 6 hours PRN Shortness of Breath and/or Wheezing  aluminum hydroxide/magnesium hydroxide/simethicone Suspension 30 milliLiter(s) Oral every 4 hours PRN Dyspepsia  benzocaine 15 mG/menthol 3.6 mG Lozenge 1 Lozenge Oral every 2 hours PRN sore throat  bisacodyl 5 milliGRAM(s) Oral every 12 hours PRN Constipation  melatonin. 3 milliGRAM(s) Oral at bedtime PRN Insomnia  OLANZapine 1.25 milliGRAM(s) Oral every 6 hours PRN agitation  OLANZapine Injectable 1.25 milliGRAM(s) IntraMuscular once PRN agitation  OLANZapine Injectable 2.5 milliGRAM(s) IntraMuscular at bedtime PRN refusal of po olanzpine

## 2023-02-10 NOTE — BH INPATIENT PSYCHIATRY PROGRESS NOTE - NSBHASSESSSUMMFT_PSY_ALL_CORE
Dinorah Hogan is an 88 year old female, , domiciled with daughter with PPHx of bipolar I disorder, 1 previous inpatient psychiatric hospitalization in 1970s, no known history of SI/SA and PMHx of asthma, GERD, sciatica with previous vertebral fracture who is admitted on 9.27 status for symptoms of hortencia including irritability, loud speech, and agitation.     DDx: hortencia vs hypomania vs comorbid delirium    Today, patient is angry about her current admission and denying any psychiatric history. She is asking to be discharged immediately and yelling at treatment team. She is noted to have loud and rapid speech, but is able to be interrupted. She states she has been sleeping poorly due to hip and R knee pain. She denies S/IIP, H/IIP, AVH, paranoia, or ideas of reference. She is AOx1-2 (person, place--hospital), but unable to state year or location in [place]. She requires 9.27 inpatient admission for evaluation and management of hortencia.    1/22: less irritable but still requiring PRN med, more compliant with meds, no SI/HI.  1/23: Accepted meds yd and this AM though required PRN yd. Less irritable, appropriately conversant, making needs known. Suspicious bordering on paranoid, however no fully formed delusional thought content apparent.  1/24: Irritable and labile, suspicious regarding staff members. Discussed medication, pt will be offered psychiatric meds and is aware she has right to refuse.  1/25: Hostile, irritable, labile. TOO application in progress given poor insight and intermittent refusal of psychiatric mediations. Pt at risk for deconditioning given refusal to ambulate and minimal engagement with PT, given ambulatory at home refusal here appears to be behavioral, will continue to encourage participation. Refused cognitive evaluation today. Given ongoing intermittent agitation, paranoia, physical deconditioning, and poor insight into condition, pt requires continued inpatient hospitalization for stabilization and safety.  1/26: Pt remains labile, irritable, and paranoid toward staff. Although pt makes provocative statements (e.g. "she controls the robots," "the Jews will hate me"), these statements appear to represent intense feeling rather than fully formed delusion. Continues to refuse cognitive evaluation or consent to contact family. Given ongoing intermittent agitation, paranoia, physical deconditioning, and poor insight into condition, pt requires continued inpatient hospitalization for stabilization and safety.  1/27 Patient agitated, markedly irritable,paranoid  with themes of perrvasive mistreatment, being singled out but w/u well formed , fixed delusional ideas.Cont enocurage med compliance scheduled for court hearing for TOO  1/28: Currently on CO due to hospital bed requirement. Reports fair sleep and appetite. Seen at bedside during rounds. Continues to refuse most medications, took senna and tylenol last night. Reporting back pain "15/10" and requesting more heat packs "I will die without these", however under no visible distress. Primary team pursuing TOO. Renewed CO.   1/29: Renewed CO. Last night took Depakote, Haldol and Senna. Med compliance has been partial.   1/30: Refused all meds yd, this AM accepted metoprolol. Making accusations that people are attempting to harm her and want her to die. Impulse control is impaired.  1/31: Continues with intermittent agitation, hostile and accusatory twd staff, refusing meds (except metoprolol), refusing consent to speak to daughter. Given ongoing agitation, impulsivity, disinhibition, and deconditioning 2/2 refusal to ambulate (also attempted to climb out of bed last night), pt poses a threat of harm to herself and requires ongoing inpatient hospitalization for stabilization and safety. TOO & retention court date next week.  2/1: Continues irritable, paranoid, refusing medications and engagement in interviews.   2/2: Minimally engaged in interview, refusing to answer most questions; labile, hostile, required IM Haldol 1mg overnight for agitation; refusing to ambulate or engage in PT; refusing pain control interventions other than hot packs and lidocaine patch. TOO in process.   2/4 Cont agitated poor sleep, this Am had vairable O2 sat, patient reported she feels she needs an inhaler for asthma. Patient to be seen by medicine, labs and RVP ordered. Will change haldol to olanzapine standing as response to prns of haldol and occ po's have had poor response.   2/5 Paranoid, severely agitated. Has URI. COnt meds prn inhlaer scheuled for TOO 2/7 2/6: Remains paranoid, agitated, accusatory. Frequently refusing meds, required 3x olanzapine PRNs over the weekend.  2/7: Remains paranoid and accusatory twd staff. Continues to refuse majority of medications. Currently with URI. Going to court today for retention and TOO.  2/8:  Patient irritable, agitated, will increase olanzapine with IM back up.   2/9 No major changes but taking meds, Cont olanzapine now at 2.5mg hs and Depakote. Plan cont to titrate, monitor for se  2/10 Patient w/o much reponse cont with intense paranoia and agitation. Cont Zyprexa cont to titrate.

## 2023-02-10 NOTE — BH INPATIENT PSYCHIATRY PROGRESS NOTE - NSBHCHARTREVIEWVS_PSY_A_CORE FT
Vital Signs Last 24 Hrs  T(C): 36.6 (02-10-23 @ 08:02), Max: 36.6 (02-10-23 @ 08:02)  T(F): 97.9 (02-10-23 @ 08:02), Max: 97.9 (02-10-23 @ 08:02)  HR: --  BP: 118/60 (02-10-23 @ 08:02) (118/60 - 118/60)  BP(mean): 64 (02-10-23 @ 08:02) (64 - 64)  RR: 18 (02-10-23 @ 08:02) (18 - 18)  SpO2: --

## 2023-02-10 NOTE — BH INPATIENT PSYCHIATRY PROGRESS NOTE - MSE UNSTRUCTURED FT
Appearance: The patient appears stated age, poor hygiene, and dressed appropriately in hospital attire.    Behavior: Not cooperative with interview, intermittently shouting and hostile   Motor: No psychomotor agitation or slowing noted, no abnormal movements.   Speech: Hoarse, high pitched  Mood: The patient's mood is "terrible"    Affect: irritable, labile, congruent  Thought process: Overall linear  Thought content: paranoia, alleging staff intentionally trying to kill her or break bones or throw her to the floor  Perception: Refuses to answer today, no known h/o AVH  Cognition: Forgetful. Fair attention. Refused orientation questions today.   SI/HI: Refuses to answer today  Insight is poor. Judgment is fair. Impulse control is impaired.

## 2023-02-10 NOTE — BH INPATIENT PSYCHIATRY PROGRESS NOTE - NSBHFUPINTERVALHXFT_PSY_A_CORE
Patient seen for BAD, r/o cog disorder. Patient took meds last night, eating fair sleeping fair. Cont to be persistently agitated, yelling, screaming during PT and activites

## 2023-02-11 PROCEDURE — 99231 SBSQ HOSP IP/OBS SF/LOW 25: CPT

## 2023-02-11 RX ADMIN — OLANZAPINE 1.25 MILLIGRAM(S): 15 TABLET, FILM COATED ORAL at 13:52

## 2023-02-11 RX ADMIN — Medication 3 MILLIGRAM(S): at 01:11

## 2023-02-11 RX ADMIN — Medication 650 MILLIGRAM(S): at 02:30

## 2023-02-11 RX ADMIN — DIVALPROEX SODIUM 375 MILLIGRAM(S): 500 TABLET, DELAYED RELEASE ORAL at 20:32

## 2023-02-11 RX ADMIN — LIDOCAINE 1 PATCH: 4 CREAM TOPICAL at 07:10

## 2023-02-11 RX ADMIN — ENOXAPARIN SODIUM 40 MILLIGRAM(S): 100 INJECTION SUBCUTANEOUS at 08:46

## 2023-02-11 RX ADMIN — LIDOCAINE 1 PATCH: 4 CREAM TOPICAL at 23:39

## 2023-02-11 RX ADMIN — Medication 650 MILLIGRAM(S): at 01:11

## 2023-02-11 RX ADMIN — Medication 1 DROP(S): at 21:33

## 2023-02-11 RX ADMIN — Medication 50 MILLIGRAM(S): at 08:46

## 2023-02-11 RX ADMIN — OLANZAPINE 1.25 MILLIGRAM(S): 15 TABLET, FILM COATED ORAL at 23:38

## 2023-02-11 RX ADMIN — LIDOCAINE 1 PATCH: 4 CREAM TOPICAL at 12:12

## 2023-02-11 RX ADMIN — Medication 81 MILLIGRAM(S): at 08:46

## 2023-02-11 RX ADMIN — DIVALPROEX SODIUM 375 MILLIGRAM(S): 500 TABLET, DELAYED RELEASE ORAL at 08:46

## 2023-02-11 RX ADMIN — OLANZAPINE 2.5 MILLIGRAM(S): 15 TABLET, FILM COATED ORAL at 20:32

## 2023-02-11 RX ADMIN — SENNA PLUS 2 TABLET(S): 8.6 TABLET ORAL at 20:32

## 2023-02-11 NOTE — BH INPATIENT PSYCHIATRY PROGRESS NOTE - NSBHASSESSSUMMFT_PSY_ALL_CORE
Dinorah Hogan is an 88 year old female, , domiciled with daughter with PPHx of bipolar I disorder, 1 previous inpatient psychiatric hospitalization in 1970s, no known history of SI/SA and PMHx of asthma, GERD, sciatica with previous vertebral fracture who is admitted on 9.27 status for symptoms of hortencia including irritability, loud speech, and agitation.     DDx: hortencia vs hypomania vs comorbid delirium    Today, patient is angry about her current admission and denying any psychiatric history. She is asking to be discharged immediately and yelling at treatment team. She is noted to have loud and rapid speech, but is able to be interrupted. She states she has been sleeping poorly due to hip and R knee pain. She denies S/IIP, H/IIP, AVH, paranoia, or ideas of reference. She is AOx1-2 (person, place--hospital), but unable to state year or location in [place]. She requires 9.27 inpatient admission for evaluation and management of hortencia.    1/22: less irritable but still requiring PRN med, more compliant with meds, no SI/HI.  1/23: Accepted meds yd and this AM though required PRN yd. Less irritable, appropriately conversant, making needs known. Suspicious bordering on paranoid, however no fully formed delusional thought content apparent.  1/24: Irritable and labile, suspicious regarding staff members. Discussed medication, pt will be offered psychiatric meds and is aware she has right to refuse.  1/25: Hostile, irritable, labile. TOO application in progress given poor insight and intermittent refusal of psychiatric mediations. Pt at risk for deconditioning given refusal to ambulate and minimal engagement with PT, given ambulatory at home refusal here appears to be behavioral, will continue to encourage participation. Refused cognitive evaluation today. Given ongoing intermittent agitation, paranoia, physical deconditioning, and poor insight into condition, pt requires continued inpatient hospitalization for stabilization and safety.  1/26: Pt remains labile, irritable, and paranoid toward staff. Although pt makes provocative statements (e.g. "she controls the robots," "the Jews will hate me"), these statements appear to represent intense feeling rather than fully formed delusion. Continues to refuse cognitive evaluation or consent to contact family. Given ongoing intermittent agitation, paranoia, physical deconditioning, and poor insight into condition, pt requires continued inpatient hospitalization for stabilization and safety.  1/27 Patient agitated, markedly irritable,paranoid  with themes of perrvasive mistreatment, being singled out but w/u well formed , fixed delusional ideas.Cont enocurage med compliance scheduled for court hearing for TOO  1/28: Currently on CO due to hospital bed requirement. Reports fair sleep and appetite. Seen at bedside during rounds. Continues to refuse most medications, took senna and tylenol last night. Reporting back pain "15/10" and requesting more heat packs "I will die without these", however under no visible distress. Primary team pursuing TOO. Renewed CO.   1/29: Renewed CO. Last night took Depakote, Haldol and Senna. Med compliance has been partial.   1/30: Refused all meds yd, this AM accepted metoprolol. Making accusations that people are attempting to harm her and want her to die. Impulse control is impaired.  1/31: Continues with intermittent agitation, hostile and accusatory twd staff, refusing meds (except metoprolol), refusing consent to speak to daughter. Given ongoing agitation, impulsivity, disinhibition, and deconditioning 2/2 refusal to ambulate (also attempted to climb out of bed last night), pt poses a threat of harm to herself and requires ongoing inpatient hospitalization for stabilization and safety. TOO & retention court date next week.  2/1: Continues irritable, paranoid, refusing medications and engagement in interviews.   2/2: Minimally engaged in interview, refusing to answer most questions; labile, hostile, required IM Haldol 1mg overnight for agitation; refusing to ambulate or engage in PT; refusing pain control interventions other than hot packs and lidocaine patch. TOO in process.   2/4 Cont agitated poor sleep, this Am had vairable O2 sat, patient reported she feels she needs an inhaler for asthma. Patient to be seen by medicine, labs and RVP ordered. Will change haldol to olanzapine standing as response to prns of haldol and occ po's have had poor response.   2/5 Paranoid, severely agitated. Has URI. COnt meds prn inhlaer scheuled for TOO 2/7 2/6: Remains paranoid, agitated, accusatory. Frequently refusing meds, required 3x olanzapine PRNs over the weekend.  2/7: Remains paranoid and accusatory twd staff. Continues to refuse majority of medications. Currently with URI. Going to court today for retention and TOO.  2/8:  Patient irritable, agitated, will increase olanzapine with IM back up.   2/9 No major changes but taking meds, Cont olanzapine now at 2.5mg hs and Depakote. Plan cont to titrate, monitor for se  2/10 Patient w/o much reponse cont with intense paranoia and agitation. Cont Zyprexa cont to titrate.   2/11; Pt is sleepy most likely due to PRN zyprexa last night, remains disorganized and confused.

## 2023-02-11 NOTE — BH INPATIENT PSYCHIATRY PROGRESS NOTE - NSBHCHARTREVIEWVS_PSY_A_CORE FT
Vital Signs Last 24 Hrs  T(C): 36.4 (02-11-23 @ 07:57), Max: 36.4 (02-11-23 @ 07:57)  T(F): 97.5 (02-11-23 @ 07:57), Max: 97.5 (02-11-23 @ 07:57)  HR: --  BP: --  BP(mean): --  RR: --  SpO2: --    Orthostatic VS  02-11-23 @ 07:57  Lying BP: --/-- HR: --  Sitting BP: 130/60 HR: 65  Standing BP: 143/56 HR: 62  Site: --  Mode: --

## 2023-02-11 NOTE — BH INPATIENT PSYCHIATRY PROGRESS NOTE - NSBHFUPINTERVALHXFT_PSY_A_CORE
Patient seen for BAD, r/o cognitive disorder, chart reviewed and discussed with nursing staff. Patient is compliant with meds, received PRN po zyprexa last night. On exam, Pt is lying in bed, sleepy, opens eyes on command but, does not engage, goes back to sleep. Pt continues to be disorganized and confused per staff. No aggression this am. Vitals stable.

## 2023-02-12 PROCEDURE — 99231 SBSQ HOSP IP/OBS SF/LOW 25: CPT

## 2023-02-12 RX ORDER — OLANZAPINE 15 MG/1
1.25 TABLET, FILM COATED ORAL ONCE
Refills: 0 | Status: COMPLETED | OUTPATIENT
Start: 2023-02-12 | End: 2023-02-17

## 2023-02-12 RX ADMIN — LIDOCAINE 1 PATCH: 4 CREAM TOPICAL at 22:08

## 2023-02-12 RX ADMIN — OLANZAPINE 2.5 MILLIGRAM(S): 15 TABLET, FILM COATED ORAL at 22:07

## 2023-02-12 RX ADMIN — DIVALPROEX SODIUM 375 MILLIGRAM(S): 500 TABLET, DELAYED RELEASE ORAL at 10:40

## 2023-02-12 RX ADMIN — DIVALPROEX SODIUM 375 MILLIGRAM(S): 500 TABLET, DELAYED RELEASE ORAL at 22:06

## 2023-02-12 RX ADMIN — ENOXAPARIN SODIUM 40 MILLIGRAM(S): 100 INJECTION SUBCUTANEOUS at 10:39

## 2023-02-12 RX ADMIN — Medication 1 DROP(S): at 22:09

## 2023-02-12 RX ADMIN — Medication 50 MILLIGRAM(S): at 10:38

## 2023-02-12 RX ADMIN — SENNA PLUS 2 TABLET(S): 8.6 TABLET ORAL at 22:06

## 2023-02-12 RX ADMIN — Medication 1 DROP(S): at 10:41

## 2023-02-12 RX ADMIN — POLYETHYLENE GLYCOL 3350 17 GRAM(S): 17 POWDER, FOR SOLUTION ORAL at 10:38

## 2023-02-12 RX ADMIN — Medication 81 MILLIGRAM(S): at 10:38

## 2023-02-12 RX ADMIN — Medication 3 MILLIGRAM(S): at 00:06

## 2023-02-12 NOTE — BH INPATIENT PSYCHIATRY PROGRESS NOTE - NSBHASSESSSUMMFT_PSY_ALL_CORE
Dinorah Hogan is an 88 year old female, , domiciled with daughter with PPHx of bipolar I disorder, 1 previous inpatient psychiatric hospitalization in 1970s, no known history of SI/SA and PMHx of asthma, GERD, sciatica with previous vertebral fracture who is admitted on 9.27 status for symptoms of hortencia including irritability, loud speech, and agitation.     DDx: hortencia vs hypomania vs comorbid delirium    Today, patient is angry about her current admission and denying any psychiatric history. She is asking to be discharged immediately and yelling at treatment team. She is noted to have loud and rapid speech, but is able to be interrupted. She states she has been sleeping poorly due to hip and R knee pain. She denies S/IIP, H/IIP, AVH, paranoia, or ideas of reference. She is AOx1-2 (person, place--hospital), but unable to state year or location in [place]. She requires 9.27 inpatient admission for evaluation and management of hortencia.    1/22: less irritable but still requiring PRN med, more compliant with meds, no SI/HI.  1/23: Accepted meds yd and this AM though required PRN yd. Less irritable, appropriately conversant, making needs known. Suspicious bordering on paranoid, however no fully formed delusional thought content apparent.  1/24: Irritable and labile, suspicious regarding staff members. Discussed medication, pt will be offered psychiatric meds and is aware she has right to refuse.  1/25: Hostile, irritable, labile. TOO application in progress given poor insight and intermittent refusal of psychiatric mediations. Pt at risk for deconditioning given refusal to ambulate and minimal engagement with PT, given ambulatory at home refusal here appears to be behavioral, will continue to encourage participation. Refused cognitive evaluation today. Given ongoing intermittent agitation, paranoia, physical deconditioning, and poor insight into condition, pt requires continued inpatient hospitalization for stabilization and safety.  1/26: Pt remains labile, irritable, and paranoid toward staff. Although pt makes provocative statements (e.g. "she controls the robots," "the Jews will hate me"), these statements appear to represent intense feeling rather than fully formed delusion. Continues to refuse cognitive evaluation or consent to contact family. Given ongoing intermittent agitation, paranoia, physical deconditioning, and poor insight into condition, pt requires continued inpatient hospitalization for stabilization and safety.  1/27 Patient agitated, markedly irritable,paranoid  with themes of perrvasive mistreatment, being singled out but w/u well formed , fixed delusional ideas.Cont enocurage med compliance scheduled for court hearing for TOO  1/28: Currently on CO due to hospital bed requirement. Reports fair sleep and appetite. Seen at bedside during rounds. Continues to refuse most medications, took senna and tylenol last night. Reporting back pain "15/10" and requesting more heat packs "I will die without these", however under no visible distress. Primary team pursuing TOO. Renewed CO.   1/29: Renewed CO. Last night took Depakote, Haldol and Senna. Med compliance has been partial.   1/30: Refused all meds yd, this AM accepted metoprolol. Making accusations that people are attempting to harm her and want her to die. Impulse control is impaired.  1/31: Continues with intermittent agitation, hostile and accusatory twd staff, refusing meds (except metoprolol), refusing consent to speak to daughter. Given ongoing agitation, impulsivity, disinhibition, and deconditioning 2/2 refusal to ambulate (also attempted to climb out of bed last night), pt poses a threat of harm to herself and requires ongoing inpatient hospitalization for stabilization and safety. TOO & retention court date next week.  2/1: Continues irritable, paranoid, refusing medications and engagement in interviews.   2/2: Minimally engaged in interview, refusing to answer most questions; labile, hostile, required IM Haldol 1mg overnight for agitation; refusing to ambulate or engage in PT; refusing pain control interventions other than hot packs and lidocaine patch. TOO in process.   2/4 Cont agitated poor sleep, this Am had vairable O2 sat, patient reported she feels she needs an inhaler for asthma. Patient to be seen by medicine, labs and RVP ordered. Will change haldol to olanzapine standing as response to prns of haldol and occ po's have had poor response.   2/5 Paranoid, severely agitated. Has URI. COnt meds prn inhlaer scheuled for TOO 2/7 2/6: Remains paranoid, agitated, accusatory. Frequently refusing meds, required 3x olanzapine PRNs over the weekend.  2/7: Remains paranoid and accusatory twd staff. Continues to refuse majority of medications. Currently with URI. Going to court today for retention and TOO.  2/8:  Patient irritable, agitated, will increase olanzapine with IM back up.   2/9 No major changes but taking meds, Cont olanzapine now at 2.5mg hs and Depakote. Plan cont to titrate, monitor for se  2/10 Patient w/o much response cont with intense paranoia and agitation. Cont Zyprexa cont to titrate.   2/11 Pt is sleepy most likely due to PRN zyprexa last night, remains disorganized and confused.   2/12 confused, disorganized, screams at times, required PRN zyprexa/melatonin last night.

## 2023-02-12 NOTE — BH INPATIENT PSYCHIATRY PROGRESS NOTE - NSBHFUPINTERVALHXFT_PSY_A_CORE
Patient seen for bipolar affective disorder, r/o cognitive disorder, chart reviewed and discussed with nursing staff. Patient is compliant with meds, received PRN po zyprexa and melatonin last night. On exam, Pt is sitting on gerichair, awake. Reports 'feeling ok, please help'. Pt c/p back pain. Reports sleep and appetite ok. Denies any SI, HI, hallucination. Pt continues to be disorganized, screams and confused. No aggression this am. Vitals stable.

## 2023-02-12 NOTE — BH INPATIENT PSYCHIATRY PROGRESS NOTE - NSBHCHARTREVIEWVS_PSY_A_CORE FT
Vital Signs Last 24 Hrs  T(C): 36.4 (02-12-23 @ 07:47), Max: 36.4 (02-12-23 @ 07:47)  T(F): 97.6 (02-12-23 @ 07:47), Max: 97.6 (02-12-23 @ 07:47)  HR: --  BP: --  BP(mean): --  RR: --  SpO2: --    Orthostatic VS  02-12-23 @ 07:47  Lying BP: --/-- HR: --  Sitting BP: 118/59 HR: 69  Standing BP: --/-- HR: --  Site: upper left arm  Mode: electronic  Orthostatic VS  02-11-23 @ 07:57  Lying BP: --/-- HR: --  Sitting BP: 130/60 HR: 65  Standing BP: 143/56 HR: 62  Site: --  Mode: --

## 2023-02-13 PROCEDURE — 99231 SBSQ HOSP IP/OBS SF/LOW 25: CPT

## 2023-02-13 RX ORDER — OLANZAPINE 15 MG/1
5 TABLET, FILM COATED ORAL AT BEDTIME
Refills: 0 | Status: DISCONTINUED | OUTPATIENT
Start: 2023-02-13 | End: 2023-02-14

## 2023-02-13 RX ADMIN — OLANZAPINE 5 MILLIGRAM(S): 15 TABLET, FILM COATED ORAL at 23:17

## 2023-02-13 RX ADMIN — Medication 50 MILLIGRAM(S): at 10:31

## 2023-02-13 RX ADMIN — LIDOCAINE 1 PATCH: 4 CREAM TOPICAL at 23:00

## 2023-02-13 RX ADMIN — DIVALPROEX SODIUM 375 MILLIGRAM(S): 500 TABLET, DELAYED RELEASE ORAL at 22:57

## 2023-02-13 RX ADMIN — LIDOCAINE 1 PATCH: 4 CREAM TOPICAL at 07:00

## 2023-02-13 RX ADMIN — DIVALPROEX SODIUM 375 MILLIGRAM(S): 500 TABLET, DELAYED RELEASE ORAL at 09:43

## 2023-02-13 RX ADMIN — OLANZAPINE 1.25 MILLIGRAM(S): 15 TABLET, FILM COATED ORAL at 15:25

## 2023-02-13 RX ADMIN — ENOXAPARIN SODIUM 40 MILLIGRAM(S): 100 INJECTION SUBCUTANEOUS at 13:25

## 2023-02-13 RX ADMIN — Medication 1 DROP(S): at 23:00

## 2023-02-13 RX ADMIN — SENNA PLUS 2 TABLET(S): 8.6 TABLET ORAL at 22:57

## 2023-02-13 RX ADMIN — Medication 81 MILLIGRAM(S): at 09:43

## 2023-02-13 NOTE — BH INPATIENT PSYCHIATRY PROGRESS NOTE - NSBHCHARTREVIEWVS_PSY_A_CORE FT
Vital Signs Last 24 Hrs  T(C): 36.5 (02-13-23 @ 08:56), Max: 36.5 (02-13-23 @ 08:56)  T(F): 97.7 (02-13-23 @ 08:56), Max: 97.7 (02-13-23 @ 08:56)  HR: --  BP: --  BP(mean): --  RR: --  SpO2: --    Orthostatic VS  02-13-23 @ 08:56  Lying BP: 94/60 HR: 82  Sitting BP: 99/56 HR: 62  Standing BP: --/-- HR: --  Site: --  Mode: --  Orthostatic VS  02-12-23 @ 07:47  Lying BP: --/-- HR: --  Sitting BP: 118/59 HR: 69  Standing BP: --/-- HR: --  Site: upper left arm  Mode: electronic

## 2023-02-13 NOTE — UM REPORT PROGRESS NOTE - NSUMSWPROPOSEDC_GEN_A_CORE
Nursing Home/Outpatient Mental Health Clinic/other... Outpatient Mental Health Clinic/other... Nursing Home Outpatient [facility] Clinic

## 2023-02-13 NOTE — BH INPATIENT PSYCHIATRY PROGRESS NOTE - PRN MEDS
MEDICATIONS  (PRN):  acetaminophen     Tablet .. 650 milliGRAM(s) Oral every 6 hours PRN Mild Pain (1 - 3), Moderate Pain (4 - 6), Severe Pain (7 - 10)  albuterol    90 MICROgram(s) HFA Inhaler 2 Puff(s) Inhalation every 6 hours PRN Shortness of Breath and/or Wheezing  aluminum hydroxide/magnesium hydroxide/simethicone Suspension 30 milliLiter(s) Oral every 4 hours PRN Dyspepsia  benzocaine 15 mG/menthol 3.6 mG Lozenge 1 Lozenge Oral every 2 hours PRN sore throat  bisacodyl 5 milliGRAM(s) Oral every 12 hours PRN Constipation  melatonin. 3 milliGRAM(s) Oral at bedtime PRN Insomnia  OLANZapine 1.25 milliGRAM(s) Oral every 6 hours PRN agitation  OLANZapine Injectable 1.25 milliGRAM(s) IntraMuscular once PRN agitation  OLANZapine Injectable 2.5 milliGRAM(s) IntraMuscular at bedtime PRN refusal of po olanzpine  OLANZapine Injectable 1.25 milliGRAM(s) IntraMuscular once PRN agitation

## 2023-02-13 NOTE — BH INPATIENT PSYCHIATRY PROGRESS NOTE - MSE UNSTRUCTURED FT
The patient appears stated age, with fair hygiene, and is dressed appropriately.  She was irritable, largely uncooperative with the interview, not answering questions asked, argumentative regarding various unrelated topics.  She maintained appropriate eye contact.  No restlessness or slowing of movements observed.  The patient’s speech was fluent, angry in tone, increased in rate, and loud in volume at times.  The patient’s mood is “upset.”  Her affect is irritable, angry, labile.  The patient’s thoughts are disorganized, tangential, loose at times.  She has grandiose and paranoid delusions, no apparent hallucinations.  She does not have any suicidal or homicidal ideation, intent, or plan.  Insight is poor.  Judgment is impaired.  Impulse control has been poor.

## 2023-02-13 NOTE — UM REPORT PROGRESS NOTE - NSUMSWPROPOSEDC_GEN_A_CORE FT
Pt will either be discharged home with [facility] services with daughter agreeing to supplement home care hours by paying for a private aide or daughter residing in pt's home vs possible guardianship.  Pt will be discharged home with referral to the [facility] and [facility] services with daughter agreeing to supplement home care hours by paying for a private aide and residing in pt's home to ensure continuous supervision.

## 2023-02-13 NOTE — BH INPATIENT PSYCHIATRY PROGRESS NOTE - NSBHFUPINTERVALHXFT_PSY_A_CORE
Patient seen for follow-up of bipolar affective disorder, r/o cognitive disorder, chart reviewed and discussed with nursing staff.   No events reported overnight.  The patient did require prn medications over the weekend for agitation.  The patient continues to be agitated, yelling out intermittently throughout the day.  The patient is irritable, argumentative, grandiose, disorganized in thoughts.  She shows no insight, demanding to be discharged.  The patient continues to need constant redirection to maintain safety.  The patient reports eating and sleeping well.  She has been compliant with medications, tolerating them well.  BP low but stable.

## 2023-02-13 NOTE — BH INPATIENT PSYCHIATRY PROGRESS NOTE - NSBHASSESSSUMMFT_PSY_ALL_CORE
Dinorah Hogan is an 88 year old female, , domiciled with daughter with PPHx of bipolar I disorder, 1 previous inpatient psychiatric hospitalization in 1970s, no known history of SI/SA and PMHx of asthma, GERD, sciatica with previous vertebral fracture who is admitted on 9.27 status for symptoms of hortencia including irritability, loud speech, and agitation.     DDx: hortencia vs hypomania vs comorbid delirium    Today, patient is angry about her current admission and denying any psychiatric history. She is asking to be discharged immediately and yelling at treatment team. She is noted to have loud and rapid speech, but is able to be interrupted. She states she has been sleeping poorly due to hip and R knee pain. She denies S/IIP, H/IIP, AVH, paranoia, or ideas of reference. She is AOx1-2 (person, place--hospital), but unable to state year or location in [place]. She requires 9.27 inpatient admission for evaluation and management of hortencia.    1/22: less irritable but still requiring PRN med, more compliant with meds, no SI/HI.  1/23: Accepted meds yd and this AM though required PRN yd. Less irritable, appropriately conversant, making needs known. Suspicious bordering on paranoid, however no fully formed delusional thought content apparent.  1/24: Irritable and labile, suspicious regarding staff members. Discussed medication, pt will be offered psychiatric meds and is aware she has right to refuse.  1/25: Hostile, irritable, labile. TOO application in progress given poor insight and intermittent refusal of psychiatric mediations. Pt at risk for deconditioning given refusal to ambulate and minimal engagement with PT, given ambulatory at home refusal here appears to be behavioral, will continue to encourage participation. Refused cognitive evaluation today. Given ongoing intermittent agitation, paranoia, physical deconditioning, and poor insight into condition, pt requires continued inpatient hospitalization for stabilization and safety.  1/26: Pt remains labile, irritable, and paranoid toward staff. Although pt makes provocative statements (e.g. "she controls the robots," "the Jews will hate me"), these statements appear to represent intense feeling rather than fully formed delusion. Continues to refuse cognitive evaluation or consent to contact family. Given ongoing intermittent agitation, paranoia, physical deconditioning, and poor insight into condition, pt requires continued inpatient hospitalization for stabilization and safety.  1/27 Patient agitated, markedly irritable,paranoid  with themes of perrvasive mistreatment, being singled out but w/u well formed , fixed delusional ideas.Cont enocurage med compliance scheduled for court hearing for TOO  1/28: Currently on CO due to hospital bed requirement. Reports fair sleep and appetite. Seen at bedside during rounds. Continues to refuse most medications, took senna and tylenol last night. Reporting back pain "15/10" and requesting more heat packs "I will die without these", however under no visible distress. Primary team pursuing TOO. Renewed CO.   1/29: Renewed CO. Last night took Depakote, Haldol and Senna. Med compliance has been partial.   1/30: Refused all meds yd, this AM accepted metoprolol. Making accusations that people are attempting to harm her and want her to die. Impulse control is impaired.  1/31: Continues with intermittent agitation, hostile and accusatory twd staff, refusing meds (except metoprolol), refusing consent to speak to daughter. Given ongoing agitation, impulsivity, disinhibition, and deconditioning 2/2 refusal to ambulate (also attempted to climb out of bed last night), pt poses a threat of harm to herself and requires ongoing inpatient hospitalization for stabilization and safety. TOO & retention court date next week.  2/1: Continues irritable, paranoid, refusing medications and engagement in interviews.   2/2: Minimally engaged in interview, refusing to answer most questions; labile, hostile, required IM Haldol 1mg overnight for agitation; refusing to ambulate or engage in PT; refusing pain control interventions other than hot packs and lidocaine patch. TOO in process.   2/4 Cont agitated poor sleep, this Am had vairable O2 sat, patient reported she feels she needs an inhaler for asthma. Patient to be seen by medicine, labs and RVP ordered. Will change haldol to olanzapine standing as response to prns of haldol and occ po's have had poor response.   2/5 Paranoid, severely agitated. Has URI. COnt meds prn inhlaer scheuled for TOO 2/7 2/6: Remains paranoid, agitated, accusatory. Frequently refusing meds, required 3x olanzapine PRNs over the weekend.  2/7: Remains paranoid and accusatory twd staff. Continues to refuse majority of medications. Currently with URI. Going to court today for retention and TOO.  2/8:  Patient irritable, agitated, will increase olanzapine with IM back up.   2/9 No major changes but taking meds, Cont olanzapine now at 2.5mg hs and Depakote. Plan cont to titrate, monitor for se  2/10 Patient w/o much response cont with intense paranoia and agitation. Cont Zyprexa cont to titrate.   2/11 Pt is sleepy most likely due to PRN zyprexa last night, remains disorganized and confused.   2/12 confused, disorganized, screams at times, required PRN zyprexa/melatonin last night.   2/13: The patient continues to be manic, disorganized, irritable, screaming at times.  She has been tolerating medications and prn's well - will increase Zyprexa to 5mg hs and change to Zydis to ensure compliance.  Continue 1:1.

## 2023-02-13 NOTE — UM REPORT PROGRESS NOTE - NSUMSWNOTE_GEN_A_CORE FT
The pt is an 89 yo  woman living alone with a dx BAD and evolving dementia who was admitted due to agitation and irritability. The pt had been non-compliant with medication necessitating court hearing for TOO and retention. The pt was treated with Zyprexa, Seroquel and Depakote with no benefit seen. Medication was switched to Risperdal and Depakote with pt now showing some modest lessening intensity of agitation and vocal disruptiveness. However pt continues to be irritable, sleep disturbed, and episodically agitated requiring multiple Haldol prns. MD will continue to titrate Risperdal and monitor pt's response. The pt is an 87 yo  woman living alone with a dx BAD and evolving dementia who was admitted due to agitation and irritability. The pt had been non-compliant with medication necessitating court hearing for TOO and retention. The pt was treated with Zyprexa, Seroquel and Depakote with no benefit seen. Medication was switched to Risperdal and Depakote with pt now showing some modest lessening intensity of agitation and vocal disruptiveness. However pt continues to be irritable, sleep disturbed, and episodically agitated requiring multiple Haldol prns. MD will continue to titrate Risperdal and monitor pt's response. The pt has continued to need prns for agitation and vocal disruptiveness. Pt currently has a UTI being treated with antibiotics. The pt is an 89 yo  woman living alone with a dx BAD and evolving dementia who was admitted due to agitation and irritability. The pt is maintained on CO1:1 from 11pm-7am for hospital bed and falls risk. The pt had been non-compliant with medication necessitating court hearing for TOO and retention. The pt was treated with Zyprexa, Seroquel and Depakote with no benefit seen. Medication was switched to Risperdal with pt now showing some modest lessening intensity of agitation and vocal disruptiveness. However pt continues to be irritable and episodically agitated requiring prns. MD will continue to titrate Risperdal and monitor pt's response. Pt recently had a UTI which has resolved with antibiotics. The pt's thinking is paranoid and mood labile. She is seen by physical therapy and screams that the therapist is trying to kill her.  The pt is an 89 yo  woman living alone with a dx Vascular Dementia and hx of BAD. The pt's neighbors called 911 because the pt was screaming for help. The pt was admitted due to agitation and irritability. The pt has been maintained on CO1:1 from 11pm-7am for hospital bed and falls risk. The pt had been non-compliant with medication necessitating court hearing for TOO and retention. The pt has had medication trials on Zyprexa, Seroquel and Depakote with no benefit seen. Risperdal trial has likewise been show limited benefit with pt continuing to be irritable, vocally disruptive, and episodically agitated requiring prns. Pt had a UTI which resolved with antibiotics. The pt's thinking remains paranoid about staff, mood labile, and screams out loudly. The pt is disruptive to the unit and poorly responsive to staff redirection. She is frail, gait unsteady, and at risk to fall. She is seen by physical therapy and screams that the therapist is trying to kill her. MD will now start Haldol trial as pt has shown some responsiveness to Haldol prns.  The pt is an 87 yo  woman living alone with a dx Vascular Dementia and hx of BAD. The pt's neighbors called 911 because the pt was screaming for help. The pt was admitted due to agitation and irritability. The pt has been maintained on CO1:2 from 11pm-7am for hospital bed and falls risk. The pt had been non-compliant with medication necessitating court hearing for TOO and retention. The pt has had medication trials on Zyprexa, Seroquel and Depakote with no benefit seen. Risperdal trial likewise showed limited benefit with pt continuing to be paranoid, irritable, vocally disruptive, and episodically agitated requiring prns. Pt had a UTI which resolved with antibiotics. The pt is frail, has severe spinal stenosis, her gait is unsteady, and she is at risk to fall. The pt is seen by physical therapy but is resistive and screams out during PT. Presently the pt is being treated with Haldol and Gabapentin trials, dose is being titrated, and pt is showing some beginning signs of paranoia, agitation, and vocal disruptiveness lessening in intensity. The pt is an 89 yo  woman living alone with a dx Vascular Dementia and hx of BAD. The pt's neighbors called 911 because the pt was screaming for help. The pt was admitted due to agitation and irritability. The pt has been maintained on CO1:2 from 11pm-7am for hospital bed and falls risk. The pt had been non-compliant with medication necessitating court hearing for TOO and retention. The pt has had medication trials on Zyprexa, Seroquel and Depakote with no benefit seen. Risperdal trial likewise showed limited benefit with pt continuing to be paranoid, irritable, vocally disruptive, and episodically agitated requiring prns. Pt had a UTI which resolved with antibiotics. The pt is frail, has severe spinal stenosis, her gait is unsteady, and she is at risk to fall. The pt is seen by physical therapy but is resistive and screams out during PT. The pt now is being treated with Haldol and Gabapentin with signs of paranoia and vocal disruptiveness lessening in frequency and intensity.  Zoloft was started last week as the pt's mood appears anxious and dysphoric and thinking focused on fears of abandonment and theme of separation anxiety.

## 2023-02-13 NOTE — BH INPATIENT PSYCHIATRY PROGRESS NOTE - CURRENT MEDICATION
MEDICATIONS  (STANDING):  artificial  tears Solution 1 Drop(s) Both EYES two times a day  aspirin enteric coated 81 milliGRAM(s) Oral daily  divalproex Sprinkle 375 milliGRAM(s) Oral two times a day  enoxaparin Injectable 40 milliGRAM(s) SubCutaneous <User Schedule>  lidocaine   4% Patch 1 Patch Transdermal every 24 hours  metoprolol succinate ER 50 milliGRAM(s) Oral daily  OLANZapine Disintegrating Tablet 5 milliGRAM(s) Oral at bedtime  polyethylene glycol 3350 17 Gram(s) Oral daily  senna 2 Tablet(s) Oral at bedtime    MEDICATIONS  (PRN):  acetaminophen     Tablet .. 650 milliGRAM(s) Oral every 6 hours PRN Mild Pain (1 - 3), Moderate Pain (4 - 6), Severe Pain (7 - 10)  albuterol    90 MICROgram(s) HFA Inhaler 2 Puff(s) Inhalation every 6 hours PRN Shortness of Breath and/or Wheezing  aluminum hydroxide/magnesium hydroxide/simethicone Suspension 30 milliLiter(s) Oral every 4 hours PRN Dyspepsia  benzocaine 15 mG/menthol 3.6 mG Lozenge 1 Lozenge Oral every 2 hours PRN sore throat  bisacodyl 5 milliGRAM(s) Oral every 12 hours PRN Constipation  melatonin. 3 milliGRAM(s) Oral at bedtime PRN Insomnia  OLANZapine 1.25 milliGRAM(s) Oral every 6 hours PRN agitation  OLANZapine Injectable 1.25 milliGRAM(s) IntraMuscular once PRN agitation  OLANZapine Injectable 2.5 milliGRAM(s) IntraMuscular at bedtime PRN refusal of po olanzpine  OLANZapine Injectable 1.25 milliGRAM(s) IntraMuscular once PRN agitation

## 2023-02-14 PROCEDURE — 99231 SBSQ HOSP IP/OBS SF/LOW 25: CPT

## 2023-02-14 RX ORDER — QUETIAPINE FUMARATE 200 MG/1
12.5 TABLET, FILM COATED ORAL
Refills: 0 | Status: DISCONTINUED | OUTPATIENT
Start: 2023-02-14 | End: 2023-02-16

## 2023-02-14 RX ORDER — LANOLIN ALCOHOL/MO/W.PET/CERES
3 CREAM (GRAM) TOPICAL
Refills: 0 | Status: DISCONTINUED | OUTPATIENT
Start: 2023-02-14 | End: 2023-02-23

## 2023-02-14 RX ORDER — QUETIAPINE FUMARATE 200 MG/1
12.5 TABLET, FILM COATED ORAL ONCE
Refills: 0 | Status: COMPLETED | OUTPATIENT
Start: 2023-02-14 | End: 2023-02-14

## 2023-02-14 RX ADMIN — Medication 81 MILLIGRAM(S): at 07:59

## 2023-02-14 RX ADMIN — DIVALPROEX SODIUM 375 MILLIGRAM(S): 500 TABLET, DELAYED RELEASE ORAL at 07:59

## 2023-02-14 RX ADMIN — DIVALPROEX SODIUM 375 MILLIGRAM(S): 500 TABLET, DELAYED RELEASE ORAL at 21:17

## 2023-02-14 RX ADMIN — Medication 50 MILLIGRAM(S): at 07:59

## 2023-02-14 RX ADMIN — Medication 3 MILLIGRAM(S): at 17:04

## 2023-02-14 RX ADMIN — QUETIAPINE FUMARATE 12.5 MILLIGRAM(S): 200 TABLET, FILM COATED ORAL at 12:43

## 2023-02-14 RX ADMIN — POLYETHYLENE GLYCOL 3350 17 GRAM(S): 17 POWDER, FOR SOLUTION ORAL at 07:59

## 2023-02-14 RX ADMIN — Medication 1 DROP(S): at 21:18

## 2023-02-14 RX ADMIN — QUETIAPINE FUMARATE 12.5 MILLIGRAM(S): 200 TABLET, FILM COATED ORAL at 17:04

## 2023-02-14 RX ADMIN — QUETIAPINE FUMARATE 12.5 MILLIGRAM(S): 200 TABLET, FILM COATED ORAL at 09:52

## 2023-02-14 RX ADMIN — ENOXAPARIN SODIUM 40 MILLIGRAM(S): 100 INJECTION SUBCUTANEOUS at 08:00

## 2023-02-14 RX ADMIN — LIDOCAINE 1 PATCH: 4 CREAM TOPICAL at 22:35

## 2023-02-14 RX ADMIN — OLANZAPINE 1.25 MILLIGRAM(S): 15 TABLET, FILM COATED ORAL at 07:59

## 2023-02-14 NOTE — BH INPATIENT PSYCHIATRY PROGRESS NOTE - NSBHASSESSSUMMFT_PSY_ALL_CORE
Dinorah Hogan is an 88 year old female, , domiciled with daughter with PPHx of bipolar I disorder, 1 previous inpatient psychiatric hospitalization in 1970s, no known history of SI/SA and PMHx of asthma, GERD, sciatica with previous vertebral fracture who is admitted on 9.27 status for symptoms of hortencia including irritability, loud speech, and agitation.     DDx: hortencia vs hypomania vs comorbid delirium    Today, patient is angry about her current admission and denying any psychiatric history. She is asking to be discharged immediately and yelling at treatment team. She is noted to have loud and rapid speech, but is able to be interrupted. She states she has been sleeping poorly due to hip and R knee pain. She denies S/IIP, H/IIP, AVH, paranoia, or ideas of reference. She is AOx1-2 (person, place--hospital), but unable to state year or location in [place]. She requires 9.27 inpatient admission for evaluation and management of hortencia.    1/22: less irritable but still requiring PRN med, more compliant with meds, no SI/HI.  1/23: Accepted meds yd and this AM though required PRN yd. Less irritable, appropriately conversant, making needs known. Suspicious bordering on paranoid, however no fully formed delusional thought content apparent.  1/24: Irritable and labile, suspicious regarding staff members. Discussed medication, pt will be offered psychiatric meds and is aware she has right to refuse.  1/25: Hostile, irritable, labile. TOO application in progress given poor insight and intermittent refusal of psychiatric mediations. Pt at risk for deconditioning given refusal to ambulate and minimal engagement with PT, given ambulatory at home refusal here appears to be behavioral, will continue to encourage participation. Refused cognitive evaluation today. Given ongoing intermittent agitation, paranoia, physical deconditioning, and poor insight into condition, pt requires continued inpatient hospitalization for stabilization and safety.  1/26: Pt remains labile, irritable, and paranoid toward staff. Although pt makes provocative statements (e.g. "she controls the robots," "the Jews will hate me"), these statements appear to represent intense feeling rather than fully formed delusion. Continues to refuse cognitive evaluation or consent to contact family. Given ongoing intermittent agitation, paranoia, physical deconditioning, and poor insight into condition, pt requires continued inpatient hospitalization for stabilization and safety.  1/27 Patient agitated, markedly irritable,paranoid  with themes of perrvasive mistreatment, being singled out but w/u well formed , fixed delusional ideas.Cont enocurage med compliance scheduled for court hearing for TOO  1/28: Currently on CO due to hospital bed requirement. Reports fair sleep and appetite. Seen at bedside during rounds. Continues to refuse most medications, took senna and tylenol last night. Reporting back pain "15/10" and requesting more heat packs "I will die without these", however under no visible distress. Primary team pursuing TOO. Renewed CO.   1/29: Renewed CO. Last night took Depakote, Haldol and Senna. Med compliance has been partial.   1/30: Refused all meds yd, this AM accepted metoprolol. Making accusations that people are attempting to harm her and want her to die. Impulse control is impaired.  1/31: Continues with intermittent agitation, hostile and accusatory twd staff, refusing meds (except metoprolol), refusing consent to speak to daughter. Given ongoing agitation, impulsivity, disinhibition, and deconditioning 2/2 refusal to ambulate (also attempted to climb out of bed last night), pt poses a threat of harm to herself and requires ongoing inpatient hospitalization for stabilization and safety. TOO & retention court date next week.  2/1: Continues irritable, paranoid, refusing medications and engagement in interviews.   2/2: Minimally engaged in interview, refusing to answer most questions; labile, hostile, required IM Haldol 1mg overnight for agitation; refusing to ambulate or engage in PT; refusing pain control interventions other than hot packs and lidocaine patch. TOO in process.   2/4 Cont agitated poor sleep, this Am had vairable O2 sat, patient reported she feels she needs an inhaler for asthma. Patient to be seen by medicine, labs and RVP ordered. Will change haldol to olanzapine standing as response to prns of haldol and occ po's have had poor response.   2/5 Paranoid, severely agitated. Has URI. COnt meds prn inhlaer scheuled for TOO 2/7 2/6: Remains paranoid, agitated, accusatory. Frequently refusing meds, required 3x olanzapine PRNs over the weekend.  2/7: Remains paranoid and accusatory twd staff. Continues to refuse majority of medications. Currently with URI. Going to court today for retention and TOO.  2/8:  Patient irritable, agitated, will increase olanzapine with IM back up.   2/9 No major changes but taking meds, Cont olanzapine now at 2.5mg hs and Depakote. Plan cont to titrate, monitor for se  2/10 Patient w/o much response cont with intense paranoia and agitation. Cont Zyprexa cont to titrate.   2/11 Pt is sleepy most likely due to PRN zyprexa last night, remains disorganized and confused.   2/12 confused, disorganized, screams at times, required PRN zyprexa/melatonin last night.   2/13: The patient continues to be manic, disorganized, irritable, screaming at times.  She has been tolerating medications and prn's well - will increase Zyprexa to 5mg hs and change to Zydis to ensure compliance.  Continue 1:1.  2/14: The patient is not responding to Zyprexa, despite increasing dose and getting prn's.  Will change to Seroquel, as patient reportedly did well with it in the past.  Patient given 12.5mg this morning, tolerated well.  Will start 12.5mg tid.

## 2023-02-14 NOTE — BH INPATIENT PSYCHIATRY PROGRESS NOTE - MSE UNSTRUCTURED FT
The patient appears stated age, with fair hygiene, and is dressed appropriately.  She was irritable, largely uncooperative with the interview, not answering questions asked, argumentative regarding various unrelated topics.  She maintained appropriate eye contact.  No restlessness or slowing of movements observed.  The patient’s speech was fluent, angry in tone, increased in rate, and loud in volume at times.  The patient’s mood is “not good.”  Her affect is irritable, angry, labile.  The patient’s thoughts are disorganized, tangential, loose at times.  She has grandiose and paranoid delusions, no apparent hallucinations.  She does not have any suicidal or homicidal ideation, intent, or plan.  Insight is poor.  Judgment is impaired.  Impulse control has been poor.

## 2023-02-14 NOTE — BH INPATIENT PSYCHIATRY PROGRESS NOTE - NSBHCHARTREVIEWVS_PSY_A_CORE FT
Vital Signs Last 24 Hrs  T(C): 36.1 (02-14-23 @ 06:55), Max: 36.1 (02-14-23 @ 06:55)  T(F): 96.9 (02-14-23 @ 06:55), Max: 96.9 (02-14-23 @ 06:55)  HR: 85 (02-14-23 @ 06:55) (85 - 85)  BP: 146/66 (02-14-23 @ 06:55) (146/66 - 146/66)  BP(mean): --  RR: --  SpO2: --    Orthostatic VS  02-13-23 @ 08:56  Lying BP: 94/60 HR: 82  Sitting BP: 99/56 HR: 62  Standing BP: --/-- HR: --  Site: --  Mode: --

## 2023-02-14 NOTE — BH INPATIENT PSYCHIATRY PROGRESS NOTE - CURRENT MEDICATION
MEDICATIONS  (STANDING):  artificial  tears Solution 1 Drop(s) Both EYES two times a day  aspirin enteric coated 81 milliGRAM(s) Oral daily  divalproex Sprinkle 375 milliGRAM(s) Oral two times a day  enoxaparin Injectable 40 milliGRAM(s) SubCutaneous <User Schedule>  lidocaine   4% Patch 1 Patch Transdermal every 24 hours  melatonin. 3 milliGRAM(s) Oral <User Schedule>  metoprolol succinate ER 50 milliGRAM(s) Oral daily  polyethylene glycol 3350 17 Gram(s) Oral daily  QUEtiapine 12.5 milliGRAM(s) Oral <User Schedule>  senna 2 Tablet(s) Oral at bedtime    MEDICATIONS  (PRN):  acetaminophen     Tablet .. 650 milliGRAM(s) Oral every 6 hours PRN Mild Pain (1 - 3), Moderate Pain (4 - 6), Severe Pain (7 - 10)  albuterol    90 MICROgram(s) HFA Inhaler 2 Puff(s) Inhalation every 6 hours PRN Shortness of Breath and/or Wheezing  aluminum hydroxide/magnesium hydroxide/simethicone Suspension 30 milliLiter(s) Oral every 4 hours PRN Dyspepsia  benzocaine 15 mG/menthol 3.6 mG Lozenge 1 Lozenge Oral every 2 hours PRN sore throat  bisacodyl 5 milliGRAM(s) Oral every 12 hours PRN Constipation  OLANZapine 1.25 milliGRAM(s) Oral every 6 hours PRN agitation  OLANZapine Injectable 1.25 milliGRAM(s) IntraMuscular once PRN agitation  OLANZapine Injectable 1.25 milliGRAM(s) IntraMuscular once PRN agitation  OLANZapine Injectable 2.5 milliGRAM(s) IntraMuscular at bedtime PRN refusal of po olanzpine

## 2023-02-14 NOTE — BH INPATIENT PSYCHIATRY PROGRESS NOTE - PRN MEDS
MEDICATIONS  (PRN):  acetaminophen     Tablet .. 650 milliGRAM(s) Oral every 6 hours PRN Mild Pain (1 - 3), Moderate Pain (4 - 6), Severe Pain (7 - 10)  albuterol    90 MICROgram(s) HFA Inhaler 2 Puff(s) Inhalation every 6 hours PRN Shortness of Breath and/or Wheezing  aluminum hydroxide/magnesium hydroxide/simethicone Suspension 30 milliLiter(s) Oral every 4 hours PRN Dyspepsia  benzocaine 15 mG/menthol 3.6 mG Lozenge 1 Lozenge Oral every 2 hours PRN sore throat  bisacodyl 5 milliGRAM(s) Oral every 12 hours PRN Constipation  OLANZapine 1.25 milliGRAM(s) Oral every 6 hours PRN agitation  OLANZapine Injectable 1.25 milliGRAM(s) IntraMuscular once PRN agitation  OLANZapine Injectable 1.25 milliGRAM(s) IntraMuscular once PRN agitation  OLANZapine Injectable 2.5 milliGRAM(s) IntraMuscular at bedtime PRN refusal of po olanzpine

## 2023-02-14 NOTE — BH INPATIENT PSYCHIATRY PROGRESS NOTE - NSBHFUPINTERVALHXFT_PSY_A_CORE
Patient seen for follow-up of bipolar affective disorder, r/o cognitive disorder, chart reviewed and discussed with nursing staff.   The patient continues to be agitated at times, yelling out and yelling at staff.  She has required prn medications, with only partial benefit.  This morning, the patient continues to be agitated, irritable, yelling out intermittently throughout the day.  The patient is irritable during assessment, argumentative, grandiose, disorganized in thoughts, not answering questions asked.  She shows no insight, demanding to be discharged.  The patient continues to need constant redirection to maintain safety.  The patient reports eating well.  Sleeping on and off throughout the night.  She has been compliant with medications, tolerating them well.  BP stable.

## 2023-02-15 PROCEDURE — 99231 SBSQ HOSP IP/OBS SF/LOW 25: CPT

## 2023-02-15 RX ORDER — QUETIAPINE FUMARATE 200 MG/1
12.5 TABLET, FILM COATED ORAL EVERY 6 HOURS
Refills: 0 | Status: DISCONTINUED | OUTPATIENT
Start: 2023-02-15 | End: 2023-02-17

## 2023-02-15 RX ADMIN — POLYETHYLENE GLYCOL 3350 17 GRAM(S): 17 POWDER, FOR SOLUTION ORAL at 09:30

## 2023-02-15 RX ADMIN — SENNA PLUS 2 TABLET(S): 8.6 TABLET ORAL at 20:58

## 2023-02-15 RX ADMIN — Medication 1 DROP(S): at 09:31

## 2023-02-15 RX ADMIN — QUETIAPINE FUMARATE 12.5 MILLIGRAM(S): 200 TABLET, FILM COATED ORAL at 17:13

## 2023-02-15 RX ADMIN — QUETIAPINE FUMARATE 12.5 MILLIGRAM(S): 200 TABLET, FILM COATED ORAL at 20:58

## 2023-02-15 RX ADMIN — Medication 50 MILLIGRAM(S): at 09:30

## 2023-02-15 RX ADMIN — LIDOCAINE 1 PATCH: 4 CREAM TOPICAL at 22:51

## 2023-02-15 RX ADMIN — Medication 3 MILLIGRAM(S): at 17:14

## 2023-02-15 RX ADMIN — QUETIAPINE FUMARATE 12.5 MILLIGRAM(S): 200 TABLET, FILM COATED ORAL at 06:20

## 2023-02-15 RX ADMIN — QUETIAPINE FUMARATE 12.5 MILLIGRAM(S): 200 TABLET, FILM COATED ORAL at 14:13

## 2023-02-15 RX ADMIN — QUETIAPINE FUMARATE 12.5 MILLIGRAM(S): 200 TABLET, FILM COATED ORAL at 10:13

## 2023-02-15 RX ADMIN — Medication 81 MILLIGRAM(S): at 09:30

## 2023-02-15 RX ADMIN — ENOXAPARIN SODIUM 40 MILLIGRAM(S): 100 INJECTION SUBCUTANEOUS at 09:31

## 2023-02-15 RX ADMIN — DIVALPROEX SODIUM 375 MILLIGRAM(S): 500 TABLET, DELAYED RELEASE ORAL at 09:30

## 2023-02-15 NOTE — BH INPATIENT PSYCHIATRY PROGRESS NOTE - PRN MEDS
MEDICATIONS  (PRN):  acetaminophen     Tablet .. 650 milliGRAM(s) Oral every 6 hours PRN Mild Pain (1 - 3), Moderate Pain (4 - 6), Severe Pain (7 - 10)  albuterol    90 MICROgram(s) HFA Inhaler 2 Puff(s) Inhalation every 6 hours PRN Shortness of Breath and/or Wheezing  aluminum hydroxide/magnesium hydroxide/simethicone Suspension 30 milliLiter(s) Oral every 4 hours PRN Dyspepsia  benzocaine 15 mG/menthol 3.6 mG Lozenge 1 Lozenge Oral every 2 hours PRN sore throat  bisacodyl 5 milliGRAM(s) Oral every 12 hours PRN Constipation  OLANZapine Injectable 1.25 milliGRAM(s) IntraMuscular once PRN agitation  OLANZapine Injectable 1.25 milliGRAM(s) IntraMuscular once PRN agitation  QUEtiapine 12.5 milliGRAM(s) Oral every 6 hours PRN agitation from hortencia

## 2023-02-15 NOTE — BH INPATIENT PSYCHIATRY PROGRESS NOTE - CURRENT MEDICATION
MEDICATIONS  (STANDING):  artificial  tears Solution 1 Drop(s) Both EYES two times a day  aspirin enteric coated 81 milliGRAM(s) Oral daily  divalproex Sprinkle 375 milliGRAM(s) Oral two times a day  enoxaparin Injectable 40 milliGRAM(s) SubCutaneous <User Schedule>  lidocaine   4% Patch 1 Patch Transdermal every 24 hours  melatonin. 3 milliGRAM(s) Oral <User Schedule>  metoprolol succinate ER 50 milliGRAM(s) Oral daily  polyethylene glycol 3350 17 Gram(s) Oral daily  QUEtiapine 12.5 milliGRAM(s) Oral <User Schedule>  senna 2 Tablet(s) Oral at bedtime    MEDICATIONS  (PRN):  acetaminophen     Tablet .. 650 milliGRAM(s) Oral every 6 hours PRN Mild Pain (1 - 3), Moderate Pain (4 - 6), Severe Pain (7 - 10)  albuterol    90 MICROgram(s) HFA Inhaler 2 Puff(s) Inhalation every 6 hours PRN Shortness of Breath and/or Wheezing  aluminum hydroxide/magnesium hydroxide/simethicone Suspension 30 milliLiter(s) Oral every 4 hours PRN Dyspepsia  benzocaine 15 mG/menthol 3.6 mG Lozenge 1 Lozenge Oral every 2 hours PRN sore throat  bisacodyl 5 milliGRAM(s) Oral every 12 hours PRN Constipation  OLANZapine Injectable 1.25 milliGRAM(s) IntraMuscular once PRN agitation  OLANZapine Injectable 1.25 milliGRAM(s) IntraMuscular once PRN agitation  QUEtiapine 12.5 milliGRAM(s) Oral every 6 hours PRN agitation from hortencia

## 2023-02-15 NOTE — BH INPATIENT PSYCHIATRY PROGRESS NOTE - NSBHASSESSSUMMFT_PSY_ALL_CORE
Dinorah Hogan is an 88 year old female, , domiciled with daughter with PPHx of bipolar I disorder, 1 previous inpatient psychiatric hospitalization in 1970s, no known history of SI/SA and PMHx of asthma, GERD, sciatica with previous vertebral fracture who is admitted on 9.27 status for symptoms of hortencia including irritability, loud speech, and agitation.     DDx: hortencia vs hypomania vs comorbid delirium    Today, patient is angry about her current admission and denying any psychiatric history. She is asking to be discharged immediately and yelling at treatment team. She is noted to have loud and rapid speech, but is able to be interrupted. She states she has been sleeping poorly due to hip and R knee pain. She denies S/IIP, H/IIP, AVH, paranoia, or ideas of reference. She is AOx1-2 (person, place--hospital), but unable to state year or location in [place]. She requires 9.27 inpatient admission for evaluation and management of hortencia.    1/22: less irritable but still requiring PRN med, more compliant with meds, no SI/HI.  1/23: Accepted meds yd and this AM though required PRN yd. Less irritable, appropriately conversant, making needs known. Suspicious bordering on paranoid, however no fully formed delusional thought content apparent.  1/24: Irritable and labile, suspicious regarding staff members. Discussed medication, pt will be offered psychiatric meds and is aware she has right to refuse.  1/25: Hostile, irritable, labile. TOO application in progress given poor insight and intermittent refusal of psychiatric mediations. Pt at risk for deconditioning given refusal to ambulate and minimal engagement with PT, given ambulatory at home refusal here appears to be behavioral, will continue to encourage participation. Refused cognitive evaluation today. Given ongoing intermittent agitation, paranoia, physical deconditioning, and poor insight into condition, pt requires continued inpatient hospitalization for stabilization and safety.  1/26: Pt remains labile, irritable, and paranoid toward staff. Although pt makes provocative statements (e.g. "she controls the robots," "the Jews will hate me"), these statements appear to represent intense feeling rather than fully formed delusion. Continues to refuse cognitive evaluation or consent to contact family. Given ongoing intermittent agitation, paranoia, physical deconditioning, and poor insight into condition, pt requires continued inpatient hospitalization for stabilization and safety.  1/27 Patient agitated, markedly irritable,paranoid  with themes of perrvasive mistreatment, being singled out but w/u well formed , fixed delusional ideas.Cont enocurage med compliance scheduled for court hearing for TOO  1/28: Currently on CO due to hospital bed requirement. Reports fair sleep and appetite. Seen at bedside during rounds. Continues to refuse most medications, took senna and tylenol last night. Reporting back pain "15/10" and requesting more heat packs "I will die without these", however under no visible distress. Primary team pursuing TOO. Renewed CO.   1/29: Renewed CO. Last night took Depakote, Haldol and Senna. Med compliance has been partial.   1/30: Refused all meds yd, this AM accepted metoprolol. Making accusations that people are attempting to harm her and want her to die. Impulse control is impaired.  1/31: Continues with intermittent agitation, hostile and accusatory twd staff, refusing meds (except metoprolol), refusing consent to speak to daughter. Given ongoing agitation, impulsivity, disinhibition, and deconditioning 2/2 refusal to ambulate (also attempted to climb out of bed last night), pt poses a threat of harm to herself and requires ongoing inpatient hospitalization for stabilization and safety. TOO & retention court date next week.  2/1: Continues irritable, paranoid, refusing medications and engagement in interviews.   2/2: Minimally engaged in interview, refusing to answer most questions; labile, hostile, required IM Haldol 1mg overnight for agitation; refusing to ambulate or engage in PT; refusing pain control interventions other than hot packs and lidocaine patch. TOO in process.   2/4 Cont agitated poor sleep, this Am had vairable O2 sat, patient reported she feels she needs an inhaler for asthma. Patient to be seen by medicine, labs and RVP ordered. Will change haldol to olanzapine standing as response to prns of haldol and occ po's have had poor response.   2/5 Paranoid, severely agitated. Has URI. COnt meds prn inhlaer scheuled for TOO 2/7 2/6: Remains paranoid, agitated, accusatory. Frequently refusing meds, required 3x olanzapine PRNs over the weekend.  2/7: Remains paranoid and accusatory twd staff. Continues to refuse majority of medications. Currently with URI. Going to court today for retention and TOO.  2/8:  Patient irritable, agitated, will increase olanzapine with IM back up.   2/9 No major changes but taking meds, Cont olanzapine now at 2.5mg hs and Depakote. Plan cont to titrate, monitor for se  2/10 Patient w/o much response cont with intense paranoia and agitation. Cont Zyprexa cont to titrate.   2/11 Pt is sleepy most likely due to PRN zyprexa last night, remains disorganized and confused.   2/12 confused, disorganized, screams at times, required PRN zyprexa/melatonin last night.   2/13: The patient continues to be manic, disorganized, irritable, screaming at times.  She has been tolerating medications and prn's well - will increase Zyprexa to 5mg hs and change to Zydis to ensure compliance.  Continue 1:1.  2/14: The patient is not responding to Zyprexa, despite increasing dose and getting prn's.  Will change to Seroquel, as patient reportedly did well with it in the past.  Patient given 12.5mg this morning, tolerated well.  Will start 12.5mg tid.  2/15: The patient continues to be irritable, agitated, disorganized.  Some small improvement with Seroquel, sleeping better at night.  Continue Seroquel trial.

## 2023-02-15 NOTE — BH INPATIENT PSYCHIATRY PROGRESS NOTE - NSBHFUPINTERVALHXFT_PSY_A_CORE
Patient seen for follow-up of bipolar affective disorder, r/o cognitive disorder, chart reviewed and discussed with nursing staff.   The patient continues to be irritable, agitated at times, yelling out and yelling at staff.  However, staff reports some small improvement, as patient more redirectable overall.  The patient also was able to sleep better last night.  On interview, the patient continues to be irritable, uncooperative, discharge focused, showing no insight.  Her thoughts remain disorganized and influenced by delusional thinking.  The patient continues to need frequent redirection to maintain safety.  The patient reports eating well, slept better last night.  She has been compliant with medications, tolerating them well.    Spoke with the patient's daughter to provide clinical update.  She is agreeable with treatment plan.  Daughter confirmed patient had been stable on Seroquel 50mg bid, klonopin 0.5mg hs, melatonin 5mg hs.

## 2023-02-15 NOTE — BH INPATIENT PSYCHIATRY PROGRESS NOTE - MSE UNSTRUCTURED FT
The patient appears stated age, with fair hygiene, and is dressed appropriately.  She continues to be irritable, largely uncooperative with the interview, not answering questions asked, argumentative regarding various unrelated topics.  She maintained appropriate eye contact.  No restlessness or slowing of movements observed.  The patient’s speech was fluent, irritable in tone, increased in rate, and loud in volume at times.  The patient’s mood is “not good.”  Her affect is irritable, angry, labile.  The patient’s thoughts are disorganized, tangential, loose at times.  She has paranoid delusions, no apparent hallucinations.  She does not have any suicidal or homicidal ideation, intent, or plan.  Insight is poor.  Judgment is impaired.  Impulse control has been poor.

## 2023-02-15 NOTE — BH INPATIENT PSYCHIATRY PROGRESS NOTE - NSBHCHARTREVIEWVS_PSY_A_CORE FT
Vital Signs Last 24 Hrs  T(C): 36.3 (02-15-23 @ 07:53), Max: 36.3 (02-15-23 @ 07:53)  T(F): 97.3 (02-15-23 @ 07:53), Max: 97.3 (02-15-23 @ 07:53)  HR: --  BP: --  BP(mean): --  RR: --  SpO2: --    Orthostatic VS  02-15-23 @ 07:53  Lying BP: --/-- HR: --  Sitting BP: 101/48 HR: 60  Standing BP: --/-- HR: --  Site: upper left arm  Mode: electronic

## 2023-02-16 PROCEDURE — 99231 SBSQ HOSP IP/OBS SF/LOW 25: CPT

## 2023-02-16 RX ORDER — OLANZAPINE 15 MG/1
2.5 TABLET, FILM COATED ORAL EVERY 6 HOURS
Refills: 0 | Status: DISCONTINUED | OUTPATIENT
Start: 2023-02-16 | End: 2023-02-24

## 2023-02-16 RX ORDER — QUETIAPINE FUMARATE 200 MG/1
25 TABLET, FILM COATED ORAL
Refills: 0 | Status: DISCONTINUED | OUTPATIENT
Start: 2023-02-16 | End: 2023-02-19

## 2023-02-16 RX ORDER — OLANZAPINE 15 MG/1
1.25 TABLET, FILM COATED ORAL ONCE
Refills: 0 | Status: COMPLETED | OUTPATIENT
Start: 2023-02-16 | End: 2023-02-16

## 2023-02-16 RX ADMIN — Medication 50 MILLIGRAM(S): at 10:09

## 2023-02-16 RX ADMIN — QUETIAPINE FUMARATE 25 MILLIGRAM(S): 200 TABLET, FILM COATED ORAL at 12:33

## 2023-02-16 RX ADMIN — LIDOCAINE 1 PATCH: 4 CREAM TOPICAL at 10:04

## 2023-02-16 RX ADMIN — LIDOCAINE 1 PATCH: 4 CREAM TOPICAL at 22:50

## 2023-02-16 RX ADMIN — QUETIAPINE FUMARATE 12.5 MILLIGRAM(S): 200 TABLET, FILM COATED ORAL at 06:58

## 2023-02-16 RX ADMIN — OLANZAPINE 2.5 MILLIGRAM(S): 15 TABLET, FILM COATED ORAL at 11:09

## 2023-02-16 RX ADMIN — Medication 81 MILLIGRAM(S): at 10:09

## 2023-02-16 RX ADMIN — POLYETHYLENE GLYCOL 3350 17 GRAM(S): 17 POWDER, FOR SOLUTION ORAL at 10:09

## 2023-02-16 RX ADMIN — ENOXAPARIN SODIUM 40 MILLIGRAM(S): 100 INJECTION SUBCUTANEOUS at 10:09

## 2023-02-16 RX ADMIN — QUETIAPINE FUMARATE 12.5 MILLIGRAM(S): 200 TABLET, FILM COATED ORAL at 10:09

## 2023-02-16 RX ADMIN — QUETIAPINE FUMARATE 12.5 MILLIGRAM(S): 200 TABLET, FILM COATED ORAL at 03:38

## 2023-02-16 RX ADMIN — Medication 3 MILLIGRAM(S): at 17:19

## 2023-02-16 RX ADMIN — DIVALPROEX SODIUM 375 MILLIGRAM(S): 500 TABLET, DELAYED RELEASE ORAL at 20:26

## 2023-02-16 RX ADMIN — OLANZAPINE 1.25 MILLIGRAM(S): 15 TABLET, FILM COATED ORAL at 11:28

## 2023-02-16 RX ADMIN — Medication 1 DROP(S): at 20:26

## 2023-02-16 RX ADMIN — QUETIAPINE FUMARATE 12.5 MILLIGRAM(S): 200 TABLET, FILM COATED ORAL at 20:31

## 2023-02-16 RX ADMIN — LIDOCAINE 1 PATCH: 4 CREAM TOPICAL at 06:56

## 2023-02-16 RX ADMIN — QUETIAPINE FUMARATE 25 MILLIGRAM(S): 200 TABLET, FILM COATED ORAL at 17:19

## 2023-02-16 RX ADMIN — DIVALPROEX SODIUM 375 MILLIGRAM(S): 500 TABLET, DELAYED RELEASE ORAL at 10:08

## 2023-02-16 RX ADMIN — SENNA PLUS 2 TABLET(S): 8.6 TABLET ORAL at 20:25

## 2023-02-16 NOTE — BH INPATIENT PSYCHIATRY PROGRESS NOTE - NSBHCHARTREVIEWVS_PSY_A_CORE FT
Vital Signs Last 24 Hrs  T(C): 36.2 (02-16-23 @ 07:52), Max: 36.2 (02-16-23 @ 07:52)  T(F): 97.2 (02-16-23 @ 07:52), Max: 97.2 (02-16-23 @ 07:52)  HR: --  BP: --  BP(mean): --  RR: --  SpO2: --    Orthostatic VS  02-16-23 @ 07:52  Lying BP: --/-- HR: --  Sitting BP: 122/60 HR: 68  Standing BP: 119/69 HR: 71  Site: upper left arm  Mode: electronic  Orthostatic VS  02-15-23 @ 07:53  Lying BP: --/-- HR: --  Sitting BP: 101/48 HR: 60  Standing BP: --/-- HR: --  Site: upper left arm  Mode: electronic

## 2023-02-16 NOTE — BH INPATIENT PSYCHIATRY PROGRESS NOTE - MSE UNSTRUCTURED FT
The patient appears stated age, with fair hygiene, and is dressed appropriately.  She continues to be irritable, largely uncooperative with the interview, not answering questions asked, argumentative, discharge focused.  She maintained appropriate eye contact.  No restlessness or slowing of movements observed.  The patient’s speech was fluent, irritable in tone, increased in rate, and loud in volume at times.  The patient’s mood is “not good.”  Her affect is irritable, angry, labile.  The patient’s thoughts are disorganized, tangential, loose at times.  She has paranoid delusions, no apparent hallucinations.  She does not have any suicidal or homicidal ideation, intent, or plan.  Insight is poor.  Judgment is impaired.  Impulse control has been poor.

## 2023-02-16 NOTE — BH INPATIENT PSYCHIATRY PROGRESS NOTE - PRN MEDS
MEDICATIONS  (PRN):  acetaminophen     Tablet .. 650 milliGRAM(s) Oral every 6 hours PRN Mild Pain (1 - 3), Moderate Pain (4 - 6), Severe Pain (7 - 10)  albuterol    90 MICROgram(s) HFA Inhaler 2 Puff(s) Inhalation every 6 hours PRN Shortness of Breath and/or Wheezing  aluminum hydroxide/magnesium hydroxide/simethicone Suspension 30 milliLiter(s) Oral every 4 hours PRN Dyspepsia  benzocaine 15 mG/menthol 3.6 mG Lozenge 1 Lozenge Oral every 2 hours PRN sore throat  bisacodyl 5 milliGRAM(s) Oral every 12 hours PRN Constipation  OLANZapine 2.5 milliGRAM(s) Oral every 6 hours PRN severe agitation from hortencia  OLANZapine Injectable 1.25 milliGRAM(s) IntraMuscular once PRN agitation  OLANZapine Injectable 1.25 milliGRAM(s) IntraMuscular once PRN agitation  QUEtiapine 12.5 milliGRAM(s) Oral every 6 hours PRN agitation from hortencia

## 2023-02-16 NOTE — BH INPATIENT PSYCHIATRY PROGRESS NOTE - CURRENT MEDICATION
MEDICATIONS  (STANDING):  artificial  tears Solution 1 Drop(s) Both EYES two times a day  aspirin enteric coated 81 milliGRAM(s) Oral daily  divalproex Sprinkle 375 milliGRAM(s) Oral two times a day  enoxaparin Injectable 40 milliGRAM(s) SubCutaneous <User Schedule>  lidocaine   4% Patch 1 Patch Transdermal every 24 hours  melatonin. 3 milliGRAM(s) Oral <User Schedule>  metoprolol succinate ER 50 milliGRAM(s) Oral daily  polyethylene glycol 3350 17 Gram(s) Oral daily  QUEtiapine 25 milliGRAM(s) Oral <User Schedule>  senna 2 Tablet(s) Oral at bedtime    MEDICATIONS  (PRN):  acetaminophen     Tablet .. 650 milliGRAM(s) Oral every 6 hours PRN Mild Pain (1 - 3), Moderate Pain (4 - 6), Severe Pain (7 - 10)  albuterol    90 MICROgram(s) HFA Inhaler 2 Puff(s) Inhalation every 6 hours PRN Shortness of Breath and/or Wheezing  aluminum hydroxide/magnesium hydroxide/simethicone Suspension 30 milliLiter(s) Oral every 4 hours PRN Dyspepsia  benzocaine 15 mG/menthol 3.6 mG Lozenge 1 Lozenge Oral every 2 hours PRN sore throat  bisacodyl 5 milliGRAM(s) Oral every 12 hours PRN Constipation  OLANZapine 2.5 milliGRAM(s) Oral every 6 hours PRN severe agitation from hortencia  OLANZapine Injectable 1.25 milliGRAM(s) IntraMuscular once PRN agitation  OLANZapine Injectable 1.25 milliGRAM(s) IntraMuscular once PRN agitation  QUEtiapine 12.5 milliGRAM(s) Oral every 6 hours PRN agitation from hortencia

## 2023-02-16 NOTE — BH INPATIENT PSYCHIATRY PROGRESS NOTE - NSBHFUPINTERVALHXFT_PSY_A_CORE
Patient seen for follow-up of bipolar affective disorder, r/o cognitive disorder, chart reviewed and discussed with nursing staff.   The patient continues to be agitated frequently, often yelling out, getting prn medications with partial effect.  The patient did not sleep as well again overnight.  On interview, the patient continues to be irritable, uncooperative, not answering questions, discharge focused, showing no insight.  Her thoughts remain disorganized and influenced by delusional thinking.  The patient continues to need frequent redirection to maintain safety.  The patient reports eating well.  She has been compliant with medications, tolerating them well.

## 2023-02-16 NOTE — BH INPATIENT PSYCHIATRY PROGRESS NOTE - NSBHASSESSSUMMFT_PSY_ALL_CORE
Dinorah Hogan is an 88 year old female, , domiciled with daughter with PPHx of bipolar I disorder, 1 previous inpatient psychiatric hospitalization in 1970s, no known history of SI/SA and PMHx of asthma, GERD, sciatica with previous vertebral fracture who is admitted on 9.27 status for symptoms of hortencia including irritability, loud speech, and agitation.     DDx: hortencia vs hypomania vs comorbid delirium    Today, patient is angry about her current admission and denying any psychiatric history. She is asking to be discharged immediately and yelling at treatment team. She is noted to have loud and rapid speech, but is able to be interrupted. She states she has been sleeping poorly due to hip and R knee pain. She denies S/IIP, H/IIP, AVH, paranoia, or ideas of reference. She is AOx1-2 (person, place--hospital), but unable to state year or location in [place]. She requires 9.27 inpatient admission for evaluation and management of hortencia.    1/22: less irritable but still requiring PRN med, more compliant with meds, no SI/HI.  1/23: Accepted meds yd and this AM though required PRN yd. Less irritable, appropriately conversant, making needs known. Suspicious bordering on paranoid, however no fully formed delusional thought content apparent.  1/24: Irritable and labile, suspicious regarding staff members. Discussed medication, pt will be offered psychiatric meds and is aware she has right to refuse.  1/25: Hostile, irritable, labile. TOO application in progress given poor insight and intermittent refusal of psychiatric mediations. Pt at risk for deconditioning given refusal to ambulate and minimal engagement with PT, given ambulatory at home refusal here appears to be behavioral, will continue to encourage participation. Refused cognitive evaluation today. Given ongoing intermittent agitation, paranoia, physical deconditioning, and poor insight into condition, pt requires continued inpatient hospitalization for stabilization and safety.  1/26: Pt remains labile, irritable, and paranoid toward staff. Although pt makes provocative statements (e.g. "she controls the robots," "the Jews will hate me"), these statements appear to represent intense feeling rather than fully formed delusion. Continues to refuse cognitive evaluation or consent to contact family. Given ongoing intermittent agitation, paranoia, physical deconditioning, and poor insight into condition, pt requires continued inpatient hospitalization for stabilization and safety.  1/27 Patient agitated, markedly irritable,paranoid  with themes of perrvasive mistreatment, being singled out but w/u well formed , fixed delusional ideas.Cont enocurage med compliance scheduled for court hearing for TOO  1/28: Currently on CO due to hospital bed requirement. Reports fair sleep and appetite. Seen at bedside during rounds. Continues to refuse most medications, took senna and tylenol last night. Reporting back pain "15/10" and requesting more heat packs "I will die without these", however under no visible distress. Primary team pursuing TOO. Renewed CO.   1/29: Renewed CO. Last night took Depakote, Haldol and Senna. Med compliance has been partial.   1/30: Refused all meds yd, this AM accepted metoprolol. Making accusations that people are attempting to harm her and want her to die. Impulse control is impaired.  1/31: Continues with intermittent agitation, hostile and accusatory twd staff, refusing meds (except metoprolol), refusing consent to speak to daughter. Given ongoing agitation, impulsivity, disinhibition, and deconditioning 2/2 refusal to ambulate (also attempted to climb out of bed last night), pt poses a threat of harm to herself and requires ongoing inpatient hospitalization for stabilization and safety. TOO & retention court date next week.  2/1: Continues irritable, paranoid, refusing medications and engagement in interviews.   2/2: Minimally engaged in interview, refusing to answer most questions; labile, hostile, required IM Haldol 1mg overnight for agitation; refusing to ambulate or engage in PT; refusing pain control interventions other than hot packs and lidocaine patch. TOO in process.   2/4 Cont agitated poor sleep, this Am had vairable O2 sat, patient reported she feels she needs an inhaler for asthma. Patient to be seen by medicine, labs and RVP ordered. Will change haldol to olanzapine standing as response to prns of haldol and occ po's have had poor response.   2/5 Paranoid, severely agitated. Has URI. COnt meds prn inhlaer scheuled for TOO 2/7 2/6: Remains paranoid, agitated, accusatory. Frequently refusing meds, required 3x olanzapine PRNs over the weekend.  2/7: Remains paranoid and accusatory twd staff. Continues to refuse majority of medications. Currently with URI. Going to court today for retention and TOO.  2/8:  Patient irritable, agitated, will increase olanzapine with IM back up.   2/9 No major changes but taking meds, Cont olanzapine now at 2.5mg hs and Depakote. Plan cont to titrate, monitor for se  2/10 Patient w/o much response cont with intense paranoia and agitation. Cont Zyprexa cont to titrate.   2/11 Pt is sleepy most likely due to PRN zyprexa last night, remains disorganized and confused.   2/12 confused, disorganized, screams at times, required PRN zyprexa/melatonin last night.   2/13: The patient continues to be manic, disorganized, irritable, screaming at times.  She has been tolerating medications and prn's well - will increase Zyprexa to 5mg hs and change to Zydis to ensure compliance.  Continue 1:1.  2/14: The patient is not responding to Zyprexa, despite increasing dose and getting prn's.  Will change to Seroquel, as patient reportedly did well with it in the past.  Patient given 12.5mg this morning, tolerated well.  Will start 12.5mg tid.  2/15: The patient continues to be irritable, agitated, disorganized.  Some small improvement with Seroquel, sleeping better at night.  Continue Seroquel trial.  2/16: The patient continues to be agitated, frequently yelling out, irritable and angry.  She has been tolerating standing and prn Seroquel well, will continue to titrate to 25mg tid.  Continue 1:1.

## 2023-02-17 PROCEDURE — 99231 SBSQ HOSP IP/OBS SF/LOW 25: CPT

## 2023-02-17 RX ORDER — QUETIAPINE FUMARATE 200 MG/1
25 TABLET, FILM COATED ORAL EVERY 6 HOURS
Refills: 0 | Status: DISCONTINUED | OUTPATIENT
Start: 2023-02-17 | End: 2023-02-23

## 2023-02-17 RX ORDER — OLANZAPINE 15 MG/1
1.25 TABLET, FILM COATED ORAL ONCE
Refills: 0 | Status: COMPLETED | OUTPATIENT
Start: 2023-02-17 | End: 2023-02-17

## 2023-02-17 RX ADMIN — QUETIAPINE FUMARATE 25 MILLIGRAM(S): 200 TABLET, FILM COATED ORAL at 17:36

## 2023-02-17 RX ADMIN — QUETIAPINE FUMARATE 25 MILLIGRAM(S): 200 TABLET, FILM COATED ORAL at 05:28

## 2023-02-17 RX ADMIN — LIDOCAINE 1 PATCH: 4 CREAM TOPICAL at 12:23

## 2023-02-17 RX ADMIN — Medication 81 MILLIGRAM(S): at 09:59

## 2023-02-17 RX ADMIN — Medication 3 MILLIGRAM(S): at 17:37

## 2023-02-17 RX ADMIN — Medication 50 MILLIGRAM(S): at 09:59

## 2023-02-17 RX ADMIN — ENOXAPARIN SODIUM 40 MILLIGRAM(S): 100 INJECTION SUBCUTANEOUS at 09:59

## 2023-02-17 RX ADMIN — POLYETHYLENE GLYCOL 3350 17 GRAM(S): 17 POWDER, FOR SOLUTION ORAL at 09:59

## 2023-02-17 RX ADMIN — QUETIAPINE FUMARATE 25 MILLIGRAM(S): 200 TABLET, FILM COATED ORAL at 12:38

## 2023-02-17 RX ADMIN — DIVALPROEX SODIUM 375 MILLIGRAM(S): 500 TABLET, DELAYED RELEASE ORAL at 09:59

## 2023-02-17 RX ADMIN — OLANZAPINE 1.25 MILLIGRAM(S): 15 TABLET, FILM COATED ORAL at 18:39

## 2023-02-17 RX ADMIN — LIDOCAINE 1 PATCH: 4 CREAM TOPICAL at 09:52

## 2023-02-17 NOTE — BH INPATIENT PSYCHIATRY PROGRESS NOTE - NSBHASSESSSUMMFT_PSY_ALL_CORE
Dinorah Hogan is an 88 year old female, , domiciled with daughter with PPHx of bipolar I disorder, 1 previous inpatient psychiatric hospitalization in 1970s, no known history of SI/SA and PMHx of asthma, GERD, sciatica with previous vertebral fracture who is admitted on 9.27 status for symptoms of hortencia including irritability, loud speech, and agitation.     DDx: hortencia vs hypomania vs comorbid delirium    Today, patient is angry about her current admission and denying any psychiatric history. She is asking to be discharged immediately and yelling at treatment team. She is noted to have loud and rapid speech, but is able to be interrupted. She states she has been sleeping poorly due to hip and R knee pain. She denies S/IIP, H/IIP, AVH, paranoia, or ideas of reference. She is AOx1-2 (person, place--hospital), but unable to state year or location in [place]. She requires 9.27 inpatient admission for evaluation and management of hortencia.    1/22: less irritable but still requiring PRN med, more compliant with meds, no SI/HI.  1/23: Accepted meds yd and this AM though required PRN yd. Less irritable, appropriately conversant, making needs known. Suspicious bordering on paranoid, however no fully formed delusional thought content apparent.  1/24: Irritable and labile, suspicious regarding staff members. Discussed medication, pt will be offered psychiatric meds and is aware she has right to refuse.  1/25: Hostile, irritable, labile. TOO application in progress given poor insight and intermittent refusal of psychiatric mediations. Pt at risk for deconditioning given refusal to ambulate and minimal engagement with PT, given ambulatory at home refusal here appears to be behavioral, will continue to encourage participation. Refused cognitive evaluation today. Given ongoing intermittent agitation, paranoia, physical deconditioning, and poor insight into condition, pt requires continued inpatient hospitalization for stabilization and safety.  1/26: Pt remains labile, irritable, and paranoid toward staff. Although pt makes provocative statements (e.g. "she controls the robots," "the Jews will hate me"), these statements appear to represent intense feeling rather than fully formed delusion. Continues to refuse cognitive evaluation or consent to contact family. Given ongoing intermittent agitation, paranoia, physical deconditioning, and poor insight into condition, pt requires continued inpatient hospitalization for stabilization and safety.  1/27 Patient agitated, markedly irritable,paranoid  with themes of perrvasive mistreatment, being singled out but w/u well formed , fixed delusional ideas.Cont enocurage med compliance scheduled for court hearing for TOO  1/28: Currently on CO due to hospital bed requirement. Reports fair sleep and appetite. Seen at bedside during rounds. Continues to refuse most medications, took senna and tylenol last night. Reporting back pain "15/10" and requesting more heat packs "I will die without these", however under no visible distress. Primary team pursuing TOO. Renewed CO.   1/29: Renewed CO. Last night took Depakote, Haldol and Senna. Med compliance has been partial.   1/30: Refused all meds yd, this AM accepted metoprolol. Making accusations that people are attempting to harm her and want her to die. Impulse control is impaired.  1/31: Continues with intermittent agitation, hostile and accusatory twd staff, refusing meds (except metoprolol), refusing consent to speak to daughter. Given ongoing agitation, impulsivity, disinhibition, and deconditioning 2/2 refusal to ambulate (also attempted to climb out of bed last night), pt poses a threat of harm to herself and requires ongoing inpatient hospitalization for stabilization and safety. TOO & retention court date next week.  2/1: Continues irritable, paranoid, refusing medications and engagement in interviews.   2/2: Minimally engaged in interview, refusing to answer most questions; labile, hostile, required IM Haldol 1mg overnight for agitation; refusing to ambulate or engage in PT; refusing pain control interventions other than hot packs and lidocaine patch. TOO in process.   2/4 Cont agitated poor sleep, this Am had vairable O2 sat, patient reported she feels she needs an inhaler for asthma. Patient to be seen by medicine, labs and RVP ordered. Will change haldol to olanzapine standing as response to prns of haldol and occ po's have had poor response.   2/5 Paranoid, severely agitated. Has URI. COnt meds prn inhlaer scheuled for TOO 2/7 2/6: Remains paranoid, agitated, accusatory. Frequently refusing meds, required 3x olanzapine PRNs over the weekend.  2/7: Remains paranoid and accusatory twd staff. Continues to refuse majority of medications. Currently with URI. Going to court today for retention and TOO.  2/8:  Patient irritable, agitated, will increase olanzapine with IM back up.   2/9 No major changes but taking meds, Cont olanzapine now at 2.5mg hs and Depakote. Plan cont to titrate, monitor for se  2/10 Patient w/o much response cont with intense paranoia and agitation. Cont Zyprexa cont to titrate.   2/11 Pt is sleepy most likely due to PRN zyprexa last night, remains disorganized and confused.   2/12 confused, disorganized, screams at times, required PRN zyprexa/melatonin last night.   2/13: The patient continues to be manic, disorganized, irritable, screaming at times.  She has been tolerating medications and prn's well - will increase Zyprexa to 5mg hs and change to Zydis to ensure compliance.  Continue 1:1.  2/14: The patient is not responding to Zyprexa, despite increasing dose and getting prn's.  Will change to Seroquel, as patient reportedly did well with it in the past.  Patient given 12.5mg this morning, tolerated well.  Will start 12.5mg tid.  2/15: The patient continues to be irritable, agitated, disorganized.  Some small improvement with Seroquel, sleeping better at night.  Continue Seroquel trial.  2/16: The patient continues to be agitated, frequently yelling out, irritable and angry.  She has been tolerating standing and prn Seroquel well, will continue to titrate to 25mg tid.  Continue 1:1.  2/17: The patient continues to be irritable, agitated, yelling out throughout the day.  She is having more restful periods during the day.  She has been tolerating Seroquel well, will continue.  Continue 1:1.

## 2023-02-17 NOTE — BH INPATIENT PSYCHIATRY PROGRESS NOTE - MSE UNSTRUCTURED FT
The patient appears stated age, with fair hygiene, and is dressed appropriately.  She continues to be irritable, largely uncooperative with the interview, not answering questions appropriately.  She maintained appropriate eye contact.  No restlessness or slowing of movements observed.  The patient’s speech was fluent, irritable in tone, increased in rate, and loud in volume at times.  The patient’s mood is “get away.”  Her affect is irritable, angry, labile.  The patient’s thoughts are disorganized, tangential, loose at times, discharge focused.  She has paranoid delusions, no apparent hallucinations.  She does not have any suicidal or homicidal ideation, intent, or plan.  Insight is poor.  Judgment is impaired.  Impulse control has been poor.

## 2023-02-17 NOTE — BH INPATIENT PSYCHIATRY PROGRESS NOTE - PRN MEDS
MEDICATIONS  (PRN):  acetaminophen     Tablet .. 650 milliGRAM(s) Oral every 6 hours PRN Mild Pain (1 - 3), Moderate Pain (4 - 6), Severe Pain (7 - 10)  albuterol    90 MICROgram(s) HFA Inhaler 2 Puff(s) Inhalation every 6 hours PRN Shortness of Breath and/or Wheezing  aluminum hydroxide/magnesium hydroxide/simethicone Suspension 30 milliLiter(s) Oral every 4 hours PRN Dyspepsia  benzocaine 15 mG/menthol 3.6 mG Lozenge 1 Lozenge Oral every 2 hours PRN sore throat  bisacodyl 5 milliGRAM(s) Oral every 12 hours PRN Constipation  OLANZapine 2.5 milliGRAM(s) Oral every 6 hours PRN severe agitation from hortencia  OLANZapine Injectable 1.25 milliGRAM(s) IntraMuscular once PRN agitation  OLANZapine Injectable 1.25 milliGRAM(s) IntraMuscular once PRN agitation  QUEtiapine 25 milliGRAM(s) Oral every 6 hours PRN agitation from hortencia

## 2023-02-17 NOTE — BH INPATIENT PSYCHIATRY PROGRESS NOTE - NSBHFUPINTERVALHXFT_PSY_A_CORE
Patient seen for follow-up of bipolar affective disorder, r/o cognitive disorder, chart reviewed and discussed with nursing staff.   The patient continues to be agitated frequently, often yelling out, getting prn medications with only partial effect.  The patient did not sleep well again overnight.  This morning, the patient is a little calmer, but continues to be easily agitated.  She is disorganized and delusional, unable to answer questions appropriately.  The patient continues to need frequent redirection to maintain safety.  The patient reports eating well.  She has been compliant with medications, tolerating them well.

## 2023-02-17 NOTE — BH INPATIENT PSYCHIATRY PROGRESS NOTE - NSBHCHARTREVIEWVS_PSY_A_CORE FT
Vital Signs Last 24 Hrs  T(C): 36.2 (02-17-23 @ 07:45), Max: 36.2 (02-17-23 @ 07:45)  T(F): 97.1 (02-17-23 @ 07:45), Max: 97.1 (02-17-23 @ 07:45)  HR: --  BP: --  BP(mean): --  RR: --  SpO2: --    Orthostatic VS  02-17-23 @ 07:45  Lying BP: --/-- HR: --  Sitting BP: 113/55 HR: 71  Standing BP: --/-- HR: --  Site: --  Mode: --  Orthostatic VS  02-16-23 @ 07:52  Lying BP: --/-- HR: --  Sitting BP: 122/60 HR: 68  Standing BP: 119/69 HR: 71  Site: upper left arm  Mode: electronic

## 2023-02-17 NOTE — BH INPATIENT PSYCHIATRY PROGRESS NOTE - CURRENT MEDICATION
MEDICATIONS  (STANDING):  artificial  tears Solution 1 Drop(s) Both EYES two times a day  aspirin enteric coated 81 milliGRAM(s) Oral daily  divalproex Sprinkle 375 milliGRAM(s) Oral two times a day  enoxaparin Injectable 40 milliGRAM(s) SubCutaneous <User Schedule>  lidocaine   4% Patch 1 Patch Transdermal every 24 hours  melatonin. 3 milliGRAM(s) Oral <User Schedule>  metoprolol succinate ER 50 milliGRAM(s) Oral daily  polyethylene glycol 3350 17 Gram(s) Oral daily  QUEtiapine 25 milliGRAM(s) Oral <User Schedule>  senna 2 Tablet(s) Oral at bedtime    MEDICATIONS  (PRN):  acetaminophen     Tablet .. 650 milliGRAM(s) Oral every 6 hours PRN Mild Pain (1 - 3), Moderate Pain (4 - 6), Severe Pain (7 - 10)  albuterol    90 MICROgram(s) HFA Inhaler 2 Puff(s) Inhalation every 6 hours PRN Shortness of Breath and/or Wheezing  aluminum hydroxide/magnesium hydroxide/simethicone Suspension 30 milliLiter(s) Oral every 4 hours PRN Dyspepsia  benzocaine 15 mG/menthol 3.6 mG Lozenge 1 Lozenge Oral every 2 hours PRN sore throat  bisacodyl 5 milliGRAM(s) Oral every 12 hours PRN Constipation  OLANZapine 2.5 milliGRAM(s) Oral every 6 hours PRN severe agitation from hortencia  OLANZapine Injectable 1.25 milliGRAM(s) IntraMuscular once PRN agitation  OLANZapine Injectable 1.25 milliGRAM(s) IntraMuscular once PRN agitation  QUEtiapine 25 milliGRAM(s) Oral every 6 hours PRN agitation from hortencia

## 2023-02-18 PROCEDURE — 99231 SBSQ HOSP IP/OBS SF/LOW 25: CPT

## 2023-02-18 RX ORDER — OLANZAPINE 15 MG/1
1.25 TABLET, FILM COATED ORAL ONCE
Refills: 0 | Status: DISCONTINUED | OUTPATIENT
Start: 2023-02-18 | End: 2023-02-21

## 2023-02-18 RX ADMIN — LIDOCAINE 1 PATCH: 4 CREAM TOPICAL at 07:03

## 2023-02-18 RX ADMIN — ENOXAPARIN SODIUM 40 MILLIGRAM(S): 100 INJECTION SUBCUTANEOUS at 08:06

## 2023-02-18 RX ADMIN — QUETIAPINE FUMARATE 25 MILLIGRAM(S): 200 TABLET, FILM COATED ORAL at 12:13

## 2023-02-18 RX ADMIN — LIDOCAINE 1 PATCH: 4 CREAM TOPICAL at 02:05

## 2023-02-18 RX ADMIN — Medication 50 MILLIGRAM(S): at 08:06

## 2023-02-18 RX ADMIN — QUETIAPINE FUMARATE 25 MILLIGRAM(S): 200 TABLET, FILM COATED ORAL at 06:14

## 2023-02-18 RX ADMIN — QUETIAPINE FUMARATE 25 MILLIGRAM(S): 200 TABLET, FILM COATED ORAL at 09:50

## 2023-02-18 RX ADMIN — LIDOCAINE 1 PATCH: 4 CREAM TOPICAL at 22:54

## 2023-02-18 RX ADMIN — DIVALPROEX SODIUM 375 MILLIGRAM(S): 500 TABLET, DELAYED RELEASE ORAL at 22:30

## 2023-02-18 RX ADMIN — Medication 3 MILLIGRAM(S): at 17:11

## 2023-02-18 RX ADMIN — QUETIAPINE FUMARATE 25 MILLIGRAM(S): 200 TABLET, FILM COATED ORAL at 22:31

## 2023-02-18 RX ADMIN — SENNA PLUS 2 TABLET(S): 8.6 TABLET ORAL at 22:31

## 2023-02-18 RX ADMIN — OLANZAPINE 2.5 MILLIGRAM(S): 15 TABLET, FILM COATED ORAL at 08:05

## 2023-02-18 RX ADMIN — QUETIAPINE FUMARATE 25 MILLIGRAM(S): 200 TABLET, FILM COATED ORAL at 17:11

## 2023-02-18 RX ADMIN — DIVALPROEX SODIUM 375 MILLIGRAM(S): 500 TABLET, DELAYED RELEASE ORAL at 08:06

## 2023-02-18 RX ADMIN — QUETIAPINE FUMARATE 25 MILLIGRAM(S): 200 TABLET, FILM COATED ORAL at 01:59

## 2023-02-18 RX ADMIN — Medication 81 MILLIGRAM(S): at 08:06

## 2023-02-18 RX ADMIN — Medication 1 DROP(S): at 23:00
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
## 2023-02-18 NOTE — BH INPATIENT PSYCHIATRY PROGRESS NOTE - NSBHCHARTREVIEWVS_PSY_A_CORE FT
Vital Signs Last 24 Hrs  T(C): 36.4 (02-18-23 @ 07:25), Max: 36.4 (02-18-23 @ 07:25)  T(F): 97.5 (02-18-23 @ 07:25), Max: 97.5 (02-18-23 @ 07:25)  HR: --  BP: --  BP(mean): --  RR: --  SpO2: --    Orthostatic VS  02-18-23 @ 07:25  Lying BP: --/-- HR: --  Sitting BP: 91/59 HR: 58  Standing BP: --/-- HR: --  Site: --  Mode: --  Orthostatic VS  02-17-23 @ 07:45  Lying BP: --/-- HR: --  Sitting BP: 113/55 HR: 71  Standing BP: --/-- HR: --  Site: --  Mode: --

## 2023-02-18 NOTE — BH INPATIENT PSYCHIATRY PROGRESS NOTE - NSBHASSESSSUMMFT_PSY_ALL_CORE
Dinorah Hogan is an 88 year old female, , domiciled with daughter with PPHx of bipolar I disorder, 1 previous inpatient psychiatric hospitalization in 1970s, no known history of SI/SA and PMHx of asthma, GERD, sciatica with previous vertebral fracture who is admitted on 9.27 status for symptoms of hortencia including irritability, loud speech, and agitation.     DDx: hortencia vs hypomania vs comorbid delirium    Today, patient is angry about her current admission and denying any psychiatric history. She is asking to be discharged immediately and yelling at treatment team. She is noted to have loud and rapid speech, but is able to be interrupted. She states she has been sleeping poorly due to hip and R knee pain. She denies S/IIP, H/IIP, AVH, paranoia, or ideas of reference. She is AOx1-2 (person, place--hospital), but unable to state year or location in [place]. She requires 9.27 inpatient admission for evaluation and management of hortencia.    1/22: less irritable but still requiring PRN med, more compliant with meds, no SI/HI.  1/23: Accepted meds yd and this AM though required PRN yd. Less irritable, appropriately conversant, making needs known. Suspicious bordering on paranoid, however no fully formed delusional thought content apparent.  1/24: Irritable and labile, suspicious regarding staff members. Discussed medication, pt will be offered psychiatric meds and is aware she has right to refuse.  1/25: Hostile, irritable, labile. TOO application in progress given poor insight and intermittent refusal of psychiatric mediations. Pt at risk for deconditioning given refusal to ambulate and minimal engagement with PT, given ambulatory at home refusal here appears to be behavioral, will continue to encourage participation. Refused cognitive evaluation today. Given ongoing intermittent agitation, paranoia, physical deconditioning, and poor insight into condition, pt requires continued inpatient hospitalization for stabilization and safety.  1/26: Pt remains labile, irritable, and paranoid toward staff. Although pt makes provocative statements (e.g. "she controls the robots," "the Jews will hate me"), these statements appear to represent intense feeling rather than fully formed delusion. Continues to refuse cognitive evaluation or consent to contact family. Given ongoing intermittent agitation, paranoia, physical deconditioning, and poor insight into condition, pt requires continued inpatient hospitalization for stabilization and safety.  1/27 Patient agitated, markedly irritable,paranoid  with themes of perrvasive mistreatment, being singled out but w/u well formed , fixed delusional ideas.Cont enocurage med compliance scheduled for court hearing for TOO  1/28: Currently on CO due to hospital bed requirement. Reports fair sleep and appetite. Seen at bedside during rounds. Continues to refuse most medications, took senna and tylenol last night. Reporting back pain "15/10" and requesting more heat packs "I will die without these", however under no visible distress. Primary team pursuing TOO. Renewed CO.   1/29: Renewed CO. Last night took Depakote, Haldol and Senna. Med compliance has been partial.   1/30: Refused all meds yd, this AM accepted metoprolol. Making accusations that people are attempting to harm her and want her to die. Impulse control is impaired.  1/31: Continues with intermittent agitation, hostile and accusatory twd staff, refusing meds (except metoprolol), refusing consent to speak to daughter. Given ongoing agitation, impulsivity, disinhibition, and deconditioning 2/2 refusal to ambulate (also attempted to climb out of bed last night), pt poses a threat of harm to herself and requires ongoing inpatient hospitalization for stabilization and safety. TOO & retention court date next week.  2/1: Continues irritable, paranoid, refusing medications and engagement in interviews.   2/2: Minimally engaged in interview, refusing to answer most questions; labile, hostile, required IM Haldol 1mg overnight for agitation; refusing to ambulate or engage in PT; refusing pain control interventions other than hot packs and lidocaine patch. TOO in process.   2/4 Cont agitated poor sleep, this Am had vairable O2 sat, patient reported she feels she needs an inhaler for asthma. Patient to be seen by medicine, labs and RVP ordered. Will change haldol to olanzapine standing as response to prns of haldol and occ po's have had poor response.   2/5 Paranoid, severely agitated. Has URI. COnt meds prn inhlaer scheuled for TOO 2/7 2/6: Remains paranoid, agitated, accusatory. Frequently refusing meds, required 3x olanzapine PRNs over the weekend.  2/7: Remains paranoid and accusatory twd staff. Continues to refuse majority of medications. Currently with URI. Going to court today for retention and TOO.  2/8:  Patient irritable, agitated, will increase olanzapine with IM back up.   2/9 No major changes but taking meds, Cont olanzapine now at 2.5mg hs and Depakote. Plan cont to titrate, monitor for se  2/10 Patient w/o much response cont with intense paranoia and agitation. Cont Zyprexa cont to titrate.   2/11 Pt is sleepy most likely due to PRN zyprexa last night, remains disorganized and confused.   2/12 confused, disorganized, screams at times, required PRN zyprexa/melatonin last night.   2/13: The patient continues to be manic, disorganized, irritable, screaming at times.  She has been tolerating medications and prn's well - will increase Zyprexa to 5mg hs and change to Zydis to ensure compliance.  Continue 1:1.  2/14: The patient is not responding to Zyprexa, despite increasing dose and getting prn's.  Will change to Seroquel, as patient reportedly did well with it in the past.  Patient given 12.5mg this morning, tolerated well.  Will start 12.5mg tid.  2/15: The patient continues to be irritable, agitated, disorganized.  Some small improvement with Seroquel, sleeping better at night.  Continue Seroquel trial.  2/16: The patient continues to be agitated, frequently yelling out, irritable and angry.  She has been tolerating standing and prn Seroquel well, will continue to titrate to 25mg tid.  Continue 1:1.  2/17: The patient continues to be irritable, agitated, yelling out throughout the day.  She is having more restful periods during the day.  She has been tolerating Seroquel well, will continue.  Continue 1:1.  2/18: Patient remains agitated, disorganized, guarded, confusd

## 2023-02-18 NOTE — BH INPATIENT PSYCHIATRY PROGRESS NOTE - NSBHFUPINTERVALHXFT_PSY_A_CORE
Patient seen for follow-up of bipolar affective disorder, possible evolving dementia. BP 91/59 HR 58. remains confused, agitated

## 2023-02-18 NOTE — BH INPATIENT PSYCHIATRY PROGRESS NOTE - PRN MEDS
MEDICATIONS  (PRN):  acetaminophen     Tablet .. 650 milliGRAM(s) Oral every 6 hours PRN Mild Pain (1 - 3), Moderate Pain (4 - 6), Severe Pain (7 - 10)  albuterol    90 MICROgram(s) HFA Inhaler 2 Puff(s) Inhalation every 6 hours PRN Shortness of Breath and/or Wheezing  aluminum hydroxide/magnesium hydroxide/simethicone Suspension 30 milliLiter(s) Oral every 4 hours PRN Dyspepsia  benzocaine 15 mG/menthol 3.6 mG Lozenge 1 Lozenge Oral every 2 hours PRN sore throat  bisacodyl 5 milliGRAM(s) Oral every 12 hours PRN Constipation  OLANZapine 2.5 milliGRAM(s) Oral every 6 hours PRN severe agitation from hortencia  OLANZapine Injectable 1.25 milliGRAM(s) IntraMuscular once PRN agitation  QUEtiapine 25 milliGRAM(s) Oral every 6 hours PRN agitation from hortencia

## 2023-02-18 NOTE — BH INPATIENT PSYCHIATRY PROGRESS NOTE - CURRENT MEDICATION
MEDICATIONS  (STANDING):  artificial  tears Solution 1 Drop(s) Both EYES two times a day  aspirin enteric coated 81 milliGRAM(s) Oral daily  divalproex Sprinkle 375 milliGRAM(s) Oral two times a day  enoxaparin Injectable 40 milliGRAM(s) SubCutaneous <User Schedule>  lidocaine   4% Patch 1 Patch Transdermal every 24 hours  melatonin. 3 milliGRAM(s) Oral <User Schedule>  metoprolol succinate ER 50 milliGRAM(s) Oral daily  polyethylene glycol 3350 17 Gram(s) Oral daily  QUEtiapine 25 milliGRAM(s) Oral <User Schedule>  senna 2 Tablet(s) Oral at bedtime    MEDICATIONS  (PRN):  acetaminophen     Tablet .. 650 milliGRAM(s) Oral every 6 hours PRN Mild Pain (1 - 3), Moderate Pain (4 - 6), Severe Pain (7 - 10)  albuterol    90 MICROgram(s) HFA Inhaler 2 Puff(s) Inhalation every 6 hours PRN Shortness of Breath and/or Wheezing  aluminum hydroxide/magnesium hydroxide/simethicone Suspension 30 milliLiter(s) Oral every 4 hours PRN Dyspepsia  benzocaine 15 mG/menthol 3.6 mG Lozenge 1 Lozenge Oral every 2 hours PRN sore throat  bisacodyl 5 milliGRAM(s) Oral every 12 hours PRN Constipation  OLANZapine 2.5 milliGRAM(s) Oral every 6 hours PRN severe agitation from hortencia  OLANZapine Injectable 1.25 milliGRAM(s) IntraMuscular once PRN agitation  QUEtiapine 25 milliGRAM(s) Oral every 6 hours PRN agitation from hortencia

## 2023-02-18 NOTE — BH INPATIENT PSYCHIATRY PROGRESS NOTE - MSE UNSTRUCTURED FT
Patient is awake and alert. Observed flipping through book, holding it upside down. Affect irritable. +motor agitation. Confused. TP disjointed. Poor insight. No suicidal ideations.

## 2023-02-19 PROCEDURE — 99231 SBSQ HOSP IP/OBS SF/LOW 25: CPT

## 2023-02-19 RX ORDER — QUETIAPINE FUMARATE 200 MG/1
50 TABLET, FILM COATED ORAL
Refills: 0 | Status: DISCONTINUED | OUTPATIENT
Start: 2023-02-19 | End: 2023-02-21

## 2023-02-19 RX ADMIN — SENNA PLUS 2 TABLET(S): 8.6 TABLET ORAL at 22:36

## 2023-02-19 RX ADMIN — QUETIAPINE FUMARATE 50 MILLIGRAM(S): 200 TABLET, FILM COATED ORAL at 22:36

## 2023-02-19 RX ADMIN — DIVALPROEX SODIUM 375 MILLIGRAM(S): 500 TABLET, DELAYED RELEASE ORAL at 08:54

## 2023-02-19 RX ADMIN — LIDOCAINE 1 PATCH: 4 CREAM TOPICAL at 22:35

## 2023-02-19 RX ADMIN — QUETIAPINE FUMARATE 25 MILLIGRAM(S): 200 TABLET, FILM COATED ORAL at 05:57

## 2023-02-19 RX ADMIN — OLANZAPINE 2.5 MILLIGRAM(S): 15 TABLET, FILM COATED ORAL at 09:01

## 2023-02-19 RX ADMIN — Medication 81 MILLIGRAM(S): at 08:55

## 2023-02-19 RX ADMIN — Medication 3 MILLIGRAM(S): at 17:01

## 2023-02-19 RX ADMIN — OLANZAPINE 2.5 MILLIGRAM(S): 15 TABLET, FILM COATED ORAL at 01:15

## 2023-02-19 RX ADMIN — Medication 50 MILLIGRAM(S): at 08:54

## 2023-02-19 RX ADMIN — QUETIAPINE FUMARATE 25 MILLIGRAM(S): 200 TABLET, FILM COATED ORAL at 10:52

## 2023-02-19 RX ADMIN — ENOXAPARIN SODIUM 40 MILLIGRAM(S): 100 INJECTION SUBCUTANEOUS at 08:55

## 2023-02-19 RX ADMIN — DIVALPROEX SODIUM 375 MILLIGRAM(S): 500 TABLET, DELAYED RELEASE ORAL at 22:35

## 2023-02-19 NOTE — BH INPATIENT PSYCHIATRY PROGRESS NOTE - NSBHASSESSSUMMFT_PSY_ALL_CORE
Dinorah Hogan is an 88 year old female, , domiciled with daughter with PPHx of bipolar I disorder, 1 previous inpatient psychiatric hospitalization in 1970s, no known history of SI/SA and PMHx of asthma, GERD, sciatica with previous vertebral fracture who is admitted on 9.27 status for symptoms of hortencia including irritability, loud speech, and agitation.     DDx: hortencia vs hypomania vs comorbid delirium    Today, patient is angry about her current admission and denying any psychiatric history. She is asking to be discharged immediately and yelling at treatment team. She is noted to have loud and rapid speech, but is able to be interrupted. She states she has been sleeping poorly due to hip and R knee pain. She denies S/IIP, H/IIP, AVH, paranoia, or ideas of reference. She is AOx1-2 (person, place--hospital), but unable to state year or location in [place]. She requires 9.27 inpatient admission for evaluation and management of hortencia.    1/22: less irritable but still requiring PRN med, more compliant with meds, no SI/HI.  1/23: Accepted meds yd and this AM though required PRN yd. Less irritable, appropriately conversant, making needs known. Suspicious bordering on paranoid, however no fully formed delusional thought content apparent.  1/24: Irritable and labile, suspicious regarding staff members. Discussed medication, pt will be offered psychiatric meds and is aware she has right to refuse.  1/25: Hostile, irritable, labile. TOO application in progress given poor insight and intermittent refusal of psychiatric mediations. Pt at risk for deconditioning given refusal to ambulate and minimal engagement with PT, given ambulatory at home refusal here appears to be behavioral, will continue to encourage participation. Refused cognitive evaluation today. Given ongoing intermittent agitation, paranoia, physical deconditioning, and poor insight into condition, pt requires continued inpatient hospitalization for stabilization and safety.  1/26: Pt remains labile, irritable, and paranoid toward staff. Although pt makes provocative statements (e.g. "she controls the robots," "the Jews will hate me"), these statements appear to represent intense feeling rather than fully formed delusion. Continues to refuse cognitive evaluation or consent to contact family. Given ongoing intermittent agitation, paranoia, physical deconditioning, and poor insight into condition, pt requires continued inpatient hospitalization for stabilization and safety.  1/27 Patient agitated, markedly irritable,paranoid  with themes of perrvasive mistreatment, being singled out but w/u well formed , fixed delusional ideas.Cont enocurage med compliance scheduled for court hearing for TOO  1/28: Currently on CO due to hospital bed requirement. Reports fair sleep and appetite. Seen at bedside during rounds. Continues to refuse most medications, took senna and tylenol last night. Reporting back pain "15/10" and requesting more heat packs "I will die without these", however under no visible distress. Primary team pursuing TOO. Renewed CO.   1/29: Renewed CO. Last night took Depakote, Haldol and Senna. Med compliance has been partial.   1/30: Refused all meds yd, this AM accepted metoprolol. Making accusations that people are attempting to harm her and want her to die. Impulse control is impaired.  1/31: Continues with intermittent agitation, hostile and accusatory twd staff, refusing meds (except metoprolol), refusing consent to speak to daughter. Given ongoing agitation, impulsivity, disinhibition, and deconditioning 2/2 refusal to ambulate (also attempted to climb out of bed last night), pt poses a threat of harm to herself and requires ongoing inpatient hospitalization for stabilization and safety. TOO & retention court date next week.  2/1: Continues irritable, paranoid, refusing medications and engagement in interviews.   2/2: Minimally engaged in interview, refusing to answer most questions; labile, hostile, required IM Haldol 1mg overnight for agitation; refusing to ambulate or engage in PT; refusing pain control interventions other than hot packs and lidocaine patch. TOO in process.   2/4 Cont agitated poor sleep, this Am had vairable O2 sat, patient reported she feels she needs an inhaler for asthma. Patient to be seen by medicine, labs and RVP ordered. Will change haldol to olanzapine standing as response to prns of haldol and occ po's have had poor response.   2/5 Paranoid, severely agitated. Has URI. COnt meds prn inhlaer scheuled for TOO 2/7 2/6: Remains paranoid, agitated, accusatory. Frequently refusing meds, required 3x olanzapine PRNs over the weekend.  2/7: Remains paranoid and accusatory twd staff. Continues to refuse majority of medications. Currently with URI. Going to court today for retention and TOO.  2/8:  Patient irritable, agitated, will increase olanzapine with IM back up.   2/9 No major changes but taking meds, Cont olanzapine now at 2.5mg hs and Depakote. Plan cont to titrate, monitor for se  2/10 Patient w/o much response cont with intense paranoia and agitation. Cont Zyprexa cont to titrate.   2/11 Pt is sleepy most likely due to PRN zyprexa last night, remains disorganized and confused.   2/12 confused, disorganized, screams at times, required PRN zyprexa/melatonin last night.   2/13: The patient continues to be manic, disorganized, irritable, screaming at times.  She has been tolerating medications and prn's well - will increase Zyprexa to 5mg hs and change to Zydis to ensure compliance.  Continue 1:1.  2/14: The patient is not responding to Zyprexa, despite increasing dose and getting prn's.  Will change to Seroquel, as patient reportedly did well with it in the past.  Patient given 12.5mg this morning, tolerated well.  Will start 12.5mg tid.  2/15: The patient continues to be irritable, agitated, disorganized.  Some small improvement with Seroquel, sleeping better at night.  Continue Seroquel trial.  2/16: The patient continues to be agitated, frequently yelling out, irritable and angry.  She has been tolerating standing and prn Seroquel well, will continue to titrate to 25mg tid.  Continue 1:1.  2/17: The patient continues to be irritable, agitated, yelling out throughout the day.  She is having more restful periods during the day.  She has been tolerating Seroquel well, will continue.  Continue 1:1.  2/18: Patient remains agitated, disorganized, guarded, confused  2/19: Sustained agitated, irritability, likely paranoia, will increase quetiapine

## 2023-02-19 NOTE — BH INPATIENT PSYCHIATRY PROGRESS NOTE - NSBHFUPINTERVALHXFT_PSY_A_CORE
Patient seen for follow-up of bipolar affective disorder, possible evolving dementia. AVSS. agitated and requiring prn meds, screaming

## 2023-02-19 NOTE — BH INPATIENT PSYCHIATRY PROGRESS NOTE - MSE UNSTRUCTURED FT
Patient is awake and alert. Affect irritable. Screaming, not participating in exam. TP disjointed. Poor insight.

## 2023-02-19 NOTE — BH INPATIENT PSYCHIATRY PROGRESS NOTE - NSBHCHARTREVIEWVS_PSY_A_CORE FT
Vital Signs Last 24 Hrs  T(C): 36.4 (02-19-23 @ 07:50), Max: 36.4 (02-19-23 @ 07:50)  T(F): 97.6 (02-19-23 @ 07:50), Max: 97.6 (02-19-23 @ 07:50)  HR: --  BP: --  BP(mean): --  RR: --  SpO2: --    Orthostatic VS  02-19-23 @ 07:50  Lying BP: --/-- HR: --  Sitting BP: 124/61 HR: 58  Standing BP: --/-- HR: --  Site: upper left arm  Mode: electronic  Orthostatic VS  02-18-23 @ 07:25  Lying BP: --/-- HR: --  Sitting BP: 91/59 HR: 58  Standing BP: --/-- HR: --  Site: --  Mode: --

## 2023-02-20 RX ORDER — OLANZAPINE 15 MG/1
2.5 TABLET, FILM COATED ORAL ONCE
Refills: 0 | Status: COMPLETED | OUTPATIENT
Start: 2023-02-20 | End: 2023-02-20

## 2023-02-20 RX ADMIN — LIDOCAINE 1 PATCH: 4 CREAM TOPICAL at 22:51

## 2023-02-20 RX ADMIN — DIVALPROEX SODIUM 375 MILLIGRAM(S): 500 TABLET, DELAYED RELEASE ORAL at 10:02

## 2023-02-20 RX ADMIN — ENOXAPARIN SODIUM 40 MILLIGRAM(S): 100 INJECTION SUBCUTANEOUS at 10:02

## 2023-02-20 RX ADMIN — LIDOCAINE 1 PATCH: 4 CREAM TOPICAL at 07:39

## 2023-02-20 RX ADMIN — QUETIAPINE FUMARATE 50 MILLIGRAM(S): 200 TABLET, FILM COATED ORAL at 10:02

## 2023-02-20 RX ADMIN — Medication 3 MILLIGRAM(S): at 17:05

## 2023-02-20 RX ADMIN — Medication 50 MILLIGRAM(S): at 10:02

## 2023-02-20 RX ADMIN — OLANZAPINE 2.5 MILLIGRAM(S): 15 TABLET, FILM COATED ORAL at 17:25

## 2023-02-20 RX ADMIN — Medication 1 DROP(S): at 22:09

## 2023-02-20 RX ADMIN — DIVALPROEX SODIUM 375 MILLIGRAM(S): 500 TABLET, DELAYED RELEASE ORAL at 22:05

## 2023-02-20 RX ADMIN — Medication 81 MILLIGRAM(S): at 10:02

## 2023-02-20 RX ADMIN — OLANZAPINE 2.5 MILLIGRAM(S): 15 TABLET, FILM COATED ORAL at 10:03

## 2023-02-20 RX ADMIN — SENNA PLUS 2 TABLET(S): 8.6 TABLET ORAL at 22:05

## 2023-02-20 RX ADMIN — POLYETHYLENE GLYCOL 3350 17 GRAM(S): 17 POWDER, FOR SOLUTION ORAL at 10:02

## 2023-02-20 RX ADMIN — QUETIAPINE FUMARATE 50 MILLIGRAM(S): 200 TABLET, FILM COATED ORAL at 22:05

## 2023-02-20 RX ADMIN — LIDOCAINE 1 PATCH: 4 CREAM TOPICAL at 09:59

## 2023-02-20 NOTE — BH INPATIENT PSYCHIATRY PROGRESS NOTE - MSE UNSTRUCTURED FT
Patient is awake and alert. Affect irritable. Seen speaking with another patient, then screams at examiner "are you a doctor? align my spine!! do it!"  very agitated. Poor insight

## 2023-02-20 NOTE — BH INPATIENT PSYCHIATRY PROGRESS NOTE - NSBHCHARTREVIEWVS_PSY_A_CORE FT
Vital Signs Last 24 Hrs  T(C): 36.6 (02-20-23 @ 08:17), Max: 36.6 (02-20-23 @ 08:17)  T(F): 97.8 (02-20-23 @ 08:17), Max: 97.8 (02-20-23 @ 08:17)  HR: --  BP: --  BP(mean): --  RR: --  SpO2: --    Orthostatic VS  02-20-23 @ 08:17  Lying BP: --/-- HR: --  Sitting BP: 125/66 HR: 65  Standing BP: --/-- HR: --  Site: upper left arm  Mode: electronic  Orthostatic VS  02-19-23 @ 07:50  Lying BP: --/-- HR: --  Sitting BP: 124/61 HR: 58  Standing BP: --/-- HR: --  Site: upper left arm  Mode: electronic

## 2023-02-20 NOTE — BH INPATIENT PSYCHIATRY PROGRESS NOTE - NSBHFUPINTERVALHXFT_PSY_A_CORE
Patient seen for follow-up of bipolar affective disorder, possible evolving dementia. AVSS. agitated and requiring prn meds, given olanzapine activated by me  today for prn

## 2023-02-20 NOTE — BH INPATIENT PSYCHIATRY PROGRESS NOTE - CURRENT MEDICATION
MEDICATIONS  (STANDING):  artificial  tears Solution 1 Drop(s) Both EYES two times a day  aspirin enteric coated 81 milliGRAM(s) Oral daily  divalproex Sprinkle 375 milliGRAM(s) Oral two times a day  enoxaparin Injectable 40 milliGRAM(s) SubCutaneous <User Schedule>  lidocaine   4% Patch 1 Patch Transdermal every 24 hours  melatonin. 3 milliGRAM(s) Oral <User Schedule>  metoprolol succinate ER 50 milliGRAM(s) Oral daily  polyethylene glycol 3350 17 Gram(s) Oral daily  QUEtiapine 50 milliGRAM(s) Oral two times a day  senna 2 Tablet(s) Oral at bedtime    MEDICATIONS  (PRN):  acetaminophen     Tablet .. 650 milliGRAM(s) Oral every 6 hours PRN Mild Pain (1 - 3), Moderate Pain (4 - 6), Severe Pain (7 - 10)  albuterol    90 MICROgram(s) HFA Inhaler 2 Puff(s) Inhalation every 6 hours PRN Shortness of Breath and/or Wheezing  aluminum hydroxide/magnesium hydroxide/simethicone Suspension 30 milliLiter(s) Oral every 4 hours PRN Dyspepsia  benzocaine 15 mG/menthol 3.6 mG Lozenge 1 Lozenge Oral every 2 hours PRN sore throat  bisacodyl 5 milliGRAM(s) Oral every 12 hours PRN Constipation  OLANZapine 2.5 milliGRAM(s) Oral every 6 hours PRN severe agitation from hortencia  OLANZapine Injectable 1.25 milliGRAM(s) IntraMuscular once PRN agitation  OLANZapine Injectable 1.25 milliGRAM(s) IntraMuscular once PRN agitation  QUEtiapine 25 milliGRAM(s) Oral every 6 hours PRN agitation from hortencia

## 2023-02-20 NOTE — BH INPATIENT PSYCHIATRY PROGRESS NOTE - NSBHASSESSSUMMFT_PSY_ALL_CORE
Dinorah Hogan is an 88 year old female, , domiciled with daughter with PPHx of bipolar I disorder, 1 previous inpatient psychiatric hospitalization in 1970s, no known history of SI/SA and PMHx of asthma, GERD, sciatica with previous vertebral fracture who is admitted on 9.27 status for symptoms of hortencia including irritability, loud speech, and agitation.     DDx: hortencia vs hypomania vs comorbid delirium    Today, patient is angry about her current admission and denying any psychiatric history. She is asking to be discharged immediately and yelling at treatment team. She is noted to have loud and rapid speech, but is able to be interrupted. She states she has been sleeping poorly due to hip and R knee pain. She denies S/IIP, H/IIP, AVH, paranoia, or ideas of reference. She is AOx1-2 (person, place--hospital), but unable to state year or location in [place]. She requires 9.27 inpatient admission for evaluation and management of hortencia.    1/22: less irritable but still requiring PRN med, more compliant with meds, no SI/HI.  1/23: Accepted meds yd and this AM though required PRN yd. Less irritable, appropriately conversant, making needs known. Suspicious bordering on paranoid, however no fully formed delusional thought content apparent.  1/24: Irritable and labile, suspicious regarding staff members. Discussed medication, pt will be offered psychiatric meds and is aware she has right to refuse.  1/25: Hostile, irritable, labile. TOO application in progress given poor insight and intermittent refusal of psychiatric mediations. Pt at risk for deconditioning given refusal to ambulate and minimal engagement with PT, given ambulatory at home refusal here appears to be behavioral, will continue to encourage participation. Refused cognitive evaluation today. Given ongoing intermittent agitation, paranoia, physical deconditioning, and poor insight into condition, pt requires continued inpatient hospitalization for stabilization and safety.  1/26: Pt remains labile, irritable, and paranoid toward staff. Although pt makes provocative statements (e.g. "she controls the robots," "the Jews will hate me"), these statements appear to represent intense feeling rather than fully formed delusion. Continues to refuse cognitive evaluation or consent to contact family. Given ongoing intermittent agitation, paranoia, physical deconditioning, and poor insight into condition, pt requires continued inpatient hospitalization for stabilization and safety.  1/27 Patient agitated, markedly irritable,paranoid  with themes of perrvasive mistreatment, being singled out but w/u well formed , fixed delusional ideas.Cont enocurage med compliance scheduled for court hearing for TOO  1/28: Currently on CO due to hospital bed requirement. Reports fair sleep and appetite. Seen at bedside during rounds. Continues to refuse most medications, took senna and tylenol last night. Reporting back pain "15/10" and requesting more heat packs "I will die without these", however under no visible distress. Primary team pursuing TOO. Renewed CO.   1/29: Renewed CO. Last night took Depakote, Haldol and Senna. Med compliance has been partial.   1/30: Refused all meds yd, this AM accepted metoprolol. Making accusations that people are attempting to harm her and want her to die. Impulse control is impaired.  1/31: Continues with intermittent agitation, hostile and accusatory twd staff, refusing meds (except metoprolol), refusing consent to speak to daughter. Given ongoing agitation, impulsivity, disinhibition, and deconditioning 2/2 refusal to ambulate (also attempted to climb out of bed last night), pt poses a threat of harm to herself and requires ongoing inpatient hospitalization for stabilization and safety. TOO & retention court date next week.  2/1: Continues irritable, paranoid, refusing medications and engagement in interviews.   2/2: Minimally engaged in interview, refusing to answer most questions; labile, hostile, required IM Haldol 1mg overnight for agitation; refusing to ambulate or engage in PT; refusing pain control interventions other than hot packs and lidocaine patch. TOO in process.   2/4 Cont agitated poor sleep, this Am had vairable O2 sat, patient reported she feels she needs an inhaler for asthma. Patient to be seen by medicine, labs and RVP ordered. Will change haldol to olanzapine standing as response to prns of haldol and occ po's have had poor response.   2/5 Paranoid, severely agitated. Has URI. COnt meds prn inhlaer scheuled for TOO 2/7 2/6: Remains paranoid, agitated, accusatory. Frequently refusing meds, required 3x olanzapine PRNs over the weekend.  2/7: Remains paranoid and accusatory twd staff. Continues to refuse majority of medications. Currently with URI. Going to court today for retention and TOO.  2/8:  Patient irritable, agitated, will increase olanzapine with IM back up.   2/9 No major changes but taking meds, Cont olanzapine now at 2.5mg hs and Depakote. Plan cont to titrate, monitor for se  2/10 Patient w/o much response cont with intense paranoia and agitation. Cont Zyprexa cont to titrate.   2/11 Pt is sleepy most likely due to PRN zyprexa last night, remains disorganized and confused.   2/12 confused, disorganized, screams at times, required PRN zyprexa/melatonin last night.   2/13: The patient continues to be manic, disorganized, irritable, screaming at times.  She has been tolerating medications and prn's well - will increase Zyprexa to 5mg hs and change to Zydis to ensure compliance.  Continue 1:1.  2/14: The patient is not responding to Zyprexa, despite increasing dose and getting prn's.  Will change to Seroquel, as patient reportedly did well with it in the past.  Patient given 12.5mg this morning, tolerated well.  Will start 12.5mg tid.  2/15: The patient continues to be irritable, agitated, disorganized.  Some small improvement with Seroquel, sleeping better at night.  Continue Seroquel trial.  2/16: The patient continues to be agitated, frequently yelling out, irritable and angry.  She has been tolerating standing and prn Seroquel well, will continue to titrate to 25mg tid.  Continue 1:1.  2/17: The patient continues to be irritable, agitated, yelling out throughout the day.  She is having more restful periods during the day.  She has been tolerating Seroquel well, will continue.  Continue 1:1.  2/18: Patient remains agitated, disorganized, guarded, confused  2/19: Sustained agitated, irritability, likely paranoia, will increase quetiapine   2/20: Patient remains agitated, suspicious of staff, requiring prn meds, quetiapine increased

## 2023-02-21 PROCEDURE — 99231 SBSQ HOSP IP/OBS SF/LOW 25: CPT

## 2023-02-21 RX ORDER — QUETIAPINE FUMARATE 200 MG/1
50 TABLET, FILM COATED ORAL THREE TIMES A DAY
Refills: 0 | Status: DISCONTINUED | OUTPATIENT
Start: 2023-02-21 | End: 2023-02-24

## 2023-02-21 RX ORDER — OLANZAPINE 15 MG/1
2.5 TABLET, FILM COATED ORAL
Refills: 0 | Status: DISCONTINUED | OUTPATIENT
Start: 2023-02-21 | End: 2023-02-24

## 2023-02-21 RX ORDER — OLANZAPINE 15 MG/1
2.5 TABLET, FILM COATED ORAL ONCE
Refills: 0 | Status: DISCONTINUED | OUTPATIENT
Start: 2023-02-21 | End: 2023-02-21

## 2023-02-21 RX ORDER — QUETIAPINE FUMARATE 200 MG/1
25 TABLET, FILM COATED ORAL ONCE
Refills: 0 | Status: COMPLETED | OUTPATIENT
Start: 2023-02-21 | End: 2023-02-21

## 2023-02-21 RX ORDER — OLANZAPINE 15 MG/1
2.5 TABLET, FILM COATED ORAL ONCE
Refills: 0 | Status: DISCONTINUED | OUTPATIENT
Start: 2023-02-21 | End: 2023-02-23

## 2023-02-21 RX ORDER — BENZOCAINE AND MENTHOL 5; 1 G/100ML; G/100ML
1 LIQUID ORAL
Refills: 0 | Status: DISCONTINUED | OUTPATIENT
Start: 2023-02-21 | End: 2023-03-31

## 2023-02-21 RX ADMIN — DIVALPROEX SODIUM 375 MILLIGRAM(S): 500 TABLET, DELAYED RELEASE ORAL at 09:10

## 2023-02-21 RX ADMIN — SENNA PLUS 2 TABLET(S): 8.6 TABLET ORAL at 22:43

## 2023-02-21 RX ADMIN — Medication 3 MILLIGRAM(S): at 17:02

## 2023-02-21 RX ADMIN — DIVALPROEX SODIUM 375 MILLIGRAM(S): 500 TABLET, DELAYED RELEASE ORAL at 22:43

## 2023-02-21 RX ADMIN — LIDOCAINE 1 PATCH: 4 CREAM TOPICAL at 09:46

## 2023-02-21 RX ADMIN — QUETIAPINE FUMARATE 50 MILLIGRAM(S): 200 TABLET, FILM COATED ORAL at 09:10

## 2023-02-21 RX ADMIN — LIDOCAINE 1 PATCH: 4 CREAM TOPICAL at 22:43

## 2023-02-21 RX ADMIN — QUETIAPINE FUMARATE 50 MILLIGRAM(S): 200 TABLET, FILM COATED ORAL at 22:43

## 2023-02-21 RX ADMIN — LIDOCAINE 1 PATCH: 4 CREAM TOPICAL at 09:06

## 2023-02-21 RX ADMIN — POLYETHYLENE GLYCOL 3350 17 GRAM(S): 17 POWDER, FOR SOLUTION ORAL at 09:12

## 2023-02-21 RX ADMIN — Medication 50 MILLIGRAM(S): at 09:11

## 2023-02-21 RX ADMIN — QUETIAPINE FUMARATE 25 MILLIGRAM(S): 200 TABLET, FILM COATED ORAL at 17:02

## 2023-02-21 RX ADMIN — Medication 81 MILLIGRAM(S): at 09:10

## 2023-02-21 RX ADMIN — ENOXAPARIN SODIUM 40 MILLIGRAM(S): 100 INJECTION SUBCUTANEOUS at 09:11

## 2023-02-21 RX ADMIN — OLANZAPINE 2.5 MILLIGRAM(S): 15 TABLET, FILM COATED ORAL at 09:11

## 2023-02-21 NOTE — BH INPATIENT PSYCHIATRY PROGRESS NOTE - NSBHCHARTREVIEWVS_PSY_A_CORE FT
Vital Signs Last 24 Hrs  T(C): 36.3 (02-21-23 @ 07:48), Max: 36.3 (02-21-23 @ 07:48)  T(F): 97.3 (02-21-23 @ 07:48), Max: 97.3 (02-21-23 @ 07:48)  HR: --  BP: --  BP(mean): --  RR: --  SpO2: --    Orthostatic VS  02-21-23 @ 07:48  Lying BP: --/-- HR: --  Sitting BP: 123/102 HR: 63  Standing BP: 117/105 HR: 72  Site: --  Mode: --  Orthostatic VS  02-20-23 @ 08:17  Lying BP: --/-- HR: --  Sitting BP: 125/66 HR: 65  Standing BP: --/-- HR: --  Site: upper left arm  Mode: electronic

## 2023-02-21 NOTE — BH INPATIENT PSYCHIATRY PROGRESS NOTE - MSE UNSTRUCTURED FT
Patient is awake and alert. Patient with high pitched yelling, no EPS. Mood irritable, angry. Patient with labile affect. Patient with paranoid delusions that she is being persecuted in various ways but almost all staff. No taylor. No Si/HI. Thinking disjointed. Uncooperative with cog testing. Poor insight

## 2023-02-21 NOTE — BH INPATIENT PSYCHIATRY PROGRESS NOTE - NSBHASSESSSUMMFT_PSY_ALL_CORE
Dinorah Hogan is an 88 year old female, , domiciled with daughter with PPHx of bipolar I disorder, 1 previous inpatient psychiatric hospitalization in 1970s, no known history of SI/SA and PMHx of asthma, GERD, sciatica with previous vertebral fracture who is admitted on 9.27 status for symptoms of hortencia including irritability, loud speech, and agitation.     DDx: hortencia vs hypomania vs comorbid delirium    Today, patient is angry about her current admission and denying any psychiatric history. She is asking to be discharged immediately and yelling at treatment team. She is noted to have loud and rapid speech, but is able to be interrupted. She states she has been sleeping poorly due to hip and R knee pain. She denies S/IIP, H/IIP, AVH, paranoia, or ideas of reference. She is AOx1-2 (person, place--hospital), but unable to state year or location in [place]. She requires 9.27 inpatient admission for evaluation and management of hortencia.    1/22: less irritable but still requiring PRN med, more compliant with meds, no SI/HI.  1/23: Accepted meds yd and this AM though required PRN yd. Less irritable, appropriately conversant, making needs known. Suspicious bordering on paranoid, however no fully formed delusional thought content apparent.  1/24: Irritable and labile, suspicious regarding staff members. Discussed medication, pt will be offered psychiatric meds and is aware she has right to refuse.  1/25: Hostile, irritable, labile. TOO application in progress given poor insight and intermittent refusal of psychiatric mediations. Pt at risk for deconditioning given refusal to ambulate and minimal engagement with PT, given ambulatory at home refusal here appears to be behavioral, will continue to encourage participation. Refused cognitive evaluation today. Given ongoing intermittent agitation, paranoia, physical deconditioning, and poor insight into condition, pt requires continued inpatient hospitalization for stabilization and safety.  1/26: Pt remains labile, irritable, and paranoid toward staff. Although pt makes provocative statements (e.g. "she controls the robots," "the Jews will hate me"), these statements appear to represent intense feeling rather than fully formed delusion. Continues to refuse cognitive evaluation or consent to contact family. Given ongoing intermittent agitation, paranoia, physical deconditioning, and poor insight into condition, pt requires continued inpatient hospitalization for stabilization and safety.  1/27 Patient agitated, markedly irritable,paranoid  with themes of perrvasive mistreatment, being singled out but w/u well formed , fixed delusional ideas.Cont enocurage med compliance scheduled for court hearing for TOO  1/28: Currently on CO due to hospital bed requirement. Reports fair sleep and appetite. Seen at bedside during rounds. Continues to refuse most medications, took senna and tylenol last night. Reporting back pain "15/10" and requesting more heat packs "I will die without these", however under no visible distress. Primary team pursuing TOO. Renewed CO.   1/29: Renewed CO. Last night took Depakote, Haldol and Senna. Med compliance has been partial.   1/30: Refused all meds yd, this AM accepted metoprolol. Making accusations that people are attempting to harm her and want her to die. Impulse control is impaired.  1/31: Continues with intermittent agitation, hostile and accusatory twd staff, refusing meds (except metoprolol), refusing consent to speak to daughter. Given ongoing agitation, impulsivity, disinhibition, and deconditioning 2/2 refusal to ambulate (also attempted to climb out of bed last night), pt poses a threat of harm to herself and requires ongoing inpatient hospitalization for stabilization and safety. TOO & retention court date next week.  2/1: Continues irritable, paranoid, refusing medications and engagement in interviews.   2/2: Minimally engaged in interview, refusing to answer most questions; labile, hostile, required IM Haldol 1mg overnight for agitation; refusing to ambulate or engage in PT; refusing pain control interventions other than hot packs and lidocaine patch. TOO in process.   2/4 Cont agitated poor sleep, this Am had vairable O2 sat, patient reported she feels she needs an inhaler for asthma. Patient to be seen by medicine, labs and RVP ordered. Will change haldol to olanzapine standing as response to prns of haldol and occ po's have had poor response.   2/5 Paranoid, severely agitated. Has URI. COnt meds prn inhlaer scheuled for TOO 2/7 2/6: Remains paranoid, agitated, accusatory. Frequently refusing meds, required 3x olanzapine PRNs over the weekend.  2/7: Remains paranoid and accusatory twd staff. Continues to refuse majority of medications. Currently with URI. Going to court today for retention and TOO.  2/8:  Patient irritable, agitated, will increase olanzapine with IM back up.   2/9 No major changes but taking meds, Cont olanzapine now at 2.5mg hs and Depakote. Plan cont to titrate, monitor for se  2/10 Patient w/o much response cont with intense paranoia and agitation. Cont Zyprexa cont to titrate.   2/11 Pt is sleepy most likely due to PRN zyprexa last night, remains disorganized and confused.   2/12 confused, disorganized, screams at times, required PRN zyprexa/melatonin last night.   2/13: The patient continues to be manic, disorganized, irritable, screaming at times.  She has been tolerating medications and prn's well - will increase Zyprexa to 5mg hs and change to Zydis to ensure compliance.  Continue 1:1.  2/14: The patient is not responding to Zyprexa, despite increasing dose and getting prn's.  Will change to Seroquel, as patient reportedly did well with it in the past.  Patient given 12.5mg this morning, tolerated well.  Will start 12.5mg tid.  2/15: The patient continues to be irritable, agitated, disorganized.  Some small improvement with Seroquel, sleeping better at night.  Continue Seroquel trial.  2/16: The patient continues to be agitated, frequently yelling out, irritable and angry.  She has been tolerating standing and prn Seroquel well, will continue to titrate to 25mg tid.  Continue 1:1.  2/17: The patient continues to be irritable, agitated, yelling out throughout the day.  She is having more restful periods during the day.  She has been tolerating Seroquel well, will continue.  Continue 1:1.  2/18: Patient remains agitated, disorganized, guarded, confused  2/19: Sustained agitated, irritability, likely paranoia, will increase quetiapine   2/20: Patient remains agitated, suspicious of staff, requiring prn meds, quetiapine increased   2/21 agitated paranoid, no response from Seroquel so far. Will increase Seroquel. Reviewed care with daughter.

## 2023-02-21 NOTE — BH INPATIENT PSYCHIATRY PROGRESS NOTE - NSBHFUPINTERVALHXFT_PSY_A_CORE
Patient seen for follow-up of bipolar affective disorder, possible evolving dementia. AVSS. agitated and requiring prn meds.

## 2023-02-21 NOTE — BH INPATIENT PSYCHIATRY PROGRESS NOTE - CURRENT MEDICATION
MEDICATIONS  (STANDING):  artificial  tears Solution 1 Drop(s) Both EYES two times a day  aspirin enteric coated 81 milliGRAM(s) Oral daily  divalproex Sprinkle 375 milliGRAM(s) Oral two times a day  enoxaparin Injectable 40 milliGRAM(s) SubCutaneous <User Schedule>  lidocaine   4% Patch 1 Patch Transdermal every 24 hours  melatonin. 3 milliGRAM(s) Oral <User Schedule>  metoprolol succinate ER 50 milliGRAM(s) Oral daily  polyethylene glycol 3350 17 Gram(s) Oral daily  QUEtiapine 50 milliGRAM(s) Oral three times a day  QUEtiapine 25 milliGRAM(s) Oral once  senna 2 Tablet(s) Oral at bedtime    MEDICATIONS  (PRN):  acetaminophen     Tablet .. 650 milliGRAM(s) Oral every 6 hours PRN Mild Pain (1 - 3), Moderate Pain (4 - 6), Severe Pain (7 - 10)  albuterol    90 MICROgram(s) HFA Inhaler 2 Puff(s) Inhalation every 6 hours PRN Shortness of Breath and/or Wheezing  aluminum hydroxide/magnesium hydroxide/simethicone Suspension 30 milliLiter(s) Oral every 4 hours PRN Dyspepsia  benzocaine 15 mG/menthol 3.6 mG Lozenge 1 Lozenge Oral every 2 hours PRN sore throat  bisacodyl 5 milliGRAM(s) Oral every 12 hours PRN Constipation  OLANZapine 2.5 milliGRAM(s) Oral every 6 hours PRN severe agitation from hortencia  OLANZapine Injectable 2.5 milliGRAM(s) IntraMuscular once PRN agitation  OLANZapine Injectable 2.5 milliGRAM(s) IntraMuscular <User Schedule> PRN refusal of court ordered Seroquel  QUEtiapine 25 milliGRAM(s) Oral every 6 hours PRN agitation from hortencia

## 2023-02-21 NOTE — BH INPATIENT PSYCHIATRY PROGRESS NOTE - PRN MEDS
MEDICATIONS  (PRN):  acetaminophen     Tablet .. 650 milliGRAM(s) Oral every 6 hours PRN Mild Pain (1 - 3), Moderate Pain (4 - 6), Severe Pain (7 - 10)  albuterol    90 MICROgram(s) HFA Inhaler 2 Puff(s) Inhalation every 6 hours PRN Shortness of Breath and/or Wheezing  aluminum hydroxide/magnesium hydroxide/simethicone Suspension 30 milliLiter(s) Oral every 4 hours PRN Dyspepsia  benzocaine 15 mG/menthol 3.6 mG Lozenge 1 Lozenge Oral every 2 hours PRN sore throat  bisacodyl 5 milliGRAM(s) Oral every 12 hours PRN Constipation  OLANZapine 2.5 milliGRAM(s) Oral every 6 hours PRN severe agitation from hortencia  OLANZapine Injectable 2.5 milliGRAM(s) IntraMuscular once PRN agitation  OLANZapine Injectable 2.5 milliGRAM(s) IntraMuscular <User Schedule> PRN refusal of court ordered Seroquel  QUEtiapine 25 milliGRAM(s) Oral every 6 hours PRN agitation from hortencia

## 2023-02-22 PROCEDURE — 99231 SBSQ HOSP IP/OBS SF/LOW 25: CPT

## 2023-02-22 RX ORDER — METOPROLOL TARTRATE 50 MG
25 TABLET ORAL
Refills: 0 | Status: DISCONTINUED | OUTPATIENT
Start: 2023-02-23 | End: 2023-04-10

## 2023-02-22 RX ADMIN — DIVALPROEX SODIUM 375 MILLIGRAM(S): 500 TABLET, DELAYED RELEASE ORAL at 21:19

## 2023-02-22 RX ADMIN — QUETIAPINE FUMARATE 50 MILLIGRAM(S): 200 TABLET, FILM COATED ORAL at 12:43

## 2023-02-22 RX ADMIN — Medication 3 MILLIGRAM(S): at 17:23

## 2023-02-22 RX ADMIN — QUETIAPINE FUMARATE 50 MILLIGRAM(S): 200 TABLET, FILM COATED ORAL at 10:00

## 2023-02-22 RX ADMIN — Medication 50 MILLIGRAM(S): at 10:07

## 2023-02-22 RX ADMIN — ENOXAPARIN SODIUM 40 MILLIGRAM(S): 100 INJECTION SUBCUTANEOUS at 10:00

## 2023-02-22 RX ADMIN — Medication 81 MILLIGRAM(S): at 10:00

## 2023-02-22 RX ADMIN — LIDOCAINE 1 PATCH: 4 CREAM TOPICAL at 21:20

## 2023-02-22 RX ADMIN — QUETIAPINE FUMARATE 50 MILLIGRAM(S): 200 TABLET, FILM COATED ORAL at 21:19

## 2023-02-22 RX ADMIN — DIVALPROEX SODIUM 375 MILLIGRAM(S): 500 TABLET, DELAYED RELEASE ORAL at 10:00

## 2023-02-22 RX ADMIN — SENNA PLUS 2 TABLET(S): 8.6 TABLET ORAL at 21:20

## 2023-02-22 NOTE — BH INPATIENT PSYCHIATRY PROGRESS NOTE - PRN MEDS
MEDICATIONS  (PRN):  acetaminophen     Tablet .. 650 milliGRAM(s) Oral every 6 hours PRN Mild Pain (1 - 3), Moderate Pain (4 - 6), Severe Pain (7 - 10)  albuterol    90 MICROgram(s) HFA Inhaler 2 Puff(s) Inhalation every 6 hours PRN Shortness of Breath and/or Wheezing  aluminum hydroxide/magnesium hydroxide/simethicone Suspension 30 milliLiter(s) Oral every 4 hours PRN Dyspepsia  benzocaine/menthol Lozenge 1 Lozenge Oral every 2 hours PRN sore throat  bisacodyl 5 milliGRAM(s) Oral every 12 hours PRN Constipation  OLANZapine 2.5 milliGRAM(s) Oral every 6 hours PRN severe agitation from hortencia  OLANZapine Injectable 2.5 milliGRAM(s) IntraMuscular once PRN agitation  OLANZapine Injectable 2.5 milliGRAM(s) IntraMuscular <User Schedule> PRN refusal of court ordered Seroquel  QUEtiapine 25 milliGRAM(s) Oral every 6 hours PRN agitation from hortencia

## 2023-02-22 NOTE — BH INPATIENT PSYCHIATRY PROGRESS NOTE - NSBHCHARTREVIEWVS_PSY_A_CORE FT
Vital Signs Last 24 Hrs  T(C): 36.4 (02-22-23 @ 10:05), Max: 36.4 (02-22-23 @ 10:05)  T(F): 97.6 (02-22-23 @ 10:05), Max: 97.6 (02-22-23 @ 10:05)  HR: --  BP: --  BP(mean): --  RR: --  SpO2: --    Orthostatic VS  02-22-23 @ 10:05  Lying BP: --/-- HR: --  Sitting BP: 126/63 HR: 76  Standing BP: --/-- HR: --  Site: --  Mode: --  Orthostatic VS  02-21-23 @ 07:48  Lying BP: --/-- HR: --  Sitting BP: 123/102 HR: 63  Standing BP: 117/105 HR: 72  Site: --  Mode: --

## 2023-02-22 NOTE — BH INPATIENT PSYCHIATRY PROGRESS NOTE - CURRENT MEDICATION
MEDICATIONS  (STANDING):  artificial  tears Solution 1 Drop(s) Both EYES two times a day  aspirin enteric coated 81 milliGRAM(s) Oral daily  divalproex Sprinkle 375 milliGRAM(s) Oral two times a day  enoxaparin Injectable 40 milliGRAM(s) SubCutaneous <User Schedule>  lidocaine   4% Patch 1 Patch Transdermal every 24 hours  melatonin. 3 milliGRAM(s) Oral <User Schedule>  metoprolol succinate ER 50 milliGRAM(s) Oral daily  polyethylene glycol 3350 17 Gram(s) Oral daily  QUEtiapine 50 milliGRAM(s) Oral three times a day  senna 2 Tablet(s) Oral at bedtime    MEDICATIONS  (PRN):  acetaminophen     Tablet .. 650 milliGRAM(s) Oral every 6 hours PRN Mild Pain (1 - 3), Moderate Pain (4 - 6), Severe Pain (7 - 10)  albuterol    90 MICROgram(s) HFA Inhaler 2 Puff(s) Inhalation every 6 hours PRN Shortness of Breath and/or Wheezing  aluminum hydroxide/magnesium hydroxide/simethicone Suspension 30 milliLiter(s) Oral every 4 hours PRN Dyspepsia  benzocaine/menthol Lozenge 1 Lozenge Oral every 2 hours PRN sore throat  bisacodyl 5 milliGRAM(s) Oral every 12 hours PRN Constipation  OLANZapine 2.5 milliGRAM(s) Oral every 6 hours PRN severe agitation from hortencia  OLANZapine Injectable 2.5 milliGRAM(s) IntraMuscular once PRN agitation  OLANZapine Injectable 2.5 milliGRAM(s) IntraMuscular <User Schedule> PRN refusal of court ordered Seroquel  QUEtiapine 25 milliGRAM(s) Oral every 6 hours PRN agitation from hortencia

## 2023-02-23 LAB
ALBUMIN SERPL ELPH-MCNC: 3.7 G/DL — SIGNIFICANT CHANGE UP (ref 3.3–5)
ALP SERPL-CCNC: 89 U/L — SIGNIFICANT CHANGE UP (ref 40–120)
ALT FLD-CCNC: 12 U/L — SIGNIFICANT CHANGE UP (ref 4–33)
ANION GAP SERPL CALC-SCNC: 12 MMOL/L — SIGNIFICANT CHANGE UP (ref 7–14)
AST SERPL-CCNC: 21 U/L — SIGNIFICANT CHANGE UP (ref 4–32)
BILIRUB SERPL-MCNC: 0.5 MG/DL — SIGNIFICANT CHANGE UP (ref 0.2–1.2)
BUN SERPL-MCNC: 24 MG/DL — HIGH (ref 7–23)
CALCIUM SERPL-MCNC: 9.4 MG/DL — SIGNIFICANT CHANGE UP (ref 8.4–10.5)
CHLORIDE SERPL-SCNC: 103 MMOL/L — SIGNIFICANT CHANGE UP (ref 98–107)
CO2 SERPL-SCNC: 28 MMOL/L — SIGNIFICANT CHANGE UP (ref 22–31)
CREAT SERPL-MCNC: 0.62 MG/DL — SIGNIFICANT CHANGE UP (ref 0.5–1.3)
EGFR: 86 ML/MIN/1.73M2 — SIGNIFICANT CHANGE UP
GLUCOSE SERPL-MCNC: 111 MG/DL — HIGH (ref 70–99)
POTASSIUM SERPL-MCNC: 4.6 MMOL/L — SIGNIFICANT CHANGE UP (ref 3.5–5.3)
POTASSIUM SERPL-SCNC: 4.6 MMOL/L — SIGNIFICANT CHANGE UP (ref 3.5–5.3)
PROT SERPL-MCNC: 7.1 G/DL — SIGNIFICANT CHANGE UP (ref 6–8.3)
SODIUM SERPL-SCNC: 143 MMOL/L — SIGNIFICANT CHANGE UP (ref 135–145)
VALPROATE SERPL-MCNC: 100.4 UG/ML — HIGH (ref 50–100)

## 2023-02-23 PROCEDURE — 99231 SBSQ HOSP IP/OBS SF/LOW 25: CPT

## 2023-02-23 RX ORDER — LANOLIN ALCOHOL/MO/W.PET/CERES
3 CREAM (GRAM) TOPICAL AT BEDTIME
Refills: 0 | Status: DISCONTINUED | OUTPATIENT
Start: 2023-02-23 | End: 2023-03-30

## 2023-02-23 RX ORDER — OLANZAPINE 15 MG/1
2.5 TABLET, FILM COATED ORAL ONCE
Refills: 0 | Status: DISCONTINUED | OUTPATIENT
Start: 2023-02-23 | End: 2023-02-24

## 2023-02-23 RX ORDER — QUETIAPINE FUMARATE 200 MG/1
50 TABLET, FILM COATED ORAL EVERY 6 HOURS
Refills: 0 | Status: DISCONTINUED | OUTPATIENT
Start: 2023-02-23 | End: 2023-02-24

## 2023-02-23 RX ORDER — DIVALPROEX SODIUM 500 MG/1
250 TABLET, DELAYED RELEASE ORAL
Refills: 0 | Status: DISCONTINUED | OUTPATIENT
Start: 2023-02-24 | End: 2023-03-01

## 2023-02-23 RX ADMIN — Medication 3 MILLIGRAM(S): at 21:37

## 2023-02-23 RX ADMIN — QUETIAPINE FUMARATE 50 MILLIGRAM(S): 200 TABLET, FILM COATED ORAL at 21:37

## 2023-02-23 RX ADMIN — LIDOCAINE 1 PATCH: 4 CREAM TOPICAL at 22:12

## 2023-02-23 RX ADMIN — ENOXAPARIN SODIUM 40 MILLIGRAM(S): 100 INJECTION SUBCUTANEOUS at 08:59

## 2023-02-23 RX ADMIN — SENNA PLUS 2 TABLET(S): 8.6 TABLET ORAL at 21:37

## 2023-02-23 RX ADMIN — OLANZAPINE 2.5 MILLIGRAM(S): 15 TABLET, FILM COATED ORAL at 09:09

## 2023-02-23 RX ADMIN — QUETIAPINE FUMARATE 50 MILLIGRAM(S): 200 TABLET, FILM COATED ORAL at 12:54

## 2023-02-23 RX ADMIN — Medication 1 DROP(S): at 21:45

## 2023-02-23 RX ADMIN — Medication 25 MILLIGRAM(S): at 21:37

## 2023-02-23 NOTE — BH INPATIENT PSYCHIATRY PROGRESS NOTE - NSBHASSESSSUMMFT_PSY_ALL_CORE
Dinorah Hogan is an 88 year old female, , domiciled with daughter with PPHx of bipolar I disorder, 1 previous inpatient psychiatric hospitalization in 1970s, no known history of SI/SA and PMHx of asthma, GERD, sciatica with previous vertebral fracture who is admitted on 9.27 status for symptoms of hortencia including irritability, loud speech, and agitation.     DDx: hortencia vs hypomania vs comorbid delirium    Today, patient is angry about her current admission and denying any psychiatric history. She is asking to be discharged immediately and yelling at treatment team. She is noted to have loud and rapid speech, but is able to be interrupted. She states she has been sleeping poorly due to hip and R knee pain. She denies S/IIP, H/IIP, AVH, paranoia, or ideas of reference. She is AOx1-2 (person, place--hospital), but unable to state year or location in [place]. She requires 9.27 inpatient admission for evaluation and management of hortencia.    1/22: less irritable but still requiring PRN med, more compliant with meds, no SI/HI.  1/23: Accepted meds yd and this AM though required PRN yd. Less irritable, appropriately conversant, making needs known. Suspicious bordering on paranoid, however no fully formed delusional thought content apparent.  1/24: Irritable and labile, suspicious regarding staff members. Discussed medication, pt will be offered psychiatric meds and is aware she has right to refuse.  1/25: Hostile, irritable, labile. TOO application in progress given poor insight and intermittent refusal of psychiatric mediations. Pt at risk for deconditioning given refusal to ambulate and minimal engagement with PT, given ambulatory at home refusal here appears to be behavioral, will continue to encourage participation. Refused cognitive evaluation today. Given ongoing intermittent agitation, paranoia, physical deconditioning, and poor insight into condition, pt requires continued inpatient hospitalization for stabilization and safety.  1/26: Pt remains labile, irritable, and paranoid toward staff. Although pt makes provocative statements (e.g. "she controls the robots," "the Jews will hate me"), these statements appear to represent intense feeling rather than fully formed delusion. Continues to refuse cognitive evaluation or consent to contact family. Given ongoing intermittent agitation, paranoia, physical deconditioning, and poor insight into condition, pt requires continued inpatient hospitalization for stabilization and safety.  1/27 Patient agitated, markedly irritable,paranoid  with themes of perrvasive mistreatment, being singled out but w/u well formed , fixed delusional ideas.Cont enocurage med compliance scheduled for court hearing for TOO  1/28: Currently on CO due to hospital bed requirement. Reports fair sleep and appetite. Seen at bedside during rounds. Continues to refuse most medications, took senna and tylenol last night. Reporting back pain "15/10" and requesting more heat packs "I will die without these", however under no visible distress. Primary team pursuing TOO. Renewed CO.   1/29: Renewed CO. Last night took Depakote, Haldol and Senna. Med compliance has been partial.   1/30: Refused all meds yd, this AM accepted metoprolol. Making accusations that people are attempting to harm her and want her to die. Impulse control is impaired.  1/31: Continues with intermittent agitation, hostile and accusatory twd staff, refusing meds (except metoprolol), refusing consent to speak to daughter. Given ongoing agitation, impulsivity, disinhibition, and deconditioning 2/2 refusal to ambulate (also attempted to climb out of bed last night), pt poses a threat of harm to herself and requires ongoing inpatient hospitalization for stabilization and safety. TOO & retention court date next week.  2/1: Continues irritable, paranoid, refusing medications and engagement in interviews.   2/2: Minimally engaged in interview, refusing to answer most questions; labile, hostile, required IM Haldol 1mg overnight for agitation; refusing to ambulate or engage in PT; refusing pain control interventions other than hot packs and lidocaine patch. TOO in process.   2/4 Cont agitated poor sleep, this Am had vairable O2 sat, patient reported she feels she needs an inhaler for asthma. Patient to be seen by medicine, labs and RVP ordered. Will change haldol to olanzapine standing as response to prns of haldol and occ po's have had poor response.   2/5 Paranoid, severely agitated. Has URI. COnt meds prn inhlaer scheuled for TOO 2/7 2/6: Remains paranoid, agitated, accusatory. Frequently refusing meds, required 3x olanzapine PRNs over the weekend.  2/7: Remains paranoid and accusatory twd staff. Continues to refuse majority of medications. Currently with URI. Going to court today for retention and TOO.  2/8:  Patient irritable, agitated, will increase olanzapine with IM back up.   2/9 No major changes but taking meds, Cont olanzapine now at 2.5mg hs and Depakote. Plan cont to titrate, monitor for se  2/10 Patient w/o much response cont with intense paranoia and agitation. Cont Zyprexa cont to titrate.   2/11 Pt is sleepy most likely due to PRN zyprexa last night, remains disorganized and confused.   2/12 confused, disorganized, screams at times, required PRN zyprexa/melatonin last night.   2/13: The patient continues to be manic, disorganized, irritable, screaming at times.  She has been tolerating medications and prn's well - will increase Zyprexa to 5mg hs and change to Zydis to ensure compliance.  Continue 1:1.  2/14: The patient is not responding to Zyprexa, despite increasing dose and getting prn's.  Will change to Seroquel, as patient reportedly did well with it in the past.  Patient given 12.5mg this morning, tolerated well.  Will start 12.5mg tid.  2/15: The patient continues to be irritable, agitated, disorganized.  Some small improvement with Seroquel, sleeping better at night.  Continue Seroquel trial.  2/16: The patient continues to be agitated, frequently yelling out, irritable and angry.  She has been tolerating standing and prn Seroquel well, will continue to titrate to 25mg tid.  Continue 1:1.  2/17: The patient continues to be irritable, agitated, yelling out throughout the day.  She is having more restful periods during the day.  She has been tolerating Seroquel well, will continue.  Continue 1:1.  2/18: Patient remains agitated, disorganized, guarded, confused  2/19: Sustained agitated, irritability, likely paranoia, will increase quetiapine   2/20: Patient remains agitated, suspicious of staff, requiring prn meds, quetiapine increased   2/21 agitated paranoid, no response from Seroquel so far. Will increase Seroquel. Reviewed care with daughter.   2/22:  Paranoid agitated. Taking meds, cont  Seroquel, recheck labs.   2/23 Doing poorly with ongoing agitation now increased BUN Will push fluids. Will decrease Depakote consider d/c . COnisder changing to risperidone or haldol to avoid sedation. constipation

## 2023-02-23 NOTE — BH INPATIENT PSYCHIATRY PROGRESS NOTE - NSBHCHARTREVIEWVS_PSY_A_CORE FT
Vital Signs Last 24 Hrs  T(C): --  T(F): --  HR: --  BP: --  BP(mean): --  RR: --  SpO2: --    Orthostatic VS  02-22-23 @ 10:05  Lying BP: --/-- HR: --  Sitting BP: 126/63 HR: 76  Standing BP: --/-- HR: --  Site: --  Mode: --

## 2023-02-23 NOTE — BH INPATIENT PSYCHIATRY PROGRESS NOTE - PRN MEDS
MEDICATIONS  (PRN):  acetaminophen     Tablet .. 650 milliGRAM(s) Oral every 6 hours PRN Mild Pain (1 - 3), Moderate Pain (4 - 6), Severe Pain (7 - 10)  albuterol    90 MICROgram(s) HFA Inhaler 2 Puff(s) Inhalation every 6 hours PRN Shortness of Breath and/or Wheezing  aluminum hydroxide/magnesium hydroxide/simethicone Suspension 30 milliLiter(s) Oral every 4 hours PRN Dyspepsia  benzocaine/menthol Lozenge 1 Lozenge Oral every 2 hours PRN sore throat  bisacodyl 5 milliGRAM(s) Oral every 12 hours PRN Constipation  OLANZapine 2.5 milliGRAM(s) Oral every 6 hours PRN severe agitation from hortencia  OLANZapine Injectable 2.5 milliGRAM(s) IntraMuscular once PRN agitation  OLANZapine Injectable 2.5 milliGRAM(s) IntraMuscular <User Schedule> PRN refusal of court ordered Seroquel  QUEtiapine 50 milliGRAM(s) Oral every 6 hours PRN agitation from hortencia

## 2023-02-23 NOTE — BH INPATIENT PSYCHIATRY PROGRESS NOTE - MSE UNSTRUCTURED FT
Patient is awake and alert. Patient with high pitched yelling, no EPS. Mood irritable, angry. Patient with labile affect. Patient with paranoid delusions that she is being persecuted in various ways by almost all staff. No taylor. No Si/HI. Thinking disjointed. Uncooperative with cog testing. Says it is Dec  Jan 2023 Poor insight

## 2023-02-23 NOTE — BH INPATIENT PSYCHIATRY PROGRESS NOTE - CURRENT MEDICATION
MEDICATIONS  (STANDING):  artificial  tears Solution 1 Drop(s) Both EYES two times a day  aspirin enteric coated 81 milliGRAM(s) Oral daily  enoxaparin Injectable 40 milliGRAM(s) SubCutaneous <User Schedule>  lidocaine   4% Patch 1 Patch Transdermal every 24 hours  melatonin. 3 milliGRAM(s) Oral at bedtime  metoprolol tartrate 25 milliGRAM(s) Oral two times a day  polyethylene glycol 3350 17 Gram(s) Oral daily  QUEtiapine 50 milliGRAM(s) Oral three times a day  senna 2 Tablet(s) Oral at bedtime    MEDICATIONS  (PRN):  acetaminophen     Tablet .. 650 milliGRAM(s) Oral every 6 hours PRN Mild Pain (1 - 3), Moderate Pain (4 - 6), Severe Pain (7 - 10)  albuterol    90 MICROgram(s) HFA Inhaler 2 Puff(s) Inhalation every 6 hours PRN Shortness of Breath and/or Wheezing  aluminum hydroxide/magnesium hydroxide/simethicone Suspension 30 milliLiter(s) Oral every 4 hours PRN Dyspepsia  benzocaine/menthol Lozenge 1 Lozenge Oral every 2 hours PRN sore throat  bisacodyl 5 milliGRAM(s) Oral every 12 hours PRN Constipation  OLANZapine 2.5 milliGRAM(s) Oral every 6 hours PRN severe agitation from hortencia  OLANZapine Injectable 2.5 milliGRAM(s) IntraMuscular once PRN agitation  OLANZapine Injectable 2.5 milliGRAM(s) IntraMuscular <User Schedule> PRN refusal of court ordered Seroquel  QUEtiapine 50 milliGRAM(s) Oral every 6 hours PRN agitation from hortencia

## 2023-02-23 NOTE — BH INPATIENT PSYCHIATRY PROGRESS NOTE - NSBHFUPINTERVALHXFT_PSY_A_CORE
Patient seen for follow-up of bipolar affective disorder, possible evolving dementia. AVSS. agitated and requiring prn meds. Has taken po meds so far today. reports constipation took Miralax after initially refusing. Refused vitals. Labs show increased BUN and  . Sleeps after meds or prn briefly then awakens yelling

## 2023-02-24 PROCEDURE — 99232 SBSQ HOSP IP/OBS MODERATE 35: CPT

## 2023-02-24 RX ORDER — HALOPERIDOL DECANOATE 100 MG/ML
2 INJECTION INTRAMUSCULAR ONCE
Refills: 0 | Status: COMPLETED | OUTPATIENT
Start: 2023-02-24 | End: 2023-02-24

## 2023-02-24 RX ORDER — HALOPERIDOL DECANOATE 100 MG/ML
1 INJECTION INTRAMUSCULAR THREE TIMES A DAY
Refills: 0 | Status: DISCONTINUED | OUTPATIENT
Start: 2023-02-24 | End: 2023-02-25

## 2023-02-24 RX ORDER — HALOPERIDOL DECANOATE 100 MG/ML
1 INJECTION INTRAMUSCULAR EVERY 6 HOURS
Refills: 0 | Status: DISCONTINUED | OUTPATIENT
Start: 2023-02-24 | End: 2023-04-10

## 2023-02-24 RX ORDER — RISPERIDONE 4 MG/1
0.25 TABLET ORAL THREE TIMES A DAY
Refills: 0 | Status: DISCONTINUED | OUTPATIENT
Start: 2023-02-24 | End: 2023-02-25

## 2023-02-24 RX ORDER — HALOPERIDOL DECANOATE 100 MG/ML
1 INJECTION INTRAMUSCULAR ONCE
Refills: 0 | Status: DISCONTINUED | OUTPATIENT
Start: 2023-02-24 | End: 2023-04-10

## 2023-02-24 RX ADMIN — OLANZAPINE 2.5 MILLIGRAM(S): 15 TABLET, FILM COATED ORAL at 02:43

## 2023-02-24 RX ADMIN — Medication 81 MILLIGRAM(S): at 08:20

## 2023-02-24 RX ADMIN — Medication 25 MILLIGRAM(S): at 22:29

## 2023-02-24 RX ADMIN — HALOPERIDOL DECANOATE 2 MILLIGRAM(S): 100 INJECTION INTRAMUSCULAR at 14:07

## 2023-02-24 RX ADMIN — POLYETHYLENE GLYCOL 3350 17 GRAM(S): 17 POWDER, FOR SOLUTION ORAL at 08:21

## 2023-02-24 RX ADMIN — Medication 3 MILLIGRAM(S): at 22:29

## 2023-02-24 RX ADMIN — DIVALPROEX SODIUM 250 MILLIGRAM(S): 500 TABLET, DELAYED RELEASE ORAL at 22:30

## 2023-02-24 RX ADMIN — DIVALPROEX SODIUM 250 MILLIGRAM(S): 500 TABLET, DELAYED RELEASE ORAL at 08:19

## 2023-02-24 RX ADMIN — QUETIAPINE FUMARATE 50 MILLIGRAM(S): 200 TABLET, FILM COATED ORAL at 08:19

## 2023-02-24 RX ADMIN — Medication 25 MILLIGRAM(S): at 08:19

## 2023-02-24 RX ADMIN — RISPERIDONE 0.25 MILLIGRAM(S): 4 TABLET ORAL at 22:30

## 2023-02-24 RX ADMIN — LIDOCAINE 1 PATCH: 4 CREAM TOPICAL at 22:30

## 2023-02-24 RX ADMIN — SENNA PLUS 2 TABLET(S): 8.6 TABLET ORAL at 22:30

## 2023-02-24 RX ADMIN — HALOPERIDOL DECANOATE 1 MILLIGRAM(S): 100 INJECTION INTRAMUSCULAR at 16:12

## 2023-02-24 RX ADMIN — ENOXAPARIN SODIUM 40 MILLIGRAM(S): 100 INJECTION SUBCUTANEOUS at 08:21

## 2023-02-24 RX ADMIN — HALOPERIDOL DECANOATE 2 MILLIGRAM(S): 100 INJECTION INTRAMUSCULAR at 09:51

## 2023-02-24 NOTE — BH INPATIENT PSYCHIATRY PROGRESS NOTE - PRN MEDS
MEDICATIONS  (PRN):  acetaminophen     Tablet .. 650 milliGRAM(s) Oral every 6 hours PRN Mild Pain (1 - 3), Moderate Pain (4 - 6), Severe Pain (7 - 10)  albuterol    90 MICROgram(s) HFA Inhaler 2 Puff(s) Inhalation every 6 hours PRN Shortness of Breath and/or Wheezing  aluminum hydroxide/magnesium hydroxide/simethicone Suspension 30 milliLiter(s) Oral every 4 hours PRN Dyspepsia  benzocaine/menthol Lozenge 1 Lozenge Oral every 2 hours PRN sore throat  bisacodyl 5 milliGRAM(s) Oral every 12 hours PRN Constipation  haloperidol     Tablet 1 milliGRAM(s) Oral every 6 hours PRN agitation  haloperidol    Injectable 1 milliGRAM(s) IntraMuscular once PRN agitation

## 2023-02-24 NOTE — BH INPATIENT PSYCHIATRY PROGRESS NOTE - NSBHFUPINTERVALHXFT_PSY_A_CORE
Patient seen for follow-up of bipolar affective disorder, possible evolving dementia. VSS. agitated and requiring prn meds. Has taken po meds so far today. Had BM. Refused vitals. Labs show increased BUN and  . Sleeping fair, po fair.

## 2023-02-24 NOTE — BH INPATIENT PSYCHIATRY PROGRESS NOTE - CURRENT MEDICATION
MEDICATIONS  (STANDING):  artificial  tears Solution 1 Drop(s) Both EYES two times a day  aspirin enteric coated 81 milliGRAM(s) Oral daily  divalproex Sprinkle 250 milliGRAM(s) Oral two times a day  enoxaparin Injectable 40 milliGRAM(s) SubCutaneous <User Schedule>  lidocaine   4% Patch 1 Patch Transdermal every 24 hours  melatonin. 3 milliGRAM(s) Oral at bedtime  metoprolol tartrate 25 milliGRAM(s) Oral two times a day  polyethylene glycol 3350 17 Gram(s) Oral daily  risperiDONE   Tablet 0.25 milliGRAM(s) Oral three times a day  senna 2 Tablet(s) Oral at bedtime    MEDICATIONS  (PRN):  acetaminophen     Tablet .. 650 milliGRAM(s) Oral every 6 hours PRN Mild Pain (1 - 3), Moderate Pain (4 - 6), Severe Pain (7 - 10)  albuterol    90 MICROgram(s) HFA Inhaler 2 Puff(s) Inhalation every 6 hours PRN Shortness of Breath and/or Wheezing  aluminum hydroxide/magnesium hydroxide/simethicone Suspension 30 milliLiter(s) Oral every 4 hours PRN Dyspepsia  benzocaine/menthol Lozenge 1 Lozenge Oral every 2 hours PRN sore throat  bisacodyl 5 milliGRAM(s) Oral every 12 hours PRN Constipation  haloperidol     Tablet 1 milliGRAM(s) Oral every 6 hours PRN agitation  haloperidol    Injectable 1 milliGRAM(s) IntraMuscular once PRN agitation

## 2023-02-24 NOTE — BH INPATIENT PSYCHIATRY PROGRESS NOTE - NSBHASSESSSUMMFT_PSY_ALL_CORE
Dinorah Hogan is an 88 year old female, , domiciled with daughter with PPHx of bipolar I disorder, 1 previous inpatient psychiatric hospitalization in 1970s, no known history of SI/SA and PMHx of asthma, GERD, sciatica with previous vertebral fracture who is admitted on 9.27 status for symptoms of hortencia including irritability, loud speech, and agitation.     DDx: hortencia vs hypomania vs comorbid delirium    Today, patient is angry about her current admission and denying any psychiatric history. She is asking to be discharged immediately and yelling at treatment team. She is noted to have loud and rapid speech, but is able to be interrupted. She states she has been sleeping poorly due to hip and R knee pain. She denies S/IIP, H/IIP, AVH, paranoia, or ideas of reference. She is AOx1-2 (person, place--hospital), but unable to state year or location in [place]. She requires 9.27 inpatient admission for evaluation and management of hortencia.    1/22: less irritable but still requiring PRN med, more compliant with meds, no SI/HI.  1/23: Accepted meds yd and this AM though required PRN yd. Less irritable, appropriately conversant, making needs known. Suspicious bordering on paranoid, however no fully formed delusional thought content apparent.  1/24: Irritable and labile, suspicious regarding staff members. Discussed medication, pt will be offered psychiatric meds and is aware she has right to refuse.  1/25: Hostile, irritable, labile. TOO application in progress given poor insight and intermittent refusal of psychiatric mediations. Pt at risk for deconditioning given refusal to ambulate and minimal engagement with PT, given ambulatory at home refusal here appears to be behavioral, will continue to encourage participation. Refused cognitive evaluation today. Given ongoing intermittent agitation, paranoia, physical deconditioning, and poor insight into condition, pt requires continued inpatient hospitalization for stabilization and safety.  1/26: Pt remains labile, irritable, and paranoid toward staff. Although pt makes provocative statements (e.g. "she controls the robots," "the Jews will hate me"), these statements appear to represent intense feeling rather than fully formed delusion. Continues to refuse cognitive evaluation or consent to contact family. Given ongoing intermittent agitation, paranoia, physical deconditioning, and poor insight into condition, pt requires continued inpatient hospitalization for stabilization and safety.  1/27 Patient agitated, markedly irritable,paranoid  with themes of perrvasive mistreatment, being singled out but w/u well formed , fixed delusional ideas.Cont enocurage med compliance scheduled for court hearing for TOO  1/28: Currently on CO due to hospital bed requirement. Reports fair sleep and appetite. Seen at bedside during rounds. Continues to refuse most medications, took senna and tylenol last night. Reporting back pain "15/10" and requesting more heat packs "I will die without these", however under no visible distress. Primary team pursuing TOO. Renewed CO.   1/29: Renewed CO. Last night took Depakote, Haldol and Senna. Med compliance has been partial.   1/30: Refused all meds yd, this AM accepted metoprolol. Making accusations that people are attempting to harm her and want her to die. Impulse control is impaired.  1/31: Continues with intermittent agitation, hostile and accusatory twd staff, refusing meds (except metoprolol), refusing consent to speak to daughter. Given ongoing agitation, impulsivity, disinhibition, and deconditioning 2/2 refusal to ambulate (also attempted to climb out of bed last night), pt poses a threat of harm to herself and requires ongoing inpatient hospitalization for stabilization and safety. TOO & retention court date next week.  2/1: Continues irritable, paranoid, refusing medications and engagement in interviews.   2/2: Minimally engaged in interview, refusing to answer most questions; labile, hostile, required IM Haldol 1mg overnight for agitation; refusing to ambulate or engage in PT; refusing pain control interventions other than hot packs and lidocaine patch. TOO in process.   2/4 Cont agitated poor sleep, this Am had vairable O2 sat, patient reported she feels she needs an inhaler for asthma. Patient to be seen by medicine, labs and RVP ordered. Will change haldol to olanzapine standing as response to prns of haldol and occ po's have had poor response.   2/5 Paranoid, severely agitated. Has URI. COnt meds prn inhlaer scheuled for TOO 2/7 2/6: Remains paranoid, agitated, accusatory. Frequently refusing meds, required 3x olanzapine PRNs over the weekend.  2/7: Remains paranoid and accusatory twd staff. Continues to refuse majority of medications. Currently with URI. Going to court today for retention and TOO.  2/8:  Patient irritable, agitated, will increase olanzapine with IM back up.   2/9 No major changes but taking meds, Cont olanzapine now at 2.5mg hs and Depakote. Plan cont to titrate, monitor for se  2/10 Patient w/o much response cont with intense paranoia and agitation. Cont Zyprexa cont to titrate.   2/11 Pt is sleepy most likely due to PRN zyprexa last night, remains disorganized and confused.   2/12 confused, disorganized, screams at times, required PRN zyprexa/melatonin last night.   2/13: The patient continues to be manic, disorganized, irritable, screaming at times.  She has been tolerating medications and prn's well - will increase Zyprexa to 5mg hs and change to Zydis to ensure compliance.  Continue 1:1.  2/14: The patient is not responding to Zyprexa, despite increasing dose and getting prn's.  Will change to Seroquel, as patient reportedly did well with it in the past.  Patient given 12.5mg this morning, tolerated well.  Will start 12.5mg tid.  2/15: The patient continues to be irritable, agitated, disorganized.  Some small improvement with Seroquel, sleeping better at night.  Continue Seroquel trial.  2/16: The patient continues to be agitated, frequently yelling out, irritable and angry.  She has been tolerating standing and prn Seroquel well, will continue to titrate to 25mg tid.  Continue 1:1.  2/17: The patient continues to be irritable, agitated, yelling out throughout the day.  She is having more restful periods during the day.  She has been tolerating Seroquel well, will continue.  Continue 1:1.  2/18: Patient remains agitated, disorganized, guarded, confused  2/19: Sustained agitated, irritability, likely paranoia, will increase quetiapine   2/20: Patient remains agitated, suspicious of staff, requiring prn meds, quetiapine increased   2/21 agitated paranoid, no response from Seroquel so far. Will increase Seroquel. Reviewed care with daughter.   2/22:  Paranoid agitated. Taking meds, cont  Seroquel, recheck labs.   2/23 Doing poorly with ongoing agitation now increased BUN Will push fluids. Will decrease Depakote consider d/c . COnisder changing to risperidone or haldol to avoid sedation. constipation  2/24 Poor resposne to Seroquel and olanzapine IMs with no reposne but sedation at times and constipation. Will change to risperdal po and haldol prn. Consider ECT given refractoriness. Plan recheck labs Monday

## 2023-02-24 NOTE — BH INPATIENT PSYCHIATRY PROGRESS NOTE - MSE UNSTRUCTURED FT
Patient is awake and alert. Patient with high pitched yelling, no EPS. Mood irritable, angry. Patient with angry affect. Patient with paranoid delusions that she is being persecuted in various ways by almost all staff. No taylor. No SI/HI. Thinking disjointed. Uncooperative with cog testing. Says it is Dec  Jan 2023 Poor insight

## 2023-02-24 NOTE — BH INPATIENT PSYCHIATRY PROGRESS NOTE - NSBHCHARTREVIEWVS_PSY_A_CORE FT
Vital Signs Last 24 Hrs  T(C): 36.3 (02-24-23 @ 08:04), Max: 36.3 (02-24-23 @ 08:04)  T(F): 97.3 (02-24-23 @ 08:04), Max: 97.3 (02-24-23 @ 08:04)  HR: 68 (02-23-23 @ 20:20) (68 - 68)  BP: 149/71 (02-23-23 @ 20:20) (149/71 - 149/71)  BP(mean): --  RR: 18 (02-24-23 @ 08:04) (18 - 18)  SpO2: --    Orthostatic VS  02-24-23 @ 08:04  Lying BP: --/-- HR: --  Sitting BP: 120/60 HR: 69  Standing BP: --/-- HR: --  Site: --  Mode: --

## 2023-02-25 PROCEDURE — 99231 SBSQ HOSP IP/OBS SF/LOW 25: CPT

## 2023-02-25 RX ORDER — HALOPERIDOL DECANOATE 100 MG/ML
1 INJECTION INTRAMUSCULAR THREE TIMES A DAY
Refills: 0 | Status: DISCONTINUED | OUTPATIENT
Start: 2023-02-25 | End: 2023-04-10

## 2023-02-25 RX ORDER — HALOPERIDOL DECANOATE 100 MG/ML
2 INJECTION INTRAMUSCULAR ONCE
Refills: 0 | Status: COMPLETED | OUTPATIENT
Start: 2023-02-25 | End: 2023-02-25

## 2023-02-25 RX ORDER — HALOPERIDOL DECANOATE 100 MG/ML
1 INJECTION INTRAMUSCULAR ONCE
Refills: 0 | Status: COMPLETED | OUTPATIENT
Start: 2023-02-25 | End: 2023-03-22

## 2023-02-25 RX ORDER — RISPERIDONE 4 MG/1
0.25 TABLET ORAL THREE TIMES A DAY
Refills: 0 | Status: DISCONTINUED | OUTPATIENT
Start: 2023-02-25 | End: 2023-02-27

## 2023-02-25 RX ADMIN — Medication 25 MILLIGRAM(S): at 08:01

## 2023-02-25 RX ADMIN — Medication 25 MILLIGRAM(S): at 22:31

## 2023-02-25 RX ADMIN — LIDOCAINE 1 PATCH: 4 CREAM TOPICAL at 06:58

## 2023-02-25 RX ADMIN — HALOPERIDOL DECANOATE 1 MILLIGRAM(S): 100 INJECTION INTRAMUSCULAR at 00:01

## 2023-02-25 RX ADMIN — Medication 81 MILLIGRAM(S): at 08:01

## 2023-02-25 RX ADMIN — Medication 3 MILLIGRAM(S): at 22:31

## 2023-02-25 RX ADMIN — HALOPERIDOL DECANOATE 2 MILLIGRAM(S): 100 INJECTION INTRAMUSCULAR at 12:35

## 2023-02-25 RX ADMIN — SENNA PLUS 2 TABLET(S): 8.6 TABLET ORAL at 22:32

## 2023-02-25 RX ADMIN — POLYETHYLENE GLYCOL 3350 17 GRAM(S): 17 POWDER, FOR SOLUTION ORAL at 08:01

## 2023-02-25 RX ADMIN — DIVALPROEX SODIUM 250 MILLIGRAM(S): 500 TABLET, DELAYED RELEASE ORAL at 22:31

## 2023-02-25 RX ADMIN — RISPERIDONE 0.25 MILLIGRAM(S): 4 TABLET ORAL at 22:32

## 2023-02-25 RX ADMIN — HALOPERIDOL DECANOATE 1 MILLIGRAM(S): 100 INJECTION INTRAMUSCULAR at 18:02

## 2023-02-25 RX ADMIN — ENOXAPARIN SODIUM 40 MILLIGRAM(S): 100 INJECTION SUBCUTANEOUS at 08:01

## 2023-02-25 RX ADMIN — RISPERIDONE 0.25 MILLIGRAM(S): 4 TABLET ORAL at 12:35

## 2023-02-25 RX ADMIN — DIVALPROEX SODIUM 250 MILLIGRAM(S): 500 TABLET, DELAYED RELEASE ORAL at 08:01

## 2023-02-25 RX ADMIN — HALOPERIDOL DECANOATE 1 MILLIGRAM(S): 100 INJECTION INTRAMUSCULAR at 08:01

## 2023-02-25 NOTE — BH INPATIENT PSYCHIATRY PROGRESS NOTE - CURRENT MEDICATION
MEDICATIONS  (STANDING):  artificial  tears Solution 1 Drop(s) Both EYES two times a day  aspirin enteric coated 81 milliGRAM(s) Oral daily  divalproex Sprinkle 250 milliGRAM(s) Oral two times a day  enoxaparin Injectable 40 milliGRAM(s) SubCutaneous <User Schedule>  lidocaine   4% Patch 1 Patch Transdermal every 24 hours  melatonin. 3 milliGRAM(s) Oral at bedtime  metoprolol tartrate 25 milliGRAM(s) Oral two times a day  polyethylene glycol 3350 17 Gram(s) Oral daily  risperiDONE   Tablet 0.25 milliGRAM(s) Oral three times a day  senna 2 Tablet(s) Oral at bedtime    MEDICATIONS  (PRN):  acetaminophen     Tablet .. 650 milliGRAM(s) Oral every 6 hours PRN Mild Pain (1 - 3), Moderate Pain (4 - 6), Severe Pain (7 - 10)  albuterol    90 MICROgram(s) HFA Inhaler 2 Puff(s) Inhalation every 6 hours PRN Shortness of Breath and/or Wheezing  aluminum hydroxide/magnesium hydroxide/simethicone Suspension 30 milliLiter(s) Oral every 4 hours PRN Dyspepsia  benzocaine/menthol Lozenge 1 Lozenge Oral every 2 hours PRN sore throat  bisacodyl 5 milliGRAM(s) Oral every 12 hours PRN Constipation  haloperidol     Tablet 1 milliGRAM(s) Oral every 6 hours PRN agitation  haloperidol    Injectable 1 milliGRAM(s) IntraMuscular once PRN agitation  haloperidol    Injectable 1 milliGRAM(s) IntraMuscular once PRN agitation  haloperidol    Injectable 1 milliGRAM(s) IntraMuscular three times a day PRN refusal of court ordered meds   Never smoker

## 2023-02-25 NOTE — BH INPATIENT PSYCHIATRY PROGRESS NOTE - MSE UNSTRUCTURED FT
Patient is awake and alert. Patient with high pitched yelling,moves freely, resists efforts to check for cogwheeling Mood irritable, angry. Patient with angry affect. Patient with paranoid delusions that she is being persecuted in various ways by almost all staff. No taylor. No SI/HI. Thinking disjointed. Uncooperative with cog testing. Says it is Dec  Jan 2023 Poor insight

## 2023-02-25 NOTE — BH INPATIENT PSYCHIATRY PROGRESS NOTE - NSBHASSESSSUMMFT_PSY_ALL_CORE
Dinorah Hogan is an 88 year old female, , domiciled with daughter with PPHx of bipolar I disorder, 1 previous inpatient psychiatric hospitalization in 1970s, no known history of SI/SA and PMHx of asthma, GERD, sciatica with previous vertebral fracture who is admitted on 9.27 status for symptoms of hortencia including irritability, loud speech, and agitation.     DDx: hortencia vs hypomania vs comorbid delirium    Today, patient is angry about her current admission and denying any psychiatric history. She is asking to be discharged immediately and yelling at treatment team. She is noted to have loud and rapid speech, but is able to be interrupted. She states she has been sleeping poorly due to hip and R knee pain. She denies S/IIP, H/IIP, AVH, paranoia, or ideas of reference. She is AOx1-2 (person, place--hospital), but unable to state year or location in [place]. She requires 9.27 inpatient admission for evaluation and management of hortencia.    1/22: less irritable but still requiring PRN med, more compliant with meds, no SI/HI.  1/23: Accepted meds yd and this AM though required PRN yd. Less irritable, appropriately conversant, making needs known. Suspicious bordering on paranoid, however no fully formed delusional thought content apparent.  1/24: Irritable and labile, suspicious regarding staff members. Discussed medication, pt will be offered psychiatric meds and is aware she has right to refuse.  1/25: Hostile, irritable, labile. TOO application in progress given poor insight and intermittent refusal of psychiatric mediations. Pt at risk for deconditioning given refusal to ambulate and minimal engagement with PT, given ambulatory at home refusal here appears to be behavioral, will continue to encourage participation. Refused cognitive evaluation today. Given ongoing intermittent agitation, paranoia, physical deconditioning, and poor insight into condition, pt requires continued inpatient hospitalization for stabilization and safety.  1/26: Pt remains labile, irritable, and paranoid toward staff. Although pt makes provocative statements (e.g. "she controls the robots," "the Jews will hate me"), these statements appear to represent intense feeling rather than fully formed delusion. Continues to refuse cognitive evaluation or consent to contact family. Given ongoing intermittent agitation, paranoia, physical deconditioning, and poor insight into condition, pt requires continued inpatient hospitalization for stabilization and safety.  1/27 Patient agitated, markedly irritable,paranoid  with themes of perrvasive mistreatment, being singled out but w/u well formed , fixed delusional ideas.Cont enocurage med compliance scheduled for court hearing for TOO  1/28: Currently on CO due to hospital bed requirement. Reports fair sleep and appetite. Seen at bedside during rounds. Continues to refuse most medications, took senna and tylenol last night. Reporting back pain "15/10" and requesting more heat packs "I will die without these", however under no visible distress. Primary team pursuing TOO. Renewed CO.   1/29: Renewed CO. Last night took Depakote, Haldol and Senna. Med compliance has been partial.   1/30: Refused all meds yd, this AM accepted metoprolol. Making accusations that people are attempting to harm her and want her to die. Impulse control is impaired.  1/31: Continues with intermittent agitation, hostile and accusatory twd staff, refusing meds (except metoprolol), refusing consent to speak to daughter. Given ongoing agitation, impulsivity, disinhibition, and deconditioning 2/2 refusal to ambulate (also attempted to climb out of bed last night), pt poses a threat of harm to herself and requires ongoing inpatient hospitalization for stabilization and safety. TOO & retention court date next week.  2/1: Continues irritable, paranoid, refusing medications and engagement in interviews.   2/2: Minimally engaged in interview, refusing to answer most questions; labile, hostile, required IM Haldol 1mg overnight for agitation; refusing to ambulate or engage in PT; refusing pain control interventions other than hot packs and lidocaine patch. TOO in process.   2/4 Cont agitated poor sleep, this Am had vairable O2 sat, patient reported she feels she needs an inhaler for asthma. Patient to be seen by medicine, labs and RVP ordered. Will change haldol to olanzapine standing as response to prns of haldol and occ po's have had poor response.   2/5 Paranoid, severely agitated. Has URI. COnt meds prn inhlaer scheuled for TOO 2/7 2/6: Remains paranoid, agitated, accusatory. Frequently refusing meds, required 3x olanzapine PRNs over the weekend.  2/7: Remains paranoid and accusatory twd staff. Continues to refuse majority of medications. Currently with URI. Going to court today for retention and TOO.  2/8:  Patient irritable, agitated, will increase olanzapine with IM back up.   2/9 No major changes but taking meds, Cont olanzapine now at 2.5mg hs and Depakote. Plan cont to titrate, monitor for se  2/10 Patient w/o much response cont with intense paranoia and agitation. Cont Zyprexa cont to titrate.   2/11 Pt is sleepy most likely due to PRN zyprexa last night, remains disorganized and confused.   2/12 confused, disorganized, screams at times, required PRN zyprexa/melatonin last night.   2/13: The patient continues to be manic, disorganized, irritable, screaming at times.  She has been tolerating medications and prn's well - will increase Zyprexa to 5mg hs and change to Zydis to ensure compliance.  Continue 1:1.  2/14: The patient is not responding to Zyprexa, despite increasing dose and getting prn's.  Will change to Seroquel, as patient reportedly did well with it in the past.  Patient given 12.5mg this morning, tolerated well.  Will start 12.5mg tid.  2/15: The patient continues to be irritable, agitated, disorganized.  Some small improvement with Seroquel, sleeping better at night.  Continue Seroquel trial.  2/16: The patient continues to be agitated, frequently yelling out, irritable and angry.  She has been tolerating standing and prn Seroquel well, will continue to titrate to 25mg tid.  Continue 1:1.  2/17: The patient continues to be irritable, agitated, yelling out throughout the day.  She is having more restful periods during the day.  She has been tolerating Seroquel well, will continue.  Continue 1:1.  2/18: Patient remains agitated, disorganized, guarded, confused  2/19: Sustained agitated, irritability, likely paranoia, will increase quetiapine   2/20: Patient remains agitated, suspicious of staff, requiring prn meds, quetiapine increased   2/21 agitated paranoid, no response from Seroquel so far. Will increase Seroquel. Reviewed care with daughter.   2/22:  Paranoid agitated. Taking meds, cont  Seroquel, recheck labs.   2/23 Doing poorly with ongoing agitation now increased BUN Will push fluids. Will decrease Depakote consider d/c . COnisder changing to risperidone or haldol to avoid sedation. constipation  2/24 Poor resposne to Seroquel and olanzapine IMs with no reposne but sedation at times and constipation. Will change to risperdal po and haldol prn. Consider ECT given refractoriness. Plan recheck labs Monday 2/25 MArginal impression of improvement. Cont Risperdal trial with haldol prns

## 2023-02-25 NOTE — BH INPATIENT PSYCHIATRY PROGRESS NOTE - NSBHFUPINTERVALHXFT_PSY_A_CORE
Patient seen for follow-up of bipolar affective disorder, possible evolving dementia. VSS. agitated and requiring prn meds. Has taken po meds so far today. Had BM. VSS. Labs show increased BUN and  . Sleeping fair, po fair. Patient asked writer how he was before lapsing into shout accusations

## 2023-02-25 NOTE — BH INPATIENT PSYCHIATRY PROGRESS NOTE - NSBHCHARTREVIEWVS_PSY_A_CORE FT
Vital Signs Last 24 Hrs  T(C): 36.2 (02-25-23 @ 05:27), Max: 36.2 (02-25-23 @ 05:27)  T(F): 97.2 (02-25-23 @ 05:27), Max: 97.2 (02-25-23 @ 05:27)  HR: 68 (02-25-23 @ 05:27) (68 - 68)  BP: 149/89 (02-25-23 @ 05:27) (135/70 - 149/89)  BP(mean): 64 (02-24-23 @ 20:10) (64 - 64)  RR: 17 (02-25-23 @ 05:27) (17 - 17)  SpO2: --    Orthostatic VS  02-24-23 @ 08:04  Lying BP: --/-- HR: --  Sitting BP: 120/60 HR: 69  Standing BP: --/-- HR: --  Site: --  Mode: --

## 2023-02-25 NOTE — BH INPATIENT PSYCHIATRY PROGRESS NOTE - PRN MEDS
MEDICATIONS  (PRN):  acetaminophen     Tablet .. 650 milliGRAM(s) Oral every 6 hours PRN Mild Pain (1 - 3), Moderate Pain (4 - 6), Severe Pain (7 - 10)  albuterol    90 MICROgram(s) HFA Inhaler 2 Puff(s) Inhalation every 6 hours PRN Shortness of Breath and/or Wheezing  aluminum hydroxide/magnesium hydroxide/simethicone Suspension 30 milliLiter(s) Oral every 4 hours PRN Dyspepsia  benzocaine/menthol Lozenge 1 Lozenge Oral every 2 hours PRN sore throat  bisacodyl 5 milliGRAM(s) Oral every 12 hours PRN Constipation  haloperidol     Tablet 1 milliGRAM(s) Oral every 6 hours PRN agitation  haloperidol    Injectable 1 milliGRAM(s) IntraMuscular once PRN agitation  haloperidol    Injectable 1 milliGRAM(s) IntraMuscular once PRN agitation  haloperidol    Injectable 1 milliGRAM(s) IntraMuscular three times a day PRN refusal of court ordered meds

## 2023-02-26 PROCEDURE — 99231 SBSQ HOSP IP/OBS SF/LOW 25: CPT

## 2023-02-26 RX ADMIN — Medication 25 MILLIGRAM(S): at 11:08

## 2023-02-26 RX ADMIN — DIVALPROEX SODIUM 250 MILLIGRAM(S): 500 TABLET, DELAYED RELEASE ORAL at 21:18

## 2023-02-26 RX ADMIN — Medication 81 MILLIGRAM(S): at 11:08

## 2023-02-26 RX ADMIN — Medication 25 MILLIGRAM(S): at 21:17

## 2023-02-26 RX ADMIN — RISPERIDONE 0.25 MILLIGRAM(S): 4 TABLET ORAL at 12:46

## 2023-02-26 RX ADMIN — Medication 1 DROP(S): at 09:55

## 2023-02-26 RX ADMIN — HALOPERIDOL DECANOATE 1 MILLIGRAM(S): 100 INJECTION INTRAMUSCULAR at 17:48

## 2023-02-26 RX ADMIN — POLYETHYLENE GLYCOL 3350 17 GRAM(S): 17 POWDER, FOR SOLUTION ORAL at 11:10

## 2023-02-26 RX ADMIN — DIVALPROEX SODIUM 250 MILLIGRAM(S): 500 TABLET, DELAYED RELEASE ORAL at 11:08

## 2023-02-26 RX ADMIN — SENNA PLUS 2 TABLET(S): 8.6 TABLET ORAL at 21:17

## 2023-02-26 RX ADMIN — RISPERIDONE 0.25 MILLIGRAM(S): 4 TABLET ORAL at 21:18

## 2023-02-26 RX ADMIN — ENOXAPARIN SODIUM 40 MILLIGRAM(S): 100 INJECTION SUBCUTANEOUS at 09:56

## 2023-02-26 RX ADMIN — Medication 3 MILLIGRAM(S): at 21:18

## 2023-02-26 RX ADMIN — RISPERIDONE 0.25 MILLIGRAM(S): 4 TABLET ORAL at 11:09

## 2023-02-26 NOTE — BH INPATIENT PSYCHIATRY PROGRESS NOTE - NSBHFUPINTERVALHXFT_PSY_A_CORE
Patient seen for follow-up of bipolar affective disorder, possible evolving dementia. Chart reviewed and discussed with nursing staff. Vitals are stable. Patient is compliant with her medications this am, complied with last night risperdal. She received PRN haldol yesterday around 8 am and 6 pm. On encounter today, reports not feeling good, 'I have a disease'. When asked to clarify she says, 'she can't get water and she dies..'. She appears loud, mild agitated, confused and disorganized. Noted screaming at times.

## 2023-02-26 NOTE — BH INPATIENT PSYCHIATRY PROGRESS NOTE - NSBHASSESSSUMMFT_PSY_ALL_CORE
Dinorah Hogan is an 88 year old female, , domiciled with daughter with PPHx of bipolar I disorder, 1 previous inpatient psychiatric hospitalization in 1970s, no known history of SI/SA and PMHx of asthma, GERD, sciatica with previous vertebral fracture who is admitted on 9.27 status for symptoms of hortencia including irritability, loud speech, and agitation.     DDx: hortencia vs hypomania vs comorbid delirium    Today, patient is angry about her current admission and denying any psychiatric history. She is asking to be discharged immediately and yelling at treatment team. She is noted to have loud and rapid speech, but is able to be interrupted. She states she has been sleeping poorly due to hip and R knee pain. She denies S/IIP, H/IIP, AVH, paranoia, or ideas of reference. She is AOx1-2 (person, place--hospital), but unable to state year or location in [place]. She requires 9.27 inpatient admission for evaluation and management of hortencia.    1/22: less irritable but still requiring PRN med, more compliant with meds, no SI/HI.  1/23: Accepted meds yd and this AM though required PRN yd. Less irritable, appropriately conversant, making needs known. Suspicious bordering on paranoid, however no fully formed delusional thought content apparent.  1/24: Irritable and labile, suspicious regarding staff members. Discussed medication, pt will be offered psychiatric meds and is aware she has right to refuse.  1/25: Hostile, irritable, labile. TOO application in progress given poor insight and intermittent refusal of psychiatric mediations. Pt at risk for deconditioning given refusal to ambulate and minimal engagement with PT, given ambulatory at home refusal here appears to be behavioral, will continue to encourage participation. Refused cognitive evaluation today. Given ongoing intermittent agitation, paranoia, physical deconditioning, and poor insight into condition, pt requires continued inpatient hospitalization for stabilization and safety.  1/26: Pt remains labile, irritable, and paranoid toward staff. Although pt makes provocative statements (e.g. "she controls the robots," "the Jews will hate me"), these statements appear to represent intense feeling rather than fully formed delusion. Continues to refuse cognitive evaluation or consent to contact family. Given ongoing intermittent agitation, paranoia, physical deconditioning, and poor insight into condition, pt requires continued inpatient hospitalization for stabilization and safety.  1/27 Patient agitated, markedly irritable,paranoid  with themes of perrvasive mistreatment, being singled out but w/u well formed , fixed delusional ideas.Cont enocurage med compliance scheduled for court hearing for TOO  1/28: Currently on CO due to hospital bed requirement. Reports fair sleep and appetite. Seen at bedside during rounds. Continues to refuse most medications, took senna and tylenol last night. Reporting back pain "15/10" and requesting more heat packs "I will die without these", however under no visible distress. Primary team pursuing TOO. Renewed CO.   1/29: Renewed CO. Last night took Depakote, Haldol and Senna. Med compliance has been partial.   1/30: Refused all meds yd, this AM accepted metoprolol. Making accusations that people are attempting to harm her and want her to die. Impulse control is impaired.  1/31: Continues with intermittent agitation, hostile and accusatory twd staff, refusing meds (except metoprolol), refusing consent to speak to daughter. Given ongoing agitation, impulsivity, disinhibition, and deconditioning 2/2 refusal to ambulate (also attempted to climb out of bed last night), pt poses a threat of harm to herself and requires ongoing inpatient hospitalization for stabilization and safety. TOO & retention court date next week.  2/1: Continues irritable, paranoid, refusing medications and engagement in interviews.   2/2: Minimally engaged in interview, refusing to answer most questions; labile, hostile, required IM Haldol 1mg overnight for agitation; refusing to ambulate or engage in PT; refusing pain control interventions other than hot packs and lidocaine patch. TOO in process.   2/4 Cont agitated poor sleep, this Am had vairable O2 sat, patient reported she feels she needs an inhaler for asthma. Patient to be seen by medicine, labs and RVP ordered. Will change haldol to olanzapine standing as response to prns of haldol and occ po's have had poor response.   2/5 Paranoid, severely agitated. Has URI. COnt meds prn inhlaer scheuled for TOO 2/7 2/6: Remains paranoid, agitated, accusatory. Frequently refusing meds, required 3x olanzapine PRNs over the weekend.  2/7: Remains paranoid and accusatory twd staff. Continues to refuse majority of medications. Currently with URI. Going to court today for retention and TOO.  2/8:  Patient irritable, agitated, will increase olanzapine with IM back up.   2/9 No major changes but taking meds, Cont olanzapine now at 2.5mg hs and Depakote. Plan cont to titrate, monitor for se  2/10 Patient w/o much response cont with intense paranoia and agitation. Cont Zyprexa cont to titrate.   2/11 Pt is sleepy most likely due to PRN zyprexa last night, remains disorganized and confused.   2/12 confused, disorganized, screams at times, required PRN zyprexa/melatonin last night.   2/13: The patient continues to be manic, disorganized, irritable, screaming at times.  She has been tolerating medications and prn's well - will increase Zyprexa to 5mg hs and change to Zydis to ensure compliance.  Continue 1:1.  2/14: The patient is not responding to Zyprexa, despite increasing dose and getting prn's.  Will change to Seroquel, as patient reportedly did well with it in the past.  Patient given 12.5mg this morning, tolerated well.  Will start 12.5mg tid.  2/15: The patient continues to be irritable, agitated, disorganized.  Some small improvement with Seroquel, sleeping better at night.  Continue Seroquel trial.  2/16: The patient continues to be agitated, frequently yelling out, irritable and angry.  She has been tolerating standing and prn Seroquel well, will continue to titrate to 25mg tid.  Continue 1:1.  2/17: The patient continues to be irritable, agitated, yelling out throughout the day.  She is having more restful periods during the day.  She has been tolerating Seroquel well, will continue.  Continue 1:1.  2/18: Patient remains agitated, disorganized, guarded, confused  2/19: Sustained agitated, irritability, likely paranoia, will increase quetiapine   2/20: Patient remains agitated, suspicious of staff, requiring prn meds, quetiapine increased   2/21 agitated paranoid, no response from Seroquel so far. Will increase Seroquel. Reviewed care with daughter.   2/22:  Paranoid agitated. Taking meds, cont  Seroquel, recheck labs.   2/23 Doing poorly with ongoing agitation now increased BUN Will push fluids. Will decrease Depakote consider d/c . COnisder changing to risperidone or haldol to avoid sedation. constipation  2/24 Poor resposne to Seroquel and olanzapine IMs with no reposne but sedation at times and constipation. Will change to risperdal po and haldol prn. Consider ECT given refractoriness. Plan recheck labs Monday 2/25 MArginal impression of improvement. Cont Risperdal trial with haldol prns  2/26 agitated, loud, screaming, disorganized and irritable, requiring PRN meds.

## 2023-02-26 NOTE — BH INPATIENT PSYCHIATRY PROGRESS NOTE - NSBHCHARTREVIEWVS_PSY_A_CORE FT
Vital Signs Last 24 Hrs  T(C): 36.9 (02-26-23 @ 06:43), Max: 36.9 (02-25-23 @ 20:51)  T(F): 98.4 (02-26-23 @ 06:43), Max: 98.4 (02-25-23 @ 20:51)  HR: 64 (02-26-23 @ 06:43) (64 - 64)  BP: 142/76 (02-26-23 @ 06:43) (116/60 - 142/76)  BP(mean): 66 (02-25-23 @ 20:51) (66 - 66)  RR: 18 (02-25-23 @ 20:51) (18 - 18)  SpO2: --

## 2023-02-26 NOTE — BH INPATIENT PSYCHIATRY PROGRESS NOTE - CURRENT MEDICATION
MEDICATIONS  (STANDING):  artificial  tears Solution 1 Drop(s) Both EYES two times a day  aspirin enteric coated 81 milliGRAM(s) Oral daily  divalproex Sprinkle 250 milliGRAM(s) Oral two times a day  enoxaparin Injectable 40 milliGRAM(s) SubCutaneous <User Schedule>  lidocaine   4% Patch 1 Patch Transdermal every 24 hours  melatonin. 3 milliGRAM(s) Oral at bedtime  metoprolol tartrate 25 milliGRAM(s) Oral two times a day  polyethylene glycol 3350 17 Gram(s) Oral daily  risperiDONE   Tablet 0.25 milliGRAM(s) Oral three times a day  senna 2 Tablet(s) Oral at bedtime    MEDICATIONS  (PRN):  acetaminophen     Tablet .. 650 milliGRAM(s) Oral every 6 hours PRN Mild Pain (1 - 3), Moderate Pain (4 - 6), Severe Pain (7 - 10)  albuterol    90 MICROgram(s) HFA Inhaler 2 Puff(s) Inhalation every 6 hours PRN Shortness of Breath and/or Wheezing  aluminum hydroxide/magnesium hydroxide/simethicone Suspension 30 milliLiter(s) Oral every 4 hours PRN Dyspepsia  benzocaine/menthol Lozenge 1 Lozenge Oral every 2 hours PRN sore throat  bisacodyl 5 milliGRAM(s) Oral every 12 hours PRN Constipation  haloperidol     Tablet 1 milliGRAM(s) Oral every 6 hours PRN agitation  haloperidol    Injectable 1 milliGRAM(s) IntraMuscular once PRN agitation  haloperidol    Injectable 1 milliGRAM(s) IntraMuscular once PRN agitation  haloperidol    Injectable 1 milliGRAM(s) IntraMuscular three times a day PRN refusal of court ordered meds

## 2023-02-27 DIAGNOSIS — Z86.59 PERSONAL HISTORY OF OTHER MENTAL AND BEHAVIORAL DISORDERS: ICD-10-CM

## 2023-02-27 LAB
ANION GAP SERPL CALC-SCNC: 4 MMOL/L — LOW (ref 7–14)
BUN SERPL-MCNC: 18 MG/DL — SIGNIFICANT CHANGE UP (ref 7–23)
CALCIUM SERPL-MCNC: 9.3 MG/DL — SIGNIFICANT CHANGE UP (ref 8.4–10.5)
CHLORIDE SERPL-SCNC: 102 MMOL/L — SIGNIFICANT CHANGE UP (ref 98–107)
CO2 SERPL-SCNC: 35 MMOL/L — HIGH (ref 22–31)
CREAT SERPL-MCNC: 0.56 MG/DL — SIGNIFICANT CHANGE UP (ref 0.5–1.3)
EGFR: 88 ML/MIN/1.73M2 — SIGNIFICANT CHANGE UP
GLUCOSE SERPL-MCNC: 131 MG/DL — HIGH (ref 70–99)
POTASSIUM SERPL-MCNC: 4.2 MMOL/L — SIGNIFICANT CHANGE UP (ref 3.5–5.3)
POTASSIUM SERPL-SCNC: 4.2 MMOL/L — SIGNIFICANT CHANGE UP (ref 3.5–5.3)
SODIUM SERPL-SCNC: 141 MMOL/L — SIGNIFICANT CHANGE UP (ref 135–145)
TSH SERPL-MCNC: 2.9 UIU/ML — SIGNIFICANT CHANGE UP (ref 0.27–4.2)

## 2023-02-27 PROCEDURE — 99231 SBSQ HOSP IP/OBS SF/LOW 25: CPT

## 2023-02-27 RX ORDER — RISPERIDONE 4 MG/1
0.5 TABLET ORAL
Refills: 0 | Status: DISCONTINUED | OUTPATIENT
Start: 2023-02-27 | End: 2023-03-02

## 2023-02-27 RX ADMIN — SENNA PLUS 2 TABLET(S): 8.6 TABLET ORAL at 22:33

## 2023-02-27 RX ADMIN — RISPERIDONE 0.5 MILLIGRAM(S): 4 TABLET ORAL at 22:32

## 2023-02-27 RX ADMIN — DIVALPROEX SODIUM 250 MILLIGRAM(S): 500 TABLET, DELAYED RELEASE ORAL at 22:31

## 2023-02-27 RX ADMIN — POLYETHYLENE GLYCOL 3350 17 GRAM(S): 17 POWDER, FOR SOLUTION ORAL at 09:39

## 2023-02-27 RX ADMIN — ENOXAPARIN SODIUM 40 MILLIGRAM(S): 100 INJECTION SUBCUTANEOUS at 09:39

## 2023-02-27 RX ADMIN — RISPERIDONE 0.25 MILLIGRAM(S): 4 TABLET ORAL at 09:39

## 2023-02-27 RX ADMIN — DIVALPROEX SODIUM 250 MILLIGRAM(S): 500 TABLET, DELAYED RELEASE ORAL at 09:38

## 2023-02-27 RX ADMIN — LIDOCAINE 1 PATCH: 4 CREAM TOPICAL at 22:33

## 2023-02-27 RX ADMIN — Medication 25 MILLIGRAM(S): at 22:32

## 2023-02-27 RX ADMIN — HALOPERIDOL DECANOATE 1 MILLIGRAM(S): 100 INJECTION INTRAMUSCULAR at 10:14

## 2023-02-27 RX ADMIN — RISPERIDONE 0.25 MILLIGRAM(S): 4 TABLET ORAL at 12:59

## 2023-02-27 RX ADMIN — Medication 81 MILLIGRAM(S): at 09:38

## 2023-02-27 RX ADMIN — Medication 3 MILLIGRAM(S): at 22:32

## 2023-02-27 RX ADMIN — Medication 1 DROP(S): at 22:36

## 2023-02-27 RX ADMIN — Medication 25 MILLIGRAM(S): at 09:38

## 2023-02-27 NOTE — BH INPATIENT PSYCHIATRY PROGRESS NOTE - NSBHASSESSSUMMFT_PSY_ALL_CORE
Dinorah Hogan is an 88 year old female, , domiciled with daughter with PPHx of bipolar I disorder, 1 previous inpatient psychiatric hospitalization in 1970s, no known history of SI/SA and PMHx of asthma, GERD, sciatica with previous vertebral fracture who is admitted on 9.27 status for symptoms of hortencia including irritability, loud speech, and agitation.     DDx: hortencia vs hypomania vs comorbid delirium    Today, patient is angry about her current admission and denying any psychiatric history. She is asking to be discharged immediately and yelling at treatment team. She is noted to have loud and rapid speech, but is able to be interrupted. She states she has been sleeping poorly due to hip and R knee pain. She denies S/IIP, H/IIP, AVH, paranoia, or ideas of reference. She is AOx1-2 (person, place--hospital), but unable to state year or location in [place]. She requires 9.27 inpatient admission for evaluation and management of hortencia.    1/22: less irritable but still requiring PRN med, more compliant with meds, no SI/HI.  1/23: Accepted meds yd and this AM though required PRN yd. Less irritable, appropriately conversant, making needs known. Suspicious bordering on paranoid, however no fully formed delusional thought content apparent.  1/24: Irritable and labile, suspicious regarding staff members. Discussed medication, pt will be offered psychiatric meds and is aware she has right to refuse.  1/25: Hostile, irritable, labile. TOO application in progress given poor insight and intermittent refusal of psychiatric mediations. Pt at risk for deconditioning given refusal to ambulate and minimal engagement with PT, given ambulatory at home refusal here appears to be behavioral, will continue to encourage participation. Refused cognitive evaluation today. Given ongoing intermittent agitation, paranoia, physical deconditioning, and poor insight into condition, pt requires continued inpatient hospitalization for stabilization and safety.  1/26: Pt remains labile, irritable, and paranoid toward staff. Although pt makes provocative statements (e.g. "she controls the robots," "the Jews will hate me"), these statements appear to represent intense feeling rather than fully formed delusion. Continues to refuse cognitive evaluation or consent to contact family. Given ongoing intermittent agitation, paranoia, physical deconditioning, and poor insight into condition, pt requires continued inpatient hospitalization for stabilization and safety.  1/27 Patient agitated, markedly irritable,paranoid  with themes of perrvasive mistreatment, being singled out but w/u well formed , fixed delusional ideas.Cont enocurage med compliance scheduled for court hearing for TOO  1/28: Currently on CO due to hospital bed requirement. Reports fair sleep and appetite. Seen at bedside during rounds. Continues to refuse most medications, took senna and tylenol last night. Reporting back pain "15/10" and requesting more heat packs "I will die without these", however under no visible distress. Primary team pursuing TOO. Renewed CO.   1/29: Renewed CO. Last night took Depakote, Haldol and Senna. Med compliance has been partial.   1/30: Refused all meds yd, this AM accepted metoprolol. Making accusations that people are attempting to harm her and want her to die. Impulse control is impaired.  1/31: Continues with intermittent agitation, hostile and accusatory twd staff, refusing meds (except metoprolol), refusing consent to speak to daughter. Given ongoing agitation, impulsivity, disinhibition, and deconditioning 2/2 refusal to ambulate (also attempted to climb out of bed last night), pt poses a threat of harm to herself and requires ongoing inpatient hospitalization for stabilization and safety. TOO & retention court date next week.  2/1: Continues irritable, paranoid, refusing medications and engagement in interviews.   2/2: Minimally engaged in interview, refusing to answer most questions; labile, hostile, required IM Haldol 1mg overnight for agitation; refusing to ambulate or engage in PT; refusing pain control interventions other than hot packs and lidocaine patch. TOO in process.   2/4 Cont agitated poor sleep, this Am had vairable O2 sat, patient reported she feels she needs an inhaler for asthma. Patient to be seen by medicine, labs and RVP ordered. Will change haldol to olanzapine standing as response to prns of haldol and occ po's have had poor response.   2/5 Paranoid, severely agitated. Has URI. COnt meds prn inhlaer scheuled for TOO 2/7 2/6: Remains paranoid, agitated, accusatory. Frequently refusing meds, required 3x olanzapine PRNs over the weekend.  2/7: Remains paranoid and accusatory twd staff. Continues to refuse majority of medications. Currently with URI. Going to court today for retention and TOO.  2/8:  Patient irritable, agitated, will increase olanzapine with IM back up.   2/9 No major changes but taking meds, Cont olanzapine now at 2.5mg hs and Depakote. Plan cont to titrate, monitor for se  2/10 Patient w/o much response cont with intense paranoia and agitation. Cont Zyprexa cont to titrate.   2/11 Pt is sleepy most likely due to PRN zyprexa last night, remains disorganized and confused.   2/12 confused, disorganized, screams at times, required PRN zyprexa/melatonin last night.   2/13: The patient continues to be manic, disorganized, irritable, screaming at times.  She has been tolerating medications and prn's well - will increase Zyprexa to 5mg hs and change to Zydis to ensure compliance.  Continue 1:1.  2/14: The patient is not responding to Zyprexa, despite increasing dose and getting prn's.  Will change to Seroquel, as patient reportedly did well with it in the past.  Patient given 12.5mg this morning, tolerated well.  Will start 12.5mg tid.  2/15: The patient continues to be irritable, agitated, disorganized.  Some small improvement with Seroquel, sleeping better at night.  Continue Seroquel trial.  2/16: The patient continues to be agitated, frequently yelling out, irritable and angry.  She has been tolerating standing and prn Seroquel well, will continue to titrate to 25mg tid.  Continue 1:1.  2/17: The patient continues to be irritable, agitated, yelling out throughout the day.  She is having more restful periods during the day.  She has been tolerating Seroquel well, will continue.  Continue 1:1.  2/18: Patient remains agitated, disorganized, guarded, confused  2/19: Sustained agitated, irritability, likely paranoia, will increase quetiapine   2/20: Patient remains agitated, suspicious of staff, requiring prn meds, quetiapine increased   2/21 agitated paranoid, no response from Seroquel so far. Will increase Seroquel. Reviewed care with daughter.   2/22:  Paranoid agitated. Taking meds, cont  Seroquel, recheck labs.   2/23 Doing poorly with ongoing agitation now increased BUN Will push fluids. Will decrease Depakote consider d/c . COnisder changing to risperidone or haldol to avoid sedation. constipation  2/2 2/24 Poor resposne to Seroquel and olanzapine IMs with no reposne but sedation at times and constipation. Will change to risperdal po and haldol prn. Consider ECT given refractoriness. Plan recheck labs Monday 2/25 MArginal impression of improvement. Cont Risperdal trial with haldol prn    2/26 agitated, loud, screaming, disorganized and irritable, requiring PRN meds.  2/27 Patient irritable, paranoid with some more moments of being calmer, better related. Cont risperdal increase dose. Patient with elevated bicarb spoke with medicine, will repeat in AM

## 2023-02-27 NOTE — BH INPATIENT PSYCHIATRY PROGRESS NOTE - NSBHFUPINTERVALHXFT_PSY_A_CORE
Patient seen for follow-up of bipolar affective disorder, possible evolving dementia. Chart reviewed and discussed with nursing staff. Vitals are stable. Patient is compliant with her medications this am, complied with last night risperdal. She received PRN haldol yesterday around 8 am and 6 pm.

## 2023-02-27 NOTE — BH INPATIENT PSYCHIATRY PROGRESS NOTE - CURRENT MEDICATION
MEDICATIONS  (STANDING):  artificial  tears Solution 1 Drop(s) Both EYES two times a day  aspirin enteric coated 81 milliGRAM(s) Oral daily  divalproex Sprinkle 250 milliGRAM(s) Oral two times a day  enoxaparin Injectable 40 milliGRAM(s) SubCutaneous <User Schedule>  lidocaine   4% Patch 1 Patch Transdermal every 24 hours  melatonin. 3 milliGRAM(s) Oral at bedtime  metoprolol tartrate 25 milliGRAM(s) Oral two times a day  polyethylene glycol 3350 17 Gram(s) Oral daily  risperiDONE   Tablet 0.5 milliGRAM(s) Oral two times a day  senna 2 Tablet(s) Oral at bedtime    MEDICATIONS  (PRN):  acetaminophen     Tablet .. 650 milliGRAM(s) Oral every 6 hours PRN Mild Pain (1 - 3), Moderate Pain (4 - 6), Severe Pain (7 - 10)  albuterol    90 MICROgram(s) HFA Inhaler 2 Puff(s) Inhalation every 6 hours PRN Shortness of Breath and/or Wheezing  aluminum hydroxide/magnesium hydroxide/simethicone Suspension 30 milliLiter(s) Oral every 4 hours PRN Dyspepsia  benzocaine/menthol Lozenge 1 Lozenge Oral every 2 hours PRN sore throat  bisacodyl 5 milliGRAM(s) Oral every 12 hours PRN Constipation  haloperidol     Tablet 1 milliGRAM(s) Oral every 6 hours PRN agitation  haloperidol    Injectable 1 milliGRAM(s) IntraMuscular once PRN agitation  haloperidol    Injectable 1 milliGRAM(s) IntraMuscular once PRN agitation  haloperidol    Injectable 1 milliGRAM(s) IntraMuscular three times a day PRN refusal of court ordered meds

## 2023-02-27 NOTE — BH INPATIENT PSYCHIATRY PROGRESS NOTE - NSBHCHARTREVIEWVS_PSY_A_CORE FT
Vital Signs Last 24 Hrs  T(C): --  T(F): --  HR: 84 (02-27-23 @ 09:30) (84 - 86)  BP: 112/70 (02-27-23 @ 09:30) (108/66 - 112/70)  BP(mean): --  RR: 16 (02-26-23 @ 20:52) (16 - 16)  SpO2: --

## 2023-02-28 LAB
ANION GAP SERPL CALC-SCNC: 6 MMOL/L — LOW (ref 7–14)
APPEARANCE UR: CLEAR — SIGNIFICANT CHANGE UP
BACTERIA # UR AUTO: ABNORMAL
BILIRUB UR-MCNC: NEGATIVE — SIGNIFICANT CHANGE UP
BUN SERPL-MCNC: 20 MG/DL — SIGNIFICANT CHANGE UP (ref 7–23)
CALCIUM SERPL-MCNC: 9.2 MG/DL — SIGNIFICANT CHANGE UP (ref 8.4–10.5)
CHLORIDE SERPL-SCNC: 103 MMOL/L — SIGNIFICANT CHANGE UP (ref 98–107)
CO2 SERPL-SCNC: 33 MMOL/L — HIGH (ref 22–31)
COLOR SPEC: YELLOW — SIGNIFICANT CHANGE UP
CREAT SERPL-MCNC: 0.61 MG/DL — SIGNIFICANT CHANGE UP (ref 0.5–1.3)
DIFF PNL FLD: NEGATIVE — SIGNIFICANT CHANGE UP
EGFR: 86 ML/MIN/1.73M2 — SIGNIFICANT CHANGE UP
EPI CELLS # UR: 6 /HPF — HIGH (ref 0–5)
GLUCOSE SERPL-MCNC: 138 MG/DL — HIGH (ref 70–99)
GLUCOSE UR QL: NEGATIVE — SIGNIFICANT CHANGE UP
HYALINE CASTS # UR AUTO: 2 /LPF — SIGNIFICANT CHANGE UP (ref 0–7)
KETONES UR-MCNC: ABNORMAL
LEUKOCYTE ESTERASE UR-ACNC: ABNORMAL
NITRITE UR-MCNC: NEGATIVE — SIGNIFICANT CHANGE UP
PH UR: 6 — SIGNIFICANT CHANGE UP (ref 5–8)
POTASSIUM SERPL-MCNC: 3.8 MMOL/L — SIGNIFICANT CHANGE UP (ref 3.5–5.3)
POTASSIUM SERPL-SCNC: 3.8 MMOL/L — SIGNIFICANT CHANGE UP (ref 3.5–5.3)
PROT UR-MCNC: ABNORMAL
RBC CASTS # UR COMP ASSIST: 13 /HPF — HIGH (ref 0–4)
SODIUM SERPL-SCNC: 142 MMOL/L — SIGNIFICANT CHANGE UP (ref 135–145)
SP GR SPEC: 1.03 — SIGNIFICANT CHANGE UP (ref 1.01–1.05)
UROBILINOGEN FLD QL: ABNORMAL
WBC UR QL: 76 /HPF — HIGH (ref 0–5)

## 2023-02-28 PROCEDURE — 99231 SBSQ HOSP IP/OBS SF/LOW 25: CPT

## 2023-02-28 RX ADMIN — Medication 81 MILLIGRAM(S): at 09:05

## 2023-02-28 RX ADMIN — RISPERIDONE 0.5 MILLIGRAM(S): 4 TABLET ORAL at 09:06

## 2023-02-28 RX ADMIN — Medication 25 MILLIGRAM(S): at 09:05

## 2023-02-28 RX ADMIN — DIVALPROEX SODIUM 250 MILLIGRAM(S): 500 TABLET, DELAYED RELEASE ORAL at 09:05

## 2023-02-28 RX ADMIN — ENOXAPARIN SODIUM 40 MILLIGRAM(S): 100 INJECTION SUBCUTANEOUS at 09:06

## 2023-02-28 RX ADMIN — LIDOCAINE 1 PATCH: 4 CREAM TOPICAL at 22:03

## 2023-02-28 NOTE — BH INPATIENT PSYCHIATRY PROGRESS NOTE - MSE UNSTRUCTURED FT
Patient is awake and alert. Patient with clearly less high pitched yelling still occurs but less for shorter periods,moves freely, resists efforts to check for cogwheeling Mood irritable, less soPatient with sage range of affect no longer angry all the time. Patient with paranoid delusions that she is being persecuted in various ways by almost all staff but less. No taylor. No SI/HI. Thinking disjointed. Uncooperative with cog testing. Says it is Feb 2023 Poor insight

## 2023-02-28 NOTE — BH INPATIENT PSYCHIATRY PROGRESS NOTE - NSBHCHARTREVIEWVS_PSY_A_CORE FT
Vital Signs Last 24 Hrs  T(C): 36.5 (02-28-23 @ 08:24), Max: 36.5 (02-28-23 @ 08:24)  T(F): 97.7 (02-28-23 @ 08:24), Max: 97.7 (02-28-23 @ 08:24)  HR: 87 (02-27-23 @ 20:29) (87 - 87)  BP: 120/74 (02-27-23 @ 20:29) (120/74 - 120/74)  BP(mean): --  RR: --  SpO2: --    Orthostatic VS  02-28-23 @ 08:24  Lying BP: --/-- HR: --  Sitting BP: 127/66 HR: 69  Standing BP: --/-- HR: --  Site: upper left arm  Mode: electronic

## 2023-02-28 NOTE — BH INPATIENT PSYCHIATRY PROGRESS NOTE - NSBHASSESSSUMMFT_PSY_ALL_CORE
Dinorah Hogan is an 88 year old female, , domiciled with daughter with PPHx of bipolar I disorder, 1 previous inpatient psychiatric hospitalization in 1970s, no known history of SI/SA and PMHx of asthma, GERD, sciatica with previous vertebral fracture who is admitted on 9.27 status for symptoms of hortencia including irritability, loud speech, and agitation.     DDx: hortencia vs hypomania vs comorbid delirium    Today, patient is angry about her current admission and denying any psychiatric history. She is asking to be discharged immediately and yelling at treatment team. She is noted to have loud and rapid speech, but is able to be interrupted. She states she has been sleeping poorly due to hip and R knee pain. She denies S/IIP, H/IIP, AVH, paranoia, or ideas of reference. She is AOx1-2 (person, place--hospital), but unable to state year or location in [place]. She requires 9.27 inpatient admission for evaluation and management of hortencia.    1/22: less irritable but still requiring PRN med, more compliant with meds, no SI/HI.  1/23: Accepted meds yd and this AM though required PRN yd. Less irritable, appropriately conversant, making needs known. Suspicious bordering on paranoid, however no fully formed delusional thought content apparent.  1/24: Irritable and labile, suspicious regarding staff members. Discussed medication, pt will be offered psychiatric meds and is aware she has right to refuse.  1/25: Hostile, irritable, labile. TOO application in progress given poor insight and intermittent refusal of psychiatric mediations. Pt at risk for deconditioning given refusal to ambulate and minimal engagement with PT, given ambulatory at home refusal here appears to be behavioral, will continue to encourage participation. Refused cognitive evaluation today. Given ongoing intermittent agitation, paranoia, physical deconditioning, and poor insight into condition, pt requires continued inpatient hospitalization for stabilization and safety.  1/26: Pt remains labile, irritable, and paranoid toward staff. Although pt makes provocative statements (e.g. "she controls the robots," "the Jews will hate me"), these statements appear to represent intense feeling rather than fully formed delusion. Continues to refuse cognitive evaluation or consent to contact family. Given ongoing intermittent agitation, paranoia, physical deconditioning, and poor insight into condition, pt requires continued inpatient hospitalization for stabilization and safety.  1/27 Patient agitated, markedly irritable,paranoid  with themes of perrvasive mistreatment, being singled out but w/u well formed , fixed delusional ideas.Cont enocurage med compliance scheduled for court hearing for TOO  1/28: Currently on CO due to hospital bed requirement. Reports fair sleep and appetite. Seen at bedside during rounds. Continues to refuse most medications, took senna and tylenol last night. Reporting back pain "15/10" and requesting more heat packs "I will die without these", however under no visible distress. Primary team pursuing TOO. Renewed CO.   1/29: Renewed CO. Last night took Depakote, Haldol and Senna. Med compliance has been partial.   1/30: Refused all meds yd, this AM accepted metoprolol. Making accusations that people are attempting to harm her and want her to die. Impulse control is impaired.  1/31: Continues with intermittent agitation, hostile and accusatory twd staff, refusing meds (except metoprolol), refusing consent to speak to daughter. Given ongoing agitation, impulsivity, disinhibition, and deconditioning 2/2 refusal to ambulate (also attempted to climb out of bed last night), pt poses a threat of harm to herself and requires ongoing inpatient hospitalization for stabilization and safety. TOO & retention court date next week.  2/1: Continues irritable, paranoid, refusing medications and engagement in interviews.   2/2: Minimally engaged in interview, refusing to answer most questions; labile, hostile, required IM Haldol 1mg overnight for agitation; refusing to ambulate or engage in PT; refusing pain control interventions other than hot packs and lidocaine patch. TOO in process.   2/4 Cont agitated poor sleep, this Am had vairable O2 sat, patient reported she feels she needs an inhaler for asthma. Patient to be seen by medicine, labs and RVP ordered. Will change haldol to olanzapine standing as response to prns of haldol and occ po's have had poor response.   2/5 Paranoid, severely agitated. Has URI. COnt meds prn inhlaer scheuled for TOO 2/7 2/6: Remains paranoid, agitated, accusatory. Frequently refusing meds, required 3x olanzapine PRNs over the weekend.  2/7: Remains paranoid and accusatory twd staff. Continues to refuse majority of medications. Currently with URI. Going to court today for retention and TOO.  2/8:  Patient irritable, agitated, will increase olanzapine with IM back up.   2/9 No major changes but taking meds, Cont olanzapine now at 2.5mg hs and Depakote. Plan cont to titrate, monitor for se  2/10 Patient w/o much response cont with intense paranoia and agitation. Cont Zyprexa cont to titrate.   2/11 Pt is sleepy most likely due to PRN zyprexa last night, remains disorganized and confused.   2/12 confused, disorganized, screams at times, required PRN zyprexa/melatonin last night.   2/13: The patient continues to be manic, disorganized, irritable, screaming at times.  She has been tolerating medications and prn's well - will increase Zyprexa to 5mg hs and change to Zydis to ensure compliance.  Continue 1:1.  2/14: The patient is not responding to Zyprexa, despite increasing dose and getting prn's.  Will change to Seroquel, as patient reportedly did well with it in the past.  Patient given 12.5mg this morning, tolerated well.  Will start 12.5mg tid.  2/15: The patient continues to be irritable, agitated, disorganized.  Some small improvement with Seroquel, sleeping better at night.  Continue Seroquel trial.  2/16: The patient continues to be agitated, frequently yelling out, irritable and angry.  She has been tolerating standing and prn Seroquel well, will continue to titrate to 25mg tid.  Continue 1:1.  2/17: The patient continues to be irritable, agitated, yelling out throughout the day.  She is having more restful periods during the day.  She has been tolerating Seroquel well, will continue.  Continue 1:1.  2/18: Patient remains agitated, disorganized, guarded, confused  2/19: Sustained agitated, irritability, likely paranoia, will increase quetiapine   2/20: Patient remains agitated, suspicious of staff, requiring prn meds, quetiapine increased   2/21 agitated paranoid, no response from Seroquel so far. Will increase Seroquel. Reviewed care with daughter.   2/22:  Paranoid agitated. Taking meds, cont  Seroquel, recheck labs.   2/23 Doing poorly with ongoing agitation now increased BUN Will push fluids. Will decrease Depakote consider d/c . COnisder changing to risperidone or haldol to avoid sedation. constipation  2/2 2/24 Poor resposne to Seroquel and olanzapine IMs with no reposne but sedation at times and constipation. Will change to risperdal po and haldol prn. Consider ECT given refractoriness. Plan recheck labs Monday 2/25 MArginal impression of improvement. Cont Risperdal trial with haldol prn    2/26 agitated, loud, screaming, disorganized and irritable, requiring PRN meds.  2/27 Patient irritable, paranoid with some more moments of being calmer, better related. Cont risperdal increase dose. Patient with elevated bicarb spoke with medicine, will repeat in AM  2/28 IMproved behaviorally. COnt risperidone trial. Labs about same. Cont meds review labs with medicine

## 2023-03-01 LAB
ANION GAP SERPL CALC-SCNC: 5 MMOL/L — LOW (ref 7–14)
BASOPHILS # BLD AUTO: 0.02 K/UL — SIGNIFICANT CHANGE UP (ref 0–0.2)
BASOPHILS NFR BLD AUTO: 0.4 % — SIGNIFICANT CHANGE UP (ref 0–2)
BUN SERPL-MCNC: 24 MG/DL — HIGH (ref 7–23)
CALCIUM SERPL-MCNC: 9 MG/DL — SIGNIFICANT CHANGE UP (ref 8.4–10.5)
CHLORIDE SERPL-SCNC: 105 MMOL/L — SIGNIFICANT CHANGE UP (ref 98–107)
CO2 SERPL-SCNC: 34 MMOL/L — HIGH (ref 22–31)
CREAT SERPL-MCNC: 0.54 MG/DL — SIGNIFICANT CHANGE UP (ref 0.5–1.3)
EGFR: 88 ML/MIN/1.73M2 — SIGNIFICANT CHANGE UP
EOSINOPHIL # BLD AUTO: 0.06 K/UL — SIGNIFICANT CHANGE UP (ref 0–0.5)
EOSINOPHIL NFR BLD AUTO: 1.1 % — SIGNIFICANT CHANGE UP (ref 0–6)
GLUCOSE SERPL-MCNC: 90 MG/DL — SIGNIFICANT CHANGE UP (ref 70–99)
HCT VFR BLD CALC: 31.5 % — LOW (ref 34.5–45)
HGB BLD-MCNC: 9.7 G/DL — LOW (ref 11.5–15.5)
IANC: 2.75 K/UL — SIGNIFICANT CHANGE UP (ref 1.8–7.4)
IMM GRANULOCYTES NFR BLD AUTO: 0.6 % — SIGNIFICANT CHANGE UP (ref 0–0.9)
LYMPHOCYTES # BLD AUTO: 1.48 K/UL — SIGNIFICANT CHANGE UP (ref 1–3.3)
LYMPHOCYTES # BLD AUTO: 27.6 % — SIGNIFICANT CHANGE UP (ref 13–44)
MAGNESIUM SERPL-MCNC: 1.9 MG/DL — SIGNIFICANT CHANGE UP (ref 1.6–2.6)
MCHC RBC-ENTMCNC: 27.2 PG — SIGNIFICANT CHANGE UP (ref 27–34)
MCHC RBC-ENTMCNC: 30.8 GM/DL — LOW (ref 32–36)
MCV RBC AUTO: 88.5 FL — SIGNIFICANT CHANGE UP (ref 80–100)
MONOCYTES # BLD AUTO: 1.03 K/UL — HIGH (ref 0–0.9)
MONOCYTES NFR BLD AUTO: 19.2 % — HIGH (ref 2–14)
NEUTROPHILS # BLD AUTO: 2.75 K/UL — SIGNIFICANT CHANGE UP (ref 1.8–7.4)
NEUTROPHILS NFR BLD AUTO: 51.1 % — SIGNIFICANT CHANGE UP (ref 43–77)
NRBC # BLD: 0 /100 WBCS — SIGNIFICANT CHANGE UP (ref 0–0)
NRBC # FLD: 0 K/UL — SIGNIFICANT CHANGE UP (ref 0–0)
PHOSPHATE SERPL-MCNC: 3.3 MG/DL — SIGNIFICANT CHANGE UP (ref 2.5–4.5)
PLATELET # BLD AUTO: 189 K/UL — SIGNIFICANT CHANGE UP (ref 150–400)
POTASSIUM SERPL-MCNC: 4.1 MMOL/L — SIGNIFICANT CHANGE UP (ref 3.5–5.3)
POTASSIUM SERPL-SCNC: 4.1 MMOL/L — SIGNIFICANT CHANGE UP (ref 3.5–5.3)
RBC # BLD: 3.56 M/UL — LOW (ref 3.8–5.2)
RBC # FLD: 16.7 % — HIGH (ref 10.3–14.5)
SODIUM SERPL-SCNC: 144 MMOL/L — SIGNIFICANT CHANGE UP (ref 135–145)
WBC # BLD: 5.37 K/UL — SIGNIFICANT CHANGE UP (ref 3.8–10.5)
WBC # FLD AUTO: 5.37 K/UL — SIGNIFICANT CHANGE UP (ref 3.8–10.5)

## 2023-03-01 PROCEDURE — 99233 SBSQ HOSP IP/OBS HIGH 50: CPT

## 2023-03-01 RX ORDER — ALBUTEROL 90 UG/1
2 AEROSOL, METERED ORAL EVERY 6 HOURS
Refills: 0 | Status: DISCONTINUED | OUTPATIENT
Start: 2023-03-01 | End: 2023-03-03

## 2023-03-01 RX ORDER — TIOTROPIUM BROMIDE 18 UG/1
2 CAPSULE ORAL; RESPIRATORY (INHALATION) DAILY
Refills: 0 | Status: DISCONTINUED | OUTPATIENT
Start: 2023-03-01 | End: 2023-04-10

## 2023-03-01 RX ORDER — DIVALPROEX SODIUM 500 MG/1
250 TABLET, DELAYED RELEASE ORAL AT BEDTIME
Refills: 0 | Status: DISCONTINUED | OUTPATIENT
Start: 2023-03-01 | End: 2023-03-06

## 2023-03-01 RX ADMIN — Medication 25 MILLIGRAM(S): at 21:06

## 2023-03-01 RX ADMIN — POLYETHYLENE GLYCOL 3350 17 GRAM(S): 17 POWDER, FOR SOLUTION ORAL at 09:58

## 2023-03-01 RX ADMIN — Medication 1 DROP(S): at 21:26

## 2023-03-01 RX ADMIN — RISPERIDONE 0.5 MILLIGRAM(S): 4 TABLET ORAL at 21:07

## 2023-03-01 RX ADMIN — Medication 3 MILLIGRAM(S): at 22:27

## 2023-03-01 RX ADMIN — ENOXAPARIN SODIUM 40 MILLIGRAM(S): 100 INJECTION SUBCUTANEOUS at 09:58

## 2023-03-01 RX ADMIN — DIVALPROEX SODIUM 250 MILLIGRAM(S): 500 TABLET, DELAYED RELEASE ORAL at 21:06

## 2023-03-01 RX ADMIN — LIDOCAINE 1 PATCH: 4 CREAM TOPICAL at 09:38

## 2023-03-01 RX ADMIN — Medication 81 MILLIGRAM(S): at 09:58

## 2023-03-01 RX ADMIN — ALBUTEROL 2 PUFF(S): 90 AEROSOL, METERED ORAL at 17:13

## 2023-03-01 RX ADMIN — SENNA PLUS 2 TABLET(S): 8.6 TABLET ORAL at 21:06

## 2023-03-01 RX ADMIN — LIDOCAINE 1 PATCH: 4 CREAM TOPICAL at 09:51

## 2023-03-01 RX ADMIN — DIVALPROEX SODIUM 250 MILLIGRAM(S): 500 TABLET, DELAYED RELEASE ORAL at 09:58

## 2023-03-01 RX ADMIN — Medication 25 MILLIGRAM(S): at 09:58

## 2023-03-01 RX ADMIN — RISPERIDONE 0.5 MILLIGRAM(S): 4 TABLET ORAL at 09:58

## 2023-03-01 RX ADMIN — LIDOCAINE 1 PATCH: 4 CREAM TOPICAL at 22:14

## 2023-03-01 NOTE — PROGRESS NOTE ADULT - SUBJECTIVE AND OBJECTIVE BOX
CC/Reason for Consult:   Suspected UTI.     SUBJECTIVE / OVERNIGHT EVENTS:  Per primary team, patient has been more somnolent today. Staff expressed concern for foul smelling urine, raising concern for UTI. Per RN, does not believe patient has had increased urinary frequency or urgency. No fevers or diarrhea.     Pt reports that she is "very tired." Endorses back pain.     MEDICATIONS  (STANDING):  artificial  tears Solution 1 Drop(s) Both EYES two times a day  aspirin enteric coated 81 milliGRAM(s) Oral daily  divalproex Sprinkle 250 milliGRAM(s) Oral two times a day  enoxaparin Injectable 40 milliGRAM(s) SubCutaneous <User Schedule>  lidocaine   4% Patch 1 Patch Transdermal every 24 hours  melatonin. 3 milliGRAM(s) Oral at bedtime  metoprolol tartrate 25 milliGRAM(s) Oral two times a day  polyethylene glycol 3350 17 Gram(s) Oral daily  risperiDONE   Tablet 0.5 milliGRAM(s) Oral two times a day  senna 2 Tablet(s) Oral at bedtime    MEDICATIONS  (PRN):  acetaminophen     Tablet .. 650 milliGRAM(s) Oral every 6 hours PRN Mild Pain (1 - 3), Moderate Pain (4 - 6), Severe Pain (7 - 10)  albuterol    90 MICROgram(s) HFA Inhaler 2 Puff(s) Inhalation every 6 hours PRN Shortness of Breath and/or Wheezing  aluminum hydroxide/magnesium hydroxide/simethicone Suspension 30 milliLiter(s) Oral every 4 hours PRN Dyspepsia  benzocaine/menthol Lozenge 1 Lozenge Oral every 2 hours PRN sore throat  bisacodyl 5 milliGRAM(s) Oral every 12 hours PRN Constipation  haloperidol     Tablet 1 milliGRAM(s) Oral every 6 hours PRN agitation  haloperidol    Injectable 1 milliGRAM(s) IntraMuscular once PRN agitation  haloperidol    Injectable 1 milliGRAM(s) IntraMuscular once PRN agitation  haloperidol    Injectable 1 milliGRAM(s) IntraMuscular three times a day PRN refusal of court ordered meds      Vital Signs Last 24 Hrs  T(C): --  T(F): --  HR: 72 (01 Mar 2023 07:37) (72 - 98)  BP: 150/90 (01 Mar 2023 07:37) (110/58 - 150/90)  BP(mean): --  RR: 18 (2023 19:50) (18 - 18)  SpO2: --      CAPILLARY BLOOD GLUCOSE            PHYSICAL EXAM:  GENERAL: NAD, well-developed  HEAD:  Atraumatic, Normocephalic  EYES: EOMI, conjunctiva and sclera clear  NECK: Supple, No JVD  CHEST/LUNG: Clear to auscultation bilaterally; No wheeze  HEART: Regular rate and rhythm; No murmurs, rubs, or gallops  ABDOMEN: Soft, Nontender, Nondistended; Bowel sounds present  : No suprapubic tenderness  EXTREMITIES:  2+ Peripheral Pulses, No clubbing, cyanosis, or edema  PSYCH: AAOx3  NEUROLOGY: non-focal  SKIN: No rashes or lesions    LABS:        142  |  103  |  20  ----------------------------<  138<H>  3.8   |  33<H>  |  0.61    Ca    9.2      2023 08:00            Urinalysis Basic - ( 2023 18:51 )    Color: Yellow / Appearance: Clear / S.033 / pH: x  Gluc: x / Ketone: Trace  / Bili: Negative / Urobili: 3 mg/dL   Blood: x / Protein: 30 mg/dL / Nitrite: Negative   Leuk Esterase: Large / RBC: 13 /HPF / WBC 76 /HPF   Sq Epi: x / Non Sq Epi: 6 /HPF / Bacteria: Occasional        RADIOLOGY & ADDITIONAL TESTS:    Imaging Personally Reviewed:    Consultant(s) Notes Reviewed:      Care Discussed with Consultants/Other Providers:

## 2023-03-01 NOTE — PROGRESS NOTE ADULT - ASSESSMENT
88 year old female with Bipolar Disorder now with exacerbation, asthma, who presented with agitation.  Now with concern d/t worsening encephalopathy.       1. Encephalopathy:   -per primary team, pt has been more somnolent over the past 4 hours. Prior to that, she was more alert and vocal. U/a sample obtained is contaminated with epithelial cells; thus, not reliable. No suprapubic tenderness on exam. Possibly in setting of increased CO2 and possible retention? Emphysema noted on CT; thus, may have COPD instead of asthma  2. Abnormal u/a:   -as above, contaminated due to greater number of epithelial cells. No suprapubic tenderness or report of increased urinary frequency/urgency. F/u CBC as obtained per primary team.   3.  Chronic asthma w/o exacerbation: Given evidence of emphysema on CT chest, suspect possible COPD instead of asthma.  Patient with rhinovirus on RVP, with normal oxygen saturation on RA. Lungs are without wheezing. No respiratory distress noted on exam.  Will initiate spiriva and standing duoneb.   4.  BACK PAIN/VERTEBRAL FX:  c/w LIDOCAINE PATCH AND TYLENOL PRN  5.  GERD:  PANTOPRAZOLE.  6.  PSYCH: AS PER ATTENDING  7.  HTN: BP acceptable. c/w METOPROLOL.

## 2023-03-02 LAB
CULTURE RESULTS: SIGNIFICANT CHANGE UP
SPECIMEN SOURCE: SIGNIFICANT CHANGE UP

## 2023-03-02 PROCEDURE — 99231 SBSQ HOSP IP/OBS SF/LOW 25: CPT

## 2023-03-02 RX ORDER — RISPERIDONE 4 MG/1
0.5 TABLET ORAL THREE TIMES A DAY
Refills: 0 | Status: DISCONTINUED | OUTPATIENT
Start: 2023-03-02 | End: 2023-03-02

## 2023-03-02 RX ORDER — RISPERIDONE 4 MG/1
0.5 TABLET ORAL ONCE
Refills: 0 | Status: COMPLETED | OUTPATIENT
Start: 2023-03-02 | End: 2023-03-02

## 2023-03-02 RX ORDER — RISPERIDONE 4 MG/1
0.5 TABLET ORAL
Refills: 0 | Status: DISCONTINUED | OUTPATIENT
Start: 2023-03-02 | End: 2023-03-06

## 2023-03-02 RX ADMIN — LIDOCAINE 1 PATCH: 4 CREAM TOPICAL at 07:20

## 2023-03-02 RX ADMIN — RISPERIDONE 0.5 MILLIGRAM(S): 4 TABLET ORAL at 09:48

## 2023-03-02 RX ADMIN — Medication 81 MILLIGRAM(S): at 09:48

## 2023-03-02 RX ADMIN — HALOPERIDOL DECANOATE 1 MILLIGRAM(S): 100 INJECTION INTRAMUSCULAR at 17:53

## 2023-03-02 RX ADMIN — ALBUTEROL 2 PUFF(S): 90 AEROSOL, METERED ORAL at 05:25

## 2023-03-02 RX ADMIN — ENOXAPARIN SODIUM 40 MILLIGRAM(S): 100 INJECTION SUBCUTANEOUS at 09:49

## 2023-03-02 RX ADMIN — RISPERIDONE 0.5 MILLIGRAM(S): 4 TABLET ORAL at 13:11

## 2023-03-02 RX ADMIN — LIDOCAINE 1 PATCH: 4 CREAM TOPICAL at 09:38

## 2023-03-02 RX ADMIN — RISPERIDONE 0.5 MILLIGRAM(S): 4 TABLET ORAL at 20:44

## 2023-03-02 RX ADMIN — ALBUTEROL 2 PUFF(S): 90 AEROSOL, METERED ORAL at 00:49

## 2023-03-02 RX ADMIN — ALBUTEROL 2 PUFF(S): 90 AEROSOL, METERED ORAL at 17:00

## 2023-03-02 RX ADMIN — HALOPERIDOL DECANOATE 1 MILLIGRAM(S): 100 INJECTION INTRAMUSCULAR at 05:24

## 2023-03-02 RX ADMIN — LIDOCAINE 1 PATCH: 4 CREAM TOPICAL at 20:44

## 2023-03-02 RX ADMIN — POLYETHYLENE GLYCOL 3350 17 GRAM(S): 17 POWDER, FOR SOLUTION ORAL at 09:49

## 2023-03-02 RX ADMIN — ALBUTEROL 2 PUFF(S): 90 AEROSOL, METERED ORAL at 12:43

## 2023-03-02 RX ADMIN — TIOTROPIUM BROMIDE 2 PUFF(S): 18 CAPSULE ORAL; RESPIRATORY (INHALATION) at 09:51

## 2023-03-02 NOTE — BH INPATIENT PSYCHIATRY PROGRESS NOTE - CURRENT MEDICATION
MEDICATIONS  (STANDING):  albuterol    90 MICROgram(s) HFA Inhaler 2 Puff(s) Inhalation every 6 hours  artificial  tears Solution 1 Drop(s) Both EYES two times a day  aspirin enteric coated 81 milliGRAM(s) Oral daily  divalproex Sprinkle 250 milliGRAM(s) Oral at bedtime  enoxaparin Injectable 40 milliGRAM(s) SubCutaneous <User Schedule>  lidocaine   4% Patch 1 Patch Transdermal every 24 hours  melatonin. 3 milliGRAM(s) Oral at bedtime  metoprolol tartrate 25 milliGRAM(s) Oral two times a day  polyethylene glycol 3350 17 Gram(s) Oral daily  risperiDONE   Tablet 0.5 milliGRAM(s) Oral three times a day  risperiDONE   Tablet 0.5 milliGRAM(s) Oral once  senna 2 Tablet(s) Oral at bedtime  tiotropium 2.5 MICROgram(s) Inhaler 2 Puff(s) Inhalation daily    MEDICATIONS  (PRN):  acetaminophen     Tablet .. 650 milliGRAM(s) Oral every 6 hours PRN Mild Pain (1 - 3), Moderate Pain (4 - 6), Severe Pain (7 - 10)  aluminum hydroxide/magnesium hydroxide/simethicone Suspension 30 milliLiter(s) Oral every 4 hours PRN Dyspepsia  benzocaine/menthol Lozenge 1 Lozenge Oral every 2 hours PRN sore throat  bisacodyl 5 milliGRAM(s) Oral every 12 hours PRN Constipation  haloperidol     Tablet 1 milliGRAM(s) Oral every 6 hours PRN agitation  haloperidol    Injectable 1 milliGRAM(s) IntraMuscular once PRN agitation  haloperidol    Injectable 1 milliGRAM(s) IntraMuscular once PRN agitation  haloperidol    Injectable 1 milliGRAM(s) IntraMuscular three times a day PRN refusal of court ordered meds

## 2023-03-02 NOTE — BH INPATIENT PSYCHIATRY PROGRESS NOTE - NSBHCHARTREVIEWVS_PSY_A_CORE FT
Vital Signs Last 24 Hrs  T(C): 36.8 (03-02-23 @ 07:40), Max: 36.8 (03-02-23 @ 07:40)  T(F): 98.2 (03-02-23 @ 07:40), Max: 98.2 (03-02-23 @ 07:40)  HR: --  BP: 137/67 (03-01-23 @ 20:32) (137/67 - 137/67)  BP(mean): 79 (03-01-23 @ 20:32) (79 - 79)  RR: --  SpO2: --    Orthostatic VS  03-02-23 @ 07:40  Lying BP: --/-- HR: --  Sitting BP: 104/51 HR: 61  Standing BP: --/-- HR: --  Site: --  Mode: --  Orthostatic VS  03-01-23 @ 07:37  Lying BP: --/-- HR: --  Sitting BP: 0/-- HR: --  Standing BP: --/-- HR: --  Site: --  Mode: --

## 2023-03-02 NOTE — BH INPATIENT PSYCHIATRY PROGRESS NOTE - NSBHASSESSSUMMFT_PSY_ALL_CORE
Dinorah Hogan is an 88 year old female, , domiciled with daughter with PPHx of bipolar I disorder, 1 previous inpatient psychiatric hospitalization in 1970s, no known history of SI/SA and PMHx of asthma, GERD, sciatica with previous vertebral fracture who is admitted on 9.27 status for symptoms of hortencia including irritability, loud speech, and agitation.     DDx: hortencia vs hypomania vs comorbid delirium    Today, patient is angry about her current admission and denying any psychiatric history. She is asking to be discharged immediately and yelling at treatment team. She is noted to have loud and rapid speech, but is able to be interrupted. She states she has been sleeping poorly due to hip and R knee pain. She denies S/IIP, H/IIP, AVH, paranoia, or ideas of reference. She is AOx1-2 (person, place--hospital), but unable to state year or location in [place]. She requires 9.27 inpatient admission for evaluation and management of hortencia.    1/22: less irritable but still requiring PRN med, more compliant with meds, no SI/HI.  1/23: Accepted meds yd and this AM though required PRN yd. Less irritable, appropriately conversant, making needs known. Suspicious bordering on paranoid, however no fully formed delusional thought content apparent.  1/24: Irritable and labile, suspicious regarding staff members. Discussed medication, pt will be offered psychiatric meds and is aware she has right to refuse.  1/25: Hostile, irritable, labile. TOO application in progress given poor insight and intermittent refusal of psychiatric mediations. Pt at risk for deconditioning given refusal to ambulate and minimal engagement with PT, given ambulatory at home refusal here appears to be behavioral, will continue to encourage participation. Refused cognitive evaluation today. Given ongoing intermittent agitation, paranoia, physical deconditioning, and poor insight into condition, pt requires continued inpatient hospitalization for stabilization and safety.  1/26: Pt remains labile, irritable, and paranoid toward staff. Although pt makes provocative statements (e.g. "she controls the robots," "the Jews will hate me"), these statements appear to represent intense feeling rather than fully formed delusion. Continues to refuse cognitive evaluation or consent to contact family. Given ongoing intermittent agitation, paranoia, physical deconditioning, and poor insight into condition, pt requires continued inpatient hospitalization for stabilization and safety.  1/27 Patient agitated, markedly irritable,paranoid  with themes of perrvasive mistreatment, being singled out but w/u well formed , fixed delusional ideas.Cont enocurage med compliance scheduled for court hearing for TOO  1/28: Currently on CO due to hospital bed requirement. Reports fair sleep and appetite. Seen at bedside during rounds. Continues to refuse most medications, took senna and tylenol last night. Reporting back pain "15/10" and requesting more heat packs "I will die without these", however under no visible distress. Primary team pursuing TOO. Renewed CO.   1/29: Renewed CO. Last night took Depakote, Haldol and Senna. Med compliance has been partial.   1/30: Refused all meds yd, this AM accepted metoprolol. Making accusations that people are attempting to harm her and want her to die. Impulse control is impaired.  1/31: Continues with intermittent agitation, hostile and accusatory twd staff, refusing meds (except metoprolol), refusing consent to speak to daughter. Given ongoing agitation, impulsivity, disinhibition, and deconditioning 2/2 refusal to ambulate (also attempted to climb out of bed last night), pt poses a threat of harm to herself and requires ongoing inpatient hospitalization for stabilization and safety. TOO & retention court date next week.  2/1: Continues irritable, paranoid, refusing medications and engagement in interviews.   2/2: Minimally engaged in interview, refusing to answer most questions; labile, hostile, required IM Haldol 1mg overnight for agitation; refusing to ambulate or engage in PT; refusing pain control interventions other than hot packs and lidocaine patch. TOO in process.   2/4 Cont agitated poor sleep, this Am had vairable O2 sat, patient reported she feels she needs an inhaler for asthma. Patient to be seen by medicine, labs and RVP ordered. Will change haldol to olanzapine standing as response to prns of haldol and occ po's have had poor response.   2/5 Paranoid, severely agitated. Has URI. COnt meds prn inhlaer scheuled for TOO 2/7 2/6: Remains paranoid, agitated, accusatory. Frequently refusing meds, required 3x olanzapine PRNs over the weekend.  2/7: Remains paranoid and accusatory twd staff. Continues to refuse majority of medications. Currently with URI. Going to court today for retention and TOO.  2/8:  Patient irritable, agitated, will increase olanzapine with IM back up.   2/9 No major changes but taking meds, Cont olanzapine now at 2.5mg hs and Depakote. Plan cont to titrate, monitor for se  2/10 Patient w/o much response cont with intense paranoia and agitation. Cont Zyprexa cont to titrate.   2/11 Pt is sleepy most likely due to PRN zyprexa last night, remains disorganized and confused.   2/12 confused, disorganized, screams at times, required PRN zyprexa/melatonin last night.   2/13: The patient continues to be manic, disorganized, irritable, screaming at times.  She has been tolerating medications and prn's well - will increase Zyprexa to 5mg hs and change to Zydis to ensure compliance.  Continue 1:1.  2/14: The patient is not responding to Zyprexa, despite increasing dose and getting prn's.  Will change to Seroquel, as patient reportedly did well with it in the past.  Patient given 12.5mg this morning, tolerated well.  Will start 12.5mg tid.  2/15: The patient continues to be irritable, agitated, disorganized.  Some small improvement with Seroquel, sleeping better at night.  Continue Seroquel trial.  2/16: The patient continues to be agitated, frequently yelling out, irritable and angry.  She has been tolerating standing and prn Seroquel well, will continue to titrate to 25mg tid.  Continue 1:1.  2/17: The patient continues to be irritable, agitated, yelling out throughout the day.  She is having more restful periods during the day.  She has been tolerating Seroquel well, will continue.  Continue 1:1.  2/18: Patient remains agitated, disorganized, guarded, confused  2/19: Sustained agitated, irritability, likely paranoia, will increase quetiapine   2/20: Patient remains agitated, suspicious of staff, requiring prn meds, quetiapine increased   2/21 agitated paranoid, no response from Seroquel so far. Will increase Seroquel. Reviewed care with daughter.   2/22:  Paranoid agitated. Taking meds, cont  Seroquel, recheck labs.   2/23 Doing poorly with ongoing agitation now increased BUN Will push fluids. Will decrease Depakote consider d/c . COnisder changing to risperidone or haldol to avoid sedation. constipation  2/2 2/24 Poor resposne to Seroquel and olanzapine IMs with no reposne but sedation at times and constipation. Will change to risperdal po and haldol prn. Consider ECT given refractoriness. Plan recheck labs Monday 2/25 MArginal impression of improvement. Cont Risperdal trial with haldol prn    2/26 agitated, loud, screaming, disorganized and irritable, requiring PRN meds.  2/27 Patient irritable, paranoid with some more moments of being calmer, better related. Cont risperdal increase dose. Patient with elevated bicarb spoke with medicine, will repeat in AM  2/28 IMproved behaviorally. COnt risperidone trial. Labs about same. Cont meds review labs with medicine  3/2 Patient remains agitated not responded to risperidone at current dose as as is only better after multiples prns. Will increase risperdione, taper VPA

## 2023-03-02 NOTE — BH INPATIENT PSYCHIATRY PROGRESS NOTE - NSBHFUPINTERVALHXFT_PSY_A_CORE
Patient seen for follow-up of bipolar affective disorder, possible evolving dementia. Chart reviewed and discussed with nursing staff. Vitals are stable. Patient is compliant with her medications, needed prn last night. She has some quiet periods but then starts screaming. Screaming also triggered by ADL's or PT

## 2023-03-02 NOTE — BH INPATIENT PSYCHIATRY PROGRESS NOTE - MSE UNSTRUCTURED FT
Patient is awake and alert. Patient with clearly less high pitched yelling still occurs but less for shorter periods,moves freely,  but resists efforts to check for cogwheeling Mood irritable, less soPatient with sage range of affect no longer angry all the time. Patient with paranoid delusions that she is being persecuted in various ways by almost all staff but less. No taylor. No SI/HI. Thinking disjointed. Uncooperative with cog testing. Says it is Feb 2023 Poor insight

## 2023-03-02 NOTE — BH INPATIENT PSYCHIATRY PROGRESS NOTE - PRN MEDS
MEDICATIONS  (PRN):  acetaminophen     Tablet .. 650 milliGRAM(s) Oral every 6 hours PRN Mild Pain (1 - 3), Moderate Pain (4 - 6), Severe Pain (7 - 10)  aluminum hydroxide/magnesium hydroxide/simethicone Suspension 30 milliLiter(s) Oral every 4 hours PRN Dyspepsia  benzocaine/menthol Lozenge 1 Lozenge Oral every 2 hours PRN sore throat  bisacodyl 5 milliGRAM(s) Oral every 12 hours PRN Constipation  haloperidol     Tablet 1 milliGRAM(s) Oral every 6 hours PRN agitation  haloperidol    Injectable 1 milliGRAM(s) IntraMuscular once PRN agitation  haloperidol    Injectable 1 milliGRAM(s) IntraMuscular once PRN agitation  haloperidol    Injectable 1 milliGRAM(s) IntraMuscular three times a day PRN refusal of court ordered meds

## 2023-03-03 LAB
AMMONIA BLD-MCNC: 42 UMOL/L — SIGNIFICANT CHANGE UP (ref 11–55)
ANION GAP SERPL CALC-SCNC: 8 MMOL/L — SIGNIFICANT CHANGE UP (ref 7–14)
APPEARANCE UR: ABNORMAL
BACTERIA # UR AUTO: ABNORMAL
BASOPHILS # BLD AUTO: 0.03 K/UL — SIGNIFICANT CHANGE UP (ref 0–0.2)
BASOPHILS NFR BLD AUTO: 0.7 % — SIGNIFICANT CHANGE UP (ref 0–2)
BILIRUB UR-MCNC: NEGATIVE — SIGNIFICANT CHANGE UP
BUN SERPL-MCNC: 22 MG/DL — SIGNIFICANT CHANGE UP (ref 7–23)
CALCIUM SERPL-MCNC: 9.3 MG/DL — SIGNIFICANT CHANGE UP (ref 8.4–10.5)
CHLORIDE SERPL-SCNC: 102 MMOL/L — SIGNIFICANT CHANGE UP (ref 98–107)
CO2 SERPL-SCNC: 32 MMOL/L — HIGH (ref 22–31)
COLOR SPEC: YELLOW — SIGNIFICANT CHANGE UP
CREAT SERPL-MCNC: 0.57 MG/DL — SIGNIFICANT CHANGE UP (ref 0.5–1.3)
DIFF PNL FLD: NEGATIVE — SIGNIFICANT CHANGE UP
EGFR: 87 ML/MIN/1.73M2 — SIGNIFICANT CHANGE UP
EOSINOPHIL # BLD AUTO: 0.06 K/UL — SIGNIFICANT CHANGE UP (ref 0–0.5)
EOSINOPHIL NFR BLD AUTO: 1.4 % — SIGNIFICANT CHANGE UP (ref 0–6)
EPI CELLS # UR: 1 /HPF — SIGNIFICANT CHANGE UP (ref 0–5)
GLUCOSE SERPL-MCNC: 110 MG/DL — HIGH (ref 70–99)
GLUCOSE UR QL: NEGATIVE — SIGNIFICANT CHANGE UP
HCT VFR BLD CALC: 33.7 % — LOW (ref 34.5–45)
HGB BLD-MCNC: 10.3 G/DL — LOW (ref 11.5–15.5)
HYALINE CASTS # UR AUTO: 7 /LPF — SIGNIFICANT CHANGE UP (ref 0–7)
IANC: 2.07 K/UL — SIGNIFICANT CHANGE UP (ref 1.8–7.4)
IMM GRANULOCYTES NFR BLD AUTO: 0.5 % — SIGNIFICANT CHANGE UP (ref 0–0.9)
KETONES UR-MCNC: NEGATIVE — SIGNIFICANT CHANGE UP
LEUKOCYTE ESTERASE UR-ACNC: ABNORMAL
LYMPHOCYTES # BLD AUTO: 1.37 K/UL — SIGNIFICANT CHANGE UP (ref 1–3.3)
LYMPHOCYTES # BLD AUTO: 32.4 % — SIGNIFICANT CHANGE UP (ref 13–44)
MCHC RBC-ENTMCNC: 27.5 PG — SIGNIFICANT CHANGE UP (ref 27–34)
MCHC RBC-ENTMCNC: 30.6 GM/DL — LOW (ref 32–36)
MCV RBC AUTO: 90.1 FL — SIGNIFICANT CHANGE UP (ref 80–100)
MONOCYTES # BLD AUTO: 0.68 K/UL — SIGNIFICANT CHANGE UP (ref 0–0.9)
MONOCYTES NFR BLD AUTO: 16.1 % — HIGH (ref 2–14)
NEUTROPHILS # BLD AUTO: 2.07 K/UL — SIGNIFICANT CHANGE UP (ref 1.8–7.4)
NEUTROPHILS NFR BLD AUTO: 48.9 % — SIGNIFICANT CHANGE UP (ref 43–77)
NITRITE UR-MCNC: NEGATIVE — SIGNIFICANT CHANGE UP
NRBC # BLD: 0 /100 WBCS — SIGNIFICANT CHANGE UP (ref 0–0)
NRBC # FLD: 0 K/UL — SIGNIFICANT CHANGE UP (ref 0–0)
PH UR: 7.5 — SIGNIFICANT CHANGE UP (ref 5–8)
PLATELET # BLD AUTO: 219 K/UL — SIGNIFICANT CHANGE UP (ref 150–400)
POTASSIUM SERPL-MCNC: 4.2 MMOL/L — SIGNIFICANT CHANGE UP (ref 3.5–5.3)
POTASSIUM SERPL-SCNC: 4.2 MMOL/L — SIGNIFICANT CHANGE UP (ref 3.5–5.3)
PROT UR-MCNC: ABNORMAL
RBC # BLD: 3.74 M/UL — LOW (ref 3.8–5.2)
RBC # FLD: 17 % — HIGH (ref 10.3–14.5)
RBC CASTS # UR COMP ASSIST: 6 /HPF — HIGH (ref 0–4)
SODIUM SERPL-SCNC: 142 MMOL/L — SIGNIFICANT CHANGE UP (ref 135–145)
SP GR SPEC: 1.02 — SIGNIFICANT CHANGE UP (ref 1.01–1.05)
UROBILINOGEN FLD QL: ABNORMAL
VALPROATE SERPL-MCNC: 50.6 UG/ML — SIGNIFICANT CHANGE UP (ref 50–100)
WBC # BLD: 4.23 K/UL — SIGNIFICANT CHANGE UP (ref 3.8–10.5)
WBC # FLD AUTO: 4.23 K/UL — SIGNIFICANT CHANGE UP (ref 3.8–10.5)
WBC UR QL: 92 /HPF — HIGH (ref 0–5)

## 2023-03-03 PROCEDURE — 99233 SBSQ HOSP IP/OBS HIGH 50: CPT

## 2023-03-03 PROCEDURE — 99232 SBSQ HOSP IP/OBS MODERATE 35: CPT

## 2023-03-03 RX ORDER — ALBUTEROL 90 UG/1
2 AEROSOL, METERED ORAL EVERY 6 HOURS
Refills: 0 | Status: DISCONTINUED | OUTPATIENT
Start: 2023-03-03 | End: 2023-03-03

## 2023-03-03 RX ORDER — ALBUTEROL 90 UG/1
2 AEROSOL, METERED ORAL
Refills: 0 | Status: DISCONTINUED | OUTPATIENT
Start: 2023-03-03 | End: 2023-03-03

## 2023-03-03 RX ORDER — CEFPODOXIME PROXETIL 100 MG
100 TABLET ORAL EVERY 12 HOURS
Refills: 0 | Status: DISCONTINUED | OUTPATIENT
Start: 2023-03-03 | End: 2023-03-06

## 2023-03-03 RX ORDER — ALBUTEROL 90 UG/1
2 AEROSOL, METERED ORAL
Refills: 0 | Status: DISCONTINUED | OUTPATIENT
Start: 2023-03-03 | End: 2023-03-06

## 2023-03-03 RX ORDER — SACCHAROMYCES BOULARDII 250 MG
250 POWDER IN PACKET (EA) ORAL
Refills: 0 | Status: DISCONTINUED | OUTPATIENT
Start: 2023-03-03 | End: 2023-03-20

## 2023-03-03 RX ADMIN — LIDOCAINE 1 PATCH: 4 CREAM TOPICAL at 09:11

## 2023-03-03 RX ADMIN — ALBUTEROL 2 PUFF(S): 90 AEROSOL, METERED ORAL at 22:10

## 2023-03-03 RX ADMIN — DIVALPROEX SODIUM 250 MILLIGRAM(S): 500 TABLET, DELAYED RELEASE ORAL at 21:53

## 2023-03-03 RX ADMIN — TIOTROPIUM BROMIDE 2 PUFF(S): 18 CAPSULE ORAL; RESPIRATORY (INHALATION) at 09:17

## 2023-03-03 RX ADMIN — Medication 1 DROP(S): at 21:55

## 2023-03-03 RX ADMIN — RISPERIDONE 0.5 MILLIGRAM(S): 4 TABLET ORAL at 21:54

## 2023-03-03 RX ADMIN — Medication 81 MILLIGRAM(S): at 09:17

## 2023-03-03 RX ADMIN — LIDOCAINE 1 PATCH: 4 CREAM TOPICAL at 22:09

## 2023-03-03 RX ADMIN — Medication 3 MILLIGRAM(S): at 21:54

## 2023-03-03 RX ADMIN — Medication 25 MILLIGRAM(S): at 21:53

## 2023-03-03 RX ADMIN — HALOPERIDOL DECANOATE 1 MILLIGRAM(S): 100 INJECTION INTRAMUSCULAR at 14:01

## 2023-03-03 RX ADMIN — RISPERIDONE 0.5 MILLIGRAM(S): 4 TABLET ORAL at 09:17

## 2023-03-03 RX ADMIN — Medication 100 MILLIGRAM(S): at 21:52

## 2023-03-03 RX ADMIN — POLYETHYLENE GLYCOL 3350 17 GRAM(S): 17 POWDER, FOR SOLUTION ORAL at 09:17

## 2023-03-03 RX ADMIN — SENNA PLUS 2 TABLET(S): 8.6 TABLET ORAL at 21:53

## 2023-03-03 RX ADMIN — ENOXAPARIN SODIUM 40 MILLIGRAM(S): 100 INJECTION SUBCUTANEOUS at 09:17

## 2023-03-03 NOTE — BH INPATIENT PSYCHIATRY PROGRESS NOTE - NSBHCHARTREVIEWVS_PSY_A_CORE FT
Vital Signs Last 24 Hrs  T(C): 36.4 (03-03-23 @ 08:16), Max: 36.4 (03-03-23 @ 08:16)  T(F): 97.5 (03-03-23 @ 08:16), Max: 97.5 (03-03-23 @ 08:16)  HR: --  BP: 141/67 (03-03-23 @ 08:16) (141/67 - 141/67)  BP(mean): 59 (03-03-23 @ 08:16) (59 - 59)  RR: 18 (03-03-23 @ 08:16) (18 - 18)  SpO2: --    Orthostatic VS  03-02-23 @ 20:40  Lying BP: --/-- HR: --  Sitting BP: 132/69 HR: 66  Standing BP: --/-- HR: --  Site: upper right arm  Mode: electronic  Orthostatic VS  03-02-23 @ 07:40  Lying BP: --/-- HR: --  Sitting BP: 104/51 HR: 61  Standing BP: --/-- HR: --  Site: --  Mode: --

## 2023-03-03 NOTE — BH INPATIENT PSYCHIATRY PROGRESS NOTE - NSBHASSESSSUMMFT_PSY_ALL_CORE
Dinorah Hogan is an 88 year old female, , domiciled with daughter with PPHx of bipolar I disorder, 1 previous inpatient psychiatric hospitalization in 1970s, no known history of SI/SA and PMHx of asthma, GERD, sciatica with previous vertebral fracture who is admitted on 9.27 status for symptoms of hortencia including irritability, loud speech, and agitation.     DDx: hortencia vs hypomania vs comorbid delirium    Today, patient is angry about her current admission and denying any psychiatric history. She is asking to be discharged immediately and yelling at treatment team. She is noted to have loud and rapid speech, but is able to be interrupted. She states she has been sleeping poorly due to hip and R knee pain. She denies S/IIP, H/IIP, AVH, paranoia, or ideas of reference. She is AOx1-2 (person, place--hospital), but unable to state year or location in [place]. She requires 9.27 inpatient admission for evaluation and management of hortencia.    1/22: less irritable but still requiring PRN med, more compliant with meds, no SI/HI.  1/23: Accepted meds yd and this AM though required PRN yd. Less irritable, appropriately conversant, making needs known. Suspicious bordering on paranoid, however no fully formed delusional thought content apparent.  1/24: Irritable and labile, suspicious regarding staff members. Discussed medication, pt will be offered psychiatric meds and is aware she has right to refuse.  1/25: Hostile, irritable, labile. TOO application in progress given poor insight and intermittent refusal of psychiatric mediations. Pt at risk for deconditioning given refusal to ambulate and minimal engagement with PT, given ambulatory at home refusal here appears to be behavioral, will continue to encourage participation. Refused cognitive evaluation today. Given ongoing intermittent agitation, paranoia, physical deconditioning, and poor insight into condition, pt requires continued inpatient hospitalization for stabilization and safety.  1/26: Pt remains labile, irritable, and paranoid toward staff. Although pt makes provocative statements (e.g. "she controls the robots," "the Jews will hate me"), these statements appear to represent intense feeling rather than fully formed delusion. Continues to refuse cognitive evaluation or consent to contact family. Given ongoing intermittent agitation, paranoia, physical deconditioning, and poor insight into condition, pt requires continued inpatient hospitalization for stabilization and safety.  1/27 Patient agitated, markedly irritable,paranoid  with themes of perrvasive mistreatment, being singled out but w/u well formed , fixed delusional ideas.Cont enocurage med compliance scheduled for court hearing for TOO  1/28: Currently on CO due to hospital bed requirement. Reports fair sleep and appetite. Seen at bedside during rounds. Continues to refuse most medications, took senna and tylenol last night. Reporting back pain "15/10" and requesting more heat packs "I will die without these", however under no visible distress. Primary team pursuing TOO. Renewed CO.   1/29: Renewed CO. Last night took Depakote, Haldol and Senna. Med compliance has been partial.   1/30: Refused all meds yd, this AM accepted metoprolol. Making accusations that people are attempting to harm her and want her to die. Impulse control is impaired.  1/31: Continues with intermittent agitation, hostile and accusatory twd staff, refusing meds (except metoprolol), refusing consent to speak to daughter. Given ongoing agitation, impulsivity, disinhibition, and deconditioning 2/2 refusal to ambulate (also attempted to climb out of bed last night), pt poses a threat of harm to herself and requires ongoing inpatient hospitalization for stabilization and safety. TOO & retention court date next week.  2/1: Continues irritable, paranoid, refusing medications and engagement in interviews.   2/2: Minimally engaged in interview, refusing to answer most questions; labile, hostile, required IM Haldol 1mg overnight for agitation; refusing to ambulate or engage in PT; refusing pain control interventions other than hot packs and lidocaine patch. TOO in process.   2/4 Cont agitated poor sleep, this Am had vairable O2 sat, patient reported she feels she needs an inhaler for asthma. Patient to be seen by medicine, labs and RVP ordered. Will change haldol to olanzapine standing as response to prns of haldol and occ po's have had poor response.   2/5 Paranoid, severely agitated. Has URI. COnt meds prn inhlaer scheuled for TOO 2/7 2/6: Remains paranoid, agitated, accusatory. Frequently refusing meds, required 3x olanzapine PRNs over the weekend.  2/7: Remains paranoid and accusatory twd staff. Continues to refuse majority of medications. Currently with URI. Going to court today for retention and TOO.  2/8:  Patient irritable, agitated, will increase olanzapine with IM back up.   2/9 No major changes but taking meds, Cont olanzapine now at 2.5mg hs and Depakote. Plan cont to titrate, monitor for se  2/10 Patient w/o much response cont with intense paranoia and agitation. Cont Zyprexa cont to titrate.   2/11 Pt is sleepy most likely due to PRN zyprexa last night, remains disorganized and confused.   2/12 confused, disorganized, screams at times, required PRN zyprexa/melatonin last night.   2/13: The patient continues to be manic, disorganized, irritable, screaming at times.  She has been tolerating medications and prn's well - will increase Zyprexa to 5mg hs and change to Zydis to ensure compliance.  Continue 1:1.  2/14: The patient is not responding to Zyprexa, despite increasing dose and getting prn's.  Will change to Seroquel, as patient reportedly did well with it in the past.  Patient given 12.5mg this morning, tolerated well.  Will start 12.5mg tid.  2/15: The patient continues to be irritable, agitated, disorganized.  Some small improvement with Seroquel, sleeping better at night.  Continue Seroquel trial.  2/16: The patient continues to be agitated, frequently yelling out, irritable and angry.  She has been tolerating standing and prn Seroquel well, will continue to titrate to 25mg tid.  Continue 1:1.  2/17: The patient continues to be irritable, agitated, yelling out throughout the day.  She is having more restful periods during the day.  She has been tolerating Seroquel well, will continue.  Continue 1:1.  2/18: Patient remains agitated, disorganized, guarded, confused  2/19: Sustained agitated, irritability, likely paranoia, will increase quetiapine   2/20: Patient remains agitated, suspicious of staff, requiring prn meds, quetiapine increased   2/21 agitated paranoid, no response from Seroquel so far. Will increase Seroquel. Reviewed care with daughter.   2/22:  Paranoid agitated. Taking meds, cont  Seroquel, recheck labs.   2/23 Doing poorly with ongoing agitation now increased BUN Will push fluids. Will decrease Depakote consider d/c . COnisder changing to risperidone or haldol to avoid sedation. constipation  2/2 2/24 Poor resposne to Seroquel and olanzapine IMs with no reposne but sedation at times and constipation. Will change to risperdal po and haldol prn. Consider ECT given refractoriness. Plan recheck labs Monday 2/25 MArginal impression of improvement. Cont Risperdal trial with haldol prn    2/26 agitated, loud, screaming, disorganized and irritable, requiring PRN meds.  2/27 Patient irritable, paranoid with some more moments of being calmer, better related. Cont risperdal increase dose. Patient with elevated bicarb spoke with medicine, will repeat in AM  2/28 IMproved behaviorally. COnt risperidone trial. Labs about same. Cont meds review labs with medicine  3/2 Patient remains agitated not responded to risperidone at current dose as as is only better after multiples prns. Will increase risperidone, taper VPA  3/3 Patient with delirium superimposed on baseline dementia. Started on Vantin by medicine. Medicine to follow over weekend . Patient examined after sliding to floor , no pain or tenderness or decreased ROM hips or lower back. No intervention for this. Family notified about fall and UTI

## 2023-03-03 NOTE — BH INPATIENT PSYCHIATRY PROGRESS NOTE - CURRENT MEDICATION
MEDICATIONS  (STANDING):  albuterol    90 MICROgram(s) HFA Inhaler 2 Puff(s) Inhalation four times a day  artificial  tears Solution 1 Drop(s) Both EYES two times a day  aspirin enteric coated 81 milliGRAM(s) Oral daily  cefpodoxime 100 milliGRAM(s) Oral every 12 hours  divalproex Sprinkle 250 milliGRAM(s) Oral at bedtime  enoxaparin Injectable 40 milliGRAM(s) SubCutaneous <User Schedule>  lidocaine   4% Patch 1 Patch Transdermal every 24 hours  melatonin. 3 milliGRAM(s) Oral at bedtime  metoprolol tartrate 25 milliGRAM(s) Oral two times a day  polyethylene glycol 3350 17 Gram(s) Oral daily  risperiDONE   Tablet 0.5 milliGRAM(s) Oral two times a day  saccharomyces boulardii 250 milliGRAM(s) Oral two times a day  senna 2 Tablet(s) Oral at bedtime  tiotropium 2.5 MICROgram(s) Inhaler 2 Puff(s) Inhalation daily    MEDICATIONS  (PRN):  acetaminophen     Tablet .. 650 milliGRAM(s) Oral every 6 hours PRN Mild Pain (1 - 3), Moderate Pain (4 - 6), Severe Pain (7 - 10)  aluminum hydroxide/magnesium hydroxide/simethicone Suspension 30 milliLiter(s) Oral every 4 hours PRN Dyspepsia  benzocaine/menthol Lozenge 1 Lozenge Oral every 2 hours PRN sore throat  bisacodyl 5 milliGRAM(s) Oral every 12 hours PRN Constipation  haloperidol     Tablet 1 milliGRAM(s) Oral every 6 hours PRN agitation  haloperidol    Injectable 1 milliGRAM(s) IntraMuscular once PRN agitation  haloperidol    Injectable 1 milliGRAM(s) IntraMuscular once PRN agitation  haloperidol    Injectable 1 milliGRAM(s) IntraMuscular three times a day PRN refusal of court ordered meds

## 2023-03-03 NOTE — BH INPATIENT PSYCHIATRY PROGRESS NOTE - MSE UNSTRUCTURED FT
Patient is awake and alert. Patient with clearly less high pitched yelling still occurs but less for shorter periods,moves freely,  mild increased tome with  likely some paratonic component. Mood irritable, less so.Patient with sage range of affect no longer angry all the time. Patient with paranoid delusions that she is being persecuted in various ways by almost all staff but less. No taylor. No SI/HI. Thinking disjointed. Uncooperative with cog testing. Says it is Feb OCt 2023.

## 2023-03-03 NOTE — PROGRESS NOTE ADULT - SUBJECTIVE AND OBJECTIVE BOX
CC/Reason for Consult:   AMS    SUBJECTIVE / OVERNIGHT EVENTS:  Pt is sitting up in chair. States she is not feeling well, but is unable to elaborate further. Yells during exam.     MEDICATIONS  (STANDING):  albuterol    90 MICROgram(s) HFA Inhaler 2 Puff(s) Inhalation four times a day  artificial  tears Solution 1 Drop(s) Both EYES two times a day  aspirin enteric coated 81 milliGRAM(s) Oral daily  cefpodoxime 100 milliGRAM(s) Oral every 12 hours  divalproex Sprinkle 250 milliGRAM(s) Oral at bedtime  enoxaparin Injectable 40 milliGRAM(s) SubCutaneous <User Schedule>  lidocaine   4% Patch 1 Patch Transdermal every 24 hours  melatonin. 3 milliGRAM(s) Oral at bedtime  metoprolol tartrate 25 milliGRAM(s) Oral two times a day  polyethylene glycol 3350 17 Gram(s) Oral daily  risperiDONE   Tablet 0.5 milliGRAM(s) Oral two times a day  senna 2 Tablet(s) Oral at bedtime  tiotropium 2.5 MICROgram(s) Inhaler 2 Puff(s) Inhalation daily    MEDICATIONS  (PRN):  acetaminophen     Tablet .. 650 milliGRAM(s) Oral every 6 hours PRN Mild Pain (1 - 3), Moderate Pain (4 - 6), Severe Pain (7 - 10)  aluminum hydroxide/magnesium hydroxide/simethicone Suspension 30 milliLiter(s) Oral every 4 hours PRN Dyspepsia  benzocaine/menthol Lozenge 1 Lozenge Oral every 2 hours PRN sore throat  bisacodyl 5 milliGRAM(s) Oral every 12 hours PRN Constipation  haloperidol     Tablet 1 milliGRAM(s) Oral every 6 hours PRN agitation  haloperidol    Injectable 1 milliGRAM(s) IntraMuscular once PRN agitation  haloperidol    Injectable 1 milliGRAM(s) IntraMuscular once PRN agitation  haloperidol    Injectable 1 milliGRAM(s) IntraMuscular three times a day PRN refusal of court ordered meds      Vital Signs Last 24 Hrs  T(C): 36.4 (03 Mar 2023 08:16), Max: 36.4 (03 Mar 2023 08:16)  T(F): 97.5 (03 Mar 2023 08:16), Max: 97.5 (03 Mar 2023 08:16)  HR: --  BP: 141/67 (03 Mar 2023 08:16) (141/67 - 141/67)  BP(mean): 59 (03 Mar 2023 08:16) (59 - 59)  RR: 18 (03 Mar 2023 08:16) (18 - 18)  SpO2: --      CAPILLARY BLOOD GLUCOSE            PHYSICAL EXAM:  GENERAL: NAD, well-developed  HEAD:  Atraumatic, Normocephalic  EYES: EOMI, conjunctiva and sclera clear  NECK: Supple, No JVD  CHEST/LUNG: Clear to auscultation bilaterally; No wheeze  HEART: Regular rate and rhythm; No murmurs, rubs, or gallops  ABDOMEN: Soft, Nontender, Nondistended; Bowel sounds present  EXTREMITIES:  2+ Peripheral Pulses, No clubbing, cyanosis, or edema  PSYCH: AAOx 1; to self only  NEUROLOGY: non-focal  SKIN: No rashes or lesions    LABS:                        10.3   4.23  )-----------( 219      ( 03 Mar 2023 10:16 )             33.7         142  |  102  |  22  ----------------------------<  110<H>  4.2   |  32<H>  |  0.57    Ca    9.3      03 Mar 2023 10:16            Urinalysis Basic - ( 03 Mar 2023 11:42 )    Color: Yellow / Appearance: Slightly Turbid / S.024 / pH: x  Gluc: x / Ketone: Negative  / Bili: Negative / Urobili: 3 mg/dL   Blood: x / Protein: 30 mg/dL / Nitrite: Negative   Leuk Esterase: Large / RBC: 6 /HPF / WBC 92 /HPF   Sq Epi: x / Non Sq Epi: 1 /HPF / Bacteria: Moderate        RADIOLOGY & ADDITIONAL TESTS:    Imaging Personally Reviewed:    Consultant(s) Notes Reviewed:      Care Discussed with Consultants/Other Providers:

## 2023-03-03 NOTE — PROGRESS NOTE ADULT - ASSESSMENT
88 year old female with Bipolar Disorder now with exacerbation, asthma, who presented with agitation.  Now with concern d/t worsening encephalopathy.       #Encephalopathy:   -pt is more lethargic as per primary team; increasingly confused. A&O x 1 on exam  -u/a is grossly positive; no culture data available. Suspect in setting of UTI    #UTI:   -f/u urine culture  -cefpodoxime 100 mg x 5 days    #Chronic asthma w/o exacerbation: Given evidence of emphysema on CT chest, suspect possible COPD instead of asthma.  Patient with rhinovirus on RVP, with normal oxygen saturation on RA. Lungs are without wheezing. No respiratory distress noted on exam.  Will initiate spiriva and standing duoneb x 2 days.     #Back pain/Vertebral fracture:    -c/w lidocaine patch and lidocaine prn    #GERD:   -protonix    #PSYCH:   -as per primary team    #HTN:  -BP acceptable. c/w metoprolol

## 2023-03-04 PROCEDURE — 99231 SBSQ HOSP IP/OBS SF/LOW 25: CPT

## 2023-03-04 RX ADMIN — LIDOCAINE 1 PATCH: 4 CREAM TOPICAL at 22:14

## 2023-03-04 RX ADMIN — Medication 3 MILLIGRAM(S): at 22:16

## 2023-03-04 RX ADMIN — RISPERIDONE 0.5 MILLIGRAM(S): 4 TABLET ORAL at 09:29

## 2023-03-04 RX ADMIN — DIVALPROEX SODIUM 250 MILLIGRAM(S): 500 TABLET, DELAYED RELEASE ORAL at 22:13

## 2023-03-04 RX ADMIN — Medication 81 MILLIGRAM(S): at 09:29

## 2023-03-04 RX ADMIN — Medication 250 MILLIGRAM(S): at 09:29

## 2023-03-04 RX ADMIN — SENNA PLUS 2 TABLET(S): 8.6 TABLET ORAL at 22:13

## 2023-03-04 RX ADMIN — Medication 100 MILLIGRAM(S): at 22:13

## 2023-03-04 RX ADMIN — Medication 100 MILLIGRAM(S): at 09:29

## 2023-03-04 RX ADMIN — Medication 25 MILLIGRAM(S): at 22:13

## 2023-03-04 RX ADMIN — LIDOCAINE 1 PATCH: 4 CREAM TOPICAL at 06:54

## 2023-03-04 RX ADMIN — Medication 250 MILLIGRAM(S): at 22:19

## 2023-03-04 RX ADMIN — Medication 1 DROP(S): at 22:14

## 2023-03-04 RX ADMIN — Medication 25 MILLIGRAM(S): at 09:29

## 2023-03-04 RX ADMIN — RISPERIDONE 0.5 MILLIGRAM(S): 4 TABLET ORAL at 22:16

## 2023-03-04 RX ADMIN — ALBUTEROL 2 PUFF(S): 90 AEROSOL, METERED ORAL at 22:14

## 2023-03-04 RX ADMIN — ENOXAPARIN SODIUM 40 MILLIGRAM(S): 100 INJECTION SUBCUTANEOUS at 09:29

## 2023-03-04 NOTE — BH INPATIENT PSYCHIATRY PROGRESS NOTE - NSBHASSESSSUMMFT_PSY_ALL_CORE
Dinorah Hogan is an 88 year old female, , domiciled with daughter with PPHx of bipolar I disorder, 1 previous inpatient psychiatric hospitalization in 1970s, no known history of SI/SA and PMHx of asthma, GERD, sciatica with previous vertebral fracture who is admitted on 9.27 status for symptoms of hortencia including irritability, loud speech, and agitation.     DDx: hortencia vs hypomania vs comorbid delirium    Today, patient is angry about her current admission and denying any psychiatric history. She is asking to be discharged immediately and yelling at treatment team. She is noted to have loud and rapid speech, but is able to be interrupted. She states she has been sleeping poorly due to hip and R knee pain. She denies S/IIP, H/IIP, AVH, paranoia, or ideas of reference. She is AOx1-2 (person, place--hospital), but unable to state year or location in [place]. She requires 9.27 inpatient admission for evaluation and management of hortencia.    1/22: less irritable but still requiring PRN med, more compliant with meds, no SI/HI.  1/23: Accepted meds yd and this AM though required PRN yd. Less irritable, appropriately conversant, making needs known. Suspicious bordering on paranoid, however no fully formed delusional thought content apparent.  1/24: Irritable and labile, suspicious regarding staff members. Discussed medication, pt will be offered psychiatric meds and is aware she has right to refuse.  1/25: Hostile, irritable, labile. TOO application in progress given poor insight and intermittent refusal of psychiatric mediations. Pt at risk for deconditioning given refusal to ambulate and minimal engagement with PT, given ambulatory at home refusal here appears to be behavioral, will continue to encourage participation. Refused cognitive evaluation today. Given ongoing intermittent agitation, paranoia, physical deconditioning, and poor insight into condition, pt requires continued inpatient hospitalization for stabilization and safety.  1/26: Pt remains labile, irritable, and paranoid toward staff. Although pt makes provocative statements (e.g. "she controls the robots," "the Jews will hate me"), these statements appear to represent intense feeling rather than fully formed delusion. Continues to refuse cognitive evaluation or consent to contact family. Given ongoing intermittent agitation, paranoia, physical deconditioning, and poor insight into condition, pt requires continued inpatient hospitalization for stabilization and safety.  1/27 Patient agitated, markedly irritable,paranoid  with themes of perrvasive mistreatment, being singled out but w/u well formed , fixed delusional ideas.Cont enocurage med compliance scheduled for court hearing for TOO  1/28: Currently on CO due to hospital bed requirement. Reports fair sleep and appetite. Seen at bedside during rounds. Continues to refuse most medications, took senna and tylenol last night. Reporting back pain "15/10" and requesting more heat packs "I will die without these", however under no visible distress. Primary team pursuing TOO. Renewed CO.   1/29: Renewed CO. Last night took Depakote, Haldol and Senna. Med compliance has been partial.   1/30: Refused all meds yd, this AM accepted metoprolol. Making accusations that people are attempting to harm her and want her to die. Impulse control is impaired.  1/31: Continues with intermittent agitation, hostile and accusatory twd staff, refusing meds (except metoprolol), refusing consent to speak to daughter. Given ongoing agitation, impulsivity, disinhibition, and deconditioning 2/2 refusal to ambulate (also attempted to climb out of bed last night), pt poses a threat of harm to herself and requires ongoing inpatient hospitalization for stabilization and safety. TOO & retention court date next week.  2/1: Continues irritable, paranoid, refusing medications and engagement in interviews.   2/2: Minimally engaged in interview, refusing to answer most questions; labile, hostile, required IM Haldol 1mg overnight for agitation; refusing to ambulate or engage in PT; refusing pain control interventions other than hot packs and lidocaine patch. TOO in process.   2/4 Cont agitated poor sleep, this Am had vairable O2 sat, patient reported she feels she needs an inhaler for asthma. Patient to be seen by medicine, labs and RVP ordered. Will change haldol to olanzapine standing as response to prns of haldol and occ po's have had poor response.   2/5 Paranoid, severely agitated. Has URI. COnt meds prn inhlaer scheuled for TOO 2/7 2/6: Remains paranoid, agitated, accusatory. Frequently refusing meds, required 3x olanzapine PRNs over the weekend.  2/7: Remains paranoid and accusatory twd staff. Continues to refuse majority of medications. Currently with URI. Going to court today for retention and TOO.  2/8:  Patient irritable, agitated, will increase olanzapine with IM back up.   2/9 No major changes but taking meds, Cont olanzapine now at 2.5mg hs and Depakote. Plan cont to titrate, monitor for se  2/10 Patient w/o much response cont with intense paranoia and agitation. Cont Zyprexa cont to titrate.   2/11 Pt is sleepy most likely due to PRN zyprexa last night, remains disorganized and confused.   2/12 confused, disorganized, screams at times, required PRN zyprexa/melatonin last night.   2/13: The patient continues to be manic, disorganized, irritable, screaming at times.  She has been tolerating medications and prn's well - will increase Zyprexa to 5mg hs and change to Zydis to ensure compliance.  Continue 1:1.  2/14: The patient is not responding to Zyprexa, despite increasing dose and getting prn's.  Will change to Seroquel, as patient reportedly did well with it in the past.  Patient given 12.5mg this morning, tolerated well.  Will start 12.5mg tid.  2/15: The patient continues to be irritable, agitated, disorganized.  Some small improvement with Seroquel, sleeping better at night.  Continue Seroquel trial.  2/16: The patient continues to be agitated, frequently yelling out, irritable and angry.  She has been tolerating standing and prn Seroquel well, will continue to titrate to 25mg tid.  Continue 1:1.  2/17: The patient continues to be irritable, agitated, yelling out throughout the day.  She is having more restful periods during the day.  She has been tolerating Seroquel well, will continue.  Continue 1:1.  2/18: Patient remains agitated, disorganized, guarded, confused  2/19: Sustained agitated, irritability, likely paranoia, will increase quetiapine   2/20: Patient remains agitated, suspicious of staff, requiring prn meds, quetiapine increased   2/21 agitated paranoid, no response from Seroquel so far. Will increase Seroquel. Reviewed care with daughter.   2/22:  Paranoid agitated. Taking meds, cont  Seroquel, recheck labs.   2/23 Doing poorly with ongoing agitation now increased BUN Will push fluids. Will decrease Depakote consider d/c . COnisder changing to risperidone or haldol to avoid sedation. constipation  2/2 2/24 Poor resposne to Seroquel and olanzapine IMs with no reposne but sedation at times and constipation. Will change to risperdal po and haldol prn. Consider ECT given refractoriness. Plan recheck labs Monday 2/25 MArginal impression of improvement. Cont Risperdal trial with haldol prn    2/26 agitated, loud, screaming, disorganized and irritable, requiring PRN meds.  2/27 Patient irritable, paranoid with some more moments of being calmer, better related. Cont risperdal increase dose. Patient with elevated bicarb spoke with medicine, will repeat in AM  2/28 IMproved behaviorally. COnt risperidone trial. Labs about same. Cont meds review labs with medicine  3/2 Patient remains agitated not responded to risperidone at current dose as as is only better after multiples prns. Will increase risperidone, taper VPA  3/3 Patient with delirium superimposed on baseline dementia. Started on Vantin by medicine. Medicine to follow over weekend . Patient examined after sliding to floor , no pain or tenderness or decreased ROM hips or lower back. No intervention for this. Family notified about fall and UTI  3/4: Patient with delirium with episodes of agitation requiring prn haloperidol, but then calm and cooperative. Calm this AM.

## 2023-03-04 NOTE — BH INPATIENT PSYCHIATRY PROGRESS NOTE - NSBHFUPINTERVALHXFT_PSY_A_CORE
Patient seen for follow-up of bipolar, vascular dementia. Bp low, systolic 96 this AM, no dizziness. Good po intake. Received haloperidol prn yesterday.

## 2023-03-04 NOTE — BH INPATIENT PSYCHIATRY PROGRESS NOTE - MSE UNSTRUCTURED FT
Patient is awake and alert. Calm this AM. Affect neutral. Speech fluent. Oriented to day and month. No delusions. No perceptual disturbance. No tremor. Limited insight.

## 2023-03-04 NOTE — BH INPATIENT PSYCHIATRY PROGRESS NOTE - NSBHCHARTREVIEWVS_PSY_A_CORE FT
Vital Signs Last 24 Hrs  T(C): 36.6 (03-04-23 @ 09:59), Max: 36.6 (03-04-23 @ 09:59)  T(F): 97.8 (03-04-23 @ 09:59), Max: 97.8 (03-04-23 @ 09:59)  HR: --  BP: 112/51 (03-03-23 @ 20:32) (112/51 - 112/51)  BP(mean): 57 (03-03-23 @ 20:32) (57 - 57)  RR: --  SpO2: --    Orthostatic VS  03-04-23 @ 09:59  Lying BP: --/-- HR: --  Sitting BP: 96/51 HR: 76  Standing BP: --/-- HR: --  Site: --  Mode: --  Orthostatic VS  03-02-23 @ 20:40  Lying BP: --/-- HR: --  Sitting BP: 132/69 HR: 66  Standing BP: --/-- HR: --  Site: upper right arm  Mode: electronic

## 2023-03-05 PROCEDURE — 99231 SBSQ HOSP IP/OBS SF/LOW 25: CPT

## 2023-03-05 RX ADMIN — SENNA PLUS 2 TABLET(S): 8.6 TABLET ORAL at 22:04

## 2023-03-05 RX ADMIN — Medication 100 MILLIGRAM(S): at 22:00

## 2023-03-05 RX ADMIN — Medication 250 MILLIGRAM(S): at 10:18

## 2023-03-05 RX ADMIN — LIDOCAINE 1 PATCH: 4 CREAM TOPICAL at 21:45

## 2023-03-05 RX ADMIN — RISPERIDONE 0.5 MILLIGRAM(S): 4 TABLET ORAL at 10:19

## 2023-03-05 RX ADMIN — POLYETHYLENE GLYCOL 3350 17 GRAM(S): 17 POWDER, FOR SOLUTION ORAL at 10:19

## 2023-03-05 RX ADMIN — ENOXAPARIN SODIUM 40 MILLIGRAM(S): 100 INJECTION SUBCUTANEOUS at 10:19

## 2023-03-05 RX ADMIN — Medication 250 MILLIGRAM(S): at 21:45

## 2023-03-05 RX ADMIN — Medication 3 MILLIGRAM(S): at 21:45

## 2023-03-05 RX ADMIN — LIDOCAINE 1 PATCH: 4 CREAM TOPICAL at 06:58

## 2023-03-05 RX ADMIN — Medication 25 MILLIGRAM(S): at 22:00

## 2023-03-05 RX ADMIN — RISPERIDONE 0.5 MILLIGRAM(S): 4 TABLET ORAL at 22:00

## 2023-03-05 RX ADMIN — DIVALPROEX SODIUM 250 MILLIGRAM(S): 500 TABLET, DELAYED RELEASE ORAL at 21:59

## 2023-03-05 RX ADMIN — Medication 25 MILLIGRAM(S): at 10:19

## 2023-03-05 RX ADMIN — Medication 100 MILLIGRAM(S): at 10:18

## 2023-03-05 RX ADMIN — Medication 81 MILLIGRAM(S): at 10:18

## 2023-03-05 NOTE — BH INPATIENT PSYCHIATRY PROGRESS NOTE - NSBHASSESSSUMMFT_PSY_ALL_CORE
Dinorah Hogan is an 88 year old female, , domiciled with daughter with PPHx of bipolar I disorder, 1 previous inpatient psychiatric hospitalization in 1970s, no known history of SI/SA and PMHx of asthma, GERD, sciatica with previous vertebral fracture who is admitted on 9.27 status for symptoms of hortencia including irritability, loud speech, and agitation.     DDx: hortencia vs hypomania vs comorbid delirium    Today, patient is angry about her current admission and denying any psychiatric history. She is asking to be discharged immediately and yelling at treatment team. She is noted to have loud and rapid speech, but is able to be interrupted. She states she has been sleeping poorly due to hip and R knee pain. She denies S/IIP, H/IIP, AVH, paranoia, or ideas of reference. She is AOx1-2 (person, place--hospital), but unable to state year or location in [place]. She requires 9.27 inpatient admission for evaluation and management of hortencia.    1/22: less irritable but still requiring PRN med, more compliant with meds, no SI/HI.  1/23: Accepted meds yd and this AM though required PRN yd. Less irritable, appropriately conversant, making needs known. Suspicious bordering on paranoid, however no fully formed delusional thought content apparent.  1/24: Irritable and labile, suspicious regarding staff members. Discussed medication, pt will be offered psychiatric meds and is aware she has right to refuse.  1/25: Hostile, irritable, labile. TOO application in progress given poor insight and intermittent refusal of psychiatric mediations. Pt at risk for deconditioning given refusal to ambulate and minimal engagement with PT, given ambulatory at home refusal here appears to be behavioral, will continue to encourage participation. Refused cognitive evaluation today. Given ongoing intermittent agitation, paranoia, physical deconditioning, and poor insight into condition, pt requires continued inpatient hospitalization for stabilization and safety.  1/26: Pt remains labile, irritable, and paranoid toward staff. Although pt makes provocative statements (e.g. "she controls the robots," "the Jews will hate me"), these statements appear to represent intense feeling rather than fully formed delusion. Continues to refuse cognitive evaluation or consent to contact family. Given ongoing intermittent agitation, paranoia, physical deconditioning, and poor insight into condition, pt requires continued inpatient hospitalization for stabilization and safety.  1/27 Patient agitated, markedly irritable,paranoid  with themes of perrvasive mistreatment, being singled out but w/u well formed , fixed delusional ideas.Cont enocurage med compliance scheduled for court hearing for TOO  1/28: Currently on CO due to hospital bed requirement. Reports fair sleep and appetite. Seen at bedside during rounds. Continues to refuse most medications, took senna and tylenol last night. Reporting back pain "15/10" and requesting more heat packs "I will die without these", however under no visible distress. Primary team pursuing TOO. Renewed CO.   1/29: Renewed CO. Last night took Depakote, Haldol and Senna. Med compliance has been partial.   1/30: Refused all meds yd, this AM accepted metoprolol. Making accusations that people are attempting to harm her and want her to die. Impulse control is impaired.  1/31: Continues with intermittent agitation, hostile and accusatory twd staff, refusing meds (except metoprolol), refusing consent to speak to daughter. Given ongoing agitation, impulsivity, disinhibition, and deconditioning 2/2 refusal to ambulate (also attempted to climb out of bed last night), pt poses a threat of harm to herself and requires ongoing inpatient hospitalization for stabilization and safety. TOO & retention court date next week.  2/1: Continues irritable, paranoid, refusing medications and engagement in interviews.   2/2: Minimally engaged in interview, refusing to answer most questions; labile, hostile, required IM Haldol 1mg overnight for agitation; refusing to ambulate or engage in PT; refusing pain control interventions other than hot packs and lidocaine patch. TOO in process.   2/4 Cont agitated poor sleep, this Am had vairable O2 sat, patient reported she feels she needs an inhaler for asthma. Patient to be seen by medicine, labs and RVP ordered. Will change haldol to olanzapine standing as response to prns of haldol and occ po's have had poor response.   2/5 Paranoid, severely agitated. Has URI. COnt meds prn inhlaer scheuled for TOO 2/7 2/6: Remains paranoid, agitated, accusatory. Frequently refusing meds, required 3x olanzapine PRNs over the weekend.  2/7: Remains paranoid and accusatory twd staff. Continues to refuse majority of medications. Currently with URI. Going to court today for retention and TOO.  2/8:  Patient irritable, agitated, will increase olanzapine with IM back up.   2/9 No major changes but taking meds, Cont olanzapine now at 2.5mg hs and Depakote. Plan cont to titrate, monitor for se  2/10 Patient w/o much response cont with intense paranoia and agitation. Cont Zyprexa cont to titrate.   2/11 Pt is sleepy most likely due to PRN zyprexa last night, remains disorganized and confused.   2/12 confused, disorganized, screams at times, required PRN zyprexa/melatonin last night.   2/13: The patient continues to be manic, disorganized, irritable, screaming at times.  She has been tolerating medications and prn's well - will increase Zyprexa to 5mg hs and change to Zydis to ensure compliance.  Continue 1:1.  2/14: The patient is not responding to Zyprexa, despite increasing dose and getting prn's.  Will change to Seroquel, as patient reportedly did well with it in the past.  Patient given 12.5mg this morning, tolerated well.  Will start 12.5mg tid.  2/15: The patient continues to be irritable, agitated, disorganized.  Some small improvement with Seroquel, sleeping better at night.  Continue Seroquel trial.  2/16: The patient continues to be agitated, frequently yelling out, irritable and angry.  She has been tolerating standing and prn Seroquel well, will continue to titrate to 25mg tid.  Continue 1:1.  2/17: The patient continues to be irritable, agitated, yelling out throughout the day.  She is having more restful periods during the day.  She has been tolerating Seroquel well, will continue.  Continue 1:1.  2/18: Patient remains agitated, disorganized, guarded, confused  2/19: Sustained agitated, irritability, likely paranoia, will increase quetiapine   2/20: Patient remains agitated, suspicious of staff, requiring prn meds, quetiapine increased   2/21 agitated paranoid, no response from Seroquel so far. Will increase Seroquel. Reviewed care with daughter.   2/22:  Paranoid agitated. Taking meds, cont  Seroquel, recheck labs.   2/23 Doing poorly with ongoing agitation now increased BUN Will push fluids. Will decrease Depakote consider d/c . COnisder changing to risperidone or haldol to avoid sedation. constipation  2/2 2/24 Poor resposne to Seroquel and olanzapine IMs with no reposne but sedation at times and constipation. Will change to risperdal po and haldol prn. Consider ECT given refractoriness. Plan recheck labs Monday 2/25 MArginal impression of improvement. Cont Risperdal trial with haldol prn    2/26 agitated, loud, screaming, disorganized and irritable, requiring PRN meds.  2/27 Patient irritable, paranoid with some more moments of being calmer, better related. Cont risperdal increase dose. Patient with elevated bicarb spoke with medicine, will repeat in AM  2/28 IMproved behaviorally. COnt risperidone trial. Labs about same. Cont meds review labs with medicine  3/2 Patient remains agitated not responded to risperidone at current dose as as is only better after multiples prns. Will increase risperidone, taper VPA  3/3 Patient with delirium superimposed on baseline dementia. Started on Vantin by medicine. Medicine to follow over weekend . Patient examined after sliding to floor , no pain or tenderness or decreased ROM hips or lower back. No intervention for this. Family notified about fall and UTI  3/4: Patient with delirium with episodes of agitation requiring prn haloperidol, but then calm and cooperative. Calm this AM.   3/5: Pt seen for f/u of vascular dementia. On CO for hospital bed. No significant overnight events reported, no PRNs needed overnight. VSS with systolic  this morning. Pt seen in the dayroom. Pt told writer she was "dead" and followed that by asking writer for orange juice. Accepted apple juice. Denies any other complaints.

## 2023-03-05 NOTE — BH INPATIENT PSYCHIATRY PROGRESS NOTE - NSBHFUPINTERVALHXFT_PSY_A_CORE
Patient seen for follow-up of bipolar, vascular dementia. Bp low, systolic 96 this AM, no dizziness. Good po intake. Received haloperidol prn yesterday.     3/5: Pt seen for f/u of vascular dementia. On CO for hospital bed. No significant overnight events reported, no PRNs needed overnight. VSS with systolic  this morning. Pt seen in the dayroom. Pt told writer she was "dead" and followed that by asking writer for orange juice. Accepted apple juice. Denies any other complaints.

## 2023-03-05 NOTE — BH INPATIENT PSYCHIATRY PROGRESS NOTE - NSBHCHARTREVIEWVS_PSY_A_CORE FT
Vital Signs Last 24 Hrs  T(C): 36.1 (03-05-23 @ 07:58), Max: 36.1 (03-05-23 @ 07:58)  T(F): 97 (03-05-23 @ 07:58), Max: 97 (03-05-23 @ 07:58)  HR: --  BP: 126/73 (03-04-23 @ 20:30) (126/73 - 126/73)  BP(mean): 81 (03-04-23 @ 20:30) (81 - 81)  RR: --  SpO2: --    Orthostatic VS  03-05-23 @ 07:58  Lying BP: 117/60 HR: 62  Sitting BP: 134/70 HR: 67  Standing BP: --/-- HR: --  Site: --  Mode: --  Orthostatic VS  03-04-23 @ 09:59  Lying BP: --/-- HR: --  Sitting BP: 96/51 HR: 76  Standing BP: --/-- HR: --  Site: --  Mode: --

## 2023-03-06 LAB
-  AMIKACIN: SIGNIFICANT CHANGE UP
-  AMOXICILLIN/CLAVULANIC ACID: SIGNIFICANT CHANGE UP
-  AMPICILLIN/SULBACTAM: SIGNIFICANT CHANGE UP
-  AMPICILLIN: SIGNIFICANT CHANGE UP
-  AZTREONAM: SIGNIFICANT CHANGE UP
-  CEFAZOLIN: SIGNIFICANT CHANGE UP
-  CEFEPIME: SIGNIFICANT CHANGE UP
-  CEFTRIAXONE: SIGNIFICANT CHANGE UP
-  CEFUROXIME: SIGNIFICANT CHANGE UP
-  CIPROFLOXACIN: SIGNIFICANT CHANGE UP
-  ERTAPENEM: SIGNIFICANT CHANGE UP
-  GENTAMICIN: SIGNIFICANT CHANGE UP
-  LEVOFLOXACIN: SIGNIFICANT CHANGE UP
-  MEROPENEM: SIGNIFICANT CHANGE UP
-  NITROFURANTOIN: SIGNIFICANT CHANGE UP
-  PIPERACILLIN/TAZOBACTAM: SIGNIFICANT CHANGE UP
-  TOBRAMYCIN: SIGNIFICANT CHANGE UP
-  TRIMETHOPRIM/SULFAMETHOXAZOLE: SIGNIFICANT CHANGE UP
CULTURE RESULTS: SIGNIFICANT CHANGE UP
METHOD TYPE: SIGNIFICANT CHANGE UP
ORGANISM # SPEC MICROSCOPIC CNT: SIGNIFICANT CHANGE UP
ORGANISM # SPEC MICROSCOPIC CNT: SIGNIFICANT CHANGE UP
SPECIMEN SOURCE: SIGNIFICANT CHANGE UP

## 2023-03-06 PROCEDURE — 99232 SBSQ HOSP IP/OBS MODERATE 35: CPT

## 2023-03-06 RX ORDER — ALBUTEROL 90 UG/1
2 AEROSOL, METERED ORAL EVERY 6 HOURS
Refills: 0 | Status: DISCONTINUED | OUTPATIENT
Start: 2023-03-06 | End: 2023-04-10

## 2023-03-06 RX ORDER — RISPERIDONE 4 MG/1
0.5 TABLET ORAL
Refills: 0 | Status: DISCONTINUED | OUTPATIENT
Start: 2023-03-06 | End: 2023-03-08

## 2023-03-06 RX ADMIN — RISPERIDONE 0.5 MILLIGRAM(S): 4 TABLET ORAL at 22:19

## 2023-03-06 RX ADMIN — Medication 25 MILLIGRAM(S): at 22:19

## 2023-03-06 RX ADMIN — LIDOCAINE 1 PATCH: 4 CREAM TOPICAL at 09:47

## 2023-03-06 RX ADMIN — POLYETHYLENE GLYCOL 3350 17 GRAM(S): 17 POWDER, FOR SOLUTION ORAL at 09:53

## 2023-03-06 RX ADMIN — Medication 1 TABLET(S): at 13:03

## 2023-03-06 RX ADMIN — Medication 250 MILLIGRAM(S): at 09:53

## 2023-03-06 RX ADMIN — RISPERIDONE 0.5 MILLIGRAM(S): 4 TABLET ORAL at 09:53

## 2023-03-06 RX ADMIN — TIOTROPIUM BROMIDE 2 PUFF(S): 18 CAPSULE ORAL; RESPIRATORY (INHALATION) at 09:53

## 2023-03-06 RX ADMIN — ENOXAPARIN SODIUM 40 MILLIGRAM(S): 100 INJECTION SUBCUTANEOUS at 09:53

## 2023-03-06 RX ADMIN — LIDOCAINE 1 PATCH: 4 CREAM TOPICAL at 22:19

## 2023-03-06 RX ADMIN — Medication 81 MILLIGRAM(S): at 09:52

## 2023-03-06 RX ADMIN — Medication 100 MILLIGRAM(S): at 09:52

## 2023-03-06 RX ADMIN — Medication 25 MILLIGRAM(S): at 09:53

## 2023-03-06 RX ADMIN — RISPERIDONE 0.5 MILLIGRAM(S): 4 TABLET ORAL at 13:03

## 2023-03-06 RX ADMIN — Medication 3 MILLIGRAM(S): at 22:10

## 2023-03-06 RX ADMIN — SENNA PLUS 2 TABLET(S): 8.6 TABLET ORAL at 22:19

## 2023-03-06 RX ADMIN — HALOPERIDOL DECANOATE 1 MILLIGRAM(S): 100 INJECTION INTRAMUSCULAR at 10:30

## 2023-03-06 RX ADMIN — Medication 1 TABLET(S): at 22:19

## 2023-03-06 NOTE — PROGRESS NOTE ADULT - SUBJECTIVE AND OBJECTIVE BOX
CC/Reason for Consult:   encephalopathy, UTI    SUBJECTIVE / OVERNIGHT EVENTS:  Patient sitting in chair, awake, alert. Refused to answer orientation questions. States "everything is bothering me". Denies any shortness of breath, cough, chest pain.     MEDICATIONS  (STANDING):  albuterol    90 MICROgram(s) HFA Inhaler 2 Puff(s) Inhalation four times a day  artificial  tears Solution 1 Drop(s) Both EYES two times a day  aspirin enteric coated 81 milliGRAM(s) Oral daily  cefpodoxime 100 milliGRAM(s) Oral every 12 hours  divalproex Sprinkle 250 milliGRAM(s) Oral at bedtime  enoxaparin Injectable 40 milliGRAM(s) SubCutaneous <User Schedule>  lidocaine   4% Patch 1 Patch Transdermal every 24 hours  melatonin. 3 milliGRAM(s) Oral at bedtime  metoprolol tartrate 25 milliGRAM(s) Oral two times a day  polyethylene glycol 3350 17 Gram(s) Oral daily  risperiDONE   Tablet 0.5 milliGRAM(s) Oral two times a day  saccharomyces boulardii 250 milliGRAM(s) Oral two times a day  senna 2 Tablet(s) Oral at bedtime  tiotropium 2.5 MICROgram(s) Inhaler 2 Puff(s) Inhalation daily    MEDICATIONS  (PRN):  acetaminophen     Tablet .. 650 milliGRAM(s) Oral every 6 hours PRN Mild Pain (1 - 3), Moderate Pain (4 - 6), Severe Pain (7 - 10)  aluminum hydroxide/magnesium hydroxide/simethicone Suspension 30 milliLiter(s) Oral every 4 hours PRN Dyspepsia  benzocaine/menthol Lozenge 1 Lozenge Oral every 2 hours PRN sore throat  bisacodyl 5 milliGRAM(s) Oral every 12 hours PRN Constipation  haloperidol     Tablet 1 milliGRAM(s) Oral every 6 hours PRN agitation  haloperidol    Injectable 1 milliGRAM(s) IntraMuscular once PRN agitation  haloperidol    Injectable 1 milliGRAM(s) IntraMuscular once PRN agitation  haloperidol    Injectable 1 milliGRAM(s) IntraMuscular three times a day PRN refusal of court ordered meds      Vital Signs Last 24 Hrs  T(C): 36.5 (06 Mar 2023 08:11), Max: 36.5 (06 Mar 2023 08:11)  T(F): 97.7 (06 Mar 2023 08:11), Max: 97.7 (06 Mar 2023 08:11)  HR: --  BP: 119/55 (06 Mar 2023 08:11) (119/55 - 119/55)  BP(mean): 67 (06 Mar 2023 08:11) (67 - 67)  RR: --  SpO2: --      CAPILLARY BLOOD GLUCOSE            PHYSICAL EXAM:  GENERAL: NAD  HEAD:  Atraumatic, Normocephalic  EYES: EOMI, conjunctiva and sclera clear  NECK: Supple, No JVD  CHEST/LUNG: Clear to auscultation bilaterally; No wheeze  HEART: Regular rate and rhythm; No murmurs, rubs, or gallops  ABDOMEN: Soft, Nontender, Nondistended; Bowel sounds present  EXTREMITIES:  2+ Peripheral Pulses, No clubbing, cyanosis, or edema  NEUROLOGY: non-focal  SKIN: No rashes or lesions    LABS:                    RADIOLOGY & ADDITIONAL TESTS:    Imaging Personally Reviewed:    Consultant(s) Notes Reviewed:      Care Discussed with Consultants/Other Providers:   CC/Reason for Consult:   encephalopathy, UTI    SUBJECTIVE / OVERNIGHT EVENTS:  Patient sitting in chair, awake, alert. Refused to answer orientation questions. States "everything is bothering me". Denies any shortness of breath, cough, chest pain.     MEDICATIONS  (STANDING):  albuterol    90 MICROgram(s) HFA Inhaler 2 Puff(s) Inhalation four times a day  artificial  tears Solution 1 Drop(s) Both EYES two times a day  aspirin enteric coated 81 milliGRAM(s) Oral daily  cefpodoxime 100 milliGRAM(s) Oral every 12 hours  divalproex Sprinkle 250 milliGRAM(s) Oral at bedtime  enoxaparin Injectable 40 milliGRAM(s) SubCutaneous <User Schedule>  lidocaine   4% Patch 1 Patch Transdermal every 24 hours  melatonin. 3 milliGRAM(s) Oral at bedtime  metoprolol tartrate 25 milliGRAM(s) Oral two times a day  polyethylene glycol 3350 17 Gram(s) Oral daily  risperiDONE   Tablet 0.5 milliGRAM(s) Oral two times a day  saccharomyces boulardii 250 milliGRAM(s) Oral two times a day  senna 2 Tablet(s) Oral at bedtime  tiotropium 2.5 MICROgram(s) Inhaler 2 Puff(s) Inhalation daily    MEDICATIONS  (PRN):  acetaminophen     Tablet .. 650 milliGRAM(s) Oral every 6 hours PRN Mild Pain (1 - 3), Moderate Pain (4 - 6), Severe Pain (7 - 10)  aluminum hydroxide/magnesium hydroxide/simethicone Suspension 30 milliLiter(s) Oral every 4 hours PRN Dyspepsia  benzocaine/menthol Lozenge 1 Lozenge Oral every 2 hours PRN sore throat  bisacodyl 5 milliGRAM(s) Oral every 12 hours PRN Constipation  haloperidol     Tablet 1 milliGRAM(s) Oral every 6 hours PRN agitation  haloperidol    Injectable 1 milliGRAM(s) IntraMuscular once PRN agitation  haloperidol    Injectable 1 milliGRAM(s) IntraMuscular once PRN agitation  haloperidol    Injectable 1 milliGRAM(s) IntraMuscular three times a day PRN refusal of court ordered meds      Vital Signs Last 24 Hrs  T(C): 36.5 (06 Mar 2023 08:11), Max: 36.5 (06 Mar 2023 08:11)  T(F): 97.7 (06 Mar 2023 08:11), Max: 97.7 (06 Mar 2023 08:11)  HR: --  BP: 119/55 (06 Mar 2023 08:11) (119/55 - 119/55)  BP(mean): 67 (06 Mar 2023 08:11) (67 - 67)  RR: --  SpO2: --      CAPILLARY BLOOD GLUCOSE            PHYSICAL EXAM:  GENERAL: NAD  HEAD:  Atraumatic, Normocephalic  EYES: EOMI, conjunctiva and sclera clear  NECK: Supple, No JVD  CHEST/LUNG: Clear to auscultation bilaterally; No wheeze  HEART: Regular rate and rhythm; No murmurs, rubs, or gallops  ABDOMEN: Soft, Nontender, Nondistended; Bowel sounds present  EXTREMITIES:  2+ Peripheral Pulses, No clubbing, cyanosis, or edema  NEUROLOGY: non-focal  SKIN: No rashes or lesions    LABS:        Urine culture reviewed

## 2023-03-06 NOTE — BH INPATIENT PSYCHIATRY PROGRESS NOTE - MSE UNSTRUCTURED FT
Patient is awake and alert. Calm when evaluated. Mood perplexed Affect constricted Speech fluent. Oriented to day and month. Some paranoia about staff but attentuated.. No perceptual disturbance. No tremor. Limited insight.

## 2023-03-06 NOTE — BH INPATIENT PSYCHIATRY PROGRESS NOTE - NSBHCHARTREVIEWVS_PSY_A_CORE FT
Vital Signs Last 24 Hrs  T(C): 36.5 (03-06-23 @ 08:11), Max: 36.5 (03-06-23 @ 08:11)  T(F): 97.7 (03-06-23 @ 08:11), Max: 97.7 (03-06-23 @ 08:11)  HR: --  BP: 119/55 (03-06-23 @ 08:11) (119/55 - 119/55)  BP(mean): 67 (03-06-23 @ 08:11) (67 - 67)  RR: --  SpO2: --    Orthostatic VS  03-05-23 @ 07:58  Lying BP: 117/60 HR: 62  Sitting BP: 134/70 HR: 67  Standing BP: --/-- HR: --  Site: --  Mode: --

## 2023-03-06 NOTE — PROGRESS NOTE ADULT - ASSESSMENT
88 year old female with Bipolar Disorder now with exacerbation, asthma, who presented with agitation.  Medicine following for encephalopathy, UTI.     #Encephalopathy:   -pt awake and alert now   -likely behavioral vs due to UTI  -finish treatment for UTI as below  -rest per primary team  -re-call if worsening symptoms    #UTI:   - urine culture with 10-50K Proteus mirabilis  -continue with cefpodoxime 100 mg x 5 days- end date 3/8/23    #Chronic asthma w/o exacerbation: Given evidence of emphysema on CT chest, suspect possible COPD instead of asthma.  Patient with rhinovirus on RVP, with normal oxygen saturation on RA. Lungs are without wheezing. No respiratory distress noted on exam.  c/w  spiriva, albuterol PRN    #Back pain/Vertebral fracture:    -c/w lidocaine patch and lidocaine prn    #GERD:   -protonix    #PSYCH:   -as per primary team    #HTN:  -BP acceptable. c/w metoprolol   88 year old female with Bipolar Disorder now with exacerbation, asthma, who presented with agitation.  Medicine following for encephalopathy, UTI.     #Encephalopathy:   -pt awake and alert now   -likely behavioral vs due to UTI  -treatment for UTI as below  -psych primary team    #UTI:   - urine culture with mixed culture but 10-50K Proteus mirabilis ESBL, since culture is mixed could be colonization  -discussed with ID pharmacist- limited oral/IM options for ESBL and age, patient on depakote which have drug-drug interactions. Will give a trial of Augmentin 500mg BID. Will treat for 5 days and if no improvement/worsening symptoms patient will have to be sent to [facility] for IV antibiotics.     #Chronic asthma w/o exacerbation: Given evidence of emphysema on CT chest, suspect possible COPD instead of asthma.  Patient with rhinovirus on RVP, with normal oxygen saturation on RA. Lungs are without wheezing. No respiratory distress noted on exam.  c/w  spiriva, albuterol PRN    #Back pain/Vertebral fracture:    -c/w lidocaine patch and lidocaine prn    #GERD:   -protonix    #PSYCH:   -as per primary team    #HTN:  -BP acceptable. c/w metoprolol

## 2023-03-06 NOTE — BH INPATIENT PSYCHIATRY PROGRESS NOTE - NSBHFUPINTERVALHXFT_PSY_A_CORE
Patient seen for follow-up of bipolar, vascular dementia. Bp low, systolic 96 this AM, no dizziness. Good po intake. Received haloperidol prn yesterday.     3/6: Pt seen for f/u of vascular dementia. On CO for hospital bed. No significant overnight events reported, no PRNs needed overnight. VSS , afebrile. Urine culutre shows nl sandra plus 10-50k proteus. Patient quieter in AM then began yelling non stop

## 2023-03-06 NOTE — BH INPATIENT PSYCHIATRY PROGRESS NOTE - NSBHASSESSSUMMFT_PSY_ALL_CORE
Dinorah Hogan is an 88 year old female, , domiciled with daughter with PPHx of bipolar I disorder, 1 previous inpatient psychiatric hospitalization in 1970s, no known history of SI/SA and PMHx of asthma, GERD, sciatica with previous vertebral fracture who is admitted on 9.27 status for symptoms of hortencia including irritability, loud speech, and agitation.     DDx: hortencia vs hypomania vs comorbid delirium    Today, patient is angry about her current admission and denying any psychiatric history. She is asking to be discharged immediately and yelling at treatment team. She is noted to have loud and rapid speech, but is able to be interrupted. She states she has been sleeping poorly due to hip and R knee pain. She denies S/IIP, H/IIP, AVH, paranoia, or ideas of reference. She is AOx1-2 (person, place--hospital), but unable to state year or location in [place]. She requires 9.27 inpatient admission for evaluation and management of hortencia.    1/22: less irritable but still requiring PRN med, more compliant with meds, no SI/HI.  1/23: Accepted meds yd and this AM though required PRN yd. Less irritable, appropriately conversant, making needs known. Suspicious bordering on paranoid, however no fully formed delusional thought content apparent.  1/24: Irritable and labile, suspicious regarding staff members. Discussed medication, pt will be offered psychiatric meds and is aware she has right to refuse.  1/25: Hostile, irritable, labile. TOO application in progress given poor insight and intermittent refusal of psychiatric mediations. Pt at risk for deconditioning given refusal to ambulate and minimal engagement with PT, given ambulatory at home refusal here appears to be behavioral, will continue to encourage participation. Refused cognitive evaluation today. Given ongoing intermittent agitation, paranoia, physical deconditioning, and poor insight into condition, pt requires continued inpatient hospitalization for stabilization and safety.  1/26: Pt remains labile, irritable, and paranoid toward staff. Although pt makes provocative statements (e.g. "she controls the robots," "the Jews will hate me"), these statements appear to represent intense feeling rather than fully formed delusion. Continues to refuse cognitive evaluation or consent to contact family. Given ongoing intermittent agitation, paranoia, physical deconditioning, and poor insight into condition, pt requires continued inpatient hospitalization for stabilization and safety.  1/27 Patient agitated, markedly irritable,paranoid  with themes of perrvasive mistreatment, being singled out but w/u well formed , fixed delusional ideas.Cont enocurage med compliance scheduled for court hearing for TOO  1/28: Currently on CO due to hospital bed requirement. Reports fair sleep and appetite. Seen at bedside during rounds. Continues to refuse most medications, took senna and tylenol last night. Reporting back pain "15/10" and requesting more heat packs "I will die without these", however under no visible distress. Primary team pursuing TOO. Renewed CO.   1/29: Renewed CO. Last night took Depakote, Haldol and Senna. Med compliance has been partial.   1/30: Refused all meds yd, this AM accepted metoprolol. Making accusations that people are attempting to harm her and want her to die. Impulse control is impaired.  1/31: Continues with intermittent agitation, hostile and accusatory twd staff, refusing meds (except metoprolol), refusing consent to speak to daughter. Given ongoing agitation, impulsivity, disinhibition, and deconditioning 2/2 refusal to ambulate (also attempted to climb out of bed last night), pt poses a threat of harm to herself and requires ongoing inpatient hospitalization for stabilization and safety. TOO & retention court date next week.  2/1: Continues irritable, paranoid, refusing medications and engagement in interviews.   2/2: Minimally engaged in interview, refusing to answer most questions; labile, hostile, required IM Haldol 1mg overnight for agitation; refusing to ambulate or engage in PT; refusing pain control interventions other than hot packs and lidocaine patch. TOO in process.   2/4 Cont agitated poor sleep, this Am had vairable O2 sat, patient reported she feels she needs an inhaler for asthma. Patient to be seen by medicine, labs and RVP ordered. Will change haldol to olanzapine standing as response to prns of haldol and occ po's have had poor response.   2/5 Paranoid, severely agitated. Has URI. COnt meds prn inhlaer scheuled for TOO 2/7 2/6: Remains paranoid, agitated, accusatory. Frequently refusing meds, required 3x olanzapine PRNs over the weekend.  2/7: Remains paranoid and accusatory twd staff. Continues to refuse majority of medications. Currently with URI. Going to court today for retention and TOO.  2/8:  Patient irritable, agitated, will increase olanzapine with IM back up.   2/9 No major changes but taking meds, Cont olanzapine now at 2.5mg hs and Depakote. Plan cont to titrate, monitor for se  2/10 Patient w/o much response cont with intense paranoia and agitation. Cont Zyprexa cont to titrate.   2/11 Pt is sleepy most likely due to PRN zyprexa last night, remains disorganized and confused.   2/12 confused, disorganized, screams at times, required PRN zyprexa/melatonin last night.   2/13: The patient continues to be manic, disorganized, irritable, screaming at times.  She has been tolerating medications and prn's well - will increase Zyprexa to 5mg hs and change to Zydis to ensure compliance.  Continue 1:1.  2/14: The patient is not responding to Zyprexa, despite increasing dose and getting prn's.  Will change to Seroquel, as patient reportedly did well with it in the past.  Patient given 12.5mg this morning, tolerated well.  Will start 12.5mg tid.  2/15: The patient continues to be irritable, agitated, disorganized.  Some small improvement with Seroquel, sleeping better at night.  Continue Seroquel trial.  2/16: The patient continues to be agitated, frequently yelling out, irritable and angry.  She has been tolerating standing and prn Seroquel well, will continue to titrate to 25mg tid.  Continue 1:1.  2/17: The patient continues to be irritable, agitated, yelling out throughout the day.  She is having more restful periods during the day.  She has been tolerating Seroquel well, will continue.  Continue 1:1.  2/18: Patient remains agitated, disorganized, guarded, confused  2/19: Sustained agitated, irritability, likely paranoia, will increase quetiapine   2/20: Patient remains agitated, suspicious of staff, requiring prn meds, quetiapine increased   2/21 agitated paranoid, no response from Seroquel so far. Will increase Seroquel. Reviewed care with daughter.   2/22:  Paranoid agitated. Taking meds, cont  Seroquel, recheck labs.   2/23 Doing poorly with ongoing agitation now increased BUN Will push fluids. Will decrease Depakote consider d/c . COnisder changing to risperidone or haldol to avoid sedation. constipation  2/2 2/24 Poor resposne to Seroquel and olanzapine IMs with no reposne but sedation at times and constipation. Will change to risperdal po and haldol prn. Consider ECT given refractoriness. Plan recheck labs Monday 2/25 MArginal impression of improvement. Cont Risperdal trial with haldol prn    2/26 agitated, loud, screaming, disorganized and irritable, requiring PRN meds.  2/27 Patient irritable, paranoid with some more moments of being calmer, better related. Cont risperdal increase dose. Patient with elevated bicarb spoke with medicine, will repeat in AM  2/28 IMproved behaviorally. COnt risperidone trial. Labs about same. Cont meds review labs with medicine  3/2 Patient remains agitated not responded to risperidone at current dose as as is only better after multiples prns. Will increase risperidone, taper VPA  3/3 Patient with delirium superimposed on baseline dementia. Started on Vantin by medicine. Medicine to follow over weekend . Patient examined after sliding to floor , no pain or tenderness or decreased ROM hips or lower back. No intervention for this. Family notified about fall and UTI  3/4: Patient with delirium with episodes of agitation requiring prn haloperidol, but then calm and cooperative. Calm this AM.   3/5: Pt seen for f/u of vascular dementia. On CO for hospital bed. No significant overnight events reported, no PRNs needed overnight. VSS with systolic  this morning. Pt seen in the dayroom. Pt told writer she was "dead" and followed that by asking writer for orange juice. Accepted apple juice. Denies any other complaints.   3/6 Patient cont to fluctuate in symptoms from calm, placid to severely agitated. Will increase risperidone. Discussed with medicine will switch antibiotic to Augmentin based on sensitivities but may require transfer for IV if not improving.

## 2023-03-06 NOTE — BH INPATIENT PSYCHIATRY PROGRESS NOTE - CURRENT MEDICATION
MEDICATIONS  (STANDING):  amoxicillin  500 milliGRAM(s)/clavulanate 1 Tablet(s) Oral two times a day  artificial  tears Solution 1 Drop(s) Both EYES two times a day  aspirin enteric coated 81 milliGRAM(s) Oral daily  enoxaparin Injectable 40 milliGRAM(s) SubCutaneous <User Schedule>  lidocaine   4% Patch 1 Patch Transdermal every 24 hours  melatonin. 3 milliGRAM(s) Oral at bedtime  metoprolol tartrate 25 milliGRAM(s) Oral two times a day  polyethylene glycol 3350 17 Gram(s) Oral daily  risperiDONE   Tablet 0.5 milliGRAM(s) Oral <User Schedule>  saccharomyces boulardii 250 milliGRAM(s) Oral two times a day  senna 2 Tablet(s) Oral at bedtime  tiotropium 2.5 MICROgram(s) Inhaler 2 Puff(s) Inhalation daily    MEDICATIONS  (PRN):  acetaminophen     Tablet .. 650 milliGRAM(s) Oral every 6 hours PRN Mild Pain (1 - 3), Moderate Pain (4 - 6), Severe Pain (7 - 10)  albuterol    90 MICROgram(s) HFA Inhaler 2 Puff(s) Inhalation every 6 hours PRN Shortness of Breath and/or Wheezing  aluminum hydroxide/magnesium hydroxide/simethicone Suspension 30 milliLiter(s) Oral every 4 hours PRN Dyspepsia  benzocaine/menthol Lozenge 1 Lozenge Oral every 2 hours PRN sore throat  bisacodyl 5 milliGRAM(s) Oral every 12 hours PRN Constipation  haloperidol     Tablet 1 milliGRAM(s) Oral every 6 hours PRN agitation  haloperidol    Injectable 1 milliGRAM(s) IntraMuscular once PRN agitation  haloperidol    Injectable 1 milliGRAM(s) IntraMuscular once PRN agitation  haloperidol    Injectable 1 milliGRAM(s) IntraMuscular three times a day PRN refusal of court ordered meds

## 2023-03-07 RX ORDER — FOSFOMYCIN TROMETHAMINE 3 G/1
3 POWDER ORAL ONCE
Refills: 0 | Status: COMPLETED | OUTPATIENT
Start: 2023-03-07 | End: 2023-03-07

## 2023-03-07 RX ADMIN — LIDOCAINE 1 PATCH: 4 CREAM TOPICAL at 21:52

## 2023-03-07 RX ADMIN — Medication 81 MILLIGRAM(S): at 10:05

## 2023-03-07 RX ADMIN — Medication 3 MILLIGRAM(S): at 21:54

## 2023-03-07 RX ADMIN — ENOXAPARIN SODIUM 40 MILLIGRAM(S): 100 INJECTION SUBCUTANEOUS at 10:04

## 2023-03-07 RX ADMIN — RISPERIDONE 0.5 MILLIGRAM(S): 4 TABLET ORAL at 06:10

## 2023-03-07 RX ADMIN — SENNA PLUS 2 TABLET(S): 8.6 TABLET ORAL at 21:51

## 2023-03-07 RX ADMIN — HALOPERIDOL DECANOATE 1 MILLIGRAM(S): 100 INJECTION INTRAMUSCULAR at 10:36

## 2023-03-07 RX ADMIN — Medication 250 MILLIGRAM(S): at 10:28

## 2023-03-07 RX ADMIN — Medication 1 TABLET(S): at 10:05

## 2023-03-07 RX ADMIN — Medication 25 MILLIGRAM(S): at 21:51

## 2023-03-07 RX ADMIN — POLYETHYLENE GLYCOL 3350 17 GRAM(S): 17 POWDER, FOR SOLUTION ORAL at 10:04

## 2023-03-07 RX ADMIN — RISPERIDONE 0.5 MILLIGRAM(S): 4 TABLET ORAL at 21:54

## 2023-03-07 RX ADMIN — Medication 25 MILLIGRAM(S): at 10:05

## 2023-03-07 RX ADMIN — RISPERIDONE 0.5 MILLIGRAM(S): 4 TABLET ORAL at 13:31

## 2023-03-07 RX ADMIN — TIOTROPIUM BROMIDE 2 PUFF(S): 18 CAPSULE ORAL; RESPIRATORY (INHALATION) at 09:29

## 2023-03-07 NOTE — BH INPATIENT PSYCHIATRY PROGRESS NOTE - NSBHFUPINTERVALHXFT_PSY_A_CORE
3/6: Pt seen for f/u of vascular dementia, BAD. On CO for hospital bed. No significant overnight events reported, no PRNs needed overnight. VSS , afebrile. Urine culutre shows nl sandra plus 10-50k proteus. Patient quieter in AM then began yelling  during PT

## 2023-03-07 NOTE — BH INPATIENT PSYCHIATRY PROGRESS NOTE - NSBHASSESSSUMMFT_PSY_ALL_CORE
Dinorah Hogan is an 88 year old female, , domiciled with daughter with PPHx of bipolar I disorder, 1 previous inpatient psychiatric hospitalization in 1970s, no known history of SI/SA and PMHx of asthma, GERD, sciatica with previous vertebral fracture who is admitted on 9.27 status for symptoms of hortencia including irritability, loud speech, and agitation.     DDx: hortencia vs hypomania vs comorbid delirium    Today, patient is angry about her current admission and denying any psychiatric history. She is asking to be discharged immediately and yelling at treatment team. She is noted to have loud and rapid speech, but is able to be interrupted. She states she has been sleeping poorly due to hip and R knee pain. She denies S/IIP, H/IIP, AVH, paranoia, or ideas of reference. She is AOx1-2 (person, place--hospital), but unable to state year or location in [place]. She requires 9.27 inpatient admission for evaluation and management of hortencia.    1/22: less irritable but still requiring PRN med, more compliant with meds, no SI/HI.  1/23: Accepted meds yd and this AM though required PRN yd. Less irritable, appropriately conversant, making needs known. Suspicious bordering on paranoid, however no fully formed delusional thought content apparent.  1/24: Irritable and labile, suspicious regarding staff members. Discussed medication, pt will be offered psychiatric meds and is aware she has right to refuse.  1/25: Hostile, irritable, labile. TOO application in progress given poor insight and intermittent refusal of psychiatric mediations. Pt at risk for deconditioning given refusal to ambulate and minimal engagement with PT, given ambulatory at home refusal here appears to be behavioral, will continue to encourage participation. Refused cognitive evaluation today. Given ongoing intermittent agitation, paranoia, physical deconditioning, and poor insight into condition, pt requires continued inpatient hospitalization for stabilization and safety.  1/26: Pt remains labile, irritable, and paranoid toward staff. Although pt makes provocative statements (e.g. "she controls the robots," "the Jews will hate me"), these statements appear to represent intense feeling rather than fully formed delusion. Continues to refuse cognitive evaluation or consent to contact family. Given ongoing intermittent agitation, paranoia, physical deconditioning, and poor insight into condition, pt requires continued inpatient hospitalization for stabilization and safety.  1/27 Patient agitated, markedly irritable,paranoid  with themes of perrvasive mistreatment, being singled out but w/u well formed , fixed delusional ideas.Cont enocurage med compliance scheduled for court hearing for TOO  1/28: Currently on CO due to hospital bed requirement. Reports fair sleep and appetite. Seen at bedside during rounds. Continues to refuse most medications, took senna and tylenol last night. Reporting back pain "15/10" and requesting more heat packs "I will die without these", however under no visible distress. Primary team pursuing TOO. Renewed CO.   1/29: Renewed CO. Last night took Depakote, Haldol and Senna. Med compliance has been partial.   1/30: Refused all meds yd, this AM accepted metoprolol. Making accusations that people are attempting to harm her and want her to die. Impulse control is impaired.  1/31: Continues with intermittent agitation, hostile and accusatory twd staff, refusing meds (except metoprolol), refusing consent to speak to daughter. Given ongoing agitation, impulsivity, disinhibition, and deconditioning 2/2 refusal to ambulate (also attempted to climb out of bed last night), pt poses a threat of harm to herself and requires ongoing inpatient hospitalization for stabilization and safety. TOO & retention court date next week.  2/1: Continues irritable, paranoid, refusing medications and engagement in interviews.   2/2: Minimally engaged in interview, refusing to answer most questions; labile, hostile, required IM Haldol 1mg overnight for agitation; refusing to ambulate or engage in PT; refusing pain control interventions other than hot packs and lidocaine patch. TOO in process.   2/4 Cont agitated poor sleep, this Am had vairable O2 sat, patient reported she feels she needs an inhaler for asthma. Patient to be seen by medicine, labs and RVP ordered. Will change haldol to olanzapine standing as response to prns of haldol and occ po's have had poor response.   2/5 Paranoid, severely agitated. Has URI. COnt meds prn inhlaer scheuled for TOO 2/7 2/6: Remains paranoid, agitated, accusatory. Frequently refusing meds, required 3x olanzapine PRNs over the weekend.  2/7: Remains paranoid and accusatory twd staff. Continues to refuse majority of medications. Currently with URI. Going to court today for retention and TOO.  2/8:  Patient irritable, agitated, will increase olanzapine with IM back up.   2/9 No major changes but taking meds, Cont olanzapine now at 2.5mg hs and Depakote. Plan cont to titrate, monitor for se  2/10 Patient w/o much response cont with intense paranoia and agitation. Cont Zyprexa cont to titrate.   2/11 Pt is sleepy most likely due to PRN zyprexa last night, remains disorganized and confused.   2/12 confused, disorganized, screams at times, required PRN zyprexa/melatonin last night.   2/13: The patient continues to be manic, disorganized, irritable, screaming at times.  She has been tolerating medications and prn's well - will increase Zyprexa to 5mg hs and change to Zydis to ensure compliance.  Continue 1:1.  2/14: The patient is not responding to Zyprexa, despite increasing dose and getting prn's.  Will change to Seroquel, as patient reportedly did well with it in the past.  Patient given 12.5mg this morning, tolerated well.  Will start 12.5mg tid.  2/15: The patient continues to be irritable, agitated, disorganized.  Some small improvement with Seroquel, sleeping better at night.  Continue Seroquel trial.  2/16: The patient continues to be agitated, frequently yelling out, irritable and angry.  She has been tolerating standing and prn Seroquel well, will continue to titrate to 25mg tid.  Continue 1:1.  2/17: The patient continues to be irritable, agitated, yelling out throughout the day.  She is having more restful periods during the day.  She has been tolerating Seroquel well, will continue.  Continue 1:1.  2/18: Patient remains agitated, disorganized, guarded, confused  2/19: Sustained agitated, irritability, likely paranoia, will increase quetiapine   2/20: Patient remains agitated, suspicious of staff, requiring prn meds, quetiapine increased   2/21 agitated paranoid, no response from Seroquel so far. Will increase Seroquel. Reviewed care with daughter.   2/22:  Paranoid agitated. Taking meds, cont  Seroquel, recheck labs.   2/23 Doing poorly with ongoing agitation now increased BUN Will push fluids. Will decrease Depakote consider d/c . COnisder changing to risperidone or haldol to avoid sedation. constipation  2/2 2/24 Poor resposne to Seroquel and olanzapine IMs with no reposne but sedation at times and constipation. Will change to risperdal po and haldol prn. Consider ECT given refractoriness. Plan recheck labs Monday 2/25 MArginal impression of improvement. Cont Risperdal trial with haldol prn    2/26 agitated, loud, screaming, disorganized and irritable, requiring PRN meds.  2/27 Patient irritable, paranoid with some more moments of being calmer, better related. Cont risperdal increase dose. Patient with elevated bicarb spoke with medicine, will repeat in AM  2/28 IMproved behaviorally. COnt risperidone trial. Labs about same. Cont meds review labs with medicine  3/2 Patient remains agitated not responded to risperidone at current dose as as is only better after multiples prns. Will increase risperidone, taper VPA  3/3 Patient with delirium superimposed on baseline dementia. Started on Vantin by medicine. Medicine to follow over weekend . Patient examined after sliding to floor , no pain or tenderness or decreased ROM hips or lower back. No intervention for this. Family notified about fall and UTI  3/4: Patient with delirium with episodes of agitation requiring prn haloperidol, but then calm and cooperative. Calm this AM.   3/5: Pt seen for f/u of vascular dementia. On CO for hospital bed. No significant overnight events reported, no PRNs needed overnight. VSS with systolic  this morning. Pt seen in the dayroom. Pt told writer she was "dead" and followed that by asking writer for orange juice. Accepted apple juice. Denies any other complaints.   3/6 Patient cont to fluctuate in symptoms from calm, placid to severely agitated. Will increase risperidone. Discussed with medicine will switch antibiotic to Augmentin based on sensitivities but may require transfer for IV if not improving.    3/7 Patient sl better. COnt risperidone dose just increased, reviewed case with medicine internist plans to switch to alternative antibiotic better suited to treat ESBL organism

## 2023-03-07 NOTE — CHART NOTE - NSCHARTNOTEFT_GEN_A_CORE
Nutrition f/u note:     Patient continues treatment at [facility] for bipolar and vascular dementia.     Diet : Soft and Bite sized and Ensure Plus High Protein x2/day      Patient reports [ ] nausea  [ ] vomiting [ ] diarrhea [ ] constipation  [ ]chewing problems [ ] swallowing issues  [ ] other:     PO intake:  < 50% [ ] 50-75% [ ]   % [ x]  other :    Source for PO intake [ ] Patient [ ] family [x ] chart [ x] staff [ ] other    Unable to speak to patient- patient yelling during PT. As per staff, patient has good po intake of meals. No difficulty chewing or swallowing. Feeds self meals. On bowel meds for bowel regulation. On probiotic as ordered.        Current Weight: 96# (2/18) weight stable since admission  % Weight Change    Pertinent Medications: MEDICATIONS  (STANDING):  artificial  tears Solution 1 Drop(s) Both EYES two times a day  aspirin enteric coated 81 milliGRAM(s) Oral daily  enoxaparin Injectable 40 milliGRAM(s) SubCutaneous <User Schedule>  fosfomycin 3 Gram(s) Oral once  lidocaine   4% Patch 1 Patch Transdermal every 24 hours  melatonin. 3 milliGRAM(s) Oral at bedtime  metoprolol tartrate 25 milliGRAM(s) Oral two times a day  polyethylene glycol 3350 17 Gram(s) Oral daily  risperiDONE   Tablet 0.5 milliGRAM(s) Oral <User Schedule>  saccharomyces boulardii 250 milliGRAM(s) Oral two times a day  senna 2 Tablet(s) Oral at bedtime  tiotropium 2.5 MICROgram(s) Inhaler 2 Puff(s) Inhalation daily    MEDICATIONS  (PRN):  acetaminophen     Tablet .. 650 milliGRAM(s) Oral every 6 hours PRN Mild Pain (1 - 3), Moderate Pain (4 - 6), Severe Pain (7 - 10)  albuterol    90 MICROgram(s) HFA Inhaler 2 Puff(s) Inhalation every 6 hours PRN Shortness of Breath and/or Wheezing  aluminum hydroxide/magnesium hydroxide/simethicone Suspension 30 milliLiter(s) Oral every 4 hours PRN Dyspepsia  benzocaine/menthol Lozenge 1 Lozenge Oral every 2 hours PRN sore throat  bisacodyl 5 milliGRAM(s) Oral every 12 hours PRN Constipation  haloperidol     Tablet 1 milliGRAM(s) Oral every 6 hours PRN agitation  haloperidol    Injectable 1 milliGRAM(s) IntraMuscular once PRN agitation  haloperidol    Injectable 1 milliGRAM(s) IntraMuscular once PRN agitation  haloperidol    Injectable 1 milliGRAM(s) IntraMuscular three times a day PRN refusal of court ordered meds    Pertinent Labs:   03-01 Phos 3.3 mg/dL      Skin: intact    Estimated Needs:   [x ] no change since previous assessment  [ ] recalculated:       Previous Nutrition Diagnosis:     [ ] Inadequate Energy Intake [ ]Inadequate Oral Intake [ ] Excessive Energy Intake     [ ] Underweight [ ] Increased Nutrient Needs [ ] Overweight/Obesity     [ ] Altered GI Function [ ] Unintended Weight Loss [ ] Food & Nutrition Related Knowledge Deficit [x ] Malnutrition        Nutrition Diagnosis is [ ] ongoing  [x ] resolved [ ] not applicable        New Nutrition Diagnosis: [ x] not applicable    [ ] Inadequate Protein Energy Intake [ ]Inadequate Oral Intake [ ] Excessive Energy Intake     [ ] Underweight [ ] Increased Nutrient Needs [ ] Overweight/Obesity     [ ] Altered GI Function [ ] Unintended Weight Loss [ ] Food & Nutrition Related Knowledge Deficit[ ] Limited Adherence to nutrition related recommendations [ ] Malnutrition  [ ] other: Free text         Recommend/Plan:    [x ] Maintain prescribed diet    [ x] Maintain Hormel Vital shakes BID    [x] Florastor (probiotic) as ordered    [ x] RD remains available prn           Monitoring and Evaluation:     [x ] PO intake [x ] Tolerance to diet prescription [ x] weights [ x] follow up per protocol    Jackie Rivera MS RDN  Pager #[phone]
Reviewed prior hospitalist assessment.  Case discussed with Dr Wright.    Would suggest given fosfomycin 3g PO x1 given urine cx showing ESBL Proteus Mirabilis.  NF form done. Order signed in EMR.

## 2023-03-07 NOTE — BH INPATIENT PSYCHIATRY PROGRESS NOTE - NSBHCHARTREVIEWVS_PSY_A_CORE FT
Vital Signs Last 24 Hrs  T(C): 36.4 (03-07-23 @ 08:19), Max: 36.4 (03-07-23 @ 08:19)  T(F): 97.5 (03-07-23 @ 08:19), Max: 97.5 (03-07-23 @ 08:19)  HR: 83 (03-06-23 @ 20:40) (83 - 83)  BP: 107/67 (03-06-23 @ 20:40) (107/67 - 107/67)  BP(mean): --  RR: 16 (03-06-23 @ 20:40) (16 - 16)  SpO2: 98% (03-06-23 @ 20:40) (98% - 98%)    Orthostatic VS  03-07-23 @ 08:19  Lying BP: --/-- HR: --  Sitting BP: 111/60 HR: 67  Standing BP: --/-- HR: --  Site: --  Mode: --

## 2023-03-08 PROCEDURE — 99232 SBSQ HOSP IP/OBS MODERATE 35: CPT

## 2023-03-08 RX ORDER — RISPERIDONE 4 MG/1
1 TABLET ORAL AT BEDTIME
Refills: 0 | Status: DISCONTINUED | OUTPATIENT
Start: 2023-03-08 | End: 2023-03-13

## 2023-03-08 RX ORDER — RISPERIDONE 4 MG/1
0.5 TABLET ORAL
Refills: 0 | Status: DISCONTINUED | OUTPATIENT
Start: 2023-03-08 | End: 2023-03-13

## 2023-03-08 RX ADMIN — POLYETHYLENE GLYCOL 3350 17 GRAM(S): 17 POWDER, FOR SOLUTION ORAL at 10:30

## 2023-03-08 RX ADMIN — LIDOCAINE 1 PATCH: 4 CREAM TOPICAL at 21:57

## 2023-03-08 RX ADMIN — HALOPERIDOL DECANOATE 1 MILLIGRAM(S): 100 INJECTION INTRAMUSCULAR at 17:04

## 2023-03-08 RX ADMIN — LIDOCAINE 1 PATCH: 4 CREAM TOPICAL at 20:17

## 2023-03-08 RX ADMIN — SENNA PLUS 2 TABLET(S): 8.6 TABLET ORAL at 21:57

## 2023-03-08 RX ADMIN — Medication 1 DROP(S): at 10:29

## 2023-03-08 RX ADMIN — RISPERIDONE 0.5 MILLIGRAM(S): 4 TABLET ORAL at 05:57

## 2023-03-08 RX ADMIN — TIOTROPIUM BROMIDE 2 PUFF(S): 18 CAPSULE ORAL; RESPIRATORY (INHALATION) at 10:33

## 2023-03-08 RX ADMIN — Medication 25 MILLIGRAM(S): at 21:56

## 2023-03-08 RX ADMIN — RISPERIDONE 0.5 MILLIGRAM(S): 4 TABLET ORAL at 12:26

## 2023-03-08 RX ADMIN — ENOXAPARIN SODIUM 40 MILLIGRAM(S): 100 INJECTION SUBCUTANEOUS at 10:30

## 2023-03-08 RX ADMIN — Medication 25 MILLIGRAM(S): at 10:30

## 2023-03-08 RX ADMIN — RISPERIDONE 1 MILLIGRAM(S): 4 TABLET ORAL at 21:57

## 2023-03-08 RX ADMIN — Medication 3 MILLIGRAM(S): at 21:57

## 2023-03-08 RX ADMIN — Medication 81 MILLIGRAM(S): at 10:30

## 2023-03-08 RX ADMIN — HALOPERIDOL DECANOATE 1 MILLIGRAM(S): 100 INJECTION INTRAMUSCULAR at 00:19

## 2023-03-08 RX ADMIN — Medication 250 MILLIGRAM(S): at 10:30

## 2023-03-08 NOTE — BH INPATIENT PSYCHIATRY PROGRESS NOTE - MSE UNSTRUCTURED FT
Patient is awake and alert. Calm when evaluated. Mood perplexed Affect constricted Speech fluent. Oriented to day and month. Some paranoia about staff accusing her of antisemetism. No perceptual disturbance. No tremor. Limited insight.

## 2023-03-08 NOTE — BH INPATIENT PSYCHIATRY PROGRESS NOTE - CURRENT MEDICATION
MEDICATIONS  (STANDING):  artificial  tears Solution 1 Drop(s) Both EYES two times a day  aspirin enteric coated 81 milliGRAM(s) Oral daily  enoxaparin Injectable 40 milliGRAM(s) SubCutaneous <User Schedule>  lidocaine   4% Patch 1 Patch Transdermal every 24 hours  melatonin. 3 milliGRAM(s) Oral at bedtime  metoprolol tartrate 25 milliGRAM(s) Oral two times a day  polyethylene glycol 3350 17 Gram(s) Oral daily  risperiDONE   Tablet 0.5 milliGRAM(s) Oral <User Schedule>  risperiDONE   Tablet 1 milliGRAM(s) Oral at bedtime  saccharomyces boulardii 250 milliGRAM(s) Oral two times a day  senna 2 Tablet(s) Oral at bedtime  tiotropium 2.5 MICROgram(s) Inhaler 2 Puff(s) Inhalation daily    MEDICATIONS  (PRN):  acetaminophen     Tablet .. 650 milliGRAM(s) Oral every 6 hours PRN Mild Pain (1 - 3), Moderate Pain (4 - 6), Severe Pain (7 - 10)  albuterol    90 MICROgram(s) HFA Inhaler 2 Puff(s) Inhalation every 6 hours PRN Shortness of Breath and/or Wheezing  aluminum hydroxide/magnesium hydroxide/simethicone Suspension 30 milliLiter(s) Oral every 4 hours PRN Dyspepsia  benzocaine/menthol Lozenge 1 Lozenge Oral every 2 hours PRN sore throat  bisacodyl 5 milliGRAM(s) Oral every 12 hours PRN Constipation  haloperidol     Tablet 1 milliGRAM(s) Oral every 6 hours PRN agitation  haloperidol    Injectable 1 milliGRAM(s) IntraMuscular once PRN agitation  haloperidol    Injectable 1 milliGRAM(s) IntraMuscular once PRN agitation  haloperidol    Injectable 1 milliGRAM(s) IntraMuscular three times a day PRN refusal of court ordered meds

## 2023-03-08 NOTE — BH INPATIENT PSYCHIATRY PROGRESS NOTE - NSBHASSESSSUMMFT_PSY_ALL_CORE
Dinorah Hogan is an 88 year old female, , domiciled with daughter with PPHx of bipolar I disorder, 1 previous inpatient psychiatric hospitalization in 1970s, no known history of SI/SA and PMHx of asthma, GERD, sciatica with previous vertebral fracture who is admitted on 9.27 status for symptoms of hortencia including irritability, loud speech, and agitation.     DDx: hortencia vs hypomania vs comorbid delirium    Today, patient is angry about her current admission and denying any psychiatric history. She is asking to be discharged immediately and yelling at treatment team. She is noted to have loud and rapid speech, but is able to be interrupted. She states she has been sleeping poorly due to hip and R knee pain. She denies S/IIP, H/IIP, AVH, paranoia, or ideas of reference. She is AOx1-2 (person, place--hospital), but unable to state year or location in [place]. She requires 9.27 inpatient admission for evaluation and management of hortencia.    1/22: less irritable but still requiring PRN med, more compliant with meds, no SI/HI.  1/23: Accepted meds yd and this AM though required PRN yd. Less irritable, appropriately conversant, making needs known. Suspicious bordering on paranoid, however no fully formed delusional thought content apparent.  1/24: Irritable and labile, suspicious regarding staff members. Discussed medication, pt will be offered psychiatric meds and is aware she has right to refuse.  1/25: Hostile, irritable, labile. TOO application in progress given poor insight and intermittent refusal of psychiatric mediations. Pt at risk for deconditioning given refusal to ambulate and minimal engagement with PT, given ambulatory at home refusal here appears to be behavioral, will continue to encourage participation. Refused cognitive evaluation today. Given ongoing intermittent agitation, paranoia, physical deconditioning, and poor insight into condition, pt requires continued inpatient hospitalization for stabilization and safety.  1/26: Pt remains labile, irritable, and paranoid toward staff. Although pt makes provocative statements (e.g. "she controls the robots," "the Jews will hate me"), these statements appear to represent intense feeling rather than fully formed delusion. Continues to refuse cognitive evaluation or consent to contact family. Given ongoing intermittent agitation, paranoia, physical deconditioning, and poor insight into condition, pt requires continued inpatient hospitalization for stabilization and safety.  1/27 Patient agitated, markedly irritable,paranoid  with themes of perrvasive mistreatment, being singled out but w/u well formed , fixed delusional ideas.Cont enocurage med compliance scheduled for court hearing for TOO  1/28: Currently on CO due to hospital bed requirement. Reports fair sleep and appetite. Seen at bedside during rounds. Continues to refuse most medications, took senna and tylenol last night. Reporting back pain "15/10" and requesting more heat packs "I will die without these", however under no visible distress. Primary team pursuing TOO. Renewed CO.   1/29: Renewed CO. Last night took Depakote, Haldol and Senna. Med compliance has been partial.   1/30: Refused all meds yd, this AM accepted metoprolol. Making accusations that people are attempting to harm her and want her to die. Impulse control is impaired.  1/31: Continues with intermittent agitation, hostile and accusatory twd staff, refusing meds (except metoprolol), refusing consent to speak to daughter. Given ongoing agitation, impulsivity, disinhibition, and deconditioning 2/2 refusal to ambulate (also attempted to climb out of bed last night), pt poses a threat of harm to herself and requires ongoing inpatient hospitalization for stabilization and safety. TOO & retention court date next week.  2/1: Continues irritable, paranoid, refusing medications and engagement in interviews.   2/2: Minimally engaged in interview, refusing to answer most questions; labile, hostile, required IM Haldol 1mg overnight for agitation; refusing to ambulate or engage in PT; refusing pain control interventions other than hot packs and lidocaine patch. TOO in process.   2/4 Cont agitated poor sleep, this Am had vairable O2 sat, patient reported she feels she needs an inhaler for asthma. Patient to be seen by medicine, labs and RVP ordered. Will change haldol to olanzapine standing as response to prns of haldol and occ po's have had poor response.   2/5 Paranoid, severely agitated. Has URI. COnt meds prn inhlaer scheuled for TOO 2/7 2/6: Remains paranoid, agitated, accusatory. Frequently refusing meds, required 3x olanzapine PRNs over the weekend.  2/7: Remains paranoid and accusatory twd staff. Continues to refuse majority of medications. Currently with URI. Going to court today for retention and TOO.  2/8:  Patient irritable, agitated, will increase olanzapine with IM back up.   2/9 No major changes but taking meds, Cont olanzapine now at 2.5mg hs and Depakote. Plan cont to titrate, monitor for se  2/10 Patient w/o much response cont with intense paranoia and agitation. Cont Zyprexa cont to titrate.   2/11 Pt is sleepy most likely due to PRN zyprexa last night, remains disorganized and confused.   2/12 confused, disorganized, screams at times, required PRN zyprexa/melatonin last night.   2/13: The patient continues to be manic, disorganized, irritable, screaming at times.  She has been tolerating medications and prn's well - will increase Zyprexa to 5mg hs and change to Zydis to ensure compliance.  Continue 1:1.  2/14: The patient is not responding to Zyprexa, despite increasing dose and getting prn's.  Will change to Seroquel, as patient reportedly did well with it in the past.  Patient given 12.5mg this morning, tolerated well.  Will start 12.5mg tid.  2/15: The patient continues to be irritable, agitated, disorganized.  Some small improvement with Seroquel, sleeping better at night.  Continue Seroquel trial.  2/16: The patient continues to be agitated, frequently yelling out, irritable and angry.  She has been tolerating standing and prn Seroquel well, will continue to titrate to 25mg tid.  Continue 1:1.  2/17: The patient continues to be irritable, agitated, yelling out throughout the day.  She is having more restful periods during the day.  She has been tolerating Seroquel well, will continue.  Continue 1:1.  2/18: Patient remains agitated, disorganized, guarded, confused  2/19: Sustained agitated, irritability, likely paranoia, will increase quetiapine   2/20: Patient remains agitated, suspicious of staff, requiring prn meds, quetiapine increased   2/21 agitated paranoid, no response from Seroquel so far. Will increase Seroquel. Reviewed care with daughter.   2/22:  Paranoid agitated. Taking meds, cont  Seroquel, recheck labs.   2/23 Doing poorly with ongoing agitation now increased BUN Will push fluids. Will decrease Depakote consider d/c . COnisder changing to risperidone or haldol to avoid sedation. constipation  2/2 2/24 Poor resposne to Seroquel and olanzapine IMs with no reposne but sedation at times and constipation. Will change to risperdal po and haldol prn. Consider ECT given refractoriness. Plan recheck labs Monday 2/25 MArginal impression of improvement. Cont Risperdal trial with haldol prn    2/26 agitated, loud, screaming, disorganized and irritable, requiring PRN meds.  2/27 Patient irritable, paranoid with some more moments of being calmer, better related. Cont risperdal increase dose. Patient with elevated bicarb spoke with medicine, will repeat in AM  2/28 IMproved behaviorally. COnt risperidone trial. Labs about same. Cont meds review labs with medicine  3/2 Patient remains agitated not responded to risperidone at current dose as as is only better after multiples prns. Will increase risperidone, taper VPA  3/3 Patient with delirium superimposed on baseline dementia. Started on Vantin by medicine. Medicine to follow over weekend . Patient examined after sliding to floor , no pain or tenderness or decreased ROM hips or lower back. No intervention for this. Family notified about fall and UTI  3/4: Patient with delirium with episodes of agitation requiring prn haloperidol, but then calm and cooperative. Calm this AM.   3/5: Pt seen for f/u of vascular dementia. On CO for hospital bed. No significant overnight events reported, no PRNs needed overnight. VSS with systolic  this morning. Pt seen in the dayroom. Pt told writer she was "dead" and followed that by asking writer for orange juice. Accepted apple juice. Denies any other complaints.   3/6 Patient cont to fluctuate in symptoms from calm, placid to severely agitated. Will increase risperidone. Discussed with medicine will switch antibiotic to Augmentin based on sensitivities but may require transfer for IV if not improving.    3/7 Patient sl better. COnt risperidone dose just increased, reviewed case with medicine internist plans to switch to alternative antibiotic better suited to treat ESBL organism  3/8 Still agitated needing prns with some periods of being calmer. Cont meds but increase risperidone to 1mg at hs

## 2023-03-08 NOTE — BH INPATIENT PSYCHIATRY PROGRESS NOTE - NSBHFUPINTERVALHXFT_PSY_A_CORE
Pt seen for f/u of vascular dementia, BAD. On CO for hospital bed. 2 prns yesterday for intractable screaming , agitation, paranoia VSS , afebrile. Urine culutre shows nl sandra plus 10-50k proteus. Eating well, quieter in Am, some napping

## 2023-03-08 NOTE — BH INPATIENT PSYCHIATRY PROGRESS NOTE - NSBHCHARTREVIEWVS_PSY_A_CORE FT
Vital Signs Last 24 Hrs  T(C): 36.4 (03-08-23 @ 10:14), Max: 36.6 (03-07-23 @ 20:52)  T(F): 97.6 (03-08-23 @ 10:14), Max: 97.9 (03-07-23 @ 20:52)  HR: --  BP: --  BP(mean): --  RR: --  SpO2: --    Orthostatic VS  03-08-23 @ 10:14  Lying BP: --/-- HR: --  Sitting BP: 129/70 HR: 69  Standing BP: --/-- HR: --  Site: --  Mode: --  Orthostatic VS  03-07-23 @ 20:52  Lying BP: --/-- HR: --  Sitting BP: 124/59 HR: 65  Standing BP: --/-- HR: --  Site: --  Mode: --  Orthostatic VS  03-07-23 @ 08:19  Lying BP: --/-- HR: --  Sitting BP: 111/60 HR: 67  Standing BP: --/-- HR: --  Site: --  Mode: --

## 2023-03-09 PROCEDURE — 99232 SBSQ HOSP IP/OBS MODERATE 35: CPT

## 2023-03-09 RX ORDER — TRAZODONE HCL 50 MG
25 TABLET ORAL AT BEDTIME
Refills: 0 | Status: DISCONTINUED | OUTPATIENT
Start: 2023-03-09 | End: 2023-03-16

## 2023-03-09 RX ADMIN — RISPERIDONE 0.5 MILLIGRAM(S): 4 TABLET ORAL at 12:39

## 2023-03-09 RX ADMIN — RISPERIDONE 1 MILLIGRAM(S): 4 TABLET ORAL at 21:50

## 2023-03-09 RX ADMIN — Medication 250 MILLIGRAM(S): at 21:49

## 2023-03-09 RX ADMIN — TIOTROPIUM BROMIDE 2 PUFF(S): 18 CAPSULE ORAL; RESPIRATORY (INHALATION) at 09:42

## 2023-03-09 RX ADMIN — Medication 1 DROP(S): at 21:55

## 2023-03-09 RX ADMIN — ENOXAPARIN SODIUM 40 MILLIGRAM(S): 100 INJECTION SUBCUTANEOUS at 09:42

## 2023-03-09 RX ADMIN — LIDOCAINE 1 PATCH: 4 CREAM TOPICAL at 09:31

## 2023-03-09 RX ADMIN — LIDOCAINE 1 PATCH: 4 CREAM TOPICAL at 21:48

## 2023-03-09 RX ADMIN — HALOPERIDOL DECANOATE 1 MILLIGRAM(S): 100 INJECTION INTRAMUSCULAR at 17:57

## 2023-03-09 RX ADMIN — Medication 25 MILLIGRAM(S): at 21:51

## 2023-03-09 RX ADMIN — Medication 250 MILLIGRAM(S): at 09:41

## 2023-03-09 RX ADMIN — RISPERIDONE 0.5 MILLIGRAM(S): 4 TABLET ORAL at 05:25

## 2023-03-09 RX ADMIN — SENNA PLUS 2 TABLET(S): 8.6 TABLET ORAL at 21:49

## 2023-03-09 RX ADMIN — POLYETHYLENE GLYCOL 3350 17 GRAM(S): 17 POWDER, FOR SOLUTION ORAL at 09:42

## 2023-03-09 RX ADMIN — Medication 3 MILLIGRAM(S): at 21:50

## 2023-03-09 RX ADMIN — Medication 81 MILLIGRAM(S): at 09:41

## 2023-03-09 RX ADMIN — Medication 650 MILLIGRAM(S): at 03:46

## 2023-03-09 RX ADMIN — Medication 25 MILLIGRAM(S): at 21:50

## 2023-03-09 RX ADMIN — HALOPERIDOL DECANOATE 1 MILLIGRAM(S): 100 INJECTION INTRAMUSCULAR at 10:08

## 2023-03-09 RX ADMIN — LIDOCAINE 1 PATCH: 4 CREAM TOPICAL at 07:44

## 2023-03-09 NOTE — BH INPATIENT PSYCHIATRY PROGRESS NOTE - MSE UNSTRUCTURED FT
Patient is awake and alert. Patient w/o sig EPS. Speech loud high pitched.  Mood perplexed , affect agitated Speech yelling at times nl volume others. Says we are in Oct 2023. Says PT theapist is trying to kill her by causing her to fall.

## 2023-03-09 NOTE — BH INPATIENT PSYCHIATRY PROGRESS NOTE - NSBHCHARTREVIEWVS_PSY_A_CORE FT
Vital Signs Last 24 Hrs  T(C): 36.3 (03-09-23 @ 07:26), Max: 36.6 (03-08-23 @ 20:27)  T(F): 97.4 (03-09-23 @ 07:26), Max: 97.8 (03-08-23 @ 20:27)  HR: 77 (03-08-23 @ 20:27) (77 - 77)  BP: 108/60 (03-08-23 @ 20:27) (108/60 - 108/60)  BP(mean): --  RR: 18 (03-09-23 @ 07:26) (18 - 18)  SpO2: --    Orthostatic VS  03-09-23 @ 07:26  Lying BP: --/-- HR: --  Sitting BP: 129/61 HR: 61  Standing BP: --/-- HR: --  Site: upper right arm  Mode: electronic  Orthostatic VS  03-08-23 @ 10:14  Lying BP: --/-- HR: --  Sitting BP: 129/70 HR: 69  Standing BP: --/-- HR: --  Site: --  Mode: --  Orthostatic VS  03-07-23 @ 20:52  Lying BP: --/-- HR: --  Sitting BP: 124/59 HR: 65  Standing BP: --/-- HR: --  Site: --  Mode: --

## 2023-03-09 NOTE — BH INPATIENT PSYCHIATRY PROGRESS NOTE - NSBHASSESSSUMMFT_PSY_ALL_CORE
Dinorah Hogan is an 88 year old female, , domiciled with daughter with PPHx of bipolar I disorder, 1 previous inpatient psychiatric hospitalization in 1970s, no known history of SI/SA and PMHx of asthma, GERD, sciatica with previous vertebral fracture who is admitted on 9.27 status for symptoms of hortencia including irritability, loud speech, and agitation.     DDx: hortencia vs hypomania vs comorbid delirium    Today, patient is angry about her current admission and denying any psychiatric history. She is asking to be discharged immediately and yelling at treatment team. She is noted to have loud and rapid speech, but is able to be interrupted. She states she has been sleeping poorly due to hip and R knee pain. She denies S/IIP, H/IIP, AVH, paranoia, or ideas of reference. She is AOx1-2 (person, place--hospital), but unable to state year or location in [place]. She requires 9.27 inpatient admission for evaluation and management of hortencia.    1/22: less irritable but still requiring PRN med, more compliant with meds, no SI/HI.  1/23: Accepted meds yd and this AM though required PRN yd. Less irritable, appropriately conversant, making needs known. Suspicious bordering on paranoid, however no fully formed delusional thought content apparent.  1/24: Irritable and labile, suspicious regarding staff members. Discussed medication, pt will be offered psychiatric meds and is aware she has right to refuse.  1/25: Hostile, irritable, labile. TOO application in progress given poor insight and intermittent refusal of psychiatric mediations. Pt at risk for deconditioning given refusal to ambulate and minimal engagement with PT, given ambulatory at home refusal here appears to be behavioral, will continue to encourage participation. Refused cognitive evaluation today. Given ongoing intermittent agitation, paranoia, physical deconditioning, and poor insight into condition, pt requires continued inpatient hospitalization for stabilization and safety.  1/26: Pt remains labile, irritable, and paranoid toward staff. Although pt makes provocative statements (e.g. "she controls the robots," "the Jews will hate me"), these statements appear to represent intense feeling rather than fully formed delusion. Continues to refuse cognitive evaluation or consent to contact family. Given ongoing intermittent agitation, paranoia, physical deconditioning, and poor insight into condition, pt requires continued inpatient hospitalization for stabilization and safety.  1/27 Patient agitated, markedly irritable,paranoid  with themes of perrvasive mistreatment, being singled out but w/u well formed , fixed delusional ideas.Cont enocurage med compliance scheduled for court hearing for TOO  1/28: Currently on CO due to hospital bed requirement. Reports fair sleep and appetite. Seen at bedside during rounds. Continues to refuse most medications, took senna and tylenol last night. Reporting back pain "15/10" and requesting more heat packs "I will die without these", however under no visible distress. Primary team pursuing TOO. Renewed CO.   1/29: Renewed CO. Last night took Depakote, Haldol and Senna. Med compliance has been partial.   1/30: Refused all meds yd, this AM accepted metoprolol. Making accusations that people are attempting to harm her and want her to die. Impulse control is impaired.  1/31: Continues with intermittent agitation, hostile and accusatory twd staff, refusing meds (except metoprolol), refusing consent to speak to daughter. Given ongoing agitation, impulsivity, disinhibition, and deconditioning 2/2 refusal to ambulate (also attempted to climb out of bed last night), pt poses a threat of harm to herself and requires ongoing inpatient hospitalization for stabilization and safety. TOO & retention court date next week.  2/1: Continues irritable, paranoid, refusing medications and engagement in interviews.   2/2: Minimally engaged in interview, refusing to answer most questions; labile, hostile, required IM Haldol 1mg overnight for agitation; refusing to ambulate or engage in PT; refusing pain control interventions other than hot packs and lidocaine patch. TOO in process.   2/4 Cont agitated poor sleep, this Am had vairable O2 sat, patient reported she feels she needs an inhaler for asthma. Patient to be seen by medicine, labs and RVP ordered. Will change haldol to olanzapine standing as response to prns of haldol and occ po's have had poor response.   2/5 Paranoid, severely agitated. Has URI. COnt meds prn inhlaer scheuled for TOO 2/7 2/6: Remains paranoid, agitated, accusatory. Frequently refusing meds, required 3x olanzapine PRNs over the weekend.  2/7: Remains paranoid and accusatory twd staff. Continues to refuse majority of medications. Currently with URI. Going to court today for retention and TOO.  2/8:  Patient irritable, agitated, will increase olanzapine with IM back up.   2/9 No major changes but taking meds, Cont olanzapine now at 2.5mg hs and Depakote. Plan cont to titrate, monitor for se  2/10 Patient w/o much response cont with intense paranoia and agitation. Cont Zyprexa cont to titrate.   2/11 Pt is sleepy most likely due to PRN zyprexa last night, remains disorganized and confused.   2/12 confused, disorganized, screams at times, required PRN zyprexa/melatonin last night.   2/13: The patient continues to be manic, disorganized, irritable, screaming at times.  She has been tolerating medications and prn's well - will increase Zyprexa to 5mg hs and change to Zydis to ensure compliance.  Continue 1:1.  2/14: The patient is not responding to Zyprexa, despite increasing dose and getting prn's.  Will change to Seroquel, as patient reportedly did well with it in the past.  Patient given 12.5mg this morning, tolerated well.  Will start 12.5mg tid.  2/15: The patient continues to be irritable, agitated, disorganized.  Some small improvement with Seroquel, sleeping better at night.  Continue Seroquel trial.  2/16: The patient continues to be agitated, frequently yelling out, irritable and angry.  She has been tolerating standing and prn Seroquel well, will continue to titrate to 25mg tid.  Continue 1:1.  2/17: The patient continues to be irritable, agitated, yelling out throughout the day.  She is having more restful periods during the day.  She has been tolerating Seroquel well, will continue.  Continue 1:1.  2/18: Patient remains agitated, disorganized, guarded, confused  2/19: Sustained agitated, irritability, likely paranoia, will increase quetiapine   2/20: Patient remains agitated, suspicious of staff, requiring prn meds, quetiapine increased   2/21 agitated paranoid, no response from Seroquel so far. Will increase Seroquel. Reviewed care with daughter.   2/22:  Paranoid agitated. Taking meds, cont  Seroquel, recheck labs.   2/23 Doing poorly with ongoing agitation now increased BUN Will push fluids. Will decrease Depakote consider d/c . COnisder changing to risperidone or haldol to avoid sedation. constipation  2/2 2/24 Poor resposne to Seroquel and olanzapine IMs with no reposne but sedation at times and constipation. Will change to risperdal po and haldol prn. Consider ECT given refractoriness. Plan recheck labs Monday 2/25 MArginal impression of improvement. Cont Risperdal trial with haldol prn    2/26 agitated, loud, screaming, disorganized and irritable, requiring PRN meds.  2/27 Patient irritable, paranoid with some more moments of being calmer, better related. Cont risperdal increase dose. Patient with elevated bicarb spoke with medicine, will repeat in AM  2/28 IMproved behaviorally. COnt risperidone trial. Labs about same. Cont meds review labs with medicine  3/2 Patient remains agitated not responded to risperidone at current dose as as is only better after multiples prns. Will increase risperidone, taper VPA  3/3 Patient with delirium superimposed on baseline dementia. Started on Vantin by medicine. Medicine to follow over weekend . Patient examined after sliding to floor , no pain or tenderness or decreased ROM hips or lower back. No intervention for this. Family notified about fall and UTI  3/4: Patient with delirium with episodes of agitation requiring prn haloperidol, but then calm and cooperative. Calm this AM.   3/5: Pt seen for f/u of vascular dementia. On CO for hospital bed. No significant overnight events reported, no PRNs needed overnight. VSS with systolic  this morning. Pt seen in the dayroom. Pt told writer she was "dead" and followed that by asking writer for orange juice. Accepted apple juice. Denies any other complaints.   3/6 Patient cont to fluctuate in symptoms from calm, placid to severely agitated. Will increase risperidone. Discussed with medicine will switch antibiotic to Augmentin based on sensitivities but may require transfer for IV if not improving.    3/7 Patient sl better. COnt risperidone dose just increased, reviewed case with medicine internist plans to switch to alternative antibiotic better suited to treat ESBL organism  3/8 Still agitated needing prns with some periods of being calmer. Cont meds but increase risperidone to 1mg at hs  3/9 COnt paranoid agitated will add trazodone at night as sleep is poor and agitation worse. Plan increase risperdal if not improving will change to haldol

## 2023-03-09 NOTE — BH INPATIENT PSYCHIATRY PROGRESS NOTE - NSBHFUPINTERVALHXFT_PSY_A_CORE
Pt seen for f/u of vascular dementia, BAD. On CO for hospital bed. PAtient slept poor was yelling VSS , afebrile. PAtient particularly agitated when PT trying to help her ambulate.

## 2023-03-09 NOTE — BH INPATIENT PSYCHIATRY PROGRESS NOTE - CURRENT MEDICATION
MEDICATIONS  (STANDING):  artificial  tears Solution 1 Drop(s) Both EYES two times a day  aspirin enteric coated 81 milliGRAM(s) Oral daily  enoxaparin Injectable 40 milliGRAM(s) SubCutaneous <User Schedule>  lidocaine   4% Patch 1 Patch Transdermal every 24 hours  melatonin. 3 milliGRAM(s) Oral at bedtime  metoprolol tartrate 25 milliGRAM(s) Oral two times a day  polyethylene glycol 3350 17 Gram(s) Oral daily  risperiDONE   Tablet 0.5 milliGRAM(s) Oral <User Schedule>  risperiDONE   Tablet 1 milliGRAM(s) Oral at bedtime  saccharomyces boulardii 250 milliGRAM(s) Oral two times a day  senna 2 Tablet(s) Oral at bedtime  tiotropium 2.5 MICROgram(s) Inhaler 2 Puff(s) Inhalation daily  traZODone 25 milliGRAM(s) Oral at bedtime    MEDICATIONS  (PRN):  acetaminophen     Tablet .. 650 milliGRAM(s) Oral every 6 hours PRN Mild Pain (1 - 3), Moderate Pain (4 - 6), Severe Pain (7 - 10)  albuterol    90 MICROgram(s) HFA Inhaler 2 Puff(s) Inhalation every 6 hours PRN Shortness of Breath and/or Wheezing  aluminum hydroxide/magnesium hydroxide/simethicone Suspension 30 milliLiter(s) Oral every 4 hours PRN Dyspepsia  benzocaine/menthol Lozenge 1 Lozenge Oral every 2 hours PRN sore throat  bisacodyl 5 milliGRAM(s) Oral every 12 hours PRN Constipation  haloperidol     Tablet 1 milliGRAM(s) Oral every 6 hours PRN agitation  haloperidol    Injectable 1 milliGRAM(s) IntraMuscular once PRN agitation  haloperidol    Injectable 1 milliGRAM(s) IntraMuscular once PRN agitation  haloperidol    Injectable 1 milliGRAM(s) IntraMuscular three times a day PRN refusal of court ordered meds

## 2023-03-10 PROCEDURE — 99232 SBSQ HOSP IP/OBS MODERATE 35: CPT

## 2023-03-10 RX ADMIN — Medication 3 MILLIGRAM(S): at 20:46

## 2023-03-10 RX ADMIN — Medication 25 MILLIGRAM(S): at 20:46

## 2023-03-10 RX ADMIN — RISPERIDONE 0.5 MILLIGRAM(S): 4 TABLET ORAL at 12:51

## 2023-03-10 RX ADMIN — Medication 81 MILLIGRAM(S): at 09:49

## 2023-03-10 RX ADMIN — RISPERIDONE 1 MILLIGRAM(S): 4 TABLET ORAL at 20:46

## 2023-03-10 RX ADMIN — SENNA PLUS 2 TABLET(S): 8.6 TABLET ORAL at 20:46

## 2023-03-10 RX ADMIN — Medication 250 MILLIGRAM(S): at 20:45

## 2023-03-10 RX ADMIN — LIDOCAINE 1 PATCH: 4 CREAM TOPICAL at 07:02

## 2023-03-10 RX ADMIN — Medication 25 MILLIGRAM(S): at 09:49

## 2023-03-10 RX ADMIN — RISPERIDONE 0.5 MILLIGRAM(S): 4 TABLET ORAL at 05:32

## 2023-03-10 RX ADMIN — LIDOCAINE 1 PATCH: 4 CREAM TOPICAL at 20:47

## 2023-03-10 RX ADMIN — Medication 250 MILLIGRAM(S): at 09:49

## 2023-03-10 RX ADMIN — LIDOCAINE 1 PATCH: 4 CREAM TOPICAL at 12:55

## 2023-03-10 RX ADMIN — POLYETHYLENE GLYCOL 3350 17 GRAM(S): 17 POWDER, FOR SOLUTION ORAL at 09:49

## 2023-03-10 RX ADMIN — ENOXAPARIN SODIUM 40 MILLIGRAM(S): 100 INJECTION SUBCUTANEOUS at 09:49

## 2023-03-10 NOTE — BH INPATIENT PSYCHIATRY PROGRESS NOTE - MSE UNSTRUCTURED FT
Patient is awake and alert. Patient w/o sig EPS. Speech  high pitched, not as loud.  Mood perplexed , affect less labile Speech yelling at times nl volume currently. Says we are in Oct 2023. Says PT theapist is trying to kill her by causing her to fall but denies feeling unsafe with other staff. poor insight

## 2023-03-10 NOTE — BH INPATIENT PSYCHIATRY PROGRESS NOTE - NSBHASSESSSUMMFT_PSY_ALL_CORE
Dinorah Hogan is an 88 year old female, , domiciled with daughter with PPHx of bipolar I disorder, 1 previous inpatient psychiatric hospitalization in 1970s, no known history of SI/SA and PMHx of asthma, GERD, sciatica with previous vertebral fracture who is admitted on 9.27 status for symptoms of hortencia including irritability, loud speech, and agitation.     DDx: hortencia vs hypomania vs comorbid delirium    Today, patient is angry about her current admission and denying any psychiatric history. She is asking to be discharged immediately and yelling at treatment team. She is noted to have loud and rapid speech, but is able to be interrupted. She states she has been sleeping poorly due to hip and R knee pain. She denies S/IIP, H/IIP, AVH, paranoia, or ideas of reference. She is AOx1-2 (person, place--hospital), but unable to state year or location in [place]. She requires 9.27 inpatient admission for evaluation and management of hortencia.    1/22: less irritable but still requiring PRN med, more compliant with meds, no SI/HI.  1/23: Accepted meds yd and this AM though required PRN yd. Less irritable, appropriately conversant, making needs known. Suspicious bordering on paranoid, however no fully formed delusional thought content apparent.  1/24: Irritable and labile, suspicious regarding staff members. Discussed medication, pt will be offered psychiatric meds and is aware she has right to refuse.  1/25: Hostile, irritable, labile. TOO application in progress given poor insight and intermittent refusal of psychiatric mediations. Pt at risk for deconditioning given refusal to ambulate and minimal engagement with PT, given ambulatory at home refusal here appears to be behavioral, will continue to encourage participation. Refused cognitive evaluation today. Given ongoing intermittent agitation, paranoia, physical deconditioning, and poor insight into condition, pt requires continued inpatient hospitalization for stabilization and safety.  1/26: Pt remains labile, irritable, and paranoid toward staff. Although pt makes provocative statements (e.g. "she controls the robots," "the Jews will hate me"), these statements appear to represent intense feeling rather than fully formed delusion. Continues to refuse cognitive evaluation or consent to contact family. Given ongoing intermittent agitation, paranoia, physical deconditioning, and poor insight into condition, pt requires continued inpatient hospitalization for stabilization and safety.  1/27 Patient agitated, markedly irritable,paranoid  with themes of perrvasive mistreatment, being singled out but w/u well formed , fixed delusional ideas.Cont enocurage med compliance scheduled for court hearing for TOO  1/28: Currently on CO due to hospital bed requirement. Reports fair sleep and appetite. Seen at bedside during rounds. Continues to refuse most medications, took senna and tylenol last night. Reporting back pain "15/10" and requesting more heat packs "I will die without these", however under no visible distress. Primary team pursuing TOO. Renewed CO.   1/29: Renewed CO. Last night took Depakote, Haldol and Senna. Med compliance has been partial.   1/30: Refused all meds yd, this AM accepted metoprolol. Making accusations that people are attempting to harm her and want her to die. Impulse control is impaired.  1/31: Continues with intermittent agitation, hostile and accusatory twd staff, refusing meds (except metoprolol), refusing consent to speak to daughter. Given ongoing agitation, impulsivity, disinhibition, and deconditioning 2/2 refusal to ambulate (also attempted to climb out of bed last night), pt poses a threat of harm to herself and requires ongoing inpatient hospitalization for stabilization and safety. TOO & retention court date next week.  2/1: Continues irritable, paranoid, refusing medications and engagement in interviews.   2/2: Minimally engaged in interview, refusing to answer most questions; labile, hostile, required IM Haldol 1mg overnight for agitation; refusing to ambulate or engage in PT; refusing pain control interventions other than hot packs and lidocaine patch. TOO in process.   2/4 Cont agitated poor sleep, this Am had vairable O2 sat, patient reported she feels she needs an inhaler for asthma. Patient to be seen by medicine, labs and RVP ordered. Will change haldol to olanzapine standing as response to prns of haldol and occ po's have had poor response.   2/5 Paranoid, severely agitated. Has URI. COnt meds prn inhlaer scheuled for TOO 2/7 2/6: Remains paranoid, agitated, accusatory. Frequently refusing meds, required 3x olanzapine PRNs over the weekend.  2/7: Remains paranoid and accusatory twd staff. Continues to refuse majority of medications. Currently with URI. Going to court today for retention and TOO.  2/8:  Patient irritable, agitated, will increase olanzapine with IM back up.   2/9 No major changes but taking meds, Cont olanzapine now at 2.5mg hs and Depakote. Plan cont to titrate, monitor for se  2/10 Patient w/o much response cont with intense paranoia and agitation. Cont Zyprexa cont to titrate.   2/11 Pt is sleepy most likely due to PRN zyprexa last night, remains disorganized and confused.   2/12 confused, disorganized, screams at times, required PRN zyprexa/melatonin last night.   2/13: The patient continues to be manic, disorganized, irritable, screaming at times.  She has been tolerating medications and prn's well - will increase Zyprexa to 5mg hs and change to Zydis to ensure compliance.  Continue 1:1.  2/14: The patient is not responding to Zyprexa, despite increasing dose and getting prn's.  Will change to Seroquel, as patient reportedly did well with it in the past.  Patient given 12.5mg this morning, tolerated well.  Will start 12.5mg tid.  2/15: The patient continues to be irritable, agitated, disorganized.  Some small improvement with Seroquel, sleeping better at night.  Continue Seroquel trial.  2/16: The patient continues to be agitated, frequently yelling out, irritable and angry.  She has been tolerating standing and prn Seroquel well, will continue to titrate to 25mg tid.  Continue 1:1.  2/17: The patient continues to be irritable, agitated, yelling out throughout the day.  She is having more restful periods during the day.  She has been tolerating Seroquel well, will continue.  Continue 1:1.  2/18: Patient remains agitated, disorganized, guarded, confused  2/19: Sustained agitated, irritability, likely paranoia, will increase quetiapine   2/20: Patient remains agitated, suspicious of staff, requiring prn meds, quetiapine increased   2/21 agitated paranoid, no response from Seroquel so far. Will increase Seroquel. Reviewed care with daughter.   2/22:  Paranoid agitated. Taking meds, cont  Seroquel, recheck labs.   2/23 Doing poorly with ongoing agitation now increased BUN Will push fluids. Will decrease Depakote consider d/c . COnisder changing to risperidone or haldol to avoid sedation. constipation  2/2 2/24 Poor resposne to Seroquel and olanzapine IMs with no reposne but sedation at times and constipation. Will change to risperdal po and haldol prn. Consider ECT given refractoriness. Plan recheck labs Monday 2/25 MArginal impression of improvement. Cont Risperdal trial with haldol prn    2/26 agitated, loud, screaming, disorganized and irritable, requiring PRN meds.  2/27 Patient irritable, paranoid with some more moments of being calmer, better related. Cont risperdal increase dose. Patient with elevated bicarb spoke with medicine, will repeat in AM  2/28 IMproved behaviorally. COnt risperidone trial. Labs about same. Cont meds review labs with medicine  3/2 Patient remains agitated not responded to risperidone at current dose as as is only better after multiples prns. Will increase risperidone, taper VPA  3/3 Patient with delirium superimposed on baseline dementia. Started on Vantin by medicine. Medicine to follow over weekend . Patient examined after sliding to floor , no pain or tenderness or decreased ROM hips or lower back. No intervention for this. Family notified about fall and UTI  3/4: Patient with delirium with episodes of agitation requiring prn haloperidol, but then calm and cooperative. Calm this AM.   3/5: Pt seen for f/u of vascular dementia. On CO for hospital bed. No significant overnight events reported, no PRNs needed overnight. VSS with systolic  this morning. Pt seen in the dayroom. Pt told writer she was "dead" and followed that by asking writer for orange juice. Accepted apple juice. Denies any other complaints.   3/6 Patient cont to fluctuate in symptoms from calm, placid to severely agitated. Will increase risperidone. Discussed with medicine will switch antibiotic to Augmentin based on sensitivities but may require transfer for IV if not improving.    3/7 Patient sl better. COnt risperidone dose just increased, reviewed case with medicine internist plans to switch to alternative antibiotic better suited to treat ESBL organism  3/8 Still agitated needing prns with some periods of being calmer. Cont meds but increase risperidone to 1mg at hs  3/9 COnt paranoid agitated will add trazodone at night as sleep is poor and agitation worse. Plan increase risperdal if not improving will change to haldol  3/10 Improved today calmer and paranoia more circumsribed. Possible benefit from rx of UTI. Cont current treatment. Reviewed care with daughter

## 2023-03-10 NOTE — BH INPATIENT PSYCHIATRY PROGRESS NOTE - NSBHFUPINTERVALHXFT_PSY_A_CORE
Pt seen for f/u of vascular dementia, BAD. On CO for hospital bed. PAtient slept better last night awoke early morning was yellingVSS , afebrile. PAtient particularly agitated when PT trying to help her ambulate.

## 2023-03-10 NOTE — BH INPATIENT PSYCHIATRY PROGRESS NOTE - NSBHCHARTREVIEWVS_PSY_A_CORE FT
Vital Signs Last 24 Hrs  T(C): 36.2 (03-10-23 @ 08:01), Max: 36.2 (03-10-23 @ 08:01)  T(F): 97.2 (03-10-23 @ 08:01), Max: 97.2 (03-10-23 @ 08:01)  HR: 67 (03-09-23 @ 21:05) (67 - 67)  BP: 135/69 (03-09-23 @ 21:05) (135/69 - 135/69)  BP(mean): --  RR: 18 (03-10-23 @ 08:01) (18 - 18)  SpO2: --    Orthostatic VS  03-10-23 @ 08:01  Lying BP: 110/56 HR: 58  Sitting BP: 106/60 HR: 64  Standing BP: --/-- HR: --  Site: upper left arm  Mode: electronic  Orthostatic VS  03-09-23 @ 07:26  Lying BP: --/-- HR: --  Sitting BP: 129/61 HR: 61  Standing BP: --/-- HR: --  Site: upper right arm  Mode: electronic

## 2023-03-11 PROCEDURE — 99231 SBSQ HOSP IP/OBS SF/LOW 25: CPT

## 2023-03-11 RX ADMIN — RISPERIDONE 0.5 MILLIGRAM(S): 4 TABLET ORAL at 06:23

## 2023-03-11 RX ADMIN — RISPERIDONE 0.5 MILLIGRAM(S): 4 TABLET ORAL at 13:10

## 2023-03-11 RX ADMIN — Medication 3 MILLIGRAM(S): at 21:03

## 2023-03-11 RX ADMIN — Medication 25 MILLIGRAM(S): at 10:07

## 2023-03-11 RX ADMIN — RISPERIDONE 1 MILLIGRAM(S): 4 TABLET ORAL at 21:04

## 2023-03-11 RX ADMIN — ENOXAPARIN SODIUM 40 MILLIGRAM(S): 100 INJECTION SUBCUTANEOUS at 10:06

## 2023-03-11 RX ADMIN — Medication 25 MILLIGRAM(S): at 21:03

## 2023-03-11 RX ADMIN — LIDOCAINE 1 PATCH: 4 CREAM TOPICAL at 07:44

## 2023-03-11 RX ADMIN — Medication 250 MILLIGRAM(S): at 10:07

## 2023-03-11 RX ADMIN — Medication 81 MILLIGRAM(S): at 10:07

## 2023-03-11 RX ADMIN — Medication 250 MILLIGRAM(S): at 21:04

## 2023-03-11 RX ADMIN — POLYETHYLENE GLYCOL 3350 17 GRAM(S): 17 POWDER, FOR SOLUTION ORAL at 10:06

## 2023-03-11 RX ADMIN — SENNA PLUS 2 TABLET(S): 8.6 TABLET ORAL at 21:03

## 2023-03-11 RX ADMIN — LIDOCAINE 1 PATCH: 4 CREAM TOPICAL at 09:49

## 2023-03-11 RX ADMIN — TIOTROPIUM BROMIDE 2 PUFF(S): 18 CAPSULE ORAL; RESPIRATORY (INHALATION) at 10:06

## 2023-03-11 RX ADMIN — Medication 25 MILLIGRAM(S): at 21:02

## 2023-03-11 RX ADMIN — LIDOCAINE 1 PATCH: 4 CREAM TOPICAL at 21:45

## 2023-03-11 RX ADMIN — HALOPERIDOL DECANOATE 1 MILLIGRAM(S): 100 INJECTION INTRAMUSCULAR at 12:06

## 2023-03-11 NOTE — BH INPATIENT PSYCHIATRY PROGRESS NOTE - NSBHFUPINTERVALHXFT_PSY_A_CORE
Pt seen for f/u of vascular dementia, BAD. On CO for hospital bed. Patient slept better last nigh. Still periods of screaming but shorter durationtVSS , afebrile. Patient particularly agitated when PT trying to help her ambulate.

## 2023-03-11 NOTE — BH INPATIENT PSYCHIATRY PROGRESS NOTE - NSBHCHARTREVIEWVS_PSY_A_CORE FT
Vital Signs Last 24 Hrs  T(C): 36.7 (03-11-23 @ 08:30), Max: 36.7 (03-11-23 @ 08:30)  T(F): 98 (03-11-23 @ 08:30), Max: 98 (03-11-23 @ 08:30)  HR: 65 (03-11-23 @ 08:30) (65 - 65)  BP: 115/58 (03-11-23 @ 08:30) (115/58 - 124/67)  BP(mean): 73 (03-10-23 @ 21:11) (73 - 73)  RR: --  SpO2: --    Orthostatic VS  03-10-23 @ 08:01  Lying BP: 110/56 HR: 58  Sitting BP: 106/60 HR: 64  Standing BP: --/-- HR: --  Site: upper left arm  Mode: electronic

## 2023-03-11 NOTE — BH INPATIENT PSYCHIATRY PROGRESS NOTE - NSBHASSESSSUMMFT_PSY_ALL_CORE
Dinorah Hogan is an 88 year old female, , domiciled with daughter with PPHx of bipolar I disorder, 1 previous inpatient psychiatric hospitalization in 1970s, no known history of SI/SA and PMHx of asthma, GERD, sciatica with previous vertebral fracture who is admitted on 9.27 status for symptoms of hortencia including irritability, loud speech, and agitation.     DDx: hortencia vs hypomania vs comorbid delirium    Today, patient is angry about her current admission and denying any psychiatric history. She is asking to be discharged immediately and yelling at treatment team. She is noted to have loud and rapid speech, but is able to be interrupted. She states she has been sleeping poorly due to hip and R knee pain. She denies S/IIP, H/IIP, AVH, paranoia, or ideas of reference. She is AOx1-2 (person, place--hospital), but unable to state year or location in [place]. She requires 9.27 inpatient admission for evaluation and management of hortencia.    1/22: less irritable but still requiring PRN med, more compliant with meds, no SI/HI.  1/23: Accepted meds yd and this AM though required PRN yd. Less irritable, appropriately conversant, making needs known. Suspicious bordering on paranoid, however no fully formed delusional thought content apparent.  1/24: Irritable and labile, suspicious regarding staff members. Discussed medication, pt will be offered psychiatric meds and is aware she has right to refuse.  1/25: Hostile, irritable, labile. TOO application in progress given poor insight and intermittent refusal of psychiatric mediations. Pt at risk for deconditioning given refusal to ambulate and minimal engagement with PT, given ambulatory at home refusal here appears to be behavioral, will continue to encourage participation. Refused cognitive evaluation today. Given ongoing intermittent agitation, paranoia, physical deconditioning, and poor insight into condition, pt requires continued inpatient hospitalization for stabilization and safety.  1/26: Pt remains labile, irritable, and paranoid toward staff. Although pt makes provocative statements (e.g. "she controls the robots," "the Jews will hate me"), these statements appear to represent intense feeling rather than fully formed delusion. Continues to refuse cognitive evaluation or consent to contact family. Given ongoing intermittent agitation, paranoia, physical deconditioning, and poor insight into condition, pt requires continued inpatient hospitalization for stabilization and safety.  1/27 Patient agitated, markedly irritable,paranoid  with themes of perrvasive mistreatment, being singled out but w/u well formed , fixed delusional ideas.Cont enocurage med compliance scheduled for court hearing for TOO  1/28: Currently on CO due to hospital bed requirement. Reports fair sleep and appetite. Seen at bedside during rounds. Continues to refuse most medications, took senna and tylenol last night. Reporting back pain "15/10" and requesting more heat packs "I will die without these", however under no visible distress. Primary team pursuing TOO. Renewed CO.   1/29: Renewed CO. Last night took Depakote, Haldol and Senna. Med compliance has been partial.   1/30: Refused all meds yd, this AM accepted metoprolol. Making accusations that people are attempting to harm her and want her to die. Impulse control is impaired.  1/31: Continues with intermittent agitation, hostile and accusatory twd staff, refusing meds (except metoprolol), refusing consent to speak to daughter. Given ongoing agitation, impulsivity, disinhibition, and deconditioning 2/2 refusal to ambulate (also attempted to climb out of bed last night), pt poses a threat of harm to herself and requires ongoing inpatient hospitalization for stabilization and safety. TOO & retention court date next week.  2/1: Continues irritable, paranoid, refusing medications and engagement in interviews.   2/2: Minimally engaged in interview, refusing to answer most questions; labile, hostile, required IM Haldol 1mg overnight for agitation; refusing to ambulate or engage in PT; refusing pain control interventions other than hot packs and lidocaine patch. TOO in process.   2/4 Cont agitated poor sleep, this Am had vairable O2 sat, patient reported she feels she needs an inhaler for asthma. Patient to be seen by medicine, labs and RVP ordered. Will change haldol to olanzapine standing as response to prns of haldol and occ po's have had poor response.   2/5 Paranoid, severely agitated. Has URI. COnt meds prn inhlaer scheuled for TOO 2/7 2/6: Remains paranoid, agitated, accusatory. Frequently refusing meds, required 3x olanzapine PRNs over the weekend.  2/7: Remains paranoid and accusatory twd staff. Continues to refuse majority of medications. Currently with URI. Going to court today for retention and TOO.  2/8:  Patient irritable, agitated, will increase olanzapine with IM back up.   2/9 No major changes but taking meds, Cont olanzapine now at 2.5mg hs and Depakote. Plan cont to titrate, monitor for se  2/10 Patient w/o much response cont with intense paranoia and agitation. Cont Zyprexa cont to titrate.   2/11 Pt is sleepy most likely due to PRN zyprexa last night, remains disorganized and confused.   2/12 confused, disorganized, screams at times, required PRN zyprexa/melatonin last night.   2/13: The patient continues to be manic, disorganized, irritable, screaming at times.  She has been tolerating medications and prn's well - will increase Zyprexa to 5mg hs and change to Zydis to ensure compliance.  Continue 1:1.  2/14: The patient is not responding to Zyprexa, despite increasing dose and getting prn's.  Will change to Seroquel, as patient reportedly did well with it in the past.  Patient given 12.5mg this morning, tolerated well.  Will start 12.5mg tid.  2/15: The patient continues to be irritable, agitated, disorganized.  Some small improvement with Seroquel, sleeping better at night.  Continue Seroquel trial.  2/16: The patient continues to be agitated, frequently yelling out, irritable and angry.  She has been tolerating standing and prn Seroquel well, will continue to titrate to 25mg tid.  Continue 1:1.  2/17: The patient continues to be irritable, agitated, yelling out throughout the day.  She is having more restful periods during the day.  She has been tolerating Seroquel well, will continue.  Continue 1:1.  2/18: Patient remains agitated, disorganized, guarded, confused  2/19: Sustained agitated, irritability, likely paranoia, will increase quetiapine   2/20: Patient remains agitated, suspicious of staff, requiring prn meds, quetiapine increased   2/21 agitated paranoid, no response from Seroquel so far. Will increase Seroquel. Reviewed care with daughter.   2/22:  Paranoid agitated. Taking meds, cont  Seroquel, recheck labs.   2/23 Doing poorly with ongoing agitation now increased BUN Will push fluids. Will decrease Depakote consider d/c . COnisder changing to risperidone or haldol to avoid sedation. constipation  2/2 2/24 Poor resposne to Seroquel and olanzapine IMs with no reposne but sedation at times and constipation. Will change to risperdal po and haldol prn. Consider ECT given refractoriness. Plan recheck labs Monday 2/25 MArginal impression of improvement. Cont Risperdal trial with haldol prn    2/26 agitated, loud, screaming, disorganized and irritable, requiring PRN meds.  2/27 Patient irritable, paranoid with some more moments of being calmer, better related. Cont risperdal increase dose. Patient with elevated bicarb spoke with medicine, will repeat in AM  2/28 IMproved behaviorally. COnt risperidone trial. Labs about same. Cont meds review labs with medicine  3/2 Patient remains agitated not responded to risperidone at current dose as as is only better after multiples prns. Will increase risperidone, taper VPA  3/3 Patient with delirium superimposed on baseline dementia. Started on Vantin by medicine. Medicine to follow over weekend . Patient examined after sliding to floor , no pain or tenderness or decreased ROM hips or lower back. No intervention for this. Family notified about fall and UTI  3/4: Patient with delirium with episodes of agitation requiring prn haloperidol, but then calm and cooperative. Calm this AM.   3/5: Pt seen for f/u of vascular dementia. On CO for hospital bed. No significant overnight events reported, no PRNs needed overnight. VSS with systolic  this morning. Pt seen in the dayroom. Pt told writer she was "dead" and followed that by asking writer for orange juice. Accepted apple juice. Denies any other complaints.   3/6 Patient cont to fluctuate in symptoms from calm, placid to severely agitated. Will increase risperidone. Discussed with medicine will switch antibiotic to Augmentin based on sensitivities but may require transfer for IV if not improving.    3/7 Patient sl better. COnt risperidone dose just increased, reviewed case with medicine internist plans to switch to alternative antibiotic better suited to treat ESBL organism  3/8 Still agitated needing prns with some periods of being calmer. Cont meds but increase risperidone to 1mg at hs  3/9 COnt paranoid agitated will add trazodone at night as sleep is poor and agitation worse. Plan increase risperdal if not improving will change to haldol  3/10 Improved today calmer and paranoia more circumsribed. Possible benefit from rx of UTI. Cont current treatment. Reviewed care with daughter  3/11 Improved conisder increasing trazodone or risperidone for residual symptoms if they don't improve on current dose.

## 2023-03-11 NOTE — BH INPATIENT PSYCHIATRY PROGRESS NOTE - MSE UNSTRUCTURED FT
Patient is awake and alert. Patient w/o sig EPS. Speech  high pitched, not as loud.  Mood perplexed , affect less labile Speech yelling at times nl volume currently. Says we are in Oct 2023. Says PT theapist is trying to kill her by causing her to fall but denies feeling unsafe with other staff. Able to talk appropriately about other topics such as phone call with daughter. poor insight

## 2023-03-12 PROCEDURE — 99231 SBSQ HOSP IP/OBS SF/LOW 25: CPT

## 2023-03-12 RX ADMIN — TIOTROPIUM BROMIDE 2 PUFF(S): 18 CAPSULE ORAL; RESPIRATORY (INHALATION) at 09:12

## 2023-03-12 RX ADMIN — ENOXAPARIN SODIUM 40 MILLIGRAM(S): 100 INJECTION SUBCUTANEOUS at 09:11

## 2023-03-12 RX ADMIN — Medication 25 MILLIGRAM(S): at 20:31

## 2023-03-12 RX ADMIN — Medication 250 MILLIGRAM(S): at 20:29

## 2023-03-12 RX ADMIN — RISPERIDONE 1 MILLIGRAM(S): 4 TABLET ORAL at 20:29

## 2023-03-12 RX ADMIN — POLYETHYLENE GLYCOL 3350 17 GRAM(S): 17 POWDER, FOR SOLUTION ORAL at 09:10

## 2023-03-12 RX ADMIN — Medication 650 MILLIGRAM(S): at 16:30

## 2023-03-12 RX ADMIN — RISPERIDONE 0.5 MILLIGRAM(S): 4 TABLET ORAL at 06:45

## 2023-03-12 RX ADMIN — Medication 650 MILLIGRAM(S): at 14:56

## 2023-03-12 RX ADMIN — Medication 81 MILLIGRAM(S): at 09:11

## 2023-03-12 RX ADMIN — RISPERIDONE 0.5 MILLIGRAM(S): 4 TABLET ORAL at 12:22

## 2023-03-12 RX ADMIN — LIDOCAINE 1 PATCH: 4 CREAM TOPICAL at 14:56

## 2023-03-12 RX ADMIN — LIDOCAINE 1 PATCH: 4 CREAM TOPICAL at 19:40

## 2023-03-12 RX ADMIN — Medication 1 DROP(S): at 21:31

## 2023-03-12 RX ADMIN — Medication 650 MILLIGRAM(S): at 11:09

## 2023-03-12 RX ADMIN — Medication 650 MILLIGRAM(S): at 09:10

## 2023-03-12 RX ADMIN — SENNA PLUS 2 TABLET(S): 8.6 TABLET ORAL at 20:30

## 2023-03-12 RX ADMIN — Medication 3 MILLIGRAM(S): at 20:30

## 2023-03-12 RX ADMIN — Medication 1 DROP(S): at 09:12

## 2023-03-12 RX ADMIN — LIDOCAINE 1 PATCH: 4 CREAM TOPICAL at 11:08

## 2023-03-12 RX ADMIN — Medication 25 MILLIGRAM(S): at 09:10

## 2023-03-12 RX ADMIN — Medication 250 MILLIGRAM(S): at 09:11

## 2023-03-12 RX ADMIN — HALOPERIDOL DECANOATE 1 MILLIGRAM(S): 100 INJECTION INTRAMUSCULAR at 17:40

## 2023-03-12 RX ADMIN — Medication 25 MILLIGRAM(S): at 20:30

## 2023-03-12 NOTE — BH INPATIENT PSYCHIATRY PROGRESS NOTE - NSBHCHARTREVIEWVS_PSY_A_CORE FT
Vital Signs Last 24 Hrs  T(C): 36.4 (03-12-23 @ 07:32), Max: 36.4 (03-12-23 @ 07:32)  T(F): 97.6 (03-12-23 @ 07:32), Max: 97.6 (03-12-23 @ 07:32)  HR: 65 (03-12-23 @ 07:32) (65 - 65)  BP: 149/71 (03-12-23 @ 07:32) (125/65 - 149/71)  BP(mean): 73 (03-11-23 @ 20:41) (73 - 73)  RR: --  SpO2: --

## 2023-03-12 NOTE — BH INPATIENT PSYCHIATRY PROGRESS NOTE - NSBHASSESSSUMMFT_PSY_ALL_CORE
Dinorah Hogan is an 88 year old female, , domiciled with daughter with PPHx of bipolar I disorder, 1 previous inpatient psychiatric hospitalization in 1970s, no known history of SI/SA and PMHx of asthma, GERD, sciatica with previous vertebral fracture who is admitted on 9.27 status for symptoms of hortencia including irritability, loud speech, and agitation.     DDx: hortencia vs hypomania vs comorbid delirium    Today, patient is angry about her current admission and denying any psychiatric history. She is asking to be discharged immediately and yelling at treatment team. She is noted to have loud and rapid speech, but is able to be interrupted. She states she has been sleeping poorly due to hip and R knee pain. She denies S/IIP, H/IIP, AVH, paranoia, or ideas of reference. She is AOx1-2 (person, place--hospital), but unable to state year or location in [place]. She requires 9.27 inpatient admission for evaluation and management of hortencia.    1/22: less irritable but still requiring PRN med, more compliant with meds, no SI/HI.  1/23: Accepted meds yd and this AM though required PRN yd. Less irritable, appropriately conversant, making needs known. Suspicious bordering on paranoid, however no fully formed delusional thought content apparent.  1/24: Irritable and labile, suspicious regarding staff members. Discussed medication, pt will be offered psychiatric meds and is aware she has right to refuse.  1/25: Hostile, irritable, labile. TOO application in progress given poor insight and intermittent refusal of psychiatric mediations. Pt at risk for deconditioning given refusal to ambulate and minimal engagement with PT, given ambulatory at home refusal here appears to be behavioral, will continue to encourage participation. Refused cognitive evaluation today. Given ongoing intermittent agitation, paranoia, physical deconditioning, and poor insight into condition, pt requires continued inpatient hospitalization for stabilization and safety.  1/26: Pt remains labile, irritable, and paranoid toward staff. Although pt makes provocative statements (e.g. "she controls the robots," "the Jews will hate me"), these statements appear to represent intense feeling rather than fully formed delusion. Continues to refuse cognitive evaluation or consent to contact family. Given ongoing intermittent agitation, paranoia, physical deconditioning, and poor insight into condition, pt requires continued inpatient hospitalization for stabilization and safety.  1/27 Patient agitated, markedly irritable,paranoid  with themes of perrvasive mistreatment, being singled out but w/u well formed , fixed delusional ideas.Cont enocurage med compliance scheduled for court hearing for TOO  1/28: Currently on CO due to hospital bed requirement. Reports fair sleep and appetite. Seen at bedside during rounds. Continues to refuse most medications, took senna and tylenol last night. Reporting back pain "15/10" and requesting more heat packs "I will die without these", however under no visible distress. Primary team pursuing TOO. Renewed CO.   1/29: Renewed CO. Last night took Depakote, Haldol and Senna. Med compliance has been partial.   1/30: Refused all meds yd, this AM accepted metoprolol. Making accusations that people are attempting to harm her and want her to die. Impulse control is impaired.  1/31: Continues with intermittent agitation, hostile and accusatory twd staff, refusing meds (except metoprolol), refusing consent to speak to daughter. Given ongoing agitation, impulsivity, disinhibition, and deconditioning 2/2 refusal to ambulate (also attempted to climb out of bed last night), pt poses a threat of harm to herself and requires ongoing inpatient hospitalization for stabilization and safety. TOO & retention court date next week.  2/1: Continues irritable, paranoid, refusing medications and engagement in interviews.   2/2: Minimally engaged in interview, refusing to answer most questions; labile, hostile, required IM Haldol 1mg overnight for agitation; refusing to ambulate or engage in PT; refusing pain control interventions other than hot packs and lidocaine patch. TOO in process.   2/4 Cont agitated poor sleep, this Am had vairable O2 sat, patient reported she feels she needs an inhaler for asthma. Patient to be seen by medicine, labs and RVP ordered. Will change haldol to olanzapine standing as response to prns of haldol and occ po's have had poor response.   2/5 Paranoid, severely agitated. Has URI. COnt meds prn inhlaer scheuled for TOO 2/7 2/6: Remains paranoid, agitated, accusatory. Frequently refusing meds, required 3x olanzapine PRNs over the weekend.  2/7: Remains paranoid and accusatory twd staff. Continues to refuse majority of medications. Currently with URI. Going to court today for retention and TOO.  2/8:  Patient irritable, agitated, will increase olanzapine with IM back up.   2/9 No major changes but taking meds, Cont olanzapine now at 2.5mg hs and Depakote. Plan cont to titrate, monitor for se  2/10 Patient w/o much response cont with intense paranoia and agitation. Cont Zyprexa cont to titrate.   2/11 Pt is sleepy most likely due to PRN zyprexa last night, remains disorganized and confused.   2/12 confused, disorganized, screams at times, required PRN zyprexa/melatonin last night.   2/13: The patient continues to be manic, disorganized, irritable, screaming at times.  She has been tolerating medications and prn's well - will increase Zyprexa to 5mg hs and change to Zydis to ensure compliance.  Continue 1:1.  2/14: The patient is not responding to Zyprexa, despite increasing dose and getting prn's.  Will change to Seroquel, as patient reportedly did well with it in the past.  Patient given 12.5mg this morning, tolerated well.  Will start 12.5mg tid.  2/15: The patient continues to be irritable, agitated, disorganized.  Some small improvement with Seroquel, sleeping better at night.  Continue Seroquel trial.  2/16: The patient continues to be agitated, frequently yelling out, irritable and angry.  She has been tolerating standing and prn Seroquel well, will continue to titrate to 25mg tid.  Continue 1:1.  2/17: The patient continues to be irritable, agitated, yelling out throughout the day.  She is having more restful periods during the day.  She has been tolerating Seroquel well, will continue.  Continue 1:1.  2/18: Patient remains agitated, disorganized, guarded, confused  2/19: Sustained agitated, irritability, likely paranoia, will increase quetiapine   2/20: Patient remains agitated, suspicious of staff, requiring prn meds, quetiapine increased   2/21 agitated paranoid, no response from Seroquel so far. Will increase Seroquel. Reviewed care with daughter.   2/22:  Paranoid agitated. Taking meds, cont  Seroquel, recheck labs.   2/23 Doing poorly with ongoing agitation now increased BUN Will push fluids. Will decrease Depakote consider d/c . COnisder changing to risperidone or haldol to avoid sedation. constipation  2/2 2/24 Poor resposne to Seroquel and olanzapine IMs with no reposne but sedation at times and constipation. Will change to risperdal po and haldol prn. Consider ECT given refractoriness. Plan recheck labs Monday 2/25 MArginal impression of improvement. Cont Risperdal trial with haldol prn    2/26 agitated, loud, screaming, disorganized and irritable, requiring PRN meds.  2/27 Patient irritable, paranoid with some more moments of being calmer, better related. Cont risperdal increase dose. Patient with elevated bicarb spoke with medicine, will repeat in AM  2/28 IMproved behaviorally. COnt risperidone trial. Labs about same. Cont meds review labs with medicine  3/2 Patient remains agitated not responded to risperidone at current dose as as is only better after multiples prns. Will increase risperidone, taper VPA  3/3 Patient with delirium superimposed on baseline dementia. Started on Vantin by medicine. Medicine to follow over weekend . Patient examined after sliding to floor , no pain or tenderness or decreased ROM hips or lower back. No intervention for this. Family notified about fall and UTI  3/4: Patient with delirium with episodes of agitation requiring prn haloperidol, but then calm and cooperative. Calm this AM.   3/5: Pt seen for f/u of vascular dementia. On CO for hospital bed. No significant overnight events reported, no PRNs needed overnight. VSS with systolic  this morning. Pt seen in the dayroom. Pt told writer she was "dead" and followed that by asking writer for orange juice. Accepted apple juice. Denies any other complaints.   3/6 Patient cont to fluctuate in symptoms from calm, placid to severely agitated. Will increase risperidone. Discussed with medicine will switch antibiotic to Augmentin based on sensitivities but may require transfer for IV if not improving.    3/7 Patient sl better. COnt risperidone dose just increased, reviewed case with medicine internist plans to switch to alternative antibiotic better suited to treat ESBL organism  3/8 Still agitated needing prns with some periods of being calmer. Cont meds but increase risperidone to 1mg at hs  3/9 COnt paranoid agitated will add trazodone at night as sleep is poor and agitation worse. Plan increase risperdal if not improving will change to haldol  3/10 Improved today calmer and paranoia more circumsribed. Possible benefit from rx of UTI. Cont current treatment. Reviewed care with daughter  3/11 Improved conisder increasing trazodone or risperidone for residual symptoms if they don't improve on current dose.   3/12 irritable, angry, loud, focused on discharge, last PRN yesterday afternoon.

## 2023-03-12 NOTE — BH INPATIENT PSYCHIATRY PROGRESS NOTE - NSBHFUPINTERVALHXFT_PSY_A_CORE
Pt seen for f/u of vascular dementia, BAD. Chart and history reviewed and discussed with nursing team. No events reported over night. Patient is compliant with medications, received PRN po haldol yesterday afternoon. Vitals are stable. On encounter, Pt screams and demands to be discharged, says, 'I am here too long, since birth'. She was loud, minimally cooperative and agitated with ROS.

## 2023-03-13 PROCEDURE — 99231 SBSQ HOSP IP/OBS SF/LOW 25: CPT

## 2023-03-13 RX ORDER — RISPERIDONE 4 MG/1
1 TABLET ORAL
Refills: 0 | Status: DISCONTINUED | OUTPATIENT
Start: 2023-03-13 | End: 2023-03-20

## 2023-03-13 RX ORDER — RISPERIDONE 4 MG/1
0.5 TABLET ORAL DAILY
Refills: 0 | Status: DISCONTINUED | OUTPATIENT
Start: 2023-03-13 | End: 2023-03-16

## 2023-03-13 RX ADMIN — TIOTROPIUM BROMIDE 2 PUFF(S): 18 CAPSULE ORAL; RESPIRATORY (INHALATION) at 09:26

## 2023-03-13 RX ADMIN — LIDOCAINE 1 PATCH: 4 CREAM TOPICAL at 20:37

## 2023-03-13 RX ADMIN — Medication 25 MILLIGRAM(S): at 09:07

## 2023-03-13 RX ADMIN — Medication 1 DROP(S): at 09:07

## 2023-03-13 RX ADMIN — POLYETHYLENE GLYCOL 3350 17 GRAM(S): 17 POWDER, FOR SOLUTION ORAL at 09:10

## 2023-03-13 RX ADMIN — Medication 25 MILLIGRAM(S): at 20:40

## 2023-03-13 RX ADMIN — RISPERIDONE 0.5 MILLIGRAM(S): 4 TABLET ORAL at 12:29

## 2023-03-13 RX ADMIN — Medication 81 MILLIGRAM(S): at 09:07

## 2023-03-13 RX ADMIN — Medication 250 MILLIGRAM(S): at 09:06

## 2023-03-13 RX ADMIN — Medication 25 MILLIGRAM(S): at 20:38

## 2023-03-13 RX ADMIN — Medication 250 MILLIGRAM(S): at 20:41

## 2023-03-13 RX ADMIN — SENNA PLUS 2 TABLET(S): 8.6 TABLET ORAL at 20:40

## 2023-03-13 RX ADMIN — Medication 650 MILLIGRAM(S): at 07:14

## 2023-03-13 RX ADMIN — ENOXAPARIN SODIUM 40 MILLIGRAM(S): 100 INJECTION SUBCUTANEOUS at 09:10

## 2023-03-13 RX ADMIN — Medication 3 MILLIGRAM(S): at 20:40

## 2023-03-13 RX ADMIN — Medication 1 DROP(S): at 20:36

## 2023-03-13 RX ADMIN — ALBUTEROL 2 PUFF(S): 90 AEROSOL, METERED ORAL at 09:07

## 2023-03-13 RX ADMIN — RISPERIDONE 1 MILLIGRAM(S): 4 TABLET ORAL at 20:39

## 2023-03-13 RX ADMIN — RISPERIDONE 0.5 MILLIGRAM(S): 4 TABLET ORAL at 05:40

## 2023-03-13 RX ADMIN — Medication 650 MILLIGRAM(S): at 06:25

## 2023-03-13 NOTE — BH INPATIENT PSYCHIATRY PROGRESS NOTE - CURRENT MEDICATION
MEDICATIONS  (STANDING):  artificial  tears Solution 1 Drop(s) Both EYES two times a day  aspirin enteric coated 81 milliGRAM(s) Oral daily  enoxaparin Injectable 40 milliGRAM(s) SubCutaneous <User Schedule>  lidocaine   4% Patch 1 Patch Transdermal every 24 hours  melatonin. 3 milliGRAM(s) Oral at bedtime  metoprolol tartrate 25 milliGRAM(s) Oral two times a day  polyethylene glycol 3350 17 Gram(s) Oral daily  risperiDONE   Tablet 0.5 milliGRAM(s) Oral daily  risperiDONE   Tablet 1 milliGRAM(s) Oral <User Schedule>  saccharomyces boulardii 250 milliGRAM(s) Oral two times a day  senna 2 Tablet(s) Oral at bedtime  tiotropium 2.5 MICROgram(s) Inhaler 2 Puff(s) Inhalation daily  traZODone 25 milliGRAM(s) Oral at bedtime    MEDICATIONS  (PRN):  acetaminophen     Tablet .. 650 milliGRAM(s) Oral every 6 hours PRN Mild Pain (1 - 3), Moderate Pain (4 - 6), Severe Pain (7 - 10)  albuterol    90 MICROgram(s) HFA Inhaler 2 Puff(s) Inhalation every 6 hours PRN Shortness of Breath and/or Wheezing  aluminum hydroxide/magnesium hydroxide/simethicone Suspension 30 milliLiter(s) Oral every 4 hours PRN Dyspepsia  benzocaine/menthol Lozenge 1 Lozenge Oral every 2 hours PRN sore throat  bisacodyl 5 milliGRAM(s) Oral every 12 hours PRN Constipation  haloperidol     Tablet 1 milliGRAM(s) Oral every 6 hours PRN agitation  haloperidol    Injectable 1 milliGRAM(s) IntraMuscular once PRN agitation  haloperidol    Injectable 1 milliGRAM(s) IntraMuscular once PRN agitation  haloperidol    Injectable 1 milliGRAM(s) IntraMuscular three times a day PRN refusal of court ordered meds

## 2023-03-13 NOTE — BH INPATIENT PSYCHIATRY PROGRESS NOTE - MSE UNSTRUCTURED FT
Patient is awake and alert. Patient w/o sig EPS. Speech  high pitched, not as loud.  Mood irritable , affect less labile Speech yelling at times nl volume currently. Says we are in Oct 2023. Says PT therapist is trying to kill her by causing her to fall but denies feeling unsafe with other staff. Able to talk appropriately about other topics such as phone call with daughter. poor insight

## 2023-03-13 NOTE — BH INPATIENT PSYCHIATRY PROGRESS NOTE - NSBHCHARTREVIEWVS_PSY_A_CORE FT
Vital Signs Last 24 Hrs  T(C): 36.7 (03-12-23 @ 23:02), Max: 36.7 (03-12-23 @ 23:02)  T(F): 98.1 (03-12-23 @ 23:02), Max: 98.1 (03-12-23 @ 23:02)  HR: 80 (03-12-23 @ 23:02) (80 - 80)  BP: 139/83 (03-12-23 @ 23:02) (139/83 - 139/83)  BP(mean): --  RR: --  SpO2: --

## 2023-03-13 NOTE — BH INPATIENT PSYCHIATRY PROGRESS NOTE - NSBHASSESSSUMMFT_PSY_ALL_CORE
Dinorah Hogan is an 88 year old female, , domiciled with daughter with PPHx of bipolar I disorder, 1 previous inpatient psychiatric hospitalization in 1970s, no known history of SI/SA and PMHx of asthma, GERD, sciatica with previous vertebral fracture who is admitted on 9.27 status for symptoms of hortencia including irritability, loud speech, and agitation.     DDx: hortencia vs hypomania vs comorbid delirium    Today, patient is angry about her current admission and denying any psychiatric history. She is asking to be discharged immediately and yelling at treatment team. She is noted to have loud and rapid speech, but is able to be interrupted. She states she has been sleeping poorly due to hip and R knee pain. She denies S/IIP, H/IIP, AVH, paranoia, or ideas of reference. She is AOx1-2 (person, place--hospital), but unable to state year or location in [place]. She requires 9.27 inpatient admission for evaluation and management of hortencia.    1/22: less irritable but still requiring PRN med, more compliant with meds, no SI/HI.  1/23: Accepted meds yd and this AM though required PRN yd. Less irritable, appropriately conversant, making needs known. Suspicious bordering on paranoid, however no fully formed delusional thought content apparent.  1/24: Irritable and labile, suspicious regarding staff members. Discussed medication, pt will be offered psychiatric meds and is aware she has right to refuse.  1/25: Hostile, irritable, labile. TOO application in progress given poor insight and intermittent refusal of psychiatric mediations. Pt at risk for deconditioning given refusal to ambulate and minimal engagement with PT, given ambulatory at home refusal here appears to be behavioral, will continue to encourage participation. Refused cognitive evaluation today. Given ongoing intermittent agitation, paranoia, physical deconditioning, and poor insight into condition, pt requires continued inpatient hospitalization for stabilization and safety.  1/26: Pt remains labile, irritable, and paranoid toward staff. Although pt makes provocative statements (e.g. "she controls the robots," "the Jews will hate me"), these statements appear to represent intense feeling rather than fully formed delusion. Continues to refuse cognitive evaluation or consent to contact family. Given ongoing intermittent agitation, paranoia, physical deconditioning, and poor insight into condition, pt requires continued inpatient hospitalization for stabilization and safety.  1/27 Patient agitated, markedly irritable,paranoid  with themes of perrvasive mistreatment, being singled out but w/u well formed , fixed delusional ideas.Cont enocurage med compliance scheduled for court hearing for TOO  1/28: Currently on CO due to hospital bed requirement. Reports fair sleep and appetite. Seen at bedside during rounds. Continues to refuse most medications, took senna and tylenol last night. Reporting back pain "15/10" and requesting more heat packs "I will die without these", however under no visible distress. Primary team pursuing TOO. Renewed CO.   1/29: Renewed CO. Last night took Depakote, Haldol and Senna. Med compliance has been partial.   1/30: Refused all meds yd, this AM accepted metoprolol. Making accusations that people are attempting to harm her and want her to die. Impulse control is impaired.  1/31: Continues with intermittent agitation, hostile and accusatory twd staff, refusing meds (except metoprolol), refusing consent to speak to daughter. Given ongoing agitation, impulsivity, disinhibition, and deconditioning 2/2 refusal to ambulate (also attempted to climb out of bed last night), pt poses a threat of harm to herself and requires ongoing inpatient hospitalization for stabilization and safety. TOO & retention court date next week.  2/1: Continues irritable, paranoid, refusing medications and engagement in interviews.   2/2: Minimally engaged in interview, refusing to answer most questions; labile, hostile, required IM Haldol 1mg overnight for agitation; refusing to ambulate or engage in PT; refusing pain control interventions other than hot packs and lidocaine patch. TOO in process.   2/4 Cont agitated poor sleep, this Am had vairable O2 sat, patient reported she feels she needs an inhaler for asthma. Patient to be seen by medicine, labs and RVP ordered. Will change haldol to olanzapine standing as response to prns of haldol and occ po's have had poor response.   2/5 Paranoid, severely agitated. Has URI. COnt meds prn inhlaer scheuled for TOO 2/7 2/6: Remains paranoid, agitated, accusatory. Frequently refusing meds, required 3x olanzapine PRNs over the weekend.  2/7: Remains paranoid and accusatory twd staff. Continues to refuse majority of medications. Currently with URI. Going to court today for retention and TOO.  2/8:  Patient irritable, agitated, will increase olanzapine with IM back up.   2/9 No major changes but taking meds, Cont olanzapine now at 2.5mg hs and Depakote. Plan cont to titrate, monitor for se  2/10 Patient w/o much response cont with intense paranoia and agitation. Cont Zyprexa cont to titrate.   2/11 Pt is sleepy most likely due to PRN zyprexa last night, remains disorganized and confused.   2/12 confused, disorganized, screams at times, required PRN zyprexa/melatonin last night.   2/13: The patient continues to be manic, disorganized, irritable, screaming at times.  She has been tolerating medications and prn's well - will increase Zyprexa to 5mg hs and change to Zydis to ensure compliance.  Continue 1:1.  2/14: The patient is not responding to Zyprexa, despite increasing dose and getting prn's.  Will change to Seroquel, as patient reportedly did well with it in the past.  Patient given 12.5mg this morning, tolerated well.  Will start 12.5mg tid.  2/15: The patient continues to be irritable, agitated, disorganized.  Some small improvement with Seroquel, sleeping better at night.  Continue Seroquel trial.  2/16: The patient continues to be agitated, frequently yelling out, irritable and angry.  She has been tolerating standing and prn Seroquel well, will continue to titrate to 25mg tid.  Continue 1:1.  2/17: The patient continues to be irritable, agitated, yelling out throughout the day.  She is having more restful periods during the day.  She has been tolerating Seroquel well, will continue.  Continue 1:1.  2/18: Patient remains agitated, disorganized, guarded, confused  2/19: Sustained agitated, irritability, likely paranoia, will increase quetiapine   2/20: Patient remains agitated, suspicious of staff, requiring prn meds, quetiapine increased   2/21 agitated paranoid, no response from Seroquel so far. Will increase Seroquel. Reviewed care with daughter.   2/22:  Paranoid agitated. Taking meds, cont  Seroquel, recheck labs.   2/23 Doing poorly with ongoing agitation now increased BUN Will push fluids. Will decrease Depakote consider d/c . COnisder changing to risperidone or haldol to avoid sedation. constipation  2/2 2/24 Poor resposne to Seroquel and olanzapine IMs with no reposne but sedation at times and constipation. Will change to risperdal po and haldol prn. Consider ECT given refractoriness. Plan recheck labs Monday 2/25 MArginal impression of improvement. Cont Risperdal trial with haldol prn    2/26 agitated, loud, screaming, disorganized and irritable, requiring PRN meds.  2/27 Patient irritable, paranoid with some more moments of being calmer, better related. Cont risperdal increase dose. Patient with elevated bicarb spoke with medicine, will repeat in AM  2/28 IMproved behaviorally. COnt risperidone trial. Labs about same. Cont meds review labs with medicine  3/2 Patient remains agitated not responded to risperidone at current dose as as is only better after multiples prns. Will increase risperidone, taper VPA  3/3 Patient with delirium superimposed on baseline dementia. Started on Vantin by medicine. Medicine to follow over weekend . Patient examined after sliding to floor , no pain or tenderness or decreased ROM hips or lower back. No intervention for this. Family notified about fall and UTI  3/4: Patient with delirium with episodes of agitation requiring prn haloperidol, but then calm and cooperative. Calm this AM.   3/5: Pt seen for f/u of vascular dementia. On CO for hospital bed. No significant overnight events reported, no PRNs needed overnight. VSS with systolic  this morning. Pt seen in the dayroom. Pt told writer she was "dead" and followed that by asking writer for orange juice. Accepted apple juice. Denies any other complaints.   3/6 Patient cont to fluctuate in symptoms from calm, placid to severely agitated. Will increase risperidone. Discussed with medicine will switch antibiotic to Augmentin based on sensitivities but may require transfer for IV if not improving.    3/7 Patient sl better. COnt risperidone dose just increased, reviewed case with medicine internist plans to switch to alternative antibiotic better suited to treat ESBL organism  3/8 Still agitated needing prns with some periods of being calmer. Cont meds but increase risperidone to 1mg at hs  3/9 COnt paranoid agitated will add trazodone at night as sleep is poor and agitation worse. Plan increase risperdal if not improving will change to haldol  3/10 Improved today calmer and paranoia more circumsribed. Possible benefit from rx of UTI. Cont current treatment. Reviewed care with daughter  3/11 Improved conisder increasing trazodone or risperidone for residual symptoms if they don't improve on current dose.   3/12 irritable, angry, loud, focused on discharge, last PRN yesterday afternoon.   3/13 Patient better still screaming and paranoid. Will increase risperidone, tolerating well, attmept to get BP reading

## 2023-03-13 NOTE — BH INPATIENT PSYCHIATRY PROGRESS NOTE - NSBHFUPINTERVALHXFT_PSY_A_CORE
Pt seen for f/u of vascular dementia, BAD. Chart and history reviewed and discussed with nursing team. No events reported over night. Patient is compliant with medications.Refused BP todayOn encounter, Pt screams and demands to be discharged, says, 'I am here too long, since birth'.

## 2023-03-14 PROCEDURE — 99231 SBSQ HOSP IP/OBS SF/LOW 25: CPT

## 2023-03-14 RX ADMIN — Medication 650 MILLIGRAM(S): at 01:27

## 2023-03-14 RX ADMIN — LIDOCAINE 1 PATCH: 4 CREAM TOPICAL at 21:05

## 2023-03-14 RX ADMIN — RISPERIDONE 1 MILLIGRAM(S): 4 TABLET ORAL at 21:15

## 2023-03-14 RX ADMIN — Medication 3 MILLIGRAM(S): at 21:09

## 2023-03-14 RX ADMIN — RISPERIDONE 1 MILLIGRAM(S): 4 TABLET ORAL at 14:21

## 2023-03-14 RX ADMIN — RISPERIDONE 0.5 MILLIGRAM(S): 4 TABLET ORAL at 11:25

## 2023-03-14 RX ADMIN — Medication 25 MILLIGRAM(S): at 20:47

## 2023-03-14 RX ADMIN — POLYETHYLENE GLYCOL 3350 17 GRAM(S): 17 POWDER, FOR SOLUTION ORAL at 11:27

## 2023-03-14 RX ADMIN — Medication 1 DROP(S): at 11:24

## 2023-03-14 RX ADMIN — Medication 25 MILLIGRAM(S): at 11:26

## 2023-03-14 RX ADMIN — SENNA PLUS 2 TABLET(S): 8.6 TABLET ORAL at 20:47

## 2023-03-14 RX ADMIN — TIOTROPIUM BROMIDE 2 PUFF(S): 18 CAPSULE ORAL; RESPIRATORY (INHALATION) at 11:26

## 2023-03-14 RX ADMIN — Medication 250 MILLIGRAM(S): at 20:46

## 2023-03-14 RX ADMIN — Medication 250 MILLIGRAM(S): at 11:25

## 2023-03-14 RX ADMIN — Medication 1 DROP(S): at 21:05

## 2023-03-14 RX ADMIN — Medication 81 MILLIGRAM(S): at 11:26

## 2023-03-14 RX ADMIN — ENOXAPARIN SODIUM 40 MILLIGRAM(S): 100 INJECTION SUBCUTANEOUS at 11:25

## 2023-03-14 NOTE — BH INPATIENT PSYCHIATRY PROGRESS NOTE - NSBHFUPINTERVALHXFT_PSY_A_CORE
Pt seen for f/u of vascular dementia, BAD. Chart and history reviewed and discussed with nursing team. No events reported over night. Patient is compliant with medications.VSS. PAtient yelling during PT quieter other times though still some outbursts.

## 2023-03-14 NOTE — BH INPATIENT PSYCHIATRY PROGRESS NOTE - NSBHCHARTREVIEWVS_PSY_A_CORE FT
Vital Signs Last 24 Hrs  T(C): 36.7 (03-14-23 @ 08:47), Max: 37.1 (03-13-23 @ 20:36)  T(F): 98.1 (03-14-23 @ 08:47), Max: 98.8 (03-13-23 @ 20:36)  HR: --  BP: 136/83 (03-13-23 @ 20:36) (136/83 - 136/83)  BP(mean): 73 (03-13-23 @ 20:36) (73 - 73)  RR: --  SpO2: --    Orthostatic VS  03-14-23 @ 08:47  Lying BP: --/-- HR: --  Sitting BP: --/-- HR: --  Standing BP: 149/64 HR: 60  Site: --  Mode: --  Orthostatic VS  03-13-23 @ 16:45  Lying BP: --/-- HR: --  Sitting BP: 110/68 HR: 80  Standing BP: --/-- HR: --  Site: --  Mode: --

## 2023-03-14 NOTE — BH INPATIENT PSYCHIATRY PROGRESS NOTE - NSBHASSESSSUMMFT_PSY_ALL_CORE
Dinorah Hogan is an 88 year old female, , domiciled with daughter with PPHx of bipolar I disorder, 1 previous inpatient psychiatric hospitalization in 1970s, no known history of SI/SA and PMHx of asthma, GERD, sciatica with previous vertebral fracture who is admitted on 9.27 status for symptoms of hortencia including irritability, loud speech, and agitation.     DDx: hortencia vs hypomania vs comorbid delirium    Today, patient is angry about her current admission and denying any psychiatric history. She is asking to be discharged immediately and yelling at treatment team. She is noted to have loud and rapid speech, but is able to be interrupted. She states she has been sleeping poorly due to hip and R knee pain. She denies S/IIP, H/IIP, AVH, paranoia, or ideas of reference. She is AOx1-2 (person, place--hospital), but unable to state year or location in [place]. She requires 9.27 inpatient admission for evaluation and management of hortencia.    1/22: less irritable but still requiring PRN med, more compliant with meds, no SI/HI.  1/23: Accepted meds yd and this AM though required PRN yd. Less irritable, appropriately conversant, making needs known. Suspicious bordering on paranoid, however no fully formed delusional thought content apparent.  1/24: Irritable and labile, suspicious regarding staff members. Discussed medication, pt will be offered psychiatric meds and is aware she has right to refuse.  1/25: Hostile, irritable, labile. TOO application in progress given poor insight and intermittent refusal of psychiatric mediations. Pt at risk for deconditioning given refusal to ambulate and minimal engagement with PT, given ambulatory at home refusal here appears to be behavioral, will continue to encourage participation. Refused cognitive evaluation today. Given ongoing intermittent agitation, paranoia, physical deconditioning, and poor insight into condition, pt requires continued inpatient hospitalization for stabilization and safety.  1/26: Pt remains labile, irritable, and paranoid toward staff. Although pt makes provocative statements (e.g. "she controls the robots," "the Jews will hate me"), these statements appear to represent intense feeling rather than fully formed delusion. Continues to refuse cognitive evaluation or consent to contact family. Given ongoing intermittent agitation, paranoia, physical deconditioning, and poor insight into condition, pt requires continued inpatient hospitalization for stabilization and safety.  1/27 Patient agitated, markedly irritable,paranoid  with themes of perrvasive mistreatment, being singled out but w/u well formed , fixed delusional ideas.Cont enocurage med compliance scheduled for court hearing for TOO  1/28: Currently on CO due to hospital bed requirement. Reports fair sleep and appetite. Seen at bedside during rounds. Continues to refuse most medications, took senna and tylenol last night. Reporting back pain "15/10" and requesting more heat packs "I will die without these", however under no visible distress. Primary team pursuing TOO. Renewed CO.   1/29: Renewed CO. Last night took Depakote, Haldol and Senna. Med compliance has been partial.   1/30: Refused all meds yd, this AM accepted metoprolol. Making accusations that people are attempting to harm her and want her to die. Impulse control is impaired.  1/31: Continues with intermittent agitation, hostile and accusatory twd staff, refusing meds (except metoprolol), refusing consent to speak to daughter. Given ongoing agitation, impulsivity, disinhibition, and deconditioning 2/2 refusal to ambulate (also attempted to climb out of bed last night), pt poses a threat of harm to herself and requires ongoing inpatient hospitalization for stabilization and safety. TOO & retention court date next week.  2/1: Continues irritable, paranoid, refusing medications and engagement in interviews.   2/2: Minimally engaged in interview, refusing to answer most questions; labile, hostile, required IM Haldol 1mg overnight for agitation; refusing to ambulate or engage in PT; refusing pain control interventions other than hot packs and lidocaine patch. TOO in process.   2/4 Cont agitated poor sleep, this Am had vairable O2 sat, patient reported she feels she needs an inhaler for asthma. Patient to be seen by medicine, labs and RVP ordered. Will change haldol to olanzapine standing as response to prns of haldol and occ po's have had poor response.   2/5 Paranoid, severely agitated. Has URI. COnt meds prn inhlaer scheuled for TOO 2/7 2/6: Remains paranoid, agitated, accusatory. Frequently refusing meds, required 3x olanzapine PRNs over the weekend.  2/7: Remains paranoid and accusatory twd staff. Continues to refuse majority of medications. Currently with URI. Going to court today for retention and TOO.  2/8:  Patient irritable, agitated, will increase olanzapine with IM back up.   2/9 No major changes but taking meds, Cont olanzapine now at 2.5mg hs and Depakote. Plan cont to titrate, monitor for se  2/10 Patient w/o much response cont with intense paranoia and agitation. Cont Zyprexa cont to titrate.   2/11 Pt is sleepy most likely due to PRN zyprexa last night, remains disorganized and confused.   2/12 confused, disorganized, screams at times, required PRN zyprexa/melatonin last night.   2/13: The patient continues to be manic, disorganized, irritable, screaming at times.  She has been tolerating medications and prn's well - will increase Zyprexa to 5mg hs and change to Zydis to ensure compliance.  Continue 1:1.  2/14: The patient is not responding to Zyprexa, despite increasing dose and getting prn's.  Will change to Seroquel, as patient reportedly did well with it in the past.  Patient given 12.5mg this morning, tolerated well.  Will start 12.5mg tid.  2/15: The patient continues to be irritable, agitated, disorganized.  Some small improvement with Seroquel, sleeping better at night.  Continue Seroquel trial.  2/16: The patient continues to be agitated, frequently yelling out, irritable and angry.  She has been tolerating standing and prn Seroquel well, will continue to titrate to 25mg tid.  Continue 1:1.  2/17: The patient continues to be irritable, agitated, yelling out throughout the day.  She is having more restful periods during the day.  She has been tolerating Seroquel well, will continue.  Continue 1:1.  2/18: Patient remains agitated, disorganized, guarded, confused  2/19: Sustained agitated, irritability, likely paranoia, will increase quetiapine   2/20: Patient remains agitated, suspicious of staff, requiring prn meds, quetiapine increased   2/21 agitated paranoid, no response from Seroquel so far. Will increase Seroquel. Reviewed care with daughter.   2/22:  Paranoid agitated. Taking meds, cont  Seroquel, recheck labs.   2/23 Doing poorly with ongoing agitation now increased BUN Will push fluids. Will decrease Depakote consider d/c . COnisder changing to risperidone or haldol to avoid sedation. constipation  2/2 2/24 Poor resposne to Seroquel and olanzapine IMs with no reposne but sedation at times and constipation. Will change to risperdal po and haldol prn. Consider ECT given refractoriness. Plan recheck labs Monday 2/25 MArginal impression of improvement. Cont Risperdal trial with haldol prn    2/26 agitated, loud, screaming, disorganized and irritable, requiring PRN meds.  2/27 Patient irritable, paranoid with some more moments of being calmer, better related. Cont risperdal increase dose. Patient with elevated bicarb spoke with medicine, will repeat in AM  2/28 IMproved behaviorally. COnt risperidone trial. Labs about same. Cont meds review labs with medicine  3/2 Patient remains agitated not responded to risperidone at current dose as as is only better after multiples prns. Will increase risperidone, taper VPA  3/3 Patient with delirium superimposed on baseline dementia. Started on Vantin by medicine. Medicine to follow over weekend . Patient examined after sliding to floor , no pain or tenderness or decreased ROM hips or lower back. No intervention for this. Family notified about fall and UTI  3/4: Patient with delirium with episodes of agitation requiring prn haloperidol, but then calm and cooperative. Calm this AM.   3/5: Pt seen for f/u of vascular dementia. On CO for hospital bed. No significant overnight events reported, no PRNs needed overnight. VSS with systolic  this morning. Pt seen in the dayroom. Pt told writer she was "dead" and followed that by asking writer for orange juice. Accepted apple juice. Denies any other complaints.   3/6 Patient cont to fluctuate in symptoms from calm, placid to severely agitated. Will increase risperidone. Discussed with medicine will switch antibiotic to Augmentin based on sensitivities but may require transfer for IV if not improving.    3/7 Patient sl better. COnt risperidone dose just increased, reviewed case with medicine internist plans to switch to alternative antibiotic better suited to treat ESBL organism  3/8 Still agitated needing prns with some periods of being calmer. Cont meds but increase risperidone to 1mg at hs  3/9 COnt paranoid agitated will add trazodone at night as sleep is poor and agitation worse. Plan increase risperdal if not improving will change to haldol  3/10 Improved today calmer and paranoia more circumsribed. Possible benefit from rx of UTI. Cont current treatment. Reviewed care with daughter  3/11 Improved conisder increasing trazodone or risperidone for residual symptoms if they don't improve on current dose.   3/12 irritable, angry, loud, focused on discharge, last PRN yesterday afternoon.   3/13 Patient better still screaming and paranoid. Will increase risperidone, tolerating well, attmept to get BP reading  3/14 PAtient less paranoid still with disinhibition vocal agitation. Cont to titrate risperidone

## 2023-03-14 NOTE — BH INPATIENT PSYCHIATRY PROGRESS NOTE - MSE UNSTRUCTURED FT
Patient is awake and alert. Patient w/o sig EPS. Speech  high pitched, not as loud.  Mood irritable , affect less labile Speech yelling at times nl volume currently. Says we are in Oct 2023. Says PT therapist is trying to kill her by causing her to fall by pushing her too hard to walk. Able to talk appropriately about other topics such as phone call with daughter. poor insight

## 2023-03-15 PROCEDURE — 99231 SBSQ HOSP IP/OBS SF/LOW 25: CPT

## 2023-03-15 RX ADMIN — Medication 1 DROP(S): at 20:11

## 2023-03-15 RX ADMIN — SENNA PLUS 2 TABLET(S): 8.6 TABLET ORAL at 20:11

## 2023-03-15 RX ADMIN — RISPERIDONE 0.5 MILLIGRAM(S): 4 TABLET ORAL at 10:01

## 2023-03-15 RX ADMIN — LIDOCAINE 1 PATCH: 4 CREAM TOPICAL at 09:49

## 2023-03-15 RX ADMIN — RISPERIDONE 1 MILLIGRAM(S): 4 TABLET ORAL at 20:08

## 2023-03-15 RX ADMIN — LIDOCAINE 1 PATCH: 4 CREAM TOPICAL at 20:10

## 2023-03-15 RX ADMIN — Medication 3 MILLIGRAM(S): at 20:07

## 2023-03-15 RX ADMIN — Medication 25 MILLIGRAM(S): at 22:14

## 2023-03-15 RX ADMIN — Medication 25 MILLIGRAM(S): at 20:08

## 2023-03-15 RX ADMIN — Medication 81 MILLIGRAM(S): at 10:01

## 2023-03-15 RX ADMIN — HALOPERIDOL DECANOATE 1 MILLIGRAM(S): 100 INJECTION INTRAMUSCULAR at 12:46

## 2023-03-15 RX ADMIN — LIDOCAINE 1 PATCH: 4 CREAM TOPICAL at 07:39

## 2023-03-15 RX ADMIN — ENOXAPARIN SODIUM 40 MILLIGRAM(S): 100 INJECTION SUBCUTANEOUS at 10:01

## 2023-03-15 RX ADMIN — Medication 250 MILLIGRAM(S): at 20:08

## 2023-03-15 RX ADMIN — RISPERIDONE 1 MILLIGRAM(S): 4 TABLET ORAL at 12:46

## 2023-03-15 RX ADMIN — POLYETHYLENE GLYCOL 3350 17 GRAM(S): 17 POWDER, FOR SOLUTION ORAL at 10:01

## 2023-03-15 RX ADMIN — Medication 250 MILLIGRAM(S): at 10:01

## 2023-03-15 RX ADMIN — TIOTROPIUM BROMIDE 2 PUFF(S): 18 CAPSULE ORAL; RESPIRATORY (INHALATION) at 10:02

## 2023-03-15 NOTE — BH INPATIENT PSYCHIATRY PROGRESS NOTE - NSBHASSESSSUMMFT_PSY_ALL_CORE
Dinorah Hogan is an 88 year old female, , domiciled with daughter with PPHx of bipolar I disorder, 1 previous inpatient psychiatric hospitalization in 1970s, no known history of SI/SA and PMHx of asthma, GERD, sciatica with previous vertebral fracture who is admitted on 9.27 status for symptoms of hortencia including irritability, loud speech, and agitation.     DDx: hortencia vs hypomania vs comorbid delirium    Today, patient is angry about her current admission and denying any psychiatric history. She is asking to be discharged immediately and yelling at treatment team. She is noted to have loud and rapid speech, but is able to be interrupted. She states she has been sleeping poorly due to hip and R knee pain. She denies S/IIP, H/IIP, AVH, paranoia, or ideas of reference. She is AOx1-2 (person, place--hospital), but unable to state year or location in [place]. She requires 9.27 inpatient admission for evaluation and management of hortencia.    1/22: less irritable but still requiring PRN med, more compliant with meds, no SI/HI.  1/23: Accepted meds yd and this AM though required PRN yd. Less irritable, appropriately conversant, making needs known. Suspicious bordering on paranoid, however no fully formed delusional thought content apparent.  1/24: Irritable and labile, suspicious regarding staff members. Discussed medication, pt will be offered psychiatric meds and is aware she has right to refuse.  1/25: Hostile, irritable, labile. TOO application in progress given poor insight and intermittent refusal of psychiatric mediations. Pt at risk for deconditioning given refusal to ambulate and minimal engagement with PT, given ambulatory at home refusal here appears to be behavioral, will continue to encourage participation. Refused cognitive evaluation today. Given ongoing intermittent agitation, paranoia, physical deconditioning, and poor insight into condition, pt requires continued inpatient hospitalization for stabilization and safety.  1/26: Pt remains labile, irritable, and paranoid toward staff. Although pt makes provocative statements (e.g. "she controls the robots," "the Jews will hate me"), these statements appear to represent intense feeling rather than fully formed delusion. Continues to refuse cognitive evaluation or consent to contact family. Given ongoing intermittent agitation, paranoia, physical deconditioning, and poor insight into condition, pt requires continued inpatient hospitalization for stabilization and safety.  1/27 Patient agitated, markedly irritable,paranoid  with themes of perrvasive mistreatment, being singled out but w/u well formed , fixed delusional ideas.Cont enocurage med compliance scheduled for court hearing for TOO  1/28: Currently on CO due to hospital bed requirement. Reports fair sleep and appetite. Seen at bedside during rounds. Continues to refuse most medications, took senna and tylenol last night. Reporting back pain "15/10" and requesting more heat packs "I will die without these", however under no visible distress. Primary team pursuing TOO. Renewed CO.   1/29: Renewed CO. Last night took Depakote, Haldol and Senna. Med compliance has been partial.   1/30: Refused all meds yd, this AM accepted metoprolol. Making accusations that people are attempting to harm her and want her to die. Impulse control is impaired.  1/31: Continues with intermittent agitation, hostile and accusatory twd staff, refusing meds (except metoprolol), refusing consent to speak to daughter. Given ongoing agitation, impulsivity, disinhibition, and deconditioning 2/2 refusal to ambulate (also attempted to climb out of bed last night), pt poses a threat of harm to herself and requires ongoing inpatient hospitalization for stabilization and safety. TOO & retention court date next week.  2/1: Continues irritable, paranoid, refusing medications and engagement in interviews.   2/2: Minimally engaged in interview, refusing to answer most questions; labile, hostile, required IM Haldol 1mg overnight for agitation; refusing to ambulate or engage in PT; refusing pain control interventions other than hot packs and lidocaine patch. TOO in process.   2/4 Cont agitated poor sleep, this Am had vairable O2 sat, patient reported she feels she needs an inhaler for asthma. Patient to be seen by medicine, labs and RVP ordered. Will change haldol to olanzapine standing as response to prns of haldol and occ po's have had poor response.   2/5 Paranoid, severely agitated. Has URI. COnt meds prn inhlaer scheuled for TOO 2/7 2/6: Remains paranoid, agitated, accusatory. Frequently refusing meds, required 3x olanzapine PRNs over the weekend.  2/7: Remains paranoid and accusatory twd staff. Continues to refuse majority of medications. Currently with URI. Going to court today for retention and TOO.  2/8:  Patient irritable, agitated, will increase olanzapine with IM back up.   2/9 No major changes but taking meds, Cont olanzapine now at 2.5mg hs and Depakote. Plan cont to titrate, monitor for se  2/10 Patient w/o much response cont with intense paranoia and agitation. Cont Zyprexa cont to titrate.   2/11 Pt is sleepy most likely due to PRN zyprexa last night, remains disorganized and confused.   2/12 confused, disorganized, screams at times, required PRN zyprexa/melatonin last night.   2/13: The patient continues to be manic, disorganized, irritable, screaming at times.  She has been tolerating medications and prn's well - will increase Zyprexa to 5mg hs and change to Zydis to ensure compliance.  Continue 1:1.  2/14: The patient is not responding to Zyprexa, despite increasing dose and getting prn's.  Will change to Seroquel, as patient reportedly did well with it in the past.  Patient given 12.5mg this morning, tolerated well.  Will start 12.5mg tid.  2/15: The patient continues to be irritable, agitated, disorganized.  Some small improvement with Seroquel, sleeping better at night.  Continue Seroquel trial.  2/16: The patient continues to be agitated, frequently yelling out, irritable and angry.  She has been tolerating standing and prn Seroquel well, will continue to titrate to 25mg tid.  Continue 1:1.  2/17: The patient continues to be irritable, agitated, yelling out throughout the day.  She is having more restful periods during the day.  She has been tolerating Seroquel well, will continue.  Continue 1:1.  2/18: Patient remains agitated, disorganized, guarded, confused  2/19: Sustained agitated, irritability, likely paranoia, will increase quetiapine   2/20: Patient remains agitated, suspicious of staff, requiring prn meds, quetiapine increased   2/21 agitated paranoid, no response from Seroquel so far. Will increase Seroquel. Reviewed care with daughter.   2/22:  Paranoid agitated. Taking meds, cont  Seroquel, recheck labs.   2/23 Doing poorly with ongoing agitation now increased BUN Will push fluids. Will decrease Depakote consider d/c . COnisder changing to risperidone or haldol to avoid sedation. constipation  2/2 2/24 Poor resposne to Seroquel and olanzapine IMs with no reposne but sedation at times and constipation. Will change to risperdal po and haldol prn. Consider ECT given refractoriness. Plan recheck labs Monday 2/25 MArginal impression of improvement. Cont Risperdal trial with haldol prn    2/26 agitated, loud, screaming, disorganized and irritable, requiring PRN meds.  2/27 Patient irritable, paranoid with some more moments of being calmer, better related. Cont risperdal increase dose. Patient with elevated bicarb spoke with medicine, will repeat in AM  2/28 IMproved behaviorally. COnt risperidone trial. Labs about same. Cont meds review labs with medicine  3/2 Patient remains agitated not responded to risperidone at current dose as as is only better after multiples prns. Will increase risperidone, taper VPA  3/3 Patient with delirium superimposed on baseline dementia. Started on Vantin by medicine. Medicine to follow over weekend . Patient examined after sliding to floor , no pain or tenderness or decreased ROM hips or lower back. No intervention for this. Family notified about fall and UTI  3/4: Patient with delirium with episodes of agitation requiring prn haloperidol, but then calm and cooperative. Calm this AM.   3/5: Pt seen for f/u of vascular dementia. On CO for hospital bed. No significant overnight events reported, no PRNs needed overnight. VSS with systolic  this morning. Pt seen in the dayroom. Pt told writer she was "dead" and followed that by asking writer for orange juice. Accepted apple juice. Denies any other complaints.   3/6 Patient cont to fluctuate in symptoms from calm, placid to severely agitated. Will increase risperidone. Discussed with medicine will switch antibiotic to Augmentin based on sensitivities but may require transfer for IV if not improving.    3/7 Patient sl better. COnt risperidone dose just increased, reviewed case with medicine internist plans to switch to alternative antibiotic better suited to treat ESBL organism  3/8 Still agitated needing prns with some periods of being calmer. Cont meds but increase risperidone to 1mg at hs  3/9 COnt paranoid agitated will add trazodone at night as sleep is poor and agitation worse. Plan increase risperdal if not improving will change to haldol  3/10 Improved today calmer and paranoia more circumsribed. Possible benefit from rx of UTI. Cont current treatment. Reviewed care with daughter  3/11 Improved conisder increasing trazodone or risperidone for residual symptoms if they don't improve on current dose.   3/12 irritable, angry, loud, focused on discharge, last PRN yesterday afternoon.   3/13 Patient better still screaming and paranoid. Will increase risperidone, tolerating well, attmept to get BP reading  3/14 PAtient less paranoid still with disinhibition vocal agitation. Cont to titrate risperidone  3/15 Patient improved in that agitation and paranoia more circumscribed. Cont current treatment , plan to increase risperidone to 1mg tid

## 2023-03-15 NOTE — BH INPATIENT PSYCHIATRY PROGRESS NOTE - NSBHFUPINTERVALHXFT_PSY_A_CORE
Pt seen for f/u of vascular dementia, BAD. Chart and history reviewed and discussed with nursing team. No events reported over night. Patient is compliant with medications.VSS. Patient yelling during PT quite severely, quieter other times though still some outbursts.

## 2023-03-15 NOTE — BH INPATIENT PSYCHIATRY PROGRESS NOTE - MSE UNSTRUCTURED FT
Patient is awake and alert. Patient w/o sig EPS. Speech  high pitched, not as loud.  Mood irritable , affect less labile Speech yelling at times nl volume currently. Says we are in Oct 2023. Says PT therapist is trying to kill her by causing her to fall by pushing her too hard to walk. Able to talk appropriately about other topics . Poor insight

## 2023-03-15 NOTE — BH INPATIENT PSYCHIATRY PROGRESS NOTE - NSBHCHARTREVIEWVS_PSY_A_CORE FT
Vital Signs Last 24 Hrs  T(C): 36.3 (03-15-23 @ 08:21), Max: 36.7 (03-14-23 @ 20:42)  T(F): 97.3 (03-15-23 @ 08:21), Max: 98.1 (03-14-23 @ 20:42)  HR: --  BP: --  BP(mean): --  RR: --  SpO2: --    Orthostatic VS  03-15-23 @ 08:21  Lying BP: --/-- HR: --  Sitting BP: 147/62 HR: 55  Standing BP: --/-- HR: --  Site: upper left arm  Mode: electronic  Orthostatic VS  03-14-23 @ 20:42  Lying BP: --/-- HR: --  Sitting BP: 143/60 HR: 67  Standing BP: --/-- HR: --  Site: --  Mode: --  Orthostatic VS  03-14-23 @ 08:47  Lying BP: --/-- HR: --  Sitting BP: --/-- HR: --  Standing BP: 149/64 HR: 60  Site: --  Mode: --  Orthostatic VS  03-13-23 @ 16:45  Lying BP: --/-- HR: --  Sitting BP: 110/68 HR: 80  Standing BP: --/-- HR: --  Site: --  Mode: --

## 2023-03-16 PROCEDURE — 99231 SBSQ HOSP IP/OBS SF/LOW 25: CPT

## 2023-03-16 RX ORDER — TRAZODONE HCL 50 MG
50 TABLET ORAL AT BEDTIME
Refills: 0 | Status: DISCONTINUED | OUTPATIENT
Start: 2023-03-16 | End: 2023-04-10

## 2023-03-16 RX ORDER — RISPERIDONE 4 MG/1
1 TABLET ORAL DAILY
Refills: 0 | Status: DISCONTINUED | OUTPATIENT
Start: 2023-03-16 | End: 2023-03-16

## 2023-03-16 RX ORDER — RISPERIDONE 4 MG/1
0.5 TABLET ORAL DAILY
Refills: 0 | Status: DISCONTINUED | OUTPATIENT
Start: 2023-03-16 | End: 2023-03-17

## 2023-03-16 RX ADMIN — RISPERIDONE 1 MILLIGRAM(S): 4 TABLET ORAL at 12:41

## 2023-03-16 RX ADMIN — Medication 3 MILLIGRAM(S): at 20:01

## 2023-03-16 RX ADMIN — LIDOCAINE 1 PATCH: 4 CREAM TOPICAL at 20:02

## 2023-03-16 RX ADMIN — Medication 1 DROP(S): at 20:03

## 2023-03-16 RX ADMIN — Medication 250 MILLIGRAM(S): at 20:01

## 2023-03-16 RX ADMIN — HALOPERIDOL DECANOATE 1 MILLIGRAM(S): 100 INJECTION INTRAMUSCULAR at 02:12

## 2023-03-16 RX ADMIN — Medication 25 MILLIGRAM(S): at 09:56

## 2023-03-16 RX ADMIN — TIOTROPIUM BROMIDE 2 PUFF(S): 18 CAPSULE ORAL; RESPIRATORY (INHALATION) at 09:09

## 2023-03-16 RX ADMIN — Medication 250 MILLIGRAM(S): at 09:56

## 2023-03-16 RX ADMIN — Medication 50 MILLIGRAM(S): at 20:01

## 2023-03-16 RX ADMIN — RISPERIDONE 0.5 MILLIGRAM(S): 4 TABLET ORAL at 09:56

## 2023-03-16 RX ADMIN — Medication 81 MILLIGRAM(S): at 09:56

## 2023-03-16 RX ADMIN — POLYETHYLENE GLYCOL 3350 17 GRAM(S): 17 POWDER, FOR SOLUTION ORAL at 09:56

## 2023-03-16 RX ADMIN — RISPERIDONE 1 MILLIGRAM(S): 4 TABLET ORAL at 20:02

## 2023-03-16 RX ADMIN — Medication 25 MILLIGRAM(S): at 22:21

## 2023-03-16 RX ADMIN — HALOPERIDOL DECANOATE 1 MILLIGRAM(S): 100 INJECTION INTRAMUSCULAR at 19:43

## 2023-03-16 RX ADMIN — LIDOCAINE 1 PATCH: 4 CREAM TOPICAL at 06:44

## 2023-03-16 RX ADMIN — Medication 1 DROP(S): at 09:57

## 2023-03-16 RX ADMIN — SENNA PLUS 2 TABLET(S): 8.6 TABLET ORAL at 20:04

## 2023-03-16 RX ADMIN — ENOXAPARIN SODIUM 40 MILLIGRAM(S): 100 INJECTION SUBCUTANEOUS at 09:55

## 2023-03-16 NOTE — BH INPATIENT PSYCHIATRY PROGRESS NOTE - MSE UNSTRUCTURED FT
Patient is awake and alert. Patient w/o sig EPS. Speech  high pitched, not as loud.  Mood irritable , affect less labile Speech yelling at times nl volume currently. Says we are in Oct 2023. Says PT therapist is trying to kill her by causing her to fall by pushing her too hard to walk. Reports having severe fear of falling. Poor insight

## 2023-03-16 NOTE — BH INPATIENT PSYCHIATRY PROGRESS NOTE - NSBHASSESSSUMMFT_PSY_ALL_CORE
Dinorah Hogan is an 88 year old female, , domiciled with daughter with PPHx of bipolar I disorder, 1 previous inpatient psychiatric hospitalization in 1970s, no known history of SI/SA and PMHx of asthma, GERD, sciatica with previous vertebral fracture who is admitted on 9.27 status for symptoms of hortencia including irritability, loud speech, and agitation.     DDx: hortencia vs hypomania vs comorbid delirium    Today, patient is angry about her current admission and denying any psychiatric history. She is asking to be discharged immediately and yelling at treatment team. She is noted to have loud and rapid speech, but is able to be interrupted. She states she has been sleeping poorly due to hip and R knee pain. She denies S/IIP, H/IIP, AVH, paranoia, or ideas of reference. She is AOx1-2 (person, place--hospital), but unable to state year or location in [place]. She requires 9.27 inpatient admission for evaluation and management of hortencia.    1/22: less irritable but still requiring PRN med, more compliant with meds, no SI/HI.  1/23: Accepted meds yd and this AM though required PRN yd. Less irritable, appropriately conversant, making needs known. Suspicious bordering on paranoid, however no fully formed delusional thought content apparent.  1/24: Irritable and labile, suspicious regarding staff members. Discussed medication, pt will be offered psychiatric meds and is aware she has right to refuse.  1/25: Hostile, irritable, labile. TOO application in progress given poor insight and intermittent refusal of psychiatric mediations. Pt at risk for deconditioning given refusal to ambulate and minimal engagement with PT, given ambulatory at home refusal here appears to be behavioral, will continue to encourage participation. Refused cognitive evaluation today. Given ongoing intermittent agitation, paranoia, physical deconditioning, and poor insight into condition, pt requires continued inpatient hospitalization for stabilization and safety.  1/26: Pt remains labile, irritable, and paranoid toward staff. Although pt makes provocative statements (e.g. "she controls the robots," "the Jews will hate me"), these statements appear to represent intense feeling rather than fully formed delusion. Continues to refuse cognitive evaluation or consent to contact family. Given ongoing intermittent agitation, paranoia, physical deconditioning, and poor insight into condition, pt requires continued inpatient hospitalization for stabilization and safety.  1/27 Patient agitated, markedly irritable,paranoid  with themes of perrvasive mistreatment, being singled out but w/u well formed , fixed delusional ideas.Cont enocurage med compliance scheduled for court hearing for TOO  1/28: Currently on CO due to hospital bed requirement. Reports fair sleep and appetite. Seen at bedside during rounds. Continues to refuse most medications, took senna and tylenol last night. Reporting back pain "15/10" and requesting more heat packs "I will die without these", however under no visible distress. Primary team pursuing TOO. Renewed CO.   1/29: Renewed CO. Last night took Depakote, Haldol and Senna. Med compliance has been partial.   1/30: Refused all meds yd, this AM accepted metoprolol. Making accusations that people are attempting to harm her and want her to die. Impulse control is impaired.  1/31: Continues with intermittent agitation, hostile and accusatory twd staff, refusing meds (except metoprolol), refusing consent to speak to daughter. Given ongoing agitation, impulsivity, disinhibition, and deconditioning 2/2 refusal to ambulate (also attempted to climb out of bed last night), pt poses a threat of harm to herself and requires ongoing inpatient hospitalization for stabilization and safety. TOO & retention court date next week.  2/1: Continues irritable, paranoid, refusing medications and engagement in interviews.   2/2: Minimally engaged in interview, refusing to answer most questions; labile, hostile, required IM Haldol 1mg overnight for agitation; refusing to ambulate or engage in PT; refusing pain control interventions other than hot packs and lidocaine patch. TOO in process.   2/4 Cont agitated poor sleep, this Am had vairable O2 sat, patient reported she feels she needs an inhaler for asthma. Patient to be seen by medicine, labs and RVP ordered. Will change haldol to olanzapine standing as response to prns of haldol and occ po's have had poor response.   2/5 Paranoid, severely agitated. Has URI. COnt meds prn inhlaer scheuled for TOO 2/7 2/6: Remains paranoid, agitated, accusatory. Frequently refusing meds, required 3x olanzapine PRNs over the weekend.  2/7: Remains paranoid and accusatory twd staff. Continues to refuse majority of medications. Currently with URI. Going to court today for retention and TOO.  2/8:  Patient irritable, agitated, will increase olanzapine with IM back up.   2/9 No major changes but taking meds, Cont olanzapine now at 2.5mg hs and Depakote. Plan cont to titrate, monitor for se  2/10 Patient w/o much response cont with intense paranoia and agitation. Cont Zyprexa cont to titrate.   2/11 Pt is sleepy most likely due to PRN zyprexa last night, remains disorganized and confused.   2/12 confused, disorganized, screams at times, required PRN zyprexa/melatonin last night.   2/13: The patient continues to be manic, disorganized, irritable, screaming at times.  She has been tolerating medications and prn's well - will increase Zyprexa to 5mg hs and change to Zydis to ensure compliance.  Continue 1:1.  2/14: The patient is not responding to Zyprexa, despite increasing dose and getting prn's.  Will change to Seroquel, as patient reportedly did well with it in the past.  Patient given 12.5mg this morning, tolerated well.  Will start 12.5mg tid.  2/15: The patient continues to be irritable, agitated, disorganized.  Some small improvement with Seroquel, sleeping better at night.  Continue Seroquel trial.  2/16: The patient continues to be agitated, frequently yelling out, irritable and angry.  She has been tolerating standing and prn Seroquel well, will continue to titrate to 25mg tid.  Continue 1:1.  2/17: The patient continues to be irritable, agitated, yelling out throughout the day.  She is having more restful periods during the day.  She has been tolerating Seroquel well, will continue.  Continue 1:1.  2/18: Patient remains agitated, disorganized, guarded, confused  2/19: Sustained agitated, irritability, likely paranoia, will increase quetiapine   2/20: Patient remains agitated, suspicious of staff, requiring prn meds, quetiapine increased   2/21 agitated paranoid, no response from Seroquel so far. Will increase Seroquel. Reviewed care with daughter.   2/22:  Paranoid agitated. Taking meds, cont  Seroquel, recheck labs.   2/23 Doing poorly with ongoing agitation now increased BUN Will push fluids. Will decrease Depakote consider d/c . COnisder changing to risperidone or haldol to avoid sedation. constipation  2/2 2/24 Poor resposne to Seroquel and olanzapine IMs with no reposne but sedation at times and constipation. Will change to risperdal po and haldol prn. Consider ECT given refractoriness. Plan recheck labs Monday 2/25 MArginal impression of improvement. Cont Risperdal trial with haldol prn    2/26 agitated, loud, screaming, disorganized and irritable, requiring PRN meds.  2/27 Patient irritable, paranoid with some more moments of being calmer, better related. Cont risperdal increase dose. Patient with elevated bicarb spoke with medicine, will repeat in AM  2/28 IMproved behaviorally. COnt risperidone trial. Labs about same. Cont meds review labs with medicine  3/2 Patient remains agitated not responded to risperidone at current dose as as is only better after multiples prns. Will increase risperidone, taper VPA  3/3 Patient with delirium superimposed on baseline dementia. Started on Vantin by medicine. Medicine to follow over weekend . Patient examined after sliding to floor , no pain or tenderness or decreased ROM hips or lower back. No intervention for this. Family notified about fall and UTI  3/4: Patient with delirium with episodes of agitation requiring prn haloperidol, but then calm and cooperative. Calm this AM.   3/5: Pt seen for f/u of vascular dementia. On CO for hospital bed. No significant overnight events reported, no PRNs needed overnight. VSS with systolic  this morning. Pt seen in the dayroom. Pt told writer she was "dead" and followed that by asking writer for orange juice. Accepted apple juice. Denies any other complaints.   3/6 Patient cont to fluctuate in symptoms from calm, placid to severely agitated. Will increase risperidone. Discussed with medicine will switch antibiotic to Augmentin based on sensitivities but may require transfer for IV if not improving.    3/7 Patient sl better. COnt risperidone dose just increased, reviewed case with medicine internist plans to switch to alternative antibiotic better suited to treat ESBL organism  3/8 Still agitated needing prns with some periods of being calmer. Cont meds but increase risperidone to 1mg at hs  3/9 COnt paranoid agitated will add trazodone at night as sleep is poor and agitation worse. Plan increase risperdal if not improving will change to haldol  3/10 Improved today calmer and paranoia more circumsribed. Possible benefit from rx of UTI. Cont current treatment. Reviewed care with daughter  3/11 Improved conisder increasing trazodone or risperidone for residual symptoms if they don't improve on current dose.   3/12 irritable, angry, loud, focused on discharge, last PRN yesterday afternoon.   3/13 Patient better still screaming and paranoid. Will increase risperidone, tolerating well, attmept to get BP reading  3/14 PAtient less paranoid still with disinhibition vocal agitation. Cont to titrate risperidone  3/15 Patient improved in that agitation and paranoia more circumscribed. Cont current treatment , plan to increase risperidone to 1mg tid  3/16 Sig nocturnal agitation and insomnia will increase hs trazodone

## 2023-03-16 NOTE — BH INPATIENT PSYCHIATRY PROGRESS NOTE - CURRENT MEDICATION
MEDICATIONS  (STANDING):  artificial  tears Solution 1 Drop(s) Both EYES two times a day  aspirin enteric coated 81 milliGRAM(s) Oral daily  enoxaparin Injectable 40 milliGRAM(s) SubCutaneous <User Schedule>  lidocaine   4% Patch 1 Patch Transdermal every 24 hours  melatonin. 3 milliGRAM(s) Oral at bedtime  metoprolol tartrate 25 milliGRAM(s) Oral two times a day  polyethylene glycol 3350 17 Gram(s) Oral daily  risperiDONE   Tablet 1 milliGRAM(s) Oral <User Schedule>  risperiDONE   Tablet 0.5 milliGRAM(s) Oral daily  saccharomyces boulardii 250 milliGRAM(s) Oral two times a day  senna 2 Tablet(s) Oral at bedtime  tiotropium 2.5 MICROgram(s) Inhaler 2 Puff(s) Inhalation daily  traZODone 50 milliGRAM(s) Oral at bedtime    MEDICATIONS  (PRN):  acetaminophen     Tablet .. 650 milliGRAM(s) Oral every 6 hours PRN Mild Pain (1 - 3), Moderate Pain (4 - 6), Severe Pain (7 - 10)  albuterol    90 MICROgram(s) HFA Inhaler 2 Puff(s) Inhalation every 6 hours PRN Shortness of Breath and/or Wheezing  aluminum hydroxide/magnesium hydroxide/simethicone Suspension 30 milliLiter(s) Oral every 4 hours PRN Dyspepsia  benzocaine/menthol Lozenge 1 Lozenge Oral every 2 hours PRN sore throat  bisacodyl 5 milliGRAM(s) Oral every 12 hours PRN Constipation  haloperidol     Tablet 1 milliGRAM(s) Oral every 6 hours PRN agitation  haloperidol    Injectable 1 milliGRAM(s) IntraMuscular once PRN agitation  haloperidol    Injectable 1 milliGRAM(s) IntraMuscular once PRN agitation  haloperidol    Injectable 1 milliGRAM(s) IntraMuscular three times a day PRN refusal of court ordered meds

## 2023-03-16 NOTE — BH INPATIENT PSYCHIATRY PROGRESS NOTE - NSBHCHARTREVIEWVS_PSY_A_CORE FT
Vital Signs Last 24 Hrs  T(C): 36.6 (03-16-23 @ 07:36), Max: 36.6 (03-16-23 @ 07:36)  T(F): 97.8 (03-16-23 @ 07:36), Max: 97.8 (03-16-23 @ 07:36)  HR: --  BP: --  BP(mean): --  RR: --  SpO2: --    Orthostatic VS  03-16-23 @ 07:36  Lying BP: 146/63 HR: 57  Sitting BP: 151/63 HR: 62  Standing BP: --/-- HR: --  Site: --  Mode: --  Orthostatic VS  03-15-23 @ 20:58  Lying BP: --/-- HR: --  Sitting BP: 123/63 HR: 68  Standing BP: --/-- HR: --  Site: --  Mode: --  Orthostatic VS  03-15-23 @ 08:21  Lying BP: --/-- HR: --  Sitting BP: 147/62 HR: 55  Standing BP: --/-- HR: --  Site: upper left arm  Mode: electronic  Orthostatic VS  03-14-23 @ 20:42  Lying BP: --/-- HR: --  Sitting BP: 143/60 HR: 67  Standing BP: --/-- HR: --  Site: --  Mode: --

## 2023-03-16 NOTE — BH INPATIENT PSYCHIATRY PROGRESS NOTE - NSBHFUPINTERVALHXFT_PSY_A_CORE
Pt seen for f/u of vascular dementia, BAD. Chart and history reviewed and discussed with nursing team. Patient slept poorly was agitated needed prn Patient is compliant with medications.VSS. Patient yelling during PT quite severely, quieter other times though still some outbursts.

## 2023-03-17 PROCEDURE — 99231 SBSQ HOSP IP/OBS SF/LOW 25: CPT

## 2023-03-17 RX ORDER — RISPERIDONE 4 MG/1
1 TABLET ORAL
Refills: 0 | Status: DISCONTINUED | OUTPATIENT
Start: 2023-03-17 | End: 2023-03-20

## 2023-03-17 RX ADMIN — Medication 250 MILLIGRAM(S): at 20:10

## 2023-03-17 RX ADMIN — Medication 81 MILLIGRAM(S): at 09:55

## 2023-03-17 RX ADMIN — RISPERIDONE 1 MILLIGRAM(S): 4 TABLET ORAL at 13:15

## 2023-03-17 RX ADMIN — Medication 25 MILLIGRAM(S): at 09:56

## 2023-03-17 RX ADMIN — ENOXAPARIN SODIUM 40 MILLIGRAM(S): 100 INJECTION SUBCUTANEOUS at 09:56

## 2023-03-17 RX ADMIN — SENNA PLUS 2 TABLET(S): 8.6 TABLET ORAL at 20:09

## 2023-03-17 RX ADMIN — LIDOCAINE 1 PATCH: 4 CREAM TOPICAL at 06:57

## 2023-03-17 RX ADMIN — Medication 650 MILLIGRAM(S): at 05:35

## 2023-03-17 RX ADMIN — RISPERIDONE 0.5 MILLIGRAM(S): 4 TABLET ORAL at 09:56

## 2023-03-17 RX ADMIN — Medication 1 DROP(S): at 20:09

## 2023-03-17 RX ADMIN — Medication 250 MILLIGRAM(S): at 09:56

## 2023-03-17 RX ADMIN — RISPERIDONE 1 MILLIGRAM(S): 4 TABLET ORAL at 20:10

## 2023-03-17 RX ADMIN — HALOPERIDOL DECANOATE 1 MILLIGRAM(S): 100 INJECTION INTRAMUSCULAR at 18:39

## 2023-03-17 RX ADMIN — LIDOCAINE 1 PATCH: 4 CREAM TOPICAL at 20:09

## 2023-03-17 RX ADMIN — TIOTROPIUM BROMIDE 2 PUFF(S): 18 CAPSULE ORAL; RESPIRATORY (INHALATION) at 09:55

## 2023-03-17 RX ADMIN — POLYETHYLENE GLYCOL 3350 17 GRAM(S): 17 POWDER, FOR SOLUTION ORAL at 09:56

## 2023-03-17 RX ADMIN — Medication 3 MILLIGRAM(S): at 20:09

## 2023-03-17 RX ADMIN — LIDOCAINE 1 PATCH: 4 CREAM TOPICAL at 09:52

## 2023-03-17 RX ADMIN — Medication 50 MILLIGRAM(S): at 20:10

## 2023-03-17 RX ADMIN — Medication 25 MILLIGRAM(S): at 20:45

## 2023-03-17 RX ADMIN — Medication 650 MILLIGRAM(S): at 04:37

## 2023-03-17 NOTE — BH INPATIENT PSYCHIATRY PROGRESS NOTE - NSBHFUPINTERVALHXFT_PSY_A_CORE
Pt seen for f/u of vascular dementia, BAD. Chart and history reviewed and discussed with nursing team. Patient slept fair was agitated needed prn Patient is compliant with medications.VSS. Patient yelling during PT quite severely, quieter other times though still some outbursts.

## 2023-03-17 NOTE — BH INPATIENT PSYCHIATRY PROGRESS NOTE - MSE UNSTRUCTURED FT
Patient is awake and alert. Patient w/o sig EPS. Speech  high pitched,yells intermittently.  Mood irritable , affect less labile Says we are in Oct 2023. Says PT therapist is trying to kill her by causing her to fall by pushing her too hard to walk. Reports having severe fear of falling. Poor insightNo SI/HI, voices.

## 2023-03-17 NOTE — BH INPATIENT PSYCHIATRY PROGRESS NOTE - NSBHASSESSSUMMFT_PSY_ALL_CORE
Dinorah Hogan is an 88 year old female, , domiciled with daughter with PPHx of bipolar I disorder, 1 previous inpatient psychiatric hospitalization in 1970s, no known history of SI/SA and PMHx of asthma, GERD, sciatica with previous vertebral fracture who is admitted on 9.27 status for symptoms of hortencia including irritability, loud speech, and agitation.     DDx: hortencia vs hypomania vs comorbid delirium    Today, patient is angry about her current admission and denying any psychiatric history. She is asking to be discharged immediately and yelling at treatment team. She is noted to have loud and rapid speech, but is able to be interrupted. She states she has been sleeping poorly due to hip and R knee pain. She denies S/IIP, H/IIP, AVH, paranoia, or ideas of reference. She is AOx1-2 (person, place--hospital), but unable to state year or location in [place]. She requires 9.27 inpatient admission for evaluation and management of hortencia.    1/22: less irritable but still requiring PRN med, more compliant with meds, no SI/HI.  1/23: Accepted meds yd and this AM though required PRN yd. Less irritable, appropriately conversant, making needs known. Suspicious bordering on paranoid, however no fully formed delusional thought content apparent.  1/24: Irritable and labile, suspicious regarding staff members. Discussed medication, pt will be offered psychiatric meds and is aware she has right to refuse.  1/25: Hostile, irritable, labile. TOO application in progress given poor insight and intermittent refusal of psychiatric mediations. Pt at risk for deconditioning given refusal to ambulate and minimal engagement with PT, given ambulatory at home refusal here appears to be behavioral, will continue to encourage participation. Refused cognitive evaluation today. Given ongoing intermittent agitation, paranoia, physical deconditioning, and poor insight into condition, pt requires continued inpatient hospitalization for stabilization and safety.  1/26: Pt remains labile, irritable, and paranoid toward staff. Although pt makes provocative statements (e.g. "she controls the robots," "the Jews will hate me"), these statements appear to represent intense feeling rather than fully formed delusion. Continues to refuse cognitive evaluation or consent to contact family. Given ongoing intermittent agitation, paranoia, physical deconditioning, and poor insight into condition, pt requires continued inpatient hospitalization for stabilization and safety.  1/27 Patient agitated, markedly irritable,paranoid  with themes of perrvasive mistreatment, being singled out but w/u well formed , fixed delusional ideas.Cont enocurage med compliance scheduled for court hearing for TOO  1/28: Currently on CO due to hospital bed requirement. Reports fair sleep and appetite. Seen at bedside during rounds. Continues to refuse most medications, took senna and tylenol last night. Reporting back pain "15/10" and requesting more heat packs "I will die without these", however under no visible distress. Primary team pursuing TOO. Renewed CO.   1/29: Renewed CO. Last night took Depakote, Haldol and Senna. Med compliance has been partial.   1/30: Refused all meds yd, this AM accepted metoprolol. Making accusations that people are attempting to harm her and want her to die. Impulse control is impaired.  1/31: Continues with intermittent agitation, hostile and accusatory twd staff, refusing meds (except metoprolol), refusing consent to speak to daughter. Given ongoing agitation, impulsivity, disinhibition, and deconditioning 2/2 refusal to ambulate (also attempted to climb out of bed last night), pt poses a threat of harm to herself and requires ongoing inpatient hospitalization for stabilization and safety. TOO & retention court date next week.  2/1: Continues irritable, paranoid, refusing medications and engagement in interviews.   2/2: Minimally engaged in interview, refusing to answer most questions; labile, hostile, required IM Haldol 1mg overnight for agitation; refusing to ambulate or engage in PT; refusing pain control interventions other than hot packs and lidocaine patch. TOO in process.   2/4 Cont agitated poor sleep, this Am had vairable O2 sat, patient reported she feels she needs an inhaler for asthma. Patient to be seen by medicine, labs and RVP ordered. Will change haldol to olanzapine standing as response to prns of haldol and occ po's have had poor response.   2/5 Paranoid, severely agitated. Has URI. COnt meds prn inhlaer scheuled for TOO 2/7 2/6: Remains paranoid, agitated, accusatory. Frequently refusing meds, required 3x olanzapine PRNs over the weekend.  2/7: Remains paranoid and accusatory twd staff. Continues to refuse majority of medications. Currently with URI. Going to court today for retention and TOO.  2/8:  Patient irritable, agitated, will increase olanzapine with IM back up.   2/9 No major changes but taking meds, Cont olanzapine now at 2.5mg hs and Depakote. Plan cont to titrate, monitor for se  2/10 Patient w/o much response cont with intense paranoia and agitation. Cont Zyprexa cont to titrate.   2/11 Pt is sleepy most likely due to PRN zyprexa last night, remains disorganized and confused.   2/12 confused, disorganized, screams at times, required PRN zyprexa/melatonin last night.   2/13: The patient continues to be manic, disorganized, irritable, screaming at times.  She has been tolerating medications and prn's well - will increase Zyprexa to 5mg hs and change to Zydis to ensure compliance.  Continue 1:1.  2/14: The patient is not responding to Zyprexa, despite increasing dose and getting prn's.  Will change to Seroquel, as patient reportedly did well with it in the past.  Patient given 12.5mg this morning, tolerated well.  Will start 12.5mg tid.  2/15: The patient continues to be irritable, agitated, disorganized.  Some small improvement with Seroquel, sleeping better at night.  Continue Seroquel trial.  2/16: The patient continues to be agitated, frequently yelling out, irritable and angry.  She has been tolerating standing and prn Seroquel well, will continue to titrate to 25mg tid.  Continue 1:1.  2/17: The patient continues to be irritable, agitated, yelling out throughout the day.  She is having more restful periods during the day.  She has been tolerating Seroquel well, will continue.  Continue 1:1.  2/18: Patient remains agitated, disorganized, guarded, confused  2/19: Sustained agitated, irritability, likely paranoia, will increase quetiapine   2/20: Patient remains agitated, suspicious of staff, requiring prn meds, quetiapine increased   2/21 agitated paranoid, no response from Seroquel so far. Will increase Seroquel. Reviewed care with daughter.   2/22:  Paranoid agitated. Taking meds, cont  Seroquel, recheck labs.   2/23 Doing poorly with ongoing agitation now increased BUN Will push fluids. Will decrease Depakote consider d/c . COnisder changing to risperidone or haldol to avoid sedation. constipation  2/2 2/24 Poor resposne to Seroquel and olanzapine IMs with no reposne but sedation at times and constipation. Will change to risperdal po and haldol prn. Consider ECT given refractoriness. Plan recheck labs Monday 2/25 MArginal impression of improvement. Cont Risperdal trial with haldol prn    2/26 agitated, loud, screaming, disorganized and irritable, requiring PRN meds.  2/27 Patient irritable, paranoid with some more moments of being calmer, better related. Cont risperdal increase dose. Patient with elevated bicarb spoke with medicine, will repeat in AM  2/28 IMproved behaviorally. COnt risperidone trial. Labs about same. Cont meds review labs with medicine  3/2 Patient remains agitated not responded to risperidone at current dose as as is only better after multiples prns. Will increase risperidone, taper VPA  3/3 Patient with delirium superimposed on baseline dementia. Started on Vantin by medicine. Medicine to follow over weekend . Patient examined after sliding to floor , no pain or tenderness or decreased ROM hips or lower back. No intervention for this. Family notified about fall and UTI  3/4: Patient with delirium with episodes of agitation requiring prn haloperidol, but then calm and cooperative. Calm this AM.   3/5: Pt seen for f/u of vascular dementia. On CO for hospital bed. No significant overnight events reported, no PRNs needed overnight. VSS with systolic  this morning. Pt seen in the dayroom. Pt told writer she was "dead" and followed that by asking writer for orange juice. Accepted apple juice. Denies any other complaints.   3/6 Patient cont to fluctuate in symptoms from calm, placid to severely agitated. Will increase risperidone. Discussed with medicine will switch antibiotic to Augmentin based on sensitivities but may require transfer for IV if not improving.    3/7 Patient sl better. COnt risperidone dose just increased, reviewed case with medicine internist plans to switch to alternative antibiotic better suited to treat ESBL organism  3/8 Still agitated needing prns with some periods of being calmer. Cont meds but increase risperidone to 1mg at hs  3/9 COnt paranoid agitated will add trazodone at night as sleep is poor and agitation worse. Plan increase risperdal if not improving will change to haldol  3/10 Improved today calmer and paranoia more circumsribed. Possible benefit from rx of UTI. Cont current treatment. Reviewed care with daughter  3/11 Improved conisder increasing trazodone or risperidone for residual symptoms if they don't improve on current dose.   3/12 irritable, angry, loud, focused on discharge, last PRN yesterday afternoon.   3/13 Patient better still screaming and paranoid. Will increase risperidone, tolerating well, attmept to get BP reading  3/14 PAtient less paranoid still with disinhibition vocal agitation. Cont to titrate risperidone  3/15 Patient improved in that agitation and paranoia more circumscribed. Cont current treatment , plan to increase risperidone to 1mg tid  3/16 Sig nocturnal agitation and insomnia will increase hs trazodone  3/17 Some gains limited incremental benefit from titration . Will increase risperdla, ocnisder changing to haldol if not better

## 2023-03-17 NOTE — BH INPATIENT PSYCHIATRY PROGRESS NOTE - CURRENT MEDICATION
MEDICATIONS  (STANDING):  artificial  tears Solution 1 Drop(s) Both EYES two times a day  aspirin enteric coated 81 milliGRAM(s) Oral daily  enoxaparin Injectable 40 milliGRAM(s) SubCutaneous <User Schedule>  lidocaine   4% Patch 1 Patch Transdermal every 24 hours  melatonin. 3 milliGRAM(s) Oral at bedtime  metoprolol tartrate 25 milliGRAM(s) Oral two times a day  polyethylene glycol 3350 17 Gram(s) Oral daily  risperiDONE   Tablet 1 milliGRAM(s) Oral <User Schedule>  risperiDONE   Tablet 1 milliGRAM(s) Oral <User Schedule>  saccharomyces boulardii 250 milliGRAM(s) Oral two times a day  senna 2 Tablet(s) Oral at bedtime  tiotropium 2.5 MICROgram(s) Inhaler 2 Puff(s) Inhalation daily  traZODone 50 milliGRAM(s) Oral at bedtime    MEDICATIONS  (PRN):  acetaminophen     Tablet .. 650 milliGRAM(s) Oral every 6 hours PRN Mild Pain (1 - 3), Moderate Pain (4 - 6), Severe Pain (7 - 10)  albuterol    90 MICROgram(s) HFA Inhaler 2 Puff(s) Inhalation every 6 hours PRN Shortness of Breath and/or Wheezing  aluminum hydroxide/magnesium hydroxide/simethicone Suspension 30 milliLiter(s) Oral every 4 hours PRN Dyspepsia  benzocaine/menthol Lozenge 1 Lozenge Oral every 2 hours PRN sore throat  bisacodyl 5 milliGRAM(s) Oral every 12 hours PRN Constipation  haloperidol     Tablet 1 milliGRAM(s) Oral every 6 hours PRN agitation  haloperidol    Injectable 1 milliGRAM(s) IntraMuscular once PRN agitation  haloperidol    Injectable 1 milliGRAM(s) IntraMuscular once PRN agitation  haloperidol    Injectable 1 milliGRAM(s) IntraMuscular three times a day PRN refusal of court ordered meds

## 2023-03-17 NOTE — BH INPATIENT PSYCHIATRY PROGRESS NOTE - NSBHCHARTREVIEWVS_PSY_A_CORE FT
Vital Signs Last 24 Hrs  T(C): 36.4 (03-17-23 @ 07:45), Max: 36.4 (03-17-23 @ 07:45)  T(F): 97.5 (03-17-23 @ 07:45), Max: 97.5 (03-17-23 @ 07:45)  HR: --  BP: 137/76 (03-16-23 @ 20:48) (137/76 - 137/76)  BP(mean): 92 (03-16-23 @ 20:48) (92 - 92)  RR: 18 (03-17-23 @ 07:45) (18 - 18)  SpO2: --    Orthostatic VS  03-17-23 @ 07:45  Lying BP: 137/69 HR: 59  Sitting BP: 151/66 HR: 56  Standing BP: --/-- HR: --  Site: upper right arm  Mode: electronic  Orthostatic VS  03-16-23 @ 07:36  Lying BP: 146/63 HR: 57  Sitting BP: 151/63 HR: 62  Standing BP: --/-- HR: --  Site: --  Mode: --  Orthostatic VS  03-15-23 @ 20:58  Lying BP: --/-- HR: --  Sitting BP: 123/63 HR: 68  Standing BP: --/-- HR: --  Site: --  Mode: --

## 2023-03-18 PROCEDURE — 99231 SBSQ HOSP IP/OBS SF/LOW 25: CPT

## 2023-03-18 RX ADMIN — RISPERIDONE 1 MILLIGRAM(S): 4 TABLET ORAL at 20:14

## 2023-03-18 RX ADMIN — Medication 1 DROP(S): at 20:16

## 2023-03-18 RX ADMIN — Medication 25 MILLIGRAM(S): at 22:52

## 2023-03-18 RX ADMIN — Medication 3 MILLIGRAM(S): at 20:14

## 2023-03-18 RX ADMIN — HALOPERIDOL DECANOATE 1 MILLIGRAM(S): 100 INJECTION INTRAMUSCULAR at 17:42

## 2023-03-18 RX ADMIN — RISPERIDONE 1 MILLIGRAM(S): 4 TABLET ORAL at 06:24

## 2023-03-18 RX ADMIN — Medication 25 MILLIGRAM(S): at 08:49

## 2023-03-18 RX ADMIN — Medication 250 MILLIGRAM(S): at 20:15

## 2023-03-18 RX ADMIN — POLYETHYLENE GLYCOL 3350 17 GRAM(S): 17 POWDER, FOR SOLUTION ORAL at 08:50

## 2023-03-18 RX ADMIN — Medication 50 MILLIGRAM(S): at 20:15

## 2023-03-18 RX ADMIN — TIOTROPIUM BROMIDE 2 PUFF(S): 18 CAPSULE ORAL; RESPIRATORY (INHALATION) at 08:49

## 2023-03-18 RX ADMIN — Medication 81 MILLIGRAM(S): at 08:49

## 2023-03-18 RX ADMIN — Medication 250 MILLIGRAM(S): at 09:44

## 2023-03-18 RX ADMIN — Medication 650 MILLIGRAM(S): at 05:10

## 2023-03-18 RX ADMIN — LIDOCAINE 1 PATCH: 4 CREAM TOPICAL at 22:20

## 2023-03-18 RX ADMIN — Medication 1 DROP(S): at 09:15

## 2023-03-18 RX ADMIN — SENNA PLUS 2 TABLET(S): 8.6 TABLET ORAL at 20:15

## 2023-03-18 RX ADMIN — RISPERIDONE 1 MILLIGRAM(S): 4 TABLET ORAL at 12:08

## 2023-03-18 RX ADMIN — Medication 650 MILLIGRAM(S): at 04:13

## 2023-03-18 NOTE — BH INPATIENT PSYCHIATRY PROGRESS NOTE - NSBHASSESSSUMMFT_PSY_ALL_CORE
Dinorah Hogan is an 88 year old female, , domiciled with daughter with PPHx of bipolar I disorder, 1 previous inpatient psychiatric hospitalization in 1970s, no known history of SI/SA and PMHx of asthma, GERD, sciatica with previous vertebral fracture who is admitted on 9.27 status for symptoms of hortencia including irritability, loud speech, and agitation.     DDx: hortencia vs hypomania vs comorbid delirium    Today, patient is angry about her current admission and denying any psychiatric history. She is asking to be discharged immediately and yelling at treatment team. She is noted to have loud and rapid speech, but is able to be interrupted. She states she has been sleeping poorly due to hip and R knee pain. She denies S/IIP, H/IIP, AVH, paranoia, or ideas of reference. She is AOx1-2 (person, place--hospital), but unable to state year or location in [place]. She requires 9.27 inpatient admission for evaluation and management of hortencia.    1/22: less irritable but still requiring PRN med, more compliant with meds, no SI/HI.  1/23: Accepted meds yd and this AM though required PRN yd. Less irritable, appropriately conversant, making needs known. Suspicious bordering on paranoid, however no fully formed delusional thought content apparent.  1/24: Irritable and labile, suspicious regarding staff members. Discussed medication, pt will be offered psychiatric meds and is aware she has right to refuse.  1/25: Hostile, irritable, labile. TOO application in progress given poor insight and intermittent refusal of psychiatric mediations. Pt at risk for deconditioning given refusal to ambulate and minimal engagement with PT, given ambulatory at home refusal here appears to be behavioral, will continue to encourage participation. Refused cognitive evaluation today. Given ongoing intermittent agitation, paranoia, physical deconditioning, and poor insight into condition, pt requires continued inpatient hospitalization for stabilization and safety.  1/26: Pt remains labile, irritable, and paranoid toward staff. Although pt makes provocative statements (e.g. "she controls the robots," "the Jews will hate me"), these statements appear to represent intense feeling rather than fully formed delusion. Continues to refuse cognitive evaluation or consent to contact family. Given ongoing intermittent agitation, paranoia, physical deconditioning, and poor insight into condition, pt requires continued inpatient hospitalization for stabilization and safety.  1/27 Patient agitated, markedly irritable,paranoid  with themes of perrvasive mistreatment, being singled out but w/u well formed , fixed delusional ideas.Cont enocurage med compliance scheduled for court hearing for TOO  1/28: Currently on CO due to hospital bed requirement. Reports fair sleep and appetite. Seen at bedside during rounds. Continues to refuse most medications, took senna and tylenol last night. Reporting back pain "15/10" and requesting more heat packs "I will die without these", however under no visible distress. Primary team pursuing TOO. Renewed CO.   1/29: Renewed CO. Last night took Depakote, Haldol and Senna. Med compliance has been partial.   1/30: Refused all meds yd, this AM accepted metoprolol. Making accusations that people are attempting to harm her and want her to die. Impulse control is impaired.  1/31: Continues with intermittent agitation, hostile and accusatory twd staff, refusing meds (except metoprolol), refusing consent to speak to daughter. Given ongoing agitation, impulsivity, disinhibition, and deconditioning 2/2 refusal to ambulate (also attempted to climb out of bed last night), pt poses a threat of harm to herself and requires ongoing inpatient hospitalization for stabilization and safety. TOO & retention court date next week.  2/1: Continues irritable, paranoid, refusing medications and engagement in interviews.   2/2: Minimally engaged in interview, refusing to answer most questions; labile, hostile, required IM Haldol 1mg overnight for agitation; refusing to ambulate or engage in PT; refusing pain control interventions other than hot packs and lidocaine patch. TOO in process.   2/4 Cont agitated poor sleep, this Am had vairable O2 sat, patient reported she feels she needs an inhaler for asthma. Patient to be seen by medicine, labs and RVP ordered. Will change haldol to olanzapine standing as response to prns of haldol and occ po's have had poor response.   2/5 Paranoid, severely agitated. Has URI. COnt meds prn inhlaer scheuled for TOO 2/7 2/6: Remains paranoid, agitated, accusatory. Frequently refusing meds, required 3x olanzapine PRNs over the weekend.  2/7: Remains paranoid and accusatory twd staff. Continues to refuse majority of medications. Currently with URI. Going to court today for retention and TOO.  2/8:  Patient irritable, agitated, will increase olanzapine with IM back up.   2/9 No major changes but taking meds, Cont olanzapine now at 2.5mg hs and Depakote. Plan cont to titrate, monitor for se  2/10 Patient w/o much response cont with intense paranoia and agitation. Cont Zyprexa cont to titrate.   2/11 Pt is sleepy most likely due to PRN zyprexa last night, remains disorganized and confused.   2/12 confused, disorganized, screams at times, required PRN zyprexa/melatonin last night.   2/13: The patient continues to be manic, disorganized, irritable, screaming at times.  She has been tolerating medications and prn's well - will increase Zyprexa to 5mg hs and change to Zydis to ensure compliance.  Continue 1:1.  2/14: The patient is not responding to Zyprexa, despite increasing dose and getting prn's.  Will change to Seroquel, as patient reportedly did well with it in the past.  Patient given 12.5mg this morning, tolerated well.  Will start 12.5mg tid.  2/15: The patient continues to be irritable, agitated, disorganized.  Some small improvement with Seroquel, sleeping better at night.  Continue Seroquel trial.  2/16: The patient continues to be agitated, frequently yelling out, irritable and angry.  She has been tolerating standing and prn Seroquel well, will continue to titrate to 25mg tid.  Continue 1:1.  2/17: The patient continues to be irritable, agitated, yelling out throughout the day.  She is having more restful periods during the day.  She has been tolerating Seroquel well, will continue.  Continue 1:1.  2/18: Patient remains agitated, disorganized, guarded, confused  2/19: Sustained agitated, irritability, likely paranoia, will increase quetiapine   2/20: Patient remains agitated, suspicious of staff, requiring prn meds, quetiapine increased   2/21 agitated paranoid, no response from Seroquel so far. Will increase Seroquel. Reviewed care with daughter.   2/22:  Paranoid agitated. Taking meds, cont  Seroquel, recheck labs.   2/23 Doing poorly with ongoing agitation now increased BUN Will push fluids. Will decrease Depakote consider d/c . COnisder changing to risperidone or haldol to avoid sedation. constipation  2/2 2/24 Poor resposne to Seroquel and olanzapine IMs with no reposne but sedation at times and constipation. Will change to risperdal po and haldol prn. Consider ECT given refractoriness. Plan recheck labs Monday 2/25 MArginal impression of improvement. Cont Risperdal trial with haldol prn    2/26 agitated, loud, screaming, disorganized and irritable, requiring PRN meds.  2/27 Patient irritable, paranoid with some more moments of being calmer, better related. Cont risperdal increase dose. Patient with elevated bicarb spoke with medicine, will repeat in AM  2/28 IMproved behaviorally. COnt risperidone trial. Labs about same. Cont meds review labs with medicine  3/2 Patient remains agitated not responded to risperidone at current dose as as is only better after multiples prns. Will increase risperidone, taper VPA  3/3 Patient with delirium superimposed on baseline dementia. Started on Vantin by medicine. Medicine to follow over weekend . Patient examined after sliding to floor , no pain or tenderness or decreased ROM hips or lower back. No intervention for this. Family notified about fall and UTI  3/4: Patient with delirium with episodes of agitation requiring prn haloperidol, but then calm and cooperative. Calm this AM.   3/5: Pt seen for f/u of vascular dementia. On CO for hospital bed. No significant overnight events reported, no PRNs needed overnight. VSS with systolic  this morning. Pt seen in the dayroom. Pt told writer she was "dead" and followed that by asking writer for orange juice. Accepted apple juice. Denies any other complaints.   3/6 Patient cont to fluctuate in symptoms from calm, placid to severely agitated. Will increase risperidone. Discussed with medicine will switch antibiotic to Augmentin based on sensitivities but may require transfer for IV if not improving.    3/7 Patient sl better. COnt risperidone dose just increased, reviewed case with medicine internist plans to switch to alternative antibiotic better suited to treat ESBL organism  3/8 Still agitated needing prns with some periods of being calmer. Cont meds but increase risperidone to 1mg at hs  3/9 COnt paranoid agitated will add trazodone at night as sleep is poor and agitation worse. Plan increase risperdal if not improving will change to haldol  3/10 Improved today calmer and paranoia more circumsribed. Possible benefit from rx of UTI. Cont current treatment. Reviewed care with daughter  3/11 Improved conisder increasing trazodone or risperidone for residual symptoms if they don't improve on current dose.   3/12 irritable, angry, loud, focused on discharge, last PRN yesterday afternoon.   3/13 Patient better still screaming and paranoid. Will increase risperidone, tolerating well, attmept to get BP reading  3/14 PAtient less paranoid still with disinhibition vocal agitation. Cont to titrate risperidone  3/15 Patient improved in that agitation and paranoia more circumscribed. Cont current treatment , plan to increase risperidone to 1mg tid  3/16 Sig nocturnal agitation and insomnia will increase hs trazodone  3/17 Some gains limited incremental benefit from titration . Will increase risperdla, ocnisder changing to haldol if not better.  3/18 calmer, less irritable,  last PRN yesterday around 6.40 pm, no SI/HI.

## 2023-03-18 NOTE — BH INPATIENT PSYCHIATRY PROGRESS NOTE - NSBHFUPINTERVALCCFT_PSY_A_CORE
Chest Pain   WHAT YOU NEED TO KNOW:   Chest pain can be caused by a range of conditions, from not serious to life-threatening  Chest pain can be a symptom of a digestive problem, such as acid reflux or a stomach ulcer  An anxiety attack or a strong emotion, such as anger, can also cause chest pain  Infection, inflammation, or a fracture in the bones or cartilage in your chest can cause pain or discomfort  Sometimes chest pain or pressure is caused by poor blood flow to your heart (angina)  Chest pain may also be caused by life-threatening conditions such as a heart attack or blood clot in your lungs  DISCHARGE INSTRUCTIONS:   Call 911 if:   · You have any of the following signs of a heart attack:      ¨ Squeezing, pressure, or pain in your chest that lasts longer than 5 minutes or returns    ¨ Discomfort or pain in your back, neck, jaw, stomach, or arm     ¨ Trouble breathing    ¨ Nausea or vomiting    ¨ Lightheadedness or a sudden cold sweat, especially with chest pain or trouble breathing    Return to the emergency department if:   · You have chest discomfort that gets worse, even with medicine  · You cough or vomit blood  · Your bowel movements are black or bloody  · You cannot stop vomiting, or it hurts to swallow  Contact your healthcare provider if:   · You have questions or concerns about your condition or care  Medicines:   · Medicines  may be given to treat the cause of your chest pain  Examples include pain medicine, anxiety medicine, or medicines to increase blood flow to your heart  · Do not take certain medicines without asking your healthcare provider first   These include NSAIDs, herbal or vitamin supplements, or hormones (estrogen or progestin)  · Take your medicine as directed  Contact your healthcare provider if you think your medicine is not helping or if you have side effects  Tell him or her if you are allergic to any medicine   Keep a list of the medicines, vitamins, and herbs you take  Include the amounts, and when and why you take them  Bring the list or the pill bottles to follow-up visits  Carry your medicine list with you in case of an emergency  Follow up with your healthcare provider within 72 hours, or as directed: You may need to return for more tests to find the cause of your chest pain  You may be referred to a specialist, such as a cardiologist or gastroenterologist  Write down your questions so you remember to ask them during your visits  Healthy living tips: The following are general healthy guidelines  If your chest pain is caused by a heart problem, your healthcare provider will give you specific guidelines to follow  · Do not smoke  Nicotine and other chemicals in cigarettes and cigars can cause lung and heart damage  Ask your healthcare provider for information if you currently smoke and need help to quit  E-cigarettes or smokeless tobacco still contain nicotine  Talk to your healthcare provider before you use these products  · Eat a variety of healthy, low-fat foods  Healthy foods include fruits, vegetables, whole-grain breads, low-fat dairy products, beans, lean meats, and fish  Ask for more information about a heart healthy diet  · Ask about activity  Your healthcare provider will tell you which activities to limit or avoid  Ask when you can drive, return to work, and have sex  Ask about the best exercise plan for you  · Maintain a healthy weight  Ask your healthcare provider how much you should weigh  Ask him or her to help you create a weight loss plan if you are overweight  · Get the flu and pneumonia vaccines  All adults should get the influenza (flu) vaccine  Get it every year as soon as it becomes available  The pneumococcal vaccine is given to adults aged 72 years or older  The vaccine is given every 5 years to prevent pneumococcal disease, such as pneumonia    © 2017 [facility] Information is for End User's use only and may not be sold, redistributed or otherwise used for commercial purposes  All illustrations and images included in CareNotes® are the copyrighted property of A D A M [employer]  or Dharmesh Batres  The above information is an  only  It is not intended as medical advice for individual conditions or treatments  Talk to your doctor, nurse or pharmacist before following any medical regimen to see if it is safe and effective for you  ' am so so'

## 2023-03-18 NOTE — BH INPATIENT PSYCHIATRY PROGRESS NOTE - NSBHFUPINTERVALHXFT_PSY_A_CORE
Pt seen for f/u of vascular dementia, BAD. Chart and history reviewed and discussed with nursing team. Patient is compliant with medications, received PRN haldol po yesterday around 6.40 pm.  Vital signs are stable. On exam, Pt noted sleeping on gerichair in day room. She is awake on approach and command. Reports feeling so so, had breakfast and sleep was ok . Patient appears calm now. Denies any SI, HI, paranoia or hallucinations.

## 2023-03-18 NOTE — BH INPATIENT PSYCHIATRY PROGRESS NOTE - NSBHCHARTREVIEWVS_PSY_A_CORE FT
Vital Signs Last 24 Hrs  T(C): 36.7 (03-18-23 @ 07:47), Max: 36.7 (03-18-23 @ 07:47)  T(F): 98.1 (03-18-23 @ 07:47), Max: 98.1 (03-18-23 @ 07:47)  HR: --  BP: --  BP(mean): --  RR: --  SpO2: --    Orthostatic VS  03-18-23 @ 07:47  Lying BP: --/-- HR: --  Sitting BP: 136/62 HR: 57  Standing BP: --/-- HR: --  Site: --  Mode: --  Orthostatic VS  03-17-23 @ 20:41  Lying BP: --/-- HR: --  Sitting BP: 146/58 HR: 70  Standing BP: --/-- HR: --  Site: --  Mode: --  Orthostatic VS  03-17-23 @ 07:45  Lying BP: 137/69 HR: 59  Sitting BP: 151/66 HR: 56  Standing BP: --/-- HR: --  Site: upper right arm  Mode: electronic

## 2023-03-19 RX ADMIN — RISPERIDONE 1 MILLIGRAM(S): 4 TABLET ORAL at 13:28

## 2023-03-19 RX ADMIN — Medication 50 MILLIGRAM(S): at 20:22

## 2023-03-19 RX ADMIN — Medication 1 DROP(S): at 20:23

## 2023-03-19 RX ADMIN — Medication 650 MILLIGRAM(S): at 02:21

## 2023-03-19 RX ADMIN — Medication 3 MILLIGRAM(S): at 20:22

## 2023-03-19 RX ADMIN — Medication 250 MILLIGRAM(S): at 20:22

## 2023-03-19 RX ADMIN — HALOPERIDOL DECANOATE 1 MILLIGRAM(S): 100 INJECTION INTRAMUSCULAR at 19:15

## 2023-03-19 RX ADMIN — SENNA PLUS 2 TABLET(S): 8.6 TABLET ORAL at 20:21

## 2023-03-19 RX ADMIN — TIOTROPIUM BROMIDE 2 PUFF(S): 18 CAPSULE ORAL; RESPIRATORY (INHALATION) at 09:00

## 2023-03-19 RX ADMIN — ENOXAPARIN SODIUM 40 MILLIGRAM(S): 100 INJECTION SUBCUTANEOUS at 09:00

## 2023-03-19 RX ADMIN — Medication 25 MILLIGRAM(S): at 20:22

## 2023-03-19 RX ADMIN — POLYETHYLENE GLYCOL 3350 17 GRAM(S): 17 POWDER, FOR SOLUTION ORAL at 10:41

## 2023-03-19 RX ADMIN — Medication 650 MILLIGRAM(S): at 19:14

## 2023-03-19 RX ADMIN — RISPERIDONE 1 MILLIGRAM(S): 4 TABLET ORAL at 20:22

## 2023-03-19 RX ADMIN — Medication 650 MILLIGRAM(S): at 19:54

## 2023-03-19 RX ADMIN — Medication 81 MILLIGRAM(S): at 10:41

## 2023-03-19 RX ADMIN — Medication 650 MILLIGRAM(S): at 03:20

## 2023-03-19 RX ADMIN — LIDOCAINE 1 PATCH: 4 CREAM TOPICAL at 20:21

## 2023-03-19 RX ADMIN — Medication 250 MILLIGRAM(S): at 10:41

## 2023-03-19 RX ADMIN — Medication 25 MILLIGRAM(S): at 10:41

## 2023-03-19 RX ADMIN — LIDOCAINE 1 PATCH: 4 CREAM TOPICAL at 07:00

## 2023-03-19 RX ADMIN — RISPERIDONE 1 MILLIGRAM(S): 4 TABLET ORAL at 05:54

## 2023-03-19 RX ADMIN — Medication 1 DROP(S): at 10:41

## 2023-03-19 RX ADMIN — HALOPERIDOL DECANOATE 1 MILLIGRAM(S): 100 INJECTION INTRAMUSCULAR at 10:00

## 2023-03-19 RX ADMIN — LIDOCAINE 1 PATCH: 4 CREAM TOPICAL at 10:00

## 2023-03-19 NOTE — BH INPATIENT PSYCHIATRY PROGRESS NOTE - NSBHMSETHTPROC_PSY_A_CORE
Disorganized/Illogical/Impaired reasoning
Linear/Perseverative/Impaired reasoning
Unable to assess
Perseverative/Illogical/Impaired reasoning
Disorganized/Illogical/Impaired reasoning
Disorganized/Illogical/Impaired reasoning
Linear/Impaired reasoning

## 2023-03-19 NOTE — BH INPATIENT PSYCHIATRY PROGRESS NOTE - NSBHMSESPABN_PSY_A_CORE
Loud volume
Loud volume/Decreased productivity
Decreased productivity
Loud volume
Loud volume/Pressured rate
Loud volume

## 2023-03-19 NOTE — BH INPATIENT PSYCHIATRY PROGRESS NOTE - NSBHASSESSSUMMFT_PSY_ALL_CORE
Dinorah Hogan is an 88 year old female, , domiciled with daughter with PPHx of bipolar I disorder, 1 previous inpatient psychiatric hospitalization in 1970s, no known history of SI/SA and PMHx of asthma, GERD, sciatica with previous vertebral fracture who is admitted on 9.27 status for symptoms of hortencia including irritability, loud speech, and agitation.     DDx: hortencia vs hypomania vs comorbid delirium    Today, patient is angry about her current admission and denying any psychiatric history. She is asking to be discharged immediately and yelling at treatment team. She is noted to have loud and rapid speech, but is able to be interrupted. She states she has been sleeping poorly due to hip and R knee pain. She denies S/IIP, H/IIP, AVH, paranoia, or ideas of reference. She is AOx1-2 (person, place--hospital), but unable to state year or location in [place]. She requires 9.27 inpatient admission for evaluation and management of hortencia.    1/22: less irritable but still requiring PRN med, more compliant with meds, no SI/HI.  1/23: Accepted meds yd and this AM though required PRN yd. Less irritable, appropriately conversant, making needs known. Suspicious bordering on paranoid, however no fully formed delusional thought content apparent.  1/24: Irritable and labile, suspicious regarding staff members. Discussed medication, pt will be offered psychiatric meds and is aware she has right to refuse.  1/25: Hostile, irritable, labile. TOO application in progress given poor insight and intermittent refusal of psychiatric mediations. Pt at risk for deconditioning given refusal to ambulate and minimal engagement with PT, given ambulatory at home refusal here appears to be behavioral, will continue to encourage participation. Refused cognitive evaluation today. Given ongoing intermittent agitation, paranoia, physical deconditioning, and poor insight into condition, pt requires continued inpatient hospitalization for stabilization and safety.  1/26: Pt remains labile, irritable, and paranoid toward staff. Although pt makes provocative statements (e.g. "she controls the robots," "the Jews will hate me"), these statements appear to represent intense feeling rather than fully formed delusion. Continues to refuse cognitive evaluation or consent to contact family. Given ongoing intermittent agitation, paranoia, physical deconditioning, and poor insight into condition, pt requires continued inpatient hospitalization for stabilization and safety.  1/27 Patient agitated, markedly irritable,paranoid  with themes of perrvasive mistreatment, being singled out but w/u well formed , fixed delusional ideas.Cont enocurage med compliance scheduled for court hearing for TOO  1/28: Currently on CO due to hospital bed requirement. Reports fair sleep and appetite. Seen at bedside during rounds. Continues to refuse most medications, took senna and tylenol last night. Reporting back pain "15/10" and requesting more heat packs "I will die without these", however under no visible distress. Primary team pursuing TOO. Renewed CO.   1/29: Renewed CO. Last night took Depakote, Haldol and Senna. Med compliance has been partial.   1/30: Refused all meds yd, this AM accepted metoprolol. Making accusations that people are attempting to harm her and want her to die. Impulse control is impaired.  1/31: Continues with intermittent agitation, hostile and accusatory twd staff, refusing meds (except metoprolol), refusing consent to speak to daughter. Given ongoing agitation, impulsivity, disinhibition, and deconditioning 2/2 refusal to ambulate (also attempted to climb out of bed last night), pt poses a threat of harm to herself and requires ongoing inpatient hospitalization for stabilization and safety. TOO & retention court date next week.  2/1: Continues irritable, paranoid, refusing medications and engagement in interviews.   2/2: Minimally engaged in interview, refusing to answer most questions; labile, hostile, required IM Haldol 1mg overnight for agitation; refusing to ambulate or engage in PT; refusing pain control interventions other than hot packs and lidocaine patch. TOO in process.   2/4 Cont agitated poor sleep, this Am had vairable O2 sat, patient reported she feels she needs an inhaler for asthma. Patient to be seen by medicine, labs and RVP ordered. Will change haldol to olanzapine standing as response to prns of haldol and occ po's have had poor response.   2/5 Paranoid, severely agitated. Has URI. COnt meds prn inhlaer scheuled for TOO 2/7 2/6: Remains paranoid, agitated, accusatory. Frequently refusing meds, required 3x olanzapine PRNs over the weekend.  2/7: Remains paranoid and accusatory twd staff. Continues to refuse majority of medications. Currently with URI. Going to court today for retention and TOO.  2/8:  Patient irritable, agitated, will increase olanzapine with IM back up.   2/9 No major changes but taking meds, Cont olanzapine now at 2.5mg hs and Depakote. Plan cont to titrate, monitor for se  2/10 Patient w/o much response cont with intense paranoia and agitation. Cont Zyprexa cont to titrate.   2/11 Pt is sleepy most likely due to PRN zyprexa last night, remains disorganized and confused.   2/12 confused, disorganized, screams at times, required PRN zyprexa/melatonin last night.   2/13: The patient continues to be manic, disorganized, irritable, screaming at times.  She has been tolerating medications and prn's well - will increase Zyprexa to 5mg hs and change to Zydis to ensure compliance.  Continue 1:1.  2/14: The patient is not responding to Zyprexa, despite increasing dose and getting prn's.  Will change to Seroquel, as patient reportedly did well with it in the past.  Patient given 12.5mg this morning, tolerated well.  Will start 12.5mg tid.  2/15: The patient continues to be irritable, agitated, disorganized.  Some small improvement with Seroquel, sleeping better at night.  Continue Seroquel trial.  2/16: The patient continues to be agitated, frequently yelling out, irritable and angry.  She has been tolerating standing and prn Seroquel well, will continue to titrate to 25mg tid.  Continue 1:1.  2/17: The patient continues to be irritable, agitated, yelling out throughout the day.  She is having more restful periods during the day.  She has been tolerating Seroquel well, will continue.  Continue 1:1.  2/18: Patient remains agitated, disorganized, guarded, confused  2/19: Sustained agitated, irritability, likely paranoia, will increase quetiapine   2/20: Patient remains agitated, suspicious of staff, requiring prn meds, quetiapine increased   2/21 agitated paranoid, no response from Seroquel so far. Will increase Seroquel. Reviewed care with daughter.   2/22:  Paranoid agitated. Taking meds, cont  Seroquel, recheck labs.   2/23 Doing poorly with ongoing agitation now increased BUN Will push fluids. Will decrease Depakote consider d/c . COnisder changing to risperidone or haldol to avoid sedation. constipation  2/2 2/24 Poor resposne to Seroquel and olanzapine IMs with no reposne but sedation at times and constipation. Will change to risperdal po and haldol prn. Consider ECT given refractoriness. Plan recheck labs Monday 2/25 MArginal impression of improvement. Cont Risperdal trial with haldol prn    2/26 agitated, loud, screaming, disorganized and irritable, requiring PRN meds.  2/27 Patient irritable, paranoid with some more moments of being calmer, better related. Cont risperdal increase dose. Patient with elevated bicarb spoke with medicine, will repeat in AM  2/28 IMproved behaviorally. COnt risperidone trial. Labs about same. Cont meds review labs with medicine  3/2 Patient remains agitated not responded to risperidone at current dose as as is only better after multiples prns. Will increase risperidone, taper VPA  3/3 Patient with delirium superimposed on baseline dementia. Started on Vantin by medicine. Medicine to follow over weekend . Patient examined after sliding to floor , no pain or tenderness or decreased ROM hips or lower back. No intervention for this. Family notified about fall and UTI  3/4: Patient with delirium with episodes of agitation requiring prn haloperidol, but then calm and cooperative. Calm this AM.   3/5: Pt seen for f/u of vascular dementia. On CO for hospital bed. No significant overnight events reported, no PRNs needed overnight. VSS with systolic  this morning. Pt seen in the dayroom. Pt told writer she was "dead" and followed that by asking writer for orange juice. Accepted apple juice. Denies any other complaints.   3/6 Patient cont to fluctuate in symptoms from calm, placid to severely agitated. Will increase risperidone. Discussed with medicine will switch antibiotic to Augmentin based on sensitivities but may require transfer for IV if not improving.    3/7 Patient sl better. COnt risperidone dose just increased, reviewed case with medicine internist plans to switch to alternative antibiotic better suited to treat ESBL organism  3/8 Still agitated needing prns with some periods of being calmer. Cont meds but increase risperidone to 1mg at hs  3/9 COnt paranoid agitated will add trazodone at night as sleep is poor and agitation worse. Plan increase risperdal if not improving will change to haldol  3/10 Improved today calmer and paranoia more circumsribed. Possible benefit from rx of UTI. Cont current treatment. Reviewed care with daughter  3/11 Improved conisder increasing trazodone or risperidone for residual symptoms if they don't improve on current dose.   3/12 irritable, angry, loud, focused on discharge, last PRN yesterday afternoon.   3/13 Patient better still screaming and paranoid. Will increase risperidone, tolerating well, attmept to get BP reading  3/14 PAtient less paranoid still with disinhibition vocal agitation. Cont to titrate risperidone  3/15 Patient improved in that agitation and paranoia more circumscribed. Cont current treatment , plan to increase risperidone to 1mg tid  3/16 Sig nocturnal agitation and insomnia will increase hs trazodone  3/17 Some gains limited incremental benefit from titration . Will increase risperdla, ocnisder changing to haldol if not better.  3/18 calmer, less irritable,  last PRN yesterday around 6.40 pm, no SI/HI.   3/19 screaming, irritable, perseverative, received PRN yesterday around 6 pm.

## 2023-03-19 NOTE — BH INPATIENT PSYCHIATRY PROGRESS NOTE - NSBHMSEAFFRANGE_PSY_A_CORE
Constricted/Unable to assess
Constricted
Constricted
Unable to assess
Constricted

## 2023-03-19 NOTE — BH INPATIENT PSYCHIATRY PROGRESS NOTE - NSBHMSEPERCEPT_PSY_A_CORE
Unable to assess
No abnormalities
Unable to assess
Unable to assess
No abnormalities
Unable to assess
No abnormalities/Unable to assess

## 2023-03-19 NOTE — BH INPATIENT PSYCHIATRY PROGRESS NOTE - NSBHMSEAFFQUAL_PSY_A_CORE
Irritable/Anxious/Unable to assess
Anxious/Unable to assess
Unable to assess
Irritable/Anxious/Unable to assess

## 2023-03-19 NOTE — BH INPATIENT PSYCHIATRY PROGRESS NOTE - NSBHCHARTREVIEWVS_PSY_A_CORE FT
Vital Signs Last 24 Hrs  T(C): 36.6 (03-19-23 @ 08:27), Max: 36.6 (03-19-23 @ 08:27)  T(F): 97.8 (03-19-23 @ 08:27), Max: 97.8 (03-19-23 @ 08:27)  HR: 70 (03-18-23 @ 20:56) (70 - 70)  BP: 140/56 (03-18-23 @ 20:56) (140/56 - 140/56)  BP(mean): --  RR: 16 (03-18-23 @ 20:56) (16 - 16)  SpO2: --    Orthostatic VS  03-19-23 @ 08:27  Lying BP: --/-- HR: --  Sitting BP: 92/67 HR: 58  Standing BP: --/-- HR: --  Site: --  Mode: --  Orthostatic VS  03-18-23 @ 07:47  Lying BP: --/-- HR: --  Sitting BP: 136/62 HR: 57  Standing BP: --/-- HR: --  Site: --  Mode: --  Orthostatic VS  03-17-23 @ 20:41  Lying BP: --/-- HR: --  Sitting BP: 146/58 HR: 70  Standing BP: --/-- HR: --  Site: --  Mode: --

## 2023-03-19 NOTE — BH INPATIENT PSYCHIATRY PROGRESS NOTE - NSBHMSESPEECH_PSY_A_CORE
Abnormal as indicated, otherwise normal...
Non-verbal: unable to assess speech further
Abnormal as indicated, otherwise normal...

## 2023-03-19 NOTE — BH INPATIENT PSYCHIATRY PROGRESS NOTE - NSBHFUPINTERVALHXFT_PSY_A_CORE
Pt seen for f/u of vascular dementia, BAD. Chart and history reviewed and discussed with nursing team. Patient is compliant with medications, received PRN haldol po yesterday around 5.40 pm.  On exam, Pt noted screaming to put her up on gerichair, requiring repeated redirection by staff. She perseverative about it and appears anxious and irritable. No SI, HI expressed.

## 2023-03-20 PROCEDURE — 99232 SBSQ HOSP IP/OBS MODERATE 35: CPT

## 2023-03-20 RX ORDER — GABAPENTIN 400 MG/1
100 CAPSULE ORAL ONCE
Refills: 0 | Status: COMPLETED | OUTPATIENT
Start: 2023-03-20 | End: 2023-03-20

## 2023-03-20 RX ORDER — HALOPERIDOL DECANOATE 100 MG/ML
1 INJECTION INTRAMUSCULAR
Refills: 0 | Status: DISCONTINUED | OUTPATIENT
Start: 2023-03-20 | End: 2023-03-21

## 2023-03-20 RX ORDER — HALOPERIDOL DECANOATE 100 MG/ML
1 INJECTION INTRAMUSCULAR ONCE
Refills: 0 | Status: COMPLETED | OUTPATIENT
Start: 2023-03-20 | End: 2023-03-20

## 2023-03-20 RX ORDER — GABAPENTIN 400 MG/1
100 CAPSULE ORAL
Refills: 0 | Status: DISCONTINUED | OUTPATIENT
Start: 2023-03-20 | End: 2023-03-20

## 2023-03-20 RX ADMIN — Medication 650 MILLIGRAM(S): at 02:20

## 2023-03-20 RX ADMIN — GABAPENTIN 100 MILLIGRAM(S): 400 CAPSULE ORAL at 13:00

## 2023-03-20 RX ADMIN — LIDOCAINE 1 PATCH: 4 CREAM TOPICAL at 06:46

## 2023-03-20 RX ADMIN — Medication 50 MILLIGRAM(S): at 22:17

## 2023-03-20 RX ADMIN — HALOPERIDOL DECANOATE 1 MILLIGRAM(S): 100 INJECTION INTRAMUSCULAR at 18:43

## 2023-03-20 RX ADMIN — Medication 650 MILLIGRAM(S): at 18:43

## 2023-03-20 RX ADMIN — RISPERIDONE 1 MILLIGRAM(S): 4 TABLET ORAL at 06:26

## 2023-03-20 RX ADMIN — HALOPERIDOL DECANOATE 1 MILLIGRAM(S): 100 INJECTION INTRAMUSCULAR at 08:00

## 2023-03-20 RX ADMIN — RISPERIDONE 1 MILLIGRAM(S): 4 TABLET ORAL at 13:01

## 2023-03-20 RX ADMIN — Medication 1 DROP(S): at 08:00

## 2023-03-20 RX ADMIN — LIDOCAINE 1 PATCH: 4 CREAM TOPICAL at 22:17

## 2023-03-20 RX ADMIN — Medication 650 MILLIGRAM(S): at 01:24

## 2023-03-20 RX ADMIN — ENOXAPARIN SODIUM 40 MILLIGRAM(S): 100 INJECTION SUBCUTANEOUS at 08:01

## 2023-03-20 RX ADMIN — HALOPERIDOL DECANOATE 1 MILLIGRAM(S): 100 INJECTION INTRAMUSCULAR at 02:00

## 2023-03-20 RX ADMIN — HALOPERIDOL DECANOATE 1 MILLIGRAM(S): 100 INJECTION INTRAMUSCULAR at 22:10

## 2023-03-20 RX ADMIN — Medication 25 MILLIGRAM(S): at 22:17

## 2023-03-20 RX ADMIN — Medication 3 MILLIGRAM(S): at 22:16

## 2023-03-20 RX ADMIN — Medication 1 DROP(S): at 23:01

## 2023-03-20 RX ADMIN — TIOTROPIUM BROMIDE 2 PUFF(S): 18 CAPSULE ORAL; RESPIRATORY (INHALATION) at 08:00

## 2023-03-20 RX ADMIN — HALOPERIDOL DECANOATE 1 MILLIGRAM(S): 100 INJECTION INTRAMUSCULAR at 11:05

## 2023-03-20 RX ADMIN — SENNA PLUS 2 TABLET(S): 8.6 TABLET ORAL at 22:17

## 2023-03-20 RX ADMIN — Medication 25 MILLIGRAM(S): at 07:59

## 2023-03-20 RX ADMIN — Medication 81 MILLIGRAM(S): at 07:59

## 2023-03-20 RX ADMIN — GABAPENTIN 100 MILLIGRAM(S): 400 CAPSULE ORAL at 10:41

## 2023-03-20 NOTE — BH INPATIENT PSYCHIATRY PROGRESS NOTE - NSBHASSESSSUMMFT_PSY_ALL_CORE
Dinorah Hogan is an 88 year old female, , domiciled with daughter with PPHx of bipolar I disorder, 1 previous inpatient psychiatric hospitalization in 1970s, no known history of SI/SA and PMHx of asthma, GERD, sciatica with previous vertebral fracture who is admitted on 9.27 status for symptoms of hortencia including irritability, loud speech, and agitation.     DDx: hortencia vs hypomania vs comorbid delirium    Today, patient is angry about her current admission and denying any psychiatric history. She is asking to be discharged immediately and yelling at treatment team. She is noted to have loud and rapid speech, but is able to be interrupted. She states she has been sleeping poorly due to hip and R knee pain. She denies S/IIP, H/IIP, AVH, paranoia, or ideas of reference. She is AOx1-2 (person, place--hospital), but unable to state year or location in [place]. She requires 9.27 inpatient admission for evaluation and management of hortencia.    1/22: less irritable but still requiring PRN med, more compliant with meds, no SI/HI.  1/23: Accepted meds yd and this AM though required PRN yd. Less irritable, appropriately conversant, making needs known. Suspicious bordering on paranoid, however no fully formed delusional thought content apparent.  1/24: Irritable and labile, suspicious regarding staff members. Discussed medication, pt will be offered psychiatric meds and is aware she has right to refuse.  1/25: Hostile, irritable, labile. TOO application in progress given poor insight and intermittent refusal of psychiatric mediations. Pt at risk for deconditioning given refusal to ambulate and minimal engagement with PT, given ambulatory at home refusal here appears to be behavioral, will continue to encourage participation. Refused cognitive evaluation today. Given ongoing intermittent agitation, paranoia, physical deconditioning, and poor insight into condition, pt requires continued inpatient hospitalization for stabilization and safety.  1/26: Pt remains labile, irritable, and paranoid toward staff. Although pt makes provocative statements (e.g. "she controls the robots," "the Jews will hate me"), these statements appear to represent intense feeling rather than fully formed delusion. Continues to refuse cognitive evaluation or consent to contact family. Given ongoing intermittent agitation, paranoia, physical deconditioning, and poor insight into condition, pt requires continued inpatient hospitalization for stabilization and safety.  1/27 Patient agitated, markedly irritable,paranoid  with themes of perrvasive mistreatment, being singled out but w/u well formed , fixed delusional ideas.Cont enocurage med compliance scheduled for court hearing for TOO  1/28: Currently on CO due to hospital bed requirement. Reports fair sleep and appetite. Seen at bedside during rounds. Continues to refuse most medications, took senna and tylenol last night. Reporting back pain "15/10" and requesting more heat packs "I will die without these", however under no visible distress. Primary team pursuing TOO. Renewed CO.   1/29: Renewed CO. Last night took Depakote, Haldol and Senna. Med compliance has been partial.   1/30: Refused all meds yd, this AM accepted metoprolol. Making accusations that people are attempting to harm her and want her to die. Impulse control is impaired.  1/31: Continues with intermittent agitation, hostile and accusatory twd staff, refusing meds (except metoprolol), refusing consent to speak to daughter. Given ongoing agitation, impulsivity, disinhibition, and deconditioning 2/2 refusal to ambulate (also attempted to climb out of bed last night), pt poses a threat of harm to herself and requires ongoing inpatient hospitalization for stabilization and safety. TOO & retention court date next week.  2/1: Continues irritable, paranoid, refusing medications and engagement in interviews.   2/2: Minimally engaged in interview, refusing to answer most questions; labile, hostile, required IM Haldol 1mg overnight for agitation; refusing to ambulate or engage in PT; refusing pain control interventions other than hot packs and lidocaine patch. TOO in process.   2/4 Cont agitated poor sleep, this Am had vairable O2 sat, patient reported she feels she needs an inhaler for asthma. Patient to be seen by medicine, labs and RVP ordered. Will change haldol to olanzapine standing as response to prns of haldol and occ po's have had poor response.   2/5 Paranoid, severely agitated. Has URI. COnt meds prn inhlaer scheuled for TOO 2/7 2/6: Remains paranoid, agitated, accusatory. Frequently refusing meds, required 3x olanzapine PRNs over the weekend.  2/7: Remains paranoid and accusatory twd staff. Continues to refuse majority of medications. Currently with URI. Going to court today for retention and TOO.  2/8:  Patient irritable, agitated, will increase olanzapine with IM back up.   2/9 No major changes but taking meds, Cont olanzapine now at 2.5mg hs and Depakote. Plan cont to titrate, monitor for se  2/10 Patient w/o much response cont with intense paranoia and agitation. Cont Zyprexa cont to titrate.   2/11 Pt is sleepy most likely due to PRN zyprexa last night, remains disorganized and confused.   2/12 confused, disorganized, screams at times, required PRN zyprexa/melatonin last night.   2/13: The patient continues to be manic, disorganized, irritable, screaming at times.  She has been tolerating medications and prn's well - will increase Zyprexa to 5mg hs and change to Zydis to ensure compliance.  Continue 1:1.  2/14: The patient is not responding to Zyprexa, despite increasing dose and getting prn's.  Will change to Seroquel, as patient reportedly did well with it in the past.  Patient given 12.5mg this morning, tolerated well.  Will start 12.5mg tid.  2/15: The patient continues to be irritable, agitated, disorganized.  Some small improvement with Seroquel, sleeping better at night.  Continue Seroquel trial.  2/16: The patient continues to be agitated, frequently yelling out, irritable and angry.  She has been tolerating standing and prn Seroquel well, will continue to titrate to 25mg tid.  Continue 1:1.  2/17: The patient continues to be irritable, agitated, yelling out throughout the day.  She is having more restful periods during the day.  She has been tolerating Seroquel well, will continue.  Continue 1:1.  2/18: Patient remains agitated, disorganized, guarded, confused  2/19: Sustained agitated, irritability, likely paranoia, will increase quetiapine   2/20: Patient remains agitated, suspicious of staff, requiring prn meds, quetiapine increased   2/21 agitated paranoid, no response from Seroquel so far. Will increase Seroquel. Reviewed care with daughter.   2/22:  Paranoid agitated. Taking meds, cont  Seroquel, recheck labs.   2/23 Doing poorly with ongoing agitation now increased BUN Will push fluids. Will decrease Depakote consider d/c . COnisder changing to risperidone or haldol to avoid sedation. constipation  2/2 2/24 Poor resposne to Seroquel and olanzapine IMs with no reposne but sedation at times and constipation. Will change to risperdal po and haldol prn. Consider ECT given refractoriness. Plan recheck labs Monday 2/25 MArginal impression of improvement. Cont Risperdal trial with haldol prn    2/26 agitated, loud, screaming, disorganized and irritable, requiring PRN meds.  2/27 Patient irritable, paranoid with some more moments of being calmer, better related. Cont risperdal increase dose. Patient with elevated bicarb spoke with medicine, will repeat in AM  2/28 IMproved behaviorally. COnt risperidone trial. Labs about same. Cont meds review labs with medicine  3/2 Patient remains agitated not responded to risperidone at current dose as as is only better after multiples prns. Will increase risperidone, taper VPA  3/3 Patient with delirium superimposed on baseline dementia. Started on Vantin by medicine. Medicine to follow over weekend . Patient examined after sliding to floor , no pain or tenderness or decreased ROM hips or lower back. No intervention for this. Family notified about fall and UTI  3/4: Patient with delirium with episodes of agitation requiring prn haloperidol, but then calm and cooperative. Calm this AM.   3/5: Pt seen for f/u of vascular dementia. On CO for hospital bed. No significant overnight events reported, no PRNs needed overnight. VSS with systolic  this morning. Pt seen in the dayroom. Pt told writer she was "dead" and followed that by asking writer for orange juice. Accepted apple juice. Denies any other complaints.   3/6 Patient cont to fluctuate in symptoms from calm, placid to severely agitated. Will increase risperidone. Discussed with medicine will switch antibiotic to Augmentin based on sensitivities but may require transfer for IV if not improving.    3/7 Patient sl better. COnt risperidone dose just increased, reviewed case with medicine internist plans to switch to alternative antibiotic better suited to treat ESBL organism  3/8 Still agitated needing prns with some periods of being calmer. Cont meds but increase risperidone to 1mg at hs  3/9 COnt paranoid agitated will add trazodone at night as sleep is poor and agitation worse. Plan increase risperdal if not improving will change to haldol  3/10 Improved today calmer and paranoia more circumsribed. Possible benefit from rx of UTI. Cont current treatment. Reviewed care with daughter  3/11 Improved conisder increasing trazodone or risperidone for residual symptoms if they don't improve on current dose.   3/12 irritable, angry, loud, focused on discharge, last PRN yesterday afternoon.   3/13 Patient better still screaming and paranoid. Will increase risperidone, tolerating well, attmept to get BP reading  3/14 PAtient less paranoid still with disinhibition vocal agitation. Cont to titrate risperidone  3/15 Patient improved in that agitation and paranoia more circumscribed. Cont current treatment , plan to increase risperidone to 1mg tid  3/16 Sig nocturnal agitation and insomnia will increase hs trazodone  3/17 Some gains limited incremental benefit from titration . Will increase risperdla, ocnisder changing to haldol if not better.  3/18 calmer, less irritable,  last PRN yesterday around 6.40 pm, no SI/HI.   3/19 screaming, irritable, perseverative, received PRN yesterday around 6 pm.  3/20 Patient extremely vocally agitated, also paranoid. poor response to risperidone. Initally considering gabapetin but maybe worth switching to haldol first as rarely atypical can worsen patient with BAD due to antidepressant effects

## 2023-03-20 NOTE — BH INPATIENT PSYCHIATRY PROGRESS NOTE - MSE UNSTRUCTURED FT
Patient is awake and alert. Patient w/o sig EPS. Speech  high pitched,yells intermittently.  Mood irritable , affect less labile Says we are in Oct 2023. Says PT therapist is trying to kill her by causing her to fall by pushing her too hard to walk. Expressing paranoia about other staff mebers Reports having severe fear of falling. Poor insightNo SI/HI, voices.

## 2023-03-20 NOTE — BH INPATIENT PSYCHIATRY PROGRESS NOTE - CURRENT MEDICATION
MEDICATIONS  (STANDING):  artificial  tears Solution 1 Drop(s) Both EYES two times a day  aspirin enteric coated 81 milliGRAM(s) Oral daily  enoxaparin Injectable 40 milliGRAM(s) SubCutaneous <User Schedule>  gabapentin 100 milliGRAM(s) Oral <User Schedule>  lidocaine   4% Patch 1 Patch Transdermal every 24 hours  melatonin. 3 milliGRAM(s) Oral at bedtime  metoprolol tartrate 25 milliGRAM(s) Oral two times a day  polyethylene glycol 3350 17 Gram(s) Oral daily  risperiDONE   Tablet 1 milliGRAM(s) Oral <User Schedule>  risperiDONE   Tablet 1 milliGRAM(s) Oral <User Schedule>  senna 2 Tablet(s) Oral at bedtime  tiotropium 2.5 MICROgram(s) Inhaler 2 Puff(s) Inhalation daily  traZODone 50 milliGRAM(s) Oral at bedtime    MEDICATIONS  (PRN):  acetaminophen     Tablet .. 650 milliGRAM(s) Oral every 6 hours PRN Mild Pain (1 - 3), Moderate Pain (4 - 6), Severe Pain (7 - 10)  albuterol    90 MICROgram(s) HFA Inhaler 2 Puff(s) Inhalation every 6 hours PRN Shortness of Breath and/or Wheezing  aluminum hydroxide/magnesium hydroxide/simethicone Suspension 30 milliLiter(s) Oral every 4 hours PRN Dyspepsia  benzocaine/menthol Lozenge 1 Lozenge Oral every 2 hours PRN sore throat  bisacodyl 5 milliGRAM(s) Oral every 12 hours PRN Constipation  haloperidol     Tablet 1 milliGRAM(s) Oral every 6 hours PRN agitation  haloperidol    Injectable 1 milliGRAM(s) IntraMuscular once PRN agitation  haloperidol    Injectable 1 milliGRAM(s) IntraMuscular once PRN agitation  haloperidol    Injectable 1 milliGRAM(s) IntraMuscular three times a day PRN refusal of court ordered meds

## 2023-03-20 NOTE — BH INPATIENT PSYCHIATRY PROGRESS NOTE - NSBHFUPINTERVALHXFT_PSY_A_CORE
Pt seen for f/u of vascular dementia, BAD. Chart and history reviewed and discussed with nursing team. Patient is compliant with medications, sleeping poorly , cont to receive haldol prns. Screams during PT can be briefly redirected.

## 2023-03-20 NOTE — BH INPATIENT PSYCHIATRY PROGRESS NOTE - NSBHCHARTREVIEWVS_PSY_A_CORE FT
Vital Signs Last 24 Hrs  T(C): 36.3 (03-20-23 @ 07:54), Max: 36.3 (03-20-23 @ 07:54)  T(F): 97.3 (03-20-23 @ 07:54), Max: 97.3 (03-20-23 @ 07:54)  HR: 64 (03-19-23 @ 20:16) (64 - 64)  BP: 128/60 (03-19-23 @ 20:16) (128/60 - 128/60)  BP(mean): --  RR: --  SpO2: --    Orthostatic VS  03-20-23 @ 07:54  Lying BP: 148/63 HR: 73  Sitting BP: 145/90 HR: 74  Standing BP: --/-- HR: --  Site: --  Mode: --  Orthostatic VS  03-19-23 @ 08:27  Lying BP: --/-- HR: --  Sitting BP: 92/67 HR: 58  Standing BP: --/-- HR: --  Site: --  Mode: --

## 2023-03-21 PROCEDURE — 99231 SBSQ HOSP IP/OBS SF/LOW 25: CPT

## 2023-03-21 RX ORDER — HALOPERIDOL DECANOATE 100 MG/ML
2 INJECTION INTRAMUSCULAR AT BEDTIME
Refills: 0 | Status: DISCONTINUED | OUTPATIENT
Start: 2023-03-21 | End: 2023-03-24

## 2023-03-21 RX ORDER — HALOPERIDOL DECANOATE 100 MG/ML
1 INJECTION INTRAMUSCULAR
Refills: 0 | Status: DISCONTINUED | OUTPATIENT
Start: 2023-03-21 | End: 2023-03-23

## 2023-03-21 RX ADMIN — Medication 650 MILLIGRAM(S): at 02:44

## 2023-03-21 RX ADMIN — Medication 25 MILLIGRAM(S): at 21:13

## 2023-03-21 RX ADMIN — HALOPERIDOL DECANOATE 1 MILLIGRAM(S): 100 INJECTION INTRAMUSCULAR at 02:44

## 2023-03-21 RX ADMIN — Medication 50 MILLIGRAM(S): at 21:14

## 2023-03-21 RX ADMIN — Medication 3 MILLIGRAM(S): at 21:13

## 2023-03-21 RX ADMIN — Medication 650 MILLIGRAM(S): at 22:31

## 2023-03-21 RX ADMIN — TIOTROPIUM BROMIDE 2 PUFF(S): 18 CAPSULE ORAL; RESPIRATORY (INHALATION) at 09:08

## 2023-03-21 RX ADMIN — POLYETHYLENE GLYCOL 3350 17 GRAM(S): 17 POWDER, FOR SOLUTION ORAL at 10:14

## 2023-03-21 RX ADMIN — Medication 25 MILLIGRAM(S): at 10:14

## 2023-03-21 RX ADMIN — HALOPERIDOL DECANOATE 1 MILLIGRAM(S): 100 INJECTION INTRAMUSCULAR at 10:14

## 2023-03-21 RX ADMIN — HALOPERIDOL DECANOATE 1 MILLIGRAM(S): 100 INJECTION INTRAMUSCULAR at 06:20

## 2023-03-21 RX ADMIN — SENNA PLUS 2 TABLET(S): 8.6 TABLET ORAL at 21:14

## 2023-03-21 RX ADMIN — LIDOCAINE 1 PATCH: 4 CREAM TOPICAL at 21:12

## 2023-03-21 RX ADMIN — Medication 81 MILLIGRAM(S): at 10:14

## 2023-03-21 RX ADMIN — Medication 650 MILLIGRAM(S): at 21:14

## 2023-03-21 RX ADMIN — Medication 1 DROP(S): at 21:12

## 2023-03-21 RX ADMIN — HALOPERIDOL DECANOATE 2 MILLIGRAM(S): 100 INJECTION INTRAMUSCULAR at 21:13

## 2023-03-21 NOTE — BH INPATIENT PSYCHIATRY PROGRESS NOTE - NSBHASSESSSUMMFT_PSY_ALL_CORE
Dinorah Hogan is an 88 year old female, , domiciled with daughter with PPHx of bipolar I disorder, 1 previous inpatient psychiatric hospitalization in 1970s, no known history of SI/SA and PMHx of asthma, GERD, sciatica with previous vertebral fracture who is admitted on 9.27 status for symptoms of hortencia including irritability, loud speech, and agitation.     DDx: hortencia vs hypomania vs comorbid delirium    Today, patient is angry about her current admission and denying any psychiatric history. She is asking to be discharged immediately and yelling at treatment team. She is noted to have loud and rapid speech, but is able to be interrupted. She states she has been sleeping poorly due to hip and R knee pain. She denies S/IIP, H/IIP, AVH, paranoia, or ideas of reference. She is AOx1-2 (person, place--hospital), but unable to state year or location in [place]. She requires 9.27 inpatient admission for evaluation and management of hortencia.    1/22: less irritable but still requiring PRN med, more compliant with meds, no SI/HI.  1/23: Accepted meds yd and this AM though required PRN yd. Less irritable, appropriately conversant, making needs known. Suspicious bordering on paranoid, however no fully formed delusional thought content apparent.  1/24: Irritable and labile, suspicious regarding staff members. Discussed medication, pt will be offered psychiatric meds and is aware she has right to refuse.  1/25: Hostile, irritable, labile. TOO application in progress given poor insight and intermittent refusal of psychiatric mediations. Pt at risk for deconditioning given refusal to ambulate and minimal engagement with PT, given ambulatory at home refusal here appears to be behavioral, will continue to encourage participation. Refused cognitive evaluation today. Given ongoing intermittent agitation, paranoia, physical deconditioning, and poor insight into condition, pt requires continued inpatient hospitalization for stabilization and safety.  1/26: Pt remains labile, irritable, and paranoid toward staff. Although pt makes provocative statements (e.g. "she controls the robots," "the Jews will hate me"), these statements appear to represent intense feeling rather than fully formed delusion. Continues to refuse cognitive evaluation or consent to contact family. Given ongoing intermittent agitation, paranoia, physical deconditioning, and poor insight into condition, pt requires continued inpatient hospitalization for stabilization and safety.  1/27 Patient agitated, markedly irritable,paranoid  with themes of perrvasive mistreatment, being singled out but w/u well formed , fixed delusional ideas.Cont enocurage med compliance scheduled for court hearing for TOO  1/28: Currently on CO due to hospital bed requirement. Reports fair sleep and appetite. Seen at bedside during rounds. Continues to refuse most medications, took senna and tylenol last night. Reporting back pain "15/10" and requesting more heat packs "I will die without these", however under no visible distress. Primary team pursuing TOO. Renewed CO.   1/29: Renewed CO. Last night took Depakote, Haldol and Senna. Med compliance has been partial.   1/30: Refused all meds yd, this AM accepted metoprolol. Making accusations that people are attempting to harm her and want her to die. Impulse control is impaired.  1/31: Continues with intermittent agitation, hostile and accusatory twd staff, refusing meds (except metoprolol), refusing consent to speak to daughter. Given ongoing agitation, impulsivity, disinhibition, and deconditioning 2/2 refusal to ambulate (also attempted to climb out of bed last night), pt poses a threat of harm to herself and requires ongoing inpatient hospitalization for stabilization and safety. TOO & retention court date next week.  2/1: Continues irritable, paranoid, refusing medications and engagement in interviews.   2/2: Minimally engaged in interview, refusing to answer most questions; labile, hostile, required IM Haldol 1mg overnight for agitation; refusing to ambulate or engage in PT; refusing pain control interventions other than hot packs and lidocaine patch. TOO in process.   2/4 Cont agitated poor sleep, this Am had vairable O2 sat, patient reported she feels she needs an inhaler for asthma. Patient to be seen by medicine, labs and RVP ordered. Will change haldol to olanzapine standing as response to prns of haldol and occ po's have had poor response.   2/5 Paranoid, severely agitated. Has URI. COnt meds prn inhlaer scheuled for TOO 2/7 2/6: Remains paranoid, agitated, accusatory. Frequently refusing meds, required 3x olanzapine PRNs over the weekend.  2/7: Remains paranoid and accusatory twd staff. Continues to refuse majority of medications. Currently with URI. Going to court today for retention and TOO.  2/8:  Patient irritable, agitated, will increase olanzapine with IM back up.   2/9 No major changes but taking meds, Cont olanzapine now at 2.5mg hs and Depakote. Plan cont to titrate, monitor for se  2/10 Patient w/o much response cont with intense paranoia and agitation. Cont Zyprexa cont to titrate.   2/11 Pt is sleepy most likely due to PRN zyprexa last night, remains disorganized and confused.   2/12 confused, disorganized, screams at times, required PRN zyprexa/melatonin last night.   2/13: The patient continues to be manic, disorganized, irritable, screaming at times.  She has been tolerating medications and prn's well - will increase Zyprexa to 5mg hs and change to Zydis to ensure compliance.  Continue 1:1.  2/14: The patient is not responding to Zyprexa, despite increasing dose and getting prn's.  Will change to Seroquel, as patient reportedly did well with it in the past.  Patient given 12.5mg this morning, tolerated well.  Will start 12.5mg tid.  2/15: The patient continues to be irritable, agitated, disorganized.  Some small improvement with Seroquel, sleeping better at night.  Continue Seroquel trial.  2/16: The patient continues to be agitated, frequently yelling out, irritable and angry.  She has been tolerating standing and prn Seroquel well, will continue to titrate to 25mg tid.  Continue 1:1.  2/17: The patient continues to be irritable, agitated, yelling out throughout the day.  She is having more restful periods during the day.  She has been tolerating Seroquel well, will continue.  Continue 1:1.  2/18: Patient remains agitated, disorganized, guarded, confused  2/19: Sustained agitated, irritability, likely paranoia, will increase quetiapine   2/20: Patient remains agitated, suspicious of staff, requiring prn meds, quetiapine increased   2/21 agitated paranoid, no response from Seroquel so far. Will increase Seroquel. Reviewed care with daughter.   2/22:  Paranoid agitated. Taking meds, cont  Seroquel, recheck labs.   2/23 Doing poorly with ongoing agitation now increased BUN Will push fluids. Will decrease Depakote consider d/c . COnisder changing to risperidone or haldol to avoid sedation. constipation  2/2 2/24 Poor resposne to Seroquel and olanzapine IMs with no reposne but sedation at times and constipation. Will change to risperdal po and haldol prn. Consider ECT given refractoriness. Plan recheck labs Monday 2/25 MArginal impression of improvement. Cont Risperdal trial with haldol prn    2/26 agitated, loud, screaming, disorganized and irritable, requiring PRN meds.  2/27 Patient irritable, paranoid with some more moments of being calmer, better related. Cont risperdal increase dose. Patient with elevated bicarb spoke with medicine, will repeat in AM  2/28 IMproved behaviorally. COnt risperidone trial. Labs about same. Cont meds review labs with medicine  3/2 Patient remains agitated not responded to risperidone at current dose as as is only better after multiples prns. Will increase risperidone, taper VPA  3/3 Patient with delirium superimposed on baseline dementia. Started on Vantin by medicine. Medicine to follow over weekend . Patient examined after sliding to floor , no pain or tenderness or decreased ROM hips or lower back. No intervention for this. Family notified about fall and UTI  3/4: Patient with delirium with episodes of agitation requiring prn haloperidol, but then calm and cooperative. Calm this AM.   3/5: Pt seen for f/u of vascular dementia. On CO for hospital bed. No significant overnight events reported, no PRNs needed overnight. VSS with systolic  this morning. Pt seen in the dayroom. Pt told writer she was "dead" and followed that by asking writer for orange juice. Accepted apple juice. Denies any other complaints.   3/6 Patient cont to fluctuate in symptoms from calm, placid to severely agitated. Will increase risperidone. Discussed with medicine will switch antibiotic to Augmentin based on sensitivities but may require transfer for IV if not improving.    3/7 Patient sl better. COnt risperidone dose just increased, reviewed case with medicine internist plans to switch to alternative antibiotic better suited to treat ESBL organism  3/8 Still agitated needing prns with some periods of being calmer. Cont meds but increase risperidone to 1mg at hs  3/9 COnt paranoid agitated will add trazodone at night as sleep is poor and agitation worse. Plan increase risperdal if not improving will change to haldol  3/10 Improved today calmer and paranoia more circumsribed. Possible benefit from rx of UTI. Cont current treatment. Reviewed care with daughter  3/11 Improved conisder increasing trazodone or risperidone for residual symptoms if they don't improve on current dose.   3/12 irritable, angry, loud, focused on discharge, last PRN yesterday afternoon.   3/13 Patient better still screaming and paranoid. Will increase risperidone, tolerating well, attmept to get BP reading  3/14 PAtient less paranoid still with disinhibition vocal agitation. Cont to titrate risperidone  3/15 Patient improved in that agitation and paranoia more circumscribed. Cont current treatment , plan to increase risperidone to 1mg tid  3/16 Sig nocturnal agitation and insomnia will increase hs trazodone  3/17 Some gains limited incremental benefit from titration . Will increase risperdla, ocnisder changing to haldol if not better.  3/18 calmer, less irritable,  last PRN yesterday around 6.40 pm, no SI/HI.   3/19 screaming, irritable, perseverative, received PRN yesterday around 6 pm.  3/20 Patient extremely vocally agitated, also paranoid. poor response to risperidone. Initally considering gabapetin but maybe worth switching to haldol first as rarely atypical can worsen patient with BAD due to antidepressant effects  3/21 Severe vocal agitation and paranoia. Will increase haldol. Will add trazodone as sleep prn

## 2023-03-21 NOTE — BH INPATIENT PSYCHIATRY PROGRESS NOTE - NSBHFUPINTERVALHXFT_PSY_A_CORE
Pt seen for f/u of vascular dementia, BAD. Chart and history reviewed and discussed with nursing team. Patient is compliant with medications, sleeping  very poorly , cont to receive haldol prns. Screams during PT and in dayroom usually calling for help or protection from perceived persecutors.

## 2023-03-21 NOTE — BH INPATIENT PSYCHIATRY PROGRESS NOTE - MSE UNSTRUCTURED FT
Patient is awake and alert. Patient w/o sig EPS. Speech  high pitched,yells intermittently.  Mood irritable anxious , affect less labile . Says PT therapist is trying to kill her by causing her to fall by pushing her too hard to walk. Expressing paranoia about other staff members Reports having severe fear of falling. Poor insightNo SI/HI, voices. Uncooperative with orientation. Poor insight

## 2023-03-21 NOTE — BH INPATIENT PSYCHIATRY PROGRESS NOTE - CURRENT MEDICATION
MEDICATIONS  (STANDING):  artificial  tears Solution 1 Drop(s) Both EYES two times a day  aspirin enteric coated 81 milliGRAM(s) Oral daily  enoxaparin Injectable 40 milliGRAM(s) SubCutaneous <User Schedule>  haloperidol     Tablet 1 milliGRAM(s) Oral <User Schedule>  haloperidol     Tablet 2 milliGRAM(s) Oral at bedtime  lidocaine   4% Patch 1 Patch Transdermal every 24 hours  melatonin. 3 milliGRAM(s) Oral at bedtime  metoprolol tartrate 25 milliGRAM(s) Oral two times a day  polyethylene glycol 3350 17 Gram(s) Oral daily  senna 2 Tablet(s) Oral at bedtime  tiotropium 2.5 MICROgram(s) Inhaler 2 Puff(s) Inhalation daily  traZODone 50 milliGRAM(s) Oral at bedtime    MEDICATIONS  (PRN):  acetaminophen     Tablet .. 650 milliGRAM(s) Oral every 6 hours PRN Mild Pain (1 - 3), Moderate Pain (4 - 6), Severe Pain (7 - 10)  albuterol    90 MICROgram(s) HFA Inhaler 2 Puff(s) Inhalation every 6 hours PRN Shortness of Breath and/or Wheezing  aluminum hydroxide/magnesium hydroxide/simethicone Suspension 30 milliLiter(s) Oral every 4 hours PRN Dyspepsia  benzocaine/menthol Lozenge 1 Lozenge Oral every 2 hours PRN sore throat  bisacodyl 5 milliGRAM(s) Oral every 12 hours PRN Constipation  haloperidol     Tablet 1 milliGRAM(s) Oral every 6 hours PRN agitation  haloperidol    Injectable 1 milliGRAM(s) IntraMuscular once PRN agitation  haloperidol    Injectable 1 milliGRAM(s) IntraMuscular once PRN agitation  haloperidol    Injectable 1 milliGRAM(s) IntraMuscular three times a day PRN refusal of court ordered meds

## 2023-03-21 NOTE — BH INPATIENT PSYCHIATRY PROGRESS NOTE - NSBHCHARTREVIEWVS_PSY_A_CORE FT
Vital Signs Last 24 Hrs  T(C): 36.4 (03-21-23 @ 08:19), Max: 36.4 (03-21-23 @ 08:19)  T(F): 97.6 (03-21-23 @ 08:19), Max: 97.6 (03-21-23 @ 08:19)  HR: --  BP: 123/50 (03-20-23 @ 20:45) (123/50 - 123/50)  BP(mean): 63 (03-20-23 @ 20:45) (63 - 63)  RR: --  SpO2: --    Orthostatic VS  03-21-23 @ 08:19  Lying BP: --/-- HR: --  Sitting BP: 131/61 HR: 69  Standing BP: 149/57 HR: 65  Site: upper left arm  Mode: electronic  Orthostatic VS  03-20-23 @ 07:54  Lying BP: 148/63 HR: 73  Sitting BP: 145/90 HR: 74  Standing BP: --/-- HR: --  Site: --  Mode: --

## 2023-03-22 LAB
ANION GAP SERPL CALC-SCNC: 6 MMOL/L — LOW (ref 7–14)
BASOPHILS # BLD AUTO: 0.03 K/UL — SIGNIFICANT CHANGE UP (ref 0–0.2)
BASOPHILS NFR BLD AUTO: 0.4 % — SIGNIFICANT CHANGE UP (ref 0–2)
BUN SERPL-MCNC: 13 MG/DL — SIGNIFICANT CHANGE UP (ref 7–23)
CALCIUM SERPL-MCNC: 9.2 MG/DL — SIGNIFICANT CHANGE UP (ref 8.4–10.5)
CHLORIDE SERPL-SCNC: 103 MMOL/L — SIGNIFICANT CHANGE UP (ref 98–107)
CO2 SERPL-SCNC: 31 MMOL/L — SIGNIFICANT CHANGE UP (ref 22–31)
CREAT SERPL-MCNC: 0.45 MG/DL — LOW (ref 0.5–1.3)
EGFR: 92 ML/MIN/1.73M2 — SIGNIFICANT CHANGE UP
EOSINOPHIL # BLD AUTO: 0.16 K/UL — SIGNIFICANT CHANGE UP (ref 0–0.5)
EOSINOPHIL NFR BLD AUTO: 2.4 % — SIGNIFICANT CHANGE UP (ref 0–6)
GLUCOSE SERPL-MCNC: 87 MG/DL — SIGNIFICANT CHANGE UP (ref 70–99)
HCT VFR BLD CALC: 31.3 % — LOW (ref 34.5–45)
HGB BLD-MCNC: 9.6 G/DL — LOW (ref 11.5–15.5)
IANC: 4.08 K/UL — SIGNIFICANT CHANGE UP (ref 1.8–7.4)
IMM GRANULOCYTES NFR BLD AUTO: 0.3 % — SIGNIFICANT CHANGE UP (ref 0–0.9)
LYMPHOCYTES # BLD AUTO: 1.72 K/UL — SIGNIFICANT CHANGE UP (ref 1–3.3)
LYMPHOCYTES # BLD AUTO: 25.6 % — SIGNIFICANT CHANGE UP (ref 13–44)
MCHC RBC-ENTMCNC: 28.4 PG — SIGNIFICANT CHANGE UP (ref 27–34)
MCHC RBC-ENTMCNC: 30.7 GM/DL — LOW (ref 32–36)
MCV RBC AUTO: 92.6 FL — SIGNIFICANT CHANGE UP (ref 80–100)
MONOCYTES # BLD AUTO: 0.7 K/UL — SIGNIFICANT CHANGE UP (ref 0–0.9)
MONOCYTES NFR BLD AUTO: 10.4 % — SIGNIFICANT CHANGE UP (ref 2–14)
NEUTROPHILS # BLD AUTO: 4.08 K/UL — SIGNIFICANT CHANGE UP (ref 1.8–7.4)
NEUTROPHILS NFR BLD AUTO: 60.9 % — SIGNIFICANT CHANGE UP (ref 43–77)
NRBC # BLD: 0 /100 WBCS — SIGNIFICANT CHANGE UP (ref 0–0)
NRBC # FLD: 0 K/UL — SIGNIFICANT CHANGE UP (ref 0–0)
PLATELET # BLD AUTO: 197 K/UL — SIGNIFICANT CHANGE UP (ref 150–400)
POTASSIUM SERPL-MCNC: 3.9 MMOL/L — SIGNIFICANT CHANGE UP (ref 3.5–5.3)
POTASSIUM SERPL-SCNC: 3.9 MMOL/L — SIGNIFICANT CHANGE UP (ref 3.5–5.3)
RBC # BLD: 3.38 M/UL — LOW (ref 3.8–5.2)
RBC # FLD: 18.3 % — HIGH (ref 10.3–14.5)
SODIUM SERPL-SCNC: 140 MMOL/L — SIGNIFICANT CHANGE UP (ref 135–145)
WBC # BLD: 6.71 K/UL — SIGNIFICANT CHANGE UP (ref 3.8–10.5)
WBC # FLD AUTO: 6.71 K/UL — SIGNIFICANT CHANGE UP (ref 3.8–10.5)

## 2023-03-22 PROCEDURE — 99231 SBSQ HOSP IP/OBS SF/LOW 25: CPT

## 2023-03-22 RX ORDER — GABAPENTIN 400 MG/1
100 CAPSULE ORAL
Refills: 0 | Status: DISCONTINUED | OUTPATIENT
Start: 2023-03-22 | End: 2023-03-26

## 2023-03-22 RX ORDER — GABAPENTIN 400 MG/1
100 CAPSULE ORAL ONCE
Refills: 0 | Status: COMPLETED | OUTPATIENT
Start: 2023-03-22 | End: 2023-03-22

## 2023-03-22 RX ORDER — HALOPERIDOL DECANOATE 100 MG/ML
1 INJECTION INTRAMUSCULAR ONCE
Refills: 0 | Status: DISCONTINUED | OUTPATIENT
Start: 2023-03-22 | End: 2023-04-10

## 2023-03-22 RX ADMIN — TIOTROPIUM BROMIDE 2 PUFF(S): 18 CAPSULE ORAL; RESPIRATORY (INHALATION) at 09:08

## 2023-03-22 RX ADMIN — POLYETHYLENE GLYCOL 3350 17 GRAM(S): 17 POWDER, FOR SOLUTION ORAL at 10:24

## 2023-03-22 RX ADMIN — Medication 1 DROP(S): at 20:13

## 2023-03-22 RX ADMIN — GABAPENTIN 100 MILLIGRAM(S): 400 CAPSULE ORAL at 20:01

## 2023-03-22 RX ADMIN — Medication 25 MILLIGRAM(S): at 20:10

## 2023-03-22 RX ADMIN — SENNA PLUS 2 TABLET(S): 8.6 TABLET ORAL at 20:10

## 2023-03-22 RX ADMIN — Medication 650 MILLIGRAM(S): at 20:10

## 2023-03-22 RX ADMIN — GABAPENTIN 100 MILLIGRAM(S): 400 CAPSULE ORAL at 15:06

## 2023-03-22 RX ADMIN — Medication 650 MILLIGRAM(S): at 21:05

## 2023-03-22 RX ADMIN — Medication 25 MILLIGRAM(S): at 10:24

## 2023-03-22 RX ADMIN — HALOPERIDOL DECANOATE 1 MILLIGRAM(S): 100 INJECTION INTRAMUSCULAR at 06:18

## 2023-03-22 RX ADMIN — HALOPERIDOL DECANOATE 2 MILLIGRAM(S): 100 INJECTION INTRAMUSCULAR at 20:11

## 2023-03-22 RX ADMIN — HALOPERIDOL DECANOATE 1 MILLIGRAM(S): 100 INJECTION INTRAMUSCULAR at 07:32

## 2023-03-22 RX ADMIN — Medication 50 MILLIGRAM(S): at 20:11

## 2023-03-22 RX ADMIN — Medication 81 MILLIGRAM(S): at 10:24

## 2023-03-22 RX ADMIN — HALOPERIDOL DECANOATE 1 MILLIGRAM(S): 100 INJECTION INTRAMUSCULAR at 11:57

## 2023-03-22 RX ADMIN — Medication 3 MILLIGRAM(S): at 20:12

## 2023-03-22 RX ADMIN — HALOPERIDOL DECANOATE 1 MILLIGRAM(S): 100 INJECTION INTRAMUSCULAR at 13:04

## 2023-03-22 RX ADMIN — LIDOCAINE 1 PATCH: 4 CREAM TOPICAL at 20:10

## 2023-03-22 NOTE — BH INPATIENT PSYCHIATRY PROGRESS NOTE - PRN MEDS
MEDICATIONS  (PRN):  acetaminophen     Tablet .. 650 milliGRAM(s) Oral every 6 hours PRN Mild Pain (1 - 3), Moderate Pain (4 - 6), Severe Pain (7 - 10)  albuterol    90 MICROgram(s) HFA Inhaler 2 Puff(s) Inhalation every 6 hours PRN Shortness of Breath and/or Wheezing  aluminum hydroxide/magnesium hydroxide/simethicone Suspension 30 milliLiter(s) Oral every 4 hours PRN Dyspepsia  benzocaine/menthol Lozenge 1 Lozenge Oral every 2 hours PRN sore throat  bisacodyl 5 milliGRAM(s) Oral every 12 hours PRN Constipation  haloperidol     Tablet 1 milliGRAM(s) Oral every 6 hours PRN agitation  haloperidol    Injectable 1 milliGRAM(s) IntraMuscular once PRN agitation  haloperidol    Injectable 1 milliGRAM(s) IntraMuscular three times a day PRN refusal of court ordered meds  haloperidol    Injectable 1 milliGRAM(s) IntraMuscular once PRN agitation

## 2023-03-22 NOTE — BH INPATIENT PSYCHIATRY PROGRESS NOTE - NSBHCHARTREVIEWVS_PSY_A_CORE FT
Vital Signs Last 24 Hrs  T(C): --  T(F): --  HR: --  BP: 165/75 (03-22-23 @ 07:50) (146/69 - 165/75)  BP(mean): 67 (03-22-23 @ 07:50) (67 - 76)  RR: --  SpO2: --    Orthostatic VS  03-21-23 @ 08:19  Lying BP: --/-- HR: --  Sitting BP: 131/61 HR: 69  Standing BP: 149/57 HR: 65  Site: upper left arm  Mode: electronic

## 2023-03-22 NOTE — BH INPATIENT PSYCHIATRY PROGRESS NOTE - CURRENT MEDICATION
MEDICATIONS  (STANDING):  artificial  tears Solution 1 Drop(s) Both EYES two times a day  aspirin enteric coated 81 milliGRAM(s) Oral daily  enoxaparin Injectable 40 milliGRAM(s) SubCutaneous <User Schedule>  gabapentin 100 milliGRAM(s) Oral once  gabapentin 100 milliGRAM(s) Oral <User Schedule>  haloperidol     Tablet 2 milliGRAM(s) Oral at bedtime  haloperidol     Tablet 1 milliGRAM(s) Oral <User Schedule>  lidocaine   4% Patch 1 Patch Transdermal every 24 hours  melatonin. 3 milliGRAM(s) Oral at bedtime  metoprolol tartrate 25 milliGRAM(s) Oral two times a day  polyethylene glycol 3350 17 Gram(s) Oral daily  senna 2 Tablet(s) Oral at bedtime  tiotropium 2.5 MICROgram(s) Inhaler 2 Puff(s) Inhalation daily  traZODone 50 milliGRAM(s) Oral at bedtime    MEDICATIONS  (PRN):  acetaminophen     Tablet .. 650 milliGRAM(s) Oral every 6 hours PRN Mild Pain (1 - 3), Moderate Pain (4 - 6), Severe Pain (7 - 10)  albuterol    90 MICROgram(s) HFA Inhaler 2 Puff(s) Inhalation every 6 hours PRN Shortness of Breath and/or Wheezing  aluminum hydroxide/magnesium hydroxide/simethicone Suspension 30 milliLiter(s) Oral every 4 hours PRN Dyspepsia  benzocaine/menthol Lozenge 1 Lozenge Oral every 2 hours PRN sore throat  bisacodyl 5 milliGRAM(s) Oral every 12 hours PRN Constipation  haloperidol     Tablet 1 milliGRAM(s) Oral every 6 hours PRN agitation  haloperidol    Injectable 1 milliGRAM(s) IntraMuscular once PRN agitation  haloperidol    Injectable 1 milliGRAM(s) IntraMuscular three times a day PRN refusal of court ordered meds  haloperidol    Injectable 1 milliGRAM(s) IntraMuscular once PRN agitation

## 2023-03-22 NOTE — BH INPATIENT PSYCHIATRY PROGRESS NOTE - NSBHFUPINTERVALHXFT_PSY_A_CORE
Pt seen for f/u of vascular dementia, BAD. Chart and history reviewed and discussed with nursing team. Patient is compliant with medications, sleeping  poorly , cont to receive haldol prns one this morning.yells that she needs help then when staff near asked yells they are harming her and should leave. VSS

## 2023-03-22 NOTE — BH INPATIENT PSYCHIATRY PROGRESS NOTE - MSE UNSTRUCTURED FT
Patient is awake and alert. Patient w/o sig EPS. Speech  high pitched,yells intermittently.  Mood irritable anxious , affect  labile . Says PT therapist is trying to kill her by causing her to fall by pushing her too hard to walk. Expressing paranoia about other staff members Reports having severe fear of falling. Poor insightNo SI/HI, voices. Uncooperative with orientation. Poor insight

## 2023-03-22 NOTE — BH INPATIENT PSYCHIATRY PROGRESS NOTE - NSBHASSESSSUMMFT_PSY_ALL_CORE
Dinorah Hogan is an 88 year old female, , domiciled with daughter with PPHx of bipolar I disorder, 1 previous inpatient psychiatric hospitalization in 1970s, no known history of SI/SA and PMHx of asthma, GERD, sciatica with previous vertebral fracture who is admitted on 9.27 status for symptoms of hortencia including irritability, loud speech, and agitation.     DDx: hortencia vs hypomania vs comorbid delirium    Today, patient is angry about her current admission and denying any psychiatric history. She is asking to be discharged immediately and yelling at treatment team. She is noted to have loud and rapid speech, but is able to be interrupted. She states she has been sleeping poorly due to hip and R knee pain. She denies S/IIP, H/IIP, AVH, paranoia, or ideas of reference. She is AOx1-2 (person, place--hospital), but unable to state year or location in [place]. She requires 9.27 inpatient admission for evaluation and management of hortencia.    1/22: less irritable but still requiring PRN med, more compliant with meds, no SI/HI.  1/23: Accepted meds yd and this AM though required PRN yd. Less irritable, appropriately conversant, making needs known. Suspicious bordering on paranoid, however no fully formed delusional thought content apparent.  1/24: Irritable and labile, suspicious regarding staff members. Discussed medication, pt will be offered psychiatric meds and is aware she has right to refuse.  1/25: Hostile, irritable, labile. TOO application in progress given poor insight and intermittent refusal of psychiatric mediations. Pt at risk for deconditioning given refusal to ambulate and minimal engagement with PT, given ambulatory at home refusal here appears to be behavioral, will continue to encourage participation. Refused cognitive evaluation today. Given ongoing intermittent agitation, paranoia, physical deconditioning, and poor insight into condition, pt requires continued inpatient hospitalization for stabilization and safety.  1/26: Pt remains labile, irritable, and paranoid toward staff. Although pt makes provocative statements (e.g. "she controls the robots," "the Jews will hate me"), these statements appear to represent intense feeling rather than fully formed delusion. Continues to refuse cognitive evaluation or consent to contact family. Given ongoing intermittent agitation, paranoia, physical deconditioning, and poor insight into condition, pt requires continued inpatient hospitalization for stabilization and safety.  1/27 Patient agitated, markedly irritable,paranoid  with themes of perrvasive mistreatment, being singled out but w/u well formed , fixed delusional ideas.Cont enocurage med compliance scheduled for court hearing for TOO  1/28: Currently on CO due to hospital bed requirement. Reports fair sleep and appetite. Seen at bedside during rounds. Continues to refuse most medications, took senna and tylenol last night. Reporting back pain "15/10" and requesting more heat packs "I will die without these", however under no visible distress. Primary team pursuing TOO. Renewed CO.   1/29: Renewed CO. Last night took Depakote, Haldol and Senna. Med compliance has been partial.   1/30: Refused all meds yd, this AM accepted metoprolol. Making accusations that people are attempting to harm her and want her to die. Impulse control is impaired.  1/31: Continues with intermittent agitation, hostile and accusatory twd staff, refusing meds (except metoprolol), refusing consent to speak to daughter. Given ongoing agitation, impulsivity, disinhibition, and deconditioning 2/2 refusal to ambulate (also attempted to climb out of bed last night), pt poses a threat of harm to herself and requires ongoing inpatient hospitalization for stabilization and safety. TOO & retention court date next week.  2/1: Continues irritable, paranoid, refusing medications and engagement in interviews.   2/2: Minimally engaged in interview, refusing to answer most questions; labile, hostile, required IM Haldol 1mg overnight for agitation; refusing to ambulate or engage in PT; refusing pain control interventions other than hot packs and lidocaine patch. TOO in process.   2/4 Cont agitated poor sleep, this Am had vairable O2 sat, patient reported she feels she needs an inhaler for asthma. Patient to be seen by medicine, labs and RVP ordered. Will change haldol to olanzapine standing as response to prns of haldol and occ po's have had poor response.   2/5 Paranoid, severely agitated. Has URI. COnt meds prn inhlaer scheuled for TOO 2/7 2/6: Remains paranoid, agitated, accusatory. Frequently refusing meds, required 3x olanzapine PRNs over the weekend.  2/7: Remains paranoid and accusatory twd staff. Continues to refuse majority of medications. Currently with URI. Going to court today for retention and TOO.  2/8:  Patient irritable, agitated, will increase olanzapine with IM back up.   2/9 No major changes but taking meds, Cont olanzapine now at 2.5mg hs and Depakote. Plan cont to titrate, monitor for se  2/10 Patient w/o much response cont with intense paranoia and agitation. Cont Zyprexa cont to titrate.   2/11 Pt is sleepy most likely due to PRN zyprexa last night, remains disorganized and confused.   2/12 confused, disorganized, screams at times, required PRN zyprexa/melatonin last night.   2/13: The patient continues to be manic, disorganized, irritable, screaming at times.  She has been tolerating medications and prn's well - will increase Zyprexa to 5mg hs and change to Zydis to ensure compliance.  Continue 1:1.  2/14: The patient is not responding to Zyprexa, despite increasing dose and getting prn's.  Will change to Seroquel, as patient reportedly did well with it in the past.  Patient given 12.5mg this morning, tolerated well.  Will start 12.5mg tid.  2/15: The patient continues to be irritable, agitated, disorganized.  Some small improvement with Seroquel, sleeping better at night.  Continue Seroquel trial.  2/16: The patient continues to be agitated, frequently yelling out, irritable and angry.  She has been tolerating standing and prn Seroquel well, will continue to titrate to 25mg tid.  Continue 1:1.  2/17: The patient continues to be irritable, agitated, yelling out throughout the day.  She is having more restful periods during the day.  She has been tolerating Seroquel well, will continue.  Continue 1:1.  2/18: Patient remains agitated, disorganized, guarded, confused  2/19: Sustained agitated, irritability, likely paranoia, will increase quetiapine   2/20: Patient remains agitated, suspicious of staff, requiring prn meds, quetiapine increased   2/21 agitated paranoid, no response from Seroquel so far. Will increase Seroquel. Reviewed care with daughter.   2/22:  Paranoid agitated. Taking meds, cont  Seroquel, recheck labs.   2/23 Doing poorly with ongoing agitation now increased BUN Will push fluids. Will decrease Depakote consider d/c . COnisder changing to risperidone or haldol to avoid sedation. constipation  2/2 2/24 Poor resposne to Seroquel and olanzapine IMs with no reposne but sedation at times and constipation. Will change to risperdal po and haldol prn. Consider ECT given refractoriness. Plan recheck labs Monday 2/25 MArginal impression of improvement. Cont Risperdal trial with haldol prn    2/26 agitated, loud, screaming, disorganized and irritable, requiring PRN meds.  2/27 Patient irritable, paranoid with some more moments of being calmer, better related. Cont risperdal increase dose. Patient with elevated bicarb spoke with medicine, will repeat in AM  2/28 IMproved behaviorally. COnt risperidone trial. Labs about same. Cont meds review labs with medicine  3/2 Patient remains agitated not responded to risperidone at current dose as as is only better after multiples prns. Will increase risperidone, taper VPA  3/3 Patient with delirium superimposed on baseline dementia. Started on Vantin by medicine. Medicine to follow over weekend . Patient examined after sliding to floor , no pain or tenderness or decreased ROM hips or lower back. No intervention for this. Family notified about fall and UTI  3/4: Patient with delirium with episodes of agitation requiring prn haloperidol, but then calm and cooperative. Calm this AM.   3/5: Pt seen for f/u of vascular dementia. On CO for hospital bed. No significant overnight events reported, no PRNs needed overnight. VSS with systolic  this morning. Pt seen in the dayroom. Pt told writer she was "dead" and followed that by asking writer for orange juice. Accepted apple juice. Denies any other complaints.   3/6 Patient cont to fluctuate in symptoms from calm, placid to severely agitated. Will increase risperidone. Discussed with medicine will switch antibiotic to Augmentin based on sensitivities but may require transfer for IV if not improving.    3/7 Patient sl better. COnt risperidone dose just increased, reviewed case with medicine internist plans to switch to alternative antibiotic better suited to treat ESBL organism  3/8 Still agitated needing prns with some periods of being calmer. Cont meds but increase risperidone to 1mg at hs  3/9 COnt paranoid agitated will add trazodone at night as sleep is poor and agitation worse. Plan increase risperdal if not improving will change to haldol  3/10 Improved today calmer and paranoia more circumsribed. Possible benefit from rx of UTI. Cont current treatment. Reviewed care with daughter  3/11 Improved conisder increasing trazodone or risperidone for residual symptoms if they don't improve on current dose.   3/12 irritable, angry, loud, focused on discharge, last PRN yesterday afternoon.   3/13 Patient better still screaming and paranoid. Will increase risperidone, tolerating well, attmept to get BP reading  3/14 PAtient less paranoid still with disinhibition vocal agitation. Cont to titrate risperidone  3/15 Patient improved in that agitation and paranoia more circumscribed. Cont current treatment , plan to increase risperidone to 1mg tid  3/16 Sig nocturnal agitation and insomnia will increase hs trazodone  3/17 Some gains limited incremental benefit from titration . Will increase risperdla, ocnisder changing to haldol if not better.  3/18 calmer, less irritable,  last PRN yesterday around 6.40 pm, no SI/HI.   3/19 screaming, irritable, perseverative, received PRN yesterday around 6 pm.  3/20 Patient extremely vocally agitated, also paranoid. poor response to risperidone. Initally considering gabapetin but maybe worth switching to haldol first as rarely atypical can worsen patient with BAD due to antidepressant effects  3/21 Severe vocal agitation and paranoia. Will increase haldol.  3/22 Limited response across range of antipsychotics, will thereadd gabapentin as may help neuropathic pain and agitation

## 2023-03-23 PROCEDURE — 99231 SBSQ HOSP IP/OBS SF/LOW 25: CPT

## 2023-03-23 RX ORDER — HALOPERIDOL DECANOATE 100 MG/ML
1.5 INJECTION INTRAMUSCULAR
Refills: 0 | Status: DISCONTINUED | OUTPATIENT
Start: 2023-03-23 | End: 2023-03-24

## 2023-03-23 RX ORDER — ASPIRIN/CALCIUM CARB/MAGNESIUM 324 MG
81 TABLET ORAL DAILY
Refills: 0 | Status: DISCONTINUED | OUTPATIENT
Start: 2023-03-23 | End: 2023-04-10

## 2023-03-23 RX ADMIN — POLYETHYLENE GLYCOL 3350 17 GRAM(S): 17 POWDER, FOR SOLUTION ORAL at 09:33

## 2023-03-23 RX ADMIN — GABAPENTIN 100 MILLIGRAM(S): 400 CAPSULE ORAL at 12:51

## 2023-03-23 RX ADMIN — GABAPENTIN 100 MILLIGRAM(S): 400 CAPSULE ORAL at 20:33

## 2023-03-23 RX ADMIN — TIOTROPIUM BROMIDE 2 PUFF(S): 18 CAPSULE ORAL; RESPIRATORY (INHALATION) at 09:36

## 2023-03-23 RX ADMIN — HALOPERIDOL DECANOATE 1 MILLIGRAM(S): 100 INJECTION INTRAMUSCULAR at 05:36

## 2023-03-23 RX ADMIN — Medication 25 MILLIGRAM(S): at 20:33

## 2023-03-23 RX ADMIN — Medication 650 MILLIGRAM(S): at 21:45

## 2023-03-23 RX ADMIN — Medication 650 MILLIGRAM(S): at 20:34

## 2023-03-23 RX ADMIN — HALOPERIDOL DECANOATE 2 MILLIGRAM(S): 100 INJECTION INTRAMUSCULAR at 20:33

## 2023-03-23 RX ADMIN — Medication 81 MILLIGRAM(S): at 09:33

## 2023-03-23 RX ADMIN — Medication 25 MILLIGRAM(S): at 09:33

## 2023-03-23 RX ADMIN — LIDOCAINE 1 PATCH: 4 CREAM TOPICAL at 09:55

## 2023-03-23 RX ADMIN — LIDOCAINE 1 PATCH: 4 CREAM TOPICAL at 20:35

## 2023-03-23 RX ADMIN — ENOXAPARIN SODIUM 40 MILLIGRAM(S): 100 INJECTION SUBCUTANEOUS at 09:35

## 2023-03-23 RX ADMIN — Medication 3 MILLIGRAM(S): at 20:33

## 2023-03-23 RX ADMIN — Medication 1 DROP(S): at 20:32

## 2023-03-23 RX ADMIN — GABAPENTIN 100 MILLIGRAM(S): 400 CAPSULE ORAL at 05:37

## 2023-03-23 RX ADMIN — Medication 5 MILLIGRAM(S): at 09:33

## 2023-03-23 RX ADMIN — Medication 1 DROP(S): at 09:32

## 2023-03-23 RX ADMIN — SENNA PLUS 2 TABLET(S): 8.6 TABLET ORAL at 20:34

## 2023-03-23 RX ADMIN — LIDOCAINE 1 PATCH: 4 CREAM TOPICAL at 06:50

## 2023-03-23 RX ADMIN — Medication 50 MILLIGRAM(S): at 20:34

## 2023-03-23 RX ADMIN — HALOPERIDOL DECANOATE 1 MILLIGRAM(S): 100 INJECTION INTRAMUSCULAR at 12:51

## 2023-03-23 RX ADMIN — HALOPERIDOL DECANOATE 1 MILLIGRAM(S): 100 INJECTION INTRAMUSCULAR at 09:33

## 2023-03-23 NOTE — BH INPATIENT PSYCHIATRY PROGRESS NOTE - CURRENT MEDICATION
MEDICATIONS  (STANDING):  artificial  tears Solution 1 Drop(s) Both EYES two times a day  aspirin  chewable 81 milliGRAM(s) Oral daily  enoxaparin Injectable 40 milliGRAM(s) SubCutaneous <User Schedule>  gabapentin 100 milliGRAM(s) Oral <User Schedule>  haloperidol     Tablet 1.5 milliGRAM(s) Oral <User Schedule>  haloperidol     Tablet 2 milliGRAM(s) Oral at bedtime  lidocaine   4% Patch 1 Patch Transdermal every 24 hours  melatonin. 3 milliGRAM(s) Oral at bedtime  metoprolol tartrate 25 milliGRAM(s) Oral two times a day  polyethylene glycol 3350 17 Gram(s) Oral daily  senna 2 Tablet(s) Oral at bedtime  tiotropium 2.5 MICROgram(s) Inhaler 2 Puff(s) Inhalation daily  traZODone 50 milliGRAM(s) Oral at bedtime    MEDICATIONS  (PRN):  acetaminophen     Tablet .. 650 milliGRAM(s) Oral every 6 hours PRN Mild Pain (1 - 3), Moderate Pain (4 - 6), Severe Pain (7 - 10)  albuterol    90 MICROgram(s) HFA Inhaler 2 Puff(s) Inhalation every 6 hours PRN Shortness of Breath and/or Wheezing  aluminum hydroxide/magnesium hydroxide/simethicone Suspension 30 milliLiter(s) Oral every 4 hours PRN Dyspepsia  benzocaine/menthol Lozenge 1 Lozenge Oral every 2 hours PRN sore throat  bisacodyl 5 milliGRAM(s) Oral every 12 hours PRN Constipation  haloperidol     Tablet 1 milliGRAM(s) Oral every 6 hours PRN agitation  haloperidol    Injectable 1 milliGRAM(s) IntraMuscular three times a day PRN refusal of court ordered meds  haloperidol    Injectable 1 milliGRAM(s) IntraMuscular once PRN agitation  haloperidol    Injectable 1 milliGRAM(s) IntraMuscular once PRN agitation

## 2023-03-23 NOTE — BH INPATIENT PSYCHIATRY PROGRESS NOTE - NSBHCHARTREVIEWVS_PSY_A_CORE FT
Vital Signs Last 24 Hrs  T(C): 36.4 (03-23-23 @ 05:44), Max: 36.4 (03-23-23 @ 05:44)  T(F): 97.6 (03-23-23 @ 05:44), Max: 97.6 (03-23-23 @ 05:44)  HR: --  BP: 130/71 (03-22-23 @ 20:41) (130/71 - 130/71)  BP(mean): 72 (03-22-23 @ 20:41) (72 - 72)  RR: 16 (03-22-23 @ 20:41) (16 - 16)  SpO2: --    Orthostatic VS  03-23-23 @ 05:44  Lying BP: --/-- HR: --  Sitting BP: 135/54 HR: 69  Standing BP: --/-- HR: --  Site: --  Mode: --

## 2023-03-23 NOTE — BH INPATIENT PSYCHIATRY PROGRESS NOTE - NSBHASSESSSUMMFT_PSY_ALL_CORE
Dinorah Hogan is an 88 year old female, , domiciled with daughter with PPHx of bipolar I disorder, 1 previous inpatient psychiatric hospitalization in 1970s, no known history of SI/SA and PMHx of asthma, GERD, sciatica with previous vertebral fracture who is admitted on 9.27 status for symptoms of hortencia including irritability, loud speech, and agitation.     DDx: hortencia vs hypomania vs comorbid delirium    Today, patient is angry about her current admission and denying any psychiatric history. She is asking to be discharged immediately and yelling at treatment team. She is noted to have loud and rapid speech, but is able to be interrupted. She states she has been sleeping poorly due to hip and R knee pain. She denies S/IIP, H/IIP, AVH, paranoia, or ideas of reference. She is AOx1-2 (person, place--hospital), but unable to state year or location in [place]. She requires 9.27 inpatient admission for evaluation and management of hortencia.    1/22: less irritable but still requiring PRN med, more compliant with meds, no SI/HI.  1/23: Accepted meds yd and this AM though required PRN yd. Less irritable, appropriately conversant, making needs known. Suspicious bordering on paranoid, however no fully formed delusional thought content apparent.  1/24: Irritable and labile, suspicious regarding staff members. Discussed medication, pt will be offered psychiatric meds and is aware she has right to refuse.  1/25: Hostile, irritable, labile. TOO application in progress given poor insight and intermittent refusal of psychiatric mediations. Pt at risk for deconditioning given refusal to ambulate and minimal engagement with PT, given ambulatory at home refusal here appears to be behavioral, will continue to encourage participation. Refused cognitive evaluation today. Given ongoing intermittent agitation, paranoia, physical deconditioning, and poor insight into condition, pt requires continued inpatient hospitalization for stabilization and safety.  1/26: Pt remains labile, irritable, and paranoid toward staff. Although pt makes provocative statements (e.g. "she controls the robots," "the Jews will hate me"), these statements appear to represent intense feeling rather than fully formed delusion. Continues to refuse cognitive evaluation or consent to contact family. Given ongoing intermittent agitation, paranoia, physical deconditioning, and poor insight into condition, pt requires continued inpatient hospitalization for stabilization and safety.  1/27 Patient agitated, markedly irritable,paranoid  with themes of perrvasive mistreatment, being singled out but w/u well formed , fixed delusional ideas.Cont enocurage med compliance scheduled for court hearing for TOO  1/28: Currently on CO due to hospital bed requirement. Reports fair sleep and appetite. Seen at bedside during rounds. Continues to refuse most medications, took senna and tylenol last night. Reporting back pain "15/10" and requesting more heat packs "I will die without these", however under no visible distress. Primary team pursuing TOO. Renewed CO.   1/29: Renewed CO. Last night took Depakote, Haldol and Senna. Med compliance has been partial.   1/30: Refused all meds yd, this AM accepted metoprolol. Making accusations that people are attempting to harm her and want her to die. Impulse control is impaired.  1/31: Continues with intermittent agitation, hostile and accusatory twd staff, refusing meds (except metoprolol), refusing consent to speak to daughter. Given ongoing agitation, impulsivity, disinhibition, and deconditioning 2/2 refusal to ambulate (also attempted to climb out of bed last night), pt poses a threat of harm to herself and requires ongoing inpatient hospitalization for stabilization and safety. TOO & retention court date next week.  2/1: Continues irritable, paranoid, refusing medications and engagement in interviews.   2/2: Minimally engaged in interview, refusing to answer most questions; labile, hostile, required IM Haldol 1mg overnight for agitation; refusing to ambulate or engage in PT; refusing pain control interventions other than hot packs and lidocaine patch. TOO in process.   2/4 Cont agitated poor sleep, this Am had vairable O2 sat, patient reported she feels she needs an inhaler for asthma. Patient to be seen by medicine, labs and RVP ordered. Will change haldol to olanzapine standing as response to prns of haldol and occ po's have had poor response.   2/5 Paranoid, severely agitated. Has URI. COnt meds prn inhlaer scheuled for TOO 2/7 2/6: Remains paranoid, agitated, accusatory. Frequently refusing meds, required 3x olanzapine PRNs over the weekend.  2/7: Remains paranoid and accusatory twd staff. Continues to refuse majority of medications. Currently with URI. Going to court today for retention and TOO.  2/8:  Patient irritable, agitated, will increase olanzapine with IM back up.   2/9 No major changes but taking meds, Cont olanzapine now at 2.5mg hs and Depakote. Plan cont to titrate, monitor for se  2/10 Patient w/o much response cont with intense paranoia and agitation. Cont Zyprexa cont to titrate.   2/11 Pt is sleepy most likely due to PRN zyprexa last night, remains disorganized and confused.   2/12 confused, disorganized, screams at times, required PRN zyprexa/melatonin last night.   2/13: The patient continues to be manic, disorganized, irritable, screaming at times.  She has been tolerating medications and prn's well - will increase Zyprexa to 5mg hs and change to Zydis to ensure compliance.  Continue 1:1.  2/14: The patient is not responding to Zyprexa, despite increasing dose and getting prn's.  Will change to Seroquel, as patient reportedly did well with it in the past.  Patient given 12.5mg this morning, tolerated well.  Will start 12.5mg tid.  2/15: The patient continues to be irritable, agitated, disorganized.  Some small improvement with Seroquel, sleeping better at night.  Continue Seroquel trial.  2/16: The patient continues to be agitated, frequently yelling out, irritable and angry.  She has been tolerating standing and prn Seroquel well, will continue to titrate to 25mg tid.  Continue 1:1.  2/17: The patient continues to be irritable, agitated, yelling out throughout the day.  She is having more restful periods during the day.  She has been tolerating Seroquel well, will continue.  Continue 1:1.  2/18: Patient remains agitated, disorganized, guarded, confused  2/19: Sustained agitated, irritability, likely paranoia, will increase quetiapine   2/20: Patient remains agitated, suspicious of staff, requiring prn meds, quetiapine increased   2/21 agitated paranoid, no response from Seroquel so far. Will increase Seroquel. Reviewed care with daughter.   2/22:  Paranoid agitated. Taking meds, cont  Seroquel, recheck labs.   2/23 Doing poorly with ongoing agitation now increased BUN Will push fluids. Will decrease Depakote consider d/c . COnisder changing to risperidone or haldol to avoid sedation. constipation  2/2 2/24 Poor resposne to Seroquel and olanzapine IMs with no reposne but sedation at times and constipation. Will change to risperdal po and haldol prn. Consider ECT given refractoriness. Plan recheck labs Monday 2/25 MArginal impression of improvement. Cont Risperdal trial with haldol prn    2/26 agitated, loud, screaming, disorganized and irritable, requiring PRN meds.  2/27 Patient irritable, paranoid with some more moments of being calmer, better related. Cont risperdal increase dose. Patient with elevated bicarb spoke with medicine, will repeat in AM  2/28 IMproved behaviorally. COnt risperidone trial. Labs about same. Cont meds review labs with medicine  3/2 Patient remains agitated not responded to risperidone at current dose as as is only better after multiples prns. Will increase risperidone, taper VPA  3/3 Patient with delirium superimposed on baseline dementia. Started on Vantin by medicine. Medicine to follow over weekend . Patient examined after sliding to floor , no pain or tenderness or decreased ROM hips or lower back. No intervention for this. Family notified about fall and UTI  3/4: Patient with delirium with episodes of agitation requiring prn haloperidol, but then calm and cooperative. Calm this AM.   3/5: Pt seen for f/u of vascular dementia. On CO for hospital bed. No significant overnight events reported, no PRNs needed overnight. VSS with systolic  this morning. Pt seen in the dayroom. Pt told writer she was "dead" and followed that by asking writer for orange juice. Accepted apple juice. Denies any other complaints.   3/6 Patient cont to fluctuate in symptoms from calm, placid to severely agitated. Will increase risperidone. Discussed with medicine will switch antibiotic to Augmentin based on sensitivities but may require transfer for IV if not improving.    3/7 Patient sl better. COnt risperidone dose just increased, reviewed case with medicine internist plans to switch to alternative antibiotic better suited to treat ESBL organism  3/8 Still agitated needing prns with some periods of being calmer. Cont meds but increase risperidone to 1mg at hs  3/9 COnt paranoid agitated will add trazodone at night as sleep is poor and agitation worse. Plan increase risperdal if not improving will change to haldol  3/10 Improved today calmer and paranoia more circumsribed. Possible benefit from rx of UTI. Cont current treatment. Reviewed care with daughter  3/11 Improved conisder increasing trazodone or risperidone for residual symptoms if they don't improve on current dose.   3/12 irritable, angry, loud, focused on discharge, last PRN yesterday afternoon.   3/13 Patient better still screaming and paranoid. Will increase risperidone, tolerating well, attmept to get BP reading  3/14 PAtient less paranoid still with disinhibition vocal agitation. Cont to titrate risperidone  3/15 Patient improved in that agitation and paranoia more circumscribed. Cont current treatment , plan to increase risperidone to 1mg tid  3/16 Sig nocturnal agitation and insomnia will increase hs trazodone  3/17 Some gains limited incremental benefit from titration . Will increase risperdla, ocnisder changing to haldol if not better.  3/18 calmer, less irritable,  last PRN yesterday around 6.40 pm, no SI/HI.   3/19 screaming, irritable, perseverative, received PRN yesterday around 6 pm.  3/20 Patient extremely vocally agitated, also paranoid. poor response to risperidone. Initally considering gabapetin but maybe worth switching to haldol first as rarely atypical can worsen patient with BAD due to antidepressant effects  3/21 Severe vocal agitation and paranoia. Will increase haldol.  3/22 Limited response across range of antipsychotics, will thereadd gabapentin as may help neuropathic pain and agitation  3.23 PAtient calmer since prn suggesting benefit of higher dose of haldol will increase haldol tolerating well no EPS. Consider further increase in gabapentin

## 2023-03-23 NOTE — BH INPATIENT PSYCHIATRY PROGRESS NOTE - PRN MEDS
MEDICATIONS  (PRN):  acetaminophen     Tablet .. 650 milliGRAM(s) Oral every 6 hours PRN Mild Pain (1 - 3), Moderate Pain (4 - 6), Severe Pain (7 - 10)  albuterol    90 MICROgram(s) HFA Inhaler 2 Puff(s) Inhalation every 6 hours PRN Shortness of Breath and/or Wheezing  aluminum hydroxide/magnesium hydroxide/simethicone Suspension 30 milliLiter(s) Oral every 4 hours PRN Dyspepsia  benzocaine/menthol Lozenge 1 Lozenge Oral every 2 hours PRN sore throat  bisacodyl 5 milliGRAM(s) Oral every 12 hours PRN Constipation  haloperidol     Tablet 1 milliGRAM(s) Oral every 6 hours PRN agitation  haloperidol    Injectable 1 milliGRAM(s) IntraMuscular three times a day PRN refusal of court ordered meds  haloperidol    Injectable 1 milliGRAM(s) IntraMuscular once PRN agitation  haloperidol    Injectable 1 milliGRAM(s) IntraMuscular once PRN agitation

## 2023-03-24 PROCEDURE — 99231 SBSQ HOSP IP/OBS SF/LOW 25: CPT

## 2023-03-24 RX ORDER — HALOPERIDOL DECANOATE 100 MG/ML
1.5 INJECTION INTRAMUSCULAR
Refills: 0 | Status: DISCONTINUED | OUTPATIENT
Start: 2023-03-24 | End: 2023-04-04

## 2023-03-24 RX ADMIN — HALOPERIDOL DECANOATE 1.5 MILLIGRAM(S): 100 INJECTION INTRAMUSCULAR at 20:45

## 2023-03-24 RX ADMIN — Medication 1 DROP(S): at 20:46

## 2023-03-24 RX ADMIN — Medication 25 MILLIGRAM(S): at 10:10

## 2023-03-24 RX ADMIN — HALOPERIDOL DECANOATE 1.5 MILLIGRAM(S): 100 INJECTION INTRAMUSCULAR at 13:50

## 2023-03-24 RX ADMIN — TIOTROPIUM BROMIDE 2 PUFF(S): 18 CAPSULE ORAL; RESPIRATORY (INHALATION) at 10:04

## 2023-03-24 RX ADMIN — GABAPENTIN 100 MILLIGRAM(S): 400 CAPSULE ORAL at 06:20

## 2023-03-24 RX ADMIN — POLYETHYLENE GLYCOL 3350 17 GRAM(S): 17 POWDER, FOR SOLUTION ORAL at 10:05

## 2023-03-24 RX ADMIN — ENOXAPARIN SODIUM 40 MILLIGRAM(S): 100 INJECTION SUBCUTANEOUS at 10:04

## 2023-03-24 RX ADMIN — Medication 25 MILLIGRAM(S): at 20:24

## 2023-03-24 RX ADMIN — HALOPERIDOL DECANOATE 1.5 MILLIGRAM(S): 100 INJECTION INTRAMUSCULAR at 06:20

## 2023-03-24 RX ADMIN — HALOPERIDOL DECANOATE 1 MILLIGRAM(S): 100 INJECTION INTRAMUSCULAR at 16:01

## 2023-03-24 RX ADMIN — GABAPENTIN 100 MILLIGRAM(S): 400 CAPSULE ORAL at 13:49

## 2023-03-24 RX ADMIN — GABAPENTIN 100 MILLIGRAM(S): 400 CAPSULE ORAL at 20:45

## 2023-03-24 RX ADMIN — Medication 1 DROP(S): at 10:04

## 2023-03-24 RX ADMIN — LIDOCAINE 1 PATCH: 4 CREAM TOPICAL at 19:48

## 2023-03-24 RX ADMIN — Medication 3 MILLIGRAM(S): at 20:24

## 2023-03-24 RX ADMIN — SENNA PLUS 2 TABLET(S): 8.6 TABLET ORAL at 20:23

## 2023-03-24 RX ADMIN — Medication 81 MILLIGRAM(S): at 10:09

## 2023-03-24 RX ADMIN — Medication 50 MILLIGRAM(S): at 20:24

## 2023-03-24 RX ADMIN — LIDOCAINE 1 PATCH: 4 CREAM TOPICAL at 20:23

## 2023-03-24 NOTE — BH INPATIENT PSYCHIATRY PROGRESS NOTE - NSBHASSESSSUMMFT_PSY_ALL_CORE
Dinorah Hogan is an 88 year old female, , domiciled with daughter with PPHx of bipolar I disorder, 1 previous inpatient psychiatric hospitalization in 1970s, no known history of SI/SA and PMHx of asthma, GERD, sciatica with previous vertebral fracture who is admitted on 9.27 status for symptoms of hortencia including irritability, loud speech, and agitation.     DDx: hortencia vs hypomania vs comorbid delirium    Today, patient is angry about her current admission and denying any psychiatric history. She is asking to be discharged immediately and yelling at treatment team. She is noted to have loud and rapid speech, but is able to be interrupted. She states she has been sleeping poorly due to hip and R knee pain. She denies S/IIP, H/IIP, AVH, paranoia, or ideas of reference. She is AOx1-2 (person, place--hospital), but unable to state year or location in [place]. She requires 9.27 inpatient admission for evaluation and management of hortencia.    1/22: less irritable but still requiring PRN med, more compliant with meds, no SI/HI.  1/23: Accepted meds yd and this AM though required PRN yd. Less irritable, appropriately conversant, making needs known. Suspicious bordering on paranoid, however no fully formed delusional thought content apparent.  1/24: Irritable and labile, suspicious regarding staff members. Discussed medication, pt will be offered psychiatric meds and is aware she has right to refuse.  1/25: Hostile, irritable, labile. TOO application in progress given poor insight and intermittent refusal of psychiatric mediations. Pt at risk for deconditioning given refusal to ambulate and minimal engagement with PT, given ambulatory at home refusal here appears to be behavioral, will continue to encourage participation. Refused cognitive evaluation today. Given ongoing intermittent agitation, paranoia, physical deconditioning, and poor insight into condition, pt requires continued inpatient hospitalization for stabilization and safety.  1/26: Pt remains labile, irritable, and paranoid toward staff. Although pt makes provocative statements (e.g. "she controls the robots," "the Jews will hate me"), these statements appear to represent intense feeling rather than fully formed delusion. Continues to refuse cognitive evaluation or consent to contact family. Given ongoing intermittent agitation, paranoia, physical deconditioning, and poor insight into condition, pt requires continued inpatient hospitalization for stabilization and safety.  1/27 Patient agitated, markedly irritable,paranoid  with themes of perrvasive mistreatment, being singled out but w/u well formed , fixed delusional ideas.Cont enocurage med compliance scheduled for court hearing for TOO  1/28: Currently on CO due to hospital bed requirement. Reports fair sleep and appetite. Seen at bedside during rounds. Continues to refuse most medications, took senna and tylenol last night. Reporting back pain "15/10" and requesting more heat packs "I will die without these", however under no visible distress. Primary team pursuing TOO. Renewed CO.   1/29: Renewed CO. Last night took Depakote, Haldol and Senna. Med compliance has been partial.   1/30: Refused all meds yd, this AM accepted metoprolol. Making accusations that people are attempting to harm her and want her to die. Impulse control is impaired.  1/31: Continues with intermittent agitation, hostile and accusatory twd staff, refusing meds (except metoprolol), refusing consent to speak to daughter. Given ongoing agitation, impulsivity, disinhibition, and deconditioning 2/2 refusal to ambulate (also attempted to climb out of bed last night), pt poses a threat of harm to herself and requires ongoing inpatient hospitalization for stabilization and safety. TOO & retention court date next week.  2/1: Continues irritable, paranoid, refusing medications and engagement in interviews.   2/2: Minimally engaged in interview, refusing to answer most questions; labile, hostile, required IM Haldol 1mg overnight for agitation; refusing to ambulate or engage in PT; refusing pain control interventions other than hot packs and lidocaine patch. TOO in process.   2/4 Cont agitated poor sleep, this Am had vairable O2 sat, patient reported she feels she needs an inhaler for asthma. Patient to be seen by medicine, labs and RVP ordered. Will change haldol to olanzapine standing as response to prns of haldol and occ po's have had poor response.   2/5 Paranoid, severely agitated. Has URI. COnt meds prn inhlaer scheuled for TOO 2/7 2/6: Remains paranoid, agitated, accusatory. Frequently refusing meds, required 3x olanzapine PRNs over the weekend.  2/7: Remains paranoid and accusatory twd staff. Continues to refuse majority of medications. Currently with URI. Going to court today for retention and TOO.  2/8:  Patient irritable, agitated, will increase olanzapine with IM back up.   2/9 No major changes but taking meds, Cont olanzapine now at 2.5mg hs and Depakote. Plan cont to titrate, monitor for se  2/10 Patient w/o much response cont with intense paranoia and agitation. Cont Zyprexa cont to titrate.   2/11 Pt is sleepy most likely due to PRN zyprexa last night, remains disorganized and confused.   2/12 confused, disorganized, screams at times, required PRN zyprexa/melatonin last night.   2/13: The patient continues to be manic, disorganized, irritable, screaming at times.  She has been tolerating medications and prn's well - will increase Zyprexa to 5mg hs and change to Zydis to ensure compliance.  Continue 1:1.  2/14: The patient is not responding to Zyprexa, despite increasing dose and getting prn's.  Will change to Seroquel, as patient reportedly did well with it in the past.  Patient given 12.5mg this morning, tolerated well.  Will start 12.5mg tid.  2/15: The patient continues to be irritable, agitated, disorganized.  Some small improvement with Seroquel, sleeping better at night.  Continue Seroquel trial.  2/16: The patient continues to be agitated, frequently yelling out, irritable and angry.  She has been tolerating standing and prn Seroquel well, will continue to titrate to 25mg tid.  Continue 1:1.  2/17: The patient continues to be irritable, agitated, yelling out throughout the day.  She is having more restful periods during the day.  She has been tolerating Seroquel well, will continue.  Continue 1:1.  2/18: Patient remains agitated, disorganized, guarded, confused  2/19: Sustained agitated, irritability, likely paranoia, will increase quetiapine   2/20: Patient remains agitated, suspicious of staff, requiring prn meds, quetiapine increased   2/21 agitated paranoid, no response from Seroquel so far. Will increase Seroquel. Reviewed care with daughter.   2/22:  Paranoid agitated. Taking meds, cont  Seroquel, recheck labs.   2/23 Doing poorly with ongoing agitation now increased BUN Will push fluids. Will decrease Depakote consider d/c . COnisder changing to risperidone or haldol to avoid sedation. constipation  2/2 2/24 Poor resposne to Seroquel and olanzapine IMs with no reposne but sedation at times and constipation. Will change to risperdal po and haldol prn. Consider ECT given refractoriness. Plan recheck labs Monday 2/25 MArginal impression of improvement. Cont Risperdal trial with haldol prn    2/26 agitated, loud, screaming, disorganized and irritable, requiring PRN meds.  2/27 Patient irritable, paranoid with some more moments of being calmer, better related. Cont risperdal increase dose. Patient with elevated bicarb spoke with medicine, will repeat in AM  2/28 IMproved behaviorally. COnt risperidone trial. Labs about same. Cont meds review labs with medicine  3/2 Patient remains agitated not responded to risperidone at current dose as as is only better after multiples prns. Will increase risperidone, taper VPA  3/3 Patient with delirium superimposed on baseline dementia. Started on Vantin by medicine. Medicine to follow over weekend . Patient examined after sliding to floor , no pain or tenderness or decreased ROM hips or lower back. No intervention for this. Family notified about fall and UTI  3/4: Patient with delirium with episodes of agitation requiring prn haloperidol, but then calm and cooperative. Calm this AM.   3/5: Pt seen for f/u of vascular dementia. On CO for hospital bed. No significant overnight events reported, no PRNs needed overnight. VSS with systolic  this morning. Pt seen in the dayroom. Pt told writer she was "dead" and followed that by asking writer for orange juice. Accepted apple juice. Denies any other complaints.   3/6 Patient cont to fluctuate in symptoms from calm, placid to severely agitated. Will increase risperidone. Discussed with medicine will switch antibiotic to Augmentin based on sensitivities but may require transfer for IV if not improving.    3/7 Patient sl better. COnt risperidone dose just increased, reviewed case with medicine internist plans to switch to alternative antibiotic better suited to treat ESBL organism  3/8 Still agitated needing prns with some periods of being calmer. Cont meds but increase risperidone to 1mg at hs  3/9 COnt paranoid agitated will add trazodone at night as sleep is poor and agitation worse. Plan increase risperdal if not improving will change to haldol  3/10 Improved today calmer and paranoia more circumsribed. Possible benefit from rx of UTI. Cont current treatment. Reviewed care with daughter  3/11 Improved conisder increasing trazodone or risperidone for residual symptoms if they don't improve on current dose.   3/12 irritable, angry, loud, focused on discharge, last PRN yesterday afternoon.   3/13 Patient better still screaming and paranoid. Will increase risperidone, tolerating well, attmept to get BP reading  3/14 PAtient less paranoid still with disinhibition vocal agitation. Cont to titrate risperidone  3/15 Patient improved in that agitation and paranoia more circumscribed. Cont current treatment , plan to increase risperidone to 1mg tid  3/16 Sig nocturnal agitation and insomnia will increase hs trazodone  3/17 Some gains limited incremental benefit from titration . Will increase risperdla, ocnisder changing to haldol if not better.  3/18 calmer, less irritable,  last PRN yesterday around 6.40 pm, no SI/HI.   3/19 screaming, irritable, perseverative, received PRN yesterday around 6 pm.  3/20 Patient extremely vocally agitated, also paranoid. poor response to risperidone. Initally considering gabapetin but maybe worth switching to haldol first as rarely atypical can worsen patient with BAD due to antidepressant effects  3/21 Severe vocal agitation and paranoia. Will increase haldol.  3/22 Limited response across range of antipsychotics, will thereadd gabapentin as may help neuropathic pain and agitation  3.23 PAtient calmer since prn suggesting benefit of higher dose of haldol will increase haldol tolerating well no EPS. Consider further increase in gabapentin  3/24 PAtient improved modestly. Will ocnt haldol plan further increases in gabapentin. Has abn ua will check culutre to verify if UTIO was erridicated from last antibiotic course

## 2023-03-24 NOTE — BH INPATIENT PSYCHIATRY PROGRESS NOTE - MSE UNSTRUCTURED FT
Patient is awake and alert. Patient w/o sig EPS. Speech  high pitched,yells intermittently.  Mood irritable anxious  less severe, affect  labile . Paranoid about PT therapist but less so . Poor insightNo SI/HI, voices. Uncooperative with orientation. Poor insight

## 2023-03-24 NOTE — BH INPATIENT PSYCHIATRY PROGRESS NOTE - NSBHFUPINTERVALHXFT_PSY_A_CORE
Pt seen for f/u of vascular dementia, BAD. Chart and history reviewed and discussed with nursing team. Patient is compliant with medications, sleeping  poorly , cont to receive haldol prns one this morning.yells that she needs help then when staff near asked yells they are harming her and should leave though this is . Some staff noticed increased engageability and ability to be calmed when yelling. VSS

## 2023-03-24 NOTE — BH INPATIENT PSYCHIATRY PROGRESS NOTE - CURRENT MEDICATION
MEDICATIONS  (STANDING):  artificial  tears Solution 1 Drop(s) Both EYES two times a day  aspirin  chewable 81 milliGRAM(s) Oral daily  enoxaparin Injectable 40 milliGRAM(s) SubCutaneous <User Schedule>  gabapentin 100 milliGRAM(s) Oral <User Schedule>  haloperidol     Tablet 1.5 milliGRAM(s) Oral <User Schedule>  lidocaine   4% Patch 1 Patch Transdermal every 24 hours  melatonin. 3 milliGRAM(s) Oral at bedtime  metoprolol tartrate 25 milliGRAM(s) Oral two times a day  polyethylene glycol 3350 17 Gram(s) Oral daily  saline laxative (FLEET) Rectal Enema 1 Enema Rectal once  senna 2 Tablet(s) Oral at bedtime  tiotropium 2.5 MICROgram(s) Inhaler 2 Puff(s) Inhalation daily  traZODone 50 milliGRAM(s) Oral at bedtime    MEDICATIONS  (PRN):  acetaminophen     Tablet .. 650 milliGRAM(s) Oral every 6 hours PRN Mild Pain (1 - 3), Moderate Pain (4 - 6), Severe Pain (7 - 10)  albuterol    90 MICROgram(s) HFA Inhaler 2 Puff(s) Inhalation every 6 hours PRN Shortness of Breath and/or Wheezing  aluminum hydroxide/magnesium hydroxide/simethicone Suspension 30 milliLiter(s) Oral every 4 hours PRN Dyspepsia  benzocaine/menthol Lozenge 1 Lozenge Oral every 2 hours PRN sore throat  bisacodyl 5 milliGRAM(s) Oral every 12 hours PRN Constipation  haloperidol     Tablet 1 milliGRAM(s) Oral every 6 hours PRN agitation  haloperidol    Injectable 1 milliGRAM(s) IntraMuscular three times a day PRN refusal of court ordered meds  haloperidol    Injectable 1 milliGRAM(s) IntraMuscular once PRN agitation  haloperidol    Injectable 1 milliGRAM(s) IntraMuscular once PRN agitation

## 2023-03-24 NOTE — BH INPATIENT PSYCHIATRY PROGRESS NOTE - NSBHCHARTREVIEWVS_PSY_A_CORE FT
Vital Signs Last 24 Hrs  T(C): 36.4 (03-24-23 @ 07:51), Max: 36.4 (03-24-23 @ 07:51)  T(F): 97.5 (03-24-23 @ 07:51), Max: 97.5 (03-24-23 @ 07:51)  HR: 73 (03-23-23 @ 20:29) (73 - 73)  BP: 135/61 (03-23-23 @ 20:29) (135/61 - 135/61)  BP(mean): --  RR: --  SpO2: --    Orthostatic VS  03-24-23 @ 07:51  Lying BP: --/-- HR: --  Sitting BP: 149/74 HR: 65  Standing BP: --/-- HR: --  Site: --  Mode: --  Orthostatic VS  03-23-23 @ 05:44  Lying BP: --/-- HR: --  Sitting BP: 135/54 HR: 69  Standing BP: --/-- HR: --  Site: --  Mode: --

## 2023-03-25 PROCEDURE — 99231 SBSQ HOSP IP/OBS SF/LOW 25: CPT

## 2023-03-25 RX ADMIN — Medication 1 DROP(S): at 13:17

## 2023-03-25 RX ADMIN — GABAPENTIN 100 MILLIGRAM(S): 400 CAPSULE ORAL at 20:20

## 2023-03-25 RX ADMIN — Medication 81 MILLIGRAM(S): at 13:17

## 2023-03-25 RX ADMIN — Medication 50 MILLIGRAM(S): at 20:32

## 2023-03-25 RX ADMIN — GABAPENTIN 100 MILLIGRAM(S): 400 CAPSULE ORAL at 13:33

## 2023-03-25 RX ADMIN — HALOPERIDOL DECANOATE 1 MILLIGRAM(S): 100 INJECTION INTRAMUSCULAR at 10:14

## 2023-03-25 RX ADMIN — SENNA PLUS 2 TABLET(S): 8.6 TABLET ORAL at 20:31

## 2023-03-25 RX ADMIN — GABAPENTIN 100 MILLIGRAM(S): 400 CAPSULE ORAL at 06:00

## 2023-03-25 RX ADMIN — Medication 3 MILLIGRAM(S): at 20:31

## 2023-03-25 RX ADMIN — Medication 25 MILLIGRAM(S): at 13:17

## 2023-03-25 RX ADMIN — HALOPERIDOL DECANOATE 1.5 MILLIGRAM(S): 100 INJECTION INTRAMUSCULAR at 20:30

## 2023-03-25 RX ADMIN — LIDOCAINE 1 PATCH: 4 CREAM TOPICAL at 20:30

## 2023-03-25 RX ADMIN — LIDOCAINE 1 PATCH: 4 CREAM TOPICAL at 06:53

## 2023-03-25 RX ADMIN — Medication 25 MILLIGRAM(S): at 20:31

## 2023-03-25 RX ADMIN — Medication 1 DROP(S): at 20:30

## 2023-03-25 RX ADMIN — ENOXAPARIN SODIUM 40 MILLIGRAM(S): 100 INJECTION SUBCUTANEOUS at 13:17

## 2023-03-25 RX ADMIN — HALOPERIDOL DECANOATE 1.5 MILLIGRAM(S): 100 INJECTION INTRAMUSCULAR at 06:00

## 2023-03-25 RX ADMIN — HALOPERIDOL DECANOATE 1.5 MILLIGRAM(S): 100 INJECTION INTRAMUSCULAR at 13:33

## 2023-03-25 NOTE — BH INPATIENT PSYCHIATRY PROGRESS NOTE - NSBHASSESSSUMMFT_PSY_ALL_CORE
Dinorah Hogan is an 88 year old female, , domiciled with daughter with PPHx of bipolar I disorder, 1 previous inpatient psychiatric hospitalization in 1970s, no known history of SI/SA and PMHx of asthma, GERD, sciatica with previous vertebral fracture who is admitted on 9.27 status for symptoms of hortencia including irritability, loud speech, and agitation.     DDx: hortencia vs hypomania vs comorbid delirium    Today, patient is angry about her current admission and denying any psychiatric history. She is asking to be discharged immediately and yelling at treatment team. She is noted to have loud and rapid speech, but is able to be interrupted. She states she has been sleeping poorly due to hip and R knee pain. She denies S/IIP, H/IIP, AVH, paranoia, or ideas of reference. She is AOx1-2 (person, place--hospital), but unable to state year or location in [place]. She requires 9.27 inpatient admission for evaluation and management of hortencia.    1/22: less irritable but still requiring PRN med, more compliant with meds, no SI/HI.  1/23: Accepted meds yd and this AM though required PRN yd. Less irritable, appropriately conversant, making needs known. Suspicious bordering on paranoid,   however no fully formed delusional thought content apparent.  1/24: Irritable and labile, suspicious regarding staff members. Discussed medication, pt will be offered psychiatric meds and is aware she has right to refuse.  1/25: Hostile, irritable, labile. TOO application in progress given poor insight and intermittent refusal of psychiatric mediations. Pt at risk for deconditioning given refusal to ambulate and minimal engagement with PT, given ambulatory at home refusal here appears to be behavioral, will continue to encourage participation. Refused cognitive evaluation today. Given ongoing intermittent agitation, paranoia, physical deconditioning, and poor insight into condition, pt requires continued inpatient hospitalization for stability                                                  1/26: Pt remains labile, irritable, and paranoid toward staff. Although pt makes provocative statements (e.g. "she controls the robots," "the Jews will hate me"), these statements appear to represent intense feeling rather than fully formed delusion. Continues to refuse cognitive evaluation or consent to contact family. Given ongoingntermittent agitation, paranoia, physical deconditioning, and poor insight into condition, pt requires continued inpatient hospitalization for stabilization and safety.  1/27 Patient agitated, markedly irritable,paranoid  with themes of perrvasive mistreatment, being singled out but w/u well formed , fixed delusional ideas.Cont enocurage med compliance scheduled for court hearing for TOO  1/28: Currently on CO due to hospital bed requirement. Reports fair sleep and appetite. Seen at bedside during rounds. Continues to refuse most medications, took senna and tylenol last night. Reporting back pain "15/10" and requesting more heat packs "I will die without these", however under no visible distress. Primary team pursuing TOO. Renewed CO.   1/29: Renewed CO. Last night took Depakote, Haldol and Senna. Med compliance has been partial.   1/30: Refused all meds yd, this AM accepted metoprolol. Making accusations that people are attempting to harm her and want her to die. Impulse control is impaired.  1/31: Continues with intermittent agitation, hostile and accusatory twd staff, refusing meds (except metoprolol), refusing consent to speak to daughter. Given ongoing agitation, impulsivity, disinhibition, and deconditioning 2/2 refusal to ambulate (also attempted to climb out of bed last night), pt poses a threat of harm to herself and requires ongoing inpatient hospitalization for stabilization and safety. TOO & retention court date next week.  2/1: Continues irritable, paranoid, refusing medications and engagement in interviews.   2/2: Minimally engaged in interview, refusing to answer most questions; labile, hostile, required IM Haldol 1mg overnight for agitation; refusing to ambulate or engage in PT; refusing pain control interventions other than hot packs and lidocaine patch. TOO in process.   2/4 Cont agitated poor sleep, this Am had vairable O2 sat, patient reported she feels she needs an inhaler for asthma. Patient to be seen by medicine, labs and RVP ordered. Will change haldol to olanzapine standing as response to prns of haldol and occ po's have had poor response.   2/5 Paranoid, severely agitated. Has URI. COnt meds prn inhlaer scheuled for TOO 2/7 2/6: Remains paranoid, agitated, accusatory. Frequently refusing meds, required 3x olanzapine PRNs over the weekend.  2/7: Remains paranoid and accusatory twd staff. Continues to refuse majority of medications. Currently with URI. Going to court today for retention and TOO.  2/8:  Patient irritable, agitated, will increase olanzapine with IM back up.   2/9 No major changes but taking meds, Cont olanzapine now at 2.5mg hs and Depakote. Plan cont to titrate, monitor for se  2/10 Patient w/o much response cont with intense paranoia and agitation. Cont Zyprexa cont to titrate.   2/11 Pt is sleepy most likely due to PRN zyprexa last night, remains disorganized and confused.   2/12 confused, disorganized, screams at times, required PRN zyprexa/melatonin last night.   2/13: The patient continues to be manic, disorganized, irritable, screaming at times.  She has been tolerating medications and prn's well - will increase Zyprexa to 5mg hs and change to Zydis to ensure compliance.  Continue 1:1.  2/14: The patient is not responding to Zyprexa, despite increasing dose and getting prn's.  Will change to Seroquel, as patient reportedly did well with it in the past.  Patient given 12.5mg this morning, tolerated well.  Will start 12.5mg tid.  2/15: The patient continues to be irritable, agitated, disorganized.  Some small improvement with Seroquel, sleeping better at night.  Continue Seroquel trial.  2/16: The patient continues to be agitated, frequently yelling out, irritable and angry.  She has been tolerating standing and prn Seroquel well, will continue to titrate to 25mg tid.  Continue 1:1.  2/17: The patient continues to be irritable, agitated, yelling out throughout the day.  She is having more restful periods during the day.  She has been tolerating Seroquel well, will continue.  Continue 1:1.  2/18: Patient remains agitated, disorganized, guarded, confused  2/19: Sustained agitated, irritability, likely paranoia, will increase quetiapine   2/20: Patient remains agitated, suspicious of staff, requiring prn meds, quetiapine increased   2/21 agitated paranoid, no response from Seroquel so far. Will increase Seroquel. Reviewed care with daughter.   2/22:  Paranoid agitated. Taking meds, cont  Seroquel, recheck labs.   2/23 Doing poorly with ongoing agitation now increased BUN Will push fluids. Will decrease Depakote consider d/c . COnisder changing to risperidone or haldol to avoid sedation. constipation  2/2 2/24 Poor resposne to Seroquel and olanzapine IMs with no reposne but sedation at times and constipation. Will change to risperdal po and haldol prn. Consider ECT given refractoriness. Plan recheck labs Monday 2/25 MArginal impression of improvement. Cont Risperdal trial with haldol prn    2/26 agitated, loud, screaming, disorganized and irritable, requiring PRN meds.  2/27 Patient irritable, paranoid with some more moments of being calmer, better related. Cont risperdal increase dose. Patient with elevated bicarb spoke with medicine, will repeat in AM  2/28 IMproved behaviorally. COnt risperidone trial. Labs about same. Cont meds review labs with medicine  3/2 Patient remains agitated not responded to risperidone at current dose as as is only better after multiples prns. Will increase risperidone, taper VPA  3/3 Patient with delirium superimposed on baseline dementia. Started on Vantin by medicine. Medicine to follow over weekend . Patient examined after sliding to floor , no pain or tenderness or decreased ROM hips or lower back. No intervention for this. Family notified about fall and UTI  3/4: Patient with delirium with episodes of agitation requiring prn haloperidol, but then calm and cooperative. Calm this AM.   3/5: Pt seen for f/u of vascular dementia. On CO for hospital bed. No significant overnight events reported, no PRNs needed overnight. VSS with systolic  this morning. Pt seen in the dayroom. Pt told writer she was "dead" and followed that by asking writer for orange juice. Accepted apple juice. Denies any other complaints.   3/6 Patient cont to fluctuate in symptoms from calm, placid to severely agitated. Will increase risperidone. Discussed with medicine will switch antibiotic to Augmentin based on sensitivities but may require transfer for IV if not improving.    3/7 Patient sl better. COnt risperidone dose just increased, reviewed case with medicine internist plans to switch to alternative antibiotic better suited to treat ESBL organism  3/8 Still agitated needing prns with some periods of being calmer. Cont meds but increase risperidone to 1mg at hs  3/9 COnt paranoid agitated will add trazodone at night as sleep is poor and agitation worse. Plan increase risperdal if not improving will change to haldol  3/10 Improved today calmer and paranoia more circumsribed. Possible benefit from rx of UTI. Cont current treatment. Reviewed care with daughter  3/11 Improved conisder increasing trazodone or risperidone for residual symptoms if they don't improve on current dose.   3/12 irritable, angry, loud, focused on discharge, last PRN yesterday afternoon.   3/13 Patient better still screaming and paranoid. Will increase risperidone, tolerating well, attmept to get BP reading  3/14 PAtient less paranoid still with disinhibition vocal agitation. Cont to titrate risperidone  3/15 Patient improved in that agitation and paranoia more circumscribed. Cont current treatment , plan to increase risperidone to 1mg tid  3/16 Sig nocturnal agitation and insomnia will increase hs trazodone  3/17 Some gains limited incremental benefit from titration . Will increase risperdla, ocnisder changing to haldol if not better.  3/18 calmer, less irritable,  last PRN yesterday around 6.40 pm, no SI/HI.   3/19 screaming, irritable, perseverative, received PRN yesterday around 6 pm.  3/20 Patient extremely vocally agitated, also paranoid. poor response to risperidone. Initally considering gabapetin but maybe worth switching to haldol first as rarely atypical can worsen patient with BAD due to antidepressant effects  3/21 Severe vocal agitation and paranoia. Will increase haldol.  3/22 Limited response across range of antipsychotics, will thereadd gabapentin as may help neuropathic pain and agitation  3.23 PAtient calmer since prn suggesting benefit of higher dose of haldol will increase haldol tolerating well no EPS. Consider further increase in gabapentin  3/24 PAtient improved modestly. Will ocnt haldol plan further increases in gabapentin. Has abn ua will check culutre to verify if UTIO was erridicated from last antibiotic course  3/25 Patient improved still partial response resulting in ongoing prns plan cont meds possible increase in gabapentin

## 2023-03-25 NOTE — BH INPATIENT PSYCHIATRY PROGRESS NOTE - MSE UNSTRUCTURED FT
Patient is awake and alert. Patient w/o sig EPS. Speech  high pitched,yells intermittently  overall less severe less repetitive.  Mood irritable anxious  less severe, affect less  labile . Paranoid about staff but less. No hallucinations Poor insightNo SI/HI. Though it was the fall 2022. Poor insight

## 2023-03-25 NOTE — DOWNTIME INTERRUPTION NOTE - TIME SYSTEM AVAILABLE AGAIN
25-Mar-2023 10:00 Pharmacokinetic Initial Assessment: Gentamicin    Assessment:  Weight utilized for dose calculation: Adjusted Body Weight  Dosing method utilized: Dosing by random level    Plan:   Received 1mg/kg pre op. Random 24H lab scheduled for 3/22 @1000   If the trough is <1, re-dose x1 more time. If not, no need to re-dose.   Pharmacy will sign off after level results.     Please contact pharmacy at extension [phone] with any questions regarding this assessment.    Thank you for the consult,    Brianda Aguilar       Patient brief summary:  Lilia Hidalgo is a 89 y.o. female initiated on aminoglycoside therapy for treatment of suspected bone/joint infection    Drug Allergies:   Review of patient's allergies indicates:   Allergen Reactions    Aspirin Nausea Only and Other (See Comments)     Other reaction(s): esophageal pain    Celebrex [celecoxib] Rash    Morphine Hallucinations    Steroid [corticosteroids (glucocorticoids)] Other (See Comments)     Other reaction(s): Flushing (skin)    Sulfa dyne Other (See Comments)     Other reaction(s): esophogeal pain       Actual Body Weight:   90.7kg    Adjust Body Weight:   71.9kg    Ideal Body Weight:  59.3kg    Renal Function:   Estimated Creatinine Clearance: 28.3 mL/min (A) (based on SCr of 1.5 mg/dL (H)).,     Dialysis Method (if applicable):  N/A    CBC (last 72 hours):  Recent Labs   Lab Result Units 03/21/22  0855   WBC K/uL 12.22   Hemoglobin g/dL 14.0   Hemoglobin A1C % 5.4   Hematocrit % 47.0   Platelets K/uL 247   Gran % % 85.8*   Lymph % % 7.4*   Mono % % 4.9   Eosinophil % % 1.1   Basophil % % 0.4   Differential Method  Automated       Metabolic Panel (last 72 hours):  Recent Labs   Lab Result Units 03/21/22  0855 03/21/22  1000   Sodium mmol/L 146*  --    Potassium mmol/L 4.6  --    Chloride mmol/L 106  --    CO2 mmol/L 28  --    Glucose mg/dL 122*  --    Glucose, UA   --  Negative   BUN mg/dL 36*  --    Creatinine mg/dL 1.5*  --    Albumin g/dL 3.2*  --    Total  Bilirubin mg/dL 1.1*  --    Alkaline Phosphatase U/L 73  --    AST U/L 17  --    ALT U/L 17  --    Magnesium mg/dL 2.2  --    Phosphorus mg/dL 4.1  --        Microbiologic Results:  Microbiology Results (last 7 days)     Procedure Component Value Units Date/Time    Urine culture [[id number]] Collected: 03/21/22 1000    Order Status: No result Specimen: Urine Updated: 03/21/22 1026

## 2023-03-25 NOTE — BH INPATIENT PSYCHIATRY PROGRESS NOTE - NSBHCHARTREVIEWVS_PSY_A_CORE FT
Vital Signs Last 24 Hrs  T(C): --  T(F): --  HR: 71 (03-24-23 @ 19:43) (71 - 71)  BP: 151/68 (03-24-23 @ 19:43) (151/68 - 151/68)  BP(mean): --  RR: --  SpO2: --    Orthostatic VS  03-24-23 @ 07:51  Lying BP: --/-- HR: --  Sitting BP: 149/74 HR: 65  Standing BP: --/-- HR: --  Site: --  Mode: --

## 2023-03-25 NOTE — ED PROVIDER NOTE - CLINICAL SUMMARY MEDICAL DECISION MAKING FREE TEXT BOX
Pt tolerating blood transfusion   maurice pgy2: 84 yo f pw llq pain x 2 days, consistent w/ previous diverticulitis pain, mild ttp on exam, w/o guarding/rebound. hds, afebrile. no hx abd procedures, or nephrolithiasis. labs, imaging, sx control. reassess.

## 2023-03-25 NOTE — BH INPATIENT PSYCHIATRY PROGRESS NOTE - NSBHFUPINTERVALHXFT_PSY_A_CORE
Pt seen for f/u of vascular dementia, BAD. Chart and history reviewed and discussed with nursing team. Patient is compliant with medications, sleeping better , cont to receive haldol prns one yesterday  Some staff noticed increased engageability and ability to be calmed when yelling. VSS

## 2023-03-26 LAB
CULTURE RESULTS: SIGNIFICANT CHANGE UP
SPECIMEN SOURCE: SIGNIFICANT CHANGE UP

## 2023-03-26 PROCEDURE — 99231 SBSQ HOSP IP/OBS SF/LOW 25: CPT

## 2023-03-26 RX ORDER — GABAPENTIN 400 MG/1
200 CAPSULE ORAL
Refills: 0 | Status: DISCONTINUED | OUTPATIENT
Start: 2023-03-26 | End: 2023-03-29

## 2023-03-26 RX ADMIN — SENNA PLUS 2 TABLET(S): 8.6 TABLET ORAL at 21:02

## 2023-03-26 RX ADMIN — Medication 50 MILLIGRAM(S): at 21:02

## 2023-03-26 RX ADMIN — Medication 25 MILLIGRAM(S): at 21:01

## 2023-03-26 RX ADMIN — LIDOCAINE 1 PATCH: 4 CREAM TOPICAL at 09:43

## 2023-03-26 RX ADMIN — HALOPERIDOL DECANOATE 1.5 MILLIGRAM(S): 100 INJECTION INTRAMUSCULAR at 12:46

## 2023-03-26 RX ADMIN — Medication 1 DROP(S): at 22:35

## 2023-03-26 RX ADMIN — LIDOCAINE 1 PATCH: 4 CREAM TOPICAL at 07:40

## 2023-03-26 RX ADMIN — TIOTROPIUM BROMIDE 2 PUFF(S): 18 CAPSULE ORAL; RESPIRATORY (INHALATION) at 09:46

## 2023-03-26 RX ADMIN — GABAPENTIN 100 MILLIGRAM(S): 400 CAPSULE ORAL at 06:42

## 2023-03-26 RX ADMIN — POLYETHYLENE GLYCOL 3350 17 GRAM(S): 17 POWDER, FOR SOLUTION ORAL at 09:47

## 2023-03-26 RX ADMIN — HALOPERIDOL DECANOATE 1.5 MILLIGRAM(S): 100 INJECTION INTRAMUSCULAR at 06:42

## 2023-03-26 RX ADMIN — HALOPERIDOL DECANOATE 1.5 MILLIGRAM(S): 100 INJECTION INTRAMUSCULAR at 21:02

## 2023-03-26 RX ADMIN — Medication 3 MILLIGRAM(S): at 22:39

## 2023-03-26 RX ADMIN — Medication 81 MILLIGRAM(S): at 09:46

## 2023-03-26 RX ADMIN — Medication 25 MILLIGRAM(S): at 09:47

## 2023-03-26 RX ADMIN — GABAPENTIN 200 MILLIGRAM(S): 400 CAPSULE ORAL at 21:02

## 2023-03-26 RX ADMIN — LIDOCAINE 1 PATCH: 4 CREAM TOPICAL at 22:35

## 2023-03-26 RX ADMIN — ENOXAPARIN SODIUM 40 MILLIGRAM(S): 100 INJECTION SUBCUTANEOUS at 09:47

## 2023-03-26 RX ADMIN — LIDOCAINE 1 PATCH: 4 CREAM TOPICAL at 22:38

## 2023-03-26 RX ADMIN — GABAPENTIN 200 MILLIGRAM(S): 400 CAPSULE ORAL at 12:46

## 2023-03-26 NOTE — BH INPATIENT PSYCHIATRY PROGRESS NOTE - CURRENT MEDICATION
MEDICATIONS  (STANDING):  artificial  tears Solution 1 Drop(s) Both EYES two times a day  aspirin  chewable 81 milliGRAM(s) Oral daily  enoxaparin Injectable 40 milliGRAM(s) SubCutaneous <User Schedule>  gabapentin 200 milliGRAM(s) Oral <User Schedule>  haloperidol     Tablet 1.5 milliGRAM(s) Oral <User Schedule>  lidocaine   4% Patch 1 Patch Transdermal every 24 hours  melatonin. 3 milliGRAM(s) Oral at bedtime  metoprolol tartrate 25 milliGRAM(s) Oral two times a day  polyethylene glycol 3350 17 Gram(s) Oral daily  saline laxative (FLEET) Rectal Enema 1 Enema Rectal once  senna 2 Tablet(s) Oral at bedtime  tiotropium 2.5 MICROgram(s) Inhaler 2 Puff(s) Inhalation daily  traZODone 50 milliGRAM(s) Oral at bedtime    MEDICATIONS  (PRN):  acetaminophen     Tablet .. 650 milliGRAM(s) Oral every 6 hours PRN Mild Pain (1 - 3), Moderate Pain (4 - 6), Severe Pain (7 - 10)  albuterol    90 MICROgram(s) HFA Inhaler 2 Puff(s) Inhalation every 6 hours PRN Shortness of Breath and/or Wheezing  aluminum hydroxide/magnesium hydroxide/simethicone Suspension 30 milliLiter(s) Oral every 4 hours PRN Dyspepsia  benzocaine/menthol Lozenge 1 Lozenge Oral every 2 hours PRN sore throat  bisacodyl 5 milliGRAM(s) Oral every 12 hours PRN Constipation  haloperidol     Tablet 1 milliGRAM(s) Oral every 6 hours PRN agitation  haloperidol    Injectable 1 milliGRAM(s) IntraMuscular three times a day PRN refusal of court ordered meds  haloperidol    Injectable 1 milliGRAM(s) IntraMuscular once PRN agitation  haloperidol    Injectable 1 milliGRAM(s) IntraMuscular once PRN agitation

## 2023-03-26 NOTE — BH INPATIENT PSYCHIATRY PROGRESS NOTE - NSBHFUPINTERVALHXFT_PSY_A_CORE
Pt seen for f/u of vascular dementia, BAD. Chart and history reviewed and discussed with nursing team. Patient is compliant with medications, sleeping better after some gains had bad day with substantial screaming.

## 2023-03-26 NOTE — BH INPATIENT PSYCHIATRY PROGRESS NOTE - NSBHFUPINTERVALCCFT_PSY_A_CORE
Caller: Flaquita Cr    Relationship to patient: Self    Best call back number: [phone]     Patient is needing: PATIENT STATED SHE NEEDS A NEW SCRIPT FOR tiZANidine (ZANAFLEX) 2 MG tablet AND IT NEEDS TO INCLUDE NEW DIRECTIONS AND NOT THE DIRECTIONS FROM THE SCRIPT FROM [facility] . PATIENT STATES SHE TAKES FOR 2 PILLS TWICE A DAY .      PLEASE SEND TO ANIBAL TARANGO [address] [place] [address] [address] - [phone]  - [phone]   [phone]         Patient reports back pain, denies restlessness, focused on cyst in neck

## 2023-03-26 NOTE — BH INPATIENT PSYCHIATRY PROGRESS NOTE - MSE UNSTRUCTURED FT
Patient is awake and alert. Patient w/o sig EPS. Speech  high pitched,yells intermittently  overall less severe less repetitive.  Mood irritable anxious  less severe, affect less  labile . Paranoid about staff but less yelling that she cannot walk. No hallucinations Poor insightNo SI/HI. Though it was the fall 2022. Poor insight

## 2023-03-26 NOTE — BH INPATIENT PSYCHIATRY PROGRESS NOTE - NSBHASSESSSUMMFT_PSY_ALL_CORE
Dinorah Hogan is an 88 year old female, , domiciled with daughter with PPHx of bipolar I disorder, 1 previous inpatient psychiatric hospitalization in 1970s, no known history of SI/SA and PMHx of asthma, GERD, sciatica with previous vertebral fracture who is admitted on 9.27 status for symptoms of hortencia including irritability, loud speech, and agitation.     DDx: hortencia vs hypomania vs comorbid delirium    Today, patient is angry about her current admission and denying any psychiatric history. She is asking to be discharged immediately and yelling at treatment team. She is noted to have loud and rapid speech, but is able to be interrupted. She states she has been sleeping poorly due to hip and R knee pain. She denies S/IIP, H/IIP, AVH, paranoia, or ideas of reference. She is AOx1-2 (person, place--hospital), but unable to state year or location in [place]. She requires 9.27 inpatient admission for evaluation and management of hortencia.    1/22: less irritable but still requiring PRN med, more compliant with meds, no SI/HI.  1/23: Accepted meds yd and this AM though required PRN yd. Less irritable, appropriately conversant, making needs known. Suspicious bordering on paranoid,   however no fully formed delusional thought content apparent.  1/24: Irritable and labile, suspicious regarding staff members. Discussed medication, pt will be offered psychiatric meds and is aware she has right to refuse.  1/25: Hostile, irritable, labile. TOO application in progress given poor insight and intermittent refusal of psychiatric mediations. Pt at risk for deconditioning given refusal to ambulate and minimal engagement with PT, given ambulatory at home refusal here appears to be behavioral, will continue to encourage participation. Refused cognitive evaluation today. Given ongoing intermittent agitation, paranoia, physical deconditioning, and poor insight into condition, pt requires continued inpatient hospitalization for stability                                                  1/26: Pt remains labile, irritable, and paranoid toward staff. Although pt makes provocative statements (e.g. "she controls the robots," "the Jews will hate me"), these statements appear to represent intense feeling rather than fully formed delusion. Continues to refuse cognitive evaluation or consent to contact family. Given ongoingntermittent agitation, paranoia, physical deconditioning, and poor insight into condition, pt requires continued inpatient hospitalization for stabilization and safety.  1/27 Patient agitated, markedly irritable,paranoid  with themes of perrvasive mistreatment, being singled out but w/u well formed , fixed delusional ideas.Cont enocurage med compliance scheduled for court hearing for TOO  1/28: Currently on CO due to hospital bed requirement. Reports fair sleep and appetite. Seen at bedside during rounds. Continues to refuse most medications, took senna and tylenol last night. Reporting back pain "15/10" and requesting more heat packs "I will die without these", however under no visible distress. Primary team pursuing TOO. Renewed CO.   1/29: Renewed CO. Last night took Depakote, Haldol and Senna. Med compliance has been partial.   1/30: Refused all meds yd, this AM accepted metoprolol. Making accusations that people are attempting to harm her and want her to die. Impulse control is impaired.  1/31: Continues with intermittent agitation, hostile and accusatory twd staff, refusing meds (except metoprolol), refusing consent to speak to daughter. Given ongoing agitation, impulsivity, disinhibition, and deconditioning 2/2 refusal to ambulate (also attempted to climb out of bed last night), pt poses a threat of harm to herself and requires ongoing inpatient hospitalization for stabilization and safety. TOO & retention court date next week.  2/1: Continues irritable, paranoid, refusing medications and engagement in interviews.   2/2: Minimally engaged in interview, refusing to answer most questions; labile, hostile, required IM Haldol 1mg overnight for agitation; refusing to ambulate or engage in PT; refusing pain control interventions other than hot packs and lidocaine patch. TOO in process.   2/4 Cont agitated poor sleep, this Am had vairable O2 sat, patient reported she feels she needs an inhaler for asthma. Patient to be seen by medicine, labs and RVP ordered. Will change haldol to olanzapine standing as response to prns of haldol and occ po's have had poor response.   2/5 Paranoid, severely agitated. Has URI. COnt meds prn inhlaer scheuled for TOO 2/7 2/6: Remains paranoid, agitated, accusatory. Frequently refusing meds, required 3x olanzapine PRNs over the weekend.  2/7: Remains paranoid and accusatory twd staff. Continues to refuse majority of medications. Currently with URI. Going to court today for retention and TOO.  2/8:  Patient irritable, agitated, will increase olanzapine with IM back up.   2/9 No major changes but taking meds, Cont olanzapine now at 2.5mg hs and Depakote. Plan cont to titrate, monitor for se  2/10 Patient w/o much response cont with intense paranoia and agitation. Cont Zyprexa cont to titrate.   2/11 Pt is sleepy most likely due to PRN zyprexa last night, remains disorganized and confused.   2/12 confused, disorganized, screams at times, required PRN zyprexa/melatonin last night.   2/13: The patient continues to be manic, disorganized, irritable, screaming at times.  She has been tolerating medications and prn's well - will increase Zyprexa to 5mg hs and change to Zydis to ensure compliance.  Continue 1:1.  2/14: The patient is not responding to Zyprexa, despite increasing dose and getting prn's.  Will change to Seroquel, as patient reportedly did well with it in the past.  Patient given 12.5mg this morning, tolerated well.  Will start 12.5mg tid.  2/15: The patient continues to be irritable, agitated, disorganized.  Some small improvement with Seroquel, sleeping better at night.  Continue Seroquel trial.  2/16: The patient continues to be agitated, frequently yelling out, irritable and angry.  She has been tolerating standing and prn Seroquel well, will continue to titrate to 25mg tid.  Continue 1:1.  2/17: The patient continues to be irritable, agitated, yelling out throughout the day.  She is having more restful periods during the day.  She has been tolerating Seroquel well, will continue.  Continue 1:1.  2/18: Patient remains agitated, disorganized, guarded, confused  2/19: Sustained agitated, irritability, likely paranoia, will increase quetiapine   2/20: Patient remains agitated, suspicious of staff, requiring prn meds, quetiapine increased   2/21 agitated paranoid, no response from Seroquel so far. Will increase Seroquel. Reviewed care with daughter.   2/22:  Paranoid agitated. Taking meds, cont  Seroquel, recheck labs.   2/23 Doing poorly with ongoing agitation now increased BUN Will push fluids. Will decrease Depakote consider d/c . COnisder changing to risperidone or haldol to avoid sedation. constipation  2/2 2/24 Poor resposne to Seroquel and olanzapine IMs with no reposne but sedation at times and constipation. Will change to risperdal po and haldol prn. Consider ECT given refractoriness. Plan recheck labs Monday 2/25 MArginal impression of improvement. Cont Risperdal trial with haldol prn    2/26 agitated, loud, screaming, disorganized and irritable, requiring PRN meds.  2/27 Patient irritable, paranoid with some more moments of being calmer, better related. Cont risperdal increase dose. Patient with elevated bicarb spoke with medicine, will repeat in AM  2/28 IMproved behaviorally. COnt risperidone trial. Labs about same. Cont meds review labs with medicine  3/2 Patient remains agitated not responded to risperidone at current dose as as is only better after multiples prns. Will increase risperidone, taper VPA  3/3 Patient with delirium superimposed on baseline dementia. Started on Vantin by medicine. Medicine to follow over weekend . Patient examined after sliding to floor , no pain or tenderness or decreased ROM hips or lower back. No intervention for this. Family notified about fall and UTI  3/4: Patient with delirium with episodes of agitation requiring prn haloperidol, but then calm and cooperative. Calm this AM.   3/5: Pt seen for f/u of vascular dementia. On CO for hospital bed. No significant overnight events reported, no PRNs needed overnight. VSS with systolic  this morning. Pt seen in the dayroom. Pt told writer she was "dead" and followed that by asking writer for orange juice. Accepted apple juice. Denies any other complaints.   3/6 Patient cont to fluctuate in symptoms from calm, placid to severely agitated. Will increase risperidone. Discussed with medicine will switch antibiotic to Augmentin based on sensitivities but may require transfer for IV if not improving.    3/7 Patient sl better. COnt risperidone dose just increased, reviewed case with medicine internist plans to switch to alternative antibiotic better suited to treat ESBL organism  3/8 Still agitated needing prns with some periods of being calmer. Cont meds but increase risperidone to 1mg at hs  3/9 COnt paranoid agitated will add trazodone at night as sleep is poor and agitation worse. Plan increase risperdal if not improving will change to haldol  3/10 Improved today calmer and paranoia more circumsribed. Possible benefit from rx of UTI. Cont current treatment. Reviewed care with daughter  3/11 Improved conisder increasing trazodone or risperidone for residual symptoms if they don't improve on current dose.   3/12 irritable, angry, loud, focused on discharge, last PRN yesterday afternoon.   3/13 Patient better still screaming and paranoid. Will increase risperidone, tolerating well, attmept to get BP reading  3/14 PAtient less paranoid still with disinhibition vocal agitation. Cont to titrate risperidone  3/15 Patient improved in that agitation and paranoia more circumscribed. Cont current treatment , plan to increase risperidone to 1mg tid  3/16 Sig nocturnal agitation and insomnia will increase hs trazodone  3/17 Some gains limited incremental benefit from titration . Will increase risperdla, ocnisder changing to haldol if not better.  3/18 calmer, less irritable,  last PRN yesterday around 6.40 pm, no SI/HI.   3/19 screaming, irritable, perseverative, received PRN yesterday around 6 pm.  3/20 Patient extremely vocally agitated, also paranoid. poor response to risperidone. Initally considering gabapetin but maybe worth switching to haldol first as rarely atypical can worsen patient with BAD due to antidepressant effects  3/21 Severe vocal agitation and paranoia. Will increase haldol.  3/22 Limited response across range of antipsychotics, will thereadd gabapentin as may help neuropathic pain and agitation  3.23 PAtient calmer since prn suggesting benefit of higher dose of haldol will increase haldol tolerating well no EPS. Consider further increase in gabapentin  3/24 PAtient improved modestly. Will ocnt haldol plan further increases in gabapentin. Has abn ua will check culutre to verify if UTIO was erridicated from last antibiotic course  3/25 Patient improved still partial response resulting in ongoing prns plan cont meds possible increase in gabapentin  3/26 More agiated again unclear if tolerance developing  will increase gabapentin to 200mg tid

## 2023-03-26 NOTE — BH INPATIENT PSYCHIATRY PROGRESS NOTE - NSBHCHARTREVIEWVS_PSY_A_CORE FT
Vital Signs Last 24 Hrs  T(C): 36.2 (03-26-23 @ 08:03), Max: 36.2 (03-26-23 @ 08:03)  T(F): 97.2 (03-26-23 @ 08:03), Max: 97.2 (03-26-23 @ 08:03)  HR: --  BP: 154/62 (03-25-23 @ 20:34) (154/62 - 154/62)  BP(mean): 71 (03-25-23 @ 20:34) (71 - 71)  RR: --  SpO2: --    Orthostatic VS  03-26-23 @ 08:03  Lying BP: --/-- HR: --  Sitting BP: 156/75 HR: 62  Standing BP: 150/88 HR: 70  Site: --  Mode: --

## 2023-03-27 PROCEDURE — 99231 SBSQ HOSP IP/OBS SF/LOW 25: CPT

## 2023-03-27 RX ADMIN — POLYETHYLENE GLYCOL 3350 17 GRAM(S): 17 POWDER, FOR SOLUTION ORAL at 11:35

## 2023-03-27 RX ADMIN — GABAPENTIN 200 MILLIGRAM(S): 400 CAPSULE ORAL at 20:55

## 2023-03-27 RX ADMIN — HALOPERIDOL DECANOATE 1.5 MILLIGRAM(S): 100 INJECTION INTRAMUSCULAR at 15:06

## 2023-03-27 RX ADMIN — Medication 25 MILLIGRAM(S): at 11:35

## 2023-03-27 RX ADMIN — Medication 50 MILLIGRAM(S): at 20:56

## 2023-03-27 RX ADMIN — HALOPERIDOL DECANOATE 1.5 MILLIGRAM(S): 100 INJECTION INTRAMUSCULAR at 20:55

## 2023-03-27 RX ADMIN — HALOPERIDOL DECANOATE 1.5 MILLIGRAM(S): 100 INJECTION INTRAMUSCULAR at 05:47

## 2023-03-27 RX ADMIN — GABAPENTIN 200 MILLIGRAM(S): 400 CAPSULE ORAL at 05:47

## 2023-03-27 RX ADMIN — SENNA PLUS 2 TABLET(S): 8.6 TABLET ORAL at 20:56

## 2023-03-27 RX ADMIN — LIDOCAINE 1 PATCH: 4 CREAM TOPICAL at 20:55

## 2023-03-27 RX ADMIN — Medication 25 MILLIGRAM(S): at 21:31

## 2023-03-27 RX ADMIN — TIOTROPIUM BROMIDE 2 PUFF(S): 18 CAPSULE ORAL; RESPIRATORY (INHALATION) at 09:42

## 2023-03-27 RX ADMIN — Medication 1 DROP(S): at 20:55

## 2023-03-27 RX ADMIN — Medication 81 MILLIGRAM(S): at 11:34

## 2023-03-27 RX ADMIN — GABAPENTIN 200 MILLIGRAM(S): 400 CAPSULE ORAL at 15:05

## 2023-03-27 RX ADMIN — Medication 3 MILLIGRAM(S): at 20:55

## 2023-03-27 RX ADMIN — ENOXAPARIN SODIUM 40 MILLIGRAM(S): 100 INJECTION SUBCUTANEOUS at 11:52

## 2023-03-27 NOTE — BH INPATIENT PSYCHIATRY PROGRESS NOTE - PRN MEDS
MEDICATIONS  (PRN):  acetaminophen     Tablet .. 650 milliGRAM(s) Oral every 6 hours PRN Mild Pain (1 - 3), Moderate Pain (4 - 6), Severe Pain (7 - 10)  albuterol    90 MICROgram(s) HFA Inhaler 2 Puff(s) Inhalation every 6 hours PRN Shortness of Breath and/or Wheezing  aluminum hydroxide/magnesium hydroxide/simethicone Suspension 30 milliLiter(s) Oral every 4 hours PRN Dyspepsia  benzocaine/menthol Lozenge 1 Lozenge Oral every 2 hours PRN sore throat  bisacodyl 5 milliGRAM(s) Oral every 12 hours PRN Constipation  haloperidol     Tablet 1 milliGRAM(s) Oral every 6 hours PRN agitation  haloperidol    Injectable 1 milliGRAM(s) IntraMuscular three times a day PRN refusal of court ordered meds  haloperidol    Injectable 1 milliGRAM(s) IntraMuscular once PRN agitation  haloperidol    Injectable 1 milliGRAM(s) IntraMuscular once PRN agitation  saline laxative (FLEET) Rectal Enema 1 Enema Rectal daily PRN constipation

## 2023-03-27 NOTE — BH INPATIENT PSYCHIATRY PROGRESS NOTE - MSE UNSTRUCTURED FT
Patient is awake and alert. Patient w/o sig EPS. Speech  high pitched,yells intermittently  overall less severe less repetitive.  Mood irritable anxious  less severe, affect less  labile . Paranoid about staff but less yelling that she cannot walk. Also theme that no  one is doing anything to help her.  No hallucinations Poor insightNo SI/HI. Though it was the fall 2022. Poor insight

## 2023-03-27 NOTE — BH INPATIENT PSYCHIATRY PROGRESS NOTE - NSBHCHARTREVIEWVS_PSY_A_CORE FT
Vital Signs Last 24 Hrs  T(C): 36.3 (03-27-23 @ 08:34), Max: 36.3 (03-27-23 @ 08:34)  T(F): 97.3 (03-27-23 @ 08:34), Max: 97.3 (03-27-23 @ 08:34)  HR: --  BP: --  BP(mean): --  RR: --  SpO2: --    Orthostatic VS  03-27-23 @ 08:34  Lying BP: --/-- HR: --  Sitting BP: 121/71 HR: 92  Standing BP: --/-- HR: --  Site: --  Mode: --  Orthostatic VS  03-26-23 @ 20:28  Lying BP: --/-- HR: --  Sitting BP: 164/80 HR: 72  Standing BP: --/-- HR: --  Site: --  Mode: --  Orthostatic VS  03-26-23 @ 08:03  Lying BP: --/-- HR: --  Sitting BP: 156/75 HR: 62  Standing BP: 150/88 HR: 70  Site: --  Mode: --

## 2023-03-27 NOTE — BH INPATIENT PSYCHIATRY PROGRESS NOTE - NSBHASSESSSUMMFT_PSY_ALL_CORE
Dinorah Hogan is an 88 year old female, , domiciled with daughter with PPHx of bipolar I disorder, 1 previous inpatient psychiatric hospitalization in 1970s, no known history of SI/SA and PMHx of asthma, GERD, sciatica with previous vertebral fracture who is admitted on 9.27 status for symptoms of hortencia including irritability, loud speech, and agitation.     DDx: hortencia vs hypomania vs comorbid delirium    Today, patient is angry about her current admission and denying any psychiatric history. She is asking to be discharged immediately and yelling at treatment team. She is noted to have loud and rapid speech, but is able to be interrupted. She states she has been sleeping poorly due to hip and R knee pain. She denies S/IIP, H/IIP, AVH, paranoia, or ideas of reference. She is AOx1-2 (person, place--hospital), but unable to state year or location in [place]. She requires 9.27 inpatient admission for evaluation and management of hortencia.    1/22: less irritable but still requiring PRN med, more compliant with meds, no SI/HI.  1/23: Accepted meds yd and this AM though required PRN yd. Less irritable, appropriately conversant, making needs known. Suspicious bordering on paranoid,   however no fully formed delusional thought content apparent.  1/24: Irritable and labile, suspicious regarding staff members. Discussed medication, pt will be offered psychiatric meds and is aware she has right to refuse.  1/25: Hostile, irritable, labile. TOO application in progress given poor insight and intermittent refusal of psychiatric mediations. Pt at risk for deconditioning given refusal to ambulate and minimal engagement with PT, given ambulatory at home refusal here appears to be behavioral, will continue to encourage participation. Refused cognitive evaluation today. Given ongoing intermittent agitation, paranoia, physical deconditioning, and poor insight into condition, pt requires continued inpatient hospitalization for stability  1/26: Pt remains labile, irritable, and paranoid toward staff. Although pt makes provocative statements (e.g. "she controls the robots," "the Jews will hate me"), these statements appear to represent intense feeling rather than fully formed delusion. Continues to refuse cognitive evaluation or consent to contact family. Given ongoingntermittent agitation, paranoia, physical deconditioning, and poor insight into condition, pt requires continued inpatient hospitalization for stabilization and safety.  1/27 Patient agitated, markedly irritable,paranoid  with themes of perrvasive mistreatment, being singled out but w/u well formed , fixed delusional ideas.Cont enocurage med compliance scheduled for court hearing for TOO  1/28: Currently on CO due to hospital bed requirement. Reports fair sleep and appetite. Seen at bedside during rounds. Continues to refuse most medications, took senna and tylenol last night. Reporting back pain "15/10" and requesting more heat packs "I will die without these", however under no visible distress. Primary team pursuing TOO. Renewed CO.   1/29: Renewed CO. Last night took Depakote, Haldol and Senna. Med compliance has been partial.   1/30: Refused all meds yd, this AM accepted metoprolol. Making accusations that people are attempting to harm her and want her to die. Impulse control is impaired.  1/31: Continues with intermittent agitation, hostile and accusatory twd staff, refusing meds (except metoprolol), refusing consent to speak to daughter. Given ongoing agitation, impulsivity, disinhibition, and deconditioning 2/2 refusal to ambulate (also attempted to climb out of bed last night), pt poses a threat of harm to herself and requires ongoing inpatient hospitalization for stabilization and safety. TOO & retention court date next week.  2/1: Continues irritable, paranoid, refusing medications and engagement in interviews.   2/2: Minimally engaged in interview, refusing to answer most questions; labile, hostile, required IM Haldol 1mg overnight for agitation; refusing to ambulate or engage in PT; refusing pain control interventions other than hot packs and lidocaine patch. TOO in process.   2/4 Cont agitated poor sleep, this Am had vairable O2 sat, patient reported she feels she needs an inhaler for asthma. Patient to be seen by medicine, labs and RVP ordered. Will change haldol to olanzapine standing as response to prns of haldol and occ po's have had poor response.   2/5 Paranoid, severely agitated. Has URI. COnt meds prn inhlaer scheuled for TOO 2/7 2/6: Remains paranoid, agitated, accusatory. Frequently refusing meds, required 3x olanzapine PRNs over the weekend.  2/7: Remains paranoid and accusatory twd staff. Continues to refuse majority of medications. Currently with URI. Going to court today for retention and TOO.  2/8:  Patient irritable, agitated, will increase olanzapine with IM back up.   2/9 No major changes but taking meds, Cont olanzapine now at 2.5mg hs and Depakote. Plan cont to titrate, monitor for se  2/10 Patient w/o much response cont with intense paranoia and agitation. Cont Zyprexa cont to titrate.   2/11 Pt is sleepy most likely due to PRN zyprexa last night, remains disorganized and confused.   2/12 confused, disorganized, screams at times, required PRN zyprexa/melatonin last night.   2/13: The patient continues to be manic, disorganized, irritable, screaming at times.  She has been tolerating medications and prn's well - will increase Zyprexa to 5mg hs and change to Zydis to ensure compliance.  Continue 1:1.  2/14: The patient is not responding to Zyprexa, despite increasing dose and getting prn's.  Will change to Seroquel, as patient reportedly did well with it in the past.  Patient given 12.5mg this morning, tolerated well.  Will start 12.5mg tid.  2/15: The patient continues to be irritable, agitated, disorganized.  Some small improvement with Seroquel, sleeping better at night.  Continue Seroquel trial.  2/16: The patient continues to be agitated, frequently yelling out, irritable and angry.  She has been tolerating standing and prn Seroquel well, will continue to titrate to 25mg tid.  Continue 1:1.  2/17: The patient continues to be irritable, agitated, yelling out throughout the day.  She is having more restful periods during the day.  She has been tolerating Seroquel well, will continue.  Continue 1:1.  2/18: Patient remains agitated, disorganized, guarded, confused  2/19: Sustained agitated, irritability, likely paranoia, will increase quetiapine   2/20: Patient remains agitated, suspicious of staff, requiring prn meds, quetiapine increased   2/21 agitated paranoid, no response from Seroquel so far. Will increase Seroquel. Reviewed care with daughter.   2/22:  Paranoid agitated. Taking meds, cont  Seroquel, recheck labs.   2/23 Doing poorly with ongoing agitation now increased BUN Will push fluids. Will decrease Depakote consider d/c . COnisder changing to risperidone or haldol to avoid sedation. constipation  2/2 2/24 Poor resposne to Seroquel and olanzapine IMs with no reposne but sedation at times and constipation. Will change to risperdal po and haldol prn. Consider ECT given refractoriness. Plan recheck labs Monday 2/25 MArginal impression of improvement. Cont Risperdal trial with haldol prn    2/26 agitated, loud, screaming, disorganized and irritable, requiring PRN meds.  2/27 Patient irritable, paranoid with some more moments of being calmer, better related. Cont risperdal increase dose. Patient with elevated bicarb spoke with medicine, will repeat in AM  2/28 IMproved behaviorally. COnt risperidone trial. Labs about same. Cont meds review labs with medicine  3/2 Patient remains agitated not responded to risperidone at current dose as as is only better after multiples prns. Will increase risperidone, taper VPA  3/3 Patient with delirium superimposed on baseline dementia. Started on Vantin by medicine. Medicine to follow over weekend . Patient examined after sliding to floor , no pain or tenderness or decreased ROM hips or lower back. No intervention for this. Family notified about fall and UTI  3/4: Patient with delirium with episodes of agitation requiring prn haloperidol, but then calm and cooperative. Calm this AM.   3/5: Pt seen for f/u of vascular dementia. On CO for hospital bed. No significant overnight events reported, no PRNs needed overnight. VSS with systolic  this morning. Pt seen in the dayroom. Pt told writer she was "dead" and followed that by asking writer for orange juice. Accepted apple juice. Denies any other complaints.   3/6 Patient cont to fluctuate in symptoms from calm, placid to severely agitated. Will increase risperidone. Discussed with medicine will switch antibiotic to Augmentin based on sensitivities but may require transfer for IV if not improving.    3/7 Patient sl better. COnt risperidone dose just increased, reviewed case with medicine internist plans to switch to alternative antibiotic better suited to treat ESBL organism  3/8 Still agitated needing prns with some periods of being calmer. Cont meds but increase risperidone to 1mg at hs  3/9 COnt paranoid agitated will add trazodone at night as sleep is poor and agitation worse. Plan increase risperdal if not improving will change to haldol  3/10 Improved today calmer and paranoia more circumsribed. Possible benefit from rx of UTI. Cont current treatment. Reviewed care with daughter  3/11 Improved conisder increasing trazodone or risperidone for residual symptoms if they don't improve on current dose.   3/12 irritable, angry, loud, focused on discharge, last PRN yesterday afternoon.   3/13 Patient better still screaming and paranoid. Will increase risperidone, tolerating well, attmept to get BP reading  3/14 PAtient less paranoid still with disinhibition vocal agitation. Cont to titrate risperidone  3/15 Patient improved in that agitation and paranoia more circumscribed. Cont current treatment , plan to increase risperidone to 1mg tid  3/16 Sig nocturnal agitation and insomnia will increase hs trazodone  3/17 Some gains limited incremental benefit from titration . Will increase risperdla, ocnisder changing to haldol if not better.  3/18 calmer, less irritable,  last PRN yesterday around 6.40 pm, no SI/HI.   3/19 screaming, irritable, perseverative, received PRN yesterday around 6 pm.  3/20 Patient extremely vocally agitated, also paranoid. poor response to risperidone. Initally considering gabapetin but maybe worth switching to haldol first as rarely atypical can worsen patient with BAD due to antidepressant effects  3/21 Severe vocal agitation and paranoia. Will increase haldol.  3/22 Limited response across range of antipsychotics, will thereadd gabapentin as may help neuropathic pain and agitation  3.23 PAtient calmer since prn suggesting benefit of higher dose of haldol will increase haldol tolerating well no EPS. Consider further increase in gabapentin  3/24 PAtient improved modestly. Will ocnt haldol plan further increases in gabapentin. Has abn ua will check culutre to verify if UTIO was erridicated from last antibiotic course  3/25 Patient improved still partial response resulting in ongoing prns plan cont meds possible increase in gabapentin  3/26 More agiated again unclear if tolerance developing  will increase gabapentin to 200mg tid  3/27 Modest gains, will cont to titrate gabapentin as there has not been much incremental gain from increasing antipsychotics

## 2023-03-27 NOTE — BH INPATIENT PSYCHIATRY PROGRESS NOTE - CURRENT MEDICATION
MEDICATIONS  (STANDING):  artificial  tears Solution 1 Drop(s) Both EYES two times a day  aspirin  chewable 81 milliGRAM(s) Oral daily  enoxaparin Injectable 40 milliGRAM(s) SubCutaneous <User Schedule>  gabapentin 200 milliGRAM(s) Oral <User Schedule>  haloperidol     Tablet 1.5 milliGRAM(s) Oral <User Schedule>  lidocaine   4% Patch 1 Patch Transdermal every 24 hours  melatonin. 3 milliGRAM(s) Oral at bedtime  metoprolol tartrate 25 milliGRAM(s) Oral two times a day  polyethylene glycol 3350 17 Gram(s) Oral daily  saline laxative (FLEET) Rectal Enema 1 Enema Rectal once  senna 2 Tablet(s) Oral at bedtime  tiotropium 2.5 MICROgram(s) Inhaler 2 Puff(s) Inhalation daily  traZODone 50 milliGRAM(s) Oral at bedtime    MEDICATIONS  (PRN):  acetaminophen     Tablet .. 650 milliGRAM(s) Oral every 6 hours PRN Mild Pain (1 - 3), Moderate Pain (4 - 6), Severe Pain (7 - 10)  albuterol    90 MICROgram(s) HFA Inhaler 2 Puff(s) Inhalation every 6 hours PRN Shortness of Breath and/or Wheezing  aluminum hydroxide/magnesium hydroxide/simethicone Suspension 30 milliLiter(s) Oral every 4 hours PRN Dyspepsia  benzocaine/menthol Lozenge 1 Lozenge Oral every 2 hours PRN sore throat  bisacodyl 5 milliGRAM(s) Oral every 12 hours PRN Constipation  haloperidol     Tablet 1 milliGRAM(s) Oral every 6 hours PRN agitation  haloperidol    Injectable 1 milliGRAM(s) IntraMuscular three times a day PRN refusal of court ordered meds  haloperidol    Injectable 1 milliGRAM(s) IntraMuscular once PRN agitation  haloperidol    Injectable 1 milliGRAM(s) IntraMuscular once PRN agitation  saline laxative (FLEET) Rectal Enema 1 Enema Rectal daily PRN constipation

## 2023-03-27 NOTE — BH INPATIENT PSYCHIATRY PROGRESS NOTE - NSBHFUPINTERVALHXFT_PSY_A_CORE
Pt seen for f/u of vascular dementia, BAD. Chart and history reviewed and discussed with nursing team. Patient is compliant with medications, sleeping better some overall reduction in vocal agitation. Had BM 3/25

## 2023-03-28 PROCEDURE — 99232 SBSQ HOSP IP/OBS MODERATE 35: CPT

## 2023-03-28 RX ADMIN — Medication 30 MILLILITER(S): at 17:50

## 2023-03-28 RX ADMIN — GABAPENTIN 200 MILLIGRAM(S): 400 CAPSULE ORAL at 12:27

## 2023-03-28 RX ADMIN — Medication 25 MILLIGRAM(S): at 09:44

## 2023-03-28 RX ADMIN — LIDOCAINE 1 PATCH: 4 CREAM TOPICAL at 06:37

## 2023-03-28 RX ADMIN — Medication 650 MILLIGRAM(S): at 11:04

## 2023-03-28 RX ADMIN — HALOPERIDOL DECANOATE 1.5 MILLIGRAM(S): 100 INJECTION INTRAMUSCULAR at 12:27

## 2023-03-28 RX ADMIN — Medication 1 DROP(S): at 20:43

## 2023-03-28 RX ADMIN — TIOTROPIUM BROMIDE 2 PUFF(S): 18 CAPSULE ORAL; RESPIRATORY (INHALATION) at 09:42

## 2023-03-28 RX ADMIN — Medication 3 MILLIGRAM(S): at 20:33

## 2023-03-28 RX ADMIN — HALOPERIDOL DECANOATE 1.5 MILLIGRAM(S): 100 INJECTION INTRAMUSCULAR at 20:32

## 2023-03-28 RX ADMIN — Medication 650 MILLIGRAM(S): at 20:32

## 2023-03-28 RX ADMIN — Medication 5 MILLIGRAM(S): at 11:04

## 2023-03-28 RX ADMIN — POLYETHYLENE GLYCOL 3350 17 GRAM(S): 17 POWDER, FOR SOLUTION ORAL at 09:43

## 2023-03-28 RX ADMIN — SENNA PLUS 2 TABLET(S): 8.6 TABLET ORAL at 20:33

## 2023-03-28 RX ADMIN — Medication 50 MILLIGRAM(S): at 20:33

## 2023-03-28 RX ADMIN — GABAPENTIN 200 MILLIGRAM(S): 400 CAPSULE ORAL at 20:33

## 2023-03-28 RX ADMIN — Medication 81 MILLIGRAM(S): at 09:44

## 2023-03-28 RX ADMIN — ENOXAPARIN SODIUM 40 MILLIGRAM(S): 100 INJECTION SUBCUTANEOUS at 09:43

## 2023-03-28 RX ADMIN — Medication 650 MILLIGRAM(S): at 11:50

## 2023-03-28 RX ADMIN — HALOPERIDOL DECANOATE 1.5 MILLIGRAM(S): 100 INJECTION INTRAMUSCULAR at 06:39

## 2023-03-28 RX ADMIN — Medication 25 MILLIGRAM(S): at 20:44

## 2023-03-28 RX ADMIN — LIDOCAINE 1 PATCH: 4 CREAM TOPICAL at 20:50

## 2023-03-28 RX ADMIN — Medication 1 DROP(S): at 09:42

## 2023-03-28 RX ADMIN — Medication 650 MILLIGRAM(S): at 21:30

## 2023-03-28 RX ADMIN — LIDOCAINE 1 PATCH: 4 CREAM TOPICAL at 09:00

## 2023-03-28 RX ADMIN — GABAPENTIN 200 MILLIGRAM(S): 400 CAPSULE ORAL at 06:39

## 2023-03-28 NOTE — BH INPATIENT PSYCHIATRY PROGRESS NOTE - MSE UNSTRUCTURED FT
Patient is awake and alert. Patient w/o sig EPS. Speech  high pitched,yells intermittently  overall less severe less repetitive.  Mood irritable anxious  less severe, affect less  labile . Paranoid about staff but less not focus more on speparation, fear of abandonment.   No hallucinations Poor insightNo SI/HI. Though it was the fall 2022. Poor insight

## 2023-03-28 NOTE — BH INPATIENT PSYCHIATRY PROGRESS NOTE - CURRENT MEDICATION
MEDICATIONS  (STANDING):  artificial  tears Solution 1 Drop(s) Both EYES two times a day  aspirin  chewable 81 milliGRAM(s) Oral daily  enoxaparin Injectable 40 milliGRAM(s) SubCutaneous <User Schedule>  gabapentin 200 milliGRAM(s) Oral <User Schedule>  haloperidol     Tablet 1.5 milliGRAM(s) Oral <User Schedule>  lidocaine   4% Patch 1 Patch Transdermal every 24 hours  melatonin. 3 milliGRAM(s) Oral at bedtime  metoprolol tartrate 25 milliGRAM(s) Oral two times a day  polyethylene glycol 3350 17 Gram(s) Oral daily  saline laxative (FLEET) Rectal Enema 1 Enema Rectal once  senna 2 Tablet(s) Oral at bedtime  tiotropium 2.5 MICROgram(s) Inhaler 2 Puff(s) Inhalation daily  traZODone 50 milliGRAM(s) Oral at bedtime    MEDICATIONS  (PRN):  acetaminophen     Tablet .. 650 milliGRAM(s) Oral every 6 hours PRN Mild Pain (1 - 3), Moderate Pain (4 - 6), Severe Pain (7 - 10)  albuterol    90 MICROgram(s) HFA Inhaler 2 Puff(s) Inhalation every 6 hours PRN Shortness of Breath and/or Wheezing  aluminum hydroxide/magnesium hydroxide/simethicone Suspension 30 milliLiter(s) Oral every 4 hours PRN Dyspepsia  benzocaine/menthol Lozenge 1 Lozenge Oral every 2 hours PRN sore throat  bisacodyl 5 milliGRAM(s) Oral every 12 hours PRN Constipation  haloperidol     Tablet 1 milliGRAM(s) Oral every 6 hours PRN agitation  haloperidol    Injectable 1 milliGRAM(s) IntraMuscular once PRN agitation  haloperidol    Injectable 1 milliGRAM(s) IntraMuscular three times a day PRN refusal of court ordered meds  haloperidol    Injectable 1 milliGRAM(s) IntraMuscular once PRN agitation  saline laxative (FLEET) Rectal Enema 1 Enema Rectal daily PRN constipation

## 2023-03-28 NOTE — BH INPATIENT PSYCHIATRY PROGRESS NOTE - NSBHFUPINTERVALHXFT_PSY_A_CORE
Pt seen for f/u of vascular dementia, BAD. Chart and history reviewed and discussed with nursing team. Patient is compliant with medications, sleeping better some overall reduction in vocal agitation. Had BM 3/25. Patient has separation anxiety appears calmer if someone says close to her and continues to engage and reassure her

## 2023-03-28 NOTE — BH INPATIENT PSYCHIATRY PROGRESS NOTE - NSBHASSESSSUMMFT_PSY_ALL_CORE
Dinorah Hogan is an 88 year old female, , domiciled with daughter with PPHx of bipolar I disorder, 1 previous inpatient psychiatric hospitalization in 1970s, no known history of SI/SA and PMHx of asthma, GERD, sciatica with previous vertebral fracture who is admitted on 9.27 status for symptoms of hortencia including irritability, loud speech, and agitation.     DDx: hortencia vs hypomania vs comorbid delirium    Today, patient is angry about her current admission and denying any psychiatric history. She is asking to be discharged immediately and yelling at treatment team. She is noted to have loud and rapid speech, but is able to be interrupted. She states she has been sleeping poorly due to hip and R knee pain. She denies S/IIP, H/IIP, AVH, paranoia, or ideas of reference. She is AOx1-2 (person, place--hospital), but unable to state year or location in [place]. She requires 9.27 inpatient admission for evaluation and management of hortencia.    1/22: less irritable but still requiring PRN med, more compliant with meds, no SI/HI.  1/23: Accepted meds yd and this AM though required PRN yd. Less irritable, appropriately conversant, making needs known. Suspicious bordering on paranoid,   however no fully formed delusional thought content apparent.  1/24: Irritable and labile, suspicious regarding staff members. Discussed medication, pt will be offered psychiatric meds and is aware she has right to refuse.  1/25: Hostile, irritable, labile. TOO application in progress given poor insight and intermittent refusal of psychiatric mediations. Pt at risk for deconditioning given refusal to ambulate and minimal engagement with PT, given ambulatory at home refusal here appears to be behavioral, will continue to encourage participation. Refused cognitive evaluation today. Given ongoing intermittent agitation, paranoia, physical deconditioning, and poor insight into condition, pt requires continued inpatient hospitalization for stability  1/26: Pt remains labile, irritable, and paranoid toward staff. Although pt makes provocative statements (e.g. "she controls the robots," "the Jews will hate me"), these statements appear to represent intense feeling rather than fully formed delusion. Continues to refuse cognitive evaluation or consent to contact family. Given ongoingntermittent agitation, paranoia, physical deconditioning, and poor insight into condition, pt requires continued inpatient hospitalization for stabilization and safety.  1/27 Patient agitated, markedly irritable,paranoid  with themes of perrvasive mistreatment, being singled out but w/u well formed , fixed delusional ideas.Cont enocurage med compliance scheduled for court hearing for TOO  1/28: Currently on CO due to hospital bed requirement. Reports fair sleep and appetite. Seen at bedside during rounds. Continues to refuse most medications, took senna and tylenol last night. Reporting back pain "15/10" and requesting more heat packs "I will die without these", however under no visible distress. Primary team pursuing TOO. Renewed CO.   1/29: Renewed CO. Last night took Depakote, Haldol and Senna. Med compliance has been partial.   1/30: Refused all meds yd, this AM accepted metoprolol. Making accusations that people are attempting to harm her and want her to die. Impulse control is impaired.  1/31: Continues with intermittent agitation, hostile and accusatory twd staff, refusing meds (except metoprolol), refusing consent to speak to daughter. Given ongoing agitation, impulsivity, disinhibition, and deconditioning 2/2 refusal to ambulate (also attempted to climb out of bed last night), pt poses a threat of harm to herself and requires ongoing inpatient hospitalization for stabilization and safety. TOO & retention court date next week.  2/1: Continues irritable, paranoid, refusing medications and engagement in interviews.   2/2: Minimally engaged in interview, refusing to answer most questions; labile, hostile, required IM Haldol 1mg overnight for agitation; refusing to ambulate or engage in PT; refusing pain control interventions other than hot packs and lidocaine patch. TOO in process.   2/4 Cont agitated poor sleep, this Am had vairable O2 sat, patient reported she feels she needs an inhaler for asthma. Patient to be seen by medicine, labs and RVP ordered. Will change haldol to olanzapine standing as response to prns of haldol and occ po's have had poor response.   2/5 Paranoid, severely agitated. Has URI. COnt meds prn inhlaer scheuled for TOO 2/7 2/6: Remains paranoid, agitated, accusatory. Frequently refusing meds, required 3x olanzapine PRNs over the weekend.  2/7: Remains paranoid and accusatory twd staff. Continues to refuse majority of medications. Currently with URI. Going to court today for retention and TOO.  2/8:  Patient irritable, agitated, will increase olanzapine with IM back up.   2/9 No major changes but taking meds, Cont olanzapine now at 2.5mg hs and Depakote. Plan cont to titrate, monitor for se  2/10 Patient w/o much response cont with intense paranoia and agitation. Cont Zyprexa cont to titrate.   2/11 Pt is sleepy most likely due to PRN zyprexa last night, remains disorganized and confused.   2/12 confused, disorganized, screams at times, required PRN zyprexa/melatonin last night.   2/13: The patient continues to be manic, disorganized, irritable, screaming at times.  She has been tolerating medications and prn's well - will increase Zyprexa to 5mg hs and change to Zydis to ensure compliance.  Continue 1:1.  2/14: The patient is not responding to Zyprexa, despite increasing dose and getting prn's.  Will change to Seroquel, as patient reportedly did well with it in the past.  Patient given 12.5mg this morning, tolerated well.  Will start 12.5mg tid.  2/15: The patient continues to be irritable, agitated, disorganized.  Some small improvement with Seroquel, sleeping better at night.  Continue Seroquel trial.  2/16: The patient continues to be agitated, frequently yelling out, irritable and angry.  She has been tolerating standing and prn Seroquel well, will continue to titrate to 25mg tid.  Continue 1:1.  2/17: The patient continues to be irritable, agitated, yelling out throughout the day.  She is having more restful periods during the day.  She has been tolerating Seroquel well, will continue.  Continue 1:1.  2/18: Patient remains agitated, disorganized, guarded, confused  2/19: Sustained agitated, irritability, likely paranoia, will increase quetiapine   2/20: Patient remains agitated, suspicious of staff, requiring prn meds, quetiapine increased   2/21 agitated paranoid, no response from Seroquel so far. Will increase Seroquel. Reviewed care with daughter.   2/22:  Paranoid agitated. Taking meds, cont  Seroquel, recheck labs.   2/23 Doing poorly with ongoing agitation now increased BUN Will push fluids. Will decrease Depakote consider d/c . COnisder changing to risperidone or haldol to avoid sedation. constipation  2/2 2/24 Poor resposne to Seroquel and olanzapine IMs with no reposne but sedation at times and constipation. Will change to risperdal po and haldol prn. Consider ECT given refractoriness. Plan recheck labs Monday 2/25 MArginal impression of improvement. Cont Risperdal trial with haldol prn    2/26 agitated, loud, screaming, disorganized and irritable, requiring PRN meds.  2/27 Patient irritable, paranoid with some more moments of being calmer, better related. Cont risperdal increase dose. Patient with elevated bicarb spoke with medicine, will repeat in AM  2/28 IMproved behaviorally. COnt risperidone trial. Labs about same. Cont meds review labs with medicine  3/2 Patient remains agitated not responded to risperidone at current dose as as is only better after multiples prns. Will increase risperidone, taper VPA  3/3 Patient with delirium superimposed on baseline dementia. Started on Vantin by medicine. Medicine to follow over weekend . Patient examined after sliding to floor , no pain or tenderness or decreased ROM hips or lower back. No intervention for this. Family notified about fall and UTI  3/4: Patient with delirium with episodes of agitation requiring prn haloperidol, but then calm and cooperative. Calm this AM.   3/5: Pt seen for f/u of vascular dementia. On CO for hospital bed. No significant overnight events reported, no PRNs needed overnight. VSS with systolic  this morning. Pt seen in the dayroom. Pt told writer she was "dead" and followed that by asking writer for orange juice. Accepted apple juice. Denies any other complaints.   3/6 Patient cont to fluctuate in symptoms from calm, placid to severely agitated. Will increase risperidone. Discussed with medicine will switch antibiotic to Augmentin based on sensitivities but may require transfer for IV if not improving.    3/7 Patient sl better. COnt risperidone dose just increased, reviewed case with medicine internist plans to switch to alternative antibiotic better suited to treat ESBL organism  3/8 Still agitated needing prns with some periods of being calmer. Cont meds but increase risperidone to 1mg at hs  3/9 COnt paranoid agitated will add trazodone at night as sleep is poor and agitation worse. Plan increase risperdal if not improving will change to haldol  3/10 Improved today calmer and paranoia more circumsribed. Possible benefit from rx of UTI. Cont current treatment. Reviewed care with daughter  3/11 Improved conisder increasing trazodone or risperidone for residual symptoms if they don't improve on current dose.   3/12 irritable, angry, loud, focused on discharge, last PRN yesterday afternoon.   3/13 Patient better still screaming and paranoid. Will increase risperidone, tolerating well, attmept to get BP reading  3/14 PAtient less paranoid still with disinhibition vocal agitation. Cont to titrate risperidone  3/15 Patient improved in that agitation and paranoia more circumscribed. Cont current treatment , plan to increase risperidone to 1mg tid  3/16 Sig nocturnal agitation and insomnia will increase hs trazodone  3/17 Some gains limited incremental benefit from titration . Will increase risperdla, ocnisder changing to haldol if not better.  3/18 calmer, less irritable,  last PRN yesterday around 6.40 pm, no SI/HI.   3/19 screaming, irritable, perseverative, received PRN yesterday around 6 pm.  3/20 Patient extremely vocally agitated, also paranoid. poor response to risperidone. Initally considering gabapetin but maybe worth switching to haldol first as rarely atypical can worsen patient with BAD due to antidepressant effects  3/21 Severe vocal agitation and paranoia. Will increase haldol.  3/22 Limited response across range of antipsychotics, will thereadd gabapentin as may help neuropathic pain and agitation  3.23 PAtient calmer since prn suggesting benefit of higher dose of haldol will increase haldol tolerating well no EPS. Consider further increase in gabapentin  3/24 PAtient improved modestly. Will ocnt haldol plan further increases in gabapentin. Has abn ua will check culutre to verify if UTIO was erridicated from last antibiotic course  3/25 Patient improved still partial response resulting in ongoing prns plan cont meds possible increase in gabapentin  3/26 More agiated again unclear if tolerance developing  will increase gabapentin to 200mg tid  3/27 Modest gains, will cont to titrate gabapentin as there has not been much incremental gain from increasing antipsychotics  3/28 Modest gains cont to titrate gabapentin to parget pain, agitation, anxiety. if not working consider trazodone based on case reports of use in separation anxiety in dementia. Urine has three organisms, will not treat for UTI based on it

## 2023-03-28 NOTE — BH INPATIENT PSYCHIATRY PROGRESS NOTE - NSBHCHARTREVIEWVS_PSY_A_CORE FT
Vital Signs Last 24 Hrs  T(C): 36.5 (03-28-23 @ 08:40), Max: 36.5 (03-28-23 @ 08:40)  T(F): 97.7 (03-28-23 @ 08:40), Max: 97.7 (03-28-23 @ 08:40)  HR: 84 (03-27-23 @ 21:00) (84 - 84)  BP: 158/62 (03-28-23 @ 08:40) (138/78 - 158/62)  BP(mean): 60 (03-28-23 @ 08:40) (60 - 60)  RR: 16 (03-27-23 @ 21:00) (16 - 16)  SpO2: --    Orthostatic VS  03-27-23 @ 08:34  Lying BP: --/-- HR: --  Sitting BP: 121/71 HR: 92  Standing BP: --/-- HR: --  Site: --  Mode: --  Orthostatic VS  03-26-23 @ 20:28  Lying BP: --/-- HR: --  Sitting BP: 164/80 HR: 72  Standing BP: --/-- HR: --  Site: --  Mode: --

## 2023-03-28 NOTE — BH INPATIENT PSYCHIATRY PROGRESS NOTE - PRN MEDS
MEDICATIONS  (PRN):  acetaminophen     Tablet .. 650 milliGRAM(s) Oral every 6 hours PRN Mild Pain (1 - 3), Moderate Pain (4 - 6), Severe Pain (7 - 10)  albuterol    90 MICROgram(s) HFA Inhaler 2 Puff(s) Inhalation every 6 hours PRN Shortness of Breath and/or Wheezing  aluminum hydroxide/magnesium hydroxide/simethicone Suspension 30 milliLiter(s) Oral every 4 hours PRN Dyspepsia  benzocaine/menthol Lozenge 1 Lozenge Oral every 2 hours PRN sore throat  bisacodyl 5 milliGRAM(s) Oral every 12 hours PRN Constipation  haloperidol     Tablet 1 milliGRAM(s) Oral every 6 hours PRN agitation  haloperidol    Injectable 1 milliGRAM(s) IntraMuscular once PRN agitation  haloperidol    Injectable 1 milliGRAM(s) IntraMuscular three times a day PRN refusal of court ordered meds  haloperidol    Injectable 1 milliGRAM(s) IntraMuscular once PRN agitation  saline laxative (FLEET) Rectal Enema 1 Enema Rectal daily PRN constipation

## 2023-03-29 PROCEDURE — 99232 SBSQ HOSP IP/OBS MODERATE 35: CPT

## 2023-03-29 RX ORDER — GABAPENTIN 400 MG/1
300 CAPSULE ORAL
Refills: 0 | Status: DISCONTINUED | OUTPATIENT
Start: 2023-03-29 | End: 2023-04-10

## 2023-03-29 RX ADMIN — LIDOCAINE 1 PATCH: 4 CREAM TOPICAL at 20:16

## 2023-03-29 RX ADMIN — GABAPENTIN 300 MILLIGRAM(S): 400 CAPSULE ORAL at 20:17

## 2023-03-29 RX ADMIN — Medication 1 DROP(S): at 20:17

## 2023-03-29 RX ADMIN — SENNA PLUS 2 TABLET(S): 8.6 TABLET ORAL at 20:16

## 2023-03-29 RX ADMIN — HALOPERIDOL DECANOATE 1.5 MILLIGRAM(S): 100 INJECTION INTRAMUSCULAR at 12:40

## 2023-03-29 RX ADMIN — Medication 25 MILLIGRAM(S): at 09:42

## 2023-03-29 RX ADMIN — Medication 25 MILLIGRAM(S): at 21:00

## 2023-03-29 RX ADMIN — Medication 3 MILLIGRAM(S): at 20:16

## 2023-03-29 RX ADMIN — ENOXAPARIN SODIUM 40 MILLIGRAM(S): 100 INJECTION SUBCUTANEOUS at 09:42

## 2023-03-29 RX ADMIN — LIDOCAINE 1 PATCH: 4 CREAM TOPICAL at 07:30

## 2023-03-29 RX ADMIN — GABAPENTIN 200 MILLIGRAM(S): 400 CAPSULE ORAL at 05:33

## 2023-03-29 RX ADMIN — HALOPERIDOL DECANOATE 1.5 MILLIGRAM(S): 100 INJECTION INTRAMUSCULAR at 20:17

## 2023-03-29 RX ADMIN — HALOPERIDOL DECANOATE 1 MILLIGRAM(S): 100 INJECTION INTRAMUSCULAR at 09:43

## 2023-03-29 RX ADMIN — LIDOCAINE 1 PATCH: 4 CREAM TOPICAL at 07:08

## 2023-03-29 RX ADMIN — GABAPENTIN 300 MILLIGRAM(S): 400 CAPSULE ORAL at 12:40

## 2023-03-29 RX ADMIN — Medication 50 MILLIGRAM(S): at 20:16

## 2023-03-29 RX ADMIN — HALOPERIDOL DECANOATE 1.5 MILLIGRAM(S): 100 INJECTION INTRAMUSCULAR at 05:34

## 2023-03-29 RX ADMIN — Medication 81 MILLIGRAM(S): at 09:42

## 2023-03-29 RX ADMIN — Medication 1 ENEMA: at 13:43

## 2023-03-29 NOTE — BH INPATIENT PSYCHIATRY PROGRESS NOTE - NSBHASSESSSUMMFT_PSY_ALL_CORE
Dinorah Hogan is an 88 year old female, , domiciled with daughter with PPHx of bipolar I disorder, 1 previous inpatient psychiatric hospitalization in 1970s, no known history of SI/SA and PMHx of asthma, GERD, sciatica with previous vertebral fracture who is admitted on 9.27 status for symptoms of hortencia including irritability, loud speech, and agitation.     DDx: hortencia vs hypomania vs comorbid delirium    Today, patient is angry about her current admission and denying any psychiatric history. She is asking to be discharged immediately and yelling at treatment team. She is noted to have loud and rapid speech, but is able to be interrupted. She states she has been sleeping poorly due to hip and R knee pain. She denies S/IIP, H/IIP, AVH, paranoia, or ideas of reference. She is AOx1-2 (person, place--hospital), but unable to state year or location in [place]. She requires 9.27 inpatient admission for evaluation and management of hortencia.    1/22: less irritable but still requiring PRN med, more compliant with meds, no SI/HI.  1/23: Accepted meds yd and this AM though required PRN yd. Less irritable, appropriately conversant, making needs known. Suspicious bordering on paranoid,   however no fully formed delusional thought content apparent.  1/24: Irritable and labile, suspicious regarding staff members. Discussed medication, pt will be offered psychiatric meds and is aware she has right to refuse.  1/25: Hostile, irritable, labile. TOO application in progress given poor insight and intermittent refusal of psychiatric mediations. Pt at risk for deconditioning given refusal to ambulate and minimal engagement with PT, given ambulatory at home refusal here appears to be behavioral, will continue to encourage participation. Refused cognitive evaluation today. Given ongoing intermittent agitation, paranoia, physical deconditioning, and poor insight into condition, pt requires continued inpatient hospitalization for stability  1/26: Pt remains labile, irritable, and paranoid toward staff. Although pt makes provocative statements (e.g. "she controls the robots," "the Jews will hate me"), these statements appear to represent intense feeling rather than fully formed delusion. Continues to refuse cognitive evaluation or consent to contact family. Given ongoingntermittent agitation, paranoia, physical deconditioning, and poor insight into condition, pt requires continued inpatient hospitalization for stabilization and safety.  1/27 Patient agitated, markedly irritable,paranoid  with themes of perrvasive mistreatment, being singled out but w/u well formed , fixed delusional ideas.Cont enocurage med compliance scheduled for court hearing for TOO  1/28: Currently on CO due to hospital bed requirement. Reports fair sleep and appetite. Seen at bedside during rounds. Continues to refuse most medications, took senna and tylenol last night. Reporting back pain "15/10" and requesting more heat packs "I will die without these", however under no visible distress. Primary team pursuing TOO. Renewed CO.   1/29: Renewed CO. Last night took Depakote, Haldol and Senna. Med compliance has been partial.   1/30: Refused all meds yd, this AM accepted metoprolol. Making accusations that people are attempting to harm her and want her to die. Impulse control is impaired.  1/31: Continues with intermittent agitation, hostile and accusatory twd staff, refusing meds (except metoprolol), refusing consent to speak to daughter. Given ongoing agitation, impulsivity, disinhibition, and deconditioning 2/2 refusal to ambulate (also attempted to climb out of bed last night), pt poses a threat of harm to herself and requires ongoing inpatient hospitalization for stabilization and safety. TOO & retention court date next week.  2/1: Continues irritable, paranoid, refusing medications and engagement in interviews.   2/2: Minimally engaged in interview, refusing to answer most questions; labile, hostile, required IM Haldol 1mg overnight for agitation; refusing to ambulate or engage in PT; refusing pain control interventions other than hot packs and lidocaine patch. TOO in process.   2/4 Cont agitated poor sleep, this Am had vairable O2 sat, patient reported she feels she needs an inhaler for asthma. Patient to be seen by medicine, labs and RVP ordered. Will change haldol to olanzapine standing as response to prns of haldol and occ po's have had poor response.   2/5 Paranoid, severely agitated. Has URI. COnt meds prn inhlaer scheuled for TOO 2/7 2/6: Remains paranoid, agitated, accusatory. Frequently refusing meds, required 3x olanzapine PRNs over the weekend.  2/7: Remains paranoid and accusatory twd staff. Continues to refuse majority of medications. Currently with URI. Going to court today for retention and TOO.  2/8:  Patient irritable, agitated, will increase olanzapine with IM back up.   2/9 No major changes but taking meds, Cont olanzapine now at 2.5mg hs and Depakote. Plan cont to titrate, monitor for se  2/10 Patient w/o much response cont with intense paranoia and agitation. Cont Zyprexa cont to titrate.   2/11 Pt is sleepy most likely due to PRN zyprexa last night, remains disorganized and confused.   2/12 confused, disorganized, screams at times, required PRN zyprexa/melatonin last night.   2/13: The patient continues to be manic, disorganized, irritable, screaming at times.  She has been tolerating medications and prn's well - will increase Zyprexa to 5mg hs and change to Zydis to ensure compliance.  Continue 1:1.  2/14: The patient is not responding to Zyprexa, despite increasing dose and getting prn's.  Will change to Seroquel, as patient reportedly did well with it in the past.  Patient given 12.5mg this morning, tolerated well.  Will start 12.5mg tid.  2/15: The patient continues to be irritable, agitated, disorganized.  Some small improvement with Seroquel, sleeping better at night.  Continue Seroquel trial.  2/16: The patient continues to be agitated, frequently yelling out, irritable and angry.  She has been tolerating standing and prn Seroquel well, will continue to titrate to 25mg tid.  Continue 1:1.  2/17: The patient continues to be irritable, agitated, yelling out throughout the day.  She is having more restful periods during the day.  She has been tolerating Seroquel well, will continue.  Continue 1:1.  2/18: Patient remains agitated, disorganized, guarded, confused  2/19: Sustained agitated, irritability, likely paranoia, will increase quetiapine   2/20: Patient remains agitated, suspicious of staff, requiring prn meds, quetiapine increased   2/21 agitated paranoid, no response from Seroquel so far. Will increase Seroquel. Reviewed care with daughter.   2/22:  Paranoid agitated. Taking meds, cont  Seroquel, recheck labs.   2/23 Doing poorly with ongoing agitation now increased BUN Will push fluids. Will decrease Depakote consider d/c . COnisder changing to risperidone or haldol to avoid sedation. constipation  2/2 2/24 Poor resposne to Seroquel and olanzapine IMs with no reposne but sedation at times and constipation. Will change to risperdal po and haldol prn. Consider ECT given refractoriness. Plan recheck labs Monday 2/25 MArginal impression of improvement. Cont Risperdal trial with haldol prn    2/26 agitated, loud, screaming, disorganized and irritable, requiring PRN meds.  2/27 Patient irritable, paranoid with some more moments of being calmer, better related. Cont risperdal increase dose. Patient with elevated bicarb spoke with medicine, will repeat in AM  2/28 IMproved behaviorally. COnt risperidone trial. Labs about same. Cont meds review labs with medicine  3/2 Patient remains agitated not responded to risperidone at current dose as as is only better after multiples prns. Will increase risperidone, taper VPA  3/3 Patient with delirium superimposed on baseline dementia. Started on Vantin by medicine. Medicine to follow over weekend . Patient examined after sliding to floor , no pain or tenderness or decreased ROM hips or lower back. No intervention for this. Family notified about fall and UTI  3/4: Patient with delirium with episodes of agitation requiring prn haloperidol, but then calm and cooperative. Calm this AM.   3/5: Pt seen for f/u of vascular dementia. On CO for hospital bed. No significant overnight events reported, no PRNs needed overnight. VSS with systolic  this morning. Pt seen in the dayroom. Pt told writer she was "dead" and followed that by asking writer for orange juice. Accepted apple juice. Denies any other complaints.   3/6 Patient cont to fluctuate in symptoms from calm, placid to severely agitated. Will increase risperidone. Discussed with medicine will switch antibiotic to Augmentin based on sensitivities but may require transfer for IV if not improving.    3/7 Patient sl better. COnt risperidone dose just increased, reviewed case with medicine internist plans to switch to alternative antibiotic better suited to treat ESBL organism  3/8 Still agitated needing prns with some periods of being calmer. Cont meds but increase risperidone to 1mg at hs  3/9 COnt paranoid agitated will add trazodone at night as sleep is poor and agitation worse. Plan increase risperdal if not improving will change to haldol  3/10 Improved today calmer and paranoia more circumsribed. Possible benefit from rx of UTI. Cont current treatment. Reviewed care with daughter  3/11 Improved conisder increasing trazodone or risperidone for residual symptoms if they don't improve on current dose.   3/12 irritable, angry, loud, focused on discharge, last PRN yesterday afternoon.   3/13 Patient better still screaming and paranoid. Will increase risperidone, tolerating well, attmept to get BP reading  3/14 PAtient less paranoid still with disinhibition vocal agitation. Cont to titrate risperidone  3/15 Patient improved in that agitation and paranoia more circumscribed. Cont current treatment , plan to increase risperidone to 1mg tid  3/16 Sig nocturnal agitation and insomnia will increase hs trazodone  3/17 Some gains limited incremental benefit from titration . Will increase risperdla, ocnisder changing to haldol if not better.  3/18 calmer, less irritable,  last PRN yesterday around 6.40 pm, no SI/HI.   3/19 screaming, irritable, perseverative, received PRN yesterday around 6 pm.  3/20 Patient extremely vocally agitated, also paranoid. poor response to risperidone. Initally considering gabapetin but maybe worth switching to haldol first as rarely atypical can worsen patient with BAD due to antidepressant effects  3/21 Severe vocal agitation and paranoia. Will increase haldol.  3/22 Limited response across range of antipsychotics, will thereadd gabapentin as may help neuropathic pain and agitation  3.23 PAtient calmer since prn suggesting benefit of higher dose of haldol will increase haldol tolerating well no EPS. Consider further increase in gabapentin  3/24 PAtient improved modestly. Will ocnt haldol plan further increases in gabapentin. Has abn ua will check culutre to verify if UTIO was erridicated from last antibiotic course  3/25 Patient improved still partial response resulting in ongoing prns plan cont meds possible increase in gabapentin  3/26 More agiated again unclear if tolerance developing  will increase gabapentin to 200mg tid  3/27 Modest gains, will cont to titrate gabapentin as there has not been much incremental gain from increasing antipsychotics  3/28 Modest gains cont to titrate gabapentin to parget pain, agitation, anxiety. if not working consider trazodone based on case reports of use in separation anxiety in dementia. Urine has three organisms, will not treat for UTI based on it Dinorah Hogan is an 88 year old female, , domiciled with daughter with PPHx of bipolar I disorder, 1 previous inpatient psychiatric hospitalization in 1970s, no known history of SI/SA and PMHx of asthma, GERD, sciatica with previous vertebral fracture who is admitted on 9.27 status for symptoms of hortencia including irritability, loud speech, and agitation.     DDx: hortencia vs hypomania vs comorbid delirium    Today, patient is angry about her current admission and denying any psychiatric history. She is asking to be discharged immediately and yelling at treatment team. She is noted to have loud and rapid speech, but is able to be interrupted. She states she has been sleeping poorly due to hip and R knee pain. She denies S/IIP, H/IIP, AVH, paranoia, or ideas of reference. She is AOx1-2 (person, place--hospital), but unable to state year or location in [place]. She requires 9.27 inpatient admission for evaluation and management of hortencia.    1/22: less irritable but still requiring PRN med, more compliant with meds, no SI/HI.  1/23: Accepted meds yd and this AM though required PRN yd. Less irritable, appropriately conversant, making needs known. Suspicious bordering on paranoid,   however no fully formed delusional thought content apparent.  1/24: Irritable and labile, suspicious regarding staff members. Discussed medication, pt will be offered psychiatric meds and is aware she has right to refuse.  1/25: Hostile, irritable, labile. TOO application in progress given poor insight and intermittent refusal of psychiatric mediations. Pt at risk for deconditioning given refusal to ambulate and minimal engagement with PT, given ambulatory at home refusal here appears to be behavioral, will continue to encourage participation. Refused cognitive evaluation today. Given ongoing intermittent agitation, paranoia, physical deconditioning, and poor insight into condition, pt requires continued inpatient hospitalization for stability  1/26: Pt remains labile, irritable, and paranoid toward staff. Although pt makes provocative statements (e.g. "she controls the robots," "the Jews will hate me"), these statements appear to represent intense feeling rather than fully formed delusion. Continues to refuse cognitive evaluation or consent to contact family. Given ongoingntermittent agitation, paranoia, physical deconditioning, and poor insight into condition, pt requires continued inpatient hospitalization for stabilization and safety.  1/27 Patient agitated, markedly irritable,paranoid  with themes of perrvasive mistreatment, being singled out but w/u well formed , fixed delusional ideas.Cont enocurage med compliance scheduled for court hearing for TOO  1/28: Currently on CO due to hospital bed requirement. Reports fair sleep and appetite. Seen at bedside during rounds. Continues to refuse most medications, took senna and tylenol last night. Reporting back pain "15/10" and requesting more heat packs "I will die without these", however under no visible distress. Primary team pursuing TOO. Renewed CO.   1/29: Renewed CO. Last night took Depakote, Haldol and Senna. Med compliance has been partial.   1/30: Refused all meds yd, this AM accepted metoprolol. Making accusations that people are attempting to harm her and want her to die. Impulse control is impaired.  1/31: Continues with intermittent agitation, hostile and accusatory twd staff, refusing meds (except metoprolol), refusing consent to speak to daughter. Given ongoing agitation, impulsivity, disinhibition, and deconditioning 2/2 refusal to ambulate (also attempted to climb out of bed last night), pt poses a threat of harm to herself and requires ongoing inpatient hospitalization for stabilization and safety. TOO & retention court date next week.  2/1: Continues irritable, paranoid, refusing medications and engagement in interviews.   2/2: Minimally engaged in interview, refusing to answer most questions; labile, hostile, required IM Haldol 1mg overnight for agitation; refusing to ambulate or engage in PT; refusing pain control interventions other than hot packs and lidocaine patch. TOO in process.   2/4 Cont agitated poor sleep, this Am had vairable O2 sat, patient reported she feels she needs an inhaler for asthma. Patient to be seen by medicine, labs and RVP ordered. Will change haldol to olanzapine standing as response to prns of haldol and occ po's have had poor response.   2/5 Paranoid, severely agitated. Has URI. COnt meds prn inhlaer scheuled for TOO 2/7 2/6: Remains paranoid, agitated, accusatory. Frequently refusing meds, required 3x olanzapine PRNs over the weekend.  2/7: Remains paranoid and accusatory twd staff. Continues to refuse majority of medications. Currently with URI. Going to court today for retention and TOO.  2/8:  Patient irritable, agitated, will increase olanzapine with IM back up.   2/9 No major changes but taking meds, Cont olanzapine now at 2.5mg hs and Depakote. Plan cont to titrate, monitor for se  2/10 Patient w/o much response cont with intense paranoia and agitation. Cont Zyprexa cont to titrate.   2/11 Pt is sleepy most likely due to PRN zyprexa last night, remains disorganized and confused.   2/12 confused, disorganized, screams at times, required PRN zyprexa/melatonin last night.   2/13: The patient continues to be manic, disorganized, irritable, screaming at times.  She has been tolerating medications and prn's well - will increase Zyprexa to 5mg hs and change to Zydis to ensure compliance.  Continue 1:1.  2/14: The patient is not responding to Zyprexa, despite increasing dose and getting prn's.  Will change to Seroquel, as patient reportedly did well with it in the past.  Patient given 12.5mg this morning, tolerated well.  Will start 12.5mg tid.  2/15: The patient continues to be irritable, agitated, disorganized.  Some small improvement with Seroquel, sleeping better at night.  Continue Seroquel trial.  2/16: The patient continues to be agitated, frequently yelling out, irritable and angry.  She has been tolerating standing and prn Seroquel well, will continue to titrate to 25mg tid.  Continue 1:1.  2/17: The patient continues to be irritable, agitated, yelling out throughout the day.  She is having more restful periods during the day.  She has been tolerating Seroquel well, will continue.  Continue 1:1.  2/18: Patient remains agitated, disorganized, guarded, confused  2/19: Sustained agitated, irritability, likely paranoia, will increase quetiapine   2/20: Patient remains agitated, suspicious of staff, requiring prn meds, quetiapine increased   2/21 agitated paranoid, no response from Seroquel so far. Will increase Seroquel. Reviewed care with daughter.   2/22:  Paranoid agitated. Taking meds, cont  Seroquel, recheck labs.   2/23 Doing poorly with ongoing agitation now increased BUN Will push fluids. Will decrease Depakote consider d/c . COnisder changing to risperidone or haldol to avoid sedation. constipation  2/2 2/24 Poor resposne to Seroquel and olanzapine IMs with no reposne but sedation at times and constipation. Will change to risperdal po and haldol prn. Consider ECT given refractoriness. Plan recheck labs Monday 2/25 MArginal impression of improvement. Cont Risperdal trial with haldol prn    2/26 agitated, loud, screaming, disorganized and irritable, requiring PRN meds.  2/27 Patient irritable, paranoid with some more moments of being calmer, better related. Cont risperdal increase dose. Patient with elevated bicarb spoke with medicine, will repeat in AM  2/28 IMproved behaviorally. COnt risperidone trial. Labs about same. Cont meds review labs with medicine  3/2 Patient remains agitated not responded to risperidone at current dose as as is only better after multiples prns. Will increase risperidone, taper VPA  3/3 Patient with delirium superimposed on baseline dementia. Started on Vantin by medicine. Medicine to follow over weekend . Patient examined after sliding to floor , no pain or tenderness or decreased ROM hips or lower back. No intervention for this. Family notified about fall and UTI  3/4: Patient with delirium with episodes of agitation requiring prn haloperidol, but then calm and cooperative. Calm this AM.   3/5: Pt seen for f/u of vascular dementia. On CO for hospital bed. No significant overnight events reported, no PRNs needed overnight. VSS with systolic  this morning. Pt seen in the dayroom. Pt told writer she was "dead" and followed that by asking writer for orange juice. Accepted apple juice. Denies any other complaints.   3/6 Patient cont to fluctuate in symptoms from calm, placid to severely agitated. Will increase risperidone. Discussed with medicine will switch antibiotic to Augmentin based on sensitivities but may require transfer for IV if not improving.    3/7 Patient sl better. COnt risperidone dose just increased, reviewed case with medicine internist plans to switch to alternative antibiotic better suited to treat ESBL organism  3/8 Still agitated needing prns with some periods of being calmer. Cont meds but increase risperidone to 1mg at hs  3/9 COnt paranoid agitated will add trazodone at night as sleep is poor and agitation worse. Plan increase risperdal if not improving will change to haldol  3/10 Improved today calmer and paranoia more circumsribed. Possible benefit from rx of UTI. Cont current treatment. Reviewed care with daughter  3/11 Improved conisder increasing trazodone or risperidone for residual symptoms if they don't improve on current dose.   3/12 irritable, angry, loud, focused on discharge, last PRN yesterday afternoon.   3/13 Patient better still screaming and paranoid. Will increase risperidone, tolerating well, attmept to get BP reading  3/14 PAtient less paranoid still with disinhibition vocal agitation. Cont to titrate risperidone  3/15 Patient improved in that agitation and paranoia more circumscribed. Cont current treatment , plan to increase risperidone to 1mg tid  3/16 Sig nocturnal agitation and insomnia will increase hs trazodone  3/17 Some gains limited incremental benefit from titration . Will increase risperdla, ocnisder changing to haldol if not better.  3/18 calmer, less irritable,  last PRN yesterday around 6.40 pm, no SI/HI.   3/19 screaming, irritable, perseverative, received PRN yesterday around 6 pm.  3/20 Patient extremely vocally agitated, also paranoid. poor response to risperidone. Initally considering gabapetin but maybe worth switching to haldol first as rarely atypical can worsen patient with BAD due to antidepressant effects  3/21 Severe vocal agitation and paranoia. Will increase haldol.  3/22 Limited response across range of antipsychotics, will thereadd gabapentin as may help neuropathic pain and agitation  3.23 PAtient calmer since prn suggesting benefit of higher dose of haldol will increase haldol tolerating well no EPS. Consider further increase in gabapentin  3/24 PAtient improved modestly. Will ocnt haldol plan further increases in gabapentin. Has abn ua will check culutre to verify if UTIO was erridicated from last antibiotic course  3/25 Patient improved still partial response resulting in ongoing prns plan cont meds possible increase in gabapentin  3/26 More agiated again unclear if tolerance developing  will increase gabapentin to 200mg tid  3/27 Modest gains, will cont to titrate gabapentin as there has not been much incremental gain from increasing antipsychotics  3/28 Modest gains cont to titrate gabapentin to parget pain, agitation, anxiety. if not working consider trazodone based on case reports of use in separation anxiety in dementia. Urine has three organisms, will not treat for UTI based on it  3/29  PAtient with some reduction in paranoia but remains vocally agitated with separation anxiety or seeking then rejecting help. Will increase gabapenitn consider trial trazodone

## 2023-03-29 NOTE — BH INPATIENT PSYCHIATRY PROGRESS NOTE - PRN MEDS
MEDICATIONS  (PRN):  acetaminophen     Tablet .. 650 milliGRAM(s) Oral every 6 hours PRN Mild Pain (1 - 3), Moderate Pain (4 - 6), Severe Pain (7 - 10)  albuterol    90 MICROgram(s) HFA Inhaler 2 Puff(s) Inhalation every 6 hours PRN Shortness of Breath and/or Wheezing  aluminum hydroxide/magnesium hydroxide/simethicone Suspension 30 milliLiter(s) Oral every 4 hours PRN Dyspepsia  benzocaine/menthol Lozenge 1 Lozenge Oral every 2 hours PRN sore throat  bisacodyl 5 milliGRAM(s) Oral every 12 hours PRN Constipation  haloperidol     Tablet 1 milliGRAM(s) Oral every 6 hours PRN agitation  haloperidol    Injectable 1 milliGRAM(s) IntraMuscular three times a day PRN refusal of court ordered meds  haloperidol    Injectable 1 milliGRAM(s) IntraMuscular once PRN agitation  haloperidol    Injectable 1 milliGRAM(s) IntraMuscular once PRN agitation  saline laxative (FLEET) Rectal Enema 1 Enema Rectal daily PRN constipation   MEDICATIONS  (PRN):  acetaminophen     Tablet .. 650 milliGRAM(s) Oral every 6 hours PRN Mild Pain (1 - 3), Moderate Pain (4 - 6), Severe Pain (7 - 10)  albuterol    90 MICROgram(s) HFA Inhaler 2 Puff(s) Inhalation every 6 hours PRN Shortness of Breath and/or Wheezing  aluminum hydroxide/magnesium hydroxide/simethicone Suspension 30 milliLiter(s) Oral every 4 hours PRN Dyspepsia  benzocaine/menthol Lozenge 1 Lozenge Oral every 2 hours PRN sore throat  bisacodyl 5 milliGRAM(s) Oral every 12 hours PRN Constipation  haloperidol     Tablet 1 milliGRAM(s) Oral every 6 hours PRN agitation  haloperidol    Injectable 1 milliGRAM(s) IntraMuscular once PRN agitation  haloperidol    Injectable 1 milliGRAM(s) IntraMuscular three times a day PRN refusal of court ordered meds  haloperidol    Injectable 1 milliGRAM(s) IntraMuscular once PRN agitation  saline laxative (FLEET) Rectal Enema 1 Enema Rectal daily PRN constipation

## 2023-03-29 NOTE — BH INPATIENT PSYCHIATRY PROGRESS NOTE - NSBHCHARTREVIEWVS_PSY_A_CORE FT
Vital Signs Last 24 Hrs  T(C): 36.4 (03-29-23 @ 09:45), Max: 36.4 (03-29-23 @ 09:45)  T(F): 97.5 (03-29-23 @ 09:45), Max: 97.5 (03-29-23 @ 09:45)  HR: --  BP: 159/74 (03-28-23 @ 20:39) (159/74 - 159/74)  BP(mean): 81 (03-28-23 @ 20:39) (81 - 81)  RR: --  SpO2: --    Orthostatic VS  03-29-23 @ 09:45  Lying BP: 150/64 HR: 61  Sitting BP: 154/66 HR: 65  Standing BP: --/-- HR: --  Site: upper left arm  Mode: electronic

## 2023-03-29 NOTE — BH INPATIENT PSYCHIATRY PROGRESS NOTE - MSE UNSTRUCTURED FT
Patient is awake and alert. Patient w/o sig EPS. Speech  high pitched,yells intermittently  overall less severe less repetitive.  Mood irritable anxious  less severe, affect less  labile . Paranoid about staff but less not focus more on speparation, fear of abandonment.   No hallucinations Poor insightNo SI/HI. Though it was the fall 2022. Poor insight Patient is awake and alert. Patient w/o sig EPS. Speech  high pitched,yells intermittently  overall less severe less repetitive.  Mood irritable anxious  less severe, affect less  labile . Paranoid about staff but less not focus more on separation, fear of abandonment.   No hallucinations Poor insightNo SI/HI. Though it was the fall 2022. Poor insight

## 2023-03-29 NOTE — BH INPATIENT PSYCHIATRY PROGRESS NOTE - CURRENT MEDICATION
MEDICATIONS  (STANDING):  artificial  tears Solution 1 Drop(s) Both EYES two times a day  aspirin  chewable 81 milliGRAM(s) Oral daily  enoxaparin Injectable 40 milliGRAM(s) SubCutaneous <User Schedule>  gabapentin 300 milliGRAM(s) Oral <User Schedule>  haloperidol     Tablet 1.5 milliGRAM(s) Oral <User Schedule>  lidocaine   4% Patch 1 Patch Transdermal every 24 hours  melatonin. 3 milliGRAM(s) Oral at bedtime  metoprolol tartrate 25 milliGRAM(s) Oral two times a day  polyethylene glycol 3350 17 Gram(s) Oral daily  saline laxative (FLEET) Rectal Enema 1 Enema Rectal once  senna 2 Tablet(s) Oral at bedtime  tiotropium 2.5 MICROgram(s) Inhaler 2 Puff(s) Inhalation daily  traZODone 50 milliGRAM(s) Oral at bedtime    MEDICATIONS  (PRN):  acetaminophen     Tablet .. 650 milliGRAM(s) Oral every 6 hours PRN Mild Pain (1 - 3), Moderate Pain (4 - 6), Severe Pain (7 - 10)  albuterol    90 MICROgram(s) HFA Inhaler 2 Puff(s) Inhalation every 6 hours PRN Shortness of Breath and/or Wheezing  aluminum hydroxide/magnesium hydroxide/simethicone Suspension 30 milliLiter(s) Oral every 4 hours PRN Dyspepsia  benzocaine/menthol Lozenge 1 Lozenge Oral every 2 hours PRN sore throat  bisacodyl 5 milliGRAM(s) Oral every 12 hours PRN Constipation  haloperidol     Tablet 1 milliGRAM(s) Oral every 6 hours PRN agitation  haloperidol    Injectable 1 milliGRAM(s) IntraMuscular three times a day PRN refusal of court ordered meds  haloperidol    Injectable 1 milliGRAM(s) IntraMuscular once PRN agitation  haloperidol    Injectable 1 milliGRAM(s) IntraMuscular once PRN agitation  saline laxative (FLEET) Rectal Enema 1 Enema Rectal daily PRN constipation   MEDICATIONS  (STANDING):  artificial  tears Solution 1 Drop(s) Both EYES two times a day  aspirin  chewable 81 milliGRAM(s) Oral daily  enoxaparin Injectable 40 milliGRAM(s) SubCutaneous <User Schedule>  gabapentin 300 milliGRAM(s) Oral <User Schedule>  haloperidol     Tablet 1.5 milliGRAM(s) Oral <User Schedule>  lidocaine   4% Patch 1 Patch Transdermal every 24 hours  melatonin. 3 milliGRAM(s) Oral at bedtime  metoprolol tartrate 25 milliGRAM(s) Oral two times a day  polyethylene glycol 3350 17 Gram(s) Oral daily  saline laxative (FLEET) Rectal Enema 1 Enema Rectal once  senna 2 Tablet(s) Oral at bedtime  tiotropium 2.5 MICROgram(s) Inhaler 2 Puff(s) Inhalation daily  traZODone 50 milliGRAM(s) Oral at bedtime    MEDICATIONS  (PRN):  acetaminophen     Tablet .. 650 milliGRAM(s) Oral every 6 hours PRN Mild Pain (1 - 3), Moderate Pain (4 - 6), Severe Pain (7 - 10)  albuterol    90 MICROgram(s) HFA Inhaler 2 Puff(s) Inhalation every 6 hours PRN Shortness of Breath and/or Wheezing  aluminum hydroxide/magnesium hydroxide/simethicone Suspension 30 milliLiter(s) Oral every 4 hours PRN Dyspepsia  benzocaine/menthol Lozenge 1 Lozenge Oral every 2 hours PRN sore throat  bisacodyl 5 milliGRAM(s) Oral every 12 hours PRN Constipation  haloperidol     Tablet 1 milliGRAM(s) Oral every 6 hours PRN agitation  haloperidol    Injectable 1 milliGRAM(s) IntraMuscular once PRN agitation  haloperidol    Injectable 1 milliGRAM(s) IntraMuscular three times a day PRN refusal of court ordered meds  haloperidol    Injectable 1 milliGRAM(s) IntraMuscular once PRN agitation  saline laxative (FLEET) Rectal Enema 1 Enema Rectal daily PRN constipation

## 2023-03-29 NOTE — BH INPATIENT PSYCHIATRY PROGRESS NOTE - NSBHFUPINTERVALHXFT_PSY_A_CORE
Pt seen for f/u of vascular dementia, BAD. Chart and history reviewed and discussed with nursing team. Patient is compliant with medications, sleeping better some overall reduction in vocal agitation. Had BM 3/25. Patient has separation anxiety but not always consoled by staff next to her will yell help without idneitifying what she wants

## 2023-03-29 NOTE — ED ADULT TRIAGE NOTE - INTERNATIONAL TRAVEL
Patient woke up with a fast heart rate. Patient called ems. Patient HR in the 180's in SVT. Patient self converted. Patient has history of SVT. No

## 2023-03-30 PROCEDURE — 99232 SBSQ HOSP IP/OBS MODERATE 35: CPT

## 2023-03-30 RX ORDER — DICLOFENAC SODIUM 75 MG/1
25 TABLET, DELAYED RELEASE ORAL DAILY
Refills: 0 | Status: DISCONTINUED | OUTPATIENT
Start: 2023-03-30 | End: 2023-03-31

## 2023-03-30 RX ORDER — DICLOFENAC SODIUM 75 MG/1
25 TABLET, DELAYED RELEASE ORAL ONCE
Refills: 0 | Status: COMPLETED | OUTPATIENT
Start: 2023-03-30 | End: 2023-03-30

## 2023-03-30 RX ORDER — PANTOPRAZOLE SODIUM 20 MG/1
40 TABLET, DELAYED RELEASE ORAL
Refills: 0 | Status: DISCONTINUED | OUTPATIENT
Start: 2023-03-30 | End: 2023-03-31

## 2023-03-30 RX ORDER — FAMOTIDINE 10 MG/ML
20 INJECTION INTRAVENOUS ONCE
Refills: 0 | Status: COMPLETED | OUTPATIENT
Start: 2023-03-30 | End: 2023-03-30

## 2023-03-30 RX ADMIN — HALOPERIDOL DECANOATE 1.5 MILLIGRAM(S): 100 INJECTION INTRAMUSCULAR at 23:50

## 2023-03-30 RX ADMIN — Medication 25 MILLIGRAM(S): at 10:10

## 2023-03-30 RX ADMIN — Medication 1 DROP(S): at 22:28

## 2023-03-30 RX ADMIN — GABAPENTIN 300 MILLIGRAM(S): 400 CAPSULE ORAL at 12:21

## 2023-03-30 RX ADMIN — GABAPENTIN 300 MILLIGRAM(S): 400 CAPSULE ORAL at 22:35

## 2023-03-30 RX ADMIN — SENNA PLUS 2 TABLET(S): 8.6 TABLET ORAL at 22:34

## 2023-03-30 RX ADMIN — Medication 50 MILLIGRAM(S): at 22:27

## 2023-03-30 RX ADMIN — Medication 25 MILLIGRAM(S): at 22:27

## 2023-03-30 RX ADMIN — HALOPERIDOL DECANOATE 1.5 MILLIGRAM(S): 100 INJECTION INTRAMUSCULAR at 12:21

## 2023-03-30 RX ADMIN — POLYETHYLENE GLYCOL 3350 17 GRAM(S): 17 POWDER, FOR SOLUTION ORAL at 10:10

## 2023-03-30 RX ADMIN — Medication 81 MILLIGRAM(S): at 10:09

## 2023-03-30 RX ADMIN — ENOXAPARIN SODIUM 40 MILLIGRAM(S): 100 INJECTION SUBCUTANEOUS at 10:09

## 2023-03-30 RX ADMIN — TIOTROPIUM BROMIDE 2 PUFF(S): 18 CAPSULE ORAL; RESPIRATORY (INHALATION) at 10:09

## 2023-03-30 RX ADMIN — DICLOFENAC SODIUM 25 MILLIGRAM(S): 75 TABLET, DELAYED RELEASE ORAL at 16:50

## 2023-03-30 RX ADMIN — LIDOCAINE 1 PATCH: 4 CREAM TOPICAL at 22:28

## 2023-03-30 RX ADMIN — LIDOCAINE 1 PATCH: 4 CREAM TOPICAL at 10:08

## 2023-03-30 RX ADMIN — HALOPERIDOL DECANOATE 1.5 MILLIGRAM(S): 100 INJECTION INTRAMUSCULAR at 06:17

## 2023-03-30 RX ADMIN — GABAPENTIN 300 MILLIGRAM(S): 400 CAPSULE ORAL at 06:17

## 2023-03-30 RX ADMIN — LIDOCAINE 1 PATCH: 4 CREAM TOPICAL at 06:13

## 2023-03-30 RX ADMIN — DICLOFENAC SODIUM 25 MILLIGRAM(S): 75 TABLET, DELAYED RELEASE ORAL at 17:50

## 2023-03-30 NOTE — BH INPATIENT PSYCHIATRY PROGRESS NOTE - MSE UNSTRUCTURED FT
Patient is awake and alert. Patient w/o sig EPS. Speech  high pitched,yells intermittently  overall less severe less repetitive.  Mood irritable anxious  less severe, affect less  labile . Paranoid about staff but less not focus more on separation, fear of abandonment.   No hallucinations Poor insightNo SI/HI. . Poor insight

## 2023-03-30 NOTE — BH INPATIENT PSYCHIATRY PROGRESS NOTE - CURRENT MEDICATION
MEDICATIONS  (STANDING):  artificial  tears Solution 1 Drop(s) Both EYES two times a day  aspirin  chewable 81 milliGRAM(s) Oral daily  diclofenac 25 milliGRAM(s) Oral once  diclofenac 25 milliGRAM(s) Oral daily  famotidine    Tablet 20 milliGRAM(s) Oral once  gabapentin 300 milliGRAM(s) Oral <User Schedule>  haloperidol     Tablet 1.5 milliGRAM(s) Oral <User Schedule>  lidocaine   4% Patch 1 Patch Transdermal every 24 hours  metoprolol tartrate 25 milliGRAM(s) Oral two times a day  pantoprazole    Tablet 40 milliGRAM(s) Oral before breakfast  polyethylene glycol 3350 17 Gram(s) Oral daily  senna 2 Tablet(s) Oral at bedtime  tiotropium 2.5 MICROgram(s) Inhaler 2 Puff(s) Inhalation daily  traZODone 50 milliGRAM(s) Oral at bedtime    MEDICATIONS  (PRN):  acetaminophen     Tablet .. 650 milliGRAM(s) Oral every 6 hours PRN Mild Pain (1 - 3), Moderate Pain (4 - 6), Severe Pain (7 - 10)  albuterol    90 MICROgram(s) HFA Inhaler 2 Puff(s) Inhalation every 6 hours PRN Shortness of Breath and/or Wheezing  aluminum hydroxide/magnesium hydroxide/simethicone Suspension 30 milliLiter(s) Oral every 4 hours PRN Dyspepsia  benzocaine/menthol Lozenge 1 Lozenge Oral every 2 hours PRN sore throat  bisacodyl 5 milliGRAM(s) Oral every 12 hours PRN Constipation  haloperidol     Tablet 1 milliGRAM(s) Oral every 6 hours PRN agitation  haloperidol    Injectable 1 milliGRAM(s) IntraMuscular three times a day PRN refusal of court ordered meds  haloperidol    Injectable 1 milliGRAM(s) IntraMuscular once PRN agitation  haloperidol    Injectable 1 milliGRAM(s) IntraMuscular once PRN agitation  saline laxative (FLEET) Rectal Enema 1 Enema Rectal daily PRN constipation

## 2023-03-30 NOTE — BH INPATIENT PSYCHIATRY PROGRESS NOTE - NSBHCHARTREVIEWVS_PSY_A_CORE FT
Vital Signs Last 24 Hrs  T(C): 36.3 (03-30-23 @ 08:13), Max: 36.3 (03-30-23 @ 08:13)  T(F): 97.4 (03-30-23 @ 08:13), Max: 97.4 (03-30-23 @ 08:13)  HR: 73 (03-29-23 @ 20:47) (73 - 73)  BP: 117/70 (03-29-23 @ 20:47) (117/70 - 117/70)  BP(mean): --  RR: --  SpO2: --    Orthostatic VS  03-30-23 @ 08:13  Lying BP: 152/68 HR: 63  Sitting BP: 162/72 HR: 64  Standing BP: --/-- HR: --  Site: --  Mode: --  Orthostatic VS  03-29-23 @ 09:45  Lying BP: 150/64 HR: 61  Sitting BP: 154/66 HR: 65  Standing BP: --/-- HR: --  Site: upper left arm  Mode: electronic

## 2023-03-30 NOTE — BH INPATIENT PSYCHIATRY PROGRESS NOTE - NSBHASSESSSUMMFT_PSY_ALL_CORE
Dinorah Hogan is an 88 year old female, , domiciled with daughter with PPHx of bipolar I disorder, 1 previous inpatient psychiatric hospitalization in 1970s, no known history of SI/SA and PMHx of asthma, GERD, sciatica with previous vertebral fracture who is admitted on 9.27 status for symptoms of hortencia including irritability, loud speech, and agitation.     DDx: hortencia vs hypomania vs comorbid delirium    Today, patient is angry about her current admission and denying any psychiatric history. She is asking to be discharged immediately and yelling at treatment team. She is noted to have loud and rapid speech, but is able to be interrupted. She states she has been sleeping poorly due to hip and R knee pain. She denies S/IIP, H/IIP, AVH, paranoia, or ideas of reference. She is AOx1-2 (person, place--hospital), but unable to state year or location in [place]. She requires 9.27 inpatient admission for evaluation and management of hortencia.    1/22: less irritable but still requiring PRN med, more compliant with meds, no SI/HI.  1/23: Accepted meds yd and this AM though required PRN yd. Less irritable, appropriately conversant, making needs known. Suspicious bordering on paranoid,   however no fully formed delusional thought content apparent.  1/24: Irritable and labile, suspicious regarding staff members. Discussed medication, pt will be offered psychiatric meds and is aware she has right to refuse.  1/25: Hostile, irritable, labile. TOO application in progress given poor insight and intermittent refusal of psychiatric mediations. Pt at risk for deconditioning given refusal to ambulate and minimal engagement with PT, given ambulatory at home refusal here appears to be behavioral, will continue to encourage participation. Refused cognitive evaluation today. Given ongoing intermittent agitation, paranoia, physical deconditioning, and poor insight into condition, pt requires continued inpatient hospitalization for stability  1/26: Pt remains labile, irritable, and paranoid toward staff. Although pt makes provocative statements (e.g. "she controls the robots," "the Jews will hate me"), these statements appear to represent intense feeling rather than fully formed delusion. Continues to refuse cognitive evaluation or consent to contact family. Given ongoingntermittent agitation, paranoia, physical deconditioning, and poor insight into condition, pt requires continued inpatient hospitalization for stabilization and safety.  1/27 Patient agitated, markedly irritable,paranoid  with themes of perrvasive mistreatment, being singled out but w/u well formed , fixed delusional ideas.Cont enocurage med compliance scheduled for court hearing for TOO  1/28: Currently on CO due to hospital bed requirement. Reports fair sleep and appetite. Seen at bedside during rounds. Continues to refuse most medications, took senna and tylenol last night. Reporting back pain "15/10" and requesting more heat packs "I will die without these", however under no visible distress. Primary team pursuing TOO. Renewed CO.   1/29: Renewed CO. Last night took Depakote, Haldol and Senna. Med compliance has been partial.   1/30: Refused all meds yd, this AM accepted metoprolol. Making accusations that people are attempting to harm her and want her to die. Impulse control is impaired.  1/31: Continues with intermittent agitation, hostile and accusatory twd staff, refusing meds (except metoprolol), refusing consent to speak to daughter. Given ongoing agitation, impulsivity, disinhibition, and deconditioning 2/2 refusal to ambulate (also attempted to climb out of bed last night), pt poses a threat of harm to herself and requires ongoing inpatient hospitalization for stabilization and safety. TOO & retention court date next week.  2/1: Continues irritable, paranoid, refusing medications and engagement in interviews.   2/2: Minimally engaged in interview, refusing to answer most questions; labile, hostile, required IM Haldol 1mg overnight for agitation; refusing to ambulate or engage in PT; refusing pain control interventions other than hot packs and lidocaine patch. TOO in process.   2/4 Cont agitated poor sleep, this Am had vairable O2 sat, patient reported she feels she needs an inhaler for asthma. Patient to be seen by medicine, labs and RVP ordered. Will change haldol to olanzapine standing as response to prns of haldol and occ po's have had poor response.   2/5 Paranoid, severely agitated. Has URI. COnt meds prn inhlaer scheuled for TOO 2/7 2/6: Remains paranoid, agitated, accusatory. Frequently refusing meds, required 3x olanzapine PRNs over the weekend.  2/7: Remains paranoid and accusatory twd staff. Continues to refuse majority of medications. Currently with URI. Going to court today for retention and TOO.  2/8:  Patient irritable, agitated, will increase olanzapine with IM back up.   2/9 No major changes but taking meds, Cont olanzapine now at 2.5mg hs and Depakote. Plan cont to titrate, monitor for se  2/10 Patient w/o much response cont with intense paranoia and agitation. Cont Zyprexa cont to titrate.   2/11 Pt is sleepy most likely due to PRN zyprexa last night, remains disorganized and confused.   2/12 confused, disorganized, screams at times, required PRN zyprexa/melatonin last night.   2/13: The patient continues to be manic, disorganized, irritable, screaming at times.  She has been tolerating medications and prn's well - will increase Zyprexa to 5mg hs and change to Zydis to ensure compliance.  Continue 1:1.  2/14: The patient is not responding to Zyprexa, despite increasing dose and getting prn's.  Will change to Seroquel, as patient reportedly did well with it in the past.  Patient given 12.5mg this morning, tolerated well.  Will start 12.5mg tid.  2/15: The patient continues to be irritable, agitated, disorganized.  Some small improvement with Seroquel, sleeping better at night.  Continue Seroquel trial.  2/16: The patient continues to be agitated, frequently yelling out, irritable and angry.  She has been tolerating standing and prn Seroquel well, will continue to titrate to 25mg tid.  Continue 1:1.  2/17: The patient continues to be irritable, agitated, yelling out throughout the day.  She is having more restful periods during the day.  She has been tolerating Seroquel well, will continue.  Continue 1:1.  2/18: Patient remains agitated, disorganized, guarded, confused  2/19: Sustained agitated, irritability, likely paranoia, will increase quetiapine   2/20: Patient remains agitated, suspicious of staff, requiring prn meds, quetiapine increased   2/21 agitated paranoid, no response from Seroquel so far. Will increase Seroquel. Reviewed care with daughter.   2/22:  Paranoid agitated. Taking meds, cont  Seroquel, recheck labs.   2/23 Doing poorly with ongoing agitation now increased BUN Will push fluids. Will decrease Depakote consider d/c . COnisder changing to risperidone or haldol to avoid sedation. constipation  2/2 2/24 Poor resposne to Seroquel and olanzapine IMs with no reposne but sedation at times and constipation. Will change to risperdal po and haldol prn. Consider ECT given refractoriness. Plan recheck labs Monday 2/25 MArginal impression of improvement. Cont Risperdal trial with haldol prn    2/26 agitated, loud, screaming, disorganized and irritable, requiring PRN meds.  2/27 Patient irritable, paranoid with some more moments of being calmer, better related. Cont risperdal increase dose. Patient with elevated bicarb spoke with medicine, will repeat in AM  2/28 IMproved behaviorally. COnt risperidone trial. Labs about same. Cont meds review labs with medicine  3/2 Patient remains agitated not responded to risperidone at current dose as as is only better after multiples prns. Will increase risperidone, taper VPA  3/3 Patient with delirium superimposed on baseline dementia. Started on Vantin by medicine. Medicine to follow over weekend . Patient examined after sliding to floor , no pain or tenderness or decreased ROM hips or lower back. No intervention for this. Family notified about fall and UTI  3/4: Patient with delirium with episodes of agitation requiring prn haloperidol, but then calm and cooperative. Calm this AM.   3/5: Pt seen for f/u of vascular dementia. On CO for hospital bed. No significant overnight events reported, no PRNs needed overnight. VSS with systolic  this morning. Pt seen in the dayroom. Pt told writer she was "dead" and followed that by asking writer for orange juice. Accepted apple juice. Denies any other complaints.   3/6 Patient cont to fluctuate in symptoms from calm, placid to severely agitated. Will increase risperidone. Discussed with medicine will switch antibiotic to Augmentin based on sensitivities but may require transfer for IV if not improving.    3/7 Patient sl better. COnt risperidone dose just increased, reviewed case with medicine internist plans to switch to alternative antibiotic better suited to treat ESBL organism  3/8 Still agitated needing prns with some periods of being calmer. Cont meds but increase risperidone to 1mg at hs  3/9 COnt paranoid agitated will add trazodone at night as sleep is poor and agitation worse. Plan increase risperdal if not improving will change to haldol  3/10 Improved today calmer and paranoia more circumsribed. Possible benefit from rx of UTI. Cont current treatment. Reviewed care with daughter  3/11 Improved conisder increasing trazodone or risperidone for residual symptoms if they don't improve on current dose.   3/12 irritable, angry, loud, focused on discharge, last PRN yesterday afternoon.   3/13 Patient better still screaming and paranoid. Will increase risperidone, tolerating well, attmept to get BP reading  3/14 PAtient less paranoid still with disinhibition vocal agitation. Cont to titrate risperidone  3/15 Patient improved in that agitation and paranoia more circumscribed. Cont current treatment , plan to increase risperidone to 1mg tid  3/16 Sig nocturnal agitation and insomnia will increase hs trazodone  3/17 Some gains limited incremental benefit from titration . Will increase risperdla, ocnisder changing to haldol if not better.  3/18 calmer, less irritable,  last PRN yesterday around 6.40 pm, no SI/HI.   3/19 screaming, irritable, perseverative, received PRN yesterday around 6 pm.  3/20 Patient extremely vocally agitated, also paranoid. poor response to risperidone. Initally considering gabapetin but maybe worth switching to haldol first as rarely atypical can worsen patient with BAD due to antidepressant effects  3/21 Severe vocal agitation and paranoia. Will increase haldol.  3/22 Limited response across range of antipsychotics, will thereadd gabapentin as may help neuropathic pain and agitation  3.23 PAtient calmer since prn suggesting benefit of higher dose of haldol will increase haldol tolerating well no EPS. Consider further increase in gabapentin  3/24 PAtient improved modestly. Will ocnt haldol plan further increases in gabapentin. Has abn ua will check culutre to verify if UTIO was erridicated from last antibiotic course  3/25 Patient improved still partial response resulting in ongoing prns plan cont meds possible increase in gabapentin  3/26 More agiated again unclear if tolerance developing  will increase gabapentin to 200mg tid  3/27 Modest gains, will cont to titrate gabapentin as there has not been much incremental gain from increasing antipsychotics  3/28 Modest gains cont to titrate gabapentin to parget pain, agitation, anxiety. if not working consider trazodone based on case reports of use in separation anxiety in dementia. Urine has three organisms, will not treat for UTI based on it  3/29  PAtient with some reduction in paranoia but remains vocally agitated with separation anxiety or seeking then rejecting help. Will increase gabapenitn consider trial trazodone  3/30 Dementia with hard to treat agitation with partial response. Patient with some improvement compared to baseline. Reviewed care with PT and medicine. Patient has element of arthritic pain not likely to respond to gabapentin. Will give trial diclofenac po with acid blocker discussed risk benefits with daughter who agrees to treatment aimed and pain reduction

## 2023-03-30 NOTE — BH INPATIENT PSYCHIATRY PROGRESS NOTE - NSBHFUPINTERVALHXFT_PSY_A_CORE
Pt seen for f/u of vascular dementia, BAD. Chart and history reviewed and discussed with nursing team. Patient is compliant with medications, sleeping better some overall reduction in vocal agitation.  Patient has separation anxiety but not always consoled by staff next to her will yell help without identifying what she wants. Reviewed plan of care with daughter

## 2023-03-31 PROCEDURE — 99232 SBSQ HOSP IP/OBS MODERATE 35: CPT

## 2023-03-31 RX ORDER — SERTRALINE 25 MG/1
12.5 TABLET, FILM COATED ORAL ONCE
Refills: 0 | Status: DISCONTINUED | OUTPATIENT
Start: 2023-03-31 | End: 2023-04-04

## 2023-03-31 RX ORDER — ENOXAPARIN SODIUM 100 MG/ML
40 INJECTION SUBCUTANEOUS
Refills: 0 | Status: DISCONTINUED | OUTPATIENT
Start: 2023-03-31 | End: 2023-04-10

## 2023-03-31 RX ORDER — SERTRALINE 25 MG/1
25 TABLET, FILM COATED ORAL DAILY
Refills: 0 | Status: DISCONTINUED | OUTPATIENT
Start: 2023-03-31 | End: 2023-04-03

## 2023-03-31 RX ORDER — PANTOPRAZOLE SODIUM 20 MG/1
40 TABLET, DELAYED RELEASE ORAL DAILY
Refills: 0 | Status: DISCONTINUED | OUTPATIENT
Start: 2023-03-31 | End: 2023-04-04

## 2023-03-31 RX ADMIN — Medication 50 MILLIGRAM(S): at 20:22

## 2023-03-31 RX ADMIN — Medication 25 MILLIGRAM(S): at 20:22

## 2023-03-31 RX ADMIN — GABAPENTIN 300 MILLIGRAM(S): 400 CAPSULE ORAL at 06:18

## 2023-03-31 RX ADMIN — SENNA PLUS 2 TABLET(S): 8.6 TABLET ORAL at 20:22

## 2023-03-31 RX ADMIN — HALOPERIDOL DECANOATE 1.5 MILLIGRAM(S): 100 INJECTION INTRAMUSCULAR at 20:53

## 2023-03-31 RX ADMIN — Medication 81 MILLIGRAM(S): at 09:04

## 2023-03-31 RX ADMIN — PANTOPRAZOLE SODIUM 40 MILLIGRAM(S): 20 TABLET, DELAYED RELEASE ORAL at 06:17

## 2023-03-31 RX ADMIN — POLYETHYLENE GLYCOL 3350 17 GRAM(S): 17 POWDER, FOR SOLUTION ORAL at 09:07

## 2023-03-31 RX ADMIN — HALOPERIDOL DECANOATE 1.5 MILLIGRAM(S): 100 INJECTION INTRAMUSCULAR at 13:21

## 2023-03-31 RX ADMIN — DICLOFENAC SODIUM 25 MILLIGRAM(S): 75 TABLET, DELAYED RELEASE ORAL at 09:04

## 2023-03-31 RX ADMIN — TIOTROPIUM BROMIDE 2 PUFF(S): 18 CAPSULE ORAL; RESPIRATORY (INHALATION) at 09:08

## 2023-03-31 RX ADMIN — GABAPENTIN 300 MILLIGRAM(S): 400 CAPSULE ORAL at 13:21

## 2023-03-31 RX ADMIN — LIDOCAINE 1 PATCH: 4 CREAM TOPICAL at 20:23

## 2023-03-31 RX ADMIN — Medication 1 DROP(S): at 09:50

## 2023-03-31 RX ADMIN — Medication 1 DROP(S): at 20:39

## 2023-03-31 RX ADMIN — GABAPENTIN 300 MILLIGRAM(S): 400 CAPSULE ORAL at 20:53

## 2023-03-31 RX ADMIN — HALOPERIDOL DECANOATE 1.5 MILLIGRAM(S): 100 INJECTION INTRAMUSCULAR at 06:18

## 2023-03-31 RX ADMIN — Medication 25 MILLIGRAM(S): at 09:04

## 2023-03-31 NOTE — BH TREATMENT PLAN - NSTXMEDICDATEEST_PSY_ALL_CORE
20-Jan-2023
20-Jan-2023
23-Mar-2023
20-Jan-2023
23-Mar-2023
20-Jan-2023

## 2023-03-31 NOTE — BH TREATMENT PLAN - NSTXPLANSTARTDATE_PSY_ALL_CORE
24-Mar-2023 07:27
10-Mar-2023 07:19
21-Jan-2023 09:39
24-Feb-2023 07:26
16-Feb-2023 08:48
01-Feb-2023 12:48
03-Mar-2023 07:13
31-Mar-2023 07:49

## 2023-03-31 NOTE — BH TREATMENT PLAN - NSTXCAREGIVERPARTICIPATE_PSY_P_CORE
Family/Caregiver participated in identification of needs/problems/goals for treatment
Family/Caregiver participated in defining interventions
Family/Caregiver participated in identification of needs/problems/goals for treatment/Family/Caregiver participated in defining interventions
Family/Caregiver participated in defining interventions
Family/Caregiver participated in defining interventions
Family/Caregiver participated in identification of needs/problems/goals for treatment

## 2023-03-31 NOTE — BH INPATIENT PSYCHIATRY PROGRESS NOTE - NSBHANTIPSYCHOTIC_PSY_ALL_CORE
Yes...

## 2023-03-31 NOTE — BH TREATMENT PLAN - NSDCCRITERIA_PSY_ALL_CORE
when symptoms of hortencia resolve and CGI >2

## 2023-03-31 NOTE — BH INPATIENT PSYCHIATRY PROGRESS NOTE - NS ED [facility] REVIEW OF ED CHART AVAILABLE INVESTIGATIONS REVIEWED
Yes

## 2023-03-31 NOTE — BH TREATMENT PLAN - NSTXPLANTHERAPYSESSIONSFT_PSY_ALL_CORE
03-06-23  Type of therapy: Coping skills,Creative arts therapy,Health and fitness,Spirituality,Stress management  Type of session: Group  Level of patient participation: Not engaged  Duration of participation: Less than 15 minutes  Therapy conducted by: Psych rehab  Therapy Summary: In response to the COVID-19 outbreak there has been a shift in hospital wide policies and protocols. As a result it should be noted the unit activities and structure have been temporarily modified to promote safety of patients and staff. Patient over the past week made some gains towards her rehab goals. Patient has been able to tolerate sitting in the day room with less agitation and less disruptive behavior.  Patient is compliant with her medications. However, patient continuesto have periods of anxiety, agitation, and rrestlessness. Patient at times becomes verball abusive and demanding. Patient is mistrustful of her peers and staff. Despite daily efforts by writer and other members of the treatment team patient has not tolerated any of the group activities. Patient requires assistance with her hygiene and grooming. Patient at this time does not ambulate independently.    03-06-23  Type of therapy: Other,Physical therapy  Type of session: Individual  Level of patient participation: Resistance to participation  --  Therapy conducted by: Other (specify),Physical therapy  Therapy Summary: Patient engaged in functional transfer training and ambulation within unit with 1 person assist.    03-07-23  Type of therapy: Other,Physical therapy  Type of session: Individual  Level of patient participation: Participates  --  Therapy conducted by: Other (specify),Physical therapy  Therapy Summary: Patient engaged in functional transfer and gait training using Wall bar.    03-09-23  Type of therapy: Other,Physical therapy  Type of session: Individual  Level of patient participation: Participates  --  Therapy conducted by: Other (specify),Physical therapy  Therapy Summary: Patient enagged in functional transfer training and gait training using wall bar  
  02-07-23  Type of therapy: Coping skills,Creative arts therapy,Health and fitness,Spirituality,Stress management  Type of session: Group  Level of patient participation: Not engaged  Duration of participation: Less than 15 minutes  Therapy conducted by: Psych rehab  Therapy Summary: In response to the COVID-19 outbreak there has been a shift in hospital wide policies and protocols. As a result it should be noted the unit activities and structure have been temporarily modified to promote safety of patients and staff. Patient over the past week did not make any major gains towards her rehab goals. Patient remains very anxious and dysphoric. Patient has very low frustration tolerance and becomes easily agitated. Patient is verbally abusive to staff and her peers. Despite daily efforts by writer and other members of the treatment patient has not participated in any of the group activities. Patient requires assistance with her hygiene and grooming. Patient at times is non-compliant with her medications.    02-07-23  Type of therapy: Other,Physical therapy  Type of session: Individual  Level of patient participation: Participates  --  Therapy conducted by: Other (specify),Physical therapy  Therapy Summary: Patient enagged in bed mobility training and B LE exercises    02-08-23  Type of therapy: Other,Physical therapy  Type of session: Individual  Level of patient participation: Participates  --  Therapy conducted by: Other (specify),Physical therapy  Therapy Summary: Patient engaged is B LE ROM exercises  
  03-17-23  Type of therapy: Other,Physical therapy  Type of session: Individual  Level of patient participation: Participates  --  Therapy conducted by: Other (specify),Physical therapy  Therapy Summary: Patient engaged in functional transfer and gait training using Wall bar    03-20-23  Type of therapy: Coping skills,Creative arts therapy,Health and fitness,Spirituality,Stress management  Type of session: Group  Level of patient participation: Participated with encouragement  Duration of participation: Less than 15 minutes  Therapy conducted by: Psych rehab  Therapy Summary: In response to the COVID-19 outbreak there has been a shift in hospital wide policies and protocols. As a result it should be noted the unit activities and structure have been temporarily modified to promote safety of patients and staff. Patient over the past week made some gains towards her rehab goals. Patient with maximum encouragement and redirection briefly participated in several of the morning and afternoon exercise groups. Patient at times has brief periods of being cooperative and pleasant. However, overall patient remains anxious, agitated, verbally abusive, demanding and restless. Patient constantly requires redirection, and limit setting. Patient requires assistance with her hygiene and grooming. Patient at this time does not ambulates independently.    03-20-23  Type of therapy: Other,Physical therapy  Type of session: Individual  Level of patient participation: Participates  --  Therapy conducted by: Other (specify),Physical therapy  Therapy Summary: Patient engage din functional transfer training and ambulation using wall bar    03-23-23  Type of therapy: Other,Physical therapy  Type of session: Individual  Level of patient participation: Participates  --  Therapy conducted by: Other (specify),Physical therapy  Therapy Summary: Patient engaged in functional trasnfer training and gait training with 1 person assist.  
  02-13-23  Type of therapy: Coping skills,Creative arts therapy,Health and fitness,Spirituality,Stress management  Type of session: Group  Level of patient participation: Not engaged  Duration of participation: Less than 15 minutes  Therapy conducted by: Psych rehab  Therapy Summary: In response to the COVID-19 outbreak there has been a shift in hospital wide policies and protocols. As a result it should be noted the unit activities and structure have been temporarily modified to promote safety of patients and staff. Patient over the past week did not make any major gains towards her rehab goals. Patient remains very anxious, dysphoric, agitated, and difficult to redirect. Patient continues to yell and to be verbally abusive. Despite daily efforts by writer patient has not tolerated any of the group activities. Patient does not tolerate 1:1 with wirter. Patient requires assistance with her hygiene and grooming. Patient continues to require a structured supervised environment.    02-14-23  Type of therapy: Other,Physical therapy  Type of session: Individual  Level of patient participation: Participates  --  Therapy conducted by: Other (specify),Physical therapy  Therapy Summary: Patient engaged in bilateral LE exercises.  
  02-27-23  Type of therapy: Coping skills,Creative arts therapy,Health and fitness,Spirituality,Stress management  Type of session: Group  Level of patient participation: Not engaged  Duration of participation: Less than 15 minutes  Therapy conducted by: Psych rehab  Therapy Summary: In response to the COVID-19 outbreak there has been a shift in hospital wide policies and protocols. As a result it should be noted  the unit activities and structure have been temporarily modified to promote safety of patients and staff.  Patient over the past week did not make any major gains towards her rehab goals. Patient remains anxious, agitated, dysphoric, disruptive and verbally abusive. Patient remains restless, demanding and disorganized. Despite daily efforts by rehab and other members of the treatment team patient has not tolerated any of the group activities. Patient requires assistance with her hygiene and grooming.  Patient continues to require a structured supervised environment. However, patient is compliant with her medications.    02-28-23  Type of therapy: Other,Physical therapy  Type of session: Individual  Level of patient participation: Participates  --  Therapy conducted by: Other (specify),Physical therapy  Therapy Summary: Patient engaged in functional transfer and gait training with 1 person assist.    03-02-23  Type of therapy: Other,Physical therapy  Type of session: Individual  Level of patient participation: Participates  --  Therapy conducted by: Other (specify),Physical therapy  Therapy Summary: Patient engaged ingait training and B LE exercises.  
  02-20-23  --  Type of session: Individual  Level of patient participation: Not engaged  --  Therapy conducted by: Psych rehab  Therapy Summary: In response to the COVID-19 outbreak there has been a shift in hospital wide policies and protocols. As a result it should be noted the unit activities and structure have been temporarily modified to promote safety of patients and staff. Patient over the past week did not make any major gains towards her rehab goals. Patient remains very anxious, dysphoric, needy, and demanding. Patient remains restless, agitated, disorganized and verbally abusive. Despite daily efforts by writer and other members of the treatment team patient has not tolerated any of the group activities. Patient requires assistance with her hygiene and grooming. Patient continues to require a structured supervised environment.    02-22-23  Type of therapy: Other,Physical therapy  Type of session: Individual  Level of patient participation: Participates  --  Therapy conducted by: Other (specify),Physical therapy  Therapy Summary: Patient engaged in sit <> stand transfer and ambulation with 1 person assist  
  01-25-23  Type of therapy: Other,Physical therapy  Type of session: Individual  Level of patient participation: Participates  --  Therapy conducted by: Other (specify),Physical therapy  Therapy Summary: Patient engaged in functional transfer training and ambulation within unit using RW/Wall bar    01-26-23  Type of therapy: Coping skills,Creative arts therapy,Health and fitness,Spirituality,Stress management  Type of session: Group  Level of patient participation: Not engaged  Duration of participation: Less than 15 minutes  Therapy conducted by: Psych rehab  Therapy Summary: In response to the COVID-19 outbreak there has been a shift in hospital wide policies and protocols. As a result it should be noted the unit activities and structure have been temporarily modified to promote safety of patients and staff. Patient over the past week did not make any major gains towards her rehab goals. Patient remains very agitated, verbally abusive, anxious and restless. Despite daily efforts by writer and other members of the treatment team patient has not tolerated any of the group activities. Patient requires assistance with her hygiene and grooming. Patient continues to require a structured supervised environment.    01-27-23  Type of therapy: Other,Physical therapy  Type of session: Individual  Level of patient participation: Participates  --  Therapy conducted by: Other (specify),Physical therapy  Therapy Summary: Patient participated in functional transfer and gait training using RW with 1 person assist    01-31-23  Type of therapy: Other,Physical therapy  Type of session: Individual  Level of patient participation: Participates  --  Therapy conducted by: Other (specify),Physical therapy  Therapy Summary: Patient engaged in gait training using RW and B LE exercises.    02-01-23  Type of therapy: Coping skills,Creative arts therapy,Health and fitness,Spirituality,Stress management  Type of session: Group  Level of patient participation: Not engaged  Duration of participation: Less than 15 minutes  Therapy conducted by: Psych rehab  Therapy Summary: In response to the COVID-19 outbreak there has been a shift in hospital wide policies and protocols. As a result it should be noted the unit activities and structure have been temporarily modified to promote safety of patients and staff. Patient over the past week did not make any major gains towards her rehab goals. Patient remains very anxious, dysphoric, agitated and verbally abusive. Patient remains negative, suspicious of staff and non-compliant with her medications. Despite daily efforts by writer and other members of the treatment patient has not tolerated any of the group activities. Patient does not tolerate 1:1 sessions with writer.  
  03-27-23  Type of therapy: Coping skills,Creative arts therapy,Health and fitness,Spirituality,Stress management  Type of session: Group  Level of patient participation: Not engaged  Duration of participation: Less than 15 minutes  Therapy conducted by: Psych rehab  Therapy Summary: In response to the COVID-19 outbreak there has been a shift in hospital wide policies and protocols. As a result it should be noted the unit activities and structure have been temporarily modified to promote safety of patients and staff. Patient over the past week did not make any major gains towards her rehab goals. Patient remains anxious, agitated, verbally abusive and disruptive. Patient requires constant redirection, limit setting and supportive encouragement. Patient requires assistance with her hygiene and grooming. Despite daily efforts by writer and other members of the treatment team has not participated in any of the group activities. Patient continues to require a structured supervised environment.    03-27-23  Type of therapy: Other,Physical therapy  Type of session: Individual  Level of patient participation: Resistance to participation  --  Therapy conducted by: Other (specify),Physical therapy  Therapy Summary: Patient engaged in functional transfer training, gait training and B LE exercises.    03-28-23  Type of therapy: Other,Physical therapy  Type of session: Individual  --  --  Therapy conducted by: Other (specify),Physical therapy  Therapy Summary: Patient engaged in functional transfer, gait training and B LE exercises.    03-29-23  Type of therapy: Other,Physical therapy  Type of session: Individual  Level of patient participation: Participates  --  Therapy conducted by: Other (specify),Physical therapy  Therapy Summary: Patient engaged in functional transfer training, gait training and B LE exercises.    03-30-23  Type of therapy: Other,Physical therapy  Type of session: Individual  Level of patient participation: Resistance to participation  --  Therapy conducted by: Other (specify),Physical therapy  Therapy Summary: Patient engaged in gait training and functional transfer training.

## 2023-03-31 NOTE — BH INPATIENT PSYCHIATRY PROGRESS NOTE - CURRENT MEDICATION
MEDICATIONS  (STANDING):  artificial  tears Solution 1 Drop(s) Both EYES two times a day  aspirin  chewable 81 milliGRAM(s) Oral daily  enoxaparin Injectable 40 milliGRAM(s) SubCutaneous <User Schedule>  gabapentin 300 milliGRAM(s) Oral <User Schedule>  haloperidol     Tablet 1.5 milliGRAM(s) Oral <User Schedule>  lidocaine   4% Patch 1 Patch Transdermal every 24 hours  metoprolol tartrate 25 milliGRAM(s) Oral two times a day  pantoprazole   Suspension 40 milliGRAM(s) Oral daily  polyethylene glycol 3350 17 Gram(s) Oral daily  senna 2 Tablet(s) Oral at bedtime  sertraline 12.5 milliGRAM(s) Oral once  sertraline 25 milliGRAM(s) Oral daily  tiotropium 2.5 MICROgram(s) Inhaler 2 Puff(s) Inhalation daily  traZODone 50 milliGRAM(s) Oral at bedtime    MEDICATIONS  (PRN):  acetaminophen     Tablet .. 650 milliGRAM(s) Oral every 6 hours PRN Mild Pain (1 - 3), Moderate Pain (4 - 6), Severe Pain (7 - 10)  albuterol    90 MICROgram(s) HFA Inhaler 2 Puff(s) Inhalation every 6 hours PRN Shortness of Breath and/or Wheezing  aluminum hydroxide/magnesium hydroxide/simethicone Suspension 30 milliLiter(s) Oral every 4 hours PRN Dyspepsia  bisacodyl 5 milliGRAM(s) Oral every 12 hours PRN Constipation  haloperidol     Tablet 1 milliGRAM(s) Oral every 6 hours PRN agitation  haloperidol    Injectable 1 milliGRAM(s) IntraMuscular once PRN agitation  haloperidol    Injectable 1 milliGRAM(s) IntraMuscular three times a day PRN refusal of court ordered meds  haloperidol    Injectable 1 milliGRAM(s) IntraMuscular once PRN agitation  saline laxative (FLEET) Rectal Enema 1 Enema Rectal daily PRN constipation

## 2023-03-31 NOTE — BH TREATMENT PLAN - NSBHPRIMARYDX_PSY_ALL_CORE
Vascular dementia with psychotic disturbance    
Bipolar illness    
Vascular dementia with psychotic disturbance    
Bipolar illness    
Vascular dementia with psychotic disturbance    
Bipolar illness    
Vascular dementia with psychotic disturbance

## 2023-03-31 NOTE — BH INPATIENT PSYCHIATRY PROGRESS NOTE - NSBHASSESSSUMMFT_PSY_ALL_CORE
Dinorah Hogan is an 88 year old female, , domiciled with daughter with PPHx of bipolar I disorder, 1 previous inpatient psychiatric hospitalization in 1970s, no known history of SI/SA and PMHx of asthma, GERD, sciatica with previous vertebral fracture who is admitted on 9.27 status for symptoms of hortencia including irritability, loud speech, and agitation.     DDx: hortencia vs hypomania vs comorbid delirium    Today, patient is angry about her current admission and denying any psychiatric history. She is asking to be discharged immediately and yelling at treatment team. She is noted to have loud and rapid speech, but is able to be interrupted. She states she has been sleeping poorly due to hip and R knee pain. She denies S/IIP, H/IIP, AVH, paranoia, or ideas of reference. She is AOx1-2 (person, place--hospital), but unable to state year or location in [place]. She requires 9.27 inpatient admission for evaluation and management of hortencia.    1/22: less irritable but still requiring PRN med, more compliant with meds, no SI/HI.  1/23: Accepted meds yd and this AM though required PRN yd. Less irritable, appropriately conversant, making needs known. Suspicious bordering on paranoid,   however no fully formed delusional thought content apparent.  1/24: Irritable and labile, suspicious regarding staff members. Discussed medication, pt will be offered psychiatric meds and is aware she has right to refuse.  1/25: Hostile, irritable, labile. TOO application in progress given poor insight and intermittent refusal of psychiatric mediations. Pt at risk for deconditioning given refusal to ambulate and minimal engagement with PT, given ambulatory at home refusal here appears to be behavioral, will continue to encourage participation. Refused cognitive evaluation today. Given ongoing intermittent agitation, paranoia, physical deconditioning, and poor insight into condition, pt requires continued inpatient hospitalization for stability  1/26: Pt remains labile, irritable, and paranoid toward staff. Although pt makes provocative statements (e.g. "she controls the robots," "the Jews will hate me"), these statements appear to represent intense feeling rather than fully formed delusion. Continues to refuse cognitive evaluation or consent to contact family. Given ongoingntermittent agitation, paranoia, physical deconditioning, and poor insight into condition, pt requires continued inpatient hospitalization for stabilization and safety.  1/27 Patient agitated, markedly irritable,paranoid  with themes of perrvasive mistreatment, being singled out but w/u well formed , fixed delusional ideas.Cont enocurage med compliance scheduled for court hearing for TOO  1/28: Currently on CO due to hospital bed requirement. Reports fair sleep and appetite. Seen at bedside during rounds. Continues to refuse most medications, took senna and tylenol last night. Reporting back pain "15/10" and requesting more heat packs "I will die without these", however under no visible distress. Primary team pursuing TOO. Renewed CO.   1/29: Renewed CO. Last night took Depakote, Haldol and Senna. Med compliance has been partial.   1/30: Refused all meds yd, this AM accepted metoprolol. Making accusations that people are attempting to harm her and want her to die. Impulse control is impaired.  1/31: Continues with intermittent agitation, hostile and accusatory twd staff, refusing meds (except metoprolol), refusing consent to speak to daughter. Given ongoing agitation, impulsivity, disinhibition, and deconditioning 2/2 refusal to ambulate (also attempted to climb out of bed last night), pt poses a threat of harm to herself and requires ongoing inpatient hospitalization for stabilization and safety. TOO & retention court date next week.  2/1: Continues irritable, paranoid, refusing medications and engagement in interviews.   2/2: Minimally engaged in interview, refusing to answer most questions; labile, hostile, required IM Haldol 1mg overnight for agitation; refusing to ambulate or engage in PT; refusing pain control interventions other than hot packs and lidocaine patch. TOO in process.   2/4 Cont agitated poor sleep, this Am had vairable O2 sat, patient reported she feels she needs an inhaler for asthma. Patient to be seen by medicine, labs and RVP ordered. Will change haldol to olanzapine standing as response to prns of haldol and occ po's have had poor response.   2/5 Paranoid, severely agitated. Has URI. COnt meds prn inhlaer scheuled for TOO 2/7 2/6: Remains paranoid, agitated, accusatory. Frequently refusing meds, required 3x olanzapine PRNs over the weekend.  2/7: Remains paranoid and accusatory twd staff. Continues to refuse majority of medications. Currently with URI. Going to court today for retention and TOO.  2/8:  Patient irritable, agitated, will increase olanzapine with IM back up.   2/9 No major changes but taking meds, Cont olanzapine now at 2.5mg hs and Depakote. Plan cont to titrate, monitor for se  2/10 Patient w/o much response cont with intense paranoia and agitation. Cont Zyprexa cont to titrate.   2/11 Pt is sleepy most likely due to PRN zyprexa last night, remains disorganized and confused.   2/12 confused, disorganized, screams at times, required PRN zyprexa/melatonin last night.   2/13: The patient continues to be manic, disorganized, irritable, screaming at times.  She has been tolerating medications and prn's well - will increase Zyprexa to 5mg hs and change to Zydis to ensure compliance.  Continue 1:1.  2/14: The patient is not responding to Zyprexa, despite increasing dose and getting prn's.  Will change to Seroquel, as patient reportedly did well with it in the past.  Patient given 12.5mg this morning, tolerated well.  Will start 12.5mg tid.  2/15: The patient continues to be irritable, agitated, disorganized.  Some small improvement with Seroquel, sleeping better at night.  Continue Seroquel trial.  2/16: The patient continues to be agitated, frequently yelling out, irritable and angry.  She has been tolerating standing and prn Seroquel well, will continue to titrate to 25mg tid.  Continue 1:1.  2/17: The patient continues to be irritable, agitated, yelling out throughout the day.  She is having more restful periods during the day.  She has been tolerating Seroquel well, will continue.  Continue 1:1.  2/18: Patient remains agitated, disorganized, guarded, confused  2/19: Sustained agitated, irritability, likely paranoia, will increase quetiapine   2/20: Patient remains agitated, suspicious of staff, requiring prn meds, quetiapine increased   2/21 agitated paranoid, no response from Seroquel so far. Will increase Seroquel. Reviewed care with daughter.   2/22:  Paranoid agitated. Taking meds, cont  Seroquel, recheck labs.   2/23 Doing poorly with ongoing agitation now increased BUN Will push fluids. Will decrease Depakote consider d/c . COnisder changing to risperidone or haldol to avoid sedation. constipation  2/2 2/24 Poor resposne to Seroquel and olanzapine IMs with no reposne but sedation at times and constipation. Will change to risperdal po and haldol prn. Consider ECT given refractoriness. Plan recheck labs Monday 2/25 MArginal impression of improvement. Cont Risperdal trial with haldol prn    2/26 agitated, loud, screaming, disorganized and irritable, requiring PRN meds.  2/27 Patient irritable, paranoid with some more moments of being calmer, better related. Cont risperdal increase dose. Patient with elevated bicarb spoke with medicine, will repeat in AM  2/28 IMproved behaviorally. COnt risperidone trial. Labs about same. Cont meds review labs with medicine  3/2 Patient remains agitated not responded to risperidone at current dose as as is only better after multiples prns. Will increase risperidone, taper VPA  3/3 Patient with delirium superimposed on baseline dementia. Started on Vantin by medicine. Medicine to follow over weekend . Patient examined after sliding to floor , no pain or tenderness or decreased ROM hips or lower back. No intervention for this. Family notified about fall and UTI  3/4: Patient with delirium with episodes of agitation requiring prn haloperidol, but then calm and cooperative. Calm this AM.   3/5: Pt seen for f/u of vascular dementia. On CO for hospital bed. No significant overnight events reported, no PRNs needed overnight. VSS with systolic  this morning. Pt seen in the dayroom. Pt told writer she was "dead" and followed that by asking writer for orange juice. Accepted apple juice. Denies any other complaints.   3/6 Patient cont to fluctuate in symptoms from calm, placid to severely agitated. Will increase risperidone. Discussed with medicine will switch antibiotic to Augmentin based on sensitivities but may require transfer for IV if not improving.    3/7 Patient sl better. COnt risperidone dose just increased, reviewed case with medicine internist plans to switch to alternative antibiotic better suited to treat ESBL organism  3/8 Still agitated needing prns with some periods of being calmer. Cont meds but increase risperidone to 1mg at hs  3/9 COnt paranoid agitated will add trazodone at night as sleep is poor and agitation worse. Plan increase risperdal if not improving will change to haldol  3/10 Improved today calmer and paranoia more circumsribed. Possible benefit from rx of UTI. Cont current treatment. Reviewed care with daughter  3/11 Improved conisder increasing trazodone or risperidone for residual symptoms if they don't improve on current dose.   3/12 irritable, angry, loud, focused on discharge, last PRN yesterday afternoon.   3/13 Patient better still screaming and paranoid. Will increase risperidone, tolerating well, attmept to get BP reading  3/14 PAtient less paranoid still with disinhibition vocal agitation. Cont to titrate risperidone  3/15 Patient improved in that agitation and paranoia more circumscribed. Cont current treatment , plan to increase risperidone to 1mg tid  3/16 Sig nocturnal agitation and insomnia will increase hs trazodone  3/17 Some gains limited incremental benefit from titration . Will increase risperdla, ocnisder changing to haldol if not better.  3/18 calmer, less irritable,  last PRN yesterday around 6.40 pm, no SI/HI.   3/19 screaming, irritable, perseverative, received PRN yesterday around 6 pm.  3/20 Patient extremely vocally agitated, also paranoid. poor response to risperidone. Initally considering gabapetin but maybe worth switching to haldol first as rarely atypical can worsen patient with BAD due to antidepressant effects  3/21 Severe vocal agitation and paranoia. Will increase haldol.  3/22 Limited response across range of antipsychotics, will thereadd gabapentin as may help neuropathic pain and agitation  3.23 PAtient calmer since prn suggesting benefit of higher dose of haldol will increase haldol tolerating well no EPS. Consider further increase in gabapentin  3/24 PAtient improved modestly. Will ocnt haldol plan further increases in gabapentin. Has abn ua will check culutre to verify if UTIO was erridicated from last antibiotic course  3/25 Patient improved still partial response resulting in ongoing prns plan cont meds possible increase in gabapentin  3/26 More agiated again unclear if tolerance developing  will increase gabapentin to 200mg tid  3/27 Modest gains, will cont to titrate gabapentin as there has not been much incremental gain from increasing antipsychotics  3/28 Modest gains cont to titrate gabapentin to parget pain, agitation, anxiety. if not working consider trazodone based on case reports of use in separation anxiety in dementia. Urine has three organisms, will not treat for UTI based on it  3/29  PAtient with some reduction in paranoia but remains vocally agitated with separation anxiety or seeking then rejecting help. Will increase gabapenitn consider trial trazodone  3/30 Dementia with hard to treat agitation with partial response. Patient with some improvement compared to baseline. Reviewed care with PT and medicine. Patient has element of arthritic pain not likely to respond to gabapentin. Will give trial diclofenac po with acid blocker discussed risk benefits with daughter who agrees to treatment aimed and pain reduction  3/31 Little response to NSAID and maintaining may not have favorable risk benefit. Will d/c diclofenac. After reviewing with daughter she reported some benefit of Zoloft in past foor mood, anxiousness . Given she is on haldol risk fo causing hortencia is low so will add Zoloft to target dysphoric irritability and anxiety.

## 2023-03-31 NOTE — BH TREATMENT PLAN - NSTXPATIENTAGREEMENT_PSY_ALL_CORE
Yes
Patient unable to participate
Patient unable to participate
No
No
Yes
Patient unable to participate
Yes
Patient unable to participate

## 2023-03-31 NOTE — BH TREATMENT PLAN - NSTXMEDICINTERMD_PSY_ALL_CORE
C/w med trial, pt refusing meds, pursuing TOO

## 2023-03-31 NOTE — BH TREATMENT PLAN - NSTXCOPEINTERPR_PSY_ALL_CORE
Patient will identify and utilize two coping skills daily to manage her anxiety and depressive symptoms within seven days.
Patient will identify and utilize two coping skills daily to manage her anxiety, and decrease her agitation within seven days.
Patient will identify and utilize two coping skills daily to manage her anxiety and agitated behavior within seven days.
Rehab activities will be utilized daily to increase patient's frustration tolerance and decrease patient's agitated behavior within seven days.
Patient will identify and utilize two coping skills daily to increase her frustration tolerance, and decrease agitated behavior within seven days.
Rehab activities will be utilized daily to decrease patient's agitation and anxiety within seven days.
Patient will identify and utilize two coping skills daily to manage her anxiety and depressive symptoms within seven days.
Patient will identify and utilize two coping skills daily to manage her anxiety and depressive symptoms within seven days.

## 2023-03-31 NOTE — BH INPATIENT PSYCHIATRY PROGRESS NOTE - NSBHFUPMEDSE_PSY_A_CORE
None known

## 2023-03-31 NOTE — BH INPATIENT PSYCHIATRY PROGRESS NOTE - NSBHCHARTREVIEWLAB_PSY_A_CORE FT
01-23    142  |  102  |  19  ----------------------------<  146<H>  3.8   |  28  |  0.53    Ca    9.3      23 Jan 2023 08:57    Folate, Serum: 7.4 ng/mL (01.23.23 @ 08:57)   Vitamin B12, Serum: 495 pg/mL (01.23.23 @ 08:57)   

## 2023-03-31 NOTE — BH INPATIENT PSYCHIATRY PROGRESS NOTE - NSBHCONSDANGERSELF_PSY_A_CORE
unable to care for self

## 2023-03-31 NOTE — BH INPATIENT PSYCHIATRY PROGRESS NOTE - PRN MEDS
MEDICATIONS  (PRN):  acetaminophen     Tablet .. 650 milliGRAM(s) Oral every 6 hours PRN Mild Pain (1 - 3), Moderate Pain (4 - 6), Severe Pain (7 - 10)  albuterol    90 MICROgram(s) HFA Inhaler 2 Puff(s) Inhalation every 6 hours PRN Shortness of Breath and/or Wheezing  aluminum hydroxide/magnesium hydroxide/simethicone Suspension 30 milliLiter(s) Oral every 4 hours PRN Dyspepsia  bisacodyl 5 milliGRAM(s) Oral every 12 hours PRN Constipation  haloperidol     Tablet 1 milliGRAM(s) Oral every 6 hours PRN agitation  haloperidol    Injectable 1 milliGRAM(s) IntraMuscular once PRN agitation  haloperidol    Injectable 1 milliGRAM(s) IntraMuscular three times a day PRN refusal of court ordered meds  haloperidol    Injectable 1 milliGRAM(s) IntraMuscular once PRN agitation  saline laxative (FLEET) Rectal Enema 1 Enema Rectal daily PRN constipation

## 2023-03-31 NOTE — BH INPATIENT PSYCHIATRY PROGRESS NOTE - NSBHCHARTREVIEWVS_PSY_A_CORE FT
Vital Signs Last 24 Hrs  T(C): --  T(F): --  HR: --  BP: 138/64 (03-31-23 @ 07:44) (138/64 - 155/65)  BP(mean): 56 (03-31-23 @ 07:44) (56 - 68)  RR: --  SpO2: --    Orthostatic VS  03-30-23 @ 08:13  Lying BP: 152/68 HR: 63  Sitting BP: 162/72 HR: 64  Standing BP: --/-- HR: --  Site: --  Mode: --

## 2023-03-31 NOTE — BH INPATIENT PSYCHIATRY PROGRESS NOTE - NSBHCHARTREVIEWINVESTIGATE_PSY_A_CORE FT
Ventricular Rate 70 BPM    Atrial Rate 70 BPM    P-R Interval 156 ms    QRS Duration 72 ms    Q-T Interval 410 ms    QTC Calculation(Bazett) 442 ms    P Axis 70 degrees    R Axis -40 degrees    T Axis 51 degrees    Diagnosis Line NORMAL SINUS RHYTHM  LEFT AXIS DEVIATION  ABNORMAL ECG  WHEN COMPARED WITH ECG OF 05-JAN-2023 16:20,  NO SIGNIFICANT CHANGE WAS FOUND  Confirmed by MD Jeana, Oscar ([id number]) on 1/22/2023 4:56:53 PM  

## 2023-03-31 NOTE — BH INPATIENT PSYCHIATRY PROGRESS NOTE - MSE UNSTRUCTURED FT
Patient is awake and alert. Patient w/o sig EPS. Speech  high pitched,yells intermittently  overall less severe less repetitive.  Mood irritable anxious affect less  labile . Paranoid about staff but less not focus more on separation, fear of abandonment. Focused on loss of function.   No hallucinations Poor insightNo SI/HI.

## 2023-03-31 NOTE — BH INPATIENT PSYCHIATRY PROGRESS NOTE - NSBHFUPINTERVALHXFT_PSY_A_CORE
Pt seen for f/u of vascular dementia, BAD. Chart and history reviewed and discussed with nursing team. Patient is compliant with medications, sleeping better some overall reduction in vocal agitation.  Patient has separation anxiety but not always consoled by staff next to her will yell help without identifying what she wants. Reviewed plan of care with daughter yesterday

## 2023-03-31 NOTE — BH TREATMENT PLAN - NSTXCOPEINTERMD_PSY_ALL_CORE
Seroquel trial, possible change to another antimanic
haldol/gabapentin trial
Zyprexa trial court ordered meds approved
haldol/gabapentin trial
Zyprexa trial court ordered meds approved
add trazodone to risperidone
Seroquel trial, possible change to another antimanic
C/w med trial, pt refusing meds, pursuing TOO

## 2023-03-31 NOTE — BH TREATMENT PLAN - NSTXPATIENTPARTICIPATE_PSY_ALL_CORE
No, patient unwilling to participate
Patient participated in identification of needs/problems/goals for treatment
Patient participated in defining interventions
No, patient unwilling to participate

## 2023-03-31 NOTE — BH TREATMENT PLAN - NSTXDCOTHRGOAL_PSY_ALL_CORE
The pt remains symptomatic and needs asssitance with all ADLs and ambulation. The pt's daughter feels physically unable to reside in the pt's home to participate in the pt's care. Pt's daughter feels the pt needs SNF level of care and wishes to use the pt's finances to pay privately for SNF placement.
The pt will show resolution of acute symptoms and return to baseline functioning. The pt will likely need SNF level of care due to her physical frailty, disorganization, and need for total care.
The pt will show resolution of acute symptoms and return to baseline functioning. The pt will likely need SNF placement for LTC due to her phsyical frailty, dementia dx, and need for total care.
The pt will show resolution of acute symptoms and return to baseline functioning. The pt will likely need SNF placement for LTC in light of the pt's dementia dx, physical frailty, and need for total ADL care.
The pt will show resolution of acute symptoms and return to baseline funcitoning. The pt will be discharged home where she resides alone [facility], [facility]. If the pt requires subacute rehab, referral may be made to an SNF for STR.
The pt will show resolution of acute symptoms and return to baseline functioning. The pt will be discharged home where she lives alone with [facility] and to [facility] for resumption of [facility] services. Recommendation of supplementing hours of home care with a private aide will be made. If the pt demonstrates need for subacute rehab, referral may be made for SNF placement for STR.
The pt carries a Dementia dx, continues to be vocally disruptive, requires assistance with all ADLs and ambulation, and lacks supports needed to be cared for in the community. Daughter supports the pt's need for SNF level of care for her LTC.
The pt will show a resolution of acute symptoms and return to baseline funcitoning. The pt will be discharged home where she resides alone with [facility] and back to prior [facility] for RN, PT, SW, HHA services. If the pt requires subacute rehab, referral may be made to an SNF for STR. vs. LTC.

## 2023-03-31 NOTE — BH TREATMENT PLAN - NSTXDCOTHRINTERSW_PSY_ALL_CORE
SW will provide pt support and encourage her cooperation with care and maintaining behavioral control. EBER will maitain contact with pt's daughter, providing treatment updates, gaining her perspective, and addressing discharge plan of SNF placement for LTC. EBER will encourage pt's daughter to confer with elder law  regarding estate planning issues, payor source for SNF,  and establishing pt's Medicaid eligibliity for LTC.
SW provided pt support and encouraged compliance with medication and care. SW maintained contact with pt's daughter, providing treatment updates, gaining her perspective, and addressing discharge planning issues. SW will initiate referrals for outpatient services as the pt shows stabilization.
SW will provide pt support and encourage compliance with medication and cooperation with care. SW will maintain contact with pt's daughter, providing treatment updates, gaining collateral information and her perspective, and addressing discharge planning issues. SW addressed issue of upcoming court hearing for MOO and Retention with pt's daughter.
SW will provide pt support, encourage compliance with medication and nursing care and stress the importance of behavioral control. SW will maintain contact with pt's daughter, providing treatment updates, gaining her perspective, and addressing financial and discharge planning issues regarding SNF placement.
SW will provide pt support and encourage compliance with medication and care. SW will maintain contact with pt's daughter, providing treatment updates, gaining her perspective, and addressing discharge planning issues. SW will initiate referrals for outpatient services as the pt shows stabilization.
SW will provide pt support and encourage compliance with care. SW will maintain contact with pt's daughter, providing treatment updates and addressing discharge planning issues regarding pt's need for SNF placement for LTC and clarifying pt's health care directives/finances. Question of need to pursue guardianship will be addressed.
SW will provide pt support and encourage her compliance with medication, physical therapy, and nursing care. SW will maintain contact with pt's daughter, providing support and treatment updates, gaining her perspective, and addressing discharge planning issues related to SNF placement for LTC. SW will try to clarify whether daughter ispt's  HCA/POA and determine whether a guardianship needs to be pursued.
SW will provide pt support and encourage cooperation with care. SW will maintain contact with pt's daughter, providing treatment updates, gaining her perspective, and addressing discharge planning issues. SW encourages daughter to confer with an elder law  and to contact pt's prior SNF, [facility], regarding pt's future placement there.

## 2023-03-31 NOTE — BH TREATMENT PLAN - NSTXMEDICDATETRGT_PSY_ALL_CORE
27-Jan-2023
30-Mar-2023
27-Jan-2023
27-Jan-2023
30-Mar-2023
27-Jan-2023
27-Jan-2023

## 2023-04-01 PROCEDURE — 99231 SBSQ HOSP IP/OBS SF/LOW 25: CPT

## 2023-04-01 RX ADMIN — LIDOCAINE 1 PATCH: 4 CREAM TOPICAL at 07:27

## 2023-04-01 RX ADMIN — Medication 650 MILLIGRAM(S): at 13:30

## 2023-04-01 RX ADMIN — POLYETHYLENE GLYCOL 3350 17 GRAM(S): 17 POWDER, FOR SOLUTION ORAL at 09:19

## 2023-04-01 RX ADMIN — Medication 81 MILLIGRAM(S): at 09:22

## 2023-04-01 RX ADMIN — HALOPERIDOL DECANOATE 1.5 MILLIGRAM(S): 100 INJECTION INTRAMUSCULAR at 21:12

## 2023-04-01 RX ADMIN — TIOTROPIUM BROMIDE 2 PUFF(S): 18 CAPSULE ORAL; RESPIRATORY (INHALATION) at 09:18

## 2023-04-01 RX ADMIN — HALOPERIDOL DECANOATE 1.5 MILLIGRAM(S): 100 INJECTION INTRAMUSCULAR at 05:22

## 2023-04-01 RX ADMIN — Medication 650 MILLIGRAM(S): at 12:50

## 2023-04-01 RX ADMIN — Medication 1 DROP(S): at 09:16

## 2023-04-01 RX ADMIN — GABAPENTIN 300 MILLIGRAM(S): 400 CAPSULE ORAL at 12:32

## 2023-04-01 RX ADMIN — Medication 25 MILLIGRAM(S): at 09:16

## 2023-04-01 RX ADMIN — Medication 25 MILLIGRAM(S): at 21:13

## 2023-04-01 RX ADMIN — LIDOCAINE 1 PATCH: 4 CREAM TOPICAL at 21:14

## 2023-04-01 RX ADMIN — Medication 50 MILLIGRAM(S): at 21:13

## 2023-04-01 RX ADMIN — LIDOCAINE 1 PATCH: 4 CREAM TOPICAL at 08:56

## 2023-04-01 RX ADMIN — GABAPENTIN 300 MILLIGRAM(S): 400 CAPSULE ORAL at 21:12

## 2023-04-01 RX ADMIN — Medication 30 MILLILITER(S): at 04:48

## 2023-04-01 RX ADMIN — PANTOPRAZOLE SODIUM 40 MILLIGRAM(S): 20 TABLET, DELAYED RELEASE ORAL at 09:16

## 2023-04-01 RX ADMIN — GABAPENTIN 300 MILLIGRAM(S): 400 CAPSULE ORAL at 05:22

## 2023-04-01 RX ADMIN — ENOXAPARIN SODIUM 40 MILLIGRAM(S): 100 INJECTION SUBCUTANEOUS at 09:17

## 2023-04-01 RX ADMIN — SENNA PLUS 2 TABLET(S): 8.6 TABLET ORAL at 21:13

## 2023-04-01 RX ADMIN — HALOPERIDOL DECANOATE 1.5 MILLIGRAM(S): 100 INJECTION INTRAMUSCULAR at 12:32

## 2023-04-01 RX ADMIN — Medication 1 DROP(S): at 21:13

## 2023-04-01 RX ADMIN — SERTRALINE 25 MILLIGRAM(S): 25 TABLET, FILM COATED ORAL at 09:16

## 2023-04-01 NOTE — BH INPATIENT PSYCHIATRY PROGRESS NOTE - CURRENT MEDICATION
MEDICATIONS  (STANDING):  artificial  tears Solution 1 Drop(s) Both EYES two times a day  aspirin  chewable 81 milliGRAM(s) Oral daily  enoxaparin Injectable 40 milliGRAM(s) SubCutaneous <User Schedule>  gabapentin 300 milliGRAM(s) Oral <User Schedule>  haloperidol     Tablet 1.5 milliGRAM(s) Oral <User Schedule>  lidocaine   4% Patch 1 Patch Transdermal every 24 hours  metoprolol tartrate 25 milliGRAM(s) Oral two times a day  pantoprazole   Suspension 40 milliGRAM(s) Oral daily  polyethylene glycol 3350 17 Gram(s) Oral daily  senna 2 Tablet(s) Oral at bedtime  sertraline 12.5 milliGRAM(s) Oral once  sertraline 25 milliGRAM(s) Oral daily  tiotropium 2.5 MICROgram(s) Inhaler 2 Puff(s) Inhalation daily  traZODone 50 milliGRAM(s) Oral at bedtime    MEDICATIONS  (PRN):  acetaminophen     Tablet .. 650 milliGRAM(s) Oral every 6 hours PRN Mild Pain (1 - 3), Moderate Pain (4 - 6), Severe Pain (7 - 10)  albuterol    90 MICROgram(s) HFA Inhaler 2 Puff(s) Inhalation every 6 hours PRN Shortness of Breath and/or Wheezing  aluminum hydroxide/magnesium hydroxide/simethicone Suspension 30 milliLiter(s) Oral every 4 hours PRN Dyspepsia  bisacodyl 5 milliGRAM(s) Oral every 12 hours PRN Constipation  haloperidol     Tablet 1 milliGRAM(s) Oral every 6 hours PRN agitation  haloperidol    Injectable 1 milliGRAM(s) IntraMuscular three times a day PRN refusal of court ordered meds  haloperidol    Injectable 1 milliGRAM(s) IntraMuscular once PRN agitation  haloperidol    Injectable 1 milliGRAM(s) IntraMuscular once PRN agitation  saline laxative (FLEET) Rectal Enema 1 Enema Rectal daily PRN constipation

## 2023-04-01 NOTE — BH INPATIENT PSYCHIATRY PROGRESS NOTE - MSE UNSTRUCTURED FT
Patient is awake and alert. Patient w/o sig EPS. Speech  intermittent yelling overall less severe less repetitive.  Mood irritable anxious affect less  labile . Paranoid about staff but less not focus more on separation, fear of abandonment. Focused on loss of function.   No hallucinations  Poor insight No SI/HI.

## 2023-04-01 NOTE — BH INPATIENT PSYCHIATRY PROGRESS NOTE - NSBHCHARTREVIEWVS_PSY_A_CORE FT
Vital Signs Last 24 Hrs  T(C): --  T(F): --  HR: --  BP: --  BP(mean): --  RR: --  SpO2: --    Orthostatic VS  04-01-23 @ 08:01  Lying BP: --/-- HR: --  Sitting BP: 132/66 HR: 64  Standing BP: 146/69 HR: 65  Site: --  Mode: --  Orthostatic VS  03-31-23 @ 21:01  Lying BP: --/-- HR: --  Sitting BP: 116/49 HR: 66  Standing BP: --/-- HR: --  Site: --  Mode: --

## 2023-04-01 NOTE — BH INPATIENT PSYCHIATRY PROGRESS NOTE - PRN MEDS
MEDICATIONS  (PRN):  acetaminophen     Tablet .. 650 milliGRAM(s) Oral every 6 hours PRN Mild Pain (1 - 3), Moderate Pain (4 - 6), Severe Pain (7 - 10)  albuterol    90 MICROgram(s) HFA Inhaler 2 Puff(s) Inhalation every 6 hours PRN Shortness of Breath and/or Wheezing  aluminum hydroxide/magnesium hydroxide/simethicone Suspension 30 milliLiter(s) Oral every 4 hours PRN Dyspepsia  bisacodyl 5 milliGRAM(s) Oral every 12 hours PRN Constipation  haloperidol     Tablet 1 milliGRAM(s) Oral every 6 hours PRN agitation  haloperidol    Injectable 1 milliGRAM(s) IntraMuscular three times a day PRN refusal of court ordered meds  haloperidol    Injectable 1 milliGRAM(s) IntraMuscular once PRN agitation  haloperidol    Injectable 1 milliGRAM(s) IntraMuscular once PRN agitation  saline laxative (FLEET) Rectal Enema 1 Enema Rectal daily PRN constipation

## 2023-04-02 RX ADMIN — LIDOCAINE 1 PATCH: 4 CREAM TOPICAL at 06:59

## 2023-04-02 RX ADMIN — HALOPERIDOL DECANOATE 1.5 MILLIGRAM(S): 100 INJECTION INTRAMUSCULAR at 06:29

## 2023-04-02 RX ADMIN — GABAPENTIN 300 MILLIGRAM(S): 400 CAPSULE ORAL at 06:29

## 2023-04-02 RX ADMIN — Medication 25 MILLIGRAM(S): at 08:28

## 2023-04-02 RX ADMIN — Medication 1 DROP(S): at 08:29

## 2023-04-02 RX ADMIN — Medication 81 MILLIGRAM(S): at 08:28

## 2023-04-02 RX ADMIN — SERTRALINE 25 MILLIGRAM(S): 25 TABLET, FILM COATED ORAL at 08:29

## 2023-04-02 RX ADMIN — GABAPENTIN 300 MILLIGRAM(S): 400 CAPSULE ORAL at 11:54

## 2023-04-02 RX ADMIN — Medication 25 MILLIGRAM(S): at 21:39

## 2023-04-02 RX ADMIN — POLYETHYLENE GLYCOL 3350 17 GRAM(S): 17 POWDER, FOR SOLUTION ORAL at 08:30

## 2023-04-02 RX ADMIN — SENNA PLUS 2 TABLET(S): 8.6 TABLET ORAL at 21:39

## 2023-04-02 RX ADMIN — HALOPERIDOL DECANOATE 1.5 MILLIGRAM(S): 100 INJECTION INTRAMUSCULAR at 21:39

## 2023-04-02 RX ADMIN — Medication 1 DROP(S): at 21:40

## 2023-04-02 RX ADMIN — LIDOCAINE 1 PATCH: 4 CREAM TOPICAL at 07:30

## 2023-04-02 RX ADMIN — LIDOCAINE 1 PATCH: 4 CREAM TOPICAL at 21:40

## 2023-04-02 RX ADMIN — Medication 50 MILLIGRAM(S): at 21:39

## 2023-04-02 RX ADMIN — PANTOPRAZOLE SODIUM 40 MILLIGRAM(S): 20 TABLET, DELAYED RELEASE ORAL at 08:29

## 2023-04-02 RX ADMIN — ENOXAPARIN SODIUM 40 MILLIGRAM(S): 100 INJECTION SUBCUTANEOUS at 08:30

## 2023-04-02 RX ADMIN — HALOPERIDOL DECANOATE 1.5 MILLIGRAM(S): 100 INJECTION INTRAMUSCULAR at 11:55

## 2023-04-02 RX ADMIN — GABAPENTIN 300 MILLIGRAM(S): 400 CAPSULE ORAL at 21:39

## 2023-04-02 RX ADMIN — TIOTROPIUM BROMIDE 2 PUFF(S): 18 CAPSULE ORAL; RESPIRATORY (INHALATION) at 08:29

## 2023-04-03 PROCEDURE — 99232 SBSQ HOSP IP/OBS MODERATE 35: CPT

## 2023-04-03 RX ORDER — SERTRALINE 25 MG/1
37.5 TABLET, FILM COATED ORAL DAILY
Refills: 0 | Status: DISCONTINUED | OUTPATIENT
Start: 2023-04-03 | End: 2023-04-04

## 2023-04-03 RX ADMIN — GABAPENTIN 300 MILLIGRAM(S): 400 CAPSULE ORAL at 06:10

## 2023-04-03 RX ADMIN — Medication 1 DROP(S): at 20:18

## 2023-04-03 RX ADMIN — Medication 50 MILLIGRAM(S): at 20:20

## 2023-04-03 RX ADMIN — Medication 25 MILLIGRAM(S): at 08:56

## 2023-04-03 RX ADMIN — Medication 25 MILLIGRAM(S): at 20:20

## 2023-04-03 RX ADMIN — SENNA PLUS 2 TABLET(S): 8.6 TABLET ORAL at 20:20

## 2023-04-03 RX ADMIN — HALOPERIDOL DECANOATE 1.5 MILLIGRAM(S): 100 INJECTION INTRAMUSCULAR at 06:10

## 2023-04-03 RX ADMIN — SERTRALINE 25 MILLIGRAM(S): 25 TABLET, FILM COATED ORAL at 08:56

## 2023-04-03 RX ADMIN — HALOPERIDOL DECANOATE 1.5 MILLIGRAM(S): 100 INJECTION INTRAMUSCULAR at 20:20

## 2023-04-03 RX ADMIN — ENOXAPARIN SODIUM 40 MILLIGRAM(S): 100 INJECTION SUBCUTANEOUS at 08:57

## 2023-04-03 RX ADMIN — GABAPENTIN 300 MILLIGRAM(S): 400 CAPSULE ORAL at 20:20

## 2023-04-03 RX ADMIN — HALOPERIDOL DECANOATE 1.5 MILLIGRAM(S): 100 INJECTION INTRAMUSCULAR at 12:32

## 2023-04-03 RX ADMIN — GABAPENTIN 300 MILLIGRAM(S): 400 CAPSULE ORAL at 12:32

## 2023-04-03 RX ADMIN — LIDOCAINE 1 PATCH: 4 CREAM TOPICAL at 06:32

## 2023-04-03 RX ADMIN — Medication 81 MILLIGRAM(S): at 08:56

## 2023-04-03 RX ADMIN — PANTOPRAZOLE SODIUM 40 MILLIGRAM(S): 20 TABLET, DELAYED RELEASE ORAL at 08:56

## 2023-04-03 RX ADMIN — LIDOCAINE 1 PATCH: 4 CREAM TOPICAL at 20:18

## 2023-04-03 NOTE — BH DISCHARGE NOTE NURSING/SOCIAL WORK/PSYCH REHAB - NSCDUDCCRISIS_PSY_A_CORE
.  [facility]  [phone]/.  [facility]  [address] [place] [address], [place] [address] [phone]   Hours:  Monday through Friday from 9 AM to 3 PM

## 2023-04-03 NOTE — BH DISCHARGE NOTE NURSING/SOCIAL WORK/PSYCH REHAB - NSBHDCAGENCY1FT_PSY_A_CORE
[facility], psychiatrist [facility], psychiatrist, Dr Rosmery Chris [facility], psychiatrist, Dr Rosmery Jones

## 2023-04-03 NOTE — BH INPATIENT PSYCHIATRY PROGRESS NOTE - NSBHASSESSSUMMFT_PSY_ALL_CORE
4/3: Patient overall less irritable and vocally agitated still dysphoric, irrtable, has mistrust of staff and fear fo falling when walked with PT. Cont trial fo SSRI. COnsider reducing gabapentin if too sedated as not clearing helping much

## 2023-04-03 NOTE — BH DISCHARGE NOTE NURSING/SOCIAL WORK/PSYCH REHAB - PATIENT PORTAL LINK FT
You can access the FollowMyHealth Patient Portal offered by [facility] by registering at the following website: [email or web address]. By joining [facility]’s FollowMyHealth portal, you will also be able to view your health information using other applications (apps) compatible with our system.

## 2023-04-03 NOTE — BH DISCHARGE NOTE NURSING/SOCIAL WORK/PSYCH REHAB - NSDCPRGOAL_PSY_ALL_CORE
Patient initially presented with symptoms of depression, anxiety, agitation and disorganization. Patient's initial goals included increasing her hope in recovery, increasing her coping skills, increasing her frustration tolerance and decreasing agitated behavior. Patient made gains towards her rehab goals as evidenced by patient's increased frustration tolerance, decreased anxiety and decreased irritability. Patient also demonstrated medication compliance. Patient with moderate encouragement participated in some of the rehab groups.

## 2023-04-03 NOTE — BH INPATIENT PSYCHIATRY PROGRESS NOTE - NSBHFUPINTERVALHXFT_PSY_A_CORE
Patient is seen for dementia and hx BAD. Patient w/o prns. No major overnight events. PAtient with some initial insomnia last night then sleep most of rest of night. Eating fairly. Yelss especially during PT but somewhat less. VSS

## 2023-04-03 NOTE — BH INPATIENT PSYCHIATRY PROGRESS NOTE - MSE UNSTRUCTURED FT
Patient is awake and alert. Patient w/o sig EPS. Speech  intermittent yelling overall less severe less repetitive.  Mood irritable anxious affect less  labile . Paranoid about staff but less not focus more on separation, fear of abandonment. Focused on loss of function.   No hallucinations  Poor insight No SI/HI. Says it is Oct 2022

## 2023-04-03 NOTE — BH INPATIENT PSYCHIATRY PROGRESS NOTE - NSBHCHARTREVIEWVS_PSY_A_CORE FT
Vital Signs Last 24 Hrs  T(C): 36.4 (04-03-23 @ 07:47), Max: 36.4 (04-03-23 @ 07:47)  T(F): 97.6 (04-03-23 @ 07:47), Max: 97.6 (04-03-23 @ 07:47)  HR: --  BP: --  BP(mean): --  RR: --  SpO2: --    Orthostatic VS  04-03-23 @ 07:47  Lying BP: 139/59 HR: 61  Sitting BP: 144/60 HR: 55  Standing BP: --/-- HR: --  Site: upper left arm  Mode: electronic  Orthostatic VS  04-02-23 @ 20:25  Lying BP: --/-- HR: --  Sitting BP: 162/74 HR: 67  Standing BP: --/-- HR: --  Site: --  Mode: --  Orthostatic VS  04-02-23 @ 08:09  Lying BP: 147/69 HR: 60  Sitting BP: 149/62 HR: 63  Standing BP: --/-- HR: --  Site: --  Mode: --

## 2023-04-03 NOTE — BH INPATIENT PSYCHIATRY PROGRESS NOTE - CURRENT MEDICATION
MEDICATIONS  (STANDING):  artificial  tears Solution 1 Drop(s) Both EYES two times a day  aspirin  chewable 81 milliGRAM(s) Oral daily  enoxaparin Injectable 40 milliGRAM(s) SubCutaneous <User Schedule>  gabapentin 300 milliGRAM(s) Oral <User Schedule>  haloperidol     Tablet 1.5 milliGRAM(s) Oral <User Schedule>  lidocaine   4% Patch 1 Patch Transdermal every 24 hours  metoprolol tartrate 25 milliGRAM(s) Oral two times a day  pantoprazole   Suspension 40 milliGRAM(s) Oral daily  polyethylene glycol 3350 17 Gram(s) Oral daily  senna 2 Tablet(s) Oral at bedtime  sertraline 12.5 milliGRAM(s) Oral once  sertraline 37.5 milliGRAM(s) Oral daily  tiotropium 2.5 MICROgram(s) Inhaler 2 Puff(s) Inhalation daily  traZODone 50 milliGRAM(s) Oral at bedtime    MEDICATIONS  (PRN):  acetaminophen     Tablet .. 650 milliGRAM(s) Oral every 6 hours PRN Mild Pain (1 - 3), Moderate Pain (4 - 6), Severe Pain (7 - 10)  albuterol    90 MICROgram(s) HFA Inhaler 2 Puff(s) Inhalation every 6 hours PRN Shortness of Breath and/or Wheezing  aluminum hydroxide/magnesium hydroxide/simethicone Suspension 30 milliLiter(s) Oral every 4 hours PRN Dyspepsia  bisacodyl 5 milliGRAM(s) Oral every 12 hours PRN Constipation  haloperidol     Tablet 1 milliGRAM(s) Oral every 6 hours PRN agitation  haloperidol    Injectable 1 milliGRAM(s) IntraMuscular once PRN agitation  haloperidol    Injectable 1 milliGRAM(s) IntraMuscular three times a day PRN refusal of court ordered meds  haloperidol    Injectable 1 milliGRAM(s) IntraMuscular once PRN agitation  saline laxative (FLEET) Rectal Enema 1 Enema Rectal daily PRN constipation

## 2023-04-03 NOTE — BH DISCHARGE NOTE NURSING/SOCIAL WORK/PSYCH REHAB - DISCHARGE INSTRUCTIONS AFTERCARE APPOINTMENTS
In order to check the location, date, or time of your aftercare appointment, please refer to your Discharge Instructions Document given to you upon leaving the hospital.  If you have lost the instructions please call [phone]

## 2023-04-03 NOTE — BH DISCHARGE NOTE NURSING/SOCIAL WORK/PSYCH REHAB - NSDCPRRECOMMEND_PSY_ALL_CORE
Patient is scheduled to be discharged today and will return to her previous residence. Patient will be assisted by a home health aide. Patient responded to low environmental stimulation, structured exercise groups and 1:1 interventions. Patient requires assistance with her hygiene and grooming.

## 2023-04-03 NOTE — BH DISCHARGE NOTE NURSING/SOCIAL WORK/PSYCH REHAB - MODE OF TRANSPORTATION
Ambulance [facility] transport./Ambulance [facility] transport at 11am/Ambulance

## 2023-04-03 NOTE — BH DISCHARGE NOTE NURSING/SOCIAL WORK/PSYCH REHAB - NSSCTYPOFSERV_GEN_ALL_CORE
RN, PT, and aide services have been requested under pt's Medicare. Daughter will pay privately to supplement hours to ensure [id number]. Daughter will supervise pt at all other times.

## 2023-04-04 PROCEDURE — 99232 SBSQ HOSP IP/OBS MODERATE 35: CPT

## 2023-04-04 RX ORDER — HALOPERIDOL DECANOATE 100 MG/ML
1 INJECTION INTRAMUSCULAR
Refills: 0 | Status: DISCONTINUED | OUTPATIENT
Start: 2023-04-04 | End: 2023-04-10

## 2023-04-04 RX ORDER — SERTRALINE 25 MG/1
50 TABLET, FILM COATED ORAL DAILY
Refills: 0 | Status: DISCONTINUED | OUTPATIENT
Start: 2023-04-04 | End: 2023-04-10

## 2023-04-04 RX ORDER — FAMOTIDINE 10 MG/ML
20 INJECTION INTRAVENOUS DAILY
Refills: 0 | Status: DISCONTINUED | OUTPATIENT
Start: 2023-04-04 | End: 2023-04-10

## 2023-04-04 RX ADMIN — HALOPERIDOL DECANOATE 1.5 MILLIGRAM(S): 100 INJECTION INTRAMUSCULAR at 06:26

## 2023-04-04 RX ADMIN — HALOPERIDOL DECANOATE 1 MILLIGRAM(S): 100 INJECTION INTRAMUSCULAR at 20:08

## 2023-04-04 RX ADMIN — SENNA PLUS 2 TABLET(S): 8.6 TABLET ORAL at 20:07

## 2023-04-04 RX ADMIN — Medication 25 MILLIGRAM(S): at 09:47

## 2023-04-04 RX ADMIN — GABAPENTIN 300 MILLIGRAM(S): 400 CAPSULE ORAL at 06:27

## 2023-04-04 RX ADMIN — Medication 81 MILLIGRAM(S): at 09:47

## 2023-04-04 RX ADMIN — LIDOCAINE 1 PATCH: 4 CREAM TOPICAL at 20:08

## 2023-04-04 RX ADMIN — GABAPENTIN 300 MILLIGRAM(S): 400 CAPSULE ORAL at 20:07

## 2023-04-04 RX ADMIN — LIDOCAINE 1 PATCH: 4 CREAM TOPICAL at 09:39

## 2023-04-04 RX ADMIN — GABAPENTIN 300 MILLIGRAM(S): 400 CAPSULE ORAL at 13:17

## 2023-04-04 RX ADMIN — ENOXAPARIN SODIUM 40 MILLIGRAM(S): 100 INJECTION SUBCUTANEOUS at 09:47

## 2023-04-04 RX ADMIN — PANTOPRAZOLE SODIUM 40 MILLIGRAM(S): 20 TABLET, DELAYED RELEASE ORAL at 09:47

## 2023-04-04 RX ADMIN — HALOPERIDOL DECANOATE 1 MILLIGRAM(S): 100 INJECTION INTRAMUSCULAR at 13:17

## 2023-04-04 RX ADMIN — Medication 50 MILLIGRAM(S): at 20:08

## 2023-04-04 RX ADMIN — LIDOCAINE 1 PATCH: 4 CREAM TOPICAL at 07:45

## 2023-04-04 RX ADMIN — POLYETHYLENE GLYCOL 3350 17 GRAM(S): 17 POWDER, FOR SOLUTION ORAL at 09:48

## 2023-04-04 RX ADMIN — TIOTROPIUM BROMIDE 2 PUFF(S): 18 CAPSULE ORAL; RESPIRATORY (INHALATION) at 09:46

## 2023-04-04 RX ADMIN — LIDOCAINE 1 PATCH: 4 CREAM TOPICAL at 21:00

## 2023-04-04 RX ADMIN — Medication 25 MILLIGRAM(S): at 20:51

## 2023-04-04 RX ADMIN — SERTRALINE 37.5 MILLIGRAM(S): 25 TABLET, FILM COATED ORAL at 09:47

## 2023-04-04 NOTE — BH INPATIENT PSYCHIATRY PROGRESS NOTE - CURRENT MEDICATION
MEDICATIONS  (STANDING):  artificial  tears Solution 1 Drop(s) Both EYES two times a day  aspirin  chewable 81 milliGRAM(s) Oral daily  enoxaparin Injectable 40 milliGRAM(s) SubCutaneous <User Schedule>  famotidine    Tablet 20 milliGRAM(s) Oral daily  gabapentin 300 milliGRAM(s) Oral <User Schedule>  haloperidol     Tablet 1 milliGRAM(s) Oral <User Schedule>  lidocaine   4% Patch 1 Patch Transdermal every 24 hours  metoprolol tartrate 25 milliGRAM(s) Oral two times a day  polyethylene glycol 3350 17 Gram(s) Oral daily  senna 2 Tablet(s) Oral at bedtime  sertraline 50 milliGRAM(s) Oral daily  tiotropium 2.5 MICROgram(s) Inhaler 2 Puff(s) Inhalation daily  traZODone 50 milliGRAM(s) Oral at bedtime    MEDICATIONS  (PRN):  acetaminophen     Tablet .. 650 milliGRAM(s) Oral every 6 hours PRN Mild Pain (1 - 3), Moderate Pain (4 - 6), Severe Pain (7 - 10)  albuterol    90 MICROgram(s) HFA Inhaler 2 Puff(s) Inhalation every 6 hours PRN Shortness of Breath and/or Wheezing  aluminum hydroxide/magnesium hydroxide/simethicone Suspension 30 milliLiter(s) Oral every 4 hours PRN Dyspepsia  bisacodyl 5 milliGRAM(s) Oral every 12 hours PRN Constipation  haloperidol     Tablet 1 milliGRAM(s) Oral every 6 hours PRN agitation  haloperidol    Injectable 1 milliGRAM(s) IntraMuscular once PRN agitation  haloperidol    Injectable 1 milliGRAM(s) IntraMuscular three times a day PRN refusal of court ordered meds  haloperidol    Injectable 1 milliGRAM(s) IntraMuscular once PRN agitation  saline laxative (FLEET) Rectal Enema 1 Enema Rectal daily PRN constipation

## 2023-04-04 NOTE — BH INPATIENT PSYCHIATRY PROGRESS NOTE - MSE UNSTRUCTURED FT
Patient is awake and alert. Patient with some increased tone in elbow. Speech  intermittent yelling overall less severe less repetitive.  Mood irritable anxious affect less  labile . Less directly accusatory about staff. Focused on loss of function but expressing hope she will improve back at home.   No hallucinations  Poor insight No SI/HI. Says it is April 2024. Limited insight

## 2023-04-04 NOTE — BH INPATIENT PSYCHIATRY PROGRESS NOTE - NSBHFUPINTERVALHXFT_PSY_A_CORE
Patient is seen for dementia and hx BAD. Patient w/o prns. No major overnight events. Patient slept fairly well Eating fairly. Yells especially during PT but somewhat less. VSS.

## 2023-04-04 NOTE — BH INPATIENT PSYCHIATRY PROGRESS NOTE - NSBHASSESSSUMMFT_PSY_ALL_CORE
4/4: Patient overall less irritable and vocally agitated. Some EPS. Plan reduce haldol, increase SSRI check for low NA.

## 2023-04-04 NOTE — BH INPATIENT PSYCHIATRY PROGRESS NOTE - NSBHCHARTREVIEWVS_PSY_A_CORE FT
Vital Signs Last 24 Hrs  T(C): 36.3 (04-04-23 @ 08:31), Max: 36.3 (04-04-23 @ 08:31)  T(F): 97.4 (04-04-23 @ 08:31), Max: 97.4 (04-04-23 @ 08:31)  HR: --  BP: 137/61 (04-04-23 @ 08:31) (137/61 - 151/64)  BP(mean): 62 (04-04-23 @ 08:31) (62 - 64)  RR: --  SpO2: --    Orthostatic VS  04-03-23 @ 07:47  Lying BP: 139/59 HR: 61  Sitting BP: 144/60 HR: 55  Standing BP: --/-- HR: --  Site: upper left arm  Mode: electronic  Orthostatic VS  04-02-23 @ 20:25  Lying BP: --/-- HR: --  Sitting BP: 162/74 HR: 67  Standing BP: --/-- HR: --  Site: --  Mode: --

## 2023-04-05 LAB
ANION GAP SERPL CALC-SCNC: 8 MMOL/L — SIGNIFICANT CHANGE UP (ref 7–14)
BUN SERPL-MCNC: 15 MG/DL — SIGNIFICANT CHANGE UP (ref 7–23)
CALCIUM SERPL-MCNC: 8.9 MG/DL — SIGNIFICANT CHANGE UP (ref 8.4–10.5)
CHLORIDE SERPL-SCNC: 99 MMOL/L — SIGNIFICANT CHANGE UP (ref 98–107)
CO2 SERPL-SCNC: 30 MMOL/L — SIGNIFICANT CHANGE UP (ref 22–31)
CREAT SERPL-MCNC: 0.59 MG/DL — SIGNIFICANT CHANGE UP (ref 0.5–1.3)
EGFR: 87 ML/MIN/1.73M2 — SIGNIFICANT CHANGE UP
GLUCOSE SERPL-MCNC: 140 MG/DL — HIGH (ref 70–99)
POTASSIUM SERPL-MCNC: 3.9 MMOL/L — SIGNIFICANT CHANGE UP (ref 3.5–5.3)
POTASSIUM SERPL-SCNC: 3.9 MMOL/L — SIGNIFICANT CHANGE UP (ref 3.5–5.3)
SODIUM SERPL-SCNC: 137 MMOL/L — SIGNIFICANT CHANGE UP (ref 135–145)

## 2023-04-05 PROCEDURE — 99232 SBSQ HOSP IP/OBS MODERATE 35: CPT

## 2023-04-05 RX ADMIN — SERTRALINE 50 MILLIGRAM(S): 25 TABLET, FILM COATED ORAL at 09:55

## 2023-04-05 RX ADMIN — SENNA PLUS 2 TABLET(S): 8.6 TABLET ORAL at 21:21

## 2023-04-05 RX ADMIN — TIOTROPIUM BROMIDE 2 PUFF(S): 18 CAPSULE ORAL; RESPIRATORY (INHALATION) at 09:57

## 2023-04-05 RX ADMIN — Medication 50 MILLIGRAM(S): at 21:20

## 2023-04-05 RX ADMIN — Medication 25 MILLIGRAM(S): at 21:20

## 2023-04-05 RX ADMIN — Medication 81 MILLIGRAM(S): at 09:55

## 2023-04-05 RX ADMIN — Medication 1 DROP(S): at 09:57

## 2023-04-05 RX ADMIN — Medication 650 MILLIGRAM(S): at 09:54

## 2023-04-05 RX ADMIN — POLYETHYLENE GLYCOL 3350 17 GRAM(S): 17 POWDER, FOR SOLUTION ORAL at 09:54

## 2023-04-05 RX ADMIN — LIDOCAINE 1 PATCH: 4 CREAM TOPICAL at 21:20

## 2023-04-05 RX ADMIN — GABAPENTIN 300 MILLIGRAM(S): 400 CAPSULE ORAL at 21:20

## 2023-04-05 RX ADMIN — HALOPERIDOL DECANOATE 1 MILLIGRAM(S): 100 INJECTION INTRAMUSCULAR at 12:52

## 2023-04-05 RX ADMIN — HALOPERIDOL DECANOATE 1 MILLIGRAM(S): 100 INJECTION INTRAMUSCULAR at 06:08

## 2023-04-05 RX ADMIN — FAMOTIDINE 20 MILLIGRAM(S): 10 INJECTION INTRAVENOUS at 09:55

## 2023-04-05 RX ADMIN — LIDOCAINE 1 PATCH: 4 CREAM TOPICAL at 07:07

## 2023-04-05 RX ADMIN — LIDOCAINE 1 PATCH: 4 CREAM TOPICAL at 09:57

## 2023-04-05 RX ADMIN — GABAPENTIN 300 MILLIGRAM(S): 400 CAPSULE ORAL at 12:52

## 2023-04-05 RX ADMIN — GABAPENTIN 300 MILLIGRAM(S): 400 CAPSULE ORAL at 06:08

## 2023-04-05 RX ADMIN — Medication 650 MILLIGRAM(S): at 10:59

## 2023-04-05 RX ADMIN — HALOPERIDOL DECANOATE 1 MILLIGRAM(S): 100 INJECTION INTRAMUSCULAR at 21:20

## 2023-04-05 RX ADMIN — ENOXAPARIN SODIUM 40 MILLIGRAM(S): 100 INJECTION SUBCUTANEOUS at 09:55

## 2023-04-05 RX ADMIN — Medication 25 MILLIGRAM(S): at 09:55

## 2023-04-05 NOTE — BH INPATIENT PSYCHIATRY PROGRESS NOTE - CURRENT MEDICATION
MEDICATIONS  (STANDING):  artificial  tears Solution 1 Drop(s) Both EYES two times a day  aspirin  chewable 81 milliGRAM(s) Oral daily  enoxaparin Injectable 40 milliGRAM(s) SubCutaneous <User Schedule>  famotidine    Tablet 20 milliGRAM(s) Oral daily  gabapentin 300 milliGRAM(s) Oral <User Schedule>  haloperidol     Tablet 1 milliGRAM(s) Oral <User Schedule>  lidocaine   4% Patch 1 Patch Transdermal every 24 hours  metoprolol tartrate 25 milliGRAM(s) Oral two times a day  polyethylene glycol 3350 17 Gram(s) Oral daily  senna 2 Tablet(s) Oral at bedtime  sertraline 50 milliGRAM(s) Oral daily  tiotropium 2.5 MICROgram(s) Inhaler 2 Puff(s) Inhalation daily  traZODone 50 milliGRAM(s) Oral at bedtime    MEDICATIONS  (PRN):  acetaminophen     Tablet .. 650 milliGRAM(s) Oral every 6 hours PRN Mild Pain (1 - 3), Moderate Pain (4 - 6), Severe Pain (7 - 10)  albuterol    90 MICROgram(s) HFA Inhaler 2 Puff(s) Inhalation every 6 hours PRN Shortness of Breath and/or Wheezing  aluminum hydroxide/magnesium hydroxide/simethicone Suspension 30 milliLiter(s) Oral every 4 hours PRN Dyspepsia  bisacodyl 5 milliGRAM(s) Oral every 12 hours PRN Constipation  haloperidol     Tablet 1 milliGRAM(s) Oral every 6 hours PRN agitation  haloperidol    Injectable 1 milliGRAM(s) IntraMuscular three times a day PRN refusal of court ordered meds  haloperidol    Injectable 1 milliGRAM(s) IntraMuscular once PRN agitation  haloperidol    Injectable 1 milliGRAM(s) IntraMuscular once PRN agitation  saline laxative (FLEET) Rectal Enema 1 Enema Rectal daily PRN constipation

## 2023-04-05 NOTE — PROGRESS NOTE ADULT - ASSESSMENT
88 year old female with Bipolar Disorder now with exacerbation, asthma, who presented with agitation.      #LE edema: Not present on my exam today, likely transient dependent edema.  Consider compression stockings.    #Hip pain: Per staff patient is chronic complaint of pain, is not thought to be an entirely reliable historian.  D/w Dr Wright, will obtain hip xrays given remote fall history and complaint c/w hip joint pain.    #Chronic asthma w/o exacerbation: Given evidence of emphysema on CT chest, suspect possible COPD instead of asthma.  c/w  spiriva, albuterol PRN    #Back pain/Vertebral fracture:    -c/w lidocaine patch and lidocaine prn    #GERD:   -protonix    #PSYCH:   -as per primary team    #HTN:  -BP acceptable. c/w metoprolol

## 2023-04-05 NOTE — BH INPATIENT PSYCHIATRY PROGRESS NOTE - NSBHCHARTREVIEWVS_PSY_A_CORE FT
Vital Signs Last 24 Hrs  T(C): 36.3 (04-05-23 @ 08:32), Max: 36.3 (04-05-23 @ 08:32)  T(F): 97.4 (04-05-23 @ 08:32), Max: 97.4 (04-05-23 @ 08:32)  HR: --  BP: 135/69 (04-05-23 @ 08:32) (131/56 - 135/69)  BP(mean): 56 (04-05-23 @ 08:32) (56 - 63)  RR: --  SpO2: --

## 2023-04-05 NOTE — BH INPATIENT PSYCHIATRY PROGRESS NOTE - NSBHASSESSSUMMFT_PSY_ALL_CORE
4/4: Patient overall less irritable and vocally agitated. Some EPS. Plan reduce haldol, increase SSRI check for low NA.   4/5 Some improvement in mood since adding SSRI w/o signs of hortencia. COnt current rx. Seen by medicine, does not feel there is concern for DVT but recommends hip and pelvic films given pain complaints hard to separate spinal stenosis pain from arthritic symptoms and also to r/o undiagnosed injury form fall PTA

## 2023-04-05 NOTE — PROGRESS NOTE ADULT - SUBJECTIVE AND OBJECTIVE BOX
CC/Reason for Consult: evaluate leg swelling    SUBJECTIVE / OVERNIGHT EVENTS: Staff reportedly noted asymmetric leg swelling .  Patient with chronic complaints of hip pain, today saying right hip is in "excruciating" pain.    MEDICATIONS  (STANDING):  artificial  tears Solution 1 Drop(s) Both EYES two times a day  aspirin  chewable 81 milliGRAM(s) Oral daily  enoxaparin Injectable 40 milliGRAM(s) SubCutaneous <User Schedule>  famotidine    Tablet 20 milliGRAM(s) Oral daily  gabapentin 300 milliGRAM(s) Oral <User Schedule>  haloperidol     Tablet 1 milliGRAM(s) Oral <User Schedule>  lidocaine   4% Patch 1 Patch Transdermal every 24 hours  metoprolol tartrate 25 milliGRAM(s) Oral two times a day  polyethylene glycol 3350 17 Gram(s) Oral daily  senna 2 Tablet(s) Oral at bedtime  sertraline 50 milliGRAM(s) Oral daily  tiotropium 2.5 MICROgram(s) Inhaler 2 Puff(s) Inhalation daily  traZODone 50 milliGRAM(s) Oral at bedtime    MEDICATIONS  (PRN):  acetaminophen     Tablet .. 650 milliGRAM(s) Oral every 6 hours PRN Mild Pain (1 - 3), Moderate Pain (4 - 6), Severe Pain (7 - 10)  albuterol    90 MICROgram(s) HFA Inhaler 2 Puff(s) Inhalation every 6 hours PRN Shortness of Breath and/or Wheezing  aluminum hydroxide/magnesium hydroxide/simethicone Suspension 30 milliLiter(s) Oral every 4 hours PRN Dyspepsia  bisacodyl 5 milliGRAM(s) Oral every 12 hours PRN Constipation  haloperidol     Tablet 1 milliGRAM(s) Oral every 6 hours PRN agitation  haloperidol    Injectable 1 milliGRAM(s) IntraMuscular once PRN agitation  haloperidol    Injectable 1 milliGRAM(s) IntraMuscular three times a day PRN refusal of court ordered meds  haloperidol    Injectable 1 milliGRAM(s) IntraMuscular once PRN agitation  saline laxative (FLEET) Rectal Enema 1 Enema Rectal daily PRN constipation      Vital Signs Last 24 Hrs  T(C): 36.3 (05 Apr 2023 08:32), Max: 36.3 (05 Apr 2023 08:32)  T(F): 97.4 (05 Apr 2023 08:32), Max: 97.4 (05 Apr 2023 08:32)  HR: --  BP: 135/69 (05 Apr 2023 08:32) (131/56 - 135/69)  BP(mean): 56 (05 Apr 2023 08:32) (56 - 63)  RR: 14            PHYSICAL EXAM:  GENERAL: NAD, well-developed  HEAD:  Atraumatic, Normocephalic  EYES: EOMI, conjunctiva and sclera clear  NECK: Supple, No JVD  CHEST/LUNG: Clear to auscultation bilaterally; No wheeze  HEART: Regular rate and rhythm; No murmurs, rubs, or gallops  ABDOMEN: Soft, Nontender, Nondistended; Bowel sounds present  EXTREMITIES:  2+ Peripheral Pulses, No clubbing, cyanosis, or edema  Pt reports pain in groin with flexion of right knee and attempted internat rotation of hip  NEUROLOGY: non-focal  SKIN: No rashes or lesions    LABS:    04-05    137  |  99  |  15  ----------------------------<  140<H>  3.9   |  30  |  0.59    Ca    8.9      05 Apr 2023 08:00            Care Discussed with Consultants/Other Providers: Dr Wright

## 2023-04-05 NOTE — BH INPATIENT PSYCHIATRY PROGRESS NOTE - MSE UNSTRUCTURED FT
Patient is awake and alert. Patient with some increased tone in elbow. Speech  intermittent yelling overall less severe less repetitive.  Mood irritable anxious less severe,  affect less  labile . Less directly accusatory about staff. Focused on loss of function but expressing hope she will improve back at home. Says she looks forward to doing crossword puzzles from [facility]  No hallucinations  Poor insight No SI/HI. Says it is April 2024. Limited insight

## 2023-04-05 NOTE — BH INPATIENT PSYCHIATRY PROGRESS NOTE - NSBHFUPINTERVALHXFT_PSY_A_CORE
Patient is seen for dementia and hx BAD. Patient w/o prns. No major overnight events. Patient slept fairly well Eating fairly. Yells especially during PT but somewhat less. Overall reduced vocal agitation.VSS. BMP unremarkable. Staff raised concern about le edema

## 2023-04-06 PROCEDURE — 73521 X-RAY EXAM HIPS BI 2 VIEWS: CPT | Mod: 26

## 2023-04-06 PROCEDURE — 99232 SBSQ HOSP IP/OBS MODERATE 35: CPT

## 2023-04-06 RX ADMIN — LIDOCAINE 1 PATCH: 4 CREAM TOPICAL at 07:08

## 2023-04-06 RX ADMIN — TIOTROPIUM BROMIDE 2 PUFF(S): 18 CAPSULE ORAL; RESPIRATORY (INHALATION) at 09:05

## 2023-04-06 RX ADMIN — HALOPERIDOL DECANOATE 1 MILLIGRAM(S): 100 INJECTION INTRAMUSCULAR at 20:08

## 2023-04-06 RX ADMIN — Medication 1 DROP(S): at 09:05

## 2023-04-06 RX ADMIN — GABAPENTIN 300 MILLIGRAM(S): 400 CAPSULE ORAL at 06:09

## 2023-04-06 RX ADMIN — LIDOCAINE 1 PATCH: 4 CREAM TOPICAL at 20:10

## 2023-04-06 RX ADMIN — Medication 1 DROP(S): at 20:09

## 2023-04-06 RX ADMIN — GABAPENTIN 300 MILLIGRAM(S): 400 CAPSULE ORAL at 20:08

## 2023-04-06 RX ADMIN — Medication 25 MILLIGRAM(S): at 20:07

## 2023-04-06 RX ADMIN — FAMOTIDINE 20 MILLIGRAM(S): 10 INJECTION INTRAVENOUS at 09:03

## 2023-04-06 RX ADMIN — HALOPERIDOL DECANOATE 1 MILLIGRAM(S): 100 INJECTION INTRAMUSCULAR at 06:12

## 2023-04-06 RX ADMIN — POLYETHYLENE GLYCOL 3350 17 GRAM(S): 17 POWDER, FOR SOLUTION ORAL at 09:04

## 2023-04-06 RX ADMIN — GABAPENTIN 300 MILLIGRAM(S): 400 CAPSULE ORAL at 12:25

## 2023-04-06 RX ADMIN — Medication 50 MILLIGRAM(S): at 20:07

## 2023-04-06 RX ADMIN — Medication 81 MILLIGRAM(S): at 09:04

## 2023-04-06 RX ADMIN — HALOPERIDOL DECANOATE 1 MILLIGRAM(S): 100 INJECTION INTRAMUSCULAR at 12:25

## 2023-04-06 RX ADMIN — ENOXAPARIN SODIUM 40 MILLIGRAM(S): 100 INJECTION SUBCUTANEOUS at 09:04

## 2023-04-06 RX ADMIN — SERTRALINE 50 MILLIGRAM(S): 25 TABLET, FILM COATED ORAL at 09:03

## 2023-04-06 RX ADMIN — LIDOCAINE 1 PATCH: 4 CREAM TOPICAL at 07:30

## 2023-04-06 RX ADMIN — SENNA PLUS 2 TABLET(S): 8.6 TABLET ORAL at 20:07

## 2023-04-06 RX ADMIN — Medication 25 MILLIGRAM(S): at 09:04

## 2023-04-06 NOTE — BH INPATIENT PSYCHIATRY PROGRESS NOTE - MSE UNSTRUCTURED FT
Patient is awake and alert. Patient with some increased tone in elbow. Speech  intermittent yelling overall less severe less repetitive.  Mood irritable anxious less severe,  affect less  labile . Less directly accusatory about staff. Focused on loss of function but expressing hope she will improve back at home. No hallucinations  Poor insight No SI/HI. Says it is April 2024. Limited insight

## 2023-04-06 NOTE — BH INPATIENT PSYCHIATRY PROGRESS NOTE - NSBHCHARTREVIEWVS_PSY_A_CORE FT
Vital Signs Last 24 Hrs  T(C): 36.4 (04-06-23 @ 07:59), Max: 36.4 (04-06-23 @ 07:59)  T(F): 97.5 (04-06-23 @ 07:59), Max: 97.5 (04-06-23 @ 07:59)  HR: 65 (04-05-23 @ 20:57) (65 - 65)  BP: 127/50 (04-05-23 @ 20:57) (127/50 - 127/50)  BP(mean): --  RR: --  SpO2: --    Orthostatic VS  04-06-23 @ 07:59  Lying BP: --/-- HR: --  Sitting BP: 142/65 HR: 61  Standing BP: --/-- HR: --  Site: --  Mode: --

## 2023-04-06 NOTE — BH INPATIENT PSYCHIATRY PROGRESS NOTE - NSBHASSESSSUMMFT_PSY_ALL_CORE
4/4: Patient overall less irritable and vocally agitated. Some EPS. Plan reduce haldol, increase SSRI check for low NA.   4/5 Some improvement in mood since adding SSRI w/o signs of hortencia. COnt current rx. Seen by medicine, does not feel there is concern for DVT but recommends hip and pelvic films given pain complaints hard to separate spinal stenosis pain from arthritic symptoms and also to r/o undiagnosed injury form fall PTA   4/6 Modest gains, more consolable, reassurable. Cont current meds getting hip and pelvis xray to r/o pain arising from these bony structures rather than spinal stenosis

## 2023-04-06 NOTE — BH INPATIENT PSYCHIATRY PROGRESS NOTE - NSBHFUPINTERVALHXFT_PSY_A_CORE
Patient is seen for dementia and hx BAD. Patient w/o prns. No major overnight events. Patient slept fairly well Eating fairly. Yells in dayroom but more consolable. Overall reduced vocal agitation.VSS. BMP unremarkable. Staff raised concern about le edema seen by medicine low concern for DVT

## 2023-04-07 RX ORDER — SERTRALINE 25 MG/1
1 TABLET, FILM COATED ORAL
Qty: 30 | Refills: 0
Start: 2023-04-07 | End: 2023-05-06

## 2023-04-07 RX ORDER — FAMOTIDINE 10 MG/ML
1 INJECTION INTRAVENOUS
Qty: 30 | Refills: 0
Start: 2023-04-07 | End: 2023-05-06

## 2023-04-07 RX ORDER — GABAPENTIN 400 MG/1
1 CAPSULE ORAL
Qty: 90 | Refills: 0
Start: 2023-04-07 | End: 2023-05-06

## 2023-04-07 RX ORDER — TIOTROPIUM BROMIDE 18 UG/1
2 CAPSULE ORAL; RESPIRATORY (INHALATION)
Qty: 1 | Refills: 0
Start: 2023-04-07 | End: 2023-05-06

## 2023-04-07 RX ORDER — GABAPENTIN 400 MG/1
2 CAPSULE ORAL
Qty: 90 | Refills: 0 | DISCHARGE
Start: 2023-04-07 | End: 2023-05-06

## 2023-04-07 RX ORDER — METOPROLOL TARTRATE 50 MG
1 TABLET ORAL
Qty: 0 | Refills: 0 | DISCHARGE
Start: 2023-04-07

## 2023-04-07 RX ORDER — ALBUTEROL 90 UG/1
2 AEROSOL, METERED ORAL
Qty: 1 | Refills: 0
Start: 2023-04-07 | End: 2023-05-06

## 2023-04-07 RX ORDER — TRAZODONE HCL 50 MG
1 TABLET ORAL
Qty: 30 | Refills: 0
Start: 2023-04-07 | End: 2023-05-06

## 2023-04-07 RX ORDER — POLYETHYLENE GLYCOL 3350 17 G/17G
17 POWDER, FOR SOLUTION ORAL
Qty: 510 | Refills: 0
Start: 2023-04-07 | End: 2023-05-06

## 2023-04-07 RX ORDER — LIDOCAINE 4 G/100G
1 CREAM TOPICAL
Qty: 30 | Refills: 0
Start: 2023-04-07 | End: 2023-05-06

## 2023-04-07 RX ORDER — SENNA PLUS 8.6 MG/1
2 TABLET ORAL
Qty: 60 | Refills: 0
Start: 2023-04-07 | End: 2023-05-06

## 2023-04-07 RX ORDER — HALOPERIDOL DECANOATE 100 MG/ML
1 INJECTION INTRAMUSCULAR
Qty: 90 | Refills: 0
Start: 2023-04-07 | End: 2023-05-06

## 2023-04-07 RX ORDER — ASPIRIN/CALCIUM CARB/MAGNESIUM 324 MG
1 TABLET ORAL
Qty: 30 | Refills: 0
Start: 2023-04-07 | End: 2023-05-06

## 2023-04-07 RX ORDER — METOPROLOL TARTRATE 50 MG
1 TABLET ORAL
Qty: 60 | Refills: 0
Start: 2023-04-07 | End: 2023-05-06

## 2023-04-07 RX ADMIN — GABAPENTIN 300 MILLIGRAM(S): 400 CAPSULE ORAL at 13:30

## 2023-04-07 RX ADMIN — Medication 1 DROP(S): at 21:29

## 2023-04-07 RX ADMIN — Medication 25 MILLIGRAM(S): at 21:26

## 2023-04-07 RX ADMIN — GABAPENTIN 300 MILLIGRAM(S): 400 CAPSULE ORAL at 21:28

## 2023-04-07 RX ADMIN — TIOTROPIUM BROMIDE 2 PUFF(S): 18 CAPSULE ORAL; RESPIRATORY (INHALATION) at 10:43

## 2023-04-07 RX ADMIN — SERTRALINE 50 MILLIGRAM(S): 25 TABLET, FILM COATED ORAL at 10:02

## 2023-04-07 RX ADMIN — ENOXAPARIN SODIUM 40 MILLIGRAM(S): 100 INJECTION SUBCUTANEOUS at 10:43

## 2023-04-07 RX ADMIN — HALOPERIDOL DECANOATE 1 MILLIGRAM(S): 100 INJECTION INTRAMUSCULAR at 06:04

## 2023-04-07 RX ADMIN — HALOPERIDOL DECANOATE 1 MILLIGRAM(S): 100 INJECTION INTRAMUSCULAR at 13:38

## 2023-04-07 RX ADMIN — Medication 1 DROP(S): at 10:43

## 2023-04-07 RX ADMIN — Medication 25 MILLIGRAM(S): at 10:00

## 2023-04-07 RX ADMIN — LIDOCAINE 1 PATCH: 4 CREAM TOPICAL at 21:28

## 2023-04-07 RX ADMIN — POLYETHYLENE GLYCOL 3350 17 GRAM(S): 17 POWDER, FOR SOLUTION ORAL at 10:58

## 2023-04-07 RX ADMIN — Medication 81 MILLIGRAM(S): at 10:00

## 2023-04-07 RX ADMIN — Medication 50 MILLIGRAM(S): at 21:27

## 2023-04-07 RX ADMIN — FAMOTIDINE 20 MILLIGRAM(S): 10 INJECTION INTRAVENOUS at 10:00

## 2023-04-07 RX ADMIN — SENNA PLUS 2 TABLET(S): 8.6 TABLET ORAL at 21:26

## 2023-04-07 RX ADMIN — LIDOCAINE 1 PATCH: 4 CREAM TOPICAL at 06:37

## 2023-04-07 RX ADMIN — HALOPERIDOL DECANOATE 1 MILLIGRAM(S): 100 INJECTION INTRAMUSCULAR at 21:30

## 2023-04-07 RX ADMIN — GABAPENTIN 300 MILLIGRAM(S): 400 CAPSULE ORAL at 06:04

## 2023-04-07 NOTE — BH INPATIENT PSYCHIATRY PROGRESS NOTE - NSBHASSESSSUMMFT_PSY_ALL_CORE
4/4: Patient overall less irritable and vocally agitated. Some EPS. Plan reduce haldol, increase SSRI check for low NA.   4/5 Some improvement in mood since adding SSRI w/o signs of hortencia. COnt current rx. Seen by medicine, does not feel there is concern for DVT but recommends hip and pelvic films given pain complaints hard to separate spinal stenosis pain from arthritic symptoms and also to r/o undiagnosed injury form fall PTA   4/6 Modest gains, more consolable, able to be reassured Cont current meds getting hip and pelvis xray to r/o pain arising from these bony structures rather than spinal stenosis  4/7 Improvement in psychosis, vocal agitation , dysphoria and anxiety  though with residual symptoms. Cont current regimen. Reviewed xray with internal medicine recommending ortho f/u after d/c

## 2023-04-07 NOTE — BH INPATIENT PSYCHIATRY PROGRESS NOTE - NSBHFUPINTERVALHXFT_PSY_A_CORE
Patient is seen for dementia and hx BAD. Patient w/o prns. No major overnight events. Patient slept fairly well Eating fairly. Yells in dayroom but less and overall more consolable. HAd xray showing severe hip arthritis R>L.

## 2023-04-07 NOTE — BH INPATIENT PSYCHIATRY PROGRESS NOTE - NSBHCHARTREVIEWVS_PSY_A_CORE FT
Vital Signs Last 24 Hrs  T(C): 36.4 (04-07-23 @ 08:12), Max: 36.4 (04-07-23 @ 08:12)  T(F): 97.5 (04-07-23 @ 08:12), Max: 97.5 (04-07-23 @ 08:12)  HR: --  BP: 136/65 (04-06-23 @ 20:43) (136/65 - 136/65)  BP(mean): 66 (04-06-23 @ 20:43) (66 - 66)  RR: --  SpO2: 94% (04-07-23 @ 08:12) (94% - 94%)    Orthostatic VS  04-07-23 @ 08:12  Lying BP: 143/65 HR: 63  Sitting BP: 148/63 HR: 63  Standing BP: --/-- HR: --  Site: --  Mode: --  Orthostatic VS  04-06-23 @ 07:59  Lying BP: --/-- HR: --  Sitting BP: 142/65 HR: 61  Standing BP: --/-- HR: --  Site: --  Mode: --

## 2023-04-07 NOTE — BH INPATIENT PSYCHIATRY PROGRESS NOTE - MSE UNSTRUCTURED FT
Patient is awake and alert. Patient with some increased tone in elbow. Speech high pitched intermittent yelling, overall less severe less repetitive.  Mood anxious less severe,  affect less  labile . Less directly accusatory about staff. Focused on loss of function but expressing hope she will improve back at home. Worries she will not receive help she needs. No hallucinations  Poor insight No SI/HI. Says it is April 2024.  Says she is 58 years old.Limited insight

## 2023-04-08 PROCEDURE — 99231 SBSQ HOSP IP/OBS SF/LOW 25: CPT

## 2023-04-08 RX ADMIN — Medication 81 MILLIGRAM(S): at 09:15

## 2023-04-08 RX ADMIN — Medication 25 MILLIGRAM(S): at 09:15

## 2023-04-08 RX ADMIN — ENOXAPARIN SODIUM 40 MILLIGRAM(S): 100 INJECTION SUBCUTANEOUS at 09:14

## 2023-04-08 RX ADMIN — HALOPERIDOL DECANOATE 1 MILLIGRAM(S): 100 INJECTION INTRAMUSCULAR at 20:44

## 2023-04-08 RX ADMIN — TIOTROPIUM BROMIDE 2 PUFF(S): 18 CAPSULE ORAL; RESPIRATORY (INHALATION) at 09:14

## 2023-04-08 RX ADMIN — HALOPERIDOL DECANOATE 1 MILLIGRAM(S): 100 INJECTION INTRAMUSCULAR at 06:26

## 2023-04-08 RX ADMIN — GABAPENTIN 300 MILLIGRAM(S): 400 CAPSULE ORAL at 20:45

## 2023-04-08 RX ADMIN — HALOPERIDOL DECANOATE 1 MILLIGRAM(S): 100 INJECTION INTRAMUSCULAR at 13:10

## 2023-04-08 RX ADMIN — GABAPENTIN 300 MILLIGRAM(S): 400 CAPSULE ORAL at 13:10

## 2023-04-08 RX ADMIN — LIDOCAINE 1 PATCH: 4 CREAM TOPICAL at 06:48

## 2023-04-08 RX ADMIN — LIDOCAINE 1 PATCH: 4 CREAM TOPICAL at 07:30

## 2023-04-08 RX ADMIN — Medication 50 MILLIGRAM(S): at 20:44

## 2023-04-08 RX ADMIN — SERTRALINE 50 MILLIGRAM(S): 25 TABLET, FILM COATED ORAL at 09:14

## 2023-04-08 RX ADMIN — Medication 1 DROP(S): at 20:46

## 2023-04-08 RX ADMIN — Medication 25 MILLIGRAM(S): at 20:45

## 2023-04-08 RX ADMIN — LIDOCAINE 1 PATCH: 4 CREAM TOPICAL at 20:45

## 2023-04-08 RX ADMIN — SENNA PLUS 2 TABLET(S): 8.6 TABLET ORAL at 20:46

## 2023-04-08 RX ADMIN — POLYETHYLENE GLYCOL 3350 17 GRAM(S): 17 POWDER, FOR SOLUTION ORAL at 09:15

## 2023-04-08 RX ADMIN — Medication 1 DROP(S): at 09:15

## 2023-04-08 RX ADMIN — GABAPENTIN 300 MILLIGRAM(S): 400 CAPSULE ORAL at 06:28

## 2023-04-08 RX ADMIN — FAMOTIDINE 20 MILLIGRAM(S): 10 INJECTION INTRAVENOUS at 09:15

## 2023-04-08 NOTE — BH INPATIENT PSYCHIATRY PROGRESS NOTE - MSE UNSTRUCTURED FT
Patient is awake and alert. Affect is irritable. petulant. Speech is fluent. TP is logical but focused on negative ideas, overly somatic. No delusions. No perceptual disturbance. Poor insight. No suicidal ideations.

## 2023-04-08 NOTE — BH INPATIENT PSYCHIATRY PROGRESS NOTE - NSBHCHARTREVIEWVS_PSY_A_CORE FT
Vital Signs Last 24 Hrs  T(C): 36.3 (04-08-23 @ 07:16), Max: 36.3 (04-08-23 @ 07:16)  T(F): 97.4 (04-08-23 @ 07:16), Max: 97.4 (04-08-23 @ 07:16)  HR: 54 (04-08-23 @ 07:16) (54 - 54)  BP: 134/70 (04-07-23 @ 21:01) (134/70 - 134/70)  BP(mean): 60 (04-07-23 @ 21:01) (60 - 60)  RR: --  SpO2: --    Orthostatic VS  04-08-23 @ 07:16  Lying BP: 137/56 HR: 52  Sitting BP: 136/65 HR: 54  Standing BP: --/-- HR: --  Site: upper right arm  Mode: electronic  Orthostatic VS  04-07-23 @ 08:12  Lying BP: 143/65 HR: 63  Sitting BP: 148/63 HR: 63  Standing BP: --/-- HR: --  Site: --  Mode: --

## 2023-04-08 NOTE — BH INPATIENT PSYCHIATRY PROGRESS NOTE - NSBHASSESSSUMMFT_PSY_ALL_CORE
4/4: Patient overall less irritable and vocally agitated. Some EPS. Plan reduce haldol, increase SSRI check for low NA.   4/5 Some improvement in mood since adding SSRI w/o signs of hortencia. COnt current rx. Seen by medicine, does not feel there is concern for DVT but recommends hip and pelvic films given pain complaints hard to separate spinal stenosis pain from arthritic symptoms and also to r/o undiagnosed injury form fall PTA   4/6 Modest gains, more consolable, able to be reassured Cont current meds getting hip and pelvis xray to r/o pain arising from these bony structures rather than spinal stenosis  4/7 Improvement in psychosis, vocal agitation , dysphoria and anxiety  though with residual symptoms. Cont current regimen. Reviewed xray with internal medicine recommending ortho f/u after d/c  4/8: Some continued verbal agitation, somatic, negative, continue current regimen

## 2023-04-09 PROCEDURE — 99231 SBSQ HOSP IP/OBS SF/LOW 25: CPT

## 2023-04-09 RX ADMIN — SENNA PLUS 2 TABLET(S): 8.6 TABLET ORAL at 20:52

## 2023-04-09 RX ADMIN — Medication 50 MILLIGRAM(S): at 20:52

## 2023-04-09 RX ADMIN — LIDOCAINE 1 PATCH: 4 CREAM TOPICAL at 20:51

## 2023-04-09 RX ADMIN — LIDOCAINE 1 PATCH: 4 CREAM TOPICAL at 09:30

## 2023-04-09 RX ADMIN — HALOPERIDOL DECANOATE 1 MILLIGRAM(S): 100 INJECTION INTRAMUSCULAR at 06:34

## 2023-04-09 RX ADMIN — ENOXAPARIN SODIUM 40 MILLIGRAM(S): 100 INJECTION SUBCUTANEOUS at 09:40

## 2023-04-09 RX ADMIN — Medication 1 DROP(S): at 20:51

## 2023-04-09 RX ADMIN — HALOPERIDOL DECANOATE 1 MILLIGRAM(S): 100 INJECTION INTRAMUSCULAR at 20:52

## 2023-04-09 RX ADMIN — Medication 25 MILLIGRAM(S): at 09:40

## 2023-04-09 RX ADMIN — GABAPENTIN 300 MILLIGRAM(S): 400 CAPSULE ORAL at 06:29

## 2023-04-09 RX ADMIN — GABAPENTIN 300 MILLIGRAM(S): 400 CAPSULE ORAL at 12:25

## 2023-04-09 RX ADMIN — HALOPERIDOL DECANOATE 1 MILLIGRAM(S): 100 INJECTION INTRAMUSCULAR at 17:36

## 2023-04-09 RX ADMIN — HALOPERIDOL DECANOATE 1 MILLIGRAM(S): 100 INJECTION INTRAMUSCULAR at 12:26

## 2023-04-09 RX ADMIN — POLYETHYLENE GLYCOL 3350 17 GRAM(S): 17 POWDER, FOR SOLUTION ORAL at 09:41

## 2023-04-09 RX ADMIN — Medication 81 MILLIGRAM(S): at 09:40

## 2023-04-09 RX ADMIN — SERTRALINE 50 MILLIGRAM(S): 25 TABLET, FILM COATED ORAL at 09:40

## 2023-04-09 RX ADMIN — FAMOTIDINE 20 MILLIGRAM(S): 10 INJECTION INTRAVENOUS at 09:40

## 2023-04-09 RX ADMIN — GABAPENTIN 300 MILLIGRAM(S): 400 CAPSULE ORAL at 20:52

## 2023-04-09 RX ADMIN — Medication 25 MILLIGRAM(S): at 20:51

## 2023-04-09 RX ADMIN — LIDOCAINE 1 PATCH: 4 CREAM TOPICAL at 06:22

## 2023-04-09 RX ADMIN — TIOTROPIUM BROMIDE 2 PUFF(S): 18 CAPSULE ORAL; RESPIRATORY (INHALATION) at 09:41

## 2023-04-09 NOTE — BH INPATIENT PSYCHIATRY PROGRESS NOTE - NSBHASSESSSUMMFT_PSY_ALL_CORE
4/4: Patient overall less irritable and vocally agitated. Some EPS. Plan reduce haldol, increase SSRI check for low NA.   4/5 Some improvement in mood since adding SSRI w/o signs of hortencia. COnt current rx. Seen by medicine, does not feel there is concern for DVT but recommends hip and pelvic films given pain complaints hard to separate spinal stenosis pain from arthritic symptoms and also to r/o undiagnosed injury form fall PTA   4/6 Modest gains, more consolable, able to be reassured Cont current meds getting hip and pelvis xray to r/o pain arising from these bony structures rather than spinal stenosis  4/7 Improvement in psychosis, vocal agitation , dysphoria and anxiety  though with residual symptoms. Cont current regimen. Reviewed xray with internal medicine recommending ortho f/u after d/c  4/8: Some continued verbal agitation, somatic, negative, continue current regimen  4/9: Patient with agitation and dysphoria, intermittent, seems improved today

## 2023-04-09 NOTE — BH INPATIENT PSYCHIATRY PROGRESS NOTE - CURRENT MEDICATION
MEDICATIONS  (STANDING):  artificial  tears Solution 1 Drop(s) Both EYES two times a day  aspirin  chewable 81 milliGRAM(s) Oral daily  enoxaparin Injectable 40 milliGRAM(s) SubCutaneous <User Schedule>  famotidine    Tablet 20 milliGRAM(s) Oral daily  gabapentin 300 milliGRAM(s) Oral <User Schedule>  haloperidol     Tablet 1 milliGRAM(s) Oral <User Schedule>  lidocaine   4% Patch 1 Patch Transdermal every 24 hours  metoprolol tartrate 25 milliGRAM(s) Oral two times a day  polyethylene glycol 3350 17 Gram(s) Oral daily  senna 2 Tablet(s) Oral at bedtime  sertraline 50 milliGRAM(s) Oral daily  tiotropium 2.5 MICROgram(s) Inhaler 2 Puff(s) Inhalation daily  traZODone 50 milliGRAM(s) Oral at bedtime    MEDICATIONS  (PRN):  acetaminophen     Tablet .. 650 milliGRAM(s) Oral every 6 hours PRN Mild Pain (1 - 3), Moderate Pain (4 - 6), Severe Pain (7 - 10)  albuterol    90 MICROgram(s) HFA Inhaler 2 Puff(s) Inhalation every 6 hours PRN Shortness of Breath and/or Wheezing  aluminum hydroxide/magnesium hydroxide/simethicone Suspension 30 milliLiter(s) Oral every 4 hours PRN Dyspepsia  bisacodyl 5 milliGRAM(s) Oral every 12 hours PRN Constipation  bisacodyl Suppository 10 milliGRAM(s) Rectal once PRN Constipation  haloperidol     Tablet 1 milliGRAM(s) Oral every 6 hours PRN agitation  haloperidol    Injectable 1 milliGRAM(s) IntraMuscular once PRN agitation  haloperidol    Injectable 1 milliGRAM(s) IntraMuscular three times a day PRN refusal of court ordered meds  haloperidol    Injectable 1 milliGRAM(s) IntraMuscular once PRN agitation  saline laxative (FLEET) Rectal Enema 1 Enema Rectal daily PRN constipation

## 2023-04-09 NOTE — BH INPATIENT PSYCHIATRY PROGRESS NOTE - MSE UNSTRUCTURED FT
Patient is awake and alert. Affect is neutral today. Speech is fluent. TP is logical. No delusions. 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 No perceptual disturbance. Poor insight. No suicidal ideations.

## 2023-04-09 NOTE — BH INPATIENT PSYCHIATRY PROGRESS NOTE - NSBHCHARTREVIEWVS_PSY_A_CORE FT
Vital Signs Last 24 Hrs  T(C): 36.3 (04-09-23 @ 08:23), Max: 36.3 (04-09-23 @ 08:23)  T(F): 97.4 (04-09-23 @ 08:23), Max: 97.4 (04-09-23 @ 08:23)  HR: --  BP: 132/60 (04-08-23 @ 20:39) (132/60 - 132/60)  BP(mean): 60 (04-08-23 @ 20:39) (60 - 60)  RR: --  SpO2: --    Orthostatic VS  04-09-23 @ 08:23  Lying BP: --/-- HR: --  Sitting BP: 140/73 HR: 57  Standing BP: 145/65 HR: 56  Site: --  Mode: --  Orthostatic VS  04-08-23 @ 07:16  Lying BP: 137/56 HR: 52  Sitting BP: 136/65 HR: 54  Standing BP: --/-- HR: --  Site: upper right arm  Mode: electronic

## 2023-04-09 NOTE — BH INPATIENT PSYCHIATRY PROGRESS NOTE - PRN MEDS
MEDICATIONS  (PRN):  acetaminophen     Tablet .. 650 milliGRAM(s) Oral every 6 hours PRN Mild Pain (1 - 3), Moderate Pain (4 - 6), Severe Pain (7 - 10)  albuterol    90 MICROgram(s) HFA Inhaler 2 Puff(s) Inhalation every 6 hours PRN Shortness of Breath and/or Wheezing  aluminum hydroxide/magnesium hydroxide/simethicone Suspension 30 milliLiter(s) Oral every 4 hours PRN Dyspepsia  bisacodyl 5 milliGRAM(s) Oral every 12 hours PRN Constipation  bisacodyl Suppository 10 milliGRAM(s) Rectal once PRN Constipation  haloperidol     Tablet 1 milliGRAM(s) Oral every 6 hours PRN agitation  haloperidol    Injectable 1 milliGRAM(s) IntraMuscular once PRN agitation  haloperidol    Injectable 1 milliGRAM(s) IntraMuscular three times a day PRN refusal of court ordered meds  haloperidol    Injectable 1 milliGRAM(s) IntraMuscular once PRN agitation  saline laxative (FLEET) Rectal Enema 1 Enema Rectal daily PRN constipation

## 2023-04-10 VITALS — TEMPERATURE: 98 F

## 2023-04-10 PROCEDURE — 99231 SBSQ HOSP IP/OBS SF/LOW 25: CPT

## 2023-04-10 RX ADMIN — SERTRALINE 50 MILLIGRAM(S): 25 TABLET, FILM COATED ORAL at 08:23

## 2023-04-10 RX ADMIN — LIDOCAINE 1 PATCH: 4 CREAM TOPICAL at 08:20

## 2023-04-10 RX ADMIN — ENOXAPARIN SODIUM 40 MILLIGRAM(S): 100 INJECTION SUBCUTANEOUS at 08:22

## 2023-04-10 RX ADMIN — FAMOTIDINE 20 MILLIGRAM(S): 10 INJECTION INTRAVENOUS at 08:23

## 2023-04-10 RX ADMIN — HALOPERIDOL DECANOATE 1 MILLIGRAM(S): 100 INJECTION INTRAMUSCULAR at 06:01

## 2023-04-10 RX ADMIN — GABAPENTIN 300 MILLIGRAM(S): 400 CAPSULE ORAL at 06:00

## 2023-04-10 RX ADMIN — TIOTROPIUM BROMIDE 2 PUFF(S): 18 CAPSULE ORAL; RESPIRATORY (INHALATION) at 09:15

## 2023-04-10 RX ADMIN — POLYETHYLENE GLYCOL 3350 17 GRAM(S): 17 POWDER, FOR SOLUTION ORAL at 08:23

## 2023-04-10 RX ADMIN — Medication 25 MILLIGRAM(S): at 08:23

## 2023-04-10 RX ADMIN — Medication 81 MILLIGRAM(S): at 08:23

## 2023-04-10 NOTE — BH INPATIENT PSYCHIATRY PROGRESS NOTE - TELEPSYCHIATRY?
No

## 2023-04-10 NOTE — BH INPATIENT PSYCHIATRY PROGRESS NOTE - NSTXMEDICGOAL_PSY_ALL_CORE
Take all medications as prescribed

## 2023-04-10 NOTE — BH SAFETY PLAN - WARNING SIGN 1
Patient declined the safety plan. However, patient initially presented with symptoms of depression, anxiety, and agitation.

## 2023-04-10 NOTE — BH INPATIENT PSYCHIATRY DISCHARGE NOTE - NSDCCPCAREPLAN_GEN_ALL_CORE_FT
PRINCIPAL DISCHARGE DIAGNOSIS  Diagnosis: Vascular dementia with psychotic disturbance  Assessment and Plan of Treatment:       SECONDARY DISCHARGE DIAGNOSES  Diagnosis: History of bipolar disorder  Assessment and Plan of Treatment:

## 2023-04-10 NOTE — BH INPATIENT PSYCHIATRY PROGRESS NOTE - NSBHASSESSSUMMFT_PSY_ALL_CORE
4/4: Patient overall less irritable and vocally agitated. Some EPS. Plan reduce haldol, increase SSRI check for low NA.   4/5 Some improvement in mood since adding SSRI w/o signs of hortencia. COnt current rx. Seen by medicine, does not feel there is concern for DVT but recommends hip and pelvic films given pain complaints hard to separate spinal stenosis pain from arthritic symptoms and also to r/o undiagnosed injury form fall PTA   4/6 Modest gains, more consolable, able to be reassured Cont current meds getting hip and pelvis xray to r/o pain arising from these bony structures rather than spinal stenosis  4/7 Improvement in psychosis, vocal agitation , dysphoria and anxiety  though with residual symptoms. Cont current regimen. Reviewed xray with internal medicine recommending ortho f/u after d/c  4/8: Some continued verbal agitation, somatic, negative, continue current regimen  4/9: Patient with agitation and dysphoria, intermittent, seems improved today  4/10 Patient improved but irritability around family may be clinically rlelavant indicator of activation from SSRI. Reviewed with daughter will d/c and not include as part of d/c meds. Patient not aggressive not suicidal not a danger to self or others. No longer needs hospital care. D/C home daughter moving in and hiring home care to assist her in care of mom. Reviewed care with daughter including medical f/u pain management, med schedule. Daughter will call with any concerns pending f/u with Dr Jones 4/4: Patient overall less irritable and vocally agitated. Some EPS. Plan reduce haldol, increase SSRI check for low NA.   4/5 Some improvement in mood since adding SSRI w/o signs of hortencia. COnt current rx. Seen by medicine, does not feel there is concern for DVT but recommends hip and pelvic films given pain complaints hard to separate spinal stenosis pain from arthritic symptoms and also to r/o undiagnosed injury form fall PTA   4/6 Modest gains, more consolable, able to be reassured Cont current meds getting hip and pelvis xray to r/o pain arising from these bony structures rather than spinal stenosis  4/7 Improvement in psychosis, vocal agitation , dysphoria and anxiety  though with residual symptoms. Cont current regimen. Reviewed xray with internal medicine recommending ortho f/u after d/c  4/8: Some continued verbal agitation, somatic, negative, continue current regimen  4/9: Patient with agitation and dysphoria, intermittent, seems improved today  4/10 Patient improved but irritability around family may be clinically relevant indicator of activation from SSRI. Reviewed with daughter will d/c and not include as part of d/c meds. Daughter also concern about her being on multiple meds increasing risk of GI bleed Patient not aggressive not suicidal not a danger to self or others. No longer needs hospital care. D/C home daughter moving in and hiring home care to assist her in care of mom. Reviewed care with daughter including medical f/u pain management, med schedule. Daughter will call with any concerns pending f/u with Dr Jones

## 2023-04-10 NOTE — BH INPATIENT PSYCHIATRY PROGRESS NOTE - NSTXMEDICPROGRES_PSY_ALL_CORE
No Change

## 2023-04-10 NOTE — BH INPATIENT PSYCHIATRY PROGRESS NOTE - NSTXCOPEDATETRGT_PSY_ALL_CORE
02-Feb-2023
08-Feb-2023
03-Apr-2023
14-Feb-2023
20-Feb-2023
13-Mar-2023
28-Jan-2023
27-Mar-2023
27-Mar-2023
20-Mar-2023
30-Mar-2023
06-Mar-2023
20-Mar-2023
27-Mar-2023
28-Jan-2023
10-Apr-2023
10-Apr-2023
28-Jan-2023
02-Feb-2023
14-Feb-2023
02-Feb-2023
10-Apr-2023
10-Apr-2023
02-Feb-2023
06-Mar-2023
06-Mar-2023
20-Feb-2023
08-Feb-2023
13-Mar-2023
02-Feb-2023
27-Feb-2023
03-Apr-2023
08-Feb-2023
20-Mar-2023
10-Apr-2023
14-Feb-2023
20-Mar-2023
27-Feb-2023
30-Mar-2023
20-Mar-2023
27-Feb-2023
03-Apr-2023
13-Mar-2023
14-Feb-2023
13-Mar-2023
13-Mar-2023
20-Feb-2023
10-Apr-2023
14-Feb-2023
03-Apr-2023
08-Feb-2023
10-Apr-2023
03-Apr-2023
20-Mar-2023
02-Feb-2023
03-Apr-2023
08-Feb-2023
28-Jan-2023
27-Feb-2023
20-Feb-2023
20-Feb-2023
06-Mar-2023
08-Feb-2023
13-Mar-2023
13-Mar-2023
27-Feb-2023
20-Feb-2023
27-Feb-2023
08-Feb-2023
10-Apr-2023
27-Mar-2023
14-Feb-2023
06-Mar-2023
14-Feb-2023
20-Mar-2023
06-Mar-2023
30-Mar-2023

## 2023-04-10 NOTE — BH INPATIENT PSYCHIATRY DISCHARGE NOTE - NSBHMETABOLIC_PSY_ALL_CORE_FT
BMI: BMI (kg/m2): 19.7 (03-11-23 @ 16:05)  HbA1c: A1C with Estimated Average Glucose Result: 5.3 % (01-23-23 @ 08:57)    Glucose: POCT Blood Glucose.: 118 mg/dL (09-17-22 @ 08:34)    BP: 147/66 (04-09-23 @ 20:13) (132/60 - 147/66)  Lipid Panel: Date/Time: 01-23-23 @ 08:57  Cholesterol, Serum: 171  Direct LDL: --  HDL Cholesterol, Serum: 60  Total Cholesterol/HDL Ration Measurement: --  Triglycerides, Serum: 63

## 2023-04-10 NOTE — BH INPATIENT PSYCHIATRY PROGRESS NOTE - NSBHCONSULTIPREASON_PSY_A_CORE
danger to self; mental illness expected to respond to inpatient care

## 2023-04-10 NOTE — BH INPATIENT PSYCHIATRY PROGRESS NOTE - NSTXCOPEINTERMD_PSY_ALL_CORE
haldol/gabapentin trial
Zyprexa trial court ordered meds approved
Seroquel trial, possible change to another antimanic
Zyprexa trial court ordered meds approved
add trazodone to risperidone
haldol/gabapentin trial
Seroquel trial, possible change to another antimanic
add trazodone to risperidone
Seroquel trial, possible change to another antimanic
add trazodone to risperidone
haldol/gabapentin trial
Seroquel trial, possible change to another antimanic
C/w med trial, pt refusing meds, pursuing TOO
Seroquel trial, possible change to another antimanic
haldol/gabapentin trial
C/w med trial, pt refusing meds, pursuing TOO
C/w med trial, pt refusing meds, pursuing TOO
add trazodone to risperidone
add trazodone to risperidone
Seroquel trial, possible change to another antimanic
Zyprexa trial court ordered meds approved
add trazodone to risperidone
haldol/gabapentin trial
C/w med trial, pt refusing meds, pursuing TOO
Seroquel trial, possible change to another antimanic
Zyprexa trial court ordered meds approved
haldol/gabapentin trial
haldol/gabapentin trial
Zyprexa trial court ordered meds approved
add trazodone to risperidone
add trazodone to risperidone
haldol/gabapentin trial
C/w med trial, pt refusing meds, pursuing TOO
Zyprexa trial court ordered meds approved
Seroquel trial, possible change to another antimanic
add trazodone to risperidone
haldol/gabapentin trial
haldol/gabapentin trial
C/w med trial, pt refusing meds, pursuing TOO
Zyprexa trial court ordered meds approved
haldol/gabapentin trial
haldol/gabapentin trial
Seroquel trial, possible change to another antimanic
haldol/gabapentin trial
C/w med trial, pt refusing meds, pursuing TOO
C/w med trial, pt refusing meds, pursuing TOO
Seroquel trial, possible change to another antimanic
haldol/gabapentin trial
add trazodone to risperidone
Seroquel trial, possible change to another antimanic
Zyprexa trial court ordered meds approved
haldol/gabapentin trial
Zyprexa trial court ordered meds approved
Zyprexa trial court ordered meds approved
add trazodone to risperidone
Zyprexa trial court ordered meds approved
Zyprexa trial court ordered meds approved
Seroquel trial, possible change to another antimanic
add trazodone to risperidone
C/w med trial, pt refusing meds, pursuing TOO
add trazodone to risperidone
add trazodone to risperidone
Seroquel trial, possible change to another antimanic

## 2023-04-10 NOTE — BH INPATIENT PSYCHIATRY PROGRESS NOTE - NSBHCHARTREVIEWVS_PSY_A_CORE FT
Vital Signs Last 24 Hrs  T(C): 36.5 (04-10-23 @ 08:06), Max: 36.5 (04-10-23 @ 08:06)  T(F): 97.7 (04-10-23 @ 08:06), Max: 97.7 (04-10-23 @ 08:06)  HR: 69 (04-09-23 @ 20:13) (69 - 69)  BP: 147/66 (04-09-23 @ 20:13) (147/66 - 147/66)  BP(mean): --  RR: 16 (04-09-23 @ 20:13) (16 - 16)  SpO2: --    Orthostatic VS  04-10-23 @ 08:06  Lying BP: 128/66 HR: 56  Sitting BP: 121/62 HR: 56  Standing BP: --/-- HR: --  Site: --  Mode: --  Orthostatic VS  04-09-23 @ 08:23  Lying BP: --/-- HR: --  Sitting BP: 140/73 HR: 57  Standing BP: 145/65 HR: 56  Site: --  Mode: --

## 2023-04-10 NOTE — BH INPATIENT PSYCHIATRY PROGRESS NOTE - NSTREATMENTCERTY_PSY_ALL_CORE

## 2023-04-10 NOTE — BH INPATIENT PSYCHIATRY PROGRESS NOTE - CURRENT MEDICATION
MEDICATIONS  (STANDING):  artificial  tears Solution 1 Drop(s) Both EYES two times a day  aspirin  chewable 81 milliGRAM(s) Oral daily  enoxaparin Injectable 40 milliGRAM(s) SubCutaneous <User Schedule>  famotidine    Tablet 20 milliGRAM(s) Oral daily  gabapentin 300 milliGRAM(s) Oral <User Schedule>  haloperidol     Tablet 1 milliGRAM(s) Oral <User Schedule>  lidocaine   4% Patch 1 Patch Transdermal every 24 hours  metoprolol tartrate 25 milliGRAM(s) Oral two times a day  polyethylene glycol 3350 17 Gram(s) Oral daily  senna 2 Tablet(s) Oral at bedtime  tiotropium 2.5 MICROgram(s) Inhaler 2 Puff(s) Inhalation daily  traZODone 50 milliGRAM(s) Oral at bedtime    MEDICATIONS  (PRN):  acetaminophen     Tablet .. 650 milliGRAM(s) Oral every 6 hours PRN Mild Pain (1 - 3), Moderate Pain (4 - 6), Severe Pain (7 - 10)  albuterol    90 MICROgram(s) HFA Inhaler 2 Puff(s) Inhalation every 6 hours PRN Shortness of Breath and/or Wheezing  aluminum hydroxide/magnesium hydroxide/simethicone Suspension 30 milliLiter(s) Oral every 4 hours PRN Dyspepsia  bisacodyl 5 milliGRAM(s) Oral every 12 hours PRN Constipation  bisacodyl Suppository 10 milliGRAM(s) Rectal once PRN Constipation  haloperidol     Tablet 1 milliGRAM(s) Oral every 6 hours PRN agitation  haloperidol    Injectable 1 milliGRAM(s) IntraMuscular once PRN agitation  haloperidol    Injectable 1 milliGRAM(s) IntraMuscular three times a day PRN refusal of court ordered meds  haloperidol    Injectable 1 milliGRAM(s) IntraMuscular once PRN agitation  saline laxative (FLEET) Rectal Enema 1 Enema Rectal daily PRN constipation

## 2023-04-10 NOTE — BH INPATIENT PSYCHIATRY PROGRESS NOTE - NSICDXBHPRIMARYDX_PSY_ALL_CORE
Bipolar illness   F31.9  
Vascular dementia with psychotic disturbance   F01.52  
Bipolar illness   F31.9  
Vascular dementia with psychotic disturbance   F01.52  
Vascular dementia with psychotic disturbance   F01.52  
Bipolar illness   F31.9  
Bipolar illness   F31.9  
Vascular dementia with psychotic disturbance   F01.52  
Vascular dementia with psychotic disturbance   F01.52  
Bipolar illness   F31.9  
Bipolar illness   F31.9  
Vascular dementia with psychotic disturbance   F01.52  
Bipolar illness   F31.9  
Vascular dementia with psychotic disturbance   F01.52  
Vascular dementia with psychotic disturbance   F01.52  
Bipolar illness   F31.9  
Vascular dementia with psychotic disturbance   F01.52  
Bipolar illness   F31.9  
Vascular dementia with psychotic disturbance   F01.52  
Bipolar illness   F31.9  
Vascular dementia with psychotic disturbance   F01.52  
Vascular dementia with psychotic disturbance   F01.52  
Bipolar illness   F31.9  
Vascular dementia with psychotic disturbance   F01.52  
Bipolar illness   F31.9  
Bipolar illness   F31.9  
Vascular dementia with psychotic disturbance   F01.52  
Vascular dementia with psychotic disturbance   F01.52  
Bipolar illness   F31.9  
Bipolar illness   F31.9  
Vascular dementia with psychotic disturbance   F01.52  
Bipolar illness   F31.9  
Vascular dementia with psychotic disturbance   F01.52  
Bipolar illness   F31.9  
Vascular dementia with psychotic disturbance   F01.52  
Bipolar illness   F31.9  
Vascular dementia with psychotic disturbance   F01.52  
Bipolar illness   F31.9  
Vascular dementia with psychotic disturbance   F01.52  
Vascular dementia with psychotic disturbance   F01.52  
Bipolar illness   F31.9  
Vascular dementia with psychotic disturbance   F01.52  
Bipolar illness   F31.9  
Vascular dementia with psychotic disturbance   F01.52  
Bipolar illness   F31.9  
Bipolar illness   F31.9  
Vascular dementia with psychotic disturbance   F01.52  
Bipolar illness   F31.9  
Bipolar illness   F31.9  
Vascular dementia with psychotic disturbance   F01.52  
Bipolar illness   F31.9  
Bipolar illness   F31.9  
Vascular dementia with psychotic disturbance   F01.52

## 2023-04-10 NOTE — BH INPATIENT PSYCHIATRY PROGRESS NOTE - NSDCCRITERIA_PSY_ALL_CORE
when symptoms of hortencia resolve and CGI >2
when symptoms of hortnecia resolve and CGI >2
when symptoms of hortencia resolve and CGI >2
when symptoms of hortnecia resolve and CGI >2
when symptoms of hortencia resolve and CGI >2

## 2023-04-10 NOTE — BH INPATIENT PSYCHIATRY PROGRESS NOTE - NSBHATTESTBILLING_PSY_A_CORE
[id number]-Lysiohxfvo OBS or IP - low complexity OR 25-34 mins
97652-Rchojhkpby OBS or IP - low complexity OR 25-34 mins
25971-Ajqybuxchg OBS or IP - moderate complexity OR 35-49 mins
20516-Avnslfywwg OBS or IP - low complexity OR 25-34 mins
40202-Bdimhceguh OBS or IP - moderate complexity OR 35-49 mins
58379-Rznkqkvzvo OBS or IP - low complexity OR 25-34 mins
06718-Ebxreofliy OBS or IP - moderate complexity OR 35-49 mins
19387-Mtdjqemlug OBS or IP - moderate complexity OR 35-49 mins
45604-Ryelydbvwa OBS or IP - moderate complexity OR 35-49 mins
09722-Wztgkyfjai OBS or IP - low complexity OR 25-34 mins
16337-Jyzapscvkz OBS or IP - low complexity OR 25-34 mins
38979-Jtuildzmlv OBS or IP - moderate complexity OR 35-49 mins
40603-Kygiqencpk OBS or IP - low complexity OR 25-34 mins
18260-Kvjebyexkd OBS or IP - moderate complexity OR 35-49 mins
20906-Mvrmhktrkh OBS or IP - low complexity OR 25-34 mins
50754-Wthwfuskuv OBS or IP - low complexity OR 25-34 mins
32416-Owwdxszukt OBS or IP - low complexity OR 25-34 mins
49629-Sliicyuxzk OBS or IP - low complexity OR 25-34 mins
89541-Nbyvoanmtn OBS or IP - low complexity OR 25-34 mins
53803-Gkokgelkjf OBS or IP - low complexity OR 25-34 mins
96115-Frwefcykmy OBS or IP - low complexity OR 25-34 mins
99548-Eshnxgiiqs OBS or IP - moderate complexity OR 35-49 mins
42366-Tkvvypgmeb OBS or IP - low complexity OR 25-34 mins
54655-Bizczojezh OBS or IP - low complexity OR 25-34 mins
72595-Rsofgxftgi OBS or IP - low complexity OR 25-34 mins
94662-Jgabrjhjkf OBS or IP - moderate complexity OR 35-49 mins
56313-Sljntxviuj OBS or IP - low complexity OR 25-34 mins
75765-Svkrcqxbui OBS or IP - moderate complexity OR 35-49 mins
95447-Ykvnmqyach OBS or IP - low complexity OR 25-34 mins
77998-Jiilgqdiks OBS or IP - low complexity OR 25-34 mins
74245-Pqmmoywuds OBS or IP - low complexity OR 25-34 mins
09557-Ikorytyzyf OBS or IP - low complexity OR 25-34 mins
59617-Lnpqhscnff OBS or IP - moderate complexity OR 35-49 mins
73683-Uaduknjfvu OBS or IP - low complexity OR 25-34 mins
05992-Zubwdmmwns OBS or IP - low complexity OR 25-34 mins
60265-Ghtmgzrlbc OBS or IP - moderate complexity OR 35-49 mins
19536-Yjjdjtjkwb OBS or IP - low complexity OR 25-34 mins
91245-Nlbgkjyhgu OBS or IP - moderate complexity OR 35-49 mins
95227-Mrwsxzuutc OBS or IP - low complexity OR 25-34 mins
03387-Jgmcrnyqge OBS or IP - low complexity OR 25-34 mins
36612-Ytgrcnkaqu OBS or IP - low complexity OR 25-34 mins
74897-Yyaqpjileu OBS or IP - moderate complexity OR 35-49 mins
09587-Sxgegcdjwy OBS or IP - low complexity OR 25-34 mins
82934-Mcqzgiiwij OBS or IP - low complexity OR 25-34 mins
26899-Vfskrjozzj OBS or IP - moderate complexity OR 35-49 mins
64202-Udfpylmcpw OBS or IP - low complexity OR 25-34 mins
60269-Impsretjvb OBS or IP - moderate complexity OR 35-49 mins
95163-Wbafpslozm OBS or IP - low complexity OR 25-34 mins
51297-Eeizlkgrgb OBS or IP - low complexity OR 25-34 mins
73409-Thorvpmbxn OBS or IP - low complexity OR 25-34 mins
63939-Rnnbyqooiv OBS or IP - low complexity OR 25-34 mins
64686-Tzulsdhoxe OBS or IP - moderate complexity OR 35-49 mins
87042-Xebyptkjzd OBS or IP - low complexity OR 25-34 mins
41198-Ytrbpiuwiw OBS or IP - low complexity OR 25-34 mins
69978-Vcyombmhaz OBS or IP - moderate complexity OR 35-49 mins
81114-Okectxmfbo OBS or IP - low complexity OR 25-34 mins
83889-Zucqwmleip OBS or IP - low complexity OR 25-34 mins
66980-Eilfopupzc OBS or IP - low complexity OR 25-34 mins
80803-Kkxeurvwlv OBS or IP - low complexity OR 25-34 mins
07127-Xntkmhqdbl OBS or IP - moderate complexity OR 35-49 mins
50336-Unnjofehsd OBS or IP - low complexity OR 25-34 mins
55339-Fteiowfctl OBS or IP - low complexity OR 25-34 mins
60429-Cuhpnowzeu OBS or IP - moderate complexity OR 35-49 mins
70790-Fxvvcvmtxs OBS or IP - low complexity OR 25-34 mins
86170-Spechwnjqd OBS or IP - low complexity OR 25-34 mins
31475-Jxnsembnra OBS or IP - moderate complexity OR 35-49 mins
14488-Wqgivtboay OBS or IP - low complexity OR 25-34 mins
62143-Asejejcjbr OBS or IP - moderate complexity OR 35-49 mins
69691-Dnevgngjqd OBS or IP - low complexity OR 25-34 mins
89839-Qvczkybrmy OBS or IP - low complexity OR 25-34 mins
76054-Uzvyuxmtji OBS or IP - low complexity OR 25-34 mins
53072-Vwimkjykep OBS or IP - moderate complexity OR 35-49 mins
28749-Dnclepycsq OBS or IP - low complexity OR 25-34 mins
38199-Ammphojbou OBS or IP - low complexity OR 25-34 mins
74192-Jssolqfjqj OBS or IP - low complexity OR 25-34 mins
66406-Owmzkfwtro OBS or IP - low complexity OR 25-34 mins
51420-Hgojurshaj OBS or IP - low complexity OR 25-34 mins

## 2023-04-10 NOTE — BH INPATIENT PSYCHIATRY PROGRESS NOTE - NSTXDCOTHRPROGRES_PSY_ALL_CORE
No Change
No Change
Worsening
No Change
No Change
Improving
Worsening
Improving
Improving
No Change
Worsening
No Change
Worsening
Improving
Improving
No Change
No Change
Worsening
Improving
No Change
No Change
Improving
Improving
No Change
Improving
Improving
No Change
Improving
No Change
Worsening
Improving
Improving
No Change
No Change
Improving
No Change
No Change
Worsening
Improving
Improving
No Change
No Change
Improving
No Change
Improving
No Change
Worsening
Improving
Improving
No Change
Worsening
Improving
No Change
Worsening
No Change
Improving
No Change
Worsening
No Change
Worsening
No Change

## 2023-04-10 NOTE — BH INPATIENT PSYCHIATRY PROGRESS NOTE - NSTXMEDICDATEEST_PSY_ALL_CORE
20-Jan-2023
23-Mar-2023
20-Jan-2023
23-Mar-2023
20-Jan-2023
23-Mar-2023
20-Jan-2023
20-Jan-2023
23-Mar-2023
20-Jan-2023
23-Mar-2023
20-Jan-2023
23-Mar-2023
20-Jan-2023
23-Mar-2023
23-Mar-2023
20-Jan-2023
23-Mar-2023
20-Jan-2023
23-Mar-2023
23-Mar-2023
20-Jan-2023
20-Jan-2023
23-Mar-2023
20-Jan-2023
23-Mar-2023
23-Mar-2023
20-Jan-2023
20-Jan-2023
23-Mar-2023
20-Jan-2023
23-Mar-2023
20-Jan-2023
23-Mar-2023
20-Jan-2023

## 2023-04-10 NOTE — BH INPATIENT PSYCHIATRY PROGRESS NOTE - NSBHMETABOLIC_PSY_ALL_CORE_FT
BMI: BMI (kg/m2): 19.7 (03-11-23 @ 16:05)  HbA1c: A1C with Estimated Average Glucose Result: 5.3 % (01-23-23 @ 08:57)    Glucose: POCT Blood Glucose.: 118 mg/dL (09-17-22 @ 08:34)    BP: 134/70 (04-07-23 @ 21:01) (127/50 - 136/65)  Lipid Panel: Date/Time: 01-23-23 @ 08:57  Cholesterol, Serum: 171  Direct LDL: --  HDL Cholesterol, Serum: 60  Total Cholesterol/HDL Ration Measurement: --  Triglycerides, Serum: 63  
BMI: BMI (kg/m2): 19.7 (03-11-23 @ 16:05)  HbA1c: A1C with Estimated Average Glucose Result: 5.3 % (01-23-23 @ 08:57)    Glucose: POCT Blood Glucose.: 118 mg/dL (09-17-22 @ 08:34)    BP: 137/61 (04-04-23 @ 08:31) (128/65 - 151/64)  Lipid Panel: Date/Time: 01-23-23 @ 08:57  Cholesterol, Serum: 171  Direct LDL: --  HDL Cholesterol, Serum: 60  Total Cholesterol/HDL Ration Measurement: --  Triglycerides, Serum: 63  
BMI: BMI (kg/m2): 22.7 (01-05-23 @ 14:22)  HbA1c: A1C with Estimated Average Glucose Result: 5.3 % (01-23-23 @ 08:57)    Glucose: POCT Blood Glucose.: 118 mg/dL (09-17-22 @ 08:34)    BP: --  Lipid Panel: Date/Time: 01-23-23 @ 08:57  Cholesterol, Serum: 171  Direct LDL: --  HDL Cholesterol, Serum: 60  Total Cholesterol/HDL Ration Measurement: --  Triglycerides, Serum: 63  
BMI: BMI (kg/m2): 22.7 (01-05-23 @ 14:22)  HbA1c: A1C with Estimated Average Glucose Result: 5.3 % (01-23-23 @ 08:57)    Glucose: POCT Blood Glucose.: 118 mg/dL (09-17-22 @ 08:34)    BP: 150/78 (01-26-23 @ 09:37) (131/58 - 150/78)  Lipid Panel: Date/Time: 01-23-23 @ 08:57  Cholesterol, Serum: 171  Direct LDL: --  HDL Cholesterol, Serum: 60  Total Cholesterol/HDL Ration Measurement: --  Triglycerides, Serum: 63  
BMI: BMI (kg/m2): 19.7 (03-11-23 @ 16:05)  HbA1c: A1C with Estimated Average Glucose Result: 5.3 % (01-23-23 @ 08:57)    Glucose: POCT Blood Glucose.: 118 mg/dL (09-17-22 @ 08:34)    BP: 137/76 (03-16-23 @ 20:48) (137/76 - 137/76)  Lipid Panel: Date/Time: 01-23-23 @ 08:57  Cholesterol, Serum: 171  Direct LDL: --  HDL Cholesterol, Serum: 60  Total Cholesterol/HDL Ration Measurement: --  Triglycerides, Serum: 63  
BMI: BMI (kg/m2): 19.7 (03-11-23 @ 16:05)  HbA1c: A1C with Estimated Average Glucose Result: 5.3 % (01-23-23 @ 08:57)    Glucose: POCT Blood Glucose.: 118 mg/dL (09-17-22 @ 08:34)    BP: 128/65 (04-01-23 @ 20:30) (128/65 - 128/65)  Lipid Panel: Date/Time: 01-23-23 @ 08:57  Cholesterol, Serum: 171  Direct LDL: --  HDL Cholesterol, Serum: 60  Total Cholesterol/HDL Ration Measurement: --  Triglycerides, Serum: 63  
BMI: BMI (kg/m2): 19.7 (03-11-23 @ 16:05)  HbA1c: A1C with Estimated Average Glucose Result: 5.3 % (01-23-23 @ 08:57)    Glucose: POCT Blood Glucose.: 118 mg/dL (09-17-22 @ 08:34)    BP: 130/71 (03-22-23 @ 20:41) (123/50 - 165/75)  Lipid Panel: Date/Time: 01-23-23 @ 08:57  Cholesterol, Serum: 171  Direct LDL: --  HDL Cholesterol, Serum: 60  Total Cholesterol/HDL Ration Measurement: --  Triglycerides, Serum: 63  
BMI: BMI (kg/m2): 19.7 (03-11-23 @ 16:05)  HbA1c: A1C with Estimated Average Glucose Result: 5.3 % (01-23-23 @ 08:57)    Glucose: POCT Blood Glucose.: 118 mg/dL (09-17-22 @ 08:34)    BP: 140/56 (03-18-23 @ 20:56) (137/76 - 140/56)  Lipid Panel: Date/Time: 01-23-23 @ 08:57  Cholesterol, Serum: 171  Direct LDL: --  HDL Cholesterol, Serum: 60  Total Cholesterol/HDL Ration Measurement: --  Triglycerides, Serum: 63  
BMI: BMI (kg/m2): 22.7 (01-05-23 @ 14:22)  HbA1c: A1C with Estimated Average Glucose Result: 5.3 % (01-23-23 @ 08:57)    Glucose: POCT Blood Glucose.: 118 mg/dL (09-17-22 @ 08:34)    BP: --  Lipid Panel: Date/Time: 01-23-23 @ 08:57  Cholesterol, Serum: 171  Direct LDL: --  HDL Cholesterol, Serum: 60  Total Cholesterol/HDL Ration Measurement: --  Triglycerides, Serum: 63  
BMI: BMI (kg/m2): 22.7 (01-05-23 @ 14:22)  HbA1c: A1C with Estimated Average Glucose Result: 5.3 % (01-23-23 @ 08:57)    Glucose: POCT Blood Glucose.: 118 mg/dL (09-17-22 @ 08:34)    BP: 107/66 (02-07-23 @ 07:46) (107/66 - 140/80)  Lipid Panel: Date/Time: 01-23-23 @ 08:57  Cholesterol, Serum: 171  Direct LDL: --  HDL Cholesterol, Serum: 60  Total Cholesterol/HDL Ration Measurement: --  Triglycerides, Serum: 63  
BMI: BMI (kg/m2): 22.7 (01-05-23 @ 14:22)  HbA1c: A1C with Estimated Average Glucose Result: 5.3 % (01-23-23 @ 08:57)    Glucose: POCT Blood Glucose.: 118 mg/dL (09-17-22 @ 08:34)    BP: 107/67 (03-06-23 @ 20:40) (107/67 - 119/55)  Lipid Panel: Date/Time: 01-23-23 @ 08:57  Cholesterol, Serum: 171  Direct LDL: --  HDL Cholesterol, Serum: 60  Total Cholesterol/HDL Ration Measurement: --  Triglycerides, Serum: 63  
BMI: BMI (kg/m2): 22.7 (01-05-23 @ 14:22)  HbA1c: A1C with Estimated Average Glucose Result: 5.3 % (01-23-23 @ 08:57)    Glucose: POCT Blood Glucose.: 118 mg/dL (09-17-22 @ 08:34)    BP: 134/60 (02-08-23 @ 07:52) (107/66 - 134/60)  Lipid Panel: Date/Time: 01-23-23 @ 08:57  Cholesterol, Serum: 171  Direct LDL: --  HDL Cholesterol, Serum: 60  Total Cholesterol/HDL Ration Measurement: --  Triglycerides, Serum: 63  
BMI: BMI (kg/m2): 22.7 (01-05-23 @ 14:22)  HbA1c: A1C with Estimated Average Glucose Result: 5.3 % (01-23-23 @ 08:57)    Glucose: POCT Blood Glucose.: 118 mg/dL (09-17-22 @ 08:34)    BP: --  Lipid Panel: Date/Time: 01-23-23 @ 08:57  Cholesterol, Serum: 171  Direct LDL: --  HDL Cholesterol, Serum: 60  Total Cholesterol/HDL Ration Measurement: --  Triglycerides, Serum: 63  
BMI: BMI (kg/m2): 19.7 (03-11-23 @ 16:05)  HbA1c: A1C with Estimated Average Glucose Result: 5.3 % (01-23-23 @ 08:57)    Glucose: POCT Blood Glucose.: 118 mg/dL (09-17-22 @ 08:34)    BP: 135/61 (03-23-23 @ 20:29) (130/71 - 165/75)  Lipid Panel: Date/Time: 01-23-23 @ 08:57  Cholesterol, Serum: 171  Direct LDL: --  HDL Cholesterol, Serum: 60  Total Cholesterol/HDL Ration Measurement: --  Triglycerides, Serum: 63  
BMI: BMI (kg/m2): 19.7 (03-11-23 @ 16:05)  HbA1c: A1C with Estimated Average Glucose Result: 5.3 % (01-23-23 @ 08:57)    Glucose: POCT Blood Glucose.: 118 mg/dL (09-17-22 @ 08:34)    BP: 136/83 (03-13-23 @ 20:36) (125/65 - 149/71)  Lipid Panel: Date/Time: 01-23-23 @ 08:57  Cholesterol, Serum: 171  Direct LDL: --  HDL Cholesterol, Serum: 60  Total Cholesterol/HDL Ration Measurement: --  Triglycerides, Serum: 63  
BMI: BMI (kg/m2): 22.7 (01-05-23 @ 14:22)  HbA1c:   Glucose: POCT Blood Glucose.: 118 mg/dL (09-17-22 @ 08:34)    BP: 141/60 (01-20-23 @ 16:48) (141/60 - 141/60)  Lipid Panel: 
BMI: BMI (kg/m2): 22.7 (01-05-23 @ 14:22)  HbA1c: A1C with Estimated Average Glucose Result: 5.3 % (01-23-23 @ 08:57)    Glucose: POCT Blood Glucose.: 118 mg/dL (09-17-22 @ 08:34)    BP: 115/58 (03-11-23 @ 08:30) (108/60 - 135/69)  Lipid Panel: Date/Time: 01-23-23 @ 08:57  Cholesterol, Serum: 171  Direct LDL: --  HDL Cholesterol, Serum: 60  Total Cholesterol/HDL Ration Measurement: --  Triglycerides, Serum: 63  
BMI: BMI (kg/m2): 22.7 (01-05-23 @ 14:22)  HbA1c: A1C with Estimated Average Glucose Result: 5.3 % (01-23-23 @ 08:57)    Glucose: POCT Blood Glucose.: 118 mg/dL (09-17-22 @ 08:34)    BP: 147/64 (02-03-23 @ 08:38) (147/64 - 147/64)  Lipid Panel: Date/Time: 01-23-23 @ 08:57  Cholesterol, Serum: 171  Direct LDL: --  HDL Cholesterol, Serum: 60  Total Cholesterol/HDL Ration Measurement: --  Triglycerides, Serum: 63  
BMI: BMI (kg/m2): 22.7 (01-05-23 @ 14:22)  HbA1c: A1C with Estimated Average Glucose Result: 5.3 % (01-23-23 @ 08:57)    Glucose: POCT Blood Glucose.: 118 mg/dL (09-17-22 @ 08:34)    BP: 160/72 (01-27-23 @ 08:30) (150/78 - 160/72)  Lipid Panel: Date/Time: 01-23-23 @ 08:57  Cholesterol, Serum: 171  Direct LDL: --  HDL Cholesterol, Serum: 60  Total Cholesterol/HDL Ration Measurement: --  Triglycerides, Serum: 63  
BMI: BMI (kg/m2): 22.7 (01-05-23 @ 14:22)  HbA1c: A1C with Estimated Average Glucose Result: 5.3 % (01-23-23 @ 08:57)    Glucose: POCT Blood Glucose.: 118 mg/dL (09-17-22 @ 08:34)    BP: 160/72 (01-30-23 @ 05:48) (160/72 - 160/72)  Lipid Panel: Date/Time: 01-23-23 @ 08:57  Cholesterol, Serum: 171  Direct LDL: --  HDL Cholesterol, Serum: 60  Total Cholesterol/HDL Ration Measurement: --  Triglycerides, Serum: 63  
BMI: BMI (kg/m2): 19.7 (03-11-23 @ 16:05)  HbA1c: A1C with Estimated Average Glucose Result: 5.3 % (01-23-23 @ 08:57)    Glucose: POCT Blood Glucose.: 118 mg/dL (09-17-22 @ 08:34)    BP: 138/64 (03-31-23 @ 07:44) (117/70 - 155/65)  Lipid Panel: Date/Time: 01-23-23 @ 08:57  Cholesterol, Serum: 171  Direct LDL: --  HDL Cholesterol, Serum: 60  Total Cholesterol/HDL Ration Measurement: --  Triglycerides, Serum: 63  
BMI: BMI (kg/m2): 22.7 (01-05-23 @ 14:22)  HbA1c: A1C with Estimated Average Glucose Result: 5.3 % (01-23-23 @ 08:57)    Glucose: POCT Blood Glucose.: 118 mg/dL (09-17-22 @ 08:34)    BP: 160/72 (01-27-23 @ 08:30) (150/78 - 160/72)  Lipid Panel: Date/Time: 01-23-23 @ 08:57  Cholesterol, Serum: 171  Direct LDL: --  HDL Cholesterol, Serum: 60  Total Cholesterol/HDL Ration Measurement: --  Triglycerides, Serum: 63  
BMI: BMI (kg/m2): 22.7 (01-05-23 @ 14:22)  HbA1c: A1C with Estimated Average Glucose Result: 5.3 % (01-23-23 @ 08:57)    Glucose: POCT Blood Glucose.: 118 mg/dL (09-17-22 @ 08:34)    BP: 141/67 (03-03-23 @ 08:16) (110/58 - 150/90)  Lipid Panel: Date/Time: 01-23-23 @ 08:57  Cholesterol, Serum: 171  Direct LDL: --  HDL Cholesterol, Serum: 60  Total Cholesterol/HDL Ration Measurement: --  Triglycerides, Serum: 63  
BMI: BMI (kg/m2): 19.7 (03-11-23 @ 16:05)  HbA1c: A1C with Estimated Average Glucose Result: 5.3 % (01-23-23 @ 08:57)    Glucose: POCT Blood Glucose.: 118 mg/dL (09-17-22 @ 08:34)    BP: 138/64 (03-31-23 @ 07:44) (117/70 - 159/74)  Lipid Panel: Date/Time: 01-23-23 @ 08:57  Cholesterol, Serum: 171  Direct LDL: --  HDL Cholesterol, Serum: 60  Total Cholesterol/HDL Ration Measurement: --  Triglycerides, Serum: 63  
BMI: BMI (kg/m2): 22.7 (01-05-23 @ 14:22)  HbA1c: A1C with Estimated Average Glucose Result: 5.3 % (01-23-23 @ 08:57)    Glucose: POCT Blood Glucose.: 118 mg/dL (09-17-22 @ 08:34)    BP: 147/64 (02-03-23 @ 08:38) (147/64 - 147/64)  Lipid Panel: Date/Time: 01-23-23 @ 08:57  Cholesterol, Serum: 171  Direct LDL: --  HDL Cholesterol, Serum: 60  Total Cholesterol/HDL Ration Measurement: --  Triglycerides, Serum: 63  
BMI: BMI (kg/m2): 22.7 (01-05-23 @ 14:22)  HbA1c: A1C with Estimated Average Glucose Result: 5.3 % (01-23-23 @ 08:57)    Glucose: POCT Blood Glucose.: 118 mg/dL (09-17-22 @ 08:34)    BP: 131/58 (01-24-23 @ 08:22) (131/58 - 131/58)  Lipid Panel: Date/Time: 01-23-23 @ 08:57  Cholesterol, Serum: 171  Direct LDL: --  HDL Cholesterol, Serum: 60  Total Cholesterol/HDL Ration Measurement: --  Triglycerides, Serum: 63  
BMI: BMI (kg/m2): 22.7 (01-05-23 @ 14:22)  HbA1c: A1C with Estimated Average Glucose Result: 5.3 % (01-23-23 @ 08:57)    Glucose: POCT Blood Glucose.: 118 mg/dL (09-17-22 @ 08:34)    BP: 140/80 (02-04-23 @ 20:42) (140/80 - 147/64)  Lipid Panel: Date/Time: 01-23-23 @ 08:57  Cholesterol, Serum: 171  Direct LDL: --  HDL Cholesterol, Serum: 60  Total Cholesterol/HDL Ration Measurement: --  Triglycerides, Serum: 63  
BMI: BMI (kg/m2): 22.7 (01-05-23 @ 14:22)  HbA1c: A1C with Estimated Average Glucose Result: 5.3 % (01-23-23 @ 08:57)    Glucose: POCT Blood Glucose.: 118 mg/dL (09-17-22 @ 08:34)    BP: 120/74 (02-27-23 @ 20:29) (108/66 - 142/76)  Lipid Panel: Date/Time: 01-23-23 @ 08:57  Cholesterol, Serum: 171  Direct LDL: --  HDL Cholesterol, Serum: 60  Total Cholesterol/HDL Ration Measurement: --  Triglycerides, Serum: 63  
BMI: BMI (kg/m2): 22.7 (01-05-23 @ 14:22)  HbA1c: A1C with Estimated Average Glucose Result: 5.3 % (01-23-23 @ 08:57)    Glucose: POCT Blood Glucose.: 118 mg/dL (09-17-22 @ 08:34)    BP: 149/89 (02-25-23 @ 05:27) (135/70 - 149/89)  Lipid Panel: Date/Time: 01-23-23 @ 08:57  Cholesterol, Serum: 171  Direct LDL: --  HDL Cholesterol, Serum: 60  Total Cholesterol/HDL Ration Measurement: --  Triglycerides, Serum: 63  
BMI: BMI (kg/m2): 22.7 (01-05-23 @ 14:22)  HbA1c: A1C with Estimated Average Glucose Result: 5.3 % (01-23-23 @ 08:57)    Glucose: POCT Blood Glucose.: 118 mg/dL (09-17-22 @ 08:34)    BP: 108/60 (03-08-23 @ 20:27) (107/67 - 108/60)  Lipid Panel: Date/Time: 01-23-23 @ 08:57  Cholesterol, Serum: 171  Direct LDL: --  HDL Cholesterol, Serum: 60  Total Cholesterol/HDL Ration Measurement: --  Triglycerides, Serum: 63  
BMI: BMI (kg/m2): 22.7 (01-05-23 @ 14:22)  HbA1c: A1C with Estimated Average Glucose Result: 5.3 % (01-23-23 @ 08:57)    Glucose: POCT Blood Glucose.: 118 mg/dL (09-17-22 @ 08:34)    BP: 135/69 (03-09-23 @ 21:05) (108/60 - 135/69)  Lipid Panel: Date/Time: 01-23-23 @ 08:57  Cholesterol, Serum: 171  Direct LDL: --  HDL Cholesterol, Serum: 60  Total Cholesterol/HDL Ration Measurement: --  Triglycerides, Serum: 63  
BMI: BMI (kg/m2): 22.7 (01-05-23 @ 14:22)  HbA1c: A1C with Estimated Average Glucose Result: 5.3 % (01-23-23 @ 08:57)    Glucose: POCT Blood Glucose.: 118 mg/dL (09-17-22 @ 08:34)    BP: --  Lipid Panel: Date/Time: 01-23-23 @ 08:57  Cholesterol, Serum: 171  Direct LDL: --  HDL Cholesterol, Serum: 60  Total Cholesterol/HDL Ration Measurement: --  Triglycerides, Serum: 63  
BMI: BMI (kg/m2): 19.7 (03-11-23 @ 16:05)  HbA1c: A1C with Estimated Average Glucose Result: 5.3 % (01-23-23 @ 08:57)    Glucose: POCT Blood Glucose.: 118 mg/dL (09-17-22 @ 08:34)    BP: 136/65 (04-06-23 @ 20:43) (127/50 - 136/65)  Lipid Panel: Date/Time: 01-23-23 @ 08:57  Cholesterol, Serum: 171  Direct LDL: --  HDL Cholesterol, Serum: 60  Total Cholesterol/HDL Ration Measurement: --  Triglycerides, Serum: 63  
BMI: BMI (kg/m2): 22.7 (01-05-23 @ 14:22)  HbA1c:   Glucose: POCT Blood Glucose.: 118 mg/dL (09-17-22 @ 08:34)    BP: 141/60 (01-20-23 @ 16:48) (141/60 - 141/60)  Lipid Panel: 
BMI: BMI (kg/m2): 19.7 (03-11-23 @ 16:05)  HbA1c: A1C with Estimated Average Glucose Result: 5.3 % (01-23-23 @ 08:57)    Glucose: POCT Blood Glucose.: 118 mg/dL (09-17-22 @ 08:34)    BP: 132/60 (04-08-23 @ 20:39) (132/60 - 136/65)  Lipid Panel: Date/Time: 01-23-23 @ 08:57  Cholesterol, Serum: 171  Direct LDL: --  HDL Cholesterol, Serum: 60  Total Cholesterol/HDL Ration Measurement: --  Triglycerides, Serum: 63  
BMI: BMI (kg/m2): 19.7 (03-11-23 @ 16:05)  HbA1c: A1C with Estimated Average Glucose Result: 5.3 % (01-23-23 @ 08:57)    Glucose: POCT Blood Glucose.: 118 mg/dL (09-17-22 @ 08:34)    BP: 137/76 (03-16-23 @ 20:48) (137/76 - 137/76)  Lipid Panel: Date/Time: 01-23-23 @ 08:57  Cholesterol, Serum: 171  Direct LDL: --  HDL Cholesterol, Serum: 60  Total Cholesterol/HDL Ration Measurement: --  Triglycerides, Serum: 63  
BMI: BMI (kg/m2): 19.7 (03-11-23 @ 16:05)  HbA1c: A1C with Estimated Average Glucose Result: 5.3 % (01-23-23 @ 08:57)    Glucose: POCT Blood Glucose.: 118 mg/dL (09-17-22 @ 08:34)    BP: 147/66 (04-09-23 @ 20:13) (132/60 - 147/66)  Lipid Panel: Date/Time: 01-23-23 @ 08:57  Cholesterol, Serum: 171  Direct LDL: --  HDL Cholesterol, Serum: 60  Total Cholesterol/HDL Ration Measurement: --  Triglycerides, Serum: 63  
BMI: BMI (kg/m2): 22.7 (01-05-23 @ 14:22)  HbA1c: A1C with Estimated Average Glucose Result: 5.3 % (01-23-23 @ 08:57)    Glucose: POCT Blood Glucose.: 118 mg/dL (09-17-22 @ 08:34)    BP: 139/73 (02-09-23 @ 05:38) (107/66 - 139/73)  Lipid Panel: Date/Time: 01-23-23 @ 08:57  Cholesterol, Serum: 171  Direct LDL: --  HDL Cholesterol, Serum: 60  Total Cholesterol/HDL Ration Measurement: --  Triglycerides, Serum: 63  
BMI: BMI (kg/m2): 22.7 (01-05-23 @ 14:22)  HbA1c: A1C with Estimated Average Glucose Result: 5.3 % (01-23-23 @ 08:57)    Glucose: POCT Blood Glucose.: 118 mg/dL (09-17-22 @ 08:34)    BP: 140/80 (02-04-23 @ 20:42) (140/80 - 140/80)  Lipid Panel: Date/Time: 01-23-23 @ 08:57  Cholesterol, Serum: 171  Direct LDL: --  HDL Cholesterol, Serum: 60  Total Cholesterol/HDL Ration Measurement: --  Triglycerides, Serum: 63  
BMI: BMI (kg/m2): 22.7 (01-05-23 @ 14:22)  HbA1c: A1C with Estimated Average Glucose Result: 5.3 % (01-23-23 @ 08:57)    Glucose: POCT Blood Glucose.: 118 mg/dL (09-17-22 @ 08:34)    BP: 107/67 (03-06-23 @ 20:40) (107/67 - 126/73)  Lipid Panel: Date/Time: 01-23-23 @ 08:57  Cholesterol, Serum: 171  Direct LDL: --  HDL Cholesterol, Serum: 60  Total Cholesterol/HDL Ration Measurement: --  Triglycerides, Serum: 63  
BMI: BMI (kg/m2): 19.7 (03-11-23 @ 16:05)  HbA1c: A1C with Estimated Average Glucose Result: 5.3 % (01-23-23 @ 08:57)    Glucose: POCT Blood Glucose.: 118 mg/dL (09-17-22 @ 08:34)    BP: 127/50 (04-05-23 @ 20:57) (127/50 - 151/64)  Lipid Panel: Date/Time: 01-23-23 @ 08:57  Cholesterol, Serum: 171  Direct LDL: --  HDL Cholesterol, Serum: 60  Total Cholesterol/HDL Ration Measurement: --  Triglycerides, Serum: 63  
BMI: BMI (kg/m2): 22.7 (01-05-23 @ 14:22)  HbA1c: A1C with Estimated Average Glucose Result: 5.3 % (01-23-23 @ 08:57)    Glucose: POCT Blood Glucose.: 118 mg/dL (09-17-22 @ 08:34)    BP: 112/51 (03-03-23 @ 20:32) (112/51 - 141/67)  Lipid Panel: Date/Time: 01-23-23 @ 08:57  Cholesterol, Serum: 171  Direct LDL: --  HDL Cholesterol, Serum: 60  Total Cholesterol/HDL Ration Measurement: --  Triglycerides, Serum: 63  
BMI: BMI (kg/m2): 19.7 (03-11-23 @ 16:05)  HbA1c: A1C with Estimated Average Glucose Result: 5.3 % (01-23-23 @ 08:57)    Glucose: POCT Blood Glucose.: 118 mg/dL (09-17-22 @ 08:34)    BP: 165/75 (03-22-23 @ 07:50) (123/50 - 165/75)  Lipid Panel: Date/Time: 01-23-23 @ 08:57  Cholesterol, Serum: 171  Direct LDL: --  HDL Cholesterol, Serum: 60  Total Cholesterol/HDL Ration Measurement: --  Triglycerides, Serum: 63  
BMI: BMI (kg/m2): 22.7 (01-05-23 @ 14:22)  HbA1c: A1C with Estimated Average Glucose Result: 5.3 % (01-23-23 @ 08:57)    Glucose: POCT Blood Glucose.: 118 mg/dL (09-17-22 @ 08:34)    BP: 146/66 (02-14-23 @ 06:55) (146/66 - 146/66)  Lipid Panel: Date/Time: 01-23-23 @ 08:57  Cholesterol, Serum: 171  Direct LDL: --  HDL Cholesterol, Serum: 60  Total Cholesterol/HDL Ration Measurement: --  Triglycerides, Serum: 63  
BMI: BMI (kg/m2): 22.7 (01-05-23 @ 14:22)  HbA1c: A1C with Estimated Average Glucose Result: 5.3 % (01-23-23 @ 08:57)    Glucose: POCT Blood Glucose.: 118 mg/dL (09-17-22 @ 08:34)    BP: 142/76 (02-26-23 @ 06:43) (116/60 - 149/89)  Lipid Panel: Date/Time: 01-23-23 @ 08:57  Cholesterol, Serum: 171  Direct LDL: --  HDL Cholesterol, Serum: 60  Total Cholesterol/HDL Ration Measurement: --  Triglycerides, Serum: 63  
BMI: BMI (kg/m2): 22.7 (01-05-23 @ 14:22)  HbA1c: A1C with Estimated Average Glucose Result: 5.3 % (01-23-23 @ 08:57)    Glucose: POCT Blood Glucose.: 118 mg/dL (09-17-22 @ 08:34)    BP: 160/72 (01-30-23 @ 05:48) (160/72 - 160/72)  Lipid Panel: Date/Time: 01-23-23 @ 08:57  Cholesterol, Serum: 171  Direct LDL: --  HDL Cholesterol, Serum: 60  Total Cholesterol/HDL Ration Measurement: --  Triglycerides, Serum: 63  
BMI: BMI (kg/m2): 19.7 (03-11-23 @ 16:05)  HbA1c: A1C with Estimated Average Glucose Result: 5.3 % (01-23-23 @ 08:57)    Glucose: POCT Blood Glucose.: 118 mg/dL (09-17-22 @ 08:34)    BP: 149/71 (03-12-23 @ 07:32) (115/58 - 149/71)  Lipid Panel: Date/Time: 01-23-23 @ 08:57  Cholesterol, Serum: 171  Direct LDL: --  HDL Cholesterol, Serum: 60  Total Cholesterol/HDL Ration Measurement: --  Triglycerides, Serum: 63  
BMI: BMI (kg/m2): 19.7 (03-11-23 @ 16:05)  HbA1c: A1C with Estimated Average Glucose Result: 5.3 % (01-23-23 @ 08:57)    Glucose: POCT Blood Glucose.: 118 mg/dL (09-17-22 @ 08:34)    BP: 154/62 (03-25-23 @ 20:34) (135/61 - 154/62)  Lipid Panel: Date/Time: 01-23-23 @ 08:57  Cholesterol, Serum: 171  Direct LDL: --  HDL Cholesterol, Serum: 60  Total Cholesterol/HDL Ration Measurement: --  Triglycerides, Serum: 63  
BMI: BMI (kg/m2): 22.7 (01-05-23 @ 14:22)  HbA1c: A1C with Estimated Average Glucose Result: 5.3 % (01-23-23 @ 08:57)    Glucose: POCT Blood Glucose.: 118 mg/dL (09-17-22 @ 08:34)    BP: 146/66 (02-14-23 @ 06:55) (146/66 - 146/66)  Lipid Panel: Date/Time: 01-23-23 @ 08:57  Cholesterol, Serum: 171  Direct LDL: --  HDL Cholesterol, Serum: 60  Total Cholesterol/HDL Ration Measurement: --  Triglycerides, Serum: 63  
BMI: BMI (kg/m2): 19.7 (03-11-23 @ 16:05)  HbA1c: A1C with Estimated Average Glucose Result: 5.3 % (01-23-23 @ 08:57)    Glucose: POCT Blood Glucose.: 118 mg/dL (09-17-22 @ 08:34)    BP: 135/69 (04-05-23 @ 08:32) (131/56 - 151/64)  Lipid Panel: Date/Time: 01-23-23 @ 08:57  Cholesterol, Serum: 171  Direct LDL: --  HDL Cholesterol, Serum: 60  Total Cholesterol/HDL Ration Measurement: --  Triglycerides, Serum: 63  
BMI: BMI (kg/m2): 22.7 (01-05-23 @ 14:22)  HbA1c: A1C with Estimated Average Glucose Result: 5.3 % (01-23-23 @ 08:57)    Glucose: POCT Blood Glucose.: 118 mg/dL (09-17-22 @ 08:34)    BP: 141/60 (01-20-23 @ 16:48) (141/60 - 141/60)  Lipid Panel: Date/Time: 01-23-23 @ 08:57  Cholesterol, Serum: 171  Direct LDL: --  HDL Cholesterol, Serum: 60  Total Cholesterol/HDL Ration Measurement: --  Triglycerides, Serum: 63  
BMI: BMI (kg/m2): 22.7 (01-05-23 @ 14:22)  HbA1c: A1C with Estimated Average Glucose Result: 5.3 % (01-23-23 @ 08:57)    Glucose: POCT Blood Glucose.: 118 mg/dL (09-17-22 @ 08:34)    BP: 160/72 (01-27-23 @ 08:30) (148/67 - 160/72)  Lipid Panel: Date/Time: 01-23-23 @ 08:57  Cholesterol, Serum: 171  Direct LDL: --  HDL Cholesterol, Serum: 60  Total Cholesterol/HDL Ration Measurement: --  Triglycerides, Serum: 63  
BMI: BMI (kg/m2): 22.7 (01-05-23 @ 14:22)  HbA1c: A1C with Estimated Average Glucose Result: 5.3 % (01-23-23 @ 08:57)    Glucose: POCT Blood Glucose.: 118 mg/dL (09-17-22 @ 08:34)    BP: 149/71 (02-23-23 @ 20:20) (149/71 - 149/71)  Lipid Panel: Date/Time: 01-23-23 @ 08:57  Cholesterol, Serum: 171  Direct LDL: --  HDL Cholesterol, Serum: 60  Total Cholesterol/HDL Ration Measurement: --  Triglycerides, Serum: 63  
BMI: BMI (kg/m2): 19.7 (03-11-23 @ 16:05)  HbA1c: A1C with Estimated Average Glucose Result: 5.3 % (01-23-23 @ 08:57)    Glucose: POCT Blood Glucose.: 118 mg/dL (09-17-22 @ 08:34)    BP: 158/62 (03-28-23 @ 08:40) (138/78 - 158/62)  Lipid Panel: Date/Time: 01-23-23 @ 08:57  Cholesterol, Serum: 171  Direct LDL: --  HDL Cholesterol, Serum: 60  Total Cholesterol/HDL Ration Measurement: --  Triglycerides, Serum: 63  
BMI: BMI (kg/m2): 22.7 (01-05-23 @ 14:22)  HbA1c: A1C with Estimated Average Glucose Result: 5.3 % (01-23-23 @ 08:57)    Glucose: POCT Blood Glucose.: 118 mg/dL (09-17-22 @ 08:34)    BP: --  Lipid Panel: Date/Time: 01-23-23 @ 08:57  Cholesterol, Serum: 171  Direct LDL: --  HDL Cholesterol, Serum: 60  Total Cholesterol/HDL Ration Measurement: --  Triglycerides, Serum: 63  
BMI: BMI (kg/m2): 22.7 (01-05-23 @ 14:22)  HbA1c: A1C with Estimated Average Glucose Result: 5.3 % (01-23-23 @ 08:57)    Glucose: POCT Blood Glucose.: 118 mg/dL (09-17-22 @ 08:34)    BP: 119/55 (03-06-23 @ 08:11) (112/51 - 126/73)  Lipid Panel: Date/Time: 01-23-23 @ 08:57  Cholesterol, Serum: 171  Direct LDL: --  HDL Cholesterol, Serum: 60  Total Cholesterol/HDL Ration Measurement: --  Triglycerides, Serum: 63  
BMI: BMI (kg/m2): 19.7 (03-11-23 @ 16:05)  HbA1c: A1C with Estimated Average Glucose Result: 5.3 % (01-23-23 @ 08:57)    Glucose: POCT Blood Glucose.: 118 mg/dL (09-17-22 @ 08:34)    BP: 154/62 (03-25-23 @ 20:34) (151/68 - 154/62)  Lipid Panel: Date/Time: 01-23-23 @ 08:57  Cholesterol, Serum: 171  Direct LDL: --  HDL Cholesterol, Serum: 60  Total Cholesterol/HDL Ration Measurement: --  Triglycerides, Serum: 63  
BMI: BMI (kg/m2): 22.7 (01-05-23 @ 14:22)  HbA1c: A1C with Estimated Average Glucose Result: 5.3 % (01-23-23 @ 08:57)    Glucose: POCT Blood Glucose.: 118 mg/dL (09-17-22 @ 08:34)    BP: 148/67 (01-25-23 @ 08:31) (131/58 - 148/67)  Lipid Panel: Date/Time: 01-23-23 @ 08:57  Cholesterol, Serum: 171  Direct LDL: --  HDL Cholesterol, Serum: 60  Total Cholesterol/HDL Ration Measurement: --  Triglycerides, Serum: 63  
BMI: BMI (kg/m2): 19.7 (03-11-23 @ 16:05)  HbA1c: A1C with Estimated Average Glucose Result: 5.3 % (01-23-23 @ 08:57)    Glucose: POCT Blood Glucose.: 118 mg/dL (09-17-22 @ 08:34)    BP: 139/83 (03-12-23 @ 23:02) (115/58 - 149/71)  Lipid Panel: Date/Time: 01-23-23 @ 08:57  Cholesterol, Serum: 171  Direct LDL: --  HDL Cholesterol, Serum: 60  Total Cholesterol/HDL Ration Measurement: --  Triglycerides, Serum: 63  
BMI: BMI (kg/m2): 19.7 (03-11-23 @ 16:05)  HbA1c: A1C with Estimated Average Glucose Result: 5.3 % (01-23-23 @ 08:57)    Glucose: POCT Blood Glucose.: 118 mg/dL (09-17-22 @ 08:34)    BP: 136/83 (03-13-23 @ 20:36) (136/83 - 139/83)  Lipid Panel: Date/Time: 01-23-23 @ 08:57  Cholesterol, Serum: 171  Direct LDL: --  HDL Cholesterol, Serum: 60  Total Cholesterol/HDL Ration Measurement: --  Triglycerides, Serum: 63  
BMI: BMI (kg/m2): 22.7 (01-05-23 @ 14:22)  HbA1c: A1C with Estimated Average Glucose Result: 5.3 % (01-23-23 @ 08:57)    Glucose: POCT Blood Glucose.: 118 mg/dL (09-17-22 @ 08:34)    BP: 160/72 (01-30-23 @ 05:48) (160/72 - 160/72)  Lipid Panel: Date/Time: 01-23-23 @ 08:57  Cholesterol, Serum: 171  Direct LDL: --  HDL Cholesterol, Serum: 60  Total Cholesterol/HDL Ration Measurement: --  Triglycerides, Serum: 63  
BMI: BMI (kg/m2): 22.7 (01-05-23 @ 14:22)  HbA1c: A1C with Estimated Average Glucose Result: 5.3 % (01-23-23 @ 08:57)    Glucose: POCT Blood Glucose.: 118 mg/dL (09-17-22 @ 08:34)    BP: --  Lipid Panel: Date/Time: 01-23-23 @ 08:57  Cholesterol, Serum: 171  Direct LDL: --  HDL Cholesterol, Serum: 60  Total Cholesterol/HDL Ration Measurement: --  Triglycerides, Serum: 63  
BMI: BMI (kg/m2): 22.7 (01-05-23 @ 14:22)  HbA1c: A1C with Estimated Average Glucose Result: 5.3 % (01-23-23 @ 08:57)    Glucose: POCT Blood Glucose.: 118 mg/dL (09-17-22 @ 08:34)    BP: 126/73 (03-04-23 @ 20:30) (112/51 - 141/67)  Lipid Panel: Date/Time: 01-23-23 @ 08:57  Cholesterol, Serum: 171  Direct LDL: --  HDL Cholesterol, Serum: 60  Total Cholesterol/HDL Ration Measurement: --  Triglycerides, Serum: 63  
BMI: BMI (kg/m2): 19.7 (03-11-23 @ 16:05)  HbA1c: A1C with Estimated Average Glucose Result: 5.3 % (01-23-23 @ 08:57)    Glucose: POCT Blood Glucose.: 118 mg/dL (09-17-22 @ 08:34)    BP: 128/60 (03-19-23 @ 20:16) (128/60 - 140/56)  Lipid Panel: Date/Time: 01-23-23 @ 08:57  Cholesterol, Serum: 171  Direct LDL: --  HDL Cholesterol, Serum: 60  Total Cholesterol/HDL Ration Measurement: --  Triglycerides, Serum: 63  
BMI: BMI (kg/m2): 19.7 (03-11-23 @ 16:05)  HbA1c: A1C with Estimated Average Glucose Result: 5.3 % (01-23-23 @ 08:57)    Glucose: POCT Blood Glucose.: 118 mg/dL (09-17-22 @ 08:34)    BP: 123/50 (03-20-23 @ 20:45) (123/50 - 140/56)  Lipid Panel: Date/Time: 01-23-23 @ 08:57  Cholesterol, Serum: 171  Direct LDL: --  HDL Cholesterol, Serum: 60  Total Cholesterol/HDL Ration Measurement: --  Triglycerides, Serum: 63  
BMI: BMI (kg/m2): 22.7 (01-05-23 @ 14:22)  HbA1c: A1C with Estimated Average Glucose Result: 5.3 % (01-23-23 @ 08:57)    Glucose: POCT Blood Glucose.: 118 mg/dL (09-17-22 @ 08:34)    BP: --  Lipid Panel: Date/Time: 01-23-23 @ 08:57  Cholesterol, Serum: 171  Direct LDL: --  HDL Cholesterol, Serum: 60  Total Cholesterol/HDL Ration Measurement: --  Triglycerides, Serum: 63  
BMI: BMI (kg/m2): 19.7 (03-11-23 @ 16:05)  HbA1c: A1C with Estimated Average Glucose Result: 5.3 % (01-23-23 @ 08:57)    Glucose: POCT Blood Glucose.: 118 mg/dL (09-17-22 @ 08:34)    BP: 117/70 (03-29-23 @ 20:47) (117/70 - 159/74)  Lipid Panel: Date/Time: 01-23-23 @ 08:57  Cholesterol, Serum: 171  Direct LDL: --  HDL Cholesterol, Serum: 60  Total Cholesterol/HDL Ration Measurement: --  Triglycerides, Serum: 63  
BMI: BMI (kg/m2): 19.7 (03-11-23 @ 16:05)  HbA1c: A1C with Estimated Average Glucose Result: 5.3 % (01-23-23 @ 08:57)    Glucose: POCT Blood Glucose.: 118 mg/dL (09-17-22 @ 08:34)    BP: 151/68 (03-24-23 @ 19:43) (130/71 - 151/68)  Lipid Panel: Date/Time: 01-23-23 @ 08:57  Cholesterol, Serum: 171  Direct LDL: --  HDL Cholesterol, Serum: 60  Total Cholesterol/HDL Ration Measurement: --  Triglycerides, Serum: 63  
BMI: BMI (kg/m2): 22.7 (01-05-23 @ 14:22)  HbA1c: A1C with Estimated Average Glucose Result: 5.3 % (01-23-23 @ 08:57)    Glucose: POCT Blood Glucose.: 118 mg/dL (09-17-22 @ 08:34)    BP: 118/60 (02-10-23 @ 08:02) (118/60 - 139/73)  Lipid Panel: Date/Time: 01-23-23 @ 08:57  Cholesterol, Serum: 171  Direct LDL: --  HDL Cholesterol, Serum: 60  Total Cholesterol/HDL Ration Measurement: --  Triglycerides, Serum: 63  
BMI: BMI (kg/m2): 22.7 (01-05-23 @ 14:22)  HbA1c: A1C with Estimated Average Glucose Result: 5.3 % (01-23-23 @ 08:57)    Glucose: POCT Blood Glucose.: 118 mg/dL (09-17-22 @ 08:34)    BP: 146/66 (02-14-23 @ 06:55) (146/66 - 146/66)  Lipid Panel: Date/Time: 01-23-23 @ 08:57  Cholesterol, Serum: 171  Direct LDL: --  HDL Cholesterol, Serum: 60  Total Cholesterol/HDL Ration Measurement: --  Triglycerides, Serum: 63  
BMI: BMI (kg/m2): 19.7 (03-11-23 @ 16:05)  HbA1c: A1C with Estimated Average Glucose Result: 5.3 % (01-23-23 @ 08:57)    Glucose: POCT Blood Glucose.: 118 mg/dL (09-17-22 @ 08:34)    BP: 159/74 (03-28-23 @ 20:39) (138/78 - 159/74)  Lipid Panel: Date/Time: 01-23-23 @ 08:57  Cholesterol, Serum: 171  Direct LDL: --  HDL Cholesterol, Serum: 60  Total Cholesterol/HDL Ration Measurement: --  Triglycerides, Serum: 63  
BMI: BMI (kg/m2): 22.7 (01-05-23 @ 14:22)  HbA1c: A1C with Estimated Average Glucose Result: 5.3 % (01-23-23 @ 08:57)    Glucose: POCT Blood Glucose.: 118 mg/dL (09-17-22 @ 08:34)    BP: 137/67 (03-01-23 @ 20:32) (110/58 - 150/90)  Lipid Panel: Date/Time: 01-23-23 @ 08:57  Cholesterol, Serum: 171  Direct LDL: --  HDL Cholesterol, Serum: 60  Total Cholesterol/HDL Ration Measurement: --  Triglycerides, Serum: 63  
BMI: BMI (kg/m2): 22.7 (01-05-23 @ 14:22)  HbA1c: A1C with Estimated Average Glucose Result: 5.3 % (01-23-23 @ 08:57)    Glucose: POCT Blood Glucose.: 118 mg/dL (09-17-22 @ 08:34)    BP: 118/60 (02-10-23 @ 08:02) (118/60 - 139/73)  Lipid Panel: Date/Time: 01-23-23 @ 08:57  Cholesterol, Serum: 171  Direct LDL: --  HDL Cholesterol, Serum: 60  Total Cholesterol/HDL Ration Measurement: --  Triglycerides, Serum: 63  
BMI: BMI (kg/m2): 19.7 (03-11-23 @ 16:05)  HbA1c: A1C with Estimated Average Glucose Result: 5.3 % (01-23-23 @ 08:57)    Glucose: POCT Blood Glucose.: 118 mg/dL (09-17-22 @ 08:34)    BP: 136/83 (03-13-23 @ 20:36) (136/83 - 136/83)  Lipid Panel: Date/Time: 01-23-23 @ 08:57  Cholesterol, Serum: 171  Direct LDL: --  HDL Cholesterol, Serum: 60  Total Cholesterol/HDL Ration Measurement: --  Triglycerides, Serum: 63  
BMI: BMI (kg/m2): 22.7 (01-05-23 @ 14:22)  HbA1c: A1C with Estimated Average Glucose Result: 5.3 % (01-23-23 @ 08:57)    Glucose: POCT Blood Glucose.: 118 mg/dL (09-17-22 @ 08:34)    BP: 118/60 (02-10-23 @ 08:02) (118/60 - 118/60)  Lipid Panel: Date/Time: 01-23-23 @ 08:57  Cholesterol, Serum: 171  Direct LDL: --  HDL Cholesterol, Serum: 60  Total Cholesterol/HDL Ration Measurement: --  Triglycerides, Serum: 63  
BMI: BMI (kg/m2): 22.7 (01-05-23 @ 14:22)  HbA1c: A1C with Estimated Average Glucose Result: 5.3 % (01-23-23 @ 08:57)    Glucose: POCT Blood Glucose.: 118 mg/dL (09-17-22 @ 08:34)    BP: 112/70 (02-27-23 @ 09:30) (108/66 - 149/89)  Lipid Panel: Date/Time: 01-23-23 @ 08:57  Cholesterol, Serum: 171  Direct LDL: --  HDL Cholesterol, Serum: 60  Total Cholesterol/HDL Ration Measurement: --  Triglycerides, Serum: 63

## 2023-04-10 NOTE — BH INPATIENT PSYCHIATRY PROGRESS NOTE - MSE UNSTRUCTURED FT
Patient is awake and alert. Sl increased tone. Voice not loud. Mood anxious.  Affect is constricted. Speech is fluent. TP is logical. No well defines delusion, some mistrust of staff. Some anxious content including fear of falling, of getting enough help at home. 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 No perceptual disturbance. Poor insight. No suicidal ideations. Thinks it is 2024

## 2023-04-10 NOTE — BH INPATIENT PSYCHIATRY DISCHARGE NOTE - NSDCMRMEDTOKEN_GEN_ALL_CORE_FT
Albuterol (Eqv-ProAir HFA) 90 mcg/inh inhalation aerosol: 2 puff(s) inhaled every 6 hours x 30 days as needed for  shortness of breath and/or wheezing  aspirin 81 mg oral tablet, chewable: 1 tab(s) orally once a day  famotidine 20 mg oral tablet: 1 tab(s) orally once a day  gabapentin 300 mg oral capsule: 1 cap(s) orally 3 times a day  haloperidol 1 mg oral tablet: 1 tab(s) orally 3 times a day  lidocaine 4% topical film: Apply topically to affected area once a day to back on AM off hs  metoprolol tartrate 25 mg oral tablet: 1 tab(s) orally 2 times a day  metoprolol tartrate 25 mg oral tablet: 1 tab(s) orally 2 times a day  polyethylene glycol 3350 oral powder for reconstitution: 17 gram(s) orally once a day  senna (sennosides) 8.6 mg oral tablet: 2 tab(s) orally once a day (at bedtime)  sertraline 50 mg oral tablet: 1 tab(s) orally once a day  tiotropium 2.5 mcg/inh inhalation aerosol: 2 puff(s) inhaled once a day  traZODone 50 mg oral tablet: 1 tab(s) orally once a day (at bedtime)   Albuterol (Eqv-ProAir HFA) 90 mcg/inh inhalation aerosol: 2 puff(s) inhaled every 6 hours x 30 days as needed for  shortness of breath and/or wheezing  aspirin 81 mg oral tablet, chewable: 1 tab(s) orally once a day  famotidine 20 mg oral tablet: 1 tab(s) orally once a day  gabapentin 300 mg oral capsule: 1 cap(s) orally 3 times a day  haloperidol 1 mg oral tablet: 1 tab(s) orally 3 times a day  lidocaine 4% topical film: Apply topically to affected area once a day to back on AM off hs  metoprolol tartrate 25 mg oral tablet: 1 tab(s) orally 2 times a day  polyethylene glycol 3350 oral powder for reconstitution: 17 gram(s) orally once a day  senna (sennosides) 8.6 mg oral tablet: 2 tab(s) orally once a day (at bedtime)  sertraline 50 mg oral tablet: 1 tab(s) orally once a day  tiotropium 2.5 mcg/inh inhalation aerosol: 2 puff(s) inhaled once a day  traZODone 50 mg oral tablet: 1 tab(s) orally once a day (at bedtime)   Albuterol (Eqv-ProAir HFA) 90 mcg/inh inhalation aerosol: 2 puff(s) inhaled every 6 hours x 30 days as needed for  shortness of breath and/or wheezing  aspirin 81 mg oral tablet, chewable: 1 tab(s) orally once a day  famotidine 20 mg oral tablet: 1 tab(s) orally once a day  gabapentin 300 mg oral capsule: 1 cap(s) orally 3 times a day  haloperidol 1 mg oral tablet: 1 tab(s) orally 3 times a day  lidocaine 4% topical film: Apply topically to affected area once a day to back on AM off hs  metoprolol tartrate 25 mg oral tablet: 1 tab(s) orally 2 times a day  polyethylene glycol 3350 oral powder for reconstitution: 17 gram(s) orally once a day  senna (sennosides) 8.6 mg oral tablet: 2 tab(s) orally once a day (at bedtime)  tiotropium 2.5 mcg/inh inhalation aerosol: 2 puff(s) inhaled once a day  traZODone 50 mg oral tablet: 1 tab(s) orally once a day (at bedtime)

## 2023-04-10 NOTE — BH INPATIENT PSYCHIATRY PROGRESS NOTE - NSBHFUPINTERVALHXFT_PSY_A_CORE
Patient is seen for dementia and hx BAD. Patient eating, sleeping well. Daughter feels she has after improvement period become more irritable within a few days of starting Zoloft. VSS

## 2023-04-10 NOTE — BH INPATIENT PSYCHIATRY PROGRESS NOTE - NSBHCONTPROVIDER_PSY_ALL_CORE
Not applicable

## 2023-04-10 NOTE — BH INPATIENT PSYCHIATRY PROGRESS NOTE - NSTXDCOTHRINTERMD_PSY_ALL_CORE
risperidone trial

## 2023-04-10 NOTE — BH INPATIENT PSYCHIATRY PROGRESS NOTE - NSTXPROBDCOTHR_PSY_ALL_CORE
DISCHARGE ISSUE - OTHER

## 2023-04-10 NOTE — BH INPATIENT PSYCHIATRY PROGRESS NOTE - NSCGISEVERILLNESS_PSY_ALL_CORE
6 = Severely ill - disruptive pathology, behavior and function are frequently influenced by symptoms, may require assistance from others
6 = Severely ill - disruptive pathology, behavior and function are frequently influenced by symptoms, may require assistance from others
5 = Markedly ill - intrusive symptoms that distinctly impair social/occupational function or cause intrusive levels of distress
6 = Severely ill - disruptive pathology, behavior and function are frequently influenced by symptoms, may require assistance from others
5 = Markedly ill - intrusive symptoms that distinctly impair social/occupational function or cause intrusive levels of distress
6 = Severely ill - disruptive pathology, behavior and function are frequently influenced by symptoms, may require assistance from others
5 = Markedly ill - intrusive symptoms that distinctly impair social/occupational function or cause intrusive levels of distress
6 = Severely ill - disruptive pathology, behavior and function are frequently influenced by symptoms, may require assistance from others
5 = Markedly ill - intrusive symptoms that distinctly impair social/occupational function or cause intrusive levels of distress
5 = Markedly ill - intrusive symptoms that distinctly impair social/occupational function or cause intrusive levels of distress
6 = Severely ill - disruptive pathology, behavior and function are frequently influenced by symptoms, may require assistance from others
5 = Markedly ill - intrusive symptoms that distinctly impair social/occupational function or cause intrusive levels of distress
6 = Severely ill - disruptive pathology, behavior and function are frequently influenced by symptoms, may require assistance from others
5 = Markedly ill - intrusive symptoms that distinctly impair social/occupational function or cause intrusive levels of distress
5 = Markedly ill - intrusive symptoms that distinctly impair social/occupational function or cause intrusive levels of distress
6 = Severely ill - disruptive pathology, behavior and function are frequently influenced by symptoms, may require assistance from others
5 = Markedly ill - intrusive symptoms that distinctly impair social/occupational function or cause intrusive levels of distress
6 = Severely ill - disruptive pathology, behavior and function are frequently influenced by symptoms, may require assistance from others

## 2023-04-10 NOTE — BH INPATIENT PSYCHIATRY PROGRESS NOTE - NSTXMEDICINTERMD_PSY_ALL_CORE
Medical Necessity:    Jordin Byrne is a 32 year old male with low back pain with radicular pain down the posterior aspect of their right leg down into the toes.  This is occurring nearly on a daily basis and causes disability as described below.    Failed therapy with pain medications including anticonvulsants, antidepressants, and NSAIDs has occurred.  The patient is not able to partake in their  activities of daily living; as such the patient's ability to function has been limited.     Patient denies bowel/bladder dysfunction, saddle anesthesia, or loss of motor control of the lower extremities.    Interval Hx:  The patient states that he continues to have Lumbar pain which radiates into the hip, buttocks and down the posterior aspect of the leg into the toes. The patient states that he has no pain down the left leg. The patient states that they had a 50% reduction of pain from the previous injection. The patient states that his pain is axially located and radicular in nature.  The patient states that his pain is bilateral located, however the patient states that the pain is worse on the Right side more so over the Left side.  The patient states that his pain is otherwise unchanged. Patient's pain is such that they would consider going to the ED.  We did discuss with them the unknown nature of doing a procedure during the COVID-19 pandemic.   Patient does not have any active symptoms of COVID19 or has been in close contact with someone who has been.  Thus, given the nature of the pain they would like to proceed with the intervention. The patient understands the risks and benefits of Covid-19. The patient states that they would like to proceed with the procedure. The patient was screened utilizing the [facility] protocol. The patient reports: \"The pain is very mild at the moment. I can look after myself normally without causing extra pain. Pain prevents me lifting heavy weights off the floor but I can manage 
C/w med trial, pt refusing meds, pursuing TOO
C/w med trial, pt refusing meds, pursuing TOO
if they are conveniently positioned, eg. on a table. Pain prevents me from walking more than 1 mile. I can sit in my favorite chair as long as I like. I can stand as long as I want but it gives me extra pain. My sleep is occasionally disturbed by pain.  I can travel anywhere but it gives me extra pain. My social life is normal but increases the degree of pain. \"    Past Medical History:   Diagnosis Date   • Anxiety and depression    • Obesity            Blood pressure 133/80, pulse 83, temperature 97.5 °F (36.4 °C), temperature source Temporal, height 6' 1\" (1.854 m), weight (!) 156.5 kg, SpO2 94 %.    ALLERGIES:   Allergen Reactions   • Ceclor [Cefaclor] RASH       Current Outpatient Medications   Medication Sig Dispense Refill   • amLODIPine (NORVASC) 5 MG tablet TAKE 1 TABLET BY MOUTH EVERY DAY 30 tablet 2   • hydrOXYzine (ATARAX) 25 MG tablet TAKE 1 2 TABLET BY MOUTH THREE TIMES A DAY AS NEEDED FOR ANXIETY     • baclofen (LIORESAL) 10 MG tablet Take 1 tablet by mouth 2 times daily as needed (for mx spasm). 1 cap hs for 4 days.  BID if tolerated, if not return to 1 qhs.  During day , 1/2 first and evaluate. 60 tablet 2   • venlafaxine XR (EFFEXOR XR) 75 MG 24 hr capsule TAKE 3 CAPSULES BY MOUTH EVERY DAY. 90 capsule 0     Current Facility-Administered Medications   Medication Dose Route Frequency Provider Last Rate Last Dose   • lidocaine HCl (XYLOCAINE) 1 % injection   Injection PRN Janes Moore MD   4 mL at 06/24/20 1629   • iopamidol (ISOVUE-M 300) 61 % intrathecal injection   Intrathecal PRN Janes Moore MD   1 mL at 06/24/20 1630   • dexamethasone (PF) (DECADRON) injection   Intravenous PRN Janes Moore MD   20 mg at 06/24/20 1630       MRI LUMBAR SPINE:  Results for orders placed during the hospital encounter of 01/21/14   MRI Lumbar Spine    Narrative MRI OF THE LUMBAR SPINE    HISTORY:  Low back pain radiating down right leg and left leg    COMPARISON:  Lumbar spine radiographs performed October 
2013    TECHNIQUE:  Multiple sagittal and axial T1-weighted, T2-weighted, and  inversion recovery data sets were obtained.    The dominant finding is at the level of L5-S1 where there is a large  central disc extrusion. Extruded disc material remains at migrates below  the level of the L5-S1 disc space. Extruded disc material compresses the  thecal sac and proximal right and left S1 nerve roots. Bilateral facet  arthropathy is present at L5-S1.    At the level of L4-5 there is no disc bulge, protrusion or herniation.    At the level of L1-2, L2-3 and L3-4 there is no disc bulge, protrusion or  herniation.    The conus medullaris has a normal appearance.    The paraspinous soft tissues are unremarkable.    There is no bone marrow signal abnormality.      Impression IMPRESSION:  The dominant finding is at the level of L5-S1 where there is  a large central disc extrusion. Extruded disc compresses the thecal sac  and proximal right and left S1 nerve roots. Bilateral facet arthropathy  L5-S1             Currently, pain score is a 9/10  Oswestry Disability Index:  33%  SLR is positive  Pain is described as going down the right dermatomes    Procedure:    Transforaminal Epidural Injection of the  L4-L5 L5-S1  level    EBL:  <1 ml      Preoperative Diagnosis:   Lumbar radiculitis    Postoperative Diagnosis:   Lumbar radiculitis       Procedure Technique:  All risks, benefits, and alternatives were discussed.  The patient stated they understood these and an informed consent was obtained.  The patient was then brought to the procedure room.  They were then placed in the prone position.  A timeout was performed by the nurse and physician in the room with attention directed to the patient's full name, , and allergy information.  Next, the patient's lumbar area was prepped and draped with chlorohexidine by the RN.  While wearing a surgical cap, mask, and sterile gloves, under fluoroscopic guidance, I next anesthetized the 
C/w med trial, pt refusing meds, pursuing TOO
C/w med trial, pt refusing meds, pursuing TOO
skin over the right L4-L5  neuroforamen with a 27-gauge, 1.5 inch needle utilizing 1% Lidocaine (3ml).  Next, a 25 gauge, 5 inch spinal needle was advanced toward the middle aspect of the intervertebral foramen while using oblique, lateral and AP imaging.  The needle was placed just inferior to the pedicle and inferolateral to the pars interarticularis.  The needle tip was advanced using a coaxial technique, and the tip was seated on the inferolateral margin of the pars interarticularis.  The c-arm was rotated to obtain a lateral view and the needle was slowly advanced toward the posterior and middle aspect of the foramen.  No paresthesias were illicited.  Next, aspiration was negative and so 1 ml of Isovue-300 contrast was slowly injected under live fluoroscopy and good epidural spread was confirmed on both lateral and AP images.  No vascular uptake was appreciated.  Aspiration was negative for blood, paresthesias or CSF.  Next, very gently a solution of 0.5 ml of 0.5% lidocaine, 0.5 ml of PF NS,  and 4 mg of preservative free dexamethasone was injected while observing the remainder of the contrast in the tubing flow nicely into the epidural space. No vascular uptake was appreciated during the live fluoroscopic washout phase.  During the entire procedure, the patient was asked to move their feet and their toes and was able to do it without any impairment.  No paresthesias were ever illicted.      I next anesthetized the skin over the right L5-S1 neuroforamen with a 27-gauge, 1.5 inch needle utilizing 1% Lidocaine (3ml).  Next, a 25 gauge, 5 inch spinal needle was advanced toward the middle aspect of the intervertebral foramen while using oblique, lateral and AP imaging.  The needle was placed just inferior to the pedicle and inferolateral to the pars interarticularis.  The needle tip was advanced using a coaxial technique, and the tip was seated on the inferolateral margin of the pars interarticularis.  The c-arm 
was rotated to obtain a lateral view and the needle was slowly advanced toward the posterior and middle aspect of the foramen.  No paresthesias were illicited.  Next, aspiration was negative, then 1 ml of Isovue-300 contrast was slowly injected under live fluoroscopy and good epidural spread was confirmed on both lateral and AP images.  No vascular uptake was appreciated.  Aspiration was negative for blood, paresthesias or CSF.  Next, very gently a solution of 0.5 ml of 0.5% lidocaine, 0.5 ml of PF NS,  and 4 mg of preservative free dexamethasone was injected while observing the remainder of the contrast in the tubing flow nicely into the epidural space. Again, no vascular uptake was appreciated during the live fluroscopic washout phase.  During the entire procedure, the patient was asked to move their feet and their toes and was able to do it without any impairment.  No paresthesias were ever illicted.      Of note during injection of steroids, digital subtraction angiography was utilized to make certain that the washout phase from the contrast dye did not show any vascular spread. Furthermore, all fluids were injected through a sterile tubing at all times. Furthermore, at all times careful attention to make certain that the needle and the tubing did not move during injection occurred.      The patient denied any ringing in the ears and numbness around their mouth.  Of note, 0.5ml of 0.5% lidocaine was injected into each  spinal needle while removing it.  A sterile bandage was applied over the needle entry sites.    Outcome/Postoperative Education:  The patient tolerated this procedure well.   The patient was monitored  for 30 minutes and stated their  pain was reduced to 0/10.  The patient was advised to place an ice pack on the wound and alternate 30min on and off for the remainder of the day.  The patient had 5/5 strength of both lower extremities and was able to ambulate.      A written discharge form with our 
contact information was given to the patient.  Additionally, the patient was told if any increased pain occurred in their back or lower extremity, had fever/chills, or any bowel or bladder incontinence they were to contact our office and/or report to the nearest emergency department.  Otherwise, they are to follow up with our clinic in 1 month and continue their neuropathic medication.      On 6/24/2020, Anjelica FOSTER MA scribed the services personally performed by Janes Moore MD       The documentation recorded by the scribe accurately and completely reflects the service(s) I personally performed and the decisions made by me.     Janes Moore MD  Interventional Pain Management  Fellowship Trained in Pain Management  Board Certified               
C/w med trial, pt refusing meds, pursuing TOO

## 2023-04-10 NOTE — BH INPATIENT PSYCHIATRY DISCHARGE NOTE - HOSPITAL COURSE
Patient presented having substantial agitation, especially vocal, and paranoia.  She would often seek assistance then reject as if appearing she did not really know what she wanted. Initially there was question as tho whether behavior best explained by manic episode vs complication of vascular or mixed dementia but overtime patient did not meet full criteria for hortencia and so it seem her symptoms were related complication of dementia. She did not respond much to olanzapine, risperidone or quetiapine, despite having history of responding to quetiapine. The had some response to haldol in that vocal agitation reduced from nearly constant to intermittent and paranoia reduced. She was placed on gabapentin with idea possible both agitation and neuropathic pain from spinal stenosis may improve. There was possible benefit but difficult to be sure in light of other medications being administered. trazodone at hs did improve insomnia. Given ongoing presence of dysphoric irritability, separation anxiety, Zoloft was added which also seemed to help modestly. She had fear of falling and paranoia about the physical therapist that made working with her in PT challenging but she expressed hope she will be able to do better once at home in familiar surroundings. Hip films showed severe DJD r>>l discussed pain management with daughter including various OTC and prescription options and home PT. She had a period of increased confusion, disorientation, appearing delirious  for example started to mumble numbers repeated ly. She has suspected UTi and was treated empircially with improvement in MS back to baseline Patient presented having substantial agitation, especially vocal, and paranoia.  She would often seek assistance then reject as if appearing she did not really know what she wanted. Initially there was question as tho whether behavior best explained by manic episode vs complication of vascular or mixed dementia but overtime patient did not meet full criteria for hortencia and so it seem her symptoms were related complication of dementia. She did not respond much to olanzapine, risperidone or quetiapine, despite having history of responding to quetiapine. The had some response to haldol in that vocal agitation reduced from nearly constant to intermittent and paranoia reduced. She was placed on gabapentin with idea possible both agitation and neuropathic pain from spinal stenosis may improve. There was possible benefit but difficult to be sure in light of other medications being administered. trazodone at hs did improve insomnia. Given ongoing presence of dysphoric irritability, separation anxiety, Zoloft was added which also seemed to help modestly. She had fear of falling and paranoia about the physical therapist that made working with her in PT challenging but she expressed hope she will be able to do better once at home in familiar surroundings. Hip films showed severe DJD r>>l discussed pain management with daughter including various OTC and prescription options and home PT.  Also instructed daughter could use up to 1 tab of haldol 1mg per day as a prn.She had a period of increased confusion, disorientation, appearing delirious  for example started to mumble numbers repeated ly. She has suspected UTi and was treated empircially with improvement in MS back to baseline Patient presented having substantial agitation, especially vocal, and paranoia.  She would often seek assistance then reject as if appearing she did not really know what she wanted. Initially there was question as tho whether behavior best explained by manic episode vs complication of vascular or mixed dementia but overtime patient did not meet full criteria for hortencia and so it seem her symptoms were related complication of dementia. She did not respond much to olanzapine, risperidone or quetiapine, despite having history of responding to quetiapine. The had some response to haldol in that vocal agitation reduced from nearly constant to intermittent and paranoia reduced. She was placed on gabapentin with idea possible both agitation and neuropathic pain from spinal stenosis may improve. There was possible benefit but difficult to be sure in light of other medications being administered. trazodone at hs did improve insomnia. Given ongoing presence of dysphoric irritability, separation anxiety, Zoloft was added which also seemed to help modestly. She had fear of falling and paranoia about the physical therapist that made working with her in PT challenging but she expressed hope she will be able to do better once at home in familiar surroundings. Hip films showed severe DJD r>>l discussed pain management with daughter including various OTC and prescription options and home PT.  Also instructed daughter could use up to 1 tab of haldol 1mg per day as a prn. She had a period of increased confusion, disorientation, appearing delirious  for example started to mumble numbers repeated ly. She has suspected UTI and was treated empirically with improvement in MS back to baseline Patient presented having substantial agitation, especially vocal, and paranoia.  She would often seek assistance then reject as if appearing she did not really know what she wanted. Initially there was question as tho whether behavior best explained by manic episode vs complication of vascular or mixed dementia but overtime patient did not meet full criteria for hortencia and so it seem her symptoms were related complication of dementia. She did not respond much to olanzapine, risperidone or quetiapine, despite having history of responding to quetiapine. The had some response to haldol in that vocal agitation reduced from nearly constant to intermittent and paranoia reduced. She was placed on gabapentin with idea possible both agitation and neuropathic pain from spinal stenosis may improve. There was possible benefit but difficult to be sure in light of other medications being administered. trazodone at hs did improve insomnia. Given ongoing presence of dysphoric irritability, separation anxiety, Zoloft was added but daughter felt she became more irritable on phone with a few days of it being added and so it was stopped. She had fear of falling and paranoia about the physical therapist that made working with her in PT challenging but she expressed hope she will be able to do better once at home in familiar surroundings. Hip films showed severe DJD r>>l discussed pain management with daughter including various OTC and prescription options and home PT.  Also instructed daughter could use up to 1 tab of haldol 1mg per day as a prn. She had a period of increased confusion, disorientation, appearing delirious  for example started to mumble numbers repeated ly. She has suspected UTI and was treated empirically with improvement in MS back to baseline

## 2023-04-10 NOTE — BH INPATIENT PSYCHIATRY PROGRESS NOTE - NSTXCOPEDATEEST_PSY_ALL_CORE
23-Mar-2023
06-Mar-2023
07-Feb-2023
01-Feb-2023
06-Mar-2023
27-Feb-2023
27-Mar-2023
21-Jan-2023
26-Jan-2023
07-Feb-2023
20-Mar-2023
26-Jan-2023
01-Feb-2023
06-Mar-2023
06-Mar-2023
20-Mar-2023
27-Feb-2023
03-Apr-2023
27-Mar-2023
06-Mar-2023
27-Feb-2023
01-Feb-2023
03-Apr-2023
20-Jan-2023
06-Mar-2023
23-Mar-2023
27-Mar-2023
03-Apr-2023
27-Mar-2023
03-Apr-2023
06-Mar-2023
13-Feb-2023
20-Jan-2023
20-Jan-2023
03-Apr-2023
06-Mar-2023
13-Feb-2023
27-Feb-2023
13-Feb-2023
07-Feb-2023
10-Apr-2023
20-Mar-2023
27-Mar-2023
20-Jan-2023
07-Feb-2023
03-Apr-2023
26-Jan-2023
01-Feb-2023
07-Feb-2023
21-Jan-2023
06-Mar-2023
01-Feb-2023
07-Feb-2023
21-Jan-2023
20-Jan-2023
06-Mar-2023
21-Jan-2023
06-Mar-2023
26-Jan-2023
06-Mar-2023
03-Apr-2023
26-Jan-2023
13-Feb-2023
01-Feb-2023
20-Mar-2023
01-Feb-2023
26-Jan-2023
23-Mar-2023
06-Mar-2023
27-Feb-2023
20-Jan-2023
27-Mar-2023
27-Feb-2023
06-Mar-2023
07-Feb-2023

## 2023-04-10 NOTE — BH INPATIENT PSYCHIATRY PROGRESS NOTE - NSTXPROBCOPE_PSY_ALL_CORE
COPING, INEFFECTIVE

## 2023-04-10 NOTE — BH INPATIENT PSYCHIATRY PROGRESS NOTE - NS ED [facility] MED ROS PSYCHIATRIC
See HPI

## 2023-04-10 NOTE — BH INPATIENT PSYCHIATRY PROGRESS NOTE - NSTXDCOTHRGOAL_PSY_ALL_CORE
The pt will be discharged home with [facility] providing RN and PT services and aides [id number]. Aide services will be covered in part under pt's Medicare coverage and daughter will pay privately for remaining hours. Pt's daughter will stay in the pt's home evenings/nights to ensure continuous supervision. [facility] and daughter will assist with telehealth appointments.
The pt carries a Dementia dx, continues to be vocally disruptive, requires assistance with all ADLs and ambulation, and lacks supports needed to be cared for in the community. Daughter supports the pt's need for SNF level of care for her LTC.
The pt will show a resolution of acute symptoms and return to baseline funcitoning. The pt will be discharged home where she resides alone with [facility] and back to prior [facility] for RN, PT, SW, HHA services. If the pt requires subacute rehab, referral may be made to an SNF for STR. vs. LTC.
The pt will show resolution of acute symptoms and return to baseline funcitoning. The pt will be discharged home where she resides alone [facility]. If the pt requires subacute rehab, referral may be made to an SNF for STR.
The pt will show a resolution of acute symptoms and return to baseline funcitoning. The pt will be discharged home where she resides alone with [facility] and back to prior [facility] for RN, PT, SW, HHA services. If the pt requires subacute rehab, referral may be made to an SNF for STR. vs. LTC.
The pt remains symptomatic and needs asssitance with all ADLs and ambulation. The pt's daughter feels physically unable to reside in the pt's home to participate in the pt's care. Pt's daughter feels the pt needs SNF level of care and wishes to use the pt's finances to pay privately for SNF placement.
The pt will be discharged home with [facility] providing RN and PT services and aides [id number]. Aide services will be covered in part under pt's Medicare coverage and daughter will pay privately for remaining hours. Pt's daughter will stay in the pt's home evenings/nights to ensure continuous supervision. The [facility] and daughter will assist with telehealth appointments.
The pt will show resolution of acute symptoms and return to baseline functioning. The pt will be discharged home where she lives alone with [facility] and to [facility] for resumption of [facility] services. Recommendation of supplementing hours of home care with a private aide will be made. If the pt demonstrates need for subacute rehab, referral may be made for SNF placement for STR.
The pt will show a resolution of acute symptoms and return to baseline funcitoning. The pt will be discharged home where she resides alone with [facility] and back to prior [facility] for RN, PT, SW, HHA services. If the pt requires subacute rehab, referral may be made to an SNF for STR. vs. LTC.
The pt will show resolution of acute symptoms and return to baseline funcitoning. The pt will be discharged home where she resides alone with [facility], [facility]. If the pt requires subacute rehab, referral may be made to an SNF for STR.
The pt carries a Dementia dx, continues to be vocally disruptive, requires assistance with all ADLs and ambulation, and lacks supports needed to be cared for in the community. Daughter supports the pt's need for SNF level of care for her LTC.
The pt will show resolution of acute symptoms and return to baseline functioning. The pt will return home where her daughter will reside with continuous supervision provided by her daughter, aupplemented by private aide and [facility] RN, PT, and aide services. The [facility] with pt's daughter assisting with Zoom.
The pt carries a Dementia dx, continues to be vocally disruptive, requires assistance with all ADLs and ambulation, and lacks supports needed to be cared for in the community. Daughter supports the pt's need for SNF level of care for her LTC.
The pt carries a Dementia dx, continues to be vocally disruptive, requires assistance with all ADLs and ambulation, and lacks supports needed to be cared for in the community. Daughter supports the pt's need for SNF level of care for her LTC.
The pt will show resolution of acute symptoms and return to baseline functioning. The pt will return home where her daughter will reside with continuous supervision provided by her daughter, aupplemented by private aide and [facility] RN, PT, and aide services. The [facility] with pt's daughter assisting with Zoom.
The pt will show resolution of acute symptoms and return to baseline functioning. The pt will likely need SNF placement for LTC due to her phsyical frailty, dementia dx, and need for total care.
The pt will show resolution of acute symptoms and return to baseline functioning. The pt will return home where her daughter will reside with continuous supervision provided by her daughter, aupplemented by private aide and [facility] RN, PT, and aide services. The [facility] with pt's daughter assisting with Zoom.
77
The pt will show a resolution of acute symptoms and return to baseline funcitoning. The pt will be discharged home where she resides alone with [facility] and back to prior [facility] for RN, PT, SW, HHA services. If the pt requires subacute rehab, referral may be made to an SNF for STR. vs. LTC.
The pt will show resolution of acute symptoms and return to baseline functioning. The pt will be discharged home where she resides with her daughter and [facility] and to a [facility] for RN, PT, and aide services. SW will recommend supplementing with private home care services. If pt is assess as needing subacute rehab, referral will be made to an SNF for STR.
The pt will show resolution of acute symptoms and return to baseline funcitoning. The pt will be discharged home where she resides alone with [facility], [facility], HHA [facility]. If the pt requires subacute rehab, referral may be made to an SNF for STR.
The pt will be discharged home with [facility] providing RN and PT services and aides [id number]. Aide services will be covered in part under pt's Medicare coverage and daughter will pay privately for remaining hours. Pt's daughter will stay in the pt's home evenings/nights to ensure continuous supervision. The [facility] and daughter will assist with telehealth appointments.
The pt will show resolution of acute symptoms and return to baseline funcitoning. The pt will be discharged home where she resides alone with [facility], H[facility]. If the pt requires subacute rehab, referral may be made to an SNF for STR.
The pt will show resolution of acute symptoms and return to baseline funcitoning. The pt will be discharged home where she resides alone with [facility], SW, HHA [facility]. If the pt requires subacute rehab, referral may be made to an SNF for STR.
The pt will show resolution of acute symptoms and return to baseline functioning. The pt will be discharged home where she lives alone with [facility] and to [facility] for resumption of [facility] services. Recommendation of supplementing hours of home care with a private aide will be made. If the pt demonstrates need for subacute rehab, referral may be made for SNF placement for STR.
The pt carries a Dementia dx, continues to be vocally disruptive, requires assistance with all ADLs and ambulation, and lacks supports needed to be cared for in the community. Daughter supports the pt's need for SNF level of care for her LTC.
The pt will show resolution of acute symptoms and return to baseline functioning. The pt will be discharged home where she lives alone with [facility] and to [facility] for resumption of [facility] services. Recommendation of supplementing hours of home care with a private aide will be made. If the pt demonstrates need for subacute rehab, referral may be made for SNF placement for STR.
The pt will show a resolution of acute symptoms and return to baseline funcitoning. The pt will be discharged home where she resides alone with [facility] and back to prior [facility] for RN, PT, SW, HHA services. If the pt requires subacute rehab, referral may be made to an SNF for STR. vs. LTC.
The pt will show resolution of acute symptoms and return to baseline functioning. The pt will likely need SNF level of care due to her physical frailty, disorganization, and need for total care.
The pt carries a Dementia dx, continues to be vocally disruptive, requires assistance with all ADLs and ambulation, and lacks supports needed to be cared for in the community. Daughter supports the pt's need for SNF level of care for her LTC.
The pt remains symptomatic and needs asssitance with all ADLs and ambulation. The pt's daughter feels physically unable to reside in the pt's home to participate in the pt's care. Pt's daughter feels the pt needs SNF level of care and wishes to use the pt's finances to pay privately for SNF placement.
The pt will show resolution of acute symptoms and return to baseline functioning. The pt will likely need SNF placement for LTC due to her phsyical frailty, dementia dx, and need for total care.
The pt will show resolution of acute symptoms and return to baseline functioning. The pt will return home where her daughter will reside with continuous supervision provided by her daughter, aupplemented by private aide and [facility] RN, PT, and aide services. [facility] with pt's daughter assisting with Zoom.
The pt will show resolution of acute symptoms and return to baseline functioning. The pt will return home where her daughter will reside with continuous supervision provided by her daughter, aupplemented by private aide and [facility] RN, PT, and aide services. The [facility] with pt's daughter assisting with Zoom.
The pt will show resolution of acute symptoms and return to baseline functioning. The pt will likely need SNF placement for LTC in light of the pt's dementia dx, physical frailty, and need for total ADL care.
The pt will be discharged home with [facility] providing RN and PT services and aides [id number]. Aide services will be covered in part under pt's Medicare coverage and daughter will pay privately for remaining hours. Pt's daughter will stay in the pt's home evenings/nights to ensure continuous supervision. The [facility] will [facility] and daughter will assist with telehealth appointments.
The pt will show resolution of acute symptoms and return to baseline functioning. The pt will be discharged home where she resides with her daughter and [facility] and to a [facility] for RN, PT, and aide services. SW will recommend supplementing with private home care services. If pt is assess as needing subacute rehab, referral will be made to an SNF for STR.
The pt will show resolution of acute symptoms and return to baseline functioning. The pt will be discharged home where she resides with her daughter and [facility] and to a [facility] for RN, PT, and aide services. SW will recommend supplementing with private home care services. If pt is assess as needing subacute rehab, referral will be made to an SNF for STR.
The pt will show resolution of acute symptoms and return to baseline functioning. The pt will return home where her daughter will reside with continuous supervision provided by her daughter, aupplemented by private aide and [facility] RN, PT, and aide services. The pt will [facility] with pt's daughter assisting with Zoom.
The pt carries a Dementia dx, continues to be vocally disruptive, requires assistance with all ADLs and ambulation, and lacks supports needed to be cared for in the community. Daughter supports the pt's need for SNF level of care for her LTC.
The pt will be discharged home with [facility] providing RN and PT services and aides [id number]. Aide services will be covered in part under pt's Medicare coverage and daughter will pay privately for remaining hours. Pt's daughter will stay in the pt's home evenings/nights to ensure continuous supervision. The pt will [facility] and daughter will assist with telehealth appointments.
The pt will show resolution of acute symptoms and return to baseline funcitoning. The pt will be discharged home where she resides alone with [facility] SW, HHA [facility]. If the pt requires subacute rehab, referral may be made to an SNF for STR.
The pt will be discharged home with [facility] providing RN and PT services and aides [id number]. Aide services will be covered in part under pt's Medicare coverage and daughter will pay privately for remaining hours. Pt's daughter will stay in the pt's home evenings/nights to ensure continuous supervision. The pt will [facility] and daughter will assist with telehealth appointments.
The pt will show resolution of acute symptoms and return to baseline functioning. The pt will be discharged home where she resides with her daughter and [facility] and to a [facility] for RN, PT, and aide services. SW will recommend supplementing with private home care services. If pt is assess as needing subacute rehab, referral will be made to an SNF for STR.
The pt will show resolution of acute symptoms and return to baseline functioning. The pt will be discharged home where she resides with her daughter and [facility] and to a [facility] for RN, PT, and aide services. SW will recommend supplementing with private home care services. If pt is assess as needing subacute rehab, referral will be made to an SNF for STR.
The pt will be discharged home with [facility] providing RN and PT services and aides [id number]. Aide services will be covered in part under pt's Medicare coverage and daughter will pay privately for remaining hours. Pt's daughter will stay in the pt's home evenings/nights to ensure continuous supervision. The pt will be [facility] and daughter will assist with telehealth appointments.
The pt will show resolution of acute symptoms and return to baseline functioning. The pt will likely need SNF level of care due to her physical frailty, disorganization, and need for total care.
The pt will show resolution of acute symptoms and return to baseline functioning. The pt will likely need SNF placement for LTC in light of the pt's dementia dx, physical frailty, and need for total ADL care.
The pt will show a resolution of acute symptoms and return to baseline funcitoning. The pt will be discharged home where she resides alone with [facility] and back to prior [facility] for RN, PT, SW, HHA services. If the pt requires subacute rehab, referral may be made to an SNF for STR. vs. LTC.
The pt will show resolution of acute symptoms and return to baseline functioning. The pt will be discharged home where she resides with her daughter and [facility] and to a [facility] for RN, PT, and aide services. SW will recommend supplementing with private home care services. If pt is assess as needing subacute rehab, referral will be made to an SNF for STR.
The pt will show resolution of acute symptoms and return to baseline functioning. The pt will likely need SNF level of care due to her physical frailty, disorganization, and need for total care.
The pt will show resolution of acute symptoms and return to baseline functioning. The pt will be discharged home where she lives alone with [facility] to [facility] and to [facility] for resumption of [facility] services. Recommendation of supplementing hours of home care with a private aide will be made. If the pt demonstrates need for subacute rehab, referral may be made for SNF placement for STR.
The pt will show resolution of acute symptoms and return to baseline functioning. The pt will likely need SNF placement for LTC in light of the pt's dementia dx, physical frailty, and need for total ADL care.
The pt will show resolution of acute symptoms and return to baseline functioning. The pt will be discharged home where she resides with her daughter and [facility] and to a [facility] for RN, PT, and aide services. SW will recommend supplementing with private home care services. If pt is assess as needing subacute rehab, referral will be made to an SNF for STR.
The pt will show resolution of acute symptoms and return to baseline functioning. The pt will likely need SNF placement for LTC due to her phsyical frailty, dementia dx, and need for total care.
The pt will show resolution of acute symptoms and return to baseline functioning. The pt will likely need SNF placement for LTC due to her phsyical frailty, dementia dx, and need for total care.
The pt will show a resolution of acute symptoms and return to baseline funcitoning. The pt will be discharged home where she resides alone with [facility] and back to prior [facility] for RN, PT, SW, HHA services. If the pt requires subacute rehab, referral may be made to an SNF for STR. vs. LTC.
The pt will show a resolution of acute symptoms and return to baseline funcitoning. The pt will be discharged home where she resides alone with [facility]u and back to prior [facility] for RN, PT, SW, HHA services. If the pt requires subacute rehab, referral may be made to an SNF for STR. vs. LTC.
The pt will show resolution of acute symptoms and return to baseline functioning. The pt will be discharged home where she lives alone with [facility] to [facility] and to [facility] for resumption of [facility] services. Recommendation of supplementing hours of home care with a private aide will be made. If the pt demonstrates need for subacute rehab, referral may be made for SNF placement for STR.
The pt will show resolution of acute symptoms and return to baseline functioning. The pt will likely need SNF placement for LTC in light of the pt's dementia dx, physical frailty, and need for total ADL care.
The pt will show a resolution of acute symptoms and return to baseline funcitoning. The pt will be discharged home where she resides alone with [facility]u and back to prior [facility] for RN, PT, SW, HHA services. If the pt requires subacute rehab, referral may be made to an SNF for STR. vs. LTC.
The pt will show resolution of acute symptoms and return to baseline functioning. The pt will be discharged home where she lives alone with [facility] and to [facility] for resumption of [facility] services. Recommendation of supplementing hours of home care with a private aide will be made. If the pt demonstrates need for subacute rehab, referral may be made for SNF placement for STR.
The pt will show resolution of acute symptoms and return to baseline functioning. The pt will return home where her daughter will reside with continuous supervision provided by her daughter, aupplemented by private aide and [facility] RN, PT, and aide services. The pt will [facility] with pt's daughter assisting with Zoom.
The pt will show resolution of acute symptoms and return to baseline functioning. The pt will likely need SNF placement for LTC due to her phsyical frailty, dementia dx, and need for total care.
The pt will show a resolution of acute symptoms and return to baseline funcitoning. The pt will be discharged home where she resides alone with [facility] and back to prior [facility] for RN, PT, SW, HHA services. If the pt requires subacute rehab, referral may be made to an SNF for STR. vs. LTC.
The pt will show resolution of acute symptoms and return to baseline functioning. The pt will be discharged home where she resides with her daughter and [facility] and to a [facility] for RN, PT, and aide services. SW will recommend supplementing with private home care services. If pt is assess as needing subacute rehab, referral will be made to an SNF for STR.
The pt will show resolution of acute symptoms and return to baseline functioning. The pt will likely need SNF placement for LTC in light of the pt's dementia dx, physical frailty, and need for total ADL care.
The pt will show resolution of acute symptoms and return to baseline functioning. The pt will return home where her daughter will reside with continuous supervision provided by her daughter, aupplemented by private aide and [facility] RN, PT, and aide services. The [facility] will [facility] with pt's daughter assisting with Zoom.
The pt will show resolution of acute symptoms and return to baseline functioning. The pt will likely need SNF placement for LTC in light of the pt's dementia dx, physical frailty, and need for total ADL care.
The pt will show resolution of acute symptoms and return to baseline functioning. The pt will likely need SNF placement for LTC due to her phsyical frailty, dementia dx, and need for total care.
The pt will show resolution of acute symptoms and return to baseline functioning. The pt will likely need SNF placement for LTC in light of the pt's dementia dx, physical frailty, and need for total ADL care.
The pt remains symptomatic and needs asssitance with all ADLs and ambulation. The pt's daughter feels physically unable to reside in the pt's home to participate in the pt's care. Pt's daughter feels the pt needs SNF level of care and wishes to use the pt's finances to pay privately for SNF placement.
The pt will show resolution of acute symptoms and return to baseline functioning. The pt will likely need SNF level of care due to her physical frailty, disorganization, and need for total care.
The pt will show resolution of acute symptoms and return to baseline functioning. The pt will return home where her daughter will reside with continuous supervision provided by her daughter, aupplemented by private aide and [facility] RN, PT, and aide services. [facility] with pt's daughter assisting with Zoom.
The pt will show resolution of acute symptoms and return to baseline functioning. The pt will likely need SNF placement for LTC due to her phsyical frailty, dementia dx, and need for total care.
The pt will show resolution of acute symptoms and return to baseline functioning. The pt will return home where her daughter will reside with continuous supervision provided by her daughter, aupplemented by private aide and [facility] RN, PT, and aide services. The [facility] will [facility]/u with pt's daughter assisting with Zoom.

## 2023-04-10 NOTE — BH INPATIENT PSYCHIATRY PROGRESS NOTE - NSCGIIMPROVESX_PSY_ALL_CORE
4 = No change - symptoms remain essentially unchanged
3 = Minimally improved - slightly better with little or no clinically meaningful reduction of symptoms.  Represents very little change in basic clinical status, level of care, or functional capacity.
3 = Minimally improved - slightly better with little or no clinically meaningful reduction of symptoms.  Represents very little change in basic clinical status, level of care, or functional capacity.
4 = No change - symptoms remain essentially unchanged
3 = Minimally improved - slightly better with little or no clinically meaningful reduction of symptoms.  Represents very little change in basic clinical status, level of care, or functional capacity.
3 = Minimally improved - slightly better with little or no clinically meaningful reduction of symptoms.  Represents very little change in basic clinical status, level of care, or functional capacity.
4 = No change - symptoms remain essentially unchanged
3 = Minimally improved - slightly better with little or no clinically meaningful reduction of symptoms.  Represents very little change in basic clinical status, level of care, or functional capacity.
4 = No change - symptoms remain essentially unchanged
3 = Minimally improved - slightly better with little or no clinically meaningful reduction of symptoms.  Represents very little change in basic clinical status, level of care, or functional capacity.
4 = No change - symptoms remain essentially unchanged
3 = Minimally improved - slightly better with little or no clinically meaningful reduction of symptoms.  Represents very little change in basic clinical status, level of care, or functional capacity.
4 = No change - symptoms remain essentially unchanged
4 = No change - symptoms remain essentially unchanged
3 = Minimally improved - slightly better with little or no clinically meaningful reduction of symptoms.  Represents very little change in basic clinical status, level of care, or functional capacity.
4 = No change - symptoms remain essentially unchanged
3 = Minimally improved - slightly better with little or no clinically meaningful reduction of symptoms.  Represents very little change in basic clinical status, level of care, or functional capacity.
4 = No change - symptoms remain essentially unchanged

## 2023-04-10 NOTE — BH INPATIENT PSYCHIATRY PROGRESS NOTE - NSTXCOPEPROGRES_PSY_ALL_CORE
No Change
Improving
No Change
Improving
No Change
Improving
No Change
Improving
No Change
Improving
Improving
No Change
Improving
No Change
Improving
Improving
No Change
Improving
No Change
No Change
Improving
No Change
Improving
Improving
No Change
Improving
No Change
Improving
No Change
Improving
No Change
No Change
Improving
No Change
Improving
No Change
Improving
No Change
Improving
No Change
Improving
No Change
Improving
Improving

## 2023-04-10 NOTE — BH INPATIENT PSYCHIATRY PROGRESS NOTE - NSBHROSSYSTEMS_PSY_ALL_CORE
Psychiatric
Psychiatric
Musculoskeletal.../Psychiatric
Psychiatric
Musculoskeletal.../Psychiatric
Psychiatric
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
## 2023-04-10 NOTE — BH INPATIENT PSYCHIATRY PROGRESS NOTE - NS ED BHA REVIEW OF ED CHART VITAL SIGNS REVIEWED
Yes

## 2023-04-10 NOTE — BH INPATIENT PSYCHIATRY PROGRESS NOTE - NSCGIIMPROVESXSCORE_CAL_PSY_ALL_CORE
4
3
4
3
4
3
4
3
4
3
4
3
4
3
4
3
4
3
4

## 2023-04-10 NOTE — BH SAFETY PLAN - SUICIDE AND CRISIS [place], CALL [phone]
lacrilube at bedtime left.    Call back if the symptoms don't improve, or there are new symptoms.  Otherwise follow-up with Dr. Hirsch  
Suicide and Crisis [facility], call 999

## 2023-04-10 NOTE — BH INPATIENT PSYCHIATRY PROGRESS NOTE - NSTXDCOTHRDATETRGT_PSY_ALL_CORE
30-Jan-2023
03-Apr-2023
13-Feb-2023
30-Jan-2023
06-Mar-2023
06-Mar-2023
13-Mar-2023
30-Mar-2023
13-Feb-2023
13-Mar-2023
03-Apr-2023
03-Apr-2023
06-Mar-2023
13-Feb-2023
20-Mar-2023
30-Jan-2023
30-Mar-2023
03-Apr-2023
30-Jan-2023
13-Mar-2023
03-Apr-2023
06-Mar-2023
10-Apr-2023
10-Apr-2023
06-Mar-2023
10-Apr-2023
02-Mar-2023
03-Apr-2023
07-Feb-2023
10-Apr-2023
20-Mar-2023
06-Mar-2023
07-Feb-2023
13-Mar-2023
20-Mar-2023
06-Mar-2023
10-Apr-2023
06-Mar-2023
20-Mar-2023
06-Mar-2023
06-Mar-2023
20-Mar-2023
07-Feb-2023
13-Feb-2023
02-Mar-2023
20-Mar-2023
30-Jan-2023
07-Feb-2023
06-Mar-2023
30-Jan-2023
10-Apr-2023
13-Feb-2023
07-Feb-2023
03-Apr-2023
13-Mar-2023
20-Mar-2023
07-Feb-2023
20-Mar-2023
06-Mar-2023
06-Mar-2023
30-Jan-2023
10-Apr-2023
13-Mar-2023
30-Jan-2023
30-Mar-2023
06-Mar-2023
02-Mar-2023
13-Feb-2023
02-Mar-2023
06-Mar-2023
13-Mar-2023
20-Mar-2023
20-Mar-2023

## 2023-04-10 NOTE — BH INPATIENT PSYCHIATRY PROGRESS NOTE - NSICDXBHSECONDARYDX_PSY_ALL_CORE
Delirium due to other cause   F05  Anxiety   F41.9  
H/O bipolar disorder   Z86.59  
H/O bipolar disorder   Z86.59  
Delirium due to other cause   F05  Anxiety   F41.9  
H/O bipolar disorder   Z86.59  
Delirium due to other cause   F05  Anxiety   F41.9  
H/O bipolar disorder   Z86.59  
Delirium due to other cause   F05  Anxiety   F41.9  
H/O bipolar disorder   Z86.59  
H/O bipolar disorder   Z86.59  
Delirium due to other cause   F05  Anxiety   F41.9  
Delirium due to other cause   F05  Anxiety   F41.9  
H/O bipolar disorder   Z86.59  
Delirium due to other cause   F05  Anxiety   F41.9  
Delirium due to other cause   F05  Anxiety   F41.9  
H/O bipolar disorder   Z86.59  
H/O bipolar disorder   Z86.59  
Delirium due to other cause   F05  Anxiety   F41.9  
Delirium due to other cause   F05  Anxiety   F41.9  
H/O bipolar disorder   Z86.59  
Delirium due to other cause   F05  Anxiety   F41.9  
H/O bipolar disorder   Z86.59  
Delirium due to other cause   F05  Anxiety   F41.9  
H/O bipolar disorder   Z86.59  
Delirium due to other cause   F05  Anxiety   F41.9  
H/O bipolar disorder   Z86.59  
Delirium due to other cause   F05  Anxiety   F41.9  
Delirium due to other cause   F05  Anxiety   F41.9  
H/O bipolar disorder   Z86.59  
Delirium due to other cause   F05  Anxiety   F41.9  
Delirium due to other cause   F05  Anxiety   F41.9  
H/O bipolar disorder   Z86.59  
H/O bipolar disorder   Z86.59  
Delirium due to other cause   F05  Anxiety   F41.9  
H/O bipolar disorder   Z86.59  
Delirium due to other cause   F05  Anxiety   F41.9  
H/O bipolar disorder   Z86.59  
H/O bipolar disorder   Z86.59  
Delirium due to other cause   F05  Anxiety   F41.9  
Delirium due to other cause   F05  Anxiety   F41.9  
H/O bipolar disorder   Z86.59  
Delirium due to other cause   F05  Anxiety   F41.9  
H/O bipolar disorder   Z86.59  
Delirium due to other cause   F05  Anxiety   F41.9  
H/O bipolar disorder   Z86.59  
H/O bipolar disorder   Z86.59  
Delirium due to other cause   F05  Anxiety   F41.9  
H/O bipolar disorder   Z86.59  
Delirium due to other cause   F05  Anxiety   F41.9  
Delirium due to other cause   F05  Anxiety   F41.9  
H/O bipolar disorder   Z86.59  
H/O bipolar disorder   Z86.59  
Delirium due to other cause   F05  Anxiety   F41.9  
H/O bipolar disorder   Z86.59  
Delirium due to other cause   F05  Anxiety   F41.9  
H/O bipolar disorder   Z86.59  
Delirium due to other cause   F05  Anxiety   F41.9

## 2023-04-10 NOTE — BH INPATIENT PSYCHIATRY PROGRESS NOTE - NSCGISEVERITYSCORE_CAL_PSY_ALL_CORE
5
6
5
6
6
5
6
5
6
5
6
5
6
5
6
6
5
6
5
6

## 2023-04-10 NOTE — BH INPATIENT PSYCHIATRY PROGRESS NOTE - NSBHATTESTTYPEVISIT_PSY_A_CORE
Attending Only
Attending with Resident/Fellow/Student
Attending Only
Attending with Resident/Fellow/Student
Attending with Resident/Fellow/Student
Attending Only
Attending with Resident/Fellow/Student
Attending Only
Attending with Resident/Fellow/Student
Attending with Resident/Fellow/Student
Attending Only

## 2023-04-10 NOTE — BH INPATIENT PSYCHIATRY PROGRESS NOTE - NSTXPROBMEDIC_PSY_ALL_CORE
MEDICATION/TREATMENT NON-COMPLIANCE

## 2023-04-10 NOTE — BH INPATIENT PSYCHIATRY PROGRESS NOTE - NSTXMEDICDATETRGT_PSY_ALL_CORE
27-Jan-2023
30-Mar-2023
27-Jan-2023
30-Mar-2023
27-Jan-2023
30-Mar-2023
27-Jan-2023
30-Mar-2023
27-Jan-2023
30-Mar-2023
27-Jan-2023
27-Jan-2023
30-Mar-2023
27-Jan-2023
27-Jan-2023
30-Mar-2023
27-Jan-2023
30-Mar-2023
27-Jan-2023
30-Mar-2023
27-Jan-2023
30-Mar-2023
30-Mar-2023
27-Jan-2023
30-Mar-2023
30-Mar-2023
27-Jan-2023
30-Mar-2023
27-Jan-2023
30-Mar-2023
27-Jan-2023

## 2023-04-10 NOTE — BH INPATIENT PSYCHIATRY PROGRESS NOTE - NSBHPROGSUIC_PSY_A_CORE
no

## 2023-04-10 NOTE — BH INPATIENT PSYCHIATRY PROGRESS NOTE - NSTXDCOTHRDATEEST_PSY_ALL_CORE
06-Feb-2023
23-Jan-2023
23-Mar-2023
06-Feb-2023
27-Mar-2023
27-Mar-2023
06-Feb-2023
06-Mar-2023
03-Apr-2023
06-Feb-2023
23-Jan-2023
06-Feb-2023
27-Feb-2023
23-Jan-2023
23-Jan-2023
06-Mar-2023
27-Mar-2023
06-Feb-2023
23-Jan-2023
06-Mar-2023
13-Mar-2023
06-Feb-2023
06-Feb-2023
03-Apr-2023
23-Jan-2023
27-Mar-2023
13-Mar-2023
31-Jan-2023
03-Apr-2023
13-Mar-2023
06-Mar-2023
23-Feb-2023
27-Mar-2023
13-Mar-2023
31-Jan-2023
13-Mar-2023
06-Mar-2023
13-Mar-2023
13-Mar-2023
31-Jan-2023
03-Apr-2023
13-Mar-2023
23-Feb-2023
27-Mar-2023
23-Mar-2023
06-Feb-2023
13-Mar-2023
13-Mar-2023
06-Feb-2023
27-Mar-2023
06-Mar-2023
03-Apr-2023
06-Feb-2023
03-Apr-2023
27-Feb-2023
31-Jan-2023
27-Feb-2023
27-Feb-2023
31-Jan-2023
06-Mar-2023
23-Mar-2023
06-Feb-2023
23-Feb-2023
06-Feb-2023
31-Jan-2023
03-Apr-2023
23-Jan-2023
06-Feb-2023
06-Feb-2023
27-Feb-2023
27-Feb-2023
23-Jan-2023
27-Feb-2023
23-Feb-2023
06-Feb-2023

## 2023-04-10 NOTE — BH INPATIENT PSYCHIATRY PROGRESS NOTE - NSBHROSSTATEMENT_PSY_A_CORE
.

## 2023-04-10 NOTE — BH INPATIENT PSYCHIATRY PROGRESS NOTE - NSTXCOPEGOAL_PSY_ALL_CORE
Identify and utilize 2 coping skills that meet their needs

## 2023-04-10 NOTE — BH INPATIENT PSYCHIATRY PROGRESS NOTE - MSE OPTIONS
Unstructured MSE
Structured MSE
Unstructured MSE
Structured MSE
Unstructured MSE
Structured MSE
Unstructured MSE
Unstructured MSE
Structured MSE
Structured MSE
Unstructured MSE
Structured MSE
Structured MSE

## 2023-04-11 ENCOUNTER — INPATIENT (INPATIENT)
Facility: HOSPITAL | Age: 88
LOS: 19 days | Discharge: STILL ADMITTED OR ADMINISTRATIVE | End: 2023-05-01
Attending: INTERNAL MEDICINE | Admitting: INTERNAL MEDICINE
Payer: MEDICARE

## 2023-04-11 VITALS
SYSTOLIC BLOOD PRESSURE: 136 MMHG | HEART RATE: 88 BPM | RESPIRATION RATE: 18 BRPM | TEMPERATURE: 97 F | DIASTOLIC BLOOD PRESSURE: 91 MMHG | OXYGEN SATURATION: 90 %

## 2023-04-11 DIAGNOSIS — M25.551 PAIN IN RIGHT HIP: ICD-10-CM

## 2023-04-11 DIAGNOSIS — Z90.89 ACQUIRED ABSENCE OF OTHER ORGANS: Chronic | ICD-10-CM

## 2023-04-11 LAB
ALBUMIN SERPL ELPH-MCNC: 3.7 G/DL — SIGNIFICANT CHANGE UP (ref 3.3–5)
ALP SERPL-CCNC: 115 U/L — SIGNIFICANT CHANGE UP (ref 40–120)
ALT FLD-CCNC: 11 U/L — SIGNIFICANT CHANGE UP (ref 4–33)
ANION GAP SERPL CALC-SCNC: 9 MMOL/L — SIGNIFICANT CHANGE UP (ref 7–14)
APPEARANCE UR: CLEAR — SIGNIFICANT CHANGE UP
AST SERPL-CCNC: 16 U/L — SIGNIFICANT CHANGE UP (ref 4–32)
BASOPHILS # BLD AUTO: 0.05 K/UL — SIGNIFICANT CHANGE UP (ref 0–0.2)
BASOPHILS NFR BLD AUTO: 0.6 % — SIGNIFICANT CHANGE UP (ref 0–2)
BILIRUB SERPL-MCNC: 0.6 MG/DL — SIGNIFICANT CHANGE UP (ref 0.2–1.2)
BILIRUB UR-MCNC: NEGATIVE — SIGNIFICANT CHANGE UP
BUN SERPL-MCNC: 21 MG/DL — SIGNIFICANT CHANGE UP (ref 7–23)
CALCIUM SERPL-MCNC: 9.2 MG/DL — SIGNIFICANT CHANGE UP (ref 8.4–10.5)
CHLORIDE SERPL-SCNC: 100 MMOL/L — SIGNIFICANT CHANGE UP (ref 98–107)
CO2 SERPL-SCNC: 30 MMOL/L — SIGNIFICANT CHANGE UP (ref 22–31)
COLOR SPEC: YELLOW — SIGNIFICANT CHANGE UP
CREAT SERPL-MCNC: 0.5 MG/DL — SIGNIFICANT CHANGE UP (ref 0.5–1.3)
DIFF PNL FLD: NEGATIVE — SIGNIFICANT CHANGE UP
EGFR: 90 ML/MIN/1.73M2 — SIGNIFICANT CHANGE UP
EOSINOPHIL # BLD AUTO: 0.02 K/UL — SIGNIFICANT CHANGE UP (ref 0–0.5)
EOSINOPHIL NFR BLD AUTO: 0.2 % — SIGNIFICANT CHANGE UP (ref 0–6)
FLUAV AG NPH QL: SIGNIFICANT CHANGE UP
FLUBV AG NPH QL: SIGNIFICANT CHANGE UP
GLUCOSE SERPL-MCNC: 104 MG/DL — HIGH (ref 70–99)
GLUCOSE UR QL: NEGATIVE — SIGNIFICANT CHANGE UP
HCT VFR BLD CALC: 35.7 % — SIGNIFICANT CHANGE UP (ref 34.5–45)
HGB BLD-MCNC: 10.8 G/DL — LOW (ref 11.5–15.5)
IANC: 6.09 K/UL — SIGNIFICANT CHANGE UP (ref 1.8–7.4)
IMM GRANULOCYTES NFR BLD AUTO: 0.5 % — SIGNIFICANT CHANGE UP (ref 0–0.9)
KETONES UR-MCNC: NEGATIVE — SIGNIFICANT CHANGE UP
LEUKOCYTE ESTERASE UR-ACNC: NEGATIVE — SIGNIFICANT CHANGE UP
LIDOCAIN IGE QN: 11 U/L — SIGNIFICANT CHANGE UP (ref 7–60)
LYMPHOCYTES # BLD AUTO: 1.46 K/UL — SIGNIFICANT CHANGE UP (ref 1–3.3)
LYMPHOCYTES # BLD AUTO: 17.4 % — SIGNIFICANT CHANGE UP (ref 13–44)
MCHC RBC-ENTMCNC: 27.6 PG — SIGNIFICANT CHANGE UP (ref 27–34)
MCHC RBC-ENTMCNC: 30.3 GM/DL — LOW (ref 32–36)
MCV RBC AUTO: 91.3 FL — SIGNIFICANT CHANGE UP (ref 80–100)
MONOCYTES # BLD AUTO: 0.74 K/UL — SIGNIFICANT CHANGE UP (ref 0–0.9)
MONOCYTES NFR BLD AUTO: 8.8 % — SIGNIFICANT CHANGE UP (ref 2–14)
NEUTROPHILS # BLD AUTO: 6.09 K/UL — SIGNIFICANT CHANGE UP (ref 1.8–7.4)
NEUTROPHILS NFR BLD AUTO: 72.5 % — SIGNIFICANT CHANGE UP (ref 43–77)
NITRITE UR-MCNC: NEGATIVE — SIGNIFICANT CHANGE UP
NRBC # BLD: 0 /100 WBCS — SIGNIFICANT CHANGE UP (ref 0–0)
NRBC # FLD: 0 K/UL — SIGNIFICANT CHANGE UP (ref 0–0)
PH UR: 5.5 — SIGNIFICANT CHANGE UP (ref 5–8)
PLATELET # BLD AUTO: 332 K/UL — SIGNIFICANT CHANGE UP (ref 150–400)
POTASSIUM SERPL-MCNC: 4.1 MMOL/L — SIGNIFICANT CHANGE UP (ref 3.5–5.3)
POTASSIUM SERPL-SCNC: 4.1 MMOL/L — SIGNIFICANT CHANGE UP (ref 3.5–5.3)
PROT SERPL-MCNC: 6.9 G/DL — SIGNIFICANT CHANGE UP (ref 6–8.3)
PROT UR-MCNC: ABNORMAL
RBC # BLD: 3.91 M/UL — SIGNIFICANT CHANGE UP (ref 3.8–5.2)
RBC # FLD: 15.7 % — HIGH (ref 10.3–14.5)
RSV RNA NPH QL NAA+NON-PROBE: SIGNIFICANT CHANGE UP
SARS-COV-2 RNA SPEC QL NAA+PROBE: SIGNIFICANT CHANGE UP
SODIUM SERPL-SCNC: 139 MMOL/L — SIGNIFICANT CHANGE UP (ref 135–145)
SP GR SPEC: 1.03 — SIGNIFICANT CHANGE UP (ref 1.01–1.05)
UROBILINOGEN FLD QL: SIGNIFICANT CHANGE UP
WBC # BLD: 8.4 K/UL — SIGNIFICANT CHANGE UP (ref 3.8–10.5)
WBC # FLD AUTO: 8.4 K/UL — SIGNIFICANT CHANGE UP (ref 3.8–10.5)

## 2023-04-11 PROCEDURE — 99223 1ST HOSP IP/OBS HIGH 75: CPT

## 2023-04-11 PROCEDURE — 73552 X-RAY EXAM OF FEMUR 2/>: CPT | Mod: 26,RT

## 2023-04-11 PROCEDURE — 71046 X-RAY EXAM CHEST 2 VIEWS: CPT | Mod: 26

## 2023-04-11 PROCEDURE — 73562 X-RAY EXAM OF KNEE 3: CPT | Mod: 26,RT

## 2023-04-11 PROCEDURE — 73502 X-RAY EXAM HIP UNI 2-3 VIEWS: CPT | Mod: 26,RT

## 2023-04-11 PROCEDURE — 70450 CT HEAD/BRAIN W/O DYE: CPT | Mod: 26,MA

## 2023-04-11 PROCEDURE — 99285 EMERGENCY DEPT VISIT HI MDM: CPT | Mod: GC

## 2023-04-11 RX ORDER — ACETAMINOPHEN 500 MG
1000 TABLET ORAL ONCE
Refills: 0 | Status: COMPLETED | OUTPATIENT
Start: 2023-04-11 | End: 2023-04-11

## 2023-04-11 RX ORDER — HALOPERIDOL DECANOATE 100 MG/ML
1 INJECTION INTRAMUSCULAR ONCE
Refills: 0 | Status: DISCONTINUED | OUTPATIENT
Start: 2023-04-11 | End: 2023-04-11

## 2023-04-11 RX ORDER — HALOPERIDOL DECANOATE 100 MG/ML
5 INJECTION INTRAMUSCULAR ONCE
Refills: 0 | Status: COMPLETED | OUTPATIENT
Start: 2023-04-11 | End: 2023-04-11

## 2023-04-11 RX ORDER — HALOPERIDOL DECANOATE 100 MG/ML
2.5 INJECTION INTRAMUSCULAR EVERY 6 HOURS
Refills: 0 | Status: DISCONTINUED | OUTPATIENT
Start: 2023-04-11 | End: 2023-04-12

## 2023-04-11 RX ORDER — IPRATROPIUM/ALBUTEROL SULFATE 18-103MCG
3 AEROSOL WITH ADAPTER (GRAM) INHALATION EVERY 6 HOURS
Refills: 0 | Status: DISCONTINUED | OUTPATIENT
Start: 2023-04-11 | End: 2023-04-22

## 2023-04-11 RX ORDER — HALOPERIDOL DECANOATE 100 MG/ML
2.5 INJECTION INTRAMUSCULAR ONCE
Refills: 0 | Status: COMPLETED | OUTPATIENT
Start: 2023-04-11 | End: 2023-04-11

## 2023-04-11 RX ORDER — SODIUM CHLORIDE 9 MG/ML
1000 INJECTION INTRAMUSCULAR; INTRAVENOUS; SUBCUTANEOUS ONCE
Refills: 0 | Status: COMPLETED | OUTPATIENT
Start: 2023-04-11 | End: 2023-04-11

## 2023-04-11 RX ORDER — ACETAMINOPHEN 500 MG
650 TABLET ORAL ONCE
Refills: 0 | Status: COMPLETED | OUTPATIENT
Start: 2023-04-11 | End: 2023-04-11

## 2023-04-11 RX ORDER — ACETAMINOPHEN 500 MG
1000 TABLET ORAL ONCE
Refills: 0 | Status: DISCONTINUED | OUTPATIENT
Start: 2023-04-11 | End: 2023-04-11

## 2023-04-11 RX ORDER — IPRATROPIUM/ALBUTEROL SULFATE 18-103MCG
3 AEROSOL WITH ADAPTER (GRAM) INHALATION ONCE
Refills: 0 | Status: COMPLETED | OUTPATIENT
Start: 2023-04-11 | End: 2023-04-11

## 2023-04-11 RX ADMIN — Medication 3 MILLILITER(S): at 11:16

## 2023-04-11 RX ADMIN — Medication 400 MILLIGRAM(S): at 12:22

## 2023-04-11 RX ADMIN — Medication 1 MILLIGRAM(S): at 12:34

## 2023-04-11 RX ADMIN — HALOPERIDOL DECANOATE 5 MILLIGRAM(S): 100 INJECTION INTRAMUSCULAR at 11:33

## 2023-04-11 RX ADMIN — SODIUM CHLORIDE 1000 MILLILITER(S): 9 INJECTION INTRAMUSCULAR; INTRAVENOUS; SUBCUTANEOUS at 11:16

## 2023-04-11 RX ADMIN — Medication 1000 MILLIGRAM(S): at 12:52

## 2023-04-11 RX ADMIN — HALOPERIDOL DECANOATE 2.5 MILLIGRAM(S): 100 INJECTION INTRAMUSCULAR at 22:45

## 2023-04-11 RX ADMIN — Medication 3 MILLILITER(S): at 23:36

## 2023-04-11 RX ADMIN — HALOPERIDOL DECANOATE 2.5 MILLIGRAM(S): 100 INJECTION INTRAMUSCULAR at 13:22

## 2023-04-11 NOTE — ED ADULT NURSE REASSESSMENT NOTE - NS ED NURSE REASSESS COMMENT FT1
PT is screaming, & agitated. PT is refusing pain medication. respirations are even and un labored. lung sounds are clear. safety precautions maintained.
Patient is A&OX4, awake and alert. pt breathing spontaneous and unlabored 3L NC, NSR on tele, denies SOB and chest pain, bed lowest position, will continue to monitor.
Patient is A&OX4 awake and alert. pt breathing spontaneous and unlabored. pt denies SOB and chest pain. NSR on tele, on 3L NC, bed lowest position, call bell within reach. will continue to monitor.

## 2023-04-11 NOTE — H&P ADULT - PROBLEM SELECTOR PLAN 3
-Pt reported mechanical fall at home. History of multiple falls in the past likely related to deconditioning, Was recommended d/c to skilled nursing facility prior to transfer to [facility]   -CT head no acute pathology. Pan scan no evidence of acute fx. With known severe hip osteoarthritis   -EKG no acute pathology  -PT eval inpt when agitation controlled and pt able to participate  -will need to discuss dispo options with daughter pending pt eval  -fall precautions

## 2023-04-11 NOTE — ED PROVIDER NOTE - OBJECTIVE STATEMENT
88 year old female, , domiciled with daughter with PPHx of bipolar I disorder, 1 previous inpatient psychiatric hospitalization in 1970s, no known history of SI/SA and PMHx of asthma, GERD, sciatica with previous vertebral fracture, Discharge from [facility] yesterday who presents to the ED after a fall at home.  Patient was discharged home with her daughter and was to be home with her until her aide arrived.  Daughter left last night and patient states she walked up to go to the kitchen when she had a mechanical fall and fell onto her right side, no LOC, no head trauma.  Patient was able to get up and sat on her couch and began screaming until her neighbors called EMS.  Patient currently complaining of right hip pain but denies any chest pain, belly pain, nausea, vomiting, fevers, chills.   patient was given 1 DuoNeb by EMS.

## 2023-04-11 NOTE — ED ADULT NURSE NOTE - NSIMPLEMENTINTERV_GEN_ALL_ED
Implemented All Universal Safety Interventions:  [place] to call system. Call bell, personal items and telephone within reach. Instruct patient to call for assistance. Room bathroom lighting operational. Non-slip footwear when patient is off stretcher. Physically safe environment: no spills, clutter or unnecessary equipment. Stretcher in lowest position, wheels locked, appropriate side rails in place. Implemented All Fall Risk Interventions:  [place] to call system. Call bell, personal items and telephone within reach. Instruct patient to call for assistance. Room bathroom lighting operational. Non-slip footwear when patient is off stretcher. Physically safe environment: no spills, clutter or unnecessary equipment. Stretcher in lowest position, wheels locked, appropriate side rails in place. Provide visual cue, wrist band, yellow gown, etc. Monitor gait and stability. Monitor for mental status changes and reorient to person, place, and time. Review medications for side effects contributing to fall risk. Reinforce activity limits and safety measures with patient and family.

## 2023-04-11 NOTE — H&P ADULT - PROBLEM SELECTOR PLAN 4
-pt agitated, combative  -recent psych admission reviewed. Behavior thought to be more likely related to vascular dementia than decompensated bipolar d/o  -pt not cooperative with taking po meds  -c/w haldol prn for acute agitation. Can also give low dose benzo (max 1mg q6h) if remains agitated  - starr in AM

## 2023-04-11 NOTE — ED PROVIDER NOTE - WR ORDER ID 1
Merary 3, 2019      Andrey Perrin MD  [id number] [address] [address]           [facility]  [address] [facility] [address]  Phone: [phone]  Fax: [phone]          Patient: Diamond Das   MR Number: [id number]   YOB: 1957   Date of Visit: 6/3/2019       Dear Dr. Andrey Perrin:    Thank you for referring Diamond Das to me for evaluation. Attached you will find relevant portions of my assessment and plan of care.    If you have questions, please do not hesitate to call me. I look forward to following Diamond Das along with you.    Sincerely,    Marnie Elmore MD    Enclosure  CC:  No Recipients    If you would like to receive this communication electronically, please contact [email or web address] or [phone] to request more information on [employer] access.    For providers and/or their staff who would like to refer a patient to [facility], please contact us through our one-stop-shop provider referral line, [facility], at [phone].    If you feel you have received this communication in error or would no longer like to receive these types of communications, please e-mail [email or web address]         
[email or web address]

## 2023-04-11 NOTE — H&P ADULT - PROBLEM SELECTOR PLAN 1
-Pt p/w new oxygen requirements and diffuse wheezing on exam   -pt agitated and removing NC  -CXR clear lungs, no evidence of pulm edema or infection  -pro-bnp not significantly elevated given age  -suspicion highest for asthma/COPD exacerbation. Check full RVP panel to r/o viral trigger  -c/w albuterol ATC, will start on solumedrol IV as pt unable to take po currently  -Chest PT  -lower c/f PE at this time, wells score 0  -wean off oxygen as tolerated  -discussed with daughter and pt is full code at this time

## 2023-04-11 NOTE — ED ADULT NURSE REASSESSMENT NOTE - AS PAIN REST
0 (no pain/absence of nonverbal indicators of pain)
stretcher
0 (no pain/absence of nonverbal indicators of pain)

## 2023-04-11 NOTE — ED ADULT NURSE NOTE - CHIEF COMPLAINT QUOTE
As per EMS, neighbors called 911 for pt. yelling that she fell. On arrival pt. was found to be sitting on the couch soiled. Pt. was discharged from [facility] yesterday. [facility] was suppose to come this morning but never showed up. Pt. c/o left hip pain. h/o left hip fx. Sating 90 on RA with cold fingers. EMS gave Duoneb in route for wheezing. Pt. brought straight to rm 10.

## 2023-04-11 NOTE — ED ADULT TRIAGE NOTE - CHIEF COMPLAINT QUOTE
As per EMS, neighbors called 911 for pt. yelling that she fell. On arrival pt. was found to be sitting on the couch soiled. Pt. was discharged from [facility] yesterday. [facility] was suppose to come this morning but never showed up. Pt. c/o left hip pain. h/o left hip fx. Sating 90 on RA with cold fingers. EMS gave Duoneb in route for wheezing. Pt. brought straight to rm 10.

## 2023-04-11 NOTE — H&P ADULT - HISTORY OF PRESENT ILLNESS
Limited history from pt as she is agitated and confused, history obtained from daughter and chart review     88 year old female, , domiciled with daughter with PPHx of bipolar I disorder w/ recent admission at [facility] for agitation thought to be related to vascular dementia, asthma/COPD, GERD, sciatica with previous vertebral fracture, discharge from [facility] yesterday who presents to the ED after a fall at home.  Patient was discharged home with her daughter and was to be home with her until her aide arrived.  Daughter left last night and patient states she walked up to go to the kitchen when she had a mechanical fall and fell onto her right side, no LOC, no head trauma. Patient was able to get up and sat on her couch and began screaming until her neighbors called EMS.  Patient was complaining of right hip pain but denies any chest pain, belly pain, nausea, vomiting, fevers, chills to the ED providers. Patient was given 1 DuoNeb by EMS. Of note, pt with prolonged admission from dec 2022 until a few days ago for back pain and stabilization of mood/agitation from dementia. Daughter states she was unable to get HHA to come in so pt was by herself for a few hours and she is unclear if pt had a fall. Limited history from pt as she is agitated and confused, history obtained from daughter and chart review     88 year old female, , domiciled with daughter with PPHx of bipolar I disorder w/ recent admission at [facility] for agitation thought to be related to vascular dementia, asthma/COPD, GERD, sciatica, paroxysmal afib (not on a/c) with previous vertebral fracture, discharge from [facility] yesterday who presents to the ED after a fall at home.  Patient was discharged home with her daughter and was to be home with her until her aide arrived.  Daughter left last night and patient states she walked up to go to the kitchen when she had a mechanical fall and fell onto her right side, no LOC, no head trauma. Patient was able to get up and sat on her couch and began screaming until her neighbors called EMS.  Patient was complaining of right hip pain but denies any chest pain, belly pain, nausea, vomiting, fevers, chills to the ED providers. Patient was given 1 DuoNeb by EMS. Of note, pt with prolonged admission from dec 2022 until a few days ago for back pain and stabilization of mood/agitation from dementia. Daughter states she was unable to get HHA to come in so pt was by herself for a few hours and she is unclear if pt had a fall.

## 2023-04-11 NOTE — ED PROVIDER NOTE - CLINICAL SUMMARY MEDICAL DECISION MAKING FREE TEXT BOX
88 year old female, , domiciled with daughter with PPHx of bipolar I disorder, 1 previous inpatient psychiatric hospitalization in 1970s, no known history of SI/SA and PMHx of asthma, GERD, sciatica with previous vertebral fracture, Discharge from [facility] yesterday who presents to the ED after a fall at home.    Will evaluate for UTI.  Will evaluate for trauma with CT head, pelvic x-ray.  Patient will likely be admitted due to unsafe discharge.

## 2023-04-11 NOTE — H&P ADULT - CONVERSATION DETAILS
Discussed with daughter who is primary care giver. States pt is full code and she wants everything done including intubation and resuscitation.

## 2023-04-11 NOTE — H&P ADULT - NSHPPHYSICALEXAM_GEN_ALL_CORE
GENERAL APPEARANCE: cachetic, agitated and uncooperative. in mod resp distress on NC  HEENT:  PERRL, EOMI. External auditory canals normal, hearing grossly intact.  NECK: Neck supple, non-tender   CARDIAC: Normal S1 and S2. No S3, S4 or murmurs. Rhythm is regular.  LUNGS: tachypneic without accessory muscle use. Diffuse expiratory wheeze   ABDOMEN: Positive bowel sounds. Soft, nondistended, nontender. No guarding or rebound.   MUSCULOSKELETAL: ROM intact spine and extremities. No joint erythema or tenderness.   EXTREMITIES: No significant deformity or joint abnormality. No edema. Peripheral pulses intact.   NEUROLOGICAL: moving all extremities spontaneously    SKIN: no rashes noted   PSYCHIATRIC: AOx1-2. agitated, combative

## 2023-04-11 NOTE — ED PROVIDER NOTE - PROGRESS NOTE DETAILS
MD GRAVES:  Pt agitated.  Will tx with medication. Nic Bell, PGY-3- Patient's work-up within normal limits.  Required medications in the ED to control agitation.  Patient will be admitted due to unsafe discharge.  Spoke with daughter who reports home health aide unable to come for 2 more days.  Daughter unable to take care of patient for 24/7.  Concern for fall at home.  Patient does not use assistive devices.  With extensive osteoarthritis in her hips.  Will need PT eval.

## 2023-04-11 NOTE — ED PROVIDER NOTE - ATTENDING CONTRIBUTION TO CARE
DR. GRAVES, ATTENDING MD-  I performed a face to face bedside interview with the patient regarding history of present illness, review of symptoms and past medical history. I completed an independent physical exam.  I have discussed the patient's plan of care with the resident.   Documentation as above in the note.    89 y/o female h/o bipolar d/o asthma recent dc from [facility] after unwitnessed [facility] fall at home with r hip pain difficulty ambulating.  Eval for hip fx, pelvic fx, anemia, lyte abn, uti, pna, ich.  Obtain cbc cmp ua ucx viral swab ct head cxr xr pelvis/hip give pain med reassess likely admission as unable to ambulate.

## 2023-04-11 NOTE — H&P ADULT - ASSESSMENT
88 year old female, , domiciled with daughter with PPHx of bipolar I disorder w/ recent admission at [facility] for agitation thought to be related to vascular dementia, asthma/COPD presents s/p mechanical fall with hypoxic respiratory failure c/f asthma/copd exacerbation

## 2023-04-11 NOTE — H&P ADULT - PROBLEM SELECTOR PLAN 2
-as above  -c/w albuterol ATC and solumedrol   -chest PT  -trend VBG  -low threshold for micu/pulm eval if hypoxemia worsens

## 2023-04-11 NOTE — H&P ADULT - NSHPLABSRESULTS_GEN_ALL_CORE
( @ 10:55)                      10.8  8.40 )-----------( 332                 35.7    Neutrophils = 6.09 (72.5%)  Lymphocytes = 1.46 (17.4%)  Eosinophils = 0.02 (0.2%)  Basophils = 0.05 (0.6%)  Monocytes = 0.74 (8.8%)  Bands = --%        139  |  100  |  21  ----------------------------<  104<H>  4.1   |  30  |  0.50    Ca    9.2      2023 10:55    TPro  6.9  /  Alb  3.7  /  TBili  0.6  /  DBili  x   /  AST  16  /  ALT  11  /  AlkPhos  115  04      CARDIAC MARKERS ( 2023 10:55 )  Trop x     /  U/L / CKMB x           Urinalysis Basic - ( 2023 15:42 )    Color: Yellow / Appearance: Clear / S.029 / pH: x  Gluc: x / Ketone: Negative  / Bili: Negative / Urobili: <2 mg/dL   Blood: x / Protein: Trace / Nitrite: Negative   Leuk Esterase: Negative / RBC: x / WBC x   Sq Epi: x / Non Sq Epi: x / Bacteria: x      < from: CT Head No Cont (23 @ 14:03) >      IMPRESSION:  No mass effect, hemorrhage or evidence of acute intracranial pathology.    < end of copied text >    < from: Xray Femur 2 Views, Right (23 @ 14:40) >    IMPRESSION:  No dislocations displaced fractures are discrete suspicious fracture   lines in above imaged anatomic regions however if symptoms and/or concern   persists, CT or MRI suggested to further assess as warranted.    Intact pelvic and obturator rings and symmetrically aligned spaced SI   joints and pubic symphysis.    Lower lumbar spine degenerative change and advanced right hip   osteoarthritis again noted. Suggestion of faint left hip   chondrocalcinosis otherwise preserved left hip joint space. Tiny nodular   foci calcific tendinosis adjacent to lateral femoral condyle margin.    Generalized osteopenia otherwise no discrete lytic or blastic lesions.    Rounded configuration of clustered variably sized small nodular   calcifications in right upper quadrant near superior image margin   probably representing gallstones. Atherosclerotic abdominal aortic   calcifications.  External urinecatch device overlies vulvar region.    < end of copied text >    < from: Xray Chest 2 Views PA/Lat (23 @ 14:40) >      IMPRESSION: No acute traumatic findings.    Labs personally reviewed   Imaging reviewed  EKG: NSR, no acute st changes. QTc 449

## 2023-04-11 NOTE — ED ADULT NURSE NOTE - OBJECTIVE STATEMENT
A&Ox4. ambulatory at baseline. c/o unwitnessed fall this morning. audible wheezing observed. pt states she was on the floor "for hours, until my neighbor heard me scream." NAD. unknown if fall was mechanical or physiological. pt denies SOB, chest pain, dizziness, weakness, urinary symptoms, HA, n/v/d, fevers, chills. PT placed on NC@2LPM due to RA stat = 90%. respirations are even and un labored. abd is soft and non tender. skin intact. bruising observed to left breast. call bel at bedside. 20g placed to LAC. labs drawn and sent. EKG obtained. placed on cardiac monitor, NSR. safety precautions maintained.

## 2023-04-11 NOTE — ED PROVIDER NOTE - PHYSICAL EXAMINATION
Const:    Thin elderly  Woman.  Soiled her diaper.  Eyes: no conjunctival injection, and symmetrical lids.  HEENT: Head NCAT, no lesions. Atraumatic external nose and ears. Moist MM.  Neck: Symmetric, trachea midline.   CVS: +S1/S2,    RESP: Unlabored respiratory effort.  Bilateral wheezing.  GI: Nontender/Nondistended,    MSK:   No chest wall tenderness.  No midline vertebral tenderness.  Full ROM of left hip and knee.  ROM of right hip limited due to pain. Lower Extremities w/o calf tenderness or edema b/l.   Skin: Warm, dry and intact.   Neuro:  . Motor & Sensation grossly intact.  Psych: Awake, Alert, & Oriented (AAO) x3. Appropriate mood and affect.

## 2023-04-12 DIAGNOSIS — Z29.9 ENCOUNTER FOR PROPHYLACTIC MEASURES, UNSPECIFIED: ICD-10-CM

## 2023-04-12 DIAGNOSIS — J44.1 CHRONIC OBSTRUCTIVE PULMONARY DISEASE WITH (ACUTE) EXACERBATION: ICD-10-CM

## 2023-04-12 DIAGNOSIS — J96.01 ACUTE RESPIRATORY FAILURE WITH HYPOXIA: ICD-10-CM

## 2023-04-12 DIAGNOSIS — F03.918 UNSPECIFIED DEMENTIA, UNSPECIFIED SEVERITY, WITH OTHER BEHAVIORAL DISTURBANCE: ICD-10-CM
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
## 2023-04-12 LAB
ANION GAP SERPL CALC-SCNC: 15 MMOL/L — HIGH (ref 7–14)
B PERT DNA SPEC QL NAA+PROBE: SIGNIFICANT CHANGE UP
B PERT+PARAPERT DNA PNL SPEC NAA+PROBE: SIGNIFICANT CHANGE UP
BLOOD GAS VENOUS COMPREHENSIVE RESULT: SIGNIFICANT CHANGE UP
BORDETELLA PARAPERTUSSIS (RAPRVP): SIGNIFICANT CHANGE UP
BUN SERPL-MCNC: 19 MG/DL — SIGNIFICANT CHANGE UP (ref 7–23)
C PNEUM DNA SPEC QL NAA+PROBE: SIGNIFICANT CHANGE UP
CALCIUM SERPL-MCNC: 9.5 MG/DL — SIGNIFICANT CHANGE UP (ref 8.4–10.5)
CHLORIDE SERPL-SCNC: 101 MMOL/L — SIGNIFICANT CHANGE UP (ref 98–107)
CO2 SERPL-SCNC: 25 MMOL/L — SIGNIFICANT CHANGE UP (ref 22–31)
CREAT SERPL-MCNC: 0.47 MG/DL — LOW (ref 0.5–1.3)
CULTURE RESULTS: SIGNIFICANT CHANGE UP
EGFR: 92 ML/MIN/1.73M2 — SIGNIFICANT CHANGE UP
FLUAV SUBTYP SPEC NAA+PROBE: SIGNIFICANT CHANGE UP
FLUBV RNA SPEC QL NAA+PROBE: SIGNIFICANT CHANGE UP
GLUCOSE BLDC GLUCOMTR-MCNC: 111 MG/DL — HIGH (ref 70–99)
GLUCOSE BLDC GLUCOMTR-MCNC: 115 MG/DL — HIGH (ref 70–99)
GLUCOSE BLDC GLUCOMTR-MCNC: 123 MG/DL — HIGH (ref 70–99)
GLUCOSE SERPL-MCNC: 111 MG/DL — HIGH (ref 70–99)
HADV DNA SPEC QL NAA+PROBE: SIGNIFICANT CHANGE UP
HCOV 229E RNA SPEC QL NAA+PROBE: SIGNIFICANT CHANGE UP
HCOV HKU1 RNA SPEC QL NAA+PROBE: SIGNIFICANT CHANGE UP
HCOV NL63 RNA SPEC QL NAA+PROBE: SIGNIFICANT CHANGE UP
HCOV OC43 RNA SPEC QL NAA+PROBE: SIGNIFICANT CHANGE UP
HCT VFR BLD CALC: 30.5 % — LOW (ref 34.5–45)
HGB BLD-MCNC: 9.3 G/DL — LOW (ref 11.5–15.5)
HMPV RNA SPEC QL NAA+PROBE: SIGNIFICANT CHANGE UP
HPIV1 RNA SPEC QL NAA+PROBE: SIGNIFICANT CHANGE UP
HPIV2 RNA SPEC QL NAA+PROBE: SIGNIFICANT CHANGE UP
HPIV3 RNA SPEC QL NAA+PROBE: SIGNIFICANT CHANGE UP
HPIV4 RNA SPEC QL NAA+PROBE: SIGNIFICANT CHANGE UP
M PNEUMO DNA SPEC QL NAA+PROBE: SIGNIFICANT CHANGE UP
MAGNESIUM SERPL-MCNC: 1.8 MG/DL — SIGNIFICANT CHANGE UP (ref 1.6–2.6)
MCHC RBC-ENTMCNC: 28.4 PG — SIGNIFICANT CHANGE UP (ref 27–34)
MCHC RBC-ENTMCNC: 30.5 GM/DL — LOW (ref 32–36)
MCV RBC AUTO: 93 FL — SIGNIFICANT CHANGE UP (ref 80–100)
NRBC # BLD: 0 /100 WBCS — SIGNIFICANT CHANGE UP (ref 0–0)
NRBC # FLD: 0 K/UL — SIGNIFICANT CHANGE UP (ref 0–0)
NT-PROBNP SERPL-SCNC: 1240 PG/ML — HIGH
PHOSPHATE SERPL-MCNC: 3.3 MG/DL — SIGNIFICANT CHANGE UP (ref 2.5–4.5)
PLATELET # BLD AUTO: 279 K/UL — SIGNIFICANT CHANGE UP (ref 150–400)
POTASSIUM SERPL-MCNC: 4 MMOL/L — SIGNIFICANT CHANGE UP (ref 3.5–5.3)
POTASSIUM SERPL-SCNC: 4 MMOL/L — SIGNIFICANT CHANGE UP (ref 3.5–5.3)
RAPID RVP RESULT: SIGNIFICANT CHANGE UP
RBC # BLD: 3.28 M/UL — LOW (ref 3.8–5.2)
RBC # FLD: 15.9 % — HIGH (ref 10.3–14.5)
RSV RNA SPEC QL NAA+PROBE: SIGNIFICANT CHANGE UP
RV+EV RNA SPEC QL NAA+PROBE: SIGNIFICANT CHANGE UP
SARS-COV-2 RNA SPEC QL NAA+PROBE: SIGNIFICANT CHANGE UP
SODIUM SERPL-SCNC: 141 MMOL/L — SIGNIFICANT CHANGE UP (ref 135–145)
SPECIMEN SOURCE: SIGNIFICANT CHANGE UP
WBC # BLD: 6.11 K/UL — SIGNIFICANT CHANGE UP (ref 3.8–10.5)
WBC # FLD AUTO: 6.11 K/UL — SIGNIFICANT CHANGE UP (ref 3.8–10.5)

## 2023-04-12 PROCEDURE — 90792 PSYCH DIAG EVAL W/MED SRVCS: CPT

## 2023-04-12 RX ORDER — INSULIN LISPRO 100/ML
VIAL (ML) SUBCUTANEOUS AT BEDTIME
Refills: 0 | Status: DISCONTINUED | OUTPATIENT
Start: 2023-04-12 | End: 2023-05-01

## 2023-04-12 RX ORDER — ACETAMINOPHEN 500 MG
650 TABLET ORAL EVERY 6 HOURS
Refills: 0 | Status: DISCONTINUED | OUTPATIENT
Start: 2023-04-11 | End: 2023-05-01

## 2023-04-12 RX ORDER — LANOLIN ALCOHOL/MO/W.PET/CERES
6 CREAM (GRAM) TOPICAL AT BEDTIME
Refills: 0 | Status: DISCONTINUED | OUTPATIENT
Start: 2023-04-12 | End: 2023-05-01

## 2023-04-12 RX ORDER — HALOPERIDOL DECANOATE 100 MG/ML
5 INJECTION INTRAMUSCULAR EVERY 6 HOURS
Refills: 0 | Status: DISCONTINUED | OUTPATIENT
Start: 2023-04-12 | End: 2023-04-12

## 2023-04-12 RX ORDER — TRAZODONE HCL 50 MG
50 TABLET ORAL
Refills: 0 | Status: DISCONTINUED | OUTPATIENT
Start: 2023-04-12 | End: 2023-05-01

## 2023-04-12 RX ORDER — PIPERACILLIN AND TAZOBACTAM 4; .5 G/20ML; G/20ML
3.38 INJECTION, POWDER, LYOPHILIZED, FOR SOLUTION INTRAVENOUS ONCE
Refills: 0 | Status: COMPLETED | OUTPATIENT
Start: 2023-04-13 | End: 2023-04-13

## 2023-04-12 RX ORDER — GABAPENTIN 400 MG/1
300 CAPSULE ORAL THREE TIMES A DAY
Refills: 0 | Status: DISCONTINUED | OUTPATIENT
Start: 2023-04-12 | End: 2023-05-01

## 2023-04-12 RX ORDER — INSULIN LISPRO 100/ML
VIAL (ML) SUBCUTANEOUS
Refills: 0 | Status: DISCONTINUED | OUTPATIENT
Start: 2023-04-12 | End: 2023-05-01

## 2023-04-12 RX ORDER — SODIUM CHLORIDE 9 MG/ML
1000 INJECTION, SOLUTION INTRAVENOUS
Refills: 0 | Status: DISCONTINUED | OUTPATIENT
Start: 2023-04-12 | End: 2023-05-01

## 2023-04-12 RX ORDER — LANOLIN ALCOHOL/MO/W.PET/CERES
3 CREAM (GRAM) TOPICAL AT BEDTIME
Refills: 0 | Status: DISCONTINUED | OUTPATIENT
Start: 2023-04-11 | End: 2023-04-12

## 2023-04-12 RX ORDER — HALOPERIDOL DECANOATE 100 MG/ML
1 INJECTION INTRAMUSCULAR THREE TIMES A DAY
Refills: 0 | Status: DISCONTINUED | OUTPATIENT
Start: 2023-04-12 | End: 2023-04-14

## 2023-04-12 RX ORDER — ONDANSETRON 8 MG/1
4 TABLET, FILM COATED ORAL EVERY 8 HOURS
Refills: 0 | Status: DISCONTINUED | OUTPATIENT
Start: 2023-04-11 | End: 2023-05-01

## 2023-04-12 RX ORDER — PIPERACILLIN AND TAZOBACTAM 4; .5 G/20ML; G/20ML
3.38 INJECTION, POWDER, LYOPHILIZED, FOR SOLUTION INTRAVENOUS ONCE
Refills: 0 | Status: COMPLETED | OUTPATIENT
Start: 2023-04-12 | End: 2023-04-12

## 2023-04-12 RX ORDER — HALOPERIDOL DECANOATE 100 MG/ML
1 INJECTION INTRAMUSCULAR EVERY 6 HOURS
Refills: 0 | Status: DISCONTINUED | OUTPATIENT
Start: 2023-04-12 | End: 2023-05-01

## 2023-04-12 RX ORDER — DEXTROSE 50 % IN WATER 50 %
25 SYRINGE (ML) INTRAVENOUS ONCE
Refills: 0 | Status: DISCONTINUED | OUTPATIENT
Start: 2023-04-12 | End: 2023-05-01

## 2023-04-12 RX ORDER — DEXTROSE 50 % IN WATER 50 %
12.5 SYRINGE (ML) INTRAVENOUS ONCE
Refills: 0 | Status: DISCONTINUED | OUTPATIENT
Start: 2023-04-12 | End: 2023-05-01

## 2023-04-12 RX ORDER — PIPERACILLIN AND TAZOBACTAM 4; .5 G/20ML; G/20ML
3.38 INJECTION, POWDER, LYOPHILIZED, FOR SOLUTION INTRAVENOUS EVERY 8 HOURS
Refills: 0 | Status: DISCONTINUED | OUTPATIENT
Start: 2023-04-13 | End: 2023-04-14

## 2023-04-12 RX ORDER — HALOPERIDOL DECANOATE 100 MG/ML
2.5 INJECTION INTRAMUSCULAR ONCE
Refills: 0 | Status: COMPLETED | OUTPATIENT
Start: 2023-04-12 | End: 2023-04-12

## 2023-04-12 RX ORDER — ENOXAPARIN SODIUM 100 MG/ML
40 INJECTION SUBCUTANEOUS EVERY 24 HOURS
Refills: 0 | Status: DISCONTINUED | OUTPATIENT
Start: 2023-04-11 | End: 2023-05-01

## 2023-04-12 RX ORDER — TRAZODONE HCL 50 MG
50 TABLET ORAL AT BEDTIME
Refills: 0 | Status: DISCONTINUED | OUTPATIENT
Start: 2023-04-12 | End: 2023-04-12

## 2023-04-12 RX ORDER — PANTOPRAZOLE SODIUM 20 MG/1
40 TABLET, DELAYED RELEASE ORAL
Refills: 0 | Status: DISCONTINUED | OUTPATIENT
Start: 2023-04-12 | End: 2023-05-01

## 2023-04-12 RX ORDER — DEXTROSE 50 % IN WATER 50 %
15 SYRINGE (ML) INTRAVENOUS ONCE
Refills: 0 | Status: DISCONTINUED | OUTPATIENT
Start: 2023-04-12 | End: 2023-05-01

## 2023-04-12 RX ORDER — GLUCAGON INJECTION, SOLUTION 0.5 MG/.1ML
1 INJECTION, SOLUTION SUBCUTANEOUS ONCE
Refills: 0 | Status: DISCONTINUED | OUTPATIENT
Start: 2023-04-12 | End: 2023-05-01

## 2023-04-12 RX ORDER — TRAZODONE HCL 50 MG
12.5 TABLET ORAL
Refills: 0 | Status: DISCONTINUED | OUTPATIENT
Start: 2023-04-12 | End: 2023-04-14

## 2023-04-12 RX ORDER — HALOPERIDOL DECANOATE 100 MG/ML
5 INJECTION INTRAMUSCULAR ONCE
Refills: 0 | Status: COMPLETED | OUTPATIENT
Start: 2023-04-12 | End: 2023-04-12

## 2023-04-12 RX ADMIN — HALOPERIDOL DECANOATE 1 MILLIGRAM(S): 100 INJECTION INTRAMUSCULAR at 21:53

## 2023-04-12 RX ADMIN — PIPERACILLIN AND TAZOBACTAM 200 GRAM(S): 4; .5 INJECTION, POWDER, LYOPHILIZED, FOR SOLUTION INTRAVENOUS at 16:39

## 2023-04-12 RX ADMIN — HALOPERIDOL DECANOATE 5 MILLIGRAM(S): 100 INJECTION INTRAMUSCULAR at 08:21

## 2023-04-12 RX ADMIN — HALOPERIDOL DECANOATE 2.5 MILLIGRAM(S): 100 INJECTION INTRAMUSCULAR at 01:53

## 2023-04-12 RX ADMIN — HALOPERIDOL DECANOATE 1 MILLIGRAM(S): 100 INJECTION INTRAMUSCULAR at 15:09

## 2023-04-12 RX ADMIN — HALOPERIDOL DECANOATE 5 MILLIGRAM(S): 100 INJECTION INTRAMUSCULAR at 03:06

## 2023-04-12 RX ADMIN — PIPERACILLIN AND TAZOBACTAM 25 GRAM(S): 4; .5 INJECTION, POWDER, LYOPHILIZED, FOR SOLUTION INTRAVENOUS at 21:52

## 2023-04-12 RX ADMIN — Medication 20 MILLIGRAM(S): at 18:31

## 2023-04-12 RX ADMIN — Medication 3 MILLILITER(S): at 15:18

## 2023-04-12 RX ADMIN — Medication 20 MILLIGRAM(S): at 07:40

## 2023-04-12 RX ADMIN — Medication 20 MILLIGRAM(S): at 00:57

## 2023-04-12 RX ADMIN — Medication 6 MILLIGRAM(S): at 21:53

## 2023-04-12 RX ADMIN — ENOXAPARIN SODIUM 40 MILLIGRAM(S): 100 INJECTION SUBCUTANEOUS at 15:09

## 2023-04-12 RX ADMIN — HALOPERIDOL DECANOATE 5 MILLIGRAM(S): 100 INJECTION INTRAMUSCULAR at 11:58

## 2023-04-12 RX ADMIN — Medication 650 MILLIGRAM(S): at 11:59

## 2023-04-12 RX ADMIN — Medication 3 MILLILITER(S): at 08:38

## 2023-04-12 RX ADMIN — GABAPENTIN 300 MILLIGRAM(S): 400 CAPSULE ORAL at 21:53

## 2023-04-12 RX ADMIN — Medication 50 MILLIGRAM(S): at 21:53

## 2023-04-12 NOTE — BH CONSULTATION LIAISON ASSESSMENT NOTE - CURRENT MEDICATION
MEDICATIONS  (STANDING):  albuterol/ipratropium for Nebulization 3 milliLiter(s) Nebulizer every 6 hours  dextrose 5%. 1000 milliLiter(s) (50 mL/Hr) IV Continuous <Continuous>  dextrose 5%. 1000 milliLiter(s) (100 mL/Hr) IV Continuous <Continuous>  dextrose 50% Injectable 25 Gram(s) IV Push once  dextrose 50% Injectable 12.5 Gram(s) IV Push once  dextrose 50% Injectable 25 Gram(s) IV Push once  enoxaparin Injectable 40 milliGRAM(s) SubCutaneous every 24 hours  glucagon  Injectable 1 milliGRAM(s) IntraMuscular once  insulin lispro (ADMELOG) corrective regimen sliding scale   SubCutaneous at bedtime  insulin lispro (ADMELOG) corrective regimen sliding scale   SubCutaneous three times a day before meals  methylPREDNISolone sodium succinate Injectable 20 milliGRAM(s) IV Push two times a day    MEDICATIONS  (PRN):  acetaminophen     Tablet .. 650 milliGRAM(s) Oral every 6 hours PRN Temp greater or equal to 38C (100.4F), Mild Pain (1 - 3)  aluminum hydroxide/magnesium hydroxide/simethicone Suspension 30 milliLiter(s) Oral every 4 hours PRN Dyspepsia  dextrose Oral Gel 15 Gram(s) Oral once PRN Blood Glucose LESS THAN 70 milliGRAM(s)/deciliter  haloperidol    Injectable 5 milliGRAM(s) IV Push every 6 hours PRN Agitation  melatonin 3 milliGRAM(s) Oral at bedtime PRN Insomnia  ondansetron Injectable 4 milliGRAM(s) IV Push every 8 hours PRN Nausea and/or Vomiting

## 2023-04-12 NOTE — BH CONSULTATION LIAISON ASSESSMENT NOTE - HPI (INCLUDE ILLNESS QUALITY, SEVERITY, DURATION, TIMING, CONTEXT, MODIFYING FACTORS, ASSOCIATED SIGNS AND SYMPTOMS)
88 year old female, , domiciled with daughter with PPH of vascular dementia, distant history of bipolar I disorder w/ recent admission at [facility] for agitation, asthma/COPD presents s/p mechanical fall with hypoxic respiratory failure c/f asthma/copd exacerbation. Psych c/s for agitation.    Patient AOx1, knows she is in hospital, agitated and trying to get out of bed. States that someone took her rings and she cannot find them, when told that they are in secure location she then calmed down, describes frustrated mood, no si/hi noted. No AH/VH but has some possible delusions likely based on severe cognitive deficits rather than hallucinations. No EPS, rigidity, tremors noted on exam, moving all limbs freely.     Discussed with Dr. Wright - had been on trials of seroquel, then olanzapine, then risperdal, and finally haldol, DC'd on haldol 1mg TID, gabapentin 300mg TID, Traozodone 50mg qHS. Low expectation that patient is going to improve much further with medical mgmt, would benefit from NH or ZIA placement but had run out of ZIA days. Patient screaming at slight offenses at baseline, worsened in context of dementia. Low concern of bipolarity contributing, most of mgmt was for dementia.

## 2023-04-12 NOTE — CHART NOTE - NSCHARTNOTEFT_GEN_A_CORE
Called by nurse for fever of 100.6.  UA and RVP were negative.   Check blood cultures.   CT chest pending.   Zosyn started as d/w Dr Carreon.     Gonzalo So NP   spectra [phone]

## 2023-04-12 NOTE — CONSULT NOTE ADULT - ASSESSMENT
88 year old female, , domiciled with daughter with PPHx of bipolar I disorder w/ recent admission at [facility] for agitation thought to be related to vascular dementia, asthma/COPD presents s/p mechanical fall with hypoxic respiratory failure c/f asthma/copd exacerbation :    Copd exacerbation:  hypoxic resp failure  Bipolar disorder;   dementia:   SP Fall       4/12;    Copd exacerbation:  hypoxic resp failure ; being treated for copd exacerbation   ; agree with short term steroids:  on BD ; WBC is normal : do ct chest non contrast    Bipolar disorder; cont home meds:  recent dc from [facility]  dementia: supportive care  SP Fall  /; per PMD:    mirtha acp  ; TRIED CONTACTING HER DAUGHTER BUT WITH NO RESPONSE:

## 2023-04-12 NOTE — PROGRESS NOTE ADULT - ASSESSMENT
88 year old female, , domiciled with daughter with PPHx of bipolar I disorder w/ recent admission at [facility] for agitation thought to be related to vascular dementia, asthma/COPD presents s/p mechanical fall with hypoxic respiratory failure c/f asthma/copd exacerbation

## 2023-04-12 NOTE — SOCIAL WORK POST DISCHARGE FOLLOW UP NOTE - NSBHSWFOLLOWUP_PSY_ALL_CORE_FT
The pt was discharged from [facility] 2S unit on 4/10/23, returning via [facility] ambulance to her apartment where her daughter, Kathryn Hogan, was receiving her. According to [facility] ER record, the pt had a fall at home and was brought to [facility] ER where all imaging was negative. The pt was admitted to [facility] 4/11/23. The pt had been [facility], for an intake appointment. Dr. Jones is aware of the pt's [facility] admission.

## 2023-04-12 NOTE — BH CONSULTATION LIAISON ASSESSMENT NOTE - NSBHCHARTREVIEWVS_PSY_A_CORE FT
Vital Signs Last 24 Hrs  T(C): 36.5 (12 Apr 2023 06:37), Max: 36.8 (11 Apr 2023 16:49)  T(F): 97.7 (12 Apr 2023 06:37), Max: 98.2 (11 Apr 2023 16:49)  HR: 80 (12 Apr 2023 10:04) (58 - 88)  BP: 148/76 (12 Apr 2023 06:37) (124/78 - 148/76)  BP(mean): --  RR: 18 (12 Apr 2023 06:37) (14 - 20)  SpO2: 98% (12 Apr 2023 10:04) (95% - 98%)    Parameters below as of 12 Apr 2023 10:04  Patient On (Oxygen Delivery Method): nasal cannula

## 2023-04-12 NOTE — BH CONSULTATION LIAISON ASSESSMENT NOTE - MSE UNSTRUCTURED FT
Mental Status Exam:  Ivy: well groomed, fair hygiene     Behavior: psychomotor agitation  Motor: no tremors, EPS, or rigidity  Gait: did not assess, pt in bed  Speech: normal rate, rhythm, prosedy and volume   Mood: "i'm upset!"  Affect: labile, histrionic, congruent  Thought process: perseverative    Thought Content: mild paranoia    Perception: denies AH/VH  SI: denies  HI: denies  Insight: poor  Judgment: poor    Cognitive Exam:  Orientation: AOx1

## 2023-04-12 NOTE — BH CONSULTATION LIAISON ASSESSMENT NOTE - SUMMARY
88 year old female, , domiciled with daughter with PPH of vascular dementia, distant history of bipolar I disorder w/ recent admission at [facility] for agitation, asthma/COPD presents s/p mechanical fall with hypoxic respiratory failure c/f asthma/copd exacerbation. Psych c/s for agitation.    Patient with continued agitation in context of progressive dementia, discussed prior mgmt with [facility] psychiatrist from which patient was discharged recently. No SI/HI noted, patient restless and easily upset/screaming based on innocuous slights.    Recs:  - c/w 1:1 or send to ES room on 7S  - F/u EKG for QTc, hold antipsychotics if QTc >500  - C/w home meds, Haldol 1mg TID, Gabapentin 300mg TID, Trazodone 50mg qHS  - PRN for agitation:  Haldol 1mg q6h PRN PO/IM/IV agitation  - Begin Melatonin 6mg qHS  - Early SW/CM consult, patient with complex dispo needs, unlikely to benefit from return to [facility] but recently had issue with HHA reinstatement. SW may benefit from discussing case with [facility] SW team on [address] South   - Psych will follow, needs further psych assessment    88 year old female, , domiciled with daughter with PPH of vascular dementia, distant history of bipolar I disorder w/ recent admission at [facility] for agitation, asthma/COPD presents s/p mechanical fall with hypoxic respiratory failure c/f asthma/copd exacerbation. Psych c/s for agitation.    Patient with continued agitation in context of progressive dementia, discussed prior mgmt with [facility] psychiatrist from which patient was discharged recently. No SI/HI noted, patient restless and easily upset/screaming based on innocuous slights.    Recs:  - c/w 1:1 or send to ES room on 7S  - F/u EKG for QTc, hold antipsychotics if QTc >500  - C/w home meds, Haldol 1mg TID, Gabapentin 300mg TID  - Add/Change: Trazodone 12.5mg @ 8am & 2pm, Trazodone 50mg @ 8pm instead of hs  - PRN for agitation:  Haldol 1mg q6h PRN PO/IM/IV agitation  - Begin Melatonin 6mg qHS  - Early SW/CM consult, patient with complex dispo needs, unlikely to benefit from return to [facility] but recently had issue with HHA reinstatement. SW may benefit from discussing case with [facility] SW team on 2 [address]   - Psych will follow, needs further psych assessment

## 2023-04-12 NOTE — CONSULT NOTE ADULT - SUBJECTIVE AND OBJECTIVE BOX
04-12-23 @ 13:40    Patient is a 88y old  Female who presents with a chief complaint of agitation, fall at home? (2023 11:01)      HPI:  Limited history from pt as she is agitated and confused, history obtained from daughter and chart review     88 year old female, , domiciled with daughter with PPHx of bipolar I disorder w/ recent admission at [facility] for agitation thought to be related to vascular dementia, asthma/COPD, GERD, sciatica, paroxysmal afib (not on a/c) with previous vertebral fracture, discharge from [facility] yesterday who presents to the ED after a fall at home.  Patient was discharged home with her daughter and was to be home with her until her aide arrived.  Daughter left last night and patient states she walked up to go to the kitchen when she had a mechanical fall and fell onto her right side, no LOC, no head trauma. Patient was able to get up and sat on her couch and began screaming until her neighbors called EMS.  Patient was complaining of right hip pain but denies any chest pain, belly pain, nausea, vomiting, fevers, chills to the ED providers. Patient was given 1 DuoNeb by EMS. Of note, pt with prolonged admission from dec 2022 until a few days ago for back pain and stabilization of mood/agitation from dementia. Daughter states she was unable to get HHA to come in so pt was by herself for a few hours and she is unclear if pt had a fall. (2023 23:37)  :    she is doing  ok : no sob:  no cough : no phlegm  :      ?FOLLOWING PRESENT  x[ ] Hx of PE/DVT, [ ]x Hx COPD, [ x] Hx of Asthma, [ y] Hx of Hospitalization, [x ]  Hx of BiPAP/CPAP use, [y ] Hx of JUAN C    Allergies    iodine (Unknown)  IV Contrast (Unknown)  IV DYE- burning, head tingling (Unknown)  novacaine (Unknown)    Intolerances        PAST MEDICAL & SURGICAL HISTORY:  Anxiety      CVA (Cerebral Infarction)  2007 per daughter      Diverticulosis of intestine      GERD (gastroesophageal reflux disease)      Asthma      Cerebrovascular accident (CVA), unspecified mechanism      Vertebral fracture, osteoporotic      S/P Tonsillectomy      History of tonsillectomy          FAMILY HISTORY:  Family history of amyotrophic lateral sclerosis  Daughter    Family history of multiple sclerosis (Child)        Social History: [unk  ] TOBACCO                  [ unk  ] ETOH                                 [ unk  ] IVDA/DRUGS    REVIEW OF SYSTEMS     pt cant tell me:   General:	    Skin/Breast:  	  Ophthalmologic:  	  ENMT:	    Respiratory and Thorax:  	  Cardiovascular:	    Gastrointestinal:	    Genitourinary:	    Musculoskeletal:	    Neurological:	    Psychiatric:	    Hematology/Lymphatics:	    Endocrine:	    Allergic/Immunologic:	    MEDICATIONS  (STANDING):  albuterol/ipratropium for Nebulization 3 milliLiter(s) Nebulizer every 6 hours  dextrose 5%. 1000 milliLiter(s) (50 mL/Hr) IV Continuous <Continuous>  dextrose 5%. 1000 milliLiter(s) (100 mL/Hr) IV Continuous <Continuous>  dextrose 50% Injectable 25 Gram(s) IV Push once  dextrose 50% Injectable 12.5 Gram(s) IV Push once  dextrose 50% Injectable 25 Gram(s) IV Push once  enoxaparin Injectable 40 milliGRAM(s) SubCutaneous every 24 hours  glucagon  Injectable 1 milliGRAM(s) IntraMuscular once  insulin lispro (ADMELOG) corrective regimen sliding scale   SubCutaneous at bedtime  insulin lispro (ADMELOG) corrective regimen sliding scale   SubCutaneous three times a day before meals  methylPREDNISolone sodium succinate Injectable 20 milliGRAM(s) IV Push two times a day    MEDICATIONS  (PRN):  acetaminophen     Tablet .. 650 milliGRAM(s) Oral every 6 hours PRN Temp greater or equal to 38C (100.4F), Mild Pain (1 - 3)  aluminum hydroxide/magnesium hydroxide/simethicone Suspension 30 milliLiter(s) Oral every 4 hours PRN Dyspepsia  dextrose Oral Gel 15 Gram(s) Oral once PRN Blood Glucose LESS THAN 70 milliGRAM(s)/deciliter  haloperidol    Injectable 5 milliGRAM(s) IV Push every 6 hours PRN Agitation  melatonin 3 milliGRAM(s) Oral at bedtime PRN Insomnia  ondansetron Injectable 4 milliGRAM(s) IV Push every 8 hours PRN Nausea and/or Vomiting       Vital Signs Last 24 Hrs  T(C): 36.5 (2023 06:37), Max: 36.8 (2023 16:49)  T(F): 97.7 (2023 06:37), Max: 98.2 (2023 16:49)  HR: 80 (2023 10:04) (58 - 88)  BP: 148/76 (2023 06:37) (124/78 - 148/76)  BP(mean): --  RR: 18 (2023 06:37) (14 - 20)  SpO2: 98% (2023 10:04) (95% - 98%)    Parameters below as of 2023 10:04  Patient On (Oxygen Delivery Method): nasal cannula    Orthostatic VS          I&O's Summary      Physical Exam:   GENERAL: NAD, well-groomed, well-developed  HEENT: KWABENA/   Atraumatic, Normocephalic  ENMT: No tonsillar erythema, exudates, or enlargement; Moist mucous membranes, Good dentition, No lesions  NECK: Supple, No JVD, Normal thyroid  CHEST/LUNG: Clear to auscultation bilaterally  CVS: Regular rate and rhythm; No murmurs, rubs, or gallops  GI: : Soft, Nontender, Nondistended; Bowel sounds present  NERVOUS SYSTEM:  Alert & awake  EXTREMITIES: - edema  LYMPH: No lymphadenopathy noted  SKIN: No rashes or lesions  ENDOCRINOLOGY: No Thyromegaly  PSYCH: Appropriate    Labs:  Venous<51<4>>50<<7.355>>Venous<<3><<4><<5<<509>>SARS-CoV-2: NotDetec (2023 06:57)  SARS-CoV-2: NotDetec (2023 13:43)  COVID-19 PCR: NotDetec (2023 10:05)  COVID-19 PCR: NotDetec (2023 07:28)  SARS-CoV-2: NotDetec (30 Dec 2022 19:00)    CARDIAC MARKERS ( 2023 10:55 )  x     / x     / 103 U/L / x     / x                                9.3    6.11  )-----------( 279      ( 2023 06:57 )             30.5                         10.8   8.40  )-----------( 332      ( 2023 10:55 )             35.7     12    141  |  101  |  19  ----------------------------<  111<H>  4.0   |  25  |  0.47<L>      139  |  100  |  21  ----------------------------<  104<H>  4.1   |  30  |  0.50    Ca    9.5      2023 06:57  Ca    9.2      2023 10:55  Phos  3.3     04-12  Mg     1.80     -12    TPro  6.9  /  Alb  3.7  /  TBili  0.6  /  DBili  x   /  AST  16  /  ALT  11  /  AlkPhos  115  -11    CAPILLARY BLOOD GLUCOSE      POCT Blood Glucose.: 115 mg/dL (2023 13:06)    LIVER FUNCTIONS - ( 2023 10:55 )  Alb: 3.7 g/dL / Pro: 6.9 g/dL / ALK PHOS: 115 U/L / ALT: 11 U/L / AST: 16 U/L / GGT: x             Urinalysis Basic - ( 2023 15:42 )    Color: Yellow / Appearance: Clear / S.029 / pH: x  Gluc: x / Ketone: Negative  / Bili: Negative / Urobili: <2 mg/dL   Blood: x / Protein: Trace / Nitrite: Negative   Leuk Esterase: Negative / RBC: x / WBC x   Sq Epi: x / Non Sq Epi: x / Bacteria: x      D DImer      Studies  Chest X-RAY  CT SCAN Chest   CT Abdomen  Venous Dopplers: LE:   Others        rad< from: Xray Chest 2 Views PA/Lat (23 @ 14:40) >  ACC: [id number] EXAM:  XR CHEST PA LAT 2V   ORDERED BY: CAREN SCHILLING     PROCEDURE DATE:  2023          INTERPRETATION:  CLINICAL INDICATION: Status post fall. eval rib   fracture. hypoxia    TECHNIQUE: 2 views; Frontal and lateral views of the chest were obtained.    COMPARISON: Chest x-ray 9/10/2022.    FINDINGS:  The heart size is poorly assessed on this projection  Lungs are clear considering technique.  No pneumothorax  No acute osseous abnormality.    IMPRESSION: No acute traumatic findings.      --- End of Report ---          COLTEN HALE MD; Resident Radiologist  This document has been electronically signed.  KELLY CAMERON MD; Attending Radiologist  This document has been electronically signed. 2023  4:09PM    < end of copied text >

## 2023-04-13 DIAGNOSIS — R50.9 FEVER, UNSPECIFIED: ICD-10-CM

## 2023-04-13 LAB
GLUCOSE BLDC GLUCOMTR-MCNC: 109 MG/DL — HIGH (ref 70–99)
GLUCOSE BLDC GLUCOMTR-MCNC: 118 MG/DL — HIGH (ref 70–99)
GLUCOSE BLDC GLUCOMTR-MCNC: 166 MG/DL — HIGH (ref 70–99)
GLUCOSE BLDC GLUCOMTR-MCNC: 183 MG/DL — HIGH (ref 70–99)

## 2023-04-13 PROCEDURE — 71250 CT THORAX DX C-: CPT | Mod: 26

## 2023-04-13 PROCEDURE — 99231 SBSQ HOSP IP/OBS SF/LOW 25: CPT | Mod: FS

## 2023-04-13 RX ADMIN — PIPERACILLIN AND TAZOBACTAM 25 GRAM(S): 4; .5 INJECTION, POWDER, LYOPHILIZED, FOR SOLUTION INTRAVENOUS at 23:54

## 2023-04-13 RX ADMIN — Medication 12.5 MILLIGRAM(S): at 08:54

## 2023-04-13 RX ADMIN — HALOPERIDOL DECANOATE 1 MILLIGRAM(S): 100 INJECTION INTRAMUSCULAR at 07:03

## 2023-04-13 RX ADMIN — Medication 20 MILLIGRAM(S): at 17:57

## 2023-04-13 RX ADMIN — Medication 3 MILLILITER(S): at 21:02

## 2023-04-13 RX ADMIN — Medication 6 MILLIGRAM(S): at 23:54

## 2023-04-13 RX ADMIN — GABAPENTIN 300 MILLIGRAM(S): 400 CAPSULE ORAL at 23:54

## 2023-04-13 RX ADMIN — Medication 3 MILLILITER(S): at 08:51

## 2023-04-13 RX ADMIN — PANTOPRAZOLE SODIUM 40 MILLIGRAM(S): 20 TABLET, DELAYED RELEASE ORAL at 07:05

## 2023-04-13 RX ADMIN — Medication 3 MILLILITER(S): at 15:16

## 2023-04-13 RX ADMIN — Medication 650 MILLIGRAM(S): at 17:31

## 2023-04-13 RX ADMIN — Medication 1: at 12:27

## 2023-04-13 RX ADMIN — Medication 50 MILLIGRAM(S): at 23:53

## 2023-04-13 RX ADMIN — HALOPERIDOL DECANOATE 1 MILLIGRAM(S): 100 INJECTION INTRAMUSCULAR at 15:43

## 2023-04-13 RX ADMIN — PIPERACILLIN AND TAZOBACTAM 25 GRAM(S): 4; .5 INJECTION, POWDER, LYOPHILIZED, FOR SOLUTION INTRAVENOUS at 03:11

## 2023-04-13 RX ADMIN — GABAPENTIN 300 MILLIGRAM(S): 400 CAPSULE ORAL at 15:42

## 2023-04-13 RX ADMIN — PIPERACILLIN AND TAZOBACTAM 25 GRAM(S): 4; .5 INJECTION, POWDER, LYOPHILIZED, FOR SOLUTION INTRAVENOUS at 08:53

## 2023-04-13 RX ADMIN — Medication 1: at 17:59

## 2023-04-13 RX ADMIN — HALOPERIDOL DECANOATE 1 MILLIGRAM(S): 100 INJECTION INTRAMUSCULAR at 23:53

## 2023-04-13 RX ADMIN — PIPERACILLIN AND TAZOBACTAM 25 GRAM(S): 4; .5 INJECTION, POWDER, LYOPHILIZED, FOR SOLUTION INTRAVENOUS at 15:42

## 2023-04-13 RX ADMIN — Medication 650 MILLIGRAM(S): at 16:54

## 2023-04-13 RX ADMIN — Medication 12.5 MILLIGRAM(S): at 15:42

## 2023-04-13 RX ADMIN — Medication 20 MILLIGRAM(S): at 07:04

## 2023-04-13 RX ADMIN — GABAPENTIN 300 MILLIGRAM(S): 400 CAPSULE ORAL at 07:02

## 2023-04-13 NOTE — DIETITIAN INITIAL EVALUATION ADULT - ADD RECOMMEND
1) Recommend continue with current diet, which remains appropriate at this time.   2) Recommend add Ensure Plus HP 2x daily (350cal, 20gm pro each) to provide optimal nutrition.  3) Monitor PO intake, Labs, weights, BMs, and skin integrity.   4) Unable to provide nutrition education due to pt with decreased cognitive status. RD to remain available for further nutritional interventions as indicated.

## 2023-04-13 NOTE — DIETITIAN INITIAL EVALUATION ADULT - PROBLEM SELECTOR PLAN 3
-Pt reported mechanical fall at home. History of multiple falls in the past likely related to deconditioning, Was recommended d/c to skilled nursing facility prior to transfer to [facility] [place]   -CT head no acute pathology. Pan scan no evidence of acute fx. With known severe hip osteoarthritis   -EKG no acute pathology  -PT eval inpt when agitation controlled and pt able to participate  -will need to discuss dispo options with daughter pending pt eval  -fall precautions

## 2023-04-13 NOTE — PATIENT PROFILE ADULT - CAREGIVER OBTAIN DETAILS
[facility]ation [address] [place] [address] Medicine  Progress Note    Patient Name: Nyasia Frye  MRN: [id number]  Patient Class: OP- Observation   Admission Date: 3/28/2023  Length of Stay: 0 days  Attending Physician: Sohan Cristina MD  Primary Care Provider: Marianne Padilla MD    Subjective:     Principal Problem:Acute on chronic diastolic heart failure    HPI:  Nyasia Frye is a 83 y.o. female with a PMHx of HFpEF, AF, asthma, DM, HLD, HTN, and IBS who presents to [facility] with acute on chronic abdominal pain. Patient is a very poor historian. She reports acute worsening of her chronic generalized abdominal pain for the past few days. Symptoms are consistent with prior flares of her IBS. She notes associated abdominal distension, dyspnea on exertion, orthopnea, and bilateral lower extremity edema for the past few days. She feels that the fluid in her abdomen is exacerbating her IBS symptoms. Has chronic fluctuations of diarrhea and constipation 2/2 IBS without recent changes. Denies fever, chills, chest pain, N/V, numbness/tingling, LE pain, vision changes, HA, syncope, or new/ worsening cough.     ED: hypertensive to /84, vitals otherwise stable. No leukocytosis. Cr 1.5 (BL ~1.2), t.bili 1.3. , trop 0.046>>0.049. CXR without acute findings. CT AP negative for acute process.       Overview/Hospital Course:  83 y.o. female admitted to  observation with acute on chronic diastolic HF. Patient presented with worsening of chronic generalized abdominal pain. Appeared hypervolemic, started on IV diuresis. DHEERAJ and acute cystitis noted. Treated with IV Ceftriaxone and improvement noted. Endorsed persistent cough. Will discharge when medically stable with PCP f/u.      Interval History: NAEON, VSSAF. Patient evaluated at bedside. Endorsed persistent generalized abdominal pain and suprapubic tenderness. Asked for gas-x and PRN cough medication. Denied any other urinary symptoms. Stated she feels better with IV diuresis.  Patient is a poor historian, but is pleasant and able to be re-directed. Troponin trended up to 0.069, repeat trop pending, will follow. DHEERAJ improved, with Cr 1.4 today.  Continuing with IV diuresis and IV Ceftriaxone. Will discharge with PCP f/u when medically ready.    Review of Systems   Constitutional:  Negative for chills and fever.   Respiratory:  Positive for cough and shortness of breath. Negative for chest tightness.    Cardiovascular:  Positive for leg swelling. Negative for chest pain.   Gastrointestinal:  Positive for abdominal pain. Negative for nausea.   Neurological:  Negative for dizziness and weakness.   Objective:     Vital Signs (Most Recent):  Temp: 98 °F (36.7 °C) (03/29/23 0853)  Pulse: 72 (03/29/23 0853)  Resp: 19 (03/29/23 0853)  BP: (!) 151/71 (03/29/23 0853)  SpO2: 100 % (03/29/23 0853)   Vital Signs (24h Range):  Temp:  [97.8 °F (36.6 °C)-99 °F (37.2 °C)] 98 °F (36.7 °C)  Pulse:  [65-80] 72  Resp:  [14-32] 19  SpO2:  [95 %-100 %] 100 %  BP: (121-196)/(61-98) 151/71     Weight: 94.3 kg (208 lb)  Body mass index is 35.7 kg/m².    Intake/Output Summary (Last 24 hours) at 3/29/2023 1028  Last data filed at 3/29/2023 0952  Gross per 24 hour   Intake 3500 ml   Output 1375 ml   Net 2125 ml      Physical Exam  Vitals and nursing note reviewed.   Constitutional:       Appearance: She is well-developed. She is obese. She is not ill-appearing.   Eyes:      Pupils: Pupils are equal, round, and reactive to light.   Cardiovascular:      Rate and Rhythm: Normal rate and regular rhythm.   Pulmonary:      Effort: Pulmonary effort is normal.      Breath sounds: Normal breath sounds.   Abdominal:      Palpations: Abdomen is soft.      Tenderness: There is abdominal tenderness (Suprapubic).   Musculoskeletal:         General: No tenderness.      Right lower leg: Edema present.      Left lower leg: Edema present.   Skin:     General: Skin is warm and dry.   Neurological:      Mental Status: She is alert and  pt AOx1 oriented to person, place, and time.   Psychiatric:         Behavior: Behavior normal.       Significant Labs: All pertinent labs within the past 24 hours have been reviewed.  BMP:   Recent Labs   Lab 03/29/23  0330   *      K 4.4   CL 99   CO2 28   BUN 24*   CREATININE 1.4   CALCIUM 9.6   MG 2.5     CBC:   Recent Labs   Lab 03/28/23  1459 03/28/23  1508 03/29/23  0330   WBC 6.81  --  6.63   HGB 13.7  --  13.4   HCT 43.0 42 40.8     --  192     Magnesium:   Recent Labs   Lab 03/29/23  0330   MG 2.5     Troponin:   Recent Labs   Lab 03/28/23  1706 03/28/23  2327 03/29/23  0330   TROPONINI 0.049* 0.069* 0.067*       Significant Imaging: I have reviewed all pertinent imaging results/findings within the past 24 hours.      Assessment/Plan:      * Acute on chronic diastolic heart failure  Ischemic congestive cardiomyopathy  - appears volume overloaded on admit  - , CXR clear  - started IV lasix 40 mg BID - continue  - repeat TTE - pending  - keep K>4 and Mg>2  - monitor tele  - strict I/Os, daily weights  - low Na diet w/ fluid restriction    Acute cystitis without hematuria  - likely contributing to acute on chronic abd pain  - afebrile without leukocytosis   - Continue IV CTX 1g q24hrs  - follow UCx - pending    DHEERAJ (acute kidney injury)  CKD (chronic kidney disease), stage III  - Cr 1.5 on admit, baseline ~1.2   - Cr improved to 1.4 on 3/29  - suspect 2/2 cardiorenal syndrome and UTI  - Continue IV diuresis and antibiotics  - avoid nephrotoxins, renally dose meds    IBS (irritable bowel syndrome)  - acute on chronic issue, likely exacerbated by vol overload and UTI   - Endorses persistent generalized abdominal pain  - continue home bentyl and famotidine  - CT A/P with no acute findings, reviewed  - added PRN maalox and gasX    ICD (implantable cardioverter-defibrillator) in place  - no acute issues     Morbid obesity  Body mass index is 35.7 kg/m². Morbid obesity complicates all aspects of  disease management from diagnostic modalities to treatment. Weight loss encouraged and health benefits explained to patient.    Hyperlipidemia  - continue statin     Atrial fibrillation  - not on BB for unknown reason -- will consider starting once euvoleumic  - continue eliquis  - PCP f/u      VTE Risk Mitigation (From admission, onward)         Ordered     apixaban tablet 2.5 mg  2 times daily         03/28/23 3481     Reason for No Pharmacological VTE Prophylaxis  Once        Question:  Reasons:  Answer:  Already adequately anticoagulated on oral Anticoagulants    03/28/23 1909     IP VTE HIGH RISK PATIENT  Once         03/28/23 1905     Place sequential compression device  Until discontinued         03/28/23 1905     Place sequential compression device  Until discontinued         03/28/23 1902                Discharge Planning   LATRELL: 3/30/2023     Code Status: Full Code   Is the patient medically ready for discharge?: No    Reason for patient still in hospital (select all that apply): Patient trending condition and Treatment           Tamiko Beyer PA-C  Department of Hospital Medicine   [facility] 11H

## 2023-04-13 NOTE — PROGRESS NOTE ADULT - ASSESSMENT
88 year old female, , domiciled with daughter with PPHx of bipolar I disorder w/ recent admission at [facility] for agitation thought to be related to vascular dementia, asthma/COPD presents s/p mechanical fall with hypoxic respiratory failure c/f asthma/copd exacerbation :    Copd exacerbation:  hypoxic resp failure  Bipolar disorder;   dementia:   SP Fall       4/12;    Copd exacerbation:  hypoxic resp failure ; being treated for copd exacerbation   ; agree with short term steroids:  on BD ; WBC is normal : do ct chest non contrast    Bipolar disorder; cont home meds:  recent dc from [facility]  dementia: supportive care  SP Fall  /; per PMD:    oscar acp  ; TRIED CONTACTING HER DAUGHTER BUT WITH NO RESPONSE:       4/13:    Copd exacerbation:  hypoxic resp failure ; being treated for copd exacerbation   ; agree with short term steroids:  on BD ; WBC is normal : do ct chest non contrast  - still pending: She is on empiric antibiotics  to dc if the blood cultures remain negative   Bipolar disorder; cont home meds:  recent dc from [facility]  dementia: supportive care  SP Fall  /;    OSCAR ACP    oscar acp  ;

## 2023-04-13 NOTE — PHYSICAL THERAPY INITIAL EVALUATION ADULT - LEVEL OF INDEPENDENCE: SIT/STAND, REHAB EVAL
pt deferring further functional mobility stating " I cant do it," despite encouragement and education from therapist. will progress as feasible

## 2023-04-13 NOTE — PROGRESS NOTE ADULT - ASSESSMENT
88 year old female, , domiciled with daughter with PPHx of bipolar I disorder w/ recent admission at [facility] for agitation thought to be related to vascular dementia, asthma/COPD presents s/p mechanical fall with hypoxic respiratory failure c/f asthma/copd exacerbation

## 2023-04-13 NOTE — DIETITIAN INITIAL EVALUATION ADULT - PERTINENT MEDS FT
MEDICATIONS  (STANDING):  albuterol/ipratropium for Nebulization 3 milliLiter(s) Nebulizer every 6 hours  dextrose 5%. 1000 milliLiter(s) (50 mL/Hr) IV Continuous <Continuous>  dextrose 5%. 1000 milliLiter(s) (100 mL/Hr) IV Continuous <Continuous>  dextrose 50% Injectable 25 Gram(s) IV Push once  dextrose 50% Injectable 12.5 Gram(s) IV Push once  dextrose 50% Injectable 25 Gram(s) IV Push once  enoxaparin Injectable 40 milliGRAM(s) SubCutaneous every 24 hours  gabapentin 300 milliGRAM(s) Oral three times a day  glucagon  Injectable 1 milliGRAM(s) IntraMuscular once  haloperidol     Tablet 1 milliGRAM(s) Oral three times a day  insulin lispro (ADMELOG) corrective regimen sliding scale   SubCutaneous three times a day before meals  insulin lispro (ADMELOG) corrective regimen sliding scale   SubCutaneous at bedtime  melatonin 6 milliGRAM(s) Oral at bedtime  methylPREDNISolone sodium succinate Injectable 20 milliGRAM(s) IV Push two times a day  pantoprazole    Tablet 40 milliGRAM(s) Oral before breakfast  piperacillin/tazobactam IVPB.. 3.375 Gram(s) IV Intermittent every 8 hours  traZODone 50 milliGRAM(s) Oral <User Schedule>  traZODone 12.5 milliGRAM(s) Oral <User Schedule>    MEDICATIONS  (PRN):  acetaminophen     Tablet .. 650 milliGRAM(s) Oral every 6 hours PRN Temp greater or equal to 38C (100.4F), Mild Pain (1 - 3)  aluminum hydroxide/magnesium hydroxide/simethicone Suspension 30 milliLiter(s) Oral every 4 hours PRN Dyspepsia  dextrose Oral Gel 15 Gram(s) Oral once PRN Blood Glucose LESS THAN 70 milliGRAM(s)/deciliter  haloperidol    Injectable 1 milliGRAM(s) IntraMuscular every 6 hours PRN Agitation  ondansetron Injectable 4 milliGRAM(s) IV Push every 8 hours PRN Nausea and/or Vomiting

## 2023-04-13 NOTE — DIETITIAN INITIAL EVALUATION ADULT - OTHER INFO
Per chart review, 88 year old female, , domiciled with daughter with PPHx of bipolar I disorder w/ recent admission at [facility] for agitation thought to be related to vascular dementia, asthma/COPD presents s/p mechanical fall with hypoxic respiratory failure c/f asthma/copd exacerbation.     Patient was sleeping at the time of visit. Nutrition information obtained via PCA at bedside, who reports pt had ~50% of her meal this morning. Pt requires total feed as per PCA. Recommend add Ensure Plus HP 2x daily (350cal, 20gm pro each). Pt noted no soft and bite sized diet. No chewing and swallowing difficulties noted per PCA. Denies any GI distress (nausea/vomiting/diarrhea/constipation). Pt's labs notable with elevated POCT 123H, noted on steroid. A1C 5.3 (1/23/23) well controlled. Recommend continue with current diet w/o therapeutic diet restriction.

## 2023-04-13 NOTE — DIETITIAN INITIAL EVALUATION ADULT - NS FNS WEIGHT CHANGE REASON
Pt noted with Ht:4'0, as per HIE pt's ht has been consistently 5'1. Pt's previous weight as HIE 1/05/.1lbs, most recent weight 4/12/2023-108.7. Weight trend reflects 10lbs/8.4% weight loss x 4 months./unintentional

## 2023-04-13 NOTE — PHYSICAL THERAPY INITIAL EVALUATION ADULT - ADDITIONAL COMMENTS
Service Date: 05/03/2018      REFERRING PHYSICIAN:  Dr. Evaristo Magaña.      HISTORY OF PRESENT ILLNESS:  It is my pleasure to see your patient, Ivan Gomez, in followup.  He is a very pleasant 89-year-old patient who had severe symptomatic mitral regurgitation.  On the 13th of October he had a MitraClip device placed for the mitral regurgitation.  I was not involved in his care, nor did I see him in the hospital, but he followed up with me because I had been taking care of his partner for many years.  He was last seen by Duane Brown on the 2nd of March of this year.  His major complaints at that time were urinary problems.  He was having hematuria.  He was also having urinary frequency.  His dose of Lasix was reduced from 40 mg twice a day to 40 mg per day.  He was due to see the urologist at that time.  His dose of hydralazine was also reduced down to 25 mg 3 times a day.      With that background in mind, he appears to be doing relatively well, but again his main symptoms are urological.  He takes 40 mg of Lasix in the morning time, but he is still having urinary frequency by noon and even later into the afternoon.  This would be highly unusual for a single dose of 40 mg of Lasix early in the morning.  I do note that he is on tamsulosin, and he is also on finasteride.  This sounds very much like prostatism to me.  So I have advised him possibly to get advice from the urologist about some possible treatments for this.  He also had a past history of bladder cancer as well.  His blood pressure is well controlled here today at 126/62, his pulse rate is 94 beats per minute and regular with occasional extrasystoles.  He was asking me about driving because some of his family thinks he should not be driving.  I have asked him to discuss that with you, and I believe he has already discussed that with you.  Certainly I do not want to get involved in that noncardiac issue.      IMPRESSION:   1.  Severe mitral  regurgitation, status post MitraClip with the most recent echocardiogram in January showing 2 to 3+ mitral regurgitation.  He also has moderate pulmonary hypertension.   2.  Even with a modest dose of furosemide, he is having a lot of urinary frequency, more than one would expect with this dose of furosemide and later in the day than one would expect.  I suspect that this is related to his bladder issues or prostate issues.   3.  Normotensive.   4.  Excellent lipid profile in October.   5.  Renal insufficiency.      PLAN:  I will have the patient return to see me in 3 months' time.  At that stage I will repeat his echocardiogram.  I will repeat his basic metabolic profile.  I look forward to seeing the patient again, and as always, he has been told to contact me if he has any questions or any concerns.      cc:   [place] [address]         SERENITY CORONA MD, [facility]             D: 2018   T: 2018   MT: LEANDRA      Name:     MALLORIE NOVA   MRN:      [id number]        Account:      [id number]   :      10/12/1926           Service Date: 2018      Document: [id number]       Unable to obtain accurate social history secondary to pt. presenting with confusion during subjective history and presenting with difficulty following commands, limited social history obtained through past medical documents, please refer to social work for more attainable social history.

## 2023-04-13 NOTE — PHYSICAL THERAPY INITIAL EVALUATION ADULT - PERTINENT HX OF CURRENT PROBLEM, REHAB EVAL
88 year old female, , domiciled wwith PPHx of bipolar I disorder with recent admission at [facility] for agitation thought to be related to vascular dementia, asthma/COPD, GERD, sciatica, paroxysmal afib with previous vertebral fracture, discharge from [facility] yesterday who presents to the ED after a fall at home.

## 2023-04-13 NOTE — DIETITIAN INITIAL EVALUATION ADULT - PERTINENT LABORATORY DATA
04-12    141  |  101  |  19  ----------------------------<  111<H>  4.0   |  25  |  0.47<L>    Ca    9.5      12 Apr 2023 06:57  Phos  3.3     04-12  Mg     1.80     04-12    POCT Blood Glucose.: 166 mg/dL (04-13-23 @ 12:20)  A1C with Estimated Average Glucose Result: 5.3 % (01-23-23 @ 08:57)

## 2023-04-13 NOTE — PATIENT PROFILE ADULT - FALL HARM RISK - HARM RISK INTERVENTIONS
Assistance with ambulation/Assistance OOB with selected safe patient handling equipment/Communicate Risk of Fall with Harm to all staff/Discuss with provider need for PT consult/Monitor gait and stability/Reinforce activity limits and safety measures with patient and family/Tailored Fall Risk Interventions/Visual Cue: Yellow wristband and red socks/Bed in lowest position, wheels locked, appropriate side rails in place/Call bell, personal items and telephone in reach/Instruct patient to call for assistance before getting out of bed or chair/Non-slip footwear when patient is out of bed/[place] to call system/Physically safe environment - no spills, clutter or unnecessary equipment/Purposeful Proactive Rounding/Room/bathroom lighting operational, light cord in reach

## 2023-04-14 LAB
ANION GAP SERPL CALC-SCNC: 10 MMOL/L — SIGNIFICANT CHANGE UP (ref 7–14)
BUN SERPL-MCNC: 20 MG/DL — SIGNIFICANT CHANGE UP (ref 7–23)
CALCIUM SERPL-MCNC: 9.4 MG/DL — SIGNIFICANT CHANGE UP (ref 8.4–10.5)
CHLORIDE SERPL-SCNC: 101 MMOL/L — SIGNIFICANT CHANGE UP (ref 98–107)
CO2 SERPL-SCNC: 29 MMOL/L — SIGNIFICANT CHANGE UP (ref 22–31)
CREAT SERPL-MCNC: 0.54 MG/DL — SIGNIFICANT CHANGE UP (ref 0.5–1.3)
EGFR: 88 ML/MIN/1.73M2 — SIGNIFICANT CHANGE UP
GLUCOSE BLDC GLUCOMTR-MCNC: 127 MG/DL — HIGH (ref 70–99)
GLUCOSE BLDC GLUCOMTR-MCNC: 137 MG/DL — HIGH (ref 70–99)
GLUCOSE BLDC GLUCOMTR-MCNC: 142 MG/DL — HIGH (ref 70–99)
GLUCOSE BLDC GLUCOMTR-MCNC: 147 MG/DL — HIGH (ref 70–99)
GLUCOSE SERPL-MCNC: 118 MG/DL — HIGH (ref 70–99)
HCT VFR BLD CALC: 29.7 % — LOW (ref 34.5–45)
HGB BLD-MCNC: 9.1 G/DL — LOW (ref 11.5–15.5)
MAGNESIUM SERPL-MCNC: 1.9 MG/DL — SIGNIFICANT CHANGE UP (ref 1.6–2.6)
MCHC RBC-ENTMCNC: 28.1 PG — SIGNIFICANT CHANGE UP (ref 27–34)
MCHC RBC-ENTMCNC: 30.6 GM/DL — LOW (ref 32–36)
MCV RBC AUTO: 91.7 FL — SIGNIFICANT CHANGE UP (ref 80–100)
NRBC # BLD: 0 /100 WBCS — SIGNIFICANT CHANGE UP (ref 0–0)
NRBC # FLD: 0 K/UL — SIGNIFICANT CHANGE UP (ref 0–0)
PHOSPHATE SERPL-MCNC: 2.8 MG/DL — SIGNIFICANT CHANGE UP (ref 2.5–4.5)
PLATELET # BLD AUTO: 283 K/UL — SIGNIFICANT CHANGE UP (ref 150–400)
POTASSIUM SERPL-MCNC: 4 MMOL/L — SIGNIFICANT CHANGE UP (ref 3.5–5.3)
POTASSIUM SERPL-SCNC: 4 MMOL/L — SIGNIFICANT CHANGE UP (ref 3.5–5.3)
RBC # BLD: 3.24 M/UL — LOW (ref 3.8–5.2)
RBC # FLD: 16.1 % — HIGH (ref 10.3–14.5)
SODIUM SERPL-SCNC: 140 MMOL/L — SIGNIFICANT CHANGE UP (ref 135–145)
WBC # BLD: 7.44 K/UL — SIGNIFICANT CHANGE UP (ref 3.8–10.5)
WBC # FLD AUTO: 7.44 K/UL — SIGNIFICANT CHANGE UP (ref 3.8–10.5)

## 2023-04-14 PROCEDURE — 93010 ELECTROCARDIOGRAM REPORT: CPT

## 2023-04-14 PROCEDURE — 99232 SBSQ HOSP IP/OBS MODERATE 35: CPT

## 2023-04-14 RX ORDER — TRAZODONE HCL 50 MG
25 TABLET ORAL
Refills: 0 | Status: DISCONTINUED | OUTPATIENT
Start: 2023-04-15 | End: 2023-05-01

## 2023-04-14 RX ORDER — HALOPERIDOL DECANOATE 100 MG/ML
1 INJECTION INTRAMUSCULAR
Refills: 0 | Status: DISCONTINUED | OUTPATIENT
Start: 2023-04-14 | End: 2023-05-01

## 2023-04-14 RX ORDER — METOPROLOL TARTRATE 50 MG
50 TABLET ORAL
Refills: 0 | Status: DISCONTINUED | OUTPATIENT
Start: 2023-04-14 | End: 2023-04-21

## 2023-04-14 RX ORDER — METOPROLOL TARTRATE 50 MG
50 TABLET ORAL
Refills: 0 | Status: DISCONTINUED | OUTPATIENT
Start: 2023-04-14 | End: 2023-04-14

## 2023-04-14 RX ORDER — FUROSEMIDE 40 MG
20 TABLET ORAL DAILY
Refills: 0 | Status: COMPLETED | OUTPATIENT
Start: 2023-04-14 | End: 2023-04-17

## 2023-04-14 RX ORDER — TRAZODONE HCL 50 MG
12.5 TABLET ORAL
Refills: 0 | Status: DISCONTINUED | OUTPATIENT
Start: 2023-04-15 | End: 2023-04-18

## 2023-04-14 RX ADMIN — HALOPERIDOL DECANOATE 1 MILLIGRAM(S): 100 INJECTION INTRAMUSCULAR at 13:30

## 2023-04-14 RX ADMIN — Medication 3 MILLILITER(S): at 08:42

## 2023-04-14 RX ADMIN — GABAPENTIN 300 MILLIGRAM(S): 400 CAPSULE ORAL at 05:23

## 2023-04-14 RX ADMIN — GABAPENTIN 300 MILLIGRAM(S): 400 CAPSULE ORAL at 21:17

## 2023-04-14 RX ADMIN — HALOPERIDOL DECANOATE 1 MILLIGRAM(S): 100 INJECTION INTRAMUSCULAR at 05:23

## 2023-04-14 RX ADMIN — GABAPENTIN 300 MILLIGRAM(S): 400 CAPSULE ORAL at 13:30

## 2023-04-14 RX ADMIN — PANTOPRAZOLE SODIUM 40 MILLIGRAM(S): 20 TABLET, DELAYED RELEASE ORAL at 05:23

## 2023-04-14 RX ADMIN — Medication 12.5 MILLIGRAM(S): at 09:28

## 2023-04-14 RX ADMIN — HALOPERIDOL DECANOATE 1 MILLIGRAM(S): 100 INJECTION INTRAMUSCULAR at 08:25

## 2023-04-14 RX ADMIN — PIPERACILLIN AND TAZOBACTAM 25 GRAM(S): 4; .5 INJECTION, POWDER, LYOPHILIZED, FOR SOLUTION INTRAVENOUS at 05:23

## 2023-04-14 RX ADMIN — ENOXAPARIN SODIUM 40 MILLIGRAM(S): 100 INJECTION SUBCUTANEOUS at 13:30

## 2023-04-14 RX ADMIN — Medication 50 MILLIGRAM(S): at 21:16

## 2023-04-14 RX ADMIN — Medication 12.5 MILLIGRAM(S): at 13:30

## 2023-04-14 RX ADMIN — Medication 3 MILLILITER(S): at 14:58

## 2023-04-14 RX ADMIN — Medication 20 MILLIGRAM(S): at 05:24

## 2023-04-14 RX ADMIN — Medication 3 MILLILITER(S): at 22:07

## 2023-04-14 RX ADMIN — Medication 20 MILLIGRAM(S): at 17:39

## 2023-04-14 RX ADMIN — Medication 3 MILLILITER(S): at 03:48

## 2023-04-14 RX ADMIN — Medication 6 MILLIGRAM(S): at 21:16

## 2023-04-14 RX ADMIN — Medication 20 MILLIGRAM(S): at 17:38

## 2023-04-14 RX ADMIN — HALOPERIDOL DECANOATE 1 MILLIGRAM(S): 100 INJECTION INTRAMUSCULAR at 21:16

## 2023-04-14 NOTE — CHART NOTE - NSCHARTNOTEFT_GEN_A_CORE
Notified by RN that patient with heart rate of 115 on vital signs assessment. Patient denies any complaints and in no distress. Patient seen and examined at bedside. Patient denies chest pain, palpitations or shortness of breath. EKG performed shows atrial fibrillation rate of 115. Initial chart review shows patient without history of afib, will transfer patient to telemetry. Spoke with daughter Kathryn [phone] to update patients condition, daughter states patient has a history of Afib and takes metoprolol 25mg BID at home. Called attending Dr. Carreon, recommends restarting metoprolol 50mg BID, first dose STAT. Will monitor patient closely. Patient to transfer to  for telemetry.     Vital Signs Last 24 Hrs  T(C): 37.3 (14 Apr 2023 21:12), Max: 37.3 (14 Apr 2023 19:48)  T(F): 99.2 (14 Apr 2023 21:12), Max: 99.2 (14 Apr 2023 19:48)  HR: 120 (14 Apr 2023 21:12) (62 - 120)  BP: 120/67 (14 Apr 2023 21:12) (120/67 - 151/77  RR: 18 (14 Apr 2023 21:12) (17 - 18)  SpO2: 98% (14 Apr 2023 21:12) (96% - 100%)    Parameters below as of 14 Apr 2023 21:12  Patient On (Oxygen Delivery Method): nasal cannula  O2 Flow (L/min): 2

## 2023-04-14 NOTE — PROGRESS NOTE ADULT - ASSESSMENT
88 year old female, , domiciled with daughter with PPHx of bipolar I disorder w/ recent admission at [facility] for agitation thought to be related to vascular dementia, asthma/COPD presents s/p mechanical fall with hypoxic respiratory failure c/f asthma/copd exacerbation :    Copd exacerbation:  hypoxic resp failure  Bipolar disorder;   dementia:   SP Fall       4/12;    Copd exacerbation:  hypoxic resp failure ; being treated for copd exacerbation   ; agree with short term steroids:  on BD ; WBC is normal : do ct chest non contrast    Bipolar disorder; cont home meds:  recent dc from [facility]  dementia: supportive care  SP Fall  /; per PMD:    mirtha acp  ; TRIED CONTACTING HER DAUGHTER BUT WITH NO RESPONSE:       4/13:    Copd exacerbation:  hypoxic resp failure ; being treated for copd exacerbation   ; agree with short term steroids:  on BD ; WBC is normal : do ct chest non contrast  - still pending: She is on empiric antibiotics  to dc if the blood cultures remain negative   Bipolar disorder; cont home meds:  recent dc from [facility]  dementia: supportive care  SP Fall  /;    DW ACP    dw acp  ;     4/14:    Copd exacerbation:  hypoxic resp failure ; being treated for copd exacerbation   : now much better:  change to po steroids for few days:  cont B D:   WBC is normal : ct chest has emphysema and suggestie of chf with kacy [id number] ? low dose lasix:  would defer to primayr  team   Bipolar disorder; cont home meds:  recent dc from [facility]  dementia: supportive care  SP Fall  /;    DW PMD    \

## 2023-04-14 NOTE — PROGRESS NOTE ADULT - ASSESSMENT
88 year old female, , domiciled with daughter with PPHx of bipolar I disorder w/ recent admission at [facility] for agitation thought to be related to vascular dementia, asthma/COPD presents s/p mechanical fall with hypoxic respiratory failure c/f asthma/copd exacerbation

## 2023-04-15 DIAGNOSIS — I48.0 PAROXYSMAL ATRIAL FIBRILLATION: ICD-10-CM

## 2023-04-15 LAB
GLUCOSE BLDC GLUCOMTR-MCNC: 109 MG/DL — HIGH (ref 70–99)
GLUCOSE BLDC GLUCOMTR-MCNC: 124 MG/DL — HIGH (ref 70–99)
GLUCOSE BLDC GLUCOMTR-MCNC: 129 MG/DL — HIGH (ref 70–99)
GLUCOSE BLDC GLUCOMTR-MCNC: 136 MG/DL — HIGH (ref 70–99)
GLUCOSE BLDC GLUCOMTR-MCNC: 143 MG/DL — HIGH (ref 70–99)

## 2023-04-15 RX ADMIN — Medication 20 MILLIGRAM(S): at 17:44

## 2023-04-15 RX ADMIN — PANTOPRAZOLE SODIUM 40 MILLIGRAM(S): 20 TABLET, DELAYED RELEASE ORAL at 05:17

## 2023-04-15 RX ADMIN — Medication 3 MILLILITER(S): at 06:39

## 2023-04-15 RX ADMIN — HALOPERIDOL DECANOATE 1 MILLIGRAM(S): 100 INJECTION INTRAMUSCULAR at 08:58

## 2023-04-15 RX ADMIN — Medication 12.5 MILLIGRAM(S): at 13:16

## 2023-04-15 RX ADMIN — ENOXAPARIN SODIUM 40 MILLIGRAM(S): 100 INJECTION SUBCUTANEOUS at 17:44

## 2023-04-15 RX ADMIN — Medication 3 MILLILITER(S): at 12:48

## 2023-04-15 RX ADMIN — GABAPENTIN 300 MILLIGRAM(S): 400 CAPSULE ORAL at 22:22

## 2023-04-15 RX ADMIN — Medication 50 MILLIGRAM(S): at 17:47

## 2023-04-15 RX ADMIN — Medication 650 MILLIGRAM(S): at 01:00

## 2023-04-15 RX ADMIN — GABAPENTIN 300 MILLIGRAM(S): 400 CAPSULE ORAL at 13:17

## 2023-04-15 RX ADMIN — Medication 20 MILLIGRAM(S): at 05:17

## 2023-04-15 RX ADMIN — Medication 6 MILLIGRAM(S): at 22:22

## 2023-04-15 RX ADMIN — HALOPERIDOL DECANOATE 1 MILLIGRAM(S): 100 INJECTION INTRAMUSCULAR at 22:22

## 2023-04-15 RX ADMIN — HALOPERIDOL DECANOATE 1 MILLIGRAM(S): 100 INJECTION INTRAMUSCULAR at 13:17

## 2023-04-15 RX ADMIN — Medication 50 MILLIGRAM(S): at 05:19

## 2023-04-15 RX ADMIN — Medication 50 MILLIGRAM(S): at 22:23

## 2023-04-15 RX ADMIN — Medication 25 MILLIGRAM(S): at 08:58

## 2023-04-15 RX ADMIN — GABAPENTIN 300 MILLIGRAM(S): 400 CAPSULE ORAL at 05:18

## 2023-04-15 RX ADMIN — Medication 20 MILLIGRAM(S): at 05:15

## 2023-04-15 RX ADMIN — Medication 3 MILLILITER(S): at 19:28

## 2023-04-15 NOTE — PROGRESS NOTE ADULT - ASSESSMENT
88 year old female, , domiciled with daughter with PPHx of bipolar I disorder w/ recent admission at [facility] for agitation thought to be related to vascular dementia, asthma/COPD presents s/p mechanical fall with hypoxic respiratory failure c/f asthma/copd exacerbation

## 2023-04-15 NOTE — PROGRESS NOTE ADULT - ASSESSMENT
88 year old female, , domiciled with daughter with PPHx of bipolar I disorder w/ recent admission at [facility] for agitation thought to be related to vascular dementia, asthma/COPD presents s/p mechanical fall with hypoxic respiratory failure c/f asthma/copd exacerbation :    Copd exacerbation:  hypoxic resp failure  Bipolar disorder;   dementia:   SP Fall       4/12;    Copd exacerbation:  hypoxic resp failure ; being treated for copd exacerbation   ; agree with short term steroids:  on BD ; WBC is normal : do ct chest non contrast    Bipolar disorder; cont home meds:  recent dc from [facility]  dementia: supportive care  SP Fall  /; per PMD:    mirtha acp  ; TRIED CONTACTING HER DAUGHTER BUT WITH NO RESPONSE:       4/13:    Copd exacerbation:  hypoxic resp failure ; being treated for copd exacerbation   ; agree with short term steroids:  on BD ; WBC is normal : do ct chest non contrast  - still pending: She is on empiric antibiotics  to dc if the blood cultures remain negative   Bipolar disorder; cont home meds:  recent dc from [facility]  dementia: supportive care  SP Fall  /;    DW ACP    mirtha acp  ;     4/14:    Copd exacerbation:  hypoxic resp failure ; being treated for copd exacerbation   : now much better:  change to po steroids for few days:  cont B D:   WBC is normal : ct chest has emphysema and suggestie of chf with kacy [id number] ? low dose lasix:  would defer to primayr  team   Bipolar disorder; cont home meds:  recent dc from [facility]  dementia: supportive care  SP Fall  /;    DW PMD    14/15:    Copd exacerbation:  hypoxic resp failure ; being treated for copd exacerbation   : now much better:  changed to po steroids for few days:  cont B D:   WBC is normal : ct chest has emphysema and suggestive of chf with kacy effusions started on low  dose lasix:  would monitor  Bipolar disorder; cont home meds:  recent dc from [facility]  dementia: supportive care  SP Fall  /;    DW PMD

## 2023-04-16 LAB
ANION GAP SERPL CALC-SCNC: 10 MMOL/L — SIGNIFICANT CHANGE UP (ref 7–14)
BUN SERPL-MCNC: 20 MG/DL — SIGNIFICANT CHANGE UP (ref 7–23)
CALCIUM SERPL-MCNC: 9.4 MG/DL — SIGNIFICANT CHANGE UP (ref 8.4–10.5)
CHLORIDE SERPL-SCNC: 97 MMOL/L — LOW (ref 98–107)
CO2 SERPL-SCNC: 32 MMOL/L — HIGH (ref 22–31)
CREAT SERPL-MCNC: 0.43 MG/DL — LOW (ref 0.5–1.3)
EGFR: 93 ML/MIN/1.73M2 — SIGNIFICANT CHANGE UP
GLUCOSE BLDC GLUCOMTR-MCNC: 106 MG/DL — HIGH (ref 70–99)
GLUCOSE BLDC GLUCOMTR-MCNC: 125 MG/DL — HIGH (ref 70–99)
GLUCOSE BLDC GLUCOMTR-MCNC: 143 MG/DL — HIGH (ref 70–99)
GLUCOSE BLDC GLUCOMTR-MCNC: 99 MG/DL — SIGNIFICANT CHANGE UP (ref 70–99)
GLUCOSE SERPL-MCNC: 110 MG/DL — HIGH (ref 70–99)
HCT VFR BLD CALC: 33.8 % — LOW (ref 34.5–45)
HGB BLD-MCNC: 10.3 G/DL — LOW (ref 11.5–15.5)
MAGNESIUM SERPL-MCNC: 1.9 MG/DL — SIGNIFICANT CHANGE UP (ref 1.6–2.6)
MCHC RBC-ENTMCNC: 28 PG — SIGNIFICANT CHANGE UP (ref 27–34)
MCHC RBC-ENTMCNC: 30.5 GM/DL — LOW (ref 32–36)
MCV RBC AUTO: 91.8 FL — SIGNIFICANT CHANGE UP (ref 80–100)
NRBC # BLD: 0 /100 WBCS — SIGNIFICANT CHANGE UP (ref 0–0)
NRBC # FLD: 0 K/UL — SIGNIFICANT CHANGE UP (ref 0–0)
PHOSPHATE SERPL-MCNC: 3.1 MG/DL — SIGNIFICANT CHANGE UP (ref 2.5–4.5)
PLATELET # BLD AUTO: 265 K/UL — SIGNIFICANT CHANGE UP (ref 150–400)
POTASSIUM SERPL-MCNC: 3.8 MMOL/L — SIGNIFICANT CHANGE UP (ref 3.5–5.3)
POTASSIUM SERPL-SCNC: 3.8 MMOL/L — SIGNIFICANT CHANGE UP (ref 3.5–5.3)
RBC # BLD: 3.68 M/UL — LOW (ref 3.8–5.2)
RBC # FLD: 15.7 % — HIGH (ref 10.3–14.5)
SODIUM SERPL-SCNC: 139 MMOL/L — SIGNIFICANT CHANGE UP (ref 135–145)
WBC # BLD: 5.61 K/UL — SIGNIFICANT CHANGE UP (ref 3.8–10.5)
WBC # FLD AUTO: 5.61 K/UL — SIGNIFICANT CHANGE UP (ref 3.8–10.5)

## 2023-04-16 RX ADMIN — Medication 50 MILLIGRAM(S): at 22:12

## 2023-04-16 RX ADMIN — PANTOPRAZOLE SODIUM 40 MILLIGRAM(S): 20 TABLET, DELAYED RELEASE ORAL at 05:14

## 2023-04-16 RX ADMIN — GABAPENTIN 300 MILLIGRAM(S): 400 CAPSULE ORAL at 05:14

## 2023-04-16 RX ADMIN — Medication 25 MILLIGRAM(S): at 09:51

## 2023-04-16 RX ADMIN — Medication 3 MILLILITER(S): at 00:13

## 2023-04-16 RX ADMIN — Medication 3 MILLILITER(S): at 19:54

## 2023-04-16 RX ADMIN — GABAPENTIN 300 MILLIGRAM(S): 400 CAPSULE ORAL at 22:12

## 2023-04-16 RX ADMIN — Medication 3 MILLILITER(S): at 14:06

## 2023-04-16 RX ADMIN — ENOXAPARIN SODIUM 40 MILLIGRAM(S): 100 INJECTION SUBCUTANEOUS at 17:12

## 2023-04-16 RX ADMIN — HALOPERIDOL DECANOATE 1 MILLIGRAM(S): 100 INJECTION INTRAMUSCULAR at 09:51

## 2023-04-16 RX ADMIN — Medication 6 MILLIGRAM(S): at 22:13

## 2023-04-16 RX ADMIN — Medication 50 MILLIGRAM(S): at 17:12

## 2023-04-16 RX ADMIN — HALOPERIDOL DECANOATE 1 MILLIGRAM(S): 100 INJECTION INTRAMUSCULAR at 14:33

## 2023-04-16 RX ADMIN — Medication 50 MILLIGRAM(S): at 05:14

## 2023-04-16 RX ADMIN — Medication 20 MILLIGRAM(S): at 05:14

## 2023-04-16 RX ADMIN — Medication 12.5 MILLIGRAM(S): at 14:31

## 2023-04-16 RX ADMIN — Medication 20 MILLIGRAM(S): at 17:12

## 2023-04-16 RX ADMIN — GABAPENTIN 300 MILLIGRAM(S): 400 CAPSULE ORAL at 14:32

## 2023-04-16 RX ADMIN — Medication 3 MILLILITER(S): at 07:17

## 2023-04-16 RX ADMIN — HALOPERIDOL DECANOATE 1 MILLIGRAM(S): 100 INJECTION INTRAMUSCULAR at 22:12

## 2023-04-16 RX ADMIN — HALOPERIDOL DECANOATE 1 MILLIGRAM(S): 100 INJECTION INTRAMUSCULAR at 17:13

## 2023-04-16 NOTE — PROGRESS NOTE ADULT - ASSESSMENT
88 year old female, , domiciled with daughter with PPHx of bipolar I disorder w/ recent admission at [facility] for agitation thought to be related to vascular dementia, asthma/COPD presents s/p mechanical fall with hypoxic respiratory failure c/f asthma/copd exacerbation

## 2023-04-16 NOTE — PROGRESS NOTE ADULT - ASSESSMENT
88 year old female, , domiciled with daughter with PPHx of bipolar I disorder w/ recent admission at [facility] for agitation thought to be related to vascular dementia, asthma/COPD presents s/p mechanical fall with hypoxic respiratory failure c/f asthma/copd exacerbation :    Copd exacerbation:  hypoxic resp failure  Bipolar disorder;   dementia:   SP Fall       4/12;    Copd exacerbation:  hypoxic resp failure ; being treated for copd exacerbation   ; agree with short term steroids:  on BD ; WBC is normal : do ct chest non contrast    Bipolar disorder; cont home meds:  recent dc from [facility]  dementia: supportive care  SP Fall  /; per PMD:    mirtha acp  ; TRIED CONTACTING HER DAUGHTER BUT WITH NO RESPONSE:       4/13:    Copd exacerbation:  hypoxic resp failure ; being treated for copd exacerbation   ; agree with short term steroids:  on BD ; WBC is normal : do ct chest non contrast  - still pending: She is on empiric antibiotics  to dc if the blood cultures remain negative   Bipolar disorder; cont home meds:  recent dc from [facility]  dementia: supportive care  SP Fall  /;    DW ACP    dw acp  ;     4/14:    Copd exacerbation:  hypoxic resp failure ; being treated for copd exacerbation   : now much better:  change to po steroids for few days:  cont B D:   WBC is normal : ct chest has emphysema and suggestie of chf with kacy [id number] ? low dose lasix:  would defer to primayr  team   Bipolar disorder; cont home meds:  recent dc from [facility]  dementia: supportive care  SP Fall  /;    DW PMD    14/15:    Copd exacerbation:  hypoxic resp failure ; being treated for copd exacerbation   : now much better:  changed to po steroids for few days:  cont B D:   WBC is normal : ct chest has emphysema and suggestive of chf with kacy effusions started on low  dose lasix:  would monitor  Bipolar disorder; cont home meds:  recent dc from [facility]  dementia: supportive care  SP Fall  /;    DW PMD    14/16;    Copd exacerbation:  hypoxic resp failure ; being treated for copd exacerbation   : now much better:  changed to po steroids for few days:  cont B D:   WBC is normal : ct chest has emphysema and suggestive of chf with kacy effusions started on low  dose lasix:  would monitor: CT chest also showed: 1.  Congestive heart failure, Left thyroid hypodense lesion measuring 1.9 x 1.5 cm. Given size, ultrasound evaluation is recommended,   Emphysema and  Few lung nodules measuring up to 5 mm. Consider a repeat chest CTin three months time after dc:      Bipolar disorder; cont home meds:  recent dc from [facility]  dementia: supportive care  SP Fall  /;    DW PMD

## 2023-04-17 LAB
ANION GAP SERPL CALC-SCNC: 9 MMOL/L — SIGNIFICANT CHANGE UP (ref 7–14)
BUN SERPL-MCNC: 25 MG/DL — HIGH (ref 7–23)
CALCIUM SERPL-MCNC: 9.5 MG/DL — SIGNIFICANT CHANGE UP (ref 8.4–10.5)
CHLORIDE SERPL-SCNC: 97 MMOL/L — LOW (ref 98–107)
CO2 SERPL-SCNC: 31 MMOL/L — SIGNIFICANT CHANGE UP (ref 22–31)
CREAT SERPL-MCNC: 0.46 MG/DL — LOW (ref 0.5–1.3)
CULTURE RESULTS: SIGNIFICANT CHANGE UP
CULTURE RESULTS: SIGNIFICANT CHANGE UP
EGFR: 92 ML/MIN/1.73M2 — SIGNIFICANT CHANGE UP
GLUCOSE BLDC GLUCOMTR-MCNC: 115 MG/DL — HIGH (ref 70–99)
GLUCOSE BLDC GLUCOMTR-MCNC: 118 MG/DL — HIGH (ref 70–99)
GLUCOSE BLDC GLUCOMTR-MCNC: 124 MG/DL — HIGH (ref 70–99)
GLUCOSE BLDC GLUCOMTR-MCNC: 154 MG/DL — HIGH (ref 70–99)
GLUCOSE SERPL-MCNC: 105 MG/DL — HIGH (ref 70–99)
HCT VFR BLD CALC: 35.1 % — SIGNIFICANT CHANGE UP (ref 34.5–45)
HGB BLD-MCNC: 10.8 G/DL — LOW (ref 11.5–15.5)
MAGNESIUM SERPL-MCNC: 1.9 MG/DL — SIGNIFICANT CHANGE UP (ref 1.6–2.6)
MCHC RBC-ENTMCNC: 27.7 PG — SIGNIFICANT CHANGE UP (ref 27–34)
MCHC RBC-ENTMCNC: 30.8 GM/DL — LOW (ref 32–36)
MCV RBC AUTO: 90 FL — SIGNIFICANT CHANGE UP (ref 80–100)
NRBC # BLD: 0 /100 WBCS — SIGNIFICANT CHANGE UP (ref 0–0)
NRBC # FLD: 0 K/UL — SIGNIFICANT CHANGE UP (ref 0–0)
PHOSPHATE SERPL-MCNC: 3.5 MG/DL — SIGNIFICANT CHANGE UP (ref 2.5–4.5)
PLATELET # BLD AUTO: 287 K/UL — SIGNIFICANT CHANGE UP (ref 150–400)
POTASSIUM SERPL-MCNC: 4.4 MMOL/L — SIGNIFICANT CHANGE UP (ref 3.5–5.3)
POTASSIUM SERPL-SCNC: 4.4 MMOL/L — SIGNIFICANT CHANGE UP (ref 3.5–5.3)
RBC # BLD: 3.9 M/UL — SIGNIFICANT CHANGE UP (ref 3.8–5.2)
RBC # FLD: 15.3 % — HIGH (ref 10.3–14.5)
SODIUM SERPL-SCNC: 137 MMOL/L — SIGNIFICANT CHANGE UP (ref 135–145)
SPECIMEN SOURCE: SIGNIFICANT CHANGE UP
SPECIMEN SOURCE: SIGNIFICANT CHANGE UP
WBC # BLD: 6.54 K/UL — SIGNIFICANT CHANGE UP (ref 3.8–10.5)
WBC # FLD AUTO: 6.54 K/UL — SIGNIFICANT CHANGE UP (ref 3.8–10.5)

## 2023-04-17 PROCEDURE — 99232 SBSQ HOSP IP/OBS MODERATE 35: CPT

## 2023-04-17 RX ORDER — BUDESONIDE, MICRONIZED 100 %
0.5 POWDER (GRAM) MISCELLANEOUS EVERY 12 HOURS
Refills: 0 | Status: DISCONTINUED | OUTPATIENT
Start: 2023-04-17 | End: 2023-05-01

## 2023-04-17 RX ADMIN — HALOPERIDOL DECANOATE 1 MILLIGRAM(S): 100 INJECTION INTRAMUSCULAR at 01:00

## 2023-04-17 RX ADMIN — Medication 3 MILLILITER(S): at 01:07

## 2023-04-17 RX ADMIN — Medication 25 MILLIGRAM(S): at 07:31

## 2023-04-17 RX ADMIN — HALOPERIDOL DECANOATE 1 MILLIGRAM(S): 100 INJECTION INTRAMUSCULAR at 21:02

## 2023-04-17 RX ADMIN — HALOPERIDOL DECANOATE 1 MILLIGRAM(S): 100 INJECTION INTRAMUSCULAR at 17:07

## 2023-04-17 RX ADMIN — HALOPERIDOL DECANOATE 1 MILLIGRAM(S): 100 INJECTION INTRAMUSCULAR at 13:40

## 2023-04-17 RX ADMIN — Medication 50 MILLIGRAM(S): at 21:02

## 2023-04-17 RX ADMIN — HALOPERIDOL DECANOATE 1 MILLIGRAM(S): 100 INJECTION INTRAMUSCULAR at 07:31

## 2023-04-17 RX ADMIN — ENOXAPARIN SODIUM 40 MILLIGRAM(S): 100 INJECTION SUBCUTANEOUS at 13:39

## 2023-04-17 RX ADMIN — Medication 50 MILLIGRAM(S): at 05:30

## 2023-04-17 RX ADMIN — Medication 50 MILLIGRAM(S): at 17:05

## 2023-04-17 RX ADMIN — Medication 3 MILLILITER(S): at 21:54

## 2023-04-17 RX ADMIN — Medication 20 MILLIGRAM(S): at 05:29

## 2023-04-17 RX ADMIN — Medication 3 MILLILITER(S): at 10:29

## 2023-04-17 RX ADMIN — Medication 6 MILLIGRAM(S): at 21:07

## 2023-04-17 RX ADMIN — Medication 20 MILLIGRAM(S): at 05:30

## 2023-04-17 RX ADMIN — Medication 20 MILLIGRAM(S): at 17:04

## 2023-04-17 RX ADMIN — Medication 3 MILLILITER(S): at 15:34

## 2023-04-17 RX ADMIN — GABAPENTIN 300 MILLIGRAM(S): 400 CAPSULE ORAL at 05:29

## 2023-04-17 RX ADMIN — PANTOPRAZOLE SODIUM 40 MILLIGRAM(S): 20 TABLET, DELAYED RELEASE ORAL at 05:30

## 2023-04-17 RX ADMIN — Medication 12.5 MILLIGRAM(S): at 13:39

## 2023-04-17 RX ADMIN — GABAPENTIN 300 MILLIGRAM(S): 400 CAPSULE ORAL at 13:39

## 2023-04-17 RX ADMIN — GABAPENTIN 300 MILLIGRAM(S): 400 CAPSULE ORAL at 21:06

## 2023-04-17 NOTE — PROGRESS NOTE ADULT - ASSESSMENT
88 year old female, , domiciled with daughter with PPHx of bipolar I disorder w/ recent admission at [facility] for agitation thought to be related to vascular dementia, asthma/COPD presents s/p mechanical fall with hypoxic respiratory failure c/f asthma/copd exacerbation :    Copd exacerbation:  hypoxic resp failure  Bipolar disorder;   dementia:   SP Fall       4/12;    Copd exacerbation:  hypoxic resp failure ; being treated for copd exacerbation   ; agree with short term steroids:  on BD ; WBC is normal : do ct chest non contrast    Bipolar disorder; cont home meds:  recent dc from [facility]  dementia: supportive care  SP Fall  /; per PMD:    mirtha acp  ; TRIED CONTACTING HER DAUGHTER BUT WITH NO RESPONSE:       4/13:    Copd exacerbation:  hypoxic resp failure ; being treated for copd exacerbation   ; agree with short term steroids:  on BD ; WBC is normal : do ct chest non contrast  - still pending: She is on empiric antibiotics  to dc if the blood cultures remain negative   Bipolar disorder; cont home meds:  recent dc from [facility]  dementia: supportive care  SP Fall  /;    DW ACP    mirtha acp  ;     4/14:    Copd exacerbation:  hypoxic resp failure ; being treated for copd exacerbation   : now much better:  change to po steroids for few days:  cont B D:   WBC is normal : ct chest has emphysema and suggestie of chf with kacy [id number] ? low dose lasix:  would defer to primayr  team   Bipolar disorder; cont home meds:  recent dc from [facility]  dementia: supportive care  SP Fall  /;    DW PMD    14/15:    Copd exacerbation:  hypoxic resp failure ; being treated for copd exacerbation   : now much better:  changed to po steroids for few days:  cont B D:   WBC is normal : ct chest has emphysema and suggestive of chf with kacy effusions started on low  dose lasix:  would monitor  Bipolar disorder; cont home meds:  recent dc from [facility]  dementia: supportive care  SP Fall  /;    DW PMD    14/17;    Copd exacerbation:  hypoxic resp failure ; being treated for copd exacerbation   : now much better:  changed to po steroids for few days:  cont B D:  Add pulmicort:    WBC is normal : ct chest has emphysema and suggestive of chf with kacy effusions started on low  dose lasix:  would monitor: CT chest also showed: 1.  Congestive heart failure, Left thyroid hypodense lesion measuring 1.9 x 1.5 cm. Given size, ultrasound evaluation is recommended,   Emphysema and  Few lung nodules measuring up to 5 mm. Consider a repeat chest CTin three months time after dc:    Bipolar disorder; cont home meds:  recent dc from [facility]  dementia: supportive care  SP Fall  /;    DW PMD

## 2023-04-17 NOTE — PROGRESS NOTE ADULT - ASSESSMENT
88 year old female, , domiciled with daughter with PPHx of bipolar I disorder w/ recent admission at [facility] for agitation thought to be related to vascular dementia, asthma/COPD presents s/p mechanical fall with hypoxic respiratory failure c/f asthma/copd exacerbation

## 2023-04-18 LAB
GLUCOSE BLDC GLUCOMTR-MCNC: 110 MG/DL — HIGH (ref 70–99)
GLUCOSE BLDC GLUCOMTR-MCNC: 113 MG/DL — HIGH (ref 70–99)
GLUCOSE BLDC GLUCOMTR-MCNC: 116 MG/DL — HIGH (ref 70–99)
GLUCOSE BLDC GLUCOMTR-MCNC: 89 MG/DL — SIGNIFICANT CHANGE UP (ref 70–99)

## 2023-04-18 PROCEDURE — 99233 SBSQ HOSP IP/OBS HIGH 50: CPT

## 2023-04-18 RX ORDER — TRAZODONE HCL 50 MG
25 TABLET ORAL
Refills: 0 | Status: DISCONTINUED | OUTPATIENT
Start: 2023-04-18 | End: 2023-05-01

## 2023-04-18 RX ADMIN — Medication 3 MILLILITER(S): at 16:29

## 2023-04-18 RX ADMIN — ENOXAPARIN SODIUM 40 MILLIGRAM(S): 100 INJECTION SUBCUTANEOUS at 14:15

## 2023-04-18 RX ADMIN — Medication 3 MILLILITER(S): at 03:14

## 2023-04-18 RX ADMIN — Medication 6 MILLIGRAM(S): at 23:08

## 2023-04-18 RX ADMIN — GABAPENTIN 300 MILLIGRAM(S): 400 CAPSULE ORAL at 14:15

## 2023-04-18 RX ADMIN — Medication 50 MILLIGRAM(S): at 16:57

## 2023-04-18 RX ADMIN — HALOPERIDOL DECANOATE 1 MILLIGRAM(S): 100 INJECTION INTRAMUSCULAR at 20:19

## 2023-04-18 RX ADMIN — Medication 50 MILLIGRAM(S): at 20:20

## 2023-04-18 RX ADMIN — HALOPERIDOL DECANOATE 1 MILLIGRAM(S): 100 INJECTION INTRAMUSCULAR at 14:15

## 2023-04-18 RX ADMIN — GABAPENTIN 300 MILLIGRAM(S): 400 CAPSULE ORAL at 23:09

## 2023-04-18 RX ADMIN — Medication 3 MILLILITER(S): at 21:28

## 2023-04-18 RX ADMIN — Medication 0.5 MILLIGRAM(S): at 03:13

## 2023-04-18 RX ADMIN — HALOPERIDOL DECANOATE 1 MILLIGRAM(S): 100 INJECTION INTRAMUSCULAR at 02:06

## 2023-04-18 RX ADMIN — Medication 12.5 MILLIGRAM(S): at 14:15

## 2023-04-18 RX ADMIN — Medication 0.5 MILLIGRAM(S): at 21:28

## 2023-04-18 NOTE — PROGRESS NOTE ADULT - PROBLEM SELECTOR PLAN 5
fall precautions   PT evaluation noted  discharge to subacute rehab when bed available
DVT Ppx: lovenox  PT eval pending  Discussed with daughter over the phone
supportive care  severe agitation  defer management to   patient is well known to them  continue to follow recommendations
fall precautions   PT evaluation noted  discharge to subacute rehab when bed available
fall precautions   PT evaluation noted  discharge to subacute rehab when bed available
supportive care  severe agitation  defer management to   patient is well known to them  continue to follow recommendations
fall precautions   PT evaluation noted  discharge to subacute rehab when bed available

## 2023-04-18 NOTE — PROGRESS NOTE ADULT - PROBLEM SELECTOR PLAN 3
fall precautions   PT evaluation
secondary to acute on chronic diastolic heart failure and COPD exacerbation now resolved   pulmonary help appreciated  wheeze resolved  completed 3 days IV furosemide now euvolemic
pulmonary help appreciated  wheeze resolved  defer recommendations to pulmonary  discontinue furosemide   secondary to acute on chronic diastolic heart failure
secondary to acute on chronic diastolic heart failure and COPD exacerbation now resolved   pulmonary help appreciated  wheeze resolved  completed 3 days IV furosemide now euvolemic
continue solumedrol IV  Duoneb
continue solumedrol IV  Duoneb
pulmonary help appreciated  wheeze resolved  defer recommendations to pulmonary

## 2023-04-18 NOTE — PROGRESS NOTE ADULT - PROBLEM SELECTOR PLAN 6
PT starr pending  Discussed with daughter over the phone
continue to follow  recommendations  calm
enoxaparin
continue to follow  recommendations  calm
supportive care  severe agitation  defer management to   patient is well known to them  continue to follow recommendations
continue to follow  recommendations  calm

## 2023-04-18 NOTE — PROGRESS NOTE ADULT - PROBLEM SELECTOR PLAN 4
management per pulmonary   Duoneb
supportive care
management per pulmonary   Duoneb
fall precautions   PT evaluation noted  discharge to subacute rehab when bed available
fall precautions   PT evaluation

## 2023-04-18 NOTE — PROGRESS NOTE ADULT - PROBLEM SELECTOR PLAN 2
pulmonary help appreciated  continue to follow recommendations  continue solumedrol 20 mg IV BID
off antibiotics  CT chest negative except small effusions  now resolved
pulmonary help appreciated  wheeze resolved  defer recommendations to pulmonary
off antibiotics  CT chest negative except small effusions  short course furosemide x 3 days
continue solumedrol IV  Duoneb

## 2023-04-18 NOTE — PROGRESS NOTE ADULT - ASSESSMENT
88 year old female, , domiciled with daughter with PPHx of bipolar I disorder w/ recent admission at [facility] for agitation thought to be related to vascular dementia, asthma/COPD presents s/p mechanical fall with hypoxic respiratory failure c/f asthma/copd exacerbation :    Copd exacerbation:  hypoxic resp failure  Bipolar disorder;   dementia:   SP Fall       4/12;    Copd exacerbation:  hypoxic resp failure ; being treated for copd exacerbation   ; agree with short term steroids:  on BD ; WBC is normal : do ct chest non contrast    Bipolar disorder; cont home meds:  recent dc from [facility]  dementia: supportive care  SP Fall  /; per PMD:    oscar acp  ; TRIED CONTACTING HER DAUGHTER BUT WITH NO RESPONSE:       4/13:    Copd exacerbation:  hypoxic resp failure ; being treated for copd exacerbation   ; agree with short term steroids:  on BD ; WBC is normal : do ct chest non contrast  - still pending: She is on empiric antibiotics  to dc if the blood cultures remain negative   Bipolar disorder; cont home meds:  recent dc from [facility]  dementia: supportive care  SP Fall  /;    DW ACP    dw acp  ;     4/14:    Copd exacerbation:  hypoxic resp failure ; being treated for copd exacerbation   : now much better:  change to po steroids for few days:  cont B D:   WBC is normal : ct chest has emphysema and suggestie of chf with kacy [id number] ? low dose lasix:  would defer to primayr  team   Bipolar disorder; cont home meds:  recent dc from [facility]  dementia: supportive care  SP Fall  /;    DW PMD    14/15:    Copd exacerbation:  hypoxic resp failure ; being treated for copd exacerbation   : now much better:  changed to po steroids for few days:  cont B D:   WBC is normal : ct chest has emphysema and suggestive of chf with kacy effusions started on low  dose lasix:  would monitor  Bipolar disorder; cont home meds:  recent dc from [facility]  dementia: supportive care  SP Fall  /;    DW PMD    4/17;    Copd exacerbation:  hypoxic resp failure ; being treated for copd exacerbation   : now much better:  changed to po steroids for few days:  cont B D:  Add pulmicort:    WBC is normal : ct chest has emphysema and suggestive of chf with kacy effusions started on low  dose lasix:  would monitor: CT chest also showed: 1.  Congestive heart failure, Left thyroid hypodense lesion measuring 1.9 x 1.5 cm. Given size, ultrasound evaluation is recommended,   Emphysema and  Few lung nodules measuring up to 5 mm. Consider a repeat chest CTin three months time after dc:    Bipolar disorder; cont home meds:  recent dc from [facility]  dementia: supportive care  SP Fall  /;    OSCAR PMD    4/18:    Copd exacerbation:  hypoxic resp failure ; being treated for copd exacerbation   : now much better:  finished steroids: cont B D:  Add pulmicort:    WBC is normal : ct chest has emphysema and suggestive of chf with kacy effusions started on low  dose lasix:  would monitor: CT chest also showed: 1.  Congestive heart failure, Left thyroid hypodense lesion measuring 1.9 x 1.5 cm. Given size, ultrasound evaluation is recommended,   Emphysema and  Few lung nodules measuring up to 5 mm. Consider a repeat chest CTin three months time after dc:    Bipolar disorder; cont home meds:  recent dc from [facility]  dementia: supportive care  SP Fall  /;    oscar acp : dc planniing    OSCAR PMD

## 2023-04-18 NOTE — PROGRESS NOTE ADULT - PROBLEM SELECTOR PROBLEM 6
Dementia with behavioral disturbance
Need for prophylactic measure
Need for prophylactic measure
Dementia with behavioral disturbance

## 2023-04-18 NOTE — PROGRESS NOTE ADULT - ASSESSMENT
88 year old female, , domiciled with daughter with PPHx of bipolar I disorder w/ recent admission at [facility] for agitation thought to be related to vascular dementia, asthma/COPD presents s/p mechanical fall with hypoxic respiratory failure c/f asthma/copd exacerbation

## 2023-04-18 NOTE — PROGRESS NOTE ADULT - PROBLEM SELECTOR PLAN 1
unclear etiology  empirically on piperacillin/tazobactam  awaiting blood cultures  discontinue antibiotics if stays afebrile and blood cultures negative tomorrow
not a candidate for full anticoagulation secondary to very high fall risk  will continue to monitor on metoprolol  heart rate controlled
all work up negative  no antibiotics  CT chest negative except small effusions  likely no intervention at this time   discontinue piperacillin/tazobactam
not a candidate for full anticoagulation secondary to very high fall risk  will continue to monitor on metoprolol  heart rate controlled
improved  will have pulmonary evaluate  continue solumedrol IV BID
not a candidate for anticoagulation  secondary to very high fall risk  will continue to monitor on metoprolol  heart rate controlled
secondary to very high fall risk  will continue to monitor on metoprolol  heart rate controlled

## 2023-04-18 NOTE — PROGRESS NOTE ADULT - NSPROGADDITIONALINFOA_GEN_ALL_CORE
awaiting discharge   I will be away 4/18/23 thru 5/9/23 Dr Navarro will cover
discharge to Subacute Rehab when bed available
discussed with covering ACP   discharge to Subacute Rehab when bed available     I will be away 4/18/23 thru 5/9/23 Dr Navarro will cover
discussed with patient in detail, expresses understanding of treatment plans.

## 2023-04-19 LAB
GLUCOSE BLDC GLUCOMTR-MCNC: 103 MG/DL — HIGH (ref 70–99)
GLUCOSE BLDC GLUCOMTR-MCNC: 107 MG/DL — HIGH (ref 70–99)
GLUCOSE BLDC GLUCOMTR-MCNC: 116 MG/DL — HIGH (ref 70–99)
GLUCOSE BLDC GLUCOMTR-MCNC: 122 MG/DL — HIGH (ref 70–99)

## 2023-04-19 PROCEDURE — 99231 SBSQ HOSP IP/OBS SF/LOW 25: CPT

## 2023-04-19 RX ADMIN — HALOPERIDOL DECANOATE 1 MILLIGRAM(S): 100 INJECTION INTRAMUSCULAR at 08:50

## 2023-04-19 RX ADMIN — Medication 25 MILLIGRAM(S): at 08:50

## 2023-04-19 RX ADMIN — Medication 50 MILLIGRAM(S): at 20:17

## 2023-04-19 RX ADMIN — Medication 650 MILLIGRAM(S): at 06:20

## 2023-04-19 RX ADMIN — HALOPERIDOL DECANOATE 1 MILLIGRAM(S): 100 INJECTION INTRAMUSCULAR at 20:17

## 2023-04-19 RX ADMIN — Medication 3 MILLILITER(S): at 14:48

## 2023-04-19 RX ADMIN — Medication 50 MILLIGRAM(S): at 05:22

## 2023-04-19 RX ADMIN — ENOXAPARIN SODIUM 40 MILLIGRAM(S): 100 INJECTION SUBCUTANEOUS at 12:54

## 2023-04-19 RX ADMIN — Medication 3 MILLILITER(S): at 03:14

## 2023-04-19 RX ADMIN — GABAPENTIN 300 MILLIGRAM(S): 400 CAPSULE ORAL at 05:22

## 2023-04-19 RX ADMIN — Medication 25 MILLIGRAM(S): at 13:53

## 2023-04-19 RX ADMIN — Medication 6 MILLIGRAM(S): at 22:37

## 2023-04-19 RX ADMIN — GABAPENTIN 300 MILLIGRAM(S): 400 CAPSULE ORAL at 12:53

## 2023-04-19 RX ADMIN — Medication 50 MILLIGRAM(S): at 17:45

## 2023-04-19 RX ADMIN — HALOPERIDOL DECANOATE 1 MILLIGRAM(S): 100 INJECTION INTRAMUSCULAR at 13:53

## 2023-04-19 RX ADMIN — Medication 0.5 MILLIGRAM(S): at 22:37

## 2023-04-19 RX ADMIN — Medication 650 MILLIGRAM(S): at 05:20

## 2023-04-19 RX ADMIN — Medication 0.5 MILLIGRAM(S): at 10:04

## 2023-04-19 RX ADMIN — GABAPENTIN 300 MILLIGRAM(S): 400 CAPSULE ORAL at 22:37

## 2023-04-19 RX ADMIN — Medication 3 MILLILITER(S): at 22:38

## 2023-04-19 RX ADMIN — Medication 3 MILLILITER(S): at 10:04

## 2023-04-19 RX ADMIN — PANTOPRAZOLE SODIUM 40 MILLIGRAM(S): 20 TABLET, DELAYED RELEASE ORAL at 05:22

## 2023-04-19 NOTE — PROGRESS NOTE ADULT - ASSESSMENT
88 year old female, , domiciled with daughter with PPHx of bipolar I disorder w/ recent admission at [facility] for agitation thought to be related to vascular dementia, asthma/COPD presents s/p mechanical fall with hypoxic respiratory failure c/f asthma/copd exacerbation :    Copd exacerbation:  hypoxic resp failure  Bipolar disorder;   dementia:   SP Fall       4/12;    Copd exacerbation:  hypoxic resp failure ; being treated for copd exacerbation   ; agree with short term steroids:  on BD ; WBC is normal : do ct chest non contrast    Bipolar disorder; cont home meds:  recent dc from [facility]  dementia: supportive care  SP Fall  /; per PMD:    mirtha acp  ; TRIED CONTACTING HER DAUGHTER BUT WITH NO RESPONSE:       4/13:    Copd exacerbation:  hypoxic resp failure ; being treated for copd exacerbation   ; agree with short term steroids:  on BD ; WBC is normal : do ct chest non contrast  - still pending: She is on empiric antibiotics  to dc if the blood cultures remain negative   Bipolar disorder; cont home meds:  recent dc from [facility]  dementia: supportive care  SP Fall  /;    DW ACP    dw acp  ;     4/14:    Copd exacerbation:  hypoxic resp failure ; being treated for copd exacerbation   : now much better:  change to po steroids for few days:  cont B D:   WBC is normal : ct chest has emphysema and suggestie of chf with kacy [id number] ? low dose lasix:  would defer to primayr  team   Bipolar disorder; cont home meds:  recent dc from [facility]  dementia: supportive care  SP Fall  /;    DW PMD    14/15:    Copd exacerbation:  hypoxic resp failure ; being treated for copd exacerbation   : now much better:  changed to po steroids for few days:  cont B D:   WBC is normal : ct chest has emphysema and suggestive of chf with kacy effusions started on low  dose lasix:  would monitor  Bipolar disorder; cont home meds:  recent dc from [facility]  dementia: supportive care  SP Fall  /;    DW PMD    4/17;    Copd exacerbation:  hypoxic resp failure ; being treated for copd exacerbation   : now much better:  changed to po steroids for few days:  cont B D:  Add pulmicort:    WBC is normal : ct chest has emphysema and suggestive of chf with kacy effusions started on low  dose lasix:  would monitor: CT chest also showed: 1.  Congestive heart failure, Left thyroid hypodense lesion measuring 1.9 x 1.5 cm. Given size, ultrasound evaluation is recommended,   Emphysema and  Few lung nodules measuring up to 5 mm. Consider a repeat chest CTin three months time after dc:    Bipolar disorder; cont home meds:  recent dc from [facility]  dementia: supportive care  SP Fall  /;    DW PMD    4/18:    Copd exacerbation:  hypoxic resp failure ; being treated for copd exacerbation   : now much better:  finished steroids: cont B D:  Add pulmicort:    WBC is normal : ct chest has emphysema and suggestive of chf with kacy effusions started on low  dose lasix:  would monitor: CT chest also showed: 1.  Congestive heart failure, Left thyroid hypodense lesion measuring 1.9 x 1.5 cm. Given size, ultrasound evaluation is recommended,   Emphysema and  Few lung nodules measuring up to 5 mm. Consider a repeat chest CTin three months time after dc:    Bipolar disorder; cont home meds:  recent dc from [facility]  dementia: supportive care  SP Fall  /;    dw acp : dc planniing     PMD    4/19:  Copd exacerbation:  hypoxic resp failure ; being treated for copd exacerbation   : now much better:  finished steroids: cont B D:  Add pulmicort: ; no new change:  no obvious resp distress    WBC is normal : ct chest has emphysema and suggestive of chf with kacy effusions started on low  dose lasix:  : CT chest also showed: 1.  Congestive heart failure, Left thyroid hypodense lesion measuring 1.9 x 1.5 cm. Given size, ultrasound evaluation is recommended,   Emphysema and  Few lung nodules measuring up to 5 mm. Consider a repeat chest CTin three months time after dc:    Bipolar disorder; cont home meds:  recent dc from [facility]  dementia: supportive care  SP Fall  /;    dw acp : dc planniing    DW PMD

## 2023-04-19 NOTE — PROGRESS NOTE ADULT - ASSESSMENT
88 year old female, , domiciled with daughter with PPHx of bipolar I disorder w/ recent admission at [facility] for agitation thought to be related to vascular dementia, asthma/COPD presents s/p mechanical fall with hypoxic respiratory failure c/f asthma/copd exacerbation        Problem/Plan - 1:  ·  Problem: Paroxysmal atrial fibrillation.   ·  Plan: not a candidate for full anticoagulation secondary to very high fall risk  will continue to monitor on metoprolol  heart rate controlled.     Problem/Plan - 2:  ·  Problem: Fever.   ·  Plan: off antibiotics  CT chest negative except small effusions  now resolved.     Problem/Plan - 3:  ·  Problem: Acute respiratory failure with hypoxia.   ·  Plan: secondary to acute on chronic diastolic heart failure and COPD exacerbation now resolved   pulmonary help appreciated  wheeze resolved  completed 3 days IV furosemide now euvolemic.     Problem/Plan - 4:  ·  Problem: COPD exacerbation.   ·  Plan: management per pulmonary   Duoneb.     Problem/Plan - 5:  ·  Problem: Fall at home.   ·  Plan: fall precautions   PT evaluation noted     Problem/Plan - 6:  ·  Problem: Dementia with behavioral disturbance.   ·  Plan: continue to follow  recommendations  calm.     Problem/Plan - 7:  ·  Problem: Need for prophylactic measure.   ·  Plan: enoxaparin.      Dispo : Discharge to subacute rehab when bed available.

## 2023-04-19 NOTE — BH CONSULTATION LIAISON PROGRESS NOTE - NSBHCHARTREVIEWLAB_PSY_A_CORE FT
CBC Full  -  ( 12 Apr 2023 06:57 )  WBC Count : 6.11 K/uL  RBC Count : 3.28 M/uL  Hemoglobin : 9.3 g/dL  Hematocrit : 30.5 %  Platelet Count - Automated : 279 K/uL  Mean Cell Volume : 93.0 fL  Mean Cell Hemoglobin : 28.4 pg  Mean Cell Hemoglobin Concentration : 30.5 gm/dL  Auto Neutrophil # : x  Auto Lymphocyte # : x  Auto Monocyte # : x  Auto Eosinophil # : x  Auto Basophil # : x  Auto Neutrophil % : x  Auto Lymphocyte % : x  Auto Monocyte % : x  Auto Eosinophil % : x  Auto Basophil % : x  04-12    141  |  101  |  19  ----------------------------<  111<H>  4.0   |  25  |  0.47<L>    Ca    9.5      12 Apr 2023 06:57  Phos  3.3     04-12  Mg     1.80     04-12    
no new labs from today
CBC Full  -  ( 12 Apr 2023 06:57 )  WBC Count : 6.11 K/uL  RBC Count : 3.28 M/uL  Hemoglobin : 9.3 g/dL  Hematocrit : 30.5 %  Platelet Count - Automated : 279 K/uL  Mean Cell Volume : 93.0 fL  Mean Cell Hemoglobin : 28.4 pg  Mean Cell Hemoglobin Concentration : 30.5 gm/dL  Auto Neutrophil # : x  Auto Lymphocyte # : x  Auto Monocyte # : x  Auto Eosinophil # : x  Auto Basophil # : x  Auto Neutrophil % : x  Auto Lymphocyte % : x  Auto Monocyte % : x  Auto Eosinophil % : x  Auto Basophil % : x  04-12    141  |  101  |  19  ----------------------------<  111<H>  4.0   |  25  |  0.47<L>    Ca    9.5      12 Apr 2023 06:57  Phos  3.3     04-12  Mg     1.80     04-12    

## 2023-04-20 LAB
GLUCOSE BLDC GLUCOMTR-MCNC: 100 MG/DL — HIGH (ref 70–99)
GLUCOSE BLDC GLUCOMTR-MCNC: 103 MG/DL — HIGH (ref 70–99)
GLUCOSE BLDC GLUCOMTR-MCNC: 111 MG/DL — HIGH (ref 70–99)
GLUCOSE BLDC GLUCOMTR-MCNC: 99 MG/DL — SIGNIFICANT CHANGE UP (ref 70–99)

## 2023-04-20 RX ADMIN — Medication 3 MILLILITER(S): at 08:25

## 2023-04-20 RX ADMIN — Medication 0.5 MILLIGRAM(S): at 08:28

## 2023-04-20 RX ADMIN — HALOPERIDOL DECANOATE 1 MILLIGRAM(S): 100 INJECTION INTRAMUSCULAR at 14:11

## 2023-04-20 RX ADMIN — PANTOPRAZOLE SODIUM 40 MILLIGRAM(S): 20 TABLET, DELAYED RELEASE ORAL at 06:09

## 2023-04-20 RX ADMIN — Medication 3 MILLILITER(S): at 22:12

## 2023-04-20 RX ADMIN — Medication 6 MILLIGRAM(S): at 20:51

## 2023-04-20 RX ADMIN — GABAPENTIN 300 MILLIGRAM(S): 400 CAPSULE ORAL at 14:10

## 2023-04-20 RX ADMIN — Medication 0.5 MILLIGRAM(S): at 22:12

## 2023-04-20 RX ADMIN — Medication 50 MILLIGRAM(S): at 06:09

## 2023-04-20 RX ADMIN — ENOXAPARIN SODIUM 40 MILLIGRAM(S): 100 INJECTION SUBCUTANEOUS at 14:11

## 2023-04-20 RX ADMIN — Medication 25 MILLIGRAM(S): at 08:23

## 2023-04-20 RX ADMIN — HALOPERIDOL DECANOATE 1 MILLIGRAM(S): 100 INJECTION INTRAMUSCULAR at 09:47

## 2023-04-20 RX ADMIN — HALOPERIDOL DECANOATE 1 MILLIGRAM(S): 100 INJECTION INTRAMUSCULAR at 20:51

## 2023-04-20 RX ADMIN — GABAPENTIN 300 MILLIGRAM(S): 400 CAPSULE ORAL at 06:09

## 2023-04-20 RX ADMIN — Medication 50 MILLIGRAM(S): at 20:51

## 2023-04-20 RX ADMIN — Medication 50 MILLIGRAM(S): at 17:48

## 2023-04-20 RX ADMIN — GABAPENTIN 300 MILLIGRAM(S): 400 CAPSULE ORAL at 20:51

## 2023-04-20 RX ADMIN — Medication 3 MILLILITER(S): at 14:01

## 2023-04-20 RX ADMIN — Medication 25 MILLIGRAM(S): at 14:11

## 2023-04-20 NOTE — PROGRESS NOTE ADULT - ASSESSMENT
88 year old female, , domiciled with daughter with PPHx of bipolar I disorder w/ recent admission at [facility] for agitation thought to be related to vascular dementia, asthma/COPD presents s/p mechanical fall with hypoxic respiratory failure c/f asthma/copd exacerbation        Problem/Plan - 1:  ·  Problem: Paroxysmal atrial fibrillation.   ·  Plan: not a candidate for full anticoagulation secondary to very high fall risk  will continue to monitor on metoprolol  heart rate controlled.     Problem/Plan - 2:  ·  Problem: Fever.   ·  Plan: off antibiotics  CT chest negative except small effusions  now resolved.     Problem/Plan - 3:  ·  Problem: Acute respiratory failure with hypoxia.   ·  Plan: secondary to acute on chronic diastolic heart failure and COPD exacerbation now resolved   pulmonary help appreciated  wheeze resolved  completed 3 days IV furosemide now euvolemic.     Problem/Plan - 4:  ·  Problem: COPD exacerbation.   ·  Plan: management per pulmonary   Duoneb.     Problem/Plan - 5:  ·  Problem: Fall at home.   ·  Plan: fall precautions   PT evaluation noted     Problem/Plan - 6:  ·  Problem: Dementia with behavioral disturbance.   ·  Plan: continue to follow  recommendations  calm.     Problem/Plan - 7:  ·  Problem: Need for prophylactic measure.   ·  Plan: enoxaparin.      Dispo : Discharge to subacute rehab when bed available.

## 2023-04-21 ENCOUNTER — TRANSCRIPTION ENCOUNTER (OUTPATIENT)
Age: 88
End: 2023-04-21

## 2023-04-21 LAB
GLUCOSE BLDC GLUCOMTR-MCNC: 119 MG/DL — HIGH (ref 70–99)
GLUCOSE BLDC GLUCOMTR-MCNC: 125 MG/DL — HIGH (ref 70–99)
GLUCOSE BLDC GLUCOMTR-MCNC: 128 MG/DL — HIGH (ref 70–99)
GLUCOSE BLDC GLUCOMTR-MCNC: 149 MG/DL — HIGH (ref 70–99)

## 2023-04-21 RX ORDER — METOPROLOL TARTRATE 50 MG
25 TABLET ORAL
Refills: 0 | Status: DISCONTINUED | OUTPATIENT
Start: 2023-04-21 | End: 2023-05-01

## 2023-04-21 RX ADMIN — Medication 25 MILLIGRAM(S): at 14:36

## 2023-04-21 RX ADMIN — Medication 3 MILLILITER(S): at 23:21

## 2023-04-21 RX ADMIN — HALOPERIDOL DECANOATE 1 MILLIGRAM(S): 100 INJECTION INTRAMUSCULAR at 22:47

## 2023-04-21 RX ADMIN — Medication 25 MILLIGRAM(S): at 08:35

## 2023-04-21 RX ADMIN — Medication 0.5 MILLIGRAM(S): at 10:52

## 2023-04-21 RX ADMIN — Medication 25 MILLIGRAM(S): at 17:32

## 2023-04-21 RX ADMIN — GABAPENTIN 300 MILLIGRAM(S): 400 CAPSULE ORAL at 22:46

## 2023-04-21 RX ADMIN — Medication 3 MILLILITER(S): at 16:54

## 2023-04-21 RX ADMIN — Medication 6 MILLIGRAM(S): at 22:46

## 2023-04-21 RX ADMIN — GABAPENTIN 300 MILLIGRAM(S): 400 CAPSULE ORAL at 06:07

## 2023-04-21 RX ADMIN — Medication 3 MILLILITER(S): at 04:34

## 2023-04-21 RX ADMIN — Medication 0.5 MILLIGRAM(S): at 23:22

## 2023-04-21 RX ADMIN — HALOPERIDOL DECANOATE 1 MILLIGRAM(S): 100 INJECTION INTRAMUSCULAR at 14:37

## 2023-04-21 RX ADMIN — PANTOPRAZOLE SODIUM 40 MILLIGRAM(S): 20 TABLET, DELAYED RELEASE ORAL at 06:07

## 2023-04-21 RX ADMIN — Medication 3 MILLILITER(S): at 10:52

## 2023-04-21 RX ADMIN — GABAPENTIN 300 MILLIGRAM(S): 400 CAPSULE ORAL at 14:35

## 2023-04-21 RX ADMIN — HALOPERIDOL DECANOATE 1 MILLIGRAM(S): 100 INJECTION INTRAMUSCULAR at 08:35

## 2023-04-21 RX ADMIN — ENOXAPARIN SODIUM 40 MILLIGRAM(S): 100 INJECTION SUBCUTANEOUS at 14:35

## 2023-04-21 NOTE — DISCHARGE NOTE PROVIDER - HOSPITAL COURSE
88 year old female, , domiciled with daughter with PPHx of bipolar I disorder w/ recent admission at [facility] for agitation thought to be related to vascular dementia, asthma/COPD presents s/p mechanical fall with hypoxic respiratory failure c/f asthma/copd exacerbation        Problem/Plan - 1:  ·  Problem: Paroxysmal atrial fibrillation.   ·  Plan: not a candidate for full anticoagulation secondary to very high fall risk  will continue to monitor on metoprolol  heart rate controlled.     Problem/Plan - 2:  ·  Problem: Fever.   ·  Plan: off antibiotics  CT chest negative except small effusions  now resolved.     Problem/Plan - 3:  ·  Problem: Acute respiratory failure with hypoxia.   ·  Plan: secondary to acute on chronic diastolic heart failure and COPD exacerbation now resolved   pulmonary help appreciated  wheeze resolved  completed 3 days IV furosemide now euvolemic.     Problem/Plan - 4:  ·  Problem: COPD exacerbation.   ·  Plan: management per pulmonary   Duoneb.     Problem/Plan - 5:  ·  Problem: Fall at home.   ·  Plan: fall precautions   PT evaluation noted     Problem/Plan - 6:  ·  Problem: Dementia with behavioral disturbance.   ·  Plan: continue to follow  recommendations  calm.     Problem/Plan - 7:  ·  Problem: Need for prophylactic measure.   ·  Plan: enoxaparin.    On ___ this case was reviewed with  ____, the patient is medically stable and optimized for discharge. All medications were reviewed and prescriptions were sent to mutually agreed upon pharmacy. The patient agrees to follow up with providers as recommended. 88 year old female, , domiciled with daughter with PPHx of bipolar I disorder w/ recent admission at [facility] for agitation thought to be related to vascular dementia, asthma/COPD presents s/p mechanical fall with hypoxic respiratory failure c/f asthma/copd exacerbation     Paroxysmal atrial fibrillation.   - not a candidate for full anticoagulation secondary to very high fall risk  - will continue to monitor on metoprolol  - heart rate controlled.    Fever.   - off antibiotics  - CT chest negative except small effusions  - now resolved.    Acute respiratory failure with hypoxia.   - secondary to acute on chronic diastolic heart failure and COPD exacerbation now resolved   - pulmonary help appreciated  - wheeze resolved  - completed 3 days IV furosemide now euvolemic.    COPD exacerbation.   - management per pulmonary   - Duoneb.    Fall at home.   - fall precautions   - PT evaluation recommending ZIA    Dementia with behavioral disturbance.   - continue to follow  recommendations  - calm.    On ___ this case was reviewed with  ____, the patient is medically stable and optimized for discharge. All medications were reviewed and prescriptions were sent to mutually agreed upon pharmacy. The patient agrees to follow up with providers as recommended. 88 year old female, , domiciled with daughter with PPHx of bipolar I disorder w/ recent admission at [facility] for agitation thought to be related to vascular dementia, asthma/COPD presents s/p mechanical fall with hypoxic respiratory failure c/f asthma/copd exacerbation     Paroxysmal atrial fibrillation.   - not a candidate for full anticoagulation secondary to very high fall risk  -continue with metoprolol  - heart rate controlled.    Fever.   - off antibiotics  - CT chest negative except small effusions  - now resolved.    Acute respiratory failure with hypoxia.   - secondary to acute on chronic diastolic heart failure and COPD exacerbation now resolved   - pulmonary help appreciated  - wheeze resolved  - completed 3 days IV furosemide now euvolemic.    COPD exacerbation.   - management per pulmonary   - Duoneb.    Fall at home.   - fall precautions   - PT evaluation recommending ZIA    Dementia with behavioral disturbance.   - continue to follow  recommendations  - calm.    On ___ this case was reviewed with  ____, the patient is medically stable and optimized for discharge. All medications were reviewed and prescriptions were sent to mutually agreed upon pharmacy. The patient agrees to follow up with providers as recommended. 88 year old female, , domiciled with daughter with PPHx of bipolar I disorder w/ recent admission at [facility] for agitation thought to be related to vascular dementia, asthma/COPD presents s/p mechanical fall with hypoxic respiratory failure c/f asthma/copd exacerbation     Paroxysmal atrial fibrillation.   - not a candidate for full anticoagulation secondary to very high fall risk  -continue with metoprolol  - heart rate controlled.    Fever.   - off antibiotics  - CT chest negative except small effusions  - now resolved.    Acute respiratory failure with hypoxia.   - secondary to acute on chronic diastolic heart failure and COPD exacerbation now resolved   - pulmonary consulted   - wheeze resolved  - completed 3 days IV furosemide now euvolemic.    COPD exacerbation.   - managed by pulmonary   with nebulizers and inhalers     Fall at home.   - fall precautions   - PT evaluation recommending ZIA - daughter arranged HHA     Dementia with behavioral disturbance.   -  following and recommending medications   - calm.    Discussed with attending ready for discharge home

## 2023-04-21 NOTE — DISCHARGE NOTE PROVIDER - NSDCCPCAREPLAN_GEN_ALL_CORE_FT
PRINCIPAL DISCHARGE DIAGNOSIS  Diagnosis: Fall at home  Assessment and Plan of Treatment: You had a fall at home. Your xrays showed no fractures or dislocations. Please be aware of your surroundings when walking around. Continue to take your medications as prescribed. Please follow up with your primary care provider.        SECONDARY DISCHARGE DIAGNOSES  Diagnosis: COPD exacerbation  Assessment and Plan of Treatment: You had a CT of your chest which showed emphysema and  Few lung nodules measuring up to 5 mm. Follow up with your primary care provider or pulmonologist for a repeta CT scan in 3 months.       Diagnosis: Acute respiratory failure with hypoxia  Assessment and Plan of Treatment: You were found to have low oxygen. This is now resolved. Continue to take your medications as prescribed. Please follow up with your primary care provider.    Diagnosis: Dementia with behavioral disturbance  Assessment and Plan of Treatment: Continue to take your medications as prescribed. Please follow up with your primary care provider.    Diagnosis: Paroxysmal atrial fibrillation  Assessment and Plan of Treatment: Please continue your medications as directed and follow-up with your primary provider/cardiologist to further manage your care. Monitor for signs/symptoms of uncontrolled atrial fibrillation, such as, increased heart rate, palpitations, chest pain, dizziness, or shortness of breath - Return to emergency room if these signs/symptoms are present.      Diagnosis: Thyroid lesion  Assessment and Plan of Treatment: You were incidentally found to have a left thyroid hypodense lesion measuring 1.9 x 1.5 cm on your CT scan. It is recommended that you get an Ultrasound outpatient for further evaluation.     PRINCIPAL DISCHARGE DIAGNOSIS  Diagnosis: Fall at home  Assessment and Plan of Treatment: You had a fall at home. Your xrays showed no fractures or dislocations. Please be aware of your surroundings when walking around. Continue to take your medications as prescribed. Please follow up with your primary care provider.        SECONDARY DISCHARGE DIAGNOSES  Diagnosis: Acute respiratory failure with hypoxia  Assessment and Plan of Treatment: You were found to have low oxygen. This is now resolved. Continue to take your medications as prescribed. Please follow up with your primary care provider.    Diagnosis: COPD exacerbation  Assessment and Plan of Treatment: You had a CT of your chest which showed emphysema and  Few lung nodules measuring up to 5 mm. Follow up with your primary care provider or pulmonologist for a repeat CT scan in 3 months.       Diagnosis: Dementia with behavioral disturbance  Assessment and Plan of Treatment: Continue to take your medications as prescribed. Please follow up with your primary care provider and with psych    Diagnosis: Paroxysmal atrial fibrillation  Assessment and Plan of Treatment: Please continue your medications as directed and follow-up with your primary provider/cardiologist to further manage your care. Monitor for signs/symptoms of uncontrolled atrial fibrillation, such as, increased heart rate, palpitations, chest pain, dizziness, or shortness of breath - Return to emergency room if these signs/symptoms are present.      Diagnosis: Thyroid lesion  Assessment and Plan of Treatment: You were incidentally found to have a left thyroid hypodense lesion measuring 1.9 x 1.5 cm on your CT scan. It is recommended that you get an Ultrasound outpatient for further evaluation.

## 2023-04-21 NOTE — DISCHARGE NOTE PROVIDER - CARE PROVIDER_API CALL
Your, PCP  Phone: (   )    -  Fax: (   )    -  Follow Up Time: 2 weeks   Your, PCP  Phone: (   )    -  Fax: (   )    -  Follow Up Time: 2 weeks    Jose Angel Dumont)  Critical Care Medicine; Internal Medicine; Pulmonary Disease  [address] [address]  Phone: [phone]  Fax: [phone]  Follow Up Time:    Jose Angel Dumont)  Critical Care Medicine; Internal Medicine; Pulmonary Disease  [address] [place] [address]  Phone: [phone]  Fax: [phone]  Follow Up Time:     Your, PCP  Phone: (   )    -  Fax: (   )    -  Follow Up Time: 2 weeks    Willie Carreon)  Internal Medicine  [address] [place] [address]  Phone: [phone]  Fax: [phone]  Follow Up Time:

## 2023-04-21 NOTE — DISCHARGE NOTE PROVIDER - NSFOLLOWUPCLINICSTOKEN_GEN_ALL_ED_FT
[id number]: || ||00\01||False; [id number]: || ||00\01||False;[id number]: || ||00\01||False;[id number]: || ||00\01||False;

## 2023-04-21 NOTE — DISCHARGE NOTE PROVIDER - PROVIDER TOKENS
FREE:[LAST:[Your],FIRST:[PCP],PHONE:[(   )    -],FAX:[(   )    -],FOLLOWUP:[2 weeks]] FREE:[LAST:[Your],FIRST:[PCP],PHONE:[(   )    -],FAX:[(   )    -],FOLLOWUP:[2 weeks]],PROVIDER:[TOKEN:[[id number]]] PROVIDER:[TOKEN:[[id number]]],FREE:[LAST:[Your],FIRST:[PCP],PHONE:[(   )    -],FAX:[(   )    -],FOLLOWUP:[2 weeks]],PROVIDER:[TOKEN:[[id number]]]

## 2023-04-21 NOTE — DISCHARGE NOTE PROVIDER - NSFOLLOWUPCLINICS_GEN_ALL_ED_FT
[facility]  Behavioral Health  [address] [place] [address]  Phone: [phone]  Fax:      [facility]  Internal Medicine  [address] [place] [address]  Phone: [phone]  Fax: [phone]    [facility]  Behavioral Health  [address] [place] [address]  Phone: [phone]  Fax:     [facility] Pulmonolgy and Sleep [facility]  Pulmonology  [address] [place] [address], [place] [address]  Phone: [phone]  Fax:

## 2023-04-21 NOTE — CHART NOTE - NSCHARTNOTEFT_GEN_A_CORE
Spoke to patient's daughter [phone] to confirm discharge plan. Plan is to take patient home. Daughter has most things ready but is waiting for the day time home health aide company to call her back and confirm that they will be able to provider an aide. CM and EBER aware.     Ellen Jeronimo PA-C  Medicine  pager [phone] Spoke to patient's daughter [phone] to confirm discharge plan. Plan is to take patient home. Daughter has most things ready but is waiting for the day time home health aide company to call her back and confirm that they will be able to provide an aide. CM and EBER aware.     Ellen Jeronimo PA-C  Medicine  pager [phone]

## 2023-04-21 NOTE — PROGRESS NOTE ADULT - ASSESSMENT
_________________________________________________________________________________________  ========>>  M E D I C A L   A T T E N D I N G    F O L L O W  U P  N O T E  <<=========  -----------------------------------------------------------------------------------------------------    - Patient seen and examined by me earlier today. (covering today)   - In summary,  TREMAINE NUR is a 88y year old woman admitted with   - Patient today overall doing ok, comfortable, eating OK.     ==================>> REVIEW OF SYSTEM <<=================    GEN: no fever, no chills, no pain  RESP: no SOB, no cough, no sputum  CVS: no chest pain, no palpitations  GI: no abdominal pain, no nausea  : no dysuria, no frequency  Neuro: no headache, no dizziness  Derm : no itching, no rash    ==================>> PHYSICAL EXAM <<=================    GEN: A&O X 3 , NAD , comfortable, pleasant, calm   HEENT: NCAT, PERRL, MMM, hearing intact  Neck: supple , no JVD appreciated  CVS: S1S2 , regular , No M/R/G appreciated  PULM: CTA B/L,  no W/R/R appreciated  ABD.: soft. non tender, non distended,  bowel sounds present  Extrem: intact pulses , no edema   PSYCH : normal mood,  not anxious                            ( Note Written / Date of service :  04-21-23 )    ==================>> MEDICATIONS <<====================    MEDICATIONS  (STANDING):  albuterol/ipratropium for Nebulization 3 milliLiter(s) Nebulizer every 6 hours  buDESOnide    Inhalation Suspension 0.5 milliGRAM(s) Inhalation every 12 hours  dextrose 5%. 1000 milliLiter(s) (100 mL/Hr) IV Continuous <Continuous>  dextrose 5%. 1000 milliLiter(s) (50 mL/Hr) IV Continuous <Continuous>  dextrose 50% Injectable 25 Gram(s) IV Push once  dextrose 50% Injectable 12.5 Gram(s) IV Push once  dextrose 50% Injectable 25 Gram(s) IV Push once  enoxaparin Injectable 40 milliGRAM(s) SubCutaneous every 24 hours  gabapentin 300 milliGRAM(s) Oral three times a day  glucagon  Injectable 1 milliGRAM(s) IntraMuscular once  haloperidol     Tablet 1 milliGRAM(s) Oral <User Schedule>  insulin lispro (ADMELOG) corrective regimen sliding scale   SubCutaneous three times a day before meals  insulin lispro (ADMELOG) corrective regimen sliding scale   SubCutaneous at bedtime  melatonin 6 milliGRAM(s) Oral at bedtime  metoprolol tartrate 25 milliGRAM(s) Oral two times a day  pantoprazole    Tablet 40 milliGRAM(s) Oral before breakfast  traZODone 25 milliGRAM(s) Oral <User Schedule>  traZODone 25 milliGRAM(s) Oral <User Schedule>  traZODone 50 milliGRAM(s) Oral <User Schedule>    MEDICATIONS  (PRN):  acetaminophen     Tablet .. 650 milliGRAM(s) Oral every 6 hours PRN Temp greater or equal to 38C (100.4F), Mild Pain (1 - 3)  aluminum hydroxide/magnesium hydroxide/simethicone Suspension 30 milliLiter(s) Oral every 4 hours PRN Dyspepsia  dextrose Oral Gel 15 Gram(s) Oral once PRN Blood Glucose LESS THAN 70 milliGRAM(s)/deciliter  haloperidol    Injectable 1 milliGRAM(s) IntraMuscular every 6 hours PRN Agitation  ondansetron Injectable 4 milliGRAM(s) IV Push every 8 hours PRN Nausea and/or Vomiting    ___________  Active diet:  Diet, Soft and Bite Sized:   Supplement Feeding Modality:  Oral  Ensure Plus High Protein Cans or Servings Per Day:  1       Frequency:  Two Times a day  ___________________    ==================>> VITAL SIGNS <<==================    T(C): 36.7 (04-21-23 @ 12:11), Max: 36.7 (04-21-23 @ 12:11)  HR: 80 (04-21-23 @ 12:11) (70 - 95)  BP: 116/65 (04-21-23 @ 12:11) (95/60 - 119/64)  BP(mean): --  RR: 18 (04-21-23 @ 12:11) (17 - 18)  SpO2: 97% (04-21-23 @ 12:11) (93% - 99%)     CAPILLARY BLOOD GLUCOSE      POCT Blood Glucose.: 119 mg/dL (21 Apr 2023 12:26)  POCT Blood Glucose.: 125 mg/dL (21 Apr 2023 08:29)  POCT Blood Glucose.: 103 mg/dL (20 Apr 2023 21:40)  POCT Blood Glucose.: 99 mg/dL (20 Apr 2023 18:46)    I&O's Summary       ==================>> LAB AND IMAGING <<==================                              TSH:      2.90   (02-27-23)           Lipid profile:    HgA1C:   (01-23-23)          (01-23-23)      5.3    Culture - Blood (collected 12 Apr 2023 15:40)  Source: .Blood Blood-Venous  Final Report (17 Apr 2023 19:00):    No Growth Final    Culture - Blood (collected 12 Apr 2023 15:30)  Source: .Blood Blood-Peripheral  Final Report (17 Apr 2023 19:00):    No Growth Final    Culture - Urine (collected 11 Apr 2023 15:42)  Source: Clean Catch Clean Catch (Midstream)  Final Report (12 Apr 2023 15:40):    <10,000 CFU/mL Normal Urogenital Tamika      ___________________________________________________________________________________  ===============>>  A S S E S S M E N T   A N D   P L A N <<===============  ------------------------------------------------------------------------------------------    HPI:  Limited history from pt as she is agitated and confused, history obtained from daughter and chart review     88 year old female, , domiciled with daughter with PPHx of bipolar I disorder w/ recent admission at [facility] for agitation thought to be related to vascular dementia, asthma/COPD, GERD, sciatica, paroxysmal afib (not on a/c) with previous vertebral fracture, discharge from [facility] yesterday who presents to the ED after a fall at home.  Patient was discharged home with her daughter and was to be home with her until her aide arrived.  Daughter left last night and patient states she walked up to go to the kitchen when she had a mechanical fall and fell onto her right side, no LOC, no head trauma. Patient was able to get up and sat on her couch and began screaming until her neighbors called EMS.  Patient was complaining of right hip pain but denies any chest pain, belly pain, nausea, vomiting, fevers, chills to the ED providers. Patient was given 1 DuoNeb by EMS. Of note, pt with prolonged admission from dec 2022 until a few days ago for back pain and stabilization of mood/agitation from dementia. Daughter states she was unable to get HHA to come in so pt was by herself for a few hours and she is unclear if pt had a fall. (11 Apr 2023 23:37)        -GI/DVT Prophylaxis per protocol.    --------------------------------------------  Case discussed with   Education given on findings and plan of care  ___________________________  H. JUSTO Laughlin. (covering today)   Pager: [phone]     _________________________________________________________________________________________  ========>>  M E D I C A L   A T T E N D I N G    F O L L O W  U P  N O T E  <<=========  -----------------------------------------------------------------------------------------------------    - Patient seen and examined by me earlier today. (covering today)   - In summary,  TREMAINE NUR is a 88y year old woman With multiple medical conditions    ==================>> REVIEW OF SYSTEM <<=================    Limited/unable to obtain as patient with poor mental status    ==================>> PHYSICAL EXAM <<=================    GEN: Resting comfortably in bed, NAD ,  calm , Cachectic  HEENT: NCAT, PERRL,   Neck: supple , no JVD appreciated  CVS: S1S2 , regular , No M/R/G appreciated  PULM: CTA B/L,  limited exam as not taking deep breaths   ABD.: soft. non tender, non distended,  bowel sounds present  Extrem: intact pulses , no edema                             ( Note Written / Date of service :  04-21-23 )    ==================>> MEDICATIONS <<====================    MEDICATIONS  (STANDING):  albuterol/ipratropium for Nebulization 3 milliLiter(s) Nebulizer every 6 hours  buDESOnide    Inhalation Suspension 0.5 milliGRAM(s) Inhalation every 12 hours  dextrose 5%. 1000 milliLiter(s) (100 mL/Hr) IV Continuous <Continuous>  dextrose 5%. 1000 milliLiter(s) (50 mL/Hr) IV Continuous <Continuous>  dextrose 50% Injectable 25 Gram(s) IV Push once  dextrose 50% Injectable 12.5 Gram(s) IV Push once  dextrose 50% Injectable 25 Gram(s) IV Push once  enoxaparin Injectable 40 milliGRAM(s) SubCutaneous every 24 hours  gabapentin 300 milliGRAM(s) Oral three times a day  glucagon  Injectable 1 milliGRAM(s) IntraMuscular once  haloperidol     Tablet 1 milliGRAM(s) Oral <User Schedule>  insulin lispro (ADMELOG) corrective regimen sliding scale   SubCutaneous three times a day before meals  insulin lispro (ADMELOG) corrective regimen sliding scale   SubCutaneous at bedtime  melatonin 6 milliGRAM(s) Oral at bedtime  metoprolol tartrate 25 milliGRAM(s) Oral two times a day  pantoprazole    Tablet 40 milliGRAM(s) Oral before breakfast  traZODone 25 milliGRAM(s) Oral <User Schedule>  traZODone 25 milliGRAM(s) Oral <User Schedule>  traZODone 50 milliGRAM(s) Oral <User Schedule>    MEDICATIONS  (PRN):  acetaminophen     Tablet .. 650 milliGRAM(s) Oral every 6 hours PRN Temp greater or equal to 38C (100.4F), Mild Pain (1 - 3)  aluminum hydroxide/magnesium hydroxide/simethicone Suspension 30 milliLiter(s) Oral every 4 hours PRN Dyspepsia  dextrose Oral Gel 15 Gram(s) Oral once PRN Blood Glucose LESS THAN 70 milliGRAM(s)/deciliter  haloperidol    Injectable 1 milliGRAM(s) IntraMuscular every 6 hours PRN Agitation  ondansetron Injectable 4 milliGRAM(s) IV Push every 8 hours PRN Nausea and/or Vomiting    ___________  Active diet:  Diet, Soft and Bite Sized:   Supplement Feeding Modality:  Oral  Ensure Plus High Protein Cans or Servings Per Day:  1       Frequency:  Two Times a day  ___________________    ==================>> VITAL SIGNS <<==================    T(C): 36.7 (04-21-23 @ 12:11), Max: 36.7 (04-21-23 @ 12:11)  HR: 80 (04-21-23 @ 12:11) (70 - 95)  BP: 116/65 (04-21-23 @ 12:11) (95/60 - 119/64)  RR: 18 (04-21-23 @ 12:11) (17 - 18)  SpO2: 97% (04-21-23 @ 12:11) (93% - 99%)     POCT Blood Glucose.: 119 mg/dL (21 Apr 2023 12:26)  POCT Blood Glucose.: 125 mg/dL (21 Apr 2023 08:29)  POCT Blood Glucose.: 103 mg/dL (20 Apr 2023 21:40)  POCT Blood Glucose.: 99 mg/dL (20 Apr 2023 18:46)     ==================>> LAB AND IMAGING <<==================       No labs today     TSH:      2.90   (02-27-23)           HgA1C:   (01-23-23)          (01-23-23)      5.3    Culture - Blood (collected 12 Apr 2023 15:40)  Source: .Blood Blood-Venous  Final Report (17 Apr 2023 19:00):    No Growth Final    Culture - Blood (collected 12 Apr 2023 15:30)  Source: .Blood Blood-Peripheral  Final Report (17 Apr 2023 19:00):    No Growth Final    Culture - Urine (collected 11 Apr 2023 15:42)  Source: Clean Catch Clean Catch (Midstream)  Final Report (12 Apr 2023 15:40):    <10,000 CFU/mL Normal Urogenital Tamika      ___________________________________________________________________________________  ===============>>  A S S E S S M E N T   A N D   P L A N <<===============  ------------------------------------------------------------------------------------------    · Assessment	  88 year old female, , domiciled with daughter with PPHx of bipolar I disorder w/ recent admission at [facility] for agitation thought to be related to vascular dementia, asthma/COPD presents s/p mechanical fall with hypoxic respiratory failure c/f asthma/copd exacerbation        Problem/Plan - 1:  ·  Problem: Paroxysmal atrial fibrillation.   ·  Plan: not a candidate for full anticoagulation secondary to very high fall risk  will continue to monitor on metoprolol  heart rate controlled.     Problem/Plan - 2:  ·  Problem: Fever.   ·  Plan: off antibiotics  CT chest negative except small effusions  now resolved.     Problem/Plan - 3:  ·  Problem: Acute respiratory failure with hypoxia.   ·  Plan: secondary to acute on chronic diastolic heart failure and COPD exacerbation now resolved   pulmonary help appreciated  wheeze resolved  completed 3 days IV furosemide now euvolemic.     Problem/Plan - 4:  ·  Problem: COPD exacerbation.   ·  Plan: management per pulmonary   Duoneb.     Problem/Plan - 5:  ·  Problem: Fall at home.   ·  Plan: fall precautions   PT evaluation noted     Problem/Plan - 6:  ·  Problem: Dementia with behavioral disturbance.   ·  Plan: continue to follow BH recommendations  calm.     Problem/Plan - 7:  ·  Problem: Need for prophylactic measure.   ·  Plan: enoxaparin.      Dispo : Discharge to subacute rehab when bed available.  __________________________  HMatthew Laughlin D.O. (covering today)   Pager: [phone]

## 2023-04-21 NOTE — DISCHARGE NOTE PROVIDER - NSDCMRMEDTOKEN_GEN_ALL_CORE_FT
Albuterol (Eqv-ProAir HFA) 90 mcg/inh inhalation aerosol: 2 puff(s) inhaled every 6 hours x 30 days as needed for  shortness of breath and/or wheezing  aspirin 81 mg oral tablet, chewable: 1 tab(s) orally once a day  famotidine 20 mg oral tablet: 1 tab(s) orally once a day  gabapentin 300 mg oral capsule: 1 cap(s) orally 3 times a day  haloperidol 1 mg oral tablet: 1 tab(s) orally 3 times a day  lidocaine 4% topical film: Apply topically to affected area once a day to back on AM off hs  metoprolol tartrate 25 mg oral tablet: 1 tab(s) orally 2 times a day  polyethylene glycol 3350 oral powder for reconstitution: 17 gram(s) orally once a day  senna (sennosides) 8.6 mg oral tablet: 2 tab(s) orally once a day (at bedtime)  tiotropium 2.5 mcg/inh inhalation aerosol: 2 puff(s) inhaled once a day  traZODone 50 mg oral tablet: 1 tab(s) orally once a day (at bedtime)   budesonide 0.5 mg/2 mL inhalation suspension: 2 milliliter(s) by nebulizer every 12 weeks  gabapentin 300 mg oral capsule: 1 cap(s) orally 3 times a day  haloperidol 1 mg oral tablet: 1 tab(s) orally 3 times a day  lidocaine 4% topical film: Apply topically to affected area once a day to back on AM off hs  melatonin 3 mg oral tablet: 2 tab(s) orally once a day (at bedtime)  metoprolol tartrate 25 mg oral tablet: 1 tab(s) orally 2 times a day  pantoprazole 40 mg oral delayed release tablet: 1 tab(s) orally once a day (before a meal)  polyethylene glycol 3350 oral powder for reconstitution: 17 gram(s) orally once a day  senna (sennosides) 8.6 mg oral tablet: 2 tab(s) orally once a day (at bedtime)  tiotropium 2.5 mcg/inh inhalation aerosol: 2 puff(s) inhaled once a day  traZODone 50 mg oral tablet: 0.5 milligram(s) orally 2 times a day at 8am and 14:00  traZODone 50 mg oral tablet: 1 tab(s) orally once a day (at bedtime)

## 2023-04-21 NOTE — PROGRESS NOTE ADULT - ASSESSMENT
88 year old female, , domiciled with daughter with PPHx of bipolar I disorder w/ recent admission at [facility] for agitation thought to be related to vascular dementia, asthma/COPD presents s/p mechanical fall with hypoxic respiratory failure c/f asthma/copd exacerbation :    Copd exacerbation:  hypoxic resp failure  Bipolar disorder;   dementia:   SP Fall       4/12;    Copd exacerbation:  hypoxic resp failure ; being treated for copd exacerbation   ; agree with short term steroids:  on BD ; WBC is normal : do ct chest non contrast    Bipolar disorder; cont home meds:  recent dc from [facility]  dementia: supportive care  SP Fall  /; per PMD:    oscar acp  ; TRIED CONTACTING HER DAUGHTER BUT WITH NO RESPONSE:       4/13:    Copd exacerbation:  hypoxic resp failure ; being treated for copd exacerbation   ; agree with short term steroids:  on BD ; WBC is normal : do ct chest non contrast  - still pending: She is on empiric antibiotics  to dc if the blood cultures remain negative   Bipolar disorder; cont home meds:  recent dc from [facility]  dementia: supportive care  SP Fall  /;    DW ACP    dw acp  ;     4/14:    Copd exacerbation:  hypoxic resp failure ; being treated for copd exacerbation   : now much better:  change to po steroids for few days:  cont B D:   WBC is normal : ct chest has emphysema and suggestie of chf with kacy [id number] ? low dose lasix:  would defer to primayr  team   Bipolar disorder; cont home meds:  recent dc from [facility]  dementia: supportive care  SP Fall  /;    DW PMD    14/15:    Copd exacerbation:  hypoxic resp failure ; being treated for copd exacerbation   : now much better:  changed to po steroids for few days:  cont B D:   WBC is normal : ct chest has emphysema and suggestive of chf with kacy effusions started on low  dose lasix:  would monitor  Bipolar disorder; cont home meds:  recent dc from [facility]  dementia: supportive care  SP Fall  /;    DW PMD    4/17;    Copd exacerbation:  hypoxic resp failure ; being treated for copd exacerbation   : now much better:  changed to po steroids for few days:  cont B D:  Add pulmicort:    WBC is normal : ct chest has emphysema and suggestive of chf with kacy effusions started on low  dose lasix:  would monitor: CT chest also showed: 1.  Congestive heart failure, Left thyroid hypodense lesion measuring 1.9 x 1.5 cm. Given size, ultrasound evaluation is recommended,   Emphysema and  Few lung nodules measuring up to 5 mm. Consider a repeat chest CTin three months time after dc:    Bipolar disorder; cont home meds:  recent dc from [facility]  dementia: supportive care  SP Fall  /;     PMD    4/18:    Copd exacerbation:  hypoxic resp failure ; being treated for copd exacerbation   : now much better:  finished steroids: cont B D:  Add pulmicort:    WBC is normal : ct chest has emphysema and suggestive of chf with kacy effusions started on low  dose lasix:  would monitor: CT chest also showed: 1.  Congestive heart failure, Left thyroid hypodense lesion measuring 1.9 x 1.5 cm. Given size, ultrasound evaluation is recommended,   Emphysema and  Few lung nodules measuring up to 5 mm. Consider a repeat chest CTin three months time after dc:    Bipolar disorder; cont home meds:  recent dc from [facility]  dementia: supportive care  SP Fall  /;    dw acp : dc planniing     PMD    4/19:  Copd exacerbation:  hypoxic resp failure ; being treated for copd exacerbation   : now much better:  finished steroids: cont B D:  Add pulmicort: ; no new change:  no obvious resp distress    WBC is normal : ct chest has emphysema and suggestive of chf with kacy effusions started on low  dose lasix:  : CT chest also showed: 1.  Congestive heart failure, Left thyroid hypodense lesion measuring 1.9 x 1.5 cm. Given size, ultrasound evaluation is recommended,   Emphysema and  Few lung nodules measuring up to 5 mm. Consider a repeat chest CTin three months time after dc:    Bipolar disorder; cont home meds:  recent dc from [facility]  dementia: supportive care  SP Fall  /;    dw acp : dc planniing     PMD    4/21:    Copd exacerbation:  hypoxic resp failure ; being treated for copd exacerbation   : now much better:  finished steroids: cont B D:  Add pulmicort: ; no new change:  no obvious resp distress   WBC is normal : ct chest has emphysema and suggestive of chf with kacy effusions started on low  dose lasix:  : CT chest also showed: 1.  Congestive heart failure, Left thyroid hypodense lesion measuring 1.9 x 1.5 cm. Given size, ultrasound evaluation is recommended,   Emphysema and  Few lung nodules measuring up to 5 mm. Consider a repeat chest CTin three months time after dc:    Bipolar disorder; cont home meds:  recent dc from [facility]  dementia: supportive care  SP Fall  /;    dw acp : dc planniing    OSCAR PMD

## 2023-04-21 NOTE — DISCHARGE NOTE PROVIDER - NSDCFUADDAPPT_GEN_ALL_CORE_FT
[facility]  [address] [place] [address], [facility], First Floor, [place] [address]  [phone]  [email or web address]: [phone]

## 2023-04-22 LAB
ANION GAP SERPL CALC-SCNC: 13 MMOL/L — SIGNIFICANT CHANGE UP (ref 7–14)
BUN SERPL-MCNC: 25 MG/DL — HIGH (ref 7–23)
CALCIUM SERPL-MCNC: 9.1 MG/DL — SIGNIFICANT CHANGE UP (ref 8.4–10.5)
CHLORIDE SERPL-SCNC: 99 MMOL/L — SIGNIFICANT CHANGE UP (ref 98–107)
CO2 SERPL-SCNC: 22 MMOL/L — SIGNIFICANT CHANGE UP (ref 22–31)
CREAT SERPL-MCNC: 0.5 MG/DL — SIGNIFICANT CHANGE UP (ref 0.5–1.3)
EGFR: 90 ML/MIN/1.73M2 — SIGNIFICANT CHANGE UP
GLUCOSE BLDC GLUCOMTR-MCNC: 101 MG/DL — HIGH (ref 70–99)
GLUCOSE BLDC GLUCOMTR-MCNC: 103 MG/DL — HIGH (ref 70–99)
GLUCOSE BLDC GLUCOMTR-MCNC: 105 MG/DL — HIGH (ref 70–99)
GLUCOSE BLDC GLUCOMTR-MCNC: 106 MG/DL — HIGH (ref 70–99)
GLUCOSE SERPL-MCNC: 100 MG/DL — HIGH (ref 70–99)
HCT VFR BLD CALC: 34 % — LOW (ref 34.5–45)
HGB BLD-MCNC: 10.8 G/DL — LOW (ref 11.5–15.5)
MAGNESIUM SERPL-MCNC: 1.8 MG/DL — SIGNIFICANT CHANGE UP (ref 1.6–2.6)
MCHC RBC-ENTMCNC: 29 PG — SIGNIFICANT CHANGE UP (ref 27–34)
MCHC RBC-ENTMCNC: 31.8 GM/DL — LOW (ref 32–36)
MCV RBC AUTO: 91.2 FL — SIGNIFICANT CHANGE UP (ref 80–100)
NRBC # BLD: 0 /100 WBCS — SIGNIFICANT CHANGE UP (ref 0–0)
NRBC # FLD: 0 K/UL — SIGNIFICANT CHANGE UP (ref 0–0)
PHOSPHATE SERPL-MCNC: 2.4 MG/DL — LOW (ref 2.5–4.5)
PLATELET # BLD AUTO: 248 K/UL — SIGNIFICANT CHANGE UP (ref 150–400)
POTASSIUM SERPL-MCNC: 4.6 MMOL/L — SIGNIFICANT CHANGE UP (ref 3.5–5.3)
POTASSIUM SERPL-SCNC: 4.6 MMOL/L — SIGNIFICANT CHANGE UP (ref 3.5–5.3)
RBC # BLD: 3.73 M/UL — LOW (ref 3.8–5.2)
RBC # FLD: 15.5 % — HIGH (ref 10.3–14.5)
SODIUM SERPL-SCNC: 134 MMOL/L — LOW (ref 135–145)
WBC # BLD: 9.52 K/UL — SIGNIFICANT CHANGE UP (ref 3.8–10.5)
WBC # FLD AUTO: 9.52 K/UL — SIGNIFICANT CHANGE UP (ref 3.8–10.5)

## 2023-04-22 RX ORDER — IPRATROPIUM/ALBUTEROL SULFATE 18-103MCG
3 AEROSOL WITH ADAPTER (GRAM) INHALATION EVERY 12 HOURS
Refills: 0 | Status: DISCONTINUED | OUTPATIENT
Start: 2023-04-22 | End: 2023-05-01

## 2023-04-22 RX ADMIN — Medication 0.5 MILLIGRAM(S): at 08:32

## 2023-04-22 RX ADMIN — GABAPENTIN 300 MILLIGRAM(S): 400 CAPSULE ORAL at 06:08

## 2023-04-22 RX ADMIN — Medication 25 MILLIGRAM(S): at 09:00

## 2023-04-22 RX ADMIN — Medication 50 MILLIGRAM(S): at 21:29

## 2023-04-22 RX ADMIN — PANTOPRAZOLE SODIUM 40 MILLIGRAM(S): 20 TABLET, DELAYED RELEASE ORAL at 06:08

## 2023-04-22 RX ADMIN — Medication 25 MILLIGRAM(S): at 16:59

## 2023-04-22 RX ADMIN — Medication 650 MILLIGRAM(S): at 23:50

## 2023-04-22 RX ADMIN — Medication 25 MILLIGRAM(S): at 06:08

## 2023-04-22 RX ADMIN — ENOXAPARIN SODIUM 40 MILLIGRAM(S): 100 INJECTION SUBCUTANEOUS at 13:24

## 2023-04-22 RX ADMIN — Medication 25 MILLIGRAM(S): at 13:24

## 2023-04-22 RX ADMIN — HALOPERIDOL DECANOATE 1 MILLIGRAM(S): 100 INJECTION INTRAMUSCULAR at 20:29

## 2023-04-22 RX ADMIN — HALOPERIDOL DECANOATE 1 MILLIGRAM(S): 100 INJECTION INTRAMUSCULAR at 13:24

## 2023-04-22 RX ADMIN — GABAPENTIN 300 MILLIGRAM(S): 400 CAPSULE ORAL at 13:24

## 2023-04-22 RX ADMIN — Medication 3 MILLILITER(S): at 08:31

## 2023-04-22 RX ADMIN — HALOPERIDOL DECANOATE 1 MILLIGRAM(S): 100 INJECTION INTRAMUSCULAR at 09:01

## 2023-04-22 NOTE — PROGRESS NOTE ADULT - ASSESSMENT
88 year old female, , domiciled with daughter with PPHx of bipolar I disorder w/ recent admission at [facility] for agitation thought to be related to vascular dementia, asthma/COPD presents s/p mechanical fall with hypoxic respiratory failure c/f asthma/copd exacerbation        Problem/Plan - 1:  ·  Problem: Paroxysmal atrial fibrillation.   ·  Plan: not a candidate for full anticoagulation secondary to very high fall risk  will continue to monitor on metoprolol  heart rate controlled.     Problem/Plan - 2:  ·  Problem: Fever.   ·  Plan: off antibiotics  CT chest negative except small effusions  now resolved.     Problem/Plan - 3:  ·  Problem: Acute respiratory failure with hypoxia.   ·  Plan: secondary to acute on chronic diastolic heart failure and COPD exacerbation now resolved   pulmonary help appreciated  wheeze resolved  completed 3 days IV furosemide now euvolemic.     Problem/Plan - 4:  ·  Problem: COPD exacerbation.   ·  Plan: management per pulmonary   Duoneb.     Problem/Plan - 5:  ·  Problem: Fall at home.   ·  Plan: fall precautions   PT evaluation noted     Problem/Plan - 6:  ·  Problem: Dementia with behavioral disturbance.   ·  Plan: continue to follow  recommendations  calm.     Problem/Plan - 7:  ·  Problem: Need for prophylactic measure.   ·  Plan: enoxaparin.      Dispo : Discharge to subacute rehab when bed available.

## 2023-04-22 NOTE — CHART NOTE - NSCHARTNOTEFT_GEN_A_CORE
Attempted to reach out to daughter [phone] to confirm discharge plan. Went to voicemail. Left call back number and asked to let us know if all arrangements have been made and if she is ready to accept the patient for discharge.    Ellen Jeronimo PA-C  Medicine  pager [phone]

## 2023-04-23 LAB
GLUCOSE BLDC GLUCOMTR-MCNC: 112 MG/DL — HIGH (ref 70–99)
GLUCOSE BLDC GLUCOMTR-MCNC: 129 MG/DL — HIGH (ref 70–99)
GLUCOSE BLDC GLUCOMTR-MCNC: 92 MG/DL — SIGNIFICANT CHANGE UP (ref 70–99)
GLUCOSE BLDC GLUCOMTR-MCNC: 95 MG/DL — SIGNIFICANT CHANGE UP (ref 70–99)

## 2023-04-23 RX ADMIN — PANTOPRAZOLE SODIUM 40 MILLIGRAM(S): 20 TABLET, DELAYED RELEASE ORAL at 05:35

## 2023-04-23 RX ADMIN — Medication 25 MILLIGRAM(S): at 08:22

## 2023-04-23 RX ADMIN — Medication 25 MILLIGRAM(S): at 13:00

## 2023-04-23 RX ADMIN — HALOPERIDOL DECANOATE 1 MILLIGRAM(S): 100 INJECTION INTRAMUSCULAR at 08:23

## 2023-04-23 RX ADMIN — GABAPENTIN 300 MILLIGRAM(S): 400 CAPSULE ORAL at 13:00

## 2023-04-23 RX ADMIN — Medication 25 MILLIGRAM(S): at 05:35

## 2023-04-23 RX ADMIN — Medication 50 MILLIGRAM(S): at 20:47

## 2023-04-23 RX ADMIN — GABAPENTIN 300 MILLIGRAM(S): 400 CAPSULE ORAL at 20:43

## 2023-04-23 RX ADMIN — ENOXAPARIN SODIUM 40 MILLIGRAM(S): 100 INJECTION SUBCUTANEOUS at 13:01

## 2023-04-23 RX ADMIN — HALOPERIDOL DECANOATE 1 MILLIGRAM(S): 100 INJECTION INTRAMUSCULAR at 20:42

## 2023-04-23 RX ADMIN — Medication 3 MILLILITER(S): at 08:46

## 2023-04-23 RX ADMIN — Medication 0.5 MILLIGRAM(S): at 08:46

## 2023-04-23 RX ADMIN — HALOPERIDOL DECANOATE 1 MILLIGRAM(S): 100 INJECTION INTRAMUSCULAR at 13:00

## 2023-04-23 RX ADMIN — Medication 25 MILLIGRAM(S): at 17:47

## 2023-04-23 RX ADMIN — Medication 6 MILLIGRAM(S): at 20:42

## 2023-04-23 RX ADMIN — Medication 650 MILLIGRAM(S): at 00:45

## 2023-04-23 RX ADMIN — GABAPENTIN 300 MILLIGRAM(S): 400 CAPSULE ORAL at 05:34

## 2023-04-23 NOTE — PROVIDER CONTACT NOTE (OTHER) - REASON
/74
HR of 115.
Rectal temp 100.9
Patient BP 95/60 not within parameter to give AM metoprolol
Pt making remarks about harming herself

## 2023-04-23 NOTE — PROVIDER CONTACT NOTE (OTHER) - ACTION/TREATMENT ORDERED:
Provider made aware. Take rectal temperature and if afebrile do an EKG.
ACP notified, pushed AM dose of metoprolol to 8 am as per acp
ACP notified
Collect blood cultures x 2
Metoprolol given, will reassess BP

## 2023-04-23 NOTE — CHART NOTE - NSCHARTNOTEFT_GEN_A_CORE
Notified by RN that patient was making suicidal statements while RN was in the room with her. Saying "I'm going to kill myself" several times. ACP arrived at bedside to assess patient. Laying in bed. When asked about her suicidal statement she said yes. When asked about how she would harm herself she said she would take a lot of drugs. When asked where she would get the drugs, she said she didn't know.     Patient does not walk, however has full mobility of her arms. Therefore there is concern for safety. Discussed with Dr. Navarro. In agreement with 1:1 and psych was called to reevaluate patient for suicidal ideation. Voicemail left on psych line [phone]. Plan discussed with RN.     Ellen Jeronimo PA-C  Medicine  pager [phone] Notified by RN that patient was making suicidal statements while RN was in the room with her. Saying "I'm going to kill myself" several times. ACP arrived at bedside to assess patient. Laying in bed. When asked about if she made a suicidal statement she said yes. When asked about how she would harm herself she said she would take a lot of drugs. When asked where she would get the drugs, she said she didn't know.     Patient does not walk, however has full mobility of her arms. Therefore there is concern for safety. Discussed with Dr. Navarro. In agreement with 1:1 and psych was called to reevaluate patient for suicidal ideation. Voicemail left on psych line [phone]. Plan discussed with RN.     Ellen Jeronimo PA-C  Medicine  pager [phone] Notified by RN that patient was making suicidal statements while RN was in the room with her. Saying "I'm going to commit suicide" several times. ACP arrived at bedside to assess patient. Laying in bed. When asked about if she made a suicidal statement she said yes. When asked about how she would harm herself she said she would take a lot of drugs. When asked where she would get the drugs, she said she didn't know.     Patient does not walk, however has full mobility of her arms. Therefore there is concern for safety. Discussed with Dr. Navarro. In agreement with 1:1 and psych was called to reevaluate patient for suicidal ideation. Voicemail left on psych line [phone]. Plan discussed with RN.     Ellen Jeronimo PA-C  Medicine  pager [phone]

## 2023-04-23 NOTE — PROVIDER CONTACT NOTE (OTHER) - SITUATION
Pt making remarks about harming herself.
Rectal temp 100.9
Patient BP 95/60 not within parameter to give AM metoprolol
Pt BP checked for BP meds, resulted /74
HR of 115. NAD noted.  Pt oral temp was 97.7F.

## 2023-04-23 NOTE — PROVIDER CONTACT NOTE (OTHER) - ASSESSMENT
Patient asymptomatic, VS as documented no s/s of distress noted
agitated currently on 1:1 for safety
NAD noted. No complaint of chest pain. Denies symptoms of being febrile.
Pt making remarks about harming herself. Pt repeatedly yelling "im going to commit suicide". When asked with ACP present if pt had a plan, she stated she was going to take a lot of drugs. Pt is AOx1, agitated/restless at times.
Pt agitated, yelling, which resulted in higher BP.

## 2023-04-23 NOTE — PROVIDER CONTACT NOTE (OTHER) - BACKGROUND
Admitted for right hip pain.
pt is 87 yo admitted for R hip pain. PMH bipolar 1, COPD, falls, GERD,
Admit Diagnosis) Pain of right hip  (PMH) CVA (Cerebral Infarction)  (PMH) Diverticulosis of intestine

## 2023-04-23 NOTE — PROGRESS NOTE ADULT - ASSESSMENT
88 year old female, , domiciled with daughter with PPHx of bipolar I disorder w/ recent admission at [facility] for agitation thought to be related to vascular dementia, asthma/COPD presents s/p mechanical fall with hypoxic respiratory failure c/f asthma/copd exacerbation        Problem/Plan - 1:  ·  Problem: Paroxysmal atrial fibrillation.   ·  Plan: not a candidate for full anticoagulation secondary to very high fall risk  will continue to monitor on metoprolol  heart rate controlled.     Problem/Plan - 2:  ·  Problem: Fever.   ·  Plan: off antibiotics  CT chest negative except small effusions  now resolved.     Problem/Plan - 3:  ·  Problem: Acute respiratory failure with hypoxia.   ·  Plan: secondary to acute on chronic diastolic heart failure and COPD exacerbation now resolved   pulmonary help appreciated  wheeze resolved  completed 3 days IV furosemide now euvolemic.     Problem/Plan - 4:  ·  Problem: COPD exacerbation.   ·  Plan: management per pulmonary   Duoneb.     Problem/Plan - 5:  ·  Problem: Fall at home.   ·  Plan: fall precautions   PT evaluation noted     Problem/Plan - 6:  ·  Problem: Dementia with behavioral disturbance.   ·  Plan: continue to follow  recommendations  calm.     Problem/Plan - 7:  ·  Problem: Need for prophylactic measure.   ·  Plan: enoxaparin.      Dispo : Discharge to subacute rehab pending psychiatry evaluation.

## 2023-04-23 NOTE — PROGRESS NOTE ADULT - ASSESSMENT
88 year old female, , domiciled with daughter with PPHx of bipolar I disorder w/ recent admission at [facility] for agitation thought to be related to vascular dementia, asthma/COPD presents s/p mechanical fall with hypoxic respiratory failure c/f asthma/copd exacerbation :    Copd exacerbation:  hypoxic resp failure  Bipolar disorder;   dementia:   SP Fall       4/12;    Copd exacerbation:  hypoxic resp failure ; being treated for copd exacerbation   ; agree with short term steroids:  on BD ; WBC is normal : do ct chest non contrast    Bipolar disorder; cont home meds:  recent dc from [facility]  dementia: supportive care  SP Fall  /; per PMD:    mirtha acp  ; TRIED CONTACTING HER DAUGHTER BUT WITH NO RESPONSE:       4/13:    Copd exacerbation:  hypoxic resp failure ; being treated for copd exacerbation   ; agree with short term steroids:  on BD ; WBC is normal : do ct chest non contrast  - still pending: She is on empiric antibiotics  to dc if the blood cultures remain negative   Bipolar disorder; cont home meds:  recent dc from [facility]  dementia: supportive care  SP Fall  /;    DW ACP    dw acp  ;     4/14:    Copd exacerbation:  hypoxic resp failure ; being treated for copd exacerbation   : now much better:  change to po steroids for few days:  cont B D:   WBC is normal : ct chest has emphysema and suggestie of chf with kacy [id number] ? low dose lasix:  would defer to primayr  team   Bipolar disorder; cont home meds:  recent dc from [facility]  dementia: supportive care  SP Fall  /;    DW PMD    14/15:    Copd exacerbation:  hypoxic resp failure ; being treated for copd exacerbation   : now much better:  changed to po steroids for few days:  cont B D:   WBC is normal : ct chest has emphysema and suggestive of chf with kacy effusions started on low  dose lasix:  would monitor  Bipolar disorder; cont home meds:  recent dc from [facility]  dementia: supportive care  SP Fall  /;    DW PMD    4/17;    Copd exacerbation:  hypoxic resp failure ; being treated for copd exacerbation   : now much better:  changed to po steroids for few days:  cont B D:  Add pulmicort:    WBC is normal : ct chest has emphysema and suggestive of chf with kacy effusions started on low  dose lasix:  would monitor: CT chest also showed: 1.  Congestive heart failure, Left thyroid hypodense lesion measuring 1.9 x 1.5 cm. Given size, ultrasound evaluation is recommended,   Emphysema and  Few lung nodules measuring up to 5 mm. Consider a repeat chest CTin three months time after dc:    Bipolar disorder; cont home meds:  recent dc from [facility]  dementia: supportive care  SP Fall  /;     PMD    4/18:    Copd exacerbation:  hypoxic resp failure ; being treated for copd exacerbation   : now much better:  finished steroids: cont B D:  Add pulmicort:    WBC is normal : ct chest has emphysema and suggestive of chf with kacy effusions started on low  dose lasix:  would monitor: CT chest also showed: 1.  Congestive heart failure, Left thyroid hypodense lesion measuring 1.9 x 1.5 cm. Given size, ultrasound evaluation is recommended,   Emphysema and  Few lung nodules measuring up to 5 mm. Consider a repeat chest CTin three months time after dc:    Bipolar disorder; cont home meds:  recent dc from [facility]  dementia: supportive care  SP Fall  /;    dw acp : dc planniing     PMD    4/19:  Copd exacerbation:  hypoxic resp failure ; being treated for copd exacerbation   : now much better:  finished steroids: cont B D:  Add pulmicort: ; no new change:  no obvious resp distress    WBC is normal : ct chest has emphysema and suggestive of chf with kacy effusions started on low  dose lasix:  : CT chest also showed: 1.  Congestive heart failure, Left thyroid hypodense lesion measuring 1.9 x 1.5 cm. Given size, ultrasound evaluation is recommended,   Emphysema and  Few lung nodules measuring up to 5 mm. Consider a repeat chest CTin three months time after dc:    Bipolar disorder; cont home meds:  recent dc from [facility]  dementia: supportive care  SP Fall  /;    dw acp : dc planniing     PMD    4/21:    Copd exacerbation:  hypoxic resp failure ; being treated for copd exacerbation   : now much better:  finished steroids: cont B D:  Add pulmicort: ; no new change:  no obvious resp distress   WBC is normal : ct chest has emphysema and suggestive of chf with kacy effusions started on low  dose lasix:  : CT chest also showed: 1.  Congestive heart failure, Left thyroid hypodense lesion measuring 1.9 x 1.5 cm. Given size, ultrasound evaluation is recommended,   Emphysema and  Few lung nodules measuring up to 5 mm. Consider a repeat chest CTin three months time after dc:    Bipolar disorder; cont home meds:  recent dc from [facility]  dementia: supportive care  SP Fall  /;    dw acp : dc planniing    DW PMD    4/23:    Copd exacerbation:  hypoxic resp failure ; being treated for copd exacerbation   : resolved:    WBC is normal : ct chest has emphysema and suggestive of chf with kacy effusions started on low  dose lasix:  : CT chest also showed: 1.  Congestive heart failure, Left thyroid hypodense lesion measuring 1.9 x 1.5 cm. Given size, ultrasound evaluation is recommended,   Emphysema and  Few lung nodules measuring up to 5 mm. Consider a repeat chest CTin three months time after dc:    Bipolar disorder; cont home meds:  recent dc from [facility]  dementia: supportive care  SP Fall  /;    dw acp : dc planniing

## 2023-04-24 LAB
GLUCOSE BLDC GLUCOMTR-MCNC: 100 MG/DL — HIGH (ref 70–99)
GLUCOSE BLDC GLUCOMTR-MCNC: 105 MG/DL — HIGH (ref 70–99)
GLUCOSE BLDC GLUCOMTR-MCNC: 81 MG/DL — SIGNIFICANT CHANGE UP (ref 70–99)
GLUCOSE BLDC GLUCOMTR-MCNC: 86 MG/DL — SIGNIFICANT CHANGE UP (ref 70–99)

## 2023-04-24 PROCEDURE — 99232 SBSQ HOSP IP/OBS MODERATE 35: CPT | Mod: FS

## 2023-04-24 RX ADMIN — Medication 3 MILLILITER(S): at 08:41

## 2023-04-24 RX ADMIN — Medication 25 MILLIGRAM(S): at 16:40

## 2023-04-24 RX ADMIN — HALOPERIDOL DECANOATE 1 MILLIGRAM(S): 100 INJECTION INTRAMUSCULAR at 21:01

## 2023-04-24 RX ADMIN — GABAPENTIN 300 MILLIGRAM(S): 400 CAPSULE ORAL at 13:46

## 2023-04-24 RX ADMIN — HALOPERIDOL DECANOATE 1 MILLIGRAM(S): 100 INJECTION INTRAMUSCULAR at 13:46

## 2023-04-24 RX ADMIN — HALOPERIDOL DECANOATE 1 MILLIGRAM(S): 100 INJECTION INTRAMUSCULAR at 08:49

## 2023-04-24 RX ADMIN — Medication 25 MILLIGRAM(S): at 13:46

## 2023-04-24 RX ADMIN — Medication 6 MILLIGRAM(S): at 21:01

## 2023-04-24 RX ADMIN — GABAPENTIN 300 MILLIGRAM(S): 400 CAPSULE ORAL at 21:02

## 2023-04-24 RX ADMIN — GABAPENTIN 300 MILLIGRAM(S): 400 CAPSULE ORAL at 05:00

## 2023-04-24 RX ADMIN — ENOXAPARIN SODIUM 40 MILLIGRAM(S): 100 INJECTION SUBCUTANEOUS at 13:46

## 2023-04-24 RX ADMIN — Medication 50 MILLIGRAM(S): at 21:08

## 2023-04-24 RX ADMIN — Medication 25 MILLIGRAM(S): at 05:00

## 2023-04-24 RX ADMIN — PANTOPRAZOLE SODIUM 40 MILLIGRAM(S): 20 TABLET, DELAYED RELEASE ORAL at 05:00

## 2023-04-24 RX ADMIN — Medication 25 MILLIGRAM(S): at 08:48

## 2023-04-24 NOTE — BH CONSULTATION LIAISON PROGRESS NOTE - NSBHASSESSMENTFT_PSY_ALL_CORE
88 year old female, , domiciled with daughter with PPH of vascular dementia, distant history of bipolar I disorder w/ recent admission at [facility] for agitation, asthma/COPD presents s/p mechanical fall with hypoxic respiratory failure c/f asthma/copd exacerbation. Psych c/s for agitation.    Patient with continued agitation in context of progressive dementia, discussed prior mgmt with [facility] psychiatrist from which patient was discharged recently. No SI/HI noted, patient restless and easily upset/screaming based on innocuous slights.    4/13: calm, cooperative, no screaming, denies ah/vh, denies si/sa  4/14: still getting PRN in AM but afterward appears to be failry calm, less screaming, would change meds as below  4/17: intermittent PRNs but less than before, increase trazodone as below   4/18: less agitated, would increase trazodone as below, transfer to 7S when able     Recs:  - Transfer to 7S when able or to ES room  - F/u EKG for QTc, hold antipsychotics if QTc >500  - C/w home meds, Gabapentin 300mg TID  - Change haldol to 1mg q8AM + 1mg q2pm + 1mg q8pm   - Increase Trazodone to 25mg @ 8am, 25mg at 2pm, Trazodone 50mg @ 8pm instead of hs  - PRN for agitation:  Haldol 1mg q6h PRN PO/IM/IV agitation  - c/w  Melatonin 6mg qHS  - Early SW/CM consult, patient with complex dispo needs, unlikely to benefit from return to [facility] but recently had issue with HHA reinstatement. SW may benefit from discussing case with [facility] SW team on [address]   - Psych will follow  - Patient more calm, unlikely to benefit from [facility] admission, if able to go to [facility] is cleared to do so 
88 year old female, , domiciled with daughter with PPH of vascular dementia, distant history of bipolar I disorder w/ recent admission at [facility] for agitation, asthma/COPD presents s/p mechanical fall with hypoxic respiratory failure c/f asthma/copd exacerbation. Psych c/s for agitation.    Patient with continued agitation in context of progressive dementia, discussed prior mgmt with [facility] psychiatrist from which patient was discharged recently. No SI/HI noted, patient restless and easily upset/screaming based on innocuous slights.    4/13: calm, cooperative, no screaming, denies ah/vh, denies si/sa  4/14: still getting PRN in AM but afterward appears to be failry calm, less screaming, would change meds as below  4/17: intermittent PRNs but less than before, increase trazodone as below   4/18: less agitated, would increase trazodone as below, transfer to 7S when able   4/19: verbal redirection able to calm patient, enjoys company in room  4/24: consulted for f/u s/p suicidal statement, pt seen, c/o back pain d/t bed, endorses making suicidal statement in response to frustration, denies si, denies ah/vh, discussed with team additional recommendations below:    Recs:  - Patient would benefit from busy therapeutic vest, doll, book for stimuli and comfort, out of bed to chair if ok with pt  - Transfer to 7S when able or to ES room  - F/u EKG for QTc, hold antipsychotics if QTc >500  - C/w home meds, Gabapentin 300mg TID  - haldol  1mg q8AM + 1mg q2pm + 1mg q8pm   - Increased Trazodone to 25mg @ 8am, 25mg at 2pm, Trazodone 50mg @ 8pm   - PRN for agitation:  Haldol 1mg q6h PRN PO/IM/IV agitation  - c/w  Melatonin 6mg qHS  - Early SW/CM consult, patient with complex dispo needs, unlikely to benefit from return to [facility] but recently had issue with HHA reinstatement. SW may benefit from discussing case with [facility] SW team on [address]   - Patient more calm, unlikely to benefit from [facility] admission, if able to go to [facility] is cleared to do so 
88 year old female, , domiciled with daughter with PPH of vascular dementia, distant history of bipolar I disorder w/ recent admission at [facility] for agitation, asthma/COPD presents s/p mechanical fall with hypoxic respiratory failure c/f asthma/copd exacerbation. Psych c/s for agitation.    Patient with continued agitation in context of progressive dementia, discussed prior mgmt with [facility] psychiatrist from which patient was discharged recently. No SI/HI noted, patient restless and easily upset/screaming based on innocuous slights.    4/13: calm, cooperative, no screaming, denies ah/vh, denies si/sa    Recs:  - c/w 1:1 or send to ES room on 7S  - F/u EKG for QTc, hold antipsychotics if QTc >500  - C/w home meds, Haldol 1mg TID, Gabapentin 300mg TID  - c/w Trazodone 12.5mg @ 8am & 2pm, Trazodone 50mg @ 8pm instead of hs  - PRN for agitation:  Haldol 1mg q6h PRN PO/IM/IV agitation  - c/w  Melatonin 6mg qHS  - Early SW/CM consult, patient with complex dispo needs, unlikely to benefit from return to [facility] but recently had issue with HHA reinstatement. SW may benefit from discussing case with [facility] SW team on [address]   - Psych will follow, needs further psych assessment
88 year old female, , domiciled with daughter with PPH of vascular dementia, distant history of bipolar I disorder w/ recent admission at [facility] for agitation, asthma/COPD presents s/p mechanical fall with hypoxic respiratory failure c/f asthma/copd exacerbation. Psych c/s for agitation.    Patient with continued agitation in context of progressive dementia, discussed prior mgmt with [facility] psychiatrist from which patient was discharged recently. No SI/HI noted, patient restless and easily upset/screaming based on innocuous slights.    4/13: calm, cooperative, no screaming, denies ah/vh, denies si/sa  4/14: still getting PRN in AM but afterward appears to be failry calm, less screaming, would change meds as below  4/17: intermittent PRNs but less than before, increase trazodone as below     Recs:  - c/w 1:1 or send to ES room on 7S  - F/u EKG for QTc, hold antipsychotics if QTc >500  - C/w home meds, Gabapentin 300mg TID  - Change haldol to 1mg q8AM + 1mg q2pm + 1mg q8pm   - Increase Trazodone to 25mg @ 8am, 25mg at 2pm, Trazodone 50mg @ 8pm instead of hs  - PRN for agitation:  Haldol 1mg q6h PRN PO/IM/IV agitation  - c/w  Melatonin 6mg qHS  - Early SW/CM consult, patient with complex dispo needs, unlikely to benefit from return to [facility] but recently had issue with HHA reinstatement. SW may benefit from discussing case with [facility] SW team on [address]   - Psych will follow, needs further psych assessment
88 year old female, , domiciled with daughter with PPH of vascular dementia, distant history of bipolar I disorder w/ recent admission at [facility] for agitation, asthma/COPD presents s/p mechanical fall with hypoxic respiratory failure c/f asthma/copd exacerbation. Psych c/s for agitation.    Patient with continued agitation in context of progressive dementia, discussed prior mgmt with [facility] psychiatrist from which patient was discharged recently. No SI/HI noted, patient restless and easily upset/screaming based on innocuous slights.    4/13: calm, cooperative, no screaming, denies ah/vh, denies si/sa  4/14: still getting PRN in AM but afterward appears to be failry calm, less screaming, would change meds as below  4/17: intermittent PRNs but less than before, increase trazodone as below   4/18: less agitated, would increase trazodone as below, transfer to 7S when able   4/19: verbal redirection able to calm patient, enjoys company in room    Recs:  - Transfer to 7S when able or to  room  - F/u EKG for QTc, hold antipsychotics if QTc >500  - C/w home meds, Gabapentin 300mg TID  - haldol  1mg q8AM + 1mg q2pm + 1mg q8pm   - Increased Trazodone to 25mg @ 8am, 25mg at 2pm, Trazodone 50mg @ 8pm   - PRN for agitation:  Haldol 1mg q6h PRN PO/IM/IV agitation  - c/w  Melatonin 6mg qHS  - Early SW/CM consult, patient with complex dispo needs, unlikely to benefit from return to [facility] but recently had issue with HHA reinstatement. SW may benefit from discussing case with [facility] SW team on [address]   - Psych will follow  - Patient more calm, unlikely to benefit from [facility] admission, if able to go to [facility] is cleared to do so 
88 year old female, , domiciled with daughter with PPH of vascular dementia, distant history of bipolar I disorder w/ recent admission at [facility] for agitation, asthma/COPD presents s/p mechanical fall with hypoxic respiratory failure c/f asthma/copd exacerbation. Psych c/s for agitation.    Patient with continued agitation in context of progressive dementia, discussed prior mgmt with [facility] psychiatrist from which patient was discharged recently. No SI/HI noted, patient restless and easily upset/screaming based on innocuous slights.    4/13: calm, cooperative, no screaming, denies ah/vh, denies si/sa  4/14: still getting PRN in AM but afterward appears to be failry calm, less screaming, would change meds as below    Recs:  - c/w 1:1 or send to ES room on 7S  - F/u EKG for QTc, hold antipsychotics if QTc >500  - C/w home meds, Gabapentin 300mg TID  - Change haldol to 1mg q8AM + 1mg q2pm + 1mg q8pm   - Increase Trazodone to 25mg @ 8am, 12.5mg at 2pm, Trazodone 50mg @ 8pm instead of hs  - PRN for agitation:  Haldol 1mg q6h PRN PO/IM/IV agitation  - c/w  Melatonin 6mg qHS  - Early SW/CM consult, patient with complex dispo needs, unlikely to benefit from return to [facility] but recently had issue with HHA reinstatement. SW may benefit from discussing case with [facility] SW team on [address]   - Psych will follow, needs further psych assessment

## 2023-04-24 NOTE — BH CONSULTATION LIAISON PROGRESS NOTE - NSBHFUPINTERVALHXFT_PSY_A_CORE
patient irritable and yelling "help, i'm trapped!" but then when clarified states that she is stuck in an odd possition in bed, once patient is moved to more comfortable position irritability improves
Chart reviewed, no prn's given, medication compliant. [facility] psychiatry consulted for follow up s/p patient making suicidal statement. Patient seen by writer and [facility] attending, Dr. Ceja, patient lying quietly on bed, a&ox3, c/o back pain in relation to bed, endorses making suicidal statement in context of frustration in relational to the bed causing her back pain, patient denies suicidal ideation, denies auditory/visual hallucinations.     Upon physical exam, no rigidity, stiffness and or cogwheeling noted.     Discussed with nursing staff as well as ACP, Renny, patient would benefit from "busy" therapeutic vest, doll and a book for stimuli and comfort, also discussed possibility of bed side chair for patient in response to bed causing her pain.   
Chart reviewed. Patient received Haldol 1mg this morning. Patient seen initially this morning, however, sleeping, seen again in the afternoon, easily arousable by verbal stimuli, a&o with confusion, pleasant, calm, no screaming noted, states she slept and feels better, denies auditory/visual hallucinations, denies suicidal ideation. 
patient again feels miserable, denies si/hi, asking for help, able to be redirected, AOx2 at most 
Patient feels "miserable" because she is bored here, denies si/hi, screaming less, no si/hi, future oriented, wishes to read [employer], [email or web address] and other books instead of watching TV
Patient was seen and assessed at bedside. patient yelling, asking for water, uncomfortable in bed. confused.  Repositioned, provided nourishment - patient stopped yelling and was calm. Loose no rigidity  Taking standing haldol.

## 2023-04-24 NOTE — BH CONSULTATION LIAISON PROGRESS NOTE - NSICDXBHPRIMARYDX_PSY_ALL_CORE
Vascular dementia with psychotic disturbance   F01.52  

## 2023-04-24 NOTE — BH CONSULTATION LIAISON PROGRESS NOTE - NSBHFUPINTERVALCCFT_PSY_A_CORE
Patient states, "I slept."
I'm miserable"
Patient states, "The bed hurts my back."
"im thirsty!"
help i'm trapped!"
I'm miserable"

## 2023-04-24 NOTE — BH CONSULTATION LIAISON PROGRESS NOTE - NSICDXBHSECONDARYDX_PSY_ALL_CORE
H/O bipolar disorder   Z86.59  

## 2023-04-24 NOTE — BH CONSULTATION LIAISON PROGRESS NOTE - NSBHINDICATION_PSY_ALL_CORE
or send to ES room on 7s

## 2023-04-24 NOTE — BH CONSULTATION LIAISON PROGRESS NOTE - NSBHATTESTTYPEVISIT_PSY_A_CORE
Attending Only
ALLAN without on-site Attending supervision
Attending Only
ALLAN without on-site Attending supervision
Attending Only
ALLAN without on-site Attending supervision

## 2023-04-24 NOTE — BH CONSULTATION LIAISON PROGRESS NOTE - MSE UNSTRUCTURED FT
Mental Status Exam:  Ivy: well groomed, fair hygiene     Behavior: psychomotor agitation  Motor: no tremors, EPS, or rigidity  Gait: did not assess, pt in bed  Speech: normal rate, soft tone of voice  Mood: calm  Thought process: linear with some confusion    Thought Content: unremarkable    Perception: denies AH/VH  SI: denies  HI: denies  Insight: poor  Judgment: poor    Cognitive Exam:  Orientation: AO to self, name of hospital, confused to month/year
Mental Status Exam:  Ivy: well groomed, fair hygiene     Behavior: psychomotor agitation  Motor: no tremors, EPS, or rigidity  Gait: did not assess, pt in bed  Speech: yelling at times, verbal redirection  Mood: calm when staff in room  Thought process:  confusion    Thought Content: unremarkable    Perception: denies AH/VH  SI: denies  HI: denies  Insight: poor  Judgment: poor    Cognitive Exam:  Orientation: AO to self, name of hospital, confused to month/year
Mental Status Exam:  Ivy: well groomed, fair hygiene     Behavior: psychomotor agitation  Motor: no tremors, EPS, or rigidity  Gait: did not assess, pt in bed  Speech: normal rate, soft tone of voice  Mood: calm  Thought process: linear with some confusion    Thought Content: unremarkable    Perception: denies AH/VH  SI: denies  HI: denies  Insight: poor  Judgment: poor    Cognitive Exam:  Orientation: AO to self, name of hospital, confused to month/year
Mental Status Exam:  Ivy: well groomed, fair hygiene     Behavior: psychomotor agitation  Motor: no tremors, EPS, or rigidity  Gait: did not assess, pt in bed  Speech: normal rate, soft tone of voice  Mood: calm  Thought process: linear with some confusion    Thought Content: unremarkable    Perception: denies AH/VH  SI: denies  HI: denies  Insight: poor  Judgment: poor    Cognitive Exam:  Orientation: AO to self, name of hospital, confused to month/year
Mental Status Exam:  Ivy: well groomed, fair hygiene     Behavior: psychomotor agitation  Motor: no tremors, EPS, or rigidity  Gait: did not assess, pt in bed  Speech: clear, engaged in interview  Mood: calm when staff in room  Thought process:  a&o x3    Thought Content: c/o pain caused by bed    Perception: denies AH/VH  SI: denies  HI: denies  Insight: poor  Judgment: poor    Cognitive Exam:  Orientation: AO to self, name of hospital, confused to month/year
Mental Status Exam:  Ivy: well groomed, fair hygiene     Behavior: psychomotor agitation  Motor: no tremors, EPS, or rigidity  Gait: did not assess, pt in bed  Speech: normal rate, soft tone of voice  Mood: calm  Thought process: linear with some confusion    Thought Content: unremarkable    Perception: denies AH/VH  SI: denies  HI: denies  Insight: poor  Judgment: poor    Cognitive Exam:  Orientation: AO to self, name of hospital, confused to month/year

## 2023-04-24 NOTE — BH CONSULTATION LIAISON PROGRESS NOTE - CURRENT MEDICATION
MEDICATIONS  (STANDING):  albuterol/ipratropium for Nebulization 3 milliLiter(s) Nebulizer every 6 hours  dextrose 5%. 1000 milliLiter(s) (50 mL/Hr) IV Continuous <Continuous>  dextrose 5%. 1000 milliLiter(s) (100 mL/Hr) IV Continuous <Continuous>  dextrose 50% Injectable 25 Gram(s) IV Push once  dextrose 50% Injectable 12.5 Gram(s) IV Push once  dextrose 50% Injectable 25 Gram(s) IV Push once  enoxaparin Injectable 40 milliGRAM(s) SubCutaneous every 24 hours  gabapentin 300 milliGRAM(s) Oral three times a day  glucagon  Injectable 1 milliGRAM(s) IntraMuscular once  haloperidol     Tablet 1 milliGRAM(s) Oral <User Schedule>  insulin lispro (ADMELOG) corrective regimen sliding scale   SubCutaneous three times a day before meals  insulin lispro (ADMELOG) corrective regimen sliding scale   SubCutaneous at bedtime  melatonin 6 milliGRAM(s) Oral at bedtime  metoprolol tartrate 50 milliGRAM(s) Oral two times a day  pantoprazole    Tablet 40 milliGRAM(s) Oral before breakfast  predniSONE   Tablet 20 milliGRAM(s) Oral every 12 hours  traZODone 12.5 milliGRAM(s) Oral <User Schedule>  traZODone 25 milliGRAM(s) Oral <User Schedule>  traZODone 50 milliGRAM(s) Oral <User Schedule>    MEDICATIONS  (PRN):  acetaminophen     Tablet .. 650 milliGRAM(s) Oral every 6 hours PRN Temp greater or equal to 38C (100.4F), Mild Pain (1 - 3)  aluminum hydroxide/magnesium hydroxide/simethicone Suspension 30 milliLiter(s) Oral every 4 hours PRN Dyspepsia  dextrose Oral Gel 15 Gram(s) Oral once PRN Blood Glucose LESS THAN 70 milliGRAM(s)/deciliter  haloperidol    Injectable 1 milliGRAM(s) IntraMuscular every 6 hours PRN Agitation  ondansetron Injectable 4 milliGRAM(s) IV Push every 8 hours PRN Nausea and/or Vomiting  
MEDICATIONS  (STANDING):  albuterol/ipratropium for Nebulization 3 milliLiter(s) Nebulizer every 6 hours  buDESOnide    Inhalation Suspension 0.5 milliGRAM(s) Inhalation every 12 hours  dextrose 5%. 1000 milliLiter(s) (100 mL/Hr) IV Continuous <Continuous>  dextrose 5%. 1000 milliLiter(s) (50 mL/Hr) IV Continuous <Continuous>  dextrose 50% Injectable 25 Gram(s) IV Push once  dextrose 50% Injectable 12.5 Gram(s) IV Push once  dextrose 50% Injectable 25 Gram(s) IV Push once  enoxaparin Injectable 40 milliGRAM(s) SubCutaneous every 24 hours  gabapentin 300 milliGRAM(s) Oral three times a day  glucagon  Injectable 1 milliGRAM(s) IntraMuscular once  haloperidol     Tablet 1 milliGRAM(s) Oral <User Schedule>  insulin lispro (ADMELOG) corrective regimen sliding scale   SubCutaneous three times a day before meals  insulin lispro (ADMELOG) corrective regimen sliding scale   SubCutaneous at bedtime  melatonin 6 milliGRAM(s) Oral at bedtime  metoprolol tartrate 50 milliGRAM(s) Oral two times a day  pantoprazole    Tablet 40 milliGRAM(s) Oral before breakfast  traZODone 25 milliGRAM(s) Oral <User Schedule>  traZODone 25 milliGRAM(s) Oral <User Schedule>  traZODone 50 milliGRAM(s) Oral <User Schedule>    MEDICATIONS  (PRN):  acetaminophen     Tablet .. 650 milliGRAM(s) Oral every 6 hours PRN Temp greater or equal to 38C (100.4F), Mild Pain (1 - 3)  aluminum hydroxide/magnesium hydroxide/simethicone Suspension 30 milliLiter(s) Oral every 4 hours PRN Dyspepsia  dextrose Oral Gel 15 Gram(s) Oral once PRN Blood Glucose LESS THAN 70 milliGRAM(s)/deciliter  haloperidol    Injectable 1 milliGRAM(s) IntraMuscular every 6 hours PRN Agitation  ondansetron Injectable 4 milliGRAM(s) IV Push every 8 hours PRN Nausea and/or Vomiting  
MEDICATIONS  (STANDING):  albuterol/ipratropium for Nebulization 3 milliLiter(s) Nebulizer every 6 hours  dextrose 5%. 1000 milliLiter(s) (50 mL/Hr) IV Continuous <Continuous>  dextrose 5%. 1000 milliLiter(s) (100 mL/Hr) IV Continuous <Continuous>  dextrose 50% Injectable 25 Gram(s) IV Push once  dextrose 50% Injectable 12.5 Gram(s) IV Push once  dextrose 50% Injectable 25 Gram(s) IV Push once  enoxaparin Injectable 40 milliGRAM(s) SubCutaneous every 24 hours  gabapentin 300 milliGRAM(s) Oral three times a day  glucagon  Injectable 1 milliGRAM(s) IntraMuscular once  haloperidol     Tablet 1 milliGRAM(s) Oral three times a day  insulin lispro (ADMELOG) corrective regimen sliding scale   SubCutaneous three times a day before meals  insulin lispro (ADMELOG) corrective regimen sliding scale   SubCutaneous at bedtime  melatonin 6 milliGRAM(s) Oral at bedtime  methylPREDNISolone sodium succinate Injectable 20 milliGRAM(s) IV Push two times a day  pantoprazole    Tablet 40 milliGRAM(s) Oral before breakfast  piperacillin/tazobactam IVPB.. 3.375 Gram(s) IV Intermittent every 8 hours  traZODone 50 milliGRAM(s) Oral <User Schedule>  traZODone 12.5 milliGRAM(s) Oral <User Schedule>    MEDICATIONS  (PRN):  acetaminophen     Tablet .. 650 milliGRAM(s) Oral every 6 hours PRN Temp greater or equal to 38C (100.4F), Mild Pain (1 - 3)  aluminum hydroxide/magnesium hydroxide/simethicone Suspension 30 milliLiter(s) Oral every 4 hours PRN Dyspepsia  dextrose Oral Gel 15 Gram(s) Oral once PRN Blood Glucose LESS THAN 70 milliGRAM(s)/deciliter  haloperidol    Injectable 1 milliGRAM(s) IntraMuscular every 6 hours PRN Agitation  ondansetron Injectable 4 milliGRAM(s) IV Push every 8 hours PRN Nausea and/or Vomiting  
MEDICATIONS  (STANDING):  albuterol/ipratropium for Nebulization 3 milliLiter(s) Nebulizer every 12 hours  buDESOnide    Inhalation Suspension 0.5 milliGRAM(s) Inhalation every 12 hours  dextrose 5%. 1000 milliLiter(s) (50 mL/Hr) IV Continuous <Continuous>  dextrose 5%. 1000 milliLiter(s) (100 mL/Hr) IV Continuous <Continuous>  dextrose 50% Injectable 25 Gram(s) IV Push once  dextrose 50% Injectable 12.5 Gram(s) IV Push once  dextrose 50% Injectable 25 Gram(s) IV Push once  enoxaparin Injectable 40 milliGRAM(s) SubCutaneous every 24 hours  gabapentin 300 milliGRAM(s) Oral three times a day  glucagon  Injectable 1 milliGRAM(s) IntraMuscular once  haloperidol     Tablet 1 milliGRAM(s) Oral <User Schedule>  insulin lispro (ADMELOG) corrective regimen sliding scale   SubCutaneous at bedtime  insulin lispro (ADMELOG) corrective regimen sliding scale   SubCutaneous three times a day before meals  melatonin 6 milliGRAM(s) Oral at bedtime  metoprolol tartrate 25 milliGRAM(s) Oral two times a day  pantoprazole    Tablet 40 milliGRAM(s) Oral before breakfast  traZODone 50 milliGRAM(s) Oral <User Schedule>  traZODone 25 milliGRAM(s) Oral <User Schedule>  traZODone 25 milliGRAM(s) Oral <User Schedule>    MEDICATIONS  (PRN):  acetaminophen     Tablet .. 650 milliGRAM(s) Oral every 6 hours PRN Temp greater or equal to 38C (100.4F), Mild Pain (1 - 3)  aluminum hydroxide/magnesium hydroxide/simethicone Suspension 30 milliLiter(s) Oral every 4 hours PRN Dyspepsia  dextrose Oral Gel 15 Gram(s) Oral once PRN Blood Glucose LESS THAN 70 milliGRAM(s)/deciliter  haloperidol    Injectable 1 milliGRAM(s) IntraMuscular every 6 hours PRN Agitation  ondansetron Injectable 4 milliGRAM(s) IV Push every 8 hours PRN Nausea and/or Vomiting  
MEDICATIONS  (STANDING):  albuterol/ipratropium for Nebulization 3 milliLiter(s) Nebulizer every 6 hours  dextrose 5%. 1000 milliLiter(s) (50 mL/Hr) IV Continuous <Continuous>  dextrose 5%. 1000 milliLiter(s) (100 mL/Hr) IV Continuous <Continuous>  dextrose 50% Injectable 25 Gram(s) IV Push once  dextrose 50% Injectable 12.5 Gram(s) IV Push once  dextrose 50% Injectable 25 Gram(s) IV Push once  enoxaparin Injectable 40 milliGRAM(s) SubCutaneous every 24 hours  gabapentin 300 milliGRAM(s) Oral three times a day  glucagon  Injectable 1 milliGRAM(s) IntraMuscular once  haloperidol     Tablet 1 milliGRAM(s) Oral three times a day  insulin lispro (ADMELOG) corrective regimen sliding scale   SubCutaneous three times a day before meals  insulin lispro (ADMELOG) corrective regimen sliding scale   SubCutaneous at bedtime  melatonin 6 milliGRAM(s) Oral at bedtime  methylPREDNISolone sodium succinate Injectable 20 milliGRAM(s) IV Push two times a day  pantoprazole    Tablet 40 milliGRAM(s) Oral before breakfast  traZODone 50 milliGRAM(s) Oral <User Schedule>  traZODone 12.5 milliGRAM(s) Oral <User Schedule>    MEDICATIONS  (PRN):  acetaminophen     Tablet .. 650 milliGRAM(s) Oral every 6 hours PRN Temp greater or equal to 38C (100.4F), Mild Pain (1 - 3)  aluminum hydroxide/magnesium hydroxide/simethicone Suspension 30 milliLiter(s) Oral every 4 hours PRN Dyspepsia  dextrose Oral Gel 15 Gram(s) Oral once PRN Blood Glucose LESS THAN 70 milliGRAM(s)/deciliter  haloperidol    Injectable 1 milliGRAM(s) IntraMuscular every 6 hours PRN Agitation  ondansetron Injectable 4 milliGRAM(s) IV Push every 8 hours PRN Nausea and/or Vomiting  
MEDICATIONS  (STANDING):  albuterol/ipratropium for Nebulization 3 milliLiter(s) Nebulizer every 6 hours  buDESOnide    Inhalation Suspension 0.5 milliGRAM(s) Inhalation every 12 hours  dextrose 5%. 1000 milliLiter(s) (50 mL/Hr) IV Continuous <Continuous>  dextrose 5%. 1000 milliLiter(s) (100 mL/Hr) IV Continuous <Continuous>  dextrose 50% Injectable 25 Gram(s) IV Push once  dextrose 50% Injectable 25 Gram(s) IV Push once  dextrose 50% Injectable 12.5 Gram(s) IV Push once  enoxaparin Injectable 40 milliGRAM(s) SubCutaneous every 24 hours  gabapentin 300 milliGRAM(s) Oral three times a day  glucagon  Injectable 1 milliGRAM(s) IntraMuscular once  haloperidol     Tablet 1 milliGRAM(s) Oral <User Schedule>  insulin lispro (ADMELOG) corrective regimen sliding scale   SubCutaneous at bedtime  insulin lispro (ADMELOG) corrective regimen sliding scale   SubCutaneous three times a day before meals  melatonin 6 milliGRAM(s) Oral at bedtime  metoprolol tartrate 50 milliGRAM(s) Oral two times a day  pantoprazole    Tablet 40 milliGRAM(s) Oral before breakfast  traZODone 50 milliGRAM(s) Oral <User Schedule>  traZODone 12.5 milliGRAM(s) Oral <User Schedule>  traZODone 25 milliGRAM(s) Oral <User Schedule>    MEDICATIONS  (PRN):  acetaminophen     Tablet .. 650 milliGRAM(s) Oral every 6 hours PRN Temp greater or equal to 38C (100.4F), Mild Pain (1 - 3)  aluminum hydroxide/magnesium hydroxide/simethicone Suspension 30 milliLiter(s) Oral every 4 hours PRN Dyspepsia  dextrose Oral Gel 15 Gram(s) Oral once PRN Blood Glucose LESS THAN 70 milliGRAM(s)/deciliter  haloperidol    Injectable 1 milliGRAM(s) IntraMuscular every 6 hours PRN Agitation  ondansetron Injectable 4 milliGRAM(s) IV Push every 8 hours PRN Nausea and/or Vomiting

## 2023-04-24 NOTE — BH CONSULTATION LIAISON PROGRESS NOTE - NSBHATTESTBILLING_PSY_A_CORE
89824-Nlomatfaok OBS or IP - moderate complexity OR 35-49 mins
Billing in another system
64472-Wyqscnluby OBS or IP - moderate complexity OR 35-49 mins
34156-Qiyxetbsag OBS or IP - moderate complexity OR 35-49 mins
Billing in another system
Billing in another system

## 2023-04-24 NOTE — PROGRESS NOTE ADULT - ASSESSMENT
88 year old female, , domiciled with daughter with PPHx of bipolar I disorder w/ recent admission at [facility] for agitation thought to be related to vascular dementia, asthma/COPD presents s/p mechanical fall with hypoxic respiratory failure c/f asthma/copd exacerbation        Problem/Plan - 1:  ·  Problem: Paroxysmal atrial fibrillation.   ·  Plan: not a candidate for full anticoagulation secondary to very high fall risk  will continue to monitor on metoprolol  heart rate controlled.     Problem/Plan - 2:  ·  Problem: Fever.   ·  Plan: off antibiotics  CT chest negative except small effusions  now resolved.     Problem/Plan - 3:  ·  Problem: Acute respiratory failure with hypoxia.   ·  Plan: secondary to acute on chronic diastolic heart failure and COPD exacerbation now resolved   pulmonary help appreciated  wheeze resolved  completed 3 days IV furosemide now euvolemic.     Problem/Plan - 4:  ·  Problem: COPD exacerbation.   ·  Plan: management per pulmonary   Duoneb.     Problem/Plan - 5:  ·  Problem: Fall at home.   ·  Plan: fall precautions   PT evaluation noted     Problem/Plan - 6:  ·  Problem: Dementia with behavioral disturbance.   ·  Plan: continue to follow  recommendations  calm.  1:1 observation   Problem/Plan - 7:  ·  Problem: Need for prophylactic measure.   ·  Plan: enoxaparin.      Dispo : Discharge to subacute rehab pending above.

## 2023-04-24 NOTE — BH CONSULTATION LIAISON PROGRESS NOTE - NSBHPTASSESSDT_PSY_A_CORE
13-Apr-2023 15:08
24-Apr-2023 15:10
17-Apr-2023 15:29
18-Apr-2023 13:53
14-Apr-2023 14:24
19-Apr-2023 14:17

## 2023-04-24 NOTE — BH CONSULTATION LIAISON PROGRESS NOTE - NSBHCONSFOLLOWNEEDS_PSY_ALL_CORE
Needs further psych safety assessment prior to discharge
Needs further psych safety assessment prior to discharge
No psychiatric contraindications to discharge

## 2023-04-24 NOTE — BH CONSULTATION LIAISON PROGRESS NOTE - NSBHCHARTREVIEWVS_PSY_A_CORE FT
Vital Signs Last 24 Hrs  T(C): 36.3 (24 Apr 2023 11:57), Max: 36.8 (23 Apr 2023 21:51)  T(F): 97.3 (24 Apr 2023 11:57), Max: 98.2 (23 Apr 2023 21:51)  HR: 70 (24 Apr 2023 11:57) (70 - 85)  BP: 132/60 (24 Apr 2023 11:57) (108/62 - 132/60)  BP(mean): --  RR: 17 (24 Apr 2023 11:57) (16 - 17)  SpO2: 100% (24 Apr 2023 11:57) (97% - 100%)    Parameters below as of 24 Apr 2023 11:57  Patient On (Oxygen Delivery Method): room air    
Vital Signs Last 24 Hrs  T(C): 36.6 (14 Apr 2023 13:38), Max: 36.6 (14 Apr 2023 13:38)  T(F): 97.9 (14 Apr 2023 13:38), Max: 97.9 (14 Apr 2023 13:38)  HR: 83 (14 Apr 2023 13:38) (62 - 85)  BP: 151/77 (14 Apr 2023 13:38) (122/56 - 151/77)  BP(mean): --  RR: 18 (14 Apr 2023 13:38) (17 - 18)  SpO2: 99% (14 Apr 2023 13:38) (96% - 100%)    Parameters below as of 14 Apr 2023 13:38  Patient On (Oxygen Delivery Method): nasal cannula  O2 Flow (L/min): 2  
Vital Signs Last 24 Hrs  T(C): 37.1 (18 Apr 2023 12:52), Max: 37.1 (18 Apr 2023 12:52)  T(F): 98.8 (18 Apr 2023 12:52), Max: 98.8 (18 Apr 2023 12:52)  HR: 65 (18 Apr 2023 12:52) (60 - 79)  BP: 132/69 (18 Apr 2023 12:52) (121/63 - 145/72)  BP(mean): --  RR: 18 (18 Apr 2023 12:52) (18 - 18)  SpO2: 95% (18 Apr 2023 12:52) (94% - 100%)    Parameters below as of 18 Apr 2023 05:00  Patient On (Oxygen Delivery Method): room air    
Vital Signs Last 24 Hrs  T(C): 36.6 (17 Apr 2023 05:20), Max: 37 (16 Apr 2023 17:10)  T(F): 97.8 (17 Apr 2023 05:20), Max: 98.6 (16 Apr 2023 17:10)  HR: 65 (17 Apr 2023 10:29) (65 - 81)  BP: 131/67 (17 Apr 2023 05:20) (131/67 - 167/74)  BP(mean): --  RR: 18 (17 Apr 2023 14:07) (18 - 18)  SpO2: 100% (17 Apr 2023 14:07) (95% - 100%)    Parameters below as of 17 Apr 2023 14:07  Patient On (Oxygen Delivery Method): room air    
Vital Signs Last 24 Hrs  T(C): 36.6 (19 Apr 2023 11:31), Max: 36.7 (19 Apr 2023 05:15)  T(F): 97.9 (19 Apr 2023 11:31), Max: 98 (19 Apr 2023 05:15)  HR: 65 (19 Apr 2023 11:31) (65 - 93)  BP: 101/80 (19 Apr 2023 11:31) (101/80 - 139/94)  BP(mean): --  RR: 17 (19 Apr 2023 11:31) (17 - 18)  SpO2: 95% (19 Apr 2023 11:31) (94% - 99%)    Parameters below as of 19 Apr 2023 11:31  Patient On (Oxygen Delivery Method): room air    
Vital Signs Last 24 Hrs  T(C): 36.3 (13 Apr 2023 11:00), Max: 37.2 (12 Apr 2023 22:09)  T(F): 97.3 (13 Apr 2023 11:00), Max: 98.9 (12 Apr 2023 22:09)  HR: 62 (13 Apr 2023 11:00) (62 - 96)  BP: 138/68 (13 Apr 2023 11:00) (138/68 - 152/68)  BP(mean): --  RR: 17 (13 Apr 2023 11:00) (17 - 20)  SpO2: 99% (13 Apr 2023 11:00) (91% - 100%)    Parameters below as of 13 Apr 2023 11:00  Patient On (Oxygen Delivery Method): nasal cannula  O2 Flow (L/min): 4

## 2023-04-24 NOTE — BH CONSULTATION LIAISON PROGRESS NOTE - NSBHADMITIPOBS_PSY_A_CORE
Constant observation
Enhanced supervision
Enhanced supervision
Constant observation

## 2023-04-24 NOTE — BH CONSULTATION LIAISON PROGRESS NOTE - NSBHCONSULTFOLLOWAFTERCARE_PSY_A_CORE FT
patient cleared for DC to [facility] if no PRNs for agitation for 2d    [facility]  [address] [place] [address], [address] Floor, [place] [address]  [phone]  [email or web address] - Central Intake: [phone] 
patient cleared for DC to [facility] if no PRNs for agitation for 2d    [facility]  [address] [place] [address], First Floor, [place] [address]  [phone]  [email or web address] - Central Intake: [phone] 
patient cleared for DC to [facility] if no PRNs for agitation for 2d    [facility]  [address] [place] [address], First Floor, [place] [address]  [phone]  [email or web address] - Central Intake: [phone] 
patient cleared for DC to [facility] if no PRNs for agitation for 2d    [facility]  [address] [place] [address], First Floor, [place] [address]  [phone]  [email or web address] - Central Intake: [phone]

## 2023-04-24 NOTE — PROVIDER CONTACT NOTE (CRITICAL VALUE NOTIFICATION) - BACKGROUND
(Admit Diagnosis) Pain of right hip  (PMH) CVA (Cerebral Infarction)  (PMH) Diverticulosis of intestine

## 2023-04-25 LAB
ANION GAP SERPL CALC-SCNC: 11 MMOL/L — SIGNIFICANT CHANGE UP (ref 7–14)
BASOPHILS # BLD AUTO: 0.04 K/UL — SIGNIFICANT CHANGE UP (ref 0–0.2)
BASOPHILS NFR BLD AUTO: 0.6 % — SIGNIFICANT CHANGE UP (ref 0–2)
BUN SERPL-MCNC: 20 MG/DL — SIGNIFICANT CHANGE UP (ref 7–23)
CALCIUM SERPL-MCNC: 9.2 MG/DL — SIGNIFICANT CHANGE UP (ref 8.4–10.5)
CHLORIDE SERPL-SCNC: 103 MMOL/L — SIGNIFICANT CHANGE UP (ref 98–107)
CO2 SERPL-SCNC: 26 MMOL/L — SIGNIFICANT CHANGE UP (ref 22–31)
CREAT SERPL-MCNC: 0.5 MG/DL — SIGNIFICANT CHANGE UP (ref 0.5–1.3)
EGFR: 90 ML/MIN/1.73M2 — SIGNIFICANT CHANGE UP
EOSINOPHIL # BLD AUTO: 0.1 K/UL — SIGNIFICANT CHANGE UP (ref 0–0.5)
EOSINOPHIL NFR BLD AUTO: 1.6 % — SIGNIFICANT CHANGE UP (ref 0–6)
GLUCOSE BLDC GLUCOMTR-MCNC: 111 MG/DL — HIGH (ref 70–99)
GLUCOSE BLDC GLUCOMTR-MCNC: 114 MG/DL — HIGH (ref 70–99)
GLUCOSE BLDC GLUCOMTR-MCNC: 115 MG/DL — HIGH (ref 70–99)
GLUCOSE BLDC GLUCOMTR-MCNC: 87 MG/DL — SIGNIFICANT CHANGE UP (ref 70–99)
GLUCOSE SERPL-MCNC: 93 MG/DL — SIGNIFICANT CHANGE UP (ref 70–99)
HCT VFR BLD CALC: 32.6 % — LOW (ref 34.5–45)
HGB BLD-MCNC: 10.3 G/DL — LOW (ref 11.5–15.5)
IANC: 3.82 K/UL — SIGNIFICANT CHANGE UP (ref 1.8–7.4)
IMM GRANULOCYTES NFR BLD AUTO: 0.5 % — SIGNIFICANT CHANGE UP (ref 0–0.9)
LYMPHOCYTES # BLD AUTO: 1.41 K/UL — SIGNIFICANT CHANGE UP (ref 1–3.3)
LYMPHOCYTES # BLD AUTO: 22.8 % — SIGNIFICANT CHANGE UP (ref 13–44)
MAGNESIUM SERPL-MCNC: 2 MG/DL — SIGNIFICANT CHANGE UP (ref 1.6–2.6)
MCHC RBC-ENTMCNC: 28.4 PG — SIGNIFICANT CHANGE UP (ref 27–34)
MCHC RBC-ENTMCNC: 31.6 GM/DL — LOW (ref 32–36)
MCV RBC AUTO: 89.8 FL — SIGNIFICANT CHANGE UP (ref 80–100)
MONOCYTES # BLD AUTO: 0.78 K/UL — SIGNIFICANT CHANGE UP (ref 0–0.9)
MONOCYTES NFR BLD AUTO: 12.6 % — SIGNIFICANT CHANGE UP (ref 2–14)
NEUTROPHILS # BLD AUTO: 3.82 K/UL — SIGNIFICANT CHANGE UP (ref 1.8–7.4)
NEUTROPHILS NFR BLD AUTO: 61.9 % — SIGNIFICANT CHANGE UP (ref 43–77)
NRBC # BLD: 0 /100 WBCS — SIGNIFICANT CHANGE UP (ref 0–0)
NRBC # FLD: 0 K/UL — SIGNIFICANT CHANGE UP (ref 0–0)
PHOSPHATE SERPL-MCNC: 2.5 MG/DL — SIGNIFICANT CHANGE UP (ref 2.5–4.5)
PLATELET # BLD AUTO: 273 K/UL — SIGNIFICANT CHANGE UP (ref 150–400)
POTASSIUM SERPL-MCNC: 3.8 MMOL/L — SIGNIFICANT CHANGE UP (ref 3.5–5.3)
POTASSIUM SERPL-SCNC: 3.8 MMOL/L — SIGNIFICANT CHANGE UP (ref 3.5–5.3)
RBC # BLD: 3.63 M/UL — LOW (ref 3.8–5.2)
RBC # FLD: 15 % — HIGH (ref 10.3–14.5)
SODIUM SERPL-SCNC: 140 MMOL/L — SIGNIFICANT CHANGE UP (ref 135–145)
WBC # BLD: 6.18 K/UL — SIGNIFICANT CHANGE UP (ref 3.8–10.5)
WBC # FLD AUTO: 6.18 K/UL — SIGNIFICANT CHANGE UP (ref 3.8–10.5)

## 2023-04-25 RX ADMIN — Medication 25 MILLIGRAM(S): at 14:34

## 2023-04-25 RX ADMIN — Medication 3 MILLILITER(S): at 07:56

## 2023-04-25 RX ADMIN — HALOPERIDOL DECANOATE 1 MILLIGRAM(S): 100 INJECTION INTRAMUSCULAR at 08:58

## 2023-04-25 RX ADMIN — GABAPENTIN 300 MILLIGRAM(S): 400 CAPSULE ORAL at 21:20

## 2023-04-25 RX ADMIN — Medication 50 MILLIGRAM(S): at 21:20

## 2023-04-25 RX ADMIN — Medication 0.5 MILLIGRAM(S): at 07:56

## 2023-04-25 RX ADMIN — PANTOPRAZOLE SODIUM 40 MILLIGRAM(S): 20 TABLET, DELAYED RELEASE ORAL at 05:02

## 2023-04-25 RX ADMIN — ENOXAPARIN SODIUM 40 MILLIGRAM(S): 100 INJECTION SUBCUTANEOUS at 14:34

## 2023-04-25 RX ADMIN — GABAPENTIN 300 MILLIGRAM(S): 400 CAPSULE ORAL at 14:34

## 2023-04-25 RX ADMIN — GABAPENTIN 300 MILLIGRAM(S): 400 CAPSULE ORAL at 05:02

## 2023-04-25 RX ADMIN — Medication 25 MILLIGRAM(S): at 05:02

## 2023-04-25 RX ADMIN — Medication 25 MILLIGRAM(S): at 08:58

## 2023-04-25 RX ADMIN — Medication 0.5 MILLIGRAM(S): at 22:34

## 2023-04-25 RX ADMIN — HALOPERIDOL DECANOATE 1 MILLIGRAM(S): 100 INJECTION INTRAMUSCULAR at 14:34

## 2023-04-25 RX ADMIN — HALOPERIDOL DECANOATE 1 MILLIGRAM(S): 100 INJECTION INTRAMUSCULAR at 21:20

## 2023-04-25 RX ADMIN — Medication 25 MILLIGRAM(S): at 18:21

## 2023-04-25 RX ADMIN — Medication 650 MILLIGRAM(S): at 21:28

## 2023-04-25 RX ADMIN — Medication 3 MILLILITER(S): at 22:34

## 2023-04-25 RX ADMIN — Medication 6 MILLIGRAM(S): at 21:20

## 2023-04-25 NOTE — PROGRESS NOTE ADULT - PROBLEM SELECTOR PROBLEM 1
Acute respiratory failure with hypoxia
Paroxysmal atrial fibrillation
Acute respiratory failure with hypoxia
Fever
Paroxysmal atrial fibrillation
Acute respiratory failure with hypoxia
Fever
Paroxysmal atrial fibrillation
Paroxysmal atrial fibrillation

## 2023-04-25 NOTE — PROGRESS NOTE ADULT - PROBLEM SELECTOR PROBLEM 2
COPD exacerbation
Fever
Acute respiratory failure with hypoxia
COPD exacerbation
Acute respiratory failure with hypoxia
Fever

## 2023-04-25 NOTE — CHART NOTE - NSCHARTNOTEFT_GEN_A_CORE
Source: Patient A&Ox1   Family [ ]     RN [x ]    Chart [x ]    Reason for Follow-Up Assessment: Length Of Stay Follow-Up     88 year old female, , domiciled with daughter with PPHx of bipolar I disorder w/ recent admission at [facility] for agitation thought to be related to vascular dementia, asthma/COPD presents s/p mechanical fall with hypoxic respiratory failure c/f asthma/copd exacerbation     Soft and bite sized diet consistency per MD discretion; no swallow assessment in chart. Per RN, pt eating well, only needs tray set-up; she is able to feed herself. Per RN documentation, pt is usually completing % of meals during admission. No reported GI issues (nausea/vomiting/diarrhea/constipation.)     GI: WDL. Last BM 4/24     PO intake:   % [ x]  other :      Anthropometrics:   Height (cm): 121.9 (04-12)  Weight (kg): 36.9 (04-17),  49.2 (04-12), 47.2 (03-11)  BMI (kg/m2): 33.1 (04-12)    Edema: none  Pressure Injuries: none    __________________ Pertinent Medications__________________   MEDICATIONS  (STANDING):  albuterol/ipratropium for Nebulization 3 milliLiter(s) Nebulizer every 12 hours  buDESOnide    Inhalation Suspension 0.5 milliGRAM(s) Inhalation every 12 hours  dextrose 5%. 1000 milliLiter(s) (100 mL/Hr) IV Continuous <Continuous>  dextrose 5%. 1000 milliLiter(s) (50 mL/Hr) IV Continuous <Continuous>  dextrose 50% Injectable 25 Gram(s) IV Push once  dextrose 50% Injectable 25 Gram(s) IV Push once  dextrose 50% Injectable 12.5 Gram(s) IV Push once  enoxaparin Injectable 40 milliGRAM(s) SubCutaneous every 24 hours  gabapentin 300 milliGRAM(s) Oral three times a day  glucagon  Injectable 1 milliGRAM(s) IntraMuscular once  haloperidol     Tablet 1 milliGRAM(s) Oral <User Schedule>  insulin lispro (ADMELOG) corrective regimen sliding scale   SubCutaneous three times a day before meals  insulin lispro (ADMELOG) corrective regimen sliding scale   SubCutaneous at bedtime  melatonin 6 milliGRAM(s) Oral at bedtime  metoprolol tartrate 25 milliGRAM(s) Oral two times a day  pantoprazole    Tablet 40 milliGRAM(s) Oral before breakfast  traZODone 50 milliGRAM(s) Oral <User Schedule>  traZODone 25 milliGRAM(s) Oral <User Schedule>  traZODone 25 milliGRAM(s) Oral <User Schedule>    MEDICATIONS  (PRN):  acetaminophen     Tablet .. 650 milliGRAM(s) Oral every 6 hours PRN Temp greater or equal to 38C (100.4F), Mild Pain (1 - 3)  aluminum hydroxide/magnesium hydroxide/simethicone Suspension 30 milliLiter(s) Oral every 4 hours PRN Dyspepsia  dextrose Oral Gel 15 Gram(s) Oral once PRN Blood Glucose LESS THAN 70 milliGRAM(s)/deciliter  haloperidol    Injectable 1 milliGRAM(s) IntraMuscular every 6 hours PRN Agitation  ondansetron Injectable 4 milliGRAM(s) IV Push every 8 hours PRN Nausea and/or Vomiting      __________________ Pertinent Labs__________________   04-25 Na140 mmol/L Glu 93 mg/dL K+ 3.8 mmol/L Cr  0.50 mg/dL BUN 20 mg/dL 04-25 Phos 2.5 mg/dL        POCT Blood Glucose.: 87 mg/dL (04-25-23 @ 08:53)  POCT Blood Glucose.: 81 mg/dL (04-24-23 @ 22:17)  POCT Blood Glucose.: 86 mg/dL (04-24-23 @ 16:36)  POCT Blood Glucose.: 105 mg/dL (04-24-23 @ 12:35)  POCT Blood Glucose.: 100 mg/dL (04-24-23 @ 08:34)  POCT Blood Glucose.: 129 mg/dL (04-23-23 @ 22:23)  POCT Blood Glucose.: 112 mg/dL (04-23-23 @ 17:06)  POCT Blood Glucose.: 95 mg/dL (04-23-23 @ 12:27)  POCT Blood Glucose.: 92 mg/dL (04-23-23 @ 08:23)  POCT Blood Glucose.: 101 mg/dL (04-22-23 @ 23:00)  POCT Blood Glucose.: 105 mg/dL (04-22-23 @ 16:31)  POCT Blood Glucose.: 103 mg/dL (04-22-23 @ 12:00)      Previous Nutrition Diagnosis: Inadequate oral intake     Nutrition Diagnosis is [x ] ongoing      Recommendations:  1. Upgrade diet consistency PRN.   2. Continue Ensure Plus HP 2x daily.   3. Encourage PO intake and honor food preferences as able.   4. Continue to document PO in RN flow sheets and monitor weekly weights.       Monitoring and Evaluation:      [ x] Tolerance to diet prescription [x ] weights [x ] follow up per protocol  [ ] other:

## 2023-04-25 NOTE — PROGRESS NOTE ADULT - PROBLEM SELECTOR PROBLEM 5
Need for prophylactic measure
Fall at home
Need for prophylactic measure
Dementia with behavioral disturbance
Fall at home
Dementia with behavioral disturbance

## 2023-04-25 NOTE — PROGRESS NOTE ADULT - ASSESSMENT
88 year old female, , domiciled with daughter with PPHx of bipolar I disorder w/ recent admission at [facility] for agitation thought to be related to vascular dementia, asthma/COPD presents s/p mechanical fall with hypoxic respiratory failure c/f asthma/copd exacerbation :    Copd exacerbation:  hypoxic resp failure  Bipolar disorder;   dementia:   SP Fall       4/12;    Copd exacerbation:  hypoxic resp failure ; being treated for copd exacerbation   ; agree with short term steroids:  on BD ; WBC is normal : do ct chest non contrast    Bipolar disorder; cont home meds:  recent dc from [facility]  dementia: supportive care  SP Fall  /; per PMD:    mirtha acp  ; TRIED CONTACTING HER DAUGHTER BUT WITH NO RESPONSE:       4/13:    Copd exacerbation:  hypoxic resp failure ; being treated for copd exacerbation   ; agree with short term steroids:  on BD ; WBC is normal : do ct chest non contrast  - still pending: She is on empiric antibiotics  to dc if the blood cultures remain negative   Bipolar disorder; cont home meds:  recent dc from [facility]  dementia: supportive care  SP Fall  /;    DW ACP    dw acp  ;     4/14:    Copd exacerbation:  hypoxic resp failure ; being treated for copd exacerbation   : now much better:  change to po steroids for few days:  cont B D:   WBC is normal : ct chest has emphysema and suggestie of chf with kacy [id number] ? low dose lasix:  would defer to primayr  team   Bipolar disorder; cont home meds:  recent dc from [facility]  dementia: supportive care  SP Fall  /;    DW PMD    14/15:    Copd exacerbation:  hypoxic resp failure ; being treated for copd exacerbation   : now much better:  changed to po steroids for few days:  cont B D:   WBC is normal : ct chest has emphysema and suggestive of chf with kacy effusions started on low  dose lasix:  would monitor  Bipolar disorder; cont home meds:  recent dc from [facility]  dementia: supportive care  SP Fall  /;    DW PMD    4/17;    Copd exacerbation:  hypoxic resp failure ; being treated for copd exacerbation   : now much better:  changed to po steroids for few days:  cont B D:  Add pulmicort:    WBC is normal : ct chest has emphysema and suggestive of chf with kacy effusions started on low  dose lasix:  would monitor: CT chest also showed: 1.  Congestive heart failure, Left thyroid hypodense lesion measuring 1.9 x 1.5 cm. Given size, ultrasound evaluation is recommended,   Emphysema and  Few lung nodules measuring up to 5 mm. Consider a repeat chest CTin three months time after dc:    Bipolar disorder; cont home meds:  recent dc from [facility]  dementia: supportive care  SP Fall  /;     PMD    4/18:    Copd exacerbation:  hypoxic resp failure ; being treated for copd exacerbation   : now much better:  finished steroids: cont B D:  Add pulmicort:    WBC is normal : ct chest has emphysema and suggestive of chf with kacy effusions started on low  dose lasix:  would monitor: CT chest also showed: 1.  Congestive heart failure, Left thyroid hypodense lesion measuring 1.9 x 1.5 cm. Given size, ultrasound evaluation is recommended,   Emphysema and  Few lung nodules measuring up to 5 mm. Consider a repeat chest CTin three months time after dc:    Bipolar disorder; cont home meds:  recent dc from [facility]  dementia: supportive care  SP Fall  /;    dw acp : dc planniing     PMD    4/19:  Copd exacerbation:  hypoxic resp failure ; being treated for copd exacerbation   : now much better:  finished steroids: cont B D:  Add pulmicort: ; no new change:  no obvious resp distress    WBC is normal : ct chest has emphysema and suggestive of chf with kacy effusions started on low  dose lasix:  : CT chest also showed: 1.  Congestive heart failure, Left thyroid hypodense lesion measuring 1.9 x 1.5 cm. Given size, ultrasound evaluation is recommended,   Emphysema and  Few lung nodules measuring up to 5 mm. Consider a repeat chest CTin three months time after dc:    Bipolar disorder; cont home meds:  recent dc from [facility]  dementia: supportive care  SP Fall  /;    dw acp : dc planniing     PMD    4/21:    Copd exacerbation:  hypoxic resp failure ; being treated for copd exacerbation   : now much better:  finished steroids: cont B D:  Add pulmicort: ; no new change:  no obvious resp distress   WBC is normal : ct chest has emphysema and suggestive of chf with kacy effusions started on low  dose lasix:  : CT chest also showed: 1.  Congestive heart failure, Left thyroid hypodense lesion measuring 1.9 x 1.5 cm. Given size, ultrasound evaluation is recommended,   Emphysema and  Few lung nodules measuring up to 5 mm. Consider a repeat chest CTin three months time after dc:    Bipolar disorder; cont home meds:  recent dc from [facility]  dementia: supportive care  SP Fall  /;    dw acp : dc planniing    DW PMD    4/23:    Copd exacerbation:  hypoxic resp failure ; being treated for copd exacerbation   : resolved:    WBC is normal : ct chest has emphysema and suggestive of chf with kacy effusions started on low  dose lasix:  : CT chest also showed: 1.  Congestive heart failure, Left thyroid hypodense lesion measuring 1.9 x 1.5 cm. Given size, ultrasound evaluation is recommended,   Emphysema and  Few lung nodules measuring up to 5 mm. Consider a repeat chest CTin three months time after dc:    Bipolar disorder; cont home meds:  recent dc from [facility]  dementia: supportive care  SP Fall  /;    dw acp : dc planniing      4/25:    Copd exacerbation:  resolved:  on room air: ; being treated for copd exacerbation   : now resolved:    WBC is normal : ct chest has emphysema and suggestive of chf with kacy effusions started on low  dose lasix:  : CT chest also showed: 1.  Congestive heart failure, Left thyroid hypodense lesion measuring 1.9 x 1.5 cm. Given size, ultrasound evaluation is recommended,   Emphysema and  Few lung nodules measuring up to 5 mm. Consider a repeat chest CTin three months time after dc:    Bipolar disorder; cont home meds:  recent dc from [facility]  dementia: supportive care  SP Fall  /;    dw acp : dc planniing ;will sign off

## 2023-04-25 NOTE — PROGRESS NOTE ADULT - ASSESSMENT
88 year old female, , domiciled with daughter with PPHx of bipolar I disorder w/ recent admission at [facility] for agitation thought to be related to vascular dementia, asthma/COPD presents s/p mechanical fall with hypoxic respiratory failure c/f asthma/copd exacerbation        Problem/Plan - 1:  ·  Problem: Paroxysmal atrial fibrillation.   ·  Plan: not a candidate for full anticoagulation secondary to very high fall risk  will continue to monitor on metoprolol  heart rate controlled.     Problem/Plan - 2:  ·  Problem: Fever.   ·  Plan: off antibiotics  CT chest negative except small effusions  now resolved.     Problem/Plan - 3:  ·  Problem: Acute respiratory failure with hypoxia.   ·  Plan: secondary to acute on chronic diastolic heart failure and COPD exacerbation now resolved   pulmonary help appreciated  wheeze resolved  completed 3 days IV furosemide now euvolemic.     Problem/Plan - 4:  ·  Problem: COPD exacerbation.   ·  Plan: management per pulmonary   Duoneb.     Problem/Plan - 5:  ·  Problem: Fall at home.   ·  Plan: fall precautions   PT evaluation noted     Problem/Plan - 6:  ·  Problem: Dementia with behavioral disturbance.   ·  Plan: continue to follow  recommendations  calm.     Problem/Plan - 7:  ·  Problem: Need for prophylactic measure.   ·  Plan: enoxaparin.      Dispo : Discharge to subacute rehab .

## 2023-04-25 NOTE — PROGRESS NOTE ADULT - PROBLEM SELECTOR PROBLEM 3
Fall at home
COPD exacerbation
Fall at home
Acute respiratory failure with hypoxia
COPD exacerbation

## 2023-04-25 NOTE — PROGRESS NOTE ADULT - PROBLEM SELECTOR PROBLEM 4
Dementia with behavioral disturbance
COPD exacerbation
Dementia with behavioral disturbance
COPD exacerbation
Fall at home
Fall at home

## 2023-04-26 LAB
ANION GAP SERPL CALC-SCNC: 12 MMOL/L — SIGNIFICANT CHANGE UP (ref 7–14)
BUN SERPL-MCNC: 21 MG/DL — SIGNIFICANT CHANGE UP (ref 7–23)
CALCIUM SERPL-MCNC: 9.9 MG/DL — SIGNIFICANT CHANGE UP (ref 8.4–10.5)
CHLORIDE SERPL-SCNC: 104 MMOL/L — SIGNIFICANT CHANGE UP (ref 98–107)
CO2 SERPL-SCNC: 24 MMOL/L — SIGNIFICANT CHANGE UP (ref 22–31)
CREAT SERPL-MCNC: 0.51 MG/DL — SIGNIFICANT CHANGE UP (ref 0.5–1.3)
EGFR: 90 ML/MIN/1.73M2 — SIGNIFICANT CHANGE UP
GLUCOSE BLDC GLUCOMTR-MCNC: 107 MG/DL — HIGH (ref 70–99)
GLUCOSE BLDC GLUCOMTR-MCNC: 109 MG/DL — HIGH (ref 70–99)
GLUCOSE BLDC GLUCOMTR-MCNC: 109 MG/DL — HIGH (ref 70–99)
GLUCOSE BLDC GLUCOMTR-MCNC: 114 MG/DL — HIGH (ref 70–99)
GLUCOSE SERPL-MCNC: 97 MG/DL — SIGNIFICANT CHANGE UP (ref 70–99)
MAGNESIUM SERPL-MCNC: 2.1 MG/DL — SIGNIFICANT CHANGE UP (ref 1.6–2.6)
PHOSPHATE SERPL-MCNC: 3.8 MG/DL — SIGNIFICANT CHANGE UP (ref 2.5–4.5)
POTASSIUM SERPL-MCNC: 4.3 MMOL/L — SIGNIFICANT CHANGE UP (ref 3.5–5.3)
POTASSIUM SERPL-SCNC: 4.3 MMOL/L — SIGNIFICANT CHANGE UP (ref 3.5–5.3)
SODIUM SERPL-SCNC: 140 MMOL/L — SIGNIFICANT CHANGE UP (ref 135–145)

## 2023-04-26 RX ADMIN — Medication 50 MILLIGRAM(S): at 21:15

## 2023-04-26 RX ADMIN — HALOPERIDOL DECANOATE 1 MILLIGRAM(S): 100 INJECTION INTRAMUSCULAR at 17:58

## 2023-04-26 RX ADMIN — GABAPENTIN 300 MILLIGRAM(S): 400 CAPSULE ORAL at 21:15

## 2023-04-26 RX ADMIN — HALOPERIDOL DECANOATE 1 MILLIGRAM(S): 100 INJECTION INTRAMUSCULAR at 22:03

## 2023-04-26 RX ADMIN — PANTOPRAZOLE SODIUM 40 MILLIGRAM(S): 20 TABLET, DELAYED RELEASE ORAL at 06:52

## 2023-04-26 RX ADMIN — Medication 25 MILLIGRAM(S): at 06:56

## 2023-04-26 RX ADMIN — HALOPERIDOL DECANOATE 1 MILLIGRAM(S): 100 INJECTION INTRAMUSCULAR at 08:32

## 2023-04-26 RX ADMIN — HALOPERIDOL DECANOATE 1 MILLIGRAM(S): 100 INJECTION INTRAMUSCULAR at 00:13

## 2023-04-26 RX ADMIN — Medication 25 MILLIGRAM(S): at 08:31

## 2023-04-26 RX ADMIN — ENOXAPARIN SODIUM 40 MILLIGRAM(S): 100 INJECTION SUBCUTANEOUS at 17:59

## 2023-04-26 RX ADMIN — Medication 0.5 MILLIGRAM(S): at 08:29

## 2023-04-26 RX ADMIN — GABAPENTIN 300 MILLIGRAM(S): 400 CAPSULE ORAL at 17:57

## 2023-04-26 RX ADMIN — Medication 25 MILLIGRAM(S): at 17:57

## 2023-04-26 RX ADMIN — GABAPENTIN 300 MILLIGRAM(S): 400 CAPSULE ORAL at 06:51

## 2023-04-26 RX ADMIN — Medication 0.5 MILLIGRAM(S): at 19:37

## 2023-04-26 RX ADMIN — Medication 6 MILLIGRAM(S): at 21:15

## 2023-04-26 RX ADMIN — Medication 25 MILLIGRAM(S): at 17:58

## 2023-04-26 RX ADMIN — Medication 3 MILLILITER(S): at 07:27

## 2023-04-26 RX ADMIN — Medication 3 MILLILITER(S): at 19:37

## 2023-04-26 NOTE — PROGRESS NOTE ADULT - ASSESSMENT
88 year old female, , domiciled with daughter with PPHx of bipolar I disorder w/ recent admission at [facility] for agitation thought to be related to vascular dementia, asthma/COPD presents s/p mechanical fall with hypoxic respiratory failure c/f asthma/copd exacerbation        Problem/Plan - 1:  ·  Problem: Paroxysmal atrial fibrillation.   ·  Plan: not a candidate for full anticoagulation secondary to very high fall risk  will continue to monitor on metoprolol  heart rate controlled.     Problem/Plan - 2:  ·  Problem: Fever.   ·  Plan: off antibiotics  CT chest negative except small effusions  now resolved.     Problem/Plan - 3:  ·  Problem: Acute respiratory failure with hypoxia.   ·  Plan: secondary to acute on chronic diastolic heart failure and COPD exacerbation now resolved   pulmonary help appreciated  wheeze resolved  completed 3 days IV furosemide now euvolemic.     Problem/Plan - 4:  ·  Problem: COPD exacerbation.   ·  Plan: management per pulmonary   Duoneb.     Problem/Plan - 5:  ·  Problem: Fall at home.   ·  Plan: fall precautions   PT evaluation noted     Problem/Plan - 6:  ·  Problem: Dementia with behavioral disturbance.   ·  Plan: continue to follow  recommendations  calm.     Problem/Plan - 7:  ·  Problem: Need for prophylactic measure.   ·  Plan: enoxaparin.      Dispo : Discharge to subacute rehab .

## 2023-04-27 LAB
GLUCOSE BLDC GLUCOMTR-MCNC: 104 MG/DL — HIGH (ref 70–99)
GLUCOSE BLDC GLUCOMTR-MCNC: 105 MG/DL — HIGH (ref 70–99)
GLUCOSE BLDC GLUCOMTR-MCNC: 116 MG/DL — HIGH (ref 70–99)
GLUCOSE BLDC GLUCOMTR-MCNC: 131 MG/DL — HIGH (ref 70–99)

## 2023-04-27 RX ORDER — LIDOCAINE 4 G/100G
1 CREAM TOPICAL EVERY 24 HOURS
Refills: 0 | Status: DISCONTINUED | OUTPATIENT
Start: 2023-04-27 | End: 2023-05-01

## 2023-04-27 RX ADMIN — Medication 0.5 MILLIGRAM(S): at 09:28

## 2023-04-27 RX ADMIN — Medication 25 MILLIGRAM(S): at 10:15

## 2023-04-27 RX ADMIN — Medication 25 MILLIGRAM(S): at 05:59

## 2023-04-27 RX ADMIN — GABAPENTIN 300 MILLIGRAM(S): 400 CAPSULE ORAL at 21:52

## 2023-04-27 RX ADMIN — HALOPERIDOL DECANOATE 1 MILLIGRAM(S): 100 INJECTION INTRAMUSCULAR at 10:16

## 2023-04-27 RX ADMIN — Medication 3 MILLILITER(S): at 09:28

## 2023-04-27 RX ADMIN — HALOPERIDOL DECANOATE 1 MILLIGRAM(S): 100 INJECTION INTRAMUSCULAR at 15:20

## 2023-04-27 RX ADMIN — GABAPENTIN 300 MILLIGRAM(S): 400 CAPSULE ORAL at 06:00

## 2023-04-27 RX ADMIN — PANTOPRAZOLE SODIUM 40 MILLIGRAM(S): 20 TABLET, DELAYED RELEASE ORAL at 06:00

## 2023-04-27 RX ADMIN — Medication 3 MILLILITER(S): at 22:14

## 2023-04-27 RX ADMIN — Medication 25 MILLIGRAM(S): at 18:29

## 2023-04-27 RX ADMIN — Medication 0.5 MILLIGRAM(S): at 22:15

## 2023-04-27 RX ADMIN — Medication 25 MILLIGRAM(S): at 15:21

## 2023-04-27 RX ADMIN — Medication 50 MILLIGRAM(S): at 21:57

## 2023-04-27 RX ADMIN — Medication 6 MILLIGRAM(S): at 21:52

## 2023-04-27 RX ADMIN — GABAPENTIN 300 MILLIGRAM(S): 400 CAPSULE ORAL at 15:20

## 2023-04-27 RX ADMIN — ENOXAPARIN SODIUM 40 MILLIGRAM(S): 100 INJECTION SUBCUTANEOUS at 15:25

## 2023-04-27 RX ADMIN — HALOPERIDOL DECANOATE 1 MILLIGRAM(S): 100 INJECTION INTRAMUSCULAR at 21:52

## 2023-04-27 NOTE — PROGRESS NOTE ADULT - ASSESSMENT
88 year old female, , domiciled with daughter with PPHx of bipolar I disorder w/ recent admission at [facility] for agitation thought to be related to vascular dementia, asthma/COPD presents s/p mechanical fall with hypoxic respiratory failure c/f asthma/copd exacerbation        Problem/Plan - 1:  ·  Problem: Paroxysmal atrial fibrillation.   ·  Plan: not a candidate for full anticoagulation secondary to very high fall risk  will continue to monitor on metoprolol  heart rate controlled.     Problem/Plan - 2:  ·  Problem: Fever.   ·  Plan: off antibiotics  CT chest negative except small effusions  now resolved.     Problem/Plan - 3:  ·  Problem: Acute respiratory failure with hypoxia.   ·  Plan: secondary to acute on chronic diastolic heart failure and COPD exacerbation now resolved   pulmonary help appreciated  wheeze resolved  completed 3 days IV furosemide now euvolemic.     Problem/Plan - 4:  ·  Problem: COPD exacerbation.   ·  Plan: management per pulmonary   Duoneb.     Problem/Plan - 5:  ·  Problem: Fall at home.   ·  Plan: fall precautions   PT evaluation noted     Problem/Plan - 6:  ·  Problem: Dementia with behavioral disturbance.   ·  Plan: continue to follow  recommendations  calm.     Problem/Plan - 7:  ·  Problem: Need for prophylactic measure.   ·  Plan: enoxaparin.      Dispo : Discharge to subacute rehab . Called daughter and left message.

## 2023-04-28 LAB
GLUCOSE BLDC GLUCOMTR-MCNC: 106 MG/DL — HIGH (ref 70–99)
GLUCOSE BLDC GLUCOMTR-MCNC: 107 MG/DL — HIGH (ref 70–99)
GLUCOSE BLDC GLUCOMTR-MCNC: 110 MG/DL — HIGH (ref 70–99)
GLUCOSE BLDC GLUCOMTR-MCNC: 96 MG/DL — SIGNIFICANT CHANGE UP (ref 70–99)

## 2023-04-28 RX ADMIN — GABAPENTIN 300 MILLIGRAM(S): 400 CAPSULE ORAL at 05:46

## 2023-04-28 RX ADMIN — HALOPERIDOL DECANOATE 1 MILLIGRAM(S): 100 INJECTION INTRAMUSCULAR at 08:33

## 2023-04-28 RX ADMIN — Medication 0.5 MILLIGRAM(S): at 08:20

## 2023-04-28 RX ADMIN — Medication 25 MILLIGRAM(S): at 05:46

## 2023-04-28 RX ADMIN — GABAPENTIN 300 MILLIGRAM(S): 400 CAPSULE ORAL at 14:48

## 2023-04-28 RX ADMIN — Medication 50 MILLIGRAM(S): at 22:47

## 2023-04-28 RX ADMIN — Medication 0.5 MILLIGRAM(S): at 23:03

## 2023-04-28 RX ADMIN — Medication 25 MILLIGRAM(S): at 14:47

## 2023-04-28 RX ADMIN — ENOXAPARIN SODIUM 40 MILLIGRAM(S): 100 INJECTION SUBCUTANEOUS at 14:48

## 2023-04-28 RX ADMIN — Medication 25 MILLIGRAM(S): at 18:24

## 2023-04-28 RX ADMIN — HALOPERIDOL DECANOATE 1 MILLIGRAM(S): 100 INJECTION INTRAMUSCULAR at 14:48

## 2023-04-28 RX ADMIN — Medication 3 MILLILITER(S): at 08:15

## 2023-04-28 RX ADMIN — Medication 25 MILLIGRAM(S): at 08:34

## 2023-04-28 RX ADMIN — PANTOPRAZOLE SODIUM 40 MILLIGRAM(S): 20 TABLET, DELAYED RELEASE ORAL at 05:46

## 2023-04-28 RX ADMIN — Medication 3 MILLILITER(S): at 23:03

## 2023-04-28 RX ADMIN — Medication 6 MILLIGRAM(S): at 22:48

## 2023-04-28 RX ADMIN — GABAPENTIN 300 MILLIGRAM(S): 400 CAPSULE ORAL at 22:48

## 2023-04-28 RX ADMIN — HALOPERIDOL DECANOATE 1 MILLIGRAM(S): 100 INJECTION INTRAMUSCULAR at 22:47

## 2023-04-28 NOTE — PROGRESS NOTE ADULT - ASSESSMENT
88 year old female, , domiciled with daughter with PPHx of bipolar I disorder w/ recent admission at [facility] for agitation thought to be related to vascular dementia, asthma/COPD presents s/p mechanical fall with hypoxic respiratory failure c/f asthma/copd exacerbation        Problem/Plan - 1:  ·  Problem: Paroxysmal atrial fibrillation.   ·  Plan: not a candidate for full anticoagulation secondary to very high fall risk  will continue to monitor on metoprolol  heart rate controlled.     Problem/Plan - 2:  ·  Problem: Fever.   ·  Plan: off antibiotics  CT chest negative except small effusions  now resolved.     Problem/Plan - 3:  ·  Problem: Acute respiratory failure with hypoxia.   ·  Plan: secondary to acute on chronic diastolic heart failure and COPD exacerbation now resolved   pulmonary help appreciated  wheeze resolved  completed 3 days IV furosemide now euvolemic.     Problem/Plan - 4:  ·  Problem: COPD exacerbation.   ·  Plan: management per pulmonary   Duoneb.     Problem/Plan - 5:  ·  Problem: Fall at home.   ·  Plan: fall precautions   PT evaluation noted     Problem/Plan - 6:  ·  Problem: Dementia with behavioral disturbance.   ·  Plan: continue to follow  recommendations  calm.     Problem/Plan - 7:  ·  Problem: Need for prophylactic measure.   ·  Plan: enoxaparin.      Dispo : Discharge to subacute rehab . Called daughter and left message.

## 2023-04-29 LAB
GLUCOSE BLDC GLUCOMTR-MCNC: 108 MG/DL — HIGH (ref 70–99)
GLUCOSE BLDC GLUCOMTR-MCNC: 124 MG/DL — HIGH (ref 70–99)
GLUCOSE BLDC GLUCOMTR-MCNC: 97 MG/DL — SIGNIFICANT CHANGE UP (ref 70–99)
GLUCOSE BLDC GLUCOMTR-MCNC: 99 MG/DL — SIGNIFICANT CHANGE UP (ref 70–99)

## 2023-04-29 RX ADMIN — GABAPENTIN 300 MILLIGRAM(S): 400 CAPSULE ORAL at 14:32

## 2023-04-29 RX ADMIN — HALOPERIDOL DECANOATE 1 MILLIGRAM(S): 100 INJECTION INTRAMUSCULAR at 09:13

## 2023-04-29 RX ADMIN — Medication 25 MILLIGRAM(S): at 14:34

## 2023-04-29 RX ADMIN — GABAPENTIN 300 MILLIGRAM(S): 400 CAPSULE ORAL at 22:22

## 2023-04-29 RX ADMIN — Medication 3 MILLILITER(S): at 21:19

## 2023-04-29 RX ADMIN — HALOPERIDOL DECANOATE 1 MILLIGRAM(S): 100 INJECTION INTRAMUSCULAR at 14:32

## 2023-04-29 RX ADMIN — Medication 3 MILLILITER(S): at 07:01

## 2023-04-29 RX ADMIN — Medication 25 MILLIGRAM(S): at 06:13

## 2023-04-29 RX ADMIN — Medication 0.5 MILLIGRAM(S): at 07:02

## 2023-04-29 RX ADMIN — ENOXAPARIN SODIUM 40 MILLIGRAM(S): 100 INJECTION SUBCUTANEOUS at 14:33

## 2023-04-29 RX ADMIN — Medication 50 MILLIGRAM(S): at 19:54

## 2023-04-29 RX ADMIN — Medication 6 MILLIGRAM(S): at 22:22

## 2023-04-29 RX ADMIN — HALOPERIDOL DECANOATE 1 MILLIGRAM(S): 100 INJECTION INTRAMUSCULAR at 19:54

## 2023-04-29 RX ADMIN — Medication 650 MILLIGRAM(S): at 22:25

## 2023-04-29 RX ADMIN — Medication 25 MILLIGRAM(S): at 18:31

## 2023-04-29 RX ADMIN — Medication 25 MILLIGRAM(S): at 09:13

## 2023-04-29 RX ADMIN — LIDOCAINE 1 PATCH: 4 CREAM TOPICAL at 22:29

## 2023-04-29 RX ADMIN — PANTOPRAZOLE SODIUM 40 MILLIGRAM(S): 20 TABLET, DELAYED RELEASE ORAL at 06:13

## 2023-04-29 RX ADMIN — LIDOCAINE 1 PATCH: 4 CREAM TOPICAL at 14:38

## 2023-04-29 RX ADMIN — Medication 0.5 MILLIGRAM(S): at 21:19

## 2023-04-29 RX ADMIN — GABAPENTIN 300 MILLIGRAM(S): 400 CAPSULE ORAL at 06:13

## 2023-04-29 RX ADMIN — Medication 650 MILLIGRAM(S): at 23:25

## 2023-04-29 NOTE — PROGRESS NOTE ADULT - ASSESSMENT
88 year old female, , domiciled with daughter with PPHx of bipolar I disorder w/ recent admission at [facility] for agitation thought to be related to vascular dementia, asthma/COPD presents s/p mechanical fall with hypoxic respiratory failure c/f asthma/copd exacerbation        Problem/Plan - 1:  ·  Problem: Paroxysmal atrial fibrillation.   ·  Plan: not a candidate for full anticoagulation secondary to very high fall risk  will continue to monitor on metoprolol  heart rate controlled.     Problem/Plan - 2:  ·  Problem: Fever.   ·  Plan: off antibiotics  CT chest negative except small effusions  now resolved.     Problem/Plan - 3:  ·  Problem: Acute respiratory failure with hypoxia.   ·  Plan: secondary to acute on chronic diastolic heart failure and COPD exacerbation now resolved   pulmonary help appreciated  wheeze resolved  completed 3 days IV furosemide now euvolemic.     Problem/Plan - 4:  ·  Problem: COPD exacerbation.   ·  Plan: management per pulmonary   Duoneb.     Problem/Plan - 5:  ·  Problem: Fall at home.   ·  Plan: fall precautions   PT evaluation noted     Problem/Plan - 6:  ·  Problem: Dementia with behavioral disturbance.   ·  Plan: continue to follow  recommendations  calm.     Problem/Plan - 7:  ·  Problem: Need for prophylactic measure.   ·  Plan: enoxaparin.    Dispo : DC planning pending placement .

## 2023-04-30 LAB
GLUCOSE BLDC GLUCOMTR-MCNC: 108 MG/DL — HIGH (ref 70–99)
GLUCOSE BLDC GLUCOMTR-MCNC: 91 MG/DL — SIGNIFICANT CHANGE UP (ref 70–99)
GLUCOSE BLDC GLUCOMTR-MCNC: 96 MG/DL — SIGNIFICANT CHANGE UP (ref 70–99)
GLUCOSE BLDC GLUCOMTR-MCNC: 97 MG/DL — SIGNIFICANT CHANGE UP (ref 70–99)

## 2023-04-30 RX ADMIN — PANTOPRAZOLE SODIUM 40 MILLIGRAM(S): 20 TABLET, DELAYED RELEASE ORAL at 06:48

## 2023-04-30 RX ADMIN — Medication 6 MILLIGRAM(S): at 23:35

## 2023-04-30 RX ADMIN — Medication 25 MILLIGRAM(S): at 14:24

## 2023-04-30 RX ADMIN — GABAPENTIN 300 MILLIGRAM(S): 400 CAPSULE ORAL at 23:35

## 2023-04-30 RX ADMIN — GABAPENTIN 300 MILLIGRAM(S): 400 CAPSULE ORAL at 06:48

## 2023-04-30 RX ADMIN — LIDOCAINE 1 PATCH: 4 CREAM TOPICAL at 03:06

## 2023-04-30 RX ADMIN — Medication 25 MILLIGRAM(S): at 18:59

## 2023-04-30 RX ADMIN — Medication 650 MILLIGRAM(S): at 20:52

## 2023-04-30 RX ADMIN — Medication 3 MILLILITER(S): at 22:04

## 2023-04-30 RX ADMIN — Medication 0.5 MILLIGRAM(S): at 07:10

## 2023-04-30 RX ADMIN — Medication 0.5 MILLIGRAM(S): at 22:03

## 2023-04-30 RX ADMIN — Medication 3 MILLILITER(S): at 07:10

## 2023-04-30 RX ADMIN — GABAPENTIN 300 MILLIGRAM(S): 400 CAPSULE ORAL at 14:24

## 2023-04-30 RX ADMIN — HALOPERIDOL DECANOATE 1 MILLIGRAM(S): 100 INJECTION INTRAMUSCULAR at 14:24

## 2023-04-30 RX ADMIN — Medication 50 MILLIGRAM(S): at 19:53

## 2023-04-30 RX ADMIN — HALOPERIDOL DECANOATE 1 MILLIGRAM(S): 100 INJECTION INTRAMUSCULAR at 19:53

## 2023-04-30 RX ADMIN — Medication 25 MILLIGRAM(S): at 06:47

## 2023-04-30 RX ADMIN — Medication 650 MILLIGRAM(S): at 19:52

## 2023-04-30 NOTE — PROGRESS NOTE ADULT - ASSESSMENT
88 year old female, , domiciled with daughter with PPHx of bipolar I disorder w/ recent admission at [facility] for agitation thought to be related to vascular dementia, asthma/COPD presents s/p mechanical fall with hypoxic respiratory failure c/f asthma/copd exacerbation        Problem/Plan - 1:  ·  Problem: Paroxysmal atrial fibrillation.   ·  Plan: not a candidate for full anticoagulation secondary to very high fall risk  will continue to monitor on metoprolol  heart rate controlled.     Problem/Plan - 2:  ·  Problem: Fever.   ·  Plan: off antibiotics  CT chest negative except small effusions  now resolved.     Problem/Plan - 3:  ·  Problem: Acute respiratory failure with hypoxia.   ·  Plan: secondary to acute on chronic diastolic heart failure and COPD exacerbation now resolved   pulmonary help appreciated  wheeze resolved  completed 3 days IV furosemide now euvolemic.     Problem/Plan - 4:  ·  Problem: COPD exacerbation.   ·  Plan: management per pulmonary   Duoneb.     Problem/Plan - 5:  ·  Problem: Fall at home.   ·  Plan: fall precautions   PT evaluation noted     Problem/Plan - 6:  ·  Problem: Dementia with behavioral disturbance.   ·  Plan: continue to follow  recommendations  calm.     Problem/Plan - 7:  ·  Problem: Need for prophylactic measure.   ·  Plan: enoxaparin.    Dispo : DC planning pending placement .

## 2023-04-30 NOTE — PROGRESS NOTE ADULT - REASON FOR ADMISSION
agitation, fall at home?

## 2023-04-30 NOTE — PROGRESS NOTE ADULT - PROVIDER SPECIALTY LIST ADULT
Internal Medicine
Pulmonology
Pulmonology
Internal Medicine
Pulmonology
Internal Medicine
Pulmonology
Internal Medicine

## 2023-04-30 NOTE — PROGRESS NOTE ADULT - SUBJECTIVE AND OBJECTIVE BOX
Date of Service  : 04-19-23     INTERVAL HPI/OVERNIGHT EVENTS: I need help from nurse to sit nicely.   Vital Signs Last 24 Hrs  T(C): 36.6 (19 Apr 2023 11:31), Max: 36.7 (19 Apr 2023 05:15)  T(F): 97.9 (19 Apr 2023 11:31), Max: 98 (19 Apr 2023 05:15)  HR: 70 (19 Apr 2023 14:48) (65 - 93)  BP: 101/80 (19 Apr 2023 11:31) (101/80 - 139/94)  BP(mean): --  RR: 17 (19 Apr 2023 11:31) (17 - 18)  SpO2: 95% (19 Apr 2023 11:31) (94% - 95%)    Parameters below as of 19 Apr 2023 11:31  Patient On (Oxygen Delivery Method): room air      I&O's Summary    18 Apr 2023 07:01  -  19 Apr 2023 07:00  --------------------------------------------------------  IN: 240 mL / OUT: 0 mL / NET: 240 mL    19 Apr 2023 07:01  -  19 Apr 2023 17:55  --------------------------------------------------------  IN: 240 mL / OUT: 0 mL / NET: 240 mL      MEDICATIONS  (STANDING):  albuterol/ipratropium for Nebulization 3 milliLiter(s) Nebulizer every 6 hours  buDESOnide    Inhalation Suspension 0.5 milliGRAM(s) Inhalation every 12 hours  dextrose 5%. 1000 milliLiter(s) (100 mL/Hr) IV Continuous <Continuous>  dextrose 5%. 1000 milliLiter(s) (50 mL/Hr) IV Continuous <Continuous>  dextrose 50% Injectable 25 Gram(s) IV Push once  dextrose 50% Injectable 12.5 Gram(s) IV Push once  dextrose 50% Injectable 25 Gram(s) IV Push once  enoxaparin Injectable 40 milliGRAM(s) SubCutaneous every 24 hours  gabapentin 300 milliGRAM(s) Oral three times a day  glucagon  Injectable 1 milliGRAM(s) IntraMuscular once  haloperidol     Tablet 1 milliGRAM(s) Oral <User Schedule>  insulin lispro (ADMELOG) corrective regimen sliding scale   SubCutaneous three times a day before meals  insulin lispro (ADMELOG) corrective regimen sliding scale   SubCutaneous at bedtime  melatonin 6 milliGRAM(s) Oral at bedtime  metoprolol tartrate 50 milliGRAM(s) Oral two times a day  pantoprazole    Tablet 40 milliGRAM(s) Oral before breakfast  traZODone 25 milliGRAM(s) Oral <User Schedule>  traZODone 50 milliGRAM(s) Oral <User Schedule>  traZODone 25 milliGRAM(s) Oral <User Schedule>    MEDICATIONS  (PRN):  acetaminophen     Tablet .. 650 milliGRAM(s) Oral every 6 hours PRN Temp greater or equal to 38C (100.4F), Mild Pain (1 - 3)  aluminum hydroxide/magnesium hydroxide/simethicone Suspension 30 milliLiter(s) Oral every 4 hours PRN Dyspepsia  dextrose Oral Gel 15 Gram(s) Oral once PRN Blood Glucose LESS THAN 70 milliGRAM(s)/deciliter  haloperidol    Injectable 1 milliGRAM(s) IntraMuscular every 6 hours PRN Agitation  ondansetron Injectable 4 milliGRAM(s) IV Push every 8 hours PRN Nausea and/or Vomiting    LABS:              CAPILLARY BLOOD GLUCOSE      POCT Blood Glucose.: 122 mg/dL (19 Apr 2023 17:33)  POCT Blood Glucose.: 116 mg/dL (19 Apr 2023 12:09)  POCT Blood Glucose.: 103 mg/dL (19 Apr 2023 08:24)  POCT Blood Glucose.: 89 mg/dL (18 Apr 2023 23:07)              Consultant(s) Notes Reviewed:  [x ] YES  [ ] NO    PHYSICAL EXAM:  GENERAL: Not in any distress ,  HEAD:  Atraumatic, Normocephalic  NECK: Supple, No JVD, Normal thyroid  NERVOUS SYSTEM:  Alert   CHEST/LUNG: Good air entry bilateral with no  rales, rhonchi, wheezing, or rubs  HEART: Regular rate and rhythm; No murmurs, rubs, or gallops  ABDOMEN: Soft, Nontender, Nondistended; Bowel sounds present  EXTREMITIES:  2+ Peripheral Pulses, No clubbing, cyanosis, or edema      Care Discussed with Consultants/Other Providers [ x] YES  [ ] NO
Date of Service  : 04-20-23     INTERVAL HPI/OVERNIGHT EVENTS: No new concerns.   Vital Signs Last 24 Hrs  T(C): 36.6 (20 Apr 2023 17:20), Max: 36.7 (19 Apr 2023 22:35)  T(F): 97.8 (20 Apr 2023 17:20), Max: 98.1 (20 Apr 2023 06:05)  HR: 77 (20 Apr 2023 17:20) (66 - 77)  BP: 119/64 (20 Apr 2023 17:20) (102/50 - 127/66)  BP(mean): --  RR: 17 (20 Apr 2023 17:20) (17 - 18)  SpO2: 99% (20 Apr 2023 17:20) (95% - 100%)    Parameters below as of 20 Apr 2023 17:20  Patient On (Oxygen Delivery Method): room air      I&O's Summary    19 Apr 2023 07:01  -  20 Apr 2023 07:00  --------------------------------------------------------  IN: 240 mL / OUT: 250 mL / NET: -10 mL      MEDICATIONS  (STANDING):  albuterol/ipratropium for Nebulization 3 milliLiter(s) Nebulizer every 6 hours  buDESOnide    Inhalation Suspension 0.5 milliGRAM(s) Inhalation every 12 hours  dextrose 5%. 1000 milliLiter(s) (100 mL/Hr) IV Continuous <Continuous>  dextrose 5%. 1000 milliLiter(s) (50 mL/Hr) IV Continuous <Continuous>  dextrose 50% Injectable 25 Gram(s) IV Push once  dextrose 50% Injectable 12.5 Gram(s) IV Push once  dextrose 50% Injectable 25 Gram(s) IV Push once  enoxaparin Injectable 40 milliGRAM(s) SubCutaneous every 24 hours  gabapentin 300 milliGRAM(s) Oral three times a day  glucagon  Injectable 1 milliGRAM(s) IntraMuscular once  haloperidol     Tablet 1 milliGRAM(s) Oral <User Schedule>  insulin lispro (ADMELOG) corrective regimen sliding scale   SubCutaneous three times a day before meals  insulin lispro (ADMELOG) corrective regimen sliding scale   SubCutaneous at bedtime  melatonin 6 milliGRAM(s) Oral at bedtime  metoprolol tartrate 50 milliGRAM(s) Oral two times a day  pantoprazole    Tablet 40 milliGRAM(s) Oral before breakfast  traZODone 25 milliGRAM(s) Oral <User Schedule>  traZODone 25 milliGRAM(s) Oral <User Schedule>  traZODone 50 milliGRAM(s) Oral <User Schedule>    MEDICATIONS  (PRN):  acetaminophen     Tablet .. 650 milliGRAM(s) Oral every 6 hours PRN Temp greater or equal to 38C (100.4F), Mild Pain (1 - 3)  aluminum hydroxide/magnesium hydroxide/simethicone Suspension 30 milliLiter(s) Oral every 4 hours PRN Dyspepsia  dextrose Oral Gel 15 Gram(s) Oral once PRN Blood Glucose LESS THAN 70 milliGRAM(s)/deciliter  haloperidol    Injectable 1 milliGRAM(s) IntraMuscular every 6 hours PRN Agitation  ondansetron Injectable 4 milliGRAM(s) IV Push every 8 hours PRN Nausea and/or Vomiting    LABS:              CAPILLARY BLOOD GLUCOSE      POCT Blood Glucose.: 100 mg/dL (20 Apr 2023 12:39)  POCT Blood Glucose.: 111 mg/dL (20 Apr 2023 08:13)  POCT Blood Glucose.: 107 mg/dL (19 Apr 2023 21:38)              RADIOLOGY & ADDITIONAL TESTS:    Consultant(s) Notes Reviewed:  [x ] YES  [ ] NO    PHYSICAL EXAM:  GENERAL: NAD, well-groomed, well-developed,not in any distress ,  HEAD:  Atraumatic, Normocephalic  NECK: Supple, No JVD, Normal thyroid  NERVOUS SYSTEM:  Alert & Oriented X3, No focal deficit   CHEST/LUNG: Good air entry bilateral with no  rales, rhonchi, wheezing, or rubs  HEART: Regular rate and rhythm; No murmurs, rubs, or gallops  ABDOMEN: Soft, Nontender, Nondistended; Bowel sounds present  EXTREMITIES:  2+ Peripheral Pulses, No clubbing, cyanosis, or edema      Care Discussed with Consultants/Other Providers [ x] YES  [ ] NO
Date of Service  : 04-23-23 @ 16:27    INTERVAL HPI/OVERNIGHT EVENTS: Seen and examined earlier . Had threatened to kill herself so on 1;1   Vital Signs Last 24 Hrs  T(C): 36.5 (23 Apr 2023 15:39), Max: 36.8 (22 Apr 2023 20:00)  T(F): 97.7 (23 Apr 2023 15:39), Max: 98.3 (23 Apr 2023 11:30)  HR: 72 (23 Apr 2023 15:39) (69 - 100)  BP: 108/62 (23 Apr 2023 15:39) (106/65 - 135/69)  BP(mean): --  RR: 17 (23 Apr 2023 15:39) (16 - 18)  SpO2: 97% (23 Apr 2023 15:39) (97% - 100%)    Parameters below as of 23 Apr 2023 15:39  Patient On (Oxygen Delivery Method): room air      I&O's Summary    22 Apr 2023 07:01  -  23 Apr 2023 07:00  --------------------------------------------------------  IN: 500 mL / OUT: 800 mL / NET: -300 mL      MEDICATIONS  (STANDING):  albuterol/ipratropium for Nebulization 3 milliLiter(s) Nebulizer every 12 hours  buDESOnide    Inhalation Suspension 0.5 milliGRAM(s) Inhalation every 12 hours  dextrose 5%. 1000 milliLiter(s) (50 mL/Hr) IV Continuous <Continuous>  dextrose 5%. 1000 milliLiter(s) (100 mL/Hr) IV Continuous <Continuous>  dextrose 50% Injectable 12.5 Gram(s) IV Push once  dextrose 50% Injectable 25 Gram(s) IV Push once  dextrose 50% Injectable 25 Gram(s) IV Push once  enoxaparin Injectable 40 milliGRAM(s) SubCutaneous every 24 hours  gabapentin 300 milliGRAM(s) Oral three times a day  glucagon  Injectable 1 milliGRAM(s) IntraMuscular once  haloperidol     Tablet 1 milliGRAM(s) Oral <User Schedule>  insulin lispro (ADMELOG) corrective regimen sliding scale   SubCutaneous three times a day before meals  insulin lispro (ADMELOG) corrective regimen sliding scale   SubCutaneous at bedtime  melatonin 6 milliGRAM(s) Oral at bedtime  metoprolol tartrate 25 milliGRAM(s) Oral two times a day  pantoprazole    Tablet 40 milliGRAM(s) Oral before breakfast  traZODone 50 milliGRAM(s) Oral <User Schedule>  traZODone 25 milliGRAM(s) Oral <User Schedule>  traZODone 25 milliGRAM(s) Oral <User Schedule>    MEDICATIONS  (PRN):  acetaminophen     Tablet .. 650 milliGRAM(s) Oral every 6 hours PRN Temp greater or equal to 38C (100.4F), Mild Pain (1 - 3)  aluminum hydroxide/magnesium hydroxide/simethicone Suspension 30 milliLiter(s) Oral every 4 hours PRN Dyspepsia  dextrose Oral Gel 15 Gram(s) Oral once PRN Blood Glucose LESS THAN 70 milliGRAM(s)/deciliter  haloperidol    Injectable 1 milliGRAM(s) IntraMuscular every 6 hours PRN Agitation  ondansetron Injectable 4 milliGRAM(s) IV Push every 8 hours PRN Nausea and/or Vomiting    LABS:                        10.8   9.52  )-----------( 248      ( 22 Apr 2023 07:01 )             34.0     04-22    134<L>  |  99  |  25<H>  ----------------------------<  100<H>  4.6   |  22  |  0.50    Ca    9.1      22 Apr 2023 07:01  Phos  2.4     04-22  Mg     1.80     04-22          CAPILLARY BLOOD GLUCOSE      POCT Blood Glucose.: 95 mg/dL (23 Apr 2023 12:27)  POCT Blood Glucose.: 92 mg/dL (23 Apr 2023 08:23)  POCT Blood Glucose.: 101 mg/dL (22 Apr 2023 23:00)  POCT Blood Glucose.: 105 mg/dL (22 Apr 2023 16:31)              RADIOLOGY & ADDITIONAL TESTS:    Consultant(s) Notes Reviewed:  [x ] YES  [ ] NO    PHYSICAL EXAM:  GENERAL: Not in any distress ,  HEAD:  Atraumatic, Normocephalic  NECK: Supple, No JVD, Normal thyroid  NERVOUS SYSTEM:  Alert   CHEST/LUNG: Good air entry bilateral with no  rales, rhonchi, wheezing, or rubs  HEART: Regular rate and rhythm; No murmurs, rubs, or gallops  ABDOMEN: Soft, Nontender, Nondistended; Bowel sounds present  EXTREMITIES:  2+ Peripheral Pulses, No clubbing, cyanosis, or edema    Care Discussed with Consultants/Other Providers [ x] YES  [ ] NO
Date of Service  : 04-25-23     INTERVAL HPI/OVERNIGHT EVENTS: Seen and examined earlier . Getting PT.   Vital Signs Last 24 Hrs  T(C): 36.6 (25 Apr 2023 05:06), Max: 36.6 (25 Apr 2023 05:06)  T(F): 97.8 (25 Apr 2023 05:06), Max: 97.8 (25 Apr 2023 05:06)  HR: 72 (25 Apr 2023 08:03) (70 - 75)  BP: 116/62 (25 Apr 2023 05:06) (116/62 - 155/61)  BP(mean): --  RR: 16 (25 Apr 2023 05:06) (16 - 17)  SpO2: 100% (25 Apr 2023 08:03) (100% - 100%)    Parameters below as of 25 Apr 2023 08:03  Patient On (Oxygen Delivery Method): room air      I&O's Summary    24 Apr 2023 07:01  -  25 Apr 2023 07:00  --------------------------------------------------------  IN: 400 mL / OUT: 200 mL / NET: 200 mL      MEDICATIONS  (STANDING):  albuterol/ipratropium for Nebulization 3 milliLiter(s) Nebulizer every 12 hours  buDESOnide    Inhalation Suspension 0.5 milliGRAM(s) Inhalation every 12 hours  dextrose 5%. 1000 milliLiter(s) (100 mL/Hr) IV Continuous <Continuous>  dextrose 5%. 1000 milliLiter(s) (50 mL/Hr) IV Continuous <Continuous>  dextrose 50% Injectable 25 Gram(s) IV Push once  dextrose 50% Injectable 12.5 Gram(s) IV Push once  dextrose 50% Injectable 25 Gram(s) IV Push once  enoxaparin Injectable 40 milliGRAM(s) SubCutaneous every 24 hours  gabapentin 300 milliGRAM(s) Oral three times a day  glucagon  Injectable 1 milliGRAM(s) IntraMuscular once  haloperidol     Tablet 1 milliGRAM(s) Oral <User Schedule>  insulin lispro (ADMELOG) corrective regimen sliding scale   SubCutaneous three times a day before meals  insulin lispro (ADMELOG) corrective regimen sliding scale   SubCutaneous at bedtime  melatonin 6 milliGRAM(s) Oral at bedtime  metoprolol tartrate 25 milliGRAM(s) Oral two times a day  pantoprazole    Tablet 40 milliGRAM(s) Oral before breakfast  traZODone 50 milliGRAM(s) Oral <User Schedule>  traZODone 25 milliGRAM(s) Oral <User Schedule>  traZODone 25 milliGRAM(s) Oral <User Schedule>    MEDICATIONS  (PRN):  acetaminophen     Tablet .. 650 milliGRAM(s) Oral every 6 hours PRN Temp greater or equal to 38C (100.4F), Mild Pain (1 - 3)  aluminum hydroxide/magnesium hydroxide/simethicone Suspension 30 milliLiter(s) Oral every 4 hours PRN Dyspepsia  dextrose Oral Gel 15 Gram(s) Oral once PRN Blood Glucose LESS THAN 70 milliGRAM(s)/deciliter  haloperidol    Injectable 1 milliGRAM(s) IntraMuscular every 6 hours PRN Agitation  ondansetron Injectable 4 milliGRAM(s) IV Push every 8 hours PRN Nausea and/or Vomiting    LABS:                        10.3   6.18  )-----------( 273      ( 25 Apr 2023 07:24 )             32.6     04-25    140  |  103  |  20  ----------------------------<  93  3.8   |  26  |  0.50    Ca    9.2      25 Apr 2023 07:24  Phos  2.5     04-25  Mg     2.00     04-25          CAPILLARY BLOOD GLUCOSE      POCT Blood Glucose.: 87 mg/dL (25 Apr 2023 08:53)  POCT Blood Glucose.: 81 mg/dL (24 Apr 2023 22:17)  POCT Blood Glucose.: 86 mg/dL (24 Apr 2023 16:36)  POCT Blood Glucose.: 105 mg/dL (24 Apr 2023 12:35)                Consultant(s) Notes Reviewed:  [x ] YES  [ ] NO    PHYSICAL EXAM:  GENERAL: NAD, well-groomed, well-developed,not in any distress ,  HEAD:  Atraumatic, Normocephalic  NECK: Supple, No JVD, Normal thyroid  NERVOUS SYSTEM:  Alert   CHEST/LUNG: Good air entry bilateral with no  rales, rhonchi, wheezing, or rubs  HEART: Regular rate and rhythm; No murmurs, rubs, or gallops  ABDOMEN: Soft, Nontender, Nondistended; Bowel sounds present  EXTREMITIES:  2+ Peripheral Pulses, No clubbing, cyanosis, or edema    Care Discussed with Consultants/Other Providers [ x] YES  [ ] NO
Date of Service  : 04-27-23    INTERVAL HPI/OVERNIGHT EVENTS: I feel fine.   Vital Signs Last 24 Hrs  T(C): 36.4 (27 Apr 2023 12:04), Max: 36.4 (27 Apr 2023 12:04)  T(F): 97.5 (27 Apr 2023 12:04), Max: 97.5 (27 Apr 2023 12:04)  HR: 76 (27 Apr 2023 12:04) (59 - 79)  BP: 116/56 (27 Apr 2023 12:04) (116/56 - 133/48)  BP(mean): --  RR: 18 (27 Apr 2023 12:04) (18 - 18)  SpO2: 100% (27 Apr 2023 12:04) (100% - 100%)    Parameters below as of 27 Apr 2023 12:04  Patient On (Oxygen Delivery Method): room air      I&O's Summary    MEDICATIONS  (STANDING):  albuterol/ipratropium for Nebulization 3 milliLiter(s) Nebulizer every 12 hours  buDESOnide    Inhalation Suspension 0.5 milliGRAM(s) Inhalation every 12 hours  dextrose 5%. 1000 milliLiter(s) (50 mL/Hr) IV Continuous <Continuous>  dextrose 5%. 1000 milliLiter(s) (100 mL/Hr) IV Continuous <Continuous>  dextrose 50% Injectable 25 Gram(s) IV Push once  dextrose 50% Injectable 12.5 Gram(s) IV Push once  dextrose 50% Injectable 25 Gram(s) IV Push once  enoxaparin Injectable 40 milliGRAM(s) SubCutaneous every 24 hours  gabapentin 300 milliGRAM(s) Oral three times a day  glucagon  Injectable 1 milliGRAM(s) IntraMuscular once  haloperidol     Tablet 1 milliGRAM(s) Oral <User Schedule>  insulin lispro (ADMELOG) corrective regimen sliding scale   SubCutaneous three times a day before meals  insulin lispro (ADMELOG) corrective regimen sliding scale   SubCutaneous at bedtime  melatonin 6 milliGRAM(s) Oral at bedtime  metoprolol tartrate 25 milliGRAM(s) Oral two times a day  pantoprazole    Tablet 40 milliGRAM(s) Oral before breakfast  traZODone 25 milliGRAM(s) Oral <User Schedule>  traZODone 50 milliGRAM(s) Oral <User Schedule>  traZODone 25 milliGRAM(s) Oral <User Schedule>    MEDICATIONS  (PRN):  acetaminophen     Tablet .. 650 milliGRAM(s) Oral every 6 hours PRN Temp greater or equal to 38C (100.4F), Mild Pain (1 - 3)  aluminum hydroxide/magnesium hydroxide/simethicone Suspension 30 milliLiter(s) Oral every 4 hours PRN Dyspepsia  dextrose Oral Gel 15 Gram(s) Oral once PRN Blood Glucose LESS THAN 70 milliGRAM(s)/deciliter  haloperidol    Injectable 1 milliGRAM(s) IntraMuscular every 6 hours PRN Agitation  lidocaine   4% Patch 1 Patch Transdermal every 24 hours PRN hip pain  ondansetron Injectable 4 milliGRAM(s) IV Push every 8 hours PRN Nausea and/or Vomiting    LABS:    04-26    140  |  104  |  21  ----------------------------<  97  4.3   |  24  |  0.51    Ca    9.9      26 Apr 2023 05:50  Phos  3.8     04-26  Mg     2.10     04-26          CAPILLARY BLOOD GLUCOSE      POCT Blood Glucose.: 131 mg/dL (27 Apr 2023 17:12)  POCT Blood Glucose.: 116 mg/dL (27 Apr 2023 12:11)  POCT Blood Glucose.: 105 mg/dL (27 Apr 2023 08:30)  POCT Blood Glucose.: 109 mg/dL (26 Apr 2023 21:03)                Consultant(s) Notes Reviewed:  [x ] YES  [ ] NO    PHYSICAL EXAM:  GENERAL: Not in any distress ,  HEAD:  Atraumatic, Normocephalic  EYES: EOMI, PERRLA, conjunctiva and sclera clear  ENMT: No tonsillar erythema, exudates, or enlargement; Moist mucous membranes, Good dentition, No lesions  NECK: Supple, No JVD, Normal thyroid  NERVOUS SYSTEM:  Alert & Oriented X1  CHEST/LUNG: Good air entry bilateral with no  rales, rhonchi, wheezing, or rubs  HEART: Regular rate and rhythm; No murmurs, rubs, or gallops  ABDOMEN: Soft, Nontender, Nondistended; Bowel sounds present  EXTREMITIES:  2+ Peripheral Pulses, No clubbing, cyanosis, or edema      Care Discussed with Consultants/Other Providers [ x] YES  [ ] NO
Date of Service: 04-18-23 @ 12:05    Patient is a 88y old  Female who presents with a chief complaint of agitation, fall at home? (18 Apr 2023 09:47)      Any change in ROS: seems OK: no sob:  no cough:     MEDICATIONS  (STANDING):  albuterol/ipratropium for Nebulization 3 milliLiter(s) Nebulizer every 6 hours  buDESOnide    Inhalation Suspension 0.5 milliGRAM(s) Inhalation every 12 hours  dextrose 5%. 1000 milliLiter(s) (100 mL/Hr) IV Continuous <Continuous>  dextrose 5%. 1000 milliLiter(s) (50 mL/Hr) IV Continuous <Continuous>  dextrose 50% Injectable 25 Gram(s) IV Push once  dextrose 50% Injectable 12.5 Gram(s) IV Push once  dextrose 50% Injectable 25 Gram(s) IV Push once  enoxaparin Injectable 40 milliGRAM(s) SubCutaneous every 24 hours  gabapentin 300 milliGRAM(s) Oral three times a day  glucagon  Injectable 1 milliGRAM(s) IntraMuscular once  haloperidol     Tablet 1 milliGRAM(s) Oral <User Schedule>  insulin lispro (ADMELOG) corrective regimen sliding scale   SubCutaneous three times a day before meals  insulin lispro (ADMELOG) corrective regimen sliding scale   SubCutaneous at bedtime  melatonin 6 milliGRAM(s) Oral at bedtime  metoprolol tartrate 50 milliGRAM(s) Oral two times a day  pantoprazole    Tablet 40 milliGRAM(s) Oral before breakfast  traZODone 50 milliGRAM(s) Oral <User Schedule>  traZODone 12.5 milliGRAM(s) Oral <User Schedule>  traZODone 25 milliGRAM(s) Oral <User Schedule>    MEDICATIONS  (PRN):  acetaminophen     Tablet .. 650 milliGRAM(s) Oral every 6 hours PRN Temp greater or equal to 38C (100.4F), Mild Pain (1 - 3)  aluminum hydroxide/magnesium hydroxide/simethicone Suspension 30 milliLiter(s) Oral every 4 hours PRN Dyspepsia  dextrose Oral Gel 15 Gram(s) Oral once PRN Blood Glucose LESS THAN 70 milliGRAM(s)/deciliter  haloperidol    Injectable 1 milliGRAM(s) IntraMuscular every 6 hours PRN Agitation  ondansetron Injectable 4 milliGRAM(s) IV Push every 8 hours PRN Nausea and/or Vomiting    Vital Signs Last 24 Hrs  T(C): 36.7 (18 Apr 2023 05:00), Max: 36.7 (18 Apr 2023 05:00)  T(F): 98 (18 Apr 2023 05:00), Max: 98 (18 Apr 2023 05:00)  HR: 78 (18 Apr 2023 05:00) (60 - 79)  BP: 145/72 (18 Apr 2023 05:00) (121/63 - 145/72)  BP(mean): --  RR: 18 (18 Apr 2023 05:00) (18 - 18)  SpO2: 98% (18 Apr 2023 05:00) (94% - 100%)    Parameters below as of 18 Apr 2023 05:00  Patient On (Oxygen Delivery Method): room air        I&O's Summary    18 Apr 2023 07:01  -  18 Apr 2023 12:05  --------------------------------------------------------  IN: 240 mL / OUT: 0 mL / NET: 240 mL          Physical Exam:   GENERAL: NAD, well-groomed, well-developed  HEENT: [place]/   Atraumatic, Normocephalic  ENMT: No tonsillar erythema, exudates, or enlargement; Moist mucous membranes, Good dentition, No lesions  NECK: Supple, No JVD, Normal thyroid  CHEST/LUNG: Clear to auscultaion  CVS: Regular rate and rhythm; No murmurs, rubs, or gallops  GI: : Soft, Nontender, Nondistended; Bowel sounds present  NERVOUS SYSTEM:  sleepy on 2 L   EXTREMITIES:  - edema  LYMPH: No lymphadenopathy noted  SKIN: No rashes or lesions  ENDOCRINOLOGY: No Thyromegaly  PSYCH: calm     Labs:  28                            10.8   6.54  )-----------( 287      ( 17 Apr 2023 07:14 )             35.1                         10.3   5.61  )-----------( 265      ( 16 Apr 2023 06:04 )             33.8     04-17    137  |  97<L>  |  25<H>  ----------------------------<  105<H>  4.4   |  31  |  0.46<L>  04-16    139  |  97<L>  |  20  ----------------------------<  110<H>  3.8   |  32<H>  |  0.43<L>    Ca    9.5      17 Apr 2023 07:14  Phos  3.5     04-17  Mg     1.90     04-17      CAPILLARY BLOOD GLUCOSE      POCT Blood Glucose.: 110 mg/dL (18 Apr 2023 08:32)  POCT Blood Glucose.: 154 mg/dL (17 Apr 2023 22:15)  POCT Blood Glucose.: 118 mg/dL (17 Apr 2023 17:30)  POCT Blood Glucose.: 115 mg/dL (17 Apr 2023 12:21)        rad< from: CT Chest No Cont (04.13.23 @ 20:50) >    BONES: There is diffusequalitative osteopenia and multilevel discogenic   disease in the spine. severe chronic compression fracture/vertebral plana   no normal kidneys as this of T8.    IMPRESSION:    1.  Congestive heart failure    2.  Left thyroid hypodense lesion measuring 1.9 x 1.5 cm. Given size,   ultrasound evaluation is recommended.    3.  Emphysema    4.  Few lung nodules measuring up to 5 mm. Consider a repeat chest CT   scan in one year for reassessment.    --- End of Report ---      < end of copied text >            RECENT CULTURES:  04-12 @ 15:40 .Blood Blood-Venous                No Growth Final    04-12 @ 15:30 .Blood Blood-Peripheral                No Growth Final    04-11 @ 15:42 Clean Catch Clean Catch (Midstream)                <10,000 CFU/mL Normal Urogenital Tamika          RESPIRATORY CULTURES:          Studies  Chest X-RAY  CT SCAN Chest   Venous Dopplers: LE:   CT Abdomen  Others              
Date of Service: 04-19-23 @ 13:20    Patient is a 88y old  Female who presents with a chief complaint of agitation, fall at home? (18 Apr 2023 12:04)      Any change in ROS: seems stable:  no sob;  no cough     MEDICATIONS  (STANDING):  albuterol/ipratropium for Nebulization 3 milliLiter(s) Nebulizer every 6 hours  buDESOnide    Inhalation Suspension 0.5 milliGRAM(s) Inhalation every 12 hours  dextrose 5%. 1000 milliLiter(s) (100 mL/Hr) IV Continuous <Continuous>  dextrose 5%. 1000 milliLiter(s) (50 mL/Hr) IV Continuous <Continuous>  dextrose 50% Injectable 25 Gram(s) IV Push once  dextrose 50% Injectable 12.5 Gram(s) IV Push once  dextrose 50% Injectable 25 Gram(s) IV Push once  enoxaparin Injectable 40 milliGRAM(s) SubCutaneous every 24 hours  gabapentin 300 milliGRAM(s) Oral three times a day  glucagon  Injectable 1 milliGRAM(s) IntraMuscular once  haloperidol     Tablet 1 milliGRAM(s) Oral <User Schedule>  insulin lispro (ADMELOG) corrective regimen sliding scale   SubCutaneous three times a day before meals  insulin lispro (ADMELOG) corrective regimen sliding scale   SubCutaneous at bedtime  melatonin 6 milliGRAM(s) Oral at bedtime  metoprolol tartrate 50 milliGRAM(s) Oral two times a day  pantoprazole    Tablet 40 milliGRAM(s) Oral before breakfast  traZODone 50 milliGRAM(s) Oral <User Schedule>  traZODone 25 milliGRAM(s) Oral <User Schedule>  traZODone 25 milliGRAM(s) Oral <User Schedule>    MEDICATIONS  (PRN):  acetaminophen     Tablet .. 650 milliGRAM(s) Oral every 6 hours PRN Temp greater or equal to 38C (100.4F), Mild Pain (1 - 3)  aluminum hydroxide/magnesium hydroxide/simethicone Suspension 30 milliLiter(s) Oral every 4 hours PRN Dyspepsia  dextrose Oral Gel 15 Gram(s) Oral once PRN Blood Glucose LESS THAN 70 milliGRAM(s)/deciliter  haloperidol    Injectable 1 milliGRAM(s) IntraMuscular every 6 hours PRN Agitation  ondansetron Injectable 4 milliGRAM(s) IV Push every 8 hours PRN Nausea and/or Vomiting    Vital Signs Last 24 Hrs  T(C): 36.6 (19 Apr 2023 11:31), Max: 36.7 (19 Apr 2023 05:15)  T(F): 97.9 (19 Apr 2023 11:31), Max: 98 (19 Apr 2023 05:15)  HR: 65 (19 Apr 2023 11:31) (65 - 93)  BP: 101/80 (19 Apr 2023 11:31) (101/80 - 139/94)  BP(mean): --  RR: 17 (19 Apr 2023 11:31) (17 - 18)  SpO2: 95% (19 Apr 2023 11:31) (94% - 99%)    Parameters below as of 19 Apr 2023 11:31  Patient On (Oxygen Delivery Method): room air        I&O's Summary    18 Apr 2023 07:01  -  19 Apr 2023 07:00  --------------------------------------------------------  IN: 240 mL / OUT: 0 mL / NET: 240 mL    19 Apr 2023 07:01  -  19 Apr 2023 13:20  --------------------------------------------------------  IN: 240 mL / OUT: 0 mL / NET: 240 mL          Physical Exam:   GENERAL: NAD, well-groomed, well-developed  HEENT: KWABENA/   Atraumatic, Normocephalic  ENMT: No tonsillar erythema, exudates, or enlargement; Moist mucous membranes, Good dentition, No lesions  NECK: Supple, No JVD, Normal thyroid  CHEST/LUNG: Clear to auscultaion- no wheezing  CVS: Regular rate and rhythm; No murmurs, rubs, or gallops  GI: : Soft, Nontender, Nondistended; Bowel sounds present  NERVOUS SYSTEM:  comfortable on 2 L : not agiated  EXTREMITIES:-edema  LYMPH: No lymphadenopathy noted  SKIN: No rashes or lesions  ENDOCRINOLOGY: No Thyromegaly  PSYCH: calm     Labs:                              10.8   6.54  )-----------( 287      ( 17 Apr 2023 07:14 )             35.1                         10.3   5.61  )-----------( 265      ( 16 Apr 2023 06:04 )             33.8     04-17    137  |  97<L>  |  25<H>  ----------------------------<  105<H>  4.4   |  31  |  0.46<L>  04-16    139  |  97<L>  |  20  ----------------------------<  110<H>  3.8   |  32<H>  |  0.43<L>        CAPILLARY BLOOD GLUCOSE      POCT Blood Glucose.: 116 mg/dL (19 Apr 2023 12:09)  POCT Blood Glucose.: 103 mg/dL (19 Apr 2023 08:24)  POCT Blood Glucose.: 89 mg/dL (18 Apr 2023 23:07)  POCT Blood Glucose.: 113 mg/dL (18 Apr 2023 17:52)        rad< from: CT Chest No Cont (04.13.23 @ 20:50) >  Cholelithiasis. Mild adrenal gland thickening, left greater than right.    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 severe chronic compression fracture/vertebral plana   no normal kidneys as this of T8.    IMPRESSION:    1.  Congestive heart failure    2.  Left thyroid hypodense lesion measuring 1.9 x 1.5 cm. Given size,   ultrasound evaluation is recommended.    3.  Emphysema    4.  Few lung nodules measuring up to 5 mm. Consider a repeat chest CT   scan in one year for reassessment.    --- End of Report ---    < end of copied text >            RECENT CULTURES:  04-12 @ 15:40 .Blood Blood-Venous                No Growth Final    04-12 @ 15:30 .Blood Blood-Peripheral                No Growth Final          RESPIRATORY CULTURES:          Studies  Chest X-RAY  CT SCAN Chest   Venous Dopplers: LE:   CT Abdomen  Others              
Date of Service: 23 @ 14:54    Patient is a 88y old  Female who presents with a chief complaint of agitation, fall at home? (2023 12:27)      Any change in ROS: calmer today  on 2 L of oxygen      MEDICATIONS  (STANDING):  albuterol/ipratropium for Nebulization 3 milliLiter(s) Nebulizer every 6 hours  dextrose 5%. 1000 milliLiter(s) (50 mL/Hr) IV Continuous <Continuous>  dextrose 5%. 1000 milliLiter(s) (100 mL/Hr) IV Continuous <Continuous>  dextrose 50% Injectable 25 Gram(s) IV Push once  dextrose 50% Injectable 12.5 Gram(s) IV Push once  dextrose 50% Injectable 25 Gram(s) IV Push once  enoxaparin Injectable 40 milliGRAM(s) SubCutaneous every 24 hours  gabapentin 300 milliGRAM(s) Oral three times a day  glucagon  Injectable 1 milliGRAM(s) IntraMuscular once  haloperidol     Tablet 1 milliGRAM(s) Oral three times a day  insulin lispro (ADMELOG) corrective regimen sliding scale   SubCutaneous three times a day before meals  insulin lispro (ADMELOG) corrective regimen sliding scale   SubCutaneous at bedtime  melatonin 6 milliGRAM(s) Oral at bedtime  methylPREDNISolone sodium succinate Injectable 20 milliGRAM(s) IV Push two times a day  pantoprazole    Tablet 40 milliGRAM(s) Oral before breakfast  piperacillin/tazobactam IVPB.. 3.375 Gram(s) IV Intermittent every 8 hours  traZODone 50 milliGRAM(s) Oral <User Schedule>  traZODone 12.5 milliGRAM(s) Oral <User Schedule>    MEDICATIONS  (PRN):  acetaminophen     Tablet .. 650 milliGRAM(s) Oral every 6 hours PRN Temp greater or equal to 38C (100.4F), Mild Pain (1 - 3)  aluminum hydroxide/magnesium hydroxide/simethicone Suspension 30 milliLiter(s) Oral every 4 hours PRN Dyspepsia  dextrose Oral Gel 15 Gram(s) Oral once PRN Blood Glucose LESS THAN 70 milliGRAM(s)/deciliter  haloperidol    Injectable 1 milliGRAM(s) IntraMuscular every 6 hours PRN Agitation  ondansetron Injectable 4 milliGRAM(s) IV Push every 8 hours PRN Nausea and/or Vomiting    Vital Signs Last 24 Hrs  T(C): 36.3 (2023 11:00), Max: 38.3 (2023 15:00)  T(F): 97.3 (2023 11:00), Max: 100.9 (2023 15:00)  HR: 62 (2023 11:00) (62 - 96)  BP: 138/68 (2023 11:00) (138/68 - 152/68)  BP(mean): --  RR: 17 (2023 11:00) (17 - 20)  SpO2: 99% (2023 11:00) (91% - 100%)    Parameters below as of 2023 11:00  Patient On (Oxygen Delivery Method): nasal cannula  O2 Flow (L/min): 4      I&O's Summary        Physical Exam:   GENERAL: NAD, well-groomed, well-developed  HEENT: KWABENA/   Atraumatic, Normocephalic  ENMT: No tonsillar erythema, exudates, or enlargement; Moist mucous membranes, Good dentition, No lesions  NECK: Supple, No JVD, Normal thyroid  CHEST/LUNG: Clear to auscultaion- no wheezing  CVS: Regular rate and rhythm; No murmurs, rubs, or gallops  GI: : Soft, Nontender, Nondistended; Bowel sounds present  NERVOUS SYSTEM:  sleepy on 2 L   EXTREMITIES: -edema  LYMPH: No lymphadenopathy noted  SKIN: No rashes or lesions  ENDOCRINOLOGY: No Thyromegaly  PSYCH: calm     Labs:  28                            9.3    6.11  )-----------( 279      ( 2023 06:57 )             30.5                         10.8   8.40  )-----------( 332      ( 2023 10:55 )             35.7     0412    141  |  101  |  19  ----------------------------<  111<H>  4.0   |  25  |  0.47<L>  11    139  |  100  |  21  ----------------------------<  104<H>  4.1   |  30  |  0.50    Ca    9.5      2023 06:57  Phos  3.3     -  Mg     1.80     -12    TPro  6.9  /  Alb  3.7  /  TBili  0.6  /  DBili  x   /  AST  16  /  ALT  11  /  AlkPhos  115  -    CAPILLARY BLOOD GLUCOSE      POCT Blood Glucose.: 166 mg/dL (2023 12:20)  POCT Blood Glucose.: 109 mg/dL (2023 08:35)  POCT Blood Glucose.: 123 mg/dL (2023 23:14)  POCT Blood Glucose.: 111 mg/dL (2023 18:04)          Urinalysis Basic - ( 2023 15:42 )    Color: Yellow / Appearance: Clear / S.029 / pH: x  Gluc: x / Ketone: Negative  / Bili: Negative / Urobili: <2 mg/dL   Blood: x / Protein: Trace / Nitrite: Negative   Leuk Esterase: Negative / RBC: x / WBC x   Sq Epi: x / Non Sq Epi: x / Bacteria: x            RECENT CULTURES:   @ 15:42 Clean Catch Clean Catch (Midstream)            rad< from: Xray Chest 2 Views PA/Lat (23 @ 14:40) >  ACC: [id number] EXAM:  XR CHEST PA LAT 2V   ORDERED BY: CAREN SCHILLING     PROCEDURE DATE:  2023          INTERPRETATION:  CLINICAL INDICATION: Status post fall. eval rib   fracture. hypoxia    TECHNIQUE: 2 views; Frontal and lateral views of the chest were obtained.    COMPARISON: Chest x-ray 9/10/2022.    FINDINGS:  The heart size is poorly assessed on this projection  Lungs are clear considering technique.  No pneumothorax  No acute osseous abnormality.    IMPRESSION: No acute traumatic findings.      --- End of Report ---          COLTEN HALE MD; Resident Radiologist  This document has been electronically signed.  KELLY CAMERON MD; Attending Radiologist  This document has been electronically signed. 2023  4:09PM    < end of copied text >      <10,000 CFU/mL Normal Urogenital Tamika          RESPIRATORY CULTURES:          Studies  Chest X-RAY  CT SCAN Chest   Venous Dopplers: LE:   CT Abdomen  Others              
Patient is a 88y old  Female who presents with a chief complaint of agitation, fall at home? (14 Apr 2023 15:17)      DATE OF SERVICE: 04-15-23 @ 10:19    SUBJECTIVE / OVERNIGHT EVENTS: overnight events noted    ROS:  Resp: No cough no sputum production  CVS: No chest pain no palpitations no orthopnea  GI: no N/V/D        MEDICATIONS  (STANDING):  albuterol/ipratropium for Nebulization 3 milliLiter(s) Nebulizer every 6 hours  dextrose 5%. 1000 milliLiter(s) (50 mL/Hr) IV Continuous <Continuous>  dextrose 5%. 1000 milliLiter(s) (100 mL/Hr) IV Continuous <Continuous>  dextrose 50% Injectable 25 Gram(s) IV Push once  dextrose 50% Injectable 12.5 Gram(s) IV Push once  dextrose 50% Injectable 25 Gram(s) IV Push once  enoxaparin Injectable 40 milliGRAM(s) SubCutaneous every 24 hours  furosemide   Injectable 20 milliGRAM(s) IV Push daily  gabapentin 300 milliGRAM(s) Oral three times a day  glucagon  Injectable 1 milliGRAM(s) IntraMuscular once  haloperidol     Tablet 1 milliGRAM(s) Oral <User Schedule>  insulin lispro (ADMELOG) corrective regimen sliding scale   SubCutaneous three times a day before meals  insulin lispro (ADMELOG) corrective regimen sliding scale   SubCutaneous at bedtime  melatonin 6 milliGRAM(s) Oral at bedtime  metoprolol tartrate 50 milliGRAM(s) Oral two times a day  pantoprazole    Tablet 40 milliGRAM(s) Oral before breakfast  predniSONE   Tablet 20 milliGRAM(s) Oral every 12 hours  traZODone 25 milliGRAM(s) Oral <User Schedule>  traZODone 12.5 milliGRAM(s) Oral <User Schedule>  traZODone 50 milliGRAM(s) Oral <User Schedule>    MEDICATIONS  (PRN):  acetaminophen     Tablet .. 650 milliGRAM(s) Oral every 6 hours PRN Temp greater or equal to 38C (100.4F), Mild Pain (1 - 3)  aluminum hydroxide/magnesium hydroxide/simethicone Suspension 30 milliLiter(s) Oral every 4 hours PRN Dyspepsia  dextrose Oral Gel 15 Gram(s) Oral once PRN Blood Glucose LESS THAN 70 milliGRAM(s)/deciliter  haloperidol    Injectable 1 milliGRAM(s) IntraMuscular every 6 hours PRN Agitation  ondansetron Injectable 4 milliGRAM(s) IV Push every 8 hours PRN Nausea and/or Vomiting        CAPILLARY BLOOD GLUCOSE      POCT Blood Glucose.: 109 mg/dL (15 Apr 2023 08:47)  POCT Blood Glucose.: 136 mg/dL (15 Apr 2023 01:07)  POCT Blood Glucose.: 127 mg/dL (14 Apr 2023 21:42)  POCT Blood Glucose.: 142 mg/dL (14 Apr 2023 17:15)  POCT Blood Glucose.: 147 mg/dL (14 Apr 2023 12:09)    I&O's Summary      Vital Signs Last 24 Hrs  T(C): 36.6 (15 Apr 2023 05:04), Max: 37.3 (14 Apr 2023 19:48)  T(F): 97.9 (15 Apr 2023 05:04), Max: 99.2 (14 Apr 2023 19:48)  HR: 115 (15 Apr 2023 06:39) (69 - 120)  BP: 144/72 (15 Apr 2023 05:04) (120/67 - 151/77)  BP(mean): --  RR: 18 (15 Apr 2023 06:05) (17 - 18)  SpO2: 97% (15 Apr 2023 06:39) (87% - 99%)      PHYSICAL EXAM:  GENERAL: calm   EYES: EOMI, PERRLA  NECK: Supple,  CHEST/LUNG: no wheeze  HEART: S1 S2; no murmurs   ABDOMEN: Soft, Nontender  EXTREMITIES:  no edema  NEUROLOGY: Alert confused    LABS:                        9.1    7.44  )-----------( 283      ( 14 Apr 2023 05:30 )             29.7     04-14    140  |  101  |  20  ----------------------------<  118<H>  4.0   |  29  |  0.54    Ca    9.4      14 Apr 2023 05:30  Phos  2.8     04-14  Mg     1.90     04-14                  All consultant(s) notes reviewed and care discussed with other providers        Contact Number, Dr Carreon [phone]
Date of Service  : 04-22-23     INTERVAL HPI/OVERNIGHT EVENTS: Wanted spoon as was eating with hand.   Vital Signs Last 24 Hrs  T(C): 36.8 (22 Apr 2023 20:00), Max: 36.8 (22 Apr 2023 06:20)  T(F): 98.2 (22 Apr 2023 20:00), Max: 98.2 (22 Apr 2023 06:20)  HR: 69 (22 Apr 2023 20:00) (69 - 100)  BP: 106/65 (22 Apr 2023 20:00) (106/65 - 120/76)  BP(mean): --  RR: 18 (22 Apr 2023 20:00) (16 - 20)  SpO2: 100% (22 Apr 2023 20:00) (96% - 100%)    Parameters below as of 22 Apr 2023 20:00  Patient On (Oxygen Delivery Method): room air      I&O's Summary    22 Apr 2023 07:01  -  22 Apr 2023 23:40  --------------------------------------------------------  IN: 500 mL / OUT: 300 mL / NET: 200 mL      MEDICATIONS  (STANDING):  albuterol/ipratropium for Nebulization 3 milliLiter(s) Nebulizer every 12 hours  buDESOnide    Inhalation Suspension 0.5 milliGRAM(s) Inhalation every 12 hours  dextrose 5%. 1000 milliLiter(s) (100 mL/Hr) IV Continuous <Continuous>  dextrose 5%. 1000 milliLiter(s) (50 mL/Hr) IV Continuous <Continuous>  dextrose 50% Injectable 25 Gram(s) IV Push once  dextrose 50% Injectable 12.5 Gram(s) IV Push once  dextrose 50% Injectable 25 Gram(s) IV Push once  enoxaparin Injectable 40 milliGRAM(s) SubCutaneous every 24 hours  gabapentin 300 milliGRAM(s) Oral three times a day  glucagon  Injectable 1 milliGRAM(s) IntraMuscular once  haloperidol     Tablet 1 milliGRAM(s) Oral <User Schedule>  insulin lispro (ADMELOG) corrective regimen sliding scale   SubCutaneous three times a day before meals  insulin lispro (ADMELOG) corrective regimen sliding scale   SubCutaneous at bedtime  melatonin 6 milliGRAM(s) Oral at bedtime  metoprolol tartrate 25 milliGRAM(s) Oral two times a day  pantoprazole    Tablet 40 milliGRAM(s) Oral before breakfast  traZODone 25 milliGRAM(s) Oral <User Schedule>  traZODone 25 milliGRAM(s) Oral <User Schedule>  traZODone 50 milliGRAM(s) Oral <User Schedule>    MEDICATIONS  (PRN):  acetaminophen     Tablet .. 650 milliGRAM(s) Oral every 6 hours PRN Temp greater or equal to 38C (100.4F), Mild Pain (1 - 3)  aluminum hydroxide/magnesium hydroxide/simethicone Suspension 30 milliLiter(s) Oral every 4 hours PRN Dyspepsia  dextrose Oral Gel 15 Gram(s) Oral once PRN Blood Glucose LESS THAN 70 milliGRAM(s)/deciliter  haloperidol    Injectable 1 milliGRAM(s) IntraMuscular every 6 hours PRN Agitation  ondansetron Injectable 4 milliGRAM(s) IV Push every 8 hours PRN Nausea and/or Vomiting    LABS:                        10.8   9.52  )-----------( 248      ( 22 Apr 2023 07:01 )             34.0     04-22    134<L>  |  99  |  25<H>  ----------------------------<  100<H>  4.6   |  22  |  0.50    Ca    9.1      22 Apr 2023 07:01  Phos  2.4     04-22  Mg     1.80     04-22          CAPILLARY BLOOD GLUCOSE      POCT Blood Glucose.: 101 mg/dL (22 Apr 2023 23:00)  POCT Blood Glucose.: 105 mg/dL (22 Apr 2023 16:31)  POCT Blood Glucose.: 103 mg/dL (22 Apr 2023 12:00)  POCT Blood Glucose.: 106 mg/dL (22 Apr 2023 08:32)              Consultant(s) Notes Reviewed:  [x ] YES  [ ] NO    PHYSICAL EXAM:  GENERAL: Not in any distress ,  HEAD:  Atraumatic, Normocephalic  NECK: Supple, No JVD, Normal thyroid  NERVOUS SYSTEM:  Alert &  No focal deficit   CHEST/LUNG: Good air entry bilateral with no  rales, rhonchi, wheezing, or rubs  HEART: Regular rate and rhythm; No murmurs, rubs, or gallops  ABDOMEN: Soft, Nontender, Nondistended; Bowel sounds present  EXTREMITIES:  2+ Peripheral Pulses, No clubbing, cyanosis, or edema  SKIN: No rashes or lesions    Care Discussed with Consultants/Other Providers [ x] YES  [ ] NO
Date of Service  : 04-24-23     INTERVAL HPI/OVERNIGHT EVENTS: I feel fine.  Vital Signs Last 24 Hrs  T(C): 36.3 (24 Apr 2023 11:57), Max: 36.8 (23 Apr 2023 21:51)  T(F): 97.3 (24 Apr 2023 11:57), Max: 98.2 (23 Apr 2023 21:51)  HR: 70 (24 Apr 2023 16:38) (70 - 85)  BP: 155/61 (24 Apr 2023 16:38) (109/75 - 155/61)  BP(mean): --  RR: 17 (24 Apr 2023 11:57) (16 - 17)  SpO2: 100% (24 Apr 2023 11:57) (99% - 100%)    Parameters below as of 24 Apr 2023 11:57  Patient On (Oxygen Delivery Method): room air      I&O's Summary    23 Apr 2023 07:01  -  24 Apr 2023 07:00  --------------------------------------------------------  IN: 400 mL / OUT: 540 mL / NET: -140 mL      MEDICATIONS  (STANDING):  albuterol/ipratropium for Nebulization 3 milliLiter(s) Nebulizer every 12 hours  buDESOnide    Inhalation Suspension 0.5 milliGRAM(s) Inhalation every 12 hours  dextrose 5%. 1000 milliLiter(s) (50 mL/Hr) IV Continuous <Continuous>  dextrose 5%. 1000 milliLiter(s) (100 mL/Hr) IV Continuous <Continuous>  dextrose 50% Injectable 12.5 Gram(s) IV Push once  dextrose 50% Injectable 25 Gram(s) IV Push once  dextrose 50% Injectable 25 Gram(s) IV Push once  enoxaparin Injectable 40 milliGRAM(s) SubCutaneous every 24 hours  gabapentin 300 milliGRAM(s) Oral three times a day  glucagon  Injectable 1 milliGRAM(s) IntraMuscular once  haloperidol     Tablet 1 milliGRAM(s) Oral <User Schedule>  insulin lispro (ADMELOG) corrective regimen sliding scale   SubCutaneous at bedtime  insulin lispro (ADMELOG) corrective regimen sliding scale   SubCutaneous three times a day before meals  melatonin 6 milliGRAM(s) Oral at bedtime  metoprolol tartrate 25 milliGRAM(s) Oral two times a day  pantoprazole    Tablet 40 milliGRAM(s) Oral before breakfast  traZODone 25 milliGRAM(s) Oral <User Schedule>  traZODone 25 milliGRAM(s) Oral <User Schedule>  traZODone 50 milliGRAM(s) Oral <User Schedule>    MEDICATIONS  (PRN):  acetaminophen     Tablet .. 650 milliGRAM(s) Oral every 6 hours PRN Temp greater or equal to 38C (100.4F), Mild Pain (1 - 3)  aluminum hydroxide/magnesium hydroxide/simethicone Suspension 30 milliLiter(s) Oral every 4 hours PRN Dyspepsia  dextrose Oral Gel 15 Gram(s) Oral once PRN Blood Glucose LESS THAN 70 milliGRAM(s)/deciliter  haloperidol    Injectable 1 milliGRAM(s) IntraMuscular every 6 hours PRN Agitation  ondansetron Injectable 4 milliGRAM(s) IV Push every 8 hours PRN Nausea and/or Vomiting    LABS:              CAPILLARY BLOOD GLUCOSE      POCT Blood Glucose.: 86 mg/dL (24 Apr 2023 16:36)  POCT Blood Glucose.: 105 mg/dL (24 Apr 2023 12:35)  POCT Blood Glucose.: 100 mg/dL (24 Apr 2023 08:34)  POCT Blood Glucose.: 129 mg/dL (23 Apr 2023 22:23)              Consultant(s) Notes Reviewed:  [x ] YES  [ ] NO    PHYSICAL EXAM:  GENERAL: Not in any distress ,  HEAD:  Atraumatic, Normocephalic  EYES: EOMI, PERRLA, conjunctiva and sclera clear  ENMT: No tonsillar erythema, exudates, or enlargement; Moist mucous membranes, Good dentition, No lesions  NECK: Supple, No JVD, Normal thyroid  NERVOUS SYSTEM:  Alert & Oriented X31  CHEST/LUNG: Good air entry bilateral with no  rales, rhonchi, wheezing, or rubs  HEART: Regular rate and rhythm; No murmurs, rubs, or gallops  ABDOMEN: Soft, Nontender, Nondistended; Bowel sounds present  EXTREMITIES:  2+ Peripheral Pulses, No clubbing, cyanosis, or edema      Care Discussed with Consultants/Other Providers [ x] YES  [ ] NO
Date of Service  : 04-26-23     INTERVAL HPI/OVERNIGHT EVENTS: I feel fine.   Vital Signs Last 24 Hrs  T(C): 36.7 (26 Apr 2023 07:04), Max: 36.7 (26 Apr 2023 07:04)  T(F): 98 (26 Apr 2023 07:04), Max: 98 (26 Apr 2023 07:04)  HR: 89 (26 Apr 2023 17:57) (86 - 112)  BP: 140/81 (26 Apr 2023 17:57) (134/63 - 140/81)  BP(mean): --  RR: 20 (26 Apr 2023 07:04) (20 - 20)  SpO2: 96% (26 Apr 2023 08:45) (79% - 100%)    Parameters below as of 26 Apr 2023 08:45  Patient On (Oxygen Delivery Method): room air      I&O's Summary    MEDICATIONS  (STANDING):  albuterol/ipratropium for Nebulization 3 milliLiter(s) Nebulizer every 12 hours  buDESOnide    Inhalation Suspension 0.5 milliGRAM(s) Inhalation every 12 hours  dextrose 5%. 1000 milliLiter(s) (100 mL/Hr) IV Continuous <Continuous>  dextrose 5%. 1000 milliLiter(s) (50 mL/Hr) IV Continuous <Continuous>  dextrose 50% Injectable 25 Gram(s) IV Push once  dextrose 50% Injectable 12.5 Gram(s) IV Push once  dextrose 50% Injectable 25 Gram(s) IV Push once  enoxaparin Injectable 40 milliGRAM(s) SubCutaneous every 24 hours  gabapentin 300 milliGRAM(s) Oral three times a day  glucagon  Injectable 1 milliGRAM(s) IntraMuscular once  haloperidol     Tablet 1 milliGRAM(s) Oral <User Schedule>  insulin lispro (ADMELOG) corrective regimen sliding scale   SubCutaneous three times a day before meals  insulin lispro (ADMELOG) corrective regimen sliding scale   SubCutaneous at bedtime  melatonin 6 milliGRAM(s) Oral at bedtime  metoprolol tartrate 25 milliGRAM(s) Oral two times a day  pantoprazole    Tablet 40 milliGRAM(s) Oral before breakfast  traZODone 25 milliGRAM(s) Oral <User Schedule>  traZODone 50 milliGRAM(s) Oral <User Schedule>  traZODone 25 milliGRAM(s) Oral <User Schedule>    MEDICATIONS  (PRN):  acetaminophen     Tablet .. 650 milliGRAM(s) Oral every 6 hours PRN Temp greater or equal to 38C (100.4F), Mild Pain (1 - 3)  aluminum hydroxide/magnesium hydroxide/simethicone Suspension 30 milliLiter(s) Oral every 4 hours PRN Dyspepsia  dextrose Oral Gel 15 Gram(s) Oral once PRN Blood Glucose LESS THAN 70 milliGRAM(s)/deciliter  haloperidol    Injectable 1 milliGRAM(s) IntraMuscular every 6 hours PRN Agitation  ondansetron Injectable 4 milliGRAM(s) IV Push every 8 hours PRN Nausea and/or Vomiting    LABS:                        10.3   6.18  )-----------( 273      ( 25 Apr 2023 07:24 )             32.6     04-26    140  |  104  |  21  ----------------------------<  97  4.3   |  24  |  0.51    Ca    9.9      26 Apr 2023 05:50  Phos  3.8     04-26  Mg     2.10     04-26          CAPILLARY BLOOD GLUCOSE      POCT Blood Glucose.: 109 mg/dL (26 Apr 2023 21:03)  POCT Blood Glucose.: 114 mg/dL (26 Apr 2023 17:47)  POCT Blood Glucose.: 109 mg/dL (26 Apr 2023 12:05)  POCT Blood Glucose.: 107 mg/dL (26 Apr 2023 08:32)  POCT Blood Glucose.: 111 mg/dL (25 Apr 2023 22:21)          REVIEW OF SYSTEMS:  CONSTITUTIONAL: No fever, weight loss, or fatigue  EYES: No eye pain, visual disturbances, or discharge  ENMT:  No difficulty hearing, tinnitus, vertigo; No sinus or throat pain  NECK: No pain or stiffness  RESPIRATORY: No cough, wheezing, chills or hemoptysis; No shortness of breath  CARDIOVASCULAR: No chest pain, palpitations, dizziness, or leg swelling  GASTROINTESTINAL: No abdominal or epigastric pain. No nausea, vomiting, or hematemesis; No diarrhea or constipation. No melena or hematochezia.  GENITOURINARY: No dysuria, frequency, hematuria, or incontinence  NEUROLOGICAL: No headaches, memory loss, loss of strength, numbness, or tremors    Consultant(s) Notes Reviewed:  [x ] YES  [ ] NO    PHYSICAL EXAM:  GENERAL: NAD, well-groomed, well-developed,not in any distress ,  HEAD:  Atraumatic, Normocephalic  EYES: EOMI, PERRLA, conjunctiva and sclera clear  ENMT: No tonsillar erythema, exudates, or enlargement; Moist mucous membranes, Good dentition, No lesions  NECK: Supple, No JVD, Normal thyroid  NERVOUS SYSTEM:  Alert & Oriented X3, No focal deficit   CHEST/LUNG: Good air entry bilateral with no  rales, rhonchi, wheezing, or rubs  HEART: Regular rate and rhythm; No murmurs, rubs, or gallops  ABDOMEN: Soft, Nontender, Nondistended; Bowel sounds present  EXTREMITIES:  2+ Peripheral Pulses, No clubbing, cyanosis, or edema    Care Discussed with Consultants/Other Providers [ x] YES  [ ] NO
Date of Service  : 04-28-23     INTERVAL HPI/OVERNIGHT EVENTS: I feel fine.   Vital Signs Last 24 Hrs  T(C): 36.6 (28 Apr 2023 18:00), Max: 36.8 (28 Apr 2023 05:30)  T(F): 97.8 (28 Apr 2023 18:00), Max: 98.2 (28 Apr 2023 05:30)  HR: 76 (28 Apr 2023 18:00) (75 - 78)  BP: 101/68 (28 Apr 2023 18:00) (98/48 - 125/62)  BP(mean): --  RR: 18 (28 Apr 2023 18:00) (18 - 18)  SpO2: 100% (28 Apr 2023 18:00) (99% - 100%)    Parameters below as of 28 Apr 2023 18:00  Patient On (Oxygen Delivery Method): room air      I&O's Summary    27 Apr 2023 07:01  -  28 Apr 2023 07:00  --------------------------------------------------------  IN: 480 mL / OUT: 420 mL / NET: 60 mL      MEDICATIONS  (STANDING):  albuterol/ipratropium for Nebulization 3 milliLiter(s) Nebulizer every 12 hours  buDESOnide    Inhalation Suspension 0.5 milliGRAM(s) Inhalation every 12 hours  dextrose 5%. 1000 milliLiter(s) (50 mL/Hr) IV Continuous <Continuous>  dextrose 5%. 1000 milliLiter(s) (100 mL/Hr) IV Continuous <Continuous>  dextrose 50% Injectable 25 Gram(s) IV Push once  dextrose 50% Injectable 25 Gram(s) IV Push once  dextrose 50% Injectable 12.5 Gram(s) IV Push once  enoxaparin Injectable 40 milliGRAM(s) SubCutaneous every 24 hours  gabapentin 300 milliGRAM(s) Oral three times a day  glucagon  Injectable 1 milliGRAM(s) IntraMuscular once  haloperidol     Tablet 1 milliGRAM(s) Oral <User Schedule>  insulin lispro (ADMELOG) corrective regimen sliding scale   SubCutaneous three times a day before meals  insulin lispro (ADMELOG) corrective regimen sliding scale   SubCutaneous at bedtime  melatonin 6 milliGRAM(s) Oral at bedtime  metoprolol tartrate 25 milliGRAM(s) Oral two times a day  pantoprazole    Tablet 40 milliGRAM(s) Oral before breakfast  traZODone 50 milliGRAM(s) Oral <User Schedule>  traZODone 25 milliGRAM(s) Oral <User Schedule>  traZODone 25 milliGRAM(s) Oral <User Schedule>    MEDICATIONS  (PRN):  acetaminophen     Tablet .. 650 milliGRAM(s) Oral every 6 hours PRN Temp greater or equal to 38C (100.4F), Mild Pain (1 - 3)  aluminum hydroxide/magnesium hydroxide/simethicone Suspension 30 milliLiter(s) Oral every 4 hours PRN Dyspepsia  dextrose Oral Gel 15 Gram(s) Oral once PRN Blood Glucose LESS THAN 70 milliGRAM(s)/deciliter  haloperidol    Injectable 1 milliGRAM(s) IntraMuscular every 6 hours PRN Agitation  lidocaine   4% Patch 1 Patch Transdermal every 24 hours PRN hip pain  ondansetron Injectable 4 milliGRAM(s) IV Push every 8 hours PRN Nausea and/or Vomiting    LABS:              CAPILLARY BLOOD GLUCOSE      POCT Blood Glucose.: 106 mg/dL (28 Apr 2023 17:19)  POCT Blood Glucose.: 107 mg/dL (28 Apr 2023 12:22)  POCT Blood Glucose.: 96 mg/dL (28 Apr 2023 08:26)  POCT Blood Glucose.: 104 mg/dL (27 Apr 2023 21:38)                Consultant(s) Notes Reviewed:  [x ] YES  [ ] NO    PHYSICAL EXAM:  GENERAL: Not in any distress ,  HEAD:  Atraumatic, Normocephalic  EYES: EOMI, PERRLA, conjunctiva and sclera clear  ENMT: No tonsillar erythema, exudates, or enlargement; Moist mucous membranes, Good dentition, No lesions  NECK: Supple, No JVD, Normal thyroid  NERVOUS SYSTEM:  Alert & Oriented X1  CHEST/LUNG: Good air entry bilateral with no  rales, rhonchi, wheezing, or rubs  HEART: Regular rate and rhythm; No murmurs, rubs, or gallops  ABDOMEN: Soft, Nontender, Nondistended; Bowel sounds present  EXTREMITIES:  2+ Peripheral Pulses, No clubbing, cyanosis, or edema      Care Discussed with Consultants/Other Providers [ x] YES  [ ] NO
Date of Service  : 04-29-23 @ 11:41    INTERVAL HPI/OVERNIGHT EVENTS:  Vital Signs Last 24 Hrs  T(C): 36.4 (29 Apr 2023 11:13), Max: 36.6 (28 Apr 2023 18:00)  T(F): 97.5 (29 Apr 2023 11:13), Max: 97.8 (28 Apr 2023 18:00)  HR: 66 (29 Apr 2023 11:13) (66 - 77)  BP: 102/55 (29 Apr 2023 11:13) (101/68 - 124/72)  BP(mean): --  RR: 17 (29 Apr 2023 11:13) (17 - 18)  SpO2: 99% (29 Apr 2023 11:13) (96% - 100%)    Parameters below as of 29 Apr 2023 11:13  Patient On (Oxygen Delivery Method): room air      I&O's Summary    29 Apr 2023 07:01  -  29 Apr 2023 11:41  --------------------------------------------------------  IN: 240 mL / OUT: 0 mL / NET: 240 mL      MEDICATIONS  (STANDING):  albuterol/ipratropium for Nebulization 3 milliLiter(s) Nebulizer every 12 hours  buDESOnide    Inhalation Suspension 0.5 milliGRAM(s) Inhalation every 12 hours  dextrose 5%. 1000 milliLiter(s) (50 mL/Hr) IV Continuous <Continuous>  dextrose 5%. 1000 milliLiter(s) (100 mL/Hr) IV Continuous <Continuous>  dextrose 50% Injectable 12.5 Gram(s) IV Push once  dextrose 50% Injectable 25 Gram(s) IV Push once  dextrose 50% Injectable 25 Gram(s) IV Push once  enoxaparin Injectable 40 milliGRAM(s) SubCutaneous every 24 hours  gabapentin 300 milliGRAM(s) Oral three times a day  glucagon  Injectable 1 milliGRAM(s) IntraMuscular once  haloperidol     Tablet 1 milliGRAM(s) Oral <User Schedule>  insulin lispro (ADMELOG) corrective regimen sliding scale   SubCutaneous three times a day before meals  insulin lispro (ADMELOG) corrective regimen sliding scale   SubCutaneous at bedtime  melatonin 6 milliGRAM(s) Oral at bedtime  metoprolol tartrate 25 milliGRAM(s) Oral two times a day  pantoprazole    Tablet 40 milliGRAM(s) Oral before breakfast  traZODone 25 milliGRAM(s) Oral <User Schedule>  traZODone 25 milliGRAM(s) Oral <User Schedule>  traZODone 50 milliGRAM(s) Oral <User Schedule>    MEDICATIONS  (PRN):  acetaminophen     Tablet .. 650 milliGRAM(s) Oral every 6 hours PRN Temp greater or equal to 38C (100.4F), Mild Pain (1 - 3)  aluminum hydroxide/magnesium hydroxide/simethicone Suspension 30 milliLiter(s) Oral every 4 hours PRN Dyspepsia  dextrose Oral Gel 15 Gram(s) Oral once PRN Blood Glucose LESS THAN 70 milliGRAM(s)/deciliter  haloperidol    Injectable 1 milliGRAM(s) IntraMuscular every 6 hours PRN Agitation  lidocaine   4% Patch 1 Patch Transdermal every 24 hours PRN hip pain  ondansetron Injectable 4 milliGRAM(s) IV Push every 8 hours PRN Nausea and/or Vomiting    LABS:              CAPILLARY BLOOD GLUCOSE      POCT Blood Glucose.: 99 mg/dL (29 Apr 2023 08:18)  POCT Blood Glucose.: 110 mg/dL (28 Apr 2023 21:45)  POCT Blood Glucose.: 106 mg/dL (28 Apr 2023 17:19)  POCT Blood Glucose.: 107 mg/dL (28 Apr 2023 12:22)          REVIEW OF SYSTEMS:  CONSTITUTIONAL: No fever, weight loss, or fatigue  EYES: No eye pain, visual disturbances, or discharge  ENMT:  No difficulty hearing, tinnitus, vertigo; No sinus or throat pain  NECK: No pain or stiffness  RESPIRATORY: No cough, wheezing, chills or hemoptysis; No shortness of breath  CARDIOVASCULAR: No chest pain, palpitations, dizziness, or leg swelling  GASTROINTESTINAL: No abdominal or epigastric pain. No nausea, vomiting, or hematemesis; No diarrhea or constipation. No melena or hematochezia.  GENITOURINARY: No dysuria, frequency, hematuria, or incontinence  NEUROLOGICAL: No headaches, memory loss, loss of strength, numbness, or tremors      Consultant(s) Notes Reviewed:  [x ] YES  [ ] NO    PHYSICAL EXAM:  GENERAL: NAD, well-groomed, well-developed,not in any distress ,  HEAD:  Atraumatic, Normocephalic  NECK: Supple, No JVD, Normal thyroid  NERVOUS SYSTEM:  Alert & Oriented X3, No focal deficit   CHEST/LUNG: Good air entry bilateral with no  rales, rhonchi, wheezing, or rubs  HEART: Regular rate and rhythm; No murmurs, rubs, or gallops  ABDOMEN: Soft, Nontender, Nondistended; Bowel sounds present  EXTREMITIES:  2+ Peripheral Pulses, No clubbing, cyanosis, or edema    Care Discussed with Consultants/Other Providers [ x] YES  [ ] NO
Date of Service  : 04-30-23     INTERVAL HPI/OVERNIGHT EVENTS: No new concerns.   Vital Signs Last 24 Hrs  T(C): 36.3 (30 Apr 2023 06:44), Max: 36.4 (29 Apr 2023 22:19)  T(F): 97.3 (30 Apr 2023 06:44), Max: 97.6 (29 Apr 2023 22:19)  HR: 72 (30 Apr 2023 13:52) (61 - 74)  BP: 120/80 (30 Apr 2023 13:52) (102/52 - 120/80)  BP(mean): --  RR: 18 (30 Apr 2023 06:44) (18 - 18)  SpO2: 97% (30 Apr 2023 07:10) (97% - 100%)    Parameters below as of 30 Apr 2023 07:10  Patient On (Oxygen Delivery Method): room air      I&O's Summary    29 Apr 2023 07:01  -  30 Apr 2023 07:00  --------------------------------------------------------  IN: 240 mL / OUT: 0 mL / NET: 240 mL      MEDICATIONS  (STANDING):  albuterol/ipratropium for Nebulization 3 milliLiter(s) Nebulizer every 12 hours  buDESOnide    Inhalation Suspension 0.5 milliGRAM(s) Inhalation every 12 hours  dextrose 5%. 1000 milliLiter(s) (50 mL/Hr) IV Continuous <Continuous>  dextrose 5%. 1000 milliLiter(s) (100 mL/Hr) IV Continuous <Continuous>  dextrose 50% Injectable 12.5 Gram(s) IV Push once  dextrose 50% Injectable 25 Gram(s) IV Push once  dextrose 50% Injectable 25 Gram(s) IV Push once  enoxaparin Injectable 40 milliGRAM(s) SubCutaneous every 24 hours  gabapentin 300 milliGRAM(s) Oral three times a day  glucagon  Injectable 1 milliGRAM(s) IntraMuscular once  haloperidol     Tablet 1 milliGRAM(s) Oral <User Schedule>  insulin lispro (ADMELOG) corrective regimen sliding scale   SubCutaneous three times a day before meals  insulin lispro (ADMELOG) corrective regimen sliding scale   SubCutaneous at bedtime  melatonin 6 milliGRAM(s) Oral at bedtime  metoprolol tartrate 25 milliGRAM(s) Oral two times a day  pantoprazole    Tablet 40 milliGRAM(s) Oral before breakfast  traZODone 25 milliGRAM(s) Oral <User Schedule>  traZODone 50 milliGRAM(s) Oral <User Schedule>  traZODone 25 milliGRAM(s) Oral <User Schedule>    MEDICATIONS  (PRN):  acetaminophen     Tablet .. 650 milliGRAM(s) Oral every 6 hours PRN Temp greater or equal to 38C (100.4F), Mild Pain (1 - 3)  aluminum hydroxide/magnesium hydroxide/simethicone Suspension 30 milliLiter(s) Oral every 4 hours PRN Dyspepsia  dextrose Oral Gel 15 Gram(s) Oral once PRN Blood Glucose LESS THAN 70 milliGRAM(s)/deciliter  haloperidol    Injectable 1 milliGRAM(s) IntraMuscular every 6 hours PRN Agitation  lidocaine   4% Patch 1 Patch Transdermal every 24 hours PRN hip pain  ondansetron Injectable 4 milliGRAM(s) IV Push every 8 hours PRN Nausea and/or Vomiting    LABS:              CAPILLARY BLOOD GLUCOSE      POCT Blood Glucose.: 96 mg/dL (30 Apr 2023 17:40)  POCT Blood Glucose.: 108 mg/dL (30 Apr 2023 12:29)  POCT Blood Glucose.: 91 mg/dL (30 Apr 2023 08:27)  POCT Blood Glucose.: 97 mg/dL (29 Apr 2023 21:09)          REVIEW OF SYSTEMS:  CONSTITUTIONAL: No fever, weight loss, or fatigue  EYES: No eye pain, visual disturbances, or discharge  ENMT:  No difficulty hearing, tinnitus, vertigo; No sinus or throat pain  NECK: No pain or stiffness  RESPIRATORY: No cough, wheezing, chills or hemoptysis; No shortness of breath  CARDIOVASCULAR: No chest pain, palpitations, dizziness, or leg swelling  GASTROINTESTINAL: No abdominal or epigastric pain. No nausea, vomiting, or hematemesis; No diarrhea or constipation. No melena or hematochezia.  GENITOURINARY: No dysuria, frequency, hematuria, or incontinence  NEUROLOGICAL: No headaches, memory loss, loss of strength, numbness, or tremors      Consultant(s) Notes Reviewed:  [x ] YES  [ ] NO    PHYSICAL EXAM:  GENERAL: NAD, well-groomed, well-developed,not in any distress ,  HEAD:  Atraumatic, Normocephalic  EYES: EOMI, PERRLA, conjunctiva and sclera clear  ENMT: No tonsillar erythema, exudates, or enlargement; Moist mucous membranes, Good dentition, No lesions  NECK: Supple, No JVD, Normal thyroid  NERVOUS SYSTEM:  Alert & Oriented X3, No focal deficit   CHEST/LUNG: Good air entry bilateral with no  rales, rhonchi, wheezing, or rubs  HEART: Regular rate and rhythm; No murmurs, rubs, or gallops  ABDOMEN: Soft, Nontender, Nondistended; Bowel sounds present  EXTREMITIES:  2+ Peripheral Pulses, No clubbing, cyanosis, or edema      Care Discussed with Consultants/Other Providers [ x] YES  [ ] NO
Date of Service: 04-21-23 @ 11:40    Patient is a 88y old  Female who presents with a chief complaint of agitation, fall at home? (20 Apr 2023 12:15)      Any change in ROS: seems OK; no sob:  no cough :    MEDICATIONS  (STANDING):  albuterol/ipratropium for Nebulization 3 milliLiter(s) Nebulizer every 6 hours  buDESOnide    Inhalation Suspension 0.5 milliGRAM(s) Inhalation every 12 hours  dextrose 5%. 1000 milliLiter(s) (100 mL/Hr) IV Continuous <Continuous>  dextrose 5%. 1000 milliLiter(s) (50 mL/Hr) IV Continuous <Continuous>  dextrose 50% Injectable 25 Gram(s) IV Push once  dextrose 50% Injectable 12.5 Gram(s) IV Push once  dextrose 50% Injectable 25 Gram(s) IV Push once  enoxaparin Injectable 40 milliGRAM(s) SubCutaneous every 24 hours  gabapentin 300 milliGRAM(s) Oral three times a day  glucagon  Injectable 1 milliGRAM(s) IntraMuscular once  haloperidol     Tablet 1 milliGRAM(s) Oral <User Schedule>  insulin lispro (ADMELOG) corrective regimen sliding scale   SubCutaneous three times a day before meals  insulin lispro (ADMELOG) corrective regimen sliding scale   SubCutaneous at bedtime  melatonin 6 milliGRAM(s) Oral at bedtime  metoprolol tartrate 50 milliGRAM(s) Oral two times a day  pantoprazole    Tablet 40 milliGRAM(s) Oral before breakfast  traZODone 25 milliGRAM(s) Oral <User Schedule>  traZODone 25 milliGRAM(s) Oral <User Schedule>  traZODone 50 milliGRAM(s) Oral <User Schedule>    MEDICATIONS  (PRN):  acetaminophen     Tablet .. 650 milliGRAM(s) Oral every 6 hours PRN Temp greater or equal to 38C (100.4F), Mild Pain (1 - 3)  aluminum hydroxide/magnesium hydroxide/simethicone Suspension 30 milliLiter(s) Oral every 4 hours PRN Dyspepsia  dextrose Oral Gel 15 Gram(s) Oral once PRN Blood Glucose LESS THAN 70 milliGRAM(s)/deciliter  haloperidol    Injectable 1 milliGRAM(s) IntraMuscular every 6 hours PRN Agitation  ondansetron Injectable 4 milliGRAM(s) IV Push every 8 hours PRN Nausea and/or Vomiting    Vital Signs Last 24 Hrs  T(C): 36.6 (21 Apr 2023 06:05), Max: 36.6 (20 Apr 2023 17:20)  T(F): 97.8 (21 Apr 2023 06:05), Max: 97.8 (20 Apr 2023 17:20)  HR: 85 (21 Apr 2023 06:05) (70 - 95)  BP: 95/60 (21 Apr 2023 06:05) (95/60 - 119/64)  BP(mean): --  RR: 17 (21 Apr 2023 06:05) (17 - 17)  SpO2: 95% (21 Apr 2023 06:05) (93% - 99%)    Parameters below as of 21 Apr 2023 06:05  Patient On (Oxygen Delivery Method): room air        I&O's Summary        Physical Exam:   GENERAL: NAD, well-groomed, well-developed  HEENT: KWABENA/   Atraumatic, Normocephalic  ENMT: No tonsillar erythema, exudates, or enlargement; Moist mucous membranes, Good dentition, No lesions  NECK: Supple, No JVD, Normal thyroid  CHEST/LUNG: Clear to auscultaion  CVS: Regular rate and rhythm; No murmurs, rubs, or gallops  GI: : Soft, Nontender, Nondistended; Bowel sounds present  NERVOUS SYSTEM:  Alert & Oriented X0  EXTREMITIES: -edema  LYMPH: No lymphadenopathy noted  SKIN: No rashes or lesions  ENDOCRINOLOGY: No Thyromegaly  PSYCH: dementia    Labs:                CAPILLARY BLOOD GLUCOSE      POCT Blood Glucose.: 125 mg/dL (21 Apr 2023 08:29)  POCT Blood Glucose.: 103 mg/dL (20 Apr 2023 21:40)  POCT Blood Glucose.: 99 mg/dL (20 Apr 2023 18:46)  POCT Blood Glucose.: 100 mg/dL (20 Apr 2023 12:39)      rad< from: CT Chest No Cont (04.13.23 @ 20:50) >  Cholelithiasis. Mild adrenal gland thickening, left greater than right.    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 severe chronic compression fracture/vertebral plana   no normal kidneys as this of T8.    IMPRESSION:    1.  Congestive heart failure    2.  Left thyroid hypodense lesion measuring 1.9 x 1.5 cm. Given size,   ultrasound evaluation is recommended.    3.  Emphysema    4.  Few lung nodules measuring up to 5 mm. Consider a repeat chest CT   scan in one year for reassessment.    --- End of Report ---    < end of copied text >              RECENT CULTURES:        RESPIRATORY CULTURES:          Studies  Chest X-RAY  CT SCAN Chest   Venous Dopplers: LE:   CT Abdomen  Others              
Date of Service: 04-16-23 @ 10:47    Patient is a 88y old  Female who presents with a chief complaint of agitation, fall at home? (16 Apr 2023 09:54)      Any change in ROS: Luing comfortably: not agitated now:      MEDICATIONS  (STANDING):  albuterol/ipratropium for Nebulization 3 milliLiter(s) Nebulizer every 6 hours  dextrose 5%. 1000 milliLiter(s) (50 mL/Hr) IV Continuous <Continuous>  dextrose 5%. 1000 milliLiter(s) (100 mL/Hr) IV Continuous <Continuous>  dextrose 50% Injectable 25 Gram(s) IV Push once  dextrose 50% Injectable 25 Gram(s) IV Push once  dextrose 50% Injectable 12.5 Gram(s) IV Push once  enoxaparin Injectable 40 milliGRAM(s) SubCutaneous every 24 hours  furosemide   Injectable 20 milliGRAM(s) IV Push daily  gabapentin 300 milliGRAM(s) Oral three times a day  glucagon  Injectable 1 milliGRAM(s) IntraMuscular once  haloperidol     Tablet 1 milliGRAM(s) Oral <User Schedule>  insulin lispro (ADMELOG) corrective regimen sliding scale   SubCutaneous three times a day before meals  insulin lispro (ADMELOG) corrective regimen sliding scale   SubCutaneous at bedtime  melatonin 6 milliGRAM(s) Oral at bedtime  metoprolol tartrate 50 milliGRAM(s) Oral two times a day  pantoprazole    Tablet 40 milliGRAM(s) Oral before breakfast  predniSONE   Tablet 20 milliGRAM(s) Oral every 12 hours  traZODone 25 milliGRAM(s) Oral <User Schedule>  traZODone 12.5 milliGRAM(s) Oral <User Schedule>  traZODone 50 milliGRAM(s) Oral <User Schedule>    MEDICATIONS  (PRN):  acetaminophen     Tablet .. 650 milliGRAM(s) Oral every 6 hours PRN Temp greater or equal to 38C (100.4F), Mild Pain (1 - 3)  aluminum hydroxide/magnesium hydroxide/simethicone Suspension 30 milliLiter(s) Oral every 4 hours PRN Dyspepsia  dextrose Oral Gel 15 Gram(s) Oral once PRN Blood Glucose LESS THAN 70 milliGRAM(s)/deciliter  haloperidol    Injectable 1 milliGRAM(s) IntraMuscular every 6 hours PRN Agitation  ondansetron Injectable 4 milliGRAM(s) IV Push every 8 hours PRN Nausea and/or Vomiting    Vital Signs Last 24 Hrs  T(C): 36.6 (16 Apr 2023 05:10), Max: 37.1 (15 Apr 2023 17:53)  T(F): 97.8 (16 Apr 2023 05:10), Max: 98.8 (15 Apr 2023 17:53)  HR: 69 (16 Apr 2023 08:52) (61 - 115)  BP: 165/69 (16 Apr 2023 05:10) (138/60 - 165/69)  BP(mean): --  RR: 18 (16 Apr 2023 05:10) (16 - 18)  SpO2: 98% (16 Apr 2023 08:52) (97% - 100%)    Parameters below as of 16 Apr 2023 08:52  Patient On (Oxygen Delivery Method): nasal cannula        I&O's Summary    15 Apr 2023 07:01  -  16 Apr 2023 07:00  --------------------------------------------------------  IN: 600 mL / OUT: 0 mL / NET: 600 mL          Physical Exam:   GENERAL: NAD, well-groomed, well-developed  HEENT: KWABENA/   Atraumatic, Normocephalic  ENMT: No tonsillar erythema, exudates, or enlargement; Moist mucous membranes, Good dentition, No lesions  NECK: Supple, No JVD, Normal thyroid  CHEST/LUNG: Clear to auscultaion- no wheezing  CVS: Regular rate and rhythm; No murmurs, rubs, or gallops  GI: : Soft, Nontender, Nondistended; Bowel sounds present  NERVOUS SYSTEM:  Alert & awake:  confused  EXTREMITIES:  -edema  LYMPH: No lymphadenopathy noted  SKIN: No rashes or lesions  ENDOCRINOLOGY: No Thyromegaly  PSYCH: Appropriate    Labs:  28                            10.3   5.61  )-----------( 265      ( 16 Apr 2023 06:04 )             33.8                         9.1    7.44  )-----------( 283      ( 14 Apr 2023 05:30 )             29.7     04-16    139  |  97<L>  |  20  ----------------------------<  110<H>  3.8   |  32<H>  |  0.43<L>  04-14    140  |  101  |  20  ----------------------------<  118<H>  4.0   |  29  |  0.54    Ca    9.4      16 Apr 2023 06:04  Phos  3.1     04-16  Mg     1.90     04-16      CAPILLARY BLOOD GLUCOSE      POCT Blood Glucose.: 125 mg/dL (16 Apr 2023 08:16)  POCT Blood Glucose.: 143 mg/dL (15 Apr 2023 22:53)  POCT Blood Glucose.: 129 mg/dL (15 Apr 2023 17:19)  POCT Blood Glucose.: 124 mg/dL (15 Apr 2023 12:25)      rad< from: CT Chest No Cont (04.13.23 @ 20:50) >    BONES: There is diffusequalitative osteopenia and multilevel discogenic   disease in the spine. severe chronic compression fracture/vertebral plana   no normal kidneys as this of T8.    IMPRESSION:    1.  Congestive heart failure    2.  Left thyroid hypodense lesion measuring 1.9 x 1.5 cm. Given size,   ultrasound evaluation is recommended.    3.  Emphysema    4.  Few lung nodules measuring up to 5 mm. Consider a repeat chest CT   scan in one year for reassessment.    --- End of Report ---    < end of copied text >              RECENT CULTURES:  04-12 @ 15:40 .Blood Blood-Venous                No growth to date.    04-12 @ 15:30 .Blood Blood-Peripheral                No growth to date.    04-11 @ 15:42 Clean Catch Clean Catch (Midstream)                <10,000 CFU/mL Normal Urogenital Tamika          RESPIRATORY CULTURES:          Studies  Chest X-RAY  CT SCAN Chest   Venous Dopplers: LE:   CT Abdomen  Others              
Date of Service: 04-14-23 @ 15:18    Patient is a 88y old  Female who presents with a chief complaint of agitation, fall at home? (14 Apr 2023 10:35)      Any change in ROS: calmer toady  : not very agitated   no resp distress:     MEDICATIONS  (STANDING):  albuterol/ipratropium for Nebulization 3 milliLiter(s) Nebulizer every 6 hours  dextrose 5%. 1000 milliLiter(s) (50 mL/Hr) IV Continuous <Continuous>  dextrose 5%. 1000 milliLiter(s) (100 mL/Hr) IV Continuous <Continuous>  dextrose 50% Injectable 25 Gram(s) IV Push once  dextrose 50% Injectable 12.5 Gram(s) IV Push once  dextrose 50% Injectable 25 Gram(s) IV Push once  enoxaparin Injectable 40 milliGRAM(s) SubCutaneous every 24 hours  gabapentin 300 milliGRAM(s) Oral three times a day  glucagon  Injectable 1 milliGRAM(s) IntraMuscular once  haloperidol     Tablet 1 milliGRAM(s) Oral three times a day  insulin lispro (ADMELOG) corrective regimen sliding scale   SubCutaneous three times a day before meals  insulin lispro (ADMELOG) corrective regimen sliding scale   SubCutaneous at bedtime  melatonin 6 milliGRAM(s) Oral at bedtime  methylPREDNISolone sodium succinate Injectable 20 milliGRAM(s) IV Push two times a day  pantoprazole    Tablet 40 milliGRAM(s) Oral before breakfast  traZODone 50 milliGRAM(s) Oral <User Schedule>  traZODone 12.5 milliGRAM(s) Oral <User Schedule>    MEDICATIONS  (PRN):  acetaminophen     Tablet .. 650 milliGRAM(s) Oral every 6 hours PRN Temp greater or equal to 38C (100.4F), Mild Pain (1 - 3)  aluminum hydroxide/magnesium hydroxide/simethicone Suspension 30 milliLiter(s) Oral every 4 hours PRN Dyspepsia  dextrose Oral Gel 15 Gram(s) Oral once PRN Blood Glucose LESS THAN 70 milliGRAM(s)/deciliter  haloperidol    Injectable 1 milliGRAM(s) IntraMuscular every 6 hours PRN Agitation  ondansetron Injectable 4 milliGRAM(s) IV Push every 8 hours PRN Nausea and/or Vomiting    Vital Signs Last 24 Hrs  T(C): 36.6 (14 Apr 2023 13:38), Max: 36.6 (14 Apr 2023 13:38)  T(F): 97.9 (14 Apr 2023 13:38), Max: 97.9 (14 Apr 2023 13:38)  HR: 83 (14 Apr 2023 13:38) (62 - 85)  BP: 151/77 (14 Apr 2023 13:38) (122/56 - 151/77)  BP(mean): --  RR: 18 (14 Apr 2023 13:38) (17 - 18)  SpO2: 99% (14 Apr 2023 13:38) (96% - 100%)    Parameters below as of 14 Apr 2023 13:38  Patient On (Oxygen Delivery Method): nasal cannula  O2 Flow (L/min): 2      I&O's Summary        Physical Exam:   GENERAL: NAD, well-groomed, well-developed  HEENT: KWABENA/   Atraumatic, Normocephalic  ENMT: No tonsillar erythema, exudates, or enlargement; Moist mucous membranes, Good dentition, No lesions  NECK: Supple, No JVD, Normal thyroid  CHEST/LUNG: minimal wheezing  CVS: Regular rate and rhythm; No murmurs, rubs, or gallops  GI: : Soft, Nontender, Nondistended; Bowel sounds present  NERVOUS SYSTEM:  Alert & Oriented X0  EXTREMITIES: -edema  LYMPH: No lymphadenopathy noted  SKIN: No rashes or lesions  ENDOCRINOLOGY: No Thyromegaly  PSYCH: confused!    Labs:  28                            9.1    7.44  )-----------( 283      ( 14 Apr 2023 05:30 )             29.7                         9.3    6.11  )-----------( 279      ( 12 Apr 2023 06:57 )             30.5                         10.8   8.40  )-----------( 332      ( 11 Apr 2023 10:55 )             35.7     04-14    140  |  101  |  20  ----------------------------<  118<H>  4.0   |  29  |  0.54  04-12    141  |  101  |  19  ----------------------------<  111<H>  4.0   |  25  |  0.47<L>  04-11    139  |  100  |  21  ----------------------------<  104<H>  4.1   |  30  |  0.50    Ca    9.4      14 Apr 2023 05:30  Phos  2.8     04-14  Mg     1.90     04-14    TPro  6.9  /  Alb  3.7  /  TBili  0.6  /  DBili  x   /  AST  16  /  ALT  11  /  AlkPhos  115  04-11    CAPILLARY BLOOD GLUCOSE      POCT Blood Glucose.: 147 mg/dL (14 Apr 2023 12:09)  POCT Blood Glucose.: 137 mg/dL (14 Apr 2023 08:26)  POCT Blood Glucose.: 118 mg/dL (13 Apr 2023 22:09)  POCT Blood Glucose.: 183 mg/dL (13 Apr 2023 17:58)        ra< from: CT Chest No Cont (04.13.23 @ 20:50) >  1.5 cm.    VISUALIZED UPPER ABDOMEN: Multiple hepatic cysts, unchanged in size, and   subcentimeter hypoattenuating lesions too small to characterize.   Cholelithiasis. Mild adrenal gland thickening, left greater than right.    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 severe chronic compression fracture/vertebral plana   no normal kidneys as this of T8.    IMPRESSION:    1.  Congestive heart failure    2.  Left thyroid hypodense lesion measuring 1.9 x 1.5 cm. Given size,   ultrasound evaluation is recommended.    3.  Emphysema    4.  Few lung nodules measuring up to 5 mm. Consider a repeat chest CT   scan in one year for reassessment.    --- End of Report ---      < end of copied text >  < from: CT Chest No Cont (04.13.23 @ 20:50) >  1.5 cm.    VISUALIZED UPPER ABDOMEN: Multiple hepatic cysts, unchanged in size, and   subcentimeter hypoattenuating lesions too small to characterize.   Cholelithiasis. Mild adrenal gland thickening, left greater than right.    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 severe chronic compression fracture/vertebral plana   no normal kidneys as this of T8.    IMPRESSION:    1.  Congestive heart failure    2.  Left thyroid hypodense lesion measuring 1.9 x 1.5 cm. Given size,   ultrasound evaluation is recommended.    3.  Emphysema    4.  Few lung nodules measuring up to 5 mm. Consider a repeat chest CT   scan in one year for reassessment.    --- End of Report ---      < end of copied text >            RECENT CULTURES:  04-12 @ 15:40 .Blood Blood-Venous                No growth to date.    04-12 @ 15:30 .Blood Blood-Peripheral     rad< from: Transthoracic Echocardiogram w/ Doppler (09.02.08 @ 13:00) >  dysfunction (Stage I).  Right Heart: Normal right atrium.  Normal right ventricular size and systolic function.  Normal tricuspid and pulmonic valves.  Pericardium/Pleura: Normal pericardium with no pericardial  effusion.  Doppler: Minimal mitral regurgitation.  Minimal tricuspid regurgitation.  ------------------------------------------------------------------------  Conclusions:  1. Normal left ventricular function.  Mild diastolic  dysfunction (Stage I).  2. Normal right ventricular size and systolic function.  3. Minimal mitral regurgitation.  No obvious cardiac source of embolus was identified on this  transthoracic study.  If clinical suspicion is high,  consider INDIGO for further evaluation.  ------------------------------------------------------------------------  Confirmed on  9/2/2008 - 14:20:37 by Kaz Boyle M.D.  ------------------------------------------------------------------------    < end of copied text >             No growth to date.    04-11 @ 15:42 Clean Catch Clean Catch (Midstream)                <10,000 CFU/mL Normal Urogenital Tamika          RESPIRATORY CULTURES:          Studies  Chest X-RAY  CT SCAN Chest   Venous Dopplers: LE:   CT Abdomen  Others              
Date of Service: 04-17-23 @ 16:57    Patient is a 88y old  Female who presents with a chief complaint of agitation, fall at home? (17 Apr 2023 11:52)      Any change in ROS: she is doing ok : no sob:  but agitated and yelling  : on 2 L of oxygen     MEDICATIONS  (STANDING):  albuterol/ipratropium for Nebulization 3 milliLiter(s) Nebulizer every 6 hours  dextrose 5%. 1000 milliLiter(s) (100 mL/Hr) IV Continuous <Continuous>  dextrose 5%. 1000 milliLiter(s) (50 mL/Hr) IV Continuous <Continuous>  dextrose 50% Injectable 25 Gram(s) IV Push once  dextrose 50% Injectable 12.5 Gram(s) IV Push once  dextrose 50% Injectable 25 Gram(s) IV Push once  enoxaparin Injectable 40 milliGRAM(s) SubCutaneous every 24 hours  gabapentin 300 milliGRAM(s) Oral three times a day  glucagon  Injectable 1 milliGRAM(s) IntraMuscular once  haloperidol     Tablet 1 milliGRAM(s) Oral <User Schedule>  insulin lispro (ADMELOG) corrective regimen sliding scale   SubCutaneous three times a day before meals  insulin lispro (ADMELOG) corrective regimen sliding scale   SubCutaneous at bedtime  melatonin 6 milliGRAM(s) Oral at bedtime  metoprolol tartrate 50 milliGRAM(s) Oral two times a day  pantoprazole    Tablet 40 milliGRAM(s) Oral before breakfast  predniSONE   Tablet 20 milliGRAM(s) Oral every 12 hours  traZODone 12.5 milliGRAM(s) Oral <User Schedule>  traZODone 50 milliGRAM(s) Oral <User Schedule>  traZODone 25 milliGRAM(s) Oral <User Schedule>    MEDICATIONS  (PRN):  acetaminophen     Tablet .. 650 milliGRAM(s) Oral every 6 hours PRN Temp greater or equal to 38C (100.4F), Mild Pain (1 - 3)  aluminum hydroxide/magnesium hydroxide/simethicone Suspension 30 milliLiter(s) Oral every 4 hours PRN Dyspepsia  dextrose Oral Gel 15 Gram(s) Oral once PRN Blood Glucose LESS THAN 70 milliGRAM(s)/deciliter  haloperidol    Injectable 1 milliGRAM(s) IntraMuscular every 6 hours PRN Agitation  ondansetron Injectable 4 milliGRAM(s) IV Push every 8 hours PRN Nausea and/or Vomiting    Vital Signs Last 24 Hrs  T(C): 36.6 (17 Apr 2023 05:20), Max: 37 (16 Apr 2023 17:10)  T(F): 97.8 (17 Apr 2023 05:20), Max: 98.6 (16 Apr 2023 17:10)  HR: 60 (17 Apr 2023 15:34) (60 - 81)  BP: 131/67 (17 Apr 2023 05:20) (131/67 - 167/74)  BP(mean): --  RR: 18 (17 Apr 2023 14:07) (18 - 18)  SpO2: 96% (17 Apr 2023 15:34) (95% - 100%)    Parameters below as of 17 Apr 2023 15:34  Patient On (Oxygen Delivery Method): nasal cannula        I&O's Summary    16 Apr 2023 07:01  -  17 Apr 2023 07:00  --------------------------------------------------------  IN: 390 mL / OUT: 1300 mL / NET: -910 mL          Physical Exam:   GENERAL: NAD, well-groomed, well-developed  HEENT: KWABENA/   Atraumatic, Normocephalic  ENMT: No tonsillar erythema, exudates, or enlargement; Moist mucous membranes, Good dentition, No lesions  NECK: Supple, No JVD, Normal thyroid  CHEST/LUNG: Minimal wheezing  CVS: Regular rate and rhythm; No murmurs, rubs, or gallops  GI: : Soft, Nontender, Nondistended; Bowel sounds present  NERVOUS SYSTEM:  Alert & awake: demnted  EXTREMITIES: - edema  LYMPH: No lymphadenopathy noted  SKIN: No rashes or lesions  ENDOCRINOLOGY: No Thyromegaly  PSYCH: Appropriate    Labs:  28                            10.8   6.54  )-----------( 287      ( 17 Apr 2023 07:14 )             35.1                         10.3   5.61  )-----------( 265      ( 16 Apr 2023 06:04 )             33.8                         9.1    7.44  )-----------( 283      ( 14 Apr 2023 05:30 )             29.7     04-17    137  |  97<L>  |  25<H>  ----------------------------<  105<H>  4.4   |  31  |  0.46<L>  04-16    139  |  97<L>  |  20  ----------------------------<  110<H>  3.8   |  32<H>  |  0.43<L>  04-14    140  |  101  |  20  ----------------------------<  118<H>  4.0   |  29  |  0.54    Ca    9.5      17 Apr 2023 07:14  Ca    9.4      16 Apr 2023 06:04  Phos  3.5     04-17  Phos  3.1     04-16  Mg     1.90     04-17  Mg     1.90     04-16      CAPILLARY BLOOD GLUCOSE      POCT Blood Glucose.: 115 mg/dL (17 Apr 2023 12:21)  POCT Blood Glucose.: 124 mg/dL (17 Apr 2023 08:43)  POCT Blood Glucose.: 99 mg/dL (16 Apr 2023 22:02)  POCT Blood Glucose.: 106 mg/dL (16 Apr 2023 17:30)        rad< from: CT Chest No Cont (04.13.23 @ 20:50) >  1.5 cm.    VISUALIZED UPPER ABDOMEN: Multiple hepatic cysts, unchanged in size, and   subcentimeter hypoattenuating lesions too small to characterize.   Cholelithiasis. Mild adrenal gland thickening, left greater than right.    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 severe chronic compression fracture/vertebral plana   no normal kidneys as this of T8.    IMPRESSION:    1.  Congestive heart failure    2.  Left thyroid hypodense lesion measuring 1.9 x 1.5 cm. Given size,   ultrasound evaluation is recommended.    3.  Emphysema    4.  Few lung nodules measuring up to 5 mm. Consider a repeat chest CT   scan in one year for reassessment.    --- End of Report ---    < end of copied text >            RECENT CULTURES:  04-12 @ 15:40 .Blood Blood-Venous                No growth to date.    04-12 @ 15:30 .Blood Blood-Peripheral                No growth to date.    04-11 @ 15:42 Clean Catch Clean Catch (Midstream)                <10,000 CFU/mL Normal Urogenital Tamika          RESPIRATORY CULTURES:          Studies  Chest X-RAY  CT SCAN Chest   Venous Dopplers: LE:   CT Abdomen  Others              
Date of Service: 04-15-23 @ 12:13    Patient is a 88y old  Female who presents with a chief complaint of agitation, fall at home? (15 Apr 2023 10:19)      Any change in ROS:  doing  ok : no sob:  no cough     MEDICATIONS  (STANDING):  albuterol/ipratropium for Nebulization 3 milliLiter(s) Nebulizer every 6 hours  dextrose 5%. 1000 milliLiter(s) (50 mL/Hr) IV Continuous <Continuous>  dextrose 5%. 1000 milliLiter(s) (100 mL/Hr) IV Continuous <Continuous>  dextrose 50% Injectable 25 Gram(s) IV Push once  dextrose 50% Injectable 12.5 Gram(s) IV Push once  dextrose 50% Injectable 25 Gram(s) IV Push once  enoxaparin Injectable 40 milliGRAM(s) SubCutaneous every 24 hours  furosemide   Injectable 20 milliGRAM(s) IV Push daily  gabapentin 300 milliGRAM(s) Oral three times a day  glucagon  Injectable 1 milliGRAM(s) IntraMuscular once  haloperidol     Tablet 1 milliGRAM(s) Oral <User Schedule>  insulin lispro (ADMELOG) corrective regimen sliding scale   SubCutaneous three times a day before meals  insulin lispro (ADMELOG) corrective regimen sliding scale   SubCutaneous at bedtime  melatonin 6 milliGRAM(s) Oral at bedtime  metoprolol tartrate 50 milliGRAM(s) Oral two times a day  pantoprazole    Tablet 40 milliGRAM(s) Oral before breakfast  predniSONE   Tablet 20 milliGRAM(s) Oral every 12 hours  traZODone 25 milliGRAM(s) Oral <User Schedule>  traZODone 12.5 milliGRAM(s) Oral <User Schedule>  traZODone 50 milliGRAM(s) Oral <User Schedule>    MEDICATIONS  (PRN):  acetaminophen     Tablet .. 650 milliGRAM(s) Oral every 6 hours PRN Temp greater or equal to 38C (100.4F), Mild Pain (1 - 3)  aluminum hydroxide/magnesium hydroxide/simethicone Suspension 30 milliLiter(s) Oral every 4 hours PRN Dyspepsia  dextrose Oral Gel 15 Gram(s) Oral once PRN Blood Glucose LESS THAN 70 milliGRAM(s)/deciliter  haloperidol    Injectable 1 milliGRAM(s) IntraMuscular every 6 hours PRN Agitation  ondansetron Injectable 4 milliGRAM(s) IV Push every 8 hours PRN Nausea and/or Vomiting    Vital Signs Last 24 Hrs  T(C): 36.6 (15 Apr 2023 05:04), Max: 37.3 (14 Apr 2023 19:48)  T(F): 97.9 (15 Apr 2023 05:04), Max: 99.2 (14 Apr 2023 19:48)  HR: 115 (15 Apr 2023 06:39) (69 - 120)  BP: 144/72 (15 Apr 2023 05:04) (120/67 - 151/77)  BP(mean): --  RR: 18 (15 Apr 2023 06:05) (17 - 18)  SpO2: 97% (15 Apr 2023 06:39) (87% - 99%)    Parameters below as of 15 Apr 2023 06:39  Patient On (Oxygen Delivery Method): nasal cannula        I&O's Summary        Physical Exam:   GENERAL: NAD, well-groomed, well-developed  HEENT: KWABENA/   Atraumatic, Normocephalic  ENMT: No tonsillar erythema, exudates, or enlargement; Moist mucous membranes, Good dentition, No lesions  NECK: Supple, No JVD, Normal thyroid  CHEST/LUNG: Clear to auscultaion  CVS: Regular rate and rhythm; No murmurs, rubs, or gallops  GI: : Soft, Nontender, Nondistended; Bowel sounds present  NERVOUS SYSTEM:  Alert & Oriented X2   EXTREMITIES:  -edema  LYMPH: No lymphadenopathy noted  SKIN: No rashes or lesions  ENDOCRINOLOGY: No Thyromegaly  PSYCH: Appropriate    Labs:  28                            9.1    7.44  )-----------( 283      ( 14 Apr 2023 05:30 )             29.7                         9.3    6.11  )-----------( 279      ( 12 Apr 2023 06:57 )             30.5     04-14    140  |  101  |  20  ----------------------------<  118<H>  4.0   |  29  |  0.54  04-12    141  |  101  |  19  ----------------------------<  111<H>  4.0   |  25  |  0.47<L>    Ca    9.4      14 Apr 2023 05:30  Phos  2.8     04-14  Mg     1.90     04-14      CAPILLARY BLOOD GLUCOSE      POCT Blood Glucose.: 109 mg/dL (15 Apr 2023 08:47)  POCT Blood Glucose.: 136 mg/dL (15 Apr 2023 01:07)  POCT Blood Glucose.: 127 mg/dL (14 Apr 2023 21:42)  POCT Blood Glucose.: 142 mg/dL (14 Apr 2023 17:15)        rad< from: CT Chest No Cont (04.13.23 @ 20:50) >  disease in the spine. severe chronic compression fracture/vertebral plana   no normal kidneys as this of T8.    IMPRESSION:    1.  Congestive heart failure    2.  Left thyroid hypodense lesion measuring 1.9 x 1.5 cm. Given size,   ultrasound evaluation is recommended.    3.  Emphysema    4.  Few lung nodules measuring up to 5 mm. Consider a repeat chest CT   scan in one year for reassessment.    --- End of Report ---      < end of copied text >            RECENT CULTURES:  04-12 @ 15:40 .Blood Blood-Venous                No growth to date.    04-12 @ 15:30 .Blood Blood-Peripheral                No growth to date.    04-11 @ 15:42 Clean Catch Clean Catch (Midstream)                <10,000 CFU/mL Normal Urogenital Tamika          RESPIRATORY CULTURES:          Studies  Chest X-RAY  CT SCAN Chest   Venous Dopplers: LE:   CT Abdomen  Others              
Date of Service: 04-23-23 @ 11:36    Patient is a 88y old  Female who presents with a chief complaint of agitation, fall at home? (22 Apr 2023 11:39)      Any change in ROS: Sheis doing well : no sob; on room air    MEDICATIONS  (STANDING):  albuterol/ipratropium for Nebulization 3 milliLiter(s) Nebulizer every 12 hours  buDESOnide    Inhalation Suspension 0.5 milliGRAM(s) Inhalation every 12 hours  dextrose 5%. 1000 milliLiter(s) (100 mL/Hr) IV Continuous <Continuous>  dextrose 5%. 1000 milliLiter(s) (50 mL/Hr) IV Continuous <Continuous>  dextrose 50% Injectable 25 Gram(s) IV Push once  dextrose 50% Injectable 12.5 Gram(s) IV Push once  dextrose 50% Injectable 25 Gram(s) IV Push once  enoxaparin Injectable 40 milliGRAM(s) SubCutaneous every 24 hours  gabapentin 300 milliGRAM(s) Oral three times a day  glucagon  Injectable 1 milliGRAM(s) IntraMuscular once  haloperidol     Tablet 1 milliGRAM(s) Oral <User Schedule>  insulin lispro (ADMELOG) corrective regimen sliding scale   SubCutaneous three times a day before meals  insulin lispro (ADMELOG) corrective regimen sliding scale   SubCutaneous at bedtime  melatonin 6 milliGRAM(s) Oral at bedtime  metoprolol tartrate 25 milliGRAM(s) Oral two times a day  pantoprazole    Tablet 40 milliGRAM(s) Oral before breakfast  traZODone 25 milliGRAM(s) Oral <User Schedule>  traZODone 25 milliGRAM(s) Oral <User Schedule>  traZODone 50 milliGRAM(s) Oral <User Schedule>    MEDICATIONS  (PRN):  acetaminophen     Tablet .. 650 milliGRAM(s) Oral every 6 hours PRN Temp greater or equal to 38C (100.4F), Mild Pain (1 - 3)  aluminum hydroxide/magnesium hydroxide/simethicone Suspension 30 milliLiter(s) Oral every 4 hours PRN Dyspepsia  dextrose Oral Gel 15 Gram(s) Oral once PRN Blood Glucose LESS THAN 70 milliGRAM(s)/deciliter  haloperidol    Injectable 1 milliGRAM(s) IntraMuscular every 6 hours PRN Agitation  ondansetron Injectable 4 milliGRAM(s) IV Push every 8 hours PRN Nausea and/or Vomiting    Vital Signs Last 24 Hrs  T(C): 36.7 (23 Apr 2023 05:30), Max: 36.8 (22 Apr 2023 20:00)  T(F): 98.1 (23 Apr 2023 05:30), Max: 98.2 (22 Apr 2023 20:00)  HR: 73 (23 Apr 2023 08:48) (69 - 100)  BP: 135/69 (23 Apr 2023 05:30) (106/65 - 135/69)  BP(mean): --  RR: 18 (23 Apr 2023 05:30) (16 - 18)  SpO2: 100% (23 Apr 2023 08:48) (97% - 100%)    Parameters below as of 23 Apr 2023 08:48  Patient On (Oxygen Delivery Method): room air        I&O's Summary    22 Apr 2023 07:01  -  23 Apr 2023 07:00  --------------------------------------------------------  IN: 500 mL / OUT: 800 mL / NET: -300 mL          Physical Exam:   GENERAL: NAD, well-groomed, well-developed  HEENT: KWABENA/   Atraumatic, Normocephalic  ENMT: No tonsillar erythema, exudates, or enlargement; Moist mucous membranes, Good dentition, No lesions  NECK: Supple, No JVD, Normal thyroid  CHEST/LUNG: Clear to auscultaion  CVS: Regular rate and rhythm; No murmurs, rubs, or gallops  GI: : Soft, Nontender, Nondistended; Bowel sounds present  NERVOUS SYSTEM:  Alert & awake  EXTREMITIES: - edema  LYMPH: No lymphadenopathy noted  SKIN: No rashes or lesions  ENDOCRINOLOGY: No Thyromegaly  PSYCH:dementia    Labs:                              10.8   9.52  )-----------( 248      ( 22 Apr 2023 07:01 )             34.0     04-22    134<L>  |  99  |  25<H>  ----------------------------<  100<H>  4.6   |  22  |  0.50    Ca    9.1      22 Apr 2023 07:01  Phos  2.4     04-22  Mg     1.80     04-22      CAPILLARY BLOOD GLUCOSE      POCT Blood Glucose.: 92 mg/dL (23 Apr 2023 08:23)  POCT Blood Glucose.: 101 mg/dL (22 Apr 2023 23:00)  POCT Blood Glucose.: 105 mg/dL (22 Apr 2023 16:31)  POCT Blood Glucose.: 103 mg/dL (22 Apr 2023 12:00)        rad< from: CT Chest No Cont (04.13.23 @ 20:50) >  Cholelithiasis. Mild adrenal gland thickening, left greater than right.    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 severe chronic compression fracture/vertebral plana   no normal kidneys as this of T8.    IMPRESSION:    1.  Congestive heart failure    2.  Left thyroid hypodense lesion measuring 1.9 x 1.5 cm. Given size,   ultrasound evaluation is recommended.    3.  Emphysema    4.  Few lung nodules measuring up to 5 mm. Consider a repeat chest CT   scan in one year for reassessment.    --- End of Report ---      < end of copied text >            RECENT CULTURES:        RESPIRATORY CULTURES:          Studies  Chest X-RAY  CT SCAN Chest   Venous Dopplers: LE:   CT Abdomen  Others              
Date of Service: 04-25-23 @ 12:35    Patient is a 88y old  Female who presents with a chief complaint of agitation, fall at home? (25 Apr 2023 10:21)      Any change in ROS: seems OK: no sob:  calm :     MEDICATIONS  (STANDING):  albuterol/ipratropium for Nebulization 3 milliLiter(s) Nebulizer every 12 hours  buDESOnide    Inhalation Suspension 0.5 milliGRAM(s) Inhalation every 12 hours  dextrose 5%. 1000 milliLiter(s) (50 mL/Hr) IV Continuous <Continuous>  dextrose 5%. 1000 milliLiter(s) (100 mL/Hr) IV Continuous <Continuous>  dextrose 50% Injectable 25 Gram(s) IV Push once  dextrose 50% Injectable 25 Gram(s) IV Push once  dextrose 50% Injectable 12.5 Gram(s) IV Push once  enoxaparin Injectable 40 milliGRAM(s) SubCutaneous every 24 hours  gabapentin 300 milliGRAM(s) Oral three times a day  glucagon  Injectable 1 milliGRAM(s) IntraMuscular once  haloperidol     Tablet 1 milliGRAM(s) Oral <User Schedule>  insulin lispro (ADMELOG) corrective regimen sliding scale   SubCutaneous three times a day before meals  insulin lispro (ADMELOG) corrective regimen sliding scale   SubCutaneous at bedtime  melatonin 6 milliGRAM(s) Oral at bedtime  metoprolol tartrate 25 milliGRAM(s) Oral two times a day  pantoprazole    Tablet 40 milliGRAM(s) Oral before breakfast  traZODone 50 milliGRAM(s) Oral <User Schedule>  traZODone 25 milliGRAM(s) Oral <User Schedule>  traZODone 25 milliGRAM(s) Oral <User Schedule>    MEDICATIONS  (PRN):  acetaminophen     Tablet .. 650 milliGRAM(s) Oral every 6 hours PRN Temp greater or equal to 38C (100.4F), Mild Pain (1 - 3)  aluminum hydroxide/magnesium hydroxide/simethicone Suspension 30 milliLiter(s) Oral every 4 hours PRN Dyspepsia  dextrose Oral Gel 15 Gram(s) Oral once PRN Blood Glucose LESS THAN 70 milliGRAM(s)/deciliter  haloperidol    Injectable 1 milliGRAM(s) IntraMuscular every 6 hours PRN Agitation  ondansetron Injectable 4 milliGRAM(s) IV Push every 8 hours PRN Nausea and/or Vomiting    Vital Signs Last 24 Hrs  T(C): 36.3 (25 Apr 2023 11:05), Max: 36.6 (25 Apr 2023 05:06)  T(F): 97.3 (25 Apr 2023 11:05), Max: 97.8 (25 Apr 2023 05:06)  HR: 63 (25 Apr 2023 11:05) (63 - 75)  BP: 108/60 (25 Apr 2023 11:05) (108/60 - 155/61)  BP(mean): --  RR: 17 (25 Apr 2023 11:05) (16 - 17)  SpO2: 98% (25 Apr 2023 11:05) (98% - 100%)    Parameters below as of 25 Apr 2023 11:05  Patient On (Oxygen Delivery Method): room air        I&O's Summary    24 Apr 2023 07:01  -  25 Apr 2023 07:00  --------------------------------------------------------  IN: 400 mL / OUT: 200 mL / NET: 200 mL          Physical Exam:   GENERAL: NAD, well-groomed, well-developed  HEENT: KWABENA/   Atraumatic, Normocephalic  ENMT: No tonsillar erythema, exudates, or enlargement; Moist mucous membranes, Good dentition, No lesions  NECK: Supple, No JVD, Normal thyroid  CHEST/LUNG: Clear to auscultaion  CVS: Regular rate and rhythm; No murmurs, rubs, or gallops  GI: : Soft, Nontender, Nondistended; Bowel sounds present  NERVOUS SYSTEM:  Alert & awake  EXTREMITIES:  2+ Peripheral Pulses, No clubbing, cyanosis, or edema  LYMPH: No lymphadenopathy noted  SKIN: No rashes or lesions  ENDOCRINOLOGY: No Thyromegaly  PSYCH: demeentia    Labs:                              10.3   6.18  )-----------( 273      ( 25 Apr 2023 07:24 )             32.6                         10.8   9.52  )-----------( 248      ( 22 Apr 2023 07:01 )             34.0     04-25    140  |  103  |  20  ----------------------------<  93  3.8   |  26  |  0.50  04-22    134<L>  |  99  |  25<H>  ----------------------------<  100<H>  4.6   |  22  |  0.50    Ca    9.2      25 Apr 2023 07:24  Phos  2.5     04-25  Mg     2.00     04-25      CAPILLARY BLOOD GLUCOSE      POCT Blood Glucose.: 115 mg/dL (25 Apr 2023 12:04)  POCT Blood Glucose.: 87 mg/dL (25 Apr 2023 08:53)  POCT Blood Glucose.: 81 mg/dL (24 Apr 2023 22:17)  POCT Blood Glucose.: 86 mg/dL (24 Apr 2023 16:36)                rad< from: CT Chest No Cont (04.13.23 @ 20:50) >  no normal kidneys as this of T8.    IMPRESSION:    1.  Congestive heart failure    2.  Left thyroid hypodense lesion measuring 1.9 x 1.5 cm. Given size,   ultrasound evaluation is recommended.    3.  Emphysema    4.  Few lung nodules measuring up to 5 mm. Consider a repeat chest CT   scan in one year for reassessment.    --- End of Report ---    < end of copied text >    RECENT CULTURES:        RESPIRATORY CULTURES:          Studies  Chest X-RAY  CT SCAN Chest   Venous Dopplers: LE:   CT Abdomen  Others              
Patient is a 88y old  Female who presents with a chief complaint of agitation, fall at home? (2023 13:39)      DATE OF SERVICE: 23 @ 12:28    SUBJECTIVE / OVERNIGHT EVENTS: overnight events noted    ROS:  Resp: No cough no sputum production  GI: no N/V/D  per RN  calmer today ate some breakfast       MEDICATIONS  (STANDING):  albuterol/ipratropium for Nebulization 3 milliLiter(s) Nebulizer every 6 hours  dextrose 5%. 1000 milliLiter(s) (50 mL/Hr) IV Continuous <Continuous>  dextrose 5%. 1000 milliLiter(s) (100 mL/Hr) IV Continuous <Continuous>  dextrose 50% Injectable 25 Gram(s) IV Push once  dextrose 50% Injectable 12.5 Gram(s) IV Push once  dextrose 50% Injectable 25 Gram(s) IV Push once  enoxaparin Injectable 40 milliGRAM(s) SubCutaneous every 24 hours  gabapentin 300 milliGRAM(s) Oral three times a day  glucagon  Injectable 1 milliGRAM(s) IntraMuscular once  haloperidol     Tablet 1 milliGRAM(s) Oral three times a day  insulin lispro (ADMELOG) corrective regimen sliding scale   SubCutaneous three times a day before meals  insulin lispro (ADMELOG) corrective regimen sliding scale   SubCutaneous at bedtime  melatonin 6 milliGRAM(s) Oral at bedtime  methylPREDNISolone sodium succinate Injectable 20 milliGRAM(s) IV Push two times a day  pantoprazole    Tablet 40 milliGRAM(s) Oral before breakfast  piperacillin/tazobactam IVPB.. 3.375 Gram(s) IV Intermittent every 8 hours  traZODone 50 milliGRAM(s) Oral <User Schedule>  traZODone 12.5 milliGRAM(s) Oral <User Schedule>    MEDICATIONS  (PRN):  acetaminophen     Tablet .. 650 milliGRAM(s) Oral every 6 hours PRN Temp greater or equal to 38C (100.4F), Mild Pain (1 - 3)  aluminum hydroxide/magnesium hydroxide/simethicone Suspension 30 milliLiter(s) Oral every 4 hours PRN Dyspepsia  dextrose Oral Gel 15 Gram(s) Oral once PRN Blood Glucose LESS THAN 70 milliGRAM(s)/deciliter  haloperidol    Injectable 1 milliGRAM(s) IntraMuscular every 6 hours PRN Agitation  ondansetron Injectable 4 milliGRAM(s) IV Push every 8 hours PRN Nausea and/or Vomiting        CAPILLARY BLOOD GLUCOSE      POCT Blood Glucose.: 166 mg/dL (2023 12:20)  POCT Blood Glucose.: 109 mg/dL (2023 08:35)  POCT Blood Glucose.: 123 mg/dL (2023 23:14)  POCT Blood Glucose.: 111 mg/dL (2023 18:04)  POCT Blood Glucose.: 115 mg/dL (2023 13:06)    I&O's Summary      Vital Signs Last 24 Hrs  T(C): 36.3 (2023 11:00), Max: 38.3 (2023 15:00)  T(F): 97.3 (2023 11:00), Max: 100.9 (2023 15:00)  HR: 62 (2023 11:00) (62 - 96)  BP: 138/68 (2023 11:00) (138/68 - 152/68)  BP(mean): --  RR: 17 (2023 11:00) (17 - 20)  SpO2: 99% (2023 11:00) (83% - 100%)    PHYSICAL EXAM:  GENERAL: calm   EYES: EOMI, PERRLA  NECK: Supple,  CHEST/LUNG: improved bilateral wheeze  HEART: S1 S2; no murmurs   ABDOMEN: Soft, Nontender  EXTREMITIES:  no edema  NEUROLOGY: Alert confused    LABS:                        9.3    6.11  )-----------( 279      ( 2023 06:57 )             30.5     04-12    141  |  101  |  19  ----------------------------<  111<H>  4.0   |  25  |  0.47<L>    Ca    9.5      2023 06:57  Phos  3.3     04-12  Mg     1.80     04-12            Urinalysis Basic - ( 2023 15:42 )    Color: Yellow / Appearance: Clear / S.029 / pH: x  Gluc: x / Ketone: Negative  / Bili: Negative / Urobili: <2 mg/dL   Blood: x / Protein: Trace / Nitrite: Negative   Leuk Esterase: Negative / RBC: x / WBC x   Sq Epi: x / Non Sq Epi: x / Bacteria: x          All consultant(s) notes reviewed and care discussed with other providers        Contact Number, Dr Carreon [phone]
Patient is a 88y old  Female who presents with a chief complaint of agitation, fall at home? (2023 23:37)      DATE OF SERVICE: 23 @ 11:01    SUBJECTIVE / OVERNIGHT EVENTS: overnight events noted    ROS:  Resp: No cough no sputum production  CVS: No chest pain no palpitations no orthopnea  GI: no N/V/D  : no dysuria, no hematuria          MEDICATIONS  (STANDING):  albuterol/ipratropium for Nebulization 3 milliLiter(s) Nebulizer every 6 hours  dextrose 5%. 1000 milliLiter(s) (50 mL/Hr) IV Continuous <Continuous>  dextrose 5%. 1000 milliLiter(s) (100 mL/Hr) IV Continuous <Continuous>  dextrose 50% Injectable 25 Gram(s) IV Push once  dextrose 50% Injectable 12.5 Gram(s) IV Push once  dextrose 50% Injectable 25 Gram(s) IV Push once  enoxaparin Injectable 40 milliGRAM(s) SubCutaneous every 24 hours  glucagon  Injectable 1 milliGRAM(s) IntraMuscular once  insulin lispro (ADMELOG) corrective regimen sliding scale   SubCutaneous three times a day before meals  insulin lispro (ADMELOG) corrective regimen sliding scale   SubCutaneous at bedtime  methylPREDNISolone sodium succinate Injectable 20 milliGRAM(s) IV Push two times a day    MEDICATIONS  (PRN):  acetaminophen     Tablet .. 650 milliGRAM(s) Oral every 6 hours PRN Temp greater or equal to 38C (100.4F), Mild Pain (1 - 3)  aluminum hydroxide/magnesium hydroxide/simethicone Suspension 30 milliLiter(s) Oral every 4 hours PRN Dyspepsia  dextrose Oral Gel 15 Gram(s) Oral once PRN Blood Glucose LESS THAN 70 milliGRAM(s)/deciliter  haloperidol    Injectable 5 milliGRAM(s) IV Push every 6 hours PRN Agitation  melatonin 3 milliGRAM(s) Oral at bedtime PRN Insomnia  ondansetron Injectable 4 milliGRAM(s) IV Push every 8 hours PRN Nausea and/or Vomiting        CAPILLARY BLOOD GLUCOSE        I&O's Summary      Vital Signs Last 24 Hrs  T(C): 36.5 (2023 06:37), Max: 36.8 (2023 13:04)  T(F): 97.7 (2023 06:37), Max: 98.3 (2023 13:04)  HR: 80 (2023 10:04) (58 - 88)  BP: 148/76 (2023 06:37) (124/54 - 150/86)  BP(mean): --  RR: 18 (2023 06:37) (14 - 20)  SpO2: 98% (2023 10:04) (95% - 99%)    PHYSICAL EXAM:  GENERAL: agitated  EYES: EOMI, PERRLA  NECK: Supple,  CHEST/LUNG: scattered bilateral wheeze  HEART: S1 S2; no murmurs   ABDOMEN: Soft, Nontender  EXTREMITIES:  no edema  NEUROLOGY: Alert confused    LABS:                        10.8   8.40  )-----------( 332      ( 2023 10:55 )             35.7     04-11    139  |  100  |  21  ----------------------------<  104<H>  4.1   |  30  |  0.50    Ca    9.2      2023 10:55    TPro  6.9  /  Alb  3.7  /  TBili  0.6  /  DBili  x   /  AST  16  /  ALT  11  /  AlkPhos  115  04-11      CARDIAC MARKERS ( 2023 10:55 )  x     / x     / 103 U/L / x     / x          Urinalysis Basic - ( 2023 15:42 )    Color: Yellow / Appearance: Clear / S.029 / pH: x  Gluc: x / Ketone: Negative  / Bili: Negative / Urobili: <2 mg/dL   Blood: x / Protein: Trace / Nitrite: Negative   Leuk Esterase: Negative / RBC: x / WBC x   Sq Epi: x / Non Sq Epi: x / Bacteria: x          All consultant(s) notes reviewed and care discussed with other providers        Contact Number, Dr Carreon [phone]
Patient is a 88y old  Female who presents with a chief complaint of agitation, fall at home? (13 Apr 2023 14:54)      DATE OF SERVICE: 04-14-23 @ 10:35    SUBJECTIVE / OVERNIGHT EVENTS: overnight events noted    ROS:  Resp: No cough no sputum production  GI: no N/V/D  calm           MEDICATIONS  (STANDING):  albuterol/ipratropium for Nebulization 3 milliLiter(s) Nebulizer every 6 hours  dextrose 5%. 1000 milliLiter(s) (50 mL/Hr) IV Continuous <Continuous>  dextrose 5%. 1000 milliLiter(s) (100 mL/Hr) IV Continuous <Continuous>  dextrose 50% Injectable 25 Gram(s) IV Push once  dextrose 50% Injectable 12.5 Gram(s) IV Push once  dextrose 50% Injectable 25 Gram(s) IV Push once  enoxaparin Injectable 40 milliGRAM(s) SubCutaneous every 24 hours  gabapentin 300 milliGRAM(s) Oral three times a day  glucagon  Injectable 1 milliGRAM(s) IntraMuscular once  haloperidol     Tablet 1 milliGRAM(s) Oral three times a day  insulin lispro (ADMELOG) corrective regimen sliding scale   SubCutaneous three times a day before meals  insulin lispro (ADMELOG) corrective regimen sliding scale   SubCutaneous at bedtime  melatonin 6 milliGRAM(s) Oral at bedtime  methylPREDNISolone sodium succinate Injectable 20 milliGRAM(s) IV Push two times a day  pantoprazole    Tablet 40 milliGRAM(s) Oral before breakfast  traZODone 50 milliGRAM(s) Oral <User Schedule>  traZODone 12.5 milliGRAM(s) Oral <User Schedule>    MEDICATIONS  (PRN):  acetaminophen     Tablet .. 650 milliGRAM(s) Oral every 6 hours PRN Temp greater or equal to 38C (100.4F), Mild Pain (1 - 3)  aluminum hydroxide/magnesium hydroxide/simethicone Suspension 30 milliLiter(s) Oral every 4 hours PRN Dyspepsia  dextrose Oral Gel 15 Gram(s) Oral once PRN Blood Glucose LESS THAN 70 milliGRAM(s)/deciliter  haloperidol    Injectable 1 milliGRAM(s) IntraMuscular every 6 hours PRN Agitation  ondansetron Injectable 4 milliGRAM(s) IV Push every 8 hours PRN Nausea and/or Vomiting        CAPILLARY BLOOD GLUCOSE      POCT Blood Glucose.: 137 mg/dL (14 Apr 2023 08:26)  POCT Blood Glucose.: 118 mg/dL (13 Apr 2023 22:09)  POCT Blood Glucose.: 183 mg/dL (13 Apr 2023 17:58)  POCT Blood Glucose.: 166 mg/dL (13 Apr 2023 12:20)    I&O's Summary      Vital Signs Last 24 Hrs  T(C): 36.4 (14 Apr 2023 05:20), Max: 36.4 (14 Apr 2023 05:20)  T(F): 97.6 (14 Apr 2023 05:20), Max: 97.6 (14 Apr 2023 05:20)  HR: 84 (14 Apr 2023 09:34) (62 - 85)  BP: 122/56 (14 Apr 2023 05:20) (122/56 - 138/68)  BP(mean): --  RR: 18 (14 Apr 2023 05:20) (17 - 18)  SpO2: 100% (14 Apr 2023 09:34) (96% - 100%)    PHYSICAL EXAM:  GENERAL: calm   EYES: EOMI, PERRLA  NECK: Supple,  CHEST/LUNG: no wheeze  HEART: S1 S2; no murmurs   ABDOMEN: Soft, Nontender  EXTREMITIES:  no edema  NEUROLOGY: Alert confused    LABS:                        9.1    7.44  )-----------( 283      ( 14 Apr 2023 05:30 )             29.7     04-14    140  |  101  |  20  ----------------------------<  118<H>  4.0   |  29  |  0.54    Ca    9.4      14 Apr 2023 05:30  Phos  2.8     04-14  Mg     1.90     04-14                  All consultant(s) notes reviewed and care discussed with other providers        Contact Number, Dr Carreon [phone]
Patient is a 88y old  Female who presents with a chief complaint of agitation, fall at home? (15 Apr 2023 12:13)      DATE OF SERVICE: 04-16-23 @ 09:55    SUBJECTIVE / OVERNIGHT EVENTS: overnight events noted    ROS:  Resp: No cough no sputum production  CVS: No chest pain no palpitations no orthopnea  GI: no N/V/D          MEDICATIONS  (STANDING):  albuterol/ipratropium for Nebulization 3 milliLiter(s) Nebulizer every 6 hours  dextrose 5%. 1000 milliLiter(s) (50 mL/Hr) IV Continuous <Continuous>  dextrose 5%. 1000 milliLiter(s) (100 mL/Hr) IV Continuous <Continuous>  dextrose 50% Injectable 12.5 Gram(s) IV Push once  dextrose 50% Injectable 25 Gram(s) IV Push once  dextrose 50% Injectable 25 Gram(s) IV Push once  enoxaparin Injectable 40 milliGRAM(s) SubCutaneous every 24 hours  furosemide   Injectable 20 milliGRAM(s) IV Push daily  gabapentin 300 milliGRAM(s) Oral three times a day  glucagon  Injectable 1 milliGRAM(s) IntraMuscular once  haloperidol     Tablet 1 milliGRAM(s) Oral <User Schedule>  insulin lispro (ADMELOG) corrective regimen sliding scale   SubCutaneous three times a day before meals  insulin lispro (ADMELOG) corrective regimen sliding scale   SubCutaneous at bedtime  melatonin 6 milliGRAM(s) Oral at bedtime  metoprolol tartrate 50 milliGRAM(s) Oral two times a day  pantoprazole    Tablet 40 milliGRAM(s) Oral before breakfast  predniSONE   Tablet 20 milliGRAM(s) Oral every 12 hours  traZODone 50 milliGRAM(s) Oral <User Schedule>  traZODone 12.5 milliGRAM(s) Oral <User Schedule>  traZODone 25 milliGRAM(s) Oral <User Schedule>    MEDICATIONS  (PRN):  acetaminophen     Tablet .. 650 milliGRAM(s) Oral every 6 hours PRN Temp greater or equal to 38C (100.4F), Mild Pain (1 - 3)  aluminum hydroxide/magnesium hydroxide/simethicone Suspension 30 milliLiter(s) Oral every 4 hours PRN Dyspepsia  dextrose Oral Gel 15 Gram(s) Oral once PRN Blood Glucose LESS THAN 70 milliGRAM(s)/deciliter  haloperidol    Injectable 1 milliGRAM(s) IntraMuscular every 6 hours PRN Agitation  ondansetron Injectable 4 milliGRAM(s) IV Push every 8 hours PRN Nausea and/or Vomiting        CAPILLARY BLOOD GLUCOSE      POCT Blood Glucose.: 125 mg/dL (16 Apr 2023 08:16)  POCT Blood Glucose.: 143 mg/dL (15 Apr 2023 22:53)  POCT Blood Glucose.: 129 mg/dL (15 Apr 2023 17:19)  POCT Blood Glucose.: 124 mg/dL (15 Apr 2023 12:25)    I&O's Summary    15 Apr 2023 07:01  -  16 Apr 2023 07:00  --------------------------------------------------------  IN: 600 mL / OUT: 0 mL / NET: 600 mL        Vital Signs Last 24 Hrs  T(C): 36.6 (16 Apr 2023 05:10), Max: 37.1 (15 Apr 2023 17:53)  T(F): 97.8 (16 Apr 2023 05:10), Max: 98.8 (15 Apr 2023 17:53)  HR: 69 (16 Apr 2023 08:52) (61 - 115)  BP: 165/69 (16 Apr 2023 05:10) (138/60 - 165/69)  BP(mean): --  RR: 18 (16 Apr 2023 05:10) (16 - 18)  SpO2: 98% (16 Apr 2023 08:52) (97% - 100%)      PHYSICAL EXAM:  GENERAL: calm   EYES: EOMI, PERRLA  NECK: Supple,  CHEST/LUNG: no wheeze  HEART: S1 S2; no murmurs   ABDOMEN: Soft, Nontender  EXTREMITIES:  no edema  NEUROLOGY: Alert confused    LABS:                        10.3   5.61  )-----------( 265      ( 16 Apr 2023 06:04 )             33.8     04-16    139  |  97<L>  |  20  ----------------------------<  110<H>  3.8   |  32<H>  |  0.43<L>    Ca    9.4      16 Apr 2023 06:04  Phos  3.1     04-16  Mg     1.90     04-16                  All consultant(s) notes reviewed and care discussed with other providers        Contact Number, Dr Carreon [phone]
Patient is a 88y old  Female who presents with a chief complaint of agitation, fall at home? (17 Apr 2023 16:57)      DATE OF SERVICE: 04-18-23 @ 09:48    SUBJECTIVE / OVERNIGHT EVENTS: overnight events noted    ROS:  Resp: No cough no sputum production  CVS: No chest pain no palpitations no orthopnea  GI: no N/V/D  calm  eating on her own        MEDICATIONS  (STANDING):  albuterol/ipratropium for Nebulization 3 milliLiter(s) Nebulizer every 6 hours  buDESOnide    Inhalation Suspension 0.5 milliGRAM(s) Inhalation every 12 hours  dextrose 5%. 1000 milliLiter(s) (50 mL/Hr) IV Continuous <Continuous>  dextrose 5%. 1000 milliLiter(s) (100 mL/Hr) IV Continuous <Continuous>  dextrose 50% Injectable 25 Gram(s) IV Push once  dextrose 50% Injectable 12.5 Gram(s) IV Push once  dextrose 50% Injectable 25 Gram(s) IV Push once  enoxaparin Injectable 40 milliGRAM(s) SubCutaneous every 24 hours  gabapentin 300 milliGRAM(s) Oral three times a day  glucagon  Injectable 1 milliGRAM(s) IntraMuscular once  haloperidol     Tablet 1 milliGRAM(s) Oral <User Schedule>  insulin lispro (ADMELOG) corrective regimen sliding scale   SubCutaneous three times a day before meals  insulin lispro (ADMELOG) corrective regimen sliding scale   SubCutaneous at bedtime  melatonin 6 milliGRAM(s) Oral at bedtime  metoprolol tartrate 50 milliGRAM(s) Oral two times a day  pantoprazole    Tablet 40 milliGRAM(s) Oral before breakfast  traZODone 25 milliGRAM(s) Oral <User Schedule>  traZODone 12.5 milliGRAM(s) Oral <User Schedule>  traZODone 50 milliGRAM(s) Oral <User Schedule>    MEDICATIONS  (PRN):  acetaminophen     Tablet .. 650 milliGRAM(s) Oral every 6 hours PRN Temp greater or equal to 38C (100.4F), Mild Pain (1 - 3)  aluminum hydroxide/magnesium hydroxide/simethicone Suspension 30 milliLiter(s) Oral every 4 hours PRN Dyspepsia  dextrose Oral Gel 15 Gram(s) Oral once PRN Blood Glucose LESS THAN 70 milliGRAM(s)/deciliter  haloperidol    Injectable 1 milliGRAM(s) IntraMuscular every 6 hours PRN Agitation  ondansetron Injectable 4 milliGRAM(s) IV Push every 8 hours PRN Nausea and/or Vomiting        CAPILLARY BLOOD GLUCOSE      POCT Blood Glucose.: 110 mg/dL (18 Apr 2023 08:32)  POCT Blood Glucose.: 154 mg/dL (17 Apr 2023 22:15)  POCT Blood Glucose.: 118 mg/dL (17 Apr 2023 17:30)  POCT Blood Glucose.: 115 mg/dL (17 Apr 2023 12:21)    I&O's Summary      Vital Signs Last 24 Hrs  T(C): 36.7 (18 Apr 2023 05:00), Max: 36.7 (18 Apr 2023 05:00)  T(F): 98 (18 Apr 2023 05:00), Max: 98 (18 Apr 2023 05:00)  HR: 78 (18 Apr 2023 05:00) (60 - 79)  BP: 145/72 (18 Apr 2023 05:00) (121/63 - 145/72)  BP(mean): --  RR: 18 (18 Apr 2023 05:00) (18 - 18)  SpO2: 98% (18 Apr 2023 05:00) (94% - 100%)    PHYSICAL EXAM:  GENERAL: calm   EYES: EOMI, PERRLA  NECK: Supple,  CHEST/LUNG: no wheeze  HEART: S1 S2; no murmurs   ABDOMEN: Soft, Nontender  EXTREMITIES:  no edema  NEUROLOGY: Alert confused    LABS:                        10.8   6.54  )-----------( 287      ( 17 Apr 2023 07:14 )             35.1     04-17    137  |  97<L>  |  25<H>  ----------------------------<  105<H>  4.4   |  31  |  0.46<L>    Ca    9.5      17 Apr 2023 07:14  Phos  3.5     04-17  Mg     1.90     04-17                  All consultant(s) notes reviewed and care discussed with other providers        Contact Number, Dr Carreon [phone]
Patient is a 88y old  Female who presents with a chief complaint of agitation, fall at home? (16 Apr 2023 10:46)      DATE OF SERVICE: 04-17-23 @ 11:53    SUBJECTIVE / OVERNIGHT EVENTS: overnight events noted    ROS:  Resp: No cough no sputum production  calm per RN        MEDICATIONS  (STANDING):  albuterol/ipratropium for Nebulization 3 milliLiter(s) Nebulizer every 6 hours  dextrose 5%. 1000 milliLiter(s) (100 mL/Hr) IV Continuous <Continuous>  dextrose 5%. 1000 milliLiter(s) (50 mL/Hr) IV Continuous <Continuous>  dextrose 50% Injectable 25 Gram(s) IV Push once  dextrose 50% Injectable 12.5 Gram(s) IV Push once  dextrose 50% Injectable 25 Gram(s) IV Push once  enoxaparin Injectable 40 milliGRAM(s) SubCutaneous every 24 hours  gabapentin 300 milliGRAM(s) Oral three times a day  glucagon  Injectable 1 milliGRAM(s) IntraMuscular once  haloperidol     Tablet 1 milliGRAM(s) Oral <User Schedule>  insulin lispro (ADMELOG) corrective regimen sliding scale   SubCutaneous three times a day before meals  insulin lispro (ADMELOG) corrective regimen sliding scale   SubCutaneous at bedtime  melatonin 6 milliGRAM(s) Oral at bedtime  metoprolol tartrate 50 milliGRAM(s) Oral two times a day  pantoprazole    Tablet 40 milliGRAM(s) Oral before breakfast  predniSONE   Tablet 20 milliGRAM(s) Oral every 12 hours  traZODone 50 milliGRAM(s) Oral <User Schedule>  traZODone 12.5 milliGRAM(s) Oral <User Schedule>  traZODone 25 milliGRAM(s) Oral <User Schedule>    MEDICATIONS  (PRN):  acetaminophen     Tablet .. 650 milliGRAM(s) Oral every 6 hours PRN Temp greater or equal to 38C (100.4F), Mild Pain (1 - 3)  aluminum hydroxide/magnesium hydroxide/simethicone Suspension 30 milliLiter(s) Oral every 4 hours PRN Dyspepsia  dextrose Oral Gel 15 Gram(s) Oral once PRN Blood Glucose LESS THAN 70 milliGRAM(s)/deciliter  haloperidol    Injectable 1 milliGRAM(s) IntraMuscular every 6 hours PRN Agitation  ondansetron Injectable 4 milliGRAM(s) IV Push every 8 hours PRN Nausea and/or Vomiting        CAPILLARY BLOOD GLUCOSE      POCT Blood Glucose.: 124 mg/dL (17 Apr 2023 08:43)  POCT Blood Glucose.: 99 mg/dL (16 Apr 2023 22:02)  POCT Blood Glucose.: 106 mg/dL (16 Apr 2023 17:30)  POCT Blood Glucose.: 143 mg/dL (16 Apr 2023 12:20)    I&O's Summary    16 Apr 2023 07:01  -  17 Apr 2023 07:00  --------------------------------------------------------  IN: 390 mL / OUT: 1300 mL / NET: -910 mL        Vital Signs Last 24 Hrs  T(C): 36.6 (17 Apr 2023 05:20), Max: 37 (16 Apr 2023 17:10)  T(F): 97.8 (17 Apr 2023 05:20), Max: 98.6 (16 Apr 2023 17:10)  HR: 65 (17 Apr 2023 10:29) (61 - 81)  BP: 131/67 (17 Apr 2023 05:20) (131/67 - 167/74)  BP(mean): --  RR: 18 (17 Apr 2023 05:20) (17 - 18)  SpO2: 96% (17 Apr 2023 10:29) (95% - 99%)    PHYSICAL EXAM:  GENERAL: calm   EYES: EOMI, PERRLA  NECK: Supple,  CHEST/LUNG: no wheeze  HEART: S1 S2; no murmurs   ABDOMEN: Soft, Nontender  EXTREMITIES:  no edema  NEUROLOGY: Alert confused    LABS:                        10.8   6.54  )-----------( 287      ( 17 Apr 2023 07:14 )             35.1     04-17    137  |  97<L>  |  25<H>  ----------------------------<  105<H>  4.4   |  31  |  0.46<L>    Ca    9.5      17 Apr 2023 07:14  Phos  3.5     04-17  Mg     1.90     04-17                  All consultant(s) notes reviewed and care discussed with other providers        Contact Number, Dr Carreon [phone]

## 2023-05-01 ENCOUNTER — TRANSCRIPTION ENCOUNTER (OUTPATIENT)
Age: 88
End: 2023-05-01

## 2023-05-01 VITALS
DIASTOLIC BLOOD PRESSURE: 63 MMHG | RESPIRATION RATE: 19 BRPM | SYSTOLIC BLOOD PRESSURE: 126 MMHG | TEMPERATURE: 98 F | OXYGEN SATURATION: 100 % | HEART RATE: 62 BPM

## 2023-05-01 LAB
ANION GAP SERPL CALC-SCNC: 10 MMOL/L — SIGNIFICANT CHANGE UP (ref 7–14)
BUN SERPL-MCNC: 15 MG/DL — SIGNIFICANT CHANGE UP (ref 7–23)
CALCIUM SERPL-MCNC: 9.7 MG/DL — SIGNIFICANT CHANGE UP (ref 8.4–10.5)
CHLORIDE SERPL-SCNC: 101 MMOL/L — SIGNIFICANT CHANGE UP (ref 98–107)
CO2 SERPL-SCNC: 27 MMOL/L — SIGNIFICANT CHANGE UP (ref 22–31)
CREAT SERPL-MCNC: 0.59 MG/DL — SIGNIFICANT CHANGE UP (ref 0.5–1.3)
EGFR: 87 ML/MIN/1.73M2 — SIGNIFICANT CHANGE UP
GLUCOSE BLDC GLUCOMTR-MCNC: 99 MG/DL — SIGNIFICANT CHANGE UP (ref 70–99)
GLUCOSE SERPL-MCNC: 87 MG/DL — SIGNIFICANT CHANGE UP (ref 70–99)
HCT VFR BLD CALC: 35.9 % — SIGNIFICANT CHANGE UP (ref 34.5–45)
HGB BLD-MCNC: 11.1 G/DL — LOW (ref 11.5–15.5)
MCHC RBC-ENTMCNC: 28.4 PG — SIGNIFICANT CHANGE UP (ref 27–34)
MCHC RBC-ENTMCNC: 30.9 GM/DL — LOW (ref 32–36)
MCV RBC AUTO: 91.8 FL — SIGNIFICANT CHANGE UP (ref 80–100)
NRBC # BLD: 0 /100 WBCS — SIGNIFICANT CHANGE UP (ref 0–0)
NRBC # FLD: 0 K/UL — SIGNIFICANT CHANGE UP (ref 0–0)
PLATELET # BLD AUTO: 277 K/UL — SIGNIFICANT CHANGE UP (ref 150–400)
POTASSIUM SERPL-MCNC: 3.5 MMOL/L — SIGNIFICANT CHANGE UP (ref 3.5–5.3)
POTASSIUM SERPL-SCNC: 3.5 MMOL/L — SIGNIFICANT CHANGE UP (ref 3.5–5.3)
RBC # BLD: 3.91 M/UL — SIGNIFICANT CHANGE UP (ref 3.8–5.2)
RBC # FLD: 15.2 % — HIGH (ref 10.3–14.5)
SODIUM SERPL-SCNC: 138 MMOL/L — SIGNIFICANT CHANGE UP (ref 135–145)
WBC # BLD: 6.38 K/UL — SIGNIFICANT CHANGE UP (ref 3.8–10.5)
WBC # FLD AUTO: 6.38 K/UL — SIGNIFICANT CHANGE UP (ref 3.8–10.5)

## 2023-05-01 RX ORDER — LANOLIN ALCOHOL/MO/W.PET/CERES
1 CREAM (GRAM) TOPICAL
Refills: 0 | DISCHARGE
Start: 2023-05-01

## 2023-05-01 RX ORDER — TRAZODONE HCL 50 MG
0.5 TABLET ORAL
Qty: 30 | Refills: 0
Start: 2023-05-01 | End: 2023-05-30

## 2023-05-01 RX ORDER — POTASSIUM CHLORIDE 20 MEQ
20 PACKET (EA) ORAL ONCE
Refills: 0 | Status: DISCONTINUED | OUTPATIENT
Start: 2023-05-01 | End: 2023-05-01

## 2023-05-01 RX ORDER — BUDESONIDE, MICRONIZED 100 %
2 POWDER (GRAM) MISCELLANEOUS
Qty: 1 | Refills: 0
Start: 2023-05-01 | End: 2023-05-30

## 2023-05-01 RX ORDER — PANTOPRAZOLE SODIUM 20 MG/1
1 TABLET, DELAYED RELEASE ORAL
Qty: 30 | Refills: 0
Start: 2023-05-01 | End: 2023-05-30

## 2023-05-01 RX ADMIN — HALOPERIDOL DECANOATE 1 MILLIGRAM(S): 100 INJECTION INTRAMUSCULAR at 08:44

## 2023-05-01 RX ADMIN — Medication 3 MILLILITER(S): at 09:26

## 2023-05-01 RX ADMIN — Medication 25 MILLIGRAM(S): at 08:44

## 2023-05-01 RX ADMIN — Medication 650 MILLIGRAM(S): at 05:54

## 2023-05-01 RX ADMIN — Medication 0.5 MILLIGRAM(S): at 09:27

## 2023-05-01 RX ADMIN — GABAPENTIN 300 MILLIGRAM(S): 400 CAPSULE ORAL at 05:50

## 2023-05-01 RX ADMIN — Medication 25 MILLIGRAM(S): at 05:50

## 2023-05-01 RX ADMIN — Medication 650 MILLIGRAM(S): at 06:54

## 2023-05-01 RX ADMIN — PANTOPRAZOLE SODIUM 40 MILLIGRAM(S): 20 TABLET, DELAYED RELEASE ORAL at 05:49

## 2023-05-01 NOTE — DISCHARGE NOTE NURSING/CASE MANAGEMENT/SOCIAL WORK - PATIENT PORTAL LINK FT
You can access the FollowMyHealth Patient Portal offered by [facility] by registering at the following website: [email or web address]. By joining [facility]’s FollowMyHealth portal, you will also be able to view your health information using other applications (apps) compatible with our system.

## 2023-05-25 ENCOUNTER — OUTPATIENT (OUTPATIENT)
Dept: OUTPATIENT SERVICES | Facility: HOSPITAL | Age: 88
LOS: 1 days | Discharge: HOME | End: 2023-05-25
Payer: MEDICARE

## 2023-05-25 DIAGNOSIS — Z90.89 ACQUIRED ABSENCE OF OTHER ORGANS: Chronic | ICD-10-CM

## 2023-05-25 PROCEDURE — 99443: CPT | Mod: 95

## 2023-06-19 PROCEDURE — 99443: CPT | Mod: 95

## 2023-07-20 NOTE — ED ADULT TRIAGE NOTE - ESI TRIAGE ACUITY LEVEL, MLM
Chest Tube Removal  Procedure: Chest Tube Removal    Chest tube with no air leak. CXR this am with small apical pneurmothorax. Chest tube removed at full inspiration and u shaped suture was tighten with occlusive dressing placed. CXR in 4 hours. Dressing to remain in place for 48 hours.     Patient educated.     Ladarius Grier MD.   Anesthesiology, PGY-1   Contact via Epic Message     3

## 2023-07-24 LAB
ALBUMIN SERPL ELPH-MCNC: 3.6 G/DL — SIGNIFICANT CHANGE UP (ref 3.3–5)
ALP SERPL-CCNC: 118 U/L — SIGNIFICANT CHANGE UP (ref 40–120)
ALT FLD-CCNC: 14 U/L — SIGNIFICANT CHANGE UP (ref 4–33)
ANION GAP SERPL CALC-SCNC: 11 MMOL/L — SIGNIFICANT CHANGE UP (ref 7–14)
AST SERPL-CCNC: 15 U/L — SIGNIFICANT CHANGE UP (ref 4–32)
BASOPHILS # BLD AUTO: 0.05 K/UL — SIGNIFICANT CHANGE UP (ref 0–0.2)
BASOPHILS NFR BLD AUTO: 0.8 % — SIGNIFICANT CHANGE UP (ref 0–2)
BILIRUB SERPL-MCNC: 1 MG/DL — SIGNIFICANT CHANGE UP (ref 0.2–1.2)
BUN SERPL-MCNC: 17 MG/DL — SIGNIFICANT CHANGE UP (ref 7–23)
CALCIUM SERPL-MCNC: 9.9 MG/DL — SIGNIFICANT CHANGE UP (ref 8.4–10.5)
CHLORIDE SERPL-SCNC: 101 MMOL/L — SIGNIFICANT CHANGE UP (ref 98–107)
CO2 SERPL-SCNC: 30 MMOL/L — SIGNIFICANT CHANGE UP (ref 22–31)
CREAT SERPL-MCNC: 0.6 MG/DL — SIGNIFICANT CHANGE UP (ref 0.5–1.3)
EGFR: 86 ML/MIN/1.73M2 — SIGNIFICANT CHANGE UP
EOSINOPHIL # BLD AUTO: 0.08 K/UL — SIGNIFICANT CHANGE UP (ref 0–0.5)
EOSINOPHIL NFR BLD AUTO: 1.4 % — SIGNIFICANT CHANGE UP (ref 0–6)
GLUCOSE SERPL-MCNC: 92 MG/DL — SIGNIFICANT CHANGE UP (ref 70–99)
HCT VFR BLD CALC: 39 % — SIGNIFICANT CHANGE UP (ref 34.5–45)
HGB BLD-MCNC: 12.1 G/DL — SIGNIFICANT CHANGE UP (ref 11.5–15.5)
IANC: 2.75 K/UL — SIGNIFICANT CHANGE UP (ref 1.8–7.4)
IMM GRANULOCYTES NFR BLD AUTO: 0.2 % — SIGNIFICANT CHANGE UP (ref 0–0.9)
LYMPHOCYTES # BLD AUTO: 2.3 K/UL — SIGNIFICANT CHANGE UP (ref 1–3.3)
LYMPHOCYTES # BLD AUTO: 39 % — SIGNIFICANT CHANGE UP (ref 13–44)
MCHC RBC-ENTMCNC: 28.2 PG — SIGNIFICANT CHANGE UP (ref 27–34)
MCHC RBC-ENTMCNC: 31 GM/DL — LOW (ref 32–36)
MCV RBC AUTO: 90.9 FL — SIGNIFICANT CHANGE UP (ref 80–100)
MONOCYTES # BLD AUTO: 0.71 K/UL — SIGNIFICANT CHANGE UP (ref 0–0.9)
MONOCYTES NFR BLD AUTO: 12 % — SIGNIFICANT CHANGE UP (ref 2–14)
NEUTROPHILS # BLD AUTO: 2.75 K/UL — SIGNIFICANT CHANGE UP (ref 1.8–7.4)
NEUTROPHILS NFR BLD AUTO: 46.6 % — SIGNIFICANT CHANGE UP (ref 43–77)
NRBC # BLD: 0 /100 WBCS — SIGNIFICANT CHANGE UP (ref 0–0)
NRBC # FLD: 0 K/UL — SIGNIFICANT CHANGE UP (ref 0–0)
PLATELET # BLD AUTO: 276 K/UL — SIGNIFICANT CHANGE UP (ref 150–400)
POTASSIUM SERPL-MCNC: 4.4 MMOL/L — SIGNIFICANT CHANGE UP (ref 3.5–5.3)
POTASSIUM SERPL-SCNC: 4.4 MMOL/L — SIGNIFICANT CHANGE UP (ref 3.5–5.3)
PROT SERPL-MCNC: 6.9 G/DL — SIGNIFICANT CHANGE UP (ref 6–8.3)
RBC # BLD: 4.29 M/UL — SIGNIFICANT CHANGE UP (ref 3.8–5.2)
RBC # FLD: 15.3 % — HIGH (ref 10.3–14.5)
SODIUM SERPL-SCNC: 142 MMOL/L — SIGNIFICANT CHANGE UP (ref 135–145)
WBC # BLD: 5.9 K/UL — SIGNIFICANT CHANGE UP (ref 3.8–10.5)
WBC # FLD AUTO: 5.9 K/UL — SIGNIFICANT CHANGE UP (ref 3.8–10.5)

## 2023-07-24 PROCEDURE — 99215 OFFICE O/P EST HI 40 MIN: CPT

## 2023-08-21 PROCEDURE — 99214 OFFICE O/P EST MOD 30 MIN: CPT

## 2023-08-29 DIAGNOSIS — F31.9 BIPOLAR DISORDER, UNSPECIFIED: ICD-10-CM

## 2023-09-25 PROCEDURE — 99214 OFFICE O/P EST MOD 30 MIN: CPT | Mod: 95

## 2023-10-23 PROCEDURE — 99214 OFFICE O/P EST MOD 30 MIN: CPT

## 2023-11-17 ENCOUNTER — INPATIENT (INPATIENT)
Facility: HOSPITAL | Age: 88
LOS: 9 days | Discharge: NURSING, REHAB OR LONG-TERM CARE | End: 2023-11-27
Attending: STUDENT IN AN ORGANIZED HEALTH CARE EDUCATION/TRAINING PROGRAM | Admitting: STUDENT IN AN ORGANIZED HEALTH CARE EDUCATION/TRAINING PROGRAM
Payer: MEDICARE

## 2023-11-17 VITALS
HEART RATE: 89 BPM | TEMPERATURE: 98 F | DIASTOLIC BLOOD PRESSURE: 73 MMHG | SYSTOLIC BLOOD PRESSURE: 119 MMHG | RESPIRATION RATE: 16 BRPM | OXYGEN SATURATION: 99 %

## 2023-11-17 DIAGNOSIS — Z90.89 ACQUIRED ABSENCE OF OTHER ORGANS: Chronic | ICD-10-CM

## 2023-11-17 LAB
ALBUMIN SERPL ELPH-MCNC: 3.2 G/DL — LOW (ref 3.3–5)
ALBUMIN SERPL ELPH-MCNC: 3.2 G/DL — LOW (ref 3.3–5)
ALP SERPL-CCNC: 120 U/L — SIGNIFICANT CHANGE UP (ref 40–120)
ALP SERPL-CCNC: 120 U/L — SIGNIFICANT CHANGE UP (ref 40–120)
ALT FLD-CCNC: 19 U/L — SIGNIFICANT CHANGE UP (ref 4–33)
ALT FLD-CCNC: 19 U/L — SIGNIFICANT CHANGE UP (ref 4–33)
ANION GAP SERPL CALC-SCNC: 10 MMOL/L — SIGNIFICANT CHANGE UP (ref 7–14)
ANION GAP SERPL CALC-SCNC: 10 MMOL/L — SIGNIFICANT CHANGE UP (ref 7–14)
APPEARANCE UR: ABNORMAL
APPEARANCE UR: ABNORMAL
AST SERPL-CCNC: 27 U/L — SIGNIFICANT CHANGE UP (ref 4–32)
AST SERPL-CCNC: 27 U/L — SIGNIFICANT CHANGE UP (ref 4–32)
BACTERIA # UR AUTO: NEGATIVE /HPF — SIGNIFICANT CHANGE UP
BACTERIA # UR AUTO: NEGATIVE /HPF — SIGNIFICANT CHANGE UP
BASE EXCESS BLDV CALC-SCNC: 4.9 MMOL/L — HIGH (ref -2–3)
BASE EXCESS BLDV CALC-SCNC: 4.9 MMOL/L — HIGH (ref -2–3)
BASOPHILS # BLD AUTO: 0.04 K/UL — SIGNIFICANT CHANGE UP (ref 0–0.2)
BASOPHILS # BLD AUTO: 0.04 K/UL — SIGNIFICANT CHANGE UP (ref 0–0.2)
BASOPHILS NFR BLD AUTO: 0.7 % — SIGNIFICANT CHANGE UP (ref 0–2)
BASOPHILS NFR BLD AUTO: 0.7 % — SIGNIFICANT CHANGE UP (ref 0–2)
BILIRUB SERPL-MCNC: 0.5 MG/DL — SIGNIFICANT CHANGE UP (ref 0.2–1.2)
BILIRUB SERPL-MCNC: 0.5 MG/DL — SIGNIFICANT CHANGE UP (ref 0.2–1.2)
BILIRUB UR-MCNC: NEGATIVE — SIGNIFICANT CHANGE UP
BILIRUB UR-MCNC: NEGATIVE — SIGNIFICANT CHANGE UP
BLOOD GAS VENOUS COMPREHENSIVE RESULT: SIGNIFICANT CHANGE UP
BLOOD GAS VENOUS COMPREHENSIVE RESULT: SIGNIFICANT CHANGE UP
BUN SERPL-MCNC: 17 MG/DL — SIGNIFICANT CHANGE UP (ref 7–23)
BUN SERPL-MCNC: 17 MG/DL — SIGNIFICANT CHANGE UP (ref 7–23)
CALCIUM SERPL-MCNC: 9.3 MG/DL — SIGNIFICANT CHANGE UP (ref 8.4–10.5)
CALCIUM SERPL-MCNC: 9.3 MG/DL — SIGNIFICANT CHANGE UP (ref 8.4–10.5)
CAST: 12 /LPF — HIGH (ref 0–4)
CAST: 12 /LPF — HIGH (ref 0–4)
CHLORIDE BLDV-SCNC: 103 MMOL/L — SIGNIFICANT CHANGE UP (ref 96–108)
CHLORIDE BLDV-SCNC: 103 MMOL/L — SIGNIFICANT CHANGE UP (ref 96–108)
CHLORIDE SERPL-SCNC: 104 MMOL/L — SIGNIFICANT CHANGE UP (ref 98–107)
CHLORIDE SERPL-SCNC: 104 MMOL/L — SIGNIFICANT CHANGE UP (ref 98–107)
CO2 BLDV-SCNC: 32.9 MMOL/L — HIGH (ref 22–26)
CO2 BLDV-SCNC: 32.9 MMOL/L — HIGH (ref 22–26)
CO2 SERPL-SCNC: 30 MMOL/L — SIGNIFICANT CHANGE UP (ref 22–31)
CO2 SERPL-SCNC: 30 MMOL/L — SIGNIFICANT CHANGE UP (ref 22–31)
COD CRY URNS QL: PRESENT
COD CRY URNS QL: PRESENT
COLOR SPEC: YELLOW — SIGNIFICANT CHANGE UP
COLOR SPEC: YELLOW — SIGNIFICANT CHANGE UP
CREAT SERPL-MCNC: 0.45 MG/DL — LOW (ref 0.5–1.3)
CREAT SERPL-MCNC: 0.45 MG/DL — LOW (ref 0.5–1.3)
DIFF PNL FLD: NEGATIVE — SIGNIFICANT CHANGE UP
DIFF PNL FLD: NEGATIVE — SIGNIFICANT CHANGE UP
EGFR: 92 ML/MIN/1.73M2 — SIGNIFICANT CHANGE UP
EGFR: 92 ML/MIN/1.73M2 — SIGNIFICANT CHANGE UP
EOSINOPHIL # BLD AUTO: 0.19 K/UL — SIGNIFICANT CHANGE UP (ref 0–0.5)
EOSINOPHIL # BLD AUTO: 0.19 K/UL — SIGNIFICANT CHANGE UP (ref 0–0.5)
EOSINOPHIL NFR BLD AUTO: 3.6 % — SIGNIFICANT CHANGE UP (ref 0–6)
EOSINOPHIL NFR BLD AUTO: 3.6 % — SIGNIFICANT CHANGE UP (ref 0–6)
GAS PNL BLDV: 140 MMOL/L — SIGNIFICANT CHANGE UP (ref 136–145)
GAS PNL BLDV: 140 MMOL/L — SIGNIFICANT CHANGE UP (ref 136–145)
GLUCOSE BLDV-MCNC: 78 MG/DL — SIGNIFICANT CHANGE UP (ref 70–99)
GLUCOSE BLDV-MCNC: 78 MG/DL — SIGNIFICANT CHANGE UP (ref 70–99)
GLUCOSE SERPL-MCNC: 86 MG/DL — SIGNIFICANT CHANGE UP (ref 70–99)
GLUCOSE SERPL-MCNC: 86 MG/DL — SIGNIFICANT CHANGE UP (ref 70–99)
GLUCOSE UR QL: NEGATIVE MG/DL — SIGNIFICANT CHANGE UP
GLUCOSE UR QL: NEGATIVE MG/DL — SIGNIFICANT CHANGE UP
HCO3 BLDV-SCNC: 31 MMOL/L — HIGH (ref 22–29)
HCO3 BLDV-SCNC: 31 MMOL/L — HIGH (ref 22–29)
HCT VFR BLD CALC: 31.8 % — LOW (ref 34.5–45)
HCT VFR BLD CALC: 31.8 % — LOW (ref 34.5–45)
HCT VFR BLDA CALC: 31 % — LOW (ref 34.5–46.5)
HCT VFR BLDA CALC: 31 % — LOW (ref 34.5–46.5)
HGB BLD CALC-MCNC: 10.4 G/DL — LOW (ref 11.7–16.1)
HGB BLD CALC-MCNC: 10.4 G/DL — LOW (ref 11.7–16.1)
HGB BLD-MCNC: 10.2 G/DL — LOW (ref 11.5–15.5)
HGB BLD-MCNC: 10.2 G/DL — LOW (ref 11.5–15.5)
IANC: 2.22 K/UL — SIGNIFICANT CHANGE UP (ref 1.8–7.4)
IANC: 2.22 K/UL — SIGNIFICANT CHANGE UP (ref 1.8–7.4)
IMM GRANULOCYTES NFR BLD AUTO: 0.2 % — SIGNIFICANT CHANGE UP (ref 0–0.9)
IMM GRANULOCYTES NFR BLD AUTO: 0.2 % — SIGNIFICANT CHANGE UP (ref 0–0.9)
KETONES UR-MCNC: ABNORMAL MG/DL
KETONES UR-MCNC: ABNORMAL MG/DL
LACTATE BLDV-MCNC: 1.6 MMOL/L — SIGNIFICANT CHANGE UP (ref 0.5–2)
LACTATE BLDV-MCNC: 1.6 MMOL/L — SIGNIFICANT CHANGE UP (ref 0.5–2)
LEUKOCYTE ESTERASE UR-ACNC: ABNORMAL
LEUKOCYTE ESTERASE UR-ACNC: ABNORMAL
LYMPHOCYTES # BLD AUTO: 2.19 K/UL — SIGNIFICANT CHANGE UP (ref 1–3.3)
LYMPHOCYTES # BLD AUTO: 2.19 K/UL — SIGNIFICANT CHANGE UP (ref 1–3.3)
LYMPHOCYTES # BLD AUTO: 40.9 % — SIGNIFICANT CHANGE UP (ref 13–44)
LYMPHOCYTES # BLD AUTO: 40.9 % — SIGNIFICANT CHANGE UP (ref 13–44)
MCHC RBC-ENTMCNC: 28.3 PG — SIGNIFICANT CHANGE UP (ref 27–34)
MCHC RBC-ENTMCNC: 28.3 PG — SIGNIFICANT CHANGE UP (ref 27–34)
MCHC RBC-ENTMCNC: 32.1 GM/DL — SIGNIFICANT CHANGE UP (ref 32–36)
MCHC RBC-ENTMCNC: 32.1 GM/DL — SIGNIFICANT CHANGE UP (ref 32–36)
MCV RBC AUTO: 88.1 FL — SIGNIFICANT CHANGE UP (ref 80–100)
MCV RBC AUTO: 88.1 FL — SIGNIFICANT CHANGE UP (ref 80–100)
MONOCYTES # BLD AUTO: 0.7 K/UL — SIGNIFICANT CHANGE UP (ref 0–0.9)
MONOCYTES # BLD AUTO: 0.7 K/UL — SIGNIFICANT CHANGE UP (ref 0–0.9)
MONOCYTES NFR BLD AUTO: 13.1 % — SIGNIFICANT CHANGE UP (ref 2–14)
MONOCYTES NFR BLD AUTO: 13.1 % — SIGNIFICANT CHANGE UP (ref 2–14)
NEUTROPHILS # BLD AUTO: 2.22 K/UL — SIGNIFICANT CHANGE UP (ref 1.8–7.4)
NEUTROPHILS # BLD AUTO: 2.22 K/UL — SIGNIFICANT CHANGE UP (ref 1.8–7.4)
NEUTROPHILS NFR BLD AUTO: 41.5 % — LOW (ref 43–77)
NEUTROPHILS NFR BLD AUTO: 41.5 % — LOW (ref 43–77)
NITRITE UR-MCNC: NEGATIVE — SIGNIFICANT CHANGE UP
NITRITE UR-MCNC: NEGATIVE — SIGNIFICANT CHANGE UP
NRBC # BLD: 0 /100 WBCS — SIGNIFICANT CHANGE UP (ref 0–0)
NRBC # BLD: 0 /100 WBCS — SIGNIFICANT CHANGE UP (ref 0–0)
NRBC # FLD: 0 K/UL — SIGNIFICANT CHANGE UP (ref 0–0)
NRBC # FLD: 0 K/UL — SIGNIFICANT CHANGE UP (ref 0–0)
PCO2 BLDV: 54 MMHG — HIGH (ref 39–52)
PCO2 BLDV: 54 MMHG — HIGH (ref 39–52)
PH BLDV: 7.37 — SIGNIFICANT CHANGE UP (ref 7.32–7.43)
PH BLDV: 7.37 — SIGNIFICANT CHANGE UP (ref 7.32–7.43)
PH UR: 6.5 — SIGNIFICANT CHANGE UP (ref 5–8)
PH UR: 6.5 — SIGNIFICANT CHANGE UP (ref 5–8)
PLATELET # BLD AUTO: 276 K/UL — SIGNIFICANT CHANGE UP (ref 150–400)
PLATELET # BLD AUTO: 276 K/UL — SIGNIFICANT CHANGE UP (ref 150–400)
PO2 BLDV: 53 MMHG — HIGH (ref 25–45)
PO2 BLDV: 53 MMHG — HIGH (ref 25–45)
POTASSIUM BLDV-SCNC: 3.5 MMOL/L — SIGNIFICANT CHANGE UP (ref 3.5–5.1)
POTASSIUM BLDV-SCNC: 3.5 MMOL/L — SIGNIFICANT CHANGE UP (ref 3.5–5.1)
POTASSIUM SERPL-MCNC: 3.7 MMOL/L — SIGNIFICANT CHANGE UP (ref 3.5–5.3)
POTASSIUM SERPL-MCNC: 3.7 MMOL/L — SIGNIFICANT CHANGE UP (ref 3.5–5.3)
POTASSIUM SERPL-SCNC: 3.7 MMOL/L — SIGNIFICANT CHANGE UP (ref 3.5–5.3)
POTASSIUM SERPL-SCNC: 3.7 MMOL/L — SIGNIFICANT CHANGE UP (ref 3.5–5.3)
PROT SERPL-MCNC: 6.2 G/DL — SIGNIFICANT CHANGE UP (ref 6–8.3)
PROT SERPL-MCNC: 6.2 G/DL — SIGNIFICANT CHANGE UP (ref 6–8.3)
PROT UR-MCNC: NEGATIVE MG/DL — SIGNIFICANT CHANGE UP
PROT UR-MCNC: NEGATIVE MG/DL — SIGNIFICANT CHANGE UP
RBC # BLD: 3.61 M/UL — LOW (ref 3.8–5.2)
RBC # BLD: 3.61 M/UL — LOW (ref 3.8–5.2)
RBC # FLD: 14.9 % — HIGH (ref 10.3–14.5)
RBC # FLD: 14.9 % — HIGH (ref 10.3–14.5)
RBC CASTS # UR COMP ASSIST: 5 /HPF — HIGH (ref 0–4)
RBC CASTS # UR COMP ASSIST: 5 /HPF — HIGH (ref 0–4)
REVIEW: SIGNIFICANT CHANGE UP
REVIEW: SIGNIFICANT CHANGE UP
SAO2 % BLDV: 84.9 % — SIGNIFICANT CHANGE UP (ref 67–88)
SAO2 % BLDV: 84.9 % — SIGNIFICANT CHANGE UP (ref 67–88)
SODIUM SERPL-SCNC: 144 MMOL/L — SIGNIFICANT CHANGE UP (ref 135–145)
SODIUM SERPL-SCNC: 144 MMOL/L — SIGNIFICANT CHANGE UP (ref 135–145)
SP GR SPEC: 1.02 — SIGNIFICANT CHANGE UP (ref 1–1.03)
SP GR SPEC: 1.02 — SIGNIFICANT CHANGE UP (ref 1–1.03)
SQUAMOUS # UR AUTO: 2 /HPF — SIGNIFICANT CHANGE UP (ref 0–5)
SQUAMOUS # UR AUTO: 2 /HPF — SIGNIFICANT CHANGE UP (ref 0–5)
UROBILINOGEN FLD QL: 1 MG/DL — SIGNIFICANT CHANGE UP (ref 0.2–1)
UROBILINOGEN FLD QL: 1 MG/DL — SIGNIFICANT CHANGE UP (ref 0.2–1)
WBC # BLD: 5.35 K/UL — SIGNIFICANT CHANGE UP (ref 3.8–10.5)
WBC # BLD: 5.35 K/UL — SIGNIFICANT CHANGE UP (ref 3.8–10.5)
WBC # FLD AUTO: 5.35 K/UL — SIGNIFICANT CHANGE UP (ref 3.8–10.5)
WBC # FLD AUTO: 5.35 K/UL — SIGNIFICANT CHANGE UP (ref 3.8–10.5)
WBC UR QL: 0 /HPF — SIGNIFICANT CHANGE UP (ref 0–5)
WBC UR QL: 0 /HPF — SIGNIFICANT CHANGE UP (ref 0–5)

## 2023-11-17 PROCEDURE — 72125 CT NECK SPINE W/O DYE: CPT | Mod: 26,MG

## 2023-11-17 PROCEDURE — 99285 EMERGENCY DEPT VISIT HI MDM: CPT

## 2023-11-17 PROCEDURE — G1004: CPT

## 2023-11-17 PROCEDURE — 70450 CT HEAD/BRAIN W/O DYE: CPT | Mod: 26,MG

## 2023-11-17 PROCEDURE — 74176 CT ABD & PELVIS W/O CONTRAST: CPT | Mod: 26,ME

## 2023-11-17 RX ORDER — HALOPERIDOL DECANOATE 100 MG/ML
1 INJECTION INTRAMUSCULAR ONCE
Refills: 0 | Status: DISCONTINUED | OUTPATIENT
Start: 2023-11-17 | End: 2023-11-17

## 2023-11-17 RX ORDER — HALOPERIDOL DECANOATE 100 MG/ML
2.5 INJECTION INTRAMUSCULAR ONCE
Refills: 0 | Status: COMPLETED | OUTPATIENT
Start: 2023-11-17 | End: 2023-11-17

## 2023-11-17 RX ADMIN — HALOPERIDOL DECANOATE 2.5 MILLIGRAM(S): 100 INJECTION INTRAMUSCULAR at 22:15

## 2023-11-17 NOTE — ED PROVIDER NOTE - PROGRESS NOTE DETAILS
KYRA Turner- received sign out from resident. pt mildly agitated chanting overnight which is baseline. skipped home medications. will provide haldol and trazadone which pt takes at home. plan to admit to medicine for UTI, colitis, psych consult, case management for placement per family. I spoke with Dr. Carreon and Dr. Navarro, suggesting to admit to hospitalist.

## 2023-11-17 NOTE — ED PROVIDER NOTE - PATIENT'S PREFERRED PRONOUN
unable to assess due to patient's cognitive impairment. patient unable to formulate sentence. no family present to help answer questions.

## 2023-11-17 NOTE — ED PROVIDER NOTE - OBJECTIVE STATEMENT
The patient is an 88yo woman with a history of vascular dementia, bipolar I, CVA, diverticulosis, and GERD presenting with abdominal pain. The patient lives at home with her daughter, and over the past month her daughter has endorsed that the patient appears to have developed auditory and visual hallucinations. The patient has grown disoriented at home and spends a large portion of the evening screaming in bed unable to sleep. On the morning of 11/17, the patient endorsed persistent lower abdominal pain which persisted through the evening, prompting her daughter to send the patient to the ER.    Collateral from the patient's daughter and PCP demonstrated that there is concern for the patient's declining mental status, and although the patient is not endorsing systemic symptoms or urinary frequency/urgency/dysuria/incontinence there is concern for a potential UTI. The patient's daughter states she feels it is too difficult for her to care for her mother in her current state.    Patient denied fevers, chills, vomiting, CP, SOB, HA. No recent travel, no sick contacts. However, due to the patient's underlying dementia will continue to evaluate clinically.

## 2023-11-17 NOTE — ED ADULT NURSE NOTE - OBJECTIVE STATEMENT
Pt received to rm 20 presents from home with abd pain x few days. Pt a&ox1-2, aware of name and reason for coming in. Pt skin intact, respirations even and unlabored, abd soft and non-distended, nontender to palpation. 20g Iv placed in rt arm, VSS, awaiting MD clements. Denies chest pain, head pain, recent falls. will await further orders and continue to monitor.

## 2023-11-17 NOTE — ED ADULT NURSE NOTE - NSFALLRISKINTERV_ED_ALL_ED
Assistance OOB with selected safe patient handling equipment if applicable/Communicate fall risk and risk factors to all staff, patient, and family/Monitor gait and stability/Monitor for mental status changes and reorient to person, place, and time, as needed/Move patient closer to nursing station/within visual sight of ED staff/Provide patient with walking aids/Provide visual cue: yellow wristband, yellow gown, etc/Reinforce activity limits and safety measures with patient and family/Toileting schedule using arm’s reach rule for commode and bathroom/Use of alarms - bed, stretcher, chair and/or video monitoring/Call bell, personal items and telephone in reach/Instruct patient to call for assistance before getting out of bed/chair/stretcher/Non-slip footwear applied when patient is off stretcher/[place] to call system/Physically safe environment - no spills, clutter or unnecessary equipment/Purposeful Proactive Rounding/Room/bathroom lighting operational, light cord in reach

## 2023-11-17 NOTE — ED PROVIDER NOTE - PATIENT'S SEXUAL ORIENTATION
Withheld/Decline to Answer unable to assess due to patient's cognitive impairment. patient unable to formulate sentence. no family present to help answer questions./Withheld/Decline to Answer

## 2023-11-17 NOTE — ED ADULT TRIAGE NOTE - CHIEF COMPLAINT QUOTE
Pt history of dementia presents from home for multiple complaints. Pt endorsing pain to head and abdomen. Pt moaning at times related to pain, denies nausea/vomiting.

## 2023-11-17 NOTE — ED PROVIDER NOTE - CLINICAL SUMMARY MEDICAL DECISION MAKING FREE TEXT BOX
The patient is an 88yo woman with a history of vascular dementia, bipolar I, CVA, diverticulosis, and GERD presenting with abdominal pain. The patient's underlying dementia clouds the clinical picture, but will work up patient for potential SBO or volvulus. Will screen for organic causes of patient's new onset hallucinations and obtain CT head for structural assessment of brain. Patient is a likely admission due to daughter's ongoing concerns that she will not be able to care for the patient in her current state. Plan to consult psychiatry once screening labs complete.

## 2023-11-17 NOTE — ED PROVIDER NOTE - PHYSICAL EXAMINATION
PHYSICAL EXAM:  Constitutional: NAD, comfortable in bed.  HEENT: NC/AT, PERRLA, EOMI, no conjunctival pallor or scleral icterus, MMM  Neck: Supple, no JVD  Respiratory: CTA B/L. No w/r/r.   Cardiovascular: RRR, normal S1 and S2, no m/r/g.   Gastrointestinal: +BS, soft ND, no guarding or rebound tenderness, no palpable masses. TTP in lower quadrants, R > L  Extremities: wwp; no cyanosis, clubbing or edema.   Vascular: Pulses equal and strong throughout.   Neurological: AAOx1 (self), no CN deficits, sensation intact throughout  Psych: Distant affect, sundowning, poor insight into health and situation  Skin: No gross skin abnormalities or rashes

## 2023-11-17 NOTE — ED PROVIDER NOTE - NSICDXPASTMEDICALHX_GEN_ALL_CORE_FT
PAST MEDICAL HISTORY:  Anxiety     Asthma     Cerebrovascular accident (CVA), unspecified mechanism     CVA (Cerebral Infarction) 2007 per daughter    Dementia     Diverticulosis of intestine     GERD (gastroesophageal reflux disease)     Vertebral fracture, osteoporotic

## 2023-11-17 NOTE — ED PROVIDER NOTE - CARE PLAN
Principal Discharge DX:	Acute UTI  Secondary Diagnosis:	Stercoral colitis  Secondary Diagnosis:	Hallucinations   1

## 2023-11-17 NOTE — ED PROVIDER NOTE - ATTENDING CONTRIBUTION TO CARE
89 yo F hx vascular dementia, bipolar disorder, CVA, GERD, brought in for evaluation of hallucinations and delusions that developed over the past few months. Pt denies any current complaints on exam, but per notes and collateral, earlier was complaining of abdominal pain. On exam, well appearing, alert and oriented to person and place, heart rrr, lungs ctab, abd soft, nt/nd, no LE edema,  motor/sensation intact, b/l LE contracted but no ttp. Plan for labs, CT, tba.

## 2023-11-18 DIAGNOSIS — R41.82 ALTERED MENTAL STATUS, UNSPECIFIED: ICD-10-CM

## 2023-11-18 DIAGNOSIS — N39.0 URINARY TRACT INFECTION, SITE NOT SPECIFIED: ICD-10-CM

## 2023-11-18 DIAGNOSIS — R10.9 UNSPECIFIED ABDOMINAL PAIN: ICD-10-CM

## 2023-11-18 DIAGNOSIS — Z29.9 ENCOUNTER FOR PROPHYLACTIC MEASURES, UNSPECIFIED: ICD-10-CM

## 2023-11-18 DIAGNOSIS — K21.9 GASTRO-ESOPHAGEAL REFLUX DISEASE WITHOUT ESOPHAGITIS: ICD-10-CM

## 2023-11-18 LAB
CULTURE RESULTS: NO GROWTH — SIGNIFICANT CHANGE UP
CULTURE RESULTS: NO GROWTH — SIGNIFICANT CHANGE UP
GLUCOSE BLDC GLUCOMTR-MCNC: 86 MG/DL — SIGNIFICANT CHANGE UP (ref 70–99)
GLUCOSE BLDC GLUCOMTR-MCNC: 86 MG/DL — SIGNIFICANT CHANGE UP (ref 70–99)
SPECIMEN SOURCE: SIGNIFICANT CHANGE UP
SPECIMEN SOURCE: SIGNIFICANT CHANGE UP

## 2023-11-18 PROCEDURE — 99223 1ST HOSP IP/OBS HIGH 75: CPT | Mod: GC

## 2023-11-18 PROCEDURE — 93010 ELECTROCARDIOGRAM REPORT: CPT

## 2023-11-18 PROCEDURE — 71045 X-RAY EXAM CHEST 1 VIEW: CPT | Mod: 26

## 2023-11-18 RX ORDER — HALOPERIDOL DECANOATE 100 MG/ML
2 INJECTION INTRAMUSCULAR ONCE
Refills: 0 | Status: COMPLETED | OUTPATIENT
Start: 2023-11-18 | End: 2023-11-18

## 2023-11-18 RX ORDER — ACETAMINOPHEN 500 MG
650 TABLET ORAL EVERY 6 HOURS
Refills: 0 | Status: DISCONTINUED | OUTPATIENT
Start: 2023-11-18 | End: 2023-11-27

## 2023-11-18 RX ORDER — CEFTRIAXONE 500 MG/1
1000 INJECTION, POWDER, FOR SOLUTION INTRAMUSCULAR; INTRAVENOUS ONCE
Refills: 0 | Status: COMPLETED | OUTPATIENT
Start: 2023-11-18 | End: 2023-11-18

## 2023-11-18 RX ORDER — LACTOBACILLUS ACIDOPHILUS 100MM CELL
1 CAPSULE ORAL DAILY
Refills: 0 | Status: DISCONTINUED | OUTPATIENT
Start: 2023-11-18 | End: 2023-11-27

## 2023-11-18 RX ORDER — LANOLIN ALCOHOL/MO/W.PET/CERES
3 CREAM (GRAM) TOPICAL AT BEDTIME
Refills: 0 | Status: DISCONTINUED | OUTPATIENT
Start: 2023-11-18 | End: 2023-11-27

## 2023-11-18 RX ORDER — MIRTAZAPINE 45 MG/1
15 TABLET, ORALLY DISINTEGRATING ORAL AT BEDTIME
Refills: 0 | Status: DISCONTINUED | OUTPATIENT
Start: 2023-11-18 | End: 2023-11-24

## 2023-11-18 RX ORDER — DIVALPROEX SODIUM 500 MG/1
1 TABLET, DELAYED RELEASE ORAL
Refills: 0 | DISCHARGE

## 2023-11-18 RX ORDER — TRAZODONE HCL 50 MG
25 TABLET ORAL ONCE
Refills: 0 | Status: COMPLETED | OUTPATIENT
Start: 2023-11-18 | End: 2023-11-18

## 2023-11-18 RX ORDER — OLANZAPINE 15 MG/1
2.5 TABLET, FILM COATED ORAL ONCE
Refills: 0 | Status: COMPLETED | OUTPATIENT
Start: 2023-11-18 | End: 2023-11-18

## 2023-11-18 RX ORDER — HALOPERIDOL DECANOATE 100 MG/ML
1 INJECTION INTRAMUSCULAR ONCE
Refills: 0 | Status: COMPLETED | OUTPATIENT
Start: 2023-11-18 | End: 2023-11-18

## 2023-11-18 RX ORDER — POLYETHYLENE GLYCOL 3350 17 G/17G
17 POWDER, FOR SOLUTION ORAL
Refills: 0 | Status: DISCONTINUED | OUTPATIENT
Start: 2023-11-18 | End: 2023-11-27

## 2023-11-18 RX ORDER — ENOXAPARIN SODIUM 100 MG/ML
40 INJECTION SUBCUTANEOUS EVERY 24 HOURS
Refills: 0 | Status: DISCONTINUED | OUTPATIENT
Start: 2023-11-18 | End: 2023-11-19

## 2023-11-18 RX ORDER — METOPROLOL TARTRATE 50 MG
25 TABLET ORAL
Refills: 0 | Status: DISCONTINUED | OUTPATIENT
Start: 2023-11-18 | End: 2023-11-19

## 2023-11-18 RX ORDER — GABAPENTIN 400 MG/1
600 CAPSULE ORAL AT BEDTIME
Refills: 0 | Status: DISCONTINUED | OUTPATIENT
Start: 2023-11-18 | End: 2023-11-26

## 2023-11-18 RX ORDER — SENNA PLUS 8.6 MG/1
2 TABLET ORAL AT BEDTIME
Refills: 0 | Status: DISCONTINUED | OUTPATIENT
Start: 2023-11-18 | End: 2023-11-27

## 2023-11-18 RX ORDER — HALOPERIDOL DECANOATE 100 MG/ML
1 INJECTION INTRAMUSCULAR THREE TIMES A DAY
Refills: 0 | Status: DISCONTINUED | OUTPATIENT
Start: 2023-11-18 | End: 2023-11-24

## 2023-11-18 RX ORDER — FAMOTIDINE 10 MG/ML
20 INJECTION INTRAVENOUS
Refills: 0 | Status: DISCONTINUED | OUTPATIENT
Start: 2023-11-18 | End: 2023-11-27

## 2023-11-18 RX ORDER — ASPIRIN/CALCIUM CARB/MAGNESIUM 324 MG
81 TABLET ORAL DAILY
Refills: 0 | Status: DISCONTINUED | OUTPATIENT
Start: 2023-11-18 | End: 2023-11-27

## 2023-11-18 RX ADMIN — HALOPERIDOL DECANOATE 1 MILLIGRAM(S): 100 INJECTION INTRAMUSCULAR at 23:07

## 2023-11-18 RX ADMIN — HALOPERIDOL DECANOATE 1 MILLIGRAM(S): 100 INJECTION INTRAMUSCULAR at 07:19

## 2023-11-18 RX ADMIN — GABAPENTIN 600 MILLIGRAM(S): 400 CAPSULE ORAL at 22:44

## 2023-11-18 RX ADMIN — HALOPERIDOL DECANOATE 2 MILLIGRAM(S): 100 INJECTION INTRAMUSCULAR at 18:51

## 2023-11-18 RX ADMIN — MIRTAZAPINE 15 MILLIGRAM(S): 45 TABLET, ORALLY DISINTEGRATING ORAL at 23:07

## 2023-11-18 RX ADMIN — HALOPERIDOL DECANOATE 1 MILLIGRAM(S): 100 INJECTION INTRAMUSCULAR at 15:28

## 2023-11-18 RX ADMIN — Medication 3 MILLIGRAM(S): at 22:44

## 2023-11-18 RX ADMIN — CEFTRIAXONE 100 MILLIGRAM(S): 500 INJECTION, POWDER, FOR SOLUTION INTRAMUSCULAR; INTRAVENOUS at 08:26

## 2023-11-18 RX ADMIN — HALOPERIDOL DECANOATE 1 MILLIGRAM(S): 100 INJECTION INTRAMUSCULAR at 07:51

## 2023-11-18 RX ADMIN — OLANZAPINE 2.5 MILLIGRAM(S): 15 TABLET, FILM COATED ORAL at 08:47

## 2023-11-18 RX ADMIN — SENNA PLUS 2 TABLET(S): 8.6 TABLET ORAL at 22:47

## 2023-11-18 NOTE — PHARMACOTHERAPY INTERVENTION NOTE - COMMENTS
Medication history is complete. Medication list updated in Outpatient Medication Record (OMR). Daughter confirmed medication list/dosages over the phone and verified with outpatient pharmacy ([facility]).   Of Note:   - Recent dose increase in Mirtazapine (previously on 7.5mg at bedtime)

## 2023-11-18 NOTE — H&P ADULT - NSHPREVIEWOFSYSTEMS_GEN_ALL_CORE
REVIEW OF SYSTEMS:    CONSTITUTIONAL:+ weakness, no fevers or chills  EYES/ENT: No visual changes;  No vertigo or throat pain   NECK: No pain or stiffness  RESPIRATORY: No cough, wheezing, hemoptysis; No shortness of breath  CARDIOVASCULAR: No chest pain or palpitations  GASTROINTESTINAL: No abdominal or epigastric pain. No nausea, vomiting, or hematemesis; No diarrhea or constipation. No melena or hematochezia.  GENITOURINARY: No dysuria, frequency or hematuria  NEUROLOGICAL: No numbness or weakness  SKIN: No itching, rashes

## 2023-11-18 NOTE — PATIENT PROFILE ADULT - FUNCTIONAL SCREEN CURRENT LEVEL: SWALLOWING (IF SCORE 2 OR MORE FOR ANY ITEM, CONSULT REHAB SERVICES), MLM)
unable to assess due to patient's cognitive impairment. patient unable to formulate sentence. no family present to help answer questions. unable to assess due to patient's cognitive impairment. patient unable to formulate sentence. no family present to help answer questions./2 = difficulty swallowing foods

## 2023-11-18 NOTE — H&P ADULT - PROBLEM SELECTOR PLAN 1
vascular dementia, bipolar I, CVA  CT head with moderate severity chronic microvessel change.  No fever, leukocytosis   UA with trace leuks, trace ketones   - s/p haloperidol IVx2, POx1; olanzapine x1   - f/u Ucx   - obtain CXR   - get collateral from outpatient psych  - started on ceftriaxone 1000 IV  - SLP eval   - continue with home haloperidol 1 mg TID   - continue with mirtazapine 15 mg at bedtime   - continue with ASA 81   - consider psych consult vascular dementia, bipolar I, CVA  CT head with moderate severity chronic microvessel change.  No fever, leukocytosis   UA with trace leuks, trace ketones   - s/p haloperidol IVx2, POx1; olanzapine x1   - f/u Ucx   - obtain CXR   - get collateral from outpatient psych (Dr. Billings)  - consult psych   - started on ceftriaxone 1000 IV  - SLP eval   - continue with home haloperidol 1 mg TID   - continue with mirtazapine 15 mg at bedtime   - continue with home gabapentin 600 mg at bedtime  - continue with melatonin 3 mg at bedtime    - continue with ASA 81 for hx of CVA/TIA ~7 years ago vascular dementia, bipolar I, CVA  CT head with moderate severity chronic microvessel change.  No fever, leukocytosis   UA with trace leuks, trace ketones   - s/p haloperidol IVx2, POx1; olanzapine x1   - f/u Ucx   - obtain CXR   - get collateral from outpatient psych (Dr. Billings)  - consult psych   - s/p 1 dose of ceftriaxone 1000 IV  - SLP eval   - continue with home haloperidol 1 mg TID   - continue with mirtazapine 15 mg at bedtime   - continue with home gabapentin 600 mg at bedtime  - continue with melatonin 3 mg at bedtime    - continue with ASA 81 for hx of CVA/TIA ~7 years ago

## 2023-11-18 NOTE — ED ADULT NURSE REASSESSMENT NOTE - NS ED NURSE REASSESS COMMENT FT1
Patient resting in stretcher, screaming, repeating phrases,  A&Ox1 (self), non-cooperative, agitated, RN attempting to perform dysphagia screening patient refusing at this time. Admit MD made  aware about PO medications being held at this time. Patient RR equal and unlabored, placed on cardiac monitor. VS obtained. Medicated as ordered. Care plan continued. Comfort measures provided. Safety maintained. Awaiting bed assignment.

## 2023-11-18 NOTE — H&P ADULT - NSHPPHYSICALEXAM_GEN_ALL_CORE
VITALS:   T(C): 36.3 (11-18-23 @ 13:17), Max: 37.2 (11-17-23 @ 20:57)  HR: 76 (11-18-23 @ 13:17) (68 - 89)  BP: 160/80 (11-18-23 @ 13:17) (119/73 - 160/80)  RR: 18 (11-18-23 @ 13:17) (16 - 19)  SpO2: 97% (11-18-23 @ 13:17) (97% - 100%)    GENERAL: NAD, lying in bed comfortably  HEAD:  Atraumatic, normocephalic  EYES: EOMI, PERRLA, conjunctiva and sclera clear  ENT: Moist mucous membranes  NECK: Supple, no JVD  HEART: Regular rate and rhythm, no murmurs, rubs, or gallops  LUNGS: Unlabored respirations.  Clear to auscultation bilaterally, no crackles, wheezing, or rhonchi  ABDOMEN: Soft, nontender, nondistended, +BS  EXTREMITIES: 2+ peripheral pulses bilaterally. No clubbing, cyanosis, or edema  NERVOUS SYSTEM:  A&Ox3, no focal deficits   SKIN: No rashes or lesions VITALS:   T(C): 36.3 (11-18-23 @ 13:17), Max: 37.2 (11-17-23 @ 20:57)  HR: 76 (11-18-23 @ 13:17) (68 - 89)  BP: 160/80 (11-18-23 @ 13:17) (119/73 - 160/80)  RR: 18 (11-18-23 @ 13:17) (16 - 19)  SpO2: 97% (11-18-23 @ 13:17) (97% - 100%)    GENERAL: NAD, lying in bed comfortably  HEAD:  Atraumatic, normocephalic  EYES: EOMI, PERRLA, conjunctiva and sclera clear  ENT: dry  mucous membranes  NECK: Supple, no JVD  HEART: Regular rate and rhythm, no murmurs, rubs, or gallops  LUNGS: Unlabored respirations.  Clear to auscultation bilaterally, no crackles, wheezing, or rhonchi  ABDOMEN: Soft, nontender, nondistended, +BS  EXTREMITIES: 2+ peripheral pulses bilaterally. No clubbing, cyanosis, or edema  NERVOUS SYSTEM:  A&Ox3, moves all extremities  PSYCH: acutely agitated, intermittent screening, echolalia  SKIN: No rashes or lesions VITALS:   T(C): 36.3 (11-18-23 @ 13:17), Max: 37.2 (11-17-23 @ 20:57)  HR: 76 (11-18-23 @ 13:17) (68 - 89)  BP: 160/80 (11-18-23 @ 13:17) (119/73 - 160/80)  RR: 18 (11-18-23 @ 13:17) (16 - 19)  SpO2: 97% (11-18-23 @ 13:17) (97% - 100%)    GENERAL: NAD, lying in bed comfortably  HEAD:  Atraumatic, normocephalic  EYES: EOMI, PERRLA, conjunctiva and sclera clear  ENT: dry  mucous membranes  NECK: Supple, no JVD  HEART: Regular rate and rhythm, no murmurs, rubs, or gallops  LUNGS: Unlabored respirations.  Clear to auscultation bilaterally, no crackles, wheezing, or rhonchi  ABDOMEN: Soft, nontender, nondistended, +BS  EXTREMITIES: 2+ peripheral pulses bilaterally. No clubbing, cyanosis, or edema  NERVOUS SYSTEM:  A&Ox2 (self and place), moves all extremities  PSYCH: acutely agitated, intermittent screening, echolalia  SKIN: No rashes or lesions

## 2023-11-18 NOTE — ED ADULT NURSE REASSESSMENT NOTE - NS ED NURSE REASSESS COMMENT FT1
Tap water enema performed. Patient only able to tolerate half of the bag. Small bowel movement noted. Patient unable to follow commands. Care plan continued. Comfort measures provided. Safety maintained.

## 2023-11-18 NOTE — H&P ADULT - PROBLEM SELECTOR PLAN 4
Diet: SLP eval, regular   DVT proph: lovenox   Dispo: [facility] med clearance   PT/OT Diet: passed bedside screening, will start pureed diet. Will get SLP eval to upgrade diet   DVT proph: lovenox   Dispo: pending clinical improvement  PT/OT

## 2023-11-18 NOTE — PATIENT PROFILE ADULT - FALL HARM RISK - HARM RISK INTERVENTIONS
Assistance with ambulation/Assistance OOB with selected safe patient handling equipment/Communicate Risk of Fall with Harm to all staff/Monitor for mental status changes/Move patient closer to nurses' station/Reinforce activity limits and safety measures with patient and family/Reorient to person, place and time as needed/Tailored Fall Risk Interventions/Toileting schedule using arm’s reach rule for commode and bathroom/Use of alarms - bed, chair and/or voice tab/Visual Cue: Yellow wristband and red socks/Bed in lowest position, wheels locked, appropriate side rails in place/Call bell, personal items and telephone in reach/Instruct patient to call for assistance before getting out of bed or chair/Non-slip footwear when patient is out of bed/[place] to call system/Physically safe environment - no spills, clutter or unnecessary equipment/Purposeful Proactive Rounding/Room/bathroom lighting operational, light cord in reach

## 2023-11-18 NOTE — H&P ADULT - ATTENDING COMMENTS
89yo woman with a history of vascular dementia, bipolar I, CVA, diverticulosis, and GERD presenting with abdominal pain and decompensating mental status with agitation. Patient seen and examined at bedside, Reports having abdominal pain prior to coming to hospital now pain resolved (per nursing, had a big BM in ED). Reports cough "for a while." No other infectious symptoms. Unclear how reliable of a historian is patient. On exam, patient was yelling in the room but writer was able to interrupt the yelling and started a conversation. Patient was calm and cooperative during interview and answers questions appropriately. Oriented to self and birthday, doesn't know location or date. Lung: CTAB, cardiac exam RRR. Abdominal exam benign.    #AMS  -CTH, CT cervical spine and CT A&P noted. No acute finding on CTH. CTA&P noted for stercoral colitis and L adnexal cystic mass (increased from 2020). Constipation and stercocal colitis could contribute to agitation as well as noncompliance with home meds. UA not impressive for infection. Would check CXR to eval for infectious etiology given reported cough though no cough noted during interview. Monitor off abx given stable vitals and no leukocytosis. c/w home meds for now. Psych eval to see if further adjustment of psych home med needed (family reports noncompliance with home meds)  -EKG reviewed. No acute ischemic changes. QTc<500    #Abd pain  -resolved after a large BM  -c/w BM regimen    #L adnexal cystic mass  -outpatient w/u if in line with GOC    Rest as above.  Discussed with HS.

## 2023-11-18 NOTE — H&P ADULT - NSICDXFAMILYHX_GEN_ALL_CORE_FT
FAMILY HISTORY:  Family history of amyotrophic lateral sclerosis, Daughter    Child  Still living? Unknown  Family history of multiple sclerosis, Age at diagnosis: Age Unknown    
Universal Safety Interventions

## 2023-11-18 NOTE — H&P ADULT - PROBLEM SELECTOR PLAN 2
CT a/p - moderate distended fecal filled rectum with wall thickening and perirectal fat stranding concerning for stercoral colitis.  - bowel reg Hx of H Pyori s/p treatment   CT a/p - moderate distended fecal filled rectum with wall thickening and perirectal fat stranding concerning for stercoral colitis.  - bowel reg

## 2023-11-18 NOTE — H&P ADULT - HISTORY OF PRESENT ILLNESS
89yo woman with a history of vascular dementia, bipolar I, CVA, diverticulosis, and GERD presenting with altered mentation and abdominal pain. Patient lives at home with her daughter who helps take care of her. On the morning of 11/17, the patient developed continuous lower abdominal pain that went on until the evening.     Per collateral, over the past month her daughter has endorsed that the patient appears to have developed auditory and visual hallucinations. She was becoming more disoriented at home, unable to sleep and is heard screaming at night.     Collateral from the patient's daughter and PCP - there is concern for the patient's declining mental status, and although the patient is not endorsing systemic symptoms or urinary frequency/urgency/dysuria/incontinence there is concern for a potential UTI. The patient's daughter states she feels it is too difficult for her to care for her mother in her current state.    Patient denied any complaints, including fevers, chills, vomiting, CP, SOB, HA.  89yo woman with a history of vascular dementia, bipolar I, CVA, diverticulosis, and GERD presenting with altered mentation and abdominal pain. Patient lives at home with her daughter who helps take care of her. On the morning of 11/17, the patient developed continuous lower abdominal pain that went on until the evening.     Per collateral, over the past month her daughter has endorsed that the patient appears to have developed auditory and visual hallucinations. She was becoming more disoriented at home, unable to sleep and is heard screaming at night.     Collateral from the patient's daughter and PCP - there is concern for the patient's declining mental status, and although the patient is not endorsing systemic symptoms or urinary frequency/urgency/dysuria/incontinence there is concern for a potential UTI. The patient's daughter states she feels it is too difficult for her to care for her mother in her current state.    Patient denied any complaints, including fevers, chills, vomiting, CP, SOB, HA.     Upon admission to the ED, her VSS with /73, HR 89, afebrile, satting well on RA. CT a/p with moderate distended fecal filled rectum with wall thickening and perirectal fat stranding concerning for stercoral colitis. CT head with moderate severity chronic microvessel change.   89yo woman with a history of vascular dementia, bipolar I, CVA, diverticulosis, and GERD, H pylory (treated <1 week ago) presenting with altered mentation and abdominal pain. Patient lives at home with her daughter who helps take care of her along with 2 aids. Patient has not been sleeping over the past couple of days. Per daughter, pt has been screaming frequently over the past 2-3 months. Her psychiatrist (Dr. Billings) started her on mirtazapine about a month ago but that did not seem to help. On the morning of 11/17, the patient developed continuous lower abdominal pain that went on until the evening so daughter decided to bring her in.     Per collateral, over the past month her daughter has endorsed that the patient appears to have developed auditory and visual hallucinations as well as delusions. She was becoming more disoriented at home, unable to sleep and is heard screaming at night. She has refused her psych meds her entire life, and her daughter does everything to convince her to take them.     Collateral from the patient's daughter and PCP - there is concern for the patient's declining mental status, and although the patient is not endorsing systemic symptoms or urinary frequency/urgency/dysuria/incontinence there is concern for a potential UTI. The patient's daughter states she feels it is too difficult for her to care for her mother in her current state.    Patient denied any complaints, including fevers, chills, vomiting, CP, SOB, HA.     Upon admission to the ED, her VSS with /73, HR 89, afebrile, satting well on RA. CT a/p with moderate distended fecal filled rectum with wall thickening and perirectal fat stranding concerning for stercoral colitis. CT head with moderate severity chronic microvessel change.

## 2023-11-18 NOTE — PATIENT PROFILE ADULT - FUNCTIONAL ASSESSMENT - BASIC MOBILITY 6.
1-calculated by average/Not able to assess (calculate score using [facility] averaging method)

## 2023-11-18 NOTE — H&P ADULT - TIME BILLING
Face to face encounter. Reviewed labs, vitals, imaging. Reviewed prior hospitalization record. Discussed plan of care with patient.  Documentation in sunrise.

## 2023-11-18 NOTE — PATIENT PROFILE ADULT - FUNCTIONAL SCREEN CURRENT LEVEL: COMMUNICATION, MLM
unable to assess due to patient's cognitive impairment. patient unable to formulate sentence. no family present to help answer questions. unable to assess due to patient's cognitive impairment. patient unable to formulate sentence. no family present to help answer questions./2 = difficulty understanding (not related to language barrier)

## 2023-11-18 NOTE — PATIENT PROFILE ADULT - PATIENT'S GENDER IDENTITY
unable to assess due to patient's cognitive impairment. patient unable to formulate sentence. no family present to help answer questions./Withheld/Decline to Answer

## 2023-11-18 NOTE — ED ADULT NURSE REASSESSMENT NOTE - NS ED NURSE REASSESS COMMENT FT1
Patient able to have bowel movement at this time. Large bowel movement noted. Right hip stage 1, redness. Skin tear open to the left upper extremities. Ecchymosis noted to the upper and lower extremities. RR equal and unlabored. Comfort measures provided. Safety maintained.

## 2023-11-18 NOTE — H&P ADULT - ASSESSMENT
88yo woman with a history of vascular dementia, bipolar I, CVA, diverticulosis, and GERD presenting with abdominal pain and agitiaton. 89yo woman with a history of vascular dementia, bipolar I, CVA, diverticulosis, and GERD presenting with abdominal pain and decompensating mental status with agitation

## 2023-11-19 ENCOUNTER — TRANSCRIPTION ENCOUNTER (OUTPATIENT)
Age: 88
End: 2023-11-19

## 2023-11-19 LAB
ANION GAP SERPL CALC-SCNC: 11 MMOL/L — SIGNIFICANT CHANGE UP (ref 7–14)
ANION GAP SERPL CALC-SCNC: 11 MMOL/L — SIGNIFICANT CHANGE UP (ref 7–14)
BUN SERPL-MCNC: 10 MG/DL — SIGNIFICANT CHANGE UP (ref 7–23)
BUN SERPL-MCNC: 10 MG/DL — SIGNIFICANT CHANGE UP (ref 7–23)
CALCIUM SERPL-MCNC: 9.3 MG/DL — SIGNIFICANT CHANGE UP (ref 8.4–10.5)
CALCIUM SERPL-MCNC: 9.3 MG/DL — SIGNIFICANT CHANGE UP (ref 8.4–10.5)
CHLORIDE SERPL-SCNC: 103 MMOL/L — SIGNIFICANT CHANGE UP (ref 98–107)
CHLORIDE SERPL-SCNC: 103 MMOL/L — SIGNIFICANT CHANGE UP (ref 98–107)
CO2 SERPL-SCNC: 29 MMOL/L — SIGNIFICANT CHANGE UP (ref 22–31)
CO2 SERPL-SCNC: 29 MMOL/L — SIGNIFICANT CHANGE UP (ref 22–31)
CREAT SERPL-MCNC: 0.45 MG/DL — LOW (ref 0.5–1.3)
CREAT SERPL-MCNC: 0.45 MG/DL — LOW (ref 0.5–1.3)
EGFR: 92 ML/MIN/1.73M2 — SIGNIFICANT CHANGE UP
EGFR: 92 ML/MIN/1.73M2 — SIGNIFICANT CHANGE UP
GLUCOSE SERPL-MCNC: 61 MG/DL — LOW (ref 70–99)
GLUCOSE SERPL-MCNC: 61 MG/DL — LOW (ref 70–99)
HCT VFR BLD CALC: 31.2 % — LOW (ref 34.5–45)
HCT VFR BLD CALC: 31.2 % — LOW (ref 34.5–45)
HGB BLD-MCNC: 9.8 G/DL — LOW (ref 11.5–15.5)
HGB BLD-MCNC: 9.8 G/DL — LOW (ref 11.5–15.5)
MAGNESIUM SERPL-MCNC: 1.8 MG/DL — SIGNIFICANT CHANGE UP (ref 1.6–2.6)
MAGNESIUM SERPL-MCNC: 1.8 MG/DL — SIGNIFICANT CHANGE UP (ref 1.6–2.6)
MCHC RBC-ENTMCNC: 27.9 PG — SIGNIFICANT CHANGE UP (ref 27–34)
MCHC RBC-ENTMCNC: 27.9 PG — SIGNIFICANT CHANGE UP (ref 27–34)
MCHC RBC-ENTMCNC: 31.4 GM/DL — LOW (ref 32–36)
MCHC RBC-ENTMCNC: 31.4 GM/DL — LOW (ref 32–36)
MCV RBC AUTO: 88.9 FL — SIGNIFICANT CHANGE UP (ref 80–100)
MCV RBC AUTO: 88.9 FL — SIGNIFICANT CHANGE UP (ref 80–100)
NRBC # BLD: 0 /100 WBCS — SIGNIFICANT CHANGE UP (ref 0–0)
NRBC # BLD: 0 /100 WBCS — SIGNIFICANT CHANGE UP (ref 0–0)
NRBC # FLD: 0 K/UL — SIGNIFICANT CHANGE UP (ref 0–0)
NRBC # FLD: 0 K/UL — SIGNIFICANT CHANGE UP (ref 0–0)
PHOSPHATE SERPL-MCNC: 3 MG/DL — SIGNIFICANT CHANGE UP (ref 2.5–4.5)
PHOSPHATE SERPL-MCNC: 3 MG/DL — SIGNIFICANT CHANGE UP (ref 2.5–4.5)
PLATELET # BLD AUTO: 271 K/UL — SIGNIFICANT CHANGE UP (ref 150–400)
PLATELET # BLD AUTO: 271 K/UL — SIGNIFICANT CHANGE UP (ref 150–400)
POTASSIUM SERPL-MCNC: 3.7 MMOL/L — SIGNIFICANT CHANGE UP (ref 3.5–5.3)
POTASSIUM SERPL-MCNC: 3.7 MMOL/L — SIGNIFICANT CHANGE UP (ref 3.5–5.3)
POTASSIUM SERPL-SCNC: 3.7 MMOL/L — SIGNIFICANT CHANGE UP (ref 3.5–5.3)
POTASSIUM SERPL-SCNC: 3.7 MMOL/L — SIGNIFICANT CHANGE UP (ref 3.5–5.3)
RBC # BLD: 3.51 M/UL — LOW (ref 3.8–5.2)
RBC # BLD: 3.51 M/UL — LOW (ref 3.8–5.2)
RBC # FLD: 15.2 % — HIGH (ref 10.3–14.5)
RBC # FLD: 15.2 % — HIGH (ref 10.3–14.5)
SODIUM SERPL-SCNC: 143 MMOL/L — SIGNIFICANT CHANGE UP (ref 135–145)
SODIUM SERPL-SCNC: 143 MMOL/L — SIGNIFICANT CHANGE UP (ref 135–145)
T4 FREE+ TSH PNL SERPL: 2.06 UIU/ML — SIGNIFICANT CHANGE UP (ref 0.27–4.2)
T4 FREE+ TSH PNL SERPL: 2.06 UIU/ML — SIGNIFICANT CHANGE UP (ref 0.27–4.2)
WBC # BLD: 4.59 K/UL — SIGNIFICANT CHANGE UP (ref 3.8–10.5)
WBC # BLD: 4.59 K/UL — SIGNIFICANT CHANGE UP (ref 3.8–10.5)
WBC # FLD AUTO: 4.59 K/UL — SIGNIFICANT CHANGE UP (ref 3.8–10.5)
WBC # FLD AUTO: 4.59 K/UL — SIGNIFICANT CHANGE UP (ref 3.8–10.5)

## 2023-11-19 PROCEDURE — 99232 SBSQ HOSP IP/OBS MODERATE 35: CPT | Mod: GC

## 2023-11-19 PROCEDURE — 93010 ELECTROCARDIOGRAM REPORT: CPT

## 2023-11-19 RX ORDER — HEPARIN SODIUM 5000 [USP'U]/ML
5000 INJECTION INTRAVENOUS; SUBCUTANEOUS EVERY 12 HOURS
Refills: 0 | Status: DISCONTINUED | OUTPATIENT
Start: 2023-11-19 | End: 2023-11-27

## 2023-11-19 RX ORDER — METOPROLOL TARTRATE 50 MG
25 TABLET ORAL
Refills: 0 | Status: DISCONTINUED | OUTPATIENT
Start: 2023-11-19 | End: 2023-11-27

## 2023-11-19 RX ADMIN — MIRTAZAPINE 15 MILLIGRAM(S): 45 TABLET, ORALLY DISINTEGRATING ORAL at 22:13

## 2023-11-19 RX ADMIN — Medication 3 MILLIGRAM(S): at 22:14

## 2023-11-19 RX ADMIN — POLYETHYLENE GLYCOL 3350 17 GRAM(S): 17 POWDER, FOR SOLUTION ORAL at 17:19

## 2023-11-19 RX ADMIN — Medication 1 TABLET(S): at 13:01

## 2023-11-19 RX ADMIN — SENNA PLUS 2 TABLET(S): 8.6 TABLET ORAL at 22:14

## 2023-11-19 RX ADMIN — GABAPENTIN 600 MILLIGRAM(S): 400 CAPSULE ORAL at 22:14

## 2023-11-19 RX ADMIN — POLYETHYLENE GLYCOL 3350 17 GRAM(S): 17 POWDER, FOR SOLUTION ORAL at 07:20

## 2023-11-19 RX ADMIN — HALOPERIDOL DECANOATE 1 MILLIGRAM(S): 100 INJECTION INTRAMUSCULAR at 22:14

## 2023-11-19 RX ADMIN — FAMOTIDINE 20 MILLIGRAM(S): 10 INJECTION INTRAVENOUS at 17:20

## 2023-11-19 RX ADMIN — HEPARIN SODIUM 5000 UNIT(S): 5000 INJECTION INTRAVENOUS; SUBCUTANEOUS at 17:20

## 2023-11-19 RX ADMIN — Medication 25 MILLIGRAM(S): at 17:19

## 2023-11-19 RX ADMIN — FAMOTIDINE 20 MILLIGRAM(S): 10 INJECTION INTRAVENOUS at 07:20

## 2023-11-19 RX ADMIN — Medication 81 MILLIGRAM(S): at 13:01

## 2023-11-19 RX ADMIN — HALOPERIDOL DECANOATE 1 MILLIGRAM(S): 100 INJECTION INTRAMUSCULAR at 13:02

## 2023-11-19 NOTE — DISCHARGE NOTE PROVIDER - NSDCCPCAREPLAN_GEN_ALL_CORE_FT
PRINCIPAL DISCHARGE DIAGNOSIS  Diagnosis: Stercoral colitis  Assessment and Plan of Treatment: You came in with alteration in mentation and abdominal pain. Imaging showed inflammation of colon with stool burden,, which we treated with laxatives.   Medical workup for alteration in mentation was otherwise unrevealing. Psychiatry was consulted, recommending _____      SECONDARY DISCHARGE DIAGNOSES  Diagnosis: Stercoral colitis  Assessment and Plan of Treatment:     Diagnosis: Hallucinations  Assessment and Plan of Treatment:      PRINCIPAL DISCHARGE DIAGNOSIS  Diagnosis: Stercoral colitis  Assessment and Plan of Treatment: You came in with alteration in mentation and abdominal pain. Imaging showed inflammation of colon with stool burden,, which we treated with laxatives.   Medical workup for alteration in mentation was otherwise unrevealing.      SECONDARY DISCHARGE DIAGNOSES  Diagnosis: AMS (altered mental status)  Assessment and Plan of Treatment: The patient's CT head showed chronic changes reflecting dementia, and did not show any acute bleed. Further workup was negative for any infection. Psychiatry was consulted and noted that patient was not a candidate for inpatient admission. The dosage of her antipsychotics was decreased as needed to reduce risk of oversedation.

## 2023-11-19 NOTE — PROGRESS NOTE ADULT - PROBLEM SELECTOR PLAN 2
Hx of H Pyori s/p treatment   CT a/p - moderate distended fecal filled rectum with wall thickening and perirectal fat stranding concerning for stercoral colitis.  - bowel reg

## 2023-11-19 NOTE — DISCHARGE NOTE PROVIDER - HOSPITAL COURSE
88yo woman with a history of vascular dementia, bipolar I, CVA, diverticulosis, and GERD, H pylory (treated <1 week ago) presenting with altered mentation and abdominal pain. Patient lives at home with her daughter who helps take care of her along with 2 aids. Patient has not been sleeping over the past couple of days. Per daughter, pt has been screaming frequently over the past 2-3 months. Her psychiatrist (Dr. Billings) started her on mirtazapine about a month ago but that did not seem to help. On the morning of 11/17, the patient developed continuous lower abdominal pain that went on until the evening so daughter decided to bring her in.     Per collateral, over the past month her daughter has endorsed that the patient appears to have developed auditory and visual hallucinations as well as delusions. She was becoming more disoriented at home, unable to sleep and is heard screaming at night. She has refused her psych meds her entire life, and her daughter does everything to convince her to take them.     Collateral from the patient's daughter and PCP - there is concern for the patient's declining mental status, and although the patient is not endorsing systemic symptoms or urinary frequency/urgency/dysuria/incontinence there is concern for a potential UTI. The patient's daughter states she feels it is too difficult for her to care for her mother in her current state.    Patient denied any complaints, including fevers, chills, vomiting, CP, SOB, HA.     Upon admission to the ED, her VSS with /73, HR 89, afebrile, satting well on RA. CT a/p with moderate distended fecal filled rectum with wall thickening and perirectal fat stranding concerning for stercoral colitis. CT head with moderate severity chronic microvessel change. Her blood cultures did not grow anything. She was started on a bowel regiment with miralax, senna and enemas.     Patient was continued on home medications for bipolar disorder. Psychiatry was consulted, recommending ___     Patient was deemed medically stable for discharge. Discharge Summary     Hospital Course:   For full details, please see H&P, progress notes, consult notes, and ancillary notes.     89yo woman with a history of vascular dementia, bipolar I, CVA, diverticulosis, and GERD, H pylori presenting with altered mentation and abdominal pain. Patient lives at home with her daughter who helps take care of her along with 2 aids. Patient had not been sleeping over the past couple of days prior to admission. Upon admission to the ED, her VSS with /73, HR 89, afebrile, satting well on RA. CT a/p with moderate distended fecal filled rectum with wall thickening and perirectal fat stranding concerning for stercoral colitis. CT head with moderate severity chronic microvessel change. UA and cultures were negative, CXR WNL.     She was continued on home medications for bipolar disorder. Per daughter, pt has been screaming frequently over the past 2-3 months. Her psychiatrist (Dr. Billings) started her on mirtazapine about a month ago but that did not seem to help. On the morning of 11/17, the patient developed continuous lower abdominal pain that went on until the evening so daughter decided to bring her in. Per collateral, over the past month her daughter has endorsed that the patient appears to have developed auditory and visual hallucinations as well as delusions. She was becoming more disoriented at home, unable to sleep and is heard screaming at night.     During this admission, patient initially required PRN haldol for agitation in addition to her standing regimen but for the past week prior to discharge did not require any PRNs. Psychiatry was consulted and noted that patient did not meet criteria for inpatient psychiatric admission. On 11/24 patient had a RRT called for altered mental status and was found to be sedated. CTH was unchanged, and cultures and blood gas were normal ruling out acute infection or neurological cause. Patient's antipsychotics were decreased (haldol to 0.5mg TID and gabapentin to 400 mg qhs) and her mental status began to improve.     On day of discharge, patient is clinically stable with no new exam findings or acute symptoms compared to prior. The patient was seen by the attending physician on the date of discharge and deemed stable and acceptable for discharge. The patient's chronic medical conditions were treated accordingly per the patient's home medication regimen. The patient's medication reconciliation, follow up appointments, discharge instructions, and significant lab and diagnostic studies are as noted.     Discharge follow up action items:     1. Follow up with PCP and psychiatrist  2. Medication changes:  The dose has CHANGED:  Haldol 0.5 mg PO TID  Gabapentin 400 mg QHS    Please continue to take all other medications as prescribed.    Patient's ordered code status: DNR/intubate

## 2023-11-19 NOTE — DISCHARGE NOTE PROVIDER - NS AS DC PROVIDER CONTACT Y/N MULTI
----- Message from Katheryn Calloway RN sent at 4/19/2023  9:43 AM CDT -----  Regarding: Home monitor INR  Georgia is due to check her INR on her home monitor today Wednesday 4/26/23.  This is a one week check.       
Home Monitor INR    Georgia's INR today is therapeutic at 2.1 on 15 mg of Warfarin   Reason for anticoagulation: PE & Aflutter  INR Range: 2.0-3.0  Last INR: 1.8 on 4/19/23    Patient reports no abnormal findings related to being on Warfarin. Reports no medication or dietary changes. Verified correct previous Warfarin dosing. Patient was advised to take the following Warfarin dosing: Continue taking 3mg of Warfarin Wednesday; 2mg all other days (15mg weekly). Date of next INR: 1 week on Wednesday 5/03/23 on home monitor. Timed staff message created.  No further questions/concerns.  Onsite billing provider is JANE Srivastava.    Advised to contact the clinic at [phone] with the following:   Medication changes: New, dose change, advised to stop, especially antibiotics/steroids  Procedures planned: Surgical/dental/injections, or if you are advised to hold your Warfarin  Bruising/bleeding  ER visits/Urgent Care visits      
Yes

## 2023-11-19 NOTE — PROGRESS NOTE ADULT - PROBLEM SELECTOR PLAN 1
vascular dementia, bipolar I, CVA  CT head with moderate severity chronic microvessel change.  No fever, leukocytosis   UA with trace leuks, trace ketones   - s/p haloperidol IVx2, POx1; olanzapine x1   - f/u Ucx   - obtain CXR   - get collateral from outpatient psych (Dr. Billings)  - consult psych   - s/p 1 dose of ceftriaxone 1000 IV  - SLP eval   - continue with home haloperidol 1 mg TID   - continue with mirtazapine 15 mg at bedtime   - continue with home gabapentin 600 mg at bedtime  - continue with melatonin 3 mg at bedtime    - continue with ASA 81 for hx of CVA/TIA ~7 years ago vascular dementia, bipolar I, CVA  CT head with moderate severity chronic microvessel change.  No fever, leukocytosis   UA with trace leuks, trace ketones   Ucx NGTD  CXR  - s/p haloperidol IVx2, POx1; olanzapine x1   - obtain CXR   - get collateral from outpatient psych (Dr. Billings)  - consult psych   - s/p 1 dose of ceftriaxone 1000 IV  - SLP eval   - continue with home haloperidol 1 mg TID   - continue with mirtazapine 15 mg at bedtime   - continue with home gabapentin 600 mg at bedtime  - continue with melatonin 3 mg at bedtime    - continue with ASA 81 for hx of CVA/TIA ~7 years ago

## 2023-11-19 NOTE — DISCHARGE NOTE PROVIDER - NSDCMRMEDTOKEN_GEN_ALL_CORE_FT
aspirin 81 mg oral delayed release tablet: 1 tab(s) orally once a day  famotidine 20 mg oral tablet: 1 tab(s) orally 2 times a day  gabapentin 300 mg oral capsule: 2 cap(s) orally once a day (at bedtime)  haloperidol 1 mg oral tablet: 1 tab(s) orally 3 times a day  melatonin 3 mg oral tablet: 1 tab(s) orally once a day (at bedtime)  metoprolol tartrate 25 mg oral tablet: 1 tab(s) orally 2 times a day  mirtazapine 15 mg oral tablet, disintegratin tab(s) orally once a day (at bedtime)   aspirin 81 mg oral delayed release tablet: 1 tab(s) orally once a day  famotidine 20 mg oral tablet: 1 tab(s) orally 2 times a day  gabapentin 400 mg oral capsule: 1 cap(s) orally  haloperidol 0.5 mg oral tablet: 1 tab(s) orally 3 times a day  melatonin 3 mg oral tablet: 1 tab(s) orally once a day (at bedtime)  metoprolol tartrate 25 mg oral tablet: 1 tab(s) orally 2 times a day  mirtazapine 15 mg oral tablet, disintegratin tab(s) orally once a day (at bedtime)

## 2023-11-19 NOTE — DISCHARGE NOTE PROVIDER - NSDCCPTREATMENT_GEN_ALL_CORE_FT
PRINCIPAL PROCEDURE  Procedure: CT abdomen  Findings and Treatment: FINDINGS:  LOWER CHEST: Emphysema. Coronary artery and aortic root calcifications.  LIVER: Diffuse cyst and scattered subcentimeter hypodense foci too small   to characterize.  BILE DUCTS: Normal caliber.  GALLBLADDER: Cholelithiasis.  SPLEEN: Within normal limits.  PANCREAS: Within normal limits.  ADRENALS: Left glandular thickening.  KIDNEYS/URETERS: No hydronephrosis. 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  BLADDER: Within normal limits.  REPRODUCTIVE ORGANS: The uterus is unremarkable. A left 4.1 x 3.5 cm   adnexal cyst, previously 3.5 x 3 cm on 5/30/2020.  BOWEL: No bowel obstruction. Appendix is normal. Colonic diverticulosis,   without diverticulitis. Moderately distended fecal filled rectum with   perirectal fat stranding.  PERITONEUM: No ascites.  VESSELS: Atherosclerotic changes.  RETROPERITONEUM/LYMPH NODES: No lymphadenopathy.  ABDOMINAL WALL: Subcutaneous edema.  BONES: Degenerative changes. Compression deformity of L3 is unchanged.  IMPRESSION:  Moderate distended fecal filled rectum with wall thickening and   perirectal fat stranding concerning for stercoral colitis.   Increased size of left adnexal cystic mass since 5/31/2020.        SECONDARY PROCEDURE  Procedure: CT head  Findings and Treatment:   FINDINGS:  CT HEAD:  There is no vasogenic edema, midline shift, acute intracranial   hemorrhage, or extra-axial collections.  The ventricles, cisterns and sulci are stable in size and configuration   again demonstrating volume loss. Stable moderate patchy hypodensities in   the periventricular and subcortical white matter, likely related to   chronic microangiopathic changes.  The calvarium is intact. The paranasal sinuses are clear. The mastoid air   cells are predominantly clear. Right lens replacement.  CT CERVICAL SPINE:  Reversal of usual cervical lordosis. No traumatic malalignment. Vertebral   body height loss at C4-C6 with severe intervertebral disc height loss at   C3-C7.  Multilevel diffuse disc osteophyte complexes and ligamentum flavum   thickening which contribute to multilevel spinal canal narrowing.   Notably, C4-C5.  Multilevel facet hypertrophy and uncovertebral spurring which contribute   to multilevel neural foraminal narrowing.  Right thyroid nodule measuring 1.2 cm.. Emphysema. Incidentally noted   retropharyngeal course right carotid artery.  IMPRESSION:  CT Head:  Stable exam. No acute intra-cranial hemorrhage, vasogenic edema,   extra-axial collection or calvarial fracture. Moderate severity chronic   microvessel change.  CT Cervical Spine:  No acute fracture or traumatic subluxation.  Degenerative changes.

## 2023-11-19 NOTE — DISCHARGE NOTE PROVIDER - DETAILS OF MALNUTRITION DIAGNOSIS/DIAGNOSES
This patient has been assessed with a concern for Malnutrition and was treated during this hospitalization for the following Nutrition diagnosis/diagnoses:     -  11/20/2023: Severe protein-calorie malnutrition   -  11/20/2023: Underweight (BMI < 19)

## 2023-11-19 NOTE — PROGRESS NOTE ADULT - SUBJECTIVE AND OBJECTIVE BOX
Daina Hampton MD    Internal Medicine Resident (PGY-1)  ___________________________________________________________________________________________________      TREMAINE NUR 89y Female    Overnight events/subjective: No acute overnight events. Patient seen and examined at bedside. No complaints. Denies fever, chills, chest pain, shortness of breath, abdominal pain, nausea, vomiting, changes in bowel habits, or urinary symptoms.    Vital Signs Last 24 Hrs  T(C): 36.8 (2023 05:24), Max: 36.8 (2023 05:24)  T(F): 98.2 (2023 05:24), Max: 98.2 (2023 05:24)  HR: 78 (2023 05:24) (71 - 84)  BP: 145/74 (2023 05:24) (141/95 - 161/88)  BP(mean): --  RR: 18 (2023 05:24) (18 - 19)  SpO2: 96% (2023 05:24) (96% - 99%)    Parameters below as of 2023 05:24  Patient On (Oxygen Delivery Method): room air      PHYSICAL EXAM:  GENERAL: NAD, lying in bed comfortably  HEAD:  Atraumatic, normocephalic  EYES: EOMI, PERRLA, conjunctiva and sclera clear  ENT: dry  mucous membranes  NECK: Supple, no JVD  HEART: Regular rate and rhythm, no murmurs, rubs, or gallops  LUNGS: Unlabored respirations.  Clear to auscultation bilaterally, no crackles, wheezing, or rhonchi  ABDOMEN: Soft, nontender, nondistended, +BS  EXTREMITIES: 2+ peripheral pulses bilaterally. No clubbing, cyanosis, or edema  NERVOUS SYSTEM:  A&Ox2 (self and place), moves all extremities  PSYCH: acutely agitated, intermittent screening, echolalia  SKIN: No rashes or lesions      HOSPITAL MEDICATIONS:  MEDICATIONS  (STANDING):  aspirin enteric coated 81 milliGRAM(s) Oral daily  enoxaparin Injectable 40 milliGRAM(s) SubCutaneous every 24 hours  famotidine    Tablet 20 milliGRAM(s) Oral two times a day  gabapentin 600 milliGRAM(s) Oral at bedtime  haloperidol     Tablet 1 milliGRAM(s) Oral three times a day  lactobacillus acidophilus 1 Tablet(s) Oral daily  metoprolol tartrate 25 milliGRAM(s) Oral two times a day  mirtazapine Soltab 15 milliGRAM(s) Oral at bedtime  polyethylene glycol 3350 17 Gram(s) Oral two times a day  senna 2 Tablet(s) Oral at bedtime    MEDICATIONS  (PRN):  acetaminophen     Tablet .. 650 milliGRAM(s) Oral every 6 hours PRN Temp greater or equal to 38C (100.4F), Mild Pain (1 - 3)  aluminum hydroxide/magnesium hydroxide/simethicone Suspension 30 milliLiter(s) Oral every 4 hours PRN Dyspepsia  melatonin 3 milliGRAM(s) Oral at bedtime PRN Insomnia      LABS:                        10.2   5.35  )-----------( 276      ( 2023 19:48 )             31.8     -    144  |  104  |  17  ----------------------------<  86  3.7   |  30  |  0.45<L>    Ca    9.3      2023 19:48    TPro  6.2  /  Alb  3.2<L>  /  TBili  0.5  /  DBili  x   /  AST  27  /  ALT  19  /  AlkPhos  120  -      Urinalysis Basic - ( 2023 20:55 )    Color: Yellow / Appearance: Cloudy / S.020 / pH: x  Gluc: x / Ketone: Trace mg/dL  / Bili: Negative / Urobili: 1.0 mg/dL   Blood: x / Protein: Negative mg/dL / Nitrite: Negative   Leuk Esterase: Trace / RBC: 5 /HPF / WBC 0 /HPF   Sq Epi: x / Non Sq Epi: 2 /HPF / Bacteria: Negative /HPF         Daina Hampton MD    Internal Medicine Resident (PGY-1)  ___________________________________________________________________________________________________      TREMAINE NUR 89y Female    Overnight events/subjective: No acute overnight events. Patient seen and examined at bedside. This morning, patient is sleeping comfortably. She is arousable and cooperative with following simple commands.   No complaints. Denies fever, chills, chest pain, shortness of breath, abdominal pain. No BM overnight.     Vital Signs Last 24 Hrs  T(C): 36.8 (2023 05:24), Max: 36.8 (2023 05:24)  T(F): 98.2 (2023 05:24), Max: 98.2 (2023 05:24)  HR: 78 (2023 05:24) (71 - 84)  BP: 145/74 (2023 05:24) (141/95 - 161/88)  BP(mean): --  RR: 18 (2023 05:24) (18 - 19)  SpO2: 96% (2023 05:24) (96% - 99%)    Parameters below as of 2023 05:24  Patient On (Oxygen Delivery Method): room air      PHYSICAL EXAM:  GENERAL: NAD, lying in bed comfortably  HEAD:  Atraumatic, normocephalic  EYES: EOMI, PERRLA, conjunctiva and sclera clear  ENT: dry  mucous membranes  NECK: Supple, no JVD  HEART: Regular rate and rhythm, no murmurs, rubs, or gallops  LUNGS: Unlabored respirations.  Clear to auscultation bilaterally, no crackles, wheezing, or rhonchi  ABDOMEN: Soft, nontender, nondistended, +BS  EXTREMITIES: 2+ peripheral pulses bilaterally. No clubbing, cyanosis, or edema  NERVOUS SYSTEM:  A&Ox2 (self and place), moves all extremities  PSYCH: acutely agitated, intermittent screening, echolalia  SKIN: No rashes or lesions      HOSPITAL MEDICATIONS:  MEDICATIONS  (STANDING):  aspirin enteric coated 81 milliGRAM(s) Oral daily  enoxaparin Injectable 40 milliGRAM(s) SubCutaneous every 24 hours  famotidine    Tablet 20 milliGRAM(s) Oral two times a day  gabapentin 600 milliGRAM(s) Oral at bedtime  haloperidol     Tablet 1 milliGRAM(s) Oral three times a day  lactobacillus acidophilus 1 Tablet(s) Oral daily  metoprolol tartrate 25 milliGRAM(s) Oral two times a day  mirtazapine Soltab 15 milliGRAM(s) Oral at bedtime  polyethylene glycol 3350 17 Gram(s) Oral two times a day  senna 2 Tablet(s) Oral at bedtime    MEDICATIONS  (PRN):  acetaminophen     Tablet .. 650 milliGRAM(s) Oral every 6 hours PRN Temp greater or equal to 38C (100.4F), Mild Pain (1 - 3)  aluminum hydroxide/magnesium hydroxide/simethicone Suspension 30 milliLiter(s) Oral every 4 hours PRN Dyspepsia  melatonin 3 milliGRAM(s) Oral at bedtime PRN Insomnia      LABS:                        10.2   5.35  )-----------( 276      ( 2023 19:48 )             31.8     -    144  |  104  |  17  ----------------------------<  86  3.7   |  30  |  0.45<L>    Ca    9.3      2023 19:48    TPro  6.2  /  Alb  3.2<L>  /  TBili  0.5  /  DBili  x   /  AST  27  /  ALT  19  /  AlkPhos  120        Urinalysis Basic - ( 2023 20:55 )    Color: Yellow / Appearance: Cloudy / S.020 / pH: x  Gluc: x / Ketone: Trace mg/dL  / Bili: Negative / Urobili: 1.0 mg/dL   Blood: x / Protein: Negative mg/dL / Nitrite: Negative   Leuk Esterase: Trace / RBC: 5 /HPF / WBC 0 /HPF   Sq Epi: x / Non Sq Epi: 2 /HPF / Bacteria: Negative /HPF         Daina Hampton MD    Internal Medicine Resident (PGY-1)  ___________________________________________________________________________________________________      TREMAINE NUR 89y Female    Overnight events/subjective: No acute overnight events. Patient seen and examined at bedside. This morning, patient is sleeping comfortably. She is arousable and cooperative with following simple commands.   No complaints. Denies fever, chills, chest pain, shortness of breath, abdominal pain. No BM overnight.     Vital Signs Last 24 Hrs  T(C): 36.8 (2023 05:24), Max: 36.8 (2023 05:24)  T(F): 98.2 (2023 05:24), Max: 98.2 (2023 05:24)  HR: 78 (2023 05:24) (71 - 84)  BP: 145/74 (2023 05:24) (141/95 - 161/88)  BP(mean): --  RR: 18 (2023 05:24) (18 - 19)  SpO2: 96% (2023 05:24) (96% - 99%)    Parameters below as of 2023 05:24  Patient On (Oxygen Delivery Method): room air      PHYSICAL EXAM:  GENERAL: NAD, lying in bed comfortably  HEAD:  Atraumatic, normocephalic  EYES: EOMI, PERRLA, conjunctiva and sclera clear  ENT: dry  mucous membranes  NECK: Supple, no JVD  HEART: Regular rate and rhythm, no murmurs, rubs, or gallops  LUNGS: Unlabored respirations.  Clear to auscultation bilaterally, no crackles, wheezing, or rhonchi  ABDOMEN: Soft, nontender, nondistended, +BS  EXTREMITIES: 2+ peripheral pulses bilaterally. No clubbing, cyanosis, or edema  NERVOUS SYSTEM:  A&Ox2 (self and place), moves all extremities  PSYCH: acutely agitated, intermittent screening, echolalia  SKIN: No rashes or lesions      HOSPITAL MEDICATIONS:  MEDICATIONS  (STANDING):  aspirin enteric coated 81 milliGRAM(s) Oral daily  enoxaparin Injectable 40 milliGRAM(s) SubCutaneous every 24 hours  famotidine    Tablet 20 milliGRAM(s) Oral two times a day  gabapentin 600 milliGRAM(s) Oral at bedtime  haloperidol     Tablet 1 milliGRAM(s) Oral three times a day  lactobacillus acidophilus 1 Tablet(s) Oral daily  metoprolol tartrate 25 milliGRAM(s) Oral two times a day  mirtazapine Soltab 15 milliGRAM(s) Oral at bedtime  polyethylene glycol 3350 17 Gram(s) Oral two times a day  senna 2 Tablet(s) Oral at bedtime    MEDICATIONS  (PRN):  acetaminophen     Tablet .. 650 milliGRAM(s) Oral every 6 hours PRN Temp greater or equal to 38C (100.4F), Mild Pain (1 - 3)  aluminum hydroxide/magnesium hydroxide/simethicone Suspension 30 milliLiter(s) Oral every 4 hours PRN Dyspepsia  melatonin 3 milliGRAM(s) Oral at bedtime PRN Insomnia      LABS:  < from: CT Abdomen and Pelvis No Cont (23 @ 23:05) >  IMPRESSION:  Moderate distended fecal filled rectum with wall thickening and   perirectal fat stranding concerning for stercoral colitis.     Increased size of left adnexal cystic mass since 2020.    < end of copied text >                        10.2   5.35  )-----------( 276      ( 2023 19:48 )             31.8         144  |  104  |  17  ----------------------------<  86  3.7   |  30  |  0.45<L>    Ca    9.3      2023 19:48    TPro  6.2  /  Alb  3.2<L>  /  TBili  0.5  /  DBili  x   /  AST  27  /  ALT  19  /  AlkPhos  120        Urinalysis Basic - ( 2023 20:55 )    Color: Yellow / Appearance: Cloudy / S.020 / pH: x  Gluc: x / Ketone: Trace mg/dL  / Bili: Negative / Urobili: 1.0 mg/dL   Blood: x / Protein: Negative mg/dL / Nitrite: Negative   Leuk Esterase: Trace / RBC: 5 /HPF / WBC 0 /HPF   Sq Epi: x / Non Sq Epi: 2 /HPF / Bacteria: Negative /HPF

## 2023-11-19 NOTE — PROGRESS NOTE ADULT - PROBLEM SELECTOR PLAN 4
Diet: passed bedside screening, will start pureed diet. Will get SLP eval to upgrade diet   DVT proph: lovenox   Dispo: pending clinical improvement  PT/OT Diet: passed bedside screening, will start pureed diet. Will get SLP eval to upgrade diet   DVT proph: lovenox   Dispo: pending clinical improvement

## 2023-11-19 NOTE — DISCHARGE NOTE PROVIDER - CARE PROVIDER_API CALL
Adrianna Billings  Psychiatry  [address] [place] [id number]  Phone: [phone]  Fax: [phone]  Follow Up Time: 1 week

## 2023-11-20 LAB
ANION GAP SERPL CALC-SCNC: 11 MMOL/L — SIGNIFICANT CHANGE UP (ref 7–14)
ANION GAP SERPL CALC-SCNC: 11 MMOL/L — SIGNIFICANT CHANGE UP (ref 7–14)
BUN SERPL-MCNC: 14 MG/DL — SIGNIFICANT CHANGE UP (ref 7–23)
BUN SERPL-MCNC: 14 MG/DL — SIGNIFICANT CHANGE UP (ref 7–23)
CALCIUM SERPL-MCNC: 9.3 MG/DL — SIGNIFICANT CHANGE UP (ref 8.4–10.5)
CALCIUM SERPL-MCNC: 9.3 MG/DL — SIGNIFICANT CHANGE UP (ref 8.4–10.5)
CHLORIDE SERPL-SCNC: 100 MMOL/L — SIGNIFICANT CHANGE UP (ref 98–107)
CHLORIDE SERPL-SCNC: 100 MMOL/L — SIGNIFICANT CHANGE UP (ref 98–107)
CO2 SERPL-SCNC: 29 MMOL/L — SIGNIFICANT CHANGE UP (ref 22–31)
CO2 SERPL-SCNC: 29 MMOL/L — SIGNIFICANT CHANGE UP (ref 22–31)
CREAT SERPL-MCNC: 0.49 MG/DL — LOW (ref 0.5–1.3)
CREAT SERPL-MCNC: 0.49 MG/DL — LOW (ref 0.5–1.3)
EGFR: 90 ML/MIN/1.73M2 — SIGNIFICANT CHANGE UP
EGFR: 90 ML/MIN/1.73M2 — SIGNIFICANT CHANGE UP
GLUCOSE SERPL-MCNC: 73 MG/DL — SIGNIFICANT CHANGE UP (ref 70–99)
GLUCOSE SERPL-MCNC: 73 MG/DL — SIGNIFICANT CHANGE UP (ref 70–99)
HCT VFR BLD CALC: 32.7 % — LOW (ref 34.5–45)
HCT VFR BLD CALC: 32.7 % — LOW (ref 34.5–45)
HGB BLD-MCNC: 10.3 G/DL — LOW (ref 11.5–15.5)
HGB BLD-MCNC: 10.3 G/DL — LOW (ref 11.5–15.5)
MAGNESIUM SERPL-MCNC: 1.9 MG/DL — SIGNIFICANT CHANGE UP (ref 1.6–2.6)
MAGNESIUM SERPL-MCNC: 1.9 MG/DL — SIGNIFICANT CHANGE UP (ref 1.6–2.6)
MCHC RBC-ENTMCNC: 28.3 PG — SIGNIFICANT CHANGE UP (ref 27–34)
MCHC RBC-ENTMCNC: 28.3 PG — SIGNIFICANT CHANGE UP (ref 27–34)
MCHC RBC-ENTMCNC: 31.5 GM/DL — LOW (ref 32–36)
MCHC RBC-ENTMCNC: 31.5 GM/DL — LOW (ref 32–36)
MCV RBC AUTO: 89.8 FL — SIGNIFICANT CHANGE UP (ref 80–100)
MCV RBC AUTO: 89.8 FL — SIGNIFICANT CHANGE UP (ref 80–100)
NRBC # BLD: 0 /100 WBCS — SIGNIFICANT CHANGE UP (ref 0–0)
NRBC # BLD: 0 /100 WBCS — SIGNIFICANT CHANGE UP (ref 0–0)
NRBC # FLD: 0 K/UL — SIGNIFICANT CHANGE UP (ref 0–0)
NRBC # FLD: 0 K/UL — SIGNIFICANT CHANGE UP (ref 0–0)
PHOSPHATE SERPL-MCNC: 3.3 MG/DL — SIGNIFICANT CHANGE UP (ref 2.5–4.5)
PHOSPHATE SERPL-MCNC: 3.3 MG/DL — SIGNIFICANT CHANGE UP (ref 2.5–4.5)
PLATELET # BLD AUTO: 294 K/UL — SIGNIFICANT CHANGE UP (ref 150–400)
PLATELET # BLD AUTO: 294 K/UL — SIGNIFICANT CHANGE UP (ref 150–400)
POTASSIUM SERPL-MCNC: 3.6 MMOL/L — SIGNIFICANT CHANGE UP (ref 3.5–5.3)
POTASSIUM SERPL-MCNC: 3.6 MMOL/L — SIGNIFICANT CHANGE UP (ref 3.5–5.3)
POTASSIUM SERPL-SCNC: 3.6 MMOL/L — SIGNIFICANT CHANGE UP (ref 3.5–5.3)
POTASSIUM SERPL-SCNC: 3.6 MMOL/L — SIGNIFICANT CHANGE UP (ref 3.5–5.3)
RBC # BLD: 3.64 M/UL — LOW (ref 3.8–5.2)
RBC # BLD: 3.64 M/UL — LOW (ref 3.8–5.2)
RBC # FLD: 15.1 % — HIGH (ref 10.3–14.5)
RBC # FLD: 15.1 % — HIGH (ref 10.3–14.5)
SODIUM SERPL-SCNC: 140 MMOL/L — SIGNIFICANT CHANGE UP (ref 135–145)
SODIUM SERPL-SCNC: 140 MMOL/L — SIGNIFICANT CHANGE UP (ref 135–145)
WBC # BLD: 5.06 K/UL — SIGNIFICANT CHANGE UP (ref 3.8–10.5)
WBC # BLD: 5.06 K/UL — SIGNIFICANT CHANGE UP (ref 3.8–10.5)
WBC # FLD AUTO: 5.06 K/UL — SIGNIFICANT CHANGE UP (ref 3.8–10.5)
WBC # FLD AUTO: 5.06 K/UL — SIGNIFICANT CHANGE UP (ref 3.8–10.5)

## 2023-11-20 PROCEDURE — 99232 SBSQ HOSP IP/OBS MODERATE 35: CPT | Mod: GC

## 2023-11-20 PROCEDURE — 74018 RADEX ABDOMEN 1 VIEW: CPT | Mod: 26

## 2023-11-20 RX ORDER — HALOPERIDOL DECANOATE 100 MG/ML
1 INJECTION INTRAMUSCULAR ONCE
Refills: 0 | Status: COMPLETED | OUTPATIENT
Start: 2023-11-20 | End: 2023-11-20

## 2023-11-20 RX ADMIN — HALOPERIDOL DECANOATE 1 MILLIGRAM(S): 100 INJECTION INTRAMUSCULAR at 23:30

## 2023-11-20 RX ADMIN — HEPARIN SODIUM 5000 UNIT(S): 5000 INJECTION INTRAVENOUS; SUBCUTANEOUS at 17:11

## 2023-11-20 RX ADMIN — FAMOTIDINE 20 MILLIGRAM(S): 10 INJECTION INTRAVENOUS at 06:57

## 2023-11-20 RX ADMIN — Medication 81 MILLIGRAM(S): at 13:07

## 2023-11-20 RX ADMIN — SENNA PLUS 2 TABLET(S): 8.6 TABLET ORAL at 23:30

## 2023-11-20 RX ADMIN — FAMOTIDINE 20 MILLIGRAM(S): 10 INJECTION INTRAVENOUS at 17:11

## 2023-11-20 RX ADMIN — HALOPERIDOL DECANOATE 1 MILLIGRAM(S): 100 INJECTION INTRAMUSCULAR at 13:07

## 2023-11-20 RX ADMIN — Medication 25 MILLIGRAM(S): at 17:10

## 2023-11-20 RX ADMIN — Medication 100 MILLIGRAM(S): at 13:06

## 2023-11-20 RX ADMIN — HALOPERIDOL DECANOATE 1 MILLIGRAM(S): 100 INJECTION INTRAMUSCULAR at 06:57

## 2023-11-20 RX ADMIN — HALOPERIDOL DECANOATE 1 MILLIGRAM(S): 100 INJECTION INTRAMUSCULAR at 17:07

## 2023-11-20 RX ADMIN — HEPARIN SODIUM 5000 UNIT(S): 5000 INJECTION INTRAVENOUS; SUBCUTANEOUS at 06:58

## 2023-11-20 RX ADMIN — POLYETHYLENE GLYCOL 3350 17 GRAM(S): 17 POWDER, FOR SOLUTION ORAL at 17:10

## 2023-11-20 RX ADMIN — POLYETHYLENE GLYCOL 3350 17 GRAM(S): 17 POWDER, FOR SOLUTION ORAL at 06:58

## 2023-11-20 RX ADMIN — Medication 25 MILLIGRAM(S): at 06:57

## 2023-11-20 RX ADMIN — Medication 1 TABLET(S): at 13:07

## 2023-11-20 RX ADMIN — GABAPENTIN 600 MILLIGRAM(S): 400 CAPSULE ORAL at 23:29

## 2023-11-20 NOTE — DIETITIAN NUTRITION RISK NOTIFICATION - ADDITIONAL COMMENTS/DIETITIAN RECOMMENDATIONS
Please see Dietitian Initial Assessment for complete recommendations.  Bhavana Rizvi, AILYN, CDN #[id number]  Also available on [employer] Teams

## 2023-11-20 NOTE — PROGRESS NOTE ADULT - PROBLEM SELECTOR PLAN 1
vascular dementia, bipolar I, CVA  CT head with moderate severity chronic microvessel change.  No fever, leukocytosis   UA with trace leuks, trace ketones   Ucx NGTD  CXR  - s/p haloperidol IVx2, POx1; olanzapine x1   - obtain CXR   - get collateral from outpatient psych (Dr. Billings)  - consult psych   - s/p 1 dose of ceftriaxone 1000 IV  - SLP eval   - continue with home haloperidol 1 mg TID   - continue with mirtazapine 15 mg at bedtime   - continue with home gabapentin 600 mg at bedtime  - continue with melatonin 3 mg at bedtime    - continue with ASA 81 for hx of CVA/TIA ~7 years ago vascular dementia, bipolar I, CVA  CT head with moderate severity chronic microvessel change.  No fever, leukocytosis   UA with trace leuks, trace ketones   Ucx NGTD  CXR negative for aspiration or other acute pathology  - s/p haloperidol IVx2, POx1; olanzapine x1   - Followed by outpatient psychiatrist (Dr. Billings)  - Psychiatry consult placed  - s/p 1 dose of ceftriaxone 1000 IV  - SLP eval   - continue with home haloperidol 1 mg TID   - continue with mirtazapine 15 mg at bedtime   - continue with home gabapentin 600 mg at bedtime  - continue with melatonin 3 mg at bedtime    - continue with ASA 81 for hx of CVA/TIA ~7 years ago

## 2023-11-20 NOTE — DIETITIAN INITIAL EVALUATION ADULT - ADD RECOMMEND
1) Recommend continue diet without therapeutic restrictions, texture per medical discretion.  2) RDN to provide Hormel Shake 1 PO 2x daily (provides 520 kcal, 22 gm protein per 8.45oz serving).  3) Provide assistance at meal times. Document % PO intake in flowsheets.  4) Obtain weekly weights.  5) Recommend addition of multivitamin to provide micronutrient coverage, assist with wound healing.

## 2023-11-20 NOTE — SWALLOW BEDSIDE ASSESSMENT ADULT - COMMENTS
Internal medicine note 11/20 "88yo woman with a history of vascular dementia, bipolar I, CVA, diverticulosis, and GERD presenting with abdominal pain and decompensating mental status with agitation"    Patient seen at bedside this afternoon for an initial assessment of the swallow function, at which time patient was alert. Patient is able to follow simple 1-step directives and verbalizes wants/needs.

## 2023-11-20 NOTE — DIETITIAN INITIAL EVALUATION ADULT - INCREASED NUTRIENT NEEDS
Symptoms of Retinal tear and detachment reviewed. Patient understands to call immediately with any such symptoms. protein, energy

## 2023-11-20 NOTE — DIETITIAN INITIAL EVALUATION ADULT - OTHER INFO
Addended by: HARVEY SWANN on: 12/15/2020 12:57 PM     Modules accepted: Orders     Per chart, pt is 89 year old female PMH vascular dementia, bipolar I, CVA, diverticulosis, GERD presenting with abdominal pain and decompensating mental status with agitation. Pending Psychiatry consult.    Pt visibly resting in bed, does not meaningfully participate. NKFA. Pt lives at home with daughter, has aides who assist with care. Pt noted with complaint of abdominal pain PTA. Per chart, history of H. Pyori s/p treatment; CT AP demonstrating moderate distended fecal filled rectum with wall thickening and perirectal fat stranding concerning for stercoral colitis. Last BM 11/19 per flowsheets, fecal incontinence noted. Continues on bowel regimen (Miralax 17 gm BID and senna 2 tablets qHS) with a probiotic. Pt ordered for texture modified diet, noted with fair PO intake per flowsheet documentation. Pt requires total assistance at meal times.

## 2023-11-20 NOTE — SWALLOW BEDSIDE ASSESSMENT ADULT - SWALLOW EVAL: DIAGNOSIS
1. Functional oral stage for puree and thin liquids marked by adequate oral acceptance, collection and transfer. 2. Functional pharyngeal phase for puree and thin liquids marked by a present pharyngeal swallow trigger with hyolaryngeal elevation noted upon digital palpation without evidence of impaired airway protection.

## 2023-11-20 NOTE — PROGRESS NOTE ADULT - PROBLEM SELECTOR PLAN 2
Hx of H Pyori s/p treatment   CT a/p - moderate distended fecal filled rectum with wall thickening and perirectal fat stranding concerning for stercoral colitis.  - bowel reg Hx of H Pylori s/p treatment   CT a/p - moderate distended fecal filled rectum with wall thickening and perirectal fat stranding concerning for stercoral colitis.  - bowel regimen: senna, miralax  - SMOG enema differed as patient is having bowel movements

## 2023-11-20 NOTE — DIETITIAN INITIAL EVALUATION ADULT - OTHER CALCULATIONS
Apparent clinically significant ~36 lb (30%) weight loss x10 months, per history obtained via chart: (11/18 dosing) 82.6 lbs / 37.5 kg, (4/12) 108 lbs, (1/5) 119 lbs.  Ideal Body Weight: 105 lbs / 47.7 kg +/-10%

## 2023-11-20 NOTE — DIETITIAN INITIAL EVALUATION ADULT - FLUID ACCUMULATION
Discussed with Dr Johan Drake  Needs re-eval in office  Scheduled appt 3/15 with Dr Johan Drake 
No  Overuse of antibiotics is the leading cause of antibiotic resistance, not to mention the increased potential for tendon injuries and tears associated with the use of quinolone antibiotics
Patient indicated that Dr Yajaira Garcia knows her condition and understands patient is in need of the antibiotic 
Patient is calling to state that she is finishing up the antibiotic that was prescribed by Dr Aric Smallwood  She is taking the last one tomorrow  She is afraid to go into a relapse that she had done several times when gets this        She would like a refill of the levaquin that was prescribed to her    Please call it into [facility] in [place]    Please call her back at the mobile phone
No edema noted at this time.

## 2023-11-20 NOTE — DIETITIAN INITIAL EVALUATION ADULT - PERTINENT MEDS FT
famotidine Tablet  lactobacillus acidophilus  mirtazapine Soltab   polyethylene glycol   senna   aluminum hydroxide/magnesium hydroxide/simethicone Suspension PRN

## 2023-11-20 NOTE — PROGRESS NOTE ADULT - PROBLEM SELECTOR PLAN 4
Diet: passed bedside screening, will start pureed diet. Will get SLP eval to upgrade diet   DVT proph: lovenox   Dispo: pending clinical improvement Diet: passed bedside screening, will start pureed diet. Pending SLP eval to upgrade diet   DVT proph: lovenox   Dispo: pending clinical improvement

## 2023-11-20 NOTE — PROGRESS NOTE ADULT - SUBJECTIVE AND OBJECTIVE BOX
*******************************  Ana Gilman MD (PGY-1)  Internal Medicine  Contact via [employer] TEAMS  *******************************    INTERVAL HPI/OVERNIGHT EVENTS:      OBJECTIVE  T(C): 36.8 (11-20-23 @ 05:40), Max: 37 (11-19-23 @ 17:14)  HR: 66 (11-20-23 @ 05:40) (66 - 73)  BP: 149/72 (11-20-23 @ 05:40) (124/74 - 161/82)  RR: 17 (11-20-23 @ 05:40) (17 - 18)  SpO2: 96% (11-20-23 @ 05:40) (95% - 96%)      PHYSICAL EXAM  General:  HEENT:  Cardiovascular:  Pulmonary:  GI:  :  Neuro:  Psych:          IMAGING:     *******************************  Ana Gilman MD (PGY-1)  Internal Medicine  Contact via [employer] TEAMS  *******************************    INTERVAL HPI/OVERNIGHT EVENTS:  No acute overnight events. This morning patient appears comfortable although still complains of abdominal pain. Difficult historian and unable to elicit comprehensive ROS. Last BM was yesterday evening, prior to that patient had 2 BMs the previous night.    OBJECTIVE  T(C): 36.8 (11-20-23 @ 05:40), Max: 37 (11-19-23 @ 17:14)  HR: 66 (11-20-23 @ 05:40) (66 - 73)  BP: 149/72 (11-20-23 @ 05:40) (124/74 - 161/82)  RR: 17 (11-20-23 @ 05:40) (17 - 18)  SpO2: 96% (11-20-23 @ 05:40) (95% - 96%)      PHYSICAL EXAM  GENERAL: NAD, lying in bed comfortably  HEAD:  Atraumatic, normocephalic  EYES: EOMI, PERRLA, conjunctiva and sclera clear  NECK: Supple, trachea midline, no JVD  HEART: Regular rate and rhythm, no murmurs, rubs, or gallops  LUNGS: Unlabored respirations.  Clear to auscultation bilaterally, no crackles, wheezing, or rhonchi  ABDOMEN: Soft, nondistended.  +mild diffuse tenderness  EXTREMITIES: 2+ peripheral pulses bilaterally. No clubbing, cyanosis, or edema  NERVOUS SYSTEM:  A&Ox1, moving all extremities, no focal deficits   SKIN: No rashes or lesions      IMAGING:

## 2023-11-21 PROCEDURE — 99223 1ST HOSP IP/OBS HIGH 75: CPT

## 2023-11-21 PROCEDURE — 99232 SBSQ HOSP IP/OBS MODERATE 35: CPT | Mod: GC

## 2023-11-21 RX ORDER — CHLORHEXIDINE GLUCONATE 213 G/1000ML
1 SOLUTION TOPICAL DAILY
Refills: 0 | Status: DISCONTINUED | OUTPATIENT
Start: 2023-11-21 | End: 2023-11-27

## 2023-11-21 RX ORDER — HALOPERIDOL DECANOATE 100 MG/ML
1 INJECTION INTRAMUSCULAR ONCE
Refills: 0 | Status: DISCONTINUED | OUTPATIENT
Start: 2023-11-21 | End: 2023-11-23

## 2023-11-21 RX ADMIN — Medication 25 MILLIGRAM(S): at 17:29

## 2023-11-21 RX ADMIN — Medication 25 MILLIGRAM(S): at 06:18

## 2023-11-21 RX ADMIN — SENNA PLUS 2 TABLET(S): 8.6 TABLET ORAL at 21:12

## 2023-11-21 RX ADMIN — HALOPERIDOL DECANOATE 1 MILLIGRAM(S): 100 INJECTION INTRAMUSCULAR at 21:13

## 2023-11-21 RX ADMIN — MIRTAZAPINE 15 MILLIGRAM(S): 45 TABLET, ORALLY DISINTEGRATING ORAL at 21:12

## 2023-11-21 RX ADMIN — HALOPERIDOL DECANOATE 1 MILLIGRAM(S): 100 INJECTION INTRAMUSCULAR at 13:19

## 2023-11-21 RX ADMIN — HEPARIN SODIUM 5000 UNIT(S): 5000 INJECTION INTRAVENOUS; SUBCUTANEOUS at 06:20

## 2023-11-21 RX ADMIN — HALOPERIDOL DECANOATE 1 MILLIGRAM(S): 100 INJECTION INTRAMUSCULAR at 06:19

## 2023-11-21 RX ADMIN — GABAPENTIN 600 MILLIGRAM(S): 400 CAPSULE ORAL at 21:12

## 2023-11-21 RX ADMIN — FAMOTIDINE 20 MILLIGRAM(S): 10 INJECTION INTRAVENOUS at 17:30

## 2023-11-21 RX ADMIN — POLYETHYLENE GLYCOL 3350 17 GRAM(S): 17 POWDER, FOR SOLUTION ORAL at 06:18

## 2023-11-21 RX ADMIN — Medication 81 MILLIGRAM(S): at 11:44

## 2023-11-21 RX ADMIN — POLYETHYLENE GLYCOL 3350 17 GRAM(S): 17 POWDER, FOR SOLUTION ORAL at 17:29

## 2023-11-21 RX ADMIN — Medication 1 TABLET(S): at 11:44

## 2023-11-21 RX ADMIN — HEPARIN SODIUM 5000 UNIT(S): 5000 INJECTION INTRAVENOUS; SUBCUTANEOUS at 17:29

## 2023-11-21 RX ADMIN — FAMOTIDINE 20 MILLIGRAM(S): 10 INJECTION INTRAVENOUS at 06:19

## 2023-11-21 NOTE — PHYSICAL THERAPY INITIAL EVALUATION ADULT - PASSIVE RANGE OF MOTION EXAMINATION, REHAB EVAL
Patient refused to let therapist assess ROM, Patient screaming and saying no over and over again. RICKIE fisher.

## 2023-11-21 NOTE — PHYSICAL THERAPY INITIAL EVALUATION ADULT - CRITERIA FOR SKILLED THERAPEUTIC INTERVENTIONS
Sunscreen Recommendation Label Override: Broad Spectrum Sunscreen SPF 30+ Detail Level: Simple Detail Level: Detailed impairments found/functional limitations in following categories/risk reduction/prevention/rehab potential/therapy frequency/anticipated equipment needs at discharge/anticipated discharge recommendation

## 2023-11-21 NOTE — BH CONSULTATION LIAISON ASSESSMENT NOTE - HPI (INCLUDE ILLNESS QUALITY, SEVERITY, DURATION, TIMING, CONTEXT, MODIFYING FACTORS, ASSOCIATED SIGNS AND SYMPTOMS)
89F, , domiciled with daughter and two aides with PMH of vascular dementia, bipolar I, CVA, diverticulosis, and GERD, H. pylori (treated <1 week ago) presents with altered mentation and abdominal pain. Psych consulted for medical management. Pt is aware of her name but unaware of her location and the date. She knows her birthday is in [birth year] but cannot provide a day or year. She engages in conversation when prompted through other stimuli ie physical touch but still struggles with answering questions. Pt is pleasant and calm today. 89F, , domiciled with daughter and two aides with PMH of vascular dementia, bipolar I, CVA, diverticulosis, and GERD, H. pylori (treated <1 week ago) presents with altered mentation and abdominal pain. Psych consulted for medical management.     Chart reviewed. Initially found to have signs of colitis. UA + but CX negative. Responded well to environment here after having BMs/care and did not require PRNs in several days.     On exam, she is rather limited, poor baseline, with impaired insight and minimal interaction, though not combative/agitated. Pt is aware of her name but unaware of her location and the date. She knows her birthday is in [birth year] but cannot provide a day or year. She engages in conversation when prompted through other stimuli ie physical touch but still struggles with answering questions. Pt is pleasant and calm today.

## 2023-11-21 NOTE — PROGRESS NOTE ADULT - PROBLEM SELECTOR PLAN 1
vascular dementia, bipolar I, CVA  CT head with moderate severity chronic microvessel change.  No fever, leukocytosis   UA with trace leuks, trace ketones   Ucx NGTD  CXR negative for aspiration or other acute pathology  - s/p haloperidol IVx2, POx1; olanzapine x1   - Followed by outpatient psychiatrist (Dr. Billings)  - Psychiatry consult placed  - s/p 1 dose of ceftriaxone 1000 IV  - SLP eval   - continue with home haloperidol 1 mg TID   - continue with mirtazapine 15 mg at bedtime   - continue with home gabapentin 600 mg at bedtime  - continue with melatonin 3 mg at bedtime    - continue with ASA 81 for hx of CVA/TIA ~7 years ago vascular dementia, bipolar I, CVA  CT head with moderate severity chronic microvessel change.  No fever, leukocytosis   UA with trace leuks, trace ketones , Ucx NGTD. S/p 1 dose of ceftriaxone 1000 IV  CXR negative for aspiration or other acute pathology  - s/p haloperidol IVx2, POx1; olanzapine x1 in ED.   - Followed by outpatient psychiatrist (Dr. Billings)  - continue with home haloperidol 1 mg TID   - continue with mirtazapine 15 mg at bedtime   - continue with home gabapentin 600 mg at bedtime  - continue with melatonin 3 mg at bedtime    - continue with ASA 81 for hx of CVA/TIA ~7 years ago  - Psychiatry consult placed, appreciate recs

## 2023-11-21 NOTE — PHYSICAL THERAPY INITIAL EVALUATION ADULT - NSPTDISCHREC_GEN_A_CORE
Will attempt to see patient 1-2 more times to see if patient is appropriate for skilled PT. Please continue to follow.

## 2023-11-21 NOTE — BH CONSULTATION LIAISON ASSESSMENT NOTE - PATIENT'S CHIEF COMPLAINT
Occupational Therapy   Evaluation and Discharge Note    Name: Suyapa Connelly  MRN: [id number]  Admitting Diagnosis:  Acute coronary syndrome      Recommendations:     Discharge Recommendations: home  Discharge Equipment Recommendations:  none  Barriers to discharge:       Assessment:     Suyapa Connelly is a 53 y.o. female with a medical diagnosis of Acute coronary syndrome. At this time, patient is functioning at their prior level of function and does not require further acute OT services.     Plan:     During this hospitalization, patient does not require further acute OT services.  Please re-consult if situation changes.    · Plan of Care Reviewed with: patient, spouse    Subjective     Chief Complaint: decreased endurance   Patient/Family Comments/goals: return to home     Occupational Profile:  Living Environment: Pt lives with spouse and mother in law in 1 story house with 3 steps to enter with handrail on left   Previous level of function: pt indep with self care prior to admission   Equipment Used at home:  none  Assistance upon Discharge: family able to assist     Pain/Comfort:  · Pain Rating 1: 0/10    Patients cultural, spiritual, Mormonism conflicts given the current situation: no    Objective:     Communicated with: RN prior to session.  Patient found on toilet  with peripheral IV upon OT entry to room.    General Precautions: Standard,     Orthopedic Precautions:N/A   Braces: N/A     Occupational Performance:    Bed Mobility:    · Pt SBA for safe mobility     Functional Mobility/Transfers:  · Functional Mobility: Pt with increased assist with CGA for safety for toilet transfer and in room mobility     Activities of Daily Living:  · Grooming: independence    · Upper Body Dressing: stand by assistance    · Lower Body Dressing: contact guard assistance    · Toileting: contact guard assistance      Cognitive/Visual Perceptual:  Cognitive/Psychosocial Skills:     -       Oriented to: Person, Place, Time and  Situation   -       Follows Commands/attention:Follows two-step commands  -       Communication: clear/fluent  -       Memory: No Deficits noted  -       Safety awareness/insight to disability: intact   -       Mood/Affect/Coping skills/emotional control: Appropriate to situation    Physical Exam:  Upper Extremity Range of Motion:     -       Right Upper Extremity: WFL  -       Left Upper Extremity: WFL  Upper Extremity Strength:    -       Right Upper Extremity: WFL  -       Left Upper Extremity: WFL    AMPAC 6 Click ADL:  AMPAC Total Score: 22    Treatment & Education:  Evaluation complete. Pt educated on safety, role of OT, importance of increased participation in self care for gains , expectations for participation, expectations for gains, POC, energy conservation, caregiver strain. White board updated.     Education:    Patient left up in chair with all lines intact    GOALS:   Multidisciplinary Problems     Occupational Therapy Goals     Not on file                History:     Past Medical History:   Diagnosis Date    Anxiety     Depression     Diabetes mellitus     type 2    GERD (gastroesophageal reflux disease)     Hyperlipemia     Hypertension     Myocardial infarction 2010    minor-caused by stress per pt.       Past Surgical History:   Procedure Laterality Date    Angiogram - Right Extremity Right 7/9/15    angiogram-left leg  10/6/15    CATHETERIZATION OF BOTH LEFT AND RIGHT HEART N/A 12/18/2019    Procedure: CATHETERIZATION, HEART, BOTH LEFT AND RIGHT;  Surgeon: Que Fernando III, MD;  Location: [facility] CATH LAB;  Service: Cardiology;  Laterality: N/A;    CORONARY ANGIOGRAPHY N/A 12/18/2019    Procedure: ANGIOGRAM, CORONARY ARTERY;  Surgeon: Que Fernando III, MD;  Location: [facility] CATH LAB;  Service: Cardiology;  Laterality: N/A;       Time Tracking:     OT Date of Treatment: 01/05/20  OT Start Time: 0810  OT Stop Time: 0825  OT Total Time (min): 15 min    Billable Minutes:Evaluation  15    Delmy Bustamante, OT  1/5/2020     Pt is unclear of her surroundings

## 2023-11-21 NOTE — BH CONSULTATION LIAISON ASSESSMENT NOTE - CURRENT PASSIVE IDEATION:
Unable to assess
PRE-OP DIAGNOSIS:  BPH with urinary obstruction 28-Sep-2022 12:53:18  Himanshu Beard  Bladder calculi 28-Sep-2022 12:53:30  Himanshu Beard

## 2023-11-21 NOTE — PROGRESS NOTE ADULT - SUBJECTIVE AND OBJECTIVE BOX
*******************************  Ana Gilman MD (PGY-1)  Internal Medicine  Contact via [employer] TEAMS  *******************************    INTERVAL HPI/OVERNIGHT EVENTS:      OBJECTIVE  T(C): 36.9 (11-21-23 @ 06:15), Max: 36.9 (11-21-23 @ 06:15)  HR: 76 (11-21-23 @ 06:15) (61 - 91)  BP: 141/73 (11-21-23 @ 06:15) (134/65 - 141/75)  RR: 16 (11-21-23 @ 06:15) (16 - 18)  SpO2: 95% (11-21-23 @ 06:15) (95% - 97%)      PHYSICAL EXAM  General:  HEENT:  Cardiovascular:  Pulmonary:  GI:  :  Neuro:  Psych:          IMAGING:     *******************************  Ana Gilman MD (PGY-1)  Internal Medicine  Contact via [employer] TEAMS  *******************************    INTERVAL HPI/OVERNIGHT EVENTS:  Agitated around 6pm last night, unable to reorient and received additional 1mg IV haldol. Later at night patient became agitated again but was able to be reoriented without medication.  No BMs overnight, however patient is not complaining of abdominal pain this morning.    OBJECTIVE  T(C): 36.9 (11-21-23 @ 06:15), Max: 36.9 (11-21-23 @ 06:15)  HR: 76 (11-21-23 @ 06:15) (61 - 91)  BP: 141/73 (11-21-23 @ 06:15) (134/65 - 141/75)  RR: 16 (11-21-23 @ 06:15) (16 - 18)  SpO2: 95% (11-21-23 @ 06:15) (95% - 97%)      PHYSICAL EXAM  GENERAL: NAD, lying in bed comfortably  HEAD:  Atraumatic, normocephalic  EYES: EOMI, PERRLA, conjunctiva and sclera clear  NECK: Supple, trachea midline, no JVD  HEART: Regular rate and rhythm, no murmurs, rubs, or gallops  LUNGS: Unlabored respirations.  Clear to auscultation bilaterally, no crackles, wheezing, or rhonchi  ABDOMEN: Soft, nontender, nondistended, +BS  EXTREMITIES: 2+ peripheral pulses bilaterally. No clubbing, cyanosis, or edema  NERVOUS SYSTEM:  A&Ox1, moving all extremities, no focal deficits   SKIN: No rashes or lesions        IMAGING:

## 2023-11-21 NOTE — PROGRESS NOTE ADULT - PROBLEM SELECTOR PLAN 2
Hx of H Pylori s/p treatment   CT a/p - moderate distended fecal filled rectum with wall thickening and perirectal fat stranding concerning for stercoral colitis.  - bowel regimen: senna, miralax  - SMOG enema differed as patient is having bowel movements Hx of H Pylori s/p treatment   CT a/p - moderate distended fecal filled rectum with wall thickening and perirectal fat stranding concerning for stercoral colitis.  - bowel regimen: senna, miralax  - SMOG enema deferred as patient is having bowel movements

## 2023-11-21 NOTE — BH CONSULTATION LIAISON ASSESSMENT NOTE - NSBHCHARTREVIEWVS_PSY_A_CORE FT
Vital Signs Last 24 Hrs  T(C): 36.9 (21 Nov 2023 06:15), Max: 36.9 (21 Nov 2023 06:15)  T(F): 98.4 (21 Nov 2023 06:15), Max: 98.4 (21 Nov 2023 06:15)  HR: 76 (21 Nov 2023 06:15) (76 - 91)  BP: 141/73 (21 Nov 2023 06:15) (139/62 - 141/75)  BP(mean): --  RR: 16 (21 Nov 2023 06:15) (16 - 18)  SpO2: 95% (21 Nov 2023 06:15) (95% - 97%)    Parameters below as of 21 Nov 2023 06:15  Patient On (Oxygen Delivery Method): room air

## 2023-11-21 NOTE — BH CONSULTATION LIAISON ASSESSMENT NOTE - SUMMARY
- Pt previously diagnosed with dementia and has a poor baseline.  - Recommend continuing the previously prescribed medications and no need for PRNs at this time.  - Recommend returning to supportive environment at home with pt's daughter and both aids.  - No further psychiatric intervention needed. Please re-consult as necessary. - Pt previously diagnosed with dementia and has a poor baseline.  - Recommend continuing the previously prescribed medications and no need for PRNs at this time.  - Recommend returning to supportive environment at home with pt's daughter and both aids.  - Recommend following up with outpatient psychiatrist, Dr. Billings.  - No further psychiatric intervention needed. Please re-consult as necessary. 89F, , domiciled with daughter and two aides with PMH of vascular dementia, bipolar I, CVA, diverticulosis, and GERD, H. pylori (treated <1 week ago) presents with altered mentation and abdominal pain. Psych consulted for medical management. Initially found to have signs of colitis. UA + but CX negative. Responded well to environment here after having BMs/care and did not require PRNs in several days. Chart also reveals hx of time-limited delirious episodes 2/2 her poor baseline. Usually recovers as she has here.     PLAN:   - Recommend continuing the previously prescribed medications and no need for PRNs at this time.  - Recommend returning to supportive environment at home with pt's daughter and both aids. No role for inpt psych care at this time  - Delirium precautions: avoid benzodiazepines/anticholinergic/antihistaminic agents; bowel/bladder care; frequent reorientation; keep awake during day and asleep at night; correct any underlying metabolic/infectious etiologies.   - Recommend following up with outpatient psychiatrist, Dr. Billings.  - No further psychiatric intervention needed. Please re-consult as necessary.

## 2023-11-21 NOTE — BH CONSULTATION LIAISON ASSESSMENT NOTE - NSBHATTESTCOMMENTATTENDFT_PSY_A_CORE
Chart reviewed. Pt seen with resident. Exam as above: limited though calm, not combative. Oriented to self only. No agitation/combative behavior noted. Likely at baseline, and recently was likely delirious in context of some infectious or other process (colitis? dehydration? constipation?). In cases of severe dementia like this, little perturbations can result in such agitation/psychosis. Furthermore she has a hx of this occurring (time-limited agitation 2/2 some disturbance that resolves). At this time there is little else to do but maintain vigilance of future perturbations. Agree with above assessment/recs. Will sign off as there is little acutely for psychiatry to do.

## 2023-11-21 NOTE — BH CONSULTATION LIAISON ASSESSMENT NOTE - CURRENT MEDICATION
MEDICATIONS  (STANDING):  aspirin enteric coated 81 milliGRAM(s) Oral daily  famotidine    Tablet 20 milliGRAM(s) Oral two times a day  gabapentin 600 milliGRAM(s) Oral at bedtime  haloperidol     Tablet 1 milliGRAM(s) Oral three times a day  heparin   Injectable 5000 Unit(s) SubCutaneous every 12 hours  lactobacillus acidophilus 1 Tablet(s) Oral daily  metoprolol tartrate 25 milliGRAM(s) Oral two times a day  mirtazapine Soltab 15 milliGRAM(s) Oral at bedtime  polyethylene glycol 3350 17 Gram(s) Oral two times a day  senna 2 Tablet(s) Oral at bedtime    MEDICATIONS  (PRN):  acetaminophen     Tablet .. 650 milliGRAM(s) Oral every 6 hours PRN Temp greater or equal to 38C (100.4F), Mild Pain (1 - 3)  aluminum hydroxide/magnesium hydroxide/simethicone Suspension 30 milliLiter(s) Oral every 4 hours PRN Dyspepsia  benzonatate 100 milliGRAM(s) Oral three times a day PRN Cough  bisacodyl Suppository 10 milliGRAM(s) Rectal daily PRN Constipation  guaiFENesin Oral Liquid (Sugar-Free) 100 milliGRAM(s) Oral every 6 hours PRN Cough  melatonin 3 milliGRAM(s) Oral at bedtime PRN Insomnia

## 2023-11-21 NOTE — PROGRESS NOTE ADULT - PROBLEM SELECTOR PLAN 4
Diet: passed bedside screening, will start pureed diet. Pending SLP eval to upgrade diet   DVT proph: lovenox   Dispo: pending clinical improvement Diet: Pureed  DVT proph: lovenox   Dispo: pending clinical improvement

## 2023-11-22 LAB
ANION GAP SERPL CALC-SCNC: 4 MMOL/L — LOW (ref 7–14)
ANION GAP SERPL CALC-SCNC: 4 MMOL/L — LOW (ref 7–14)
BUN SERPL-MCNC: 14 MG/DL — SIGNIFICANT CHANGE UP (ref 7–23)
BUN SERPL-MCNC: 14 MG/DL — SIGNIFICANT CHANGE UP (ref 7–23)
CALCIUM SERPL-MCNC: 9.1 MG/DL — SIGNIFICANT CHANGE UP (ref 8.4–10.5)
CALCIUM SERPL-MCNC: 9.1 MG/DL — SIGNIFICANT CHANGE UP (ref 8.4–10.5)
CHLORIDE SERPL-SCNC: 105 MMOL/L — SIGNIFICANT CHANGE UP (ref 98–107)
CHLORIDE SERPL-SCNC: 105 MMOL/L — SIGNIFICANT CHANGE UP (ref 98–107)
CO2 SERPL-SCNC: 32 MMOL/L — HIGH (ref 22–31)
CO2 SERPL-SCNC: 32 MMOL/L — HIGH (ref 22–31)
CREAT SERPL-MCNC: 0.49 MG/DL — LOW (ref 0.5–1.3)
CREAT SERPL-MCNC: 0.49 MG/DL — LOW (ref 0.5–1.3)
EGFR: 90 ML/MIN/1.73M2 — SIGNIFICANT CHANGE UP
EGFR: 90 ML/MIN/1.73M2 — SIGNIFICANT CHANGE UP
GLUCOSE SERPL-MCNC: 90 MG/DL — SIGNIFICANT CHANGE UP (ref 70–99)
GLUCOSE SERPL-MCNC: 90 MG/DL — SIGNIFICANT CHANGE UP (ref 70–99)
HCT VFR BLD CALC: 32 % — LOW (ref 34.5–45)
HCT VFR BLD CALC: 32 % — LOW (ref 34.5–45)
HGB BLD-MCNC: 10.2 G/DL — LOW (ref 11.5–15.5)
HGB BLD-MCNC: 10.2 G/DL — LOW (ref 11.5–15.5)
MCHC RBC-ENTMCNC: 28 PG — SIGNIFICANT CHANGE UP (ref 27–34)
MCHC RBC-ENTMCNC: 28 PG — SIGNIFICANT CHANGE UP (ref 27–34)
MCHC RBC-ENTMCNC: 31.9 GM/DL — LOW (ref 32–36)
MCHC RBC-ENTMCNC: 31.9 GM/DL — LOW (ref 32–36)
MCV RBC AUTO: 87.9 FL — SIGNIFICANT CHANGE UP (ref 80–100)
MCV RBC AUTO: 87.9 FL — SIGNIFICANT CHANGE UP (ref 80–100)
MRSA PCR RESULT.: SIGNIFICANT CHANGE UP
MRSA PCR RESULT.: SIGNIFICANT CHANGE UP
NRBC # BLD: 0 /100 WBCS — SIGNIFICANT CHANGE UP (ref 0–0)
NRBC # BLD: 0 /100 WBCS — SIGNIFICANT CHANGE UP (ref 0–0)
NRBC # FLD: 0 K/UL — SIGNIFICANT CHANGE UP (ref 0–0)
NRBC # FLD: 0 K/UL — SIGNIFICANT CHANGE UP (ref 0–0)
PLATELET # BLD AUTO: 276 K/UL — SIGNIFICANT CHANGE UP (ref 150–400)
PLATELET # BLD AUTO: 276 K/UL — SIGNIFICANT CHANGE UP (ref 150–400)
POTASSIUM SERPL-MCNC: 4 MMOL/L — SIGNIFICANT CHANGE UP (ref 3.5–5.3)
POTASSIUM SERPL-MCNC: 4 MMOL/L — SIGNIFICANT CHANGE UP (ref 3.5–5.3)
POTASSIUM SERPL-SCNC: 4 MMOL/L — SIGNIFICANT CHANGE UP (ref 3.5–5.3)
POTASSIUM SERPL-SCNC: 4 MMOL/L — SIGNIFICANT CHANGE UP (ref 3.5–5.3)
RBC # BLD: 3.64 M/UL — LOW (ref 3.8–5.2)
RBC # BLD: 3.64 M/UL — LOW (ref 3.8–5.2)
RBC # FLD: 14.8 % — HIGH (ref 10.3–14.5)
RBC # FLD: 14.8 % — HIGH (ref 10.3–14.5)
S AUREUS DNA NOSE QL NAA+PROBE: SIGNIFICANT CHANGE UP
S AUREUS DNA NOSE QL NAA+PROBE: SIGNIFICANT CHANGE UP
SODIUM SERPL-SCNC: 141 MMOL/L — SIGNIFICANT CHANGE UP (ref 135–145)
SODIUM SERPL-SCNC: 141 MMOL/L — SIGNIFICANT CHANGE UP (ref 135–145)
WBC # BLD: 6.05 K/UL — SIGNIFICANT CHANGE UP (ref 3.8–10.5)
WBC # BLD: 6.05 K/UL — SIGNIFICANT CHANGE UP (ref 3.8–10.5)
WBC # FLD AUTO: 6.05 K/UL — SIGNIFICANT CHANGE UP (ref 3.8–10.5)
WBC # FLD AUTO: 6.05 K/UL — SIGNIFICANT CHANGE UP (ref 3.8–10.5)

## 2023-11-22 PROCEDURE — 99232 SBSQ HOSP IP/OBS MODERATE 35: CPT | Mod: GC

## 2023-11-22 RX ADMIN — GABAPENTIN 600 MILLIGRAM(S): 400 CAPSULE ORAL at 22:09

## 2023-11-22 RX ADMIN — HEPARIN SODIUM 5000 UNIT(S): 5000 INJECTION INTRAVENOUS; SUBCUTANEOUS at 17:02

## 2023-11-22 RX ADMIN — HALOPERIDOL DECANOATE 1 MILLIGRAM(S): 100 INJECTION INTRAMUSCULAR at 07:01

## 2023-11-22 RX ADMIN — CHLORHEXIDINE GLUCONATE 1 APPLICATION(S): 213 SOLUTION TOPICAL at 11:29

## 2023-11-22 RX ADMIN — MIRTAZAPINE 15 MILLIGRAM(S): 45 TABLET, ORALLY DISINTEGRATING ORAL at 22:12

## 2023-11-22 RX ADMIN — Medication 25 MILLIGRAM(S): at 17:03

## 2023-11-22 RX ADMIN — SENNA PLUS 2 TABLET(S): 8.6 TABLET ORAL at 22:06

## 2023-11-22 RX ADMIN — Medication 25 MILLIGRAM(S): at 07:01

## 2023-11-22 RX ADMIN — Medication 1 TABLET(S): at 11:28

## 2023-11-22 RX ADMIN — HALOPERIDOL DECANOATE 1 MILLIGRAM(S): 100 INJECTION INTRAMUSCULAR at 13:09

## 2023-11-22 RX ADMIN — HALOPERIDOL DECANOATE 1 MILLIGRAM(S): 100 INJECTION INTRAMUSCULAR at 22:12

## 2023-11-22 RX ADMIN — POLYETHYLENE GLYCOL 3350 17 GRAM(S): 17 POWDER, FOR SOLUTION ORAL at 17:03

## 2023-11-22 RX ADMIN — FAMOTIDINE 20 MILLIGRAM(S): 10 INJECTION INTRAVENOUS at 07:01

## 2023-11-22 RX ADMIN — Medication 81 MILLIGRAM(S): at 11:29

## 2023-11-22 RX ADMIN — HEPARIN SODIUM 5000 UNIT(S): 5000 INJECTION INTRAVENOUS; SUBCUTANEOUS at 07:01

## 2023-11-22 RX ADMIN — FAMOTIDINE 20 MILLIGRAM(S): 10 INJECTION INTRAVENOUS at 17:03

## 2023-11-22 RX ADMIN — POLYETHYLENE GLYCOL 3350 17 GRAM(S): 17 POWDER, FOR SOLUTION ORAL at 07:01

## 2023-11-22 NOTE — ADVANCED PRACTICE NURSE CONSULT - REASON FOR CONSULT
Patient seen on skin care rounds after wound care referral received for assessment of skin impairment and recommendations of topical management. Patient H/O of vascular dementia, bipolar I, CVA, diverticulosis, and GERD presenting with abdominal pain and decompensating mental status with agitation. Patient seen by Dietician found to have severe protein and calorie malnutrition.   Chart reviewed: WBC 6.05, H/H 10.2/32.0, platelets 276,  Serum albumin 3.2, Kunal 11.

## 2023-11-22 NOTE — PROGRESS NOTE ADULT - PROBLEM SELECTOR PLAN 1
vascular dementia, bipolar I, CVA  CT head with moderate severity chronic microvessel change.  No fever, leukocytosis   UA with trace leuks, trace ketones , Ucx NGTD. S/p 1 dose of ceftriaxone 1000 IV  CXR negative for aspiration or other acute pathology  - s/p haloperidol IVx2, POx1; olanzapine x1 in ED.   - Followed by outpatient psychiatrist (Dr. Billings)  - continue with home haloperidol 1 mg TID   - continue with mirtazapine 15 mg at bedtime   - continue with home gabapentin 600 mg at bedtime  - continue with melatonin 3 mg at bedtime    - continue with ASA 81 for hx of CVA/TIA ~7 years ago  - Psychiatry consult placed, appreciate recs vascular dementia, bipolar I, CVA  CT head with moderate severity chronic microvessel change.  No fever, leukocytosis   UA with trace leuks, trace ketones , Ucx NGTD. S/p 1 dose of ceftriaxone 1000 IV  CXR negative for aspiration or other acute pathology  - s/p haloperidol IVx2, POx1; olanzapine x1 in ED.   - Followed by outpatient psychiatrist (Dr. Billings)  - continue with home haloperidol 1 mg TID   - continue with mirtazapine 15 mg at bedtime   - continue with home gabapentin 600 mg at bedtime  - continue with melatonin 3 mg at bedtime    - continue with ASA 81 for hx of CVA/TIA ~7 years ago  - Per psych, does not require inpatient psychiatry admission

## 2023-11-22 NOTE — ADVANCED PRACTICE NURSE CONSULT - RECOMMEDATIONS
Topical recommendations:     Bilateral hip- Apply LBF film daily, cover with silicone foam with border. Monitor for tissue type changes.     Areas at risk for IAD- Cleanse with soap and water, pat dry. Apply Rajendra moisture barrier cream twice a day or PRN with episodes of incontinence.     Continue low air loss bed therapy, continue heel elevation, continue to turn & reposition q2h, soft pillow between bony prominences, continue moisture management with barrier creams & single breathable pad, continue measures to decrease friction/shear/pressure. Continue with nutritional support as per dietary/orders.     Findings and recs discussed with Dr Ana Gilman.     Please contact Wound/Ostomy Care Service Line if we can be of further assistance (ext [phone]).  Topical recommendations:     Bilateral hip- Apply LBF film daily, cover with silicone foam with border. Monitor for tissue type changes.     Areas at risk for IAD- Cleanse with soap and water, pat dry. Apply Rajendra moisture barrier cream twice a day or PRN with episodes of incontinence.     Continue low air loss bed therapy, continue heel elevation, continue to turn & reposition q2h, soft pillow between bony prominences, continue moisture management with barrier creams & single breathable pad, continue measures to decrease friction/shear/pressure. Continue with nutritional support as per dietary/orders. Supplies for home provided at bedside, educated on topical care, verbalized understanding.     Findings and recs discussed with Dr Ana Gilman.     Please contact Wound/Ostomy Care Service Line if we can be of further assistance (ext [phone]).

## 2023-11-22 NOTE — PROGRESS NOTE ADULT - PROBLEM SELECTOR PLAN 2
Hx of H Pylori s/p treatment   CT a/p - moderate distended fecal filled rectum with wall thickening and perirectal fat stranding concerning for stercoral colitis.  - bowel regimen: senna, miralax  - SMOG enema deferred as patient is having bowel movements

## 2023-11-22 NOTE — ADVANCED PRACTICE NURSE CONSULT - ASSESSMENT
General: Patient confused, lethargic, occasional screaming during assessment- able to be redirected. Patient bedbound, contracted LE to chest. Incontinent of urine and stool- external female urinary  in place. Patient cachectic with muscle wasting, prominent bony prominences including but not limited to spine, sacrum. Skin warm, dry, thin and fragile. Poor skin turgor, Blanchable erythema on bilateral heels. Left knee scab.     Right hip Stage 1 pressure injury measuring [id number] presenting with 60% blanching erythema and 40% nonblanching erythema. 2 small pinpoint areas of ecchymosis? Periwound skin intact. No s/s of soft tissue infection. Goals of care: Monitor for tissue type changes.     Left hip blanching erythema measuring [id number] presenting with blanching erythema. Periwound skin intact. No induration, no erythema, no increased warmth. No s/s of soft tissue infection. Goals of care: Monitor for tissue type changes.     Risk for IAD    Patient continues to be at high risk for skin breakdown. On assessment patient on a low air loss surface, heel offloading boots in place, Z-flow positioning pillow utilized, moisture management in place with use of one incontinence pad. Turning and positioning q2hrs.

## 2023-11-22 NOTE — PROGRESS NOTE ADULT - SUBJECTIVE AND OBJECTIVE BOX
*******************************  Ana Gilman MD (PGY-1)  Internal Medicine  Contact via [employer] TEAMS  *******************************    INTERVAL HPI/OVERNIGHT EVENTS:      OBJECTIVE  T(C): 36.3 (11-22-23 @ 05:39), Max: 37.1 (11-21-23 @ 15:45)  HR: 63 (11-22-23 @ 05:39) (63 - 72)  BP: 141/65 (11-22-23 @ 05:39) (127/62 - 141/65)  RR: 18 (11-22-23 @ 05:39) (18 - 18)  SpO2: 93% (11-22-23 @ 05:39) (93% - 98%)    11-21-23 @ 07:01  -  11-22-23 @ 07:00  --------------------------------------------------------  IN: 540 mL / OUT: 0 mL / NET: 540 mL        PHYSICAL EXAM  General:  HEENT:  Cardiovascular:  Pulmonary:  GI:  :  Neuro:  Psych:          IMAGING:     *******************************  Ana Gilman MD (PGY-1)  Internal Medicine  Contact via [employer] TEAMS  *******************************    INTERVAL HPI/OVERNIGHT EVENTS:  No acute overnight events. Patient was not agitated and did not require additional sedation.    OBJECTIVE  T(C): 36.3 (11-22-23 @ 05:39), Max: 37.1 (11-21-23 @ 15:45)  HR: 63 (11-22-23 @ 05:39) (63 - 72)  BP: 141/65 (11-22-23 @ 05:39) (127/62 - 141/65)  RR: 18 (11-22-23 @ 05:39) (18 - 18)  SpO2: 93% (11-22-23 @ 05:39) (93% - 98%)    11-21-23 @ 07:01  -  11-22-23 @ 07:00  --------------------------------------------------------  IN: 540 mL / OUT: 0 mL / NET: 540 mL        PHYSICAL EXAM  GENERAL: NAD, lying in bed comfortably  HEAD:  Atraumatic, normocephalic  EYES: EOMI, PERRLA, conjunctiva and sclera clear  NECK: Supple, trachea midline, no JVD  HEART: Regular rate and rhythm, no murmurs, rubs, or gallops  LUNGS: Unlabored respirations.  Clear to auscultation bilaterally, no crackles, wheezing, or rhonchi  ABDOMEN: Soft, nontender, nondistended, +BS  EXTREMITIES: 2+ peripheral pulses bilaterally. No clubbing, cyanosis, or edema  NERVOUS SYSTEM:  A&Ox1, moving all extremities, no focal deficits   SKIN: No rashes or lesions          IMAGING:

## 2023-11-23 PROCEDURE — 99232 SBSQ HOSP IP/OBS MODERATE 35: CPT | Mod: GC

## 2023-11-23 RX ADMIN — HALOPERIDOL DECANOATE 1 MILLIGRAM(S): 100 INJECTION INTRAMUSCULAR at 06:44

## 2023-11-23 RX ADMIN — GABAPENTIN 600 MILLIGRAM(S): 400 CAPSULE ORAL at 21:44

## 2023-11-23 RX ADMIN — HEPARIN SODIUM 5000 UNIT(S): 5000 INJECTION INTRAVENOUS; SUBCUTANEOUS at 17:28

## 2023-11-23 RX ADMIN — HEPARIN SODIUM 5000 UNIT(S): 5000 INJECTION INTRAVENOUS; SUBCUTANEOUS at 06:46

## 2023-11-23 RX ADMIN — HALOPERIDOL DECANOATE 1 MILLIGRAM(S): 100 INJECTION INTRAMUSCULAR at 14:55

## 2023-11-23 RX ADMIN — Medication 25 MILLIGRAM(S): at 17:27

## 2023-11-23 RX ADMIN — Medication 100 MILLIGRAM(S): at 21:41

## 2023-11-23 RX ADMIN — POLYETHYLENE GLYCOL 3350 17 GRAM(S): 17 POWDER, FOR SOLUTION ORAL at 17:27

## 2023-11-23 RX ADMIN — POLYETHYLENE GLYCOL 3350 17 GRAM(S): 17 POWDER, FOR SOLUTION ORAL at 05:45

## 2023-11-23 RX ADMIN — SENNA PLUS 2 TABLET(S): 8.6 TABLET ORAL at 21:40

## 2023-11-23 RX ADMIN — CHLORHEXIDINE GLUCONATE 1 APPLICATION(S): 213 SOLUTION TOPICAL at 12:54

## 2023-11-23 RX ADMIN — FAMOTIDINE 20 MILLIGRAM(S): 10 INJECTION INTRAVENOUS at 06:45

## 2023-11-23 RX ADMIN — FAMOTIDINE 20 MILLIGRAM(S): 10 INJECTION INTRAVENOUS at 17:27

## 2023-11-23 RX ADMIN — Medication 81 MILLIGRAM(S): at 12:50

## 2023-11-23 RX ADMIN — Medication 25 MILLIGRAM(S): at 05:48

## 2023-11-23 RX ADMIN — MIRTAZAPINE 15 MILLIGRAM(S): 45 TABLET, ORALLY DISINTEGRATING ORAL at 21:33

## 2023-11-23 RX ADMIN — HALOPERIDOL DECANOATE 1 MILLIGRAM(S): 100 INJECTION INTRAMUSCULAR at 22:35

## 2023-11-23 RX ADMIN — Medication 1 TABLET(S): at 12:50

## 2023-11-23 NOTE — PROGRESS NOTE ADULT - PROBLEM SELECTOR PLAN 2
What Type Of Note Output Would You Prefer (Optional)?: Standard Output Hpi Title: Evaluation of Skin Lesions How Severe Are Your Spot(S)?: mild Have Your Spot(S) Been Treated In The Past?: has not been treated Year Removed: 1900 Hx of H Pylori s/p treatment   CT a/p - moderate distended fecal filled rectum with wall thickening and perirectal fat stranding concerning for stercoral colitis.  - bowel regimen: senna, miralax  - SMOG enema deferred as patient is having bowel movements Hx of H Pylori s/p treatment   CT a/p - moderate distended fecal filled rectum with wall thickening and perirectal fat stranding concerning for stercoral colitis.  - bowel regimen: senna, miralax

## 2023-11-23 NOTE — PROGRESS NOTE ADULT - PROBLEM SELECTOR PLAN 1
vascular dementia, bipolar I, CVA  CT head with moderate severity chronic microvessel change.  No fever, leukocytosis   UA with trace leuks, trace ketones , Ucx NGTD. S/p 1 dose of ceftriaxone 1000 IV  CXR negative for aspiration or other acute pathology  - s/p haloperidol IVx2, POx1; olanzapine x1 in ED.   - Followed by outpatient psychiatrist (Dr. Billings)  - continue with home haloperidol 1 mg TID   - continue with mirtazapine 15 mg at bedtime   - continue with home gabapentin 600 mg at bedtime  - continue with melatonin 3 mg at bedtime    - continue with ASA 81 for hx of CVA/TIA ~7 years ago  - Per psych, does not require inpatient psychiatry admission

## 2023-11-23 NOTE — PROGRESS NOTE ADULT - PROBLEM SELECTOR PLAN 4
Diet: Pureed  DVT proph: lovenox   Dispo: pending clinical improvement Diet: Pureed  DVT proph: lovenox   Dispo: pending

## 2023-11-23 NOTE — PROGRESS NOTE ADULT - SUBJECTIVE AND OBJECTIVE BOX
*******************************  Ana Gilman MD (PGY-1)  Internal Medicine  Contact via [employer] TEAMS  *******************************    INTERVAL HPI/OVERNIGHT EVENTS:      OBJECTIVE  T(C): 36.9 (11-23-23 @ 06:16), Max: 37 (11-22-23 @ 13:35)  HR: 83 (11-23-23 @ 06:16) (70 - 92)  BP: 100/63 (11-23-23 @ 06:16) (100/63 - 129/81)  RR: 17 (11-23-23 @ 06:16) (17 - 19)  SpO2: 93% (11-23-23 @ 06:16) (93% - 99%)    11-22-23 @ 07:01  -  11-23-23 @ 07:00  --------------------------------------------------------  IN: 640 mL / OUT: 0 mL / NET: 640 mL        PHYSICAL EXAM  General:  HEENT:  Cardiovascular:  Pulmonary:  GI:  :  Neuro:  Psych:          IMAGING:     *******************************  Ana Gilman MD (PGY-1)  Internal Medicine  Contact via Microsoft TEAMS  *******************************    INTERVAL HPI/OVERNIGHT EVENTS:  No acute overnight events.    OBJECTIVE  T(C): 36.9 (11-23-23 @ 06:16), Max: 37 (11-22-23 @ 13:35)  HR: 83 (11-23-23 @ 06:16) (70 - 92)  BP: 100/63 (11-23-23 @ 06:16) (100/63 - 129/81)  RR: 17 (11-23-23 @ 06:16) (17 - 19)  SpO2: 93% (11-23-23 @ 06:16) (93% - 99%)    11-22-23 @ 07:01  -  11-23-23 @ 07:00  --------------------------------------------------------  IN: 640 mL / OUT: 0 mL / NET: 640 mL        PHYSICAL EXAM  GENERAL: NAD, lying in bed comfortably  HEAD:  Atraumatic, normocephalic  EYES: EOMI, PERRLA, conjunctiva and sclera clear  NECK: Supple, trachea midline, no JVD  HEART: Regular rate and rhythm, no murmurs, rubs, or gallops  LUNGS: Unlabored respirations.  Clear to auscultation bilaterally, no crackles, wheezing, or rhonchi  ABDOMEN: Soft, nontender, nondistended, +BS  EXTREMITIES: 2+ peripheral pulses bilaterally. No clubbing, cyanosis, or edema  NERVOUS SYSTEM:  A&Ox0, moving all extremities, no focal deficits   SKIN: No rashes or lesions        IMAGING:

## 2023-11-24 LAB
ALBUMIN SERPL ELPH-MCNC: 3.1 G/DL — LOW (ref 3.3–5)
ALBUMIN SERPL ELPH-MCNC: 3.1 G/DL — LOW (ref 3.3–5)
ALP SERPL-CCNC: 101 U/L — SIGNIFICANT CHANGE UP (ref 40–120)
ALP SERPL-CCNC: 101 U/L — SIGNIFICANT CHANGE UP (ref 40–120)
ALT FLD-CCNC: 18 U/L — SIGNIFICANT CHANGE UP (ref 4–33)
ALT FLD-CCNC: 18 U/L — SIGNIFICANT CHANGE UP (ref 4–33)
ANION GAP SERPL CALC-SCNC: 6 MMOL/L — LOW (ref 7–14)
ANION GAP SERPL CALC-SCNC: 6 MMOL/L — LOW (ref 7–14)
APPEARANCE UR: CLEAR — SIGNIFICANT CHANGE UP
APPEARANCE UR: CLEAR — SIGNIFICANT CHANGE UP
AST SERPL-CCNC: 25 U/L — SIGNIFICANT CHANGE UP (ref 4–32)
AST SERPL-CCNC: 25 U/L — SIGNIFICANT CHANGE UP (ref 4–32)
BACTERIA # UR AUTO: NEGATIVE /HPF — SIGNIFICANT CHANGE UP
BACTERIA # UR AUTO: NEGATIVE /HPF — SIGNIFICANT CHANGE UP
BASE EXCESS BLDV CALC-SCNC: 7.9 MMOL/L — HIGH (ref -2–3)
BASE EXCESS BLDV CALC-SCNC: 7.9 MMOL/L — HIGH (ref -2–3)
BILIRUB SERPL-MCNC: 0.9 MG/DL — SIGNIFICANT CHANGE UP (ref 0.2–1.2)
BILIRUB SERPL-MCNC: 0.9 MG/DL — SIGNIFICANT CHANGE UP (ref 0.2–1.2)
BILIRUB UR-MCNC: NEGATIVE — SIGNIFICANT CHANGE UP
BILIRUB UR-MCNC: NEGATIVE — SIGNIFICANT CHANGE UP
BUN SERPL-MCNC: 18 MG/DL — SIGNIFICANT CHANGE UP (ref 7–23)
BUN SERPL-MCNC: 18 MG/DL — SIGNIFICANT CHANGE UP (ref 7–23)
CA-I SERPL-SCNC: 1.31 MMOL/L — SIGNIFICANT CHANGE UP (ref 1.15–1.33)
CA-I SERPL-SCNC: 1.31 MMOL/L — SIGNIFICANT CHANGE UP (ref 1.15–1.33)
CALCIUM SERPL-MCNC: 9.5 MG/DL — SIGNIFICANT CHANGE UP (ref 8.4–10.5)
CALCIUM SERPL-MCNC: 9.5 MG/DL — SIGNIFICANT CHANGE UP (ref 8.4–10.5)
CAST: 0 /LPF — SIGNIFICANT CHANGE UP (ref 0–4)
CAST: 0 /LPF — SIGNIFICANT CHANGE UP (ref 0–4)
CHLORIDE BLDV-SCNC: 104 MMOL/L — SIGNIFICANT CHANGE UP (ref 96–108)
CHLORIDE BLDV-SCNC: 104 MMOL/L — SIGNIFICANT CHANGE UP (ref 96–108)
CHLORIDE SERPL-SCNC: 104 MMOL/L — SIGNIFICANT CHANGE UP (ref 98–107)
CHLORIDE SERPL-SCNC: 104 MMOL/L — SIGNIFICANT CHANGE UP (ref 98–107)
CO2 BLDV-SCNC: 35.8 MMOL/L — HIGH (ref 22–26)
CO2 BLDV-SCNC: 35.8 MMOL/L — HIGH (ref 22–26)
CO2 SERPL-SCNC: 33 MMOL/L — HIGH (ref 22–31)
CO2 SERPL-SCNC: 33 MMOL/L — HIGH (ref 22–31)
COLOR SPEC: YELLOW — SIGNIFICANT CHANGE UP
COLOR SPEC: YELLOW — SIGNIFICANT CHANGE UP
CREAT SERPL-MCNC: 0.46 MG/DL — LOW (ref 0.5–1.3)
CREAT SERPL-MCNC: 0.46 MG/DL — LOW (ref 0.5–1.3)
DIFF PNL FLD: NEGATIVE — SIGNIFICANT CHANGE UP
DIFF PNL FLD: NEGATIVE — SIGNIFICANT CHANGE UP
EGFR: 91 ML/MIN/1.73M2 — SIGNIFICANT CHANGE UP
EGFR: 91 ML/MIN/1.73M2 — SIGNIFICANT CHANGE UP
GAS PNL BLDV: 139 MMOL/L — SIGNIFICANT CHANGE UP (ref 136–145)
GAS PNL BLDV: 139 MMOL/L — SIGNIFICANT CHANGE UP (ref 136–145)
GAS PNL BLDV: SIGNIFICANT CHANGE UP
GAS PNL BLDV: SIGNIFICANT CHANGE UP
GLUCOSE BLDC GLUCOMTR-MCNC: 93 MG/DL — SIGNIFICANT CHANGE UP (ref 70–99)
GLUCOSE BLDC GLUCOMTR-MCNC: 93 MG/DL — SIGNIFICANT CHANGE UP (ref 70–99)
GLUCOSE BLDV-MCNC: 89 MG/DL — SIGNIFICANT CHANGE UP (ref 70–99)
GLUCOSE BLDV-MCNC: 89 MG/DL — SIGNIFICANT CHANGE UP (ref 70–99)
GLUCOSE SERPL-MCNC: 93 MG/DL — SIGNIFICANT CHANGE UP (ref 70–99)
GLUCOSE SERPL-MCNC: 93 MG/DL — SIGNIFICANT CHANGE UP (ref 70–99)
GLUCOSE UR QL: NEGATIVE MG/DL — SIGNIFICANT CHANGE UP
GLUCOSE UR QL: NEGATIVE MG/DL — SIGNIFICANT CHANGE UP
HCO3 BLDV-SCNC: 34 MMOL/L — HIGH (ref 22–29)
HCO3 BLDV-SCNC: 34 MMOL/L — HIGH (ref 22–29)
HCT VFR BLD CALC: 32.5 % — LOW (ref 34.5–45)
HCT VFR BLD CALC: 32.5 % — LOW (ref 34.5–45)
HCT VFR BLDA CALC: 33 % — LOW (ref 34.5–46.5)
HCT VFR BLDA CALC: 33 % — LOW (ref 34.5–46.5)
HGB BLD CALC-MCNC: 10.9 G/DL — LOW (ref 11.7–16.1)
HGB BLD CALC-MCNC: 10.9 G/DL — LOW (ref 11.7–16.1)
HGB BLD-MCNC: 10.3 G/DL — LOW (ref 11.5–15.5)
HGB BLD-MCNC: 10.3 G/DL — LOW (ref 11.5–15.5)
KETONES UR-MCNC: NEGATIVE MG/DL — SIGNIFICANT CHANGE UP
KETONES UR-MCNC: NEGATIVE MG/DL — SIGNIFICANT CHANGE UP
LACTATE BLDV-MCNC: 0.9 MMOL/L — SIGNIFICANT CHANGE UP (ref 0.5–2)
LACTATE BLDV-MCNC: 0.9 MMOL/L — SIGNIFICANT CHANGE UP (ref 0.5–2)
LEUKOCYTE ESTERASE UR-ACNC: NEGATIVE — SIGNIFICANT CHANGE UP
LEUKOCYTE ESTERASE UR-ACNC: NEGATIVE — SIGNIFICANT CHANGE UP
MAGNESIUM SERPL-MCNC: 1.9 MG/DL — SIGNIFICANT CHANGE UP (ref 1.6–2.6)
MAGNESIUM SERPL-MCNC: 1.9 MG/DL — SIGNIFICANT CHANGE UP (ref 1.6–2.6)
MCHC RBC-ENTMCNC: 28.5 PG — SIGNIFICANT CHANGE UP (ref 27–34)
MCHC RBC-ENTMCNC: 28.5 PG — SIGNIFICANT CHANGE UP (ref 27–34)
MCHC RBC-ENTMCNC: 31.7 GM/DL — LOW (ref 32–36)
MCHC RBC-ENTMCNC: 31.7 GM/DL — LOW (ref 32–36)
MCV RBC AUTO: 89.8 FL — SIGNIFICANT CHANGE UP (ref 80–100)
MCV RBC AUTO: 89.8 FL — SIGNIFICANT CHANGE UP (ref 80–100)
NITRITE UR-MCNC: NEGATIVE — SIGNIFICANT CHANGE UP
NITRITE UR-MCNC: NEGATIVE — SIGNIFICANT CHANGE UP
NRBC # BLD: 0 /100 WBCS — SIGNIFICANT CHANGE UP (ref 0–0)
NRBC # BLD: 0 /100 WBCS — SIGNIFICANT CHANGE UP (ref 0–0)
NRBC # FLD: 0 K/UL — SIGNIFICANT CHANGE UP (ref 0–0)
NRBC # FLD: 0 K/UL — SIGNIFICANT CHANGE UP (ref 0–0)
PCO2 BLDV: 55 MMHG — HIGH (ref 39–52)
PCO2 BLDV: 55 MMHG — HIGH (ref 39–52)
PH BLDV: 7.4 — SIGNIFICANT CHANGE UP (ref 7.32–7.43)
PH BLDV: 7.4 — SIGNIFICANT CHANGE UP (ref 7.32–7.43)
PH UR: 6.5 — SIGNIFICANT CHANGE UP (ref 5–8)
PH UR: 6.5 — SIGNIFICANT CHANGE UP (ref 5–8)
PHOSPHATE SERPL-MCNC: 3.1 MG/DL — SIGNIFICANT CHANGE UP (ref 2.5–4.5)
PHOSPHATE SERPL-MCNC: 3.1 MG/DL — SIGNIFICANT CHANGE UP (ref 2.5–4.5)
PLATELET # BLD AUTO: 286 K/UL — SIGNIFICANT CHANGE UP (ref 150–400)
PLATELET # BLD AUTO: 286 K/UL — SIGNIFICANT CHANGE UP (ref 150–400)
PO2 BLDV: 58 MMHG — HIGH (ref 25–45)
PO2 BLDV: 58 MMHG — HIGH (ref 25–45)
POTASSIUM BLDV-SCNC: 4.1 MMOL/L — SIGNIFICANT CHANGE UP (ref 3.5–5.1)
POTASSIUM BLDV-SCNC: 4.1 MMOL/L — SIGNIFICANT CHANGE UP (ref 3.5–5.1)
POTASSIUM SERPL-MCNC: 3.9 MMOL/L — SIGNIFICANT CHANGE UP (ref 3.5–5.3)
POTASSIUM SERPL-MCNC: 3.9 MMOL/L — SIGNIFICANT CHANGE UP (ref 3.5–5.3)
POTASSIUM SERPL-SCNC: 3.9 MMOL/L — SIGNIFICANT CHANGE UP (ref 3.5–5.3)
POTASSIUM SERPL-SCNC: 3.9 MMOL/L — SIGNIFICANT CHANGE UP (ref 3.5–5.3)
PROT SERPL-MCNC: 6.2 G/DL — SIGNIFICANT CHANGE UP (ref 6–8.3)
PROT SERPL-MCNC: 6.2 G/DL — SIGNIFICANT CHANGE UP (ref 6–8.3)
PROT UR-MCNC: SIGNIFICANT CHANGE UP MG/DL
PROT UR-MCNC: SIGNIFICANT CHANGE UP MG/DL
RBC # BLD: 3.62 M/UL — LOW (ref 3.8–5.2)
RBC # BLD: 3.62 M/UL — LOW (ref 3.8–5.2)
RBC # FLD: 15.1 % — HIGH (ref 10.3–14.5)
RBC # FLD: 15.1 % — HIGH (ref 10.3–14.5)
RBC CASTS # UR COMP ASSIST: 1 /HPF — SIGNIFICANT CHANGE UP (ref 0–4)
RBC CASTS # UR COMP ASSIST: 1 /HPF — SIGNIFICANT CHANGE UP (ref 0–4)
SAO2 % BLDV: 87.9 % — SIGNIFICANT CHANGE UP (ref 67–88)
SAO2 % BLDV: 87.9 % — SIGNIFICANT CHANGE UP (ref 67–88)
SODIUM SERPL-SCNC: 143 MMOL/L — SIGNIFICANT CHANGE UP (ref 135–145)
SODIUM SERPL-SCNC: 143 MMOL/L — SIGNIFICANT CHANGE UP (ref 135–145)
SP GR SPEC: 1.02 — SIGNIFICANT CHANGE UP (ref 1–1.03)
SP GR SPEC: 1.02 — SIGNIFICANT CHANGE UP (ref 1–1.03)
SQUAMOUS # UR AUTO: 0 /HPF — SIGNIFICANT CHANGE UP (ref 0–5)
SQUAMOUS # UR AUTO: 0 /HPF — SIGNIFICANT CHANGE UP (ref 0–5)
UROBILINOGEN FLD QL: 1 MG/DL — SIGNIFICANT CHANGE UP (ref 0.2–1)
UROBILINOGEN FLD QL: 1 MG/DL — SIGNIFICANT CHANGE UP (ref 0.2–1)
WBC # BLD: 7.44 K/UL — SIGNIFICANT CHANGE UP (ref 3.8–10.5)
WBC # BLD: 7.44 K/UL — SIGNIFICANT CHANGE UP (ref 3.8–10.5)
WBC # FLD AUTO: 7.44 K/UL — SIGNIFICANT CHANGE UP (ref 3.8–10.5)
WBC # FLD AUTO: 7.44 K/UL — SIGNIFICANT CHANGE UP (ref 3.8–10.5)
WBC UR QL: 0 /HPF — SIGNIFICANT CHANGE UP (ref 0–5)
WBC UR QL: 0 /HPF — SIGNIFICANT CHANGE UP (ref 0–5)

## 2023-11-24 PROCEDURE — 99233 SBSQ HOSP IP/OBS HIGH 50: CPT | Mod: GC

## 2023-11-24 PROCEDURE — 70450 CT HEAD/BRAIN W/O DYE: CPT | Mod: 26

## 2023-11-24 RX ORDER — HALOPERIDOL DECANOATE 100 MG/ML
0.5 INJECTION INTRAMUSCULAR THREE TIMES A DAY
Refills: 0 | Status: DISCONTINUED | OUTPATIENT
Start: 2023-11-24 | End: 2023-11-27

## 2023-11-24 RX ORDER — MIRTAZAPINE 45 MG/1
15 TABLET, ORALLY DISINTEGRATING ORAL AT BEDTIME
Refills: 0 | Status: DISCONTINUED | OUTPATIENT
Start: 2023-11-24 | End: 2023-11-27

## 2023-11-24 RX ADMIN — Medication 25 MILLIGRAM(S): at 05:34

## 2023-11-24 RX ADMIN — HALOPERIDOL DECANOATE 1 MILLIGRAM(S): 100 INJECTION INTRAMUSCULAR at 06:25

## 2023-11-24 RX ADMIN — HALOPERIDOL DECANOATE 0.5 MILLIGRAM(S): 100 INJECTION INTRAMUSCULAR at 23:38

## 2023-11-24 RX ADMIN — Medication 1 TABLET(S): at 11:44

## 2023-11-24 RX ADMIN — GABAPENTIN 600 MILLIGRAM(S): 400 CAPSULE ORAL at 23:38

## 2023-11-24 RX ADMIN — CHLORHEXIDINE GLUCONATE 1 APPLICATION(S): 213 SOLUTION TOPICAL at 12:00

## 2023-11-24 RX ADMIN — MIRTAZAPINE 15 MILLIGRAM(S): 45 TABLET, ORALLY DISINTEGRATING ORAL at 23:39

## 2023-11-24 RX ADMIN — POLYETHYLENE GLYCOL 3350 17 GRAM(S): 17 POWDER, FOR SOLUTION ORAL at 05:29

## 2023-11-24 RX ADMIN — HEPARIN SODIUM 5000 UNIT(S): 5000 INJECTION INTRAVENOUS; SUBCUTANEOUS at 06:26

## 2023-11-24 RX ADMIN — FAMOTIDINE 20 MILLIGRAM(S): 10 INJECTION INTRAVENOUS at 05:25

## 2023-11-24 RX ADMIN — SENNA PLUS 2 TABLET(S): 8.6 TABLET ORAL at 23:39

## 2023-11-24 RX ADMIN — HEPARIN SODIUM 5000 UNIT(S): 5000 INJECTION INTRAVENOUS; SUBCUTANEOUS at 18:22

## 2023-11-24 RX ADMIN — Medication 81 MILLIGRAM(S): at 11:44

## 2023-11-24 NOTE — CHART NOTE - NSCHARTNOTEFT_GEN_A_CORE
Source: Patient [ ]    Family [ ]     Other (RN, Chart Review) [X]    Patient is seen for follow-up nutrition assessment for severe malnutrition.    Medical Course: Per chart review, patient is an 89y Female with PMH vascular dementia, bipolar I, cerebrovascular accident, diverticulosis, and GERD presenting with abdominal pain and decompensating mental status with agitation.    Nutrition Course: Patient noted to be documented as disoriented per RN flowsheet. Patient seen at bedside this AM by writer, asleep in bed, unarousable despite multiple verbal attempts. Noted SLP evaluation (11/20), recommendations made for pureed food textures and thin liquids. Patient is currently ordered for a PO diet in alignment with SLP recommendations and Hormel Shake BID supplementation. Documented on RN flowsheet as consuming % of meals. As per verbal discussion with RN, patient observed with fair-good PO intake, consuming approximately 60% of meals. No note of any chewing or swallowing difficulty on current diet order. No report of GI distress (nausea, vomiting, diarrhea, constipation). Noted provision of lactobacillus acidophilus per review of medication list.     Diet : Diet, Pureed (11-18-23 @ 18:07)    Anthropometrics  Height: 154.9cm/61inches  Weights per RN flowsheets: 37.1kg (11/22), 37.5kg (11/18)  BMI: 15.42kg/m^2  % Weight Change: -0.4kg (1%) x4 days period of time    Pertinent Medications: MEDICATIONS  (STANDING):  aspirin enteric coated 81 milliGRAM(s) Oral daily  chlorhexidine 2% Cloths 1 Application(s) Topical daily  famotidine    Tablet 20 milliGRAM(s) Oral two times a day  gabapentin 600 milliGRAM(s) Oral at bedtime  haloperidol     Tablet 1 milliGRAM(s) Oral three times a day  heparin   Injectable 5000 Unit(s) SubCutaneous every 12 hours  lactobacillus acidophilus 1 Tablet(s) Oral daily  metoprolol tartrate 25 milliGRAM(s) Oral two times a day  mirtazapine Soltab 15 milliGRAM(s) Oral at bedtime  polyethylene glycol 3350 17 Gram(s) Oral two times a day  senna 2 Tablet(s) Oral at bedtime    MEDICATIONS  (PRN):  acetaminophen     Tablet .. 650 milliGRAM(s) Oral every 6 hours PRN Temp greater or equal to 38C (100.4F), Mild Pain (1 - 3)  aluminum hydroxide/magnesium hydroxide/simethicone Suspension 30 milliLiter(s) Oral every 4 hours PRN Dyspepsia  benzonatate 100 milliGRAM(s) Oral three times a day PRN Cough  bisacodyl Suppository 10 milliGRAM(s) Rectal daily PRN Constipation  guaiFENesin Oral Liquid (Sugar-Free) 100 milliGRAM(s) Oral every 6 hours PRN Cough  melatonin 3 milliGRAM(s) Oral at bedtime PRN Insomnia    Pertinent Labs:  11-22 Na141 mmol/L Glu 90 mg/dL K+ 4.0 mmol/L Cr  0.49 mg/dL<L> BUN 14 mg/dL 11-20 Phos 3.3 mg/dL 11-17 Alb 3.2 g/dL<L>    Skin: MASD (sacral/gluteal), incontinence associated dermatitis (perineum), right hip stage I pressure injury per RN flowsheet    Fluid: No edema documented per RN flowsheets     GI: Last BM 11/23/23 per RN flowsheet documentation. Noted to be on a bowel regimen.     Estimated Needs:   [X] no change since previous assessment  Weight Used: Dosing weight 82.6lb/37.5kg  Estimated energy needs: 1125-1312kcal (based on 30-35kcal/kg)  Estimated protein needs: 57.5-67.5gms (based on 1.4-1.8gms/kg)    Previous Nutrition Diagnosis: [X] Severe malnutrition, [X] Increased nutrient needs    Nutrition Diagnosis is [X] ongoing    New Nutrition Diagnosis: Not applicable    Education: [X] Not warranted at present secondary to patient's mental status    Nutrition Recommendations  - Continue current diet order, as tolerated: Pureed  --> Please encourage PO intake, assist with meals, provide alternatives PRN  - Continue provision of Hormel 500 Vital Shake (provides 520kcal, 22gms protein per serving) BID to supplement intake ( Food and Nutrition Department will provide )  - Monitor weights, labs, BM's, skin integrity, p.o. intake.   - Please monitor % PO intake on flowsheets   - RD remains available, please consult as needed    Monitoring and Evaluation:   [X] PO intake [X] Tolerance to diet prescription [X] weights [X] follow up per protocol    Yuliana Choe MS RDN CDN  On [employer] Teams or Pager #[phone]

## 2023-11-24 NOTE — PROVIDER CONTACT NOTE (OTHER) - ACTION/TREATMENT ORDERED:
MD notified and made aware. No new interventions ordered at this time.
MD said to hold haldol at this time, POC ongoing

## 2023-11-24 NOTE — PROGRESS NOTE ADULT - SUBJECTIVE AND OBJECTIVE BOX
*******************************  Ana Gilman MD (PGY-1)  Internal Medicine  Contact via [employer] TEAMS  *******************************    INTERVAL HPI/OVERNIGHT EVENTS:      OBJECTIVE  T(C): 36.8 (11-24-23 @ 06:56), Max: 36.8 (11-24-23 @ 05:57)  HR: 72 (11-24-23 @ 06:56) (70 - 92)  BP: 116/68 (11-24-23 @ 06:56) (108/62 - 154/70)  RR: 17 (11-24-23 @ 06:56) (17 - 18)  SpO2: 95% (11-24-23 @ 06:56) (95% - 100%)    11-23-23 @ 07:01  -  11-24-23 @ 07:00  --------------------------------------------------------  IN: 900 mL / OUT: 500 mL / NET: 400 mL        PHYSICAL EXAM  General:  HEENT:  Cardiovascular:  Pulmonary:  GI:  :  Neuro:  Psych:          IMAGING:

## 2023-11-24 NOTE — RAPID RESPONSE TEAM SUMMARY - NSSITUATIONBACKGROUNDRRT_GEN_ALL_CORE
yo woman with a history of vascular dementia, bipolar I, CVA, diverticulosis, and GERD presenting with abdominal pain and decompensating mental status with agitation/delirium    RRT called for lethargy. Rectal T 99.9. Rest of VS unremarkable. Pt only responding to painful stimuli. Protecting airway. Per primary team, pt with worsening mental status throughout the day. Pt was more awake earlier today. Last received haldol 6am. Pt has not received any sedating medications or opioids. No home medications being held.     Prior to RRT, team sent UA and UCx. Blood Cx obtained along with VBG as pt with hx of COPD. CTH ordered to r/o acute intracranial pathology. Pt was taken to CTH with primary team and KRISTINA. CTH without any acute findings.  Given patient stability, decision was made to end the RRT in collaboration with primary team, KRISTINA, and the nursing staff. All were in agreement and RRT was ended.     To do:  [] Follow-up VBG for hypercapnia, initiate tx as appropriate  [] F/u blood cx  [] If febrile, would start abx empirically  [] Precautions to avoid hospital acquired delirium, which can be hypoactive

## 2023-11-24 NOTE — PROGRESS NOTE ADULT - NSGCTIMESPENTATTENDRES_GEN_A_CORE
Make sure you take all your prescribed medications tonight after having missed your medications for the past few days.  If your symptoms continue, please follow-up with your family doctor or return to the ED for further evaluation.  
30

## 2023-11-24 NOTE — PROGRESS NOTE ADULT - PROBLEM SELECTOR PLAN 2
Hx of H Pylori s/p treatment   CT a/p - moderate distended fecal filled rectum with wall thickening and perirectal fat stranding concerning for stercoral colitis.  - bowel regimen: senna, miralax

## 2023-11-24 NOTE — PROGRESS NOTE ADULT - CONVERSATION DETAILS
Discussed MOLST with pt's daughter. Discussed aggressive measures such as CPR; apprised daughter that in a pt who is frail and bedbound, CPR does not improve the outcome and serves only to prolong suffering and pain. Daughter then stated she would want pt to be DNR. She is not sure, however, about intubation and states she needs more time to think about this.

## 2023-11-25 LAB
ANION GAP SERPL CALC-SCNC: 8 MMOL/L — SIGNIFICANT CHANGE UP (ref 7–14)
ANION GAP SERPL CALC-SCNC: 8 MMOL/L — SIGNIFICANT CHANGE UP (ref 7–14)
APPEARANCE UR: ABNORMAL
APPEARANCE UR: ABNORMAL
BACTERIA # UR AUTO: NEGATIVE /HPF — SIGNIFICANT CHANGE UP
BACTERIA # UR AUTO: NEGATIVE /HPF — SIGNIFICANT CHANGE UP
BASE EXCESS BLDV CALC-SCNC: 7.8 MMOL/L — HIGH (ref -2–3)
BASE EXCESS BLDV CALC-SCNC: 7.8 MMOL/L — HIGH (ref -2–3)
BILIRUB UR-MCNC: NEGATIVE — SIGNIFICANT CHANGE UP
BILIRUB UR-MCNC: NEGATIVE — SIGNIFICANT CHANGE UP
BUN SERPL-MCNC: 22 MG/DL — SIGNIFICANT CHANGE UP (ref 7–23)
BUN SERPL-MCNC: 22 MG/DL — SIGNIFICANT CHANGE UP (ref 7–23)
CA-I SERPL-SCNC: 1.31 MMOL/L — SIGNIFICANT CHANGE UP (ref 1.15–1.33)
CA-I SERPL-SCNC: 1.31 MMOL/L — SIGNIFICANT CHANGE UP (ref 1.15–1.33)
CALCIUM SERPL-MCNC: 9.7 MG/DL — SIGNIFICANT CHANGE UP (ref 8.4–10.5)
CALCIUM SERPL-MCNC: 9.7 MG/DL — SIGNIFICANT CHANGE UP (ref 8.4–10.5)
CAST: 4 /LPF — SIGNIFICANT CHANGE UP (ref 0–4)
CAST: 4 /LPF — SIGNIFICANT CHANGE UP (ref 0–4)
CHLORIDE BLDV-SCNC: 104 MMOL/L — SIGNIFICANT CHANGE UP (ref 96–108)
CHLORIDE BLDV-SCNC: 104 MMOL/L — SIGNIFICANT CHANGE UP (ref 96–108)
CHLORIDE SERPL-SCNC: 103 MMOL/L — SIGNIFICANT CHANGE UP (ref 98–107)
CHLORIDE SERPL-SCNC: 103 MMOL/L — SIGNIFICANT CHANGE UP (ref 98–107)
CO2 BLDV-SCNC: 36.5 MMOL/L — HIGH (ref 22–26)
CO2 BLDV-SCNC: 36.5 MMOL/L — HIGH (ref 22–26)
CO2 SERPL-SCNC: 31 MMOL/L — SIGNIFICANT CHANGE UP (ref 22–31)
CO2 SERPL-SCNC: 31 MMOL/L — SIGNIFICANT CHANGE UP (ref 22–31)
COD CRY URNS QL: PRESENT
COD CRY URNS QL: PRESENT
COLOR SPEC: SIGNIFICANT CHANGE UP
COLOR SPEC: SIGNIFICANT CHANGE UP
CREAT SERPL-MCNC: 0.48 MG/DL — LOW (ref 0.5–1.3)
CREAT SERPL-MCNC: 0.48 MG/DL — LOW (ref 0.5–1.3)
CULTURE RESULTS: SIGNIFICANT CHANGE UP
CULTURE RESULTS: SIGNIFICANT CHANGE UP
DIFF PNL FLD: NEGATIVE — SIGNIFICANT CHANGE UP
DIFF PNL FLD: NEGATIVE — SIGNIFICANT CHANGE UP
EGFR: 90 ML/MIN/1.73M2 — SIGNIFICANT CHANGE UP
EGFR: 90 ML/MIN/1.73M2 — SIGNIFICANT CHANGE UP
GAS PNL BLDV: 139 MMOL/L — SIGNIFICANT CHANGE UP (ref 136–145)
GAS PNL BLDV: 139 MMOL/L — SIGNIFICANT CHANGE UP (ref 136–145)
GAS PNL BLDV: SIGNIFICANT CHANGE UP
GAS PNL BLDV: SIGNIFICANT CHANGE UP
GLUCOSE BLDV-MCNC: 91 MG/DL — SIGNIFICANT CHANGE UP (ref 70–99)
GLUCOSE BLDV-MCNC: 91 MG/DL — SIGNIFICANT CHANGE UP (ref 70–99)
GLUCOSE SERPL-MCNC: 90 MG/DL — SIGNIFICANT CHANGE UP (ref 70–99)
GLUCOSE SERPL-MCNC: 90 MG/DL — SIGNIFICANT CHANGE UP (ref 70–99)
GLUCOSE UR QL: NEGATIVE MG/DL — SIGNIFICANT CHANGE UP
GLUCOSE UR QL: NEGATIVE MG/DL — SIGNIFICANT CHANGE UP
HCO3 BLDV-SCNC: 35 MMOL/L — HIGH (ref 22–29)
HCO3 BLDV-SCNC: 35 MMOL/L — HIGH (ref 22–29)
HCT VFR BLD CALC: 33.9 % — LOW (ref 34.5–45)
HCT VFR BLD CALC: 33.9 % — LOW (ref 34.5–45)
HCT VFR BLDA CALC: 33 % — LOW (ref 34.5–46.5)
HCT VFR BLDA CALC: 33 % — LOW (ref 34.5–46.5)
HGB BLD CALC-MCNC: 11 G/DL — LOW (ref 11.7–16.1)
HGB BLD CALC-MCNC: 11 G/DL — LOW (ref 11.7–16.1)
HGB BLD-MCNC: 10.8 G/DL — LOW (ref 11.5–15.5)
HGB BLD-MCNC: 10.8 G/DL — LOW (ref 11.5–15.5)
KETONES UR-MCNC: NEGATIVE MG/DL — SIGNIFICANT CHANGE UP
KETONES UR-MCNC: NEGATIVE MG/DL — SIGNIFICANT CHANGE UP
LACTATE BLDV-MCNC: 1.7 MMOL/L — SIGNIFICANT CHANGE UP (ref 0.5–2)
LACTATE BLDV-MCNC: 1.7 MMOL/L — SIGNIFICANT CHANGE UP (ref 0.5–2)
LEUKOCYTE ESTERASE UR-ACNC: ABNORMAL
LEUKOCYTE ESTERASE UR-ACNC: ABNORMAL
MAGNESIUM SERPL-MCNC: 2 MG/DL — SIGNIFICANT CHANGE UP (ref 1.6–2.6)
MAGNESIUM SERPL-MCNC: 2 MG/DL — SIGNIFICANT CHANGE UP (ref 1.6–2.6)
MCHC RBC-ENTMCNC: 28.3 PG — SIGNIFICANT CHANGE UP (ref 27–34)
MCHC RBC-ENTMCNC: 28.3 PG — SIGNIFICANT CHANGE UP (ref 27–34)
MCHC RBC-ENTMCNC: 31.9 GM/DL — LOW (ref 32–36)
MCHC RBC-ENTMCNC: 31.9 GM/DL — LOW (ref 32–36)
MCV RBC AUTO: 89 FL — SIGNIFICANT CHANGE UP (ref 80–100)
MCV RBC AUTO: 89 FL — SIGNIFICANT CHANGE UP (ref 80–100)
MUCOUS THREADS # UR AUTO: PRESENT
MUCOUS THREADS # UR AUTO: PRESENT
NITRITE UR-MCNC: NEGATIVE — SIGNIFICANT CHANGE UP
NITRITE UR-MCNC: NEGATIVE — SIGNIFICANT CHANGE UP
NRBC # BLD: 0 /100 WBCS — SIGNIFICANT CHANGE UP (ref 0–0)
NRBC # BLD: 0 /100 WBCS — SIGNIFICANT CHANGE UP (ref 0–0)
NRBC # FLD: 0 K/UL — SIGNIFICANT CHANGE UP (ref 0–0)
NRBC # FLD: 0 K/UL — SIGNIFICANT CHANGE UP (ref 0–0)
PCO2 BLDV: 60 MMHG — HIGH (ref 39–52)
PCO2 BLDV: 60 MMHG — HIGH (ref 39–52)
PH BLDV: 7.37 — SIGNIFICANT CHANGE UP (ref 7.32–7.43)
PH BLDV: 7.37 — SIGNIFICANT CHANGE UP (ref 7.32–7.43)
PH UR: 6.5 — SIGNIFICANT CHANGE UP (ref 5–8)
PH UR: 6.5 — SIGNIFICANT CHANGE UP (ref 5–8)
PHOSPHATE SERPL-MCNC: 3.1 MG/DL — SIGNIFICANT CHANGE UP (ref 2.5–4.5)
PHOSPHATE SERPL-MCNC: 3.1 MG/DL — SIGNIFICANT CHANGE UP (ref 2.5–4.5)
PLATELET # BLD AUTO: 268 K/UL — SIGNIFICANT CHANGE UP (ref 150–400)
PLATELET # BLD AUTO: 268 K/UL — SIGNIFICANT CHANGE UP (ref 150–400)
PO2 BLDV: 48 MMHG — HIGH (ref 25–45)
PO2 BLDV: 48 MMHG — HIGH (ref 25–45)
POTASSIUM BLDV-SCNC: 3.9 MMOL/L — SIGNIFICANT CHANGE UP (ref 3.5–5.1)
POTASSIUM BLDV-SCNC: 3.9 MMOL/L — SIGNIFICANT CHANGE UP (ref 3.5–5.1)
POTASSIUM SERPL-MCNC: 3.9 MMOL/L — SIGNIFICANT CHANGE UP (ref 3.5–5.3)
POTASSIUM SERPL-MCNC: 3.9 MMOL/L — SIGNIFICANT CHANGE UP (ref 3.5–5.3)
POTASSIUM SERPL-SCNC: 3.9 MMOL/L — SIGNIFICANT CHANGE UP (ref 3.5–5.3)
POTASSIUM SERPL-SCNC: 3.9 MMOL/L — SIGNIFICANT CHANGE UP (ref 3.5–5.3)
PROT UR-MCNC: SIGNIFICANT CHANGE UP MG/DL
PROT UR-MCNC: SIGNIFICANT CHANGE UP MG/DL
RBC # BLD: 3.81 M/UL — SIGNIFICANT CHANGE UP (ref 3.8–5.2)
RBC # BLD: 3.81 M/UL — SIGNIFICANT CHANGE UP (ref 3.8–5.2)
RBC # FLD: 15.1 % — HIGH (ref 10.3–14.5)
RBC # FLD: 15.1 % — HIGH (ref 10.3–14.5)
RBC CASTS # UR COMP ASSIST: 3 /HPF — SIGNIFICANT CHANGE UP (ref 0–4)
RBC CASTS # UR COMP ASSIST: 3 /HPF — SIGNIFICANT CHANGE UP (ref 0–4)
REVIEW: SIGNIFICANT CHANGE UP
REVIEW: SIGNIFICANT CHANGE UP
SAO2 % BLDV: 76.4 % — SIGNIFICANT CHANGE UP (ref 67–88)
SAO2 % BLDV: 76.4 % — SIGNIFICANT CHANGE UP (ref 67–88)
SODIUM SERPL-SCNC: 142 MMOL/L — SIGNIFICANT CHANGE UP (ref 135–145)
SODIUM SERPL-SCNC: 142 MMOL/L — SIGNIFICANT CHANGE UP (ref 135–145)
SP GR SPEC: 1.02 — SIGNIFICANT CHANGE UP (ref 1–1.03)
SP GR SPEC: 1.02 — SIGNIFICANT CHANGE UP (ref 1–1.03)
SPECIMEN SOURCE: SIGNIFICANT CHANGE UP
SPECIMEN SOURCE: SIGNIFICANT CHANGE UP
SQUAMOUS # UR AUTO: 2 /HPF — SIGNIFICANT CHANGE UP (ref 0–5)
SQUAMOUS # UR AUTO: 2 /HPF — SIGNIFICANT CHANGE UP (ref 0–5)
UROBILINOGEN FLD QL: 1 MG/DL — SIGNIFICANT CHANGE UP (ref 0.2–1)
UROBILINOGEN FLD QL: 1 MG/DL — SIGNIFICANT CHANGE UP (ref 0.2–1)
WBC # BLD: 6.67 K/UL — SIGNIFICANT CHANGE UP (ref 3.8–10.5)
WBC # BLD: 6.67 K/UL — SIGNIFICANT CHANGE UP (ref 3.8–10.5)
WBC # FLD AUTO: 6.67 K/UL — SIGNIFICANT CHANGE UP (ref 3.8–10.5)
WBC # FLD AUTO: 6.67 K/UL — SIGNIFICANT CHANGE UP (ref 3.8–10.5)
WBC UR QL: 3 /HPF — SIGNIFICANT CHANGE UP (ref 0–5)
WBC UR QL: 3 /HPF — SIGNIFICANT CHANGE UP (ref 0–5)

## 2023-11-25 PROCEDURE — 99232 SBSQ HOSP IP/OBS MODERATE 35: CPT | Mod: GC

## 2023-11-25 RX ORDER — SODIUM CHLORIDE 9 MG/ML
1000 INJECTION, SOLUTION INTRAVENOUS
Refills: 0 | Status: COMPLETED | OUTPATIENT
Start: 2023-11-25 | End: 2023-11-26

## 2023-11-25 RX ORDER — THIAMINE MONONITRATE (VIT B1) 100 MG
500 TABLET ORAL DAILY
Refills: 0 | Status: DISCONTINUED | OUTPATIENT
Start: 2023-11-25 | End: 2023-11-27

## 2023-11-25 RX ADMIN — Medication 1 TABLET(S): at 14:38

## 2023-11-25 RX ADMIN — SODIUM CHLORIDE 75 MILLILITER(S): 9 INJECTION, SOLUTION INTRAVENOUS at 14:36

## 2023-11-25 RX ADMIN — FAMOTIDINE 20 MILLIGRAM(S): 10 INJECTION INTRAVENOUS at 17:33

## 2023-11-25 RX ADMIN — Medication 105 MILLIGRAM(S): at 14:36

## 2023-11-25 RX ADMIN — POLYETHYLENE GLYCOL 3350 17 GRAM(S): 17 POWDER, FOR SOLUTION ORAL at 17:33

## 2023-11-25 RX ADMIN — Medication 25 MILLIGRAM(S): at 07:26

## 2023-11-25 RX ADMIN — POLYETHYLENE GLYCOL 3350 17 GRAM(S): 17 POWDER, FOR SOLUTION ORAL at 07:26

## 2023-11-25 RX ADMIN — SENNA PLUS 2 TABLET(S): 8.6 TABLET ORAL at 22:09

## 2023-11-25 RX ADMIN — Medication 25 MILLIGRAM(S): at 17:32

## 2023-11-25 RX ADMIN — GABAPENTIN 600 MILLIGRAM(S): 400 CAPSULE ORAL at 22:09

## 2023-11-25 RX ADMIN — HALOPERIDOL DECANOATE 0.5 MILLIGRAM(S): 100 INJECTION INTRAMUSCULAR at 07:26

## 2023-11-25 RX ADMIN — HALOPERIDOL DECANOATE 0.5 MILLIGRAM(S): 100 INJECTION INTRAMUSCULAR at 22:10

## 2023-11-25 RX ADMIN — HALOPERIDOL DECANOATE 0.5 MILLIGRAM(S): 100 INJECTION INTRAMUSCULAR at 14:39

## 2023-11-25 RX ADMIN — FAMOTIDINE 20 MILLIGRAM(S): 10 INJECTION INTRAVENOUS at 07:25

## 2023-11-25 RX ADMIN — MIRTAZAPINE 15 MILLIGRAM(S): 45 TABLET, ORALLY DISINTEGRATING ORAL at 22:10

## 2023-11-25 RX ADMIN — CHLORHEXIDINE GLUCONATE 1 APPLICATION(S): 213 SOLUTION TOPICAL at 14:39

## 2023-11-25 RX ADMIN — HEPARIN SODIUM 5000 UNIT(S): 5000 INJECTION INTRAVENOUS; SUBCUTANEOUS at 17:33

## 2023-11-25 RX ADMIN — Medication 81 MILLIGRAM(S): at 14:39

## 2023-11-25 NOTE — PROGRESS NOTE ADULT - SUBJECTIVE AND OBJECTIVE BOX
America Kwon MD   PGY3 Internal Medicine       SUBJECTIVE / OVERNIGHT EVENTS:    RRT called for lethargy - w/ rectal temp 99.9, only responding to painful stimuli, off haldol since 6am, no sedating meds or opioids. VBG w/ mild hypercapnia - attributed to hypoactive delirium.   Patient seen and evaluated at bedside. HD stable. Unable to assess ROS.       MEDICATIONS  (STANDING):  aspirin enteric coated 81 milliGRAM(s) Oral daily  chlorhexidine 2% Cloths 1 Application(s) Topical daily  famotidine    Tablet 20 milliGRAM(s) Oral two times a day  gabapentin 600 milliGRAM(s) Oral at bedtime  haloperidol     Tablet 0.5 milliGRAM(s) Oral three times a day  heparin   Injectable 5000 Unit(s) SubCutaneous every 12 hours  lactobacillus acidophilus 1 Tablet(s) Oral daily  metoprolol tartrate 25 milliGRAM(s) Oral two times a day  mirtazapine Soltab 15 milliGRAM(s) Oral at bedtime  polyethylene glycol 3350 17 Gram(s) Oral two times a day  senna 2 Tablet(s) Oral at bedtime    MEDICATIONS  (PRN):  acetaminophen     Tablet .. 650 milliGRAM(s) Oral every 6 hours PRN Temp greater or equal to 38C (100.4F), Mild Pain (1 - 3)  aluminum hydroxide/magnesium hydroxide/simethicone Suspension 30 milliLiter(s) Oral every 4 hours PRN Dyspepsia  benzonatate 100 milliGRAM(s) Oral three times a day PRN Cough  bisacodyl Suppository 10 milliGRAM(s) Rectal daily PRN Constipation  guaiFENesin Oral Liquid (Sugar-Free) 100 milliGRAM(s) Oral every 6 hours PRN Cough  melatonin 3 milliGRAM(s) Oral at bedtime PRN Insomnia      CAPILLARY BLOOD GLUCOSE      POCT Blood Glucose.: 93 mg/dL (24 Nov 2023 16:51)    I&O's Summary    24 Nov 2023 07:01  -  25 Nov 2023 07:00  --------------------------------------------------------  IN: 300 mL / OUT: 0 mL / NET: 300 mL        PHYSICAL EXAM:  Vital Signs Last 24 Hrs  T(C): 36.8 (25 Nov 2023 07:21), Max: 36.8 (25 Nov 2023 07:21)  T(F): 98.2 (25 Nov 2023 07:21), Max: 98.2 (25 Nov 2023 07:21)  HR: 64 (25 Nov 2023 07:21) (64 - 72)  BP: 137/73 (25 Nov 2023 07:21) (120/77 - 141/70)  BP(mean): --  RR: 18 (25 Nov 2023 07:21) (17 - 18)  SpO2: 97% (25 Nov 2023 07:21) (95% - 97%)    Parameters below as of 25 Nov 2023 07:21  Patient On (Oxygen Delivery Method): room air        ____________________  PHYSICAL EXAM:  GENERAL: NAD, lying in bed comfortably  HEAD:  Atraumatic, normocephalic  EYES: EOMI, PERRLA, conjunctiva and sclera clear  NECK: Supple, trachea midline, no JVD  HEART: Regular rate and rhythm, no murmurs, rubs, or gallops  LUNGS: Unlabored respirations.  Clear to auscultation bilaterally, no crackles, wheezing, or rhonchi  ABDOMEN: Soft, nontender, nondistended, +BS  EXTREMITIES: 2+ peripheral pulses bilaterally. No clubbing, cyanosis, or edema  NERVOUS SYSTEM:  A&Ox0, moving all extremities, no focal deficits   SKIN: No rashes or lesions    LABS:                        10.8   6.67  )-----------( 268      ( 25 Nov 2023 05:00 )             33.9     11-25    142  |  103  |  22  ----------------------------<  90  3.9   |  31  |  0.48<L>    Ca    9.7      25 Nov 2023 05:00  Phos  3.1     11-25  Mg     2.00     11-25    TPro  6.2  /  Alb  3.1<L>  /  TBili  0.9  /  DBili  x   /  AST  25  /  ALT  18  /  AlkPhos  101  11-24          Urinalysis Basic - ( 25 Nov 2023 05:00 )    Color: x / Appearance: x / SG: x / pH: x  Gluc: 90 mg/dL / Ketone: x  / Bili: x / Urobili: x   Blood: x / Protein: x / Nitrite: x   Leuk Esterase: x / RBC: x / WBC x   Sq Epi: x / Non Sq Epi: x / Bacteria: x              *******************************************************************************  *******************************************************************************  *******************************************************************************  *******************************************************************************  *******************************************************************************  CAPILLARY BLOOD GLUCOSE      POCT Blood Glucose.: 93 mg/dL (24 Nov 2023 16:51)      aspirin enteric coated   81 milliGRAM(s) Oral (11-24 @ 11:44)    chlorhexidine 2% Cloths   1 Application(s) Topical (11-24 @ 12:00)    famotidine    Tablet   20 milliGRAM(s) Oral (11-25 @ 07:25)    gabapentin   600 milliGRAM(s) Oral (11-24 @ 23:38)    haloperidol     Tablet   0.5 milliGRAM(s) Oral (11-24 @ 23:38)   0.5 milliGRAM(s) Oral (11-25 @ 07:26)    heparin   Injectable   5000 Unit(s) SubCutaneous (11-24 @ 18:22)   5000 Unit(s) SubCutaneous (11-25 @ 07:26)    lactobacillus acidophilus   1 Tablet(s) Oral (11-24 @ 11:44)    metoprolol tartrate   25 milliGRAM(s) Oral (11-25 @ 07:26)    mirtazapine Soltab   15 milliGRAM(s) Oral (11-24 @ 23:39)    polyethylene glycol 3350   17 Gram(s) Oral (11-25 @ 07:26)    senna   2 Tablet(s) Oral (11-24 @ 23:39)      Vital Signs Last 24 Hrs  T(C): 36.8 (25 Nov 2023 07:21), Max: 36.8 (25 Nov 2023 07:21)  T(F): 98.2 (25 Nov 2023 07:21), Max: 98.2 (25 Nov 2023 07:21)  HR: 64 (25 Nov 2023 07:21) (64 - 72)  BP: 137/73 (25 Nov 2023 07:21) (120/77 - 141/70)  BP(mean): --  RR: 18 (25 Nov 2023 07:21) (17 - 18)  SpO2: 97% (25 Nov 2023 07:21) (95% - 97%)    Parameters below as of 25 Nov 2023 07:21  Patient On (Oxygen Delivery Method): room air        11-24-23 @ 07:01  -  11-25-23 @ 07:00  --------------------------------------------------------  IN:    Oral Fluid: 300 mL  Total IN: 300 mL    OUT:  Total OUT: 0 mL    Total NET: 300 mL

## 2023-11-25 NOTE — PROGRESS NOTE ADULT - PROBLEM SELECTOR PLAN 4
Diet: Pureed  DVT proph: lovenox   Dispo: pending - waiting to obtain Medicaid so patient can go to LTC  GOC: DNR/trial of intubation

## 2023-11-26 DIAGNOSIS — J44.9 CHRONIC OBSTRUCTIVE PULMONARY DISEASE, UNSPECIFIED: ICD-10-CM

## 2023-11-26 LAB
ANION GAP SERPL CALC-SCNC: 6 MMOL/L — LOW (ref 7–14)
ANION GAP SERPL CALC-SCNC: 6 MMOL/L — LOW (ref 7–14)
BUN SERPL-MCNC: 20 MG/DL — SIGNIFICANT CHANGE UP (ref 7–23)
BUN SERPL-MCNC: 20 MG/DL — SIGNIFICANT CHANGE UP (ref 7–23)
CALCIUM SERPL-MCNC: 9.4 MG/DL — SIGNIFICANT CHANGE UP (ref 8.4–10.5)
CALCIUM SERPL-MCNC: 9.4 MG/DL — SIGNIFICANT CHANGE UP (ref 8.4–10.5)
CHLORIDE SERPL-SCNC: 105 MMOL/L — SIGNIFICANT CHANGE UP (ref 98–107)
CHLORIDE SERPL-SCNC: 105 MMOL/L — SIGNIFICANT CHANGE UP (ref 98–107)
CO2 SERPL-SCNC: 29 MMOL/L — SIGNIFICANT CHANGE UP (ref 22–31)
CO2 SERPL-SCNC: 29 MMOL/L — SIGNIFICANT CHANGE UP (ref 22–31)
CREAT SERPL-MCNC: 0.49 MG/DL — LOW (ref 0.5–1.3)
CREAT SERPL-MCNC: 0.49 MG/DL — LOW (ref 0.5–1.3)
CULTURE RESULTS: SIGNIFICANT CHANGE UP
CULTURE RESULTS: SIGNIFICANT CHANGE UP
EGFR: 90 ML/MIN/1.73M2 — SIGNIFICANT CHANGE UP
EGFR: 90 ML/MIN/1.73M2 — SIGNIFICANT CHANGE UP
GLUCOSE BLDC GLUCOMTR-MCNC: 96 MG/DL — SIGNIFICANT CHANGE UP (ref 70–99)
GLUCOSE BLDC GLUCOMTR-MCNC: 96 MG/DL — SIGNIFICANT CHANGE UP (ref 70–99)
GLUCOSE SERPL-MCNC: 96 MG/DL — SIGNIFICANT CHANGE UP (ref 70–99)
GLUCOSE SERPL-MCNC: 96 MG/DL — SIGNIFICANT CHANGE UP (ref 70–99)
HCT VFR BLD CALC: 33.1 % — LOW (ref 34.5–45)
HCT VFR BLD CALC: 33.1 % — LOW (ref 34.5–45)
HGB BLD-MCNC: 10.3 G/DL — LOW (ref 11.5–15.5)
HGB BLD-MCNC: 10.3 G/DL — LOW (ref 11.5–15.5)
MAGNESIUM SERPL-MCNC: 1.8 MG/DL — SIGNIFICANT CHANGE UP (ref 1.6–2.6)
MAGNESIUM SERPL-MCNC: 1.8 MG/DL — SIGNIFICANT CHANGE UP (ref 1.6–2.6)
MCHC RBC-ENTMCNC: 28.3 PG — SIGNIFICANT CHANGE UP (ref 27–34)
MCHC RBC-ENTMCNC: 28.3 PG — SIGNIFICANT CHANGE UP (ref 27–34)
MCHC RBC-ENTMCNC: 31.1 GM/DL — LOW (ref 32–36)
MCHC RBC-ENTMCNC: 31.1 GM/DL — LOW (ref 32–36)
MCV RBC AUTO: 90.9 FL — SIGNIFICANT CHANGE UP (ref 80–100)
MCV RBC AUTO: 90.9 FL — SIGNIFICANT CHANGE UP (ref 80–100)
NRBC # BLD: 0 /100 WBCS — SIGNIFICANT CHANGE UP (ref 0–0)
NRBC # BLD: 0 /100 WBCS — SIGNIFICANT CHANGE UP (ref 0–0)
NRBC # FLD: 0 K/UL — SIGNIFICANT CHANGE UP (ref 0–0)
NRBC # FLD: 0 K/UL — SIGNIFICANT CHANGE UP (ref 0–0)
PHOSPHATE SERPL-MCNC: 3 MG/DL — SIGNIFICANT CHANGE UP (ref 2.5–4.5)
PHOSPHATE SERPL-MCNC: 3 MG/DL — SIGNIFICANT CHANGE UP (ref 2.5–4.5)
PLATELET # BLD AUTO: 284 K/UL — SIGNIFICANT CHANGE UP (ref 150–400)
PLATELET # BLD AUTO: 284 K/UL — SIGNIFICANT CHANGE UP (ref 150–400)
POTASSIUM SERPL-MCNC: 4.2 MMOL/L — SIGNIFICANT CHANGE UP (ref 3.5–5.3)
POTASSIUM SERPL-MCNC: 4.2 MMOL/L — SIGNIFICANT CHANGE UP (ref 3.5–5.3)
POTASSIUM SERPL-SCNC: 4.2 MMOL/L — SIGNIFICANT CHANGE UP (ref 3.5–5.3)
POTASSIUM SERPL-SCNC: 4.2 MMOL/L — SIGNIFICANT CHANGE UP (ref 3.5–5.3)
RBC # BLD: 3.64 M/UL — LOW (ref 3.8–5.2)
RBC # BLD: 3.64 M/UL — LOW (ref 3.8–5.2)
RBC # FLD: 15.2 % — HIGH (ref 10.3–14.5)
RBC # FLD: 15.2 % — HIGH (ref 10.3–14.5)
SODIUM SERPL-SCNC: 140 MMOL/L — SIGNIFICANT CHANGE UP (ref 135–145)
SODIUM SERPL-SCNC: 140 MMOL/L — SIGNIFICANT CHANGE UP (ref 135–145)
SPECIMEN SOURCE: SIGNIFICANT CHANGE UP
SPECIMEN SOURCE: SIGNIFICANT CHANGE UP
WBC # BLD: 8.55 K/UL — SIGNIFICANT CHANGE UP (ref 3.8–10.5)
WBC # BLD: 8.55 K/UL — SIGNIFICANT CHANGE UP (ref 3.8–10.5)
WBC # FLD AUTO: 8.55 K/UL — SIGNIFICANT CHANGE UP (ref 3.8–10.5)
WBC # FLD AUTO: 8.55 K/UL — SIGNIFICANT CHANGE UP (ref 3.8–10.5)

## 2023-11-26 PROCEDURE — 99232 SBSQ HOSP IP/OBS MODERATE 35: CPT | Mod: GC

## 2023-11-26 RX ORDER — IPRATROPIUM/ALBUTEROL SULFATE 18-103MCG
3 AEROSOL WITH ADAPTER (GRAM) INHALATION EVERY 6 HOURS
Refills: 0 | Status: DISCONTINUED | OUTPATIENT
Start: 2023-11-26 | End: 2023-11-27

## 2023-11-26 RX ORDER — GABAPENTIN 400 MG/1
400 CAPSULE ORAL AT BEDTIME
Refills: 0 | Status: DISCONTINUED | OUTPATIENT
Start: 2023-11-26 | End: 2023-11-27

## 2023-11-26 RX ADMIN — HEPARIN SODIUM 5000 UNIT(S): 5000 INJECTION INTRAVENOUS; SUBCUTANEOUS at 17:42

## 2023-11-26 RX ADMIN — Medication 25 MILLIGRAM(S): at 07:59

## 2023-11-26 RX ADMIN — POLYETHYLENE GLYCOL 3350 17 GRAM(S): 17 POWDER, FOR SOLUTION ORAL at 17:43

## 2023-11-26 RX ADMIN — Medication 81 MILLIGRAM(S): at 11:27

## 2023-11-26 RX ADMIN — SENNA PLUS 2 TABLET(S): 8.6 TABLET ORAL at 22:59

## 2023-11-26 RX ADMIN — GABAPENTIN 400 MILLIGRAM(S): 400 CAPSULE ORAL at 22:58

## 2023-11-26 RX ADMIN — HEPARIN SODIUM 5000 UNIT(S): 5000 INJECTION INTRAVENOUS; SUBCUTANEOUS at 08:19

## 2023-11-26 RX ADMIN — Medication 25 MILLIGRAM(S): at 17:45

## 2023-11-26 RX ADMIN — HALOPERIDOL DECANOATE 0.5 MILLIGRAM(S): 100 INJECTION INTRAMUSCULAR at 07:58

## 2023-11-26 RX ADMIN — MIRTAZAPINE 15 MILLIGRAM(S): 45 TABLET, ORALLY DISINTEGRATING ORAL at 23:03

## 2023-11-26 RX ADMIN — CHLORHEXIDINE GLUCONATE 1 APPLICATION(S): 213 SOLUTION TOPICAL at 11:25

## 2023-11-26 RX ADMIN — HALOPERIDOL DECANOATE 0.5 MILLIGRAM(S): 100 INJECTION INTRAMUSCULAR at 13:50

## 2023-11-26 RX ADMIN — FAMOTIDINE 20 MILLIGRAM(S): 10 INJECTION INTRAVENOUS at 07:59

## 2023-11-26 RX ADMIN — Medication 3 MILLILITER(S): at 10:38

## 2023-11-26 RX ADMIN — HALOPERIDOL DECANOATE 0.5 MILLIGRAM(S): 100 INJECTION INTRAMUSCULAR at 22:59

## 2023-11-26 RX ADMIN — SODIUM CHLORIDE 75 MILLILITER(S): 9 INJECTION, SOLUTION INTRAVENOUS at 00:27

## 2023-11-26 RX ADMIN — FAMOTIDINE 20 MILLIGRAM(S): 10 INJECTION INTRAVENOUS at 17:45

## 2023-11-26 RX ADMIN — Medication 1 TABLET(S): at 11:26

## 2023-11-26 RX ADMIN — Medication 105 MILLIGRAM(S): at 13:50

## 2023-11-26 RX ADMIN — SODIUM CHLORIDE 75 MILLILITER(S): 9 INJECTION, SOLUTION INTRAVENOUS at 11:27

## 2023-11-26 RX ADMIN — Medication 3 MILLILITER(S): at 15:25

## 2023-11-26 RX ADMIN — POLYETHYLENE GLYCOL 3350 17 GRAM(S): 17 POWDER, FOR SOLUTION ORAL at 07:58

## 2023-11-26 NOTE — PROGRESS NOTE ADULT - PROBLEM SELECTOR PLAN 4
Diet: Pureed  DVT proph: lovenox   Dispo: pending - waiting to obtain Medicaid so patient can go to LTC  GOC: DNR/trial of intubation Per daughter patient has hx of COPD.   - VBG pCO2 50s-60s  - on duonebs

## 2023-11-26 NOTE — DISCHARGE NOTE NURSING/CASE MANAGEMENT/SOCIAL WORK - NSDCFUADDAPPT_GEN_ALL_CORE_FT
82 y/o Male with h/o lung CA [place] [facility] [place], s/p right upper lobectomy, HTN, HPL, Chronic anemia, COPD, AAA, Hypothyroidism, CKD stage IIIa was admitted on 11/2 for increased shortness of breath, dyspnea on minimal exertion, and cough.    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  - repeat CXR suggestive of new RIGHT lung infiltrate - my suspicion is that there may be a component of aspiration pneumonia.     # anemia- likely related to myelosuppression from treatment history/pna  - s/p 1u pRBC  - Patient actively received transfusion.     # UE pain  US duplex UE negative for DVt- showed superficial thrombophlebitis involving b/l cephalic and median cubital veins    palll care  Discussed with MSK  will continue to follow  [facility]  [phone] [place] [address], [facility], First Floor, [place] [address]  [phone]  [email or web address]: [phone]

## 2023-11-26 NOTE — PROGRESS NOTE ADULT - SUBJECTIVE AND OBJECTIVE BOX
*******************************  Ana Gilman MD (PGY-1)  Internal Medicine  Contact via [employer] TEAMS  *******************************    INTERVAL HPI/OVERNIGHT EVENTS:      OBJECTIVE  T(C): 36.8 (11-26-23 @ 05:41), Max: 37 (11-25-23 @ 21:57)  HR: 61 (11-26-23 @ 05:41) (61 - 74)  BP: 123/62 (11-26-23 @ 05:41) (123/62 - 139/68)  RR: 17 (11-26-23 @ 05:41) (17 - 18)  SpO2: 96% (11-26-23 @ 05:41) (95% - 97%)    11-25-23 @ 07:01  -  11-26-23 @ 07:00  --------------------------------------------------------  IN: 900 mL / OUT: 0 mL / NET: 900 mL        PHYSICAL EXAM  General:  HEENT:  Cardiovascular:  Pulmonary:  GI:  :  Neuro:  Psych:          IMAGING:     *******************************  Ana Gilman MD (PGY-1)  Internal Medicine  Contact via [employer] TEAMS  *******************************    INTERVAL HPI/OVERNIGHT EVENTS:  No acute overnight events. This morning patient appears at baseline, answering yes/no to questions and follows commands, denies any pain or discomfort.     OBJECTIVE  T(C): 36.8 (11-26-23 @ 05:41), Max: 37 (11-25-23 @ 21:57)  HR: 61 (11-26-23 @ 05:41) (61 - 74)  BP: 123/62 (11-26-23 @ 05:41) (123/62 - 139/68)  RR: 17 (11-26-23 @ 05:41) (17 - 18)  SpO2: 96% (11-26-23 @ 05:41) (95% - 97%)    11-25-23 @ 07:01  -  11-26-23 @ 07:00  --------------------------------------------------------  IN: 900 mL / OUT: 0 mL / NET: 900 mL        PHYSICAL EXAM  GENERAL: NAD, lying in bed comfortably  HEAD:  Atraumatic, normocephalic  EYES: EOMI, PERRLA, conjunctiva and sclera clear  NECK: Supple, trachea midline, no JVD  HEART: Regular rate and rhythm, no murmurs, rubs, or gallops  LUNGS: Unlabored respirations.  Clear to auscultation bilaterally, no crackles, wheezing, or rhonchi  ABDOMEN: Soft, nontender, nondistended, +BS  EXTREMITIES: 2+ peripheral pulses bilaterally. No clubbing, cyanosis, or edema  NERVOUS SYSTEM:  A&Ox1, moving all extremities, no focal deficits   SKIN: No rashes or lesions        IMAGING:     Complex Repair Preamble Text (Leave Blank If You Do Not Want): Extensive wide undermining was performed.

## 2023-11-26 NOTE — PROGRESS NOTE ADULT - PROBLEM SELECTOR PLAN 1
vascular dementia, bipolar I, CVA  CT head with moderate severity chronic microvessel change.  No fever, leukocytosis   UA with trace leuks, trace ketones , Ucx NGTD. S/p 1 dose of ceftriaxone 1000 IV  CXR negative for aspiration or other acute pathology  - s/p haloperidol IVx2, POx1; olanzapine x1 in ED.   - Followed by outpatient psychiatrist (Dr. Billings)  - continue with home haloperidol 1 mg TID   - continue with mirtazapine 15 mg at bedtime   - continue with home gabapentin 600 mg at bedtime  - continue with melatonin 3 mg at bedtime    - continue with ASA 81 for hx of CVA/TIA ~7 years ago  - Per psych, does not require inpatient psychiatry admission vascular dementia, bipolar I, CVA  CT head with moderate severity chronic microvessel change.  No fever, leukocytosis   UA with trace leuks, trace ketones , Ucx NGTD. S/p 1 dose of ceftriaxone 1000 IV  CXR negative for aspiration or other acute pathology  - s/p haloperidol IVx2, POx1; olanzapine x1 in ED.   - Followed by outpatient psychiatrist (Dr. Billings)  - continue with home haloperidol 1 mg TID, mirtazapine 15 mg at bedtime, gabapentin 600 mg at bedtime, melatonin 3 mg at bedtime    - continue with ASA 81 for hx of CVA/TIA ~7 years ago  - Per psych, does not require inpatient psychiatry admission  - S/p RRT on 11/24 for AMS, was not responsive to voice but withdrew to painful stimuli. CT with no acute change from prior. Holding sedatives i/s/o decreased responsiveness.

## 2023-11-27 ENCOUNTER — TRANSCRIPTION ENCOUNTER (OUTPATIENT)
Age: 88
End: 2023-11-27

## 2023-11-27 VITALS — OXYGEN SATURATION: 98 %

## 2023-11-27 LAB
ANION GAP SERPL CALC-SCNC: 8 MMOL/L — SIGNIFICANT CHANGE UP (ref 7–14)
ANION GAP SERPL CALC-SCNC: 8 MMOL/L — SIGNIFICANT CHANGE UP (ref 7–14)
BUN SERPL-MCNC: 18 MG/DL — SIGNIFICANT CHANGE UP (ref 7–23)
BUN SERPL-MCNC: 18 MG/DL — SIGNIFICANT CHANGE UP (ref 7–23)
CALCIUM SERPL-MCNC: 9.3 MG/DL — SIGNIFICANT CHANGE UP (ref 8.4–10.5)
CALCIUM SERPL-MCNC: 9.3 MG/DL — SIGNIFICANT CHANGE UP (ref 8.4–10.5)
CHLORIDE SERPL-SCNC: 102 MMOL/L — SIGNIFICANT CHANGE UP (ref 98–107)
CHLORIDE SERPL-SCNC: 102 MMOL/L — SIGNIFICANT CHANGE UP (ref 98–107)
CO2 SERPL-SCNC: 30 MMOL/L — SIGNIFICANT CHANGE UP (ref 22–31)
CO2 SERPL-SCNC: 30 MMOL/L — SIGNIFICANT CHANGE UP (ref 22–31)
CREAT SERPL-MCNC: 0.51 MG/DL — SIGNIFICANT CHANGE UP (ref 0.5–1.3)
CREAT SERPL-MCNC: 0.51 MG/DL — SIGNIFICANT CHANGE UP (ref 0.5–1.3)
EGFR: 89 ML/MIN/1.73M2 — SIGNIFICANT CHANGE UP
EGFR: 89 ML/MIN/1.73M2 — SIGNIFICANT CHANGE UP
GLUCOSE SERPL-MCNC: 95 MG/DL — SIGNIFICANT CHANGE UP (ref 70–99)
GLUCOSE SERPL-MCNC: 95 MG/DL — SIGNIFICANT CHANGE UP (ref 70–99)
HCT VFR BLD CALC: 31.3 % — LOW (ref 34.5–45)
HCT VFR BLD CALC: 31.3 % — LOW (ref 34.5–45)
HGB BLD-MCNC: 9.8 G/DL — LOW (ref 11.5–15.5)
HGB BLD-MCNC: 9.8 G/DL — LOW (ref 11.5–15.5)
MAGNESIUM SERPL-MCNC: 1.7 MG/DL — SIGNIFICANT CHANGE UP (ref 1.6–2.6)
MAGNESIUM SERPL-MCNC: 1.7 MG/DL — SIGNIFICANT CHANGE UP (ref 1.6–2.6)
MCHC RBC-ENTMCNC: 28.5 PG — SIGNIFICANT CHANGE UP (ref 27–34)
MCHC RBC-ENTMCNC: 28.5 PG — SIGNIFICANT CHANGE UP (ref 27–34)
MCHC RBC-ENTMCNC: 31.3 GM/DL — LOW (ref 32–36)
MCHC RBC-ENTMCNC: 31.3 GM/DL — LOW (ref 32–36)
MCV RBC AUTO: 91 FL — SIGNIFICANT CHANGE UP (ref 80–100)
MCV RBC AUTO: 91 FL — SIGNIFICANT CHANGE UP (ref 80–100)
NRBC # BLD: 0 /100 WBCS — SIGNIFICANT CHANGE UP (ref 0–0)
NRBC # BLD: 0 /100 WBCS — SIGNIFICANT CHANGE UP (ref 0–0)
NRBC # FLD: 0 K/UL — SIGNIFICANT CHANGE UP (ref 0–0)
NRBC # FLD: 0 K/UL — SIGNIFICANT CHANGE UP (ref 0–0)
PHOSPHATE SERPL-MCNC: 3 MG/DL — SIGNIFICANT CHANGE UP (ref 2.5–4.5)
PHOSPHATE SERPL-MCNC: 3 MG/DL — SIGNIFICANT CHANGE UP (ref 2.5–4.5)
PLATELET # BLD AUTO: 308 K/UL — SIGNIFICANT CHANGE UP (ref 150–400)
PLATELET # BLD AUTO: 308 K/UL — SIGNIFICANT CHANGE UP (ref 150–400)
POTASSIUM SERPL-MCNC: 4.1 MMOL/L — SIGNIFICANT CHANGE UP (ref 3.5–5.3)
POTASSIUM SERPL-MCNC: 4.1 MMOL/L — SIGNIFICANT CHANGE UP (ref 3.5–5.3)
POTASSIUM SERPL-SCNC: 4.1 MMOL/L — SIGNIFICANT CHANGE UP (ref 3.5–5.3)
POTASSIUM SERPL-SCNC: 4.1 MMOL/L — SIGNIFICANT CHANGE UP (ref 3.5–5.3)
RBC # BLD: 3.44 M/UL — LOW (ref 3.8–5.2)
RBC # BLD: 3.44 M/UL — LOW (ref 3.8–5.2)
RBC # FLD: 15.1 % — HIGH (ref 10.3–14.5)
RBC # FLD: 15.1 % — HIGH (ref 10.3–14.5)
SODIUM SERPL-SCNC: 140 MMOL/L — SIGNIFICANT CHANGE UP (ref 135–145)
SODIUM SERPL-SCNC: 140 MMOL/L — SIGNIFICANT CHANGE UP (ref 135–145)
TSH RECEP AB FLD-ACNC: 1.21 IU/L — SIGNIFICANT CHANGE UP (ref 0–1.75)
TSH RECEP AB FLD-ACNC: 1.21 IU/L — SIGNIFICANT CHANGE UP (ref 0–1.75)
WBC # BLD: 9.78 K/UL — SIGNIFICANT CHANGE UP (ref 3.8–10.5)
WBC # BLD: 9.78 K/UL — SIGNIFICANT CHANGE UP (ref 3.8–10.5)
WBC # FLD AUTO: 9.78 K/UL — SIGNIFICANT CHANGE UP (ref 3.8–10.5)
WBC # FLD AUTO: 9.78 K/UL — SIGNIFICANT CHANGE UP (ref 3.8–10.5)

## 2023-11-27 PROCEDURE — 99239 HOSP IP/OBS DSCHRG MGMT >30: CPT

## 2023-11-27 RX ORDER — HALOPERIDOL DECANOATE 100 MG/ML
1 INJECTION INTRAMUSCULAR
Qty: 90 | Refills: 0
Start: 2023-11-27 | End: 2023-12-26

## 2023-11-27 RX ORDER — GABAPENTIN 400 MG/1
1 CAPSULE ORAL
Qty: 30 | Refills: 0
Start: 2023-11-27 | End: 2023-12-26

## 2023-11-27 RX ADMIN — Medication 105 MILLIGRAM(S): at 11:09

## 2023-11-27 RX ADMIN — Medication 3 MILLILITER(S): at 01:08

## 2023-11-27 RX ADMIN — POLYETHYLENE GLYCOL 3350 17 GRAM(S): 17 POWDER, FOR SOLUTION ORAL at 05:44

## 2023-11-27 RX ADMIN — HEPARIN SODIUM 5000 UNIT(S): 5000 INJECTION INTRAVENOUS; SUBCUTANEOUS at 05:44

## 2023-11-27 RX ADMIN — FAMOTIDINE 20 MILLIGRAM(S): 10 INJECTION INTRAVENOUS at 05:44

## 2023-11-27 RX ADMIN — HALOPERIDOL DECANOATE 0.5 MILLIGRAM(S): 100 INJECTION INTRAMUSCULAR at 13:34

## 2023-11-27 RX ADMIN — HALOPERIDOL DECANOATE 0.5 MILLIGRAM(S): 100 INJECTION INTRAMUSCULAR at 05:44

## 2023-11-27 RX ADMIN — Medication 25 MILLIGRAM(S): at 05:44

## 2023-11-27 RX ADMIN — Medication 3 MILLILITER(S): at 04:08

## 2023-11-27 RX ADMIN — Medication 3 MILLILITER(S): at 15:41

## 2023-11-27 RX ADMIN — Medication 1 TABLET(S): at 11:15

## 2023-11-27 RX ADMIN — Medication 3 MILLILITER(S): at 10:41

## 2023-11-27 RX ADMIN — Medication 81 MILLIGRAM(S): at 11:15

## 2023-11-27 RX ADMIN — CHLORHEXIDINE GLUCONATE 1 APPLICATION(S): 213 SOLUTION TOPICAL at 11:19

## 2023-11-27 NOTE — DISCHARGE NOTE NURSING/CASE MANAGEMENT/SOCIAL WORK - PATIENT PORTAL LINK FT
You can access the FollowMyHealth Patient Portal offered by [facility] by registering at the following website: [email or web address]. By joining [facility]’s FollowMyHealth portal, you will also be able to view your health information using other applications (apps) compatible with our system.

## 2023-11-27 NOTE — PROGRESS NOTE ADULT - ASSESSMENT
89yo woman with a history of vascular dementia, bipolar I, CVA, diverticulosis, and GERD presenting with abdominal pain and decompensating mental status with agitation
88yo woman with a history of vascular dementia, bipolar I, CVA, diverticulosis, and GERD presenting with abdominal pain and decompensating mental status with agitation
89yo woman with a history of vascular dementia, bipolar I, CVA, diverticulosis, and GERD presenting with abdominal pain and decompensating mental status with agitation
88yo woman with a history of vascular dementia, bipolar I, CVA, diverticulosis, and GERD presenting with abdominal pain and decompensating mental status with agitation
89yo woman with a history of vascular dementia, bipolar I, CVA, diverticulosis, and GERD presenting with abdominal pain and decompensating mental status with agitation

## 2023-11-27 NOTE — PROGRESS NOTE ADULT - REASON FOR ADMISSION
altered mentation

## 2023-11-27 NOTE — PROGRESS NOTE ADULT - PROBLEM SELECTOR PLAN 3
- continue with home famotidine 20 daily

## 2023-11-27 NOTE — PROGRESS NOTE ADULT - PROBLEM SELECTOR PROBLEM 1
AMS (altered mental status)

## 2023-11-27 NOTE — PROGRESS NOTE ADULT - SUBJECTIVE AND OBJECTIVE BOX
*******************************  Ana Gilman MD (PGY-1)  Internal Medicine  Contact via [employer] TEAMS  *******************************    INTERVAL HPI/OVERNIGHT EVENTS:      OBJECTIVE  T(C): 37 (11-27-23 @ 05:09), Max: 37 (11-27-23 @ 05:09)  HR: 88 (11-26-23 @ 21:33) (62 - 100)  BP: 112/62 (11-27-23 @ 05:09) (101/88 - 147/73)  RR: 17 (11-27-23 @ 05:09) (16 - 18)  SpO2: 97% (11-27-23 @ 05:09) (96% - 98%)    11-26-23 @ 07:01  -  11-27-23 @ 07:00  --------------------------------------------------------  IN: 900 mL / OUT: 0 mL / NET: 900 mL        PHYSICAL EXAM  General:  HEENT:  Cardiovascular:  Pulmonary:  GI:  :  Neuro:  Psych:          IMAGING:     *******************************  Ana Gilman MD (PGY-1)  Internal Medicine  Contact via [employer] TEAMS  *******************************    INTERVAL HPI/OVERNIGHT EVENTS:  No acute overnight events. Patient is answering yes/no to questions this AM, sleepy but responsive.    OBJECTIVE  T(C): 37 (11-27-23 @ 05:09), Max: 37 (11-27-23 @ 05:09)  HR: 88 (11-26-23 @ 21:33) (62 - 100)  BP: 112/62 (11-27-23 @ 05:09) (101/88 - 147/73)  RR: 17 (11-27-23 @ 05:09) (16 - 18)  SpO2: 97% (11-27-23 @ 05:09) (96% - 98%)    11-26-23 @ 07:01  -  11-27-23 @ 07:00  --------------------------------------------------------  IN: 900 mL / OUT: 0 mL / NET: 900 mL        PHYSICAL EXAM  GENERAL: NAD, lying in bed comfortably  HEAD:  Atraumatic, normocephalic  EYES: EOMI, PERRLA, conjunctiva and sclera clear  NECK: Supple, trachea midline, no JVD  HEART: Regular rate and rhythm, no murmurs, rubs, or gallops  LUNGS: Unlabored respirations.  Clear to auscultation bilaterally, no crackles, wheezing, or rhonchi  ABDOMEN: Soft, nontender, nondistended, +BS  EXTREMITIES: 2+ peripheral pulses bilaterally. No clubbing, cyanosis, or edema  NERVOUS SYSTEM:  A&Ox1, moving all extremities, no focal deficits   SKIN: No rashes or lesions          IMAGING:

## 2023-11-27 NOTE — PROGRESS NOTE ADULT - PROBLEM SELECTOR PLAN 1
vascular dementia, bipolar I, CVA  CT head with moderate severity chronic microvessel change.  No fever, leukocytosis   UA with trace leuks, trace ketones , Ucx NGTD. S/p 1 dose of ceftriaxone 1000 IV  CXR negative for aspiration or other acute pathology  - s/p haloperidol IVx2, POx1; olanzapine x1 in ED.   - Followed by outpatient psychiatrist (Dr. Billings)  - continue with home haloperidol 1 mg TID, mirtazapine 15 mg at bedtime, gabapentin 600 mg at bedtime, melatonin 3 mg at bedtime    - continue with ASA 81 for hx of CVA/TIA ~7 years ago  - Per psych, does not require inpatient psychiatry admission  - S/p RRT on 11/24 for AMS, was not responsive to voice but withdrew to painful stimuli. CT with no acute change from prior. Holding sedatives i/s/o decreased responsiveness. vascular dementia, bipolar I, CVA  CT head with moderate severity chronic microvessel change.  No fever, leukocytosis   UA with trace leuks, trace ketones , Ucx NGTD. S/p 1 dose of ceftriaxone 1000 IV  CXR negative for aspiration or other acute pathology  - s/p haloperidol IVx2, POx1; olanzapine x1 in ED.   - Followed by outpatient psychiatrist (Dr. Billings)  - home meds: haloperidol 1 mg TID, mirtazapine 15 mg at bedtime, gabapentin 600 mg at bedtime, melatonin 3 mg at bedtime    - due to sedation decr haldol to 0.5 TID, gabapentin to 400 mg bedtime  - continue with ASA 81 for hx of CVA/TIA ~7 years ago  - Per psych, does not require inpatient psychiatry admission  - S/p RRT on 11/24 for AMS, was not responsive to voice but withdrew to painful stimuli. CT with no acute change from prior. Holding sedatives i/s/o decreased responsiveness.

## 2023-11-27 NOTE — PROGRESS NOTE ADULT - ATTENDING COMMENTS
89F with vascular dementia, bipolar d/o, CVA, diverticulosis, and GERD presenting with abdominal pain and agitation. CT showed stool burden with stercoral colitis. UA neg, CXR clear lungs.     Sleepy but does rouse, denies complaints, states does not want to eat presently    # Encephalopathy   - initially agitated, delirious, upon admission in setting of stercoral colitis, then improved and pt became redirectable  - now with lethargy 11/24, labs elevated bicarb; suspect C02 retention  VBG obtained chronic rentention, no acidosis, not clear on baseline CO2 levels  - CTH neg  - UA and Ucx neg    # abd pain:   - Patient intermittently telling providers she has abdominal pain. However, has improved throughout hospitalization. Will get abdominal xray to evaluate stool burden. If unremarkable and patient continues to improver, patient can follow up outpatient with GYN for left adnexal cystic mass - . A left 4.1 x 3.5 cm adnexal cyst, previously 3.5 x 3 cm on 5/30/2020 on CT a/p     # hx of dementia, bipolar:   - Patient has not required any PRN, now calm  - psych evaluated; no further recommendations made     #GOC:   -DNR, pt's daughter is unsure about DNI. States needs more time to think.     Dispo: pending, family wants LTC as cannot pay for prior 24/7 aides, medicaid will need to be obtained if qualifies
Patient seen and examined, case d/w house staff.  89F with vascular dementia, bipolar d/o, CVA, diverticulosis, and GERD presenting with abdominal pain and agitation.   CT showed stool burden with stercoral colitis. UA neg, CXR clear lungs.    A/P:  # AMS, agitation in s/o constipation  no sign of infection, bowel regimen, smog enema if necessary  CT head/c-spine no acute path  # abd pain: bowel regimen, if persistent pain, call gyn consult for increasing left adnexal cystic mass  # hx of dementia, bipolar:  consult on Monday  PT consult
Patient seen and examined, In brief, 89F with vascular dementia, bipolar d/o, CVA, diverticulosis, and GERD presenting with abdominal pain and agitation. CT showed stool burden with stercoral colitis. UA neg, CXR clear lungs.      # AMS, agitation in s/o constipation"   -now improved  -no sign of infection, bowel regimen - patient with bowel movement on saturday and sunday    CT head/c-spine no acute path  # abd pain:   - Patient intermittently telling providers she has abdominal pain. However, has improved throughout hospitalization. Will get abdominal xray to evaluate stool burden. If unremarkable and patient continues to improver, patient can follow up outpatient with GYN for left adnexal cystic mass - . A left 4.1 x 3.5 cm adnexal cyst, previously 3.5 x 3 cm on 5/30/2020 on CT a/p     # hx of dementia, bipolar:   - Patient has not required any PRN, now calm  - psych evaluated; no further recommendations made     Dispo: PT saw pt, but unable to participate
Patient seen and examined, In brief, 89F with vascular dementia, bipolar d/o, CVA, diverticulosis, and GERD presenting with abdominal pain and agitation. CT showed stool burden with stercoral colitis. UA neg, CXR clear lungs.     Pt has remained calm. Redirectable. No PRNs required      # AMS, agitation in s/o constipation"   -now improved  -no sign of infection, bowel regimen - patient with bowel movement on Saturday and Sunday  -psychiatry evaluated; given no need for PRNs and resolution of agitation, no need for medication adjustments or inpatient psychiatric admission    CT head/c-spine no acute path  # abd pain:   - Patient intermittently telling providers she has abdominal pain. However, has improved throughout hospitalization. Will get abdominal xray to evaluate stool burden. If unremarkable and patient continues to improver, patient can follow up outpatient with GYN for left adnexal cystic mass - . A left 4.1 x 3.5 cm adnexal cyst, previously 3.5 x 3 cm on 5/30/2020 on CT a/p     # hx of dementia, bipolar:   - Patient has not required any PRN, now calm  - psych evaluated; no further recommendations made     Dispo: PT saw pt, but unable to participate. Will begin dispo planning for home. Pt has 24 h HHA at home.
Patient seen and examined, care d/w HS.    89F with vascular dementia, bipolar d/o, CVA, diverticulosis, and GERD presenting with abdominal pain and agitation. CT showed stool burden with stercoral colitis. UA neg, CXR clear lungs.     Pt has remained calm. Redirectable. No PRNs required    # AMS, agitation in s/o constipation"   - now improved  - no sign of infection, bowel regimen - patient with bowel movement on Saturday and Sunday  - psychiatry evaluated; given no need for PRNs and resolution of agitation, no need for medication adjustments or inpatient psychiatric admission--given contractures and general debility [facility] would not be appropriate     CT head/c-spine no acute path  # abd pain:   - Patient intermittently telling providers she has abdominal pain. However, has improved throughout hospitalization. Will get abdominal xray to evaluate stool burden. If unremarkable and patient continues to improver, patient can follow up outpatient with GYN for left adnexal cystic mass - . A left 4.1 x 3.5 cm adnexal cyst, previously 3.5 x 3 cm on 5/30/2020 on CT a/p     # hx of dementia, bipolar:   - Patient has not required any PRN, now calm  - psych evaluated; no further recommendations made     Dispo: PT saw pt, but unable to participate. Will begin dispo planning for home. Pt has 24 h HHA at home.   No LTC insurance
Patient seen and examined, In brief, 89F with vascular dementia, bipolar d/o, CVA, diverticulosis, and GERD presenting with abdominal pain and agitation. CT showed stool burden with stercoral colitis. UA neg, CXR clear lungs.      # AMS, agitation in s/o constipation  no sign of infection, bowel regimen - patient with bowel movement on saturday and sunday    CT head/c-spine no acute path  # abd pain:   - Patient intermittently telling providers she has abdominal pain. However, has improved throughout hospitalization. Will get abdominal xray to evaluate stool burden. If unremarkable and patient continues to improver, patient can follow up outpatient with GYN for left adnexal cystic mass - . A left 4.1 x 3.5 cm adnexal cyst, previously 3.5 x 3 cm on 5/30/2020 on CT a/p   # hx of dementia, bipolar:   - Patient has not required any PRN, now calm   Dispo: PT consult .
89F with vascular dementia, bipolar d/o, CVA, diverticulosis, and GERD presenting with abdominal pain and agitation. CT showed stool burden with stercoral colitis. UA neg, CXR clear lungs.     Pt has eyes open. Responds "no" when asked if she is in pain. Will not answer further questions. Cooperates with exam.     # Encephalopathy   - initially agitated, delirious, upon admission in setting of stercoral colitis, then improved and pt became redirectable. Subsequently became lethargic; now improved on lower doses of Haldol  - UA and Ucx neg  - haldol now 0.5 TID, lower gabapentin qhs to 400 mg    # abd pain:   - Patient intermittently telling providers she has abdominal pain. However, has improved throughout hospitalization. Will get abdominal xray to evaluate stool burden. If unremarkable and patient continues to improver, patient can follow up outpatient with GYN for left adnexal cystic mass - . A left 4.1 x 3.5 cm adnexal cyst, previously 3.5 x 3 cm on 5/30/2020 on CT a/p     # hx of dementia, bipolar:   - Patient has not required any PRN, now calm  - psych evaluated; no further recommendations made     #GOC:   -DNR, pt's daughter is unsure about DNI. States needs more time to think.     #Dispo:  -d/c to NH today. Pt is medically stable for discharge. A total of 38 minutes were spent on discharge coordination.
89F with vascular dementia, bipolar d/o, CVA, diverticulosis, and GERD presenting with abdominal pain and agitation. CT showed stool burden with stercoral colitis. UA neg, CXR clear lungs.     Pt has remained calm. Redirectable. No PRNs required    # Encephalopathy   - initially agitated, delirious, upon admission in setting of stercoral colitis, then improved and pt became redirectable  - now with lethargy, labs this afternoon shows elevated bicarb; suspect C02 retention. RRT called and VBG obtained; will follow up  - also follow up CTH to evaluate for structural causes  - f/u u/a and urine cx  - psychiatry evaluated; given no need for PRNs and resolution of agitation, no need for medication adjustments or inpatient psychiatric admission--given contractures and general debility [facility] would not be appropriate. Discussed with pt's daughter, she has expressed understanding     CT head/c-spine no acute path  # abd pain:   - Patient intermittently telling providers she has abdominal pain. However, has improved throughout hospitalization. Will get abdominal xray to evaluate stool burden. If unremarkable and patient continues to improver, patient can follow up outpatient with GYN for left adnexal cystic mass - . A left 4.1 x 3.5 cm adnexal cyst, previously 3.5 x 3 cm on 5/30/2020 on CT a/p     # hx of dementia, bipolar:   - Patient has not required any PRN, now calm  - psych evaluated; no further recommendations made     #GOC:   -DNR, pt's daughter is unsure about DNI. States needs more time to think.     Dispo: Pending w/u of lethargy.    PT saw pt, but unable to participate. Pt's daughter states she cannot afford 24 HHA anymore; does not have insurance. Will need medical approval for anticipated d/c to NH.
89F with vascular dementia, bipolar d/o, CVA, diverticulosis, and GERD presenting with abdominal pain and agitation. CT showed stool burden with stercoral colitis. UA neg, CXR clear lungs.     sleepy again but does wake up and then calls me annoying then keeps repeating the word annoying    # Encephalopathy   - initially agitated, delirious, upon admission in setting of stercoral colitis, then improved and pt became redirectable  - now with lethargy 11/24, labs elevated bicarb; suspect C02 retention  VBG obtained chronic rentention, no acidosis, not clear on baseline CO2 levels  - CTH neg  - UA and Ucx neg  - haldol now 0.5 TID, lower gabapentin qhs to 400 mg    # abd pain:   - Patient intermittently telling providers she has abdominal pain. However, has improved throughout hospitalization. Will get abdominal xray to evaluate stool burden. If unremarkable and patient continues to improver, patient can follow up outpatient with GYN for left adnexal cystic mass - . A left 4.1 x 3.5 cm adnexal cyst, previously 3.5 x 3 cm on 5/30/2020 on CT a/p     # hx of dementia, bipolar:   - Patient has not required any PRN, now calm  - psych evaluated; no further recommendations made     #GOC:   -DNR, pt's daughter is unsure about DNI. States needs more time to think.     Dispo: pending, family wants LTC as cannot pay for prior 24/7 aides, medicaid will need to be obtained if qualifies

## 2023-11-27 NOTE — PROGRESS NOTE ADULT - PROBLEM SELECTOR PLAN 5
Diet: Pureed  DVT proph: lovenox   Dispo: pending - waiting to obtain Medicaid so patient can go to LTC  GOC: DNR/trial of intubation
Diet: Pureed  DVT proph: lovenox   Dispo: pending - waiting to obtain Medicaid so patient can go to LTC  GOC: DNR/trial of intubation

## 2023-11-27 NOTE — PROGRESS NOTE ADULT - NUTRITIONAL ASSESSMENT
This patient has been assessed with a concern for Malnutrition and has been determined to have a diagnosis/diagnoses of Severe protein-calorie malnutrition and Underweight (BMI < 19).    This patient is being managed with:   Diet Pureed-  Entered: Nov 18 2023  6:07PM  
This patient has been assessed with a concern for Malnutrition and has been determined to have a diagnosis/diagnoses of Severe protein-calorie malnutrition and Underweight (BMI < 19).    This patient is being managed with:   Diet Pureed-  Entered: Nov 18 2023  6:07PM  
This patient has been assessed with a concern for Malnutrition and has been determined to have a diagnosis/diagnoses of Underweight (BMI < 19) and Severe protein-calorie malnutrition.    This patient is being managed with:   Diet Pureed-  Entered: Nov 18 2023  6:07PM  
This patient has been assessed with a concern for Malnutrition and has been determined to have a diagnosis/diagnoses of Severe protein-calorie malnutrition and Underweight (BMI < 19).    This patient is being managed with:   Diet Pureed-  Entered: Nov 18 2023  6:07PM  

## 2023-11-29 LAB
CULTURE RESULTS: SIGNIFICANT CHANGE UP
SPECIMEN SOURCE: SIGNIFICANT CHANGE UP

## 2023-12-02 ENCOUNTER — INPATIENT (INPATIENT)
Facility: HOSPITAL | Age: 88
LOS: 9 days | Discharge: NURSING, REHAB OR LONG-TERM CARE | End: 2023-12-12
Attending: HOSPITALIST | Admitting: HOSPITALIST
Payer: MEDICARE

## 2023-12-02 VITALS
HEIGHT: 61 IN | SYSTOLIC BLOOD PRESSURE: 148 MMHG | OXYGEN SATURATION: 96 % | HEART RATE: 61 BPM | TEMPERATURE: 98 F | RESPIRATION RATE: 18 BRPM | DIASTOLIC BLOOD PRESSURE: 76 MMHG

## 2023-12-02 DIAGNOSIS — Z90.89 ACQUIRED ABSENCE OF OTHER ORGANS: Chronic | ICD-10-CM

## 2023-12-02 LAB
ALBUMIN SERPL ELPH-MCNC: 3.2 G/DL — LOW (ref 3.3–5)
ALBUMIN SERPL ELPH-MCNC: 3.2 G/DL — LOW (ref 3.3–5)
ALP SERPL-CCNC: 88 U/L — SIGNIFICANT CHANGE UP (ref 40–120)
ALP SERPL-CCNC: 88 U/L — SIGNIFICANT CHANGE UP (ref 40–120)
ALT FLD-CCNC: 21 U/L — SIGNIFICANT CHANGE UP (ref 4–33)
ALT FLD-CCNC: 21 U/L — SIGNIFICANT CHANGE UP (ref 4–33)
ANION GAP SERPL CALC-SCNC: 9 MMOL/L — SIGNIFICANT CHANGE UP (ref 7–14)
ANION GAP SERPL CALC-SCNC: 9 MMOL/L — SIGNIFICANT CHANGE UP (ref 7–14)
APTT BLD: 26.5 SEC — SIGNIFICANT CHANGE UP (ref 24.5–35.6)
APTT BLD: 26.5 SEC — SIGNIFICANT CHANGE UP (ref 24.5–35.6)
AST SERPL-CCNC: 22 U/L — SIGNIFICANT CHANGE UP (ref 4–32)
AST SERPL-CCNC: 22 U/L — SIGNIFICANT CHANGE UP (ref 4–32)
BASOPHILS # BLD AUTO: 0.04 K/UL — SIGNIFICANT CHANGE UP (ref 0–0.2)
BASOPHILS # BLD AUTO: 0.04 K/UL — SIGNIFICANT CHANGE UP (ref 0–0.2)
BASOPHILS NFR BLD AUTO: 0.6 % — SIGNIFICANT CHANGE UP (ref 0–2)
BASOPHILS NFR BLD AUTO: 0.6 % — SIGNIFICANT CHANGE UP (ref 0–2)
BILIRUB SERPL-MCNC: 0.4 MG/DL — SIGNIFICANT CHANGE UP (ref 0.2–1.2)
BILIRUB SERPL-MCNC: 0.4 MG/DL — SIGNIFICANT CHANGE UP (ref 0.2–1.2)
BUN SERPL-MCNC: 16 MG/DL — SIGNIFICANT CHANGE UP (ref 7–23)
BUN SERPL-MCNC: 16 MG/DL — SIGNIFICANT CHANGE UP (ref 7–23)
CALCIUM SERPL-MCNC: 9.3 MG/DL — SIGNIFICANT CHANGE UP (ref 8.4–10.5)
CALCIUM SERPL-MCNC: 9.3 MG/DL — SIGNIFICANT CHANGE UP (ref 8.4–10.5)
CHLORIDE SERPL-SCNC: 107 MMOL/L — SIGNIFICANT CHANGE UP (ref 98–107)
CHLORIDE SERPL-SCNC: 107 MMOL/L — SIGNIFICANT CHANGE UP (ref 98–107)
CK MB BLD-MCNC: 4.6 % — HIGH (ref 0–2.5)
CK MB BLD-MCNC: 4.6 % — HIGH (ref 0–2.5)
CK MB BLD-MCNC: 4.7 % — HIGH (ref 0–2.5)
CK MB BLD-MCNC: 4.7 % — HIGH (ref 0–2.5)
CK MB CFR SERPL CALC: 4.3 NG/ML — SIGNIFICANT CHANGE UP
CK SERPL-CCNC: 91 U/L — SIGNIFICANT CHANGE UP (ref 25–170)
CK SERPL-CCNC: 91 U/L — SIGNIFICANT CHANGE UP (ref 25–170)
CK SERPL-CCNC: 94 U/L — SIGNIFICANT CHANGE UP (ref 25–170)
CK SERPL-CCNC: 94 U/L — SIGNIFICANT CHANGE UP (ref 25–170)
CO2 SERPL-SCNC: 30 MMOL/L — SIGNIFICANT CHANGE UP (ref 22–31)
CO2 SERPL-SCNC: 30 MMOL/L — SIGNIFICANT CHANGE UP (ref 22–31)
CREAT SERPL-MCNC: 0.63 MG/DL — SIGNIFICANT CHANGE UP (ref 0.5–1.3)
CREAT SERPL-MCNC: 0.63 MG/DL — SIGNIFICANT CHANGE UP (ref 0.5–1.3)
EGFR: 85 ML/MIN/1.73M2 — SIGNIFICANT CHANGE UP
EGFR: 85 ML/MIN/1.73M2 — SIGNIFICANT CHANGE UP
EOSINOPHIL # BLD AUTO: 0.06 K/UL — SIGNIFICANT CHANGE UP (ref 0–0.5)
EOSINOPHIL # BLD AUTO: 0.06 K/UL — SIGNIFICANT CHANGE UP (ref 0–0.5)
EOSINOPHIL NFR BLD AUTO: 0.9 % — SIGNIFICANT CHANGE UP (ref 0–6)
EOSINOPHIL NFR BLD AUTO: 0.9 % — SIGNIFICANT CHANGE UP (ref 0–6)
GLUCOSE SERPL-MCNC: 99 MG/DL — SIGNIFICANT CHANGE UP (ref 70–99)
GLUCOSE SERPL-MCNC: 99 MG/DL — SIGNIFICANT CHANGE UP (ref 70–99)
HCT VFR BLD CALC: 29.2 % — LOW (ref 34.5–45)
HCT VFR BLD CALC: 29.2 % — LOW (ref 34.5–45)
HGB BLD-MCNC: 9.3 G/DL — LOW (ref 11.5–15.5)
HGB BLD-MCNC: 9.3 G/DL — LOW (ref 11.5–15.5)
IANC: 3.79 K/UL — SIGNIFICANT CHANGE UP (ref 1.8–7.4)
IANC: 3.79 K/UL — SIGNIFICANT CHANGE UP (ref 1.8–7.4)
IMM GRANULOCYTES NFR BLD AUTO: 0.4 % — SIGNIFICANT CHANGE UP (ref 0–0.9)
IMM GRANULOCYTES NFR BLD AUTO: 0.4 % — SIGNIFICANT CHANGE UP (ref 0–0.9)
INR BLD: <0.9 RATIO — SIGNIFICANT CHANGE UP (ref 0.85–1.18)
INR BLD: <0.9 RATIO — SIGNIFICANT CHANGE UP (ref 0.85–1.18)
LYMPHOCYTES # BLD AUTO: 2.03 K/UL — SIGNIFICANT CHANGE UP (ref 1–3.3)
LYMPHOCYTES # BLD AUTO: 2.03 K/UL — SIGNIFICANT CHANGE UP (ref 1–3.3)
LYMPHOCYTES # BLD AUTO: 30.3 % — SIGNIFICANT CHANGE UP (ref 13–44)
LYMPHOCYTES # BLD AUTO: 30.3 % — SIGNIFICANT CHANGE UP (ref 13–44)
MCHC RBC-ENTMCNC: 28.2 PG — SIGNIFICANT CHANGE UP (ref 27–34)
MCHC RBC-ENTMCNC: 28.2 PG — SIGNIFICANT CHANGE UP (ref 27–34)
MCHC RBC-ENTMCNC: 31.8 GM/DL — LOW (ref 32–36)
MCHC RBC-ENTMCNC: 31.8 GM/DL — LOW (ref 32–36)
MCV RBC AUTO: 88.5 FL — SIGNIFICANT CHANGE UP (ref 80–100)
MCV RBC AUTO: 88.5 FL — SIGNIFICANT CHANGE UP (ref 80–100)
MONOCYTES # BLD AUTO: 0.76 K/UL — SIGNIFICANT CHANGE UP (ref 0–0.9)
MONOCYTES # BLD AUTO: 0.76 K/UL — SIGNIFICANT CHANGE UP (ref 0–0.9)
MONOCYTES NFR BLD AUTO: 11.3 % — SIGNIFICANT CHANGE UP (ref 2–14)
MONOCYTES NFR BLD AUTO: 11.3 % — SIGNIFICANT CHANGE UP (ref 2–14)
NEUTROPHILS # BLD AUTO: 3.79 K/UL — SIGNIFICANT CHANGE UP (ref 1.8–7.4)
NEUTROPHILS # BLD AUTO: 3.79 K/UL — SIGNIFICANT CHANGE UP (ref 1.8–7.4)
NEUTROPHILS NFR BLD AUTO: 56.5 % — SIGNIFICANT CHANGE UP (ref 43–77)
NEUTROPHILS NFR BLD AUTO: 56.5 % — SIGNIFICANT CHANGE UP (ref 43–77)
NRBC # BLD: 0 /100 WBCS — SIGNIFICANT CHANGE UP (ref 0–0)
NRBC # BLD: 0 /100 WBCS — SIGNIFICANT CHANGE UP (ref 0–0)
NRBC # FLD: 0 K/UL — SIGNIFICANT CHANGE UP (ref 0–0)
NRBC # FLD: 0 K/UL — SIGNIFICANT CHANGE UP (ref 0–0)
PLATELET # BLD AUTO: 380 K/UL — SIGNIFICANT CHANGE UP (ref 150–400)
PLATELET # BLD AUTO: 380 K/UL — SIGNIFICANT CHANGE UP (ref 150–400)
POTASSIUM SERPL-MCNC: 4.1 MMOL/L — SIGNIFICANT CHANGE UP (ref 3.5–5.3)
POTASSIUM SERPL-MCNC: 4.1 MMOL/L — SIGNIFICANT CHANGE UP (ref 3.5–5.3)
POTASSIUM SERPL-SCNC: 4.1 MMOL/L — SIGNIFICANT CHANGE UP (ref 3.5–5.3)
POTASSIUM SERPL-SCNC: 4.1 MMOL/L — SIGNIFICANT CHANGE UP (ref 3.5–5.3)
PROT SERPL-MCNC: 6.3 G/DL — SIGNIFICANT CHANGE UP (ref 6–8.3)
PROT SERPL-MCNC: 6.3 G/DL — SIGNIFICANT CHANGE UP (ref 6–8.3)
PROTHROM AB SERPL-ACNC: 9.7 SEC — SIGNIFICANT CHANGE UP (ref 9.5–13)
PROTHROM AB SERPL-ACNC: 9.7 SEC — SIGNIFICANT CHANGE UP (ref 9.5–13)
RBC # BLD: 3.3 M/UL — LOW (ref 3.8–5.2)
RBC # BLD: 3.3 M/UL — LOW (ref 3.8–5.2)
RBC # FLD: 15.5 % — HIGH (ref 10.3–14.5)
RBC # FLD: 15.5 % — HIGH (ref 10.3–14.5)
SODIUM SERPL-SCNC: 146 MMOL/L — HIGH (ref 135–145)
SODIUM SERPL-SCNC: 146 MMOL/L — HIGH (ref 135–145)
TROPONIN T, HIGH SENSITIVITY RESULT: 76 NG/L — CRITICAL HIGH
TROPONIN T, HIGH SENSITIVITY RESULT: 76 NG/L — CRITICAL HIGH
TROPONIN T, HIGH SENSITIVITY RESULT: 84 NG/L — CRITICAL HIGH
TROPONIN T, HIGH SENSITIVITY RESULT: 84 NG/L — CRITICAL HIGH
WBC # BLD: 6.71 K/UL — SIGNIFICANT CHANGE UP (ref 3.8–10.5)
WBC # BLD: 6.71 K/UL — SIGNIFICANT CHANGE UP (ref 3.8–10.5)
WBC # FLD AUTO: 6.71 K/UL — SIGNIFICANT CHANGE UP (ref 3.8–10.5)
WBC # FLD AUTO: 6.71 K/UL — SIGNIFICANT CHANGE UP (ref 3.8–10.5)

## 2023-12-02 PROCEDURE — 99285 EMERGENCY DEPT VISIT HI MDM: CPT

## 2023-12-02 PROCEDURE — 70450 CT HEAD/BRAIN W/O DYE: CPT | Mod: 26,MA

## 2023-12-02 PROCEDURE — 71045 X-RAY EXAM CHEST 1 VIEW: CPT | Mod: 26

## 2023-12-02 RX ORDER — SODIUM CHLORIDE 9 MG/ML
500 INJECTION INTRAMUSCULAR; INTRAVENOUS; SUBCUTANEOUS ONCE
Refills: 0 | Status: COMPLETED | OUTPATIENT
Start: 2023-12-02 | End: 2023-12-02

## 2023-12-02 RX ADMIN — SODIUM CHLORIDE 500 MILLILITER(S): 9 INJECTION INTRAMUSCULAR; INTRAVENOUS; SUBCUTANEOUS at 22:18

## 2023-12-02 NOTE — ED ADULT NURSE NOTE - OBJECTIVE STATEMENT
float rn/ pt in ct scan, code stroke called ,cancelled by md. ems reports  pt noted with change in mental status, pt with  dementia. pt yelling ,confused at present. iv access l arm 20 angio started. labs sent as ordered. daughter at bedside states pt was recently d/[facility]-  to nursing home. report to primary rn given

## 2023-12-02 NOTE — ED ADULT NURSE NOTE - NSFALLHARMRISKINTERV_ED_ALL_ED

## 2023-12-02 NOTE — STROKE CODE NOTE - DISPOSITION
Stroke code cancelled after discussion with ED provider. ED provider spoke to family and turns out last known well was at least 2 weeks ago. ED attending to obtain further infectious/toxic/metabolic workup for encephalopathy./Other

## 2023-12-02 NOTE — ED ADULT TRIAGE NOTE - CHIEF COMPLAINT QUOTE
pt from [facility] nursing home. daughter was visiting pt when she noticed a sudden change in mental status 1 hr ago. MD EMMANUEL to triage, code stroke called.

## 2023-12-02 NOTE — ED ADULT TRIAGE NOTE - BP NONINVASIVE DIASTOLIC (MM HG)
Alert-The patient is alert, awake and responds to voice. The patient is oriented to time, place, and person. The triage nurse is able to obtain subjective information.
76
Alert and oriented, no focal deficits, no motor or sensory deficits.  No nuchal rigidity

## 2023-12-02 NOTE — ED PROVIDER NOTE - PHYSICAL EXAMINATION
Gen: NAD  HEENT:  mucous membranes dry. PERRLA, EOMI  CV: RRR, +S1/S2, no M/R/G, 2+ radial pulses b/l  Resp: CTAB, no W/R/R, no accessory muscle use, no increased work of breathing  GI: Abdomen soft non-distended, NTTP  MSK: No midline spinal tenderness. Full ROM of neck.  no LE edema  Neuro: CN II-XII grossly intact. A&Ox1, following commands, moving all four extremities spontaneously  Psych: appropriate mood

## 2023-12-02 NOTE — ED PROVIDER NOTE - ATTENDING CONTRIBUTION TO CARE
Attending Statement: I have personally seen and examined this patient. I have fully participated in the care of this patient. I have reviewed all pertinent clinical information, including history physical exam, plan and the Resident's note and agree except as noted   agree rosendo MCGHEE chief complaint of  altered mental status with daughter at bedside.  Patient unable to give history.  Initially daughter stated that she was visiting her she was at her baseline and she had an acute change in mental status an hour prior to arrival.  At baseline patient ANO x 2 but "very interactive" and daughter was concerned that she was not speaking " normal".  Therefore initially I called a code stroke however when we walked back to [facility], daughter states that she had not seen her mother in 2 and half weeks because she has been at the nursing home.  And at baseline her mother is ANO x 1-2  minimally verbal and has not been out of bed since she has been at the nursing home.  And family has not even seen her that she has been at the nursing home for over 2 weeks last known normal was unclear.  EMS did not have any further information regarding patient's mental status or baseline.  Patient yelling intermittently while obtained trying to obtain an IV access.  Daughter states she has an allergy to contrast questionable anaphylaxis.  Spoke to neurology resident to hold off code stroke will proceed with CT head and medical workup.  Plan discussed with daughter.  Vital signs appreciated not tachycardic not tachypneic afebrile rectally alert opens her eyes and yells intermittently.  No sign of trauma.  Poor inspiratory effort but has no work of breathing.  Soft abdomen does not grimace to palpation.  Gait not tested.  Plan EKG, labs, urine, CT head, chest x-ray and reassess. Attending Statement: I have personally seen and examined this patient. I have fully participated in the care of this patient. I have reviewed all pertinent clinical information, including history physical exam, plan and the Resident's note and agree except as noted   agree rosendo MCGHEE chief complaint of  altered mental status with daughter at bedside.  Patient unable to give history.  Initially daughter stated that she was visiting her she was at her baseline and she had an acute change in mental status an hour prior to arrival.  At baseline patient ANO x 2 but "very interactive" and daughter was concerned that she was not speaking " normal".  Therefore initially I called a code stroke however when we walked back to [facility], daughter states that she had not seen her mother in 2 and half weeks because she has been at the nursing home.  And at baseline her mother is ANO x 1-2  minimally verbal and has not been out of bed since she has been at the nursing home.  And family has not even seen her that she has been at the nursing home for over 2 weeks last known normal was unclear.  EMS did not have any further information regarding patient's mental status or baseline.  Patient yelling intermittently while obtained trying to obtain an IV access.  Daughter states she has an allergy to contrast questionable anaphylaxis.  Spoke to neurology resident to hold off code stroke will proceed with CT head and medical workup.  Plan discussed with daughter.  Vital signs appreciated not tachycardic not tachypneic afebrile rectally alert opens her eyes and yells intermittently.  No sign of trauma.  Poor inspiratory effort but has no work of breathing.  Soft abdomen does not grimace to palpation.  Gait not tested.  Plan EKG, labs, urine, CT head, chest x-ray and reassess. Attending Statement: I have personally seen and examined this patient. I have fully participated in the care of this patient. I have reviewed all pertinent clinical information, including history physical exam, plan and the Resident's note and agree except as noted   agree rosendo MCGHEE chief complaint of  altered mental status with daughter at bedside.  Patient unable to give history.  Initially daughter stated that she was visiting her she was at her baseline and she had an acute change in mental status an hour prior to arrival.  At baseline patient ANO x 2 but "very interactive" and daughter was concerned that she was not speaking " normal".  Therefore initially I called a code stroke however when we walked back to [facility], daughter states that she had not seen her mother in 2 and half weeks because she has been at the nursing home.  And at baseline her mother is ANO x 1-2  minimally verbal and has not been out of bed since she has been at the nursing home.  And family has not even seen her that she has been at the nursing home for over 2 weeks last known normal was unclear.  EMS did not have any further information regarding patient's mental status or baseline.  Patient yelling intermittently while obtained trying to obtain an IV access.  Daughter states she has an allergy to contrast questionable anaphylaxis.  Spoke to neurology resident to hold off code stroke will proceed with CT head and medical workup.  Plan discussed with daughter.  Vital signs appreciated not tachycardic not tachypneic afebrile rectally alert opens her eyes and yells intermittently.  No sign of trauma.  Poor inspiratory effort but has no work of breathing.  Soft abdomen does not grimace to palpation.  Gait not tested.  Plan EKG, labs, urine, CT head, chest x-ray and reassess.

## 2023-12-02 NOTE — ED PROVIDER NOTE - PROGRESS NOTE DETAILS
Patient calm down.  Daughter at bedside updated on results.  Lynn placed no urine noted.  Pending admission for elevated troponin. Kendall Portillo MD, PGY2  Elevation in troponin, stable on repeat. No obvious source of infection on labs. Pending urinalysis, pt was straight cath with no urine in bladder. Will hydrate. Discussed with hospitalist who accepted admission for AMS. Discussed with patients daughter who is in agreement with plan.

## 2023-12-03 DIAGNOSIS — F31.9 BIPOLAR DISORDER, UNSPECIFIED: ICD-10-CM

## 2023-12-03 DIAGNOSIS — G92.8 OTHER TOXIC ENCEPHALOPATHY: ICD-10-CM

## 2023-12-03 DIAGNOSIS — N39.0 URINARY TRACT INFECTION, SITE NOT SPECIFIED: ICD-10-CM

## 2023-12-03 DIAGNOSIS — R41.82 ALTERED MENTAL STATUS, UNSPECIFIED: ICD-10-CM

## 2023-12-03 DIAGNOSIS — K21.9 GASTRO-ESOPHAGEAL REFLUX DISEASE WITHOUT ESOPHAGITIS: ICD-10-CM

## 2023-12-03 DIAGNOSIS — Z29.9 ENCOUNTER FOR PROPHYLACTIC MEASURES, UNSPECIFIED: ICD-10-CM

## 2023-12-03 PROBLEM — F03.90 UNSPECIFIED DEMENTIA WITHOUT BEHAVIORAL DISTURBANCE: Chronic | Status: ACTIVE | Noted: 2023-11-17

## 2023-12-03 LAB
ANION GAP SERPL CALC-SCNC: 10 MMOL/L — SIGNIFICANT CHANGE UP (ref 7–14)
ANION GAP SERPL CALC-SCNC: 10 MMOL/L — SIGNIFICANT CHANGE UP (ref 7–14)
APPEARANCE UR: ABNORMAL
APPEARANCE UR: ABNORMAL
BACTERIA # UR AUTO: ABNORMAL /HPF
BACTERIA # UR AUTO: ABNORMAL /HPF
BILIRUB UR-MCNC: NEGATIVE — SIGNIFICANT CHANGE UP
BILIRUB UR-MCNC: NEGATIVE — SIGNIFICANT CHANGE UP
BUN SERPL-MCNC: 14 MG/DL — SIGNIFICANT CHANGE UP (ref 7–23)
BUN SERPL-MCNC: 14 MG/DL — SIGNIFICANT CHANGE UP (ref 7–23)
CALCIUM SERPL-MCNC: 8.9 MG/DL — SIGNIFICANT CHANGE UP (ref 8.4–10.5)
CALCIUM SERPL-MCNC: 8.9 MG/DL — SIGNIFICANT CHANGE UP (ref 8.4–10.5)
CAST: 8 /LPF — HIGH (ref 0–4)
CAST: 8 /LPF — HIGH (ref 0–4)
CHLORIDE SERPL-SCNC: 105 MMOL/L — SIGNIFICANT CHANGE UP (ref 98–107)
CHLORIDE SERPL-SCNC: 105 MMOL/L — SIGNIFICANT CHANGE UP (ref 98–107)
CK SERPL-CCNC: 119 U/L — SIGNIFICANT CHANGE UP (ref 25–170)
CK SERPL-CCNC: 119 U/L — SIGNIFICANT CHANGE UP (ref 25–170)
CO2 SERPL-SCNC: 29 MMOL/L — SIGNIFICANT CHANGE UP (ref 22–31)
CO2 SERPL-SCNC: 29 MMOL/L — SIGNIFICANT CHANGE UP (ref 22–31)
COLOR SPEC: YELLOW — SIGNIFICANT CHANGE UP
COLOR SPEC: YELLOW — SIGNIFICANT CHANGE UP
CREAT SERPL-MCNC: 0.43 MG/DL — LOW (ref 0.5–1.3)
CREAT SERPL-MCNC: 0.43 MG/DL — LOW (ref 0.5–1.3)
DIFF PNL FLD: ABNORMAL
DIFF PNL FLD: ABNORMAL
EGFR: 93 ML/MIN/1.73M2 — SIGNIFICANT CHANGE UP
EGFR: 93 ML/MIN/1.73M2 — SIGNIFICANT CHANGE UP
GLUCOSE BLDC GLUCOMTR-MCNC: 90 MG/DL — SIGNIFICANT CHANGE UP (ref 70–99)
GLUCOSE BLDC GLUCOMTR-MCNC: 90 MG/DL — SIGNIFICANT CHANGE UP (ref 70–99)
GLUCOSE SERPL-MCNC: 95 MG/DL — SIGNIFICANT CHANGE UP (ref 70–99)
GLUCOSE SERPL-MCNC: 95 MG/DL — SIGNIFICANT CHANGE UP (ref 70–99)
GLUCOSE UR QL: NEGATIVE MG/DL — SIGNIFICANT CHANGE UP
GLUCOSE UR QL: NEGATIVE MG/DL — SIGNIFICANT CHANGE UP
HCT VFR BLD CALC: 26.9 % — LOW (ref 34.5–45)
HCT VFR BLD CALC: 26.9 % — LOW (ref 34.5–45)
HGB BLD-MCNC: 8.6 G/DL — LOW (ref 11.5–15.5)
HGB BLD-MCNC: 8.6 G/DL — LOW (ref 11.5–15.5)
KETONES UR-MCNC: NEGATIVE MG/DL — SIGNIFICANT CHANGE UP
KETONES UR-MCNC: NEGATIVE MG/DL — SIGNIFICANT CHANGE UP
LEUKOCYTE ESTERASE UR-ACNC: ABNORMAL
LEUKOCYTE ESTERASE UR-ACNC: ABNORMAL
MCHC RBC-ENTMCNC: 28.2 PG — SIGNIFICANT CHANGE UP (ref 27–34)
MCHC RBC-ENTMCNC: 28.2 PG — SIGNIFICANT CHANGE UP (ref 27–34)
MCHC RBC-ENTMCNC: 32 GM/DL — SIGNIFICANT CHANGE UP (ref 32–36)
MCHC RBC-ENTMCNC: 32 GM/DL — SIGNIFICANT CHANGE UP (ref 32–36)
MCV RBC AUTO: 88.2 FL — SIGNIFICANT CHANGE UP (ref 80–100)
MCV RBC AUTO: 88.2 FL — SIGNIFICANT CHANGE UP (ref 80–100)
NITRITE UR-MCNC: NEGATIVE — SIGNIFICANT CHANGE UP
NITRITE UR-MCNC: NEGATIVE — SIGNIFICANT CHANGE UP
NRBC # BLD: 0 /100 WBCS — SIGNIFICANT CHANGE UP (ref 0–0)
NRBC # BLD: 0 /100 WBCS — SIGNIFICANT CHANGE UP (ref 0–0)
NRBC # FLD: 0 K/UL — SIGNIFICANT CHANGE UP (ref 0–0)
NRBC # FLD: 0 K/UL — SIGNIFICANT CHANGE UP (ref 0–0)
PH UR: 6.5 — SIGNIFICANT CHANGE UP (ref 5–8)
PH UR: 6.5 — SIGNIFICANT CHANGE UP (ref 5–8)
PLATELET # BLD AUTO: 358 K/UL — SIGNIFICANT CHANGE UP (ref 150–400)
PLATELET # BLD AUTO: 358 K/UL — SIGNIFICANT CHANGE UP (ref 150–400)
POTASSIUM SERPL-MCNC: 3.8 MMOL/L — SIGNIFICANT CHANGE UP (ref 3.5–5.3)
POTASSIUM SERPL-MCNC: 3.8 MMOL/L — SIGNIFICANT CHANGE UP (ref 3.5–5.3)
POTASSIUM SERPL-SCNC: 3.8 MMOL/L — SIGNIFICANT CHANGE UP (ref 3.5–5.3)
POTASSIUM SERPL-SCNC: 3.8 MMOL/L — SIGNIFICANT CHANGE UP (ref 3.5–5.3)
PROT UR-MCNC: 100 MG/DL
PROT UR-MCNC: 100 MG/DL
RBC # BLD: 3.05 M/UL — LOW (ref 3.8–5.2)
RBC # BLD: 3.05 M/UL — LOW (ref 3.8–5.2)
RBC # FLD: 15.4 % — HIGH (ref 10.3–14.5)
RBC # FLD: 15.4 % — HIGH (ref 10.3–14.5)
RBC CASTS # UR COMP ASSIST: 21 /HPF — HIGH (ref 0–4)
RBC CASTS # UR COMP ASSIST: 21 /HPF — HIGH (ref 0–4)
SODIUM SERPL-SCNC: 144 MMOL/L — SIGNIFICANT CHANGE UP (ref 135–145)
SODIUM SERPL-SCNC: 144 MMOL/L — SIGNIFICANT CHANGE UP (ref 135–145)
SP GR SPEC: 1.02 — SIGNIFICANT CHANGE UP (ref 1–1.03)
SP GR SPEC: 1.02 — SIGNIFICANT CHANGE UP (ref 1–1.03)
SQUAMOUS # UR AUTO: 0 /HPF — SIGNIFICANT CHANGE UP (ref 0–5)
SQUAMOUS # UR AUTO: 0 /HPF — SIGNIFICANT CHANGE UP (ref 0–5)
TROPONIN T, HIGH SENSITIVITY RESULT: 67 NG/L — CRITICAL HIGH
TROPONIN T, HIGH SENSITIVITY RESULT: 67 NG/L — CRITICAL HIGH
TSH SERPL-MCNC: 1.38 UIU/ML — SIGNIFICANT CHANGE UP (ref 0.27–4.2)
TSH SERPL-MCNC: 1.38 UIU/ML — SIGNIFICANT CHANGE UP (ref 0.27–4.2)
UROBILINOGEN FLD QL: 1 MG/DL — SIGNIFICANT CHANGE UP (ref 0.2–1)
UROBILINOGEN FLD QL: 1 MG/DL — SIGNIFICANT CHANGE UP (ref 0.2–1)
WBC # BLD: 5.84 K/UL — SIGNIFICANT CHANGE UP (ref 3.8–10.5)
WBC # BLD: 5.84 K/UL — SIGNIFICANT CHANGE UP (ref 3.8–10.5)
WBC # FLD AUTO: 5.84 K/UL — SIGNIFICANT CHANGE UP (ref 3.8–10.5)
WBC # FLD AUTO: 5.84 K/UL — SIGNIFICANT CHANGE UP (ref 3.8–10.5)
WBC UR QL: 654 /HPF — HIGH (ref 0–5)
WBC UR QL: 654 /HPF — HIGH (ref 0–5)

## 2023-12-03 PROCEDURE — 99223 1ST HOSP IP/OBS HIGH 75: CPT

## 2023-12-03 PROCEDURE — 99497 ADVNCD CARE PLAN 30 MIN: CPT | Mod: 25

## 2023-12-03 RX ORDER — HALOPERIDOL DECANOATE 100 MG/ML
2.5 INJECTION INTRAMUSCULAR ONCE
Refills: 0 | Status: COMPLETED | OUTPATIENT
Start: 2023-12-03 | End: 2023-12-03

## 2023-12-03 RX ORDER — MIRTAZAPINE 45 MG/1
1 TABLET, ORALLY DISINTEGRATING ORAL
Refills: 0 | DISCHARGE

## 2023-12-03 RX ORDER — FAMOTIDINE 10 MG/ML
1 INJECTION INTRAVENOUS
Refills: 0 | DISCHARGE

## 2023-12-03 RX ORDER — ENOXAPARIN SODIUM 100 MG/ML
40 INJECTION SUBCUTANEOUS EVERY 24 HOURS
Refills: 0 | Status: DISCONTINUED | OUTPATIENT
Start: 2023-12-03 | End: 2023-12-03

## 2023-12-03 RX ORDER — ASPIRIN/CALCIUM CARB/MAGNESIUM 324 MG
81 TABLET ORAL DAILY
Refills: 0 | Status: DISCONTINUED | OUTPATIENT
Start: 2023-12-04 | End: 2023-12-12

## 2023-12-03 RX ORDER — FAMOTIDINE 10 MG/ML
20 INJECTION INTRAVENOUS DAILY
Refills: 0 | Status: DISCONTINUED | OUTPATIENT
Start: 2023-12-04 | End: 2023-12-12

## 2023-12-03 RX ORDER — HALOPERIDOL DECANOATE 100 MG/ML
0.5 INJECTION INTRAMUSCULAR THREE TIMES A DAY
Refills: 0 | Status: DISCONTINUED | OUTPATIENT
Start: 2023-12-03 | End: 2023-12-07

## 2023-12-03 RX ORDER — ACETAMINOPHEN 500 MG
650 TABLET ORAL EVERY 6 HOURS
Refills: 0 | Status: DISCONTINUED | OUTPATIENT
Start: 2023-12-03 | End: 2023-12-12

## 2023-12-03 RX ORDER — METOPROLOL TARTRATE 50 MG
25 TABLET ORAL
Refills: 0 | Status: DISCONTINUED | OUTPATIENT
Start: 2023-12-03 | End: 2023-12-12

## 2023-12-03 RX ORDER — ASPIRIN/CALCIUM CARB/MAGNESIUM 324 MG
1 TABLET ORAL
Refills: 0 | DISCHARGE

## 2023-12-03 RX ORDER — MIRTAZAPINE 45 MG/1
15 TABLET, ORALLY DISINTEGRATING ORAL AT BEDTIME
Refills: 0 | Status: DISCONTINUED | OUTPATIENT
Start: 2023-12-03 | End: 2023-12-12

## 2023-12-03 RX ORDER — CEFTRIAXONE 500 MG/1
1000 INJECTION, POWDER, FOR SOLUTION INTRAMUSCULAR; INTRAVENOUS EVERY 24 HOURS
Refills: 0 | Status: COMPLETED | OUTPATIENT
Start: 2023-12-03 | End: 2023-12-05

## 2023-12-03 RX ORDER — HEPARIN SODIUM 5000 [USP'U]/ML
5000 INJECTION INTRAVENOUS; SUBCUTANEOUS EVERY 12 HOURS
Refills: 0 | Status: DISCONTINUED | OUTPATIENT
Start: 2023-12-03 | End: 2023-12-12

## 2023-12-03 RX ORDER — HALOPERIDOL DECANOATE 100 MG/ML
1 INJECTION INTRAMUSCULAR EVERY 6 HOURS
Refills: 0 | Status: DISCONTINUED | OUTPATIENT
Start: 2023-12-03 | End: 2023-12-08

## 2023-12-03 RX ORDER — GABAPENTIN 400 MG/1
400 CAPSULE ORAL AT BEDTIME
Refills: 0 | Status: DISCONTINUED | OUTPATIENT
Start: 2023-12-03 | End: 2023-12-08

## 2023-12-03 RX ORDER — ONDANSETRON 8 MG/1
4 TABLET, FILM COATED ORAL EVERY 8 HOURS
Refills: 0 | Status: DISCONTINUED | OUTPATIENT
Start: 2023-12-03 | End: 2023-12-12

## 2023-12-03 RX ORDER — LANOLIN ALCOHOL/MO/W.PET/CERES
3 CREAM (GRAM) TOPICAL AT BEDTIME
Refills: 0 | Status: DISCONTINUED | OUTPATIENT
Start: 2023-12-03 | End: 2023-12-12

## 2023-12-03 RX ADMIN — HALOPERIDOL DECANOATE 2.5 MILLIGRAM(S): 100 INJECTION INTRAMUSCULAR at 15:10

## 2023-12-03 RX ADMIN — CEFTRIAXONE 100 MILLIGRAM(S): 500 INJECTION, POWDER, FOR SOLUTION INTRAMUSCULAR; INTRAVENOUS at 05:36

## 2023-12-03 RX ADMIN — HALOPERIDOL DECANOATE 2.5 MILLIGRAM(S): 100 INJECTION INTRAMUSCULAR at 10:13

## 2023-12-03 RX ADMIN — MIRTAZAPINE 15 MILLIGRAM(S): 45 TABLET, ORALLY DISINTEGRATING ORAL at 23:13

## 2023-12-03 RX ADMIN — GABAPENTIN 400 MILLIGRAM(S): 400 CAPSULE ORAL at 20:50

## 2023-12-03 RX ADMIN — HALOPERIDOL DECANOATE 0.5 MILLIGRAM(S): 100 INJECTION INTRAMUSCULAR at 20:51

## 2023-12-03 NOTE — H&P ADULT - PROBLEM SELECTOR PLAN 5
DVT: lovenox  Diet: NPO until dysphagia screen  Dispo: Pending clinical course/PT DVT: lovenox  Diet: tolerated puree without issue, will obtain formal s/s eval to advance diet  Dispo: Pending clinical course/PT DVT: lovenox  Diet: NPO except meds with applesauce, as patient with some confusion/lethargy will obtain speech eval  Dispo: Pending clinical course/PT

## 2023-12-03 NOTE — H&P ADULT - PROBLEM SELECTOR PLAN 4
- noted with history of GERD  - patient also with previous H. Pylori infection, s/p treatment  - c/w famotidine 20mg qd

## 2023-12-03 NOTE — H&P ADULT - PROBLEM SELECTOR PLAN 1
Patient presenting from NH with reported worsening confusion  - patient baseline A&O x 1-2 and minimally verbal  - per family c/f worsening confusion and lethargy  - CTH without any acute findings, patient noted with mod to severe chronic microvascular disease  - TSH wnl  - patient noted with positive UA  - current AMS may be from progressive vascular dementia vs TME from UTI  - treatment of UTI as below, c/w ASA

## 2023-12-03 NOTE — H&P ADULT - HISTORY OF PRESENT ILLNESS
Patient is an 88 y/o F with a history of vascular dementia, bipolar I, CVA, diverticulosis, and GERD, H pylori s/p treatment now presenting from NH with altered mentation. Of note, patient with recent admission approximately 2 weeks ago due to worsening confusion and hallucinations. 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 Patient was ultimately discharged to NH as family was unable to care for her at home. Patient now sent in from her NH due  Patient is an 88 y/o F with a history of vascular dementia, bipolar I, CVA, diverticulosis, and GERD, H pylori s/p treatment now presenting from NH with altered mentation. Of note, patient with recent admission approximately 2 weeks ago due to worsening confusion and hallucinations. 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 Patient was ultimately discharged to NH as family was unable to care for her at home. Patient now sent in from her NH due to worsening confusion and lethargy. Patient unable to meaningfully participate in interview due to mental status. Collateral obtained from patient's daughter, Kathryn. Overall patient was doing well prior to most recent discharge from the hospital, however declined while at her NH. Family otherwise unsure of any other developments that may have occurred as they have not visited her regularly in the NH.    ED Course: patient given NS 500cc, CTX 1g and haldol 2.5mg IV x2 for agitation.

## 2023-12-03 NOTE — ED ADULT NURSE REASSESSMENT NOTE - GENERAL PATIENT STATE
crying
As per handoff pt with altermental status, intially code stroke./ infectious work up/resting/sleeping
no change observed

## 2023-12-03 NOTE — CHART NOTE - NSCHARTNOTEFT_GEN_A_CORE
Overnight Medicine ACP    Patient with Crcl <30, recommended by pharmacy to change dvt ppx to heparin 5000u q 12.     Chris Gill  Department of Medicine   [facility]  [id number] Overnight Medicine ACP    Patient with Crcl <30, recommended by pharmacy to change dvt ppx to heparin 5000u q 12.     Chris Gill  Department of Medicine   [facility]  [id number]

## 2023-12-03 NOTE — H&P ADULT - CONVERSATION DETAILS
Discussed patient's current condition and working diagnosis with patient's daughter, Kathryn. Inquired about code status. Per patient's daughter, she had discussed code status with the patient's providers when she was recently hospitalized about 2 weeks ago. Carlos reiterated that she would like the patient to remain DNR at this time as previously discussed. I inquired about her thoughts on intubation/mechanical ventilation. Kathryn remains unsure but for the time being would be amenable to a trial of non-invasive ventilation as well as intubation if it might benefit the patient. Patient to remain DNR. MOLST form completed and placed in chart. DNR order placed in EMR.

## 2023-12-03 NOTE — H&P ADULT - NSHPLABSRESULTS_GEN_ALL_CORE
LABS:                         8.6    5.84  )-----------( 358      ( 03 Dec 2023 11:30 )             26.9     12-03    144  |  105  |  14  ----------------------------<  95  3.8   |  29  |  0.43<L>    Ca    8.9      03 Dec 2023 11:30    TPro  6.3  /  Alb  3.2<L>  /  TBili  0.4  /  DBili  x   /  AST  22  /  ALT  21  /  AlkPhos  88  12-02    PT/INR - ( 02 Dec 2023 19:56 )   PT: 9.7 sec;   INR: <0.90 ratio      PTT - ( 02 Dec 2023 19:56 )  PTT:26.5 sec  Urinalysis Basic - ( 03 Dec 2023 11:30 )    Color: x / Appearance: x / SG: x / pH: x  Gluc: 95 mg/dL / Ketone: x  / Bili: x / Urobili: x   Blood: x / Protein: x / Nitrite: x   Leuk Esterase: x / RBC: x / WBC x   Sq Epi: x / Non Sq Epi: x / Bacteria: x    RADIOLOGY, EKG & ADDITIONAL TESTS: Reviewed.

## 2023-12-03 NOTE — H&P ADULT - PROBLEM SELECTOR PLAN 2
On admission patient noted with grossly positive UA.  - noted with moderate LE, 654 WBC and few bacteria  - urine cultures pending  - c/w CTX for now

## 2023-12-03 NOTE — ED ADULT NURSE REASSESSMENT NOTE - NS ED NURSE REASSESS COMMENT FT1
Pt is screaming and seems to be in distress. ACP provider Ricardo Mayes notified, and intervention/meds requested on TEAMS.

## 2023-12-03 NOTE — H&P ADULT - PROBLEM SELECTOR PLAN 3
Patient with history of bipolar d/o  - following with psychiatry, Dr. Billings  - previously seen by  in hospital with adjustment of home medications  - patient likely with further behavioral disturbance i/s/o ongoing UTI  - c/w haldol 0.5mg TID  - c/w gabapenting 400mg qhs  - c/w mirtazapine 15mg qhs Patient with history of bipolar d/o  - following with psychiatry, Dr. Billings  - previously seen by  in hospital with adjustment of home medications  - patient likely with further behavioral disturbance i/s/o ongoing UTI  - c/w haldol 0.5mg TID  - c/w gabapentin 400mg qhs  - c/w mirtazapine 15mg qhs

## 2023-12-03 NOTE — H&P ADULT - ASSESSMENT
89 y/o F with a history of vascular dementia, bipolar I, CVA, diverticulosis, and GERD, H pylori s/p treatment now presenting from NH with altered mentation. Now admitted to medicine for further workup and management.

## 2023-12-03 NOTE — H&P ADULT - NSHPPHYSICALEXAM_GEN_ALL_CORE
GENERAL: NAD  HEENT: NC/AT, EOMI, PERRL  NECK: Supple, No JVD  CHEST/LUNG: CTAB, no increased WOB  HEART: RRR, no m/r/g  ABDOMEN: soft, NT, ND, BS+  EXTREMITIES:  2+ peripheral pulses, no clubbing, no edema  NERVOUS SYSTEM:  A&Ox0-1, no focal deficits  MSK: FROM all 4 extremities, full and equal strength  SKIN: No rashes or lesions GENERAL: NAD, moaning at times  HEENT: NC/AT, EOMI, PERRL  NECK: Supple, No JVD  CHEST/LUNG: CTAB, no increased WOB  HEART: RRR, no m/r/g  ABDOMEN: soft, NT, ND, BS+  EXTREMITIES:  2+ peripheral pulses, no LE edema  NERVOUS SYSTEM:  A&Ox0-1, no focal deficits  SKIN: No rashes or lesions visible over extremities

## 2023-12-04 LAB
24R-OH-CALCIDIOL SERPL-MCNC: 71.3 NG/ML — SIGNIFICANT CHANGE UP (ref 30–80)
24R-OH-CALCIDIOL SERPL-MCNC: 71.3 NG/ML — SIGNIFICANT CHANGE UP (ref 30–80)
ANION GAP SERPL CALC-SCNC: 11 MMOL/L — SIGNIFICANT CHANGE UP (ref 7–14)
ANION GAP SERPL CALC-SCNC: 11 MMOL/L — SIGNIFICANT CHANGE UP (ref 7–14)
BUN SERPL-MCNC: 12 MG/DL — SIGNIFICANT CHANGE UP (ref 7–23)
BUN SERPL-MCNC: 12 MG/DL — SIGNIFICANT CHANGE UP (ref 7–23)
CALCIUM SERPL-MCNC: 9.2 MG/DL — SIGNIFICANT CHANGE UP (ref 8.4–10.5)
CALCIUM SERPL-MCNC: 9.2 MG/DL — SIGNIFICANT CHANGE UP (ref 8.4–10.5)
CHLORIDE SERPL-SCNC: 104 MMOL/L — SIGNIFICANT CHANGE UP (ref 98–107)
CHLORIDE SERPL-SCNC: 104 MMOL/L — SIGNIFICANT CHANGE UP (ref 98–107)
CO2 SERPL-SCNC: 29 MMOL/L — SIGNIFICANT CHANGE UP (ref 22–31)
CO2 SERPL-SCNC: 29 MMOL/L — SIGNIFICANT CHANGE UP (ref 22–31)
CREAT SERPL-MCNC: 0.41 MG/DL — LOW (ref 0.5–1.3)
CREAT SERPL-MCNC: 0.41 MG/DL — LOW (ref 0.5–1.3)
EGFR: 94 ML/MIN/1.73M2 — SIGNIFICANT CHANGE UP
EGFR: 94 ML/MIN/1.73M2 — SIGNIFICANT CHANGE UP
FERRITIN SERPL-MCNC: 121 NG/ML — SIGNIFICANT CHANGE UP (ref 13–330)
FERRITIN SERPL-MCNC: 121 NG/ML — SIGNIFICANT CHANGE UP (ref 13–330)
FOLATE SERPL-MCNC: >20 NG/ML — HIGH (ref 3.1–17.5)
FOLATE SERPL-MCNC: >20 NG/ML — HIGH (ref 3.1–17.5)
GLUCOSE SERPL-MCNC: 83 MG/DL — SIGNIFICANT CHANGE UP (ref 70–99)
GLUCOSE SERPL-MCNC: 83 MG/DL — SIGNIFICANT CHANGE UP (ref 70–99)
HCT VFR BLD CALC: 31.6 % — LOW (ref 34.5–45)
HCT VFR BLD CALC: 31.6 % — LOW (ref 34.5–45)
HGB BLD-MCNC: 10.2 G/DL — LOW (ref 11.5–15.5)
HGB BLD-MCNC: 10.2 G/DL — LOW (ref 11.5–15.5)
IRON SATN MFR SERPL: 12 % — LOW (ref 14–50)
IRON SATN MFR SERPL: 12 % — LOW (ref 14–50)
IRON SATN MFR SERPL: 27 UG/DL — LOW (ref 30–160)
IRON SATN MFR SERPL: 27 UG/DL — LOW (ref 30–160)
MAGNESIUM SERPL-MCNC: 2 MG/DL — SIGNIFICANT CHANGE UP (ref 1.6–2.6)
MAGNESIUM SERPL-MCNC: 2 MG/DL — SIGNIFICANT CHANGE UP (ref 1.6–2.6)
MCHC RBC-ENTMCNC: 28.3 PG — SIGNIFICANT CHANGE UP (ref 27–34)
MCHC RBC-ENTMCNC: 28.3 PG — SIGNIFICANT CHANGE UP (ref 27–34)
MCHC RBC-ENTMCNC: 32.3 GM/DL — SIGNIFICANT CHANGE UP (ref 32–36)
MCHC RBC-ENTMCNC: 32.3 GM/DL — SIGNIFICANT CHANGE UP (ref 32–36)
MCV RBC AUTO: 87.8 FL — SIGNIFICANT CHANGE UP (ref 80–100)
MCV RBC AUTO: 87.8 FL — SIGNIFICANT CHANGE UP (ref 80–100)
NRBC # BLD: 0 /100 WBCS — SIGNIFICANT CHANGE UP (ref 0–0)
NRBC # BLD: 0 /100 WBCS — SIGNIFICANT CHANGE UP (ref 0–0)
NRBC # FLD: 0 K/UL — SIGNIFICANT CHANGE UP (ref 0–0)
NRBC # FLD: 0 K/UL — SIGNIFICANT CHANGE UP (ref 0–0)
PHOSPHATE SERPL-MCNC: 2.7 MG/DL — SIGNIFICANT CHANGE UP (ref 2.5–4.5)
PHOSPHATE SERPL-MCNC: 2.7 MG/DL — SIGNIFICANT CHANGE UP (ref 2.5–4.5)
PLATELET # BLD AUTO: 397 K/UL — SIGNIFICANT CHANGE UP (ref 150–400)
PLATELET # BLD AUTO: 397 K/UL — SIGNIFICANT CHANGE UP (ref 150–400)
POTASSIUM SERPL-MCNC: 3.7 MMOL/L — SIGNIFICANT CHANGE UP (ref 3.5–5.3)
POTASSIUM SERPL-MCNC: 3.7 MMOL/L — SIGNIFICANT CHANGE UP (ref 3.5–5.3)
POTASSIUM SERPL-SCNC: 3.7 MMOL/L — SIGNIFICANT CHANGE UP (ref 3.5–5.3)
POTASSIUM SERPL-SCNC: 3.7 MMOL/L — SIGNIFICANT CHANGE UP (ref 3.5–5.3)
RBC # BLD: 3.6 M/UL — LOW (ref 3.8–5.2)
RBC # BLD: 3.6 M/UL — LOW (ref 3.8–5.2)
RBC # FLD: 15.5 % — HIGH (ref 10.3–14.5)
RBC # FLD: 15.5 % — HIGH (ref 10.3–14.5)
SODIUM SERPL-SCNC: 144 MMOL/L — SIGNIFICANT CHANGE UP (ref 135–145)
SODIUM SERPL-SCNC: 144 MMOL/L — SIGNIFICANT CHANGE UP (ref 135–145)
TIBC SERPL-MCNC: 226 UG/DL — SIGNIFICANT CHANGE UP (ref 220–430)
TIBC SERPL-MCNC: 226 UG/DL — SIGNIFICANT CHANGE UP (ref 220–430)
UIBC SERPL-MCNC: 199 UG/DL — SIGNIFICANT CHANGE UP (ref 110–370)
UIBC SERPL-MCNC: 199 UG/DL — SIGNIFICANT CHANGE UP (ref 110–370)
VIT B12 SERPL-MCNC: 921 PG/ML — HIGH (ref 200–900)
VIT B12 SERPL-MCNC: 921 PG/ML — HIGH (ref 200–900)
WBC # BLD: 6.48 K/UL — SIGNIFICANT CHANGE UP (ref 3.8–10.5)
WBC # BLD: 6.48 K/UL — SIGNIFICANT CHANGE UP (ref 3.8–10.5)
WBC # FLD AUTO: 6.48 K/UL — SIGNIFICANT CHANGE UP (ref 3.8–10.5)
WBC # FLD AUTO: 6.48 K/UL — SIGNIFICANT CHANGE UP (ref 3.8–10.5)

## 2023-12-04 PROCEDURE — 99232 SBSQ HOSP IP/OBS MODERATE 35: CPT

## 2023-12-04 PROCEDURE — 74018 RADEX ABDOMEN 1 VIEW: CPT | Mod: 26

## 2023-12-04 RX ORDER — SENNA PLUS 8.6 MG/1
2 TABLET ORAL AT BEDTIME
Refills: 0 | Status: DISCONTINUED | OUTPATIENT
Start: 2023-12-04 | End: 2023-12-12

## 2023-12-04 RX ORDER — POLYETHYLENE GLYCOL 3350 17 G/17G
17 POWDER, FOR SOLUTION ORAL
Refills: 0 | Status: DISCONTINUED | OUTPATIENT
Start: 2023-12-04 | End: 2023-12-12

## 2023-12-04 RX ORDER — FERROUS SULFATE 325(65) MG
325 TABLET ORAL DAILY
Refills: 0 | Status: DISCONTINUED | OUTPATIENT
Start: 2023-12-04 | End: 2023-12-12

## 2023-12-04 RX ADMIN — FAMOTIDINE 20 MILLIGRAM(S): 10 INJECTION INTRAVENOUS at 13:40

## 2023-12-04 RX ADMIN — HEPARIN SODIUM 5000 UNIT(S): 5000 INJECTION INTRAVENOUS; SUBCUTANEOUS at 17:19

## 2023-12-04 RX ADMIN — HALOPERIDOL DECANOATE 0.5 MILLIGRAM(S): 100 INJECTION INTRAMUSCULAR at 14:04

## 2023-12-04 RX ADMIN — Medication 81 MILLIGRAM(S): at 13:39

## 2023-12-04 RX ADMIN — HEPARIN SODIUM 5000 UNIT(S): 5000 INJECTION INTRAVENOUS; SUBCUTANEOUS at 07:24

## 2023-12-04 RX ADMIN — CEFTRIAXONE 100 MILLIGRAM(S): 500 INJECTION, POWDER, FOR SOLUTION INTRAMUSCULAR; INTRAVENOUS at 07:25

## 2023-12-04 RX ADMIN — POLYETHYLENE GLYCOL 3350 17 GRAM(S): 17 POWDER, FOR SOLUTION ORAL at 17:19

## 2023-12-04 RX ADMIN — Medication 25 MILLIGRAM(S): at 17:19

## 2023-12-04 NOTE — PATIENT PROFILE ADULT - FALL HARM RISK - HARM RISK INTERVENTIONS
Assistance with ambulation/Assistance OOB with selected safe patient handling equipment/Communicate Risk of Fall with Harm to all staff/Reinforce activity limits and safety measures with patient and family/Tailored Fall Risk Interventions/Visual Cue: Yellow wristband and red socks/Bed in lowest position, wheels locked, appropriate side rails in place/Call bell, personal items and telephone in reach/Instruct patient to call for assistance before getting out of bed or chair/Non-slip footwear when patient is out of bed/[place] to call system/Physically safe environment - no spills, clutter or unnecessary equipment/Purposeful Proactive Rounding/Room/bathroom lighting operational, light cord in reach Assistance with ambulation/Assistance OOB with selected safe patient handling equipment/Communicate Risk of Fall with Harm to all staff/Reinforce activity limits and safety measures with patient and family/Tailored Fall Risk Interventions/Visual Cue: Yellow wristband and red socks/Bed in lowest position, wheels locked, appropriate side rails in place/Call bell, personal items and telephone in reach/Instruct patient to call for assistance before getting out of bed or chair/Non-slip footwear when patient is out of bed/[place] to call system/Physically safe environment - no spills, clutter or unnecessary equipment/Purposeful Proactive Rounding/Room/bathroom lighting operational, light cord in reach

## 2023-12-04 NOTE — PROGRESS NOTE ADULT - ASSESSMENT
88 y/o F with a history of vascular dementia, bipolar I, CVA, diverticulosis, and GERD, H pylori s/p treatment now presenting from NH with altered mentation. Now admitted to medicine for further workup and management.

## 2023-12-04 NOTE — PROGRESS NOTE ADULT - PROBLEM SELECTOR PLAN 1
Patient presenting from NH with reported worsening confusion  - patient baseline A&O x 1-2 and minimally verbal, wheelchair bound   - per family c/f worsening confusion and lethargy  - CTH without any acute findings, patient noted with mod to severe chronic microvascular disease  - TSH wnl  - patient noted with positive UA  - current AMS may be from progressive vascular dementia vs TME from UTI  - treatment of UTI as below, c/w ASA

## 2023-12-04 NOTE — PROGRESS NOTE ADULT - SUBJECTIVE AND OBJECTIVE BOX
[facility] Division of Hospital Medicine  Chelsea North MD  Hospitalist   Pager [phone]  Avaliable via MS teams     SUBJECTIVE / OVERNIGHT EVENTS: Pt seen and examined. Patient Aox1, as per daughter (over phone), patient was unresponsive on Saturday, normally is Aox1-2. As per daughter patient is Legs are curled up and mostly wheelchair bound, prior to NH patient was at home with 24/7 HHA. As per daughter she does not want patient to return to the same NH, wants to speak to SW regarding different options.  Daughter thinks Last BM was on Saturday in ED.     ADDITIONAL REVIEW OF SYSTEMS:    MEDICATIONS  (STANDING):  aspirin enteric coated 81 milliGRAM(s) Oral daily  cefTRIAXone   IVPB 1000 milliGRAM(s) IV Intermittent every 24 hours  famotidine    Tablet 20 milliGRAM(s) Oral daily  gabapentin 400 milliGRAM(s) Oral at bedtime  haloperidol     Tablet 0.5 milliGRAM(s) Oral three times a day  heparin   Injectable 5000 Unit(s) SubCutaneous every 12 hours  metoprolol tartrate 25 milliGRAM(s) Oral two times a day  mirtazapine Soltab 15 milliGRAM(s) Oral at bedtime  polyethylene glycol 3350 17 Gram(s) Oral two times a day  senna 2 Tablet(s) Oral at bedtime    MEDICATIONS  (PRN):  acetaminophen     Tablet .. 650 milliGRAM(s) Oral every 6 hours PRN Temp greater or equal to 38C (100.4F), Mild Pain (1 - 3)  aluminum hydroxide/magnesium hydroxide/simethicone Suspension 30 milliLiter(s) Oral every 4 hours PRN Dyspepsia  haloperidol    Injectable 1 milliGRAM(s) IntraMuscular every 6 hours PRN Severe Agitation  melatonin 3 milliGRAM(s) Oral at bedtime PRN Insomnia  ondansetron Injectable 4 milliGRAM(s) IV Push every 8 hours PRN Nausea and/or Vomiting      I&O's Summary      PHYSICAL EXAM:  Vital Signs Last 24 Hrs  T(C): 36.8 (04 Dec 2023 10:04), Max: 37.3 (03 Dec 2023 14:50)  T(F): 98.3 (04 Dec 2023 10:04), Max: 99.1 (03 Dec 2023 14:50)  HR: 96 (04 Dec 2023 10:04) (72 - 98)  BP: 144/72 (04 Dec 2023 10:04) (127/68 - 160/77)  BP(mean): --  RR: 18 (04 Dec 2023 10:04) (18 - 20)  SpO2: 95% (04 Dec 2023 10:04) (95% - 100%)    Parameters below as of 04 Dec 2023 10:04  Patient On (Oxygen Delivery Method): room air      GENERAL: NAD, moaning at times  HEENT: [place], NC/AT, EOMI, PERRL  NECK: Supple, No JVD  CHEST/LUNG: CTAB, no increased WOB  HEART: RRR, no m/r/g  ABDOMEN: soft, NT, ND, BS+  EXTREMITIES:  2+ peripheral pulses, no LE edema, Legs curled up (wheelchair bound)   NERVOUS SYSTEM:  A&Ox1, no focal deficits  SKIN: No rashes or lesions visible over extremities    LABS:                        8.6    5.84  )-----------( 358      ( 03 Dec 2023 11:30 )             26.9     12-03    144  |  105  |  14  ----------------------------<  95  3.8   |  29  |  0.43<L>    Ca    8.9      03 Dec 2023 11:30    TPro  6.3  /  Alb  3.2<L>  /  TBili  0.4  /  DBili  x   /  AST  22  /  ALT  21  /  AlkPhos  88  12-02    PT/INR - ( 02 Dec 2023 19:56 )   PT: 9.7 sec;   INR: <0.90 ratio         PTT - ( 02 Dec 2023 19:56 )  PTT:26.5 sec  CARDIAC MARKERS ( 03 Dec 2023 11:30 )  x     / x     / 119 U/L / x     / x      CARDIAC MARKERS ( 02 Dec 2023 21:30 )  x     / x     / 91 U/L / x     / 4.3 ng/mL  CARDIAC MARKERS ( 02 Dec 2023 19:56 )  x     / x     / 94 U/L / x     / 4.3 ng/mL      Urinalysis Basic - ( 03 Dec 2023 11:30 )    Color: x / Appearance: x / SG: x / pH: x  Gluc: 95 mg/dL / Ketone: x  / Bili: x / Urobili: x   Blood: x / Protein: x / Nitrite: x   Leuk Esterase: x / RBC: x / WBC x   Sq Epi: x / Non Sq Epi: x / Bacteria: x       [facility] Division of Hospital Medicine  Chelsea North MD  Hospitalist   Pager [phone]  Avaliable via MS teams     SUBJECTIVE / OVERNIGHT EVENTS: Pt seen and examined. Patient Aox1, as per daughter (over phone), patient was unresponsive on Saturday, normally is Aox1-2. As per daughter patient is Legs are curled up and mostly wheelchair bound, prior to NH patient was at home with 24/7 HHA. As per daughter she does not want patient to return to the same NH, wants to speak to SW regarding different options.  Daughter thinks Last BM was on Saturday in ED.     ADDITIONAL REVIEW OF SYSTEMS:    MEDICATIONS  (STANDING):  aspirin enteric coated 81 milliGRAM(s) Oral daily  cefTRIAXone   IVPB 1000 milliGRAM(s) IV Intermittent every 24 hours  famotidine    Tablet 20 milliGRAM(s) Oral daily  gabapentin 400 milliGRAM(s) Oral at bedtime  haloperidol     Tablet 0.5 milliGRAM(s) Oral three times a day  heparin   Injectable 5000 Unit(s) SubCutaneous every 12 hours  metoprolol tartrate 25 milliGRAM(s) Oral two times a day  mirtazapine Soltab 15 milliGRAM(s) Oral at bedtime  polyethylene glycol 3350 17 Gram(s) Oral two times a day  senna 2 Tablet(s) Oral at bedtime    MEDICATIONS  (PRN):  acetaminophen     Tablet .. 650 milliGRAM(s) Oral every 6 hours PRN Temp greater or equal to 38C (100.4F), Mild Pain (1 - 3)  aluminum hydroxide/magnesium hydroxide/simethicone Suspension 30 milliLiter(s) Oral every 4 hours PRN Dyspepsia  haloperidol    Injectable 1 milliGRAM(s) IntraMuscular every 6 hours PRN Severe Agitation  melatonin 3 milliGRAM(s) Oral at bedtime PRN Insomnia  ondansetron Injectable 4 milliGRAM(s) IV Push every 8 hours PRN Nausea and/or Vomiting      I&O's Summary      PHYSICAL EXAM:  Vital Signs Last 24 Hrs  T(C): 36.8 (04 Dec 2023 10:04), Max: 37.3 (03 Dec 2023 14:50)  T(F): 98.3 (04 Dec 2023 10:04), Max: 99.1 (03 Dec 2023 14:50)  HR: 96 (04 Dec 2023 10:04) (72 - 98)  BP: 144/72 (04 Dec 2023 10:04) (127/68 - 160/77)  BP(mean): --  RR: 18 (04 Dec 2023 10:04) (18 - 20)  SpO2: 95% (04 Dec 2023 10:04) (95% - 100%)    Parameters below as of 04 Dec 2023 10:04  Patient On (Oxygen Delivery Method): room air      GENERAL: NAD, moaning at times  HEENT: [place], NC/AT, EOMI, PERRL  NECK: Supple, No JVD  CHEST/LUNG: CTAB, no increased WOB  HEART: RRR, no m/r/g  ABDOMEN: soft, NT, ND, BS+  EXTREMITIES:  2+ peripheral pulses, no LE edema, Legs curled up (wheelchair bound)   NERVOUS SYSTEM:  A&Ox1, no focal deficits  SKIN: No rashes or lesions visible over extremities    LABS:                        8.6    5.84  )-----------( 358      ( 03 Dec 2023 11:30 )             26.9     12-03    144  |  105  |  14  ----------------------------<  95  3.8   |  29  |  0.43<L>    Ca    8.9      03 Dec 2023 11:30    TPro  6.3  /  Alb  3.2<L>  /  TBili  0.4  /  DBili  x   /  AST  22  /  ALT  21  /  AlkPhos  88  12-02    PT/INR - ( 02 Dec 2023 19:56 )   PT: 9.7 sec;   INR: <0.90 ratio         PTT - ( 02 Dec 2023 19:56 )  PTT:26.5 sec  CARDIAC MARKERS ( 03 Dec 2023 11:30 )  x     / x     / 119 U/L / x     / x      CARDIAC MARKERS ( 02 Dec 2023 21:30 )  x     / x     / 91 U/L / x     / 4.3 ng/mL  CARDIAC MARKERS ( 02 Dec 2023 19:56 )  x     / x     / 94 U/L / x     / 4.3 ng/mL      Urinalysis Basic - ( 03 Dec 2023 11:30 )    Color: x / Appearance: x / SG: x / pH: x  Gluc: 95 mg/dL / Ketone: x  / Bili: x / Urobili: x   Blood: x / Protein: x / Nitrite: x   Leuk Esterase: x / RBC: x / WBC x   Sq Epi: x / Non Sq Epi: x / Bacteria: x

## 2023-12-04 NOTE — PROGRESS NOTE ADULT - PROBLEM SELECTOR PLAN 3
Patient with history of bipolar d/o  - following with psychiatry, Dr. Billings  - previously seen by  in hospital with adjustment of home medications  - patient likely with further behavioral disturbance i/s/o ongoing UTI  - c/w haldol 0.5mg TID  - c/w gabapentin 400mg qhs  - c/w mirtazapine 15mg qhs none

## 2023-12-05 LAB
ANION GAP SERPL CALC-SCNC: 12 MMOL/L — SIGNIFICANT CHANGE UP (ref 7–14)
ANION GAP SERPL CALC-SCNC: 12 MMOL/L — SIGNIFICANT CHANGE UP (ref 7–14)
BLD GP AB SCN SERPL QL: NEGATIVE — SIGNIFICANT CHANGE UP
BLD GP AB SCN SERPL QL: NEGATIVE — SIGNIFICANT CHANGE UP
BUN SERPL-MCNC: 17 MG/DL — SIGNIFICANT CHANGE UP (ref 7–23)
BUN SERPL-MCNC: 17 MG/DL — SIGNIFICANT CHANGE UP (ref 7–23)
CALCIUM SERPL-MCNC: 9.3 MG/DL — SIGNIFICANT CHANGE UP (ref 8.4–10.5)
CALCIUM SERPL-MCNC: 9.3 MG/DL — SIGNIFICANT CHANGE UP (ref 8.4–10.5)
CHLORIDE SERPL-SCNC: 102 MMOL/L — SIGNIFICANT CHANGE UP (ref 98–107)
CHLORIDE SERPL-SCNC: 102 MMOL/L — SIGNIFICANT CHANGE UP (ref 98–107)
CO2 SERPL-SCNC: 28 MMOL/L — SIGNIFICANT CHANGE UP (ref 22–31)
CO2 SERPL-SCNC: 28 MMOL/L — SIGNIFICANT CHANGE UP (ref 22–31)
CREAT SERPL-MCNC: 0.45 MG/DL — LOW (ref 0.5–1.3)
CREAT SERPL-MCNC: 0.45 MG/DL — LOW (ref 0.5–1.3)
EGFR: 92 ML/MIN/1.73M2 — SIGNIFICANT CHANGE UP
EGFR: 92 ML/MIN/1.73M2 — SIGNIFICANT CHANGE UP
GLUCOSE SERPL-MCNC: 61 MG/DL — LOW (ref 70–99)
GLUCOSE SERPL-MCNC: 61 MG/DL — LOW (ref 70–99)
HCT VFR BLD CALC: 30.9 % — LOW (ref 34.5–45)
HCT VFR BLD CALC: 30.9 % — LOW (ref 34.5–45)
HGB BLD-MCNC: 9.8 G/DL — LOW (ref 11.5–15.5)
HGB BLD-MCNC: 9.8 G/DL — LOW (ref 11.5–15.5)
MAGNESIUM SERPL-MCNC: 1.9 MG/DL — SIGNIFICANT CHANGE UP (ref 1.6–2.6)
MAGNESIUM SERPL-MCNC: 1.9 MG/DL — SIGNIFICANT CHANGE UP (ref 1.6–2.6)
MCHC RBC-ENTMCNC: 28.1 PG — SIGNIFICANT CHANGE UP (ref 27–34)
MCHC RBC-ENTMCNC: 28.1 PG — SIGNIFICANT CHANGE UP (ref 27–34)
MCHC RBC-ENTMCNC: 31.7 GM/DL — LOW (ref 32–36)
MCHC RBC-ENTMCNC: 31.7 GM/DL — LOW (ref 32–36)
MCV RBC AUTO: 88.5 FL — SIGNIFICANT CHANGE UP (ref 80–100)
MCV RBC AUTO: 88.5 FL — SIGNIFICANT CHANGE UP (ref 80–100)
MRSA PCR RESULT.: SIGNIFICANT CHANGE UP
MRSA PCR RESULT.: SIGNIFICANT CHANGE UP
NRBC # BLD: 0 /100 WBCS — SIGNIFICANT CHANGE UP (ref 0–0)
NRBC # BLD: 0 /100 WBCS — SIGNIFICANT CHANGE UP (ref 0–0)
NRBC # FLD: 0 K/UL — SIGNIFICANT CHANGE UP (ref 0–0)
NRBC # FLD: 0 K/UL — SIGNIFICANT CHANGE UP (ref 0–0)
PHOSPHATE SERPL-MCNC: 3.1 MG/DL — SIGNIFICANT CHANGE UP (ref 2.5–4.5)
PHOSPHATE SERPL-MCNC: 3.1 MG/DL — SIGNIFICANT CHANGE UP (ref 2.5–4.5)
PLATELET # BLD AUTO: 380 K/UL — SIGNIFICANT CHANGE UP (ref 150–400)
PLATELET # BLD AUTO: 380 K/UL — SIGNIFICANT CHANGE UP (ref 150–400)
POTASSIUM SERPL-MCNC: 3.7 MMOL/L — SIGNIFICANT CHANGE UP (ref 3.5–5.3)
POTASSIUM SERPL-MCNC: 3.7 MMOL/L — SIGNIFICANT CHANGE UP (ref 3.5–5.3)
POTASSIUM SERPL-SCNC: 3.7 MMOL/L — SIGNIFICANT CHANGE UP (ref 3.5–5.3)
POTASSIUM SERPL-SCNC: 3.7 MMOL/L — SIGNIFICANT CHANGE UP (ref 3.5–5.3)
RBC # BLD: 3.49 M/UL — LOW (ref 3.8–5.2)
RBC # BLD: 3.49 M/UL — LOW (ref 3.8–5.2)
RBC # FLD: 15.4 % — HIGH (ref 10.3–14.5)
RBC # FLD: 15.4 % — HIGH (ref 10.3–14.5)
RH IG SCN BLD-IMP: POSITIVE — SIGNIFICANT CHANGE UP
RH IG SCN BLD-IMP: POSITIVE — SIGNIFICANT CHANGE UP
S AUREUS DNA NOSE QL NAA+PROBE: SIGNIFICANT CHANGE UP
S AUREUS DNA NOSE QL NAA+PROBE: SIGNIFICANT CHANGE UP
SODIUM SERPL-SCNC: 142 MMOL/L — SIGNIFICANT CHANGE UP (ref 135–145)
SODIUM SERPL-SCNC: 142 MMOL/L — SIGNIFICANT CHANGE UP (ref 135–145)
WBC # BLD: 4.98 K/UL — SIGNIFICANT CHANGE UP (ref 3.8–10.5)
WBC # BLD: 4.98 K/UL — SIGNIFICANT CHANGE UP (ref 3.8–10.5)
WBC # FLD AUTO: 4.98 K/UL — SIGNIFICANT CHANGE UP (ref 3.8–10.5)
WBC # FLD AUTO: 4.98 K/UL — SIGNIFICANT CHANGE UP (ref 3.8–10.5)

## 2023-12-05 PROCEDURE — 99232 SBSQ HOSP IP/OBS MODERATE 35: CPT

## 2023-12-05 RX ADMIN — Medication 325 MILLIGRAM(S): at 17:09

## 2023-12-05 RX ADMIN — Medication 25 MILLIGRAM(S): at 10:00

## 2023-12-05 RX ADMIN — GABAPENTIN 400 MILLIGRAM(S): 400 CAPSULE ORAL at 00:14

## 2023-12-05 RX ADMIN — POLYETHYLENE GLYCOL 3350 17 GRAM(S): 17 POWDER, FOR SOLUTION ORAL at 10:01

## 2023-12-05 RX ADMIN — CEFTRIAXONE 100 MILLIGRAM(S): 500 INJECTION, POWDER, FOR SOLUTION INTRAMUSCULAR; INTRAVENOUS at 06:07

## 2023-12-05 RX ADMIN — HALOPERIDOL DECANOATE 0.5 MILLIGRAM(S): 100 INJECTION INTRAMUSCULAR at 00:13

## 2023-12-05 RX ADMIN — MIRTAZAPINE 15 MILLIGRAM(S): 45 TABLET, ORALLY DISINTEGRATING ORAL at 00:13

## 2023-12-05 RX ADMIN — HEPARIN SODIUM 5000 UNIT(S): 5000 INJECTION INTRAVENOUS; SUBCUTANEOUS at 10:00

## 2023-12-05 RX ADMIN — Medication 81 MILLIGRAM(S): at 17:09

## 2023-12-05 RX ADMIN — FAMOTIDINE 20 MILLIGRAM(S): 10 INJECTION INTRAVENOUS at 17:11

## 2023-12-05 RX ADMIN — SENNA PLUS 2 TABLET(S): 8.6 TABLET ORAL at 00:14

## 2023-12-05 NOTE — SWALLOW BEDSIDE ASSESSMENT ADULT - ASR SWALLOW RECOMMEND DIAG
Objective testing is NOT indicated given presence of overt s/s of penetration/aspiration evidenced at bedside

## 2023-12-05 NOTE — DIETITIAN INITIAL EVALUATION ADULT - PERTINENT MEDS FT
MEDICATIONS  (STANDING):  aspirin enteric coated 81 milliGRAM(s) Oral daily  famotidine    Tablet 20 milliGRAM(s) Oral daily  ferrous    sulfate 325 milliGRAM(s) Oral daily  gabapentin 400 milliGRAM(s) Oral at bedtime  haloperidol     Tablet 0.5 milliGRAM(s) Oral three times a day  heparin   Injectable 5000 Unit(s) SubCutaneous every 12 hours  metoprolol tartrate 25 milliGRAM(s) Oral two times a day  mirtazapine Soltab 15 milliGRAM(s) Oral at bedtime  polyethylene glycol 3350 17 Gram(s) Oral two times a day  senna 2 Tablet(s) Oral at bedtime    MEDICATIONS  (PRN):  acetaminophen     Tablet .. 650 milliGRAM(s) Oral every 6 hours PRN Temp greater or equal to 38C (100.4F), Mild Pain (1 - 3)  aluminum hydroxide/magnesium hydroxide/simethicone Suspension 30 milliLiter(s) Oral every 4 hours PRN Dyspepsia  bisacodyl 5 milliGRAM(s) Oral every 12 hours PRN Constipation  haloperidol    Injectable 1 milliGRAM(s) IntraMuscular every 6 hours PRN Severe Agitation  melatonin 3 milliGRAM(s) Oral at bedtime PRN Insomnia  ondansetron Injectable 4 milliGRAM(s) IV Push every 8 hours PRN Nausea and/or Vomiting

## 2023-12-05 NOTE — PROGRESS NOTE ADULT - PROBLEM SELECTOR PLAN 1
Patient presenting from NH with reported worsening confusion  - patient baseline A&O x 1-2 and minimally verbal, wheelchair bound   - per family c/f worsening confusion and lethargy  - CTH without any acute findings, patient noted with mod to severe chronic microvascular disease  - TSH wnl  - patient noted with positive UA  - current AMS may be from progressive vascular dementia vs TME from UTI vs constipation   - treatment of UTI as below, c/w ASA

## 2023-12-05 NOTE — SWALLOW BEDSIDE ASSESSMENT ADULT - PHARYNGEAL PHASE
Wet vocal quality post oral intake/Delayed cough post oral intake/Multiple swallows Within functional limits

## 2023-12-05 NOTE — PROGRESS NOTE ADULT - ASSESSMENT
88 y/o F with a history of vascular dementia, bipolar I, CVA, diverticulosis, and GERD, H pylori s/p treatment now presenting from NH with altered mentation. Now admitted to medicine for further workup and management. 89 y/o F with a history of vascular dementia, bipolar I, CVA, diverticulosis, and GERD, H pylori s/p treatment now presenting from NH with altered mentation. Now admitted to medicine for further workup and management.

## 2023-12-05 NOTE — DIETITIAN INITIAL EVALUATION ADULT - PERTINENT LABORATORY DATA
12-05    142  |  102  |  17  ----------------------------<  61<L>  3.7   |  28  |  0.45<L>    Ca    9.3      05 Dec 2023 03:08  Phos  3.1     12-05  Mg     1.90     12-05    A1C with Estimated Average Glucose Result: 5.3 % (01-23-23 @ 08:57)

## 2023-12-05 NOTE — DIETITIAN INITIAL EVALUATION ADULT - OTHER INFO
88 y/o F with a history of vascular dementia, bipolar I, CVA, diverticulosis, and GERD, H pylori s/p treatment now presenting from NH with altered mentation. Now admitted to medicine for further workup and management.    Pt from NH, is non verbal. Noted with visible, severe clavicle/temporal muscle wasting and moderate orbital/buccal fat wasting. Pt was NPO since admit (2 days). Now s/p SLP evaluation, recommended Pureed diet with Moderately Thick Liquids. Diet changed accordingly.   Pt's last BM per chart was ?3 days ago, noted had abdominal x ray and on bowel elimination regimen. Wt per transfer record (11/29) 80.4#. Current admit weight 86.4#. Pt's previous hospitalization weight, 4/23- 109#.

## 2023-12-05 NOTE — SWALLOW BEDSIDE ASSESSMENT ADULT - SWALLOW EVAL: DIAGNOSIS
1. Mild oral dysphagia for puree, moderately-thick, mildly-thick and thin liquids marked by adequate acceptance and containment, adequate bolus manipulation, delayed oral transit and adequate oral clearance. 2. Functional pharyngeal phase for puree and moderately-thick liquids marked by hyolaryngeal excursion present upon palpation and no overt s/s of penetration/aspiration evidenced. 3. Moderate pharyngeal dysphagia for mildly-thick and thin liquids marked by hyolaryngeal excursion present upon palpation, two swallows per bolus which may be indicative of reduced pharyngeal clearance and presence of overt s/s of penetration/aspiration evidenced by wet vocal quality and delayed coughing.

## 2023-12-05 NOTE — PROGRESS NOTE ADULT - PROBLEM SELECTOR PLAN 3
Patient with history of bipolar d/o  - following with psychiatry, Dr. Billings  - previously seen by  in hospital with adjustment of home medications  - patient likely with further behavioral disturbance i/s/o ongoing UTI  - c/w haldol 0.5mg TID  - c/w gabapentin 400mg qhs  - c/w mirtazapine 15mg qhs

## 2023-12-05 NOTE — SWALLOW BEDSIDE ASSESSMENT ADULT - ORAL PREPARATORY PHASE
[facility]   Hospitalist Progress Note  Date: 3/10/2023  Patient Name: Hilary Xie  : 1944  MRN: [id number]  Date of admission: 3/8/2023    Subjective   Subjective     Chief Complaint:   Shortness of breath, COVID-19 pneumonia    Summary:   Hilary Xie is a 78 y.o. female with a past medical history significant for A-fib, hyperlipidemia, hypertension who presents to the hospital with shortness of breath, cough, fatigue, patient was checked for COVID-19, this was positive, chest x-ray was significant for multifocal pneumonia.  Patient was given nebulized breathing treatments, steroids, given patient's hypoxemia to 86% on room air the hospital service was asked to admit for further work-up and management.    Interval Followup:   No acute events overnight, patient states she is feeling better, patient still requiring supplemental oxygen    Objective   Objective     Vitals:   Temp:  [97.3 °F (36.3 °C)-97.7 °F (36.5 °C)] 97.3 °F (36.3 °C)  Heart Rate:  [60-72] 65  Resp:  [16-20] 18  BP: ()/(50-70) 94/70  Flow (L/min):  [1-1.5] 1.5    Physical Exam   GEN: Resting comfortably in bed, no acute distress  HEENT: Moist mucous membranes  LUNGS: Equal breath rise bilaterally, no accessory muscle use, no audible wheezing    Result Review    Result Review:  I have personally reviewed the results as below  [x]  Laboratory  CBC    CBC 3/8/23 3/9/23 3/10/23   WBC 3.52 1.24 (A) 2.72 (A)   RBC 4.68 4.31 4.08   Hemoglobin 14.0 12.7 12.0   Hematocrit 41.0 38.7 36.2   MCV 87.6 89.8 88.7   MCH 29.9 29.5 29.4   MCHC 34.1 32.8 33.1   RDW 13.4 13.1 13.2   Platelets 88 (A) 62 (A) 70 (A)   (A) Abnormal value            CMP    CMP 3/8/23 3/9/23 3/9/23 3/9/23 3/10/23     0523 0523 0523    Glucose 249 (A) 317 (A)   364 (A)   BUN 18 16   19   Creatinine 1.42 (A) 1.00 1.00  1.20 (A)   eGFR 37.9 (A) 57.8 (A) 57.8 (A)  46.4 (A)   Sodium 139 138   137   Potassium 3.5 3.8   3.3 (A)   Chloride 105 108 (A)   106   Calcium 9.2 8.7   8.7    Total Protein 6.6 5.9 (A)  5.9 (A) 5.8 (A)   Albumin 3.7 3.3 (A)  3.3 (A) 3.1 (A)   Globulin 2.9 2.6   2.7   Total Bilirubin 0.7 0.4  0.4 0.3   Alkaline Phosphatase 71 62  62 59   AST (SGOT) 14 13  13 13   ALT (SGPT) 11 7  7 7   Albumin/Globulin Ratio 1.3 1.3   1.1   BUN/Creatinine Ratio 12.7 16.0   15.8   Anion Gap 12.2 11.8   13.0   (A) Abnormal value       Comments are available for some flowsheets but are not being displayed.           []  Microbiology  []  Radiology  []  EKG/Telemetry   []  Cardiology/Vascular   []  Pathology  []  Old records  []  Other:    Assessment & Plan   Assessment / Plan     Assessment:  COVID-19 pneumonia  Acute hypoxemic respiratory failure, increased work of breathing, satting 86% on room air  Atrial fibrillation on anticoagulation  Hyperglycemia  Diabetes  Hyperlipidemia  Hypertension     Plan:  • Patient admitted to the hospital for further work-up and management of above  • Patient started on supplemental oxygen, titrate to 90%  • Continue increased dose of sliding scale insulin  • Increase Levemir to 15 twice daily  • Change Decadron to once daily  • Continue Brovana, Pulmicort, DuoNebs, as needed albuterol  • Continue home antihypertensives  • Continue remdesivir now that renal function is improved, therapeutic drug monitoring  • Resume home statin  • Continue Xarelto for stroke prevention  • Continue home Cardizem  • Monitor on flex telemetry  • CBC, CMP reviewed  • Check CBC, CMP, mag and Phos in a.m.   • Clinical course will dictate further management    Reviewed patients labs and imaging, and discussed with patient and nurse at bedside.    DVT prophylaxis:  Medical and mechanical DVT prophylaxis orders are present.    CODE STATUS:   Code Status (Patient has no pulse and is not breathing): CPR (Attempt to Resuscitate)  Medical Interventions (Patient has pulse or is breathing): Full Support  Release to patient: Routine Release        Electronically signed by Miguel Angel Gusman,  MD, 03/10/23, 2:28 PM EST.                    Within functional limits

## 2023-12-05 NOTE — DIETITIAN INITIAL EVALUATION ADULT - NSFNSGIIOFT_GEN_A_CORE
12-04-23 @ 07:01  -  12-05-23 @ 07:00  --------------------------------------------------------  OUT:    Stool (mL): 0 mL  Total OUT: 0 mL    Total NET: 0 mL

## 2023-12-05 NOTE — DIETITIAN INITIAL EVALUATION ADULT - PROBLEM SELECTOR PLAN 5
DVT: lovenox  Diet: NPO except meds with applesauce, as patient with some confusion/lethargy will obtain speech eval  Dispo: Pending clinical course/PT

## 2023-12-05 NOTE — PROGRESS NOTE ADULT - SUBJECTIVE AND OBJECTIVE BOX
[facility] Division of Hospital Medicine  Chelsea North MD  Hospitalist   Pager [phone]  Avaliable via MS teams     SUBJECTIVE / OVERNIGHT EVENTS: Patient seen and examined. patient initially not very responsive this morning, after help of RN who rubbed ice on patients face , stimulated patient and patient more awake and speaking minimally.     ADDITIONAL REVIEW OF SYSTEMS:    MEDICATIONS  (STANDING):  aspirin enteric coated 81 milliGRAM(s) Oral daily  famotidine    Tablet 20 milliGRAM(s) Oral daily  ferrous    sulfate 325 milliGRAM(s) Oral daily  gabapentin 400 milliGRAM(s) Oral at bedtime  haloperidol     Tablet 0.5 milliGRAM(s) Oral three times a day  heparin   Injectable 5000 Unit(s) SubCutaneous every 12 hours  metoprolol tartrate 25 milliGRAM(s) Oral two times a day  mirtazapine Soltab 15 milliGRAM(s) Oral at bedtime  polyethylene glycol 3350 17 Gram(s) Oral two times a day  senna 2 Tablet(s) Oral at bedtime    MEDICATIONS  (PRN):  acetaminophen     Tablet .. 650 milliGRAM(s) Oral every 6 hours PRN Temp greater or equal to 38C (100.4F), Mild Pain (1 - 3)  aluminum hydroxide/magnesium hydroxide/simethicone Suspension 30 milliLiter(s) Oral every 4 hours PRN Dyspepsia  haloperidol    Injectable 1 milliGRAM(s) IntraMuscular every 6 hours PRN Severe Agitation  melatonin 3 milliGRAM(s) Oral at bedtime PRN Insomnia  ondansetron Injectable 4 milliGRAM(s) IV Push every 8 hours PRN Nausea and/or Vomiting      I&O's Summary    04 Dec 2023 07:01  -  05 Dec 2023 07:00  --------------------------------------------------------  IN: 0 mL / OUT: 0 mL / NET: 0 mL        PHYSICAL EXAM:  Vital Signs Last 24 Hrs  T(C): 37.2 (05 Dec 2023 07:45), Max: 37.2 (05 Dec 2023 07:45)  T(F): 99 (05 Dec 2023 07:45), Max: 99 (05 Dec 2023 07:45)  HR: 73 (05 Dec 2023 07:45) (62 - 73)  BP: 146/79 (05 Dec 2023 07:45) (146/79 - 169/61)  BP(mean): --  RR: 17 (05 Dec 2023 07:45) (17 - 18)  SpO2: 97% (05 Dec 2023 07:45) (97% - 97%)    Parameters below as of 05 Dec 2023 07:45  Patient On (Oxygen Delivery Method): room air        GENERAL: NAD  HEENT: [place], [place]/AT, EOMI, PERRL  NECK: Supple, No JVD  CHEST/LUNG: CTAB, no increased WOB  HEART: RRR, no m/r/g  ABDOMEN: soft, NT, ND, BS+  EXTREMITIES:  2+ peripheral pulses, no LE edema, Legs curled up (wheelchair bound)   NERVOUS SYSTEM:  A&Ox1, no focal deficits  SKIN: No rashes or lesions visible over extremities    LABS:                        9.8    4.98  )-----------( 380      ( 05 Dec 2023 03:08 )             30.9     12-05    142  |  102  |  17  ----------------------------<  61<L>  3.7   |  28  |  0.45<L>    Ca    9.3      05 Dec 2023 03:08  Phos  3.1     12-05  Mg     1.90     12-05            Urinalysis Basic - ( 05 Dec 2023 03:08 )    Color: x / Appearance: x / SG: x / pH: x  Gluc: 61 mg/dL / Ketone: x  / Bili: x / Urobili: x   Blood: x / Protein: x / Nitrite: x   Leuk Esterase: x / RBC: x / WBC x   Sq Epi: x / Non Sq Epi: x / Bacteria: x        Culture - Urine (collected 03 Dec 2023 09:15)  Source: Catheterized Catheterized  Preliminary Report (04 Dec 2023 13:47):    >100,000 CFU/ml Gram Negative Rods         [facility] Division of Hospital Medicine  Chelsea North MD  Hospitalist   Pager [phone]  Avaliable via MS teams     SUBJECTIVE / OVERNIGHT EVENTS: Patient seen and examined. patient initially not very responsive this morning, after help of RN who rubbed ice on patients face , stimulated patient and patient more awake and speaking minimally.     ADDITIONAL REVIEW OF SYSTEMS:    MEDICATIONS  (STANDING):  aspirin enteric coated 81 milliGRAM(s) Oral daily  famotidine    Tablet 20 milliGRAM(s) Oral daily  ferrous    sulfate 325 milliGRAM(s) Oral daily  gabapentin 400 milliGRAM(s) Oral at bedtime  haloperidol     Tablet 0.5 milliGRAM(s) Oral three times a day  heparin   Injectable 5000 Unit(s) SubCutaneous every 12 hours  metoprolol tartrate 25 milliGRAM(s) Oral two times a day  mirtazapine Soltab 15 milliGRAM(s) Oral at bedtime  polyethylene glycol 3350 17 Gram(s) Oral two times a day  senna 2 Tablet(s) Oral at bedtime    MEDICATIONS  (PRN):  acetaminophen     Tablet .. 650 milliGRAM(s) Oral every 6 hours PRN Temp greater or equal to 38C (100.4F), Mild Pain (1 - 3)  aluminum hydroxide/magnesium hydroxide/simethicone Suspension 30 milliLiter(s) Oral every 4 hours PRN Dyspepsia  haloperidol    Injectable 1 milliGRAM(s) IntraMuscular every 6 hours PRN Severe Agitation  melatonin 3 milliGRAM(s) Oral at bedtime PRN Insomnia  ondansetron Injectable 4 milliGRAM(s) IV Push every 8 hours PRN Nausea and/or Vomiting      I&O's Summary    04 Dec 2023 07:01  -  05 Dec 2023 07:00  --------------------------------------------------------  IN: 0 mL / OUT: 0 mL / NET: 0 mL        PHYSICAL EXAM:  Vital Signs Last 24 Hrs  T(C): 37.2 (05 Dec 2023 07:45), Max: 37.2 (05 Dec 2023 07:45)  T(F): 99 (05 Dec 2023 07:45), Max: 99 (05 Dec 2023 07:45)  HR: 73 (05 Dec 2023 07:45) (62 - 73)  BP: 146/79 (05 Dec 2023 07:45) (146/79 - 169/61)  BP(mean): --  RR: 17 (05 Dec 2023 07:45) (17 - 18)  SpO2: 97% (05 Dec 2023 07:45) (97% - 97%)    Parameters below as of 05 Dec 2023 07:45  Patient On (Oxygen Delivery Method): room air        GENERAL: NAD  HEENT: [place], [place]/AT, EOMI, PERRL  NECK: Supple, No JVD  CHEST/LUNG: CTAB, no increased WOB  HEART: RRR, no m/r/g  ABDOMEN: soft, NT, ND, BS+  EXTREMITIES:  2+ peripheral pulses, no LE edema, Legs curled up (wheelchair bound)   NERVOUS SYSTEM:  A&Ox1, no focal deficits  SKIN: No rashes or lesions visible over extremities    LABS:                        9.8    4.98  )-----------( 380      ( 05 Dec 2023 03:08 )             30.9     12-05    142  |  102  |  17  ----------------------------<  61<L>  3.7   |  28  |  0.45<L>    Ca    9.3      05 Dec 2023 03:08  Phos  3.1     12-05  Mg     1.90     12-05            Urinalysis Basic - ( 05 Dec 2023 03:08 )    Color: x / Appearance: x / SG: x / pH: x  Gluc: 61 mg/dL / Ketone: x  / Bili: x / Urobili: x   Blood: x / Protein: x / Nitrite: x   Leuk Esterase: x / RBC: x / WBC x   Sq Epi: x / Non Sq Epi: x / Bacteria: x        Culture - Urine (collected 03 Dec 2023 09:15)  Source: Catheterized Catheterized  Preliminary Report (04 Dec 2023 13:47):    >100,000 CFU/ml Gram Negative Rods

## 2023-12-05 NOTE — SWALLOW BEDSIDE ASSESSMENT ADULT - COMMENTS
As per Hospitalist note dated 12/4/23 "88 y/o F with a history of vascular dementia, bipolar I, CVA, diverticulosis, and GERD, H pylori s/p treatment now presenting from NH with altered mentation. Now admitted to medicine for further workup and management."    CXR 12/4/23 "IMPRESSION: Clear lungs."    Patient is known to this service, seen for clinical swallow evaluation during a recent admission on 11/20/23 with recommendation of Puree with Thin Liquids (see note for details).     Patient visited at bedside for clinical swallow evaluation. Patient initially in a sleep state, easy to rouse with verbal and tactile cues. Patient inconsistently follows 1-step directives. Able to make basic wants/needs known. Of note, patient stating "I'm scared of swallowing" throughout evaluation, however unable to further elaborate. Patient declined further trials

## 2023-12-06 LAB
-  AMOXICILLIN/CLAVULANIC ACID: SIGNIFICANT CHANGE UP
-  AMOXICILLIN/CLAVULANIC ACID: SIGNIFICANT CHANGE UP
-  AMPICILLIN/SULBACTAM: SIGNIFICANT CHANGE UP
-  AMPICILLIN/SULBACTAM: SIGNIFICANT CHANGE UP
-  AMPICILLIN: SIGNIFICANT CHANGE UP
-  AZTREONAM: SIGNIFICANT CHANGE UP
-  AZTREONAM: SIGNIFICANT CHANGE UP
-  CEFAZOLIN: SIGNIFICANT CHANGE UP
-  CEFAZOLIN: SIGNIFICANT CHANGE UP
-  CEFEPIME: SIGNIFICANT CHANGE UP
-  CEFEPIME: SIGNIFICANT CHANGE UP
-  CEFTRIAXONE: SIGNIFICANT CHANGE UP
-  CEFTRIAXONE: SIGNIFICANT CHANGE UP
-  CEFUROXIME: SIGNIFICANT CHANGE UP
-  CEFUROXIME: SIGNIFICANT CHANGE UP
-  CIPROFLOXACIN: SIGNIFICANT CHANGE UP
-  ERTAPENEM: SIGNIFICANT CHANGE UP
-  ERTAPENEM: SIGNIFICANT CHANGE UP
-  GENTAMICIN: SIGNIFICANT CHANGE UP
-  GENTAMICIN: SIGNIFICANT CHANGE UP
-  LEVOFLOXACIN: SIGNIFICANT CHANGE UP
-  MEROPENEM: SIGNIFICANT CHANGE UP
-  MEROPENEM: SIGNIFICANT CHANGE UP
-  NITROFURANTOIN: SIGNIFICANT CHANGE UP
-  PIPERACILLIN/TAZOBACTAM: SIGNIFICANT CHANGE UP
-  PIPERACILLIN/TAZOBACTAM: SIGNIFICANT CHANGE UP
-  TETRACYCLINE: SIGNIFICANT CHANGE UP
-  TETRACYCLINE: SIGNIFICANT CHANGE UP
-  TOBRAMYCIN: SIGNIFICANT CHANGE UP
-  TOBRAMYCIN: SIGNIFICANT CHANGE UP
-  TRIMETHOPRIM/SULFAMETHOXAZOLE: SIGNIFICANT CHANGE UP
-  TRIMETHOPRIM/SULFAMETHOXAZOLE: SIGNIFICANT CHANGE UP
-  VANCOMYCIN: SIGNIFICANT CHANGE UP
-  VANCOMYCIN: SIGNIFICANT CHANGE UP
ANION GAP SERPL CALC-SCNC: 7 MMOL/L — SIGNIFICANT CHANGE UP (ref 7–14)
ANION GAP SERPL CALC-SCNC: 7 MMOL/L — SIGNIFICANT CHANGE UP (ref 7–14)
BUN SERPL-MCNC: 23 MG/DL — SIGNIFICANT CHANGE UP (ref 7–23)
BUN SERPL-MCNC: 23 MG/DL — SIGNIFICANT CHANGE UP (ref 7–23)
CALCIUM SERPL-MCNC: 9.4 MG/DL — SIGNIFICANT CHANGE UP (ref 8.4–10.5)
CALCIUM SERPL-MCNC: 9.4 MG/DL — SIGNIFICANT CHANGE UP (ref 8.4–10.5)
CHLORIDE SERPL-SCNC: 103 MMOL/L — SIGNIFICANT CHANGE UP (ref 98–107)
CHLORIDE SERPL-SCNC: 103 MMOL/L — SIGNIFICANT CHANGE UP (ref 98–107)
CO2 SERPL-SCNC: 32 MMOL/L — HIGH (ref 22–31)
CO2 SERPL-SCNC: 32 MMOL/L — HIGH (ref 22–31)
CREAT SERPL-MCNC: 0.49 MG/DL — LOW (ref 0.5–1.3)
CREAT SERPL-MCNC: 0.49 MG/DL — LOW (ref 0.5–1.3)
CULTURE RESULTS: ABNORMAL
CULTURE RESULTS: ABNORMAL
EGFR: 90 ML/MIN/1.73M2 — SIGNIFICANT CHANGE UP
EGFR: 90 ML/MIN/1.73M2 — SIGNIFICANT CHANGE UP
GLUCOSE SERPL-MCNC: 149 MG/DL — HIGH (ref 70–99)
GLUCOSE SERPL-MCNC: 149 MG/DL — HIGH (ref 70–99)
HCT VFR BLD CALC: 32.8 % — LOW (ref 34.5–45)
HCT VFR BLD CALC: 32.8 % — LOW (ref 34.5–45)
HGB BLD-MCNC: 10.5 G/DL — LOW (ref 11.5–15.5)
HGB BLD-MCNC: 10.5 G/DL — LOW (ref 11.5–15.5)
MAGNESIUM SERPL-MCNC: 1.9 MG/DL — SIGNIFICANT CHANGE UP (ref 1.6–2.6)
MAGNESIUM SERPL-MCNC: 1.9 MG/DL — SIGNIFICANT CHANGE UP (ref 1.6–2.6)
MCHC RBC-ENTMCNC: 28.3 PG — SIGNIFICANT CHANGE UP (ref 27–34)
MCHC RBC-ENTMCNC: 28.3 PG — SIGNIFICANT CHANGE UP (ref 27–34)
MCHC RBC-ENTMCNC: 32 GM/DL — SIGNIFICANT CHANGE UP (ref 32–36)
MCHC RBC-ENTMCNC: 32 GM/DL — SIGNIFICANT CHANGE UP (ref 32–36)
MCV RBC AUTO: 88.4 FL — SIGNIFICANT CHANGE UP (ref 80–100)
MCV RBC AUTO: 88.4 FL — SIGNIFICANT CHANGE UP (ref 80–100)
METHOD TYPE: SIGNIFICANT CHANGE UP
NRBC # BLD: 0 /100 WBCS — SIGNIFICANT CHANGE UP (ref 0–0)
NRBC # BLD: 0 /100 WBCS — SIGNIFICANT CHANGE UP (ref 0–0)
NRBC # FLD: 0 K/UL — SIGNIFICANT CHANGE UP (ref 0–0)
NRBC # FLD: 0 K/UL — SIGNIFICANT CHANGE UP (ref 0–0)
ORGANISM # SPEC MICROSCOPIC CNT: ABNORMAL
PHOSPHATE SERPL-MCNC: 2.6 MG/DL — SIGNIFICANT CHANGE UP (ref 2.5–4.5)
PHOSPHATE SERPL-MCNC: 2.6 MG/DL — SIGNIFICANT CHANGE UP (ref 2.5–4.5)
PLATELET # BLD AUTO: 388 K/UL — SIGNIFICANT CHANGE UP (ref 150–400)
PLATELET # BLD AUTO: 388 K/UL — SIGNIFICANT CHANGE UP (ref 150–400)
POTASSIUM SERPL-MCNC: 3.7 MMOL/L — SIGNIFICANT CHANGE UP (ref 3.5–5.3)
POTASSIUM SERPL-MCNC: 3.7 MMOL/L — SIGNIFICANT CHANGE UP (ref 3.5–5.3)
POTASSIUM SERPL-SCNC: 3.7 MMOL/L — SIGNIFICANT CHANGE UP (ref 3.5–5.3)
POTASSIUM SERPL-SCNC: 3.7 MMOL/L — SIGNIFICANT CHANGE UP (ref 3.5–5.3)
RBC # BLD: 3.71 M/UL — LOW (ref 3.8–5.2)
RBC # BLD: 3.71 M/UL — LOW (ref 3.8–5.2)
RBC # FLD: 15.3 % — HIGH (ref 10.3–14.5)
RBC # FLD: 15.3 % — HIGH (ref 10.3–14.5)
SODIUM SERPL-SCNC: 142 MMOL/L — SIGNIFICANT CHANGE UP (ref 135–145)
SODIUM SERPL-SCNC: 142 MMOL/L — SIGNIFICANT CHANGE UP (ref 135–145)
SPECIMEN SOURCE: SIGNIFICANT CHANGE UP
SPECIMEN SOURCE: SIGNIFICANT CHANGE UP
WBC # BLD: 5.87 K/UL — SIGNIFICANT CHANGE UP (ref 3.8–10.5)
WBC # BLD: 5.87 K/UL — SIGNIFICANT CHANGE UP (ref 3.8–10.5)
WBC # FLD AUTO: 5.87 K/UL — SIGNIFICANT CHANGE UP (ref 3.8–10.5)
WBC # FLD AUTO: 5.87 K/UL — SIGNIFICANT CHANGE UP (ref 3.8–10.5)

## 2023-12-06 PROCEDURE — 99233 SBSQ HOSP IP/OBS HIGH 50: CPT

## 2023-12-06 PROCEDURE — 99222 1ST HOSP IP/OBS MODERATE 55: CPT

## 2023-12-06 RX ORDER — TOBRAMYCIN SULFATE 40 MG/ML
150 VIAL (ML) INJECTION ONCE
Refills: 0 | Status: COMPLETED | OUTPATIENT
Start: 2023-12-06 | End: 2023-12-06

## 2023-12-06 RX ORDER — TOBRAMYCIN SULFATE 40 MG/ML
150 VIAL (ML) INJECTION ONCE
Refills: 0 | Status: DISCONTINUED | OUTPATIENT
Start: 2023-12-06 | End: 2023-12-06

## 2023-12-06 RX ORDER — ERTAPENEM SODIUM 1 G/1
1000 INJECTION, POWDER, LYOPHILIZED, FOR SOLUTION INTRAMUSCULAR; INTRAVENOUS EVERY 24 HOURS
Refills: 0 | Status: DISCONTINUED | OUTPATIENT
Start: 2023-12-06 | End: 2023-12-06

## 2023-12-06 RX ADMIN — FAMOTIDINE 20 MILLIGRAM(S): 10 INJECTION INTRAVENOUS at 16:14

## 2023-12-06 RX ADMIN — Medication 25 MILLIGRAM(S): at 00:26

## 2023-12-06 RX ADMIN — HEPARIN SODIUM 5000 UNIT(S): 5000 INJECTION INTRAVENOUS; SUBCUTANEOUS at 00:27

## 2023-12-06 RX ADMIN — HEPARIN SODIUM 5000 UNIT(S): 5000 INJECTION INTRAVENOUS; SUBCUTANEOUS at 16:03

## 2023-12-06 RX ADMIN — POLYETHYLENE GLYCOL 3350 17 GRAM(S): 17 POWDER, FOR SOLUTION ORAL at 18:34

## 2023-12-06 RX ADMIN — MIRTAZAPINE 15 MILLIGRAM(S): 45 TABLET, ORALLY DISINTEGRATING ORAL at 22:54

## 2023-12-06 RX ADMIN — SENNA PLUS 2 TABLET(S): 8.6 TABLET ORAL at 22:54

## 2023-12-06 RX ADMIN — Medication 25 MILLIGRAM(S): at 16:13

## 2023-12-06 RX ADMIN — GABAPENTIN 400 MILLIGRAM(S): 400 CAPSULE ORAL at 22:53

## 2023-12-06 RX ADMIN — POLYETHYLENE GLYCOL 3350 17 GRAM(S): 17 POWDER, FOR SOLUTION ORAL at 08:03

## 2023-12-06 RX ADMIN — Medication 81 MILLIGRAM(S): at 16:25

## 2023-12-06 RX ADMIN — HALOPERIDOL DECANOATE 0.5 MILLIGRAM(S): 100 INJECTION INTRAMUSCULAR at 16:26

## 2023-12-06 RX ADMIN — HALOPERIDOL DECANOATE 1 MILLIGRAM(S): 100 INJECTION INTRAMUSCULAR at 20:00

## 2023-12-06 RX ADMIN — Medication 25 MILLIGRAM(S): at 18:34

## 2023-12-06 RX ADMIN — GABAPENTIN 400 MILLIGRAM(S): 400 CAPSULE ORAL at 00:26

## 2023-12-06 RX ADMIN — SENNA PLUS 2 TABLET(S): 8.6 TABLET ORAL at 00:26

## 2023-12-06 RX ADMIN — Medication 325 MILLIGRAM(S): at 16:14

## 2023-12-06 RX ADMIN — HALOPERIDOL DECANOATE 0.5 MILLIGRAM(S): 100 INJECTION INTRAMUSCULAR at 22:53

## 2023-12-06 RX ADMIN — MIRTAZAPINE 15 MILLIGRAM(S): 45 TABLET, ORALLY DISINTEGRATING ORAL at 00:27

## 2023-12-06 RX ADMIN — Medication 107.5 MILLIGRAM(S): at 18:37

## 2023-12-06 NOTE — PROGRESS NOTE ADULT - PROBLEM SELECTOR PLAN 3
Patient with history of bipolar d/o  - following with psychiatry, Dr. Billings  - previously seen by  in hospital with adjustment of home medications  - patient likely with further behavioral disturbance i/s/o ongoing UTI  - c/w haldol 0.5mg TID, will consider  evaluation for decreasing dose   - c/w gabapentin 400mg qhs  - c/w mirtazapine 15mg qhs

## 2023-12-06 NOTE — PROVIDER CONTACT NOTE (OTHER) - RECOMMENDATIONS
notify provider and Hold haldol
notify provider, push 6am medications to later time. recheck bp before giving medications.
notify provider and Hold haldol

## 2023-12-06 NOTE — PROVIDER CONTACT NOTE (OTHER) - SITUATION
Patient lethargic this morning, arousable to touch and name but not taking medications.
patient is currently calm and cooperative
patient is currently calm and cooperative

## 2023-12-06 NOTE — PROGRESS NOTE ADULT - SUBJECTIVE AND OBJECTIVE BOX
[facility] Division of Hospital Medicine  Chelsea North MD  Hospitalist   Pager [phone]  Avaliable via MS teams     SUBJECTIVE / OVERNIGHT EVENTS: Patient seen and examined. patient laying in bed comfortably, minimally responsive.     ADDITIONAL REVIEW OF SYSTEMS:    MEDICATIONS  (STANDING):  aspirin enteric coated 81 milliGRAM(s) Oral daily  famotidine    Tablet 20 milliGRAM(s) Oral daily  ferrous    sulfate 325 milliGRAM(s) Oral daily  gabapentin 400 milliGRAM(s) Oral at bedtime  haloperidol     Tablet 0.5 milliGRAM(s) Oral three times a day  heparin   Injectable 5000 Unit(s) SubCutaneous every 12 hours  metoprolol tartrate 25 milliGRAM(s) Oral two times a day  mirtazapine Soltab 15 milliGRAM(s) Oral at bedtime  polyethylene glycol 3350 17 Gram(s) Oral two times a day  senna 2 Tablet(s) Oral at bedtime    MEDICATIONS  (PRN):  acetaminophen     Tablet .. 650 milliGRAM(s) Oral every 6 hours PRN Temp greater or equal to 38C (100.4F), Mild Pain (1 - 3)  aluminum hydroxide/magnesium hydroxide/simethicone Suspension 30 milliLiter(s) Oral every 4 hours PRN Dyspepsia  haloperidol    Injectable 1 milliGRAM(s) IntraMuscular every 6 hours PRN Severe Agitation  melatonin 3 milliGRAM(s) Oral at bedtime PRN Insomnia  ondansetron Injectable 4 milliGRAM(s) IV Push every 8 hours PRN Nausea and/or Vomiting      I&O's Summary    04 Dec 2023 07:01  -  05 Dec 2023 07:00  --------------------------------------------------------  IN: 0 mL / OUT: 0 mL / NET: 0 mL        PHYSICAL EXAM:  Vital Signs Last 24 Hrs  T(C): 37.1 (06 Dec 2023 09:21), Max: 37.1 (06 Dec 2023 09:21)  T(F): 98.7 (06 Dec 2023 09:21), Max: 98.7 (06 Dec 2023 09:21)  HR: 64 (06 Dec 2023 09:21) (58 - 80)  BP: 148/73 (06 Dec 2023 09:21) (129/91 - 167/73)  BP(mean): --  RR: 18 (06 Dec 2023 09:21) (16 - 18)  SpO2: 95% (06 Dec 2023 09:21) (95% - 100%)    Parameters below as of 06 Dec 2023 09:21  Patient On (Oxygen Delivery Method): room air      GENERAL: NAD  HEENT: [place], [place]/AT, EOMI, PERRL  NECK: Supple, No JVD  CHEST/LUNG: CTAB, no increased WOB  HEART: RRR, no m/r/g  ABDOMEN: soft, NT, ND, BS+  EXTREMITIES:  2+ peripheral pulses, no LE edema, Legs curled up (wheelchair bound)   NERVOUS SYSTEM:  A&Ox1, no focal deficits  SKIN: No rashes or lesions visible over extremities      LABS:                          10.5   5.87  )-----------( 388      ( 06 Dec 2023 03:25 )             32.8     12-06    142  |  103  |  23  ----------------------------<  149<H>  3.7   |  32<H>  |  0.49<L>    Ca    9.4      06 Dec 2023 03:25  Phos  2.6     12-06  Mg     1.90     12-06      Catheterized Catheterized  12-03 @ 09:15   >100,000 CFU/ml Proteus mirabilis ESBL  >100,000 CFU/ml Enterococcus faecalis  --  Proteus mirabilis ESBL  Enterococcus faecalis     [facility] of Hospital Medicine  Chelsea North MD  Hospitalist   Pager [phone]  Avaliable via MS teams     SUBJECTIVE / OVERNIGHT EVENTS: Patient seen and examined. patient laying in bed comfortably, minimally responsive.     ADDITIONAL REVIEW OF SYSTEMS:    MEDICATIONS  (STANDING):  aspirin enteric coated 81 milliGRAM(s) Oral daily  famotidine    Tablet 20 milliGRAM(s) Oral daily  ferrous    sulfate 325 milliGRAM(s) Oral daily  gabapentin 400 milliGRAM(s) Oral at bedtime  haloperidol     Tablet 0.5 milliGRAM(s) Oral three times a day  heparin   Injectable 5000 Unit(s) SubCutaneous every 12 hours  metoprolol tartrate 25 milliGRAM(s) Oral two times a day  mirtazapine Soltab 15 milliGRAM(s) Oral at bedtime  polyethylene glycol 3350 17 Gram(s) Oral two times a day  senna 2 Tablet(s) Oral at bedtime    MEDICATIONS  (PRN):  acetaminophen     Tablet .. 650 milliGRAM(s) Oral every 6 hours PRN Temp greater or equal to 38C (100.4F), Mild Pain (1 - 3)  aluminum hydroxide/magnesium hydroxide/simethicone Suspension 30 milliLiter(s) Oral every 4 hours PRN Dyspepsia  haloperidol    Injectable 1 milliGRAM(s) IntraMuscular every 6 hours PRN Severe Agitation  melatonin 3 milliGRAM(s) Oral at bedtime PRN Insomnia  ondansetron Injectable 4 milliGRAM(s) IV Push every 8 hours PRN Nausea and/or Vomiting      I&O's Summary    04 Dec 2023 07:01  -  05 Dec 2023 07:00  --------------------------------------------------------  IN: 0 mL / OUT: 0 mL / NET: 0 mL        PHYSICAL EXAM:  Vital Signs Last 24 Hrs  T(C): 37.1 (06 Dec 2023 09:21), Max: 37.1 (06 Dec 2023 09:21)  T(F): 98.7 (06 Dec 2023 09:21), Max: 98.7 (06 Dec 2023 09:21)  HR: 64 (06 Dec 2023 09:21) (58 - 80)  BP: 148/73 (06 Dec 2023 09:21) (129/91 - 167/73)  BP(mean): --  RR: 18 (06 Dec 2023 09:21) (16 - 18)  SpO2: 95% (06 Dec 2023 09:21) (95% - 100%)    Parameters below as of 06 Dec 2023 09:21  Patient On (Oxygen Delivery Method): room air      GENERAL: NAD  HEENT: [place], [place]/AT, EOMI, PERRL  NECK: Supple, No JVD  CHEST/LUNG: CTAB, no increased WOB  HEART: RRR, no m/r/g  ABDOMEN: soft, NT, ND, BS+  EXTREMITIES:  2+ peripheral pulses, no LE edema, Legs curled up (wheelchair bound)   NERVOUS SYSTEM:  A&Ox1, no focal deficits  SKIN: No rashes or lesions visible over extremities      LABS:                          10.5   5.87  )-----------( 388      ( 06 Dec 2023 03:25 )             32.8     12-06    142  |  103  |  23  ----------------------------<  149<H>  3.7   |  32<H>  |  0.49<L>    Ca    9.4      06 Dec 2023 03:25  Phos  2.6     12-06  Mg     1.90     12-06      Catheterized Catheterized  12-03 @ 09:15   >100,000 CFU/ml Proteus mirabilis ESBL  >100,000 CFU/ml Enterococcus faecalis  --  Proteus mirabilis ESBL  Enterococcus faecalis

## 2023-12-06 NOTE — CONSULT NOTE ADULT - ASSESSMENT
89-yo F w/ PMH of vascular dementia, bipolar I, CVA, GERD, H pylori s/p tx, and diverticulosis, sent in from NH for AMS. Found to have UCx+ w/ ESBL Proteus and E faecalis.    #UTI  #AMS  #Dementia  #Debility  #At risk for aspiration  - Dementia at baseline (house staff note from last admission reports A&O x1 from 11/27). Sent in for AMS. Possibly a/w UTI?  - Hemodynamically stable. No leukocytosis. Would tx for simple cystitis  - UCx w/ ESBL Proteus and E faecalis  -  89-yo F w/ PMH of vascular dementia, bipolar I, CVA, GERD, H pylori s/p tx, and diverticulosis, sent in from NH for AMS. Found to have UCx+ w/ ESBL Proteus and E faecalis.    #UTI  #AMS  #Dementia  #Debility  #At risk for aspiration  - Dementia at baseline (house staff note from last admission reports A&O x1 from 11/27). Sent in for AMS. Possibly a/w UTI?  - Patient denied dysuria, however unreliable 2/2 dementia. UA w/ high WBC count and UCx w/ ESBL Proteus and E faecalis. Hemodynamically stable. No leukocytosis. Would tx for simple cystitis  - Contacted microbiology lab for fosfomycin susceptibility of E faecalis. In vitro activity for Proteus has been shown in literature, however its use for Proteus is not indicated at this time.  - Would d/c ertapenem and provide a single dose tobramycin 150 mg IV x1 (for Proteus)  - If E faecalis is susceptible to fosfomycin, would provide 3g PO x1 (single dose). Otherwise, amoxicillin 500 mg PO Q8H x5 days for E faecalis.    Plan discussed with primary team ACP.  Thank you for this consult. Inpatient ID consult team will discontinue active follow-up for this patient.    Further changes in lab values, imaging studies, or clinical status will not be known to ID inpatient consultants unless specifically communicated by primary team.    Kareem Van MD, PhD  Attending Physician  Division of Infectious Diseases  Department of Medicine    Please contact through MS Teams message.  Office: [phone] (after 5 PM or weekend)   89-yo F w/ PMH of vascular dementia, bipolar I, CVA, GERD, H pylori s/p tx, and diverticulosis, sent in from NH for AMS. Found to have UCx+ w/ ESBL Proteus and E faecalis.    #UTI  #AMS  #Dementia  #Debility  #At risk for aspiration  - Dementia at baseline (house staff note from last admission reports A&O x1 from 11/27). Sent in for AMS. Possibly a/w UTI?  - Patient denied dysuria, however unreliable 2/2 dementia. UA w/ high WBC count and UCx w/ ESBL Proteus and E faecalis. Hemodynamically stable. No leukocytosis. Would tx for simple cystitis  - Contacted microbiology lab for fosfomycin susceptibility of E faecalis. In vitro activity for Proteus has been shown in literature, however its use for Proteus is not indicated at this time.  - Would d/c ertapenem and provide a single dose tobramycin 150 mg IV x1 (for Proteus)  - If E faecalis is susceptible to fosfomycin, would provide 3g PO x1 (single dose). Otherwise, amoxicillin 500 mg PO Q8H x5 days for E faecalis.    Plan discussed with primary team ACP.  Thank you for this consult. Inpatient ID consult team will discontinue active follow-up for this patient.    Further changes in lab values, imaging studies, or clinical status will not be known to ID inpatient consultants unless specifically communicated by primary team.    Kareem Van MD, PhD  Attending Physician  Division of Infectious Diseases  Department of Medicine    Please contact through MS Teams message.  Office: [phone] (after 5 PM or weekend)   89-yo F w/ PMH of vascular dementia, bipolar I, CVA, GERD, H pylori s/p tx, and diverticulosis, sent in from NH for AMS. Found to have UCx+ w/ ESBL Proteus and E faecalis.    #Acute cystitis w/ hematuria  #AMS  #Dementia  #Debility  #At risk for aspiration  - Dementia at baseline (house staff note from last admission reports A&O x1 from 11/27). Sent in for AMS. Possibly a/w UTI?  - Patient denied dysuria, however unreliable 2/2 dementia. UA w/ high WBC count w/ small blood, and UCx w/ ESBL Proteus and E faecalis. Hemodynamically stable. No leukocytosis. Would tx for simple cystitis  - Contacted microbiology lab for fosfomycin susceptibility of E faecalis. In vitro activity for Proteus has been shown in literature, however its use for Proteus is not indicated at this time.  - Would d/c ertapenem and provide a single dose tobramycin 150 mg IV x1 (for Proteus)  - If E faecalis is susceptible to fosfomycin, would provide 3g PO x1 (single dose). Otherwise, amoxicillin 500 mg PO Q8H x5 days for E faecalis.    Plan discussed with primary team ACP.  Thank you for this consult. Inpatient ID consult team will discontinue active follow-up for this patient.    Further changes in lab values, imaging studies, or clinical status will not be known to ID inpatient consultants unless specifically communicated by primary team.    Kareem Van MD, PhD  Attending Physician  Division of Infectious Diseases  Department of Medicine    Please contact through MS Teams message.  Office: [phone] (after 5 PM or weekend)   89-yo F w/ PMH of vascular dementia, bipolar I, CVA, GERD, H pylori s/p tx, and diverticulosis, sent in from NH for AMS. Found to have UCx+ w/ ESBL Proteus and E faecalis.    #Acute cystitis w/ hematuria  #AMS  #Dementia  #Debility  #At risk for aspiration  - Dementia at baseline (house staff note from last admission reports A&O x1 from 11/27). Sent in for AMS. Possibly a/w UTI?  - Patient denied dysuria, however unreliable 2/2 dementia. UA w/ high WBC count w/ small blood, and UCx w/ ESBL Proteus and E faecalis. Hemodynamically stable. No leukocytosis. Would tx for simple cystitis  - Contacted microbiology lab for fosfomycin susceptibility of E faecalis. In vitro activity for Proteus has been shown in literature, however its use for Proteus is not indicated at this time.  - Would d/c ertapenem and provide a single dose tobramycin 150 mg IV x1 (for Proteus)  - If E faecalis is susceptible to fosfomycin, would provide 3g PO x1 (single dose). Otherwise, amoxicillin 500 mg PO Q8H x5 days for E faecalis.    Plan discussed with primary team ACP.  Thank you for this consult. Inpatient ID consult team will discontinue active follow-up for this patient.    Further changes in lab values, imaging studies, or clinical status will not be known to ID inpatient consultants unless specifically communicated by primary team.    Kareem Van MD, PhD  Attending Physician  Division of Infectious Diseases  Department of Medicine    Please contact through MS Teams message.  Office: [phone] (after 5 PM or weekend)   89-yo F w/ PMH of vascular dementia, bipolar I, CVA, GERD, H pylori s/p tx, and diverticulosis, sent in from NH for AMS. Found to have UCx+ w/ ESBL Proteus and E faecalis.    #Acute cystitis w/ hematuria  #AMS  #Dementia  #Debility  #At risk for aspiration  - Dementia at baseline (house staff note from last admission reports A&O x1 from 11/27). Sent in for AMS. Possibly a/w UTI?  - Patient denied dysuria, however unreliable 2/2 dementia. UA w/ high WBC count w/ small blood, and UCx w/ ESBL Proteus and E faecalis. Hemodynamically stable. No leukocytosis. Would tx for simple cystitis  - Contacted microbiology lab for fosfomycin susceptibility of E faecalis. In vitro activity for Proteus has been shown in literature, however its use for Proteus is not indicated at this time.  - Would d/c ertapenem and provide a single dose tobramycin 150 mg IV x1 (for Proteus)  - If E faecalis is susceptible to fosfomycin, would provide 3g PO x1 (single dose). Otherwise, amoxicillin 500 mg PO Q8H x5 days for E faecalis.  - Bed head elevation, aspiration precautions    Plan discussed with primary team ACP.  Thank you for this consult. Inpatient ID consult team will discontinue active follow-up for this patient.    Further changes in lab values, imaging studies, or clinical status will not be known to ID inpatient consultants unless specifically communicated by primary team.    Kareem Van MD, PhD  Attending Physician  Division of Infectious Diseases  Department of Medicine    Please contact through MS Teams message.  Office: [phone] (after 5 PM or weekend)   89-yo F w/ PMH of vascular dementia, bipolar I, CVA, GERD, H pylori s/p tx, and diverticulosis, sent in from NH for AMS. Found to have UCx+ w/ ESBL Proteus and E faecalis.    #Acute cystitis w/ hematuria  #AMS  #Dementia  #Debility  #At risk for aspiration  - Dementia at baseline (house staff note from last admission reports A&O x1 from 11/27). Sent in for AMS. Possibly a/w UTI?  - Patient denied dysuria, however unreliable 2/2 dementia. UA w/ high WBC count w/ small blood, and UCx w/ ESBL Proteus and E faecalis. Hemodynamically stable. No leukocytosis. Would tx for simple cystitis  - Contacted microbiology lab for fosfomycin susceptibility of E faecalis. In vitro activity for Proteus has been shown in literature, however its use for Proteus is not indicated at this time.  - Would d/c ertapenem and provide a single dose tobramycin 150 mg IV x1 (for Proteus)  - If E faecalis is susceptible to fosfomycin, would provide 3g PO x1 (single dose). Otherwise, amoxicillin 500 mg PO Q8H x5 days for E faecalis.  - Bed head elevation, aspiration precautions    Plan discussed with primary team ACP.  Thank you for this consult. Inpatient ID consult team will discontinue active follow-up for this patient.    Further changes in lab values, imaging studies, or clinical status will not be known to ID inpatient consultants unless specifically communicated by primary team.    Kareem Van MD, PhD  Attending Physician  Division of Infectious Diseases  Department of Medicine    Please contact through MS Teams message.  Office: [phone] (after 5 PM or weekend)

## 2023-12-06 NOTE — CONSULT NOTE ADULT - SUBJECTIVE AND OBJECTIVE BOX
Patient is a 89y old  Female who presents with a chief complaint of Altered mental status     (05 Dec 2023 15:19)      HPI:  Patient is an 88 y/o F with a history of vascular dementia, bipolar I, CVA, diverticulosis, and GERD, H pylori s/p treatment now presenting from NH with altered mentation. Of note, patient with recent admission approximately 2 weeks ago due to worsening confusion and hallucinations. 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 Patient was ultimately discharged to NH as family was unable to care for her at home. Patient now sent in from her NH due to worsening confusion and lethargy. Patient unable to meaningfully participate in interview due to mental status. Collateral obtained from patient's daughter, Kathryn. Overall patient was doing well prior to most recent discharge from the hospital, however declined while at her NH. Family otherwise unsure of any other developments that may have occurred as they have not visited her regularly in the NH.    ED Course: patient given NS 500cc, CTX 1g and haldol 2.5mg IV x2 for agitation. (03 Dec 2023 17:02)    Above noted.  VSS and no leukocytosis during the hospitalization. UA w/ mod leuk, no nitrites, , and few martine. UCx w/ ESBL Proteus and E faecalis (S-amp, vanc). Given ceftriaxone 12/3-5, switched to ertapenem 12/6.       prior hospital charts reviewed [  ]  primary team notes reviewed [X]  other consultant notes reviewed [X]    PAST MEDICAL & SURGICAL HISTORY:  Anxiety      CVA (Cerebral Infarction)  2007 per daughter      Diverticulosis of intestine      GERD (gastroesophageal reflux disease)      Asthma      Cerebrovascular accident (CVA), unspecified mechanism      Vertebral fracture, osteoporotic      Dementia      S/P Tonsillectomy      History of tonsillectomy          Allergies  iodine (Unknown)  IV Contrast (Unknown)  IV DYE- burning, head tingling (Unknown)  novacaine (Unknown)        ANTIMICROBIALS:  ertapenem  IVPB 1000 every 24 hours      OTHER MEDS: MEDICATIONS  (STANDING):  acetaminophen     Tablet .. 650 every 6 hours PRN  aluminum hydroxide/magnesium hydroxide/simethicone Suspension 30 every 4 hours PRN  aspirin enteric coated 81 daily  bisacodyl 5 every 12 hours PRN  famotidine    Tablet 20 daily  gabapentin 400 at bedtime  haloperidol     Tablet 0.5 three times a day  haloperidol    Injectable 1 every 6 hours PRN  heparin   Injectable 5000 every 12 hours  melatonin 3 at bedtime PRN  metoprolol tartrate 25 two times a day  mirtazapine Soltab 15 at bedtime  ondansetron Injectable 4 every 8 hours PRN  polyethylene glycol 3350 17 two times a day  senna 2 at bedtime      SOCIAL HISTORY:   hx smoking  non-smoker    FAMILY HISTORY:  Family history of amyotrophic lateral sclerosis  Daughter    Family history of multiple sclerosis (Child)        REVIEW OF SYSTEMS  [X] ROS unobtainable because:  dementia  [  ] All other systems negative except as noted below:	    Constitutional:  [ ] fever [ ] chills  [ ] weight loss  [ ] weakness  Skin:  [ ] rash [ ] phlebitis	  Eyes: [ ] icterus [ ] pain  [ ] discharge	  ENMT: [ ] sore throat  [ ] thrush [ ] ulcers [ ] exudates  Respiratory: [ ] dyspnea [ ] hemoptysis [ ] cough [ ] sputum	  Cardiovascular:  [ ] chest pain [ ] palpitations [ ] edema	  Gastrointestinal:  [ ] nausea [ ] vomiting [ ] diarrhea [ ] constipation [ ] pain	  Genitourinary:  [ ] dysuria [ ] frequency [ ] hematuria [ ] discharge [ ] flank pain  [ ] incontinence  Musculoskeletal:  [ ] myalgias [ ] arthralgias [ ] arthritis  [ ] back pain  Neurological:  [ ] headache [ ] seizures  [ ] confusion/altered mental status  Psychiatric:  [ ] anxiety [ ] depression	  Hematology/Lymphatics:  [ ] lymphadenopathy  Endocrine:  [ ] adrenal [ ] thyroid  Allergic/Immunologic:	 [ ] transplant [ ] seasonal    Vital Signs Last 24 Hrs  T(F): 98.7 (12-06-23 @ 09:21), Max: 99.3 (12-02-23 @ 20:35)    Vital Signs Last 24 Hrs  HR: 64 (12-06-23 @ 09:21) (58 - 80)  BP: 148/73 (12-06-23 @ 09:21) (129/91 - 167/73)  RR: 18 (12-06-23 @ 09:21)  SpO2: 95% (12-06-23 @ 09:21) (95% - 100%)  Wt(kg): --    PHYSICAL EXAM:  Constitutional: non-toxic, no distress  HEAD/EYES: anicteric, no conjunctival injection  ENT:  supple, no thrush  Cardiovascular:   normal S1, S2, no murmur, no edema  Respiratory:  clear BS bilaterally, no wheezes, no rales  GI:  soft, non-tender, normal bowel sounds  :  no marino, no CVA tenderness  Musculoskeletal:  no synovitis, normal ROM  Neurologic: awake and alert, normal strength, no focal findings  Skin:  no rash, no erythema, no phlebitis  Heme/Onc: no lymphadenopathy   Psychiatric:  awake, alert, appropriate mood                                10.5   5.87  )-----------( 388      ( 06 Dec 2023 03:25 )             32.8       12-06    142  |  103  |  23  ----------------------------<  149<H>  3.7   |  32<H>  |  0.49<L>    Ca    9.4      06 Dec 2023 03:25  Phos  2.6     12-06  Mg     1.90     12-06        Urinalysis Basic - ( 06 Dec 2023 03:25 )    Color: x / Appearance: x / SG: x / pH: x  Gluc: 149 mg/dL / Ketone: x  / Bili: x / Urobili: x   Blood: x / Protein: x / Nitrite: x   Leuk Esterase: x / RBC: x / WBC x   Sq Epi: x / Non Sq Epi: x / Bacteria: x        MICROBIOLOGY:        Culture - Urine (12.03.23 @ 09:15)   - Amoxicillin/Clavulanic Acid: S <=8/4  - Ampicillin: R >16 These ampicillin results predict results for amoxicillin  - Ampicillin: S <=2 Predicts results to ampicillin/sulbactam, amoxacillin-clavulanate and piperacillin-tazobactam.  - Ampicillin/Sulbactam: S 8/4  - Aztreonam: R >16  - Cefazolin: R >16 For uncomplicated UTI with K. pneumoniae, E. coli, or P. mirablis: ANUJA <=16 is sensitive and ANUJA >=32 is resistant. This also predicts results for oral agents cefaclor, cefdinir, cefpodoxime, cefprozil, cefuroxime axetil, cephalexin and locarbef for uncomplicated UTI. Note that some isolates may be susceptible to these agents while testing resistant to cefazolin.  - Cefepime: R >16  - Ceftriaxone: R >32  - Cefuroxime: R >16  - Ciprofloxacin: R >2  - Ciprofloxacin: S <=1  - Ertapenem: S <=0.5  - Gentamicin: S <=2  - Levofloxacin: R >4  - Levofloxacin: S <=0.5  - Meropenem: S <=1  - Nitrofurantoin: R >64 Should not be used to treat pyelonephritis  - Nitrofurantoin: S <=32 Should not be used to treat pyelonephritis.  - Piperacillin/Tazobactam: S <=8  - Tetracycline: R >8  - Tobramycin: S <=2  - Trimethoprim/Sulfamethoxazole: R >2/38  - Vancomycin: S 2  Specimen Source: Catheterized Catheterized  Culture Results:   >100,000 CFU/ml Proteus mirabilis ESBL   >100,000 CFU/ml Enterococcus faecalis  Organism Identification: Proteus mirabilis ESBL   Enterococcus faecalis  Organism: Proteus mirabilis ESBL  Organism: Enterococcus faecalis  Method Type: ANUJA  Method Type: ANUJA              RADIOLOGY:  imaging below personally reviewed  < from: Xray Abdomen 1 View PORTABLE -Routine (Xray Abdomen 1 View PORTABLE -Routine .) (12.04.23 @ 14:44) >  IMPRESSION: Limited by patient positioning.    There is a moderate to large amount of stool in colon. No evidence of   bowel obstruction or free air.    < end of copied text >    < from: CT Brain Stroke Protocol (12.02.23 @ 20:02) >  IMPRESSION:  No CT evidence of acute intracranial pathology.    Redemonstration of moderate disproportionate ventriculomegaly which may   be related to central atrophy versus normal pressure hydrocephalus in  proper clinical scenario.  Correlate with clinical symptoms and physical   exam findings.    Moderate to severe confluent chronic microvascular ischemic disease.    No significant interval change since 11/24/2023.    < end of copied text >   Patient is a 89y old  Female who presents with a chief complaint of Altered mental status     (05 Dec 2023 15:19)      HPI:  Patient is an 88 y/o F with a history of vascular dementia, bipolar I, CVA, diverticulosis, and GERD, H pylori s/p treatment now presenting from NH with altered mentation. Of note, patient with recent admission approximately 2 weeks ago due to worsening confusion and hallucinations. 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 Patient was ultimately discharged to NH as family was unable to care for her at home. Patient now sent in from her NH due to worsening confusion and lethargy. Patient unable to meaningfully participate in interview due to mental status. Collateral obtained from patient's daughter, Kathryn. Overall patient was doing well prior to most recent discharge from the hospital, however declined while at her NH. Family otherwise unsure of any other developments that may have occurred as they have not visited her regularly in the NH.    ED Course: patient given NS 500cc, CTX 1g and haldol 2.5mg IV x2 for agitation. (03 Dec 2023 17:02)    Above noted.  VSS and no leukocytosis during the hospitalization. UA w/ mod leuk, no nitrites, , and few martine. UCx w/ ESBL Proteus and E faecalis (S-amp, vanc). Given ceftriaxone 12/3-5, switched to ertapenem 12/6.     Patient was seen and examined at bedside. Eating lunch with assistance. A&O x1 (self). Answers in one or two words to select questions. ROS unable to assess. Does not carry conversation. Denies fever or pain when prompted. Denied burning on urination.      prior hospital charts reviewed [  ]  primary team notes reviewed [X]  other consultant notes reviewed [X]    PAST MEDICAL & SURGICAL HISTORY:  Anxiety      CVA (Cerebral Infarction)  2007 per daughter      Diverticulosis of intestine      GERD (gastroesophageal reflux disease)      Asthma      Cerebrovascular accident (CVA), unspecified mechanism      Vertebral fracture, osteoporotic      Dementia      S/P Tonsillectomy      History of tonsillectomy          Allergies  iodine (Unknown)  IV Contrast (Unknown)  IV DYE- burning, head tingling (Unknown)  novacaine (Unknown)        ANTIMICROBIALS:  ertapenem  IVPB 1000 every 24 hours      OTHER MEDS: MEDICATIONS  (STANDING):  acetaminophen     Tablet .. 650 every 6 hours PRN  aluminum hydroxide/magnesium hydroxide/simethicone Suspension 30 every 4 hours PRN  aspirin enteric coated 81 daily  bisacodyl 5 every 12 hours PRN  famotidine    Tablet 20 daily  gabapentin 400 at bedtime  haloperidol     Tablet 0.5 three times a day  haloperidol    Injectable 1 every 6 hours PRN  heparin   Injectable 5000 every 12 hours  melatonin 3 at bedtime PRN  metoprolol tartrate 25 two times a day  mirtazapine Soltab 15 at bedtime  ondansetron Injectable 4 every 8 hours PRN  polyethylene glycol 3350 17 two times a day  senna 2 at bedtime      SOCIAL HISTORY:     No known tobacco or illicit drug use history    FAMILY HISTORY:  Family history of amyotrophic lateral sclerosis  Daughter    Family history of multiple sclerosis (Child)        REVIEW OF SYSTEMS  [X] ROS unobtainable because:  dementia  [  ] All other systems negative except as noted below:	    Constitutional:  [ ] fever [ ] chills  [ ] weight loss  [ ] weakness  Skin:  [ ] rash [ ] phlebitis	  Eyes: [ ] icterus [ ] pain  [ ] discharge	  ENMT: [ ] sore throat  [ ] thrush [ ] ulcers [ ] exudates  Respiratory: [ ] dyspnea [ ] hemoptysis [ ] cough [ ] sputum	  Cardiovascular:  [ ] chest pain [ ] palpitations [ ] edema	  Gastrointestinal:  [ ] nausea [ ] vomiting [ ] diarrhea [ ] constipation [ ] pain	  Genitourinary:  [ ] dysuria [ ] frequency [ ] hematuria [ ] discharge [ ] flank pain  [ ] incontinence  Musculoskeletal:  [ ] myalgias [ ] arthralgias [ ] arthritis  [ ] back pain  Neurological:  [ ] headache [ ] seizures  [ ] confusion/altered mental status  Psychiatric:  [ ] anxiety [ ] depression	  Hematology/Lymphatics:  [ ] lymphadenopathy  Endocrine:  [ ] adrenal [ ] thyroid  Allergic/Immunologic:	 [ ] transplant [ ] seasonal    Vital Signs Last 24 Hrs  T(F): 98.7 (12-06-23 @ 09:21), Max: 99.3 (12-02-23 @ 20:35)    Vital Signs Last 24 Hrs  HR: 64 (12-06-23 @ 09:21) (58 - 80)  BP: 148/73 (12-06-23 @ 09:21) (129/91 - 167/73)  RR: 18 (12-06-23 @ 09:21)  SpO2: 95% (12-06-23 @ 09:21) (95% - 100%)  Wt(kg): --    PHYSICAL EXAM:  Constitutional: cachectic, nontoxic at rest  HEAD/EYES: anicteric, no conjunctival injection  ENT:  dry oral mucosa  Cardiovascular:   normal S1, S2, no murmur, RRR  Respiratory:  clear BS bilaterally, no wheezes, no rales  GI:  soft, non-tender  :  no suprapubic tenderness; no CVA tenderness  Musculoskeletal:  no BLE edema  Neurologic: awake and oriented x1 (self)  Skin:  no phlebitis  Heme/Onc: no anterior cervical lymphadenopathy  Psychiatric:  awake, alert                                10.5   5.87  )-----------( 388      ( 06 Dec 2023 03:25 )             32.8       12-06    142  |  103  |  23  ----------------------------<  149<H>  3.7   |  32<H>  |  0.49<L>    Ca    9.4      06 Dec 2023 03:25  Phos  2.6     12-06  Mg     1.90     12-06        Urinalysis Basic - ( 06 Dec 2023 03:25 )    Color: x / Appearance: x / SG: x / pH: x  Gluc: 149 mg/dL / Ketone: x  / Bili: x / Urobili: x   Blood: x / Protein: x / Nitrite: x   Leuk Esterase: x / RBC: x / WBC x   Sq Epi: x / Non Sq Epi: x / Bacteria: x        MICROBIOLOGY:        Culture - Urine (12.03.23 @ 09:15)   - Amoxicillin/Clavulanic Acid: S <=8/4  - Ampicillin: R >16 These ampicillin results predict results for amoxicillin  - Ampicillin: S <=2 Predicts results to ampicillin/sulbactam, amoxacillin-clavulanate and piperacillin-tazobactam.  - Ampicillin/Sulbactam: S 8/4  - Aztreonam: R >16  - Cefazolin: R >16 For uncomplicated UTI with K. pneumoniae, E. coli, or P. mirablis: ANUJA <=16 is sensitive and ANUJA >=32 is resistant. This also predicts results for oral agents cefaclor, cefdinir, cefpodoxime, cefprozil, cefuroxime axetil, cephalexin and locarbef for uncomplicated UTI. Note that some isolates may be susceptible to these agents while testing resistant to cefazolin.  - Cefepime: R >16  - Ceftriaxone: R >32  - Cefuroxime: R >16  - Ciprofloxacin: R >2  - Ciprofloxacin: S <=1  - Ertapenem: S <=0.5  - Gentamicin: S <=2  - Levofloxacin: R >4  - Levofloxacin: S <=0.5  - Meropenem: S <=1  - Nitrofurantoin: R >64 Should not be used to treat pyelonephritis  - Nitrofurantoin: S <=32 Should not be used to treat pyelonephritis.  - Piperacillin/Tazobactam: S <=8  - Tetracycline: R >8  - Tobramycin: S <=2  - Trimethoprim/Sulfamethoxazole: R >2/38  - Vancomycin: S 2  Specimen Source: Catheterized Catheterized  Culture Results:   >100,000 CFU/ml Proteus mirabilis ESBL   >100,000 CFU/ml Enterococcus faecalis  Organism Identification: Proteus mirabilis ESBL   Enterococcus faecalis  Organism: Proteus mirabilis ESBL  Organism: Enterococcus faecalis  Method Type: ANUJA  Method Type: ANUJA              RADIOLOGY:  imaging below personally reviewed  < from: Xray Abdomen 1 View PORTABLE -Routine (Xray Abdomen 1 View PORTABLE -Routine .) (12.04.23 @ 14:44) >  IMPRESSION: Limited by patient positioning.    There is a moderate to large amount of stool in colon. No evidence of   bowel obstruction or free air.    < end of copied text >    < from: CT Brain Stroke Protocol (12.02.23 @ 20:02) >  IMPRESSION:  No CT evidence of acute intracranial pathology.    Redemonstration of moderate disproportionate ventriculomegaly which may   be related to central atrophy versus normal pressure hydrocephalus in  proper clinical scenario.  Correlate with clinical symptoms and physical   exam findings.    Moderate to severe confluent chronic microvascular ischemic disease.    No significant interval change since 11/24/2023.    < end of copied text >

## 2023-12-06 NOTE — CHART NOTE - NSCHARTNOTEFT_GEN_A_CORE
Urine Cultures: >100,000 CFU/ml Proteus mirabilis ESBL, Resistant to Rocephin  Started On Ertapenem 1g QD on (12/6- )

## 2023-12-06 NOTE — PROVIDER CONTACT NOTE (OTHER) - ACTION/TREATMENT ORDERED:
Provider notified and stated okay to hold haldol.
Provider notified and stated okay to hold haldol. Metoprolol given. no orders at this time.
provider made aware, stated okay to push medications. endorsed to day nurse

## 2023-12-06 NOTE — PROVIDER CONTACT NOTE (OTHER) - ASSESSMENT
patient is currently calm and cooperative.
patient is currently calm and cooperative. /73, HR 77, O2 sat  97%.
patient bp 146/79, HR 73, 97%, RR 17, temp 99.0. patient arousable. A&Ox0/1.

## 2023-12-07 LAB
ANION GAP SERPL CALC-SCNC: 9 MMOL/L — SIGNIFICANT CHANGE UP (ref 7–14)
ANION GAP SERPL CALC-SCNC: 9 MMOL/L — SIGNIFICANT CHANGE UP (ref 7–14)
BUN SERPL-MCNC: 21 MG/DL — SIGNIFICANT CHANGE UP (ref 7–23)
BUN SERPL-MCNC: 21 MG/DL — SIGNIFICANT CHANGE UP (ref 7–23)
CALCIUM SERPL-MCNC: 9.3 MG/DL — SIGNIFICANT CHANGE UP (ref 8.4–10.5)
CALCIUM SERPL-MCNC: 9.3 MG/DL — SIGNIFICANT CHANGE UP (ref 8.4–10.5)
CHLORIDE SERPL-SCNC: 105 MMOL/L — SIGNIFICANT CHANGE UP (ref 98–107)
CHLORIDE SERPL-SCNC: 105 MMOL/L — SIGNIFICANT CHANGE UP (ref 98–107)
CO2 SERPL-SCNC: 33 MMOL/L — HIGH (ref 22–31)
CO2 SERPL-SCNC: 33 MMOL/L — HIGH (ref 22–31)
CREAT SERPL-MCNC: 0.44 MG/DL — LOW (ref 0.5–1.3)
CREAT SERPL-MCNC: 0.44 MG/DL — LOW (ref 0.5–1.3)
CULTURE RESULTS: ABNORMAL
CULTURE RESULTS: ABNORMAL
EGFR: 92 ML/MIN/1.73M2 — SIGNIFICANT CHANGE UP
EGFR: 92 ML/MIN/1.73M2 — SIGNIFICANT CHANGE UP
GLUCOSE SERPL-MCNC: 115 MG/DL — HIGH (ref 70–99)
GLUCOSE SERPL-MCNC: 115 MG/DL — HIGH (ref 70–99)
HCT VFR BLD CALC: 31.2 % — LOW (ref 34.5–45)
HCT VFR BLD CALC: 31.2 % — LOW (ref 34.5–45)
HGB BLD-MCNC: 9.8 G/DL — LOW (ref 11.5–15.5)
HGB BLD-MCNC: 9.8 G/DL — LOW (ref 11.5–15.5)
MAGNESIUM SERPL-MCNC: 1.9 MG/DL — SIGNIFICANT CHANGE UP (ref 1.6–2.6)
MAGNESIUM SERPL-MCNC: 1.9 MG/DL — SIGNIFICANT CHANGE UP (ref 1.6–2.6)
MCHC RBC-ENTMCNC: 28.2 PG — SIGNIFICANT CHANGE UP (ref 27–34)
MCHC RBC-ENTMCNC: 28.2 PG — SIGNIFICANT CHANGE UP (ref 27–34)
MCHC RBC-ENTMCNC: 31.4 GM/DL — LOW (ref 32–36)
MCHC RBC-ENTMCNC: 31.4 GM/DL — LOW (ref 32–36)
MCV RBC AUTO: 89.7 FL — SIGNIFICANT CHANGE UP (ref 80–100)
MCV RBC AUTO: 89.7 FL — SIGNIFICANT CHANGE UP (ref 80–100)
NRBC # BLD: 0 /100 WBCS — SIGNIFICANT CHANGE UP (ref 0–0)
NRBC # BLD: 0 /100 WBCS — SIGNIFICANT CHANGE UP (ref 0–0)
NRBC # FLD: 0 K/UL — SIGNIFICANT CHANGE UP (ref 0–0)
NRBC # FLD: 0 K/UL — SIGNIFICANT CHANGE UP (ref 0–0)
PHOSPHATE SERPL-MCNC: 2.6 MG/DL — SIGNIFICANT CHANGE UP (ref 2.5–4.5)
PHOSPHATE SERPL-MCNC: 2.6 MG/DL — SIGNIFICANT CHANGE UP (ref 2.5–4.5)
PLATELET # BLD AUTO: 342 K/UL — SIGNIFICANT CHANGE UP (ref 150–400)
PLATELET # BLD AUTO: 342 K/UL — SIGNIFICANT CHANGE UP (ref 150–400)
POTASSIUM SERPL-MCNC: 3.6 MMOL/L — SIGNIFICANT CHANGE UP (ref 3.5–5.3)
POTASSIUM SERPL-MCNC: 3.6 MMOL/L — SIGNIFICANT CHANGE UP (ref 3.5–5.3)
POTASSIUM SERPL-SCNC: 3.6 MMOL/L — SIGNIFICANT CHANGE UP (ref 3.5–5.3)
POTASSIUM SERPL-SCNC: 3.6 MMOL/L — SIGNIFICANT CHANGE UP (ref 3.5–5.3)
RBC # BLD: 3.48 M/UL — LOW (ref 3.8–5.2)
RBC # BLD: 3.48 M/UL — LOW (ref 3.8–5.2)
RBC # FLD: 15.4 % — HIGH (ref 10.3–14.5)
RBC # FLD: 15.4 % — HIGH (ref 10.3–14.5)
SODIUM SERPL-SCNC: 147 MMOL/L — HIGH (ref 135–145)
SODIUM SERPL-SCNC: 147 MMOL/L — HIGH (ref 135–145)
WBC # BLD: 6.49 K/UL — SIGNIFICANT CHANGE UP (ref 3.8–10.5)
WBC # BLD: 6.49 K/UL — SIGNIFICANT CHANGE UP (ref 3.8–10.5)
WBC # FLD AUTO: 6.49 K/UL — SIGNIFICANT CHANGE UP (ref 3.8–10.5)
WBC # FLD AUTO: 6.49 K/UL — SIGNIFICANT CHANGE UP (ref 3.8–10.5)

## 2023-12-07 PROCEDURE — 90792 PSYCH DIAG EVAL W/MED SRVCS: CPT

## 2023-12-07 PROCEDURE — 99233 SBSQ HOSP IP/OBS HIGH 50: CPT

## 2023-12-07 RX ORDER — HALOPERIDOL DECANOATE 100 MG/ML
0.25 INJECTION INTRAMUSCULAR
Refills: 0 | Status: DISCONTINUED | OUTPATIENT
Start: 2023-12-07 | End: 2023-12-08

## 2023-12-07 RX ORDER — AMOXICILLIN 250 MG/5ML
500 SUSPENSION, RECONSTITUTED, ORAL (ML) ORAL EVERY 8 HOURS
Refills: 0 | Status: COMPLETED | OUTPATIENT
Start: 2023-12-07 | End: 2023-12-12

## 2023-12-07 RX ADMIN — Medication 81 MILLIGRAM(S): at 11:46

## 2023-12-07 RX ADMIN — Medication 25 MILLIGRAM(S): at 18:18

## 2023-12-07 RX ADMIN — HALOPERIDOL DECANOATE 0.5 MILLIGRAM(S): 100 INJECTION INTRAMUSCULAR at 13:34

## 2023-12-07 RX ADMIN — FAMOTIDINE 20 MILLIGRAM(S): 10 INJECTION INTRAVENOUS at 11:46

## 2023-12-07 RX ADMIN — HEPARIN SODIUM 5000 UNIT(S): 5000 INJECTION INTRAVENOUS; SUBCUTANEOUS at 11:45

## 2023-12-07 RX ADMIN — HEPARIN SODIUM 5000 UNIT(S): 5000 INJECTION INTRAVENOUS; SUBCUTANEOUS at 04:32

## 2023-12-07 RX ADMIN — Medication 25 MILLIGRAM(S): at 07:11

## 2023-12-07 RX ADMIN — POLYETHYLENE GLYCOL 3350 17 GRAM(S): 17 POWDER, FOR SOLUTION ORAL at 07:11

## 2023-12-07 RX ADMIN — Medication 500 MILLIGRAM(S): at 18:17

## 2023-12-07 RX ADMIN — HALOPERIDOL DECANOATE 0.25 MILLIGRAM(S): 100 INJECTION INTRAMUSCULAR at 18:18

## 2023-12-07 RX ADMIN — POLYETHYLENE GLYCOL 3350 17 GRAM(S): 17 POWDER, FOR SOLUTION ORAL at 18:16

## 2023-12-07 RX ADMIN — Medication 325 MILLIGRAM(S): at 11:46

## 2023-12-07 NOTE — BH CONSULTATION LIAISON ASSESSMENT NOTE - SUMMARY
89F, , who had been domiciled with daughter and two aides but now presents from NH with PPH of vascular dementia, bipolar I and PMH significant for CVA, diverticulosis, and GERD, H. pylori, who was recently admitted to [facility] in late November 2023 for colitis presents with altered mentation and found to have UTI and ESBL.  Psych CL consulted for medication management as patient more hypoactive.    Patient is delirious.    [] can lower haldol to 0.25mg BID- hold for sedation, lethargy, obtundation  	[] can use haldol 0.25mg BID prn for agitation  [] ensure patient is having bowel movements as this can worsen delirium  [] can continue mirtazapine- but hold for sedation, lethargy, obtundation   	[] similarly, can continue gabapentin HS, however if continues to appear lethargic, may consider further reducing this medication    [] given concern for delirium- team may consider having someone assist patient with feeds to help prevent aspiration, defer this decision to primary team 89F, , who had been domiciled with daughter and two aides but now presents from NH with PPH of vascular dementia, bipolar I and PMH significant for CVA, diverticulosis, and GERD, H. pylori, who was recently admitted to [facility] in late November 2023 for colitis presents with altered mentation and found to have UTI and ESBL.  Psych CL consulted for medication management as patient more hypoactive.    Patient is delirious.    [] can lower haldol to 0.25mg BID- hold for sedation, lethargy, obtundation  	[] can use haldol 0.25mg BID prn for agitation  [] ensure patient is having bowel movements as this can worsen delirium  [] can continue mirtazapine- but hold for sedation, lethargy, obtundation   	[] similarly, can continue gabapentin HS, however if continues to appear lethargic, may consider further reducing this medication    [] given concern for delirium- team may consider having someone assist patient with feeds to help prevent aspiration, defer this decision to primary team

## 2023-12-07 NOTE — BH CONSULTATION LIAISON ASSESSMENT NOTE - NSBHATTESTBILLING_PSY_A_CORE
[id number] diagnostic evaluation with medical services [id number] diagnostic evaluation with medical services

## 2023-12-07 NOTE — BH CONSULTATION LIAISON ASSESSMENT NOTE - ACCESS TO FIREARM
Unable to assess

## 2023-12-07 NOTE — BH CONSULTATION LIAISON ASSESSMENT NOTE - MSE UNSTRUCTURED FT
Limited Exam;  Staring but can track somewhat with eyes  curled up in fetal position  spontaneous slow movements of UE  no catalepsy  perseverates certain words but this extinguishes on its own  impoverished thought process

## 2023-12-07 NOTE — PROGRESS NOTE ADULT - TIME BILLING
Time-based billing (NON-critical care).     [ 55 ] minutes spent on total encounter; more than 50% of the visit was spent counseling and / or coordinating care by the attending physician.  The necessity of the time spent during the encounter on this date of service was due to:     documentation in [place], reviewing chart and coordinating care with patient/ACP and interdisciplinary staff (such as , social workers, etc) as well as reviewing vitals, laboratory data, radiology, medication list, consultants' recommendations and prior records. Interventions were performed as documented above. Time-based billing (NON-critical care).     [ 55 ] minutes spent on total encounter; more than 50% of the visit was spent counseling and / or coordinating care by the attending physician.  The necessity of the time spent during the encounter on this date of service was due to:     documentation in [place], reviewing chart and coordinating care with patient/ACP and interdisciplinary staff (such as , social workers, etc) as well as reviewing vitals, laboratory data, radiology, medication list, consultants' recommendations and prior records. Interventions were performed as documented above.

## 2023-12-07 NOTE — BH CONSULTATION LIAISON ASSESSMENT NOTE - CURRENT MEDICATION
MEDICATIONS  (STANDING):  amoxicillin 500 milliGRAM(s) Oral every 8 hours  aspirin enteric coated 81 milliGRAM(s) Oral daily  famotidine    Tablet 20 milliGRAM(s) Oral daily  ferrous    sulfate 325 milliGRAM(s) Oral daily  gabapentin 400 milliGRAM(s) Oral at bedtime  haloperidol     Tablet 0.25 milliGRAM(s) Oral two times a day  heparin   Injectable 5000 Unit(s) SubCutaneous every 12 hours  metoprolol tartrate 25 milliGRAM(s) Oral two times a day  mirtazapine Soltab 15 milliGRAM(s) Oral at bedtime  polyethylene glycol 3350 17 Gram(s) Oral two times a day  senna 2 Tablet(s) Oral at bedtime    MEDICATIONS  (PRN):  acetaminophen     Tablet .. 650 milliGRAM(s) Oral every 6 hours PRN Temp greater or equal to 38C (100.4F), Mild Pain (1 - 3)  aluminum hydroxide/magnesium hydroxide/simethicone Suspension 30 milliLiter(s) Oral every 4 hours PRN Dyspepsia  bisacodyl 5 milliGRAM(s) Oral every 12 hours PRN Constipation  haloperidol    Injectable 1 milliGRAM(s) IntraMuscular every 6 hours PRN Severe Agitation  melatonin 3 milliGRAM(s) Oral at bedtime PRN Insomnia  ondansetron Injectable 4 milliGRAM(s) IV Push every 8 hours PRN Nausea and/or Vomiting

## 2023-12-07 NOTE — BH CONSULTATION LIAISON ASSESSMENT NOTE - HPI (INCLUDE ILLNESS QUALITY, SEVERITY, DURATION, TIMING, CONTEXT, MODIFYING FACTORS, ASSOCIATED SIGNS AND SYMPTOMS)
89F, , who had been domiciled with daughter and two aides but now presents from NH with PPH of vascular dementia, bipolar I and PMH significant for CVA, diverticulosis, and GERD, H. pylori, who was recently admitted to [facility] in late November 2023 for colitis presents with altered mentation and found to have UTI and ESBL.  Psych CL consulted for medication management as patient more hypoactive.    Patient seen this afternoon. She is alert, staring, tracks somewhat, can slowly spontaneously move both arms. She perseverated on her name when asked, but otherwise interview significantly limited due to poor attention, hypoactivity.  No gross catalepsy or rigidity.    Spoke with daughter over the phone this afternoon to go over plan. 89F, , who had been domiciled with daughter and two aides but now presents from NH with PPH of vascular dementia, bipolar I and PMH significant for CVA, diverticulosis, and GERD, H. pylori, who was recently admitted to [facility] in late November 2023 for colitis presents with altered mentation and found to have UTI and ESBL.  Psych CL consulted for medication management as patient more hypoactive.    Patient seen this afternoon. She is alert, staring, tracks somewhat, can slowly spontaneously move both arms. She perseverated on her name when asked, but otherwise interview significantly limited due to poor attention, hypoactivity.  No gross catalepsy or rigidity.    Spoke with daughter over the phone this afternoon to go over plan.

## 2023-12-07 NOTE — BH CONSULTATION LIAISON ASSESSMENT NOTE - NSBHCHARTREVIEWVS_PSY_A_CORE FT
Vital Signs Last 24 Hrs  T(C): 36.8 (07 Dec 2023 09:48), Max: 36.8 (07 Dec 2023 09:48)  T(F): 98.3 (07 Dec 2023 09:48), Max: 98.3 (07 Dec 2023 09:48)  HR: 71 (07 Dec 2023 09:48) (60 - 79)  BP: 107/69 (07 Dec 2023 09:48) (107/69 - 140/68)  BP(mean): --  RR: 18 (07 Dec 2023 09:48) (17 - 18)  SpO2: 95% (07 Dec 2023 09:48) (95% - 96%)    Parameters below as of 07 Dec 2023 09:48  Patient On (Oxygen Delivery Method): room air

## 2023-12-07 NOTE — PROGRESS NOTE ADULT - PROBLEM SELECTOR PLAN 1
Patient presenting from NH with reported worsening confusion  - patient baseline A&O x 1-2 and minimally verbal, wheelchair bound   - per family c/f worsening confusion and lethargy  - CTH without any acute findings, patient noted with mod to severe chronic microvascular disease  - TSH wnl  - patient noted with positive UA  - current AMS may be from progressive vascular dementia vs TME from UTI vs constipation   - treatment of UTI as below, c/w ASA  - consulted to help adjust medications

## 2023-12-07 NOTE — PROGRESS NOTE ADULT - SUBJECTIVE AND OBJECTIVE BOX
[facility] Division of Hospital Medicine  Chelsea North MD  Hospitalist   Pager [phone]  Avaliable via MS teams     SUBJECTIVE / OVERNIGHT EVENTS: Patient seen and examined. patient laying in bed comfortably, more awake today.    ADDITIONAL REVIEW OF SYSTEMS:    MEDICATIONS  (STANDING):  aspirin enteric coated 81 milliGRAM(s) Oral daily  famotidine    Tablet 20 milliGRAM(s) Oral daily  ferrous    sulfate 325 milliGRAM(s) Oral daily  gabapentin 400 milliGRAM(s) Oral at bedtime  haloperidol     Tablet 0.5 milliGRAM(s) Oral three times a day  heparin   Injectable 5000 Unit(s) SubCutaneous every 12 hours  metoprolol tartrate 25 milliGRAM(s) Oral two times a day  mirtazapine Soltab 15 milliGRAM(s) Oral at bedtime  polyethylene glycol 3350 17 Gram(s) Oral two times a day  senna 2 Tablet(s) Oral at bedtime    MEDICATIONS  (PRN):  acetaminophen     Tablet .. 650 milliGRAM(s) Oral every 6 hours PRN Temp greater or equal to 38C (100.4F), Mild Pain (1 - 3)  aluminum hydroxide/magnesium hydroxide/simethicone Suspension 30 milliLiter(s) Oral every 4 hours PRN Dyspepsia  haloperidol    Injectable 1 milliGRAM(s) IntraMuscular every 6 hours PRN Severe Agitation  melatonin 3 milliGRAM(s) Oral at bedtime PRN Insomnia  ondansetron Injectable 4 milliGRAM(s) IV Push every 8 hours PRN Nausea and/or Vomiting      I&O's Summary    04 Dec 2023 07:01  -  05 Dec 2023 07:00  --------------------------------------------------------  IN: 0 mL / OUT: 0 mL / NET: 0 mL        PHYSICAL EXAM:  Vital Signs Last 24 Hrs  T(C): 36.8 (07 Dec 2023 09:48), Max: 36.8 (07 Dec 2023 09:48)  T(F): 98.3 (07 Dec 2023 09:48), Max: 98.3 (07 Dec 2023 09:48)  HR: 71 (07 Dec 2023 09:48) (60 - 95)  BP: 107/69 (07 Dec 2023 09:48) (107/69 - 140/68)  BP(mean): --  RR: 18 (07 Dec 2023 09:48) (17 - 18)  SpO2: 95% (07 Dec 2023 09:48) (95% - 96%)    Parameters below as of 07 Dec 2023 09:48  Patient On (Oxygen Delivery Method): room air          GENERAL: NAD  HEENT: [place], NC/AT, EOMI, PERRL  NECK: Supple, No JVD  CHEST/LUNG: CTAB, no increased WOB  HEART: RRR, no m/r/g  ABDOMEN: soft, NT, ND, BS+  EXTREMITIES:  2+ peripheral pulses, no LE edema, Legs curled up (wheelchair bound)   NERVOUS SYSTEM:  A&Ox1, no focal deficits  SKIN: No rashes or lesions visible over extremities      LABS:                          9.8    6.49  )-----------( 342      ( 07 Dec 2023 04:17 )             31.2     12-07    147<H>  |  105  |  21  ----------------------------<  115<H>  3.6   |  33<H>  |  0.44<L>    Ca    9.3      07 Dec 2023 04:17  Phos  2.6     12-07  Mg     1.90     12-07                >100,000 CFU/ml Enterococcus faecalis  --  Proteus mirabilis ESBL  Enterococcus faecalis     [facility] of Hospital Medicine  Chelsea North MD  Hospitalist   Pager [phone]  Avaliable via MS teams     SUBJECTIVE / OVERNIGHT EVENTS: Patient seen and examined. patient laying in bed comfortably, more awake today.    ADDITIONAL REVIEW OF SYSTEMS:    MEDICATIONS  (STANDING):  aspirin enteric coated 81 milliGRAM(s) Oral daily  famotidine    Tablet 20 milliGRAM(s) Oral daily  ferrous    sulfate 325 milliGRAM(s) Oral daily  gabapentin 400 milliGRAM(s) Oral at bedtime  haloperidol     Tablet 0.5 milliGRAM(s) Oral three times a day  heparin   Injectable 5000 Unit(s) SubCutaneous every 12 hours  metoprolol tartrate 25 milliGRAM(s) Oral two times a day  mirtazapine Soltab 15 milliGRAM(s) Oral at bedtime  polyethylene glycol 3350 17 Gram(s) Oral two times a day  senna 2 Tablet(s) Oral at bedtime    MEDICATIONS  (PRN):  acetaminophen     Tablet .. 650 milliGRAM(s) Oral every 6 hours PRN Temp greater or equal to 38C (100.4F), Mild Pain (1 - 3)  aluminum hydroxide/magnesium hydroxide/simethicone Suspension 30 milliLiter(s) Oral every 4 hours PRN Dyspepsia  haloperidol    Injectable 1 milliGRAM(s) IntraMuscular every 6 hours PRN Severe Agitation  melatonin 3 milliGRAM(s) Oral at bedtime PRN Insomnia  ondansetron Injectable 4 milliGRAM(s) IV Push every 8 hours PRN Nausea and/or Vomiting      I&O's Summary    04 Dec 2023 07:01  -  05 Dec 2023 07:00  --------------------------------------------------------  IN: 0 mL / OUT: 0 mL / NET: 0 mL        PHYSICAL EXAM:  Vital Signs Last 24 Hrs  T(C): 36.8 (07 Dec 2023 09:48), Max: 36.8 (07 Dec 2023 09:48)  T(F): 98.3 (07 Dec 2023 09:48), Max: 98.3 (07 Dec 2023 09:48)  HR: 71 (07 Dec 2023 09:48) (60 - 95)  BP: 107/69 (07 Dec 2023 09:48) (107/69 - 140/68)  BP(mean): --  RR: 18 (07 Dec 2023 09:48) (17 - 18)  SpO2: 95% (07 Dec 2023 09:48) (95% - 96%)    Parameters below as of 07 Dec 2023 09:48  Patient On (Oxygen Delivery Method): room air          GENERAL: NAD  HEENT: [place], NC/AT, EOMI, PERRL  NECK: Supple, No JVD  CHEST/LUNG: CTAB, no increased WOB  HEART: RRR, no m/r/g  ABDOMEN: soft, NT, ND, BS+  EXTREMITIES:  2+ peripheral pulses, no LE edema, Legs curled up (wheelchair bound)   NERVOUS SYSTEM:  A&Ox1, no focal deficits  SKIN: No rashes or lesions visible over extremities      LABS:                          9.8    6.49  )-----------( 342      ( 07 Dec 2023 04:17 )             31.2     12-07    147<H>  |  105  |  21  ----------------------------<  115<H>  3.6   |  33<H>  |  0.44<L>    Ca    9.3      07 Dec 2023 04:17  Phos  2.6     12-07  Mg     1.90     12-07                >100,000 CFU/ml Enterococcus faecalis  --  Proteus mirabilis ESBL  Enterococcus faecalis

## 2023-12-08 LAB
-  FOSFOMYCIN: SIGNIFICANT CHANGE UP
-  FOSFOMYCIN: SIGNIFICANT CHANGE UP
ANION GAP SERPL CALC-SCNC: 5 MMOL/L — LOW (ref 7–14)
ANION GAP SERPL CALC-SCNC: 5 MMOL/L — LOW (ref 7–14)
BASOPHILS # BLD AUTO: 0.04 K/UL — SIGNIFICANT CHANGE UP (ref 0–0.2)
BASOPHILS # BLD AUTO: 0.04 K/UL — SIGNIFICANT CHANGE UP (ref 0–0.2)
BASOPHILS NFR BLD AUTO: 0.6 % — SIGNIFICANT CHANGE UP (ref 0–2)
BASOPHILS NFR BLD AUTO: 0.6 % — SIGNIFICANT CHANGE UP (ref 0–2)
BUN SERPL-MCNC: 21 MG/DL — SIGNIFICANT CHANGE UP (ref 7–23)
BUN SERPL-MCNC: 21 MG/DL — SIGNIFICANT CHANGE UP (ref 7–23)
CALCIUM SERPL-MCNC: 9.4 MG/DL — SIGNIFICANT CHANGE UP (ref 8.4–10.5)
CALCIUM SERPL-MCNC: 9.4 MG/DL — SIGNIFICANT CHANGE UP (ref 8.4–10.5)
CHLORIDE SERPL-SCNC: 107 MMOL/L — SIGNIFICANT CHANGE UP (ref 98–107)
CHLORIDE SERPL-SCNC: 107 MMOL/L — SIGNIFICANT CHANGE UP (ref 98–107)
CO2 SERPL-SCNC: 34 MMOL/L — HIGH (ref 22–31)
CO2 SERPL-SCNC: 34 MMOL/L — HIGH (ref 22–31)
CREAT SERPL-MCNC: 0.41 MG/DL — LOW (ref 0.5–1.3)
CREAT SERPL-MCNC: 0.41 MG/DL — LOW (ref 0.5–1.3)
EGFR: 94 ML/MIN/1.73M2 — SIGNIFICANT CHANGE UP
EGFR: 94 ML/MIN/1.73M2 — SIGNIFICANT CHANGE UP
EOSINOPHIL # BLD AUTO: 0.1 K/UL — SIGNIFICANT CHANGE UP (ref 0–0.5)
EOSINOPHIL # BLD AUTO: 0.1 K/UL — SIGNIFICANT CHANGE UP (ref 0–0.5)
EOSINOPHIL NFR BLD AUTO: 1.6 % — SIGNIFICANT CHANGE UP (ref 0–6)
EOSINOPHIL NFR BLD AUTO: 1.6 % — SIGNIFICANT CHANGE UP (ref 0–6)
GLUCOSE SERPL-MCNC: 100 MG/DL — HIGH (ref 70–99)
GLUCOSE SERPL-MCNC: 100 MG/DL — HIGH (ref 70–99)
HCT VFR BLD CALC: 30.6 % — LOW (ref 34.5–45)
HCT VFR BLD CALC: 30.6 % — LOW (ref 34.5–45)
HGB BLD-MCNC: 9.4 G/DL — LOW (ref 11.5–15.5)
HGB BLD-MCNC: 9.4 G/DL — LOW (ref 11.5–15.5)
IANC: 3.85 K/UL — SIGNIFICANT CHANGE UP (ref 1.8–7.4)
IANC: 3.85 K/UL — SIGNIFICANT CHANGE UP (ref 1.8–7.4)
IMM GRANULOCYTES NFR BLD AUTO: 0.2 % — SIGNIFICANT CHANGE UP (ref 0–0.9)
IMM GRANULOCYTES NFR BLD AUTO: 0.2 % — SIGNIFICANT CHANGE UP (ref 0–0.9)
LYMPHOCYTES # BLD AUTO: 1.7 K/UL — SIGNIFICANT CHANGE UP (ref 1–3.3)
LYMPHOCYTES # BLD AUTO: 1.7 K/UL — SIGNIFICANT CHANGE UP (ref 1–3.3)
LYMPHOCYTES # BLD AUTO: 26.9 % — SIGNIFICANT CHANGE UP (ref 13–44)
LYMPHOCYTES # BLD AUTO: 26.9 % — SIGNIFICANT CHANGE UP (ref 13–44)
MAGNESIUM SERPL-MCNC: 1.9 MG/DL — SIGNIFICANT CHANGE UP (ref 1.6–2.6)
MAGNESIUM SERPL-MCNC: 1.9 MG/DL — SIGNIFICANT CHANGE UP (ref 1.6–2.6)
MCHC RBC-ENTMCNC: 27.9 PG — SIGNIFICANT CHANGE UP (ref 27–34)
MCHC RBC-ENTMCNC: 27.9 PG — SIGNIFICANT CHANGE UP (ref 27–34)
MCHC RBC-ENTMCNC: 30.7 GM/DL — LOW (ref 32–36)
MCHC RBC-ENTMCNC: 30.7 GM/DL — LOW (ref 32–36)
MCV RBC AUTO: 90.8 FL — SIGNIFICANT CHANGE UP (ref 80–100)
MCV RBC AUTO: 90.8 FL — SIGNIFICANT CHANGE UP (ref 80–100)
METHOD TYPE: SIGNIFICANT CHANGE UP
METHOD TYPE: SIGNIFICANT CHANGE UP
MONOCYTES # BLD AUTO: 0.62 K/UL — SIGNIFICANT CHANGE UP (ref 0–0.9)
MONOCYTES # BLD AUTO: 0.62 K/UL — SIGNIFICANT CHANGE UP (ref 0–0.9)
MONOCYTES NFR BLD AUTO: 9.8 % — SIGNIFICANT CHANGE UP (ref 2–14)
MONOCYTES NFR BLD AUTO: 9.8 % — SIGNIFICANT CHANGE UP (ref 2–14)
NEUTROPHILS # BLD AUTO: 3.85 K/UL — SIGNIFICANT CHANGE UP (ref 1.8–7.4)
NEUTROPHILS # BLD AUTO: 3.85 K/UL — SIGNIFICANT CHANGE UP (ref 1.8–7.4)
NEUTROPHILS NFR BLD AUTO: 60.9 % — SIGNIFICANT CHANGE UP (ref 43–77)
NEUTROPHILS NFR BLD AUTO: 60.9 % — SIGNIFICANT CHANGE UP (ref 43–77)
NRBC # BLD: 0 /100 WBCS — SIGNIFICANT CHANGE UP (ref 0–0)
NRBC # BLD: 0 /100 WBCS — SIGNIFICANT CHANGE UP (ref 0–0)
NRBC # FLD: 0 K/UL — SIGNIFICANT CHANGE UP (ref 0–0)
NRBC # FLD: 0 K/UL — SIGNIFICANT CHANGE UP (ref 0–0)
ORGANISM # SPEC MICROSCOPIC CNT: ABNORMAL
ORGANISM # SPEC MICROSCOPIC CNT: ABNORMAL
PHOSPHATE SERPL-MCNC: 2.7 MG/DL — SIGNIFICANT CHANGE UP (ref 2.5–4.5)
PHOSPHATE SERPL-MCNC: 2.7 MG/DL — SIGNIFICANT CHANGE UP (ref 2.5–4.5)
PLATELET # BLD AUTO: 319 K/UL — SIGNIFICANT CHANGE UP (ref 150–400)
PLATELET # BLD AUTO: 319 K/UL — SIGNIFICANT CHANGE UP (ref 150–400)
POTASSIUM SERPL-MCNC: 3.7 MMOL/L — SIGNIFICANT CHANGE UP (ref 3.5–5.3)
POTASSIUM SERPL-MCNC: 3.7 MMOL/L — SIGNIFICANT CHANGE UP (ref 3.5–5.3)
POTASSIUM SERPL-SCNC: 3.7 MMOL/L — SIGNIFICANT CHANGE UP (ref 3.5–5.3)
POTASSIUM SERPL-SCNC: 3.7 MMOL/L — SIGNIFICANT CHANGE UP (ref 3.5–5.3)
RBC # BLD: 3.37 M/UL — LOW (ref 3.8–5.2)
RBC # BLD: 3.37 M/UL — LOW (ref 3.8–5.2)
RBC # FLD: 15.6 % — HIGH (ref 10.3–14.5)
RBC # FLD: 15.6 % — HIGH (ref 10.3–14.5)
SODIUM SERPL-SCNC: 146 MMOL/L — HIGH (ref 135–145)
SODIUM SERPL-SCNC: 146 MMOL/L — HIGH (ref 135–145)
WBC # BLD: 6.32 K/UL — SIGNIFICANT CHANGE UP (ref 3.8–10.5)
WBC # BLD: 6.32 K/UL — SIGNIFICANT CHANGE UP (ref 3.8–10.5)
WBC # FLD AUTO: 6.32 K/UL — SIGNIFICANT CHANGE UP (ref 3.8–10.5)
WBC # FLD AUTO: 6.32 K/UL — SIGNIFICANT CHANGE UP (ref 3.8–10.5)

## 2023-12-08 PROCEDURE — 99232 SBSQ HOSP IP/OBS MODERATE 35: CPT

## 2023-12-08 RX ORDER — GABAPENTIN 400 MG/1
100 CAPSULE ORAL AT BEDTIME
Refills: 0 | Status: DISCONTINUED | OUTPATIENT
Start: 2023-12-08 | End: 2023-12-12

## 2023-12-08 RX ADMIN — FAMOTIDINE 20 MILLIGRAM(S): 10 INJECTION INTRAVENOUS at 12:21

## 2023-12-08 RX ADMIN — Medication 81 MILLIGRAM(S): at 12:20

## 2023-12-08 RX ADMIN — Medication 500 MILLIGRAM(S): at 12:20

## 2023-12-08 RX ADMIN — Medication 25 MILLIGRAM(S): at 12:21

## 2023-12-08 RX ADMIN — MIRTAZAPINE 15 MILLIGRAM(S): 45 TABLET, ORALLY DISINTEGRATING ORAL at 21:51

## 2023-12-08 RX ADMIN — Medication 500 MILLIGRAM(S): at 00:15

## 2023-12-08 RX ADMIN — HEPARIN SODIUM 5000 UNIT(S): 5000 INJECTION INTRAVENOUS; SUBCUTANEOUS at 00:15

## 2023-12-08 RX ADMIN — SENNA PLUS 2 TABLET(S): 8.6 TABLET ORAL at 00:15

## 2023-12-08 RX ADMIN — SENNA PLUS 2 TABLET(S): 8.6 TABLET ORAL at 21:50

## 2023-12-08 RX ADMIN — GABAPENTIN 400 MILLIGRAM(S): 400 CAPSULE ORAL at 00:15

## 2023-12-08 RX ADMIN — GABAPENTIN 100 MILLIGRAM(S): 400 CAPSULE ORAL at 21:54

## 2023-12-08 RX ADMIN — MIRTAZAPINE 15 MILLIGRAM(S): 45 TABLET, ORALLY DISINTEGRATING ORAL at 00:57

## 2023-12-08 RX ADMIN — Medication 25 MILLIGRAM(S): at 17:57

## 2023-12-08 RX ADMIN — Medication 0.25 MILLIGRAM(S): at 22:41

## 2023-12-08 RX ADMIN — Medication 325 MILLIGRAM(S): at 12:21

## 2023-12-08 RX ADMIN — POLYETHYLENE GLYCOL 3350 17 GRAM(S): 17 POWDER, FOR SOLUTION ORAL at 17:57

## 2023-12-08 RX ADMIN — Medication 500 MILLIGRAM(S): at 21:48

## 2023-12-08 RX ADMIN — HEPARIN SODIUM 5000 UNIT(S): 5000 INJECTION INTRAVENOUS; SUBCUTANEOUS at 12:20

## 2023-12-08 NOTE — BH CONSULTATION LIAISON PROGRESS NOTE - NSBHATTESTBILLING_PSY_A_CORE
[id number] OBS or IP - moderate complexity OR 35-49 mins [id number] OBS or IP - moderate complexity OR 35-49 mins

## 2023-12-08 NOTE — BH CONSULTATION LIAISON PROGRESS NOTE - NSBHFUPINTERVALHXFT_PSY_A_CORE
Chart reviewed. Patient received evening haldol, gabapentin, mirtazapine. AM haldol held due to lethargy. No PRNs given overnight.    Seen this AM. She is found sleeping soundly with mouth agape. Awakens with physical movements of shoulder and starts to shout when attempting to move her legs. She is again curled up. No gross catalepsy. Increased tone.  Exam very limited.

## 2023-12-08 NOTE — PROGRESS NOTE ADULT - PROBLEM SELECTOR PLAN 1
Patient presenting from NH with reported worsening confusion  - patient baseline A&O x 1-2 and minimally verbal, wheelchair bound   - per family c/f worsening confusion and lethargy  - CTH without any acute findings, patient noted with mod to severe chronic microvascular disease  - TSH wnl  - patient noted with positive UA  - current AMS may be from progressive vascular dementia vs TME from UTI vs constipation   - treatment of UTI as below, c/w ASA  -BH consulted to help adjust medications; as per  likely hypoactive delirium but have to keep Catatonia on differential ; hold Haldol, decrease gabapentin from 400mg qhs to 100mg qhs

## 2023-12-08 NOTE — PROGRESS NOTE ADULT - SUBJECTIVE AND OBJECTIVE BOX
[facility] Division of Hospital Medicine  Chelsea North MD  Hospitalist   Pager [phone]  Avaliable via MS teams     SUBJECTIVE / OVERNIGHT EVENTS: Patient seen and examined. patient laying in bed- contracted, more awake today, calm, Aox1.     ADDITIONAL REVIEW OF SYSTEMS:    MEDICATIONS  (STANDING):  aspirin enteric coated 81 milliGRAM(s) Oral daily  famotidine    Tablet 20 milliGRAM(s) Oral daily  ferrous    sulfate 325 milliGRAM(s) Oral daily  gabapentin 400 milliGRAM(s) Oral at bedtime  haloperidol     Tablet 0.5 milliGRAM(s) Oral three times a day  heparin   Injectable 5000 Unit(s) SubCutaneous every 12 hours  metoprolol tartrate 25 milliGRAM(s) Oral two times a day  mirtazapine Soltab 15 milliGRAM(s) Oral at bedtime  polyethylene glycol 3350 17 Gram(s) Oral two times a day  senna 2 Tablet(s) Oral at bedtime    MEDICATIONS  (PRN):  acetaminophen     Tablet .. 650 milliGRAM(s) Oral every 6 hours PRN Temp greater or equal to 38C (100.4F), Mild Pain (1 - 3)  aluminum hydroxide/magnesium hydroxide/simethicone Suspension 30 milliLiter(s) Oral every 4 hours PRN Dyspepsia  haloperidol    Injectable 1 milliGRAM(s) IntraMuscular every 6 hours PRN Severe Agitation  melatonin 3 milliGRAM(s) Oral at bedtime PRN Insomnia  ondansetron Injectable 4 milliGRAM(s) IV Push every 8 hours PRN Nausea and/or Vomiting      I&O's Summary    04 Dec 2023 07:01  -  05 Dec 2023 07:00  --------------------------------------------------------  IN: 0 mL / OUT: 0 mL / NET: 0 mL        PHYSICAL EXAM:  Vital Signs Last 24 Hrs  T(C): 36.9 (08 Dec 2023 10:54), Max: 36.9 (08 Dec 2023 10:54)  T(F): 98.5 (08 Dec 2023 10:54), Max: 98.5 (08 Dec 2023 10:54)  HR: 83 (08 Dec 2023 10:54) (51 - 83)  BP: 147/65 (08 Dec 2023 10:54) (130/67 - 156/96)  BP(mean): --  RR: 18 (08 Dec 2023 10:54) (18 - 18)  SpO2: 100% (08 Dec 2023 10:54) (96% - 100%)    Parameters below as of 08 Dec 2023 10:54  Patient On (Oxygen Delivery Method): room air    GENERAL: NAD  HEENT: [place], NC/AT, EOMI, PERRL  NECK: Supple, No JVD  CHEST/LUNG: CTAB, no increased WOB  HEART: RRR, no m/r/g  ABDOMEN: soft, NT, ND, BS+  EXTREMITIES:  2+ peripheral pulses, no LE edema, Legs curled up (wheelchair bound)   NERVOUS SYSTEM:  A&Ox1, no focal deficits  SKIN: No rashes or lesions visible over extremities        LABS:                          9.4    6.32  )-----------( 319      ( 08 Dec 2023 03:22 )             30.6     12-08    146<H>  |  107  |  21  ----------------------------<  100<H>  3.7   |  34<H>  |  0.41<L>    Ca    9.4      08 Dec 2023 03:22  Phos  2.7     12-08  Mg     1.90     12-08        >100,000 CFU/ml Enterococcus faecalis  --  Proteus mirabilis ESBL  Enterococcus faecalis     [facility] Hospital Medicine  Chelsea North MD  Hospitalist   Pager [phone]  Avaliable via MS teams     SUBJECTIVE / OVERNIGHT EVENTS: Patient seen and examined. patient laying in bed- contracted, more awake today, calm, Aox1.     ADDITIONAL REVIEW OF SYSTEMS:    MEDICATIONS  (STANDING):  aspirin enteric coated 81 milliGRAM(s) Oral daily  famotidine    Tablet 20 milliGRAM(s) Oral daily  ferrous    sulfate 325 milliGRAM(s) Oral daily  gabapentin 400 milliGRAM(s) Oral at bedtime  haloperidol     Tablet 0.5 milliGRAM(s) Oral three times a day  heparin   Injectable 5000 Unit(s) SubCutaneous every 12 hours  metoprolol tartrate 25 milliGRAM(s) Oral two times a day  mirtazapine Soltab 15 milliGRAM(s) Oral at bedtime  polyethylene glycol 3350 17 Gram(s) Oral two times a day  senna 2 Tablet(s) Oral at bedtime    MEDICATIONS  (PRN):  acetaminophen     Tablet .. 650 milliGRAM(s) Oral every 6 hours PRN Temp greater or equal to 38C (100.4F), Mild Pain (1 - 3)  aluminum hydroxide/magnesium hydroxide/simethicone Suspension 30 milliLiter(s) Oral every 4 hours PRN Dyspepsia  haloperidol    Injectable 1 milliGRAM(s) IntraMuscular every 6 hours PRN Severe Agitation  melatonin 3 milliGRAM(s) Oral at bedtime PRN Insomnia  ondansetron Injectable 4 milliGRAM(s) IV Push every 8 hours PRN Nausea and/or Vomiting      I&O's Summary    04 Dec 2023 07:01  -  05 Dec 2023 07:00  --------------------------------------------------------  IN: 0 mL / OUT: 0 mL / NET: 0 mL        PHYSICAL EXAM:  Vital Signs Last 24 Hrs  T(C): 36.9 (08 Dec 2023 10:54), Max: 36.9 (08 Dec 2023 10:54)  T(F): 98.5 (08 Dec 2023 10:54), Max: 98.5 (08 Dec 2023 10:54)  HR: 83 (08 Dec 2023 10:54) (51 - 83)  BP: 147/65 (08 Dec 2023 10:54) (130/67 - 156/96)  BP(mean): --  RR: 18 (08 Dec 2023 10:54) (18 - 18)  SpO2: 100% (08 Dec 2023 10:54) (96% - 100%)    Parameters below as of 08 Dec 2023 10:54  Patient On (Oxygen Delivery Method): room air    GENERAL: NAD  HEENT: [place], [place]/AT, EOMI, PERRL  NECK: Supple, No JVD  CHEST/LUNG: CTAB, no increased WOB  HEART: RRR, no m/r/g  ABDOMEN: soft, NT, ND, BS+  EXTREMITIES:  2+ peripheral pulses, no LE edema, Legs curled up (wheelchair bound)   NERVOUS SYSTEM:  A&Ox1, no focal deficits  SKIN: No rashes or lesions visible over extremities        LABS:                          9.4    6.32  )-----------( 319      ( 08 Dec 2023 03:22 )             30.6     12-08    146<H>  |  107  |  21  ----------------------------<  100<H>  3.7   |  34<H>  |  0.41<L>    Ca    9.4      08 Dec 2023 03:22  Phos  2.7     12-08  Mg     1.90     12-08        >100,000 CFU/ml Enterococcus faecalis  --  Proteus mirabilis ESBL  Enterococcus faecalis

## 2023-12-08 NOTE — PROGRESS NOTE ADULT - PROBLEM SELECTOR PLAN 3
Patient with history of bipolar d/o  - following with psychiatry, Dr. Billings  - previously seen by  in hospital with adjustment of home medications  - patient likely with further behavioral disturbance i/s/o ongoing UTI  - consulted to help adjust medications; as per  likely hypoactive delirium but have to keep Catatonia on differential ; hold Haldol, decrease gabapentin from 400mg qhs to 100mg qhs  - c/w mirtazapine 15mg qhs

## 2023-12-08 NOTE — BH CONSULTATION LIAISON PROGRESS NOTE - NSBHASSESSMENTFT_PSY_ALL_CORE
89F, , who had been domiciled with daughter and two aides but now presents from NH with PPH of vascular dementia, bipolar I and PMH significant for CVA, diverticulosis, and GERD, H. pylori, who was recently admitted to [facility] in late November 2023 for colitis presents with altered mentation and found to have UTI and ESBL.  Psych CL consulted for medication management as patient more hypoactive.    Patient continues to appear hypoactively delirious. Less likely catatonia at this point, though can keep on differential and if picture further presents itself can consider ativan challenge. For now would recommend stopping haldol (both scheduled and PRN) and using ativan prn. Will ask psych CL team to follow up tomorrow.    [] stop haldol (of note, lowered on 12/7 from 0.5mg TID to 0.25mg BID); also stop PRN haldol  [] for agitation- can use ativan 0.25mg q8h prn- close monitoring of vitals and hold if hypotensive, bradycardic, hypoxic or in resp distress    [] ensure patient is having bowel movements as this can worsen delirium  [x] can continue mirtazapine- but hold for sedation, lethargy, obtundation   [] lower gabapentin to 100mg HS (from 400mg)    [] given concern for delirium- team may consider having someone assist patient with feeds to help 89F, , who had been domiciled with daughter and two aides but now presents from NH with PPH of vascular dementia, bipolar I and PMH significant for CVA, diverticulosis, and GERD, H. pylori, who was recently admitted to [facility] in late November 2023 for colitis presents with altered mentation and found to have UTI and ESBL.  Psych CL consulted for medication management as patient more hypoactive.    Patient continues to appear hypoactively delirious. Less likely catatonia at this point, though can keep on differential and if picture further presents itself can consider ativan challenge. For now would recommend stopping haldol (both scheduled and PRN) and using ativan prn. Will ask psych CL team to follow up tomorrow.    [] stop haldol (of note, lowered on 12/7 from 0.5mg TID to 0.25mg BID); also stop PRN haldol  [] for agitation- can use ativan 0.25mg q8h prn- close monitoring of vitals and hold if hypotensive, bradycardic, hypoxic or in resp distress    [] ensure patient is having bowel movements as this can worsen delirium  [x] can continue mirtazapine- but hold for sedation, lethargy, obtundation   [] lower gabapentin to 100mg HS (from 400mg)    [] given concern for delirium- team may consider having someone assist patient with feeds to help

## 2023-12-09 LAB
ANION GAP SERPL CALC-SCNC: 9 MMOL/L — SIGNIFICANT CHANGE UP (ref 7–14)
ANION GAP SERPL CALC-SCNC: 9 MMOL/L — SIGNIFICANT CHANGE UP (ref 7–14)
BASOPHILS # BLD AUTO: 0.04 K/UL — SIGNIFICANT CHANGE UP (ref 0–0.2)
BASOPHILS # BLD AUTO: 0.04 K/UL — SIGNIFICANT CHANGE UP (ref 0–0.2)
BASOPHILS NFR BLD AUTO: 0.8 % — SIGNIFICANT CHANGE UP (ref 0–2)
BASOPHILS NFR BLD AUTO: 0.8 % — SIGNIFICANT CHANGE UP (ref 0–2)
BUN SERPL-MCNC: 23 MG/DL — SIGNIFICANT CHANGE UP (ref 7–23)
BUN SERPL-MCNC: 23 MG/DL — SIGNIFICANT CHANGE UP (ref 7–23)
CALCIUM SERPL-MCNC: 9.5 MG/DL — SIGNIFICANT CHANGE UP (ref 8.4–10.5)
CALCIUM SERPL-MCNC: 9.5 MG/DL — SIGNIFICANT CHANGE UP (ref 8.4–10.5)
CHLORIDE SERPL-SCNC: 105 MMOL/L — SIGNIFICANT CHANGE UP (ref 98–107)
CHLORIDE SERPL-SCNC: 105 MMOL/L — SIGNIFICANT CHANGE UP (ref 98–107)
CO2 SERPL-SCNC: 32 MMOL/L — HIGH (ref 22–31)
CO2 SERPL-SCNC: 32 MMOL/L — HIGH (ref 22–31)
CREAT SERPL-MCNC: 0.44 MG/DL — LOW (ref 0.5–1.3)
CREAT SERPL-MCNC: 0.44 MG/DL — LOW (ref 0.5–1.3)
EGFR: 92 ML/MIN/1.73M2 — SIGNIFICANT CHANGE UP
EGFR: 92 ML/MIN/1.73M2 — SIGNIFICANT CHANGE UP
EOSINOPHIL # BLD AUTO: 0.05 K/UL — SIGNIFICANT CHANGE UP (ref 0–0.5)
EOSINOPHIL # BLD AUTO: 0.05 K/UL — SIGNIFICANT CHANGE UP (ref 0–0.5)
EOSINOPHIL NFR BLD AUTO: 1 % — SIGNIFICANT CHANGE UP (ref 0–6)
EOSINOPHIL NFR BLD AUTO: 1 % — SIGNIFICANT CHANGE UP (ref 0–6)
GLUCOSE SERPL-MCNC: 96 MG/DL — SIGNIFICANT CHANGE UP (ref 70–99)
GLUCOSE SERPL-MCNC: 96 MG/DL — SIGNIFICANT CHANGE UP (ref 70–99)
HCT VFR BLD CALC: 32.2 % — LOW (ref 34.5–45)
HCT VFR BLD CALC: 32.2 % — LOW (ref 34.5–45)
HGB BLD-MCNC: 9.9 G/DL — LOW (ref 11.5–15.5)
HGB BLD-MCNC: 9.9 G/DL — LOW (ref 11.5–15.5)
IANC: 3.14 K/UL — SIGNIFICANT CHANGE UP (ref 1.8–7.4)
IANC: 3.14 K/UL — SIGNIFICANT CHANGE UP (ref 1.8–7.4)
IMM GRANULOCYTES NFR BLD AUTO: 0.6 % — SIGNIFICANT CHANGE UP (ref 0–0.9)
IMM GRANULOCYTES NFR BLD AUTO: 0.6 % — SIGNIFICANT CHANGE UP (ref 0–0.9)
LYMPHOCYTES # BLD AUTO: 1.44 K/UL — SIGNIFICANT CHANGE UP (ref 1–3.3)
LYMPHOCYTES # BLD AUTO: 1.44 K/UL — SIGNIFICANT CHANGE UP (ref 1–3.3)
LYMPHOCYTES # BLD AUTO: 28.2 % — SIGNIFICANT CHANGE UP (ref 13–44)
LYMPHOCYTES # BLD AUTO: 28.2 % — SIGNIFICANT CHANGE UP (ref 13–44)
MAGNESIUM SERPL-MCNC: 2 MG/DL — SIGNIFICANT CHANGE UP (ref 1.6–2.6)
MAGNESIUM SERPL-MCNC: 2 MG/DL — SIGNIFICANT CHANGE UP (ref 1.6–2.6)
MCHC RBC-ENTMCNC: 28 PG — SIGNIFICANT CHANGE UP (ref 27–34)
MCHC RBC-ENTMCNC: 28 PG — SIGNIFICANT CHANGE UP (ref 27–34)
MCHC RBC-ENTMCNC: 30.7 GM/DL — LOW (ref 32–36)
MCHC RBC-ENTMCNC: 30.7 GM/DL — LOW (ref 32–36)
MCV RBC AUTO: 91.2 FL — SIGNIFICANT CHANGE UP (ref 80–100)
MCV RBC AUTO: 91.2 FL — SIGNIFICANT CHANGE UP (ref 80–100)
MONOCYTES # BLD AUTO: 0.41 K/UL — SIGNIFICANT CHANGE UP (ref 0–0.9)
MONOCYTES # BLD AUTO: 0.41 K/UL — SIGNIFICANT CHANGE UP (ref 0–0.9)
MONOCYTES NFR BLD AUTO: 8 % — SIGNIFICANT CHANGE UP (ref 2–14)
MONOCYTES NFR BLD AUTO: 8 % — SIGNIFICANT CHANGE UP (ref 2–14)
NEUTROPHILS # BLD AUTO: 3.14 K/UL — SIGNIFICANT CHANGE UP (ref 1.8–7.4)
NEUTROPHILS # BLD AUTO: 3.14 K/UL — SIGNIFICANT CHANGE UP (ref 1.8–7.4)
NEUTROPHILS NFR BLD AUTO: 61.4 % — SIGNIFICANT CHANGE UP (ref 43–77)
NEUTROPHILS NFR BLD AUTO: 61.4 % — SIGNIFICANT CHANGE UP (ref 43–77)
NRBC # BLD: 0 /100 WBCS — SIGNIFICANT CHANGE UP (ref 0–0)
NRBC # BLD: 0 /100 WBCS — SIGNIFICANT CHANGE UP (ref 0–0)
NRBC # FLD: 0 K/UL — SIGNIFICANT CHANGE UP (ref 0–0)
NRBC # FLD: 0 K/UL — SIGNIFICANT CHANGE UP (ref 0–0)
PHOSPHATE SERPL-MCNC: 3 MG/DL — SIGNIFICANT CHANGE UP (ref 2.5–4.5)
PHOSPHATE SERPL-MCNC: 3 MG/DL — SIGNIFICANT CHANGE UP (ref 2.5–4.5)
PLATELET # BLD AUTO: 335 K/UL — SIGNIFICANT CHANGE UP (ref 150–400)
PLATELET # BLD AUTO: 335 K/UL — SIGNIFICANT CHANGE UP (ref 150–400)
POTASSIUM SERPL-MCNC: 3.9 MMOL/L — SIGNIFICANT CHANGE UP (ref 3.5–5.3)
POTASSIUM SERPL-MCNC: 3.9 MMOL/L — SIGNIFICANT CHANGE UP (ref 3.5–5.3)
POTASSIUM SERPL-SCNC: 3.9 MMOL/L — SIGNIFICANT CHANGE UP (ref 3.5–5.3)
POTASSIUM SERPL-SCNC: 3.9 MMOL/L — SIGNIFICANT CHANGE UP (ref 3.5–5.3)
RBC # BLD: 3.53 M/UL — LOW (ref 3.8–5.2)
RBC # BLD: 3.53 M/UL — LOW (ref 3.8–5.2)
RBC # FLD: 15.6 % — HIGH (ref 10.3–14.5)
RBC # FLD: 15.6 % — HIGH (ref 10.3–14.5)
SODIUM SERPL-SCNC: 146 MMOL/L — HIGH (ref 135–145)
SODIUM SERPL-SCNC: 146 MMOL/L — HIGH (ref 135–145)
WBC # BLD: 5.11 K/UL — SIGNIFICANT CHANGE UP (ref 3.8–10.5)
WBC # BLD: 5.11 K/UL — SIGNIFICANT CHANGE UP (ref 3.8–10.5)
WBC # FLD AUTO: 5.11 K/UL — SIGNIFICANT CHANGE UP (ref 3.8–10.5)
WBC # FLD AUTO: 5.11 K/UL — SIGNIFICANT CHANGE UP (ref 3.8–10.5)

## 2023-12-09 PROCEDURE — 99232 SBSQ HOSP IP/OBS MODERATE 35: CPT

## 2023-12-09 RX ORDER — SODIUM CHLORIDE 9 MG/ML
1000 INJECTION, SOLUTION INTRAVENOUS
Refills: 0 | Status: DISCONTINUED | OUTPATIENT
Start: 2023-12-09 | End: 2023-12-12

## 2023-12-09 RX ADMIN — HEPARIN SODIUM 5000 UNIT(S): 5000 INJECTION INTRAVENOUS; SUBCUTANEOUS at 00:59

## 2023-12-09 RX ADMIN — Medication 0.25 MILLIGRAM(S): at 22:36

## 2023-12-09 RX ADMIN — Medication 25 MILLIGRAM(S): at 08:05

## 2023-12-09 RX ADMIN — Medication 500 MILLIGRAM(S): at 08:05

## 2023-12-09 RX ADMIN — Medication 500 MILLIGRAM(S): at 17:43

## 2023-12-09 RX ADMIN — HEPARIN SODIUM 5000 UNIT(S): 5000 INJECTION INTRAVENOUS; SUBCUTANEOUS at 12:23

## 2023-12-09 RX ADMIN — MIRTAZAPINE 15 MILLIGRAM(S): 45 TABLET, ORALLY DISINTEGRATING ORAL at 22:37

## 2023-12-09 RX ADMIN — Medication 81 MILLIGRAM(S): at 12:24

## 2023-12-09 RX ADMIN — Medication 325 MILLIGRAM(S): at 12:24

## 2023-12-09 RX ADMIN — Medication 25 MILLIGRAM(S): at 17:43

## 2023-12-09 RX ADMIN — GABAPENTIN 100 MILLIGRAM(S): 400 CAPSULE ORAL at 22:37

## 2023-12-09 RX ADMIN — SENNA PLUS 2 TABLET(S): 8.6 TABLET ORAL at 22:37

## 2023-12-09 RX ADMIN — Medication 3 MILLIGRAM(S): at 01:00

## 2023-12-09 RX ADMIN — POLYETHYLENE GLYCOL 3350 17 GRAM(S): 17 POWDER, FOR SOLUTION ORAL at 17:43

## 2023-12-09 RX ADMIN — SODIUM CHLORIDE 60 MILLILITER(S): 9 INJECTION, SOLUTION INTRAVENOUS at 12:23

## 2023-12-09 RX ADMIN — FAMOTIDINE 20 MILLIGRAM(S): 10 INJECTION INTRAVENOUS at 12:24

## 2023-12-09 NOTE — BH CONSULTATION LIAISON PROGRESS NOTE - NSBHASSESSMENTFT_PSY_ALL_CORE
89F, , who had been domiciled with daughter and two aides but now presents from NH with PPH of vascular dementia, bipolar I and PMH significant for CVA, diverticulosis, and GERD, H. pylori, who was recently admitted to [facility] in late November 2023 for colitis presents with altered mentation and found to have UTI and ESBL.  Psych CL consulted for medication management as patient more hypoactive.    Patient continues to appear hypoactively delirious. Less likely catatonia at this point, though can keep on differential and if picture further presents itself can consider ativan challenge. Continue to hold Haldol. Use Ativan prn. Psych will continue to follow.     [] stop haldol (of note, lowered on 12/7 from 0.5mg TID to 0.25mg BID); also stop PRN haldol  [] for agitation- can use ativan 0.25mg q8h prn- close monitoring of vitals and hold if hypotensive, bradycardic, hypoxic or in resp distress    [] ensure patient is having bowel movements as this can worsen delirium  [] can continue mirtazapine- but hold for sedation, lethargy, obtundation   [] lower gabapentin to 100mg HS (from 400mg)    [] given concern for delirium- team may consider having someone assist patient with feeds to help 89F, , who had been domiciled with daughter and two aides but now presents from NH with PPH of vascular dementia, bipolar I and PMH significant for CVA, diverticulosis, and GERD, H. pylori, who was recently admitted to [facility] in late November 2023 for colitis presents with altered mentation and found to have UTI and ESBL.  Psych CL consulted for medication management as patient more hypoactive.    Patient continues to appear hypoactively delirious. Less likely catatonia at this point, though can keep on differential and if picture further presents itself can consider ativan challenge. Continue to hold Haldol. Use Ativan prn. Psych will continue to follow.     [] stop haldol (of note, lowered on 12/7 from 0.5mg TID to 0.25mg BID); also stop PRN haldol  [] for agitation- can use ativan 0.25mg q8h prn- close monitoring of vitals and hold if hypotensive, bradycardic, hypoxic or in resp distress    [] ensure patient is having bowel movements as this can worsen delirium  [] can continue mirtazapine- but hold for sedation, lethargy, obtundation   [] lower gabapentin to 100mg HS (from 400mg)    [] given concern for delirium- team may consider having someone assist patient with feeds to help 89F, , who had been domiciled with daughter and two aides but now presents from NH with PPH of vascular dementia, bipolar I and PMH significant for CVA, diverticulosis, and GERD, H. pylori, who was recently admitted to [facility] in late November 2023 for colitis presents with altered mentation and found to have UTI and ESBL.  Psych CL consulted for medication management as patient more hypoactive.    Patient continues to appear hypoactively delirious. Less likely catatonia at this point, though can keep on differential and if picture further presents itself can consider ativan challenge. Continue to hold Haldol. Use Ativan prn. Psych will continue to follow.     [] stop haldol (of note, lowered on 12/7 from 0.5mg TID to 0.25mg BID); also stop PRN haldol  [] for agitation- can use ativan 0.25mg q8h prn- close monitoring of vitals and hold if hypotensive, bradycardic, hypoxic or in resp distress  [] ensure patient is having bowel movements as this can worsen delirium  [] can continue mirtazapine- but hold for sedation, lethargy, obtundation   [] lower gabapentin to 100mg HS (from 400mg)  [] given concern for delirium- team may consider having someone assist patient with feeds to help 89F, , who had been domiciled with daughter and two aides but now presents from NH with PPH of vascular dementia, bipolar I and PMH significant for CVA, diverticulosis, and GERD, H. pylori, who was recently admitted to [facility] in late November 2023 for colitis presents with altered mentation and found to have UTI and ESBL.  Psych CL consulted for medication management as patient more hypoactive.    Patient continues to appear hypoactively delirious. Less likely catatonia at this point, though can keep on differential and if picture further presents itself can consider ativan challenge. Continue to hold Haldol. Use Ativan prn. Psych will continue to follow.     [] stop haldol (of note, lowered on 12/7 from 0.5mg TID to 0.25mg BID); also stop PRN haldol  [] for agitation- can use ativan 0.25mg q8h prn- close monitoring of vitals and hold if hypotensive, bradycardic, hypoxic or in resp distress  [] ensure patient is having bowel movements as this can worsen delirium  [] can continue mirtazapine- but hold for sedation, lethargy, obtundation   [] lower gabapentin to 100mg HS (from 400mg)  [] given concern for delirium- team may consider having someone assist patient with feeds to help

## 2023-12-09 NOTE — BH CONSULTATION LIAISON PROGRESS NOTE - ATTENDING COMMENTS
Chart reviewed. Discussed with resident. Agree with assessment/recs. Will continue to follow as needed.

## 2023-12-09 NOTE — BH CONSULTATION LIAISON PROGRESS NOTE - NSBHFUPINTERVALHXFT_PSY_A_CORE
Chart reviewed. Patient received evening gabapentin, mirtazapine. Ativan 0.25 mg prn overnight for agitation. Per staff, pt screaming intermittently.     Seen this AM. Found with PCA at bedside eating breakfast. Pt making shouting noise intermittently. No distinct words. Exam limited. Pt in bed with legs curled up. No gross catatonia. Increased tone in wrists.

## 2023-12-09 NOTE — PROGRESS NOTE ADULT - SUBJECTIVE AND OBJECTIVE BOX
[facility] Division of Hospital Medicine  Chelsea North MD  Hospitalist   Pager [phone]  Avaliable via MS teams     SUBJECTIVE / OVERNIGHT EVENTS: Patient seen and examined. patient laying in bed- contracted, wining - not really answering questions properly today.     ADDITIONAL REVIEW OF SYSTEMS:    MEDICATIONS  (STANDING):  aspirin enteric coated 81 milliGRAM(s) Oral daily  famotidine    Tablet 20 milliGRAM(s) Oral daily  ferrous    sulfate 325 milliGRAM(s) Oral daily  gabapentin 400 milliGRAM(s) Oral at bedtime  haloperidol     Tablet 0.5 milliGRAM(s) Oral three times a day  heparin   Injectable 5000 Unit(s) SubCutaneous every 12 hours  metoprolol tartrate 25 milliGRAM(s) Oral two times a day  mirtazapine Soltab 15 milliGRAM(s) Oral at bedtime  polyethylene glycol 3350 17 Gram(s) Oral two times a day  senna 2 Tablet(s) Oral at bedtime    MEDICATIONS  (PRN):  acetaminophen     Tablet .. 650 milliGRAM(s) Oral every 6 hours PRN Temp greater or equal to 38C (100.4F), Mild Pain (1 - 3)  aluminum hydroxide/magnesium hydroxide/simethicone Suspension 30 milliLiter(s) Oral every 4 hours PRN Dyspepsia  haloperidol    Injectable 1 milliGRAM(s) IntraMuscular every 6 hours PRN Severe Agitation  melatonin 3 milliGRAM(s) Oral at bedtime PRN Insomnia  ondansetron Injectable 4 milliGRAM(s) IV Push every 8 hours PRN Nausea and/or Vomiting      I&O's Summary    04 Dec 2023 07:01  -  05 Dec 2023 07:00  --------------------------------------------------------  IN: 0 mL / OUT: 0 mL / NET: 0 mL        PHYSICAL EXAM:  Vital Signs Last 24 Hrs  T(C): 36.3 (09 Dec 2023 09:23), Max: 36.9 (08 Dec 2023 17:50)  T(F): 97.3 (09 Dec 2023 09:23), Max: 98.5 (08 Dec 2023 17:50)  HR: 109 (09 Dec 2023 09:23) (62 - 109)  BP: 166/88 (09 Dec 2023 09:23) (145/71 - 169/91)  BP(mean): --  RR: 19 (09 Dec 2023 09:23) (18 - 19)  SpO2: 95% (09 Dec 2023 09:23) (95% - 100%)    Parameters below as of 09 Dec 2023 09:23  Patient On (Oxygen Delivery Method): room air        GENERAL: NAD  HEENT: [place], NC/AT, EOMI, PERRL  NECK: Supple, No JVD  CHEST/LUNG: CTAB, no increased WOB  HEART: RRR, no m/r/g  ABDOMEN: soft, NT, ND, BS+  EXTREMITIES:  2+ peripheral pulses, no LE edema, Legs curled up (wheelchair bound)   NERVOUS SYSTEM:  A&Ox1, no focal deficits  SKIN: No rashes or lesions visible over extremities        LABS:                          9.9    5.11  )-----------( 335      ( 09 Dec 2023 06:49 )             32.2     12-09    146<H>  |  105  |  23  ----------------------------<  96  3.9   |  32<H>  |  0.44<L>    Ca    9.5      09 Dec 2023 06:49  Phos  3.0     12-09  Mg     2.00     12-09                >100,000 CFU/ml Enterococcus faecalis  --  Proteus mirabilis ESBL  Enterococcus faecalis     [facility] of Hospital Medicine  Chelsea North MD  Hospitalist   Pager [phone]  Avaliable via MS teams     SUBJECTIVE / OVERNIGHT EVENTS: Patient seen and examined. patient laying in bed- contracted, wining - not really answering questions properly today.     ADDITIONAL REVIEW OF SYSTEMS:    MEDICATIONS  (STANDING):  aspirin enteric coated 81 milliGRAM(s) Oral daily  famotidine    Tablet 20 milliGRAM(s) Oral daily  ferrous    sulfate 325 milliGRAM(s) Oral daily  gabapentin 400 milliGRAM(s) Oral at bedtime  haloperidol     Tablet 0.5 milliGRAM(s) Oral three times a day  heparin   Injectable 5000 Unit(s) SubCutaneous every 12 hours  metoprolol tartrate 25 milliGRAM(s) Oral two times a day  mirtazapine Soltab 15 milliGRAM(s) Oral at bedtime  polyethylene glycol 3350 17 Gram(s) Oral two times a day  senna 2 Tablet(s) Oral at bedtime    MEDICATIONS  (PRN):  acetaminophen     Tablet .. 650 milliGRAM(s) Oral every 6 hours PRN Temp greater or equal to 38C (100.4F), Mild Pain (1 - 3)  aluminum hydroxide/magnesium hydroxide/simethicone Suspension 30 milliLiter(s) Oral every 4 hours PRN Dyspepsia  haloperidol    Injectable 1 milliGRAM(s) IntraMuscular every 6 hours PRN Severe Agitation  melatonin 3 milliGRAM(s) Oral at bedtime PRN Insomnia  ondansetron Injectable 4 milliGRAM(s) IV Push every 8 hours PRN Nausea and/or Vomiting      I&O's Summary    04 Dec 2023 07:01  -  05 Dec 2023 07:00  --------------------------------------------------------  IN: 0 mL / OUT: 0 mL / NET: 0 mL        PHYSICAL EXAM:  Vital Signs Last 24 Hrs  T(C): 36.3 (09 Dec 2023 09:23), Max: 36.9 (08 Dec 2023 17:50)  T(F): 97.3 (09 Dec 2023 09:23), Max: 98.5 (08 Dec 2023 17:50)  HR: 109 (09 Dec 2023 09:23) (62 - 109)  BP: 166/88 (09 Dec 2023 09:23) (145/71 - 169/91)  BP(mean): --  RR: 19 (09 Dec 2023 09:23) (18 - 19)  SpO2: 95% (09 Dec 2023 09:23) (95% - 100%)    Parameters below as of 09 Dec 2023 09:23  Patient On (Oxygen Delivery Method): room air        GENERAL: NAD  HEENT: [place], NC/AT, EOMI, PERRL  NECK: Supple, No JVD  CHEST/LUNG: CTAB, no increased WOB  HEART: RRR, no m/r/g  ABDOMEN: soft, NT, ND, BS+  EXTREMITIES:  2+ peripheral pulses, no LE edema, Legs curled up (wheelchair bound)   NERVOUS SYSTEM:  A&Ox1, no focal deficits  SKIN: No rashes or lesions visible over extremities        LABS:                          9.9    5.11  )-----------( 335      ( 09 Dec 2023 06:49 )             32.2     12-09    146<H>  |  105  |  23  ----------------------------<  96  3.9   |  32<H>  |  0.44<L>    Ca    9.5      09 Dec 2023 06:49  Phos  3.0     12-09  Mg     2.00     12-09                >100,000 CFU/ml Enterococcus faecalis  --  Proteus mirabilis ESBL  Enterococcus faecalis

## 2023-12-10 ENCOUNTER — TRANSCRIPTION ENCOUNTER (OUTPATIENT)
Age: 88
End: 2023-12-10

## 2023-12-10 LAB
-  FOSFOMYCIN: SIGNIFICANT CHANGE UP
ANION GAP SERPL CALC-SCNC: 8 MMOL/L — SIGNIFICANT CHANGE UP (ref 7–14)
ANION GAP SERPL CALC-SCNC: 8 MMOL/L — SIGNIFICANT CHANGE UP (ref 7–14)
BASOPHILS # BLD AUTO: 0.03 K/UL — SIGNIFICANT CHANGE UP (ref 0–0.2)
BASOPHILS # BLD AUTO: 0.03 K/UL — SIGNIFICANT CHANGE UP (ref 0–0.2)
BASOPHILS NFR BLD AUTO: 0.5 % — SIGNIFICANT CHANGE UP (ref 0–2)
BASOPHILS NFR BLD AUTO: 0.5 % — SIGNIFICANT CHANGE UP (ref 0–2)
BUN SERPL-MCNC: 19 MG/DL — SIGNIFICANT CHANGE UP (ref 7–23)
BUN SERPL-MCNC: 19 MG/DL — SIGNIFICANT CHANGE UP (ref 7–23)
CALCIUM SERPL-MCNC: 9.3 MG/DL — SIGNIFICANT CHANGE UP (ref 8.4–10.5)
CALCIUM SERPL-MCNC: 9.3 MG/DL — SIGNIFICANT CHANGE UP (ref 8.4–10.5)
CHLORIDE SERPL-SCNC: 102 MMOL/L — SIGNIFICANT CHANGE UP (ref 98–107)
CHLORIDE SERPL-SCNC: 102 MMOL/L — SIGNIFICANT CHANGE UP (ref 98–107)
CO2 SERPL-SCNC: 31 MMOL/L — SIGNIFICANT CHANGE UP (ref 22–31)
CO2 SERPL-SCNC: 31 MMOL/L — SIGNIFICANT CHANGE UP (ref 22–31)
CREAT SERPL-MCNC: 0.4 MG/DL — LOW (ref 0.5–1.3)
CREAT SERPL-MCNC: 0.4 MG/DL — LOW (ref 0.5–1.3)
CULTURE RESULTS: ABNORMAL
CULTURE RESULTS: ABNORMAL
EGFR: 95 ML/MIN/1.73M2 — SIGNIFICANT CHANGE UP
EGFR: 95 ML/MIN/1.73M2 — SIGNIFICANT CHANGE UP
EOSINOPHIL # BLD AUTO: 0.07 K/UL — SIGNIFICANT CHANGE UP (ref 0–0.5)
EOSINOPHIL # BLD AUTO: 0.07 K/UL — SIGNIFICANT CHANGE UP (ref 0–0.5)
EOSINOPHIL NFR BLD AUTO: 1.1 % — SIGNIFICANT CHANGE UP (ref 0–6)
EOSINOPHIL NFR BLD AUTO: 1.1 % — SIGNIFICANT CHANGE UP (ref 0–6)
GLUCOSE SERPL-MCNC: 96 MG/DL — SIGNIFICANT CHANGE UP (ref 70–99)
GLUCOSE SERPL-MCNC: 96 MG/DL — SIGNIFICANT CHANGE UP (ref 70–99)
HCT VFR BLD CALC: 31.8 % — LOW (ref 34.5–45)
HCT VFR BLD CALC: 31.8 % — LOW (ref 34.5–45)
HGB BLD-MCNC: 10.1 G/DL — LOW (ref 11.5–15.5)
HGB BLD-MCNC: 10.1 G/DL — LOW (ref 11.5–15.5)
IANC: 3.88 K/UL — SIGNIFICANT CHANGE UP (ref 1.8–7.4)
IANC: 3.88 K/UL — SIGNIFICANT CHANGE UP (ref 1.8–7.4)
IMM GRANULOCYTES NFR BLD AUTO: 0.2 % — SIGNIFICANT CHANGE UP (ref 0–0.9)
IMM GRANULOCYTES NFR BLD AUTO: 0.2 % — SIGNIFICANT CHANGE UP (ref 0–0.9)
LYMPHOCYTES # BLD AUTO: 1.55 K/UL — SIGNIFICANT CHANGE UP (ref 1–3.3)
LYMPHOCYTES # BLD AUTO: 1.55 K/UL — SIGNIFICANT CHANGE UP (ref 1–3.3)
LYMPHOCYTES # BLD AUTO: 25.5 % — SIGNIFICANT CHANGE UP (ref 13–44)
LYMPHOCYTES # BLD AUTO: 25.5 % — SIGNIFICANT CHANGE UP (ref 13–44)
MAGNESIUM SERPL-MCNC: 1.8 MG/DL — SIGNIFICANT CHANGE UP (ref 1.6–2.6)
MAGNESIUM SERPL-MCNC: 1.8 MG/DL — SIGNIFICANT CHANGE UP (ref 1.6–2.6)
MCHC RBC-ENTMCNC: 28.1 PG — SIGNIFICANT CHANGE UP (ref 27–34)
MCHC RBC-ENTMCNC: 28.1 PG — SIGNIFICANT CHANGE UP (ref 27–34)
MCHC RBC-ENTMCNC: 31.8 GM/DL — LOW (ref 32–36)
MCHC RBC-ENTMCNC: 31.8 GM/DL — LOW (ref 32–36)
MCV RBC AUTO: 88.3 FL — SIGNIFICANT CHANGE UP (ref 80–100)
MCV RBC AUTO: 88.3 FL — SIGNIFICANT CHANGE UP (ref 80–100)
MONOCYTES # BLD AUTO: 0.55 K/UL — SIGNIFICANT CHANGE UP (ref 0–0.9)
MONOCYTES # BLD AUTO: 0.55 K/UL — SIGNIFICANT CHANGE UP (ref 0–0.9)
MONOCYTES NFR BLD AUTO: 9 % — SIGNIFICANT CHANGE UP (ref 2–14)
MONOCYTES NFR BLD AUTO: 9 % — SIGNIFICANT CHANGE UP (ref 2–14)
NEUTROPHILS # BLD AUTO: 3.88 K/UL — SIGNIFICANT CHANGE UP (ref 1.8–7.4)
NEUTROPHILS # BLD AUTO: 3.88 K/UL — SIGNIFICANT CHANGE UP (ref 1.8–7.4)
NEUTROPHILS NFR BLD AUTO: 63.7 % — SIGNIFICANT CHANGE UP (ref 43–77)
NEUTROPHILS NFR BLD AUTO: 63.7 % — SIGNIFICANT CHANGE UP (ref 43–77)
NRBC # BLD: 0 /100 WBCS — SIGNIFICANT CHANGE UP (ref 0–0)
NRBC # BLD: 0 /100 WBCS — SIGNIFICANT CHANGE UP (ref 0–0)
NRBC # FLD: 0 K/UL — SIGNIFICANT CHANGE UP (ref 0–0)
NRBC # FLD: 0 K/UL — SIGNIFICANT CHANGE UP (ref 0–0)
PHOSPHATE SERPL-MCNC: 2.7 MG/DL — SIGNIFICANT CHANGE UP (ref 2.5–4.5)
PHOSPHATE SERPL-MCNC: 2.7 MG/DL — SIGNIFICANT CHANGE UP (ref 2.5–4.5)
PLATELET # BLD AUTO: 342 K/UL — SIGNIFICANT CHANGE UP (ref 150–400)
PLATELET # BLD AUTO: 342 K/UL — SIGNIFICANT CHANGE UP (ref 150–400)
POTASSIUM SERPL-MCNC: 3.8 MMOL/L — SIGNIFICANT CHANGE UP (ref 3.5–5.3)
POTASSIUM SERPL-MCNC: 3.8 MMOL/L — SIGNIFICANT CHANGE UP (ref 3.5–5.3)
POTASSIUM SERPL-SCNC: 3.8 MMOL/L — SIGNIFICANT CHANGE UP (ref 3.5–5.3)
POTASSIUM SERPL-SCNC: 3.8 MMOL/L — SIGNIFICANT CHANGE UP (ref 3.5–5.3)
RBC # BLD: 3.6 M/UL — LOW (ref 3.8–5.2)
RBC # BLD: 3.6 M/UL — LOW (ref 3.8–5.2)
RBC # FLD: 15.4 % — HIGH (ref 10.3–14.5)
RBC # FLD: 15.4 % — HIGH (ref 10.3–14.5)
SODIUM SERPL-SCNC: 141 MMOL/L — SIGNIFICANT CHANGE UP (ref 135–145)
SODIUM SERPL-SCNC: 141 MMOL/L — SIGNIFICANT CHANGE UP (ref 135–145)
WBC # BLD: 6.09 K/UL — SIGNIFICANT CHANGE UP (ref 3.8–10.5)
WBC # BLD: 6.09 K/UL — SIGNIFICANT CHANGE UP (ref 3.8–10.5)
WBC # FLD AUTO: 6.09 K/UL — SIGNIFICANT CHANGE UP (ref 3.8–10.5)
WBC # FLD AUTO: 6.09 K/UL — SIGNIFICANT CHANGE UP (ref 3.8–10.5)

## 2023-12-10 PROCEDURE — 99232 SBSQ HOSP IP/OBS MODERATE 35: CPT

## 2023-12-10 RX ORDER — HYDRALAZINE HCL 50 MG
2.5 TABLET ORAL ONCE
Refills: 0 | Status: DISCONTINUED | OUTPATIENT
Start: 2023-12-10 | End: 2023-12-10

## 2023-12-10 RX ORDER — HYDRALAZINE HCL 50 MG
2.5 TABLET ORAL ONCE
Refills: 0 | Status: COMPLETED | OUTPATIENT
Start: 2023-12-10 | End: 2023-12-10

## 2023-12-10 RX ADMIN — Medication 81 MILLIGRAM(S): at 13:11

## 2023-12-10 RX ADMIN — Medication 3 MILLIGRAM(S): at 22:15

## 2023-12-10 RX ADMIN — Medication 0.25 MILLIGRAM(S): at 22:24

## 2023-12-10 RX ADMIN — GABAPENTIN 100 MILLIGRAM(S): 400 CAPSULE ORAL at 22:15

## 2023-12-10 RX ADMIN — Medication 25 MILLIGRAM(S): at 10:29

## 2023-12-10 RX ADMIN — FAMOTIDINE 20 MILLIGRAM(S): 10 INJECTION INTRAVENOUS at 13:12

## 2023-12-10 RX ADMIN — HEPARIN SODIUM 5000 UNIT(S): 5000 INJECTION INTRAVENOUS; SUBCUTANEOUS at 01:14

## 2023-12-10 RX ADMIN — MIRTAZAPINE 15 MILLIGRAM(S): 45 TABLET, ORALLY DISINTEGRATING ORAL at 22:32

## 2023-12-10 RX ADMIN — Medication 2.5 MILLIGRAM(S): at 01:13

## 2023-12-10 RX ADMIN — HEPARIN SODIUM 5000 UNIT(S): 5000 INJECTION INTRAVENOUS; SUBCUTANEOUS at 13:12

## 2023-12-10 RX ADMIN — Medication 25 MILLIGRAM(S): at 22:15

## 2023-12-10 RX ADMIN — POLYETHYLENE GLYCOL 3350 17 GRAM(S): 17 POWDER, FOR SOLUTION ORAL at 19:06

## 2023-12-10 RX ADMIN — Medication 500 MILLIGRAM(S): at 19:06

## 2023-12-10 RX ADMIN — Medication 325 MILLIGRAM(S): at 13:13

## 2023-12-10 RX ADMIN — SENNA PLUS 2 TABLET(S): 8.6 TABLET ORAL at 22:15

## 2023-12-10 RX ADMIN — Medication 500 MILLIGRAM(S): at 10:29

## 2023-12-10 RX ADMIN — Medication 500 MILLIGRAM(S): at 01:15

## 2023-12-10 NOTE — DISCHARGE NOTE PROVIDER - HOSPITAL COURSE
89 y/o F with a history of vascular dementia, bipolar I, CVA, diverticulosis, and GERD, H pylori s/p treatment now presenting from NH with altered mentation. Now admitted to medicine for further workup and management.       Problem/Plan - 1:  ·  Problem: Toxic metabolic encephalopathy.   ·  Plan: Patient presenting from NH with reported worsening confusion  - patient baseline A&O x 1-2 and minimally verbal, wheelchair bound   - per family c/f worsening confusion and lethargy  - CTH without any acute findings, patient noted with mod to severe chronic microvascular disease  - TSH wnl  - patient noted with positive UA  - current AMS may be from progressive vascular dementia vs TME from UTI vs hypoactive delirium   - treatment of UTI as below, c/w ASA  - consulted to help adjust medications; as per  likely hypoactive delirium but have to keep Catatonia on differential ; held Haldol on 12/8, decrease gabapentin from 400mg qhs to 100mg qhs.     Problem/Plan - 2:  ·  Problem: Urinary tract infection.   ·  Plan: On admission patient noted with grossly positive UA.  - noted with moderate LE, 654 WBC and few bacteria  - urine cultures: ESBL   - s/p Rocephin , ID following - single dose tobramycin 150 mg IV x1 (for Proteus)  - For E faecalis, amoxicillin 500 mg PO Q8H x5 days for E faecalis.    #Constipation  Abdominal Xray: Nonobstructive bowel gas pattern with moderate colonic stool burden.  started on Miralax and senna  BM on 12/7 as per flowsheet.     Problem/Plan - 3:  ·  Problem: Bipolar 1 disorder.   ·  Plan: Patient with history of bipolar d/o  - following with psychiatry, Dr. Billings  - previously seen by  in hospital with adjustment of home medications  - patient likely with further behavioral disturbance i/s/o ongoing UTI  - consulted to help adjust medications; as per  likely hypoactive delirium but have to keep Catatonia on differential ; hold Haldol, decrease gabapentin from 400mg qhs to 100mg qhs  patient seemed to be most awake on 12/8 when Haldol was reduced to 0.25mg BID- appreciate BH recs   - c/w mirtazapine 15mg qhs.     Problem/Plan - 4:  ·  Problem: Chronic GERD.   ·  Plan: - noted with history of GERD  - patient also with previous H. Pylori infection, s/p treatment  - c/w famotidine 20mg qd.   88 y/o F with a history of vascular dementia, bipolar I, CVA, diverticulosis, and GERD, H pylori s/p treatment now presenting from NH with altered mentation. Now admitted to medicine for further workup and management.       Problem/Plan - 1:  ·  Problem: Toxic metabolic encephalopathy.   ·  Plan: Patient presenting from NH with reported worsening confusion  - patient baseline A&O x 1-2 and minimally verbal, wheelchair bound   - per family c/f worsening confusion and lethargy  - CTH without any acute findings, patient noted with mod to severe chronic microvascular disease  - TSH wnl  - patient noted with positive UA  - current AMS may be from progressive vascular dementia vs TME from UTI vs hypoactive delirium   - treatment of UTI as below, c/w ASA  - consulted to help adjust medications; as per  likely hypoactive delirium but have to keep Catatonia on differential ; held Haldol on 12/8, decrease gabapentin from 400mg qhs to 100mg qhs.     Problem/Plan - 2:  ·  Problem: Urinary tract infection.   ·  Plan: On admission patient noted with grossly positive UA.  - noted with moderate LE, 654 WBC and few bacteria  - urine cultures: ESBL   - s/p Rocephin , ID following - single dose tobramycin 150 mg IV x1 (for Proteus)  - For E faecalis, amoxicillin 500 mg PO Q8H x5 days for E faecalis.    #Constipation  Abdominal Xray: Nonobstructive bowel gas pattern with moderate colonic stool burden.  started on Miralax and senna  BM on 12/7 as per flowsheet.     Problem/Plan - 3:  ·  Problem: Bipolar 1 disorder.   ·  Plan: Patient with history of bipolar d/o  - following with psychiatry, Dr. Billings  - previously seen by  in hospital with adjustment of home medications  - patient likely with further behavioral disturbance i/s/o ongoing UTI  - consulted to help adjust medications; as per  likely hypoactive delirium but have to keep Catatonia on differential ; hold Haldol, decrease gabapentin from 400mg qhs to 100mg qhs  patient seemed to be most awake on 12/8 when Haldol was reduced to 0.25mg BID- appreciate BH recs   - c/w mirtazapine 15mg qhs.     Problem/Plan - 4:  ·  Problem: Chronic GERD.   ·  Plan: - noted with history of GERD  - patient also with previous H. Pylori infection, s/p treatment  - c/w famotidine 20mg qd.   89 y/o F with a history of vascular dementia, bipolar I, CVA, diverticulosis, and GERD, H pylori s/p treatment now presenting from NH with altered mentation. Now admitted to medicine for further workup and management.       Problem/Plan - 1:  ·  Problem: Toxic metabolic encephalopathy.   ·  Plan: Patient presenting from NH with reported worsening confusion  - patient baseline A&O x 1-2 and minimally verbal, wheelchair bound   - per family c/f worsening confusion and lethargy  - CTH without any acute findings, patient noted with mod to severe chronic microvascular disease  - TSH wnl  - patient noted with positive UA  - current AMS may be from progressive vascular dementia vs TME from UTI vs hypoactive delirium   - treatment of UTI as below, c/w ASA  - consulted to help adjust medications; as per  likely hypoactive delirium but have to keep Catatonia on differential ; held Haldol on 12/8, decrease gabapentin from 400mg qhs to 100mg qhs.     Problem/Plan - 2:  ·  Problem: Urinary tract infection.   ·  Plan: On admission patient noted with grossly positive UA.  - noted with moderate LE, 654 WBC and few bacteria  - urine cultures: ESBL   - s/p Rocephin , ID following - single dose tobramycin 150 mg IV x1 (for Proteus)  - For E faecalis, amoxicillin 500 mg PO Q8H x5 days for E faecalis.    #Constipation  Abdominal Xray: Nonobstructive bowel gas pattern with moderate colonic stool burden.  started on Miralax and senna  BM on 12/7 as per flowsheet.     Problem/Plan - 3:  ·  Problem: Bipolar 1 disorder.   ·  Plan: Patient with history of bipolar d/o  - following with psychiatry, Dr. Billings  - previously seen by  in hospital with adjustment of home medications  - patient likely with further behavioral disturbance i/s/o ongoing UTI  - consulted to help adjust medications; as per  likely hypoactive delirium but have to keep Catatonia on differential ; hold Haldol, decrease gabapentin from 400mg qhs to 100mg qhs  patient seemed to be most awake on 12/8 when Haldol was reduced to 0.25mg BID- appreciate BH recs   - c/w mirtazapine 15mg qhs.     Problem/Plan - 4:  ·  Problem: Chronic GERD.   ·  Plan: - noted with history of GERD  - patient also with previous H. Pylori infection, s/p treatment  - c/w famotidine 20mg qd.    Stable for dc 88 y/o F with a history of vascular dementia, bipolar I, CVA, diverticulosis, and GERD, H pylori s/p treatment now presenting from NH with altered mentation. Now admitted to medicine for further workup and management.       Problem/Plan - 1:  ·  Problem: Toxic metabolic encephalopathy.   ·  Plan: Patient presenting from NH with reported worsening confusion  - patient baseline A&O x 1-2 and minimally verbal, wheelchair bound   - per family c/f worsening confusion and lethargy  - CTH without any acute findings, patient noted with mod to severe chronic microvascular disease  - TSH wnl  - patient noted with positive UA  - current AMS may be from progressive vascular dementia vs TME from UTI vs hypoactive delirium   - treatment of UTI as below, c/w ASA  - consulted to help adjust medications; as per  likely hypoactive delirium but have to keep Catatonia on differential ; held Haldol on 12/8, decrease gabapentin from 400mg qhs to 100mg qhs.     Problem/Plan - 2:  ·  Problem: Urinary tract infection.   ·  Plan: On admission patient noted with grossly positive UA.  - noted with moderate LE, 654 WBC and few bacteria  - urine cultures: ESBL   - s/p Rocephin , ID following - single dose tobramycin 150 mg IV x1 (for Proteus)  - For E faecalis, amoxicillin 500 mg PO Q8H x5 days for E faecalis.    #Constipation  Abdominal Xray: Nonobstructive bowel gas pattern with moderate colonic stool burden.  started on Miralax and senna  BM on 12/7 as per flowsheet.     Problem/Plan - 3:  ·  Problem: Bipolar 1 disorder.   ·  Plan: Patient with history of bipolar d/o  - following with psychiatry, Dr. Billings  - previously seen by  in hospital with adjustment of home medications  - patient likely with further behavioral disturbance i/s/o ongoing UTI  - consulted to help adjust medications; as per  likely hypoactive delirium but have to keep Catatonia on differential ; hold Haldol, decrease gabapentin from 400mg qhs to 100mg qhs  patient seemed to be most awake on 12/8 when Haldol was reduced to 0.25mg BID- appreciate BH recs   - c/w mirtazapine 15mg qhs.     Problem/Plan - 4:  ·  Problem: Chronic GERD.   ·  Plan: - noted with history of GERD  - patient also with previous H. Pylori infection, s/p treatment  - c/w famotidine 20mg qd.    Stable for dc 88 y/o F with a history of vascular dementia, bipolar I, CVA, diverticulosis, and GERD, H pylori s/p treatment now presented from NH with altered mentation. Now admitted to medicine for further workup and management.    Hospital course:   Toxic metabolic encephalopathy.   - Patient presented from NH with reported worsening confusion  - patient baseline A&O x 1-2 and minimally verbal, wheelchair bound   - per family c/f worsening confusion and lethargy  - CTH without any acute findings, patient noted with mod to severe chronic microvascular disease  - TSH wnl  - patient noted with positive UA  - current AMS may be from progressive vascular dementia vs TME from UTI vs hypoactive delirium   - treatment of UTI as below, c/w ASA  - consulted to help adjust medications; as per  likely hypoactive delirium but have to keep Catatonia on differential ; held Haldol on 12/8, decrease gabapentin from 400mg qhs to 100mg qhs.    Urinary tract infection.   - On admission patient noted with grossly positive UA.  - noted with moderate LE, 654 WBC and few bacteria  - urine cultures: ESBL   - s/p Rocephin , ID following - single dose tobramycin 150 mg IV x1 (for Proteus)  - For E faecalis, amoxicillin 500 mg PO Q8H x5 days for E faecalis.    Constipation  - Abdominal Xray: Nonobstructive bowel gas pattern with moderate colonic stool burden.  - started on Miralax and senna  - BM on 12/7 as per flowsheet.    Bipolar 1 disorder.   - Patient with history of bipolar d/o  - following with psychiatry, Dr. Billings  - previously seen by  in hospital with adjustment of home medications  - patient likely with further behavioral disturbance i/s/o ongoing UTI  -  consulted to help adjust medications; as per  likely hypoactive delirium but have to keep Catatonia on differential ; hold Haldol, decrease gabapentin from 400mg qhs to 100mg qhs  patient seemed to be most awake on 12/8 when Haldol was reduced to 0.25mg BID- appreciate  recs   - c/w mirtazapine 15mg qhs.    Chronic GERD.   - noted with history of GERD  - patient also with previous H. Pylori infection, s/p treatment  - c/w famotidine 20mg qd.    On 12/12/23, case discussed with Dr. Stevens, pt is medically cleared/stable for discharge to [facility] for wound care needs. 89 y/o F with a history of vascular dementia, bipolar I, CVA, diverticulosis, and GERD, H pylori s/p treatment now presented from NH with altered mentation. Now admitted to medicine for further workup and management.    Hospital course:   Toxic metabolic encephalopathy.   - Patient presented from NH with reported worsening confusion  - patient baseline A&O x 1-2 and minimally verbal, wheelchair bound   - per family c/f worsening confusion and lethargy  - CTH without any acute findings, patient noted with mod to severe chronic microvascular disease  - TSH wnl  - patient noted with positive UA  - current AMS may be from progressive vascular dementia vs TME from UTI vs hypoactive delirium   - treatment of UTI as below, c/w ASA  - consulted to help adjust medications; as per  likely hypoactive delirium but have to keep Catatonia on differential ; held Haldol on 12/8, decrease gabapentin from 400mg qhs to 100mg qhs.    Urinary tract infection.   - On admission patient noted with grossly positive UA.  - noted with moderate LE, 654 WBC and few bacteria  - urine cultures: ESBL   - s/p Rocephin , ID following - single dose tobramycin 150 mg IV x1 (for Proteus)  - For E faecalis, amoxicillin 500 mg PO Q8H x5 days for E faecalis.    Constipation  - Abdominal Xray: Nonobstructive bowel gas pattern with moderate colonic stool burden.  - started on Miralax and senna  - BM on 12/7 as per flowsheet.    Bipolar 1 disorder.   - Patient with history of bipolar d/o  - following with psychiatry, Dr. Billings  - previously seen by  in hospital with adjustment of home medications  - patient likely with further behavioral disturbance i/s/o ongoing UTI  -  consulted to help adjust medications; as per  likely hypoactive delirium but have to keep Catatonia on differential ; hold Haldol, decrease gabapentin from 400mg qhs to 100mg qhs  patient seemed to be most awake on 12/8 when Haldol was reduced to 0.25mg BID- appreciate  recs   - c/w mirtazapine 15mg qhs.    Chronic GERD.   - noted with history of GERD  - patient also with previous H. Pylori infection, s/p treatment  - c/w famotidine 20mg qd.    On 12/12/23, case discussed with Dr. Stevens, pt is medically cleared/stable for discharge to [facility] for wound care needs. 88 y/o F with a history of vascular dementia, bipolar I, CVA, diverticulosis, and GERD, H pylori s/p treatment now presented from NH with altered mentation. Now admitted to medicine for further workup and management.    Hospital course:   Toxic metabolic encephalopathy.   - Patient presented from NH with reported worsening confusion  - patient baseline A&O x 1-2 and minimally verbal, wheelchair bound   - per family c/f worsening confusion and lethargy  - CTH without any acute findings, patient noted with mod to severe chronic microvascular disease  - TSH wnl  - patient noted with positive UA  - current AMS may be from progressive vascular dementia vs TME from UTI vs hypoactive delirium   - treatment of UTI as below, c/w ASA  - consulted to help adjust medications; as per  likely hypoactive delirium but have to keep Catatonia on differential ; held Haldol on 12/8, decrease gabapentin from 400mg qhs to 100mg qhs.    Urinary tract infection.   - On admission patient noted with grossly positive UA.  - noted with moderate LE, 654 WBC and few bacteria  - urine cultures: ESBL   - s/p Rocephin , ID following - single dose tobramycin 150 mg IV x1 (for Proteus)  - For E faecalis, amoxicillin 500 mg PO Q8H x5 days for E faecalis.    Constipation  - Abdominal Xray: Nonobstructive bowel gas pattern with moderate colonic stool burden.  - started on Miralax and senna  - BM on 12/7 as per flowsheet.    Bipolar 1 disorder.   - Patient with history of bipolar d/o  - following with psychiatry, Dr. Billings  - previously seen by  in hospital with adjustment of home medications  - patient likely with further behavioral disturbance i/s/o ongoing UTI  -  consulted to help adjust medications; as per  likely hypoactive delirium but have to keep Catatonia on differential ; hold Haldol, decrease gabapentin from 400mg qhs to 100mg qhs  patient seemed to be most awake on 12/8 when Haldol was reduced to 0.25mg BID- appreciate  recs   - c/w mirtazapine 15mg qhs.    Chronic GERD.   - noted with history of GERD  - patient also with previous H. Pylori infection, s/p treatment  - c/w famotidine 20mg qd.    On 12/12/23, case discussed with Dr. Stevens, pt is medically cleared/stable for discharge to [facility] for wound care needs, f/u as outpt 89 y/o F with a history of vascular dementia, bipolar I, CVA, diverticulosis, and GERD, H pylori s/p treatment now presented from NH with altered mentation. Now admitted to medicine for further workup and management.    Hospital course:   Toxic metabolic encephalopathy.   - Patient presented from NH with reported worsening confusion  - patient baseline A&O x 1-2 and minimally verbal, wheelchair bound   - per family c/f worsening confusion and lethargy  - CTH without any acute findings, patient noted with mod to severe chronic microvascular disease  - TSH wnl  - patient noted with positive UA  - current AMS may be from progressive vascular dementia vs TME from UTI vs hypoactive delirium   - treatment of UTI as below, c/w ASA  - consulted to help adjust medications; as per  likely hypoactive delirium but have to keep Catatonia on differential ; held Haldol on 12/8, decrease gabapentin from 400mg qhs to 100mg qhs.    Urinary tract infection.   - On admission patient noted with grossly positive UA.  - noted with moderate LE, 654 WBC and few bacteria  - urine cultures: ESBL   - s/p Rocephin , ID following - single dose tobramycin 150 mg IV x1 (for Proteus)  - For E faecalis, amoxicillin 500 mg PO Q8H x5 days for E faecalis.    Constipation  - Abdominal Xray: Nonobstructive bowel gas pattern with moderate colonic stool burden.  - started on Miralax and senna  - BM on 12/7 as per flowsheet.    Bipolar 1 disorder.   - Patient with history of bipolar d/o  - following with psychiatry, Dr. Billings  - previously seen by  in hospital with adjustment of home medications  - patient likely with further behavioral disturbance i/s/o ongoing UTI  -  consulted to help adjust medications; as per  likely hypoactive delirium but have to keep Catatonia on differential ; hold Haldol, decrease gabapentin from 400mg qhs to 100mg qhs  patient seemed to be most awake on 12/8 when Haldol was reduced to 0.25mg BID- appreciate  recs   - c/w mirtazapine 15mg qhs.    Chronic GERD.   - noted with history of GERD  - patient also with previous H. Pylori infection, s/p treatment  - c/w famotidine 20mg qd.    On 12/12/23, case discussed with Dr. Stevens, pt is medically cleared/stable for discharge to [facility] for wound care needs, f/u as outpt

## 2023-12-10 NOTE — PROGRESS NOTE ADULT - PROBLEM SELECTOR PLAN 3
Patient with history of bipolar d/o  - following with psychiatry, Dr. Billings  - previously seen by  in hospital with adjustment of home medications  - patient likely with further behavioral disturbance i/s/o ongoing UTI  - consulted to help adjust medications; as per  likely hypoactive delirium but have to keep Catatonia on differential ; hold Haldol, decrease gabapentin from 400mg qhs to 100mg qhs  - c/w mirtazapine 15mg qhs Patient with history of bipolar d/o  - following with psychiatry, Dr. Billings  - previously seen by  in hospital with adjustment of home medications  - patient likely with further behavioral disturbance i/s/o ongoing UTI  - consulted to help adjust medications; as per  likely hypoactive delirium but have to keep Catatonia on differential ; hold Haldol, decrease gabapentin from 400mg qhs to 100mg qhs  patient seemed to be most awake on 12/8 when Haldol was reduced to 0.25mg BID- appreciate BH recs   - c/w mirtazapine 15mg qhs

## 2023-12-10 NOTE — PROGRESS NOTE ADULT - PROBLEM SELECTOR PLAN 1
Patient presenting from NH with reported worsening confusion  - patient baseline A&O x 1-2 and minimally verbal, wheelchair bound   - per family c/f worsening confusion and lethargy  - CTH without any acute findings, patient noted with mod to severe chronic microvascular disease  - TSH wnl  - patient noted with positive UA  - current AMS may be from progressive vascular dementia vs TME from UTI vs constipation   - treatment of UTI as below, c/w ASA  -BH consulted to help adjust medications; as per  likely hypoactive delirium but have to keep Catatonia on differential ; hold Haldol, decrease gabapentin from 400mg qhs to 100mg qhs Patient presenting from NH with reported worsening confusion  - patient baseline A&O x 1-2 and minimally verbal, wheelchair bound   - per family c/f worsening confusion and lethargy  - CTH without any acute findings, patient noted with mod to severe chronic microvascular disease  - TSH wnl  - patient noted with positive UA  - current AMS may be from progressive vascular dementia vs TME from UTI vs hypoactive delirium   - treatment of UTI as below, c/w ASA  -BH consulted to help adjust medications; as per  likely hypoactive delirium but have to keep Catatonia on differential ; held Haldol on 12/8, decrease gabapentin from 400mg qhs to 100mg qhs

## 2023-12-10 NOTE — DISCHARGE NOTE PROVIDER - NSDCCPCAREPLAN_GEN_ALL_CORE_FT
PRINCIPAL DISCHARGE DIAGNOSIS  Diagnosis: Altered mental status  Assessment and Plan of Treatment: You were admitted to hospital due to change of mental status. You were found to have a urinary tract infection and treated for that with antibiotics. You were seen by behavioral health team and your medications were adjusted.        PRINCIPAL DISCHARGE DIAGNOSIS  Diagnosis: Altered mental status  Assessment and Plan of Treatment: You were admitted to hospital due to change of mental status. You were found to have a urinary tract infection and treated for that with antibiotics. You were seen by behavioral health team and your medications were adjusted.         SECONDARY DISCHARGE DIAGNOSES  Diagnosis: Bipolar 1 disorder  Assessment and Plan of Treatment: Take medication as prescibed and f/u with psychiatrist as outpt     PRINCIPAL DISCHARGE DIAGNOSIS  Diagnosis: Altered mental status  Assessment and Plan of Treatment: You were admitted to hospital due to change of mental status. You were found to have a urinary tract infection and treated for that with antibiotics. You were seen by behavioral health team and your medications were adjusted. Please continue the medications as prescribed and follow up with outpatient psych doctor for further management of medication. Also, to help prevent future infections maintain healthy hygiene and avoid taking long baths. Wipe from front to back and empty your bladder frequently by keeping yourself properly hydrated. Monitor for signs/symptoms of infection, such as, fever/chills, burning/pain with urination, urinary frequency/hesitancy, cloudy urine, or blood in urine and follow-up with your primary care provider as outpatient for further care.      SECONDARY DISCHARGE DIAGNOSES  Diagnosis: Bipolar 1 disorder  Assessment and Plan of Treatment: Take medication as prescibed and f/u with psychiatrist as outpt

## 2023-12-10 NOTE — DISCHARGE NOTE PROVIDER - NSDCMRMEDTOKEN_GEN_ALL_CORE_FT
aspirin 81 mg oral delayed release tablet: 1 tab(s) orally once a day  famotidine 20 mg oral tablet: 1 tab(s) orally 2 times a day  gabapentin 400 mg oral capsule: 1 cap(s) orally  haloperidol 0.5 mg oral tablet: 1 tab(s) orally 3 times a day  melatonin 3 mg oral tablet: 1 tab(s) orally once a day (at bedtime)  metoprolol tartrate 25 mg oral tablet: 1 tab(s) orally 2 times a day  mirtazapine 15 mg oral tablet, disintegratin tab(s) orally once a day (at bedtime)   acetaminophen 325 mg oral tablet: 2 tab(s) orally every 6 hours As needed Temp greater or equal to 38C (100.4F), Mild Pain (1 - 3)  aspirin 81 mg oral delayed release tablet: 1 tab(s) orally once a day  bisacodyl 5 mg oral delayed release tablet: 1 tab(s) orally every 12 hours As needed Constipation  famotidine 20 mg oral tablet: 1 tab(s) orally 2 times a day  ferrous sulfate 325 mg (65 mg elemental iron) oral tablet: 1 tab(s) orally once a day  gabapentin 100 mg oral capsule: 1 cap(s) orally once a day (at bedtime)  melatonin 3 mg oral tablet: 1 tab(s) orally once a day (at bedtime)  metoprolol tartrate 25 mg oral tablet: 1 tab(s) orally 2 times a day  mirtazapine 15 mg oral tablet, disintegratin tab(s) orally once a day (at bedtime)  polyethylene glycol 3350 oral powder for reconstitution: 17 gram(s) orally 2 times a day  senna leaf extract oral tablet: 2 tab(s) orally once a day (at bedtime)

## 2023-12-10 NOTE — PROGRESS NOTE ADULT - SUBJECTIVE AND OBJECTIVE BOX
[facility] Division of Hospital Medicine  Chelsea North MD  Hospitalist   Pager [phone]  Avaliable via MS teams     SUBJECTIVE / OVERNIGHT EVENTS: Patient seen and examined. patient laying in bed- contracted, wining - not really answering questions properly today.     ADDITIONAL REVIEW OF SYSTEMS:    MEDICATIONS  (STANDING):  aspirin enteric coated 81 milliGRAM(s) Oral daily  famotidine    Tablet 20 milliGRAM(s) Oral daily  ferrous    sulfate 325 milliGRAM(s) Oral daily  gabapentin 400 milliGRAM(s) Oral at bedtime  haloperidol     Tablet 0.5 milliGRAM(s) Oral three times a day  heparin   Injectable 5000 Unit(s) SubCutaneous every 12 hours  metoprolol tartrate 25 milliGRAM(s) Oral two times a day  mirtazapine Soltab 15 milliGRAM(s) Oral at bedtime  polyethylene glycol 3350 17 Gram(s) Oral two times a day  senna 2 Tablet(s) Oral at bedtime    MEDICATIONS  (PRN):  acetaminophen     Tablet .. 650 milliGRAM(s) Oral every 6 hours PRN Temp greater or equal to 38C (100.4F), Mild Pain (1 - 3)  aluminum hydroxide/magnesium hydroxide/simethicone Suspension 30 milliLiter(s) Oral every 4 hours PRN Dyspepsia  haloperidol    Injectable 1 milliGRAM(s) IntraMuscular every 6 hours PRN Severe Agitation  melatonin 3 milliGRAM(s) Oral at bedtime PRN Insomnia  ondansetron Injectable 4 milliGRAM(s) IV Push every 8 hours PRN Nausea and/or Vomiting      I&O's Summary    04 Dec 2023 07:01  -  05 Dec 2023 07:00  --------------------------------------------------------  IN: 0 mL / OUT: 0 mL / NET: 0 mL        PHYSICAL EXAM:  Vital Signs Last 24 Hrs  T(C): 36.3 (09 Dec 2023 09:23), Max: 36.9 (08 Dec 2023 17:50)  T(F): 97.3 (09 Dec 2023 09:23), Max: 98.5 (08 Dec 2023 17:50)  HR: 109 (09 Dec 2023 09:23) (62 - 109)  BP: 166/88 (09 Dec 2023 09:23) (145/71 - 169/91)  BP(mean): --  RR: 19 (09 Dec 2023 09:23) (18 - 19)  SpO2: 95% (09 Dec 2023 09:23) (95% - 100%)    Parameters below as of 09 Dec 2023 09:23  Patient On (Oxygen Delivery Method): room air        GENERAL: NAD  HEENT: [place], [place]/AT, EOMI, PERRL  NECK: Supple, No JVD  CHEST/LUNG: CTAB, no increased WOB  HEART: RRR, no m/r/g  ABDOMEN: soft, NT, ND, BS+  EXTREMITIES:  2+ peripheral pulses, no LE edema, Legs curled up (wheelchair bound)   NERVOUS SYSTEM:  A&Ox1, no focal deficits  SKIN: No rashes or lesions visible over extremities        LABS:                          9.9    5.11  )-----------( 335      ( 09 Dec 2023 06:49 )             32.2     12-09    146<H>  |  105  |  23  ----------------------------<  96  3.9   |  32<H>  |  0.44<L>    Ca    9.5      09 Dec 2023 06:49  Phos  3.0     12-09  Mg     2.00     12-09                >100,000 CFU/ml Enterococcus faecalis  --  Proteus mirabilis ESBL  Enterococcus faecalis     [facility] Medicine  Chelsea North MD  Hospitalist   Pager [phone]  Avaliable via MS teams     SUBJECTIVE / OVERNIGHT EVENTS: Patient seen and examined. patient laying in bed- contracted, wining - not really answering questions properly today.     ADDITIONAL REVIEW OF SYSTEMS:    MEDICATIONS  (STANDING):  aspirin enteric coated 81 milliGRAM(s) Oral daily  famotidine    Tablet 20 milliGRAM(s) Oral daily  ferrous    sulfate 325 milliGRAM(s) Oral daily  gabapentin 400 milliGRAM(s) Oral at bedtime  haloperidol     Tablet 0.5 milliGRAM(s) Oral three times a day  heparin   Injectable 5000 Unit(s) SubCutaneous every 12 hours  metoprolol tartrate 25 milliGRAM(s) Oral two times a day  mirtazapine Soltab 15 milliGRAM(s) Oral at bedtime  polyethylene glycol 3350 17 Gram(s) Oral two times a day  senna 2 Tablet(s) Oral at bedtime    MEDICATIONS  (PRN):  acetaminophen     Tablet .. 650 milliGRAM(s) Oral every 6 hours PRN Temp greater or equal to 38C (100.4F), Mild Pain (1 - 3)  aluminum hydroxide/magnesium hydroxide/simethicone Suspension 30 milliLiter(s) Oral every 4 hours PRN Dyspepsia  haloperidol    Injectable 1 milliGRAM(s) IntraMuscular every 6 hours PRN Severe Agitation  melatonin 3 milliGRAM(s) Oral at bedtime PRN Insomnia  ondansetron Injectable 4 milliGRAM(s) IV Push every 8 hours PRN Nausea and/or Vomiting      I&O's Summary    04 Dec 2023 07:01  -  05 Dec 2023 07:00  --------------------------------------------------------  IN: 0 mL / OUT: 0 mL / NET: 0 mL        PHYSICAL EXAM:  Vital Signs Last 24 Hrs  T(C): 36.3 (09 Dec 2023 09:23), Max: 36.9 (08 Dec 2023 17:50)  T(F): 97.3 (09 Dec 2023 09:23), Max: 98.5 (08 Dec 2023 17:50)  HR: 109 (09 Dec 2023 09:23) (62 - 109)  BP: 166/88 (09 Dec 2023 09:23) (145/71 - 169/91)  BP(mean): --  RR: 19 (09 Dec 2023 09:23) (18 - 19)  SpO2: 95% (09 Dec 2023 09:23) (95% - 100%)    Parameters below as of 09 Dec 2023 09:23  Patient On (Oxygen Delivery Method): room air        GENERAL: NAD  HEENT: [place], [place]/AT, EOMI, PERRL  NECK: Supple, No JVD  CHEST/LUNG: CTAB, no increased WOB  HEART: RRR, no m/r/g  ABDOMEN: soft, NT, ND, BS+  EXTREMITIES:  2+ peripheral pulses, no LE edema, Legs curled up (wheelchair bound)   NERVOUS SYSTEM:  A&Ox1, no focal deficits  SKIN: No rashes or lesions visible over extremities        LABS:                          9.9    5.11  )-----------( 335      ( 09 Dec 2023 06:49 )             32.2     12-09    146<H>  |  105  |  23  ----------------------------<  96  3.9   |  32<H>  |  0.44<L>    Ca    9.5      09 Dec 2023 06:49  Phos  3.0     12-09  Mg     2.00     12-09                >100,000 CFU/ml Enterococcus faecalis  --  Proteus mirabilis ESBL  Enterococcus faecalis     [facility] Medicine  Chelsea North MD  Hospitalist   Pager [phone]  Avaliable via MS teams     SUBJECTIVE / OVERNIGHT EVENTS: Patient seen and examined. patient laying in bed- contracted, wining - shouting intermittently.      ADDITIONAL REVIEW OF SYSTEMS:    MEDICATIONS  (STANDING):  aspirin enteric coated 81 milliGRAM(s) Oral daily  famotidine    Tablet 20 milliGRAM(s) Oral daily  ferrous    sulfate 325 milliGRAM(s) Oral daily  gabapentin 400 milliGRAM(s) Oral at bedtime  haloperidol     Tablet 0.5 milliGRAM(s) Oral three times a day  heparin   Injectable 5000 Unit(s) SubCutaneous every 12 hours  metoprolol tartrate 25 milliGRAM(s) Oral two times a day  mirtazapine Soltab 15 milliGRAM(s) Oral at bedtime  polyethylene glycol 3350 17 Gram(s) Oral two times a day  senna 2 Tablet(s) Oral at bedtime    MEDICATIONS  (PRN):  acetaminophen     Tablet .. 650 milliGRAM(s) Oral every 6 hours PRN Temp greater or equal to 38C (100.4F), Mild Pain (1 - 3)  aluminum hydroxide/magnesium hydroxide/simethicone Suspension 30 milliLiter(s) Oral every 4 hours PRN Dyspepsia  haloperidol    Injectable 1 milliGRAM(s) IntraMuscular every 6 hours PRN Severe Agitation  melatonin 3 milliGRAM(s) Oral at bedtime PRN Insomnia  ondansetron Injectable 4 milliGRAM(s) IV Push every 8 hours PRN Nausea and/or Vomiting      I&O's Summary    09 Dec 2023 07:01  -  10 Dec 2023 07:00  --------------------------------------------------------  IN: 720 mL / OUT: 1 mL / NET: 719 mL    10 Dec 2023 07:01  -  10 Dec 2023 16:08  --------------------------------------------------------  IN: 150 mL / OUT: 0 mL / NET: 150 mL          PHYSICAL EXAM:  Vital Signs Last 24 Hrs  T(C): 36.5 (10 Dec 2023 10:16), Max: 36.8 (09 Dec 2023 17:30)  T(F): 97.7 (10 Dec 2023 10:16), Max: 98.2 (09 Dec 2023 17:30)  HR: 72 (10 Dec 2023 10:20) (51 - 83)  BP: 125/74 (10 Dec 2023 10:20) (125/74 - 177/99)  BP(mean): --  RR: 18 (10 Dec 2023 10:16) (17 - 18)  SpO2: 97% (10 Dec 2023 10:16) (95% - 97%)    Parameters below as of 10 Dec 2023 10:16  Patient On (Oxygen Delivery Method): room air          GENERAL: screaming intermittently   HEENT: [place], [place]/AT, EOMI, PERRL  NECK: Supple, No JVD  CHEST/LUNG: CTAB, no increased WOB  HEART: RRR, no m/r/g  ABDOMEN: soft, NT, ND, BS+  EXTREMITIES:  2+ peripheral pulses, no LE edema, Legs curled up (wheelchair bound)   NERVOUS SYSTEM:  A&Ox0-1, no focal deficits  SKIN: No rashes or lesions visible over extremities      LABS:                          10.1   6.09  )-----------( 342      ( 10 Dec 2023 06:55 )             31.8     12-10    141  |  102  |  19  ----------------------------<  96  3.8   |  31  |  0.40<L>    Ca    9.3      10 Dec 2023 06:55  Phos  2.7     12-10  Mg     1.80     12-10        >100,000 CFU/ml Enterococcus faecalis  --  Proteus mirabilis ESBL  Enterococcus faecalis     [facility] Hospital Medicine  Chelsea North MD  Hospitalist   Pager [phone]  Avaliable via MS teams     SUBJECTIVE / OVERNIGHT EVENTS: Patient seen and examined. patient laying in bed- contracted, wining - shouting intermittently.      ADDITIONAL REVIEW OF SYSTEMS:    MEDICATIONS  (STANDING):  aspirin enteric coated 81 milliGRAM(s) Oral daily  famotidine    Tablet 20 milliGRAM(s) Oral daily  ferrous    sulfate 325 milliGRAM(s) Oral daily  gabapentin 400 milliGRAM(s) Oral at bedtime  haloperidol     Tablet 0.5 milliGRAM(s) Oral three times a day  heparin   Injectable 5000 Unit(s) SubCutaneous every 12 hours  metoprolol tartrate 25 milliGRAM(s) Oral two times a day  mirtazapine Soltab 15 milliGRAM(s) Oral at bedtime  polyethylene glycol 3350 17 Gram(s) Oral two times a day  senna 2 Tablet(s) Oral at bedtime    MEDICATIONS  (PRN):  acetaminophen     Tablet .. 650 milliGRAM(s) Oral every 6 hours PRN Temp greater or equal to 38C (100.4F), Mild Pain (1 - 3)  aluminum hydroxide/magnesium hydroxide/simethicone Suspension 30 milliLiter(s) Oral every 4 hours PRN Dyspepsia  haloperidol    Injectable 1 milliGRAM(s) IntraMuscular every 6 hours PRN Severe Agitation  melatonin 3 milliGRAM(s) Oral at bedtime PRN Insomnia  ondansetron Injectable 4 milliGRAM(s) IV Push every 8 hours PRN Nausea and/or Vomiting      I&O's Summary    09 Dec 2023 07:01  -  10 Dec 2023 07:00  --------------------------------------------------------  IN: 720 mL / OUT: 1 mL / NET: 719 mL    10 Dec 2023 07:01  -  10 Dec 2023 16:08  --------------------------------------------------------  IN: 150 mL / OUT: 0 mL / NET: 150 mL          PHYSICAL EXAM:  Vital Signs Last 24 Hrs  T(C): 36.5 (10 Dec 2023 10:16), Max: 36.8 (09 Dec 2023 17:30)  T(F): 97.7 (10 Dec 2023 10:16), Max: 98.2 (09 Dec 2023 17:30)  HR: 72 (10 Dec 2023 10:20) (51 - 83)  BP: 125/74 (10 Dec 2023 10:20) (125/74 - 177/99)  BP(mean): --  RR: 18 (10 Dec 2023 10:16) (17 - 18)  SpO2: 97% (10 Dec 2023 10:16) (95% - 97%)    Parameters below as of 10 Dec 2023 10:16  Patient On (Oxygen Delivery Method): room air          GENERAL: screaming intermittently   HEENT: [place], [place]/AT, EOMI, PERRL  NECK: Supple, No JVD  CHEST/LUNG: CTAB, no increased WOB  HEART: RRR, no m/r/g  ABDOMEN: soft, NT, ND, BS+  EXTREMITIES:  2+ peripheral pulses, no LE edema, Legs curled up (wheelchair bound)   NERVOUS SYSTEM:  A&Ox0-1, no focal deficits  SKIN: No rashes or lesions visible over extremities      LABS:                          10.1   6.09  )-----------( 342      ( 10 Dec 2023 06:55 )             31.8     12-10    141  |  102  |  19  ----------------------------<  96  3.8   |  31  |  0.40<L>    Ca    9.3      10 Dec 2023 06:55  Phos  2.7     12-10  Mg     1.80     12-10        >100,000 CFU/ml Enterococcus faecalis  --  Proteus mirabilis ESBL  Enterococcus faecalis

## 2023-12-11 LAB
ANION GAP SERPL CALC-SCNC: 10 MMOL/L — SIGNIFICANT CHANGE UP (ref 7–14)
ANION GAP SERPL CALC-SCNC: 10 MMOL/L — SIGNIFICANT CHANGE UP (ref 7–14)
BASOPHILS # BLD AUTO: 0.02 K/UL — SIGNIFICANT CHANGE UP (ref 0–0.2)
BASOPHILS # BLD AUTO: 0.02 K/UL — SIGNIFICANT CHANGE UP (ref 0–0.2)
BASOPHILS NFR BLD AUTO: 0.2 % — SIGNIFICANT CHANGE UP (ref 0–2)
BASOPHILS NFR BLD AUTO: 0.2 % — SIGNIFICANT CHANGE UP (ref 0–2)
BUN SERPL-MCNC: 25 MG/DL — HIGH (ref 7–23)
BUN SERPL-MCNC: 25 MG/DL — HIGH (ref 7–23)
CALCIUM SERPL-MCNC: 9.8 MG/DL — SIGNIFICANT CHANGE UP (ref 8.4–10.5)
CALCIUM SERPL-MCNC: 9.8 MG/DL — SIGNIFICANT CHANGE UP (ref 8.4–10.5)
CHLORIDE SERPL-SCNC: 103 MMOL/L — SIGNIFICANT CHANGE UP (ref 98–107)
CHLORIDE SERPL-SCNC: 103 MMOL/L — SIGNIFICANT CHANGE UP (ref 98–107)
CO2 SERPL-SCNC: 31 MMOL/L — SIGNIFICANT CHANGE UP (ref 22–31)
CO2 SERPL-SCNC: 31 MMOL/L — SIGNIFICANT CHANGE UP (ref 22–31)
CREAT SERPL-MCNC: 0.46 MG/DL — LOW (ref 0.5–1.3)
CREAT SERPL-MCNC: 0.46 MG/DL — LOW (ref 0.5–1.3)
CULTURE RESULTS: SIGNIFICANT CHANGE UP
EGFR: 91 ML/MIN/1.73M2 — SIGNIFICANT CHANGE UP
EGFR: 91 ML/MIN/1.73M2 — SIGNIFICANT CHANGE UP
EOSINOPHIL # BLD AUTO: 0.03 K/UL — SIGNIFICANT CHANGE UP (ref 0–0.5)
EOSINOPHIL # BLD AUTO: 0.03 K/UL — SIGNIFICANT CHANGE UP (ref 0–0.5)
EOSINOPHIL NFR BLD AUTO: 0.4 % — SIGNIFICANT CHANGE UP (ref 0–6)
EOSINOPHIL NFR BLD AUTO: 0.4 % — SIGNIFICANT CHANGE UP (ref 0–6)
GLUCOSE SERPL-MCNC: 99 MG/DL — SIGNIFICANT CHANGE UP (ref 70–99)
GLUCOSE SERPL-MCNC: 99 MG/DL — SIGNIFICANT CHANGE UP (ref 70–99)
HCT VFR BLD CALC: 33 % — LOW (ref 34.5–45)
HCT VFR BLD CALC: 33 % — LOW (ref 34.5–45)
HGB BLD-MCNC: 10.3 G/DL — LOW (ref 11.5–15.5)
HGB BLD-MCNC: 10.3 G/DL — LOW (ref 11.5–15.5)
IANC: 5.76 K/UL — SIGNIFICANT CHANGE UP (ref 1.8–7.4)
IANC: 5.76 K/UL — SIGNIFICANT CHANGE UP (ref 1.8–7.4)
IMM GRANULOCYTES NFR BLD AUTO: 0.4 % — SIGNIFICANT CHANGE UP (ref 0–0.9)
IMM GRANULOCYTES NFR BLD AUTO: 0.4 % — SIGNIFICANT CHANGE UP (ref 0–0.9)
LYMPHOCYTES # BLD AUTO: 1.76 K/UL — SIGNIFICANT CHANGE UP (ref 1–3.3)
LYMPHOCYTES # BLD AUTO: 1.76 K/UL — SIGNIFICANT CHANGE UP (ref 1–3.3)
LYMPHOCYTES # BLD AUTO: 20.9 % — SIGNIFICANT CHANGE UP (ref 13–44)
LYMPHOCYTES # BLD AUTO: 20.9 % — SIGNIFICANT CHANGE UP (ref 13–44)
MAGNESIUM SERPL-MCNC: 2 MG/DL — SIGNIFICANT CHANGE UP (ref 1.6–2.6)
MAGNESIUM SERPL-MCNC: 2 MG/DL — SIGNIFICANT CHANGE UP (ref 1.6–2.6)
MCHC RBC-ENTMCNC: 27.9 PG — SIGNIFICANT CHANGE UP (ref 27–34)
MCHC RBC-ENTMCNC: 27.9 PG — SIGNIFICANT CHANGE UP (ref 27–34)
MCHC RBC-ENTMCNC: 31.2 GM/DL — LOW (ref 32–36)
MCHC RBC-ENTMCNC: 31.2 GM/DL — LOW (ref 32–36)
MCV RBC AUTO: 89.4 FL — SIGNIFICANT CHANGE UP (ref 80–100)
MCV RBC AUTO: 89.4 FL — SIGNIFICANT CHANGE UP (ref 80–100)
MONOCYTES # BLD AUTO: 0.83 K/UL — SIGNIFICANT CHANGE UP (ref 0–0.9)
MONOCYTES # BLD AUTO: 0.83 K/UL — SIGNIFICANT CHANGE UP (ref 0–0.9)
MONOCYTES NFR BLD AUTO: 9.8 % — SIGNIFICANT CHANGE UP (ref 2–14)
MONOCYTES NFR BLD AUTO: 9.8 % — SIGNIFICANT CHANGE UP (ref 2–14)
NEUTROPHILS # BLD AUTO: 5.76 K/UL — SIGNIFICANT CHANGE UP (ref 1.8–7.4)
NEUTROPHILS # BLD AUTO: 5.76 K/UL — SIGNIFICANT CHANGE UP (ref 1.8–7.4)
NEUTROPHILS NFR BLD AUTO: 68.3 % — SIGNIFICANT CHANGE UP (ref 43–77)
NEUTROPHILS NFR BLD AUTO: 68.3 % — SIGNIFICANT CHANGE UP (ref 43–77)
NRBC # BLD: 0 /100 WBCS — SIGNIFICANT CHANGE UP (ref 0–0)
NRBC # BLD: 0 /100 WBCS — SIGNIFICANT CHANGE UP (ref 0–0)
NRBC # FLD: 0 K/UL — SIGNIFICANT CHANGE UP (ref 0–0)
NRBC # FLD: 0 K/UL — SIGNIFICANT CHANGE UP (ref 0–0)
PHOSPHATE SERPL-MCNC: 3.2 MG/DL — SIGNIFICANT CHANGE UP (ref 2.5–4.5)
PHOSPHATE SERPL-MCNC: 3.2 MG/DL — SIGNIFICANT CHANGE UP (ref 2.5–4.5)
PLATELET # BLD AUTO: 335 K/UL — SIGNIFICANT CHANGE UP (ref 150–400)
PLATELET # BLD AUTO: 335 K/UL — SIGNIFICANT CHANGE UP (ref 150–400)
POTASSIUM SERPL-MCNC: 4.1 MMOL/L — SIGNIFICANT CHANGE UP (ref 3.5–5.3)
POTASSIUM SERPL-MCNC: 4.1 MMOL/L — SIGNIFICANT CHANGE UP (ref 3.5–5.3)
POTASSIUM SERPL-SCNC: 4.1 MMOL/L — SIGNIFICANT CHANGE UP (ref 3.5–5.3)
POTASSIUM SERPL-SCNC: 4.1 MMOL/L — SIGNIFICANT CHANGE UP (ref 3.5–5.3)
RBC # BLD: 3.69 M/UL — LOW (ref 3.8–5.2)
RBC # BLD: 3.69 M/UL — LOW (ref 3.8–5.2)
RBC # FLD: 15.5 % — HIGH (ref 10.3–14.5)
RBC # FLD: 15.5 % — HIGH (ref 10.3–14.5)
SODIUM SERPL-SCNC: 144 MMOL/L — SIGNIFICANT CHANGE UP (ref 135–145)
SODIUM SERPL-SCNC: 144 MMOL/L — SIGNIFICANT CHANGE UP (ref 135–145)
SPECIMEN SOURCE: SIGNIFICANT CHANGE UP
WBC # BLD: 8.43 K/UL — SIGNIFICANT CHANGE UP (ref 3.8–10.5)
WBC # BLD: 8.43 K/UL — SIGNIFICANT CHANGE UP (ref 3.8–10.5)
WBC # FLD AUTO: 8.43 K/UL — SIGNIFICANT CHANGE UP (ref 3.8–10.5)
WBC # FLD AUTO: 8.43 K/UL — SIGNIFICANT CHANGE UP (ref 3.8–10.5)

## 2023-12-11 PROCEDURE — 99232 SBSQ HOSP IP/OBS MODERATE 35: CPT

## 2023-12-11 RX ADMIN — POLYETHYLENE GLYCOL 3350 17 GRAM(S): 17 POWDER, FOR SOLUTION ORAL at 18:57

## 2023-12-11 RX ADMIN — Medication 325 MILLIGRAM(S): at 11:58

## 2023-12-11 RX ADMIN — POLYETHYLENE GLYCOL 3350 17 GRAM(S): 17 POWDER, FOR SOLUTION ORAL at 06:37

## 2023-12-11 RX ADMIN — Medication 500 MILLIGRAM(S): at 03:08

## 2023-12-11 RX ADMIN — Medication 81 MILLIGRAM(S): at 11:58

## 2023-12-11 RX ADMIN — Medication 25 MILLIGRAM(S): at 22:07

## 2023-12-11 RX ADMIN — GABAPENTIN 100 MILLIGRAM(S): 400 CAPSULE ORAL at 22:06

## 2023-12-11 RX ADMIN — HEPARIN SODIUM 5000 UNIT(S): 5000 INJECTION INTRAVENOUS; SUBCUTANEOUS at 11:59

## 2023-12-11 RX ADMIN — Medication 0.25 MILLIGRAM(S): at 16:05

## 2023-12-11 RX ADMIN — MIRTAZAPINE 15 MILLIGRAM(S): 45 TABLET, ORALLY DISINTEGRATING ORAL at 22:06

## 2023-12-11 RX ADMIN — SENNA PLUS 2 TABLET(S): 8.6 TABLET ORAL at 22:07

## 2023-12-11 RX ADMIN — Medication 3 MILLIGRAM(S): at 22:09

## 2023-12-11 RX ADMIN — Medication 650 MILLIGRAM(S): at 22:09

## 2023-12-11 RX ADMIN — Medication 650 MILLIGRAM(S): at 23:09

## 2023-12-11 RX ADMIN — Medication 500 MILLIGRAM(S): at 18:56

## 2023-12-11 RX ADMIN — FAMOTIDINE 20 MILLIGRAM(S): 10 INJECTION INTRAVENOUS at 11:58

## 2023-12-11 RX ADMIN — Medication 25 MILLIGRAM(S): at 11:58

## 2023-12-11 RX ADMIN — HEPARIN SODIUM 5000 UNIT(S): 5000 INJECTION INTRAVENOUS; SUBCUTANEOUS at 00:34

## 2023-12-11 RX ADMIN — Medication 500 MILLIGRAM(S): at 11:58

## 2023-12-11 NOTE — PROGRESS NOTE ADULT - PROBLEM SELECTOR PLAN 3
Patient with history of bipolar d/o  - following with psychiatry, Dr. Billings  - previously seen by  in hospital with adjustment of home medications  - patient likely with further behavioral disturbance i/s/o ongoing UTI  - consulted to help adjust medications; as per  likely hypoactive delirium but have to keep Catatonia on differential ; hold Haldol, decreased gabapentin from 400mg qhs to 100mg qhs  patient seemed to be most awake on 12/8 when Haldol was reduced to 0.25mg BID- appreciate BH recs   - c/w mirtazapine 15mg qhs

## 2023-12-11 NOTE — PROGRESS NOTE ADULT - SUBJECTIVE AND OBJECTIVE BOX
Patient is a 89y old  Female who presents with a chief complaint of Altered mental status (10 Dec 2023 23:23)      SUBJECTIVE / OVERNIGHT EVENTS: Pt seen and examined at 11:55am, no overnight events, per nsg ate half her meals, yells easily, unable to get any history, no other new issues reported.    MEDICATIONS  (STANDING):  amoxicillin 500 milliGRAM(s) Oral every 8 hours  aspirin enteric coated 81 milliGRAM(s) Oral daily  dextrose 5%. 1000 milliLiter(s) (60 mL/Hr) IV Continuous <Continuous>  famotidine    Tablet 20 milliGRAM(s) Oral daily  ferrous    sulfate 325 milliGRAM(s) Oral daily  gabapentin 100 milliGRAM(s) Oral at bedtime  heparin   Injectable 5000 Unit(s) SubCutaneous every 12 hours  metoprolol tartrate 25 milliGRAM(s) Oral two times a day  mirtazapine Soltab 15 milliGRAM(s) Oral at bedtime  polyethylene glycol 3350 17 Gram(s) Oral two times a day  senna 2 Tablet(s) Oral at bedtime    MEDICATIONS  (PRN):  acetaminophen     Tablet .. 650 milliGRAM(s) Oral every 6 hours PRN Temp greater or equal to 38C (100.4F), Mild Pain (1 - 3)  aluminum hydroxide/magnesium hydroxide/simethicone Suspension 30 milliLiter(s) Oral every 4 hours PRN Dyspepsia  bisacodyl 5 milliGRAM(s) Oral every 12 hours PRN Constipation  LORazepam   Injectable 0.25 milliGRAM(s) IV Push every 6 hours PRN Agitation  melatonin 3 milliGRAM(s) Oral at bedtime PRN Insomnia  ondansetron Injectable 4 milliGRAM(s) IV Push every 8 hours PRN Nausea and/or Vomiting      Vital Signs Last 24 Hrs  T(C): 36.7 (11 Dec 2023 09:57), Max: 37 (10 Dec 2023 17:06)  T(F): 98 (11 Dec 2023 09:57), Max: 98.6 (10 Dec 2023 17:06)  HR: 73 (11 Dec 2023 09:57) (54 - 88)  BP: 138/67 (11 Dec 2023 09:57) (109/66 - 154/85)  BP(mean): --  RR: 18 (11 Dec 2023 09:57) (18 - 18)  SpO2: 97% (11 Dec 2023 09:57) (94% - 97%)    Parameters below as of 11 Dec 2023 06:35  Patient On (Oxygen Delivery Method): room air      CAPILLARY BLOOD GLUCOSE        I&O's Summary    10 Dec 2023 07:01  -  11 Dec 2023 07:00  --------------------------------------------------------  IN: 150 mL / OUT: 0 mL / NET: 150 mL        PHYSICAL EXAM:  GENERAL: screaming intermittently   CHEST/LUNG: CTAB, no increased WOB  HEART: RRR  ABDOMEN: soft, NT, ND  EXTREMITIES: no LE edema, Legs curled up (wheelchair bound)   NERVOUS SYSTEM:  A&Ox0-1  SKIN: No rashes or lesions visible over extremities      LABS:                        10.3   8.43  )-----------( 335      ( 11 Dec 2023 07:29 )             33.0     12-11    144  |  103  |  25<H>  ----------------------------<  99  4.1   |  31  |  0.46<L>    Ca    9.8      11 Dec 2023 07:29  Phos  3.2     12-11  Mg     2.00     12-11            Urinalysis Basic - ( 11 Dec 2023 07:29 )    Color: x / Appearance: x / SG: x / pH: x  Gluc: 99 mg/dL / Ketone: x  / Bili: x / Urobili: x   Blood: x / Protein: x / Nitrite: x   Leuk Esterase: x / RBC: x / WBC x   Sq Epi: x / Non Sq Epi: x / Bacteria: x        RADIOLOGY & ADDITIONAL TESTS:    Imaging Personally Reviewed:    Consultant(s) Notes Reviewed:      Care Discussed with Consultants/Other Providers:

## 2023-12-11 NOTE — PROGRESS NOTE ADULT - PROBLEM SELECTOR PLAN 1
Patient presenting from NH with reported worsening confusion  - patient baseline A&O x 1-2 and minimally verbal, wheelchair bound   - per family c/f worsening confusion and lethargy  - CTH without any acute findings, patient noted with mod to severe chronic microvascular disease  - TSH wnl  - patient noted with positive UA  - current AMS may be from progressive vascular dementia vs TME from UTI vs hypoactive delirium   - treatment of UTI as below, c/w ASA  -BH consulted to help adjust medications; as per  likely hypoactive delirium but have to keep Catatonia on differential ; held Haldol on 12/8, cont gabapentin 100mg qhs

## 2023-12-11 NOTE — BH CONSULTATION LIAISON PROGRESS NOTE - NSBHASSESSMENTFT_PSY_ALL_CORE
89F, , who had been domiciled with daughter and two aides but now presents from NH with PPH of vascular dementia, bipolar I and PMH significant for CVA, diverticulosis, and GERD, H. pylori, who was recently admitted to [facility] in late November 2023 for colitis presents with altered mentation and found to have UTI and ESBL.  Psych CL consulted for medication management as patient more hypoactive.    Patient continues to appear hypoactively delirious. Less likely catatonia at this point, though can keep on differential and if picture further presents itself can consider ativan challenge. Continue to hold Haldol. Use Ativan prn. Psych will continue to follow.     [] stop haldol (of note, lowered on 12/7 from 0.5mg TID to 0.25mg BID); also stop PRN haldol  [] for agitation- can use ativan 0.25mg q8h prn- close monitoring of vitals and hold if hypotensive, bradycardic, hypoxic or in resp distress  [] ensure patient is having bowel movements as this can worsen delirium  [] can continue mirtazapine- but hold for sedation, lethargy, obtundation   [] lower gabapentin to 100mg HS (from 400mg)  [] given concern for delirium- team may consider having someone assist patient with feeds to help 89F, , who had been domiciled with daughter and two aides but now presents from NH with PPH of vascular dementia, bipolar I and PMH significant for CVA, diverticulosis, and GERD, H. pylori, who was recently admitted to [facility] in late November 2023 for colitis presents with altered mentation and found to have UTI and ESBL.  Psych CL consulted for medication management as patient more hypoactive.    Patient continues to appear hypoactively delirious. Less likely catatonia at this point, though can keep on differential and if picture further presents itself can consider ativan challenge. Continue to hold Haldol. Use Ativan prn. Psych will continue to follow.     [] stop haldol (of note, lowered on 12/7 from 0.5mg TID to 0.25mg BID); also stop PRN haldol  [] for agitation- can use ativan 0.25mg q8h prn- close monitoring of vitals and hold if hypotensive, bradycardic, hypoxic or in resp distress  [] ensure patient is having bowel movements as this can worsen delirium  [] can continue mirtazapine- but hold for sedation, lethargy, obtundation   [] lower gabapentin to 100mg HS (from 400mg)  [] given concern for delirium- team may consider having someone assist patient with feeds to help 89F, , who had been domiciled with daughter and two aides but now presents from NH with PPH of vascular dementia, bipolar I and PMH significant for CVA, diverticulosis, and GERD, H. pylori, who was recently admitted to [facility] in late November 2023 for colitis presents with altered mentation and found to have UTI and ESBL.  Psych CL consulted for medication management as patient more hypoactive.    Patient continues to appear hypoactively delirious. Less likely catatonia at this point, though can keep on differential and if picture further presents itself can consider ativan challenge. Continue to hold Haldol. Use Ativan prn. Psych will continue to follow.     12/11: Patient continues to be delirious, some rigidity but more responsive to questions.     [] stop haldol (of note, lowered on 12/7 from 0.5mg TID to 0.25mg BID); also stop PRN haldol  [] for agitation- can use ativan 0.25mg q8h prn- close monitoring of vitals and hold if hypotensive, bradycardic, hypoxic or in resp distress  [] ensure patient is having bowel movements as this can worsen delirium  [] can continue mirtazapine- but hold for sedation, lethargy, obtundation   [] Continue gabapentin to 100mg HS   [] given concern for delirium- team may consider having someone assist patient with feeds to help 89F, , who had been domiciled with daughter and two aides but now presents from NH with PPH of vascular dementia, bipolar I and PMH significant for CVA, diverticulosis, and GERD, H. pylori, who was recently admitted to [facility] in late November 2023 for colitis presents with altered mentation and found to have UTI and ESBL.  Psych CL consulted for medication management as patient more hypoactive.    Patient continues to appear hypoactively delirious. Less likely catatonia at this point, though can keep on differential and if picture further presents itself can consider ativan challenge. Continue to hold Haldol. Use Ativan prn. Psych will continue to follow.     12/11: Patient continues to be delirious, some rigidity but more responsive to questions.     [] stop haldol (of note, lowered on 12/7 from 0.5mg TID to 0.25mg BID); also stop PRN haldol  [] for agitation- can use ativan 0.25mg q8h prn- close monitoring of vitals and hold if hypotensive, bradycardic, hypoxic or in resp distress  [] ensure patient is having bowel movements as this can worsen delirium  [] can continue mirtazapine- but hold for sedation, lethargy, obtundation   [] Continue gabapentin to 100mg HS   [] given concern for delirium- team may consider having someone assist patient with feeds to help 89F, , who had been domiciled with daughter and two aides but now presents from NH with PPH of vascular dementia, bipolar I and PMH significant for CVA, diverticulosis, and GERD, H. pylori, who was recently admitted to [facility] in late November 2023 for colitis presents with altered mentation and found to have UTI and ESBL.  Psych CL consulted for medication management as patient more hypoactive.    Patient continues to appear hypoactively delirious. Less likely catatonia at this point, though can keep on differential and if picture further presents itself can consider ativan challenge. Continue to hold Haldol. Use Ativan prn. Psych will continue to follow.     12/11: Patient continues to be delirious, some rigidity but more responsive to questions. Performed ativan challenge in late afternoon- did not seem to improve patients symptoms nor did it cause patient to fall asleep. Diagnosis remains likely hypoactive delirium, however will continue with prn ativan for concern of possible catatonia.    [] stop haldol (of note, lowered on 12/7 from 0.5mg TID to 0.25mg BID); also stop PRN haldol  [] for agitation- can use ativan 0.25mg q8h prn- close monitoring of vitals and hold if hypotensive, bradycardic, hypoxic or in resp distress  [] ensure patient is having bowel movements as this can worsen delirium  [] can continue mirtazapine- but hold for sedation, lethargy, obtundation   [] Continue gabapentin to 100mg HS   [] given concern for delirium- team may consider having someone assist patient with feeds to help 89F, , who had been domiciled with daughter and two aides but now presents from NH with PPH of vascular dementia, bipolar I and PMH significant for CVA, diverticulosis, and GERD, H. pylori, who was recently admitted to [facility] in late November 2023 for colitis presents with altered mentation and found to have UTI and ESBL.  Psych CL consulted for medication management as patient more hypoactive.    Patient continues to appear hypoactively delirious. Less likely catatonia at this point, though can keep on differential and if picture further presents itself can consider ativan challenge. Continue to hold Haldol. Use Ativan prn. Psych will continue to follow.     12/11: Patient continues to be delirious, some rigidity but more responsive to questions. Performed ativan challenge in late afternoon- did not seem to improve patients symptoms nor did it cause patient to fall asleep. Diagnosis remains likely hypoactive delirium, however will continue with prn ativan for concern of possible catatonia.    [] stop haldol (of note, lowered on 12/7 from 0.5mg TID to 0.25mg BID); also stop PRN haldol  [] for agitation- can use ativan 0.25mg q8h prn- close monitoring of vitals and hold if hypotensive, bradycardic, hypoxic or in resp distress  [] ensure patient is having bowel movements as this can worsen delirium  [] can continue mirtazapine- but hold for sedation, lethargy, obtundation   [] Continue gabapentin to 100mg HS   [] given concern for delirium- team may consider having someone assist patient with feeds to help

## 2023-12-11 NOTE — BH CONSULTATION LIAISON PROGRESS NOTE - NSBHATTESTBILLING_PSY_A_CORE
21280-Cvwtfeszqx OBS or IP - moderate complexity OR 35-49 mins 14100-Jinazbzwki OBS or IP - moderate complexity OR 35-49 mins

## 2023-12-11 NOTE — BH CONSULTATION LIAISON PROGRESS NOTE - NSBHFUPINTERVALHXFT_PSY_A_CORE
Chart reviewed. Patient received Ativan 0.25 mg prn overnight for agitation.    Seen this morning. Patient making shouting noise intermittently. responding to some questions but not others, reports sleeping well and no complains. Exam limited. Pt in bed with legs curled up. Increased tone in wrists, fingers and elbow.  Chart reviewed. Patient received Ativan 0.25 mg prn overnight for agitation.    Seen this morning. Patient making shouting noise intermittently. responding to some questions but not others, reports sleeping well and no complains. Exam limited. Pt in bed with legs curled up. Increased tone in wrists, fingers and elbow.     Collaterals from Daughter: patient is not back to her baseline, compare to one week ago she was more talkative, speaking full sentences. she has been screaming before but also talking. her screaming started about 3 weeks ago but get worse with time. plan discussed with daughter and her questions were answered.

## 2023-12-12 ENCOUNTER — TRANSCRIPTION ENCOUNTER (OUTPATIENT)
Age: 88
End: 2023-12-12

## 2023-12-12 VITALS
TEMPERATURE: 98 F | RESPIRATION RATE: 18 BRPM | DIASTOLIC BLOOD PRESSURE: 81 MMHG | SYSTOLIC BLOOD PRESSURE: 137 MMHG | HEART RATE: 93 BPM | OXYGEN SATURATION: 96 %

## 2023-12-12 PROCEDURE — 99239 HOSP IP/OBS DSCHRG MGMT >30: CPT

## 2023-12-12 PROCEDURE — 99232 SBSQ HOSP IP/OBS MODERATE 35: CPT

## 2023-12-12 RX ORDER — ACETAMINOPHEN 500 MG
2 TABLET ORAL
Qty: 0 | Refills: 0 | DISCHARGE
Start: 2023-12-12

## 2023-12-12 RX ORDER — SENNA PLUS 8.6 MG/1
2 TABLET ORAL
Qty: 0 | Refills: 0 | DISCHARGE
Start: 2023-12-12

## 2023-12-12 RX ORDER — GABAPENTIN 400 MG/1
1 CAPSULE ORAL
Qty: 0 | Refills: 0 | DISCHARGE
Start: 2023-12-12

## 2023-12-12 RX ORDER — POLYETHYLENE GLYCOL 3350 17 G/17G
17 POWDER, FOR SOLUTION ORAL
Qty: 0 | Refills: 0 | DISCHARGE
Start: 2023-12-12

## 2023-12-12 RX ORDER — FERROUS SULFATE 325(65) MG
1 TABLET ORAL
Qty: 0 | Refills: 0 | DISCHARGE
Start: 2023-12-12

## 2023-12-12 RX ADMIN — Medication 0.25 MILLIGRAM(S): at 11:51

## 2023-12-12 RX ADMIN — FAMOTIDINE 20 MILLIGRAM(S): 10 INJECTION INTRAVENOUS at 11:50

## 2023-12-12 RX ADMIN — Medication 25 MILLIGRAM(S): at 11:48

## 2023-12-12 RX ADMIN — POLYETHYLENE GLYCOL 3350 17 GRAM(S): 17 POWDER, FOR SOLUTION ORAL at 17:28

## 2023-12-12 RX ADMIN — Medication 81 MILLIGRAM(S): at 11:50

## 2023-12-12 RX ADMIN — HEPARIN SODIUM 5000 UNIT(S): 5000 INJECTION INTRAVENOUS; SUBCUTANEOUS at 00:50

## 2023-12-12 RX ADMIN — POLYETHYLENE GLYCOL 3350 17 GRAM(S): 17 POWDER, FOR SOLUTION ORAL at 05:50

## 2023-12-12 RX ADMIN — Medication 325 MILLIGRAM(S): at 11:50

## 2023-12-12 RX ADMIN — Medication 500 MILLIGRAM(S): at 03:10

## 2023-12-12 RX ADMIN — Medication 500 MILLIGRAM(S): at 11:48

## 2023-12-12 RX ADMIN — HEPARIN SODIUM 5000 UNIT(S): 5000 INJECTION INTRAVENOUS; SUBCUTANEOUS at 11:49

## 2023-12-12 NOTE — BH CONSULTATION LIAISON PROGRESS NOTE - MSE UNSTRUCTURED FT
Limited Exam:  Mute  Increased tone and rigidity  no catalepsy  no gross negativism  Fixed gaze, no eye contact 
Limited Exam:  no distinct speeh- intermittently shouts  Increased tone and rigidity  no catalepsy  no gross negativism  no eye contact
Limited Exam:  minimal speech- shouts when attempting to move legs  Increased tone and rigidity  no catalepsy  no gross negativism
Limited Exam:  - intermittently shouts- low productivity for speech   Increased tone and rigidity  no catalepsy  no gross negativism  no eye contact

## 2023-12-12 NOTE — PROGRESS NOTE ADULT - PROBLEM SELECTOR PROBLEM 3
Bipolar 1 disorder

## 2023-12-12 NOTE — PROGRESS NOTE ADULT - PROBLEM SELECTOR PROBLEM 4
Contacted patient after Dr Tran WHITLEY Hickman  
Chronic GERD

## 2023-12-12 NOTE — OCCUPATIONAL THERAPY INITIAL EVALUATION ADULT - NSOTDISCHREC_GEN_A_CORE
Patient unable to follow commands, unable to participate in session. Patient will not be placed on OT program. Please reconsult this discipline with any changes./No skilled OT needs

## 2023-12-12 NOTE — PHYSICAL THERAPY INITIAL EVALUATION ADULT - RANGE OF MOTION EXAMINATION, REHAB EVAL
patient able to lift upper extremities actively against gravity/bilateral upper extremity ROM was WFL (within functional limits)

## 2023-12-12 NOTE — PHYSICAL THERAPY INITIAL EVALUATION ADULT - GENERAL OBSERVATIONS, REHAB EVAL
Patient found semi-reclined in bed, NAD, A&Ox1, aroused to name and touch, confused/lethargic, spO2 93% on room air, HR 57
Patient found semi-reclined in bed, NAD, A&Ox1 to name, patient confused, patient unable to follow commands at this time, spO2 98% on room air

## 2023-12-12 NOTE — OCCUPATIONAL THERAPY INITIAL EVALUATION ADULT - RANGE OF MOTION EXAMINATION, UPPER EXTREMITY
patient able to flex bilateral shoulders against gravity/bilateral UE Active ROM was WFL  (within functional limits)

## 2023-12-12 NOTE — PHYSICAL THERAPY INITIAL EVALUATION ADULT - BED MOBILITY LIMITATIONS, REHAB EVAL
patient not following commands at this time, further functional mobility not assessed
decreased ability to use arms for pushing/pulling/decreased ability to use legs for bridging/pushing/impaired ability to control trunk for mobility

## 2023-12-12 NOTE — PROVIDER CONTACT NOTE (CRITICAL VALUE NOTIFICATION) - SITUATION
Patient urine cx results from 12/03 received. Urine cx positive for proteus mirabilis ESBL & enterococcus faecalis both >100,000 CFU/mL.

## 2023-12-12 NOTE — PROGRESS NOTE ADULT - REASON FOR ADMISSION
Altered mental status

## 2023-12-12 NOTE — PHYSICAL THERAPY INITIAL EVALUATION ADULT - PASSIVE RANGE OF MOTION EXAMINATION, REHAB EVAL
patients knees in flexion contracture position, able to extend bilateral knees to -20 with passive mobility/bilateral upper extremity Passive ROM was WFL (within functional limits)/bilateral lower extremity Passive ROM was WFL (within functional limits)
patients bilateral lower extremities contracted, patient noted to have increased discomfort with passive mobility of lower extremities

## 2023-12-12 NOTE — DISCHARGE NOTE NURSING/CASE MANAGEMENT/SOCIAL WORK - NSDCPEFALRISK_GEN_ALL_CORE
For information on Fall & Injury Prevention, visit: [email or web address] OR  [email or web address] OR  https://www.cdc.gov/steadi/patient.html For information on Fall & Injury Prevention, visit: [email or web address] OR  [email or web address] OR  https://www.cdc.gov/steadi/patient.html

## 2023-12-12 NOTE — PHYSICAL THERAPY INITIAL EVALUATION ADULT - NSPTDISCHREC_GEN_A_CORE
pt. not placed on skilled PT services at this time secondary to pt. presenting with confusion and unable to actively participate in PT services. Please reconsult if appropriate/feasible./No skilled PT needs
pt. not placed on skilled PT services at this time secondary to pt. presenting with confusion and unable to actively participate in PT services./No skilled PT needs

## 2023-12-12 NOTE — BH CONSULTATION LIAISON PROGRESS NOTE - CURRENT MEDICATION
MEDICATIONS  (STANDING):  amoxicillin 500 milliGRAM(s) Oral every 8 hours  aspirin enteric coated 81 milliGRAM(s) Oral daily  famotidine    Tablet 20 milliGRAM(s) Oral daily  ferrous    sulfate 325 milliGRAM(s) Oral daily  heparin   Injectable 5000 Unit(s) SubCutaneous every 12 hours  metoprolol tartrate 25 milliGRAM(s) Oral two times a day  mirtazapine Soltab 15 milliGRAM(s) Oral at bedtime  polyethylene glycol 3350 17 Gram(s) Oral two times a day  senna 2 Tablet(s) Oral at bedtime    MEDICATIONS  (PRN):  acetaminophen     Tablet .. 650 milliGRAM(s) Oral every 6 hours PRN Temp greater or equal to 38C (100.4F), Mild Pain (1 - 3)  aluminum hydroxide/magnesium hydroxide/simethicone Suspension 30 milliLiter(s) Oral every 4 hours PRN Dyspepsia  bisacodyl 5 milliGRAM(s) Oral every 12 hours PRN Constipation  LORazepam   Injectable 0.25 milliGRAM(s) IV Push every 6 hours PRN Agitation  melatonin 3 milliGRAM(s) Oral at bedtime PRN Insomnia  ondansetron Injectable 4 milliGRAM(s) IV Push every 8 hours PRN Nausea and/or Vomiting  
MEDICATIONS  (STANDING):  amoxicillin 500 milliGRAM(s) Oral every 8 hours  aspirin enteric coated 81 milliGRAM(s) Oral daily  dextrose 5%. 1000 milliLiter(s) (60 mL/Hr) IV Continuous <Continuous>  famotidine    Tablet 20 milliGRAM(s) Oral daily  ferrous    sulfate 325 milliGRAM(s) Oral daily  gabapentin 100 milliGRAM(s) Oral at bedtime  heparin   Injectable 5000 Unit(s) SubCutaneous every 12 hours  metoprolol tartrate 25 milliGRAM(s) Oral two times a day  mirtazapine Soltab 15 milliGRAM(s) Oral at bedtime  polyethylene glycol 3350 17 Gram(s) Oral two times a day  senna 2 Tablet(s) Oral at bedtime    MEDICATIONS  (PRN):  acetaminophen     Tablet .. 650 milliGRAM(s) Oral every 6 hours PRN Temp greater or equal to 38C (100.4F), Mild Pain (1 - 3)  aluminum hydroxide/magnesium hydroxide/simethicone Suspension 30 milliLiter(s) Oral every 4 hours PRN Dyspepsia  bisacodyl 5 milliGRAM(s) Oral every 12 hours PRN Constipation  LORazepam   Injectable 0.25 milliGRAM(s) IV Push every 6 hours PRN Agitation  melatonin 3 milliGRAM(s) Oral at bedtime PRN Insomnia  ondansetron Injectable 4 milliGRAM(s) IV Push every 8 hours PRN Nausea and/or Vomiting  

## 2023-12-12 NOTE — PHYSICAL THERAPY INITIAL EVALUATION ADULT - PERTINENT HX OF CURRENT PROBLEM, REHAB EVAL
Patient is a 89 year old female with a history of vascular dementia, bipolar I, CVA, diverticulosis, and GERD, H pylori status post treatment now presenting from nursing home with altered mentation. Now admitted to medicine for further workup and management. CT head, without any acute findings, patient noted with mod to severe chronic microvascular disease, found to have an acute UTI On admission patient noted with grossly positive UA
Patient is a 89 year old female with a history of vascular dementia, bipolar I, CVA, diverticulosis, and GERD, H pylori status post treatment now presenting from nursing home with altered mentation. Now admitted to medicine for further workup and management. CT head, without any acute findings, patient noted with mod to severe chronic microvascular disease, found to have an acute UTI On admission patient noted with grossly positive UA

## 2023-12-12 NOTE — BH CONSULTATION LIAISON PROGRESS NOTE - NSBHATTESTBILLING_PSY_A_CORE
28911-Olskeorwzb OBS or IP - moderate complexity OR 35-49 mins 86819-Aiordavadk OBS or IP - moderate complexity OR 35-49 mins

## 2023-12-12 NOTE — OCCUPATIONAL THERAPY INITIAL EVALUATION ADULT - LEVEL OF INDEPENDENCE: DRESS LOWER BODY, OT EVAL
unable to assess ; patient unable to provide responses to questions or follow commands/unable to perform

## 2023-12-12 NOTE — PROGRESS NOTE ADULT - SUBJECTIVE AND OBJECTIVE BOX
Patient is a 89y old  Female who presents with a chief complaint of Altered mental status (11 Dec 2023 15:08)      SUBJECTIVE / OVERNIGHT EVENTS: Pt seen and examined at 11:02am, no overnight events, agitated, yells easily, unable to get any history, no other new issues reported.        MEDICATIONS  (STANDING):  aspirin enteric coated 81 milliGRAM(s) Oral daily  dextrose 5%. 1000 milliLiter(s) (60 mL/Hr) IV Continuous <Continuous>  famotidine    Tablet 20 milliGRAM(s) Oral daily  ferrous    sulfate 325 milliGRAM(s) Oral daily  gabapentin 100 milliGRAM(s) Oral at bedtime  heparin   Injectable 5000 Unit(s) SubCutaneous every 12 hours  metoprolol tartrate 25 milliGRAM(s) Oral two times a day  mirtazapine Soltab 15 milliGRAM(s) Oral at bedtime  polyethylene glycol 3350 17 Gram(s) Oral two times a day  senna 2 Tablet(s) Oral at bedtime    MEDICATIONS  (PRN):  acetaminophen     Tablet .. 650 milliGRAM(s) Oral every 6 hours PRN Temp greater or equal to 38C (100.4F), Mild Pain (1 - 3)  aluminum hydroxide/magnesium hydroxide/simethicone Suspension 30 milliLiter(s) Oral every 4 hours PRN Dyspepsia  bisacodyl 5 milliGRAM(s) Oral every 12 hours PRN Constipation  LORazepam   Injectable 0.25 milliGRAM(s) IV Push every 6 hours PRN Agitation  melatonin 3 milliGRAM(s) Oral at bedtime PRN Insomnia  ondansetron Injectable 4 milliGRAM(s) IV Push every 8 hours PRN Nausea and/or Vomiting      Vital Signs Last 24 Hrs  T(C): 36.7 (12 Dec 2023 09:41), Max: 36.7 (11 Dec 2023 17:10)  T(F): 98.1 (12 Dec 2023 09:41), Max: 98.1 (11 Dec 2023 17:10)  HR: 79 (12 Dec 2023 11:00) (65 - 92)  BP: 119/91 (12 Dec 2023 11:00) (111/80 - 143/67)  BP(mean): --  RR: 19 (12 Dec 2023 09:41) (18 - 19)  SpO2: 93% (12 Dec 2023 09:41) (93% - 99%)    Parameters below as of 12 Dec 2023 09:41  Patient On (Oxygen Delivery Method): room air      CAPILLARY BLOOD GLUCOSE        I&O's Summary    11 Dec 2023 07:01  -  12 Dec 2023 07:00  --------------------------------------------------------  IN: 200 mL / OUT: 150 mL / NET: 50 mL        PHYSICAL EXAM:  GENERAL: screaming intermittently   CHEST/LUNG: CTAB, no increased WOB  HEART: RRR  ABDOMEN: soft, NT, ND  EXTREMITIES: no LE edema, Legs curled up (wheelchair bound)   NERVOUS SYSTEM: agitated, yells easily  SKIN: No rashes or lesions visible over extremities    LABS:                        10.3   8.43  )-----------( 335      ( 11 Dec 2023 07:29 )             33.0     12-11    144  |  103  |  25<H>  ----------------------------<  99  4.1   |  31  |  0.46<L>    Ca    9.8      11 Dec 2023 07:29  Phos  3.2     12-11  Mg     2.00     12-11            Urinalysis Basic - ( 11 Dec 2023 07:29 )    Color: x / Appearance: x / SG: x / pH: x  Gluc: 99 mg/dL / Ketone: x  / Bili: x / Urobili: x   Blood: x / Protein: x / Nitrite: x   Leuk Esterase: x / RBC: x / WBC x   Sq Epi: x / Non Sq Epi: x / Bacteria: x        RADIOLOGY & ADDITIONAL TESTS:    Imaging Personally Reviewed:    Consultant(s) Notes Reviewed:      Care Discussed with Consultants/Other Providers:

## 2023-12-12 NOTE — PHYSICAL THERAPY INITIAL EVALUATION ADULT - ADDITIONAL COMMENTS
Unable to obtain accurate social history secondary to pt. presenting with confusion/lethargy during subjective history and presenting with difficulty following commands, limited social history obtained through past medical documents, please refer to social work for more attainable social history. As per H&P, patient came from a nursing home and was wheelchair bound.     Patient left semi-reclined in bed, NAD, all lines and tubes intact, bed alarm on, call ayala within reach, RICKIE Petit made aware
Unable to obtain accurate social history secondary to pt. presenting with confusion/lethargy during subjective history and presenting with difficulty following commands, limited social history obtained through past medical documents, please refer to social work for more attainable social history. As per H&P, patient came from a nursing home and was wheelchair bound.     Patient left semi-reclined in bed, NAD, all lines and tubes intact, bed alarm on, call ayala within reach, RICKIE Bailey made aware

## 2023-12-12 NOTE — BH CONSULTATION LIAISON PROGRESS NOTE - NSBHATTESTCOMMENTATTENDFT_PSY_A_CORE
Patient seen twice today. In the AM, patient continued to appear hypoactively delirious with moments of yelling and some perseveration. She also was slightly more responsive.  This afternoon she appeared to be staring. With daughter at bedside decision to perform ativan challenge and patient received 0.25mg of ativan IV. Patient did not fall asleep, nor did her symptoms improve.    As working diagnosis remains hypoactive delirium- would not make any changes to above plan.    Will continue to follow.
Chart reviewed. Discussed with resident. Seen separately; difficult exam as she is somnolent, not engaging, though able to passively move her UE. Agree with above assessment/recs.

## 2023-12-12 NOTE — OCCUPATIONAL THERAPY INITIAL EVALUATION ADULT - GENERAL OBSERVATIONS, REHAB EVAL
Upon entry, patient semi-supine in bed, a&ox1 (person), confused, unable to follow commands. Patient left semi-supine in bed, call bell in reach, all lines/tubes intact, bed alarm activated, vitals stable. sp02 98%

## 2023-12-12 NOTE — BH CONSULTATION LIAISON PROGRESS NOTE - NSBHCHARTREVIEWVS_PSY_A_CORE FT
Vital Signs Last 24 Hrs  T(C): 36.7 (11 Dec 2023 09:57), Max: 37 (10 Dec 2023 17:06)  T(F): 98 (11 Dec 2023 09:57), Max: 98.6 (10 Dec 2023 17:06)  HR: 73 (11 Dec 2023 09:57) (54 - 88)  BP: 138/67 (11 Dec 2023 09:57) (109/66 - 154/85)  BP(mean): --  RR: 18 (11 Dec 2023 09:57) (18 - 18)  SpO2: 97% (11 Dec 2023 09:57) (94% - 97%)    Parameters below as of 11 Dec 2023 06:35  Patient On (Oxygen Delivery Method): room air    
Vital Signs Last 24 Hrs  T(C): 36.7 (12 Dec 2023 09:41), Max: 36.7 (11 Dec 2023 09:57)  T(F): 98.1 (12 Dec 2023 09:41), Max: 98.1 (11 Dec 2023 17:10)  HR: 92 (12 Dec 2023 09:41) (65 - 92)  BP: 127/72 (12 Dec 2023 09:41) (111/80 - 143/67)  BP(mean): --  RR: 19 (12 Dec 2023 09:41) (18 - 19)  SpO2: 93% (12 Dec 2023 09:41) (93% - 99%)    Parameters below as of 12 Dec 2023 09:41  Patient On (Oxygen Delivery Method): room air    
Vital Signs Last 24 Hrs  T(C): 36.3 (09 Dec 2023 09:23), Max: 36.9 (08 Dec 2023 10:54)  T(F): 97.3 (09 Dec 2023 09:23), Max: 98.5 (08 Dec 2023 10:54)  HR: 109 (09 Dec 2023 09:23) (62 - 109)  BP: 166/88 (09 Dec 2023 09:23) (145/71 - 169/91)  BP(mean): --  RR: 19 (09 Dec 2023 09:23) (18 - 19)  SpO2: 95% (09 Dec 2023 09:23) (95% - 100%)    Parameters below as of 09 Dec 2023 09:23  Patient On (Oxygen Delivery Method): room air    
Vital Signs Last 24 Hrs  T(C): 36.9 (08 Dec 2023 10:54), Max: 36.9 (08 Dec 2023 10:54)  T(F): 98.5 (08 Dec 2023 10:54), Max: 98.5 (08 Dec 2023 10:54)  HR: 83 (08 Dec 2023 10:54) (51 - 83)  BP: 147/65 (08 Dec 2023 10:54) (130/67 - 156/96)  BP(mean): --  RR: 18 (08 Dec 2023 10:54) (18 - 18)  SpO2: 100% (08 Dec 2023 10:54) (96% - 100%)    Parameters below as of 08 Dec 2023 10:54  Patient On (Oxygen Delivery Method): room air

## 2023-12-12 NOTE — BH CONSULTATION LIAISON PROGRESS NOTE - NSBHATTESTTYPEVISIT_PSY_A_CORE
Attending Only
Resident/Fellow with telephonic supervision
Attending with Resident/Fellow/Student
Attending with Resident/Fellow/Student

## 2023-12-12 NOTE — PROGRESS NOTE ADULT - PROBLEM SELECTOR PLAN 5
DVT: lovenox  Diet: as per swallow eval - Puree with Moderately-Thick Liquids  Dispo: Pending clinical course/PT  Updated patients daughter over phone on 12/4
DVT: lovenox  Diet: NPO except meds with applesauce, as patient with some confusion/lethargy will obtain swal eval  Dispo: Pending clinical course/PT  Updated patients daughter over phone on 12/4
DVT: lovenox  Diet: as per swallow eval - Puree with Moderately-Thick Liquids  Dispo: Nursing home   Updated patients daughter over phone on 12/4 and 12/7
DVT: hsq  Diet: as per swallow eval - Puree with Moderately-Thick Liquids  Dispo: Nursing home   Updated patients daughter over phone on 12/11, daughter wants her to get PT and OT
DVT: lovenox  Diet: as per swallow eval - Puree with Moderately-Thick Liquids  Dispo: Nursing home   Updated patients daughter over phone on 12/4 and 12/7, 12/9
DVT: lovenox  Diet: as per swallow eval - Puree with Moderately-Thick Liquids  Dispo: Nursing home   Updated patients daughter over phone on 12/4 and 12/7, 12/9
DVT: lovenox  Diet: as per swallow eval - Puree with Moderately-Thick Liquids  Dispo: Nursing home   Updated patients daughter over phone on 12/4 and 12/7
DVT: lovenox  Diet: as per swallow eval - Puree with Moderately-Thick Liquids  Dispo: Pending clinical course/PT  Updated patients daughter over phone on 12/4
DVT: hsq  Diet: as per swallow eval - Puree with Moderately-Thick Liquids  Dispo: Nursing home   Updated patients daughter over phone on 12/11, daughter wants her to get PT and OT    Dc today, dc planning time spent in coordination 42 mts ( discussion with cm, sw, preparing dc summary and plan)

## 2023-12-12 NOTE — PHYSICAL THERAPY INITIAL EVALUATION ADULT - FOLLOWS COMMANDS/ANSWERS QUESTIONS, REHAB EVAL
unable to follow commands/unable to follow multi-step instructions/unable to answer questions
unable to follow commands/unable to follow multi-step instructions/unable to answer questions

## 2023-12-12 NOTE — BH CONSULTATION LIAISON PROGRESS NOTE - NSBHASSESSMENTFT_PSY_ALL_CORE
89F, , who had been domiciled with daughter and two aides but now presents from NH with PPH of vascular dementia, bipolar I and PMH significant for CVA, diverticulosis, and GERD, H. pylori, who was recently admitted to [facility] in late November 2023 for colitis presents with altered mentation and found to have UTI and ESBL.  Psych CL consulted for medication management as patient more hypoactive.    Patient continues to appear hypoactively delirious. The patient still is less likely to have catatonia at this point, given her infections and inconclusive 0.25mg Ativan challenge on 12/11. However, it can be kept on differential and if picture further presents itself, the team can consider another ativan challenge. Continue to hold Haldol. Use Ativan prn. Psych will continue to follow.     12/12: Patient is mute today, with fixed gaze and continued rigidity. She did not respond or react to questions.     [] stop haldol (of note, lowered on 12/7 from 0.5mg TID to 0.25mg BID); also stop PRN haldol  [] for agitation- can use ativan 0.25mg q8h prn- close monitoring of vitals and hold if hypotensive, bradycardic, hypoxic or in resp distress  [] ensure patient is having bowel movements as this can worsen delirium  [] can continue mirtazapine- but hold for sedation, lethargy, obtundation   [] Continue gabapentin to 100mg HS   [] given concern for delirium- team may consider having someone assist patient with feeds to help 89F, , who had been domiciled with daughter and two aides but now presents from NH with PPH of vascular dementia, bipolar I and PMH significant for CVA, diverticulosis, and GERD, H. pylori, who was recently admitted to [facility] in late November 2023 for colitis presents with altered mentation and found to have UTI and ESBL.  Psych CL consulted for medication management as patient more hypoactive.    Patient continues to appear hypoactively delirious. The patient still is less likely to have catatonia at this point, given her infections and inconclusive 0.25mg Ativan challenge on 12/11. However, it can be kept on differential and if picture further presents itself, the team can consider another ativan challenge. Continue to hold Haldol. Use Ativan prn. Psych will continue to follow.     12/12: Patient is mute today, with fixed gaze and continued rigidity. She did not respond or react to questions.     [] stop haldol (of note, lowered on 12/7 from 0.5mg TID to 0.25mg BID); also stop PRN haldol  [] for agitation- can use ativan 0.25mg q8h prn- close monitoring of vitals and hold if hypotensive, bradycardic, hypoxic or in resp distress  [] ensure patient is having bowel movements as this can worsen delirium  [] can continue mirtazapine- but hold for sedation, lethargy, obtundation   [] Continue gabapentin to 100mg HS   [] given concern for delirium- team may consider having someone assist patient with feeds to help 89F, , who had been domiciled with daughter and two aides but now presents from NH with PPH of vascular dementia, bipolar I and PMH significant for CVA, diverticulosis, and GERD, H. pylori, who was recently admitted to [facility] in late November 2023 for colitis presents with altered mentation and found to have UTI and ESBL. Psych CL consulted for medication management as patient more hypoactive.    Patient continues to appear hypoactively delirious. The patient still is less likely to have catatonia at this point, given her infections and inconclusive 0.25mg Ativan challenge on 12/11. However, it can be kept on differential and if picture further presents itself, the team can consider another ativan challenge. Continue to hold Haldol. Use Ativan prn. Psych will continue to follow.     12/12: Patient is mute today, with fixed gaze and continued rigidity. She did not respond or react to questions. Continue as below.    [] stop haldol (of note, lowered on 12/7 from 0.5mg TID to 0.25mg BID); also stop PRN haldol  [] for agitation- can use ativan 0.25mg q8h prn- close monitoring of vitals and hold if hypotensive, bradycardic, hypoxic or in resp distress  [] ensure patient is having bowel movements as this can worsen delirium  [] can continue mirtazapine- but hold for sedation, lethargy, obtundation   [] Continue gabapentin to 100mg HS   [] given concern for delirium- team may consider having someone assist patient with feeds to help 89F, , who had been domiciled with daughter and two aides but now presents from NH with PPH of vascular dementia, bipolar I and PMH significant for CVA, diverticulosis, and GERD, H. pylori, who was recently admitted to [facility] in late November 2023 for colitis presents with altered mentation and found to have UTI and ESBL. Psych CL consulted for medication management as patient more hypoactive.    Patient continues to appear hypoactively delirious. The patient still is less likely to have catatonia at this point, given her infections and inconclusive 0.25mg Ativan challenge on 12/11. However, it can be kept on differential and if picture further presents itself, the team can consider another ativan challenge. Continue to hold Haldol. Use Ativan prn. Psych will continue to follow.     12/12: Patient is mute today, with fixed gaze and continued rigidity. She did not respond or react to questions. Continue as below.    [] stop haldol (of note, lowered on 12/7 from 0.5mg TID to 0.25mg BID); also stop PRN haldol  [] for agitation- can use ativan 0.25mg q8h prn- close monitoring of vitals and hold if hypotensive, bradycardic, hypoxic or in resp distress  [] ensure patient is having bowel movements as this can worsen delirium  [] can continue mirtazapine- but hold for sedation, lethargy, obtundation   [] Continue gabapentin to 100mg HS   [] given concern for delirium- team may consider having someone assist patient with feeds to help 89F, , who had been domiciled with daughter and two aides but now presents from NH with PPH of vascular dementia, bipolar I and PMH significant for CVA, diverticulosis, and GERD, H. pylori, who was recently admitted to [facility] in late November 2023 for colitis presents with altered mentation and found to have UTI and ESBL. Psych CL consulted for medication management as patient more hypoactive.    Patient continues to appear hypoactively delirious. The patient still is less likely to have catatonia at this point, given her infections and inconclusive 0.25mg Ativan challenge on 12/11. However, it can be kept on differential and if picture further presents itself, the team can consider another ativan challenge. Continue to hold Haldol. Use Ativan prn. Psych will continue to follow.     12/12: Patient is mute today, with fixed gaze and continued rigidity. She did not respond or react to questions. On her last day of antibiotic treatment for UTI. Continue as below.    [] stop haldol (of note, lowered on 12/7 from 0.5mg TID to 0.25mg BID); also stop PRN haldol  [] for agitation- can use ativan 0.25mg q8h prn- close monitoring of vitals and hold if hypotensive, bradycardic, hypoxic or in resp distress  [] ensure patient is having bowel movements as this can worsen delirium  [] can continue mirtazapine- but hold for sedation, lethargy, obtundation   [] Continue gabapentin to 100mg HS   [] given concern for delirium- team may consider having someone assist patient with feeds to help 89F, , who had been domiciled with daughter and two aides but now presents from NH with PPH of vascular dementia, bipolar I and PMH significant for CVA, diverticulosis, and GERD, H. pylori, who was recently admitted to [facility] in late November 2023 for colitis presents with altered mentation and found to have UTI and ESBL. Psych CL consulted for medication management as patient more hypoactive.    Patient continues to appear hypoactively delirious. The patient still is less likely to have catatonia at this point, given her infections and inconclusive 0.25mg Ativan challenge on 12/11. However, it can be kept on differential and if picture further presents itself, the team can consider another ativan challenge. Continue to hold Haldol. Use Ativan prn. Psych will continue to follow.     12/12: Patient is mute today, with fixed gaze and continued rigidity. She did not respond or react to questions. On her last day of antibiotic treatment for UTI. Continue as below.    [] stop haldol (of note, lowered on 12/7 from 0.5mg TID to 0.25mg BID); also stop PRN haldol  [] for agitation- can use ativan 0.25mg q8h prn- close monitoring of vitals and hold if hypotensive, bradycardic, hypoxic or in resp distress  [] ensure patient is having bowel movements as this can worsen delirium  [] can continue mirtazapine- but hold for sedation, lethargy, obtundation   [] Continue gabapentin to 100mg HS   [] given concern for delirium- team may consider having someone assist patient with feeds to help

## 2023-12-12 NOTE — PROGRESS NOTE ADULT - NUTRITIONAL ASSESSMENT
This patient has been assessed with a concern for Malnutrition and has been determined to have a diagnosis/diagnoses of Severe protein-calorie malnutrition and Underweight (BMI < 19).    This patient is being managed with:   Diet Pureed-  Moderately Thick Liquids (MODTHICKLIQS)  Entered: Dec  9 2023  1:30PM  
This patient has been assessed with a concern for Malnutrition and has been determined to have a diagnosis/diagnoses of Severe protein-calorie malnutrition and Underweight (BMI < 19).    This patient is being managed with:   Diet Pureed-  Moderately Thick Liquids (MODTHICKLIQS)  Entered: Dec  5 2023 12:13PM  
This patient has been assessed with a concern for Malnutrition and has been determined to have a diagnosis/diagnoses of Severe protein-calorie malnutrition and Underweight (BMI < 19).    This patient is being managed with:   Diet Pureed-  Moderately Thick Liquids (MODTHICKLIQS)  Entered: Dec  5 2023 12:13PM  
This patient has been assessed with a concern for Malnutrition and has been determined to have a diagnosis/diagnoses of Severe protein-calorie malnutrition and Underweight (BMI < 19).    This patient is being managed with:   Diet Pureed-  Moderately Thick Liquids (MODTHICKLIQS)  Entered: Dec  9 2023  1:30PM  
This patient has been assessed with a concern for Malnutrition and has been determined to have a diagnosis/diagnoses of Severe protein-calorie malnutrition and Underweight (BMI < 19).    This patient is being managed with:   Diet Pureed-  Moderately Thick Liquids (MODTHICKLIQS)  Entered: Dec  5 2023 12:13PM

## 2023-12-12 NOTE — PROGRESS NOTE ADULT - PROBLEM SELECTOR PLAN 2
On admission patient noted with grossly positive UA.  - noted with moderate LE, 654 WBC and few bacteria  - urine cultures: ESBL   - s/p Rocephin , ID following - single dose tobramycin 150 mg IV x1 (for Proteus)  - For E faecalis, amoxicillin 500 mg PO Q8H x5 days for E faecalis.    #Constipation  Abdominal Xray: Nonobstructive bowel gas pattern with moderate colonic stool burden.  started on Miralax and senna  BM on 12/6 as per flowsheet
On admission patient noted with grossly positive UA.  - noted with moderate LE, 654 WBC and few bacteria  - urine cultures: ESBL   - s/p Rocephin , ID following - single dose tobramycin 150 mg IV x1 (for Proteus)  - If E faecalis is susceptible to fosfomycin, would provide 3g PO x1 (single dose). Otherwise, amoxicillin 500 mg PO Q8H x5 days for E faecalis.    #Constipation  Abdominal Xray: Nonobstructive bowel gas pattern with moderate colonic stool burden.  started on Miralax and senna  BM on 12/6 as per flowsheet
On admission patient noted with grossly positive UA.  - noted with moderate LE, 654 WBC and few bacteria  - urine cultures: ESBL   - s/p Rocephin , ID following - single dose tobramycin 150 mg IV x1 (for Proteus)  - For E faecalis, amoxicillin 500 mg PO Q8H x5 days for E faecalis.    #Constipation  Abdominal Xray: Nonobstructive bowel gas pattern with moderate colonic stool burden.  started on Miralax and senna  BM on 12/7 as per flowsheet
On admission patient noted with grossly positive UA.  - noted with moderate LE, 654 WBC and few bacteria  - urine cultures pending  - c/w CTX for now    #Constipation  Abdominal Xray: Nonobstructive bowel gas pattern with moderate colonic stool burden.  started on Miralax and senna  added dulcolax   likely will need enema
On admission patient noted with grossly positive UA.  - noted with moderate LE, 654 WBC and few bacteria  - urine cultures: ESBL   - s/p Rocephin , ID following - single dose tobramycin 150 mg IV x1 (for Proteus)  - For E faecalis, amoxicillin 500 mg PO Q8H x5 days for E faecalis.    #Constipation  Abdominal Xray: Nonobstructive bowel gas pattern with moderate colonic stool burden.  started on Miralax and senna  BM on 12/7 as per flowsheet
On admission patient noted with grossly positive UA.  - noted with moderate LE, 654 WBC and few bacteria  - urine cultures: ESBL   - s/p Rocephin , ID following - single dose tobramycin 150 mg IV x1 (for Proteus)  - For E faecalis, amoxicillin 500 mg PO Q8H x5 days for E faecalis.    #Constipation  Abdominal Xray: Nonobstructive bowel gas pattern with moderate colonic stool burden.  started on Miralax and senna  BM on 12/6 as per flowsheet
On admission patient noted with grossly positive UA.  - noted with moderate LE, 654 WBC and few bacteria  - urine cultures: ESBL   - s/p Rocephin , ID following - single dose tobramycin 150 mg IV x1 (for Proteus)  - For E faecalis, amoxicillin 500 mg PO Q8H x5 days for E faecalis.  blood culture from 6th neg to date    #Constipation  Abdominal Xray: Nonobstructive bowel gas pattern with moderate colonic stool burden.  started on Miralax and senna  BM on 12/7 as per flowsheet
On admission patient noted with grossly positive UA.  - noted with moderate LE, 654 WBC and few bacteria  - urine cultures: ESBL   - s/p Rocephin , ID following - single dose tobramycin 150 mg IV x1 (for Proteus)  - For E faecalis, amoxicillin 500 mg PO Q8H x5 days for E faecalis.    #Constipation  Abdominal Xray: Nonobstructive bowel gas pattern with moderate colonic stool burden.  started on Miralax and senna  BM on 12/7 as per flowsheet
On admission patient noted with grossly positive UA.  - noted with moderate LE, 654 WBC and few bacteria  - urine cultures pending  - c/w CTX for now

## 2023-12-12 NOTE — OCCUPATIONAL THERAPY INITIAL EVALUATION ADULT - LIVES WITH, PROFILE
Unable to obtain accurate social history secondary to pt. presenting with confusion/lethargy during subjective history and presenting with difficulty following commands, limited social history obtained through past medical documents, please refer to social work for more attainable social history. As per H&P, patient came from a nursing home and was wheelchair bound.

## 2023-12-12 NOTE — DISCHARGE NOTE NURSING/CASE MANAGEMENT/SOCIAL WORK - PATIENT PORTAL LINK FT
You can access the FollowMyHealth Patient Portal offered by [facility] by registering at the following website: [email or web address]. By joining [employer]’s FollowMyHealth portal, you will also be able to view your health information using other applications (apps) compatible with our system. You can access the FollowMyHealth Patient Portal offered by [facility] by registering at the following website: [email or web address]. By joining [email or web address]’s FollowMyHealth portal, you will also be able to view your health information using other applications (apps) compatible with our system.

## 2023-12-12 NOTE — OCCUPATIONAL THERAPY INITIAL EVALUATION ADULT - PERTINENT HX OF CURRENT PROBLEM, REHAB EVAL
Patient is a 89 year old female with a history of vascular dementia, bipolar I, CVA, diverticulosis, and GERD, H pylori status post treatment now presenting from nursing home with altered mentation. Now admitted to medicine for further workup and management. CT head, without any acute findings, patient noted with mod to severe chronic microvascular disease, found to have an acute UTI On admission patient noted with grossly positive UA

## 2023-12-12 NOTE — PROVIDER CONTACT NOTE (CRITICAL VALUE NOTIFICATION) - BACKGROUND
89 y/o Female, from [facility], with a PmHx of Vascular Dementia, Bipolar I, CVA, diverticulosis, GERD and H pylori s/p treatment, now p/w altered mentation.

## 2023-12-12 NOTE — BH CONSULTATION LIAISON PROGRESS NOTE - NSBHCHARTREVIEWLAB_PSY_A_CORE FT
9.4    6.32  )-----------( 319      ( 08 Dec 2023 03:22 )             30.6   12-08    146<H>  |  107  |  21  ----------------------------<  100<H>  3.7   |  34<H>  |  0.41<L>    Ca    9.4      08 Dec 2023 03:22  Phos  2.7     12-08  Mg     1.90     12-08    

## 2023-12-12 NOTE — PHYSICAL THERAPY INITIAL EVALUATION ADULT - MANUAL MUSCLE TESTING RESULTS, REHAB EVAL
patients bilateral upper extremity strength 3/5, bilateral lower extremities 0/5 upon observation
patient not able to follow commands for active testing, 1/5 in bilateral upper and lower extremities based on observation

## 2023-12-12 NOTE — BH CONSULTATION LIAISON PROGRESS NOTE - NSBHFUPINTERVALHXFT_PSY_A_CORE
Chart reviewed and patient's case discussed by the treatment team.     Patient was seen this morning. She was mute this morning, non-reactive and very still. She did not make any intermittent shouting noises this morning. Her exam was limited. She was in bed with her arms and legs curled up. She has increased tone in wrists, fingers and elbow.     As per the hospitalist note from 12/11, "ate half her meals" which are pureed/thickened liquids as per the nutritional assessment. No other history could be obtained. The nursing team did not document any issues with the patient's sleep last night.     Patient's [facility] Catatonia Rating scale score increased from 8 yesterday to 10 this morning (+2 immobility/stupor, +3 mutism, +3 staring, +2, rigidity). Chart reviewed and patient's case discussed by the treatment team.     Patient was seen this morning. She was mute this morning, non-reactive and very still. She did not make any intermittent shouting noises this morning. Her exam was limited. She was in bed with her arms and legs curled up. She has increased tone in wrists, fingers and elbow.     As per the hospitalist note from 12/11, "ate half her meals" which are pureed/thickened liquids as per the nutritional assessment. No other history could be obtained. The nursing team did not document any issues with the patient's sleep last night.     Patient's [facility] Catatonia Rating scale score increased from 8 yesterday to 10 this morning (+2 immobility/stupor, +3 mutism, +3 staring, +2, rigidity). Chart reviewed and patient's case discussed by the treatment team. No acute events overnight. No PRNs for agitation. VSS. As per the hospitalist note from 12/11, "ate 50% of her meals" which are pureed/thickened liquids as per the nutritional assessment. The nursing team did not document any issues with the patient's sleep last night.     Patient was seen this morning. She was mute, stuporous, with fixed gaze and increased tone in wrists, fingers and elbow. BFCS 10 (+2 immobility/stupor, +3 mutism, +3 staring, +2, rigidity). Patient trialed ativan challenge 0.25mg yesterday afternoon but was inconclusive.

## 2023-12-14 NOTE — PHYSICAL THERAPY INITIAL EVALUATION ADULT - IMPAIRMENTS FOUND, PT EVAL
Report given to Suzie that will be assuming care of pt. Pt is currently utilizing 3 L NC to keep sats <90%. Sats 86-88% while resting and at 90-91 while awake. A&O. Has not voided while in the ED. Pt is being transferred to room 256   gait, locomotion, and balance/muscle strength

## 2023-12-21 NOTE — ED ADULT TRIAGE NOTE - CHIEF COMPLAINT QUOTE
Render Note In Bullet Format When Appropriate: No Show Applicator Variable?: Yes Detail Level: Zone Consent: The patient's consent was obtained including but not limited to risks of crusting, scabbing, blistering, scarring, darker or lighter pigmentary change, recurrence, incomplete removal and infection. Post-Care Instructions: I reviewed with the patient in detail post-care instructions. Patient is to wear sunprotection, and avoid picking at any of the treated lesions. Pt may apply Vaseline to crusted or scabbing areas. Number Of Freeze-Thaw Cycles: 1 freeze-thaw cycle Duration Of Freeze Thaw-Cycle (Seconds): 3 as per patient c/o constipation unrelieved by stool softeners. Reports no BM for 3-4 days. Denies N/V. Hx: OCHAO

## 2023-12-31 NOTE — ED ADULT TRIAGE NOTE - DOMESTIC TRAVEL HIGH RISK QUESTION
status: Former     Current packs/day: 0.00     Average packs/day: 1 pack/day for 8.0 years (8.0 ttl pk-yrs)     Types: Cigarettes     Start date: 1/1/2001     Quit date: 1/1/2009     Years since quitting: 15.0    Smokeless tobacco: Never   Substance Use Topics    Alcohol use: Yes    Drug use: Never       Allergies:  No Known Allergies      Physical Exam   Physical Exam  Constitutional:       General: He is not in acute distress.     Appearance: He is not toxic-appearing.   HENT:      Head: Normocephalic.   Cardiovascular:      Rate and Rhythm: Normal rate and regular rhythm.   Pulmonary:      Effort: Pulmonary effort is normal.      Breath sounds: Normal breath sounds.   Abdominal:      Palpations: Abdomen is soft.      Tenderness: There is no abdominal tenderness.   Musculoskeletal:      Right lower leg: No tenderness.      Left lower leg: No tenderness.   Skin:     General: Skin is warm.   Neurological:      General: No focal deficit present.      Mental Status: He is alert.         Diagnostic Study Results     Labs -     Recent Results (from the past 24 hour(s))   EKG 12 Lead    Collection Time: 12/30/23  3:26 PM   Result Value Ref Range    Ventricular Rate 86 BPM    Atrial Rate 86 BPM    P-R Interval 162 ms    QRS Duration 106 ms    Q-T Interval 370 ms    QTc Calculation (Bazett) 442 ms    P Axis 56 degrees    R Axis -24 degrees    T Axis 47 degrees    Diagnosis       Sinus rhythm with frequent and consecutive premature ventricular complexes  Minimal voltage criteria for LVH, may be normal variant  Cannot rule out Anterior infarct , age undetermined  Abnormal ECG  No previous ECGs available     CBC with Auto Differential    Collection Time: 12/30/23  3:37 PM   Result Value Ref Range    WBC 6.3 4.1 - 11.1 K/uL    RBC 5.17 4.10 - 5.70 M/uL    Hemoglobin 14.9 12.1 - 17.0 g/dL    Hematocrit 46.1 36.6 - 50.3 %    MCV 89.2 80.0 - 99.0 FL    MCH 28.8 26.0 - 34.0 PG    MCHC 32.3 30.0 - 36.5 g/dL    RDW 15.7 (H) 11.5 - 
No

## 2024-02-26 NOTE — DIETITIAN INITIAL EVALUATION ADULT - WEIGHT CHANGE
Addended by: APOORVA ZEPEDA on: 2/26/2024 10:16 AM     Modules accepted: Orders, Level of Service     yes

## 2024-06-28 NOTE — CONSULT NOTE ADULT - SUBJECTIVE AND OBJECTIVE BOX
Admitting Diagnosis:  Restlessness and agitation [R45.1]  RESTLESSNESS AND AGITATION        HPI:  This is a 87y year old Female with the below past medical history who presents with the chief complaint of agitation.  She has pmhx asthma, anxiety, questionable CVA in , diverticulosis, GERD, and previous vertebral fracture, recently admitted [facility] - for confusion and UTI, presents to ED [facility] from [facility] due to agitation. She was found to be COVID positive.       Past Medical History:  Anxiety [300.00]    CVA (Cerebral Infarction) [434.91]  2007 per daughter    Diverticulosis of intestine [K57.90]    GERD (gastroesophageal reflux disease) [K21.9]    Asthma [J45.909]    Cerebrovascular accident (CVA), unspecified mechanism [I63.9]    Vertebral fracture, osteoporotic [M80.88XA]        Past Surgical History:  S/P Tonsillectomy [V45.89]    No significant past surgical history [958079822]    History of tonsillectomy [Z90.89]        Social History:  No toxic habits    Family History:  FAMILY HISTORY:  Family history of amyotrophic lateral sclerosis  Daughter    Family history of multiple sclerosis (Child)        Allergies:  iodine (Unknown)  IV Contrast (Unknown)  IV DYE- burning, head tingling (Unknown)  novacaine (Unknown)      ROS:  Constitutional: Patient offers no complaints of fevers or significant weight loss  Ears, Nose, Mouth and Throat: The patient presents with no abnormalities of the head, ears, eyes, nose or throat  Skin: Patient offers no concerns of new rashes or lesions  Respiratory: The patient presents with no abnormalities of the respiratory tract  Cardiovascular: The patient presents with no cardiac abnormalities  Gastrointestinal: The patient presents with no abnormalities of the GI system  Genitourinary: The patient presents with no dysuria, hematuria or frequent urination  Neurological: See HPI  Endocrine: Patient offers no complaints of excessive thirst, urination, or heat/cold intolerance    Advanced care planning reviewed and noted in the chart.    Medications:  enoxaparin Injectable 40 milliGRAM(s) SubCutaneous every 24 hours  metoprolol succinate ER 25 milliGRAM(s) Oral daily      Labs:  CBC Full  -  ( 11 Sep 2022 07:21 )  WBC Count : 5.72 K/uL  RBC Count : 4.14 M/uL  Hemoglobin : 12.0 g/dL  Hematocrit : 37.3 %  Platelet Count - Automated : 288 K/uL  Mean Cell Volume : 90.1 fl  Mean Cell Hemoglobin : 29.0 pg  Mean Cell Hemoglobin Concentration : 32.2 gm/dL  Auto Neutrophil # : x  Auto Lymphocyte # : x  Auto Monocyte # : x  Auto Eosinophil # : x  Auto Basophil # : x  Auto Neutrophil % : x  Auto Lymphocyte % : x  Auto Monocyte % : x  Auto Eosinophil % : x  Auto Basophil % : x        138  |  101  |  13  ----------------------------<  101<H>  4.0   |  24  |  0.55    Ca    9.4      11 Sep 2022 07:22    TPro  6.8  /  Alb  3.7  /  TBili  1.2  /  DBili  x   /  AST  25  /  ALT  21  /  AlkPhos  87      CAPILLARY BLOOD GLUCOSE        LIVER FUNCTIONS - ( 11 Sep 2022 07:22 )  Alb: 3.7 g/dL / Pro: 6.8 g/dL / ALK PHOS: 87 U/L / ALT: 21 U/L / AST: 25 U/L / GGT: x             Urinalysis Basic - ( 10 Sep 2022 01:42 )    Color: Light Yellow / Appearance: Clear / S.010 / pH: x  Gluc: x / Ketone: Trace  / Bili: Negative / Urobili: Negative   Blood: x / Protein: Negative / Nitrite: Negative   Leuk Esterase: Negative / RBC: 1 /hpf / WBC 1 /HPF   Sq Epi: x / Non Sq Epi: 0 /hpf / Bacteria: Negative        Vitals:  Vital Signs Last 24 Hrs  T(C): 36.4 (11 Sep 2022 08:35), Max: 36.7 (10 Sep 2022 12:17)  T(F): 97.5 (11 Sep 2022 08:35), Max: 98 (10 Sep 2022 12:17)  HR: 83 (11 Sep 2022 08:35) (83 - 99)  BP: 125/79 (11 Sep 2022 08:35) (125/79 - 157/85)  BP(mean): --  RR: 17 (11 Sep 2022 08:35) (17 - 18)  SpO2: 97% (11 Sep 2022 08:35) (94% - 97%)    Parameters below as of 11 Sep 2022 08:35  Patient On (Oxygen Delivery Method): room air        NEUROLOGICAL EXAM:    Mental status: Awake, alert, and in no apparent distress. Bit agitated but calm when spoken to.  Oriented to person, hospital.  Says lives in [place].   Language function is normal.      Cranial Nerves: Pupils were equal, round, reactive to light. Extraocular movements were intact. Visual field were full. Fundoscopic exam was deferred. Facial sensation was intact to light touch. There was no facial asymmetry. The palate was upgoing symmetrically and tongue was midline. Hearing acuity was intact to finger rub AU. Shoulder shrug was full bilaterally    Motor exam: Bulk and tone were normal. Strength was 5/5 in all four extremities. Fine finger movements were symmetric and normal. There was no pronator drift    Reflexes: 2+ in the bilateral upper extremities. 2+ in the bilateral lower extremities. Toes were downgoing bilaterally.     Sensation: Intact to light touch, temperature, vibration and proprioception.     Coordination: Finger-nose-finger and heel-to-shin was without dysmetria.     Gait: deferred  Imaging:      ACC: [id number] EXAM:  CT BRAIN                          PROCEDURE DATE:  2022          INTERPRETATION:  CLINICAL INDICATION:  Altered mental status and agitation    TECHNIQUE : Axial CT scanning of the brain was obtained from the skull   base to the vertex without the administration of intravenous contrast.   Coronal and sagittal reformatted images were subsequently obtained.    COMPARISON: CT head 2022    FINDINGS:  No acute intracranial hemorrhage or suspicious extra-axial fluid   collection. No focal edema or acute mass effect. Moderate to severe   chronic microvascular ischemic changes and mild cerebral atrophy,   unchanged. No obstructive hydrocephalus. Basilar cisterns are clear.    Scalp and imaged mid facial soft tissues are unremarkable.Calvarium is   intact. Scattered paranasal sinus disease. Mastoid air cells are clear.    IMPRESSION:  No acute intracranial CT abnormality.    --- End of Report ---          GALA GRULLON MD; Resident Radiologist  This document has been electronically signed.  DORETHA LIN MD; Attending Radiologist  This document has been electronically signed. Sep  9 2022 11:42PM  
HPI:  87y F pmhx asthma, anxiety, questionable CVA in 2007, diverticulosis, GERD, and previous vertebral fracture, recently admitted [facility] - for confusion and UTI, presents to ED [facility] from [facility] due to agitation.   . Reporting back pain. No reported falls/trauma by EMS. (10 Sep 2022 13:20)      PAST MEDICAL & SURGICAL HISTORY:  Anxiety      CVA (Cerebral Infarction)   per daughter      Diverticulosis of intestine      GERD (gastroesophageal reflux disease)      Asthma      Cerebrovascular accident (CVA), unspecified mechanism      Vertebral fracture, osteoporotic      S/P Tonsillectomy      History of tonsillectomy          Allergies    iodine (Unknown)  IV Contrast (Unknown)  IV DYE- burning, head tingling (Unknown)  novacaine (Unknown)    Intolerances        ANTIMICROBIALS:      OTHER MEDS:  enoxaparin Injectable 40 milliGRAM(s) SubCutaneous every 24 hours  haloperidol    Injectable 0.5 milliGRAM(s) IV Push every 6 hours PRN  metoprolol succinate ER 25 milliGRAM(s) Oral daily      SOCIAL HISTORY:  used to live alone, but after last admission was sent to SNF, former smoker  no alcohol or drug abuse  no recent travel    FAMILY HISTORY:  Family history of amyotrophic lateral sclerosis  Daughter    Family history of multiple sclerosis (Child)        ROS:  Unobtainable because:   All other systems negative     Constitutional: no fever, no chills  Eye: no eye pain, no redness, no vision changes  ENT:  no sore throat, no rhinorrhea  Cardiovascular:  no chest pain, no palpitation  Respiratory:  no SOB, no cough  GI:  no abd pain, no vomiting, no diarrhea, +constipation  urinary: no dysuria, no hematuria, no flank pain  : no vaginal discharge or bleeding  musculoskeletal:  no joint pain, no joint swelling  skin:  no rash  neurology:  no headache, no seizure, no change in mental status  psych: no anxiety, no depression     Physical Exam:    General:    NAD, non toxic  Head: atraumatic, normocephalic  Eyes: normal sclera and conjunctiva  ENT:   no oropharyngeal lesions, no LAD, neck supple  Cardio:    regular S1,S2  Respiratory:   clear b/l, no wheezing  abd:   soft, BS +, not tender  :     no CVAT, no suprapubic tenderness, no marino  Musculoskeletal : no joint swelling, no edema  Skin:    no rash  vascular: no phlebitis  Neurologic: confused but no focal deficits  psych: agitated and was throwing things at the window      Drug Dosing Weight  Height (cm): 154.9 (09 Sep 2022 18:29)  Weight (kg): 40.8 (09 Sep 2022 18:29)  BMI (kg/m2): 17 (09 Sep 2022 18:29)  BSA (m2): 1.34 (09 Sep 2022 18:29)    Vital Signs Last 24 Hrs  T(F): 97 (09-10-22 @ 16:20), Max: 98.7 (22 @ 18:29)    Vital Signs Last 24 Hrs  HR: 89 (09-10-22 @ 16:20) (75 - 99)  BP: 145/75 (09-10-22 @ 16:20) (102/52 - 147/89)  RR: 18 (09-10-22 @ 16:20)  SpO2: 96% (09-10-22 @ 16:20) (95% - 97%)  Wt(kg): --                          11.6   6.91  )-----------( 246      ( 09 Sep 2022 22:02 )             36.1           139  |  103  |  10  ----------------------------<  105<H>  4.3   |  27  |  0.64    Ca    9.6      09 Sep 2022 22:02    TPro  6.5  /  Alb  3.6  /  TBili  0.9  /  DBili  x   /  AST  30  /  ALT  20  /  AlkPhos  90        Urinalysis Basic - ( 10 Sep 2022 01:42 )    Color: Light Yellow / Appearance: Clear / S.010 / pH: x  Gluc: x / Ketone: Trace  / Bili: Negative / Urobili: Negative   Blood: x / Protein: Negative / Nitrite: Negative   Leuk Esterase: Negative / RBC: 1 /hpf / WBC 1 /HPF   Sq Epi: x / Non Sq Epi: 0 /hpf / Bacteria: Negative        MICROBIOLOGY:  v  Clean Catch Clean Catch (Midstream)  22   >=3 organisms. Probable collection contamination.  --  --                  RADIOLOGY:    Images independently visualized and reviewed personally,  findings as below    < from: Xray Chest 1 View- PORTABLE-Urgent (Xray Chest 1 View- PORTABLE-Urgent .) (09.10.22 @ 12:17) >  IMPRESSION:  Clear lungs.    < end of copied text >  < from: CT Head No Cont (22 @ 21:26) >    IMPRESSION:  No acute intracranial CT abnormality.      < end of copied text >  
[facility]  Luis Limon PA-C  [address] [place], [place] [id number] [phone]      Chief Complaint:  Patient is a 87y old  Female who presents with a chief complaint of     HPI: 87y F pmhx asthma, anxiety, questionable CVA in 2007, diverticulosis, GERD, and previous vertebral fracture, recently admitted [facility] 8/21-8/26 for confusion and UTI, presents to ED [facility] from [facility] due to agitation.   . Reporting back pain. No reported falls/trauma by EMS.    GI asked to consult for abdominal pain     Allergies:  iodine (Unknown)  IV Contrast (Unknown)  IV DYE- burning, head tingling (Unknown)  novacaine (Unknown)      Medications:  acetaminophen     Tablet .. 650 milliGRAM(s) Oral every 6 hours PRN  chlorhexidine 2% Cloths 1 Application(s) Topical daily  enoxaparin Injectable 40 milliGRAM(s) SubCutaneous every 24 hours  melatonin 3 milliGRAM(s) Oral at bedtime PRN  metoprolol succinate ER 25 milliGRAM(s) Oral daily  OLANZapine Injectable 1.25 milliGRAM(s) IntraMuscular every 6 hours PRN  QUEtiapine 25 milliGRAM(s) Oral every 6 hours PRN  QUEtiapine 50 milliGRAM(s) Oral at bedtime  senna 2 Tablet(s) Oral at bedtime      PMHX/PSHX:  Anxiety    CVA (Cerebral Infarction)    Diverticulosis of intestine    GERD (gastroesophageal reflux disease)    Asthma    Cerebrovascular accident (CVA), unspecified mechanism    Vertebral fracture, osteoporotic    S/P Tonsillectomy    No significant past surgical history    History of tonsillectomy        Family history:  No pertinent family history in first degree relatives    Family history of amyotrophic lateral sclerosis    Family history of multiple sclerosis (Child)        Social History:     ROS:     General:  No wt loss, fevers, chills, night sweats, fatigue,   Eyes:  Good vision, no reported pain  ENT:  No sore throat, pain, runny nose, dysphagia  CV:  No pain, palpitations, hypo/hypertension  Resp:  No dyspnea, cough, tachypnea, wheezing  GI:  No pain, No nausea, No vomiting, No diarrhea, No constipation, No weight loss, No fever, No pruritis, No rectal bleeding, No tarry stools, No dysphagia,  :  No pain, bleeding, incontinence, nocturia  Muscle:  No pain, weakness  Neuro:  No weakness, tingling, memory problems  Psych:  No fatigue, insomnia, mood problems, depression  Endocrine:  No polyuria, polydipsia, cold/heat intolerance  Heme:  No petechiae, ecchymosis, easy bruisability  Skin:  No rash, tattoos, scars, edema      PHYSICAL EXAM:   Vital Signs:  Vital Signs Last 24 Hrs  T(C): 36.3 (19 Sep 2022 06:23), Max: 36.7 (18 Sep 2022 13:50)  T(F): 97.4 (19 Sep 2022 06:23), Max: 98 (18 Sep 2022 13:50)  HR: 74 (19 Sep 2022 06:23) (53 - 85)  BP: 120/67 (19 Sep 2022 06:23) (120/67 - 148/82)  BP(mean): --  RR: 18 (19 Sep 2022 06:23) (18 - 18)  SpO2: 97% (19 Sep 2022 06:23) (91% - 98%)    Parameters below as of 19 Sep 2022 06:23  Patient On (Oxygen Delivery Method): room air      Daily     Daily     GENERAL:  Appears stated age,   HEENT:  NC/AT,    CHEST:  Full & symmetric excursion,   HEART:  Regular rhythm  ABDOMEN:  Soft, non-tender, + distended,   EXTEREMITIES:  no cyanosis,clubbing or edema  SKIN:  No rash  NEURO:  Alert,    LABS:                    Imaging:          
PULMONARY CONSULT    HPI: 86 y/o F with PMH asthma, anxiety, questionable CVA in , diverticulosis, GERD, and previous vertebral fracture, recently admitted [facility] - for confusion and UTI. Presents from [facility] due to agitation. Endorses back pain. Found to be COVID positive on admission. CXR with clear lungs, normoxic. Denies SOB, cough, CP.         PAST MEDICAL & SURGICAL HISTORY:  Anxiety  CVA (Cerebral Infarction)   Diverticulosis of intestine  GERD (gastroesophageal reflux disease)  Asthma  Cerebrovascular accident (CVA), unspecified mechanism  Vertebral fracture, osteoporotic  S/P Tonsillectomy  History of tonsillectomy        Allergies  iodine (Unknown)  IV Contrast (Unknown)  IV DYE- burning, head tingling (Unknown)  novacaine (Unknown)      FAMILY HISTORY:  Family history of amyotrophic lateral sclerosis  Daughter  Family history of multiple sclerosis (Child)      Social history: non smoker     Review of Systems: ROS limited, pt confused   CONSTITUTIONAL: No fever, chills, or fatigue  EYES: No eye pain, visual disturbances, or discharge  ENMT:  No difficulty hearing, tinnitus, vertigo; No sinus or throat pain  NECK: No pain or stiffness  RESPIRATORY: Per above  CARDIOVASCULAR: No chest pain, palpitations, dizziness, or leg swelling  GASTROINTESTINAL: No abdominal or epigastric pain. No nausea, vomiting, or hematemesis; No diarrhea or constipation. No melena or hematochezia.  GENITOURINARY: No dysuria, frequency, hematuria, or incontinence  NEUROLOGICAL: per above   SKIN: No itching, burning, rashes, or lesions   MUSCULOSKELETAL: Per above   PSYCHIATRIC: No depression, anxiety, mood swings, or difficulty sleeping      Medications:  MEDICATIONS  (STANDING):  enoxaparin Injectable 40 milliGRAM(s) SubCutaneous every 24 hours  metoprolol succinate ER 25 milliGRAM(s) Oral daily          Vital Signs Last 24 Hrs  T(C): 36.7 (10 Sep 2022 12:17), Max: 37.1 (09 Sep 2022 18:29)  T(F): 98 (10 Sep 2022 12:17), Max: 98.7 (09 Sep 2022 18:29)  HR: 99 (10 Sep 2022 12:17) (75 - 99)  BP: 147/89 (10 Sep 2022 12:17) (102/52 - 147/89)  BP(mean): 69 (10 Sep 2022 02:25) (69 - 69)  RR: 18 (10 Sep 2022 12:17) (16 - 20)  SpO2: 96% (10 Sep 2022 12:17) (95% - 97%)    Parameters below as of 10 Sep 2022 12:17  Patient On (Oxygen Delivery Method): room air                    LABS:                        11.6   6.91  )-----------( 246      ( 09 Sep 2022 22:02 )             36.1         139  |  103  |  10  ----------------------------<  105<H>  4.3   |  27  |  0.64    Ca    9.6      09 Sep 2022 22:02    TPro  6.5  /  Alb  3.6  /  TBili  0.9  /  DBili  x   /  AST  30  /  ALT  20  /  AlkPhos  90              Urinalysis Basic - ( 10 Sep 2022 01:42 )    Color: Light Yellow / Appearance: Clear / S.010 / pH: x  Gluc: x / Ketone: Trace  / Bili: Negative / Urobili: Negative   Blood: x / Protein: Negative / Nitrite: Negative   Leuk Esterase: Negative / RBC: 1 /hpf / WBC 1 /HPF   Sq Epi: x / Non Sq Epi: 0 /hpf / Bacteria: Negative            Physical Examination:    General: No acute distress.      HEENT: Pupils equal, reactive to light.  Symmetric.    PULM: Clear to auscultation bilaterally, no significant sputum production    CVS: S1, S2    ABD: Soft, nondistended, nontender, normoactive bowel sounds, no masses    EXT: No edema, nontender    SKIN: Warm and well perfused, no rashes noted.    NEURO: Alert, follows some commands         RADIOLOGY REVIEWED  CXR: clear lungs 
normal

## 2024-08-07 NOTE — ED ADULT NURSE NOTE - VOIDING
Patient Contact    Attempt # 1    Was call answered?  No    Left message for patient to call back.      Rukhsana Sylvester RN on 8/7/2024 at 2:47 PM  [facility]   [phone]    without difficulty

## 2024-09-20 NOTE — H&P ADULT - PROBLEM/PLAN-2
[facility] ([phone]). You can also check with your insurance company for other providers in the area. Some possible outside resources include: [facility] ([place], [phone]); [facility] ([place], [phone]); [facility] ([place], [phone]); [facility], APSW ([place], [phone]).      [facility]  [address] [place] [address]  Phone: [phone]  Toll Free: [phone]        DISPLAY PLAN FREE TEXT

## 2024-10-23 NOTE — ED PROVIDER NOTE - WET READ LAUNCH FT
[facility]  DIVISION OF OTOLARYNGOLOGY- HEAD & NECK SURGERY  CONSULT      Leticia Day (:  1945) is a 79 y.o. female, here for evaluation of the following chief complaint(s):  Other (Pt would like to talk about her surgery that she has on  with . Pt would like to know if there might be another way other than surgery for her to do. )      ASSESSMENT/PLAN:  1. Facial skin lesion  2. Mouth lesion  -     CYTOLOGY, NON-GYN  3. Mucocele of buccal mucosa  -     CYTOLOGY, NON-GYN  4. Facial paralysis        This is a very pleasant 79 y.o. female here today for evaluation of the the above-noted complaints.      -FNA performed of right buccal lesion.  It was not as cystic as I would have expected a sialocele to be.  Discussed with the patient that this may be an underlying salivary gland neoplasm.  -Follow-up FNA results.  Depending on what this shows, may still plan for surgical excision.  Risk, benefits alternatives discussed with the patient.  A second opinion was offered but she declined.    Medical Decision Making:  The following items were considered in medical decision making:  Independent review of images  Review / order clinical lab tests  Review / order radiology tests  Decision to obtain old records  Review and summation of old records as accessed through [facility] if applicable    SUBJECTIVE/OBJECTIVE:  HPI    Leticia Day is here today for evaluation of a lesion of the ear and lesion of the mouth.  The patient states that she has had the right mouth lesion for several months.  She was seen by her PCP and referred here for further evaluation and management.  She states that she denies any prior trauma.  She does have a history of extensive smoking.  Now down to 3 cigarettes a day.    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  She feels that is now returned.  She has a painful and bleeding lesion of the ear.  There are no Wet Read(s) to document.

## 2025-01-06 NOTE — PROGRESS NOTE ADULT - PROBLEM SELECTOR PROBLEM 1
Patient needs to be evaluated by MD, and order bladder scan.
Toxic metabolic encephalopathy

## 2025-01-23 NOTE — ED ADULT NURSE NOTE - MODE OF DISCHARGE
Left message for patient to return call with what insulin is covered under his formulary.     
Ambulatory with cane/crutches/walker

## 2025-01-30 NOTE — BH PATIENT PROFILE - NSPROSPHOSPCHAPLAINYN_GEN_A_NUR
-Recommended pt to get a consult for obgyn, ophthalmology and lymph node exams yearly\\n-No lymphadenopathy in groin on exam today Detail Level: Zone no

## 2025-01-31 NOTE — PROVIDER CONTACT NOTE (OTHER) - NAME OF MD/NP/PA/DO NOTIFIED:
crissy garcia
You can access the FollowMyHealth Patient Portal offered by [facility] by registering at the following website: [email or web address]. By joining [employer]’s FollowMyHealth portal, you will also be able to view your health information using other applications (apps) compatible with our system.

## 2025-02-19 NOTE — ED ADULT TRIAGE NOTE - NSWEIGHTCALCTOOLDRUG_GEN_A_CORE
Postprocedure Follow Up Call    PROCEDURE & LEVELS: Bilateral L4-5, L5-S1 MBB#1     Date of procedure: 2/18/25    ADMISSION PAIN LEVEL: Can get up to 8/10 with activity    DISCHARGE PAIN LEVEL: 5/10    % IMPROVEMENT: None    Duration of Improvement: No relief    ANY CONCERNS POST PROCEDURE (INCLUDING RED FLAG SYMPTOMS): None    Patient denies any redness, swelling, fever, sudden/acute-onset weakness, bladder/bowel changes, saddle anesthesia.    Patient is aware to call the Pain Clinic or present to their nearest urgent care/ER for any new concerning symptoms.    Patient reports pain worsened with activities/walking, and after the MBB he had no relief of his usual pain. Would like to follow up in clinic to further discuss next steps.    used

## 2025-04-02 NOTE — BH CONSULTATION LIAISON ASSESSMENT NOTE - NSBHMSETHTPROC_PSY_A_CORE
Wash wound daily with soap and water, apply antibiotic ointment and keep covered with a bandage.  Return if you have increasing pain, redness, fever or pus.  Take all the antibiotics until gone.   Unable to assess

## 2025-04-04 NOTE — ED ADULT NURSE NOTE - CAS EDN DISCHARGE ASSESSMENT
Initiate Treatment: Oracea 40mg \\nSoolantra Cream
Plan: Patient understands to take medication daily and apply Soolantra at night.
Detail Level: Zone
Render In Strict Bullet Format?: No
Alert and oriented to person, place and time

## 2025-05-22 NOTE — BH TREATMENT PLAN - NSTXDCOTHRDATEEST_PSY_ALL_CORE
23-Feb-2023
06-Feb-2023
06-Mar-2023
06-Feb-2023
31-Jan-2023
27-Feb-2023
27-Mar-2023
23-Mar-2023

## 2025-05-23 NOTE — BEHAVIORAL HEALTH ASSESSMENT NOTE - NS ED BHA REVIEW OF ED CHART VITAL SIGNS REVIEWED
"FOLLOW UP: n/a    REASON FOR CONVERSATION: Vaginitis    SYMPTOMS: vaginal odor, irritation and discharge    OTHER: Patient with hx of BV. Symptoms started yesterday. Rx sent, advised to call if no improvement or symptoms worsen. Reviewed genital hygiene. Patient verbalized understanding    DISPOSITION: Order Prescription (overriding See Within 3 Days in Office)    Reason for Disposition   Vaginal odor (bad smell) not improved > 3 days following Care Advice    Answer Assessment - Initial Assessment Questions  1. SYMPTOM: \"What's the main symptom you're concerned about?\" (e.g., pain, itching, dryness)      Vaginal odor and irritation and discharge  2. LOCATION: \"Where is the  symptoms located?\" (e.g., inside/outside, left/right)      vaginally  3. ONSET: \"When did the  symptoms  start?\"      This morning  4. PAIN: \"Is there any pain?\" If Yes, ask: \"How bad is it?\" (Scale: 1-10; mild, moderate, severe)      denies  5. ITCHING: \"Is there any itching?\" If Yes, ask: \"How bad is it?\" (Scale: 1-10; mild, moderate, severe)      denies  6. CAUSE: \"What do you think is causing the discharge?\" \"Have you had the same problem before?\" \"What happened then?\"      BV--has had previously  7. OTHER SYMPTOMS: \"Do you have any other symptoms?\" (e.g., fever, itching, vaginal bleeding, pain with urination, injury to genital area, vaginal foreign body)      denies  8. PREGNANCY: \"Is there any chance you are pregnant?\" \"When was your last menstrual period?\"      LMP 5/17    Protocols used: Vaginal Symptoms-Adult-[place]    "
Left message for patient to return call.   
Regarding: BV symptoms  ----- Message from Warner NICOLE sent at 5/23/2025  1:34 PM EDT -----  Patient calling with BV symptoms wanting a prescription to be sent into pharmacy.    
Yes

## 2025-07-02 NOTE — BH INPATIENT PSYCHIATRY ASSESSMENT NOTE - RISK ASSESSMENT
Patient/Caregiver provided printed discharge information.
Patient is a moderate risk of harm to self or others. Risk factors: agitation, bipolar disorder, chronic pain  Protective factors: residential stability, social support from her daughter